# Patient Record
Sex: FEMALE | Race: WHITE | NOT HISPANIC OR LATINO | Employment: OTHER | URBAN - METROPOLITAN AREA
[De-identification: names, ages, dates, MRNs, and addresses within clinical notes are randomized per-mention and may not be internally consistent; named-entity substitution may affect disease eponyms.]

---

## 2017-01-04 ENCOUNTER — OFFICE VISIT (OUTPATIENT)
Dept: PHYSICAL MEDICINE AND REHAB | Facility: CLINIC | Age: 47
End: 2017-01-04

## 2017-01-04 VITALS
BODY MASS INDEX: 26.74 KG/M2 | HEIGHT: 70 IN | SYSTOLIC BLOOD PRESSURE: 123 MMHG | DIASTOLIC BLOOD PRESSURE: 73 MMHG | WEIGHT: 186.8 LBS | HEART RATE: 72 BPM

## 2017-01-04 DIAGNOSIS — M25.571 CHRONIC PAIN OF RIGHT ANKLE: Primary | ICD-10-CM

## 2017-01-04 DIAGNOSIS — G89.29 CHRONIC PAIN OF RIGHT ANKLE: Primary | ICD-10-CM

## 2017-01-04 DIAGNOSIS — S93.401A SPRAIN OF RIGHT ANKLE, UNSPECIFIED LIGAMENT, INITIAL ENCOUNTER: ICD-10-CM

## 2017-01-04 ASSESSMENT — PAIN SCALES - GENERAL: PAINLEVEL: NO PAIN (0)

## 2017-01-04 NOTE — NURSING NOTE
Patient given RX from Dr Villagomez for AFO. She was also given phone numbers and addresses for [facility], [facility] and [facility]. She is unsure which location her insurance will allow her to  Go.
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
## 2017-01-14 ENCOUNTER — TRANSFERRED RECORDS (OUTPATIENT)
Dept: HEALTH INFORMATION MANAGEMENT | Facility: CLINIC | Age: 47
End: 2017-01-14

## 2017-01-14 ENCOUNTER — THERAPY VISIT (OUTPATIENT)
Dept: PHYSICAL THERAPY | Facility: CLINIC | Age: 47
End: 2017-01-14
Payer: COMMERCIAL

## 2017-01-14 DIAGNOSIS — M25.371 RIGHT ANKLE INSTABILITY: ICD-10-CM

## 2017-01-14 DIAGNOSIS — M25.571 CHRONIC PAIN OF RIGHT ANKLE: Primary | ICD-10-CM

## 2017-01-14 DIAGNOSIS — G89.29 CHRONIC PAIN OF RIGHT ANKLE: Primary | ICD-10-CM

## 2017-01-14 PROCEDURE — 97110 THERAPEUTIC EXERCISES: CPT | Mod: GP | Performed by: PHYSICAL THERAPIST

## 2017-01-14 PROCEDURE — 97163 PT EVAL HIGH COMPLEX 45 MIN: CPT | Mod: GP | Performed by: PHYSICAL THERAPIST

## 2017-01-14 PROCEDURE — 97112 NEUROMUSCULAR REEDUCATION: CPT | Mod: GP | Performed by: PHYSICAL THERAPIST
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
## 2017-01-16 NOTE — PROGRESS NOTES
Subjective:                                     General health as reported by patient is good.  Pertinent medical history includes:  Diabetes and thyroid problems.    Other surgeries include:  Orthopedic surgery (ACL ( left )).  Current medications:  Thyroid medication and other (Insulin).  Current occupation is Research.    Primary job tasks include:  Prolonged sitting, driving and other (computer work).    Barriers include:  None as reported by patient.    Red flags:  None as reported by patient.                      Objective:    System    Physical Exam    General     ROS    Assessment/Plan:

## 2017-01-28 ENCOUNTER — THERAPY VISIT (OUTPATIENT)
Dept: PHYSICAL THERAPY | Facility: CLINIC | Age: 47
End: 2017-01-28
Payer: COMMERCIAL

## 2017-01-28 DIAGNOSIS — M25.371 RIGHT ANKLE INSTABILITY: Primary | ICD-10-CM

## 2017-01-28 PROCEDURE — 97110 THERAPEUTIC EXERCISES: CPT | Mod: GP | Performed by: PHYSICAL THERAPIST

## 2017-01-28 PROCEDURE — 97112 NEUROMUSCULAR REEDUCATION: CPT | Mod: GP | Performed by: PHYSICAL THERAPIST

## 2017-02-11 ENCOUNTER — THERAPY VISIT (OUTPATIENT)
Dept: PHYSICAL THERAPY | Facility: CLINIC | Age: 47
End: 2017-02-11
Payer: COMMERCIAL

## 2017-02-11 DIAGNOSIS — M25.371 RIGHT ANKLE INSTABILITY: Primary | ICD-10-CM

## 2017-02-11 PROCEDURE — 97110 THERAPEUTIC EXERCISES: CPT | Mod: GP | Performed by: PHYSICAL THERAPIST

## 2017-02-11 PROCEDURE — 97112 NEUROMUSCULAR REEDUCATION: CPT | Mod: GP | Performed by: PHYSICAL THERAPIST

## 2017-02-14 ENCOUNTER — OFFICE VISIT (OUTPATIENT)
Dept: ENDOCRINOLOGY | Facility: CLINIC | Age: 47
End: 2017-02-14

## 2017-02-14 VITALS
SYSTOLIC BLOOD PRESSURE: 112 MMHG | HEART RATE: 73 BPM | DIASTOLIC BLOOD PRESSURE: 75 MMHG | WEIGHT: 189.4 LBS | BODY MASS INDEX: 27.11 KG/M2 | HEIGHT: 70 IN

## 2017-02-14 DIAGNOSIS — E10.9 TYPE 1 DIABETES MELLITUS WITHOUT COMPLICATION (H): ICD-10-CM

## 2017-02-14 DIAGNOSIS — E10.9 TYPE 1 DIABETES MELLITUS WITHOUT COMPLICATION (H): Primary | ICD-10-CM

## 2017-02-14 LAB
CREAT SERPL-MCNC: 0.6 MG/DL (ref 0.52–1.04)
CREAT UR-MCNC: 74 MG/DL
GFR SERPL CREATININE-BSD FRML MDRD: NORMAL ML/MIN/1.7M2
HBA1C MFR BLD: 7.2 % (ref 4.3–6)
MICROALBUMIN UR-MCNC: 6 MG/L
MICROALBUMIN/CREAT UR: 7.81 MG/G CR (ref 0–25)

## 2017-02-14 NOTE — MR AVS SNAPSHOT
After Visit Summary   2/14/2017    Dali Martines    MRN: [id number]           Patient Information     Date Of Birth          1970        Visit Information        Provider Department      2/14/2017 2:30 PM Felicia Mckeon MD  [facility]        Today's Diagnoses     Type 1 diabetes mellitus without complication (H)    -  1      Care Instructions    Be sure to take your mealtime insulin before you eat    Keep a food log of what you ate in the evening on nights your bedtime correction doesn't bring you back to target.      To expedite your medication refill(s), please contact your pharmacy and have them fax a refill request to: [phone].  *Please allow 3 business days for routine medication refills.  *Please allow 5 business days for controlled substance medication refills.  --------------------  For scheduling appointments (including lab work), please request an appointment through [employer], or call: [phone].    For questions for your provider or the endocrine nurse, please send a [employer] message.  For after-hours urgent issues, please dial [phone], and ask to speak with the Endocrinologist On-Call.  --------------------  Please Note: If you are active on [employer], all future test results will be sent by [employer] message only and will no longer be sent by mail. You may also receive communication directly from your physician.          Follow-ups after your visit        Your next 10 appointments already scheduled     Feb 25, 2017 11:10 AM CST   [facility] Extremity with Chanelle Muniz PT   [facility] ([facility]  )    [address]  [facility] [place] [address]   [phone]            Mar 11, 2017 11:10 AM CST   LAKISHA Extremity with Chanelle Muniz PT   [facility] ([facility]  )    [address]  [facility] [place] [address]   [phone]            Mar 25, 2017 11:10  AM CDT   LAKISHA Extremity with Michi Esteves Pt, PT   [facility] ([facility]  )    [address]  [facility] [address]   [phone]            Apr 08, 2017 11:10 AM CDT   LAKISHA Extremity with Michi Esteves Pt, PT   [facility] ([facility] [place]  )    [address]  [facility] [address]   [phone]            Apr 18, 2017  3:30 PM CDT   (Arrive by 3:15 PM)   Return Visit with Yoel Betancourt MD   [facility] ([facility])    [address] [place] [address] [phone]            Apr 24, 2017 12:30 PM CDT   (Arrive by 12:15 PM)   Return Visit with Sara Carter MD   [facility] ([facility])    [address] [place] [address] [phone]            Aug 08, 2017  3:30 PM CDT   (Arrive by 3:15 PM)   RETURN DIABETES with Felicia Mckeon MD   [facility] ([facility])    [address] [place] [id number] [phone]              Who to contact     Please call your clinic at [phone] to:    Ask questions about your health    Make or cancel appointments    Discuss your medicines    Learn about your test results    Speak to your doctor   If you have compliments or concerns about an experience at your clinic, or if you wish to file a complaint, please contact [facility] Patient Relations at [phone] or email us at [email or web address]         Additional Information About Your Visit        MyChart Information     [employer] gives you secure access to your electronic health record. If you see a primary care provider, you can also send messages to your care team and make appointments. If you have questions, please call your primary care clinic.   "If you do not have a primary care provider, please call [phone] and they will assist you.      [employer] is an electronic gateway that provides easy, online access to your medical records. With [employer], you can request a clinic appointment, read your test results, renew a prescription or communicate with your care team.     To access your existing account, please contact your [facility] or call [phone] for assistance.        Care EveryWhere ID     This is your Care EveryWhere ID. This could be used by other organizations to access your [facility] medical records  [id number]        Your Vitals Were     Pulse Height BMI (Body Mass Index)             73 1.778 m (5' 10\") 27.18 kg/m2          Blood Pressure from Last 3 Encounters:   02/14/17 112/75   01/04/17 123/73   12/29/16 116/77    Weight from Last 3 Encounters:   02/14/17 85.9 kg (189 lb 6.4 oz)   01/04/17 84.7 kg (186 lb 12.8 oz)   10/18/16 88 kg (194 lb)               Primary Care Provider Office Phone # Fax #    Dimitri Alas [phone]       [address] [address]        Thank you!     Thank you for choosing [facility]  for your care. Our goal is always to provide you with excellent care. Hearing back from our patients is one way we can continue to improve our services. Please take a few minutes to complete the written survey that you may receive in the mail after your visit with us. Thank you!             Your Updated Medication List - Protect others around you: Learn how to safely use, store and throw away your medicines at www.disposemymeds.org.          This list is accurate as of: 2/14/17  5:03 PM.  Always use your most recent med list.                   Brand Name Dispense Instructions for use    aspirin 81 MG tablet      Take 1 tablet by mouth daily.       blood glucose monitoring lancets     4 Box    Use to test blood sugar 8 times daily or " as directed.       blood glucose monitoring test strip    ACCU-CHEK COLIN PLUS    700 strip    Test Blood Sugar 8 times daily or as directed       CALCIUM + D PO      Take one daily       citalopram 20 MG tablet    celeXA    135 tablet    Take 1.5 tablets (30 mg) by mouth daily       diclofenac 1 % Gel topical gel    VOLTAREN    100 g    Place 2 g onto the skin 4 times daily as needed for moderate pain       dulaglutide 1.5 MG/0.5ML pen    TRULICITY    6 mL    Inject 1.5 mg Subcutaneous every 7 days       econazole nitrate 1 % cream      Apply 0.5 inches topically daily.       fish oil-omega-3 fatty acids 1000 MG capsule      Take one daily       insulin degludec 100 UNIT/ML pen    TRESIBA    9 mL    Inject 18 Units Subcutaneous daily       insulin pen needle 31G X 8 MM    B-D U/F    450 each    Use 5 pen needles daily       levothyroxine 125 MCG tablet    SYNTHROID/LEVOTHROID    90 tablet    Take 1 tablet (125 mcg) by mouth daily       meloxicam 15 MG tablet    MOBIC    90 tablet    Take 1 tablet (15 mg) by mouth daily       methylphenidate 20 MG CR capsule    METADATE CD     Take 20 mg by mouth daily       mometasone-formoterol 100-5 MCG/ACT oral inhaler    DULERA    3 Inhaler    Inhale 2 puffs into the lungs 2 times daily       montelukast 10 MG tablet    SINGULAIR    30 tablet    Take 1 tablet (10 mg) by mouth every evening       * MULTIVITAMIN PO      Take  by mouth. Take one daily tab       * BIOCEL PO          NIZORAL PO      Take  by mouth. Shampoo daily       NovoLOG PENFILL 100 UNIT/ML injection   Generic drug:  insulin aspart     15 mL    1 unit per 15 grams CHO at all meals and snacks with correction scale of 1 unit per 50 mg/dL over 150.  Average daily use is 20 units.       ROGAINE EX      Externally apply  topically. daily       traZODone 50 MG tablet    DESYREL    180 tablet    Take 1-2 tablets by mouth nightly as needed for sleep.       UNABLE TO FIND      MEDICATION NAME: biosil       * Notice:   This list has 2 medication(s) that are the same as other medications prescribed for you. Read the directions carefully, and ask your doctor or other care provider to review them with you.

## 2017-02-14 NOTE — NURSING NOTE
"Chief Complaint   Patient presents with     RECHECK     f/u diabetes        Initial /75  Pulse 73  Ht 1.778 m (5' 10\")  Wt 85.9 kg (189 lb 6.4 oz)  BMI 27.18 kg/m2 Estimated body mass index is 27.18 kg/(m^2) as calculated from the following:    Height as of this encounter: 1.778 m (5' 10\").    Weight as of this encounter: 85.9 kg (189 lb 6.4 oz).  Medication Reconciliation: complete     Manasa Oconnor, CAMI         "

## 2017-02-14 NOTE — PROGRESS NOTES
This 46 year old woman returns for f/u of her type 1 diabetes. She also has hypothyroidism, psoriatic arthritis and joint pains, particularly in her right foot.    She is now taking tresiba 14 units daily She uses  Humalog at meals (1 for 15 )  and for correction (1 to drop 50 with goal of 70- 150)  She uses the expert meter and has been letting it calculate doses most of the time.  She was taking trulicity 1.5 mg each week but stopped two weeks ago because of cost.  She hasn't noticed that it has had much of an effect on her BG or her appetite.     She continues to use a dexcom almost daily.  She checks her sugars 1- 2 times a day and has an average over the last 30 days is 197 with range from .  Her CGM average is 130 over the last 2 weeks with 62 % of BG in range.  12% were low.       She doesn't always recognize her lows but the CGM helps her make sure she doesn't develop serious lows.  Review of the CGM shows was above 200 on 4 of last 7 nights. She believes this occurs even if she does corrections at hs.  On one night she gave a correction at 2 am and she fell to 70 over 3 hours.  On two other occasions she remained agove 200 all night despite corrections.  On the fourth night she cam down to less than 150 but then was back to 200 by 3 am.  On the other 3 nights of the last week she was stable and in target all night.  She usually is in target between 6 am and noon, but admits that she has trouble covering her noon meal and usually doesn't give insulin until after the meal.  As a result she is often low by 2-3 pm.  This occurred on 5 of the last 7 days.        She continues to be limited by pain in her left foot. She had surgery go to repair a ligamental tear in her left foot a year ago and her recovery has been slow. She is now working with PT and thinks that is at least keeping her from regressing.    She saw the eye MD and all is fine.  She has no sores on her feet.  Mood is OK.      Current  Outpatient Prescriptions on File Prior to Visit:  Multiple Vitamins-Minerals (BIOCEL PO)    blood glucose monitoring (ACCU-CHEK COLIN PLUS) test strip Test Blood Sugar 8 times daily or as directed   UNABLE TO FIND MEDICATION NAME: biosil   diclofenac (VOLTAREN) 1 % GEL Place 2 g onto the skin 4 times daily as needed for moderate pain   montelukast (SINGULAIR) 10 MG tablet Take 1 tablet (10 mg) by mouth every evening   meloxicam (MOBIC) 15 MG tablet Take 1 tablet (15 mg) by mouth daily   mometasone-formoterol (DULERA) 100-5 MCG/ACT oral inhaler Inhale 2 puffs into the lungs 2 times daily   NOVOLOG PENFILL 100 UNIT/ML soln 1 unit per 15 grams CHO at all meals and snacks with correction scale of 1 unit per 50 mg/dL over 150.  Average daily use is 20 units.   levothyroxine (SYNTHROID, LEVOTHROID) 125 MCG tablet Take 1 tablet (125 mcg) by mouth daily   Insulin Degludec 100 UNIT/ML SOPN Inject 18 Units Subcutaneous daily   blood glucose monitoring (ACCU-CHEK FASTCLIX) lancets Use to test blood sugar 8 times daily or as directed.   citalopram (CELEXA) 20 MG tablet Take 1.5 tablets (30 mg) by mouth daily   insulin pen needle (B-D U/F) 31G X 8 MM Use 5 pen needles daily   methylphenidate (METADATE CD) 20 MG CR capsule Take 20 mg by mouth daily   traZODone (DESYREL) 50 MG tablet Take 1-2 tablets by mouth nightly as needed for sleep.   aspirin 81 MG tablet Take 1 tablet by mouth daily.   Calcium Carbonate-Vitamin D (CALCIUM + D PO) Take one daily   econazole nitrate 1 % cream Apply 0.5 inches topically daily.   fish oil-omega-3 fatty acids (FISH OIL) 1000 MG capsule Take one daily   Multiple Vitamin (MULTIVITAMIN OR) Take  by mouth. Take one daily tab   Ketoconazole (NIZORAL PO) Take  by mouth. Shampoo daily   Minoxidil (ROGAINE EX) Externally apply  topically. daily   dulaglutide (TRULICITY) 1.5 MG/0.5ML pen Inject 1.5 mg Subcutaneous every 7 days (Patient not taking: Reported on 2/14/2017)     No current  "facility-administered medications on file prior to visit.     ROS: 10 point ROS neg other than the symptoms noted above in the HPI.    So Hx - lives alone . Works at [employer].  Often eats lunch at desk. Hasn't been able to exercise.    Vital signs:   /75  Pulse 73  Ht 1.778 m (5' 10\")  Wt 85.9 kg (189 lb 6.4 oz)  BMI 27.18 kg/m2  Estimated body mass index is 27.18 kg/(m^2) as calculated from the following:    Height as of this encounter: 1.778 m (5' 10\").    Weight as of this encounter: 85.9 kg (189 lb 6.4 oz).    NAD  Eyes - no periorbital edema, conjunctival injection, scleral icterus  CV - RRR.  Normal pulses in feet.  No edema  Neuro - sensation intact to monofilament on soles of feet.    Skin - normal texture   Feet - no ulcers     Recent Labs   Lab Test  02/14/17   1546  02/14/17   1540  10/21/16   0958 06/20/16 04/27/16   1402  02/16/16   1734  02/16/16   1719 02/16/16 07/09/14   1641   09/27/13   0928  09/16/13   1612   06/04/13   1555  02/15/13   0935   11/29/10   1009   A1C   --    --    --    --    --    --    --    --    --    --    --    --   7.2*   --   7.6*  6.8*   < >   --    HEMOGLOBINA1   --    --    --   7.4*   --    --    --   7.9*   < >   --    < >   --    --    --    --    --    < >   --    TSH   --    --   0.88   --    --   1.86   --    --    < >  0.11*   < >   --    --    < >   --    --    < >  1.89   T4   --    --    --    --    --    --    --    --    --   1.31   --    --    --    --    --    --    --   8.5   LDL   --    --    --    --    --    --    --    --    --    --    --   123   --    --    --   135*   < >  84   HDL   --    --    --    --    --    --    --    --    --    --    --   78   --    --    --   77   < >  94   TRIG   --    --    --    --    --    --    --    --    --    --    --   51   --    --    --   63   < >  45   CR  0.60   --    --    --   0.49*   --    --    --    < >   --    < >   --    --    --    --   0.56   < >  0.77   MICROL   --   6   --    --    --    --   6   " --    < >   --    < >   --    --    --    --    --    < >   --     < > = values in this interval not displayed.       A1c today 7.2    Assessment and plan:    1.  Diabetes control.  Overall she is doing well.  Told her that effect of trulicity may not be completely gone and to watch her post meal coverage. We may need to change the carb ratio.  Suggested she aim to take her lunch dose at the start of the meal and keep track of what she eats on the nights she stays so high overnight.  This may guide her on changes she can do to prevent the overnight highs from happening.     2.  Diabetes complications.  Will check renal function today.  Eyes and feet are OK.    3.  Hypothyroidism.  At target last fall.    4. CVD risk. Not on statin but may start post menopause. BP is OK.    F/u ~ 4 mo    I spent 25 min with her the majority of which were spent counseling about diabetes management    Felicia Mckeon MD

## 2017-02-14 NOTE — PATIENT INSTRUCTIONS
Be sure to take your mealtime insulin before you eat    Keep a food log of what you ate in the evening on nights your bedtime correction doesn't bring you back to target.      To expedite your medication refill(s), please contact your pharmacy and have them fax a refill request to: [phone].  *Please allow 3 business days for routine medication refills.  *Please allow 5 business days for controlled substance medication refills.  --------------------  For scheduling appointments (including lab work), please request an appointment through [employer], or call: [phone].    For questions for your provider or the endocrine nurse, please send a [employer] message.  For after-hours urgent issues, please dial [phone], and ask to speak with the Endocrinologist On-Call.  --------------------  Please Note: If you are active on [facility], all future test results will be sent by [facility] message only and will no longer be sent by mail. You may also receive communication directly from your physician.

## 2017-02-14 NOTE — LETTER
2/14/2017       RE: Dali Martines  [address]  [facility] [address]     Dear Colleague,    Thank you for referring your patient, Dali Martines, to the [facility] ENDOCRINOLOGY at [facility]. Please see a copy of my visit note below.    This 46 year old woman returns for f/u of her type 1 diabetes. She also has hypothyroidism, psoriatic arthritis and joint pains, particularly in her right foot.    She is now taking tresiba 14 units daily She uses  Humalog at meals (1 for 15 )  and for correction (1 to drop 50 with goal of 70- 150)  She uses the expert meter and has been letting it calculate doses most of the time.  She was taking trulicity 1.5 mg each week but stopped two weeks ago because of cost.  She hasn't noticed that it has had much of an effect on her BG or her appetite.     She continues to use a dexcom almost daily.  She checks her sugars 1- 2 times a day and has an average over the last 30 days is 197 with range from .  Her CGM average is 130 over the last 2 weeks with 62 % of BG in range.  12% were low.       She doesn't always recognize her lows but the CGM helps her make sure she doesn't develop serious lows.  Review of the CGM shows was above 200 on 4 of last 7 nights. She believes this occurs even if she does corrections at hs.  On one night she gave a correction at 2 am and she fell to 70 over 3 hours.  On two other occasions she remained agove 200 all night despite corrections.  On the fourth night she cam down to less than 150 but then was back to 200 by 3 am.  On the other 3 nights of the last week she was stable and in target all night.  She usually is in target between 6 am and noon, but admits that she has trouble covering her noon meal and usually doesn't give insulin until after the meal.  As a result she is often low by 2-3 pm.  This occurred on 5 of the last 7 days.        She continues to be limited by pain in her left foot. She had  surgery go to repair a ligamental tear in her left foot a year ago and her recovery has been slow. She is now working with PT and thinks that is at least keeping her from regressing.    She saw the eye MD and all is fine.  She has no sores on her feet.  Mood is OK.      Current Outpatient Prescriptions on File Prior to Visit:  Multiple Vitamins-Minerals (BIOCEL PO)    blood glucose monitoring (ACCU-CHEK COLIN PLUS) test strip Test Blood Sugar 8 times daily or as directed   UNABLE TO FIND MEDICATION NAME: biosil   diclofenac (VOLTAREN) 1 % GEL Place 2 g onto the skin 4 times daily as needed for moderate pain   montelukast (SINGULAIR) 10 MG tablet Take 1 tablet (10 mg) by mouth every evening   meloxicam (MOBIC) 15 MG tablet Take 1 tablet (15 mg) by mouth daily   mometasone-formoterol (DULERA) 100-5 MCG/ACT oral inhaler Inhale 2 puffs into the lungs 2 times daily   NOVOLOG PENFILL 100 UNIT/ML soln 1 unit per 15 grams CHO at all meals and snacks with correction scale of 1 unit per 50 mg/dL over 150.  Average daily use is 20 units.   levothyroxine (SYNTHROID, LEVOTHROID) 125 MCG tablet Take 1 tablet (125 mcg) by mouth daily   Insulin Degludec 100 UNIT/ML SOPN Inject 18 Units Subcutaneous daily   blood glucose monitoring (ACCU-CHEK FASTCLIX) lancets Use to test blood sugar 8 times daily or as directed.   citalopram (CELEXA) 20 MG tablet Take 1.5 tablets (30 mg) by mouth daily   insulin pen needle (B-D U/F) 31G X 8 MM Use 5 pen needles daily   methylphenidate (METADATE CD) 20 MG CR capsule Take 20 mg by mouth daily   traZODone (DESYREL) 50 MG tablet Take 1-2 tablets by mouth nightly as needed for sleep.   aspirin 81 MG tablet Take 1 tablet by mouth daily.   Calcium Carbonate-Vitamin D (CALCIUM + D PO) Take one daily   econazole nitrate 1 % cream Apply 0.5 inches topically daily.   fish oil-omega-3 fatty acids (FISH OIL) 1000 MG capsule Take one daily   Multiple Vitamin (MULTIVITAMIN OR) Take  by mouth. Take one daily tab  "  Ketoconazole (NIZORAL PO) Take  by mouth. Shampoo daily   Minoxidil (ROGAINE EX) Externally apply  topically. daily   dulaglutide (TRULICITY) 1.5 MG/0.5ML pen Inject 1.5 mg Subcutaneous every 7 days (Patient not taking: Reported on 2/14/2017)     No current facility-administered medications on file prior to visit.     ROS: 10 point ROS neg other than the symptoms noted above in the HPI.    So Hx - lives alone . Works at [employer].  Often eats lunch at desk. Hasn't been able to exercise.    Vital signs:   /75  Pulse 73  Ht 1.778 m (5' 10\")  Wt 85.9 kg (189 lb 6.4 oz)  BMI 27.18 kg/m2  Estimated body mass index is 27.18 kg/(m^2) as calculated from the following:    Height as of this encounter: 1.778 m (5' 10\").    Weight as of this encounter: 85.9 kg (189 lb 6.4 oz).    NAD  Eyes - no periorbital edema, conjunctival injection, scleral icterus  CV - RRR.  Normal pulses in feet.  No edema  Neuro - sensation intact to monofilament on soles of feet.    Skin - normal texture   Feet - no ulcers     Recent Labs   Lab Test  02/14/17   1546  02/14/17   1540  10/21/16   0958 06/20/16 04/27/16   1402  02/16/16   1734  02/16/16   1719 02/16/16 07/09/14   1641   09/27/13   0928  09/16/13   1612   06/04/13   1555  02/15/13   0935   11/29/10   1009   A1C   --    --    --    --    --    --    --    --    --    --    --    --   7.2*   --   7.6*  6.8*   < >   --    HEMOGLOBINA1   --    --    --   7.4*   --    --    --   7.9*   < >   --    < >   --    --    --    --    --    < >   --    TSH   --    --   0.88   --    --   1.86   --    --    < >  0.11*   < >   --    --    < >   --    --    < >  1.89   T4   --    --    --    --    --    --    --    --    --   1.31   --    --    --    --    --    --    --   8.5   LDL   --    --    --    --    --    --    --    --    --    --    --   123   --    --    --   135*   < >  84   HDL   --    --    --    --    --    --    --    --    --    --    --   78   --    --    --   77   < >  94   TRIG   -- "    --    --    --    --    --    --    --    --    --    --   51   --    --    --   63   < >  45   CR  0.60   --    --    --   0.49*   --    --    --    < >   --    < >   --    --    --    --   0.56   < >  0.77   MICROL   --   6   --    --    --    --   6   --    < >   --    < >   --    --    --    --    --    < >   --     < > = values in this interval not displayed.       A1c today 7.2    Assessment and plan:    1.  Diabetes control.  Overall she is doing well.  Told her that effect of trulicity may not be completely gone and to watch her post meal coverage. We may need to change the carb ratio.  Suggested she aim to take her lunch dose at the start of the meal and keep track of what she eats on the nights she stays so high overnight.  This may guide her on changes she can do to prevent the overnight highs from happening.     2.  Diabetes complications.  Will check renal function today.  Eyes and feet are OK.    3.  Hypothyroidism.  At target last fall.    4. CVD risk. Not on statin but may start post menopause. BP is OK.    F/u ~ 4 mo    I spent 25 min with her the majority of which were spent counseling about diabetes management    Felicia Mckeon MD

## 2017-02-17 DIAGNOSIS — E10.9 TYPE 1 DIABETES, HBA1C GOAL < 7% (H): ICD-10-CM

## 2017-02-17 RX ORDER — INSULIN ASPART 100 [IU]/ML
INJECTION, SOLUTION INTRAVENOUS; SUBCUTANEOUS
Qty: 15 ML | Refills: 4 | Status: SHIPPED | OUTPATIENT
Start: 2017-02-17 | End: 2017-12-05

## 2017-02-25 ENCOUNTER — THERAPY VISIT (OUTPATIENT)
Dept: PHYSICAL THERAPY | Facility: CLINIC | Age: 47
End: 2017-02-25
Payer: COMMERCIAL

## 2017-02-25 DIAGNOSIS — M25.371 RIGHT ANKLE INSTABILITY: ICD-10-CM

## 2017-02-25 PROCEDURE — 97112 NEUROMUSCULAR REEDUCATION: CPT | Mod: GP | Performed by: PHYSICAL THERAPIST

## 2017-02-25 PROCEDURE — 97110 THERAPEUTIC EXERCISES: CPT | Mod: GP | Performed by: PHYSICAL THERAPIST

## 2017-02-27 DIAGNOSIS — E10.9 TYPE 1 DIABETES MELLITUS WITHOUT COMPLICATION (H): ICD-10-CM

## 2017-02-27 NOTE — TELEPHONE ENCOUNTER
dulaglutide (TRULICITY) 1.5 MG/0.5ML pen      Last Written Prescription Date:  10/25/2016  Last Fill Quantity: 6 ml,   # refills: 3  Last Office Visit : 2/14/2017  Future Office visit:  8/8/2017

## 2017-03-15 DIAGNOSIS — E10.9 TYPE 1 DIABETES MELLITUS WITHOUT COMPLICATION (H): ICD-10-CM

## 2017-03-18 ENCOUNTER — THERAPY VISIT (OUTPATIENT)
Dept: PHYSICAL THERAPY | Facility: CLINIC | Age: 47
End: 2017-03-18
Payer: COMMERCIAL

## 2017-03-18 DIAGNOSIS — M25.371 RIGHT ANKLE INSTABILITY: ICD-10-CM

## 2017-03-18 PROCEDURE — 97110 THERAPEUTIC EXERCISES: CPT | Mod: GP | Performed by: PHYSICAL THERAPIST

## 2017-03-18 PROCEDURE — 97112 NEUROMUSCULAR REEDUCATION: CPT | Mod: GP | Performed by: PHYSICAL THERAPIST

## 2017-03-27 DIAGNOSIS — E10.9 TYPE 1 DIABETES MELLITUS WITHOUT COMPLICATION (H): ICD-10-CM

## 2017-04-08 ENCOUNTER — THERAPY VISIT (OUTPATIENT)
Dept: PHYSICAL THERAPY | Facility: CLINIC | Age: 47
End: 2017-04-08
Payer: COMMERCIAL

## 2017-04-08 DIAGNOSIS — M25.371 RIGHT ANKLE INSTABILITY: ICD-10-CM

## 2017-04-08 PROCEDURE — 97110 THERAPEUTIC EXERCISES: CPT | Mod: GP | Performed by: PHYSICAL THERAPIST

## 2017-04-08 PROCEDURE — 97112 NEUROMUSCULAR REEDUCATION: CPT | Mod: GP | Performed by: PHYSICAL THERAPIST

## 2017-04-19 ASSESSMENT — ENCOUNTER SYMPTOMS
SNORES LOUDLY: 0
RECTAL BLEEDING: 0
BLOOD IN STOOL: 0
DYSPNEA ON EXERTION: 1
ARTHRALGIAS: 1
ABDOMINAL PAIN: 1
POSTURAL DYSPNEA: 1
RECTAL PAIN: 0
MUSCLE WEAKNESS: 0
DIARRHEA: 0
WHEEZING: 0
INCREASED ENERGY: 1
DECREASED APPETITE: 1
SORE THROAT: 0
WEIGHT LOSS: 0
JOINT SWELLING: 0
JAUNDICE: 0
SMELL DISTURBANCE: 0
FEVER: 0
VOMITING: 0
TROUBLE SWALLOWING: 0
NAUSEA: 0
NECK MASS: 1
SPUTUM PRODUCTION: 0
BACK PAIN: 1
STIFFNESS: 1
MUSCLE CRAMPS: 0
WEIGHT GAIN: 0
RESPIRATORY PAIN: 1
POLYDIPSIA: 0
NECK PAIN: 0
HALLUCINATIONS: 0
CONSTIPATION: 0
SHORTNESS OF BREATH: 1
NIGHT SWEATS: 0
DECREASED LIBIDO: 0
ALTERED TEMPERATURE REGULATION: 1
MYALGIAS: 1
SWOLLEN GLANDS: 1
HOT FLASHES: 0
CHILLS: 1
FATIGUE: 1
COUGH DISTURBING SLEEP: 0
SINUS PAIN: 1
BRUISES/BLEEDS EASILY: 1
TASTE DISTURBANCE: 0
HEMOPTYSIS: 0
BLOATING: 1
POLYPHAGIA: 0
SINUS CONGESTION: 0
HOARSE VOICE: 1
COUGH: 0
BOWEL INCONTINENCE: 0
HEARTBURN: 0

## 2017-04-24 ENCOUNTER — OFFICE VISIT (OUTPATIENT)
Dept: PULMONOLOGY | Facility: CLINIC | Age: 47
End: 2017-04-24
Attending: INTERNAL MEDICINE
Payer: COMMERCIAL

## 2017-04-24 VITALS
SYSTOLIC BLOOD PRESSURE: 135 MMHG | DIASTOLIC BLOOD PRESSURE: 89 MMHG | HEART RATE: 75 BPM | RESPIRATION RATE: 16 BRPM | OXYGEN SATURATION: 98 %

## 2017-04-24 DIAGNOSIS — R59.9 SWOLLEN LYMPH NODES: ICD-10-CM

## 2017-04-24 DIAGNOSIS — J45.30 MILD PERSISTENT ASTHMA WITHOUT COMPLICATION: Primary | ICD-10-CM

## 2017-04-24 PROCEDURE — 99212 OFFICE O/P EST SF 10 MIN: CPT | Mod: ZF

## 2017-04-24 PROCEDURE — 86735 MUMPS ANTIBODY: CPT | Performed by: INTERNAL MEDICINE

## 2017-04-24 PROCEDURE — 36415 COLL VENOUS BLD VENIPUNCTURE: CPT | Performed by: INTERNAL MEDICINE

## 2017-04-24 RX ORDER — MONTELUKAST SODIUM 5 MG/1
5 TABLET, CHEWABLE ORAL AT BEDTIME
Qty: 30 TABLET | Refills: 3 | Status: SHIPPED | OUTPATIENT
Start: 2017-04-24 | End: 2017-08-18

## 2017-04-24 ASSESSMENT — PAIN SCALES - GENERAL: PAINLEVEL: NO PAIN (0)

## 2017-04-24 NOTE — MR AVS SNAPSHOT
After Visit Summary   4/24/2017    Dali Martines    MRN: [id number]           Patient Information     Date Of Birth          1970        Visit Information        Provider Department      4/24/2017 12:30 PM Sara Carter MD [facility]        Today's Diagnoses     Mild persistent asthma without complication    -  1    Swollen lymph nodes           Follow-ups after your visit        Follow-up notes from your care team     Return in about 4 months (around 8/24/2017).      Your next 10 appointments already scheduled     Apr 24, 2017  1:30 PM CDT   Lab with  LAB    Health Lab ([facility])    [address]  1st Floor  [facility] [address]   [phone]            Apr 25, 2017  5:30 PM CDT   (Arrive by 5:15 PM)   Return Visit with Yoel Betancourt MD   [facility] ([facility])    [address]  3rd Floor  [facility] [address]   [phone]            May 06, 2017 11:10 AM CDT   [facility]KISHA Extremity with Chanelle Muniz PT   [facility] ([facility]  )    [address]  [facility] [address]   [phone]            May 20, 2017 11:10 AM CDT   LAKISHA Extremity with Chanelle Muniz PT   [facility] ([facility]  )    [address]  [facility] [address]   [phone]            Sergey 10, 2017 11:10 AM CDT   LAKISHA Extremity with Mcihi Esteves Pt, PT   [facility] ([facility]  )    [address]  [facility] [address]   [phone]            Jun 24, 2017 11:10 AM CDT   LAKISHA Extremity with Michi Esteves Pt, PT   [facility] ([facility]  )    [address]  [facility] [address]   [phone]            Aug 08, 2017  3:30 PM CDT   (Arrive by 3:15 PM)    RETURN DIABETES with Felicia Mckeon MD   [facility] ([facility])    [address] [id number] [phone]            Aug 21, 2017 11:30 AM CDT   (Arrive by 11:15 AM)   Return Visit with Sara Crater MD   [facility] ([facility])    [address] [id number] [phone]              Who to contact     If you have questions or need follow up information about today's clinic visit or your schedule please contact [facility] directly at [phone].  Normal or non-critical lab and imaging results will be communicated to you by [facility] [facility], letter or phone within 4 business days after the clinic has received the results. If you do not hear from us within 7 days, please contact the clinic through [facility] [facility] or phone. If you have a critical or abnormal lab result, we will notify you by phone as soon as possible.  Submit refill requests through [employer] or call your pharmacy and they will forward the refill request to us. Please allow 3 business days for your refill to be completed.          Additional Information About Your Visit        [employer] Information     [employer] gives you secure access to your electronic health record. If you see a primary care provider, you can also send messages to your care team and make appointments. If you have questions, please call your primary care clinic.  If you do not have a primary care provider, please call [phone] and they will assist you.        Care EveryWhere ID     This is your Care EveryWhere ID. This could be used by other organizations to access your [place] medical records  [id number]        Your Vitals Were     Pulse Respirations Pulse Oximetry             75 16 98%          Blood Pressure from Last 3 Encounters:   04/24/17 135/89   02/14/17 112/75   01/04/17 123/73     Weight from Last 3 Encounters:   02/14/17 85.9 kg (189 lb 6.4 oz)   01/04/17 84.7 kg (186 lb 12.8 oz)   10/18/16 88 kg (194 lb)                 Today's Medication Changes          These changes are accurate as of: 4/24/17  1:27 PM.  If you have any questions, ask your nurse or doctor.               These medicines have changed or have updated prescriptions.        Dose/Directions    * montelukast 10 MG tablet   Commonly known as:  SINGULAIR   This may have changed:  Another medication with the same name was added. Make sure you understand how and when to take each.   Used for:  Mild persistent allergic asthma   Changed by:  Sara Carter MD        Dose:  10 mg   Take 1 tablet (10 mg) by mouth every evening   Quantity:  30 tablet   Refills:  6       * montelukast 5 MG chewable tablet   Commonly known as:  SINGULAIR   This may have changed:  You were already taking a medication with the same name, and this prescription was added. Make sure you understand how and when to take each.   Used for:  Mild persistent asthma without complication   Changed by:  Sara Carter MD        Dose:  5 mg   Take 1 tablet (5 mg) by mouth At Bedtime   Quantity:  30 tablet   Refills:  3       MULTIVITAMIN PO   This may have changed:  Another medication with the same name was removed. Continue taking this medication, and follow the directions you see here.   Changed by:  Felicia Mckeon MD        Take  by mouth. Take one daily tab   Refills:  0       * Notice:  This list has 2 medication(s) that are the same as other medications prescribed for you. Read the directions carefully, and ask your doctor or other care provider to review them with you.         Where to get your medicines      These medications were sent to [facility] [id number]  [address] [place] [address]     Phone:  [phone]     montelukast 5 MG chewable tablet                 Primary Care Provider Office Phone # Fax #    Dimitri Alas [phone]       [address] [address]        Thank you!     Thank you for choosing [facility]  for your care. Our goal is always to provide you with excellent care. Hearing back from our patients is one way we can continue to improve our services. Please take a few minutes to complete the written survey that you may receive in the mail after your visit with us. Thank you!             Your Updated Medication List - Protect others around you: Learn how to safely use, store and throw away your medicines at www.disposemymeds.org.          This list is accurate as of: 4/24/17  1:27 PM.  Always use your most recent med list.                   Brand Name Dispense Instructions for use    aspirin 81 MG tablet      Take 1 tablet by mouth daily.       blood glucose monitoring lancets     4 Box    Use to test blood sugar 8 times daily or as directed.       blood glucose monitoring test strip    ACCU-CHEK COLIN PLUS    700 strip    Test Blood Sugar 8 times daily or as directed       CALCIUM + D PO      Take one daily       citalopram 20 MG tablet    celeXA    135 tablet    Take 1.5 tablets (30 mg) by mouth daily       diclofenac 1 % Gel topical gel    VOLTAREN    100 g    Place 2 g onto the skin 4 times daily as needed for moderate pain       dulaglutide 1.5 MG/0.5ML pen    TRULICITY    6 mL    Inject 1.5 mg Subcutaneous every 7 days       econazole nitrate 1 % cream      Apply 0.5 inches topically daily.       fish oil-omega-3 fatty acids 1000 MG capsule      Take one daily       insulin degludec 100 UNIT/ML pen    TRESIBA    15 mL    Inject 18 Units Subcutaneous daily       insulin pen needle 31G X 8 MM    B-D U/F    450 each    Use 5 pen needles daily       levothyroxine 125 MCG tablet    SYNTHROID/LEVOTHROID    90 tablet    Take 1 tablet (125 mcg) by mouth daily        meloxicam 15 MG tablet    MOBIC    90 tablet    Take 1 tablet (15 mg) by mouth daily       methylphenidate 20 MG CR capsule    METADATE CD     Take 20 mg by mouth daily       mometasone-formoterol 100-5 MCG/ACT oral inhaler    DULERA    3 Inhaler    Inhale 2 puffs into the lungs 2 times daily       * montelukast 10 MG tablet    SINGULAIR    30 tablet    Take 1 tablet (10 mg) by mouth every evening       * montelukast 5 MG chewable tablet    SINGULAIR    30 tablet    Take 1 tablet (5 mg) by mouth At Bedtime       MULTIVITAMIN PO      Take  by mouth. Take one daily tab       NIZORAL PO      Take  by mouth. Shampoo daily       NovoLOG PENFILL 100 UNIT/ML injection   Generic drug:  insulin aspart     15 mL    1 unit per 15 grams CHO at all meals and snacks with correction scale of 1 unit per 50 mg/dL over 150.  Average daily use is 20 units.       ROGAINE EX      Externally apply  topically. daily       traZODone 50 MG tablet    DESYREL    180 tablet    Take 1-2 tablets by mouth nightly as needed for sleep.       * UNABLE TO FIND      MEDICATION NAME: biosil       * UNABLE TO FIND      Biosil       * Notice:  This list has 4 medication(s) that are the same as other medications prescribed for you. Read the directions carefully, and ask your doctor or other care provider to review them with you.

## 2017-04-24 NOTE — NURSING NOTE
Chief Complaint   Patient presents with     Asthma     Patient is being seen for follow up of asthma/Pt c/o mild congestion headaches enlarged lymph, Chest tightness x 1 month.      Bessie Cosme  CMA at 12:37 PM on 4/24/2017

## 2017-04-24 NOTE — PROGRESS NOTES
Reason for Visit  Dali Martines is a 46 year old female who is followed for asthma  Pulmonary HPI  In late march, early April she had sore throat, chest tightness, short of. She resumed half-tab Singulair then inhaler. Then got back on full tab of Singulair which improved above symptoms. Peak flows have been about 450. Off Dulera now.   Last 10 days lymph node swelling on the right, sore on the floor of the mouth, chills, anorexia, headache. Ankle wound healing is coming along slowly, hasn't been active lately.     The patient was seen and examined by Sara Carter MD   Current Outpatient Prescriptions   Medication     insulin degludec (TRESIBA) 100 UNIT/ML pen     dulaglutide (TRULICITY) 1.5 MG/0.5ML pen     NOVOLOG PENFILL 100 UNIT/ML soln     Multiple Vitamins-Minerals (BIOCEL PO)     blood glucose monitoring (ACCU-CHEK COLIN PLUS) test strip     UNABLE TO FIND     diclofenac (VOLTAREN) 1 % GEL     montelukast (SINGULAIR) 10 MG tablet     meloxicam (MOBIC) 15 MG tablet     mometasone-formoterol (DULERA) 100-5 MCG/ACT oral inhaler     levothyroxine (SYNTHROID, LEVOTHROID) 125 MCG tablet     blood glucose monitoring (ACCU-CHEK FASTCLIX) lancets     citalopram (CELEXA) 20 MG tablet     insulin pen needle (B-D U/F) 31G X 8 MM     methylphenidate (METADATE CD) 20 MG CR capsule     traZODone (DESYREL) 50 MG tablet     aspirin 81 MG tablet     Calcium Carbonate-Vitamin D (CALCIUM + D PO)     econazole nitrate 1 % cream     fish oil-omega-3 fatty acids (FISH OIL) 1000 MG capsule     Multiple Vitamin (MULTIVITAMIN OR)     Ketoconazole (NIZORAL PO)     Minoxidil (ROGAINE EX)     No current facility-administered medications for this visit.      No Known Allergies  Social History     Social History     Marital status: Single     Spouse name: N/A     Number of children: 0     Years of education: N/A     Occupational History     research [employer]     Social History Main Topics     Smoking status: Never Smoker      Smokeless tobacco: Never Used     Alcohol use 0.0 oz/week     0 Standard drinks or equivalent per week      Comment: moderately     Drug use: No     Sexual activity: Not on file     Other Topics Concern     Parent/Sibling W/ Cabg, Mi Or Angioplasty Before 65f 55m? Yes     Social History Narrative     Past Medical History:   Diagnosis Date     Anemia      Anxiety      Arthritis 2014    Psoriatic arthritis     Back injury 1988     Dry eye syndrome      Dyslipidemia      Endometriosis 2002    adehsions seen at laparoscopy     GERD (gastroesophageal reflux disease)      Hypothyroidism     8 yoa     SOB (shortness of breath) 3/11/2014     Type I (juvenile type) diabetes mellitus without mention of complication, not stated as uncontrolled     when 15      Uncomplicated asthma Fall 2013     ROS Pulmonary  Dyspnea: No, Cough: No, Chest pain: Yes, Wheezing: No, Sputum Production: No, Hemoptysis: No  A complete ROS was otherwise negative except as noted in the HPI.  /89 (BP Location: Right arm, Patient Position: Chair, Cuff Size: Adult Regular)  Pulse 75  Resp 16  SpO2 98%  Exam:   GENERAL APPEARANCE: Well developed, well nourished, alert, and in no apparent distress.  EYES: PERRL, EOMI  HENT: Nasal mucosa with no edema and no hyperemia. No nasal polyps.  EARS: Canals clear, TMs normal  MOUTH: Oral mucosa is moist, without any lesions, no tonsillar enlargement, no oropharyngeal exudate.  NECK: supple, no masses, no thyromegaly.   LYMPHATICS: no palpable lymphadenopathy  RESP: normal percussion, good air flow throughout.  No crackles. No rhonchi. No wheezes.  CV: Normal S1, S2, regular rhythm, normal rate. No murmur.  No rub. No gallop. No LE edema.   ABDOMEN:  Bowel sounds normal, soft, nontender, no HSM or masses.   MS: extremities normal. No clubbing. No cyanosis.  SKIN: no rash on limited exam  NEURO: Mentation intact, speech normal, normal strength and tone, normal gait and stance  PSYCH: mentation appears  normal. and affect normal/bright  Results:  No new testing    Assessment and plan: Dali Martines is a 46-year-old female with type 1 diabetes who was clinically diagnosed with asthma as an adult.  She has been sedentary for over a year due to ankle injury, surgery, poor wound healing  1.  Asthma.  Mild intermittent asthma, stable.   Up to date on vaccinations.      - peak flow meter and action plan reviewed  - spacer provided  - continue full dose Singulair for asthma maintenance, reduce to 5mg in a couple of weeks because she has noticed constipation with full dose      2.  Cervical adenopathy detected on ultrasound done to evaluate neck discomfort. it still comes and goes, especially related to infection. She is concerned about mumps, we called MD, recommended IgM blood test, will be performed today.     I will see her back in clinic in 4 months.

## 2017-04-25 ENCOUNTER — OFFICE VISIT (OUTPATIENT)
Dept: RHEUMATOLOGY | Facility: CLINIC | Age: 47
End: 2017-04-25
Attending: INTERNAL MEDICINE
Payer: COMMERCIAL

## 2017-04-25 VITALS
DIASTOLIC BLOOD PRESSURE: 81 MMHG | RESPIRATION RATE: 18 BRPM | HEART RATE: 82 BPM | BODY MASS INDEX: 28.41 KG/M2 | WEIGHT: 198 LBS | SYSTOLIC BLOOD PRESSURE: 121 MMHG

## 2017-04-25 DIAGNOSIS — E06.3 HYPOTHYROIDISM DUE TO HASHIMOTO'S THYROIDITIS: Primary | ICD-10-CM

## 2017-04-25 DIAGNOSIS — M77.9 ENTHESOPATHY: ICD-10-CM

## 2017-04-25 DIAGNOSIS — R70.0 ESR RAISED: ICD-10-CM

## 2017-04-25 DIAGNOSIS — E06.3 HYPOTHYROIDISM DUE TO HASHIMOTO'S THYROIDITIS: ICD-10-CM

## 2017-04-25 LAB
BASOPHILS # BLD AUTO: 0.1 10E9/L (ref 0–0.2)
BASOPHILS NFR BLD AUTO: 0.8 %
CRP SERPL-MCNC: <2.9 MG/L (ref 0–8)
DIFFERENTIAL METHOD BLD: NORMAL
EOSINOPHIL # BLD AUTO: 0.1 10E9/L (ref 0–0.7)
EOSINOPHIL NFR BLD AUTO: 2 %
ERYTHROCYTE [DISTWIDTH] IN BLOOD BY AUTOMATED COUNT: 12.3 % (ref 10–15)
ERYTHROCYTE [SEDIMENTATION RATE] IN BLOOD BY WESTERGREN METHOD: 41 MM/H (ref 0–20)
HCT VFR BLD AUTO: 36.7 % (ref 35–47)
HGB BLD-MCNC: 12.3 G/DL (ref 11.7–15.7)
IMM GRANULOCYTES # BLD: 0 10E9/L (ref 0–0.4)
IMM GRANULOCYTES NFR BLD: 0.3 %
LYMPHOCYTES # BLD AUTO: 2.2 10E9/L (ref 0.8–5.3)
LYMPHOCYTES NFR BLD AUTO: 33.7 %
MCH RBC QN AUTO: 30.4 PG (ref 26.5–33)
MCHC RBC AUTO-ENTMCNC: 33.5 G/DL (ref 31.5–36.5)
MCV RBC AUTO: 91 FL (ref 78–100)
MONOCYTES # BLD AUTO: 0.5 10E9/L (ref 0–1.3)
MONOCYTES NFR BLD AUTO: 7.9 %
NEUTROPHILS # BLD AUTO: 3.7 10E9/L (ref 1.6–8.3)
NEUTROPHILS NFR BLD AUTO: 55.3 %
NRBC # BLD AUTO: 0 10*3/UL
NRBC BLD AUTO-RTO: 0 /100
PLATELET # BLD AUTO: 303 10E9/L (ref 150–450)
RBC # BLD AUTO: 4.04 10E12/L (ref 3.8–5.2)
TSH SERPL DL<=0.005 MIU/L-ACNC: 0.66 MU/L (ref 0.4–4)
WBC # BLD AUTO: 6.6 10E9/L (ref 4–11)

## 2017-04-25 PROCEDURE — 85652 RBC SED RATE AUTOMATED: CPT | Performed by: INTERNAL MEDICINE

## 2017-04-25 PROCEDURE — 85025 COMPLETE CBC W/AUTO DIFF WBC: CPT | Performed by: INTERNAL MEDICINE

## 2017-04-25 PROCEDURE — 99212 OFFICE O/P EST SF 10 MIN: CPT | Mod: ZF

## 2017-04-25 PROCEDURE — 36415 COLL VENOUS BLD VENIPUNCTURE: CPT | Performed by: INTERNAL MEDICINE

## 2017-04-25 PROCEDURE — 84443 ASSAY THYROID STIM HORMONE: CPT | Performed by: INTERNAL MEDICINE

## 2017-04-25 PROCEDURE — 86140 C-REACTIVE PROTEIN: CPT | Performed by: INTERNAL MEDICINE

## 2017-04-25 ASSESSMENT — PAIN SCALES - GENERAL: PAINLEVEL: MODERATE PAIN (4)

## 2017-04-25 NOTE — PROGRESS NOTES
Rheumatology Visit     Dali Martines MRN# [id number]   YOB: 1970 Age: 44 year old         Primary care provider: Dimitri Alas          Assessment and Plan:     Psoriatic Arthriitis versus Rheumatoid Arthritis     Ms. Dali Martines is a 44 year-old woman with PMH of type I DM, Hashimoto's thyroiditis, and possible psoriasis in childhood, with family history of RA, type I DM, Hashimoto's, and psoriasis presents who presents with recurrent acute onset pain, swelling, stiffness in L hand.    Most likely explanation of her symptoms is psoriatic arthritis given the presentation of asymmetric, transient joint pain/swelling/stiffness, new low titer NOLVIA (vs 2008), family history of psoriasis and active psoriasis found on exam, plus morning heel pain. Psoriasis tends to be worst in fall/winter months, and this corresponds to when she has had these episodes of arthritis in the hand.    She has no active synovitis at this time and based on that I certainly do not think that we need to move on disease-modifying drugs.      I am not sure what this episode represented, whether it was some trauma that she does not remember or whether she actually was having a transient flare of inflammatory arthritis.      No matter what it was, I do not want her on Meloxicam long term with her diabetes due to its risks of the kidneys.  We have discussed that in some detail today.      I also think it may be a cause of her weight gain, potentially with some fluid retention, though she has not had her T4/TSH checked in the last year.      Accordingly, I am going to check her CBC with diff, her CRP, sed rate and a T4, TSH today.      I have, however, asked her to totally stop the meloxicam and to use it only on a p.r.n. basis if things flare.      In the meantime, she can try using the topical diclofenac also on the right fifth digit.      I would like to see her in 4 months to see how this strategy is going.  Certainly if she  is having worsening joint problems despite this, I think we need to consider disease-modifying drugs, although she has not wanted to be on those in the past and I have not been convinced based on the severity of her exam which is pretty minimal that she would need them.          This visit was 25 minutes in duration, over 50% in counseling.                            History of Present Illness:   Ms. Dali Martines is a 44-year-old woman with a history of type I DM and Hashimoto's thyroiditis who presents for evaluation of recurrent stiffness, swelling, and pain in the left hand.    Symptoms first occurred in August 2008, when she began having stiffness and pain in the left hand, particularly in the PIPs and MCPs. Symptoms spontaneously resolved after about 6-8 weeks. She was previously seen by Rheumatology here around that time (see note from Dr. Betancourt on 12/9/2008). Briefly, assessment was that she had a self-limited episode of inflammatory arthralgias, possibly viral or early psoriatic arthritis. Negative NOLVIA, RF, Lyme serologies and normal CRP and ESR. Given her family history of RA, there was a thought to start her on HCQ if anti-CCP antibodies were positive, but they were found to be negative. No imaging of the hand was performed.    Last September (2014), she experienced recurrent acute onset pain, stiffness, and swelling in the L hand very similar to her symptoms in 2008. Specifically, had swelling across MCPs and in 3rd PIP. She could hardly bend the hand sometimes due to the welling. Had morning stiffness lasting 30-45 minutes. No other joints were involved.    Issues with her left hand lingered for several months but eventually self-resolved around January. As of today, her only notable symptom is heel pain in the mornings. No fevers, chills, or weight loss. No back pain, vision changes, nausea, vomiting, diarrhea, abdominal pain, or blood in stool or urine. No rash, sicca symptoms, or oral ulcers.    It  seems she has a history of hypermobility, e.g. could bend wrist back to forearm, bend down and touch palms flat on floor, when she was younger. Used to get frequent ankle sprains, and has had multiple tendon and ligament tears over the years.    SEPTEMBER 22, 2015, INTERVAL HISTORY       Since we have last seen the patient, she feels like her joints were generally better over the summer.  She did have some right ankle pain that has continued to be tender at times.  She also got some pretty significant tennis elbow during the summer, but both of these did well with meloxicam.  Her tennis elbow did not respond as well to a compressive band.      About 10 days ago, however, she again started having dull hand pain, left generally greater than right, except for her right fifth digit.  She has about an hour of associated morning stiffness.  This is very similar to what she has had the last 2 years.  Last year, this seemed to last most of the year, but the prior year this was only about 3 months in duration.       Throughout this time, she has had no development of clear-cut psoriasis.  She has carried a diagnosis of psoriasis in the past, but it has not been a persistent problem with her skin.  She does have a strong family history, however, with this affecting her uncle and her mother.       Recall that based on her symptoms and her type 1 diabetes, we had previously checked her for the anti-citrullinated peptide antibodies, but she does not have those.     December 08 2015 Interval history     Since last visit and starting the Meloxicam daily, she feels that her left 5th digit pain has improved significantly. She did have an URI recently with some SOB and had a Prednisone burst of 20 mg daily for five days and she felt that her pain was completely gone with the steroids. She is not noticing any morning stiffness, swelling or erythema in the left 5th digit. She will occasionally have some chronic pain in the DIP of the  right hand but very seldomly. Overall, she feels that her joint pain is better since starting the Meloxicam.     She has noted a small lump on the palmar aspect of her right hand. It is not painful and has not become swollen or red.     Her left ankle has become more bothersome and she thinks she will be needing surgery soon on that side.     When she was having an upper respiratory infection, she noted some swelling under the right mandible and it became so large that she felt it would obstruct her breathing when she turned her head to the right side. The swelling has come down but it is still present and slightly painful to touch. She has not had any weight loss, fevers, chills or night sweats.     No symptoms of dry mouth, eyes, oral lesions, rash, abdominal pain, nausea, vomiting.     Interim history 5/17/2016:  Doing well on Meloxicam. Since last visit, had to go off of Meloxicam for ankle surgery (torn tendon). A couple flares off of it, recently went back on due to the pain. Always has been in L hand, now in right hand and both elbows. Mainly painful (5/10) and stiff. Stiffness begins in the morning, takes about 30 minutes to subside. No noted erythema, warmth or edema of joints. Has two nodules on base of 2nd MCP, can be painful occasionally, no changing drastically in size. Was doing better in the winter, so wondering if there could be a seasonal component. No known seasonal allergies.    Had the chicken pox around age 12. In the past 10-15 years, will periodically get shingles presenting as unilateral painful, itchy rash around L waist every few years. Most recently it occurred in April and was much worse than usual (nerve pain to the point of not being able to sleep). Muscle relaxant helped clear it up and allow for sleep. Has continued intermittent fever and chills since this episode. Decreased appetite, mild nausea. No other rashes since winter.     INTERVAL HISTORY (10/18/2016):  Since we have last  seen the patient, she tried going down on meloxicam and actually has tolerated that pretty well.  She has a little bit more stiffness in the morning in her left hand, but the right hand is pretty good by comparison.  She occasionally has some sporadic sharp pains there, but nothing severe and nothing persistent.  Her overall morning stiffness in the left hand is only about 30 minutes.  Her PIP joints are always the worst, and her fourth and fifth fingers tend to bother her a little bit more.      She did have some palmar contracture developing but with hand therapy those improved without any injections.  She has also avoiding a trackball mouse as that seemed to make things worse.      She continues to have some pain in her right ankle.  She had a bony fragment removed there in 01/2016 by Dr. Tran.  That has hurt a fair amount since and the nature of the pain is not particularly inflammatory and suggests more of a DJD problem.  She also has some pain in the left knee which had an ACL construction in 1995, also possibly consistent with DJD.      She has had some intermittent attacks of very low-grade psoriasis over her chest up in the clavicle area, but has none currently.  She has no other significant new problems.     INTERVAL HISTORY:  Since we last saw the patient, she had an odd episode of pain in her left hand on 02/20.  She said this initially started very sharp, then she got a lump in the palm of her hand that felt inflamed, and then she noticed some bruising in the area.  She does not recall any trauma to it.  She resumed meloxicam, which she had stopped and with that felt better over the next 2 weeks, but has remained on it since.      She also has a sensation in her right fifth digit that is consistently sore and somewhat achy.  That has persisted even with the meloxicam.      Finally, on the right ankle, which has been sprained several times in the past, she continues to wear a brace.  She actually uses  the topical meloxicam on that area, but has not done so on the fifth finger.      In all these areas she can have some stiffness in the morning lasting for up to an hour.      Her other concern is that she has gained about 10 pounds.  She does not think she has changed her eating much.                  Review of Systems:   Negative other than what is stated in the HPI above           Past Medical History:     Past Medical History:   Diagnosis Date     Anemia      Anxiety      Arthritis 2014    Psoriatic arthritis     Back injury 1988     Dry eye syndrome      Dyslipidemia      Endometriosis 2002    adehsions seen at laparoscopy     GERD (gastroesophageal reflux disease)      Hypothyroidism     8 yoa     SOB (shortness of breath) 3/11/2014     Type I (juvenile type) diabetes mellitus without mention of complication, not stated as uncontrolled     when 15      Uncomplicated asthma Fall 2013   Possible psoriasis as a child  Multiple tendon and ligament injuries    Patient Active Problem List    Diagnosis Date Noted     Right ankle instability 01/14/2017     Priority: Medium     Left knee pain 07/07/2016     Priority: Medium     Swelling of limb 03/15/2016     Priority: Medium     Other postprocedural status(V45.89) 03/15/2016     Priority: Medium     ESR raised 01/03/2016     Priority: Medium     Right foot pain 10/14/2015     Priority: Medium     Chronic pain of right ankle 07/30/2015     Priority: Medium     Enthesopathy 07/20/2015     Priority: Medium     Pain in joint, multiple sites 07/20/2015     Priority: Medium     PAIN KNEE JOINT 06/17/2015     Priority: Medium     Diabetes (H) 02/26/2015     Priority: Medium     Screening for other eye conditions 02/26/2015     Priority: Medium     Encounter for other specified aftercare 02/24/2015     Priority: Medium     PAIN FOOT 02/24/2015     Priority: Medium     SOB (shortness of breath) 03/11/2014     Priority: Medium     Cervicalgia 06/25/2012     Priority: Medium      Chronic low back pain 06/25/2012     Priority: Medium     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/2012     Priority: Medium     Cutaneous skin tags 06/12/2012     Priority: Medium     Type 1 diabetes, HbA1c goal < 7% (H) 04/05/2011     Priority: Medium     Anxiety 04/05/2011     Priority: Medium     CARDIOVASCULAR SCREENING; LDL GOAL LESS THAN 100 10/31/2010     Priority: Medium     GERD (gastroesophageal reflux disease) 02/24/2009     Priority: Medium             Past Surgical History:     Past Surgical History:   Procedure Laterality Date     C REPAIR CRUCIATE LIGAMENT,KNEE       C STOMACH SURGERY PROCEDURE UNLISTED  December 2002     COLONOSCOPY  2002     ESOPHAGOSCOPY, GASTROSCOPY, DUODENOSCOPY (EGD), COMBINED  1/7/2014    Procedure: COMBINED ESOPHAGOSCOPY, GASTROSCOPY, DUODENOSCOPY (EGD), BIOPSY SINGLE OR MULTIPLE;;  Surgeon: Kraig Nicole MD;  Location:  GI     GYN SURGERY      Laparotomy to remove endometrial tissue     HC BREATH HYDROGEN TEST N/A 6/17/2014    Procedure: HYDROGEN BREATH TEST;  Surgeon: Deacon Alberts MD;  Location:  GI     HYDROGEN BREATH TEST  6/17/14     LAPAROSCOPIC APPENDECTOMY  2002     LAPAROTOMY, LYSIS ADHESIONS, COMBINED  2002    endometriosis     SOFT TISSUE SURGERY  December 2014    right ankle tendon injury             Social History:     Social History   Substance Use Topics     Smoking status: Never Smoker     Smokeless tobacco: Never Used     Alcohol use 0.0 oz/week     0 Standard drinks or equivalent per week      Comment: moderately   Lives on her own. No children. Works as a researcher in education.          Family History:   Father with RA, Hashimoto's thyroiditis, and type I DM  Mother with psoriasis  Possible SLE in cousins  No family history of gout or IBD         Allergies:   No Known Allergies          Medications:     Current Outpatient Prescriptions   Medication Sig Dispense Refill     UNABLE TO FIND Biosil       montelukast (SINGULAIR) 5  MG chewable tablet Take 1 tablet (5 mg) by mouth At Bedtime 30 tablet 3     insulin degludec (TRESIBA) 100 UNIT/ML pen Inject 18 Units Subcutaneous daily 15 mL 11     dulaglutide (TRULICITY) 1.5 MG/0.5ML pen Inject 1.5 mg Subcutaneous every 7 days 6 mL 3     NOVOLOG PENFILL 100 UNIT/ML soln 1 unit per 15 grams CHO at all meals and snacks with correction scale of 1 unit per 50 mg/dL over 150.  Average daily use is 20 units. 15 mL 4     blood glucose monitoring (ACCU-CHEK COLIN PLUS) test strip Test Blood Sugar 8 times daily or as directed 700 strip 3     UNABLE TO FIND MEDICATION NAME: biosil       diclofenac (VOLTAREN) 1 % GEL Place 2 g onto the skin 4 times daily as needed for moderate pain 100 g 11     montelukast (SINGULAIR) 10 MG tablet Take 1 tablet (10 mg) by mouth every evening 30 tablet 6     meloxicam (MOBIC) 15 MG tablet Take 1 tablet (15 mg) by mouth daily 90 tablet 3     mometasone-formoterol (DULERA) 100-5 MCG/ACT oral inhaler Inhale 2 puffs into the lungs 2 times daily 3 Inhaler 3     levothyroxine (SYNTHROID, LEVOTHROID) 125 MCG tablet Take 1 tablet (125 mcg) by mouth daily 90 tablet 3     blood glucose monitoring (ACCU-CHEK FASTCLIX) lancets Use to test blood sugar 8 times daily or as directed. 4 Box 3     citalopram (CELEXA) 20 MG tablet Take 1.5 tablets (30 mg) by mouth daily 135 tablet 1     insulin pen needle (B-D U/F) 31G X 8 MM Use 5 pen needles daily 450 each 3     methylphenidate (METADATE CD) 20 MG CR capsule Take 20 mg by mouth daily       traZODone (DESYREL) 50 MG tablet Take 1-2 tablets by mouth nightly as needed for sleep. 180 tablet 0     aspirin 81 MG tablet Take 1 tablet by mouth daily.       Calcium Carbonate-Vitamin D (CALCIUM + D PO) Take one daily       econazole nitrate 1 % cream Apply 0.5 inches topically daily.       fish oil-omega-3 fatty acids (FISH OIL) 1000 MG capsule Take one daily       Multiple Vitamin (MULTIVITAMIN OR) Take  by mouth. Take one daily tab        Ketoconazole (NIZORAL PO) Take  by mouth. Shampoo daily       Minoxidil (ROGAINE EX) Externally apply  topically. daily              Physical Exam:   Blood pressure 121/81, pulse 82, resp. rate 18, weight 89.8 kg (198 lb), not currently breastfeeding.     Limited today to joint exam below, and skin exam.   Constitutional: [id number] cooperative  Eyes: nl EOM, PERRLA, vision, conjunctiva, sclera  ENT: nl external ears, nose, hearing, lips, teeth, gums, throat  No mucous membrane lesions, normal saliva pool  Neck: no mass or thyroid enlargement  Resp: lungs clear to auscultation, nl to palpation  CV: RRR, no murmurs, rubs or gallops, no edema  GI: no ABD mass or tenderness, no HSM  : not tested  Lymph:  No supraclavicular, inguinal or epitrochlear nodes  MS:  She has a small tendon nodule noted on the plantar aspect of the right hand   All other  TMJ, neck, shoulder, elbow, wrist, MCP/PIP/DIP, spine, hip, knee, ankle, and foot MTP/IP joints were examined. Tender right subtalar joint but sans synovitis.No active synovitis. +Heberden's nodes. Full ROM.  Normal  strength. No dactylitis,  tenosynovitis, enthespathy   Skin:  NO active psoriasis along hairline or elsewhere. No nail pitting, alopecia, rash, nodules or lesions  Neuro: nl cranial nerves, strength, sensation, DTRs.   Psych: nl judgement, orientation, memory, affect.         Data:     Component      Latest Ref Rng 4/27/2016   WBC      4.0 - 11.0 10e9/L 4.8   RBC Count      3.8 - 5.2 10e12/L 3.63 (L)   Hemoglobin      11.7 - 15.7 g/dL 11.2 (L)   Hematocrit      35.0 - 47.0 % 33.6 (L)   MCV      78 - 100 fl 93   MCH      26.5 - 33.0 pg 30.9   MCHC      31.5 - 36.5 g/dL 33.3   RDW      10.0 - 15.0 % 12.7   Platelet Count      150 - 450 10e9/L 294   Diff Method       Automated Method   % Neutrophils       59.3   % Lymphocytes       30.3   % Monocytes       7.8   % Eosinophils       1.3   % Basophils       1.1   % Immature Granulocytes       0.2   Nucleated  RBCs      0 /100 0   Absolute Neutrophil      1.6 - 8.3 10e9/L 2.8   Absolute Lymphocytes      0.8 - 5.3 10e9/L 1.4   Absolute Monocytes      0.0 - 1.3 10e9/L 0.4   Absolute Eosinophils      0.0 - 0.7 10e9/L 0.1   Absolute Basophils      0.0 - 0.2 10e9/L 0.1   Abs Immature Granulocytes      0 - 0.4 10e9/L 0.0   Absolute Nucleated RBC       0.0   TSH      0.40 - 4.00 mU/L        Lab Results   Component Value Date    AST 19 12/22/2014    AST 36 2/15/2013    AST 25 11/29/2010    ALT 22 12/22/2014    ALT 30 2/15/2013    ALT 8 11/23/2011    ALKPHOS 109 12/22/2014    ALKPHOS 89 2/15/2013    ALKPHOS 65 11/29/2010     Reviewed Rheumatology lab flowsheet. Pertinent results:  2014 -- NOLVIA 1.6, negative RF, normal CRP, mildly elevated ESR, unremarkable x-rays of hands and wrists

## 2017-04-25 NOTE — MR AVS SNAPSHOT
After Visit Summary   4/25/2017    Dali Martines    MRN: [id number]           Patient Information     Date Of Birth          1970        Visit Information        Provider Department      4/25/2017 5:30 PM Yoel Betancourt MD [facility] Rheumatology        Today's Diagnoses     Hypothyroidism due to Hashimoto's thyroiditis    -  1    Enthesopathy        ESR raised           Follow-ups after your visit        Your next 10 appointments already scheduled     May 06, 2017 11:10 AM CDT   LAKISHA Extremity with Chanelle Muniz, PT   [facility] ([facility]  )    [address]  [facility] [address]   [phone]            May 20, 2017 11:10 AM CDT   LAKISHA Extremity with Chanelle Muniz, PT   [facility] ([facility]  )    [address]  [facility] [address]   [phone]            Sergey 10, 2017 11:10 AM CDT   LAKISHA Extremity with Michi Esteves Pt, PT   [facility] ([facility]  )    [address]  [facility] [address]   [phone]            Jun 24, 2017 11:10 AM CDT   LAKISHA Extremity with Michi Esteves Pt, PT   [facility] ([facility]  )    [address]  [facility] [address]   [phone]            Aug 08, 2017  3:30 PM CDT   (Arrive by 3:15 PM)   RETURN DIABETES with Felicia Mckeon MD   [facility] Endocrinology ([facility])    [address] [facility] [address] [phone]            Aug 21, 2017 11:30 AM CDT   (Arrive by 11:15 AM)   Return Visit with Sara Carter MD   [facility] ([facility])    [address]  [address] [facility] [address]   [phone]            Aug 29, 2017  2:00 PM CDT   (Arrive by 1:45 PM)   Return Visit  with Yoel Betancourt MD   [facility] Rheumat[facility] ([facility])    [address]  [address] [facility] [address] [phone]              Who to contact     If you have questions or need follow up information about today's clinic visit or your schedule please contact [facility] RHEUMATOLOGY directly at [phone].  Normal or non-critical lab and imaging results will be communicated to you by MyChart, letter or phone within 4 business days after the clinic has received the results. If you do not hear from us within 7 days, please contact the clinic through [employer] or phone. If you have a critical or abnormal lab result, we will notify you by phone as soon as possible.  Submit refill requests through [employer] or call your pharmacy and they will forward the refill request to us. Please allow 3 business days for your refill to be completed.          Additional Information About Your Visit        [employer] Information     [employer] gives you secure access to your electronic health record. If you see a primary care provider, you can also send messages to your care team and make appointments. If you have questions, please call your primary care clinic.  If you do not have a primary care provider, please call [phone] and they will assist you.        Care EveryWhere ID     This is your Care EveryWhere ID. This could be used by other organizations to access your [facility] medical records  [id number]        Your Vitals Were     Pulse Respirations BMI (Body Mass Index)             82 18 28.41 kg/m2          Blood Pressure from Last 3 Encounters:   04/25/17 121/81   04/24/17 135/89   02/14/17 112/75    Weight from Last 3 Encounters:   04/25/17 89.8 kg (198 lb)   02/14/17 85.9 kg (189 lb 6.4 oz)   01/04/17 84.7 kg (186 lb 12.8 oz)               Primary Care Provider Office Phone # Fax #    Dimitri Alas [phone]       [address]  [facility] [address]        Thank you!     Thank you for choosing [facility] RHEUMATOLOGY  for your care. Our goal is always to provide you with excellent care. Hearing back from our patients is one way we can continue to improve our services. Please take a few minutes to complete the written survey that you may receive in the mail after your visit with us. Thank you!             Your Updated Medication List - Protect others around you: Learn how to safely use, store and throw away your medicines at www.disposemymeds.org.          This list is accurate as of: 4/25/17 11:59 PM.  Always use your most recent med list.                   Brand Name Dispense Instructions for use    aspirin 81 MG tablet      Take 1 tablet by mouth daily.       blood glucose monitoring lancets     4 Box    Use to test blood sugar 8 times daily or as directed.       blood glucose monitoring test strip    ACCU-CHEK COLIN PLUS    700 strip    Test Blood Sugar 8 times daily or as directed       CALCIUM + D PO      Take one daily       citalopram 20 MG tablet    celeXA    135 tablet    Take 1.5 tablets (30 mg) by mouth daily       diclofenac 1 % Gel topical gel    VOLTAREN    100 g    Place 2 g onto the skin 4 times daily as needed for moderate pain       dulaglutide 1.5 MG/0.5ML pen    TRULICITY    6 mL    Inject 1.5 mg Subcutaneous every 7 days       econazole nitrate 1 % cream      Apply 0.5 inches topically daily.       fish oil-omega-3 fatty acids 1000 MG capsule      Take one daily       insulin degludec 100 UNIT/ML pen    TRESIBA    15 mL    Inject 18 Units Subcutaneous daily       insulin pen needle 31G X 8 MM    B-D U/F    450 each    Use 5 pen needles daily       levothyroxine 125 MCG tablet    SYNTHROID/LEVOTHROID    90 tablet    Take 1 tablet (125 mcg) by mouth daily       meloxicam 15 MG tablet    MOBIC    90 tablet    Take 1 tablet (15 mg) by mouth daily       methylphenidate 20 MG CR capsule    METADATE CD     Take 20 mg by  mouth daily       mometasone-formoterol 100-5 MCG/ACT oral inhaler    DULERA    3 Inhaler    Inhale 2 puffs into the lungs 2 times daily       * montelukast 10 MG tablet    SINGULAIR    30 tablet    Take 1 tablet (10 mg) by mouth every evening       * montelukast 5 MG chewable tablet    SINGULAIR    30 tablet    Take 1 tablet (5 mg) by mouth At Bedtime       MULTIVITAMIN PO      Take  by mouth. Take one daily tab       NIZORAL PO      Take  by mouth. Shampoo daily       NovoLOG PENFILL 100 UNIT/ML injection   Generic drug:  insulin aspart     15 mL    1 unit per 15 grams CHO at all meals and snacks with correction scale of 1 unit per 50 mg/dL over 150.  Average daily use is 20 units.       ROGAINE EX      Externally apply  topically. daily       traZODone 50 MG tablet    DESYREL    180 tablet    Take 1-2 tablets by mouth nightly as needed for sleep.       * UNABLE TO FIND      MEDICATION NAME: biosil       * UNABLE TO FIND      Biosil       * Notice:  This list has 4 medication(s) that are the same as other medications prescribed for you. Read the directions carefully, and ask your doctor or other care provider to review them with you.

## 2017-04-25 NOTE — LETTER
4/25/2017       RE: Dali Martines  [address]  [facility] [id number]     Dear Colleague,    Thank you for referring your patient, Dali Martines, to the [facility] RHEUMATOLOGY at [facility]. Please see a copy of my visit note below.    Rheumatology Visit     Dali Martines MRN# [id number]   YOB: 1970 Age: 44 year old         Primary care provider: Dimitri Alas          Assessment and Plan:     Psoriatic Arthriitis versus Rheumatoid Arthritis     Ms. Dali Martines is a 44 year-old woman with PMH of type I DM, Hashimoto's thyroiditis, and possible psoriasis in childhood, with family history of RA, type I DM, Hashimoto's, and psoriasis presents who presents with recurrent acute onset pain, swelling, stiffness in L hand.    Most likely explanation of her symptoms is psoriatic arthritis given the presentation of asymmetric, transient joint pain/swelling/stiffness, new low titer NOLVIA (vs 2008), family history of psoriasis and active psoriasis found on exam, plus morning heel pain. Psoriasis tends to be worst in fall/winter months, and this corresponds to when she has had these episodes of arthritis in the hand.    She has no active synovitis at this time and based on that I certainly do not think that we need to move on disease-modifying drugs.      I am not sure what this episode represented, whether it was some trauma that she does not remember or whether she actually was having a transient flare of inflammatory arthritis.      No matter what it was, I do not want her on Meloxicam long term with her diabetes due to its risks of the kidneys.  We have discussed that in some detail today.      I also think it may be a cause of her weight gain, potentially with some fluid retention, though she has not had her T4/TSH checked in the last year.      Accordingly, I am going to check her CBC with diff, her CRP, sed rate and a T4, TSH today.      I have,  however, asked her to totally stop the meloxicam and to use it only on a p.r.n. basis if things flare.      In the meantime, she can try using the topical diclofenac also on the right fifth digit.      I would like to see her in 4 months to see how this strategy is going.  Certainly if she is having worsening joint problems despite this, I think we need to consider disease-modifying drugs, although she has not wanted to be on those in the past and I have not been convinced based on the severity of her exam which is pretty minimal that she would need them.          This visit was 25 minutes in duration, over 50% in counseling.                            History of Present Illness:   Ms. Dali Martines is a 44-year-old woman with a history of type I DM and Hashimoto's thyroiditis who presents for evaluation of recurrent stiffness, swelling, and pain in the left hand.    Symptoms first occurred in August 2008, when she began having stiffness and pain in the left hand, particularly in the PIPs and MCPs. Symptoms spontaneously resolved after about 6-8 weeks. She was previously seen by Rheumatology here around that time (see note from Dr. Betancourt on 12/9/2008). Briefly, assessment was that she had a self-limited episode of inflammatory arthralgias, possibly viral or early psoriatic arthritis. Negative NOLVIA, RF, Lyme serologies and normal CRP and ESR. Given her family history of RA, there was a thought to start her on HCQ if anti-CCP antibodies were positive, but they were found to be negative. No imaging of the hand was performed.    Last September (2014), she experienced recurrent acute onset pain, stiffness, and swelling in the L hand very similar to her symptoms in 2008. Specifically, had swelling across MCPs and in 3rd PIP. She could hardly bend the hand sometimes due to the welling. Had morning stiffness lasting 30-45 minutes. No other joints were involved.    Issues with her left hand lingered for several months but  eventually self-resolved around January. As of today, her only notable symptom is heel pain in the mornings. No fevers, chills, or weight loss. No back pain, vision changes, nausea, vomiting, diarrhea, abdominal pain, or blood in stool or urine. No rash, sicca symptoms, or oral ulcers.    It seems she has a history of hypermobility, e.g. could bend wrist back to forearm, bend down and touch palms flat on floor, when she was younger. Used to get frequent ankle sprains, and has had multiple tendon and ligament tears over the years.    SEPTEMBER 22, 2015, INTERVAL HISTORY       Since we have last seen the patient, she feels like her joints were generally better over the summer.  She did have some right ankle pain that has continued to be tender at times.  She also got some pretty significant tennis elbow during the summer, but both of these did well with meloxicam.  Her tennis elbow did not respond as well to a compressive band.      About 10 days ago, however, she again started having dull hand pain, left generally greater than right, except for her right fifth digit.  She has about an hour of associated morning stiffness.  This is very similar to what she has had the last 2 years.  Last year, this seemed to last most of the year, but the prior year this was only about 3 months in duration.       Throughout this time, she has had no development of clear-cut psoriasis.  She has carried a diagnosis of psoriasis in the past, but it has not been a persistent problem with her skin.  She does have a strong family history, however, with this affecting her uncle and her mother.       Recall that based on her symptoms and her type 1 diabetes, we had previously checked her for the anti-citrullinated peptide antibodies, but she does not have those.     December 08 2015 Interval history     Since last visit and starting the Meloxicam daily, she feels that her left 5th digit pain has improved significantly. She did have an URI  recently with some SOB and had a Prednisone burst of 20 mg daily for five days and she felt that her pain was completely gone with the steroids. She is not noticing any morning stiffness, swelling or erythema in the left 5th digit. She will occasionally have some chronic pain in the DIP of the right hand but very seldomly. Overall, she feels that her joint pain is better since starting the Meloxicam.     She has noted a small lump on the palmar aspect of her right hand. It is not painful and has not become swollen or red.     Her left ankle has become more bothersome and she thinks she will be needing surgery soon on that side.     When she was having an upper respiratory infection, she noted some swelling under the right mandible and it became so large that she felt it would obstruct her breathing when she turned her head to the right side. The swelling has come down but it is still present and slightly painful to touch. She has not had any weight loss, fevers, chills or night sweats.     No symptoms of dry mouth, eyes, oral lesions, rash, abdominal pain, nausea, vomiting.     Interim history 5/17/2016:  Doing well on Meloxicam. Since last visit, had to go off of Meloxicam for ankle surgery (torn tendon). A couple flares off of it, recently went back on due to the pain. Always has been in L hand, now in right hand and both elbows. Mainly painful (5/10) and stiff. Stiffness begins in the morning, takes about 30 minutes to subside. No noted erythema, warmth or edema of joints. Has two nodules on base of 2nd MCP, can be painful occasionally, no changing drastically in size. Was doing better in the winter, so wondering if there could be a seasonal component. No known seasonal allergies.    Had the chicken pox around age 12. In the past 10-15 years, will periodically get shingles presenting as unilateral painful, itchy rash around L waist every few years. Most recently it occurred in April and was much worse than usual  (nerve pain to the point of not being able to sleep). Muscle relaxant helped clear it up and allow for sleep. Has continued intermittent fever and chills since this episode. Decreased appetite, mild nausea. No other rashes since winter.     INTERVAL HISTORY (10/18/2016):  Since we have last seen the patient, she tried going down on meloxicam and actually has tolerated that pretty well.  She has a little bit more stiffness in the morning in her left hand, but the right hand is pretty good by comparison.  She occasionally has some sporadic sharp pains there, but nothing severe and nothing persistent.  Her overall morning stiffness in the left hand is only about 30 minutes.  Her PIP joints are always the worst, and her fourth and fifth fingers tend to bother her a little bit more.      She did have some palmar contracture developing but with hand therapy those improved without any injections.  She has also avoiding a trackball mouse as that seemed to make things worse.      She continues to have some pain in her right ankle.  She had a bony fragment removed there in 01/2016 by Dr. Tran.  That has hurt a fair amount since and the nature of the pain is not particularly inflammatory and suggests more of a DJD problem.  She also has some pain in the left knee which had an ACL construction in 1995, also possibly consistent with DJD.      She has had some intermittent attacks of very low-grade psoriasis over her chest up in the clavicle area, but has none currently.  She has no other significant new problems.     INTERVAL HISTORY:  Since we last saw the patient, she had an odd episode of pain in her left hand on 02/20.  She said this initially started very sharp, then she got a lump in the palm of her hand that felt inflamed, and then she noticed some bruising in the area.  She does not recall any trauma to it.  She resumed meloxicam, which she had stopped and with that felt better over the next 2 weeks, but has remained on  it since.      She also has a sensation in her right fifth digit that is consistently sore and somewhat achy.  That has persisted even with the meloxicam.      Finally, on the right ankle, which has been sprained several times in the past, she continues to wear a brace.  She actually uses the topical meloxicam on that area, but has not done so on the fifth finger.      In all these areas she can have some stiffness in the morning lasting for up to an hour.      Her other concern is that she has gained about 10 pounds.  She does not think she has changed her eating much.                  Review of Systems:   Negative other than what is stated in the HPI above           Past Medical History:     Past Medical History:   Diagnosis Date     Anemia      Anxiety      Arthritis 2014    Psoriatic arthritis     Back injury 1988     Dry eye syndrome      Dyslipidemia      Endometriosis 2002    adehsions seen at laparoscopy     GERD (gastroesophageal reflux disease)      Hypothyroidism     8 yoa     SOB (shortness of breath) 3/11/2014     Type I (juvenile type) diabetes mellitus without mention of complication, not stated as uncontrolled     when 15      Uncomplicated asthma Fall 2013   Possible psoriasis as a child  Multiple tendon and ligament injuries    Patient Active Problem List    Diagnosis Date Noted     Right ankle instability 01/14/2017     Priority: Medium     Left knee pain 07/07/2016     Priority: Medium     Swelling of limb 03/15/2016     Priority: Medium     Other postprocedural status(V45.89) 03/15/2016     Priority: Medium     ESR raised 01/03/2016     Priority: Medium     Right foot pain 10/14/2015     Priority: Medium     Chronic pain of right ankle 07/30/2015     Priority: Medium     Enthesopathy 07/20/2015     Priority: Medium     Pain in joint, multiple sites 07/20/2015     Priority: Medium     PAIN KNEE JOINT 06/17/2015     Priority: Medium     Diabetes (H) 02/26/2015     Priority: Medium     Screening  for other eye conditions 02/26/2015     Priority: Medium     Encounter for other specified aftercare 02/24/2015     Priority: Medium     PAIN FOOT 02/24/2015     Priority: Medium     SOB (shortness of breath) 03/11/2014     Priority: Medium     Cervicalgia 06/25/2012     Priority: Medium     Chronic low back pain 06/25/2012     Priority: Medium     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/2012     Priority: Medium     Cutaneous skin tags 06/12/2012     Priority: Medium     Type 1 diabetes, HbA1c goal < 7% (H) 04/05/2011     Priority: Medium     Anxiety 04/05/2011     Priority: Medium     CARDIOVASCULAR SCREENING; LDL GOAL LESS THAN 100 10/31/2010     Priority: Medium     GERD (gastroesophageal reflux disease) 02/24/2009     Priority: Medium             Past Surgical History:     Past Surgical History:   Procedure Laterality Date     C REPAIR CRUCIATE LIGAMENT,KNEE       C STOMACH SURGERY PROCEDURE UNLISTED  December 2002     COLONOSCOPY  2002     ESOPHAGOSCOPY, GASTROSCOPY, DUODENOSCOPY (EGD), COMBINED  1/7/2014    Procedure: COMBINED ESOPHAGOSCOPY, GASTROSCOPY, DUODENOSCOPY (EGD), BIOPSY SINGLE OR MULTIPLE;;  Surgeon: Kraig Nicole MD;  Location:  GI     GYN SURGERY      Laparotomy to remove endometrial tissue     HC BREATH HYDROGEN TEST N/A 6/17/2014    Procedure: HYDROGEN BREATH TEST;  Surgeon: Deacon Alberts MD;  Location:  GI     HYDROGEN BREATH TEST  6/17/14     LAPAROSCOPIC APPENDECTOMY  2002     LAPAROTOMY, LYSIS ADHESIONS, COMBINED  2002    endometriosis     SOFT TISSUE SURGERY  December 2014    right ankle tendon injury             Social History:     Social History   Substance Use Topics     Smoking status: Never Smoker     Smokeless tobacco: Never Used     Alcohol use 0.0 oz/week     0 Standard drinks or equivalent per week      Comment: moderately   Lives on her own. No children. Works as a researcher in education.          Family History:   Father with RA, Hashimoto's  thyroiditis, and type I DM  Mother with psoriasis  Possible SLE in cousins  No family history of gout or IBD         Allergies:   No Known Allergies          Medications:     Current Outpatient Prescriptions   Medication Sig Dispense Refill     UNABLE TO FIND Biosil       montelukast (SINGULAIR) 5 MG chewable tablet Take 1 tablet (5 mg) by mouth At Bedtime 30 tablet 3     insulin degludec (TRESIBA) 100 UNIT/ML pen Inject 18 Units Subcutaneous daily 15 mL 11     dulaglutide (TRULICITY) 1.5 MG/0.5ML pen Inject 1.5 mg Subcutaneous every 7 days 6 mL 3     NOVOLOG PENFILL 100 UNIT/ML soln 1 unit per 15 grams CHO at all meals and snacks with correction scale of 1 unit per 50 mg/dL over 150.  Average daily use is 20 units. 15 mL 4     blood glucose monitoring (ACCU-CHEK COLIN PLUS) test strip Test Blood Sugar 8 times daily or as directed 700 strip 3     UNABLE TO FIND MEDICATION NAME: biosil       diclofenac (VOLTAREN) 1 % GEL Place 2 g onto the skin 4 times daily as needed for moderate pain 100 g 11     montelukast (SINGULAIR) 10 MG tablet Take 1 tablet (10 mg) by mouth every evening 30 tablet 6     meloxicam (MOBIC) 15 MG tablet Take 1 tablet (15 mg) by mouth daily 90 tablet 3     mometasone-formoterol (DULERA) 100-5 MCG/ACT oral inhaler Inhale 2 puffs into the lungs 2 times daily 3 Inhaler 3     levothyroxine (SYNTHROID, LEVOTHROID) 125 MCG tablet Take 1 tablet (125 mcg) by mouth daily 90 tablet 3     blood glucose monitoring (ACCU-CHEK FASTCLIX) lancets Use to test blood sugar 8 times daily or as directed. 4 Box 3     citalopram (CELEXA) 20 MG tablet Take 1.5 tablets (30 mg) by mouth daily 135 tablet 1     insulin pen needle (B-D U/F) 31G X 8 MM Use 5 pen needles daily 450 each 3     methylphenidate (METADATE CD) 20 MG CR capsule Take 20 mg by mouth daily       traZODone (DESYREL) 50 MG tablet Take 1-2 tablets by mouth nightly as needed for sleep. 180 tablet 0     aspirin 81 MG tablet Take 1 tablet by mouth daily.        Calcium Carbonate-Vitamin D (CALCIUM + D PO) Take one daily       econazole nitrate 1 % cream Apply 0.5 inches topically daily.       fish oil-omega-3 fatty acids (FISH OIL) 1000 MG capsule Take one daily       Multiple Vitamin (MULTIVITAMIN OR) Take  by mouth. Take one daily tab       Ketoconazole (NIZORAL PO) Take  by mouth. Shampoo daily       Minoxidil (ROGAINE EX) Externally apply  topically. daily              Physical Exam:   Blood pressure 121/81, pulse 82, resp. rate 18, weight 89.8 kg (198 lb), not currently breastfeeding.     Limited today to joint exam below, and skin exam.   Constitutional: [id number] cooperative  Eyes: nl EOM, PERRLA, vision, conjunctiva, sclera  ENT: nl external ears, nose, hearing, lips, teeth, gums, throat  No mucous membrane lesions, normal saliva pool  Neck: no mass or thyroid enlargement  Resp: lungs clear to auscultation, nl to palpation  CV: RRR, no murmurs, rubs or gallops, no edema  GI: no ABD mass or tenderness, no HSM  : not tested  Lymph:  No supraclavicular, inguinal or epitrochlear nodes  MS:  She has a small tendon nodule noted on the plantar aspect of the right hand   All other  TMJ, neck, shoulder, elbow, wrist, MCP/PIP/DIP, spine, hip, knee, ankle, and foot MTP/IP joints were examined. Tender right subtalar joint but sans synovitis.No active synovitis. +Heberden's nodes. Full ROM.  Normal  strength. No dactylitis,  tenosynovitis, enthespathy   Skin:  NO active psoriasis along hairline or elsewhere. No nail pitting, alopecia, rash, nodules or lesions  Neuro: nl cranial nerves, strength, sensation, DTRs.   Psych: nl judgement, orientation, memory, affect.         Data:     Component      Latest Ref Rng 4/27/2016   WBC      4.0 - 11.0 10e9/L 4.8   RBC Count      3.8 - 5.2 10e12/L 3.63 (L)   Hemoglobin      11.7 - 15.7 g/dL 11.2 (L)   Hematocrit      35.0 - 47.0 % 33.6 (L)   MCV      78 - 100 fl 93   MCH      26.5 - 33.0 pg 30.9   MCHC      31.5 - 36.5 g/dL 33.3    RDW      10.0 - 15.0 % 12.7   Platelet Count      150 - 450 10e9/L 294   Diff Method       Automated Method   % Neutrophils       59.3   % Lymphocytes       30.3   % Monocytes       7.8   % Eosinophils       1.3   % Basophils       1.1   % Immature Granulocytes       0.2   Nucleated RBCs      0 /100 0   Absolute Neutrophil      1.6 - 8.3 10e9/L 2.8   Absolute Lymphocytes      0.8 - 5.3 10e9/L 1.4   Absolute Monocytes      0.0 - 1.3 10e9/L 0.4   Absolute Eosinophils      0.0 - 0.7 10e9/L 0.1   Absolute Basophils      0.0 - 0.2 10e9/L 0.1   Abs Immature Granulocytes      0 - 0.4 10e9/L 0.0   Absolute Nucleated RBC       0.0   TSH      0.40 - 4.00 mU/L        Lab Results   Component Value Date    AST 19 12/22/2014    AST 36 2/15/2013    AST 25 11/29/2010    ALT 22 12/22/2014    ALT 30 2/15/2013    ALT 8 11/23/2011    ALKPHOS 109 12/22/2014    ALKPHOS 89 2/15/2013    ALKPHOS 65 11/29/2010     Reviewed Rheumatology lab flowsheet. Pertinent results:  2014 -- NOLVIA 1.6, negative RF, normal CRP, mildly elevated ESR, unremarkable x-rays of hands and wrists      Again, thank you for allowing me to participate in the care of your patient.      Sincerely,    Yoel Betancourt MD

## 2017-04-25 NOTE — NURSING NOTE
"Chief Complaint   Patient presents with     RECHECK     6 mo joint follow up       Initial /81  Pulse 82  Resp 18  Wt 89.8 kg (198 lb)  BMI 28.41 kg/m2 Estimated body mass index is 28.41 kg/(m^2) as calculated from the following:    Height as of 2/14/17: 1.778 m (5' 10\").    Weight as of this encounter: 89.8 kg (198 lb).  Medication Reconciliation: complete    "

## 2017-04-27 LAB — MUV IGM SER IA-ACNC: 0.14

## 2017-05-06 ENCOUNTER — THERAPY VISIT (OUTPATIENT)
Dept: PHYSICAL THERAPY | Facility: CLINIC | Age: 47
End: 2017-05-06
Payer: COMMERCIAL

## 2017-05-06 DIAGNOSIS — M25.371 RIGHT ANKLE INSTABILITY: ICD-10-CM

## 2017-05-06 PROCEDURE — 97110 THERAPEUTIC EXERCISES: CPT | Mod: GP | Performed by: PHYSICAL THERAPIST

## 2017-05-06 PROCEDURE — 97112 NEUROMUSCULAR REEDUCATION: CPT | Mod: GP | Performed by: PHYSICAL THERAPIST

## 2017-05-06 NOTE — PROGRESS NOTES
"Subjective:    HPI                    Objective:    System    Physical Exam    General     ROS    Assessment/Plan:      PROGRESS  REPORT    Progress reporting period is from 1/14/17 to 5/6/17.       SUBJECTIVE  Subjective changes noted by patient:   Feels like her balance has greatly improved, both with walking or exercising.  However, she's still frustrated that any kind of toe raises flares up the ankle.  It hurts and swells so she can only do it periodically.       Current Pain level:  (0-6/10).     Initial Pain level:  (3-8/10).   Changes in function:  Yes (See Goal flowsheet attached for changes in current functional level)  Adverse reaction to treatment or activity: None    OBJECTIVE  Changes noted in objective findings:  Yes,       R ankle AROM: DF 9, PF 62, IV 11+, EV 10.    R ankle strength: DF 5/5, PF 3+/5+, IV 4+/5 EV 4+/5.    R LE strength: quad 5/5, hamstring 5/5, hip flexion 5/5, hip abd 5-/5, hip er 5/5, hip ext 5-/5.       SLS 30\" B, but more difficult on the R.  This is improved from 12 seconds and having pain during it in Jan.    ASSESSMENT/PLAN  Updated problem list and treatment plan: Diagnosis 1:  R ankle pain and instability  Pain -  self management, education and home program  Decreased ROM/flexibility - manual therapy, therapeutic exercise and home program  Decreased strength - therapeutic exercise, therapeutic activities and home program  Impaired balance - neuro re-education, therapeutic activities and home program  Impaired muscle performance - neuro re-education and home program  Decreased function - therapeutic activities and home program  STG/LTGs have been met or progress has been made towards goals:  Yes (See Goal flow sheet completed today.)  Assessment of Progress: The patient's condition is improving.  The patient has had set backs in their progress.  Self Management Plans:  Patient has been instructed in a home treatment program.  Patient  has been instructed in self management of " symptoms.  I have re-evaluated this patient and find that the nature, scope, duration and intensity of the therapy is appropriate for the medical condition of the patient.  Dali continues to require the following intervention to meet STG and LTG's:  PT    Recommendations:  This patient would benefit from continued therapy.     Frequency:  2 X a month, once daily  Duration:  for 3 months        Please refer to the daily flowsheet for treatment today, total treatment time and time spent performing 1:1 timed codes.

## 2017-05-06 NOTE — MR AVS SNAPSHOT
After Visit Summary   5/6/2017    Dali Martines    MRN: [id number]           Patient Information     Date Of Birth          1970        Visit Information        Provider Department      5/6/2017 11:10 AM Chanelle Muniz PT [facility]        Today's Diagnoses     Right ankle instability           Follow-ups after your visit        Your next 10 appointments already scheduled     May 20, 2017 11:10 AM CDT   LAKISHA Extremity with Chanelle Muniz PT   [facility] ([facility] [place]  )    [address]  [place] [address]   [phone]            Sergey 10, 2017 11:10 AM CDT   LAKISHA Extremity with Michi Esteves Pt, PT   [facility] ([facility]  )    [address]  [place] [address]   [phone]            Jun 24, 2017 11:10 AM CDT   LAKISHA Extremity with Michi Esteves Pt, PT   [facility] ([facility] [place]  )    [address]  [place] [address]   [phone]            Aug 08, 2017  3:30 PM CDT   (Arrive by 3:15 PM)   RETURN DIABETES with Felicia Mckeon MD   [facility] ([facility])    [address] [place] [id number] [phone]            Aug 21, 2017 11:30 AM CDT   (Arrive by 11:15 AM)   Return Visit with Sara Carter MD   [facility] ([facility])    [address] [place] [address] [id number] [phone]            Aug 29, 2017  2:00 PM CDT   (Arrive by 1:45 PM)   Return Visit with Yoel Betancourt MD   [facility] ([facility])    [address] [place] [address] [id number] [phone]              Who to contact     If you have questions or need follow up information about  today's clinic visit or your schedule please contact [facility] directly at [phone].  Normal or non-critical lab and imaging results will be communicated to you by MyChart, letter or phone within 4 business days after the clinic has received the results. If you do not hear from us within 7 days, please contact the clinic through [employer] or phone. If you have a critical or abnormal lab result, we will notify you by phone as soon as possible.  Submit refill requests through [employer] or call your pharmacy and they will forward the refill request to us. Please allow 3 business days for your refill to be completed.          Additional Information About Your Visit        [employer] Information     [employer] gives you secure access to your electronic health record. If you see a primary care provider, you can also send messages to your care team and make appointments. If you have questions, please call your primary care clinic.  If you do not have a primary care provider, please call [phone] and they will assist you.        Care EveryWhere ID     This is your Care EveryWhere ID. This could be used by other organizations to access your [facility] medical records  [id number]         Blood Pressure from Last 3 Encounters:   04/25/17 121/81   04/24/17 135/89   02/14/17 112/75    Weight from Last 3 Encounters:   04/25/17 89.8 kg (198 lb)   02/14/17 85.9 kg (189 lb 6.4 oz)   01/04/17 84.7 kg (186 lb 12.8 oz)              We Performed the Following     LA[facility]HA PROGRESS NOTES REPORT     NEUROMUSCULAR RE-EDUCATION     THERAPEUTIC EXERCISES        Primary Care Provider Office Phone # Fax #    Dimitir Alas [phone]       [address] [address]        Thank you!     Thank you for choosing [facility]  for your care. Our goal is always to provide you with excellent care. Hearing  back from our patients is one way we can continue to improve our services. Please take a few minutes to complete the written survey that you may receive in the mail after your visit with us. Thank you!             Your Updated Medication List - Protect others around you: Learn how to safely use, store and throw away your medicines at www.disposemymeds.org.          This list is accurate as of: 5/6/17 11:57 AM.  Always use your most recent med list.                   Brand Name Dispense Instructions for use    aspirin 81 MG tablet      Take 1 tablet by mouth daily.       blood glucose monitoring lancets     4 Box    Use to test blood sugar 8 times daily or as directed.       blood glucose monitoring test strip    ACCU-CHEK COLIN PLUS    700 strip    Test Blood Sugar 8 times daily or as directed       CALCIUM + D PO      Take one daily       citalopram 20 MG tablet    celeXA    135 tablet    Take 1.5 tablets (30 mg) by mouth daily       diclofenac 1 % Gel topical gel    VOLTAREN    100 g    Place 2 g onto the skin 4 times daily as needed for moderate pain       dulaglutide 1.5 MG/0.5ML pen    TRULICITY    6 mL    Inject 1.5 mg Subcutaneous every 7 days       econazole nitrate 1 % cream      Apply 0.5 inches topically daily.       fish oil-omega-3 fatty acids 1000 MG capsule      Take one daily       insulin degludec 100 UNIT/ML pen    TRESIBA    15 mL    Inject 18 Units Subcutaneous daily       insulin pen needle 31G X 8 MM    B-D U/F    450 each    Use 5 pen needles daily       levothyroxine 125 MCG tablet    SYNTHROID/LEVOTHROID    90 tablet    Take 1 tablet (125 mcg) by mouth daily       meloxicam 15 MG tablet    MOBIC    90 tablet    Take 1 tablet (15 mg) by mouth daily       methylphenidate 20 MG CR capsule    METADATE CD     Take 20 mg by mouth daily       mometasone-formoterol 100-5 MCG/ACT oral inhaler    DULERA    3 Inhaler    Inhale 2 puffs into the lungs 2 times daily       * montelukast 10 MG tablet     SINGULAIR    30 tablet    Take 1 tablet (10 mg) by mouth every evening       * montelukast 5 MG chewable tablet    SINGULAIR    30 tablet    Take 1 tablet (5 mg) by mouth At Bedtime       MULTIVITAMIN PO      Take  by mouth. Take one daily tab       NIZORAL PO      Take  by mouth. Shampoo daily       NovoLOG PENFILL 100 UNIT/ML injection   Generic drug:  insulin aspart     15 mL    1 unit per 15 grams CHO at all meals and snacks with correction scale of 1 unit per 50 mg/dL over 150.  Average daily use is 20 units.       ROGAINE EX      Externally apply  topically. daily       traZODone 50 MG tablet    DESYREL    180 tablet    Take 1-2 tablets by mouth nightly as needed for sleep.       * UNABLE TO FIND      MEDICATION NAME: biosil       * UNABLE TO FIND      Biosil       * Notice:  This list has 4 medication(s) that are the same as other medications prescribed for you. Read the directions carefully, and ask your doctor or other care provider to review them with you.

## 2017-05-17 ENCOUNTER — MYC MEDICAL ADVICE (OUTPATIENT)
Dept: RHEUMATOLOGY | Facility: CLINIC | Age: 47
End: 2017-05-17

## 2017-05-18 NOTE — TELEPHONE ENCOUNTER
Writer left voice message with patient and sent an MyChart message with provider's instructions from last office visit. Encounter routed to provider to review.   Dorita Meade LPN

## 2017-05-19 NOTE — TELEPHONE ENCOUNTER
Patient called and stated that the topical diclofenac is not effective and she needs an effective alternative to Meloxicam other that the topical diclofenac. Patient stated that she has been experiencing a  bad flare since she stopped taking meloxicam per Dr. Betancourt's instruction and that was a terrible idea. Writer will route to provider to review and advise.   Dorita Meade LPN

## 2017-05-20 ENCOUNTER — THERAPY VISIT (OUTPATIENT)
Dept: PHYSICAL THERAPY | Facility: CLINIC | Age: 47
End: 2017-05-20
Payer: COMMERCIAL

## 2017-05-20 DIAGNOSIS — M25.371 RIGHT ANKLE INSTABILITY: ICD-10-CM

## 2017-05-20 PROCEDURE — 97140 MANUAL THERAPY 1/> REGIONS: CPT | Mod: GP | Performed by: PHYSICAL THERAPIST

## 2017-05-20 PROCEDURE — 97110 THERAPEUTIC EXERCISES: CPT | Mod: GP | Performed by: PHYSICAL THERAPIST

## 2017-05-24 ENCOUNTER — TELEPHONE (OUTPATIENT)
Dept: ENDOCRINOLOGY | Facility: CLINIC | Age: 47
End: 2017-05-24

## 2017-05-24 DIAGNOSIS — E03.9 HYPOTHYROIDISM: ICD-10-CM

## 2017-05-24 RX ORDER — LEVOTHYROXINE SODIUM 125 UG/1
125 TABLET ORAL DAILY
Qty: 90 TABLET | Refills: 3 | Status: SHIPPED | OUTPATIENT
Start: 2017-05-24 | End: 2018-05-08

## 2017-05-24 NOTE — TELEPHONE ENCOUNTER
Pt left a voice message stating she is almost out of the below medication. Pt would like a new Rx sent to [facility] in [place].      levothyroxine (SYNTHROID, LEVOTHROID) 125 MCG tablet 90 tablet 3 2/29/2016  No   Sig: Take 1 tablet (125 mcg) by mouth daily

## 2017-05-30 NOTE — TELEPHONE ENCOUNTER
She can go back on the meloxicam temporarily, but using it long-term, particularly with her underlying diabetes puts her at real risk of very significant kidney disease. Continuing meloxicam long-term as a daily drug is a bad idea, which is why I strongly encouraged her to try to get off of it.    I know she is not wanted to be on disease modifying drugs such as sulfasalazine or methotrexate. If her disease cannot be controlled without the meloxicam however, then this is the next step. I am not willing to prescribe meloxicam indefinitely. The risks are too great.

## 2017-05-30 NOTE — TELEPHONE ENCOUNTER
Writer attempted to communicate provider's note to patient, left a voice message.   Dorita Meade LPN

## 2017-06-10 ENCOUNTER — THERAPY VISIT (OUTPATIENT)
Dept: PHYSICAL THERAPY | Facility: CLINIC | Age: 47
End: 2017-06-10
Payer: COMMERCIAL

## 2017-06-10 DIAGNOSIS — M25.371 RIGHT ANKLE INSTABILITY: ICD-10-CM

## 2017-06-10 PROCEDURE — 97140 MANUAL THERAPY 1/> REGIONS: CPT | Mod: GP | Performed by: PHYSICAL THERAPIST

## 2017-06-10 PROCEDURE — 97110 THERAPEUTIC EXERCISES: CPT | Mod: GP | Performed by: PHYSICAL THERAPIST

## 2017-06-15 DIAGNOSIS — M77.9 ENTHESOPATHY: Primary | ICD-10-CM

## 2017-06-15 NOTE — TELEPHONE ENCOUNTER
Patient returned call from encounter dated 5/27/2017. Writer spoke with patient and was informed that she would like to take Sulfasalazine. She is okay with stopping meloxicam but, we will need something to control her flares. Writer cued up an Rx for Sulfasalazine for provider to review and advise.   Dorita Meade LPN

## 2017-06-23 RX ORDER — SULFASALAZINE 500 MG/1
500 TABLET, DELAYED RELEASE ORAL 2 TIMES DAILY
Status: CANCELLED | OUTPATIENT
Start: 2017-06-23

## 2017-06-24 ENCOUNTER — THERAPY VISIT (OUTPATIENT)
Dept: PHYSICAL THERAPY | Facility: CLINIC | Age: 47
End: 2017-06-24
Payer: COMMERCIAL

## 2017-06-24 DIAGNOSIS — M25.371 RIGHT ANKLE INSTABILITY: ICD-10-CM

## 2017-06-24 PROCEDURE — 97110 THERAPEUTIC EXERCISES: CPT | Mod: GP | Performed by: PHYSICAL THERAPIST

## 2017-06-24 PROCEDURE — 97112 NEUROMUSCULAR REEDUCATION: CPT | Mod: GP | Performed by: PHYSICAL THERAPIST

## 2017-07-08 ENCOUNTER — THERAPY VISIT (OUTPATIENT)
Dept: PHYSICAL THERAPY | Facility: CLINIC | Age: 47
End: 2017-07-08
Payer: COMMERCIAL

## 2017-07-08 DIAGNOSIS — M25.371 RIGHT ANKLE INSTABILITY: ICD-10-CM

## 2017-07-08 PROCEDURE — 97110 THERAPEUTIC EXERCISES: CPT | Mod: GP | Performed by: PHYSICAL THERAPIST

## 2017-07-08 PROCEDURE — 97112 NEUROMUSCULAR REEDUCATION: CPT | Mod: GP | Performed by: PHYSICAL THERAPIST

## 2017-07-08 NOTE — MR AVS SNAPSHOT
After Visit Summary   7/8/2017    Dali Martines    MRN: [id number]           Patient Information     Date Of Birth          1970        Visit Information        Provider Department      7/8/2017 11:10 AM Michi Esteves Pt, PT [place] [facility]        Today's Diagnoses     Right ankle instability           Follow-ups after your visit        Your next 10 appointments already scheduled     Jul 22, 2017 11:10 AM CDT   [place] Extremity with Michi Esteves Pt, PT   [facility] ([place] [place]  )    [address] [facility] [address]  [place] [address]   [phone]            Aug 08, 2017  3:30 PM CDT   (Arrive by 3:15 PM)   RETURN DIABETES with Felicia Mckeon MD   [facility] ([facility])    [address] [place] [id number] [phone]            Aug 21, 2017 11:30 AM CDT   (Arrive by 11:15 AM)   Return Visit with Sara Carter MD   [facility] ([facility])    [address] [place] [id number] [phone]            Aug 29, 2017  2:00 PM CDT   (Arrive by 1:45 PM)   Return Visit with Yoel Betancourt MD   [facility] ([facility])    [address] [place] [id number] [phone]              Who to contact     If you have questions or need follow up information about today's clinic visit or your schedule please contact [facility] directly at [phone].  Normal or non-critical lab and imaging results will be communicated to you by MyChart, letter or phone within 4 business days after the clinic has received the results. If you do not hear from us within 7 days, please contact the clinic through MyChart or phone. If you have a critical or abnormal lab  result, we will notify you by phone as soon as possible.  Submit refill requests through [employer] In or call your pharmacy and they will forward the refill request to us. Please allow 3 business days for your refill to be completed.          Additional Information About Your Visit        [employer] Information     [employer] In gives you secure access to your electronic health record. If you see a primary care provider, you can also send messages to your care team and make appointments. If you have questions, please call your primary care clinic.  If you do not have a primary care provider, please call [phone] and they will assist you.        Care EveryWhere ID     This is your Care EveryWhere ID. This could be used by other organizations to access your [facility] medical records  [id number]         Blood Pressure from Last 3 Encounters:   04/25/17 121/81   04/24/17 135/89   02/14/17 112/75    Weight from Last 3 Encounters:   04/25/17 89.8 kg (198 lb)   02/14/17 85.9 kg (189 lb 6.4 oz)   01/04/17 84.7 kg (186 lb 12.8 oz)              We Performed the Following     NEUROMUSCULAR RE-EDUCATION     THERAPEUTIC EXERCISES        Primary Care Provider Office Phone # Fax #    Dimitri Alas [phone]       [address] [address]        Equal Access to Services     DALIA MOORE : Hadii aad ku hadasho Soomaali, waaxda luqadaha, qaybta kaalmada adeegyada, kaitlin dodd hayravi garrison . So [facility] [phone].    ATENCIÓN: Si habla español, tiene a ramsey disposición servicios gratuitos de asistencia lingüística. Llame al [phone].    We comply with applicable federal civil rights laws and [place] laws. We do not discriminate on the basis of race, color, national origin, age, disability sex, sexual orientation or gender identity.            Thank you!     Thank you for choosing [facility]  for your care. Our goal is  always to provide you with excellent care. Hearing back from our patients is one way we can continue to improve our services. Please take a few minutes to complete the written survey that you may receive in the mail after your visit with us. Thank you!             Your Updated Medication List - Protect others around you: Learn how to safely use, store and throw away your medicines at www.disposemymeds.org.          This list is accurate as of: 7/8/17 11:49 AM.  Always use your most recent med list.                   Brand Name Dispense Instructions for use Diagnosis    aspirin 81 MG tablet      Take 1 tablet by mouth daily.        blood glucose monitoring lancets     4 Box    Use to test blood sugar 8 times daily or as directed.    Type 1 diabetes, HbA1c goal < 7% (H)       blood glucose monitoring test strip    ACCU-CHEK COLIN PLUS    700 strip    Test Blood Sugar 8 times daily or as directed    Type 1 diabetes, HbA1c goal < 7% (H)       CALCIUM + D PO      Take one daily        citalopram 20 MG tablet    celeXA    135 tablet    Take 1.5 tablets (30 mg) by mouth daily    Anxiety       diclofenac 1 % Gel topical gel    VOLTAREN    100 g    Place 2 g onto the skin 4 times daily as needed for moderate pain    Enthesopathy, Pain in joint, multiple sites, Chronic pain of right ankle       dulaglutide 1.5 MG/0.5ML pen    TRULICITY    6 mL    Inject 1.5 mg Subcutaneous every 7 days    Type 1 diabetes mellitus without complication (H)       econazole nitrate 1 % cream      Apply 0.5 inches topically daily.        fish oil-omega-3 fatty acids 1000 MG capsule      Take one daily        insulin degludec 100 UNIT/ML pen    TRESIBA    15 mL    Inject 18 Units Subcutaneous daily    Type 1 diabetes mellitus without complication (H)       insulin pen needle 31G X 8 MM    B-D U/F    450 each    Use 5 pen needles daily    Diabetes mellitus type I (H)       levothyroxine 125 MCG tablet    SYNTHROID/LEVOTHROID    90 tablet    Take 1  tablet (125 mcg) by mouth daily    Hypothyroidism       meloxicam 15 MG tablet    MOBIC    90 tablet    Take 1 tablet (15 mg) by mouth daily    Pain in joint, ankle and foot, right, Chronic pain of right ankle, Pain in joint, multiple sites, Enthesopathy       methylphenidate 20 MG CR capsule    METADATE CD     Take 20 mg by mouth daily        mometasone-formoterol 100-5 MCG/ACT oral inhaler    DULERA    3 Inhaler    Inhale 2 puffs into the lungs 2 times daily    Moderate persistent asthma without complication       * montelukast 10 MG tablet    SINGULAIR    30 tablet    Take 1 tablet (10 mg) by mouth every evening    Mild persistent allergic asthma       * montelukast 5 MG chewable tablet    SINGULAIR    30 tablet    Take 1 tablet (5 mg) by mouth At Bedtime    Mild persistent asthma without complication       MULTIVITAMIN PO      Take  by mouth. Take one daily tab        NIZORAL PO      Take  by mouth. Shampoo daily        NovoLOG PENFILL 100 UNIT/ML injection   Generic drug:  insulin aspart     15 mL    1 unit per 15 grams CHO at all meals and snacks with correction scale of 1 unit per 50 mg/dL over 150.  Average daily use is 20 units.    Type 1 diabetes, HbA1c goal < 7% (H)       ROGAINE EX      Externally apply  topically. daily        sulfaSALAzine 500 MG tablet    AZULFIDINE    60 tablet    Take 1 tablet (500 mg) by mouth 2 times daily    Encounter for long-term current use of high risk medication, Pain in joint, multiple sites       traZODone 50 MG tablet    DESYREL    180 tablet    Take 1-2 tablets by mouth nightly as needed for sleep.    Insomnia       * UNABLE TO FIND      MEDICATION NAME: biosil    Enthesopathy, Pain in joint, multiple sites, Chronic pain of right ankle       * UNABLE TO FIND      Biosil        * Notice:  This list has 4 medication(s) that are the same as other medications prescribed for you. Read the directions carefully, and ask your doctor or other care provider to review them with  you.

## 2017-07-08 NOTE — PROGRESS NOTES
"Subjective:    HPI                    Objective:    System    Physical Exam    General     ROS    Assessment/Plan:      PROGRESS  REPORT    Progress reporting period is from 5/6/17 to 7/8/17.       SUBJECTIVE  Subjective changes noted by patient:  Stood 1 to 1.5 hours on cement caused increased swelling. Pain was short term and swelling did decrease after a day or 2. Walks: \"Not too much walking\" past 2 weeks. 1-2 blocks before she starts feeling it. Has been working on peroneals and she feels this helps. High peroneal contraction tends to make \"it feel better\".    Current pain level is 2/10 and at it's worse past few days 6/10 standing.     Previous pain level was   Initial Pain level:  (3-8/10).   Changes in function:  Yes, \" stability and strength better\"  Adverse reaction to treatment or activity: activity - Standing and walking most provoking even at short duration.     OBJECTIVE  Changes noted in objective findings: AROM: 10 degrees DF, PF WNL, INV 22 degrees painful, EV 10 degrees  AROM maximum Eversion \" feels like it's bone on bone\"  MMT's: EV/INV 5/5. DF 5/5. PF 4+/5, Strong, but painful and pain lingers  Swelling local to inferior incsion site.  ATFL tenderness R. - deltoid.  30 sec SLS eyes open level surface and bear foot.  Core stability is improving SL weight shift bridge 1-2x only before level pelvis lost  Flex and EXT spine both feel good. EXT is slightly stiff end range. Unable to straighten elbows for full press up. Flexion hypermobile.  NO lumbar tenderness  Hip IR 25 L and 45 R        ASSESSMENT/PLAN  Updated problem list and treatment plan: Diagnosis 1:  R ankle  Pain -  self management, education and home program  Decreased ROM/flexibility - manual therapy and therapeutic exercise  Decreased joint mobility - manual therapy and therapeutic exercise  Decreased strength - therapeutic exercise and therapeutic activities  Decreased proprioception - neuro re-education and therapeutic " activities  Inflammation - self management/home program  Impaired gait - gait training  Impaired muscle performance - neuro re-education  Decreased function - therapeutic activities  Instability -  Therapeutic Activity  Therapeutic Exercise  STG/LTGs have been met or progress has been made towards goals:  Yes, very slow progress  Assessment of Progress: The patient's condition is improving.  Self Management Plans:  Patient has been instructed in a home treatment program.    Dali continues to require the following intervention to meet STG and LTG's:  PT    Recommendations:  This patient would benefit from continued therapy.     Frequency:  1x every 3 weeks  Duration:  For 6 weeks        Please refer to the daily flowsheet for treatment today, total treatment time and time spent performing 1:1 timed codes.

## 2017-07-24 DIAGNOSIS — H47.393 CUP TO DISC ASYMMETRY, BILATERAL: Primary | ICD-10-CM

## 2017-07-27 ENCOUNTER — TELEPHONE (OUTPATIENT)
Dept: INTERNAL MEDICINE | Facility: CLINIC | Age: 47
End: 2017-07-27

## 2017-07-27 NOTE — TELEPHONE ENCOUNTER
I received a call from Dali today. She stated that she has been dizzy, had a persistent headache, and had fevers over the last several days. She stated she has taken tylenol with little effect on her fever. She stated that she believed symptoms are medication related (sulfazalazine), but that she stopped taking the medication 2 days ago and symptoms have not decreased. She has no appetite. She stated that she has been drinking lots of fluids, but is still not feeling any better. She stated that she has also had low urine output, despite drinking lots of water. Advised pt that she should be seen as symptoms have not resolved or decreased. As no appointments are available in clinic, advised pt to go to urgent care to rule out other causes and check pt's vital signs. She agreed.    Geraldine Gumzan RN

## 2017-07-28 DIAGNOSIS — Z79.899 HIGH RISK MEDICATION USE: ICD-10-CM

## 2017-07-28 DIAGNOSIS — R21 RASH: ICD-10-CM

## 2017-07-28 DIAGNOSIS — R50.9 FEVER, UNSPECIFIED: ICD-10-CM

## 2017-07-28 DIAGNOSIS — Z79.899 ENCOUNTER FOR LONG-TERM CURRENT USE OF HIGH RISK MEDICATION: ICD-10-CM

## 2017-07-28 DIAGNOSIS — M25.50 PAIN IN JOINT, MULTIPLE SITES: ICD-10-CM

## 2017-07-28 LAB
ALBUMIN UR-MCNC: NEGATIVE MG/DL
APPEARANCE UR: CLEAR
BASOPHILS # BLD AUTO: 0 10E9/L (ref 0–0.2)
BASOPHILS NFR BLD AUTO: 0.3 %
BILIRUB UR QL STRIP: NEGATIVE
COLOR UR AUTO: YELLOW
CRP SERPL-MCNC: 20 MG/L (ref 0–8)
DIFFERENTIAL METHOD BLD: ABNORMAL
EOSINOPHIL # BLD AUTO: 0.3 10E9/L (ref 0–0.7)
EOSINOPHIL NFR BLD AUTO: 7.4 %
ERYTHROCYTE [DISTWIDTH] IN BLOOD BY AUTOMATED COUNT: 12.6 % (ref 10–15)
GLUCOSE UR STRIP-MCNC: NEGATIVE MG/DL
HCT VFR BLD AUTO: 34.2 % (ref 35–47)
HGB BLD-MCNC: 11.3 G/DL (ref 11.7–15.7)
HGB UR QL STRIP: ABNORMAL
KETONES UR STRIP-MCNC: NEGATIVE MG/DL
LEUKOCYTE ESTERASE UR QL STRIP: NEGATIVE
LYMPHOCYTES # BLD AUTO: 1.6 10E9/L (ref 0.8–5.3)
LYMPHOCYTES NFR BLD AUTO: 46.2 %
MCH RBC QN AUTO: 30.1 PG (ref 26.5–33)
MCHC RBC AUTO-ENTMCNC: 33 G/DL (ref 31.5–36.5)
MCV RBC AUTO: 91 FL (ref 78–100)
MONOCYTES # BLD AUTO: 0.5 10E9/L (ref 0–1.3)
MONOCYTES NFR BLD AUTO: 13.2 %
NEUTROPHILS # BLD AUTO: 1.1 10E9/L (ref 1.6–8.3)
NEUTROPHILS NFR BLD AUTO: 32.9 %
NITRATE UR QL: NEGATIVE
NON-SQ EPI CELLS #/AREA URNS LPF: NORMAL /LPF
PH UR STRIP: 6 PH (ref 5–7)
PLATELET # BLD AUTO: 210 10E9/L (ref 150–450)
PROCALCITONIN SERPL-MCNC: NORMAL NG/ML
RBC # BLD AUTO: 3.75 10E12/L (ref 3.8–5.2)
RBC #/AREA URNS AUTO: NORMAL /HPF (ref 0–2)
SP GR UR STRIP: <=1.005 (ref 1–1.03)
URN SPEC COLLECT METH UR: ABNORMAL
UROBILINOGEN UR STRIP-ACNC: 0.2 EU/DL (ref 0.2–1)
WBC # BLD AUTO: 3.4 10E9/L (ref 4–11)
WBC #/AREA URNS AUTO: NORMAL /HPF (ref 0–2)

## 2017-07-28 PROCEDURE — 84450 TRANSFERASE (AST) (SGOT): CPT | Performed by: FAMILY MEDICINE

## 2017-07-28 PROCEDURE — 84460 ALANINE AMINO (ALT) (SGPT): CPT | Performed by: FAMILY MEDICINE

## 2017-07-28 PROCEDURE — 81001 URINALYSIS AUTO W/SCOPE: CPT | Performed by: FAMILY MEDICINE

## 2017-07-28 PROCEDURE — 86140 C-REACTIVE PROTEIN: CPT | Performed by: FAMILY MEDICINE

## 2017-07-28 PROCEDURE — 87086 URINE CULTURE/COLONY COUNT: CPT | Performed by: FAMILY MEDICINE

## 2017-07-28 PROCEDURE — 36415 COLL VENOUS BLD VENIPUNCTURE: CPT | Performed by: FAMILY MEDICINE

## 2017-07-28 PROCEDURE — 82040 ASSAY OF SERUM ALBUMIN: CPT | Performed by: FAMILY MEDICINE

## 2017-07-28 PROCEDURE — 82565 ASSAY OF CREATININE: CPT | Performed by: FAMILY MEDICINE

## 2017-07-28 PROCEDURE — 84145 PROCALCITONIN (PCT): CPT | Performed by: FAMILY MEDICINE

## 2017-07-28 PROCEDURE — 85025 COMPLETE CBC W/AUTO DIFF WBC: CPT | Performed by: FAMILY MEDICINE

## 2017-07-29 LAB
ALBUMIN SERPL-MCNC: 3.6 G/DL (ref 3.4–5)
ALT SERPL W P-5'-P-CCNC: 24 U/L (ref 0–50)
AST SERPL W P-5'-P-CCNC: 24 U/L (ref 0–45)
BACTERIA SPEC CULT: NORMAL
CREAT SERPL-MCNC: 0.59 MG/DL (ref 0.52–1.04)
GFR SERPL CREATININE-BSD FRML MDRD: NORMAL ML/MIN/1.7M2
MICRO REPORT STATUS: NORMAL
SPECIMEN SOURCE: NORMAL

## 2017-08-02 ASSESSMENT — ENCOUNTER SYMPTOMS
ALTERED TEMPERATURE REGULATION: 1
TASTE DISTURBANCE: 0
HALLUCINATIONS: 0
POOR WOUND HEALING: 0
ARTHRALGIAS: 1
CHILLS: 1
POLYPHAGIA: 0
NECK MASS: 0
FATIGUE: 1
JOINT SWELLING: 0
MYALGIAS: 1
MUSCLE WEAKNESS: 1
WEIGHT GAIN: 1
BACK PAIN: 1
SKIN CHANGES: 0
FEVER: 1
NAIL CHANGES: 0
SINUS PAIN: 0
NIGHT SWEATS: 1
MUSCLE CRAMPS: 0
DECREASED APPETITE: 1
SINUS CONGESTION: 0
INCREASED ENERGY: 1
SMELL DISTURBANCE: 0
SORE THROAT: 0
HOARSE VOICE: 0
STIFFNESS: 1
POLYDIPSIA: 0
TROUBLE SWALLOWING: 0
NECK PAIN: 0

## 2017-08-08 ENCOUNTER — OFFICE VISIT (OUTPATIENT)
Dept: ENDOCRINOLOGY | Facility: CLINIC | Age: 47
End: 2017-08-08

## 2017-08-08 VITALS
BODY MASS INDEX: 27.64 KG/M2 | HEIGHT: 70 IN | HEART RATE: 85 BPM | SYSTOLIC BLOOD PRESSURE: 110 MMHG | WEIGHT: 193.1 LBS | DIASTOLIC BLOOD PRESSURE: 74 MMHG

## 2017-08-08 DIAGNOSIS — E10.9 TYPE 1 DIABETES MELLITUS WITHOUT COMPLICATION (H): Primary | ICD-10-CM

## 2017-08-08 LAB — HBA1C MFR BLD: 7.2 % (ref 4.3–6)

## 2017-08-08 NOTE — PROGRESS NOTES
This 46 year old woman returns for f/u of her type 1 diabetes. She also has hypothyroidism, psoriatic arthritis and joint pains, particularly in her right foot. She is followed by Dr. Xiong.  She has had lots of joint pain and developed a fever while on sulfasalazine.  She was then put on prednisone, which was stopped on 8/4.  Because of this illness and the prednisone, her sugars have been higher than usual the last month.  She increased her tresiba from the 14 unit dose she usually takes to a peak of 17 units.  She reduced to 15 units a day or two ago because the sugars were coming down.   She uses  Humalog at meals (1 for 15 )  and for correction (1 to drop 50 with goal of 70- 150)  She uses the expert meter and has been letting it calculate doses most of the time.  She is taking trulicity 1.5 mg each week. She has no GI sx. She continues to use a dexcom almost daily.  She checks her sugars 1- 2 times a day and has an average over the last 30 days is 176 with range from .  Her CGM average is 159  over the last 4 weeks with 56 % of BG in range.  8% were low.   She doesn't always recognize her lows but the CGM helps her make sure she doesn't develop serious lows.  Review of the CGM shows she is usually in target overnight. She is often high after lunch for about 2 hours. She thinks this may be because she takes the insulin when she eats, not before.    She reports that her left foot pain is better.  She is working with a PT to slowly increase activity and she has been able to do more this summer than in the spring.  She is worried that she continues to gain weight.      She saw the eye MD and all is fine.  She has no sores on her feet.  Mood is OK.      Current Outpatient Prescriptions on File Prior to Visit:  predniSONE (DELTASONE) 10 MG tablet Take 1 tablet (10 mg) by mouth daily If symptoms worsen increase to 40 mg and call clinic or on call Rheumatologist.   levothyroxine (SYNTHROID/LEVOTHROID) 125 MCG  tablet Take 1 tablet (125 mcg) by mouth daily   UNABLE TO FIND Biosil   montelukast (SINGULAIR) 5 MG chewable tablet Take 1 tablet (5 mg) by mouth At Bedtime   insulin degludec (TRESIBA) 100 UNIT/ML pen Inject 18 Units Subcutaneous daily   dulaglutide (TRULICITY) 1.5 MG/0.5ML pen Inject 1.5 mg Subcutaneous every 7 days   NOVOLOG PENFILL 100 UNIT/ML soln 1 unit per 15 grams CHO at all meals and snacks with correction scale of 1 unit per 50 mg/dL over 150.  Average daily use is 20 units.   blood glucose monitoring (ACCU-CHEK COLIN PLUS) test strip Test Blood Sugar 8 times daily or as directed   UNABLE TO FIND MEDICATION NAME: biosil   diclofenac (VOLTAREN) 1 % GEL Place 2 g onto the skin 4 times daily as needed for moderate pain   mometasone-formoterol (DULERA) 100-5 MCG/ACT oral inhaler Inhale 2 puffs into the lungs 2 times daily   blood glucose monitoring (ACCU-CHEK FASTCLIX) lancets Use to test blood sugar 8 times daily or as directed.   citalopram (CELEXA) 20 MG tablet Take 1.5 tablets (30 mg) by mouth daily   insulin pen needle (B-D U/F) 31G X 8 MM Use 5 pen needles daily   methylphenidate (METADATE CD) 20 MG CR capsule Take 20 mg by mouth daily   traZODone (DESYREL) 50 MG tablet Take 1-2 tablets by mouth nightly as needed for sleep.   aspirin 81 MG tablet Take 1 tablet by mouth daily.   Calcium Carbonate-Vitamin D (CALCIUM + D PO) Take one daily   econazole nitrate 1 % cream Apply 0.5 inches topically daily.   fish oil-omega-3 fatty acids (FISH OIL) 1000 MG capsule Take one daily   Multiple Vitamin (MULTIVITAMIN OR) Take  by mouth. Take one daily tab   Ketoconazole (NIZORAL PO) Take  by mouth. Shampoo daily   Minoxidil (ROGAINE EX) Externally apply  topically. daily   [DISCONTINUED] montelukast (SINGULAIR) 10 MG tablet Take 1 tablet (10 mg) by mouth every evening     No current facility-administered medications on file prior to visit.     ROS: 10 point ROS neg other than the symptoms noted above in the  "HPI.    So Hx - lives alone    Vital signs:   /74 (BP Location: Right arm, Patient Position: Sitting, Cuff Size: Adult Regular)  Pulse 85  Ht 1.778 m (5' 10\")  Wt 87.6 kg (193 lb 1.6 oz)  BMI 27.71 kg/m2  Estimated body mass index is 27.71 kg/(m^2) as calculated from the following:    Height as of this encounter: 1.778 m (5' 10\").    Weight as of this encounter: 87.6 kg (193 lb 1.6 oz).    NAD  Eyes - no periorbital edema, conjunctival injection, scleral icterus  CV - No edema  Skin - normal texture   Feet - no ulcers   Mood - more upbeat than she usually is    Recent Labs   Lab Test  07/28/17   1428  04/25/17   1827  02/14/17   1546  02/14/17   1540 02/14/17  10/21/16   0958 06/20/16 02/16/16   1719   07/09/14   1641   09/27/13   0928  09/16/13   1612   06/04/13   1555  02/15/13   0935   11/29/10   1009   A1C   --    --    --    --    --    --    --    --    --    --    --    --    --   7.2*   --   7.6*  6.8*   < >   --    HEMOGLOBINA1   --    --    --    --   7.2*   --   7.4*   --    --    < >   --    < >   --    --    --    --    --    < >   --    TSH   --   0.66   --    --    --   0.88   --    < >   --    < >  0.11*   < >   --    --    < >   --    --    < >  1.89   T4   --    --    --    --    --    --    --    --    --    --   1.31   --    --    --    --    --    --    --   8.5   LDL   --    --    --    --    --    --    --    --    --    --    --    --   123   --    --    --   135*   < >  84   HDL   --    --    --    --    --    --    --    --    --    --    --    --   78   --    --    --   77   < >  94   TRIG   --    --    --    --    --    --    --    --    --    --    --    --   51   --    --    --   63   < >  45   CR  0.59   --   0.60   --    --    --    --    < >   --    < >   --    < >   --    --    --    --   0.56   < >  0.77   MICROL   --    --    --   6   --    --    --    --   6   < >   --    < >   --    --    --    --    --    < >   --     < > = values in this interval not displayed. "     A1c today 7.2    Assessment and plan:    1.  Diabetes control.  Overall she is doing well.  Increased the tresiba appropriately for illness, steroids.  Recommended she go back to 14 units starting tomorrow.  To continue trulicity.    2.  Diabetes complications. Up to date with screens and has none.    3.  Hypothyroidism. TSH OK earlier this year.  No changes today.    4.  Weight gain.  Will have her meet with RD to develop meal plan for weight loss.    5. CVD risk.  BP is OK.  May want to consider starting stain at menopause.    F/u 4-6 mo      I spent 25 minutes with this patient face to face and explained the conditions and plans (more than 50% of time was counseling/coordination of diabetes care) . The patient understood and is satisfied with today's visit.     Felicia Mckeon MD

## 2017-08-08 NOTE — MR AVS SNAPSHOT
After Visit Summary   8/8/2017    Dali Martines    MRN: [id number]           Patient Information     Date Of Birth          1970        Visit Information        Provider Department      8/8/2017 3:30 PM Felicia Mckeon MD  [facility]        Today's Diagnoses     Type 1 diabetes mellitus without complication (H)    -  1       Follow-ups after your visit        Additional Services     DIABETES EDUCATOR REFERRAL       DIABETES SELF MANAGEMENT TRAINING (DSMT)      Your provider has referred you to Diabetes Education: [facility]: Endocrinology and Diabetes Clinic [facility] [phone]   [email or web address]    Type of training and number of hours: Previous Diagnosis: Follow-up DSMT - 2 hours.    Medicare covers: 10 hours of initial DSMT in 12 month period from the time of first visit, plus 2 hours of follow-up DSMT annually, and additional hours as requested for insulin training.    Diabetes Type: Type 1             Diabetes Co-Morbidities: none               A1C Goal:  <7.0       A1C is: Lab Results       Component                Value               Date                       A1C                      7.2                 09/16/2013              If an urgent visit is needed or A1C is above 12, Care Team to call the Diabetes Education Team at [phone] or send an In Basket message to the Diabetes Education Pool (P DIAB ED-PATIENT CARE).    Diabetes Education Topics: Other: Want her to meet with RD to discuss weight loss    Special Educational Needs Requiring Individual DSMT: None       MEDICAL NUTRITION THERAPY (MNT) for Diabetes    Medical Nutrition Therapy with a Registered Dietitian can be provided in coordination with Diabetes Self-Management Training to assist in achieving optimal diabetes management.    MNT Type and Hours: Previous diagnosis: Annual follow-up MNT - 2 hours                        Medicare will cover: 3 hours initial MNT in 12 month period after first visit, plus 2 hours of follow-up MNT annually    Please be aware that coverage of these services is subject to the terms and limitations of your health insurance plan.  Call member services at your health plan to determine Diabetes Self-Management Training benefits and ask which blood glucose monitor brands are covered by your plan.      Please bring the following with you to your appointment:    (1)  List of current medications   (2)  List of Blood Glucose Monitor brands that are covered by your insurance plan  (3)  Blood Glucose Monitor and log book  (4)   Food records for the 3 days prior to your visit    The Certified Diabetes Educator may make diabetes medication adjustments per the CDE Protocol and Collaborative Practice Agreement.                  Your next 10 appointments already scheduled     Aug 12, 2017 11:10 AM CDT   LAKISHA Extremity with Chanelle Muniz, PT   [facility] ([facility]  )    [address]  [facility] [address]   [phone]            Aug 17, 2017  9:30 AM CDT   (Arrive by 9:15 AM)   Return Visit with Yoel Betancourt MD   [facility] ([facility])    [address] [place] [id number]   [phone]            Aug 19, 2017 11:10 AM CDT   LAKISHA Extremity with Michi Esteves Pt, PT   [facility] ([facility]  )    [address]  [facility] [address]   [phone]            Aug 29, 2017  2:00 PM CDT   (Arrive by 1:45 PM)   Return Visit with Yoel Betancourt MD   [facility] ([facility])    [address] [place] [address]   [phone]            Sep 21, 2017  2:30 PM CDT   (Arrive by 2:15 PM)   Office Visit with Dali Viera RN   [facility] ([facility])    [address] [place] [id number] [phone]           Bring a current list of meds and any records pertaining to this visit. For Physicals, please bring immunization records and any forms needing to be filled out. Please arrive 10 minutes early to complete paperwork.            Oct 09, 2017  1:00 PM CDT   (Arrive by 12:45 PM)   Return Visit with Sara Carter MD   [facility] ([facility])    [address] [place] [id number] [phone]            Jan 09, 2018  2:30 PM CST   (Arrive by 2:15 PM)   RETURN DIABETES with Felicia Mckeon MD   [facility] ([facility])    [address] [place] [id number] [phone]              Who to contact     Please call your clinic at [phone] to:    Ask questions about your health    Make or cancel appointments    Discuss your medicines    Learn about your test results    Speak to your doctor   If you have compliments or concerns about an experience at your clinic, or if you wish to file a complaint, please contact [facility] Patient Relations at [phone] or email us at [email or web address]         Additional Information About Your Visit        MyChart Information     [email or web address] gives you secure access to your electronic health record. If you see a primary care provider, you can also send messages to your care team and make appointments. If you have questions, please call your primary care clinic.  If you do not have a primary care provider, please call [phone] and they will assist you.      DoorDash is an electronic gateway that provides easy, online access to your medical records. With [employer], you can request a clinic appointment, read your test results, renew a prescription or communicate with your care team.     To access your  "existing account, please contact your [facility] or call [phone] for assistance.        Care EveryWhere ID     This is your Care EveryWhere ID. This could be used by other organizations to access your [facility] medical records  [id number]        Your Vitals Were     Pulse Height BMI (Body Mass Index)             85 1.778 m (5' 10\") 27.71 kg/m2          Blood Pressure from Last 3 Encounters:   08/08/17 110/74   04/25/17 121/81   04/24/17 135/89    Weight from Last 3 Encounters:   08/08/17 87.6 kg (193 lb 1.6 oz)   04/25/17 89.8 kg (198 lb)   02/14/17 85.9 kg (189 lb 6.4 oz)              We Performed the Following     DIABETES EDUCATOR REFERRAL          Today's Medication Changes          These changes are accurate as of: 8/8/17  4:41 PM.  If you have any questions, ask your nurse or doctor.               These medicines have changed or have updated prescriptions.        Dose/Directions    montelukast 5 MG chewable tablet   Commonly known as:  SINGULAIR   This may have changed:  Another medication with the same name was removed. Continue taking this medication, and follow the directions you see here.   Used for:  Mild persistent asthma without complication        Dose:  5 mg   Take 1 tablet (5 mg) by mouth At Bedtime   Quantity:  30 tablet   Refills:  3         Stop taking these medicines if you haven't already. Please contact your care team if you have questions.     meloxicam 15 MG tablet   Commonly known as:  MOBIC           sulfaSALAzine 500 MG tablet   Commonly known as:  AZULFIDINE                    Primary Care Provider Office Phone # Fax #    Dimitri Alas [phone] [address] [address]        Equal Access to Services     [facility]: Diego Castillo, reymundo chacon, kaitlin wolf. So [facility] [phone].    ATENCIÓN: Si habla español, tiene a ramsey " disposición servicios gratuitos de asistencia lingüística. Juanito medel [phone].    We comply with applicable federal civil rights laws and [place] laws. We do not discriminate on the basis of race, color, national origin, age, disability sex, sexual orientation or gender identity.            Thank you!     Thank you for choosing [facility]  for your care. Our goal is always to provide you with excellent care. Hearing back from our patients is one way we can continue to improve our services. Please take a few minutes to complete the written survey that you may receive in the mail after your visit with us. Thank you!             Your Updated Medication List - Protect others around you: Learn how to safely use, store and throw away your medicines at www.disposemymeds.org.          This list is accurate as of: 8/8/17  4:41 PM.  Always use your most recent med list.                   Brand Name Dispense Instructions for use Diagnosis    aspirin 81 MG tablet      Take 1 tablet by mouth daily.        blood glucose monitoring lancets     4 Box    Use to test blood sugar 8 times daily or as directed.    Type 1 diabetes, HbA1c goal < 7% (H)       blood glucose monitoring test strip    ACCU-CHEK COLIN PLUS    700 strip    Test Blood Sugar 8 times daily or as directed    Type 1 diabetes, HbA1c goal < 7% (H)       CALCIUM + D PO      Take one daily        citalopram 20 MG tablet    celeXA    135 tablet    Take 1.5 tablets (30 mg) by mouth daily    Anxiety       diclofenac 1 % Gel topical gel    VOLTAREN    100 g    Place 2 g onto the skin 4 times daily as needed for moderate pain    Enthesopathy, Pain in joint, multiple sites, Chronic pain of right ankle       dulaglutide 1.5 MG/0.5ML pen    TRULICITY    6 mL    Inject 1.5 mg Subcutaneous every 7 days    Type 1 diabetes mellitus without complication (H)       econazole nitrate 1 % cream      Apply 0.5 inches topically daily.        fish oil-omega-3 fatty acids  1000 MG capsule      Take one daily        insulin degludec 100 UNIT/ML pen    TRESIBA    15 mL    Inject 18 Units Subcutaneous daily    Type 1 diabetes mellitus without complication (H)       insulin pen needle 31G X 8 MM    B-D U/F    450 each    Use 5 pen needles daily    Diabetes mellitus type I (H)       levothyroxine 125 MCG tablet    SYNTHROID/LEVOTHROID    90 tablet    Take 1 tablet (125 mcg) by mouth daily    Hypothyroidism       methylphenidate 20 MG CR capsule    METADATE CD     Take 20 mg by mouth daily        mometasone-formoterol 100-5 MCG/ACT oral inhaler    DULERA    3 Inhaler    Inhale 2 puffs into the lungs 2 times daily    Moderate persistent asthma without complication       montelukast 5 MG chewable tablet    SINGULAIR    30 tablet    Take 1 tablet (5 mg) by mouth At Bedtime    Mild persistent asthma without complication       MULTIVITAMIN PO      Take  by mouth. Take one daily tab        NIZORAL PO      Take  by mouth. Shampoo daily        NovoLOG PENFILL 100 UNIT/ML injection   Generic drug:  insulin aspart     15 mL    1 unit per 15 grams CHO at all meals and snacks with correction scale of 1 unit per 50 mg/dL over 150.  Average daily use is 20 units.    Type 1 diabetes, HbA1c goal < 7% (H)       predniSONE 10 MG tablet    DELTASONE    60 tablet    Take 1 tablet (10 mg) by mouth daily If symptoms worsen increase to 40 mg and call clinic or on call Rheumatologist.    High risk medication use, Rash, Fever, unspecified       ROGAINE EX      Externally apply  topically. daily        traZODone 50 MG tablet    DESYREL    180 tablet    Take 1-2 tablets by mouth nightly as needed for sleep.    Insomnia       * UNABLE TO FIND      MEDICATION NAME: biosil    Enthesopathy, Pain in joint, multiple sites, Chronic pain of right ankle       * UNABLE TO FIND      Biosil        * Notice:  This list has 2 medication(s) that are the same as other medications prescribed for you. Read the directions carefully,  and ask your doctor or other care provider to review them with you.

## 2017-08-08 NOTE — LETTER
8/8/2017       RE: Dali Martines  [address]  [facility] [address]     Dear Colleague,    Thank you for referring your patient, Dali Martines, to the [facility] ENDOCRINOLOGY at [facility]. Please see a copy of my visit note below.    This 46 year old woman returns for f/u of her type 1 diabetes. She also has hypothyroidism, psoriatic arthritis and joint pains, particularly in her right foot. She is followed by Dr. Xiong.  She has had lots of joint pain and developed a fever while on sulfasalazine.  She was then put on prednisone, which was stopped on 8/4.  Because of this illness and the prednisone, her sugars have been higher than usual the last month.  She increased her tresiba from the 14 unit dose she usually takes to a peak of 17 units.  She reduced to 15 units a day or two ago because the sugars were coming down.   She uses  Humalog at meals (1 for 15 )  and for correction (1 to drop 50 with goal of 70- 150)  She uses the expert meter and has been letting it calculate doses most of the time.  She is taking trulicity 1.5 mg each week. She has no GI sx. She continues to use a dexcom almost daily.  She checks her sugars 1- 2 times a day and has an average over the last 30 days is 176 with range from .  Her CGM average is 159  over the last 4 weeks with 56 % of BG in range.  8% were low.   She doesn't always recognize her lows but the CGM helps her make sure she doesn't develop serious lows.  Review of the CGM shows she is usually in target overnight. She is often high after lunch for about 2 hours. She thinks this may be because she takes the insulin when she eats, not before.    She reports that her left foot pain is better.  She is working with a PT to slowly increase activity and she has been able to do more this summer than in the spring.  She is worried that she continues to gain weight.      She saw the eye MD and all is fine.  She has no sores on  her feet.  Mood is OK.      Current Outpatient Prescriptions on File Prior to Visit:  predniSONE (DELTASONE) 10 MG tablet Take 1 tablet (10 mg) by mouth daily If symptoms worsen increase to 40 mg and call clinic or on call Rheumatologist.   levothyroxine (SYNTHROID/LEVOTHROID) 125 MCG tablet Take 1 tablet (125 mcg) by mouth daily   UNABLE TO FIND Biosil   montelukast (SINGULAIR) 5 MG chewable tablet Take 1 tablet (5 mg) by mouth At Bedtime   insulin degludec (TRESIBA) 100 UNIT/ML pen Inject 18 Units Subcutaneous daily   dulaglutide (TRULICITY) 1.5 MG/0.5ML pen Inject 1.5 mg Subcutaneous every 7 days   NOVOLOG PENFILL 100 UNIT/ML soln 1 unit per 15 grams CHO at all meals and snacks with correction scale of 1 unit per 50 mg/dL over 150.  Average daily use is 20 units.   blood glucose monitoring (ACCU-CHEK COLIN PLUS) test strip Test Blood Sugar 8 times daily or as directed   UNABLE TO FIND MEDICATION NAME: biosil   diclofenac (VOLTAREN) 1 % GEL Place 2 g onto the skin 4 times daily as needed for moderate pain   mometasone-formoterol (DULERA) 100-5 MCG/ACT oral inhaler Inhale 2 puffs into the lungs 2 times daily   blood glucose monitoring (ACCU-CHEK FASTCLIX) lancets Use to test blood sugar 8 times daily or as directed.   citalopram (CELEXA) 20 MG tablet Take 1.5 tablets (30 mg) by mouth daily   insulin pen needle (B-D U/F) 31G X 8 MM Use 5 pen needles daily   methylphenidate (METADATE CD) 20 MG CR capsule Take 20 mg by mouth daily   traZODone (DESYREL) 50 MG tablet Take 1-2 tablets by mouth nightly as needed for sleep.   aspirin 81 MG tablet Take 1 tablet by mouth daily.   Calcium Carbonate-Vitamin D (CALCIUM + D PO) Take one daily   econazole nitrate 1 % cream Apply 0.5 inches topically daily.   fish oil-omega-3 fatty acids (FISH OIL) 1000 MG capsule Take one daily   Multiple Vitamin (MULTIVITAMIN OR) Take  by mouth. Take one daily tab   Ketoconazole (NIZORAL PO) Take  by mouth. Shampoo daily   Minoxidil (ROGAINE EX)  "Externally apply  topically. daily   [DISCONTINUED] montelukast (SINGULAIR) 10 MG tablet Take 1 tablet (10 mg) by mouth every evening     No current facility-administered medications on file prior to visit.     ROS: 10 point ROS neg other than the symptoms noted above in the HPI.    So Hx - lives alone    Vital signs:   /74 (BP Location: Right arm, Patient Position: Sitting, Cuff Size: Adult Regular)  Pulse 85  Ht 1.778 m (5' 10\")  Wt 87.6 kg (193 lb 1.6 oz)  BMI 27.71 kg/m2  Estimated body mass index is 27.71 kg/(m^2) as calculated from the following:    Height as of this encounter: 1.778 m (5' 10\").    Weight as of this encounter: 87.6 kg (193 lb 1.6 oz).    NAD  Eyes - no periorbital edema, conjunctival injection, scleral icterus  CV - No edema  Skin - normal texture   Feet - no ulcers   Mood - more upbeat than she usually is    Recent Labs   Lab Test  07/28/17   1428  04/25/17   1827  02/14/17   1546  02/14/17   1540 02/14/17  10/21/16   0958 06/20/16 02/16/16   1719   07/09/14   1641   09/27/13   0928  09/16/13   1612   06/04/13   1555  02/15/13   0935   11/29/10   1009   A1C   --    --    --    --    --    --    --    --    --    --    --    --    --   7.2*   --   7.6*  6.8*   < >   --    HEMOGLOBINA1   --    --    --    --   7.2*   --   7.4*   --    --    < >   --    < >   --    --    --    --    --    < >   --    TSH   --   0.66   --    --    --   0.88   --    < >   --    < >  0.11*   < >   --    --    < >   --    --    < >  1.89   T4   --    --    --    --    --    --    --    --    --    --   1.31   --    --    --    --    --    --    --   8.5   LDL   --    --    --    --    --    --    --    --    --    --    --    --   123   --    --    --   135*   < >  84   HDL   --    --    --    --    --    --    --    --    --    --    --    --   78   --    --    --   77   < >  94   TRIG   --    --    --    --    --    --    --    --    --    --    --    --   51   --    --    --   63   < >  45   CR  0.59  "  --   0.60   --    --    --    --    < >   --    < >   --    < >   --    --    --    --   0.56   < >  0.77   MICROL   --    --    --   6   --    --    --    --   6   < >   --    < >   --    --    --    --    --    < >   --     < > = values in this interval not displayed.     A1c today 7.2    Assessment and plan:    1.  Diabetes control.  Overall she is doing well.  Increased the tresiba appropriately for illness, steroids.  Recommended she go back to 14 units starting tomorrow.  To continue trulicity.    2.  Diabetes complications. Up to date with screens and has none.    3.  Hypothyroidism. TSH OK earlier this year.  No changes today.    4.  Weight gain.  Will have her meet with RD to develop meal plan for weight loss.    5. CVD risk.  BP is OK.  May want to consider starting stain at menopause.    F/u 4-6 mo      I spent 25 minutes with this patient face to face and explained the conditions and plans (more than 50% of time was counseling/coordination of diabetes care) . The patient understood and is satisfied with today's visit.     Felicia Mckeon MD

## 2017-08-08 NOTE — NURSING NOTE
"Chief Complaint   Patient presents with     RECHECK     DIABETES TYPE 1 F/U        Initial /74 (BP Location: Right arm, Patient Position: Sitting, Cuff Size: Adult Regular)  Pulse 85  Ht 1.778 m (5' 10\")  Wt 87.6 kg (193 lb 1.6 oz)  BMI 27.71 kg/m2 Estimated body mass index is 27.71 kg/(m^2) as calculated from the following:    Height as of this encounter: 1.778 m (5' 10\").    Weight as of this encounter: 87.6 kg (193 lb 1.6 oz).  Medication Reconciliation: complete     Performed A1C test - patient tolerated well.    Manasa Oconnor, [facility]       "

## 2017-08-17 ENCOUNTER — OFFICE VISIT (OUTPATIENT)
Dept: RHEUMATOLOGY | Facility: CLINIC | Age: 47
End: 2017-08-17
Attending: INTERNAL MEDICINE
Payer: COMMERCIAL

## 2017-08-17 VITALS
TEMPERATURE: 99 F | HEART RATE: 84 BPM | BODY MASS INDEX: 27.26 KG/M2 | SYSTOLIC BLOOD PRESSURE: 118 MMHG | OXYGEN SATURATION: 98 % | DIASTOLIC BLOOD PRESSURE: 79 MMHG | WEIGHT: 190 LBS

## 2017-08-17 DIAGNOSIS — Z51.81 ENCOUNTER FOR THERAPEUTIC DRUG MONITORING: ICD-10-CM

## 2017-08-17 DIAGNOSIS — M77.9 TENDINITIS: ICD-10-CM

## 2017-08-17 DIAGNOSIS — M25.50 PAIN IN JOINT, MULTIPLE SITES: Primary | ICD-10-CM

## 2017-08-17 PROCEDURE — 99213 OFFICE O/P EST LOW 20 MIN: CPT | Mod: ZF

## 2017-08-17 RX ORDER — FOLIC ACID 1 MG/1
1 TABLET ORAL DAILY
Qty: 90 TABLET | Refills: 3 | Status: SHIPPED | OUTPATIENT
Start: 2017-08-17 | End: 2018-09-07

## 2017-08-17 ASSESSMENT — PAIN SCALES - GENERAL: PAINLEVEL: MILD PAIN (3)

## 2017-08-17 NOTE — NURSING NOTE
"Chief Complaint   Patient presents with     RECHECK     Discuss treatment        Initial /79  Pulse 84  Temp 99  F (37.2  C) (Oral)  Wt 86.2 kg (190 lb)  SpO2 98%  BMI 27.26 kg/m2 Estimated body mass index is 27.26 kg/(m^2) as calculated from the following:    Height as of 8/8/17: 1.778 m (5' 10\").    Weight as of this encounter: 86.2 kg (190 lb).  Medication Reconciliation: complete   JUAN MARTINEZ CMA      "

## 2017-08-17 NOTE — MR AVS SNAPSHOT
After Visit Summary   8/17/2017    Dali Martines    MRN: [id number]           Patient Information     Date Of Birth          1970        Visit Information        Provider Department      8/17/2017 9:30 AM Yoel Betancourt MD [facility] Rheumatology        Today's Diagnoses     Pain in joint, multiple sites    -  1    Tendinitis        Encounter for therapeutic drug monitoring           Follow-ups after your visit        Your next 10 appointments already scheduled     Aug 19, 2017 11:10 AM CDT   [facility] Extremity with Michi Esteves Pt, PT   [facility] ([place] [place]  )    [id number]  [facility] [address]   [phone]            Sep 21, 2017  2:30 PM CDT   (Arrive by 2:15 PM)   Office Visit with Dali Viera RN   [facility] Diabetes ([facility])    [address] [place] [address] [id number] [phone]           Bring a current list of meds and any records pertaining to this visit. For Physicals, please bring immunization records and any forms needing to be filled out. Please arrive 10 minutes early to complete paperwork.            Sep 28, 2017  1:15 PM CDT   Lab with  LAB    [facility] ([facility])    [address] [place] [address] [id number] [phone]            Sep 28, 2017  2:00 PM CDT   (Arrive by 1:45 PM)   Return Visit with Yoel Betancourt MD   [facility] Rheumatology ([facility])    [address] [place] [address] [id number] [phone]            Oct 09, 2017  1:00 PM CDT   (Arrive by 12:45 PM)   Return Visit with Sara Carter MD   [facility] ([facility])    [address] [place] [address] [id number] [phone]            Jan 09, 2018  2:30 PM CST   (Arrive by 2:15 PM)   RETURN DIABETES with Felicia ALBERTS  MD Linda   [facility] ([facility])    [address]  [address] [facility] [address] [phone]              Future tests that were ordered for you today     Open Standing Orders        Priority Remaining Interval Expires Ordered    ALT Routine 6/6 Every 2 Months 8/17/2018 8/17/2017    AST Routine 6/6 Every 2 Months 8/17/2018 8/17/2017    Albumin level Routine 6/6 Every 2 Months 8/17/2018 8/17/2017    Creatinine Routine 6/6 Every 2 Months 8/17/2018 8/17/2017    CBC with platelets differential Routine 6/6 Every 2 Months 8/17/2018 8/17/2017    CRP inflammation Routine 6/6 Every 2 Months 8/17/2018 8/17/2017            Who to contact     If you have questions or need follow up information about today's clinic visit or your schedule please contact [facility] RHEUMATOLOGY directly at [phone].  Normal or non-critical lab and imaging results will be communicated to you by Acteavohart, letter or phone within 4 business days after the clinic has received the results. If you do not hear from us within 7 days, please contact the clinic through ROSTRt or phone. If you have a critical or abnormal lab result, we will notify you by phone as soon as possible.  Submit refill requests through [employer] Modular or call your pharmacy and they will forward the refill request to us. Please allow 3 business days for your refill to be completed.          Additional Information About Your Visit        Acteavohart Information     [employer] gives you secure access to your electronic health record. If you see a primary care provider, you can also send messages to your care team and make appointments. If you have questions, please call your primary care clinic.  If you do not have a primary care provider, please call [phone] and they will assist you.        Care EveryWhere ID     This is your Care EveryWhere ID. This could be used by other organizations to access your [facility]  records  [id number]        Your Vitals Were     Pulse Temperature Pulse Oximetry BMI (Body Mass Index)          84 99  F (37.2  C) (Oral) 98% 27.26 kg/m2         Blood Pressure from Last 3 Encounters:   08/17/17 118/79   08/08/17 110/74   04/25/17 121/81    Weight from Last 3 Encounters:   08/17/17 86.2 kg (190 lb)   08/08/17 87.6 kg (193 lb 1.6 oz)   04/25/17 89.8 kg (198 lb)                 Today's Medication Changes          These changes are accurate as of: 8/17/17 11:14 AM.  If you have any questions, ask your nurse or doctor.               Start taking these medicines.        Dose/Directions    folic acid 1 MG tablet   Commonly known as:  FOLVITE   Used for:  Pain in joint, multiple sites, Tendinitis, Encounter for therapeutic drug monitoring   Started by:  Yoel Betancourt MD        Dose:  1 mg   Take 1 tablet (1 mg) by mouth daily   Quantity:  90 tablet   Refills:  3       methotrexate 2.5 MG tablet CHEMO   Used for:  Pain in joint, multiple sites, Tendinitis, Encounter for therapeutic drug monitoring   Started by:  Yoel Betancourt MD        Dose:  7.5 mg   Take 3 tablets (7.5 mg) by mouth once a week Increase by 1 tab/q 2 wk as tolerated until @ 6 tabs/wk.   Quantity:  24 tablet   Refills:  2            Where to get your medicines      These medications were sent to [facility] [address] [place] [address] [address] [place] [address] [place] [address]     Phone:  [phone]     folic acid 1 MG tablet    methotrexate 2.5 MG tablet CHEMO                Primary Care Provider Office Phone # Fax #    Dimitri Alas [phone]       [address] [address]        Equal Access to Services     DALIA MOORE AH: Diego Castillo, reymundo chacon, kaitlin wolf [facility] [phone].    ATENCIÓN: Si habla español, tiene a ramsey disposición servicios gratuitos de  jannya lingüística. Juanito al [phone].    We comply with applicable federal civil rights laws and [place] laws. We do not discriminate on the basis of race, color, national origin, age, disability sex, sexual orientation or gender identity.            Thank you!     Thank you for choosing [facility] RHEUMATOLOGY  for your care. Our goal is always to provide you with excellent care. Hearing back from our patients is one way we can continue to improve our services. Please take a few minutes to complete the written survey that you may receive in the mail after your visit with us. Thank you!             Your Updated Medication List - Protect others around you: Learn how to safely use, store and throw away your medicines at www.disposemymeds.org.          This list is accurate as of: 8/17/17 11:14 AM.  Always use your most recent med list.                   Brand Name Dispense Instructions for use Diagnosis    aspirin 81 MG tablet      Take 1 tablet by mouth daily.        blood glucose monitoring lancets     4 Box    Use to test blood sugar 8 times daily or as directed.    Type 1 diabetes, HbA1c goal < 7% (H)       blood glucose monitoring test strip    ACCU-CHEK COLIN PLUS    700 strip    Test Blood Sugar 8 times daily or as directed    Type 1 diabetes, HbA1c goal < 7% (H)       CALCIUM + D PO      Take one daily        citalopram 20 MG tablet    celeXA    135 tablet    Take 1.5 tablets (30 mg) by mouth daily    Anxiety       diclofenac 1 % Gel topical gel    VOLTAREN    100 g    Place 2 g onto the skin 4 times daily as needed for moderate pain    Enthesopathy, Pain in joint, multiple sites, Chronic pain of right ankle       dulaglutide 1.5 MG/0.5ML pen    TRULICITY    6 mL    Inject 1.5 mg Subcutaneous every 7 days    Type 1 diabetes mellitus without complication (H)       econazole nitrate 1 % cream      Apply 0.5 inches topically daily.        fish oil-omega-3 fatty acids 1000 MG capsule      Take one daily         folic acid 1 MG tablet    FOLVITE    90 tablet    Take 1 tablet (1 mg) by mouth daily    Pain in joint, multiple sites, Tendinitis, Encounter for therapeutic drug monitoring       insulin degludec 100 UNIT/ML pen    TRESIBA    15 mL    Inject 18 Units Subcutaneous daily    Type 1 diabetes mellitus without complication (H)       insulin pen needle 31G X 8 MM    B-D U/F    450 each    Use 5 pen needles daily    Diabetes mellitus type I (H)       levothyroxine 125 MCG tablet    SYNTHROID/LEVOTHROID    90 tablet    Take 1 tablet (125 mcg) by mouth daily    Hypothyroidism       methotrexate 2.5 MG tablet CHEMO     24 tablet    Take 3 tablets (7.5 mg) by mouth once a week Increase by 1 tab/q 2 wk as tolerated until @ 6 tabs/wk.    Pain in joint, multiple sites, Tendinitis, Encounter for therapeutic drug monitoring       methylphenidate 20 MG CR capsule    METADATE CD     Take 20 mg by mouth daily        mometasone-formoterol 100-5 MCG/ACT oral inhaler    DULERA    3 Inhaler    Inhale 2 puffs into the lungs 2 times daily    Moderate persistent asthma without complication       montelukast 5 MG chewable tablet    SINGULAIR    30 tablet    Take 1 tablet (5 mg) by mouth At Bedtime    Mild persistent asthma without complication       MULTIVITAMIN PO      Take  by mouth. Take one daily tab        NIZORAL PO      Take  by mouth. Shampoo daily        NovoLOG PENFILL 100 UNIT/ML injection   Generic drug:  insulin aspart     15 mL    1 unit per 15 grams CHO at all meals and snacks with correction scale of 1 unit per 50 mg/dL over 150.  Average daily use is 20 units.    Type 1 diabetes, HbA1c goal < 7% (H)       predniSONE 10 MG tablet    DELTASONE    60 tablet    Take 1 tablet (10 mg) by mouth daily If symptoms worsen increase to 40 mg and call clinic or on call Rheumatologist.    High risk medication use, Rash, Fever, unspecified       ROGAINE EX      Externally apply  topically. daily        traZODone 50 MG tablet     DESYREL    180 tablet    Take 1-2 tablets by mouth nightly as needed for sleep.    Insomnia       * UNABLE TO FIND      MEDICATION NAME: biosil    Enthesopathy, Pain in joint, multiple sites, Chronic pain of right ankle       * UNABLE TO FIND      Biosil        * Notice:  This list has 2 medication(s) that are the same as other medications prescribed for you. Read the directions carefully, and ask your doctor or other care provider to review them with you.

## 2017-08-17 NOTE — LETTER
8/17/2017      RE: Dali Martines  [address]  [facility] [address]       Rheumatology Visit     Dali Martines MRN# [id number]   YOB: 1970 Age: 44 year old         Primary care provider: Dimitri Alas          Assessment and Plan:     Psoriatic Arthriitis versus Rheumatoid Arthritis     Ms. Dali Martines is a 44 year-old woman with PMH of type I DM, Hashimoto's thyroiditis, and possible psoriasis in childhood, with family history of RA, type I DM, Hashimoto's, and psoriasis presents who presents with recurrent acute onset pain, swelling, stiffness in L hand.    Most likely explanation of her symptoms is psoriatic arthritis given the presentation of asymmetric, transient joint pain/swelling/stiffness, new low titer NOLVIA (vs 2008), family history of psoriasis and active psoriasis found on exam, plus morning heel pain. Psoriasis tends to be worst in fall/winter months, and this corresponds to when she has had these episodes of arthritis in the hand.    At this point, she understands the concern about long-term meloxicam because of her diabetes and the risk to her kidneys that this medicine presents as she gets older.  Unfortunately, sulfasalazine is not going to be an alternative.       She is willing to try methotrexate and has no contraindication to doing so, so I am going to start that at a very low dose of 7.5 mg weekly and dose escalate every several weeks as tolerated and see if we can get better control of her musculoskeletal symptoms with that.       It is not entirely clear to me what this skin eruption really was.  I still think her overall disease process is consistent with psoriatic arthritis, so it is certainly possible it was real psoriasis and that that is going to be more of an issue in the future, but we will just need to monitor that.       Her labs are expanded to include typical methotrexate monitoring labs.  She will gradually dose escalate and then get those labs  done, let us know if she is having difficulties, and see us back in 2-3 months, sooner p.r.n.              This visit was 25 minutes in duration, over 50% in counseling.                            History of Present Illness:   Ms. Dali Martines is a 44-year-old woman with a history of type I DM and Hashimoto's thyroiditis who presents for evaluation of recurrent stiffness, swelling, and pain in the left hand.    Symptoms first occurred in August 2008, when she began having stiffness and pain in the left hand, particularly in the PIPs and MCPs. Symptoms spontaneously resolved after about 6-8 weeks. She was previously seen by Rheumatology here around that time (see note from Dr. Betancourt on 12/9/2008). Briefly, assessment was that she had a self-limited episode of inflammatory arthralgias, possibly viral or early psoriatic arthritis. Negative NOLVIA, RF, Lyme serologies and normal CRP and ESR. Given her family history of RA, there was a thought to start her on HCQ if anti-CCP antibodies were positive, but they were found to be negative. No imaging of the hand was performed.    Last September (2014), she experienced recurrent acute onset pain, stiffness, and swelling in the L hand very similar to her symptoms in 2008. Specifically, had swelling across MCPs and in 3rd PIP. She could hardly bend the hand sometimes due to the welling. Had morning stiffness lasting 30-45 minutes. No other joints were involved.    Issues with her left hand lingered for several months but eventually self-resolved around January. As of today, her only notable symptom is heel pain in the mornings. No fevers, chills, or weight loss. No back pain, vision changes, nausea, vomiting, diarrhea, abdominal pain, or blood in stool or urine. No rash, sicca symptoms, or oral ulcers.    It seems she has a history of hypermobility, e.g. could bend wrist back to forearm, bend down and touch palms flat on floor, when she was younger. Used to get frequent ankle  sprains, and has had multiple tendon and ligament tears over the years.    SEPTEMBER 22, 2015, INTERVAL HISTORY       Since we have last seen the patient, she feels like her joints were generally better over the summer.  She did have some right ankle pain that has continued to be tender at times.  She also got some pretty significant tennis elbow during the summer, but both of these did well with meloxicam.  Her tennis elbow did not respond as well to a compressive band.      About 10 days ago, however, she again started having dull hand pain, left generally greater than right, except for her right fifth digit.  She has about an hour of associated morning stiffness.  This is very similar to what she has had the last 2 years.  Last year, this seemed to last most of the year, but the prior year this was only about 3 months in duration.       Throughout this time, she has had no development of clear-cut psoriasis.  She has carried a diagnosis of psoriasis in the past, but it has not been a persistent problem with her skin.  She does have a strong family history, however, with this affecting her uncle and her mother.       Recall that based on her symptoms and her type 1 diabetes, we had previously checked her for the anti-citrullinated peptide antibodies, but she does not have those.     December 08 2015 Interval history     Since last visit and starting the Meloxicam daily, she feels that her left 5th digit pain has improved significantly. She did have an URI recently with some SOB and had a Prednisone burst of 20 mg daily for five days and she felt that her pain was completely gone with the steroids. She is not noticing any morning stiffness, swelling or erythema in the left 5th digit. She will occasionally have some chronic pain in the DIP of the right hand but very seldomly. Overall, she feels that her joint pain is better since starting the Meloxicam.     She has noted a small lump on the palmar aspect of her  right hand. It is not painful and has not become swollen or red.     Her left ankle has become more bothersome and she thinks she will be needing surgery soon on that side.     When she was having an upper respiratory infection, she noted some swelling under the right mandible and it became so large that she felt it would obstruct her breathing when she turned her head to the right side. The swelling has come down but it is still present and slightly painful to touch. She has not had any weight loss, fevers, chills or night sweats.     No symptoms of dry mouth, eyes, oral lesions, rash, abdominal pain, nausea, vomiting.     Interim history 5/17/2016:  Doing well on Meloxicam. Since last visit, had to go off of Meloxicam for ankle surgery (torn tendon). A couple flares off of it, recently went back on due to the pain. Always has been in L hand, now in right hand and both elbows. Mainly painful (5/10) and stiff. Stiffness begins in the morning, takes about 30 minutes to subside. No noted erythema, warmth or edema of joints. Has two nodules on base of 2nd MCP, can be painful occasionally, no changing drastically in size. Was doing better in the winter, so wondering if there could be a seasonal component. No known seasonal allergies.    Had the chicken pox around age 12. In the past 10-15 years, will periodically get shingles presenting as unilateral painful, itchy rash around L waist every few years. Most recently it occurred in April and was much worse than usual (nerve pain to the point of not being able to sleep). Muscle relaxant helped clear it up and allow for sleep. Has continued intermittent fever and chills since this episode. Decreased appetite, mild nausea. No other rashes since winter.     INTERVAL HISTORY (10/18/2016):  Since we have last seen the patient, she tried going down on meloxicam and actually has tolerated that pretty well.  She has a little bit more stiffness in the morning in her left hand, but  the right hand is pretty good by comparison.  She occasionally has some sporadic sharp pains there, but nothing severe and nothing persistent.  Her overall morning stiffness in the left hand is only about 30 minutes.  Her PIP joints are always the worst, and her fourth and fifth fingers tend to bother her a little bit more.      She did have some palmar contracture developing but with hand therapy those improved without any injections.  She has also avoiding a trackball mouse as that seemed to make things worse.      She continues to have some pain in her right ankle.  She had a bony fragment removed there in 01/2016 by Dr. Tran.  That has hurt a fair amount since and the nature of the pain is not particularly inflammatory and suggests more of a DJD problem.  She also has some pain in the left knee which had an ACL construction in 1995, also possibly consistent with DJD.      She has had some intermittent attacks of very low-grade psoriasis over her chest up in the clavicle area, but has none currently.  She has no other significant new problems.     INTERVAL HISTORY:  Since we last saw the patient, she had an odd episode of pain in her left hand on 02/20.  She said this initially started very sharp, then she got a lump in the palm of her hand that felt inflamed, and then she noticed some bruising in the area.  She does not recall any trauma to it.  She resumed meloxicam, which she had stopped and with that felt better over the next 2 weeks, but has remained on it since.      She also has a sensation in her right fifth digit that is consistently sore and somewhat achy.  That has persisted even with the meloxicam.      Finally, on the right ankle, which has been sprained several times in the past, she continues to wear a brace.  She actually uses the topical meloxicam on that area, but has not done so on the fifth finger.      In all these areas she can have some stiffness in the morning lasting for up to an hour.       Her other concern is that she has gained about 10 pounds.  She does not think she has changed her eating much.     AUGUST 17, 2017, INTERVAL HISTORY:  At the time we last saw the patient, we urged her to try to get off of meloxicam.  She did that and used the topical diclofenac I had given her, but she felt like she quickly worsened with respect to arthritis and, more interestingly and somewhat surprisingly, she got psoriasis over a number of areas.  That has since improved, and it is not entirely clear to me whether there is any relationship here, but she did get some along her hairline and a few other areas.       From the standpoint of her joints, the most dominant issue is right thumb tendinitis, and she has had ongoing difficulty in gripping with it.      When she was doing that badly, we had her go back on the meloxicam temporarily, then started sulfasalazine.  Unfortunately, she had more severe side effects with sulfasalazine including a fever to 102 degrees, headaches, and a general sense of malaise, and this was bad enough that we gave her a course of steroids to interfere with a potentially more severe sulfasalazine allergy and got her off of it.      She now has stopped the steroids as of 10 days ago.  Overall, she thinks she is doing better.  With the taper from the steroids, however, she is again feeling somewhat worse, particularly in her joints.       She really has never had much of anything in the way of psoriasis.  I have felt her arthritis was most consistent with psoriatic arthritis, and she has had a couple of very small plaques occasionally that could have been consistent with it but have never been biopsied.  She does, however, have a strong family history of psoriasis.                      Review of Systems:   Negative other than what is stated in the HPI above           Past Medical History:     Past Medical History:   Diagnosis Date     Anemia      Anxiety      Arthritis 2014    Psoriatic  arthritis     Back injury 1988     Dry eye syndrome      Dyslipidemia      Endometriosis 2002    adehsions seen at laparoscopy     GERD (gastroesophageal reflux disease)      Hypothyroidism     8 yoa     SOB (shortness of breath) 3/11/2014     Type I (juvenile type) diabetes mellitus without mention of complication, not stated as uncontrolled     when 15      Uncomplicated asthma Fall 2013   Possible psoriasis as a child  Multiple tendon and ligament injuries    Patient Active Problem List    Diagnosis Date Noted     Type 1 diabetes mellitus without complication (H) 08/08/2017     Priority: Medium     Right ankle instability 01/14/2017     Priority: Medium     Left knee pain 07/07/2016     Priority: Medium     Swelling of limb 03/15/2016     Priority: Medium     Post-proc states NEC 03/15/2016     Priority: Medium     ESR raised 01/03/2016     Priority: Medium     Right foot pain 10/14/2015     Priority: Medium     Chronic pain of right ankle 07/30/2015     Priority: Medium     Enthesopathy 07/20/2015     Priority: Medium     Pain in joint, multiple sites 07/20/2015     Priority: Medium     PAIN KNEE JOINT 06/17/2015     Priority: Medium     Diabetes (H) 02/26/2015     Priority: Medium     Screening for other eye conditions 02/26/2015     Priority: Medium     Encounter for other specified aftercare 02/24/2015     Priority: Medium     PAIN FOOT 02/24/2015     Priority: Medium     SOB (shortness of breath) 03/11/2014     Priority: Medium     Cervicalgia 06/25/2012     Priority: Medium     Chronic low back pain 06/25/2012     Priority: Medium     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/2012     Priority: Medium     Cutaneous skin tags 06/12/2012     Priority: Medium     Type 1 diabetes, HbA1c goal < 7% (H) 04/05/2011     Priority: Medium     Anxiety 04/05/2011     Priority: Medium     CARDIOVASCULAR SCREENING; LDL GOAL LESS THAN 100 10/31/2010     Priority: Medium     GERD (gastroesophageal reflux disease)  02/24/2009     Priority: Medium             Past Surgical History:     Past Surgical History:   Procedure Laterality Date     C REPAIR CRUCIATE LIGAMENT,KNEE       C STOMACH SURGERY PROCEDURE UNLISTED  December 2002     COLONOSCOPY  2002     ESOPHAGOSCOPY, GASTROSCOPY, DUODENOSCOPY (EGD), COMBINED  1/7/2014    Procedure: COMBINED ESOPHAGOSCOPY, GASTROSCOPY, DUODENOSCOPY (EGD), BIOPSY SINGLE OR MULTIPLE;;  Surgeon: Kraig Nicole MD;  Location:  GI     GYN SURGERY      Laparotomy to remove endometrial tissue     HC BREATH HYDROGEN TEST N/A 6/17/2014    Procedure: HYDROGEN BREATH TEST;  Surgeon: Deacon Alberts MD;  Location:  GI     HYDROGEN BREATH TEST  6/17/14     LAPAROSCOPIC APPENDECTOMY  2002     LAPAROTOMY, LYSIS ADHESIONS, COMBINED  2002    endometriosis     SOFT TISSUE SURGERY  December 2014    right ankle tendon injury             Social History:     Social History   Substance Use Topics     Smoking status: Never Smoker     Smokeless tobacco: Never Used     Alcohol use 0.0 oz/week     0 Standard drinks or equivalent per week      Comment: moderately   Lives on her own. No children. Works as a researcher in education.          Family History:   Father with RA, Hashimoto's thyroiditis, and type I DM  Mother with psoriasis  Possible SLE in cousins  No family history of gout or IBD         Allergies:     Allergies   Allergen Reactions     Sulfasalazine      Developed rash, HA, dizziness             Medications:     Current Outpatient Prescriptions   Medication Sig Dispense Refill     methotrexate 2.5 MG tablet CHEMO Take 3 tablets (7.5 mg) by mouth once a week Increase by 1 tab/q 2 wk as tolerated until @ 6 tabs/wk. 24 tablet 2     folic acid (FOLVITE) 1 MG tablet Take 1 tablet (1 mg) by mouth daily 90 tablet 3     predniSONE (DELTASONE) 10 MG tablet Take 1 tablet (10 mg) by mouth daily If symptoms worsen increase to 40 mg and call clinic or on call Rheumatologist. 60 tablet 1     levothyroxine  (SYNTHROID/LEVOTHROID) 125 MCG tablet Take 1 tablet (125 mcg) by mouth daily 90 tablet 3     UNABLE TO FIND Biosil       insulin degludec (TRESIBA) 100 UNIT/ML pen Inject 18 Units Subcutaneous daily 15 mL 11     dulaglutide (TRULICITY) 1.5 MG/0.5ML pen Inject 1.5 mg Subcutaneous every 7 days 6 mL 3     NOVOLOG PENFILL 100 UNIT/ML soln 1 unit per 15 grams CHO at all meals and snacks with correction scale of 1 unit per 50 mg/dL over 150.  Average daily use is 20 units. 15 mL 4     blood glucose monitoring (ACCU-CHEK COLIN PLUS) test strip Test Blood Sugar 8 times daily or as directed 700 strip 3     UNABLE TO FIND MEDICATION NAME: biosil       diclofenac (VOLTAREN) 1 % GEL Place 2 g onto the skin 4 times daily as needed for moderate pain 100 g 11     mometasone-formoterol (DULERA) 100-5 MCG/ACT oral inhaler Inhale 2 puffs into the lungs 2 times daily 3 Inhaler 3     blood glucose monitoring (ACCU-CHEK FASTCLIX) lancets Use to test blood sugar 8 times daily or as directed. 4 Box 3     citalopram (CELEXA) 20 MG tablet Take 1.5 tablets (30 mg) by mouth daily 135 tablet 1     insulin pen needle (B-D U/F) 31G X 8 MM Use 5 pen needles daily 450 each 3     methylphenidate (METADATE CD) 20 MG CR capsule Take 20 mg by mouth daily       traZODone (DESYREL) 50 MG tablet Take 1-2 tablets by mouth nightly as needed for sleep. 180 tablet 0     aspirin 81 MG tablet Take 1 tablet by mouth daily.       Calcium Carbonate-Vitamin D (CALCIUM + D PO) Take one daily       econazole nitrate 1 % cream Apply 0.5 inches topically daily.       fish oil-omega-3 fatty acids (FISH OIL) 1000 MG capsule Take one daily       Multiple Vitamin (MULTIVITAMIN OR) Take  by mouth. Take one daily tab       Ketoconazole (NIZORAL PO) Take  by mouth. Shampoo daily       Minoxidil (ROGAINE EX) Externally apply  topically. daily       montelukast (SINGULAIR) 5 MG chewable tablet CHEW AND SWALLOW 1 TABLET(5 MG) BY MOUTH AT BEDTIME 30 tablet 0            Physical  Exam:   Blood pressure 118/79, pulse 84, temperature 99  F (37.2  C), temperature source Oral, weight 86.2 kg (190 lb), SpO2 98 %, not currently breastfeeding.     Limited today to joint exam below, and skin exam.   Constitutional: [id number] cooperative  Eyes: nl EOM, PERRLA, vision, conjunctiva, sclera  ENT: nl external ears, nose, hearing, lips, teeth, gums, throat  No mucous membrane lesions, normal saliva pool  Neck: no mass or thyroid enlargement  Resp: lungs clear to auscultation, nl to palpation  CV: RRR, no murmurs, rubs or gallops, no edema  GI: no ABD mass or tenderness, no HSM  : not tested  Lymph:  No supraclavicular, inguinal or epitrochlear nodes  MS:  She has a small tendon nodule noted on the plantar aspect of the right hand, tenderness in flexor tendon right thumb.    All other  TMJ, neck, shoulder, elbow, wrist, MCP/PIP/DIP, spine, hip, knee, ankle, and foot MTP/IP joints were examined. Tender right subtalar joint but sans synovitis.No active synovitis. +Heberden's nodes. Full ROM.  Normal  strength. No dactylitis,  tenosynovitis, enthespathy   Skin:  NO active psoriasis along hairline or elsewhere. No nail pitting, alopecia, rash, nodules or lesions  Neuro: nl cranial nerves, strength, sensation, DTRs.   Psych: nl judgement, orientation, memory, affect.         Data:     Component      Latest Ref Rn 4/27/2016   WBC      4.0 - 11.0 10e9/L 4.8   RBC Count      3.8 - 5.2 10e12/L 3.63 (L)   Hemoglobin      11.7 - 15.7 g/dL 11.2 (L)   Hematocrit      35.0 - 47.0 % 33.6 (L)   MCV      78 - 100 fl 93   MCH      26.5 - 33.0 pg 30.9   MCHC      31.5 - 36.5 g/dL 33.3   RDW      10.0 - 15.0 % 12.7   Platelet Count      150 - 450 10e9/L 294   Diff Method       Automated Method   % Neutrophils       59.3   % Lymphocytes       30.3   % Monocytes       7.8   % Eosinophils       1.3   % Basophils       1.1   % Immature Granulocytes       0.2   Nucleated RBCs      0 /100 0   Absolute Neutrophil      1.6 -  8.3 10e9/L 2.8   Absolute Lymphocytes      0.8 - 5.3 10e9/L 1.4   Absolute Monocytes      0.0 - 1.3 10e9/L 0.4   Absolute Eosinophils      0.0 - 0.7 10e9/L 0.1   Absolute Basophils      0.0 - 0.2 10e9/L 0.1   Abs Immature Granulocytes      0 - 0.4 10e9/L 0.0   Absolute Nucleated RBC       0.0   TSH      0.40 - 4.00 mU/L        Lab Results   Component Value Date    AST 19 12/22/2014    AST 36 2/15/2013    AST 25 11/29/2010    ALT 22 12/22/2014    ALT 30 2/15/2013    ALT 8 11/23/2011    ALKPHOS 109 12/22/2014    ALKPHOS 89 2/15/2013    ALKPHOS 65 11/29/2010     Reviewed Rheumatology lab flowsheet. Pertinent results:  2014 -- NOLVIA 1.6, negative RF, normal CRP, mildly elevated ESR, unremarkable x-rays of hands and wrists          Yoel Betancourt MD

## 2017-08-18 DIAGNOSIS — J45.30 MILD PERSISTENT ASTHMA WITHOUT COMPLICATION: ICD-10-CM

## 2017-08-18 RX ORDER — MONTELUKAST SODIUM 5 MG/1
TABLET, CHEWABLE ORAL
Qty: 30 TABLET | Refills: 0 | Status: SHIPPED | OUTPATIENT
Start: 2017-08-18 | End: 2017-09-18

## 2017-08-19 ENCOUNTER — THERAPY VISIT (OUTPATIENT)
Dept: PHYSICAL THERAPY | Facility: CLINIC | Age: 47
End: 2017-08-19
Payer: COMMERCIAL

## 2017-08-19 DIAGNOSIS — M25.371 RIGHT ANKLE INSTABILITY: ICD-10-CM

## 2017-08-19 PROCEDURE — 97112 NEUROMUSCULAR REEDUCATION: CPT | Mod: GP | Performed by: PHYSICAL THERAPIST

## 2017-08-19 PROCEDURE — 97110 THERAPEUTIC EXERCISES: CPT | Mod: GP | Performed by: PHYSICAL THERAPIST

## 2017-08-24 ENCOUNTER — OFFICE VISIT (OUTPATIENT)
Dept: OPHTHALMOLOGY | Facility: CLINIC | Age: 47
End: 2017-08-24
Attending: OPHTHALMOLOGY
Payer: COMMERCIAL

## 2017-08-24 DIAGNOSIS — H47.393 CUP TO DISC ASYMMETRY, BILATERAL: ICD-10-CM

## 2017-08-24 PROCEDURE — 99215 OFFICE O/P EST HI 40 MIN: CPT | Mod: ZF

## 2017-08-24 PROCEDURE — 92133 CPTRZD OPH DX IMG PST SGM ON: CPT | Mod: ZF | Performed by: OPHTHALMOLOGY

## 2017-08-24 PROCEDURE — 92083 EXTENDED VISUAL FIELD XM: CPT | Mod: ZF | Performed by: OPHTHALMOLOGY

## 2017-08-24 ASSESSMENT — EXTERNAL EXAM - RIGHT EYE: OD_EXAM: NORMAL

## 2017-08-24 ASSESSMENT — CONF VISUAL FIELD
OD_NORMAL: 1
METHOD: COUNTING FINGERS
OS_NORMAL: 1

## 2017-08-24 ASSESSMENT — SLIT LAMP EXAM - LIDS
COMMENTS: NORMAL
COMMENTS: NORMAL

## 2017-08-24 ASSESSMENT — CUP TO DISC RATIO
OS_RATIO: 0.6
OD_RATIO: 0.4

## 2017-08-24 ASSESSMENT — REFRACTION_MANIFEST
OS_SPHERE: -0.50
OS_CYLINDER: SPHERE
OD_ADD: +2.00
OD_CYLINDER: +0.50
OD_SPHERE: -0.75
OS_ADD: +2.00
OD_AXIS: 165

## 2017-08-24 ASSESSMENT — TONOMETRY
OD_IOP_MMHG: 16
OS_IOP_MMHG: 18
IOP_METHOD: TONOPEN

## 2017-08-24 ASSESSMENT — VISUAL ACUITY
OD_SC: 20/20-2
METHOD: SNELLEN - LINEAR
OS_SC: 20/20

## 2017-08-24 ASSESSMENT — EXTERNAL EXAM - LEFT EYE: OS_EXAM: NORMAL

## 2017-08-24 NOTE — NURSING NOTE
Chief Complaints and History of Present Illnesses   Patient presents with     Eye Exam For Diabetes     Glaucoma Suspect Follow Up     HPI    Symptoms:     Blurred vision   No decreased vision   Difficulty with reading   Floaters   No flashes   No Dryness         Do you have eye pain now?:  No      Comments:  Harder time seeing fine print like on phone or med bottles.   A1c 7.2 per pt  Linda Dukes COA August 24, 2017 2:50 PM

## 2017-08-24 NOTE — MR AVS SNAPSHOT
After Visit Summary   8/24/2017    Dali Martines    MRN: [id number]           Patient Information     Date Of Birth          1970        Visit Information        Provider Department      8/24/2017 2:30 PM [facility] Lira MD [facility]        Today's Diagnoses     Cup to disc asymmetry, bilateral           Follow-ups after your visit        Follow-up notes from your care team     Return in about 1 year (around 8/24/2018) for OCT Mac and OCT RNFL, Diabetes and glaucoma suspect.      Your next 10 appointments already scheduled     Sep 16, 2017 11:10 AM CDT   [facility] Extremity with Michi Esteves Pt, PT   [facility] ([facility]  )    [address]  [place] [id number] [phone]            Sep 21, 2017  2:30 PM CDT   (Arrive by 2:15 PM)   Office Visit with Dali Viera RN   [facility] Diabetes ([facility])    [address] [place] [address] [id number] [phone]           Bring a current list of meds and any records pertaining to this visit. For Physicals, please bring immunization records and any forms needing to be filled out. Please arrive 10 minutes early to complete paperwork.            Sep 28, 2017  1:15 PM CDT   Lab with  LAB   [facility] Lab ([facility])    [address] [place] [id number] [phone]            Sep 28, 2017  2:00 PM CDT   (Arrive by 1:45 PM)   Return Visit with Yoel Betancourt MD   [facility] Rheumatology ([facility])    [address] [place] [address] [id number] [phone]            Oct 09, 2017  1:00 PM CDT   (Arrive by 12:45 PM)   Return Visit with Sara Carter MD   [facility] ([facility])    [address] [place] [address] [id number] [phone]            Jan 09, 2018   2:30 PM CST   (Arrive by 2:15 PM)   RETURN DIABETES with Felicia Mckeon MD   [facility] Endocrinology ([facility])    [address] [address] [phone]              Who to contact     Please call your clinic at [phone] to:    Ask questions about your health    Make or cancel appointments    Discuss your medicines    Learn about your test results    Speak to your doctor   If you have compliments or concerns about an experience at your clinic, or if you wish to file a complaint, please contact [facility] Patient Relations at [phone] or email us at [email or web address]         Additional Information About Your Visit        [employer] Information     [employer] gives you secure access to your electronic health record. If you see a primary care provider, you can also send messages to your care team and make appointments. If you have questions, please call your primary care clinic.  If you do not have a primary care provider, please call [phone] and they will assist you.      [employer] is an electronic gateway that provides easy, online access to your medical records. With [employer], you can request a clinic appointment, read your test results, renew a prescription or communicate with your care team.     To access your existing account, please contact your [facility] or call [phone] for assistance.        Care EveryWhere ID     This is your Care EveryWhere ID. This could be used by other organizations to access your [facility] medical records  [id number]         Blood Pressure from Last 3 Encounters:   08/17/17 118/79   08/08/17 110/74   04/25/17 121/81    Weight from Last 3 Encounters:   08/17/17 86.2 kg (190 lb)   08/08/17 87.6 kg (193 lb 1.6 oz)   04/25/17 89.8 kg (198 lb)              We Performed the Following     OCT Optic Nerve RNFL Spectralis OU (both eyes)     OVF  24-2 Dynamic OU        Primary Care Provider Office Phone # Fax #    Dimitri Alas [phone]        [address] [address]        Equal Access to Services     DALIA MOORE : Diego bryan ku dheerajo Soterrellali, waaxda luqadaha, qaybta kaalmada adeegyada, kaitlin fernandes laNicolasravi owusu. So [facility] [phone].    ATENCIÓN: Si habla español, tiene a ramsey disposición servicios gratuitos de asistencia lingüística. Llame al [phone].    We comply with applicable federal civil rights laws and [place] laws. We do not discriminate on the basis of race, color, national origin, age, disability sex, sexual orientation or gender identity.            Thank you!     Thank you for choosing EYE CLINIC  for your care. Our goal is always to provide you with excellent care. Hearing back from our patients is one way we can continue to improve our services. Please take a few minutes to complete the written survey that you may receive in the mail after your visit with us. Thank you!             Your Updated Medication List - Protect others around you: Learn how to safely use, store and throw away your medicines at www.disposemymeds.org.          This list is accurate as of: 8/24/17  4:02 PM.  Always use your most recent med list.                   Brand Name Dispense Instructions for use Diagnosis    aspirin 81 MG tablet      Take 1 tablet by mouth daily.        blood glucose monitoring lancets     4 Box    Use to test blood sugar 8 times daily or as directed.    Type 1 diabetes, HbA1c goal < 7% (H)       blood glucose monitoring test strip    ACCU-CHEK COLIN PLUS    700 strip    Test Blood Sugar 8 times daily or as directed    Type 1 diabetes, HbA1c goal < 7% (H)       CALCIUM + D PO      Take one daily        citalopram 20 MG tablet    celeXA    135 tablet    Take 1.5 tablets (30 mg) by mouth daily    Anxiety       diclofenac 1 % Gel topical gel    VOLTAREN    100 g    Place 2 g onto  the skin 4 times daily as needed for moderate pain    Enthesopathy, Pain in joint, multiple sites, Chronic pain of right ankle       dulaglutide 1.5 MG/0.5ML pen    TRULICITY    6 mL    Inject 1.5 mg Subcutaneous every 7 days    Type 1 diabetes mellitus without complication (H)       econazole nitrate 1 % cream      Apply 0.5 inches topically daily.        fish oil-omega-3 fatty acids 1000 MG capsule      Take one daily        folic acid 1 MG tablet    FOLVITE    90 tablet    Take 1 tablet (1 mg) by mouth daily    Pain in joint, multiple sites, Tendinitis, Encounter for therapeutic drug monitoring       insulin degludec 100 UNIT/ML pen    TRESIBA    15 mL    Inject 18 Units Subcutaneous daily    Type 1 diabetes mellitus without complication (H)       insulin pen needle 31G X 8 MM    B-D U/F    450 each    Use 5 pen needles daily    Diabetes mellitus type I (H)       levothyroxine 125 MCG tablet    SYNTHROID/LEVOTHROID    90 tablet    Take 1 tablet (125 mcg) by mouth daily    Hypothyroidism       methotrexate 2.5 MG tablet CHEMO     24 tablet    Take 3 tablets (7.5 mg) by mouth once a week Increase by 1 tab/q 2 wk as tolerated until @ 6 tabs/wk.    Pain in joint, multiple sites, Tendinitis, Encounter for therapeutic drug monitoring       methylphenidate 20 MG CR capsule    METADATE CD     Take 20 mg by mouth daily        mometasone-formoterol 100-5 MCG/ACT oral inhaler    DULERA    3 Inhaler    Inhale 2 puffs into the lungs 2 times daily    Moderate persistent asthma without complication       montelukast 5 MG chewable tablet    SINGULAIR    30 tablet    CHEW AND SWALLOW 1 TABLET(5 MG) BY MOUTH AT BEDTIME    Mild persistent asthma without complication       MULTIVITAMIN PO      Take  by mouth. Take one daily tab        NIZORAL PO      Take  by mouth. Shampoo daily        NovoLOG PENFILL 100 UNIT/ML injection   Generic drug:  insulin aspart     15 mL    1 unit per 15 grams CHO at all meals and snacks with correction  scale of 1 unit per 50 mg/dL over 150.  Average daily use is 20 units.    Type 1 diabetes, HbA1c goal < 7% (H)       predniSONE 10 MG tablet    DELTASONE    60 tablet    Take 1 tablet (10 mg) by mouth daily If symptoms worsen increase to 40 mg and call clinic or on call Rheumatologist.    High risk medication use, Rash, Fever, unspecified       ROGAINE EX      Externally apply  topically. daily        traZODone 50 MG tablet    DESYREL    180 tablet    Take 1-2 tablets by mouth nightly as needed for sleep.    Insomnia       * UNABLE TO FIND      MEDICATION NAME: biosil    Enthesopathy, Pain in joint, multiple sites, Chronic pain of right ankle       * UNABLE TO FIND      Biosil        * Notice:  This list has 2 medication(s) that are the same as other medications prescribed for you. Read the directions carefully, and ask your doctor or other care provider to review them with you.

## 2017-08-24 NOTE — PROGRESS NOTES
CC Diabetes follow up , glaucoma suspect    HPI: Dali Martines is a 46 year old female with the following ophthalmologic problems:    Here for diabetic retinopathy check.   Last HbA1c 7.2% 8.8.17. Wearing glasses for reading only.     Optical Coherence Tomography 8.24.17  Right eye - normal foveal contour; normal NFL  Left eye - normal foveal contour; normal nerve fiber layer     OVF 8.24.17: right eye normal; left eye: small focal point of decreased sensitivity     Optical Coherence Tomography retinal nerve fiber layer 8-24-17  Normal OU    ASSESSMENT/PLAN    1. DM with mild nonproliferative diabetic retinopathy right eye   No Diabetic retinopathy left eye   - Blood pressure (<120/80) and blood glucose (HbA1c 7.2) control discussed with patient.     2. H/o TIA-like vision loss RE in ~2008  - eval by Dr Schaffer felt to be migraine related rather than embolic or inflammatory    3. C:D asymmetry  - intraocular pressure within normal limits, Optical Coherence Tomography retinal nerve fiber layer without thinning, visual field 24-2 within normal limits   Plan for optic nerve Optical Coherence Tomography nerve fiber layer with OVF24-2 every other year    return to retina clinic: 1 yr with macula Optical Coherence Tomography sooner as needed.  Kelechi Matthew MD, PhD  Vitreoretinal Surgery Fellow    ~~~~~~~~~~~~~~~~~~~~~~~~~~~~~~~~~~   Complete documentation of historical and exam elements from today's encounter can be found in the full encounter summary report (not reduplicated in this progress note).  I personally obtained the chief complaint(s) and history of present illness.  I confirmed and edited as necessary the review of systems, past medical/surgical history, family history, social history, and examination findings as documented by others; and I examined the patient myself.  I personally reviewed the relevant tests, images, and reports as documented above.  I formulated and edited as necessary the assessment and plan  and discussed the findings and management plan with the patient and family    Naz Lira MD  .  Retina Service   Department of Ophthalmology and Visual Neurosciences   [facility]  Phone: [phone]   Fax: [phone]

## 2017-08-24 NOTE — LETTER
8/24/2017       RE: Dali Martines  [address]  [facility] [address]     Dear Colleague,    Thank you for referring your patient, Dali Martines, [facility] Please see a copy of my visit note below.    CC Diabetes follow up , glaucoma suspect    HPI: Dali Martines is a 46 year old female with the following ophthalmologic problems:    Here for diabetic retinopathy check.   Last HbA1c 7.2% 8.8.17. Wearing glasses for reading only.     Optical Coherence Tomography 8.24.17  Right eye - normal foveal contour; normal NFL  Left eye - normal foveal contour; normal nerve fiber layer     OVF 8.24.17: right eye normal; left eye: small focal point of decreased sensitivity     Optical Coherence Tomography retinal nerve fiber layer 8-24-17  Normal OU    ASSESSMENT/PLAN    1. DM with mild nonproliferative diabetic retinopathy right eye   No Diabetic retinopathy left eye   - Blood pressure (<120/80) and blood glucose (HbA1c 7.2) control discussed with patient.     2. H/o TIA-like vision loss RE in ~2008  - eval by Dr Schaffer felt to be migraine related rather than embolic or inflammatory    3. C:D asymmetry  - intraocular pressure within normal limits, Optical Coherence Tomography retinal nerve fiber layer without thinning, visual field 24-2 within normal limits   Plan for optic nerve Optical Coherence Tomography nerve fiber layer with OVF24-2 every other year    return to retina clinic: 1 yr with macula Optical Coherence Tomography sooner as needed.  Kelechi Matthew MD, PhD  Vitreoretinal Surgery Fellow    ~~~~~~~~~~~~~~~~~~~~~~~~~~~~~~~~~~   Complete documentation of historical and exam elements from today's encounter can be found in the full encounter summary report (not reduplicated in this progress note).  I personally obtained the chief complaint(s) and history of present illness.  I confirmed and edited as necessary the review of systems, past  medical/surgical history, family history, social history, and examination findings as documented by others; and I examined the patient myself.  I personally reviewed the relevant tests, images, and reports as documented above.  I formulated and edited as necessary the assessment and plan and discussed the findings and management plan with the patient and family    Naz Lira MD  .  Retina Service   Department of Ophthalmology and Visual Neurosciences   [facility]  Phone: [phone]   Fax: [phone]

## 2017-08-25 NOTE — PROGRESS NOTES
Rheumatology Visit     Dali Martines MRN# [id number]   YOB: 1970 Age: 44 year old         Primary care provider: Dimitri Alas          Assessment and Plan:     Psoriatic Arthriitis versus Rheumatoid Arthritis     Ms. Dali Martines is a 44 year-old woman with PMH of type I DM, Hashimoto's thyroiditis, and possible psoriasis in childhood, with family history of RA, type I DM, Hashimoto's, and psoriasis presents who presents with recurrent acute onset pain, swelling, stiffness in L hand.    Most likely explanation of her symptoms is psoriatic arthritis given the presentation of asymmetric, transient joint pain/swelling/stiffness, new low titer NOLVIA (vs 2008), family history of psoriasis and active psoriasis found on exam, plus morning heel pain. Psoriasis tends to be worst in fall/winter months, and this corresponds to when she has had these episodes of arthritis in the hand.    At this point, she understands the concern about long-term meloxicam because of her diabetes and the risk to her kidneys that this medicine presents as she gets older.  Unfortunately, sulfasalazine is not going to be an alternative.       She is willing to try methotrexate and has no contraindication to doing so, so I am going to start that at a very low dose of 7.5 mg weekly and dose escalate every several weeks as tolerated and see if we can get better control of her musculoskeletal symptoms with that.       It is not entirely clear to me what this skin eruption really was.  I still think her overall disease process is consistent with psoriatic arthritis, so it is certainly possible it was real psoriasis and that that is going to be more of an issue in the future, but we will just need to monitor that.       Her labs are expanded to include typical methotrexate monitoring labs.  She will gradually dose escalate and then get those labs done, let us know if she is having difficulties, and see us back in 2-3 months, sooner  p.r.n.              This visit was 25 minutes in duration, over 50% in counseling.                            History of Present Illness:   Ms. Dali Martines is a 44-year-old woman with a history of type I DM and Hashimoto's thyroiditis who presents for evaluation of recurrent stiffness, swelling, and pain in the left hand.    Symptoms first occurred in August 2008, when she began having stiffness and pain in the left hand, particularly in the PIPs and MCPs. Symptoms spontaneously resolved after about 6-8 weeks. She was previously seen by Rheumatology here around that time (see note from Dr. Betancourt on 12/9/2008). Briefly, assessment was that she had a self-limited episode of inflammatory arthralgias, possibly viral or early psoriatic arthritis. Negative NOLVIA, RF, Lyme serologies and normal CRP and ESR. Given her family history of RA, there was a thought to start her on HCQ if anti-CCP antibodies were positive, but they were found to be negative. No imaging of the hand was performed.    Last September (2014), she experienced recurrent acute onset pain, stiffness, and swelling in the L hand very similar to her symptoms in 2008. Specifically, had swelling across MCPs and in 3rd PIP. She could hardly bend the hand sometimes due to the welling. Had morning stiffness lasting 30-45 minutes. No other joints were involved.    Issues with her left hand lingered for several months but eventually self-resolved around January. As of today, her only notable symptom is heel pain in the mornings. No fevers, chills, or weight loss. No back pain, vision changes, nausea, vomiting, diarrhea, abdominal pain, or blood in stool or urine. No rash, sicca symptoms, or oral ulcers.    It seems she has a history of hypermobility, e.g. could bend wrist back to forearm, bend down and touch palms flat on floor, when she was younger. Used to get frequent ankle sprains, and has had multiple tendon and ligament tears over the years.    SEPTEMBER 22,  2015, INTERVAL HISTORY       Since we have last seen the patient, she feels like her joints were generally better over the summer.  She did have some right ankle pain that has continued to be tender at times.  She also got some pretty significant tennis elbow during the summer, but both of these did well with meloxicam.  Her tennis elbow did not respond as well to a compressive band.      About 10 days ago, however, she again started having dull hand pain, left generally greater than right, except for her right fifth digit.  She has about an hour of associated morning stiffness.  This is very similar to what she has had the last 2 years.  Last year, this seemed to last most of the year, but the prior year this was only about 3 months in duration.       Throughout this time, she has had no development of clear-cut psoriasis.  She has carried a diagnosis of psoriasis in the past, but it has not been a persistent problem with her skin.  She does have a strong family history, however, with this affecting her uncle and her mother.       Recall that based on her symptoms and her type 1 diabetes, we had previously checked her for the anti-citrullinated peptide antibodies, but she does not have those.     December 08 2015 Interval history     Since last visit and starting the Meloxicam daily, she feels that her left 5th digit pain has improved significantly. She did have an URI recently with some SOB and had a Prednisone burst of 20 mg daily for five days and she felt that her pain was completely gone with the steroids. She is not noticing any morning stiffness, swelling or erythema in the left 5th digit. She will occasionally have some chronic pain in the DIP of the right hand but very seldomly. Overall, she feels that her joint pain is better since starting the Meloxicam.     She has noted a small lump on the palmar aspect of her right hand. It is not painful and has not become swollen or red.     Her left ankle has  become more bothersome and she thinks she will be needing surgery soon on that side.     When she was having an upper respiratory infection, she noted some swelling under the right mandible and it became so large that she felt it would obstruct her breathing when she turned her head to the right side. The swelling has come down but it is still present and slightly painful to touch. She has not had any weight loss, fevers, chills or night sweats.     No symptoms of dry mouth, eyes, oral lesions, rash, abdominal pain, nausea, vomiting.     Interim history 5/17/2016:  Doing well on Meloxicam. Since last visit, had to go off of Meloxicam for ankle surgery (torn tendon). A couple flares off of it, recently went back on due to the pain. Always has been in L hand, now in right hand and both elbows. Mainly painful (5/10) and stiff. Stiffness begins in the morning, takes about 30 minutes to subside. No noted erythema, warmth or edema of joints. Has two nodules on base of 2nd MCP, can be painful occasionally, no changing drastically in size. Was doing better in the winter, so wondering if there could be a seasonal component. No known seasonal allergies.    Had the chicken pox around age 12. In the past 10-15 years, will periodically get shingles presenting as unilateral painful, itchy rash around L waist every few years. Most recently it occurred in April and was much worse than usual (nerve pain to the point of not being able to sleep). Muscle relaxant helped clear it up and allow for sleep. Has continued intermittent fever and chills since this episode. Decreased appetite, mild nausea. No other rashes since winter.     INTERVAL HISTORY (10/18/2016):  Since we have last seen the patient, she tried going down on meloxicam and actually has tolerated that pretty well.  She has a little bit more stiffness in the morning in her left hand, but the right hand is pretty good by comparison.  She occasionally has some sporadic sharp  pains there, but nothing severe and nothing persistent.  Her overall morning stiffness in the left hand is only about 30 minutes.  Her PIP joints are always the worst, and her fourth and fifth fingers tend to bother her a little bit more.      She did have some palmar contracture developing but with hand therapy those improved without any injections.  She has also avoiding a trackball mouse as that seemed to make things worse.      She continues to have some pain in her right ankle.  She had a bony fragment removed there in 01/2016 by Dr. Tran.  That has hurt a fair amount since and the nature of the pain is not particularly inflammatory and suggests more of a DJD problem.  She also has some pain in the left knee which had an ACL construction in 1995, also possibly consistent with DJD.      She has had some intermittent attacks of very low-grade psoriasis over her chest up in the clavicle area, but has none currently.  She has no other significant new problems.     INTERVAL HISTORY:  Since we last saw the patient, she had an odd episode of pain in her left hand on 02/20.  She said this initially started very sharp, then she got a lump in the palm of her hand that felt inflamed, and then she noticed some bruising in the area.  She does not recall any trauma to it.  She resumed meloxicam, which she had stopped and with that felt better over the next 2 weeks, but has remained on it since.      She also has a sensation in her right fifth digit that is consistently sore and somewhat achy.  That has persisted even with the meloxicam.      Finally, on the right ankle, which has been sprained several times in the past, she continues to wear a brace.  She actually uses the topical meloxicam on that area, but has not done so on the fifth finger.      In all these areas she can have some stiffness in the morning lasting for up to an hour.      Her other concern is that she has gained about 10 pounds.  She does not think she  has changed her eating much.     AUGUST 17, 2017, INTERVAL HISTORY:  At the time we last saw the patient, we urged her to try to get off of meloxicam.  She did that and used the topical diclofenac I had given her, but she felt like she quickly worsened with respect to arthritis and, more interestingly and somewhat surprisingly, she got psoriasis over a number of areas.  That has since improved, and it is not entirely clear to me whether there is any relationship here, but she did get some along her hairline and a few other areas.       From the standpoint of her joints, the most dominant issue is right thumb tendinitis, and she has had ongoing difficulty in gripping with it.      When she was doing that badly, we had her go back on the meloxicam temporarily, then started sulfasalazine.  Unfortunately, she had more severe side effects with sulfasalazine including a fever to 102 degrees, headaches, and a general sense of malaise, and this was bad enough that we gave her a course of steroids to interfere with a potentially more severe sulfasalazine allergy and got her off of it.      She now has stopped the steroids as of 10 days ago.  Overall, she thinks she is doing better.  With the taper from the steroids, however, she is again feeling somewhat worse, particularly in her joints.       She really has never had much of anything in the way of psoriasis.  I have felt her arthritis was most consistent with psoriatic arthritis, and she has had a couple of very small plaques occasionally that could have been consistent with it but have never been biopsied.  She does, however, have a strong family history of psoriasis.                      Review of Systems:   Negative other than what is stated in the HPI above           Past Medical History:     Past Medical History:   Diagnosis Date     Anemia      Anxiety      Arthritis 2014    Psoriatic arthritis     Back injury 1988     Dry eye syndrome      Dyslipidemia       Endometriosis 2002    adehsions seen at laparoscopy     GERD (gastroesophageal reflux disease)      Hypothyroidism     8 yoa     SOB (shortness of breath) 3/11/2014     Type I (juvenile type) diabetes mellitus without mention of complication, not stated as uncontrolled     when 15      Uncomplicated asthma Fall 2013   Possible psoriasis as a child  Multiple tendon and ligament injuries    Patient Active Problem List    Diagnosis Date Noted     Type 1 diabetes mellitus without complication (H) 08/08/2017     Priority: Medium     Right ankle instability 01/14/2017     Priority: Medium     Left knee pain 07/07/2016     Priority: Medium     Swelling of limb 03/15/2016     Priority: Medium     Post-proc states NEC 03/15/2016     Priority: Medium     ESR raised 01/03/2016     Priority: Medium     Right foot pain 10/14/2015     Priority: Medium     Chronic pain of right ankle 07/30/2015     Priority: Medium     Enthesopathy 07/20/2015     Priority: Medium     Pain in joint, multiple sites 07/20/2015     Priority: Medium     PAIN KNEE JOINT 06/17/2015     Priority: Medium     Diabetes (H) 02/26/2015     Priority: Medium     Screening for other eye conditions 02/26/2015     Priority: Medium     Encounter for other specified aftercare 02/24/2015     Priority: Medium     PAIN FOOT 02/24/2015     Priority: Medium     SOB (shortness of breath) 03/11/2014     Priority: Medium     Cervicalgia 06/25/2012     Priority: Medium     Chronic low back pain 06/25/2012     Priority: Medium     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/2012     Priority: Medium     Cutaneous skin tags 06/12/2012     Priority: Medium     Type 1 diabetes, HbA1c goal < 7% (H) 04/05/2011     Priority: Medium     Anxiety 04/05/2011     Priority: Medium     CARDIOVASCULAR SCREENING; LDL GOAL LESS THAN 100 10/31/2010     Priority: Medium     GERD (gastroesophageal reflux disease) 02/24/2009     Priority: Medium             Past Surgical History:     Past  Surgical History:   Procedure Laterality Date     C REPAIR CRUCIATE LIGAMENT,KNEE       C STOMACH SURGERY PROCEDURE UNLISTED  December 2002     COLONOSCOPY  2002     ESOPHAGOSCOPY, GASTROSCOPY, DUODENOSCOPY (EGD), COMBINED  1/7/2014    Procedure: COMBINED ESOPHAGOSCOPY, GASTROSCOPY, DUODENOSCOPY (EGD), BIOPSY SINGLE OR MULTIPLE;;  Surgeon: Kraig Nicole MD;  Location:  GI     GYN SURGERY      Laparotomy to remove endometrial tissue     HC BREATH HYDROGEN TEST N/A 6/17/2014    Procedure: HYDROGEN BREATH TEST;  Surgeon: Deacon Alberts MD;  Location:  GI     HYDROGEN BREATH TEST  6/17/14     LAPAROSCOPIC APPENDECTOMY  2002     LAPAROTOMY, LYSIS ADHESIONS, COMBINED  2002    endometriosis     SOFT TISSUE SURGERY  December 2014    right ankle tendon injury             Social History:     Social History   Substance Use Topics     Smoking status: Never Smoker     Smokeless tobacco: Never Used     Alcohol use 0.0 oz/week     0 Standard drinks or equivalent per week      Comment: moderately   Lives on her own. No children. Works as a researcher in education.          Family History:   Father with RA, Hashimoto's thyroiditis, and type I DM  Mother with psoriasis  Possible SLE in cousins  No family history of gout or IBD         Allergies:     Allergies   Allergen Reactions     Sulfasalazine      Developed rash, HA, dizziness             Medications:     Current Outpatient Prescriptions   Medication Sig Dispense Refill     methotrexate 2.5 MG tablet CHEMO Take 3 tablets (7.5 mg) by mouth once a week Increase by 1 tab/q 2 wk as tolerated until @ 6 tabs/wk. 24 tablet 2     folic acid (FOLVITE) 1 MG tablet Take 1 tablet (1 mg) by mouth daily 90 tablet 3     predniSONE (DELTASONE) 10 MG tablet Take 1 tablet (10 mg) by mouth daily If symptoms worsen increase to 40 mg and call clinic or on call Rheumatologist. 60 tablet 1     levothyroxine (SYNTHROID/LEVOTHROID) 125 MCG tablet Take 1 tablet (125 mcg) by mouth daily 90  tablet 3     UNABLE TO FIND Biosil       insulin degludec (TRESIBA) 100 UNIT/ML pen Inject 18 Units Subcutaneous daily 15 mL 11     dulaglutide (TRULICITY) 1.5 MG/0.5ML pen Inject 1.5 mg Subcutaneous every 7 days 6 mL 3     NOVOLOG PENFILL 100 UNIT/ML soln 1 unit per 15 grams CHO at all meals and snacks with correction scale of 1 unit per 50 mg/dL over 150.  Average daily use is 20 units. 15 mL 4     blood glucose monitoring (ACCU-CHEK COLIN PLUS) test strip Test Blood Sugar 8 times daily or as directed 700 strip 3     UNABLE TO FIND MEDICATION NAME: biosil       diclofenac (VOLTAREN) 1 % GEL Place 2 g onto the skin 4 times daily as needed for moderate pain 100 g 11     mometasone-formoterol (DULERA) 100-5 MCG/ACT oral inhaler Inhale 2 puffs into the lungs 2 times daily 3 Inhaler 3     blood glucose monitoring (ACCU-CHEK FASTCLIX) lancets Use to test blood sugar 8 times daily or as directed. 4 Box 3     citalopram (CELEXA) 20 MG tablet Take 1.5 tablets (30 mg) by mouth daily 135 tablet 1     insulin pen needle (B-D U/F) 31G X 8 MM Use 5 pen needles daily 450 each 3     methylphenidate (METADATE CD) 20 MG CR capsule Take 20 mg by mouth daily       traZODone (DESYREL) 50 MG tablet Take 1-2 tablets by mouth nightly as needed for sleep. 180 tablet 0     aspirin 81 MG tablet Take 1 tablet by mouth daily.       Calcium Carbonate-Vitamin D (CALCIUM + D PO) Take one daily       econazole nitrate 1 % cream Apply 0.5 inches topically daily.       fish oil-omega-3 fatty acids (FISH OIL) 1000 MG capsule Take one daily       Multiple Vitamin (MULTIVITAMIN OR) Take  by mouth. Take one daily tab       Ketoconazole (NIZORAL PO) Take  by mouth. Shampoo daily       Minoxidil (ROGAINE EX) Externally apply  topically. daily       montelukast (SINGULAIR) 5 MG chewable tablet CHEW AND SWALLOW 1 TABLET(5 MG) BY MOUTH AT BEDTIME 30 tablet 0            Physical Exam:   Blood pressure 118/79, pulse 84, temperature 99  F (37.2  C),  temperature source Oral, weight 86.2 kg (190 lb), SpO2 98 %, not currently breastfeeding.     Limited today to joint exam below, and skin exam.   Constitutional: [id number] cooperative  Eyes: nl EOM, PERRLA, vision, conjunctiva, sclera  ENT: nl external ears, nose, hearing, lips, teeth, gums, throat  No mucous membrane lesions, normal saliva pool  Neck: no mass or thyroid enlargement  Resp: lungs clear to auscultation, nl to palpation  CV: RRR, no murmurs, rubs or gallops, no edema  GI: no ABD mass or tenderness, no HSM  : not tested  Lymph:  No supraclavicular, inguinal or epitrochlear nodes  MS:  She has a small tendon nodule noted on the plantar aspect of the right hand, tenderness in flexor tendon right thumb.    All other  TMJ, neck, shoulder, elbow, wrist, MCP/PIP/DIP, spine, hip, knee, ankle, and foot MTP/IP joints were examined. Tender right subtalar joint but sans synovitis.No active synovitis. +Heberden's nodes. Full ROM.  Normal  strength. No dactylitis,  tenosynovitis, enthespathy   Skin:  NO active psoriasis along hairline or elsewhere. No nail pitting, alopecia, rash, nodules or lesions  Neuro: nl cranial nerves, strength, sensation, DTRs.   Psych: nl judgement, orientation, memory, affect.         Data:     Component      Latest Ref Rng 4/27/2016   WBC      4.0 - 11.0 10e9/L 4.8   RBC Count      3.8 - 5.2 10e12/L 3.63 (L)   Hemoglobin      11.7 - 15.7 g/dL 11.2 (L)   Hematocrit      35.0 - 47.0 % 33.6 (L)   MCV      78 - 100 fl 93   MCH      26.5 - 33.0 pg 30.9   MCHC      31.5 - 36.5 g/dL 33.3   RDW      10.0 - 15.0 % 12.7   Platelet Count      150 - 450 10e9/L 294   Diff Method       Automated Method   % Neutrophils       59.3   % Lymphocytes       30.3   % Monocytes       7.8   % Eosinophils       1.3   % Basophils       1.1   % Immature Granulocytes       0.2   Nucleated RBCs      0 /100 0   Absolute Neutrophil      1.6 - 8.3 10e9/L 2.8   Absolute Lymphocytes      0.8 - 5.3 10e9/L 1.4    Absolute Monocytes      0.0 - 1.3 10e9/L 0.4   Absolute Eosinophils      0.0 - 0.7 10e9/L 0.1   Absolute Basophils      0.0 - 0.2 10e9/L 0.1   Abs Immature Granulocytes      0 - 0.4 10e9/L 0.0   Absolute Nucleated RBC       0.0   TSH      0.40 - 4.00 mU/L        Lab Results   Component Value Date    AST 19 12/22/2014    AST 36 2/15/2013    AST 25 11/29/2010    ALT 22 12/22/2014    ALT 30 2/15/2013    ALT 8 11/23/2011    ALKPHOS 109 12/22/2014    ALKPHOS 89 2/15/2013    ALKPHOS 65 11/29/2010     Reviewed Rheumatology lab flowsheet. Pertinent results:  2014 -- NOLVIA 1.6, negative RF, normal CRP, mildly elevated ESR, unremarkable x-rays of hands and wrists

## 2017-09-16 ENCOUNTER — THERAPY VISIT (OUTPATIENT)
Dept: PHYSICAL THERAPY | Facility: CLINIC | Age: 47
End: 2017-09-16
Payer: COMMERCIAL

## 2017-09-16 DIAGNOSIS — M25.371 RIGHT ANKLE INSTABILITY: ICD-10-CM

## 2017-09-16 PROCEDURE — 97110 THERAPEUTIC EXERCISES: CPT | Mod: GP | Performed by: PHYSICAL THERAPIST

## 2017-09-16 PROCEDURE — 97112 NEUROMUSCULAR REEDUCATION: CPT | Mod: GP | Performed by: PHYSICAL THERAPIST

## 2017-09-16 NOTE — MR AVS SNAPSHOT
After Visit Summary   9/16/2017    Dali Martines    MRN: [id number]           Patient Information     Date Of Birth          1970        Visit Information        Provider Department      9/16/2017 11:10 AM Danielito Pt, ZAHR[facility] Physical [facility]        Today's Diagnoses     Right ankle instability           Follow-ups after your visit        Your next 10 appointments already scheduled     Sep 21, 2017  2:30 PM CDT   (Arrive by 2:15 PM)   Office Visit with Dali Viera RN   [facility] ([facility])    [address] [place] [address] [id number] [phone]           Bring a current list of meds and any records pertaining to this visit. For Physicals, please bring immunization records and any forms needing to be filled out. Please arrive 10 minutes early to complete paperwork.            Sep 28, 2017  1:15 PM CDT   Lab with  LAB   [facility] ([facility])    [address] [place] [address] [id number] [phone]            Sep 28, 2017  2:00 PM CDT   (Arrive by 1:45 PM)   Return Visit with Yoel Betancourt MD   [facility] Rheumatology ([facility])    [address] [place] [address] [id number] [phone]            Oct 09, 2017  1:00 PM CDT   (Arrive by 12:45 PM)   Return Visit with Sara Carter MD   [facility] ([facility])    [address] [place] [address] [id number] [phone]            Jan 09, 2018  2:30 PM CST   (Arrive by 2:15 PM)   RETURN DIABETES with Felicia Mckeon MD   [facility] Endocrinology ([facility])    [address] [place] [address] [id number] [phone]              Who to contact     If you have questions or need follow up information about today's clinic visit or  your schedule please contact [place]AP[facility] directly at [phone].  Normal or non-critical lab and imaging results will be communicated to you by MyChart, letter or phone within 4 business days after the clinic has received the results. If you do not hear from us within 7 days, please contact the clinic through [employer] or phone. If you have a critical or abnormal lab result, we will notify you by phone as soon as possible.  Submit refill requests through [employer] or call your pharmacy and they will forward the refill request to us. Please allow 3 business days for your refill to be completed.          Additional Information About Your Visit        [employer] Information     [employer] gives you secure access to your electronic health record. If you see a primary care provider, you can also send messages to your care team and make appointments. If you have questions, please call your primary care clinic.  If you do not have a primary care provider, please call [phone] and they will assist you.        Care EveryWhere ID     This is your Care EveryWhere ID. This could be used by other organizations to access your [facility] medical records  [id number]         Blood Pressure from Last 3 Encounters:   08/17/17 118/79   08/08/17 110/74   04/25/17 121/81    Weight from Last 3 Encounters:   08/17/17 86.2 kg (190 lb)   08/08/17 87.6 kg (193 lb 1.6 oz)   04/25/17 89.8 kg (198 lb)              We Performed the Following     LAKISHA PROGRESS NOTES REPORT     NEUROMUSCULAR RE-EDUCATION     THERAPEUTIC EXERCISES        Primary Care Provider Office Phone # Fax #    Dimitri Alas [phone] [address] [address]        Equal Access to Services     [facility] : Diego Castillo, reymundo chacon, kaitlin wolf. So [facility] [phone].    ATENCIÓN: Si jimmy edouard ramsey  disposición servicios gratuitos de asistencia lingüística. Juanito medel [phone].    We comply with applicable federal civil rights laws and [place] laws. We do not discriminate on the basis of race, color, national origin, age, disability sex, sexual orientation or gender identity.            Thank you!     Thank you for choosing [place]ER[facility]  for your care. Our goal is always to provide you with excellent care. Hearing back from our patients is one way we can continue to improve our services. Please take a few minutes to complete the written survey that you may receive in the mail after your visit with us. Thank you!             Your Updated Medication List - Protect others around you: Learn how to safely use, store and throw away your medicines at www.disposemymeds.org.          This list is accurate as of: 9/16/17 12:47 PM.  Always use your most recent med list.                   Brand Name Dispense Instructions for use Diagnosis    aspirin 81 MG tablet      Take 1 tablet by mouth daily.        blood glucose monitoring lancets     4 Box    Use to test blood sugar 8 times daily or as directed.    Type 1 diabetes, HbA1c goal < 7% (H)       blood glucose monitoring test strip    ACCU-CHEK COLIN PLUS    700 strip    Test Blood Sugar 8 times daily or as directed    Type 1 diabetes, HbA1c goal < 7% (H)       CALCIUM + D PO      Take one daily        citalopram 20 MG tablet    celeXA    135 tablet    Take 1.5 tablets (30 mg) by mouth daily    Anxiety       diclofenac 1 % Gel topical gel    VOLTAREN    100 g    Place 2 g onto the skin 4 times daily as needed for moderate pain    Enthesopathy, Pain in joint, multiple sites, Chronic pain of right ankle       dulaglutide 1.5 MG/0.5ML pen    TRULICITY    6 mL    Inject 1.5 mg Subcutaneous every 7 days    Type 1 diabetes mellitus without complication (H)       econazole nitrate 1 % cream      Apply 0.5 inches topically daily.         fish oil-omega-3 fatty acids 1000 MG capsule      Take one daily        folic acid 1 MG tablet    FOLVITE    90 tablet    Take 1 tablet (1 mg) by mouth daily    Pain in joint, multiple sites, Tendinitis, Encounter for therapeutic drug monitoring       insulin degludec 100 UNIT/ML pen    TRESIBA    15 mL    Inject 18 Units Subcutaneous daily    Type 1 diabetes mellitus without complication (H)       insulin pen needle 31G X 8 MM    B-D U/F    450 each    Use 5 pen needles daily    Diabetes mellitus type I (H)       levothyroxine 125 MCG tablet    SYNTHROID/LEVOTHROID    90 tablet    Take 1 tablet (125 mcg) by mouth daily    Hypothyroidism       methotrexate 2.5 MG tablet CHEMO     24 tablet    Take 3 tablets (7.5 mg) by mouth once a week Increase by 1 tab/q 2 wk as tolerated until @ 6 tabs/wk.    Pain in joint, multiple sites, Tendinitis, Encounter for therapeutic drug monitoring       methylphenidate 20 MG CR capsule    METADATE CD     Take 20 mg by mouth daily        mometasone-formoterol 100-5 MCG/ACT oral inhaler    DULERA    3 Inhaler    Inhale 2 puffs into the lungs 2 times daily    Moderate persistent asthma without complication       montelukast 5 MG chewable tablet    SINGULAIR    30 tablet    CHEW AND SWALLOW 1 TABLET(5 MG) BY MOUTH AT BEDTIME    Mild persistent asthma without complication       MULTIVITAMIN PO      Take  by mouth. Take one daily tab        NIZORAL PO      Take  by mouth. Shampoo daily        NovoLOG PENFILL 100 UNIT/ML injection   Generic drug:  insulin aspart     15 mL    1 unit per 15 grams CHO at all meals and snacks with correction scale of 1 unit per 50 mg/dL over 150.  Average daily use is 20 units.    Type 1 diabetes, HbA1c goal < 7% (H)       predniSONE 10 MG tablet    DELTASONE    60 tablet    Take 1 tablet (10 mg) by mouth daily If symptoms worsen increase to 40 mg and call clinic or on call Rheumatologist.    High risk medication use, Rash, Fever, unspecified       ROGAINE  EX      Externally apply  topically. daily        traZODone 50 MG tablet    DESYREL    180 tablet    Take 1-2 tablets by mouth nightly as needed for sleep.    Insomnia       * UNABLE TO FIND      MEDICATION NAME: biosil    Enthesopathy, Pain in joint, multiple sites, Chronic pain of right ankle       * UNABLE TO FIND      Biosil        * Notice:  This list has 2 medication(s) that are the same as other medications prescribed for you. Read the directions carefully, and ask your doctor or other care provider to review them with you.

## 2017-09-16 NOTE — PROGRESS NOTES
"Subjective:    HPI                    Objective:    System    Physical Exam    General     ROS    Assessment/Plan:      DISCHARGE REPORT    Progress reporting period is from 7/8/17 to 9/16/17.       SUBJECTIVE  Subjective: Compression ankle sleeve helped keep swelling down at last weeks travel. Drove to and from  [facility] and feels this is a real improvement. She still is only walking 2 blocks max with pain R ankle and swelling. This goal has not improved and has been going on since beginning of summer.  Has been compensating causing L knee to flare up.   Current pain level is 3/10  .     Previous pain level was   Initial Pain level:  (3-8/10).   Changes in function:  Yes, but not all functional goals have been progressing as stated about walking  Adverse reaction to treatment or activity: activity - prolonged walking or standing    OBJECTIVE  Changes noted in objective findings:    Objective: MMT's 5/5, Balance 30\" with effort.  AROM WNL, except 30 degrees INV vs 57 L.. Painful end range EVersion. No pain with MMT's or isometrics     ASSESSMENT/PLAN  Updated problem list and treatment plan: Diagnosis 1:  R ankle  Pain -  self management, education and home program  Decreased strength - therapeutic exercise and therapeutic activities  Inflammation - self management/home program  Decreased function - therapeutic activities  Instability -  Therapeutic Activity  Therapeutic Exercise  STG/LTGs have been met or progress has been made towards goals:  Yes, modest gains with very slow progression  Assessment of Progress: The patient's condition has potential to improve.  The patient's progress has slowed.  Self Management Plans:  Patient is independent in a home treatment program.  Patient is independent in self management of symptoms.    Dali continues to require the following intervention to meet STG and LTG's:  PT intervention is no longer required to meet STG/LTG.    Recommendations:  This patient is ready to be " discharged from therapy and continue their home treatment program.    Please refer to the daily flowsheet for treatment today, total treatment time and time spent performing 1:1 timed codes.

## 2017-09-18 DIAGNOSIS — J45.30 MILD PERSISTENT ASTHMA WITHOUT COMPLICATION: ICD-10-CM

## 2017-09-19 RX ORDER — MONTELUKAST SODIUM 5 MG/1
TABLET, CHEWABLE ORAL
Qty: 30 TABLET | Refills: 3 | Status: SHIPPED | OUTPATIENT
Start: 2017-09-19 | End: 2018-01-27

## 2017-09-28 ENCOUNTER — OFFICE VISIT (OUTPATIENT)
Dept: RHEUMATOLOGY | Facility: CLINIC | Age: 47
End: 2017-09-28
Attending: INTERNAL MEDICINE
Payer: COMMERCIAL

## 2017-09-28 VITALS
BODY MASS INDEX: 27.23 KG/M2 | HEIGHT: 70 IN | HEART RATE: 64 BPM | OXYGEN SATURATION: 98 % | SYSTOLIC BLOOD PRESSURE: 125 MMHG | DIASTOLIC BLOOD PRESSURE: 82 MMHG | WEIGHT: 190.2 LBS

## 2017-09-28 DIAGNOSIS — M25.50 PAIN IN JOINT, MULTIPLE SITES: ICD-10-CM

## 2017-09-28 DIAGNOSIS — M65.30 TRIGGER FINGER, ACQUIRED: ICD-10-CM

## 2017-09-28 DIAGNOSIS — Z51.81 ENCOUNTER FOR THERAPEUTIC DRUG MONITORING: ICD-10-CM

## 2017-09-28 DIAGNOSIS — M77.9 TENDINITIS: ICD-10-CM

## 2017-09-28 DIAGNOSIS — K21.9 GASTROESOPHAGEAL REFLUX DISEASE, ESOPHAGITIS PRESENCE NOT SPECIFIED: Primary | ICD-10-CM

## 2017-09-28 LAB
ALBUMIN SERPL-MCNC: 4 G/DL (ref 3.4–5)
ALT SERPL W P-5'-P-CCNC: 23 U/L (ref 0–50)
AST SERPL W P-5'-P-CCNC: 18 U/L (ref 0–45)
BASOPHILS # BLD AUTO: 0 10E9/L (ref 0–0.2)
BASOPHILS NFR BLD AUTO: 0.5 %
CREAT SERPL-MCNC: 0.6 MG/DL (ref 0.52–1.04)
CRP SERPL-MCNC: <2.9 MG/L (ref 0–8)
DIFFERENTIAL METHOD BLD: NORMAL
EOSINOPHIL # BLD AUTO: 0.1 10E9/L (ref 0–0.7)
EOSINOPHIL NFR BLD AUTO: 0.9 %
ERYTHROCYTE [DISTWIDTH] IN BLOOD BY AUTOMATED COUNT: 13.2 % (ref 10–15)
GFR SERPL CREATININE-BSD FRML MDRD: >90 ML/MIN/1.7M2
HCT VFR BLD AUTO: 35.6 % (ref 35–47)
HGB BLD-MCNC: 11.8 G/DL (ref 11.7–15.7)
IMM GRANULOCYTES # BLD: 0 10E9/L (ref 0–0.4)
IMM GRANULOCYTES NFR BLD: 0.2 %
LYMPHOCYTES # BLD AUTO: 1.7 10E9/L (ref 0.8–5.3)
LYMPHOCYTES NFR BLD AUTO: 29.7 %
MCH RBC QN AUTO: 30.9 PG (ref 26.5–33)
MCHC RBC AUTO-ENTMCNC: 33.1 G/DL (ref 31.5–36.5)
MCV RBC AUTO: 93 FL (ref 78–100)
MONOCYTES # BLD AUTO: 0.5 10E9/L (ref 0–1.3)
MONOCYTES NFR BLD AUTO: 8.8 %
NEUTROPHILS # BLD AUTO: 3.3 10E9/L (ref 1.6–8.3)
NEUTROPHILS NFR BLD AUTO: 59.9 %
NRBC # BLD AUTO: 0 10*3/UL
NRBC BLD AUTO-RTO: 0 /100
PLATELET # BLD AUTO: 350 10E9/L (ref 150–450)
RBC # BLD AUTO: 3.82 10E12/L (ref 3.8–5.2)
WBC # BLD AUTO: 5.6 10E9/L (ref 4–11)

## 2017-09-28 PROCEDURE — 36415 COLL VENOUS BLD VENIPUNCTURE: CPT | Performed by: INTERNAL MEDICINE

## 2017-09-28 PROCEDURE — 86140 C-REACTIVE PROTEIN: CPT | Performed by: INTERNAL MEDICINE

## 2017-09-28 PROCEDURE — 84450 TRANSFERASE (AST) (SGOT): CPT | Performed by: INTERNAL MEDICINE

## 2017-09-28 PROCEDURE — 99212 OFFICE O/P EST SF 10 MIN: CPT | Mod: ZF

## 2017-09-28 PROCEDURE — 82565 ASSAY OF CREATININE: CPT | Performed by: INTERNAL MEDICINE

## 2017-09-28 PROCEDURE — 84460 ALANINE AMINO (ALT) (SGPT): CPT | Performed by: INTERNAL MEDICINE

## 2017-09-28 PROCEDURE — 82040 ASSAY OF SERUM ALBUMIN: CPT | Performed by: INTERNAL MEDICINE

## 2017-09-28 PROCEDURE — 85025 COMPLETE CBC W/AUTO DIFF WBC: CPT | Performed by: INTERNAL MEDICINE

## 2017-09-28 ASSESSMENT — PAIN SCALES - GENERAL: PAINLEVEL: MILD PAIN (3)

## 2017-09-28 NOTE — PROGRESS NOTES
Rheumatology Visit     Dali Martines MRN# [id number]   YOB: 1970 Age: 44 year old         Primary care provider: Dimitri Alas          Assessment and Plan:     Psoriatic Arthriitis versus Rheumatoid Arthritis     Ms. Dali Martines is a 44 year-old woman with PMH of type I DM, Hashimoto's thyroiditis, and possible psoriasis in childhood, with family history of RA, type I DM, Hashimoto's, and psoriasis presents who presents with recurrent acute onset pain, swelling, stiffness in L hand.    Most likely explanation of her symptoms is psoriatic arthritis given the presentation of asymmetric, transient joint pain/swelling/stiffness, new low titer NOLVIA (vs 2008), family history of psoriasis and active psoriasis found on exam, plus morning heel pain. Psoriasis tends to be worst in fall/winter months, and this corresponds to when she has had these episodes of arthritis in the hand.    PLAN/RECOMMENDATIONS:  She is definitely doing better on the methotrexate and is tolerating the 4 tablets weekly and perhaps will get enough continued improvement on that not need to dose escalate further.  I did tell her however that if she wants to try going up to 5 tablets given that she is doing this well that may be worthwhile.      Her bigger problem right now is this trigger nodule.  I did raise the possibility that as she stays on methotrexate if this is an inflammatory nodule it may decrease in size.  She has also noted prior improvement with meloxicam which is a little surprising, but it is possible that she can get the same kind of improvement if she uses the topical Voltaren 3 or 4 times a day and I raised that possibility with her and she will try that.      If despite these measures this is continuing to be a big problem or is worsening after another 4-5 weeks, I think she should be seen in Orthopedics Hand.      In the meantime, she will continue to monitor the methotrexate carefully with every 2-month labs  and see me back in approximately 4 months, sooner p.r.n.                                History of Present Illness:   Ms. Dali Martines is a 44-year-old woman with a history of type I DM and Hashimoto's thyroiditis who presents for evaluation of recurrent stiffness, swelling, and pain in the left hand.    Symptoms first occurred in August 2008, when she began having stiffness and pain in the left hand, particularly in the PIPs and MCPs. Symptoms spontaneously resolved after about 6-8 weeks. She was previously seen by Rheumatology here around that time (see note from Dr. Betancourt on 12/9/2008). Briefly, assessment was that she had a self-limited episode of inflammatory arthralgias, possibly viral or early psoriatic arthritis. Negative NOLVIA, RF, Lyme serologies and normal CRP and ESR. Given her family history of RA, there was a thought to start her on HCQ if anti-CCP antibodies were positive, but they were found to be negative. No imaging of the hand was performed.    Last September (2014), she experienced recurrent acute onset pain, stiffness, and swelling in the L hand very similar to her symptoms in 2008. Specifically, had swelling across MCPs and in 3rd PIP. She could hardly bend the hand sometimes due to the welling. Had morning stiffness lasting 30-45 minutes. No other joints were involved.    Issues with her left hand lingered for several months but eventually self-resolved around January. As of today, her only notable symptom is heel pain in the mornings. No fevers, chills, or weight loss. No back pain, vision changes, nausea, vomiting, diarrhea, abdominal pain, or blood in stool or urine. No rash, sicca symptoms, or oral ulcers.    It seems she has a history of hypermobility, e.g. could bend wrist back to forearm, bend down and touch palms flat on floor, when she was younger. Used to get frequent ankle sprains, and has had multiple tendon and ligament tears over the years.    SEPTEMBER 22, 2015, INTERVAL  HISTORY       Since we have last seen the patient, she feels like her joints were generally better over the summer.  She did have some right ankle pain that has continued to be tender at times.  She also got some pretty significant tennis elbow during the summer, but both of these did well with meloxicam.  Her tennis elbow did not respond as well to a compressive band.      About 10 days ago, however, she again started having dull hand pain, left generally greater than right, except for her right fifth digit.  She has about an hour of associated morning stiffness.  This is very similar to what she has had the last 2 years.  Last year, this seemed to last most of the year, but the prior year this was only about 3 months in duration.       Throughout this time, she has had no development of clear-cut psoriasis.  She has carried a diagnosis of psoriasis in the past, but it has not been a persistent problem with her skin.  She does have a strong family history, however, with this affecting her uncle and her mother.       Recall that based on her symptoms and her type 1 diabetes, we had previously checked her for the anti-citrullinated peptide antibodies, but she does not have those.     December 08 2015 Interval history     Since last visit and starting the Meloxicam daily, she feels that her left 5th digit pain has improved significantly. She did have an URI recently with some SOB and had a Prednisone burst of 20 mg daily for five days and she felt that her pain was completely gone with the steroids. She is not noticing any morning stiffness, swelling or erythema in the left 5th digit. She will occasionally have some chronic pain in the DIP of the right hand but very seldomly. Overall, she feels that her joint pain is better since starting the Meloxicam.     She has noted a small lump on the palmar aspect of her right hand. It is not painful and has not become swollen or red.     Her left ankle has become more  bothersome and she thinks she will be needing surgery soon on that side.     When she was having an upper respiratory infection, she noted some swelling under the right mandible and it became so large that she felt it would obstruct her breathing when she turned her head to the right side. The swelling has come down but it is still present and slightly painful to touch. She has not had any weight loss, fevers, chills or night sweats.     No symptoms of dry mouth, eyes, oral lesions, rash, abdominal pain, nausea, vomiting.     Interim history 5/17/2016:  Doing well on Meloxicam. Since last visit, had to go off of Meloxicam for ankle surgery (torn tendon). A couple flares off of it, recently went back on due to the pain. Always has been in L hand, now in right hand and both elbows. Mainly painful (5/10) and stiff. Stiffness begins in the morning, takes about 30 minutes to subside. No noted erythema, warmth or edema of joints. Has two nodules on base of 2nd MCP, can be painful occasionally, no changing drastically in size. Was doing better in the winter, so wondering if there could be a seasonal component. No known seasonal allergies.    Had the chicken pox around age 12. In the past 10-15 years, will periodically get shingles presenting as unilateral painful, itchy rash around L waist every few years. Most recently it occurred in April and was much worse than usual (nerve pain to the point of not being able to sleep). Muscle relaxant helped clear it up and allow for sleep. Has continued intermittent fever and chills since this episode. Decreased appetite, mild nausea. No other rashes since winter.     INTERVAL HISTORY (10/18/2016):  Since we have last seen the patient, she tried going down on meloxicam and actually has tolerated that pretty well.  She has a little bit more stiffness in the morning in her left hand, but the right hand is pretty good by comparison.  She occasionally has some sporadic sharp pains there,  but nothing severe and nothing persistent.  Her overall morning stiffness in the left hand is only about 30 minutes.  Her PIP joints are always the worst, and her fourth and fifth fingers tend to bother her a little bit more.      She did have some palmar contracture developing but with hand therapy those improved without any injections.  She has also avoiding a trackball mouse as that seemed to make things worse.      She continues to have some pain in her right ankle.  She had a bony fragment removed there in 01/2016 by Dr. Tran.  That has hurt a fair amount since and the nature of the pain is not particularly inflammatory and suggests more of a DJD problem.  She also has some pain in the left knee which had an ACL construction in 1995, also possibly consistent with DJD.      She has had some intermittent attacks of very low-grade psoriasis over her chest up in the clavicle area, but has none currently.  She has no other significant new problems.     INTERVAL HISTORY:  Since we last saw the patient, she had an odd episode of pain in her left hand on 02/20.  She said this initially started very sharp, then she got a lump in the palm of her hand that felt inflamed, and then she noticed some bruising in the area.  She does not recall any trauma to it.  She resumed meloxicam, which she had stopped and with that felt better over the next 2 weeks, but has remained on it since.      She also has a sensation in her right fifth digit that is consistently sore and somewhat achy.  That has persisted even with the meloxicam.      Finally, on the right ankle, which has been sprained several times in the past, she continues to wear a brace.  She actually uses the topical meloxicam on that area, but has not done so on the fifth finger.      In all these areas she can have some stiffness in the morning lasting for up to an hour.      Her other concern is that she has gained about 10 pounds.  She does not think she has changed her  eating much.     AUGUST 17, 2017, INTERVAL HISTORY:  At the time we last saw the patient, we urged her to try to get off of meloxicam.  She did that and used the topical diclofenac I had given her, but she felt like she quickly worsened with respect to arthritis and, more interestingly and somewhat surprisingly, she got psoriasis over a number of areas.  That has since improved, and it is not entirely clear to me whether there is any relationship here, but she did get some along her hairline and a few other areas.       From the standpoint of her joints, the most dominant issue is right thumb tendinitis, and she has had ongoing difficulty in gripping with it.      When she was doing that badly, we had her go back on the meloxicam temporarily, then started sulfasalazine.  Unfortunately, she had more severe side effects with sulfasalazine including a fever to 102 degrees, headaches, and a general sense of malaise, and this was bad enough that we gave her a course of steroids to interfere with a potentially more severe sulfasalazine allergy and got her off of it.      She now has stopped the steroids as of 10 days ago.  Overall, she thinks she is doing better.  With the taper from the steroids, however, she is again feeling somewhat worse, particularly in her joints.       She really has never had much of anything in the way of psoriasis.  I have felt her arthritis was most consistent with psoriatic arthritis, and she has had a couple of very small plaques occasionally that could have been consistent with it but have never been biopsied.  She does, however, have a strong family history of psoriasis.     INTERVAL HISTORY:  At the time we last saw the patient, she was improving from her adverse effects of sulfasalazine and we initiated methotrexate at 3 tablets weekly.  She actually tolerated that quite well and was able to go up to 4 tablets weekly and continues to tolerate that with no significant symptoms.  Notably,  she has the odd effect of having her joints actually feel a little worse for 1-2 days at the time of methotrexate dosing and then feeling significantly better.  She notes this in bilateral second and third fingers in both the MCP and PIP joints and these have been the primary joints affecting her.  She definitely feels that they are overall better since starting the methotrexate and have relatively little morning stiffness now.      The bigger and more persistent problem has been her right thumb.  This still hurts, and she has a flexor tendon nodule there that is probably causing most of the difficulties.  She tried a splint but was not sure it helped very much.  She gets locking and catching with activity that requires her to manually release the catching with her other hand.  This has caused some ongoing significant tenderness in the area.  She does think it was better when she was back on Meloxicam however.      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                        Review of Systems:   Negative other than what is stated in the HPI above           Past Medical History:     Past Medical History:   Diagnosis Date     Anemia      Anxiety      Arthritis 2014    Psoriatic arthritis     Back injury 1988     Dry eye syndrome      Dyslipidemia      Endometriosis 2002    adehsions seen at laparoscopy     GERD (gastroesophageal reflux disease)      Hypothyroidism     8 yoa     SOB (shortness of breath) 3/11/2014     Type I (juvenile type) diabetes mellitus without mention of complication, not stated as uncontrolled     when 15      Uncomplicated asthma Fall 2013   Possible psoriasis as a child  Multiple tendon and ligament injuries    Patient Active Problem List    Diagnosis Date Noted     Type 1 diabetes mellitus without complication (H) 08/08/2017     Priority:  Medium     Left knee pain 07/07/2016     Priority: Medium     Swelling of limb 03/15/2016     Priority: Medium     Post-proc states NEC 03/15/2016     Priority: Medium     ESR raised 01/03/2016     Priority: Medium     Right foot pain 10/14/2015     Priority: Medium     Chronic pain of right ankle 07/30/2015     Priority: Medium     Enthesopathy 07/20/2015     Priority: Medium     Pain in joint, multiple sites 07/20/2015     Priority: Medium     PAIN KNEE JOINT 06/17/2015     Priority: Medium     Diabetes (H) 02/26/2015     Priority: Medium     Screening for other eye conditions 02/26/2015     Priority: Medium     Encounter for other specified aftercare 02/24/2015     Priority: Medium     PAIN FOOT 02/24/2015     Priority: Medium     SOB (shortness of breath) 03/11/2014     Priority: Medium     Cervicalgia 06/25/2012     Priority: Medium     Chronic low back pain 06/25/2012     Priority: Medium     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/2012     Priority: Medium     Cutaneous skin tags 06/12/2012     Priority: Medium     Type 1 diabetes, HbA1c goal < 7% (H) 04/05/2011     Priority: Medium     Anxiety 04/05/2011     Priority: Medium     CARDIOVASCULAR SCREENING; LDL GOAL LESS THAN 100 10/31/2010     Priority: Medium     GERD (gastroesophageal reflux disease) 02/24/2009     Priority: Medium             Past Surgical History:     Past Surgical History:   Procedure Laterality Date     C REPAIR CRUCIATE LIGAMENT,KNEE       C STOMACH SURGERY PROCEDURE UNLISTED  December 2002     COLONOSCOPY  2002     ESOPHAGOSCOPY, GASTROSCOPY, DUODENOSCOPY (EGD), COMBINED  1/7/2014    Procedure: COMBINED ESOPHAGOSCOPY, GASTROSCOPY, DUODENOSCOPY (EGD), BIOPSY SINGLE OR MULTIPLE;;  Surgeon: Kraig Nicole MD;  Location:  GI     GYN SURGERY      Laparotomy to remove endometrial tissue     HC BREATH HYDROGEN TEST N/A 6/17/2014    Procedure: HYDROGEN BREATH TEST;  Surgeon: Deacon Alberts MD;  Location:  GI     [facility]  BREATH TEST  6/17/14     LAPAROSCOPIC APPENDECTOMY  2002     LAPAROTOMY, LYSIS ADHESIONS, COMBINED  2002    endometriosis     SOFT TISSUE SURGERY  December 2014    right ankle tendon injury             Social History:     Social History   Substance Use Topics     Smoking status: Never Smoker     Smokeless tobacco: Never Used     Alcohol use 0.0 oz/week     0 Standard drinks or equivalent per week      Comment: moderately   Lives on her own. No children. Works as a researcher in education.          Family History:   Father with RA, Hashimoto's thyroiditis, and type I DM  Mother with psoriasis  Possible SLE in cousins  No family history of gout or IBD         Allergies:     Allergies   Allergen Reactions     Sulfasalazine      Developed rash, HA, dizziness             Medications:     Current Outpatient Prescriptions   Medication Sig Dispense Refill     methotrexate 2.5 MG tablet CHEMO Take 4 tablets (10 mg) by mouth once a week 24 tablet 2     montelukast (SINGULAIR) 5 MG chewable tablet CHEW AND SWALLOW 1 TABLET(5 MG) BY MOUTH AT BEDTIME 30 tablet 3     folic acid (FOLVITE) 1 MG tablet Take 1 tablet (1 mg) by mouth daily 90 tablet 3     levothyroxine (SYNTHROID/LEVOTHROID) 125 MCG tablet Take 1 tablet (125 mcg) by mouth daily 90 tablet 3     UNABLE TO FIND Biosil       insulin degludec (TRESIBA) 100 UNIT/ML pen Inject 18 Units Subcutaneous daily 15 mL 11     dulaglutide (TRULICITY) 1.5 MG/0.5ML pen Inject 1.5 mg Subcutaneous every 7 days 6 mL 3     NOVOLOG PENFILL 100 UNIT/ML soln 1 unit per 15 grams CHO at all meals and snacks with correction scale of 1 unit per 50 mg/dL over 150.  Average daily use is 20 units. 15 mL 4     blood glucose monitoring (ACCU-CHEK COLIN PLUS) test strip Test Blood Sugar 8 times daily or as directed 700 strip 3     UNABLE TO FIND MEDICATION NAME: biosil       diclofenac (VOLTAREN) 1 % GEL Place 2 g onto the skin 4 times daily as needed for moderate pain 100 g 11     mometasone-formoterol  "(DULERA) 100-5 MCG/ACT oral inhaler Inhale 2 puffs into the lungs 2 times daily 3 Inhaler 3     blood glucose monitoring (ACCU-CHEK FASTCLIX) lancets Use to test blood sugar 8 times daily or as directed. 4 Box 3     citalopram (CELEXA) 20 MG tablet Take 1.5 tablets (30 mg) by mouth daily 135 tablet 1     insulin pen needle (B-D U/F) 31G X 8 MM Use 5 pen needles daily 450 each 3     methylphenidate (METADATE CD) 20 MG CR capsule Take 20 mg by mouth daily       traZODone (DESYREL) 50 MG tablet Take 1-2 tablets by mouth nightly as needed for sleep. 180 tablet 0     aspirin 81 MG tablet Take 1 tablet by mouth daily.       Calcium Carbonate-Vitamin D (CALCIUM + D PO) Take one daily       econazole nitrate 1 % cream Apply 0.5 inches topically daily.       fish oil-omega-3 fatty acids (FISH OIL) 1000 MG capsule Take one daily       Multiple Vitamin (MULTIVITAMIN OR) Take  by mouth. Take one daily tab       Ketoconazole (NIZORAL PO) Take  by mouth. Shampoo daily       Minoxidil (ROGAINE EX) Externally apply  topically. daily       [DISCONTINUED] methotrexate 2.5 MG tablet CHEMO Take 3 tablets (7.5 mg) by mouth once a week Increase by 1 tab/q 2 wk as tolerated until @ 6 tabs/wk. 24 tablet 2            Physical Exam:   Blood pressure 125/82, pulse 64, height 1.778 m (5' 10\"), weight 86.3 kg (190 lb 3.2 oz), SpO2 98 %, not currently breastfeeding.     Limited today to joint exam below, and skin exam.   Constitutional: [id number] cooperative  Eyes: nl EOM, PERRLA, vision, conjunctiva, sclera  ENT: nl external ears, nose, hearing, lips, teeth, gums, throat  No mucous membrane lesions, normal saliva pool  Neck: no mass or thyroid enlargement  Resp: lungs clear to auscultation, nl to palpation  CV: RRR, no murmurs, rubs or gallops, no edema  GI: no ABD mass or tenderness, no HSM  : not tested  Lymph:  No supraclavicular, inguinal or epitrochlear nodes  MS:  She has a small tendon nodule noted on the plantar aspect of the right " hand, tenderness in flexor tendon right thumb.    All other  TMJ, neck, shoulder, elbow, wrist, MCP/PIP/DIP, spine, hip, knee, ankle, and foot MTP/IP joints were examined. Tender right subtalar joint but sans synovitis.No active synovitis. +Heberden's nodes. Full ROM.  Normal  strength. No dactylitis,  tenosynovitis, enthespathy   Skin:  NO active psoriasis along hairline or elsewhere. No nail pitting, alopecia, rash, nodules or lesions  Neuro: nl cranial nerves, strength, sensation, DTRs.   Psych: nl judgement, orientation, memory, affect.         Data:     Component      Latest Ref Rng 4/27/2016   WBC      4.0 - 11.0 10e9/L 4.8   RBC Count      3.8 - 5.2 10e12/L 3.63 (L)   Hemoglobin      11.7 - 15.7 g/dL 11.2 (L)   Hematocrit      35.0 - 47.0 % 33.6 (L)   MCV      78 - 100 fl 93   MCH      26.5 - 33.0 pg 30.9   MCHC      31.5 - 36.5 g/dL 33.3   RDW      10.0 - 15.0 % 12.7   Platelet Count      150 - 450 10e9/L 294   Diff Method       Automated Method   % Neutrophils       59.3   % Lymphocytes       30.3   % Monocytes       7.8   % Eosinophils       1.3   % Basophils       1.1   % Immature Granulocytes       0.2   Nucleated RBCs      0 /100 0   Absolute Neutrophil      1.6 - 8.3 10e9/L 2.8   Absolute Lymphocytes      0.8 - 5.3 10e9/L 1.4   Absolute Monocytes      0.0 - 1.3 10e9/L 0.4   Absolute Eosinophils      0.0 - 0.7 10e9/L 0.1   Absolute Basophils      0.0 - 0.2 10e9/L 0.1   Abs Immature Granulocytes      0 - 0.4 10e9/L 0.0   Absolute Nucleated RBC       0.0   TSH      0.40 - 4.00 mU/L        Lab Results   Component Value Date    AST 19 12/22/2014    AST 36 2/15/2013    AST 25 11/29/2010    ALT 22 12/22/2014    ALT 30 2/15/2013    ALT 8 11/23/2011    ALKPHOS 109 12/22/2014    ALKPHOS 89 2/15/2013    ALKPHOS 65 11/29/2010     Reviewed Rheumatology lab flowsheet. Pertinent results:  2014 -- NOLVIA 1.6, negative RF, normal CRP, mildly elevated ESR, unremarkable x-rays of hands and wrists

## 2017-09-28 NOTE — NURSING NOTE
"Chief Complaint   Patient presents with     RECHECK     4 month follow up for PSA        Initial /82  Pulse 64  Ht 1.778 m (5' 10\")  Wt 86.3 kg (190 lb 3.2 oz)  SpO2 98%  BMI 27.29 kg/m2 Estimated body mass index is 27.29 kg/(m^2) as calculated from the following:    Height as of this encounter: 1.778 m (5' 10\").    Weight as of this encounter: 86.3 kg (190 lb 3.2 oz).  Medication Reconciliation: complete   Barbie Wolff CMA    "

## 2017-09-28 NOTE — LETTER
9/28/2017      RE: Dali Martines  [address]  [facility] [id number]       Rheumatology Visit     Dali Martines MRN# [id number]   YOB: 1970 Age: 44 year old         Primary care provider: Dimitri Alas          Assessment and Plan:     Psoriatic Arthriitis versus Rheumatoid Arthritis     Ms. Dali Martines is a 44 year-old woman with PMH of type I DM, Hashimoto's thyroiditis, and possible psoriasis in childhood, with family history of RA, type I DM, Hashimoto's, and psoriasis presents who presents with recurrent acute onset pain, swelling, stiffness in L hand.    Most likely explanation of her symptoms is psoriatic arthritis given the presentation of asymmetric, transient joint pain/swelling/stiffness, new low titer NOLVIA (vs 2008), family history of psoriasis and active psoriasis found on exam, plus morning heel pain. Psoriasis tends to be worst in fall/winter months, and this corresponds to when she has had these episodes of arthritis in the hand.    PLAN/RECOMMENDATIONS:  She is definitely doing better on the methotrexate and is tolerating the 4 tablets weekly and perhaps will get enough continued improvement on that not need to dose escalate further.  I did tell her however that if she wants to try going up to 5 tablets given that she is doing this well that may be worthwhile.      Her bigger problem right now is this trigger nodule.  I did raise the possibility that as she stays on methotrexate if this is an inflammatory nodule it may decrease in size.  She has also noted prior improvement with meloxicam which is a little surprising, but it is possible that she can get the same kind of improvement if she uses the topical Voltaren 3 or 4 times a day and I raised that possibility with her and she will try that.      If despite these measures this is continuing to be a big problem or is worsening after another 4-5 weeks, I think she should be seen in Orthopedics Hand.      In the  meantime, she will continue to monitor the methotrexate carefully with every 2-month labs and see me back in approximately 4 months, sooner p.r.n.                                History of Present Illness:   Ms. Dali Martines is a 44-year-old woman with a history of type I DM and Hashimoto's thyroiditis who presents for evaluation of recurrent stiffness, swelling, and pain in the left hand.    Symptoms first occurred in August 2008, when she began having stiffness and pain in the left hand, particularly in the PIPs and MCPs. Symptoms spontaneously resolved after about 6-8 weeks. She was previously seen by Rheumatology here around that time (see note from Dr. Betancourt on 12/9/2008). Briefly, assessment was that she had a self-limited episode of inflammatory arthralgias, possibly viral or early psoriatic arthritis. Negative NOLVIA, RF, Lyme serologies and normal CRP and ESR. Given her family history of RA, there was a thought to start her on HCQ if anti-CCP antibodies were positive, but they were found to be negative. No imaging of the hand was performed.    Last September (2014), she experienced recurrent acute onset pain, stiffness, and swelling in the L hand very similar to her symptoms in 2008. Specifically, had swelling across MCPs and in 3rd PIP. She could hardly bend the hand sometimes due to the welling. Had morning stiffness lasting 30-45 minutes. No other joints were involved.    Issues with her left hand lingered for several months but eventually self-resolved around January. As of today, her only notable symptom is heel pain in the mornings. No fevers, chills, or weight loss. No back pain, vision changes, nausea, vomiting, diarrhea, abdominal pain, or blood in stool or urine. No rash, sicca symptoms, or oral ulcers.    It seems she has a history of hypermobility, e.g. could bend wrist back to forearm, bend down and touch palms flat on floor, when she was younger. Used to get frequent ankle sprains, and has had  multiple tendon and ligament tears over the years.    SEPTEMBER 22, 2015, INTERVAL HISTORY       Since we have last seen the patient, she feels like her joints were generally better over the summer.  She did have some right ankle pain that has continued to be tender at times.  She also got some pretty significant tennis elbow during the summer, but both of these did well with meloxicam.  Her tennis elbow did not respond as well to a compressive band.      About 10 days ago, however, she again started having dull hand pain, left generally greater than right, except for her right fifth digit.  She has about an hour of associated morning stiffness.  This is very similar to what she has had the last 2 years.  Last year, this seemed to last most of the year, but the prior year this was only about 3 months in duration.       Throughout this time, she has had no development of clear-cut psoriasis.  She has carried a diagnosis of psoriasis in the past, but it has not been a persistent problem with her skin.  She does have a strong family history, however, with this affecting her uncle and her mother.       Recall that based on her symptoms and her type 1 diabetes, we had previously checked her for the anti-citrullinated peptide antibodies, but she does not have those.     December 08 2015 Interval history     Since last visit and starting the Meloxicam daily, she feels that her left 5th digit pain has improved significantly. She did have an URI recently with some SOB and had a Prednisone burst of 20 mg daily for five days and she felt that her pain was completely gone with the steroids. She is not noticing any morning stiffness, swelling or erythema in the left 5th digit. She will occasionally have some chronic pain in the DIP of the right hand but very seldomly. Overall, she feels that her joint pain is better since starting the Meloxicam.     She has noted a small lump on the palmar aspect of her right hand. It is not  painful and has not become swollen or red.     Her left ankle has become more bothersome and she thinks she will be needing surgery soon on that side.     When she was having an upper respiratory infection, she noted some swelling under the right mandible and it became so large that she felt it would obstruct her breathing when she turned her head to the right side. The swelling has come down but it is still present and slightly painful to touch. She has not had any weight loss, fevers, chills or night sweats.     No symptoms of dry mouth, eyes, oral lesions, rash, abdominal pain, nausea, vomiting.     Interim history 5/17/2016:  Doing well on Meloxicam. Since last visit, had to go off of Meloxicam for ankle surgery (torn tendon). A couple flares off of it, recently went back on due to the pain. Always has been in L hand, now in right hand and both elbows. Mainly painful (5/10) and stiff. Stiffness begins in the morning, takes about 30 minutes to subside. No noted erythema, warmth or edema of joints. Has two nodules on base of 2nd MCP, can be painful occasionally, no changing drastically in size. Was doing better in the winter, so wondering if there could be a seasonal component. No known seasonal allergies.    Had the chicken pox around age 12. In the past 10-15 years, will periodically get shingles presenting as unilateral painful, itchy rash around L waist every few years. Most recently it occurred in April and was much worse than usual (nerve pain to the point of not being able to sleep). Muscle relaxant helped clear it up and allow for sleep. Has continued intermittent fever and chills since this episode. Decreased appetite, mild nausea. No other rashes since winter.     INTERVAL HISTORY (10/18/2016):  Since we have last seen the patient, she tried going down on meloxicam and actually has tolerated that pretty well.  She has a little bit more stiffness in the morning in her left hand, but the right hand is  pretty good by comparison.  She occasionally has some sporadic sharp pains there, but nothing severe and nothing persistent.  Her overall morning stiffness in the left hand is only about 30 minutes.  Her PIP joints are always the worst, and her fourth and fifth fingers tend to bother her a little bit more.      She did have some palmar contracture developing but with hand therapy those improved without any injections.  She has also avoiding a trackball mouse as that seemed to make things worse.      She continues to have some pain in her right ankle.  She had a bony fragment removed there in 01/2016 by Dr. Tran.  That has hurt a fair amount since and the nature of the pain is not particularly inflammatory and suggests more of a DJD problem.  She also has some pain in the left knee which had an ACL construction in 1995, also possibly consistent with DJD.      She has had some intermittent attacks of very low-grade psoriasis over her chest up in the clavicle area, but has none currently.  She has no other significant new problems.     INTERVAL HISTORY:  Since we last saw the patient, she had an odd episode of pain in her left hand on 02/20.  She said this initially started very sharp, then she got a lump in the palm of her hand that felt inflamed, and then she noticed some bruising in the area.  She does not recall any trauma to it.  She resumed meloxicam, which she had stopped and with that felt better over the next 2 weeks, but has remained on it since.      She also has a sensation in her right fifth digit that is consistently sore and somewhat achy.  That has persisted even with the meloxicam.      Finally, on the right ankle, which has been sprained several times in the past, she continues to wear a brace.  She actually uses the topical meloxicam on that area, but has not done so on the fifth finger.      In all these areas she can have some stiffness in the morning lasting for up to an hour.      Her other  concern is that she has gained about 10 pounds.  She does not think she has changed her eating much.     AUGUST 17, 2017, INTERVAL HISTORY:  At the time we last saw the patient, we urged her to try to get off of meloxicam.  She did that and used the topical diclofenac I had given her, but she felt like she quickly worsened with respect to arthritis and, more interestingly and somewhat surprisingly, she got psoriasis over a number of areas.  That has since improved, and it is not entirely clear to me whether there is any relationship here, but she did get some along her hairline and a few other areas.       From the standpoint of her joints, the most dominant issue is right thumb tendinitis, and she has had ongoing difficulty in gripping with it.      When she was doing that badly, we had her go back on the meloxicam temporarily, then started sulfasalazine.  Unfortunately, she had more severe side effects with sulfasalazine including a fever to 102 degrees, headaches, and a general sense of malaise, and this was bad enough that we gave her a course of steroids to interfere with a potentially more severe sulfasalazine allergy and got her off of it.      She now has stopped the steroids as of 10 days ago.  Overall, she thinks she is doing better.  With the taper from the steroids, however, she is again feeling somewhat worse, particularly in her joints.       She really has never had much of anything in the way of psoriasis.  I have felt her arthritis was most consistent with psoriatic arthritis, and she has had a couple of very small plaques occasionally that could have been consistent with it but have never been biopsied.  She does, however, have a strong family history of psoriasis.     INTERVAL HISTORY:  At the time we last saw the patient, she was improving from her adverse effects of sulfasalazine and we initiated methotrexate at 3 tablets weekly.  She actually tolerated that quite well and was able to go up to 4  tablets weekly and continues to tolerate that with no significant symptoms.  Notably, she has the odd effect of having her joints actually feel a little worse for 1-2 days at the time of methotrexate dosing and then feeling significantly better.  She notes this in bilateral second and third fingers in both the MCP and PIP joints and these have been the primary joints affecting her.  She definitely feels that they are overall better since starting the methotrexate and have relatively little morning stiffness now.      The bigger and more persistent problem has been her right thumb.  This still hurts, and she has a flexor tendon nodule there that is probably causing most of the difficulties.  She tried a splint but was not sure it helped very much.  She gets locking and catching with activity that requires her to manually release the catching with her other hand.  This has caused some ongoing significant tenderness in the area.  She does think it was better when she was back on Meloxicam however.      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                        Review of Systems:   Negative other than what is stated in the HPI above           Past Medical History:     Past Medical History:   Diagnosis Date     Anemia      Anxiety      Arthritis 2014    Psoriatic arthritis     Back injury 1988     Dry eye syndrome      Dyslipidemia      Endometriosis 2002    adehsions seen at laparoscopy     GERD (gastroesophageal reflux disease)      Hypothyroidism     8 yoa     SOB (shortness of breath) 3/11/2014     Type I (juvenile type) diabetes mellitus without mention of complication, not stated as uncontrolled     when 15      Uncomplicated asthma Fall 2013   Possible psoriasis as a child  Multiple tendon and ligament injuries    Patient Active Problem List    Diagnosis Date  Noted     Type 1 diabetes mellitus without complication (H) 08/08/2017     Priority: Medium     Left knee pain 07/07/2016     Priority: Medium     Swelling of limb 03/15/2016     Priority: Medium     Post-proc states NEC 03/15/2016     Priority: Medium     ESR raised 01/03/2016     Priority: Medium     Right foot pain 10/14/2015     Priority: Medium     Chronic pain of right ankle 07/30/2015     Priority: Medium     Enthesopathy 07/20/2015     Priority: Medium     Pain in joint, multiple sites 07/20/2015     Priority: Medium     PAIN KNEE JOINT 06/17/2015     Priority: Medium     Diabetes (H) 02/26/2015     Priority: Medium     Screening for other eye conditions 02/26/2015     Priority: Medium     Encounter for other specified aftercare 02/24/2015     Priority: Medium     PAIN FOOT 02/24/2015     Priority: Medium     SOB (shortness of breath) 03/11/2014     Priority: Medium     Cervicalgia 06/25/2012     Priority: Medium     Chronic low back pain 06/25/2012     Priority: Medium     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/2012     Priority: Medium     Cutaneous skin tags 06/12/2012     Priority: Medium     Type 1 diabetes, HbA1c goal < 7% (H) 04/05/2011     Priority: Medium     Anxiety 04/05/2011     Priority: Medium     CARDIOVASCULAR SCREENING; LDL GOAL LESS THAN 100 10/31/2010     Priority: Medium     GERD (gastroesophageal reflux disease) 02/24/2009     Priority: Medium             Past Surgical History:     Past Surgical History:   Procedure Laterality Date     C REPAIR CRUCIATE LIGAMENT,KNEE       C STOMACH SURGERY PROCEDURE UNLISTED  December 2002     COLONOSCOPY  2002     ESOPHAGOSCOPY, GASTROSCOPY, DUODENOSCOPY (EGD), COMBINED  1/7/2014    Procedure: COMBINED ESOPHAGOSCOPY, GASTROSCOPY, DUODENOSCOPY (EGD), BIOPSY SINGLE OR MULTIPLE;;  Surgeon: Kraig Nicole MD;  Location:  GI     GYN SURGERY      Laparotomy to remove endometrial tissue     HC BREATH HYDROGEN TEST N/A 6/17/2014    Procedure:  HYDROGEN BREATH TEST;  Surgeon: Deacon Alberts MD;  Location:  GI     HYDROGEN BREATH TEST  6/17/14     LAPAROSCOPIC APPENDECTOMY  2002     LAPAROTOMY, LYSIS ADHESIONS, COMBINED  2002    endometriosis     SOFT TISSUE SURGERY  December 2014    right ankle tendon injury             Social History:     Social History   Substance Use Topics     Smoking status: Never Smoker     Smokeless tobacco: Never Used     Alcohol use 0.0 oz/week     0 Standard drinks or equivalent per week      Comment: moderately   Lives on her own. No children. Works as a researcher in education.          Family History:   Father with RA, Hashimoto's thyroiditis, and type I DM  Mother with psoriasis  Possible SLE in cousins  No family history of gout or IBD         Allergies:     Allergies   Allergen Reactions     Sulfasalazine      Developed rash, HA, dizziness             Medications:     Current Outpatient Prescriptions   Medication Sig Dispense Refill     methotrexate 2.5 MG tablet CHEMO Take 4 tablets (10 mg) by mouth once a week 24 tablet 2     montelukast (SINGULAIR) 5 MG chewable tablet CHEW AND SWALLOW 1 TABLET(5 MG) BY MOUTH AT BEDTIME 30 tablet 3     folic acid (FOLVITE) 1 MG tablet Take 1 tablet (1 mg) by mouth daily 90 tablet 3     levothyroxine (SYNTHROID/LEVOTHROID) 125 MCG tablet Take 1 tablet (125 mcg) by mouth daily 90 tablet 3     UNABLE TO FIND Biosil       insulin degludec (TRESIBA) 100 UNIT/ML pen Inject 18 Units Subcutaneous daily 15 mL 11     dulaglutide (TRULICITY) 1.5 MG/0.5ML pen Inject 1.5 mg Subcutaneous every 7 days 6 mL 3     NOVOLOG PENFILL 100 UNIT/ML soln 1 unit per 15 grams CHO at all meals and snacks with correction scale of 1 unit per 50 mg/dL over 150.  Average daily use is 20 units. 15 mL 4     blood glucose monitoring (ACCU-CHEK COLIN PLUS) test strip Test Blood Sugar 8 times daily or as directed 700 strip 3     UNABLE TO FIND MEDICATION NAME: biosil       diclofenac (VOLTAREN) 1 % GEL Place 2 g  "onto the skin 4 times daily as needed for moderate pain 100 g 11     mometasone-formoterol (DULERA) 100-5 MCG/ACT oral inhaler Inhale 2 puffs into the lungs 2 times daily 3 Inhaler 3     blood glucose monitoring (ACCU-CHEK FASTCLIX) lancets Use to test blood sugar 8 times daily or as directed. 4 Box 3     citalopram (CELEXA) 20 MG tablet Take 1.5 tablets (30 mg) by mouth daily 135 tablet 1     insulin pen needle (B-D U/F) 31G X 8 MM Use 5 pen needles daily 450 each 3     methylphenidate (METADATE CD) 20 MG CR capsule Take 20 mg by mouth daily       traZODone (DESYREL) 50 MG tablet Take 1-2 tablets by mouth nightly as needed for sleep. 180 tablet 0     aspirin 81 MG tablet Take 1 tablet by mouth daily.       Calcium Carbonate-Vitamin D (CALCIUM + D PO) Take one daily       econazole nitrate 1 % cream Apply 0.5 inches topically daily.       fish oil-omega-3 fatty acids (FISH OIL) 1000 MG capsule Take one daily       Multiple Vitamin (MULTIVITAMIN OR) Take  by mouth. Take one daily tab       Ketoconazole (NIZORAL PO) Take  by mouth. Shampoo daily       Minoxidil (ROGAINE EX) Externally apply  topically. daily       [DISCONTINUED] methotrexate 2.5 MG tablet CHEMO Take 3 tablets (7.5 mg) by mouth once a week Increase by 1 tab/q 2 wk as tolerated until @ 6 tabs/wk. 24 tablet 2            Physical Exam:   Blood pressure 125/82, pulse 64, height 1.778 m (5' 10\"), weight 86.3 kg (190 lb 3.2 oz), SpO2 98 %, not currently breastfeeding.     Limited today to joint exam below, and skin exam.   Constitutional: [id number] cooperative  Eyes: nl EOM, PERRLA, vision, conjunctiva, sclera  ENT: nl external ears, nose, hearing, lips, teeth, gums, throat  No mucous membrane lesions, normal saliva pool  Neck: no mass or thyroid enlargement  Resp: lungs clear to auscultation, nl to palpation  CV: RRR, no murmurs, rubs or gallops, no edema  GI: no ABD mass or tenderness, no HSM  : not tested  Lymph:  No supraclavicular, inguinal or " epitrochlear nodes  MS:  She has a small tendon nodule noted on the plantar aspect of the right hand, tenderness in flexor tendon right thumb.    All other  TMJ, neck, shoulder, elbow, wrist, MCP/PIP/DIP, spine, hip, knee, ankle, and foot MTP/IP joints were examined. Tender right subtalar joint but sans synovitis.No active synovitis. +Heberden's nodes. Full ROM.  Normal  strength. No dactylitis,  tenosynovitis, enthespathy   Skin:  NO active psoriasis along hairline or elsewhere. No nail pitting, alopecia, rash, nodules or lesions  Neuro: nl cranial nerves, strength, sensation, DTRs.   Psych: nl judgement, orientation, memory, affect.         Data:     Component      Latest Ref Rng 4/27/2016   WBC      4.0 - 11.0 10e9/L 4.8   RBC Count      3.8 - 5.2 10e12/L 3.63 (L)   Hemoglobin      11.7 - 15.7 g/dL 11.2 (L)   Hematocrit      35.0 - 47.0 % 33.6 (L)   MCV      78 - 100 fl 93   MCH      26.5 - 33.0 pg 30.9   MCHC      31.5 - 36.5 g/dL 33.3   RDW      10.0 - 15.0 % 12.7   Platelet Count      150 - 450 10e9/L 294   Diff Method       Automated Method   % Neutrophils       59.3   % Lymphocytes       30.3   % Monocytes       7.8   % Eosinophils       1.3   % Basophils       1.1   % Immature Granulocytes       0.2   Nucleated RBCs      0 /100 0   Absolute Neutrophil      1.6 - 8.3 10e9/L 2.8   Absolute Lymphocytes      0.8 - 5.3 10e9/L 1.4   Absolute Monocytes      0.0 - 1.3 10e9/L 0.4   Absolute Eosinophils      0.0 - 0.7 10e9/L 0.1   Absolute Basophils      0.0 - 0.2 10e9/L 0.1   Abs Immature Granulocytes      0 - 0.4 10e9/L 0.0   Absolute Nucleated RBC       0.0   TSH      0.40 - 4.00 mU/L        Lab Results   Component Value Date    AST 19 12/22/2014    AST 36 2/15/2013    AST 25 11/29/2010    ALT 22 12/22/2014    ALT 30 2/15/2013    ALT 8 11/23/2011    ALKPHOS 109 12/22/2014    ALKPHOS 89 2/15/2013    ALKPHOS 65 11/29/2010     Reviewed Rheumatology lab flowsheet. Pertinent results:  2014 -- NOLVIA 1.6, negative RF,  normal CRP, mildly elevated ESR, unremarkable x-rays of hands and wrists          Yoel Betancourt MD

## 2017-09-28 NOTE — MR AVS SNAPSHOT
After Visit Summary   9/28/2017    Dali Martines    MRN: [id number]           Patient Information     Date Of Birth          1970        Visit Information        Provider Department      9/28/2017 2:00 PM Yoel Betancourt MD [facility] Rheumatology        Today's Diagnoses     Pain in joint, multiple sites        Tendinitis        Encounter for therapeutic drug monitoring           Follow-ups after your visit        Your next 10 appointments already scheduled     Sep 29, 2017 10:30 AM CDT   (Arrive by 10:15 AM)   Office Visit with Dali Viera RN   [facility] Diabetes ([facility])    [address] [place] [id number] [phone]           Bring a current list of meds and any records pertaining to this visit. For Physicals, please bring immunization records and any forms needing to be filled out. Please arrive 10 minutes early to complete paperwork.            Oct 09, 2017  1:00 PM CDT   (Arrive by 12:45 PM)   Return Visit with Sara Carter MD   [facility] ([facility])    [address] [place] [id number] [phone]            Dec 06, 2017  3:30 PM CST   (Arrive by 3:15 PM)   Return Visit with Kraig Villagomez MD   [facility] Physical Medicine and Rehabilitation ([facility])    [address] [place] [id number] [phone]            Jan 09, 2018  2:30 PM CST   (Arrive by 2:15 PM)   RETURN DIABETES with Felicia Mckeon MD   [facility] Endocrinology ([facility])    [address] [place] [id number]   [phone]            Feb 01, 2018  2:30 PM CST   (Arrive by 2:15 PM)   Return Visit with Yoel Betancourt MD   [facility] Rheumatology ([facility])    [address] [place] [id number] [phone]     "          Who to contact     If you have questions or need follow up information about today's clinic visit or your schedule please contact [facility] RHEUMATOLOGY directly at [phone].  Normal or non-critical lab and imaging results will be communicated to you by [employer]hart, letter or phone within 4 business days after the clinic has received the results. If you do not hear from us within 7 days, please contact the clinic through Roostt or phone. If you have a critical or abnormal lab result, we will notify you by phone as soon as possible.  Submit refill requests through Ahometo or call your pharmacy and they will forward the refill request to us. Please allow 3 business days for your refill to be completed.          Additional Information About Your Visit        Ahometo Information     [employer] gives you secure access to your electronic health record. If you see a primary care provider, you can also send messages to your care team and make appointments. If you have questions, please call your primary care clinic.  If you do not have a primary care provider, please call [phone] and they will assist you.        Care EveryWhere ID     This is your Care EveryWhere ID. This could be used by other organizations to access your [place] medical records  [id number]        Your Vitals Were     Pulse Height Pulse Oximetry BMI (Body Mass Index)          64 1.778 m (5' 10\") 98% 27.29 kg/m2         Blood Pressure from Last 3 Encounters:   09/28/17 125/82   08/17/17 118/79   08/08/17 110/74    Weight from Last 3 Encounters:   09/28/17 86.3 kg (190 lb 3.2 oz)   08/17/17 86.2 kg (190 lb)   08/08/17 87.6 kg (193 lb 1.6 oz)              Today, you had the following     No orders found for display         Today's Medication Changes          These changes are accurate as of: 9/28/17  2:47 PM.  If you have any questions, ask your nurse or doctor.               These medicines have changed or have updated prescriptions.        " Dose/Directions    methotrexate 2.5 MG tablet CHEMO   This may have changed:    - how much to take  - additional instructions   Used for:  Pain in joint, multiple sites, Tendinitis, Encounter for therapeutic drug monitoring   Changed by:  Yoel Betancourt MD        Dose:  10 mg   Take 4 tablets (10 mg) by mouth once a week   Quantity:  24 tablet   Refills:  2         Stop taking these medicines if you haven't already. Please contact your care team if you have questions.     predniSONE 10 MG tablet   Commonly known as:  DELTASONE   Stopped by:  Yoel Betancourt MD                Where to get your medicines      These medications were sent to [id number] - [place], [place] - [address]  [address] [place] [address]     Phone:  [phone]     methotrexate 2.5 MG tablet CHEMO                Primary Care Provider Office Phone # Fax #    Dimitri WEN MD [facility] [phone]       [address] [address]        Equal Access to Services     [facility]: Hadii randi ku hadasho Soomaali, waaxda luqadaha, qaybta kaalmada adeegyada, kaitlin dodd hayravi garrison . So [facility] [phone].    ATENCIÓN: Si habla español, tiene a ramsey disposición servicios gratuitos de asistencia lingüística. [facility] [phone].    We comply with applicable federal civil rights laws and [place] laws. We do not discriminate on the basis of race, color, national origin, age, disability sex, sexual orientation or gender identity.            Thank you!     Thank you for choosing [facility] RHEUMATOLOGY  for your care. Our goal is always to provide you with excellent care. Hearing back from our patients is one way we can continue to improve our services. Please take a few minutes to complete the written survey that you may receive in the mail after your visit with us. Thank you!             Your Updated Medication List - Protect others around you:  Learn how to safely use, store and throw away your medicines at www.disposemymeds.org.          This list is accurate as of: 9/28/17  2:47 PM.  Always use your most recent med list.                   Brand Name Dispense Instructions for use Diagnosis    aspirin 81 MG tablet      Take 1 tablet by mouth daily.        blood glucose monitoring lancets     4 Box    Use to test blood sugar 8 times daily or as directed.    Type 1 diabetes, HbA1c goal < 7% (H)       blood glucose monitoring test strip    ACCU-CHEK COLIN PLUS    700 strip    Test Blood Sugar 8 times daily or as directed    Type 1 diabetes, HbA1c goal < 7% (H)       CALCIUM + D PO      Take one daily        citalopram 20 MG tablet    celeXA    135 tablet    Take 1.5 tablets (30 mg) by mouth daily    Anxiety       diclofenac 1 % Gel topical gel    VOLTAREN    100 g    Place 2 g onto the skin 4 times daily as needed for moderate pain    Enthesopathy, Pain in joint, multiple sites, Chronic pain of right ankle       dulaglutide 1.5 MG/0.5ML pen    TRULICITY    6 mL    Inject 1.5 mg Subcutaneous every 7 days    Type 1 diabetes mellitus without complication (H)       econazole nitrate 1 % cream      Apply 0.5 inches topically daily.        fish oil-omega-3 fatty acids 1000 MG capsule      Take one daily        folic acid 1 MG tablet    FOLVITE    90 tablet    Take 1 tablet (1 mg) by mouth daily    Pain in joint, multiple sites, Tendinitis, Encounter for therapeutic drug monitoring       insulin degludec 100 UNIT/ML pen    TRESIBA    15 mL    Inject 18 Units Subcutaneous daily    Type 1 diabetes mellitus without complication (H)       insulin pen needle 31G X 8 MM    B-D U/F    450 each    Use 5 pen needles daily    Diabetes mellitus type I (H)       levothyroxine 125 MCG tablet    SYNTHROID/LEVOTHROID    90 tablet    Take 1 tablet (125 mcg) by mouth daily    Hypothyroidism       methotrexate 2.5 MG tablet CHEMO     24 tablet    Take 4 tablets (10 mg) by mouth once  a week    Pain in joint, multiple sites, Tendinitis, Encounter for therapeutic drug monitoring       methylphenidate 20 MG CR capsule    METADATE CD     Take 20 mg by mouth daily        mometasone-formoterol 100-5 MCG/ACT oral inhaler    DULERA    3 Inhaler    Inhale 2 puffs into the lungs 2 times daily    Moderate persistent asthma without complication       montelukast 5 MG chewable tablet    SINGULAIR    30 tablet    CHEW AND SWALLOW 1 TABLET(5 MG) BY MOUTH AT BEDTIME    Mild persistent asthma without complication       MULTIVITAMIN PO      Take  by mouth. Take one daily tab        NIZORAL PO      Take  by mouth. Shampoo daily        NovoLOG PENFILL 100 UNIT/ML injection   Generic drug:  insulin aspart     15 mL    1 unit per 15 grams CHO at all meals and snacks with correction scale of 1 unit per 50 mg/dL over 150.  Average daily use is 20 units.    Type 1 diabetes, HbA1c goal < 7% (H)       ROGAINE EX      Externally apply  topically. daily        traZODone 50 MG tablet    DESYREL    180 tablet    Take 1-2 tablets by mouth nightly as needed for sleep.    Insomnia       * UNABLE TO FIND      MEDICATION NAME: biosil    Enthesopathy, Pain in joint, multiple sites, Chronic pain of right ankle       * UNABLE TO FIND      Biosil        * Notice:  This list has 2 medication(s) that are the same as other medications prescribed for you. Read the directions carefully, and ask your doctor or other care provider to review them with you.

## 2017-09-29 ENCOUNTER — OFFICE VISIT (OUTPATIENT)
Dept: EDUCATION SERVICES | Facility: CLINIC | Age: 47
End: 2017-09-29

## 2017-09-29 DIAGNOSIS — E10.9 TYPE 1 DIABETES MELLITUS WITHOUT COMPLICATION (H): Primary | ICD-10-CM

## 2017-09-29 NOTE — MR AVS SNAPSHOT
After Visit Summary   9/29/2017    Dali Martines    MRN: [id number]           Patient Information     Date Of Birth          1970        Visit Information        Provider Department      9/29/2017 10:30 AM Dali Viera RN [facility] Diabetes        Today's Diagnoses     Type 1 diabetes mellitus without complication (H)    -  1       Follow-ups after your visit        Your next 10 appointments already scheduled     Oct 09, 2017  1:00 PM CDT   (Arrive by 12:45 PM)   Return Visit with Sara Carter MD   [facility] ([facility])    [address] [place] [address] [phone] [phone]            Oct 20, 2017  2:30 PM CDT   (Arrive by 2:15 PM)   Office Visit with Luz Marina Holley RD   [facility] Diabetes ([facility])    [address] [place] [id number] [phone]           Bring a current list of meds and any records pertaining to this visit. For Physicals, please bring immunization records and any forms needing to be filled out. Please arrive 10 minutes early to complete paperwork.            Dec 06, 2017  3:30 PM CST   (Arrive by 3:15 PM)   Return Visit with Kraig Villagomez MD   [facility] Physical Medicine and Rehabilitation ([facility])    [address] [place] [address] [id number] [phone]            Jan 09, 2018  2:30 PM CST   (Arrive by 2:15 PM)   RETURN DIABETES with Felicia Mckeon MD   [facility] Endocrinology ([facility])    [address] [place] [address] [id number] [phone]            Feb 01, 2018  2:30 PM CST   (Arrive by 2:15 PM)   Return Visit with Yoel Betancourt MD   [facility] Rheumatology ([facility])    [address] [place] [address] [phone]              Who to contact     Please call your  clinic at [phone] to:    Ask questions about your health    Make or cancel appointments    Discuss your medicines    Learn about your test results    Speak to your doctor   If you have compliments or concerns about an experience at your clinic, or if you wish to file a complaint, please contact [facility] Patient Relations at [phone] or email us at [email or web address]         Additional Information About Your Visit        [employer] Information     Un[employer] gives you secure access to your electronic health record. If you see a primary care provider, you can also send messages to your care team and make appointments. If you have questions, please call your primary care clinic.  If you do not have a primary care provider, please call [phone] and they will assist you.      Metabolomx is an electronic gateway that provides easy, online access to your medical records. With Met[employer], you can request a clinic appointment, read your test results, renew a prescription or communicate with your care team.     To access your existing account, please contact your [facility] or call [phone] for assistance.        Care EveryWhere ID     This is your Care EveryWhere ID. This could be used by other organizations to access your [place] medical records  [id number]         Blood Pressure from Last 3 Encounters:   09/28/17 125/82   08/17/17 118/79   08/08/17 110/74    Weight from Last 3 Encounters:   09/28/17 86.3 kg (190 lb 3.2 oz)   08/17/17 86.2 kg (190 lb)   08/08/17 87.6 kg (193 lb 1.6 oz)              We Performed the Following     DIABETES EDUCATION - Individual  []        Primary Care Provider Office Phone # Fax #    Dimitri Alas [phone]       [address] [address]        Equal Access to Services     DALIA MOORE : Diego Castillo, angelia jolly,  kaitlin dodd ildefonso adkins'aan ah. So [facility] [phone].    ATENCIÓN: Si pérezla lui, tiene a ramsey disposición servicios gratuitos de asistencia lingüística. Juanito medel [phone].    We comply with applicable federal civil rights laws and [place] laws. We do not discriminate on the basis of race, color, national origin, age, disability, sex, sexual orientation, or gender identity.            Thank you!     Thank you for choosing [facility] DIABETES  for your care. Our goal is always to provide you with excellent care. Hearing back from our patients is one way we can continue to improve our services. Please take a few minutes to complete the written survey that you may receive in the mail after your visit with us. Thank you!             Your Updated Medication List - Protect others around you: Learn how to safely use, store and throw away your medicines at www.disposemymeds.org.          This list is accurate as of: 9/29/17 11:59 PM.  Always use your most recent med list.                   Brand Name Dispense Instructions for use Diagnosis    aspirin 81 MG tablet      Take 1 tablet by mouth daily.        blood glucose monitoring lancets     4 Box    Use to test blood sugar 8 times daily or as directed.    Type 1 diabetes, HbA1c goal < 7% (H)       blood glucose monitoring test strip    ACCU-CHEK COLIN PLUS    700 strip    Test Blood Sugar 8 times daily or as directed    Type 1 diabetes, HbA1c goal < 7% (H)       CALCIUM + D PO      Take one daily        citalopram 20 MG tablet    celeXA    135 tablet    Take 1.5 tablets (30 mg) by mouth daily    Anxiety       diclofenac 1 % Gel topical gel    VOLTAREN    100 g    Place 2 g onto the skin 4 times daily as needed for moderate pain    Enthesopathy, Pain in joint, multiple sites, Chronic pain of right ankle       dulaglutide 1.5 MG/0.5ML pen    TRULICITY    6 mL    Inject 1.5 mg Subcutaneous every 7 days    Type 1 diabetes mellitus without complication (H)        econazole nitrate 1 % cream      Apply 0.5 inches topically daily.        fish oil-omega-3 fatty acids 1000 MG capsule      Take one daily        folic acid 1 MG tablet    FOLVITE    90 tablet    Take 1 tablet (1 mg) by mouth daily    Pain in joint, multiple sites, Tendinitis, Encounter for therapeutic drug monitoring       insulin degludec 100 UNIT/ML pen    TRESIBA    15 mL    Inject 18 Units Subcutaneous daily    Type 1 diabetes mellitus without complication (H)       insulin pen needle 31G X 8 MM    B-D U/F    450 each    Use 5 pen needles daily    Diabetes mellitus type I (H)       levothyroxine 125 MCG tablet    SYNTHROID/LEVOTHROID    90 tablet    Take 1 tablet (125 mcg) by mouth daily    Hypothyroidism       methotrexate 2.5 MG tablet CHEMO     24 tablet    Take 4 tablets (10 mg) by mouth once a week    Pain in joint, multiple sites, Tendinitis, Encounter for therapeutic drug monitoring       methylphenidate 20 MG CR capsule    METADATE CD     Take 20 mg by mouth daily        mometasone-formoterol 100-5 MCG/ACT oral inhaler    DULERA    3 Inhaler    Inhale 2 puffs into the lungs 2 times daily    Moderate persistent asthma without complication       montelukast 5 MG chewable tablet    SINGULAIR    30 tablet    CHEW AND SWALLOW 1 TABLET(5 MG) BY MOUTH AT BEDTIME    Mild persistent asthma without complication       MULTIVITAMIN PO      Take  by mouth. Take one daily tab        NIZORAL PO      Take  by mouth. Shampoo daily        NovoLOG PENFILL 100 UNIT/ML injection   Generic drug:  insulin aspart     15 mL    1 unit per 15 grams CHO at all meals and snacks with correction scale of 1 unit per 50 mg/dL over 150.  Average daily use is 20 units.    Type 1 diabetes, HbA1c goal < 7% (H)       ROGAINE EX      Externally apply  topically. daily        traZODone 50 MG tablet    DESYREL    180 tablet    Take 1-2 tablets by mouth nightly as needed for sleep.    Insomnia       * UNABLE TO FIND      MEDICATION NAME: biosil     Enthesopathy, Pain in joint, multiple sites, Chronic pain of right ankle       * UNABLE TO FIND      Biosil        * Notice:  This list has 2 medication(s) that are the same as other medications prescribed for you. Read the directions carefully, and ask your doctor or other care provider to review them with you.

## 2017-10-04 NOTE — PROGRESS NOTES
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  She is quite concerned and somewhat surprised to hear this.  She has always been very well controlled, but is seeking advice to improve on her control.  She is also interested in losing weight.      ASSESSMENT:    Current Diabetes Management per Patient:  Taking diabetes medications?   Yes:  Currently taking Tresiba 14 units daily (18 units is listed on the patient's medication record).   She has the following calculations in her Accucheck Expert meter:  Insulin to Carb ratio:  1 unit per 15 grams CHO  Correction Factor:     1 unit drops BG 50 mg/mL  Targets:  Overnight:   and during the Daytime:   Active Insulin Time:  3.5 hours.     She feels that frequently she is adding insulin onto what the calculator is suggesting when she eats a higher fat meal, and this is resulting in some low blood glucose afterward.  She is also correcting before bed sometimes and drops a little too much overnight.  She thinks that the active insulin time may be too long.           Monitoring  Patient glucose self monitoring as follows: four times daily.   BG meter: Accu-Chek Expert meter with insulin calculator  She also wears a Dexcom G4 CGM every day.  Patient's most recent   Lab Results   Component Value Date    A1C 7.2 09/16/2013    is not meeting goal of <7.0, however her episodes of hypoglycemia have improved significantly    Exercise:   Since a severe soft tissue injury to her ankle 2 years ago, she has been unable to resume her regular habit of walking, however she is able to climb stairs and has been doing this daily (about 15 flights), as well as working with a PT on core strengthening    Nutrition:   Currently not  counting calories and feels that her carb counting is not always accurate.  Her weight has steadily climbed over the past few years, a result, she feels, of not being able to exercise as much as she used to.       Hypoglycemia:   Patient is at risk of hypoglycemia? 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  She has been using Trulicity 1.5 mg weekly for a while, having previously used Symlin. She finds it easier to use but thinks it is less effective than Symlin in keeping her post meal blood glucoses under control.    Her Dexcom download indicates mild hypoglycemia overnight sometimes for several hours.  She states that she doesn't always hear the alarms.                                 EDUCATION and INSTRUCTION PROVIDED AT THIS VISIT:      Dexcom download indicates inconsistant BG overnight.  It appears that if she goes to bed with a fairly stable blood glucose, she is quite stable and in a good range overnight.  She has difficulty when she eats later in the evening, has a high blood glucose before bedtime, overcorrects her blood glucose, and then ends up going low or may stay over target for much of the night.       Discussed some strategies for bolusing to prevent glucose excursions.  She is going to try bolusing 10-15 minutes prior to eating, unless she is eating a high fat meal, in which case she may do better with bolusing immediately after eating instead.  We raised the target in her Expert meter overnight to avoid over-correcting, and cautioned her to try to avoid over over what the bolus calculator recommends, as when she does this she tends to drop too low.      Discussed weight loss somewhat.  Suggested that the first step would be to start counting calories.  Suggested a calorie limit of 1600 calories daily to help her achieve a weight goal of 160 lbs.  She is currently around 190 lbs and states she just doesn't feel well at that this weight.       Patient-stated goal written and given to Dali Martines.  Verbalized and demonstrated understanding of instructions.     PLAN:  See patient instructions  AVS printed and given to patient    FOLLOW-UP:      Dali is going to keep food records, and we scheduled a follow up appointment with Luz Marina Holley to review her food records, and to assess/ re: weight loss strategies.    Time spent with patient at today's visit was 60 minutes.      Any diabetes medication dose changes were made via the [facility]DE Protocol and Collaborative [facility] Agreement [facility].  A copy of this encounter was provided to patient's referring provider.

## 2017-10-06 ENCOUNTER — PRE VISIT (OUTPATIENT)
Dept: ORTHOPEDICS | Facility: CLINIC | Age: 47
End: 2017-10-06

## 2017-10-06 NOTE — TELEPHONE ENCOUNTER
APPT INFORMATION    Date & Time: 10/11/17 11:15AM   Reason for Appt: Rt Trigger Nodule   Referring Name/Clinic: Dr. Betancourt    Yes / No COMMENT / NOTES DATE & ACTION   Patient Contacted? No  Internal referral       RECORDS CLINIC NAME  (use N/A if no records ) DATE & ACTION RECEIVED RECS & IMG? Y/N   (may include other helpful notes)   External Clinics: n/a     Internal Clinics: [facility]  Records are in Epic

## 2017-10-09 ENCOUNTER — OFFICE VISIT (OUTPATIENT)
Dept: PULMONOLOGY | Facility: CLINIC | Age: 47
End: 2017-10-09
Attending: INTERNAL MEDICINE
Payer: COMMERCIAL

## 2017-10-09 VITALS
HEIGHT: 70 IN | DIASTOLIC BLOOD PRESSURE: 86 MMHG | HEART RATE: 60 BPM | TEMPERATURE: 97.7 F | WEIGHT: 188 LBS | BODY MASS INDEX: 26.92 KG/M2 | SYSTOLIC BLOOD PRESSURE: 130 MMHG | RESPIRATION RATE: 18 BRPM | OXYGEN SATURATION: 100 %

## 2017-10-09 DIAGNOSIS — J45.40 MODERATE PERSISTENT ASTHMA WITHOUT COMPLICATION: ICD-10-CM

## 2017-10-09 DIAGNOSIS — R07.89 ATYPICAL CHEST PAIN: ICD-10-CM

## 2017-10-09 DIAGNOSIS — J45.21 MILD INTERMITTENT ASTHMA WITH ACUTE EXACERBATION: Primary | ICD-10-CM

## 2017-10-09 PROCEDURE — 99212 OFFICE O/P EST SF 10 MIN: CPT | Mod: ZF

## 2017-10-09 ASSESSMENT — ENCOUNTER SYMPTOMS
DYSPNEA ON EXERTION: 1
MUSCLE CRAMPS: 0
DYSPNEA ON EXERTION: 1
BACK PAIN: 1
STIFFNESS: 1
DYSURIA: 0
SPUTUM PRODUCTION: 1
POOR WOUND HEALING: 0
POSTURAL DYSPNEA: 0
SKIN CHANGES: 1
ARTHRALGIAS: 1
HEMOPTYSIS: 0
HEMATURIA: 0
WHEEZING: 0
WHEEZING: 0
SNORES LOUDLY: 0
COUGH DISTURBING SLEEP: 0
ARTHRALGIAS: 1
BACK PAIN: 1
FLANK PAIN: 0
JOINT SWELLING: 1
POSTURAL DYSPNEA: 0
MYALGIAS: 0
MUSCLE WEAKNESS: 0
JOINT SWELLING: 1
NAIL CHANGES: 0
SNORES LOUDLY: 0
SHORTNESS OF BREATH: 1
NAIL CHANGES: 0
DIFFICULTY URINATING: 0
SHORTNESS OF BREATH: 1
POOR WOUND HEALING: 0
NECK PAIN: 0
RESPIRATORY PAIN: 1
COUGH DISTURBING SLEEP: 0
NECK PAIN: 0
FLANK PAIN: 0
RESPIRATORY PAIN: 1
DYSURIA: 0
COUGH: 0
STIFFNESS: 1
MUSCLE CRAMPS: 0
COUGH: 0
MYALGIAS: 0
MUSCLE WEAKNESS: 0
HEMOPTYSIS: 0
DIFFICULTY URINATING: 0
HEMATURIA: 0
SKIN CHANGES: 1
SPUTUM PRODUCTION: 1

## 2017-10-09 ASSESSMENT — PAIN SCALES - GENERAL: PAINLEVEL: MODERATE PAIN (4)

## 2017-10-09 NOTE — LETTER
10/9/2017       RE: Dali Martines  [address]  [facility] [address]     Dear Colleague,    Thank you for referring your patient, Dali Martines, to the [facility] at [facility]. Please see a copy of my visit note below.    Reason for Visit  Dali Martines is a 46 year old female who is followed for asthma  Pulmonary HPI  Recently taking more stairs lately, 3 flights 2 x daily. Still bothered by ankle and walking. Last few days worsening breathing trouble, burning and chest tightness. She also had some mild nasal congestion prior to this. Change of season is worst for her, cold exercise as well.     The patient was seen and examined by Sara Carter MD   Current Outpatient Prescriptions   Medication     methotrexate 2.5 MG tablet CHEMO     montelukast (SINGULAIR) 5 MG chewable tablet     folic acid (FOLVITE) 1 MG tablet     levothyroxine (SYNTHROID/LEVOTHROID) 125 MCG tablet     UNABLE TO FIND     insulin degludec (TRESIBA) 100 UNIT/ML pen     dulaglutide (TRULICITY) 1.5 MG/0.5ML pen     NOVOLOG PENFILL 100 UNIT/ML soln     blood glucose monitoring (ACCU-CHEK COLIN PLUS) test strip     UNABLE TO FIND     diclofenac (VOLTAREN) 1 % GEL     mometasone-formoterol (DULERA) 100-5 MCG/ACT oral inhaler     blood glucose monitoring (ACCU-CHEK FASTCLIX) lancets     citalopram (CELEXA) 20 MG tablet     insulin pen needle (B-D U/F) 31G X 8 MM     methylphenidate (METADATE CD) 20 MG CR capsule     traZODone (DESYREL) 50 MG tablet     aspirin 81 MG tablet     Calcium Carbonate-Vitamin D (CALCIUM + D PO)     econazole nitrate 1 % cream     fish oil-omega-3 fatty acids (FISH OIL) 1000 MG capsule     Multiple Vitamin (MULTIVITAMIN OR)     Ketoconazole (NIZORAL PO)     Minoxidil (ROGAINE EX)     No current facility-administered medications for this visit.      Allergies   Allergen Reactions     Sulfasalazine      Developed rash, HA, dizziness  "    Social History     Social History     Marital status: Single     Spouse name: N/A     Number of children: 0     Years of education: N/A     Occupational History     research [employer]     Social History Main Topics     Smoking status: Never Smoker     Smokeless tobacco: Never Used     Alcohol use 0.0 oz/week     0 Standard drinks or equivalent per week      Comment: moderately     Drug use: No     Sexual activity: Not on file     Other Topics Concern     Parent/Sibling W/ Cabg, Mi Or Angioplasty Before 65f 55m? Yes     Social History Narrative     Past Medical History:   Diagnosis Date     Anemia      Anxiety      Arthritis 2014    Psoriatic arthritis     Back injury 1988     Dry eye syndrome      Dyslipidemia      Endometriosis 2002    adehsions seen at laparoscopy     GERD (gastroesophageal reflux disease)      Hypothyroidism     8 yoa     SOB (shortness of breath) 3/11/2014     Type I (juvenile type) diabetes mellitus without mention of complication, not stated as uncontrolled     when 15      Uncomplicated asthma Fall 2013     ROS Pulmonary  Dyspnea: No, Cough: No, Chest pain: Yes, Wheezing: No, Sputum Production: No, Hemoptysis: No  A complete ROS was otherwise negative except as noted in the HPI.  /86  Pulse 60  Temp 97.7  F (36.5  C) (Oral)  Resp 18  Ht 1.778 m (5' 10\")  Wt 85.3 kg (188 lb)  SpO2 100%  BMI 26.98 kg/m2  Exam:   GENERAL APPEARANCE: Well developed, well nourished, alert, and in no apparent distress.  EYES: PERRL, EOMI  HENT: Nasal mucosa with no edema and no hyperemia. No nasal polyps.  EARS: Canals clear, TMs normal  MOUTH: Oral mucosa is moist, without any lesions, no tonsillar enlargement, no oropharyngeal exudate.  NECK: supple, no masses, no thyromegaly.   LYMPHATICS: no palpable lymphadenopathy  RESP: normal percussion, good air flow throughout.  No crackles. No rhonchi. No wheezes.  CV: Normal S1, S2, regular rhythm, normal rate. No murmur.  No rub. No gallop. " No LE edema.   ABDOMEN:  Bowel sounds normal, soft, nontender, no HSM or masses.   MS: extremities normal. No clubbing. No cyanosis.  SKIN: no rash on limited exam  NEURO: Mentation intact, speech normal, normal strength and tone, normal gait and stance  PSYCH: mentation appears normal. and affect normal/bright  Results:  Chest x-ray performed today images reviewed  Stress echo 12/30/14 normal    Assessment and plan: Dali Martines is a 46-year-old female with type 1 diabetes who was clinically diagnosed with asthma as an adult.  She has been sedentary for over a year due to ankle injury, surgery, poor wound healing  1.  Asthma.  Mild intermittent asthma, worsening shortness of breath lately.   Up to date on vaccinations.    -Chest x-ray performed after clinic visit and clear.  - She will resume Dulera two puffs twice a day  - Singulair for asthma maintenance, 5mg because she has noticed constipation with full dose     2.  Unusual sensation of some breath and chest discomfort that spontaneously resolved  Likely related to her asthma, we'll resume high-dose inhaled corticosteroids if this persists we should repeat stress test    I will see her back in clinic in 4 months.         Again, thank you for allowing me to participate in the care of your patient.      Sincerely,    Sara Carter MD

## 2017-10-09 NOTE — PATIENT INSTRUCTIONS
Chest xray today  I will let you know, if normal (which I expect), resume Dulera 2 puffs twice a day.  Get your flu shot at work.

## 2017-10-09 NOTE — MR AVS SNAPSHOT
After Visit Summary   10/9/2017    Dali aMrtines    MRN: [id number]           Patient Information     Date Of Birth          1970        Visit Information        Provider Department      10/9/2017 1:00 PM Sara Carter MD [facility]        Today's Diagnoses     Mild intermittent asthma with acute exacerbation    -  1    Atypical chest pain        Moderate persistent asthma without complication          Care Instructions    Chest xray today  I will let you know, if normal (which I expect), resume Dulera 2 puffs twice a day.  Get your flu shot at work.           Follow-ups after your visit        Follow-up notes from your care team     Return in about 2 months (around 12/9/2017).      Your next 10 appointments already scheduled     Oct 09, 2017  1:55 PM CDT   (Arrive by 1:40 PM)   XR CHEST 2 VIEWS with UCXR1   [facility] ([facility])    [address] [place] [id number] [phone]           Please bring a list of your current medicines to your exam. (Include vitamins, minerals and over-thecounter medicines.) Leave your valuables at home.  Tell your doctor if there is a chance you may be pregnant.  You do not need to do anything special for this exam.            Oct 11, 2017 11:15 AM CDT   (Arrive by 11:00 AM)   NEW HAND with Chris Uribe MD   [facility] ([facility])    [address] [place] [id number] [phone]            Oct 20, 2017  2:30 PM CDT   (Arrive by 2:15 PM)   Office Visit with Luz Marina Holley RD   [facility] ([facility])    [address] [place] [id number] [phone]           Bring a current list of meds and any records pertaining to this visit. For Physicals, please bring immunization records and any forms needing to be filled out.  Please arrive 10 minutes early to complete paperwork.            Dec 06, 2017  3:30 PM CST   (Arrive by 3:15 PM)   Return Visit with Kraig Villagomez MD   [facility] ([facility])    [address] [place] [id number] [phone]            Dec 11, 2017 12:00 PM CST   (Arrive by 11:45 AM)   Return Visit with Sara Carter MD   [facility] ([facility])    [address] [place] [id number] [phone]            Jan 09, 2018  2:30 PM CST   (Arrive by 2:15 PM)   RETURN DIABETES with Felicia Mckeon MD   [facility] Endocrinology ([facility])    [address] [place] [id number] [phone]            Feb 01, 2018  2:30 PM CST   (Arrive by 2:15 PM)   Return Visit with Yoel Betancourt MD   [facility] Rheumatology ([facility])    [address] [place] [id number] [phone]              Future tests that were ordered for you today     Open Future Orders        Priority Expected Expires Ordered    X-ray Chest 2 vws* Routine 10/9/2017 10/9/2018 10/9/2017            Who to contact     If you have questions or need follow up information about today's clinic visit or your schedule please contact [facility] directly at [phone].  Normal or non-critical lab and imaging results will be communicated to you by MyChart, letter or phone within 4 business days after the clinic has received the results. If you do not hear from us within 7 days, please contact the clinic through MyChart or phone. If you have a critical or abnormal lab result, we will notify you by phone as soon as possible.  Submit refill requests through [employer] or call your pharmacy and they will forward the refill request to us. Please allow 3 business  "days for your refill to be completed.          Additional Information About Your Visit        MyChart Information     [employer] gives you secure access to your electronic health record. If you see a primary care provider, you can also send messages to your care team and make appointments. If you have questions, please call your primary care clinic.  If you do not have a primary care provider, please call [phone] and they will assist you.        Care EveryWhere ID     This is your Care EveryWhere ID. This could be used by other organizations to access your [place] medical records  [id number]        Your Vitals Were     Pulse Temperature Respirations Height Pulse Oximetry BMI (Body Mass Index)    60 97.7  F (36.5  C) (Oral) 18 1.778 m (5' 10\") 100% 26.98 kg/m2       Blood Pressure from Last 3 Encounters:   10/09/17 130/86   09/28/17 125/82   08/17/17 118/79    Weight from Last 3 Encounters:   10/09/17 85.3 kg (188 lb)   09/28/17 86.3 kg (190 lb 3.2 oz)   08/17/17 86.2 kg (190 lb)                 Where to get your medicines      These medications were sent to [facility] [address] [place] [address] [address] [place] [address] [place] [address]     Phone:  [phone]     mometasone-formoterol 100-5 MCG/ACT oral inhaler          Primary Care Provider Office Phone # Fax #    Dimitri Alas [phone]       [address] [address]        Equal Access to Services     [facility]: Hadii randi Castillo, reymundo chacon, qakaitlin castro. So [facility] [phone].    ATENCIÓN: Si habla español, tiene a ramsey disposición servicios gratuitos de asistencia lingüística. Juanito al [phone].    We comply with applicable federal civil rights laws and [place] laws. We do not discriminate on the basis of race, color, national origin, age, disability, sex, sexual orientation, or " gender identity.            Thank you!     Thank you for choosing [facility]  for your care. Our goal is always to provide you with excellent care. Hearing back from our patients is one way we can continue to improve our services. Please take a few minutes to complete the written survey that you may receive in the mail after your visit with us. Thank you!             Your Updated Medication List - Protect others around you: Learn how to safely use, store and throw away your medicines at www.disposemymeds.org.          This list is accurate as of: 10/9/17  1:29 PM.  Always use your most recent med list.                   Brand Name Dispense Instructions for use Diagnosis    aspirin 81 MG tablet      Take 1 tablet by mouth daily.        blood glucose monitoring lancets     4 Box    Use to test blood sugar 8 times daily or as directed.    Type 1 diabetes, HbA1c goal < 7% (H)       blood glucose monitoring test strip    ACCU-CHEK COLIN PLUS    700 strip    Test Blood Sugar 8 times daily or as directed    Type 1 diabetes, HbA1c goal < 7% (H)       CALCIUM + D PO      Take one daily        citalopram 20 MG tablet    celeXA    135 tablet    Take 1.5 tablets (30 mg) by mouth daily    Anxiety       diclofenac 1 % Gel topical gel    VOLTAREN    100 g    Place 2 g onto the skin 4 times daily as needed for moderate pain    Enthesopathy, Pain in joint, multiple sites, Chronic pain of right ankle       dulaglutide 1.5 MG/0.5ML pen    TRULICITY    6 mL    Inject 1.5 mg Subcutaneous every 7 days    Type 1 diabetes mellitus without complication (H)       econazole nitrate 1 % cream      Apply 0.5 inches topically daily.        fish oil-omega-3 fatty acids 1000 MG capsule      Take one daily        folic acid 1 MG tablet    FOLVITE    90 tablet    Take 1 tablet (1 mg) by mouth daily    Pain in joint, multiple sites, Tendinitis, Encounter for therapeutic drug monitoring       insulin degludec 100  UNIT/ML pen    TRESIBA    15 mL    Inject 18 Units Subcutaneous daily    Type 1 diabetes mellitus without complication (H)       insulin pen needle 31G X 8 MM    B-D U/F    450 each    Use 5 pen needles daily    Diabetes mellitus type I (H)       levothyroxine 125 MCG tablet    SYNTHROID/LEVOTHROID    90 tablet    Take 1 tablet (125 mcg) by mouth daily    Hypothyroidism       methotrexate 2.5 MG tablet CHEMO     24 tablet    Take 4 tablets (10 mg) by mouth once a week    Pain in joint, multiple sites, Tendinitis, Encounter for therapeutic drug monitoring       methylphenidate 20 MG CR capsule    METADATE CD     Take 20 mg by mouth daily        mometasone-formoterol 100-5 MCG/ACT oral inhaler    DULERA    3 Inhaler    Inhale 2 puffs into the lungs 2 times daily    Moderate persistent asthma without complication       montelukast 5 MG chewable tablet    SINGULAIR    30 tablet    CHEW AND SWALLOW 1 TABLET(5 MG) BY MOUTH AT BEDTIME    Mild persistent asthma without complication       MULTIVITAMIN PO      Take  by mouth. Take one daily tab        NIZORAL PO      Take  by mouth. Shampoo daily        NovoLOG PENFILL 100 UNIT/ML injection   Generic drug:  insulin aspart     15 mL    1 unit per 15 grams CHO at all meals and snacks with correction scale of 1 unit per 50 mg/dL over 150.  Average daily use is 20 units.    Type 1 diabetes, HbA1c goal < 7% (H)       ROGAINE EX      Externally apply  topically. daily        traZODone 50 MG tablet    DESYREL    180 tablet    Take 1-2 tablets by mouth nightly as needed for sleep.    Insomnia       * UNABLE TO FIND      MEDICATION NAME: biosil    Enthesopathy, Pain in joint, multiple sites, Chronic pain of right ankle       * UNABLE TO FIND      Biosil        * Notice:  This list has 2 medication(s) that are the same as other medications prescribed for you. Read the directions carefully, and ask your doctor or other care provider to review them with you.

## 2017-10-09 NOTE — PROGRESS NOTES
Reason for Visit  Dali Martines is a 46 year old female who is followed for asthma  Pulmonary HPI  Recently taking more stairs lately, 3 flights 2 x daily. Still bothered by ankle and walking. Last few days worsening breathing trouble, burning and chest tightness. She also had some mild nasal congestion prior to this. Change of season is worst for her, cold exercise as well.     The patient was seen and examined by Sara Carter MD   Current Outpatient Prescriptions   Medication     methotrexate 2.5 MG tablet CHEMO     montelukast (SINGULAIR) 5 MG chewable tablet     folic acid (FOLVITE) 1 MG tablet     levothyroxine (SYNTHROID/LEVOTHROID) 125 MCG tablet     UNABLE TO FIND     insulin degludec (TRESIBA) 100 UNIT/ML pen     dulaglutide (TRULICITY) 1.5 MG/0.5ML pen     NOVOLOG PENFILL 100 UNIT/ML soln     blood glucose monitoring (ACCU-CHEK COLIN PLUS) test strip     UNABLE TO FIND     diclofenac (VOLTAREN) 1 % GEL     mometasone-formoterol (DULERA) 100-5 MCG/ACT oral inhaler     blood glucose monitoring (ACCU-CHEK FASTCLIX) lancets     citalopram (CELEXA) 20 MG tablet     insulin pen needle (B-D U/F) 31G X 8 MM     methylphenidate (METADATE CD) 20 MG CR capsule     traZODone (DESYREL) 50 MG tablet     aspirin 81 MG tablet     Calcium Carbonate-Vitamin D (CALCIUM + D PO)     econazole nitrate 1 % cream     fish oil-omega-3 fatty acids (FISH OIL) 1000 MG capsule     Multiple Vitamin (MULTIVITAMIN OR)     Ketoconazole (NIZORAL PO)     Minoxidil (ROGAINE EX)     No current facility-administered medications for this visit.      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 Yes     Social History Narrative     Past Medical History:   Diagnosis Date     Anemia      Anxiety      Arthritis 2014    Psoriatic arthritis     Back injury 1988     Dry eye syndrome      Dyslipidemia      Endometriosis 2002    adehsions seen at laparoscopy     GERD (gastroesophageal reflux disease)      Hypothyroidism     8 yoa     SOB (shortness of breath) 3/11/2014     Type I (juvenile type) diabetes mellitus without mention of complication, not stated as uncontrolled     when 15      Uncomplicated asthma Fall 2013     ROS Pulmonary  Dyspnea: No, Cough: No, Chest pain: Yes, Wheezing: No, Sputum Production: No, Hemoptysis: No  A complete ROS was otherwise negative except as noted in the HPI.  /86  Pulse 60  Temp 97.7  F (36.5  C) (Oral)  Resp 18  Ht 1.778 m (5' 10\")  Wt 85.3 kg (188 lb)  SpO2 100%  BMI 26.98 kg/m2  Exam:   GENERAL APPEARANCE: Well developed, well nourished, alert, and in no apparent distress.  EYES: PERRL, EOMI  HENT: Nasal mucosa with no edema and no hyperemia. No nasal polyps.  EARS: Canals clear, TMs normal  MOUTH: Oral mucosa is moist, without any lesions, no tonsillar enlargement, no oropharyngeal exudate.  NECK: supple, no masses, no thyromegaly.   LYMPHATICS: no palpable lymphadenopathy  RESP: normal percussion, good air flow throughout.  No crackles. No rhonchi. No wheezes.  CV: Normal S1, S2, regular rhythm, normal rate. No murmur.  No rub. No gallop. No LE edema.   ABDOMEN:  Bowel sounds normal, soft, nontender, no HSM or masses.   MS: extremities normal. No clubbing. No cyanosis.  SKIN: no rash on limited exam  NEURO: Mentation intact, speech normal, normal strength and tone, normal gait and stance  PSYCH: mentation appears normal. and affect " normal/bright  Results:  Chest x-ray performed today images reviewed  Stress echo 12/30/14 normal    Assessment and plan: Dali Martines is a 46-year-old female with type 1 diabetes who was clinically diagnosed with asthma as an adult.  She has been sedentary for over a year due to ankle injury, surgery, poor wound healing  1.  Asthma.  Mild intermittent asthma, worsening shortness of breath lately.   Up to date on vaccinations.    -Chest x-ray performed after clinic visit and clear.  - She will resume Dulera two puffs twice a day  - Singulair for asthma maintenance, 5mg because she has noticed constipation with full dose     2.  Unusual sensation of some breath and chest discomfort that spontaneously resolved  Likely related to her asthma, we'll resume high-dose inhaled corticosteroids if this persists we should repeat stress test    I will see her back in clinic in 4 months.

## 2017-10-09 NOTE — NURSING NOTE
Chief Complaint   Patient presents with     Asthma     Follow up on Dali and her Asthma     Contreras Alves CMA at 12:58 PM on 10/9/2017

## 2017-10-11 ENCOUNTER — OFFICE VISIT (OUTPATIENT)
Dept: ORTHOPEDICS | Facility: CLINIC | Age: 47
End: 2017-10-11

## 2017-10-11 ENCOUNTER — TELEPHONE (OUTPATIENT)
Dept: PHYSICAL MEDICINE AND REHAB | Facility: CLINIC | Age: 47
End: 2017-10-11

## 2017-10-11 DIAGNOSIS — M25.571 PAIN IN JOINT INVOLVING ANKLE AND FOOT, RIGHT: Primary | ICD-10-CM

## 2017-10-11 DIAGNOSIS — M79.644 THUMB PAIN, RIGHT: Primary | ICD-10-CM

## 2017-10-11 DIAGNOSIS — M65.311 TRIGGER FINGER OF RIGHT THUMB: ICD-10-CM

## 2017-10-11 RX ORDER — LIDOCAINE HYDROCHLORIDE 10 MG/ML
1 INJECTION, SOLUTION INFILTRATION; PERINEURAL ONCE
Qty: 1 ML | Refills: 0 | OUTPATIENT
Start: 2017-10-11 | End: 2017-10-11

## 2017-10-11 RX ORDER — BETAMETHASONE SODIUM PHOSPHATE AND BETAMETHASONE ACETATE 3; 3 MG/ML; MG/ML
6 INJECTION, SUSPENSION INTRA-ARTICULAR; INTRALESIONAL; INTRAMUSCULAR; SOFT TISSUE ONCE
Status: DISCONTINUED | OUTPATIENT
Start: 2017-10-11 | End: 2020-09-30

## 2017-10-11 NOTE — LETTER
10/11/2017      RE: Dali Martines  [address]  [facility] [address]       Orthopaedic Surgery Clinic Consult  Date of Service: 10/11/17    Chief Complaint: Right thumb trigger    History of Present Illness:  Pt is a right HD 46 year old female, with history of psoriatic arthritis, presenting with right thumb triggering and pain of 6 months duration. She has never had previous digits trigger in the past. She has never undergone any injections or other therapies for this in the past on this digit. She did have some poorly defined pain and swelling near the A1 pulleys on the left index and long finger for which she had ultrasound therapy many years ago. She seems to attribute this triggering to change in her meloxicam therapy in April. At this point she does not take meloxicam and has been transferred to methotrexate for her psoriatic arthritis. She says the triggering started in April and has continued unabated since then, it is very bothersome with activities such as cooking or other manual activities. She does have a tender nodule which she has noticed over approximately the A1 pulley on the thumb. No associated numbness or tingling, no previous injuries to this region, and no previous surgeries to the hand.    Of note she is a type I diabetic and is fairly well controlled.    Past Medical History:  Past Medical History:   Diagnosis Date     Anemia      Anxiety      Arthritis 2014    Psoriatic arthritis     Back injury 1988     Dry eye syndrome      Dyslipidemia      Endometriosis 2002    adehsions seen at laparoscopy     GERD (gastroesophageal reflux disease)      Hypothyroidism     8 yoa     SOB (shortness of breath) 3/11/2014     Type I (juvenile type) diabetes mellitus without mention of complication, not stated as uncontrolled     when 15      Uncomplicated asthma Fall 2013       Past Surgical History:  Past Surgical History:   Procedure Laterality Date     C REPAIR CRUCIATE LIGAMENT,KNEE       C  STOMACH SURGERY PROCEDURE UNLISTED  December 2002     COLONOSCOPY  2002     ESOPHAGOSCOPY, GASTROSCOPY, DUODENOSCOPY (EGD), COMBINED  1/7/2014    Procedure: COMBINED ESOPHAGOSCOPY, GASTROSCOPY, DUODENOSCOPY (EGD), BIOPSY SINGLE OR MULTIPLE;;  Surgeon: Kraig Nicole MD;  Location:  GI     GYN SURGERY      Laparotomy to remove endometrial tissue     HC BREATH HYDROGEN TEST N/A 6/17/2014    Procedure: HYDROGEN BREATH TEST;  Surgeon: Deacon Alberts MD;  Location:  GI     HYDROGEN BREATH TEST  6/17/14     LAPAROSCOPIC APPENDECTOMY  2002     LAPAROTOMY, LYSIS ADHESIONS, COMBINED  2002    endometriosis     SOFT TISSUE SURGERY  December 2014    right ankle tendon injury       Medications:  Current Outpatient Prescriptions   Medication Sig Dispense Refill     mometasone-formoterol (DULERA) 100-5 MCG/ACT oral inhaler Inhale 2 puffs into the lungs 2 times daily 3 Inhaler 3     methotrexate 2.5 MG tablet CHEMO Take 4 tablets (10 mg) by mouth once a week 24 tablet 2     montelukast (SINGULAIR) 5 MG chewable tablet CHEW AND SWALLOW 1 TABLET(5 MG) BY MOUTH AT BEDTIME 30 tablet 3     folic acid (FOLVITE) 1 MG tablet Take 1 tablet (1 mg) by mouth daily 90 tablet 3     levothyroxine (SYNTHROID/LEVOTHROID) 125 MCG tablet Take 1 tablet (125 mcg) by mouth daily 90 tablet 3     UNABLE TO FIND Biosil       insulin degludec (TRESIBA) 100 UNIT/ML pen Inject 18 Units Subcutaneous daily 15 mL 11     dulaglutide (TRULICITY) 1.5 MG/0.5ML pen Inject 1.5 mg Subcutaneous every 7 days 6 mL 3     NOVOLOG PENFILL 100 UNIT/ML soln 1 unit per 15 grams CHO at all meals and snacks with correction scale of 1 unit per 50 mg/dL over 150.  Average daily use is 20 units. 15 mL 4     blood glucose monitoring (ACCU-CHEK COLIN PLUS) test strip Test Blood Sugar 8 times daily or as directed 700 strip 3     UNABLE TO FIND MEDICATION NAME: biosil       diclofenac (VOLTAREN) 1 % GEL Place 2 g onto the skin 4 times daily as needed for moderate pain 100 g  11     blood glucose monitoring (ACCU-CHEK FASTCLIX) lancets Use to test blood sugar 8 times daily or as directed. 4 Box 3     citalopram (CELEXA) 20 MG tablet Take 1.5 tablets (30 mg) by mouth daily 135 tablet 1     insulin pen needle (B-D U/F) 31G X 8 MM Use 5 pen needles daily 450 each 3     methylphenidate (METADATE CD) 20 MG CR capsule Take 20 mg by mouth daily       traZODone (DESYREL) 50 MG tablet Take 1-2 tablets by mouth nightly as needed for sleep. 180 tablet 0     aspirin 81 MG tablet Take 1 tablet by mouth daily.       Calcium Carbonate-Vitamin D (CALCIUM + D PO) Take one daily       econazole nitrate 1 % cream Apply 0.5 inches topically daily.       fish oil-omega-3 fatty acids (FISH OIL) 1000 MG capsule Take one daily       Multiple Vitamin (MULTIVITAMIN OR) Take  by mouth. Take one daily tab       Ketoconazole (NIZORAL PO) Take  by mouth. Shampoo daily       Minoxidil (ROGAINE EX) Externally apply  topically. 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 Yes     Social History Narrative       Family History:  Family History   Problem Relation Age of Onset     Breast Cancer Mother      HEART DISEASE Father      C.A.D. Father      Arthritis Father      Circulatory Father      Eye Disorder Father      Lipids Father      Thyroid Disease Father      Macular Degeneration Father      Coronary  Artery Disease Father      Anxiety Disorder Father      Alcoholism Father      Rheumatoid Arthritis Father      Hypothyroidism Father      DIABETES Father      CEREBROVASCULAR DISEASE Paternal Grandmother      ,     CANCER Paternal Grandfather      Pancreatic     HEART DISEASE Paternal Grandfather      DIABETES Maternal Grandmother      Type 2     C.A.D. Maternal Grandmother      HEART DISEASE Maternal Grandmother      Coronary Artery Disease Maternal Grandmother      HEART DISEASE Maternal Grandfather      Cardiac Sudden Death Maternal Grandfather      Gynecology Other      self     Thyroid Disease Other      self     Macular Degeneration Maternal Aunt      DIABETES Other      Type 1     Glaucoma No family hx of      Negative for blood clots, bleeding disorders or anesthesia related complications.    Review of Systems:  10 point review of systems is reviewed and is available below.    Physical Exam:  There were no vitals taken for this visit.  She is well-developed well-nourished and in no acute distress.   She is alert and oriented to her surroundings.   RUE:   No obvious deformity   SILT m/u/r/ax nerve distributions, SILT in rad/ulnar aspects of all digits.   Fires interossei, EPL (thumbs up), FDP does fire but is painful and causes triggering.   Palpable tender nodule at the A1 pulley.  Palpable triggering with assisted thumb flexion and extension.   No skin thickening or pitting of the nail plates.   All fingers wwp, radial pulse 2+        Imaging:  None    Assessment:  Pt is a right HD 46 year old female, with history of psoriatic arthritis now presenting with right-sided trigger thumb of 6 months duration. She has not undergone any specific treatment for this to this point. We did discuss surgical and nonsurgical options and also discussed the higher chance for recurrence in a diabetic such as her, if injection pursued.    Plan:   injection to be performed today in clinic to the right thumb flexor  sheath.  -No restrictions  -We will have the patient follow up p.r.n. if she does re-develop triggering at this digit    Procedure:  Written consent was obtained from the patient after discussion of the risks of injection including infection, bleeding, tendon damage, hypopigmentation, and increased blood sugars.  R thumb skin was steriley prepped at the A1 pulley volarly.  1 ml betamethasone (30mg/5ml) and 1ml 1% lidocaine plain were drawn up into a syringe and injection was performed under sterile conditions to the thumb flexor tendon sheath, using a 27 gauge needle.  Patient tolerated this well, no complications.  Wound dressed with a bandaid. The injection was performed by Dr. Uribe.      Grayson Diaz MD  PGY-4, Orthopaedic Surgery  [phone]      I, Chris Uribe, saw and evaluated this patient with the resident and agree with the resident s findings and plan of care as documented in the resident s note.      Chris Uribe MD

## 2017-10-11 NOTE — PROGRESS NOTES
Orthopaedic Surgery Clinic Consult  Date of Service: 10/11/17    Chief Complaint: Right thumb trigger    History of Present Illness:  Pt is a right HD 46 year old female, with history of psoriatic arthritis, presenting with right thumb triggering and pain of 6 months duration. She has never had previous digits trigger in the past. She has never undergone any injections or other therapies for this in the past on this digit. She did have some poorly defined pain and swelling near the A1 pulleys on the left index and long finger for which she had ultrasound therapy many years ago. She seems to attribute this triggering to change in her meloxicam therapy in April. At this point she does not take meloxicam and has been transferred to methotrexate for her psoriatic arthritis. She says the triggering started in April and has continued unabated since then, it is very bothersome with activities such as cooking or other manual activities. She does have a tender nodule which she has noticed over approximately the A1 pulley on the thumb. No associated numbness or tingling, no previous injuries to this region, and no previous surgeries to the hand.    Of note she is a type I diabetic and is fairly well controlled.    Past Medical History:  Past Medical History:   Diagnosis Date     Anemia      Anxiety      Arthritis 2014    Psoriatic arthritis     Back injury 1988     Dry eye syndrome      Dyslipidemia      Endometriosis 2002    adehsions seen at laparoscopy     GERD (gastroesophageal reflux disease)      Hypothyroidism     8 yoa     SOB (shortness of breath) 3/11/2014     Type I (juvenile type) diabetes mellitus without mention of complication, not stated as uncontrolled     when 15      Uncomplicated asthma Fall 2013       Past Surgical History:  Past Surgical History:   Procedure Laterality Date     C REPAIR CRUCIATE LIGAMENT,KNEE       C STOMACH SURGERY PROCEDURE UNLISTED  December 2002     COLONOSCOPY  2002      ESOPHAGOSCOPY, GASTROSCOPY, DUODENOSCOPY (EGD), COMBINED  1/7/2014    Procedure: COMBINED ESOPHAGOSCOPY, GASTROSCOPY, DUODENOSCOPY (EGD), BIOPSY SINGLE OR MULTIPLE;;  Surgeon: Kraig Nicole MD;  Location:  GI     GYN SURGERY      Laparotomy to remove endometrial tissue     HC BREATH HYDROGEN TEST N/A 6/17/2014    Procedure: HYDROGEN BREATH TEST;  Surgeon: Deacon Alberts MD;  Location:  GI     HYDROGEN BREATH TEST  6/17/14     LAPAROSCOPIC APPENDECTOMY  2002     LAPAROTOMY, LYSIS ADHESIONS, COMBINED  2002    endometriosis     SOFT TISSUE SURGERY  December 2014    right ankle tendon injury       Medications:  Current Outpatient Prescriptions   Medication Sig Dispense Refill     mometasone-formoterol (DULERA) 100-5 MCG/ACT oral inhaler Inhale 2 puffs into the lungs 2 times daily 3 Inhaler 3     methotrexate 2.5 MG tablet CHEMO Take 4 tablets (10 mg) by mouth once a week 24 tablet 2     montelukast (SINGULAIR) 5 MG chewable tablet CHEW AND SWALLOW 1 TABLET(5 MG) BY MOUTH AT BEDTIME 30 tablet 3     folic acid (FOLVITE) 1 MG tablet Take 1 tablet (1 mg) by mouth daily 90 tablet 3     levothyroxine (SYNTHROID/LEVOTHROID) 125 MCG tablet Take 1 tablet (125 mcg) by mouth daily 90 tablet 3     UNABLE TO FIND Biosil       insulin degludec (TRESIBA) 100 UNIT/ML pen Inject 18 Units Subcutaneous daily 15 mL 11     dulaglutide (TRULICITY) 1.5 MG/0.5ML pen Inject 1.5 mg Subcutaneous every 7 days 6 mL 3     NOVOLOG PENFILL 100 UNIT/ML soln 1 unit per 15 grams CHO at all meals and snacks with correction scale of 1 unit per 50 mg/dL over 150.  Average daily use is 20 units. 15 mL 4     blood glucose monitoring (ACCU-CHEK COLIN PLUS) test strip Test Blood Sugar 8 times daily or as directed 700 strip 3     UNABLE TO FIND MEDICATION NAME: biosil       diclofenac (VOLTAREN) 1 % GEL Place 2 g onto the skin 4 times daily as needed for moderate pain 100 g 11     blood glucose monitoring (ACCU-CHEK FASTCLIX) lancets Use to test  blood sugar 8 times daily or as directed. 4 Box 3     citalopram (CELEXA) 20 MG tablet Take 1.5 tablets (30 mg) by mouth daily 135 tablet 1     insulin pen needle (B-D U/F) 31G X 8 MM Use 5 pen needles daily 450 each 3     methylphenidate (METADATE CD) 20 MG CR capsule Take 20 mg by mouth daily       traZODone (DESYREL) 50 MG tablet Take 1-2 tablets by mouth nightly as needed for sleep. 180 tablet 0     aspirin 81 MG tablet Take 1 tablet by mouth daily.       Calcium Carbonate-Vitamin D (CALCIUM + D PO) Take one daily       econazole nitrate 1 % cream Apply 0.5 inches topically daily.       fish oil-omega-3 fatty acids (FISH OIL) 1000 MG capsule Take one daily       Multiple Vitamin (MULTIVITAMIN OR) Take  by mouth. Take one daily tab       Ketoconazole (NIZORAL PO) Take  by mouth. Shampoo daily       Minoxidil (ROGAINE EX) Externally apply  topically. daily         Allergies:     Allergies   Allergen Reactions     Sulfasalazine      Developed rash, HA, dizziness       Social History:   nonsmoker  Social History     Social History     Marital status: Single     Spouse name: N/A     Number of children: 0     Years of education: N/A     Occupational History     research [employer]     Social History Main Topics     Smoking status: Never Smoker     Smokeless tobacco: Never Used     Alcohol use 0.0 oz/week     0 Standard drinks or equivalent per week      Comment: moderately     Drug use: No     Sexual activity: Not on file     Other Topics Concern     Parent/Sibling W/ Cabg, Mi Or Angioplasty Before 65f 55m? Yes     Social History Narrative       Family History:  Family History   Problem Relation Age of Onset     Breast Cancer Mother      HEART DISEASE Father      C.A.D. Father      Arthritis Father      Circulatory Father      Eye Disorder Father      Lipids Father      Thyroid Disease Father      Macular Degeneration Father      Coronary Artery Disease Father      Anxiety Disorder Father      Alcoholism  Father      Rheumatoid Arthritis Father      Hypothyroidism Father      DIABETES Father      CEREBROVASCULAR DISEASE Paternal Grandmother      ,     CANCER Paternal Grandfather      Pancreatic     HEART DISEASE Paternal Grandfather      DIABETES Maternal Grandmother      Type 2     C.A.D. Maternal Grandmother      HEART DISEASE Maternal Grandmother      Coronary Artery Disease Maternal Grandmother      HEART DISEASE Maternal Grandfather      Cardiac Sudden Death Maternal Grandfather      Gynecology Other      self     Thyroid Disease Other      self     Macular Degeneration Maternal Aunt      DIABETES Other      Type 1     Glaucoma No family hx of      Negative for blood clots, bleeding disorders or anesthesia related complications.    Review of Systems:  10 point review of systems is reviewed and is available below.    Physical Exam:  There were no vitals taken for this visit.  She is well-developed well-nourished and in no acute distress.   She is alert and oriented to her surroundings.   RUE:   No obvious deformity   SILT m/u/r/ax nerve distributions, SILT in rad/ulnar aspects of all digits.   Fires interossei, EPL (thumbs up), FDP does fire but is painful and causes triggering.   Palpable tender nodule at the A1 pulley.  Palpable triggering with assisted thumb flexion and extension.   No skin thickening or pitting of the nail plates.   All fingers wwp, radial pulse 2+        Imaging:  None    Assessment:  Pt is a right HD 46 year old female, with history of psoriatic arthritis now presenting with right-sided trigger thumb of 6 months duration. She has not undergone any specific treatment for this to this point. We did discuss surgical and nonsurgical options and also discussed the higher chance for recurrence in a diabetic such as her, if injection pursued.    Plan:   injection to be performed today in clinic to the right thumb flexor sheath.  -No restrictions  -We will have the patient follow up p.r.n. if she  does re-develop triggering at this digit    Procedure:  Written consent was obtained from the patient after discussion of the risks of injection including infection, bleeding, tendon damage, hypopigmentation, and increased blood sugars.  R thumb skin was steriley prepped at the A1 pulley volarly.  1 ml betamethasone (30mg/5ml) and 1ml 1% lidocaine plain were drawn up into a syringe and injection was performed under sterile conditions to the thumb flexor tendon sheath, using a 27 gauge needle.  Patient tolerated this well, no complications.  Wound dressed with a bandaid. The injection was performed by Dr. Ramírez.      Grayson Diaz MD  PGY-4, Orthopaedic Surgery  [phone]      IChris, saw and evaluated this patient with the resident and agree with the resident s findings and plan of care as documented in the resident s note.    Answers for HPI/ROS submitted by the patient on 10/9/2017   General Symptoms: No  Skin Symptoms: Yes  HENT Symptoms: No  EYE SYMPTOMS: No  HEART SYMPTOMS: No  LUNG SYMPTOMS: Yes  INTESTINAL SYMPTOMS: No  URINARY SYMPTOMS: Yes  GYNECOLOGIC SYMPTOMS: No  BREAST SYMPTOMS: No  SKELETAL SYMPTOMS: Yes  BLOOD SYMPTOMS: No  NERVOUS SYSTEM SYMPTOMS: No  MENTAL HEALTH SYMPTOMS: No  Changes in hair: No  Changes in moles/birth marks: Yes  Itching: No  Rashes: No  Changes in nails: No  Acne: No  Hair in places you don't want it: No  Change in facial hair: No  Warts: No  Non-healing sores: No  Scarring: No  Flaking of skin: No  Color changes of hands/feet in cold : No  Sun sensitivity: No  Skin thickening: No  Cough: No  Sputum or phlegm: Yes  Coughing up blood: No  Difficulty breating or shortness of breath: Yes  Snoring: No  Wheezing: No  Difficulty breathing on exertion: Yes  Respiratory pain: Yes  Nighttime Cough: No  Difficulty breathing when lying flat: No  Trouble holding urine or incontinence: No  Pain or burning: No  Trouble starting or stopping: No  Increased frequency of  urination: Yes  Blood in urine: No  Decreased frequency of urination: No  Frequent nighttime urination: No  Flank pain: No  Difficulty emptying bladder: No  Back pain: Yes  Muscle aches: No  Neck pain: No  Swollen joints: Yes  Joint pain: Yes  Bone pain: Yes  Muscle cramps: No  Muscle weakness: No  Joint stiffness: Yes  Bone fracture: No

## 2017-10-11 NOTE — NURSING NOTE
Reason For Visit:   Chief Complaint   Patient presents with     Consult     Referred by Dr Betancourt for triggerfinger right thumb, since 4/2017.       Primary MD: Dimitri Alas  Ref. MD: Dr Yoel Betancourt    Age: 46 year old    ?  No          Pain Assessment  Patient Currently in Pain: No    Hand Dominance Evaluation  Hand Dominance: Right          QuickDASH Main 10/11/2017   1.Open a tight or new jar. No difficulty   2. Do heavy household chores (e.g., wash walls, floors) No difficulty   3. Carry a shopping bag or briefcase. No difficulty   4. Wash your back. No difficulty   5. Use a knife to cut food. Severe difficulty   6. Recreational activities in which you take some force or impact through your arm, shoulder or hand (e.g., golf, hammering, tennis, etc.). Severe difficulty   7. During the past week, to what extent has your arm, shoulder or hand problem interfered with your normal social activities with family, friends, neighbours or groups? Slightly   8. During the past week, were you limited in your work or other regular daily activities as a result of your arm, shoulder or hand problem? Very limited   9. Arm, shoulder or hand pain. Moderate   10.Tingling (pins and needles) in your arm,shoulder or hand. Moderate   11. During the past week, how much difficulty have you had sleeping because of the pain in your arm, shoulder or hand? ([place] number) No difficulty   Quickdash Ability Score 31.81       Current Outpatient Prescriptions   Medication Sig Dispense Refill     mometasone-formoterol (DULERA) 100-5 MCG/ACT oral inhaler Inhale 2 puffs into the lungs 2 times daily 3 Inhaler 3     methotrexate 2.5 MG tablet CHEMO Take 4 tablets (10 mg) by mouth once a week 24 tablet 2     montelukast (SINGULAIR) 5 MG chewable tablet CHEW AND SWALLOW 1 TABLET(5 MG) BY MOUTH AT BEDTIME 30 tablet 3     folic acid (FOLVITE) 1 MG tablet Take 1 tablet (1 mg) by mouth daily 90 tablet 3     levothyroxine  (SYNTHROID/LEVOTHROID) 125 MCG tablet Take 1 tablet (125 mcg) by mouth daily 90 tablet 3     UNABLE TO FIND Biosil       insulin degludec (TRESIBA) 100 UNIT/ML pen Inject 18 Units Subcutaneous daily 15 mL 11     dulaglutide (TRULICITY) 1.5 MG/0.5ML pen Inject 1.5 mg Subcutaneous every 7 days 6 mL 3     NOVOLOG PENFILL 100 UNIT/ML soln 1 unit per 15 grams CHO at all meals and snacks with correction scale of 1 unit per 50 mg/dL over 150.  Average daily use is 20 units. 15 mL 4     blood glucose monitoring (ACCU-CHEK COLIN PLUS) test strip Test Blood Sugar 8 times daily or as directed 700 strip 3     UNABLE TO FIND MEDICATION NAME: biosil       diclofenac (VOLTAREN) 1 % GEL Place 2 g onto the skin 4 times daily as needed for moderate pain 100 g 11     blood glucose monitoring (ACCU-CHEK FASTCLIX) lancets Use to test blood sugar 8 times daily or as directed. 4 Box 3     citalopram (CELEXA) 20 MG tablet Take 1.5 tablets (30 mg) by mouth daily 135 tablet 1     insulin pen needle (B-D U/F) 31G X 8 MM Use 5 pen needles daily 450 each 3     methylphenidate (METADATE CD) 20 MG CR capsule Take 20 mg by mouth daily       traZODone (DESYREL) 50 MG tablet Take 1-2 tablets by mouth nightly as needed for sleep. 180 tablet 0     aspirin 81 MG tablet Take 1 tablet by mouth daily.       Calcium Carbonate-Vitamin D (CALCIUM + D PO) Take one daily       econazole nitrate 1 % cream Apply 0.5 inches topically daily.       fish oil-omega-3 fatty acids (FISH OIL) 1000 MG capsule Take one daily       Multiple Vitamin (MULTIVITAMIN OR) Take  by mouth. Take one daily tab       Ketoconazole (NIZORAL PO) Take  by mouth. Shampoo daily       Minoxidil (ROGAINE EX) Externally apply  topically. daily         Allergies   Allergen Reactions     Sulfasalazine      Developed rash, HA, dizziness       Bhavya Laurent LPN      The following medication was given:     MEDICATION:  Lidocaine without epinephrine  ROUTE: soft tissue  SITE: left thump  flexor tendon sheath  DOSE: 1 mL  LOT #: [id number]  : [employer]  EXPIRATION DATE: 6.1.19  NDC#: 7478-0591-99  Was there drug waste? Yes  Amount of drug waste (mL): 19.  Reason for waste:  Single use vial      Bhavya Laurent LPN  October 11, 2017    The following medication was given:     MEDICATION:  Betamethasone sodium phosphate  ROUTE: soft tissue  SITE: Left flexor tendon sheath  DOSE: 1 mL  LOT #: [id number]  : [employer]  EXPIRATION DATE: 2/2019  NDC#: 2562-1974-43   Was there drug waste? Yes  Amount of drug waste (mL): 4.  Reason for waste:  Single use vial      Bhavya Laurent LPN  October 11, 2017

## 2017-10-11 NOTE — TELEPHONE ENCOUNTER
Patient informed that Dr Villagomez placed PHYSICAL THERAPY orders for [facility]. Patient has a follow up appointment with Dr Villagomez on 12/6/17.

## 2017-10-11 NOTE — MR AVS SNAPSHOT
After Visit Summary   10/11/2017    Dali Martines    MRN: [id number]           Patient Information     Date Of Birth          1970        Visit Information        Provider Department      10/11/2017 11:15 AM Chris Uribe MD [facility]        Today's Diagnoses     Thumb pain, right    -  1    Trigger finger of right thumb           Follow-ups after your visit        Your next 10 appointments already scheduled     Oct 20, 2017  2:30 PM CDT   (Arrive by 2:15 PM)   Office Visit with Luz Marina Holley RD   [facility] ([facility])    [address] [place] [id number] [phone]           Bring a current list of meds and any records pertaining to this visit. For Physicals, please bring immunization records and any forms needing to be filled out. Please arrive 10 minutes early to complete paperwork.            Dec 06, 2017  3:30 PM CST   (Arrive by 3:15 PM)   Return Visit with Kraig Villagomez MD   [facility] ([facility])    [address] [place] [address] [id number] [phone]            Dec 11, 2017 12:00 PM CST   (Arrive by 11:45 AM)   Return Visit with Sara Carter MD   [facility] ([facility])    [address] [place] [id number] [phone]            Jan 09, 2018  2:30 PM CST   (Arrive by 2:15 PM)   RETURN DIABETES with Felicia Mckeon MD   [facility] ([facility])    [address] [place] [id number] [phone]            Feb 01, 2018  2:30 PM CST   (Arrive by 2:15 PM)   Return Visit with Yoel Betancourt MD   [facility] Rheumat[facility] ([facility])    [address] [place] [address] [id number] [phone]              Who to contact      Please call your clinic at [phone] to:    Ask questions about your health    Make or cancel appointments    Discuss your medicines    Learn about your test results    Speak to your doctor   If you have compliments or concerns about an experience at your clinic, or if you wish to file a complaint, please contact [facility] Patient Relations at [phone] or email us at [email or web address]         Additional Information About Your Visit        [employer] Information     [employer] gives you secure access to your electronic health record. If you see a primary care provider, you can also send messages to your care team and make appointments. If you have questions, please call your primary care clinic.  If you do not have a primary care provider, please call [phone] and they will assist you.      Razz is an electronic gateway that provides easy, online access to your medical records. With Razz, you can request a clinic appointment, read your test results, renew a prescription or communicate with your care team.     To access your existing account, please contact your [facility] or call [phone] for assistance.        Care EveryWhere ID     This is your Care EveryWhere ID. This could be used by other organizations to access your [facility] medical records  [id number]         Blood Pressure from Last 3 Encounters:   10/09/17 130/86   09/28/17 125/82   08/17/17 118/79    Weight from Last 3 Encounters:   10/09/17 85.3 kg (188 lb)   09/28/17 86.3 kg (190 lb 3.2 oz)   08/17/17 86.2 kg (190 lb)              We Performed the Following     INJECTION SINGLE TENDON SHEATH/LIGAMENT          Today's Medication Changes          These changes are accurate as of: 10/11/17  4:19 PM.  If you have any questions, ask your nurse or doctor.               Start taking these medicines.        Dose/Directions    lidocaine 1 % injection   Used for:  Thumb pain,  right   Started by:  Chris Uribe MD        Dose:  1 mL   1 mL by INTRA-ARTICULAR route once for 1 dose   Quantity:  1 mL   Refills:  0            Where to get your medicines      Some of these will need a paper prescription and others can be bought over the counter.  Ask your nurse if you have questions.     You don't need a prescription for these medications     lidocaine 1 % injection                Primary Care Provider Office Phone # Fax #    Dimitri Alas [phone]        [address] [address]        Equal Access to Services     [facility]: Hadii aad ku hadasho Soomaali, waaxda luqadaha, qaybta kaalmada adeegyada, waxay joanin hayravi garrison . So [facility] [phone].    ATENCIÓN: Si habla español, tiene a ramsey disposición servicios gratuitos de asistencia lingüística. [facility] [phone].    We comply with applicable federal civil rights laws and [place] laws. We do not discriminate on the basis of race, color, national origin, age, disability, sex, sexual orientation, or gender identity.            Thank you!     Thank you for choosing [facility]  for your care. Our goal is always to provide you with excellent care. Hearing back from our patients is one way we can continue to improve our services. Please take a few minutes to complete the written survey that you may receive in the mail after your visit with us. Thank you!             Your Updated Medication List - Protect others around you: Learn how to safely use, store and throw away your medicines at www.disposemymeds.org.          This list is accurate as of: 10/11/17  4:19 PM.  Always use your most recent med list.                   Brand Name Dispense Instructions for use Diagnosis    aspirin 81 MG tablet      Take 1 tablet by mouth daily.        blood glucose monitoring lancets     4 Box    Use to test blood sugar 8 times daily or as directed.    Type 1 diabetes,  HbA1c goal < 7% (H)       blood glucose monitoring test strip    ACCU-CHEK COLIN PLUS    700 strip    Test Blood Sugar 8 times daily or as directed    Type 1 diabetes, HbA1c goal < 7% (H)       CALCIUM + D PO      Take one daily        citalopram 20 MG tablet    celeXA    135 tablet    Take 1.5 tablets (30 mg) by mouth daily    Anxiety       diclofenac 1 % Gel topical gel    VOLTAREN    100 g    Place 2 g onto the skin 4 times daily as needed for moderate pain    Enthesopathy, Pain in joint, multiple sites, Chronic pain of right ankle       dulaglutide 1.5 MG/0.5ML pen    TRULICITY    6 mL    Inject 1.5 mg Subcutaneous every 7 days    Type 1 diabetes mellitus without complication (H)       econazole nitrate 1 % cream      Apply 0.5 inches topically daily.        fish oil-omega-3 fatty acids 1000 MG capsule      Take one daily        folic acid 1 MG tablet    FOLVITE    90 tablet    Take 1 tablet (1 mg) by mouth daily    Pain in joint, multiple sites, Tendinitis, Encounter for therapeutic drug monitoring       insulin degludec 100 UNIT/ML pen    TRESIBA    15 mL    Inject 18 Units Subcutaneous daily    Type 1 diabetes mellitus without complication (H)       insulin pen needle 31G X 8 MM    B-D U/F    450 each    Use 5 pen needles daily    Diabetes mellitus type I (H)       levothyroxine 125 MCG tablet    SYNTHROID/LEVOTHROID    90 tablet    Take 1 tablet (125 mcg) by mouth daily    Hypothyroidism       lidocaine 1 % injection     1 mL    1 mL by INTRA-ARTICULAR route once for 1 dose    Thumb pain, right       methotrexate 2.5 MG tablet CHEMO     24 tablet    Take 4 tablets (10 mg) by mouth once a week    Pain in joint, multiple sites, Tendinitis, Encounter for therapeutic drug monitoring       methylphenidate 20 MG CR capsule    METADATE CD     Take 20 mg by mouth daily        mometasone-formoterol 100-5 MCG/ACT oral inhaler    DULERA    3 Inhaler    Inhale 2 puffs into the lungs 2 times daily    Moderate persistent  asthma without complication       montelukast 5 MG chewable tablet    SINGULAIR    30 tablet    CHEW AND SWALLOW 1 TABLET(5 MG) BY MOUTH AT BEDTIME    Mild persistent asthma without complication       MULTIVITAMIN PO      Take  by mouth. Take one daily tab        NIZORAL PO      Take  by mouth. Shampoo daily        NovoLOG PENFILL 100 UNIT/ML injection   Generic drug:  insulin aspart     15 mL    1 unit per 15 grams CHO at all meals and snacks with correction scale of 1 unit per 50 mg/dL over 150.  Average daily use is 20 units.    Type 1 diabetes, HbA1c goal < 7% (H)       ROGAINE EX      Externally apply  topically. daily        traZODone 50 MG tablet    DESYREL    180 tablet    Take 1-2 tablets by mouth nightly as needed for sleep.    Insomnia       * UNABLE TO FIND      MEDICATION NAME: biosil    Enthesopathy, Pain in joint, multiple sites, Chronic pain of right ankle       * UNABLE TO FIND      Biosil        * Notice:  This list has 2 medication(s) that are the same as other medications prescribed for you. Read the directions carefully, and ask your doctor or other care provider to review them with you.

## 2017-10-21 ENCOUNTER — THERAPY VISIT (OUTPATIENT)
Dept: PHYSICAL THERAPY | Facility: CLINIC | Age: 47
End: 2017-10-21
Payer: COMMERCIAL

## 2017-10-21 DIAGNOSIS — M25.571 PAIN IN JOINT INVOLVING ANKLE AND FOOT, RIGHT: Primary | ICD-10-CM

## 2017-10-21 PROCEDURE — 97112 NEUROMUSCULAR REEDUCATION: CPT | Mod: GP | Performed by: PHYSICAL THERAPIST

## 2017-10-21 PROCEDURE — 97110 THERAPEUTIC EXERCISES: CPT | Mod: GP | Performed by: PHYSICAL THERAPIST

## 2017-11-04 ENCOUNTER — THERAPY VISIT (OUTPATIENT)
Dept: PHYSICAL THERAPY | Facility: CLINIC | Age: 47
End: 2017-11-04
Payer: COMMERCIAL

## 2017-11-04 DIAGNOSIS — M25.571 PAIN IN JOINT INVOLVING ANKLE AND FOOT, RIGHT: ICD-10-CM

## 2017-11-04 PROCEDURE — 97112 NEUROMUSCULAR REEDUCATION: CPT | Mod: GP | Performed by: PHYSICAL THERAPIST

## 2017-11-04 PROCEDURE — 97110 THERAPEUTIC EXERCISES: CPT | Mod: GP | Performed by: PHYSICAL THERAPIST

## 2017-11-04 NOTE — MR AVS SNAPSHOT
After Visit Summary   11/4/2017    Dali Martines    MRN: [id number]           Patient Information     Date Of Birth          1970        Visit Information        Provider Department      11/4/2017 11:10 AM Chanelle Muniz M, PT [place] [facility]        Today's Diagnoses     Pain in joint involving ankle and foot, right           Follow-ups after your visit        Your next 10 appointments already scheduled     Nov 17, 2017  1:30 PM CST   (Arrive by 1:15 PM)   Office Visit with Luz Marina Holley RD   [facility] ([facility])    [address] [place] [id number]   [phone]           Bring a current list of meds and any records pertaining to this visit. For Physicals, please bring immunization records and any forms needing to be filled out. Please arrive 10 minutes early to complete paperwork.            Nov 18, 2017 11:10 AM CST   LAKISHA Extremity with Michi Esteves Pt, PT   [place] [facility] ([facility]  )    [address]  [facility] [address]   [phone]            Dec 06, 2017  3:30 PM CST   (Arrive by 3:15 PM)   Return Visit with Kraig Villagomez MD   [facility] ([facility])    [address] [place] [address]   [phone]            Dec 11, 2017 12:00 PM CST   (Arrive by 11:45 AM)   Return Visit with Sara Carter MD   [facility] ([facility])    [address] [place] [address]   [phone]            Jan 09, 2018  2:30 PM CST   (Arrive by 2:15 PM)   RETURN DIABETES with Felicia Mckeon MD   [facility] End[facility] ([facility])    [address] [place] [address]   [phone]            Feb 01, 2018  2:30 PM  CST   (Arrive by 2:15 PM)   Return Visit with Yoel Betancourt MD   [facility] ([facility])    [address] [facility] [address] [phone]              Who to contact     If you have questions or need follow up information about today's clinic visit or your schedule please contact [facility] directly at [phone].  Normal or non-critical lab and imaging results will be communicated to you by [employer], letter or phone within 4 business days after the clinic has received the results. If you do not hear from us within 7 days, please contact the clinic through [employer] or phone. If you have a critical or abnormal lab result, we will notify you by phone as soon as possible.  Submit refill requests through [employer] or call your pharmacy and they will forward the refill request to us. Please allow 3 business days for your refill to be completed.          Additional Information About Your Visit        [employer] Information     [employer] gives you secure access to your electronic health record. If you see a primary care provider, you can also send messages to your care team and make appointments. If you have questions, please call your primary care clinic.  If you do not have a primary care provider, please call [phone] and they will assist you.        Care EveryWhere ID     This is your Care EveryWhere ID. This could be used by other organizations to access your [place] medical records  [id number]         Blood Pressure from Last 3 Encounters:   10/09/17 130/86   09/28/17 125/82   08/17/17 118/79    Weight from Last 3 Encounters:   10/09/17 85.3 kg (188 lb)   09/28/17 86.3 kg (190 lb 3.2 oz)   08/17/17 86.2 kg (190 lb)              We Performed the Following     LAKISHA PROGRESS NOTES REPORT     NEUROMUSCULAR RE-EDUCATION     THERAPEUTIC EXERCISES        Primary Care Provider Office Phone # Fax #    Dimitri CARVER  [place] [phone]       [address] [address]        Equal Access to Services     DALIA MOORE : Diego randi aguilar xin Castillo, seniada fabianaraulha, angelia kaanuragda jemal, kaitlin oquendoanaya umer. So [facility] [phone].    ATENCIÓN: Si habla español, tiene a ramsey disposición servicios gratuitos de asistencia lingüística. Llame al [phone].    We comply with applicable federal civil rights laws and [place] laws. We do not discriminate on the basis of race, color, national origin, age, disability, sex, sexual orientation, or gender identity.            Thank you!     Thank you for choosing [place] [facility]  for your care. Our goal is always to provide you with excellent care. Hearing back from our patients is one way we can continue to improve our services. Please take a few minutes to complete the written survey that you may receive in the mail after your visit with us. Thank you!             Your Updated Medication List - Protect others around you: Learn how to safely use, store and throw away your medicines at www.disposemymeds.org.          This list is accurate as of: 11/4/17 12:05 PM.  Always use your most recent med list.                   Brand Name Dispense Instructions for use Diagnosis    aspirin 81 MG tablet      Take 1 tablet by mouth daily.        blood glucose monitoring lancets     4 Box    Use to test blood sugar 8 times daily or as directed.    Type 1 diabetes, HbA1c goal < 7% (H)       blood glucose monitoring test strip    ACCU-CHEK COLIN PLUS    700 strip    Test Blood Sugar 8 times daily or as directed    Type 1 diabetes, HbA1c goal < 7% (H)       CALCIUM + D PO      Take one daily        citalopram 20 MG tablet    celeXA    135 tablet    Take 1.5 tablets (30 mg) by mouth daily    Anxiety       diclofenac 1 % Gel topical gel    VOLTAREN    100 g    Place 2 g onto the skin 4 times daily as  needed for moderate pain    Enthesopathy, Pain in joint, multiple sites, Chronic pain of right ankle       dulaglutide 1.5 MG/0.5ML pen    TRULICITY    6 mL    Inject 1.5 mg Subcutaneous every 7 days    Type 1 diabetes mellitus without complication (H)       econazole nitrate 1 % cream      Apply 0.5 inches topically daily.        fish oil-omega-3 fatty acids 1000 MG capsule      Take one daily        folic acid 1 MG tablet    FOLVITE    90 tablet    Take 1 tablet (1 mg) by mouth daily    Pain in joint, multiple sites, Tendinitis, Encounter for therapeutic drug monitoring       insulin degludec 100 UNIT/ML pen    TRESIBA    15 mL    Inject 18 Units Subcutaneous daily    Type 1 diabetes mellitus without complication (H)       insulin pen needle 31G X 8 MM    B-D U/F    450 each    Use 5 pen needles daily    Diabetes mellitus type I (H)       levothyroxine 125 MCG tablet    SYNTHROID/LEVOTHROID    90 tablet    Take 1 tablet (125 mcg) by mouth daily    Hypothyroidism       methotrexate 2.5 MG tablet CHEMO     24 tablet    Take 4 tablets (10 mg) by mouth once a week    Pain in joint, multiple sites, Tendinitis, Encounter for therapeutic drug monitoring       methylphenidate 20 MG CR capsule    METADATE CD     Take 20 mg by mouth daily        mometasone-formoterol 100-5 MCG/ACT oral inhaler    DULERA    3 Inhaler    Inhale 2 puffs into the lungs 2 times daily    Moderate persistent asthma without complication       montelukast 5 MG chewable tablet    SINGULAIR    30 tablet    CHEW AND SWALLOW 1 TABLET(5 MG) BY MOUTH AT BEDTIME    Mild persistent asthma without complication       MULTIVITAMIN PO      Take  by mouth. Take one daily tab        NIZORAL PO      Take  by mouth. Shampoo daily        NovoLOG PENFILL 100 UNIT/ML injection   Generic drug:  insulin aspart     15 mL    1 unit per 15 grams CHO at all meals and snacks with correction scale of 1 unit per 50 mg/dL over 150.  Average daily use is 20 units.    Type 1  diabetes, HbA1c goal < 7% (H)       ROGAINE EX      Externally apply  topically. daily        traZODone 50 MG tablet    DESYREL    180 tablet    Take 1-2 tablets by mouth nightly as needed for sleep.    Insomnia       * UNABLE TO FIND      MEDICATION NAME: biosil    Enthesopathy, Pain in joint, multiple sites, Chronic pain of right ankle       * UNABLE TO FIND      Biosil        * Notice:  This list has 2 medication(s) that are the same as other medications prescribed for you. Read the directions carefully, and ask your doctor or other care provider to review them with you.

## 2017-11-04 NOTE — PROGRESS NOTES
"Subjective:    HPI                    Objective:    System    Physical Exam    General     ROS    Assessment/Plan:      PROGRESS  REPORT    Progress reporting period is from 9/16/17 to 11/4/17.       SUBJECTIVE  Subjective changes noted by patient:  Feeling stronger, especially with the glutes.  This has helped her to walk with a normal gait and speed now.  Still limited to 2-3 blocks though, pain is less now.  Only gets up to a 5/10.  Has intermittently advanced to standing toe raises, but still gets swelling with it so she doesn't do it consistently.    Current Pain level:  (1-5/10).      Initial Pain level:  (3-8/10).   Changes in function:  Yes, but very slow  Adverse reaction to treatment or activity: yes, still gets swelling and soreness when trying the standing toe raises    OBJECTIVE  Changes noted in objective findings:  Yes,     R ankle AROM: PF 65, DF 8, IV 20+, EV 19.    R ankle strength 5/5.    SLS 30\" B.  However, very difficult on R LE when adding trunk movement to balance.   Tight hip flexors and left trunk rotation  Weak L knee VMO  +anterior drawer on R ankle and tender ATF    ASSESSMENT/PLAN  Updated problem list and treatment plan: Diagnosis 1:  R ankle pain and instability  Pain -  self management, education and home program  Decreased ROM/flexibility - manual therapy, therapeutic exercise and home program  Decreased strength - therapeutic exercise, therapeutic activities and home program  Impaired balance - neuro re-education, therapeutic activities and home program  Decreased proprioception - neuro re-education, therapeutic activities and home program  Impaired muscle performance - neuro re-education and home program  Decreased function - therapeutic activities and home program  STG/LTGs have been met or progress has been made towards goals:  Yes (See Goal flow sheet completed today.)  Assessment of Progress: The patient's condition has potential to improve.  Self Management Plans:  Patient " has been instructed in a home treatment program.  Patient  has been instructed in self management of symptoms.  I have re-evaluated this patient and find that the nature, scope, duration and intensity of the therapy is appropriate for the medical condition of the patient.  Dali continues to require the following intervention to meet STG and LTG's:  PT    Recommendations:  This patient would benefit from continued therapy.     Frequency:  2 X a month, once daily  Duration:  for 4 visits          Please refer to the daily flowsheet for treatment today, total treatment time and time spent performing 1:1 timed codes.

## 2017-11-17 ENCOUNTER — OFFICE VISIT (OUTPATIENT)
Dept: EDUCATION SERVICES | Facility: CLINIC | Age: 47
End: 2017-11-17

## 2017-11-17 VITALS — BODY MASS INDEX: 27.05 KG/M2 | WEIGHT: 188.5 LBS

## 2017-11-17 DIAGNOSIS — E10.9 TYPE 1 DIABETES MELLITUS WITHOUT COMPLICATION (H): Primary | ICD-10-CM

## 2017-11-17 NOTE — MR AVS SNAPSHOT
After Visit Summary   11/17/2017    Dali Martines    MRN: [id number]           Patient Information     Date Of Birth          1970        Visit Information        Provider Department      11/17/2017 1:30 PM Luz Marina Holley RD [facility] Diabetes        Care Instructions    1. We reviewed healthy low saturated, low trans fat diet today   2. We reviewed carbohydrate counting today. Measure again for awhile for better accuracy.   3. We reviewed calories in foods today, consider buying Calorie Magdiel Carbohydrate, Fat and Protein Counter to help you be more aware of what you are eating.  4. Try [employer]il[employer]fitInnovit[employer].com or JewelStreet for more information  5. A good calorie goal to start for you is 1800 calories. For now, you will log your food and carbs and blood sugars to keep better track.  6. Follow up one month, December 18th at 2:30  Luz Marina Holley RD, LD, CDE  Diabetes Care  [address]  [address] [place] [address]  [address]  Phone: [phone]  Appointment line: [phone]  Email: [email or web address]              Follow-ups after your visit        Your next 10 appointments already scheduled     Nov 18, 2017 11:10 AM CST   LAKISHA Extremity with Michi Esteves Pt, PT   [facility] ([facility]  )    [address]  [facility] [id number] [phone]            Dec 06, 2017  3:30 PM CST   (Arrive by 3:15 PM)   Return Visit with Kraig Villagomez MD   [facility] ([facility])    [address] [place] [id number] [phone]            Dec 11, 2017 12:00 PM CST   (Arrive by 11:45 AM)   Return Visit with Sara Carter MD   [facility] ([facility])    [address] [place] [id number] [phone]            Jan 09, 2018  2:30 PM  CST   (Arrive by 2:15 PM)   RETURN DIABETES with Felicia Mckeon MD   [facility] Endocrinology ([facility])    [address]  [address] [facility] [id number] [phone]            Feb 01, 2018  2:30 PM CST   (Arrive by 2:15 PM)   Return Visit with Yoel Betancourt MD   [facility] Rheumatology ([facility])    [address]  [address] [facility] [address] [phone]              Who to contact     Please call your clinic at [phone] to:    Ask questions about your health    Make or cancel appointments    Discuss your medicines    Learn about your test results    Speak to your doctor   If you have compliments or concerns about an experience at your clinic, or if you wish to file a complaint, please contact [facility] Patient Relations at [phone] or email us at [email or web address]         Additional Information About Your Visit        Comprehend Systemshargo2 media Information     Simply Wall Stt gives you secure access to your electronic health record. If you see a primary care provider, you can also send messages to your care team and make appointments. If you have questions, please call your primary care clinic.  If you do not have a primary care provider, please call [phone] and they will assist you.      Datappraise is an electronic gateway that provides easy, online access to your medical records. With Datappraise, you can request a clinic appointment, read your test results, renew a prescription or communicate with your care team.     To access your existing account, please contact your [facility] or call [phone] for assistance.        Care EveryWhere ID     This is your Care EveryWhere ID. This could be used by other organizations to access your [place] medical records  [id number]         Blood Pressure from Last 3 Encounters:   10/09/17 130/86   09/28/17 125/82   08/17/17  118/79    Weight from Last 3 Encounters:   10/09/17 85.3 kg (188 lb)   09/28/17 86.3 kg (190 lb 3.2 oz)   08/17/17 86.2 kg (190 lb)              Today, you had the following     No orders found for display       Primary Care Provider Office Phone # Fax #    Dimitri Alas [phone] [address] [address]        Equal Access to Services     DALIA MOORE : Hadii aad ku hadasho Soomaali, waaxda luqadaha, qaybta kaalmada adeegyada, waxay idiin hayaan adeeg kharash la'niman umer. So [facility] [phone].    ATENCIÓN: Si habla español, tiene a ramsey disposición servicios gratuitos de asistencia lingüística. [facility] [phone].    We comply with applicable federal civil rights laws and [place] laws. We do not discriminate on the basis of race, color, national origin, age, disability, sex, sexual orientation, or gender identity.            Thank you!     Thank you for choosing [facility]  for your care. Our goal is always to provide you with excellent care. Hearing back from our patients is one way we can continue to improve our services. Please take a few minutes to complete the written survey that you may receive in the mail after your visit with us. Thank you!             Your Updated Medication List - Protect others around you: Learn how to safely use, store and throw away your medicines at www.disposemymeds.org.          This list is accurate as of: 11/17/17  2:37 PM.  Always use your most recent med list.                   Brand Name Dispense Instructions for use Diagnosis    aspirin 81 MG tablet      Take 1 tablet by mouth daily.        blood glucose monitoring lancets     4 Box    Use to test blood sugar 8 times daily or as directed.    Type 1 diabetes, HbA1c goal < 7% (H)       blood glucose monitoring test strip    ACCU-CHEK COLIN PLUS    700 strip    Test Blood Sugar 8 times daily or as directed    Type 1 diabetes, HbA1c goal < 7% (H)       CALCIUM + D PO       Take one daily        citalopram 20 MG tablet    celeXA    135 tablet    Take 1.5 tablets (30 mg) by mouth daily    Anxiety       diclofenac 1 % Gel topical gel    VOLTAREN    100 g    Place 2 g onto the skin 4 times daily as needed for moderate pain    Enthesopathy, Pain in joint, multiple sites, Chronic pain of right ankle       dulaglutide 1.5 MG/0.5ML pen    TRULICITY    6 mL    Inject 1.5 mg Subcutaneous every 7 days    Type 1 diabetes mellitus without complication (H)       econazole nitrate 1 % cream      Apply 0.5 inches topically daily.        fish oil-omega-3 fatty acids 1000 MG capsule      Take one daily        folic acid 1 MG tablet    FOLVITE    90 tablet    Take 1 tablet (1 mg) by mouth daily    Pain in joint, multiple sites, Tendinitis, Encounter for therapeutic drug monitoring       insulin degludec 100 UNIT/ML pen    TRESIBA    15 mL    Inject 18 Units Subcutaneous daily    Type 1 diabetes mellitus without complication (H)       insulin pen needle 31G X 8 MM    B-D U/F    450 each    Use 5 pen needles daily    Diabetes mellitus type I (H)       levothyroxine 125 MCG tablet    SYNTHROID/LEVOTHROID    90 tablet    Take 1 tablet (125 mcg) by mouth daily    Hypothyroidism       methotrexate 2.5 MG tablet CHEMO     24 tablet    Take 4 tablets (10 mg) by mouth once a week    Pain in joint, multiple sites, Tendinitis, Encounter for therapeutic drug monitoring       methylphenidate 20 MG CR capsule    METADATE CD     Take 20 mg by mouth daily        mometasone-formoterol 100-5 MCG/ACT oral inhaler    DULERA    3 Inhaler    Inhale 2 puffs into the lungs 2 times daily    Moderate persistent asthma without complication       montelukast 5 MG chewable tablet    SINGULAIR    30 tablet    CHEW AND SWALLOW 1 TABLET(5 MG) BY MOUTH AT BEDTIME    Mild persistent asthma without complication       MULTIVITAMIN PO      Take  by mouth. Take one daily tab        NIZORAL PO      Take  by mouth. Shampoo daily         NovoLOG PENFILL 100 UNIT/ML injection   Generic drug:  insulin aspart     15 mL    1 unit per 15 grams CHO at all meals and snacks with correction scale of 1 unit per 50 mg/dL over 150.  Average daily use is 20 units.    Type 1 diabetes, HbA1c goal < 7% (H)       ROGAINE EX      Externally apply  topically. daily        traZODone 50 MG tablet    DESYREL    180 tablet    Take 1-2 tablets by mouth nightly as needed for sleep.    Insomnia       * UNABLE TO FIND      MEDICATION NAME: biosil    Enthesopathy, Pain in joint, multiple sites, Chronic pain of right ankle       * UNABLE TO FIND      Biosil        * Notice:  This list has 2 medication(s) that are the same as other medications prescribed for you. Read the directions carefully, and ask your doctor or other care provider to review them with you.

## 2017-11-17 NOTE — PROGRESS NOTES
"  Diabetes Self Management Training: Individual Review Visit    Dali Martines presents today for education related to Type 1 diabetes.    She is accompanied by self    Patient Problem List and Family Medical History reviewed for relevant medical history, current medical status, and diabetes risk factors.    Current Diabetes Management per Patient:  Taking diabetes medications?   yes:     Diabetes Medication(s)     Insulin Sig    insulin degludec (TRESIBA) 100 UNIT/ML pen Inject 18 Units Subcutaneous daily    NOVOLOG PENFILL 100 UNIT/ML soln 1 unit per 15 grams CHO at all meals and snacks with correction scale of 1 unit per 50 mg/dL over 150.  Average daily use is 20 units.    Incretin Mimetic Agents (GLP-1 Receptor Agonists) Sig    dulaglutide (TRULICITY) 1.5 MG/0.5ML pen Inject 1.5 mg Subcutaneous every 7 days          Past Diabetes Education: Yes    Patient glucose self monitoring as follows: Dali brings food/bg/insulin records for review today.        Vitals:  Wt 85.5 kg (188 lb 8 oz)  BMI 27.05 kg/m2  Estimated body mass index is 27.05 kg/(m^2) as calculated from the following:    Height as of 10/9/17: 1.778 m (5' 10\").    Weight as of this encounter: 85.5 kg (188 lb 8 oz).   Last 3 BP:   BP Readings from Last 3 Encounters:   10/09/17 130/86   09/28/17 125/82   08/17/17 118/79     History   Smoking Status     Never Smoker   Smokeless Tobacco     Never Used       Labs:  Lab Results   Component Value Date    A1C 7.2 09/16/2013     Lab Results   Component Value Date     04/27/2016     Lab Results   Component Value Date     09/27/2013     HDL Cholesterol   Date Value Ref Range Status   09/27/2013 78 50 - 110 mg/dL Final   ]  GFR Estimate   Date Value Ref Range Status   09/28/2017 >90 >60 mL/min/1.7m2 Final     Comment:     Non  GFR Calc     GFR Estimate If Black   Date Value Ref Range Status   09/28/2017 >90 >60 mL/min/1.7m2 Final     Comment:      GFR Calc     Lab " Results   Component Value Date    CR 0.60 09/28/2017     No results found for: MICROALBUMIN    Nutrition Review:  Dali is here today for type 1 diabetes nutrition review. I have read her PMH. She tells me she would like to review carb counting and she also would like help to lose weight. She injured her ankle approx 3 years ago and is still having problems with it and has not been able to exercise, therefore she has gained approx 10# in the past 3 years. She also would like to review the effects of protein and fat on her bg as she notices sometimes when she eats a lot of protein and/or fat her bg will rise later. She works full time in a sedentary job, M-F day hours. She lives alone, cooks and shops for self.She brings in food records today with food/bg/insulin for review.     Diet Recall:   Breakfast:coffee/1% milk  AM snack:2 toast/pb or ivette bar or granola/almonds/craisins/flax seed/milk  Lunch:Noosa Yogurt or 2 pb toast or apples/pastry/cocoa or skips if she eats a late breakfast  PM snack:only if low bg  Dinner:chicken/potato/veggies/apples or nachos/cheese or quiche/salad/sorbet or meatballa/veg,apple, wine or pizza  Evening snack:rarely    Eats out in restaurants: about 1 times per week  Beverages: water, coffee  ETOH: 1 glass wine a few times per week     Physical Activity:    Limited due to ongoing ankle pain for which she has been in PT for the past 2 years.       Gave Dali written and verbal information on general healthy eating, low saturated, low trans fat, low sodium and carbohydrate counting. Provided Dali with information on how to obtain nutrition information online and via smartphone. Reviewed effects of large amounts of protein and fat on bg which is very individual and encouraged her to keep tracking her food/bg/insulin to determine trends. Encouraged her to wait 3 hours prior to correcting if necessary for high protein/fat meals. PRovided Dali with more food records to track food.   Worked with Dali to set goals for improved diet and weight loss. Discussed phone apps such as MoveInSync or Predect to help track daily calories with a goal of 1800 per day. She is not holding in her insulin pen with injections for 10 seconds which also may affect her bg, instructed her to count to 10 with each injection prior to removing the needle.       Education provided today on:  AADE Self-Care Behaviors:  Healthy Eating: carbohydrate counting, weight reduction, heart healthy diet, eating out and label reading  Taking Medication: hold needle in skin for 10 seconds with each injection    Pt verbalized understanding of concepts discussed and recommendations provided today.         ASSESSMENT: Dali was very receptive to information provided today and will start paying attention to calories for weight loss as she states she never has had to look at calories before. She will start by keeping food records to look more closely at what she is eating and to determine bg trends.       PLAN:  See Patient Instructions for co-developed, patient-stated behavior change goals.  AVS printed and provided to patient today.    FOLLOW-UP:  Follow-up appointment scheduled on December 18.  Chart routed to referring provider.    Time Spent: 60 minutes  Encounter Type: Individual    Any diabetes medication dose changes were made via the CDE Protocol and Collaborative Practice Agreement with the patient's referring provider. A copy of this encounter was shared with the provider.

## 2017-11-17 NOTE — PATIENT INSTRUCTIONS
1. We reviewed healthy low saturated, low trans fat diet today   2. We reviewed carbohydrate counting today. Measure again for awhile for better accuracy.   3. We reviewed calories in foods today, consider buying Calorie Magdiel Carbohydrate, Fat and Protein Counter to help you be more aware of what you are eating.  4. Try [employer].com or [employer] for more information  5. A good calorie goal to start for you is 1800 calories. For now, you will log your food and carbs and blood sugars to keep better track.  6. Follow up one month, December 18th at 2:30  Luz Marina Holley RD, ASHELY, CDE  Diabetes Care  [address]  [address]  [place]  [address]  Phone: [phone]  Appointment line: [phone]  Email: [email or web address]

## 2017-11-18 ENCOUNTER — THERAPY VISIT (OUTPATIENT)
Dept: PHYSICAL THERAPY | Facility: CLINIC | Age: 47
End: 2017-11-18
Payer: COMMERCIAL

## 2017-11-18 DIAGNOSIS — M25.571 PAIN IN JOINT INVOLVING ANKLE AND FOOT, RIGHT: ICD-10-CM

## 2017-11-18 PROCEDURE — 97112 NEUROMUSCULAR REEDUCATION: CPT | Mod: GP | Performed by: PHYSICAL THERAPIST

## 2017-11-18 PROCEDURE — 97110 THERAPEUTIC EXERCISES: CPT | Mod: GP | Performed by: PHYSICAL THERAPIST

## 2017-12-01 ENCOUNTER — HOSPITAL ENCOUNTER (OUTPATIENT)
Dept: MAMMOGRAPHY | Facility: CLINIC | Age: 47
Discharge: HOME | End: 2017-12-01
Attending: INTERNAL MEDICINE | Admitting: INTERNAL MEDICINE
Payer: COMMERCIAL

## 2017-12-01 DIAGNOSIS — Z12.31 VISIT FOR SCREENING MAMMOGRAM: ICD-10-CM

## 2017-12-01 PROCEDURE — G0202 SCR MAMMO BI INCL CAD: HCPCS

## 2017-12-02 ENCOUNTER — THERAPY VISIT (OUTPATIENT)
Dept: PHYSICAL THERAPY | Facility: CLINIC | Age: 47
End: 2017-12-02
Payer: COMMERCIAL

## 2017-12-02 DIAGNOSIS — M25.571 PAIN IN JOINT INVOLVING ANKLE AND FOOT, RIGHT: ICD-10-CM

## 2017-12-02 PROCEDURE — 97110 THERAPEUTIC EXERCISES: CPT | Mod: GP | Performed by: PHYSICAL THERAPIST

## 2017-12-02 PROCEDURE — 97112 NEUROMUSCULAR REEDUCATION: CPT | Mod: GP | Performed by: PHYSICAL THERAPIST

## 2017-12-05 ENCOUNTER — TELEPHONE (OUTPATIENT)
Dept: ENDOCRINOLOGY | Facility: CLINIC | Age: 47
End: 2017-12-05

## 2017-12-05 ENCOUNTER — APPOINTMENT (OUTPATIENT)
Dept: GENERAL RADIOLOGY | Facility: CLINIC | Age: 47
End: 2017-12-05
Attending: EMERGENCY MEDICINE
Payer: COMMERCIAL

## 2017-12-05 ENCOUNTER — HOSPITAL ENCOUNTER (EMERGENCY)
Facility: CLINIC | Age: 47
Discharge: HOME | End: 2017-12-05
Attending: EMERGENCY MEDICINE | Admitting: EMERGENCY MEDICINE
Payer: COMMERCIAL

## 2017-12-05 VITALS
SYSTOLIC BLOOD PRESSURE: 138 MMHG | WEIGHT: 180 LBS | HEART RATE: 62 BPM | TEMPERATURE: 99.4 F | RESPIRATION RATE: 18 BRPM | HEIGHT: 70 IN | BODY MASS INDEX: 25.77 KG/M2 | OXYGEN SATURATION: 98 % | DIASTOLIC BLOOD PRESSURE: 81 MMHG

## 2017-12-05 DIAGNOSIS — S92.514A CLOSED NONDISPLACED FRACTURE OF PROXIMAL PHALANX OF LESSER TOE OF RIGHT FOOT, INITIAL ENCOUNTER: ICD-10-CM

## 2017-12-05 DIAGNOSIS — E10.9 TYPE 1 DIABETES, HBA1C GOAL < 7% (H): ICD-10-CM

## 2017-12-05 PROCEDURE — 28510 TREATMENT OF TOE FRACTURE: CPT | Mod: T9

## 2017-12-05 PROCEDURE — 73630 X-RAY EXAM OF FOOT: CPT | Mod: RT

## 2017-12-05 PROCEDURE — 25000132 ZZH RX MED GY IP 250 OP 250 PS 637: Performed by: EMERGENCY MEDICINE

## 2017-12-05 PROCEDURE — 99284 EMERGENCY DEPT VISIT MOD MDM: CPT | Mod: 25

## 2017-12-05 RX ORDER — HYDROCODONE BITARTRATE AND ACETAMINOPHEN 5; 325 MG/1; MG/1
1 TABLET ORAL ONCE
Status: COMPLETED | OUTPATIENT
Start: 2017-12-05 | End: 2017-12-05

## 2017-12-05 RX ORDER — INSULIN ASPART 100 [IU]/ML
INJECTION, SOLUTION INTRAVENOUS; SUBCUTANEOUS
Qty: 30 ML | Refills: 4 | Status: SHIPPED | OUTPATIENT
Start: 2017-12-05 | End: 2019-01-26

## 2017-12-05 RX ORDER — HYDROCODONE BITARTRATE AND ACETAMINOPHEN 5; 325 MG/1; MG/1
1-2 TABLET ORAL EVERY 4 HOURS PRN
Qty: 15 TABLET | Refills: 0 | Status: SHIPPED | OUTPATIENT
Start: 2017-12-05 | End: 2018-08-14

## 2017-12-05 RX ADMIN — HYDROCODONE BITARTRATE AND ACETAMINOPHEN 1 TABLET: 5; 325 TABLET ORAL at 15:46

## 2017-12-05 NOTE — ED AVS SNAPSHOT
Emergency Department    [address] [place] [address]    Phone:  [phone]    Fax:  [phone]                                       Dali Martines   MRN: [id number]    Department:   Emergency Department   Date of Visit:  12/5/2017           After Visit Summary Signature Page     I have received my discharge instructions, and my questions have been answered. I have discussed any challenges I see with this plan with the nurse or doctor.    ..........................................................................................................................................  Patient/Patient Representative Signature      ..........................................................................................................................................  Patient Representative Print Name and Relationship to Patient    ..................................................               ................................................  Date                                            Time    ..........................................................................................................................................  Reviewed by Signature/Title    ...................................................              ..............................................  Date                                                            Time

## 2017-12-05 NOTE — ED AVS SNAPSHOT
Emergency Department    [address] [facility] [address]    Phone:  [phone]    Fax:  [phone]                                       Dali Martines   MRN: [id number]    Department:   Emergency Department   Date of Visit:  12/5/2017           Patient Information     Date Of Birth          1970        Your diagnoses for this visit were:     Closed nondisplaced fracture of proximal phalanx of lesser toe of right foot, initial encounter        You were seen by Lake Scott MD and Jesi Lynn MD.      Follow-up Information     Follow up with Philip Butler MD In 1 day.    Specialty:  Orthopedics    Contact information:    [facility] ORTHOPEDICS  [address] [place] [id number]  [phone]        Discharge References/Attachments     FRACTURE, TOE, CLOSED (ENGLISH)      Future Appointments        Provider Department Dept Phone Center    12/6/2017 3:30 PM Kraig Villagomez MD [facility] Physical Medicine and Rehabilitation [phone] [facility]    12/11/2017 12:00 PM Sara Carter MD [facility] [phone] [facility]    12/16/2017 9:10 AM Pt Michi Esteves PT [facility] [phone] [facility]    12/18/2017 2:30 PM Luz Marina Holley RD [facility] Diabetes [phone] [facility]    12/30/2017 9:50 AM Pt Michi Esteves  [facility] [phone] [facility]    1/9/2018 2:30 PM Felicia Mckeon MD [facility] Endocrinology [phone] [facility]    2/1/2018 2:30 PM Yoel Betancourt MD [facility] Rheumatology [phone] [facility]      24 Hour Appointment Hotline       To make an appointment at any [facility], call [phone] ([phone]). If you don't have a family doctor or clinic, we will help you find one. [facility] are conveniently located to serve the needs of you and your family.             Review of your medicines       START taking        Dose / Directions Last dose taken    HYDROcodone-acetaminophen 5-325 MG per tablet   Commonly known as:  NORCO   Dose:  1-2 tablet   Quantity:  15 tablet        Take 1-2 tablets by mouth every 4 hours as needed for moderate to severe pain   Refills:  0          Our records show that you are taking the medicines listed below. If these are incorrect, please call your family doctor or clinic.        Dose / Directions Last dose taken    aspirin 81 MG tablet   Dose:  1 tablet        Take 1 tablet by mouth daily.   Refills:  0        blood glucose monitoring lancets   Quantity:  4 Box        Use to test blood sugar 8 times daily or as directed.   Refills:  3        blood glucose monitoring test strip   Commonly known as:  ACCU-CHEK COLIN PLUS   Quantity:  700 strip        Test Blood Sugar 8 times daily or as directed   Refills:  3        CALCIUM + D PO        Take one daily   Refills:  0        citalopram 20 MG tablet   Commonly known as:  celeXA   Dose:  30 mg   Quantity:  135 tablet        Take 1.5 tablets (30 mg) by mouth daily   Refills:  1        diclofenac 1 % Gel topical gel   Commonly known as:  VOLTAREN   Dose:  2 g   Quantity:  100 g        Place 2 g onto the skin 4 times daily as needed for moderate pain   Refills:  11        dulaglutide 1.5 MG/0.5ML pen   Commonly known as:  TRULICITY   Dose:  1.5 mg   Quantity:  6 mL        Inject 1.5 mg Subcutaneous every 7 days   Refills:  3        econazole nitrate 1 % cream   Dose:  0.5 inch        Apply 0.5 inches topically daily.   Refills:  0        fish oil-omega-3 fatty acids 1000 MG capsule        Take one daily   Refills:  0        folic acid 1 MG tablet   Commonly known as:  FOLVITE   Dose:  1 mg   Quantity:  90 tablet        Take 1 tablet (1 mg) by mouth daily   Refills:  3        insulin degludec 100 UNIT/ML pen   Commonly known as:  TRESIBA   Dose:  18 Units   Quantity:  15 mL        Inject 18 Units Subcutaneous daily   Refills:  11         insulin pen needle 31G X 8 MM   Commonly known as:  B-D U/F   Quantity:  450 each        Use 5 pen needles daily   Refills:  3        levothyroxine 125 MCG tablet   Commonly known as:  SYNTHROID/LEVOTHROID   Dose:  125 mcg   Quantity:  90 tablet        Take 1 tablet (125 mcg) by mouth daily   Refills:  3        methotrexate 2.5 MG tablet CHEMO   Dose:  10 mg   Quantity:  24 tablet        Take 4 tablets (10 mg) by mouth once a week   Refills:  2        methylphenidate 20 MG CR capsule   Commonly known as:  METADATE CD   Dose:  20 mg        Take 20 mg by mouth daily   Refills:  0        mometasone-formoterol 100-5 MCG/ACT oral inhaler   Commonly known as:  DULERA   Dose:  2 puff   Quantity:  3 Inhaler        Inhale 2 puffs into the lungs 2 times daily   Refills:  3        montelukast 5 MG chewable tablet   Commonly known as:  SINGULAIR   Quantity:  30 tablet        CHEW AND SWALLOW 1 TABLET(5 MG) BY MOUTH AT BEDTIME   Refills:  3        MULTIVITAMIN PO        Take  by mouth. Take one daily tab   Refills:  0        NIZORAL PO        Take  by mouth. Shampoo daily   Refills:  0        NovoLOG PENFILL 100 UNIT/ML injection   Quantity:  15 mL   Generic drug:  insulin aspart        1 unit per 15 grams CHO at all meals and snacks with correction scale of 1 unit per 50 mg/dL over 150.  Average daily use is 20 units.   Refills:  4        ROGAINE EX        Externally apply  topically. daily   Refills:  0        traZODone 50 MG tablet   Commonly known as:  DESYREL   Dose:   mg   Quantity:  180 tablet        Take 1-2 tablets by mouth nightly as needed for sleep.   Refills:  0        * UNABLE TO FIND        MEDICATION NAME: biosil   Refills:  0        * UNABLE TO FIND        Biosil   Refills:  0        * Notice:  This list has 2 medication(s) that are the same as other medications prescribed for you. Read the directions carefully, and ask your doctor or other care provider to review them with you.            Prescriptions  were sent or printed at these locations (1 Prescription)                   Other Prescriptions                Printed at Department/Unit printer (1 of 1)         HYDROcodone-acetaminophen (NORCO) 5-325 MG per tablet                Procedures and tests performed during your visit     Foot  XR, G/E 3 views, right      Orders Needing Specimen Collection     None      Pending Results     No orders found from 12/3/2017 to 12/6/2017.            Pending Culture Results     No orders found from 12/3/2017 to 12/6/2017.            Pending Results Instructions     If you had any lab results that were not finalized at the time of your Discharge, you can call the ED Lab Result RN at [phone]. You will be contacted by this team for any positive Lab results or changes in treatment. The nurses are available 7 days a week from 10A to 6:30P.  You can leave a message 24 hours per day and they will return your call.        Test Results From Your Hospital Stay        12/5/2017  3:03 PM      Narrative     XR FOOT RT G/E 3 VW 12/5/2017 2:57 PM    HISTORY: Injury.    COMPARISON: None.    FINDINGS: Fracture through the distal aspect of the second toe  proximal phalanx with mild displacement at the fracture site. Other  visualized osseous structures appear intact.        Impression     IMPRESSION: Fracture through the distal aspect of the second toe  proximal phalanx.    GRABIEL CARTER MD                Clinical Quality Measure: Blood Pressure Screening     Your blood pressure was checked while you were in the emergency department today. The last reading we obtained was  BP: 138/81 . Please read the guidelines below about what these numbers mean and what you should do about them.  If your systolic blood pressure (the top number) is less than 120 and your diastolic blood pressure (the bottom number) is less than 80, then your blood pressure is normal. There is nothing more that you need to do about it.  If your systolic blood pressure (the  top number) is 120-139 or your diastolic blood pressure (the bottom number) is 80-89, your blood pressure may be higher than it should be. You should have your blood pressure rechecked within a year by a primary care provider.  If your systolic blood pressure (the top number) is 140 or greater or your diastolic blood pressure (the bottom number) is 90 or greater, you may have high blood pressure. High blood pressure is treatable, but if left untreated over time it can put you at risk for heart attack, stroke, or kidney failure. You should have your blood pressure rechecked by a primary care provider within the next 4 weeks.  If your provider in the emergency department today gave you specific instructions to follow-up with your doctor or provider even sooner than that, you should follow that instruction and not wait for up to 4 weeks for your follow-up visit.        Thank you for choosing [facility]       Thank you for choosing [facility] for your care. Our goal is always to provide you with excellent care. Hearing back from our patients is one way we can continue to improve our services. Please take a few minutes to complete the written survey that you may receive in the mail after you visit with us. Thank you!        [facility] Information     [facility] gives you secure access to your electronic health record. If you see a primary care provider, you can also send messages to your care team and make appointments. If you have questions, please call your primary care clinic.  If you do not have a primary care provider, please call [phone] and they will assist you.        Care EveryWhere ID     This is your Care EveryWhere ID. This could be used by other organizations to access your [facility] medical records  [id number]        Equal Access to Services     DALIA MOORE : Diego Castillo, reymundo chacon, qakaitlin castro. So [facility] [phone].    ATENCIÓN:  Si habla lui, tiene a ramsey disposición servicios gratuitos de asistencia lingüística. Llame al [phone].    We comply with applicable federal civil rights laws and [place] laws. We do not discriminate on the basis of race, color, national origin, age, disability, sex, sexual orientation, or gender identity.            After Visit Summary       This is your record. Keep this with you and show to your community pharmacist(s) and doctor(s) at your next visit.

## 2017-12-05 NOTE — ED NOTES
Pt states that she dropped a 25 pound weight that was a base of an umbrella stand onto her foot last night.  She has been icing an taking ibuprofen.  Last does of ibuprofen was @ 10am, 600mg.   Toes are swollen and bruised, limited ROM.

## 2017-12-05 NOTE — ED PROVIDER NOTES
History     Chief Complaint:  Foot Injury      HPI   Dali Martines is a 47 year old female who presents to the emergency department today for evaluation of right foot injury. The patient reports a 25 lb weight fell off a shelf onto her right foot last night at 2000. She was wearing canvas topped shoes at the time. She was able to bear some weight, but has been using crutches since about 2200 last night. She states she has been unable to bear weight since midnight last night. Due to persistent pain and concern for fracture, the patient presents to the emergency department for evaluation. Of note, she took ibuprofen prior to arrival.    Allergies:  Sulfasalazine    Medications:    mometasone-formoterol (DULERA) 100-5 MCG/ACT oral inhaler  methotrexate 2.5 MG tablet CHEMO  montelukast (SINGULAIR) 5 MG chewable tablet  folic acid (FOLVITE) 1 MG tablet  levothyroxine (SYNTHROID/LEVOTHROID) 125 MCG tablet  insulin degludec (TRESIBA) 100 UNIT/ML pen  dulaglutide (TRULICITY) 1.5 MG/0.5ML pen  NOVOLOG PENFILL 100 UNIT/ML soln  diclofenac (VOLTAREN) 1 % GEL  citalopram (CELEXA) 20 MG tablet  methylphenidate (METADATE CD) 20 MG CR capsule  traZODone (DESYREL) 50 MG tablet  aspirin 81 MG tablet  Calcium Carbonate-Vitamin D (CALCIUM + D PO)  econazole nitrate 1 % cream  fish oil-omega-3 fatty acids (FISH OIL) 1000 MG capsule  Multiple Vitamin (MULTIVITAMIN OR)  Ketoconazole (NIZORAL PO)  Minoxidil (ROGAINE EX)    Past Medical History:    Anemia  Anxiety  Arthritis  Back injury  Dry eye syndrome  Dyslipidemia  Endometriosis  GERD  Hypothyroidism  Type 1 diabetes  Asthma    Past Surgical History:    Repair cruciate ligament, knee  Stomach surgery procedure unlisted  Colonoscopy  EGD  Gyn surgery  Breath hydrogen test  Laparoscopic appendectomy  Laparotomy, lysis adhesions, combined  Soft tissue surgery    Family History:    Breast cancer  Heart disease  CAD  Arthritis  Eye disorder  Hyperlipidemia  Thyroid  "disease  Anxiety  Alcoholism  Rheumatoid arthritis  Diabetes    Social History:  The patient was accompanied to the ED by family.  Smoking Status: Never  Smokeless Tobacco: Never  Alcohol Use: Yes  Marital Status:  Single     Review of Systems   Musculoskeletal:        Positive for right foot pain   All other systems reviewed and are negative.    Physical Exam     Patient Vitals for the past 24 hrs:   BP Temp Temp src Pulse Resp SpO2 Height Weight   12/05/17 1547 138/81 - - 62 18 98 % - -   12/05/17 1416 131/67 99.4  F (37.4  C) Oral 71 18 97 % 1.778 m (5' 10\") 81.6 kg (180 lb)       Physical Exam  Constitutional:  Patient is oriented to person, place, and time. They appear well-developed and well-nourished. Mild distress secondary to right foot pain.   HENT:   Eyes:    Conjunctivae normal and EOM are normal. Pupils are equal, round, and reactive to light.   Neck:    Normal range of motion.   Cardiovascular: Normal rate, regular rhythm and normal heart sounds.  Exam reveals no gallop and no friction rub.  No murmur heard.  Pulmonary/Chest:  Effort normal and breath sounds normal. Patient has no wheezes. Patient has no rales.   Musculoskeletal:   Movement of right foot and toes is altered secondary to pain, normal pulse, no obvious bony deformities.  Neurological:   Patient is alert and oriented to person, place, and time. Patient has normal strength. No cranial nerve deficit or sensory deficit. GCS 15  Skin:   Skin is warm and dry. No rash noted. No erythema. Bruising and mild ecchymosis from the base of the right second toe to between the great toe.  Psychiatric:   Patient has a normal mood and behavior    Emergency Department Course     Imaging:  Radiology findings were communicated with the patient who voiced understanding of the findings.  Foot XR, G/E 3 views, right  IMPRESSION: Fracture through the distal aspect of the second toe proximal phalanx.  Report per radiology      Interventions:  1546 Norco 5-325mg 1 " Tablet PO     Emergency Department Course:  Nursing notes and vitals reviewed.  The patient was sent for a Foot XR, G/E 3 views, right while in the emergency department, results above.     1514: I performed an exam of the patient as documented above. Patient updated on XR results.    1604: Patient rechecked and updated.     Findings and plan explained to the Patient. Patient discharged home with instructions regarding supportive care, medications, and reasons to return. The importance of close follow-up was reviewed. The patient was prescribed Norco.  I personally reviewed the imaging results with the Patient and answered all related questions prior to discharge.    Impression & Plan      Medical Decision Making:  Dali Martines is a 47 year old female presenting with right second toe pain after heavy object fell on her toe last night. It has become progressively painful which is why she presents for evaluation. No numbness but limited range of motion secondary to pain. XR was obtained which showed the above fractures. At this point, it is anatomically aligned on an AP view with mild displacement. I did not feel reduction was required. Also, visually there is no gross deformity. No open fracture. Her toes were oneida taped, she was placed in a post-operative shoe, and she had her own crutches. I did review the images with her on paper. She was concerned about deformity and healing as she uses her toes quite a bit as she has chronic ankle problems. I did reassure her that this does not require acute reduction at this time. She will follow up with orthopedics. She actually has an appointment tomorrow with her orthopedist specialist which she uses for her ankle. I will write her a prescription for Norco. Images provided on disc.      Diagnosis:    ICD-10-CM    1. Closed nondisplaced fracture of proximal phalanx of lesser toe of right foot, initial encounter S92.514A        Disposition:  discharged to home    Discharge  Medications:  New Prescriptions    HYDROCODONE-ACETAMINOPHEN (NORCO) 5-325 MG PER TABLET    Take 1-2 tablets by mouth every 4 hours as needed for moderate to severe pain       Scribe Disclosure:  I, Jyoti Miller, am serving as a scribe at 3:12 PM on 12/5/2017 to document services personally performed by Jesi Lynn MD based on my observations and the provider's statements to me.    12/5/2017    EMERGENCY DEPARTMENT       Jesi Lynn MD  12/08/17 6110

## 2017-12-11 ENCOUNTER — TRANSFERRED RECORDS (OUTPATIENT)
Dept: HEALTH INFORMATION MANAGEMENT | Facility: CLINIC | Age: 47
End: 2017-12-11

## 2017-12-16 DIAGNOSIS — M25.50 PAIN IN JOINT, MULTIPLE SITES: ICD-10-CM

## 2017-12-16 DIAGNOSIS — M77.9 ENTHESOPATHY: ICD-10-CM

## 2017-12-16 DIAGNOSIS — M25.571 CHRONIC PAIN OF RIGHT ANKLE: ICD-10-CM

## 2017-12-16 DIAGNOSIS — G89.29 CHRONIC PAIN OF RIGHT ANKLE: ICD-10-CM

## 2017-12-18 DIAGNOSIS — M77.9 TENDINITIS: ICD-10-CM

## 2017-12-18 DIAGNOSIS — Z51.81 ENCOUNTER FOR THERAPEUTIC DRUG MONITORING: ICD-10-CM

## 2017-12-18 DIAGNOSIS — M25.50 PAIN IN JOINT, MULTIPLE SITES: ICD-10-CM

## 2017-12-19 NOTE — TELEPHONE ENCOUNTER
Last Written Prescription Date:  10/18/16  Last Fill Quantity: 100G,   # refills: 11  Last Office Visit : 9/28/17  Future Office visit:  2/1/18  Lab Results   Component Value Date    AST 18 09/28/2017     Lab Results   Component Value Date    ALT 23 09/28/2017     Creatinine   Date Value Ref Range Status   09/28/2017 0.60 0.52 - 1.04 mg/dL Final     Hemoglobin   Date Value Ref Range Status   09/28/2017 11.8 11.7 - 15.7 g/dL Final

## 2017-12-20 NOTE — TELEPHONE ENCOUNTER
Methotrexate 2.5 mg tabs      Last Written Prescription Date:  9-28-17  Last Fill Quantity: 24,   # refills: 2  Last Office Visit: 9-28-17  Future Office visit:  2-1-18    CBC RESULTS:   Recent Labs   Lab Test  09/28/17   1300   WBC  5.6   RBC  3.82   HGB  11.8   HCT  35.6   MCV  93   MCH  30.9   MCHC  33.1   RDW  13.2   PLT  350       Creatinine   Date Value Ref Range Status   09/28/2017 0.60 0.52 - 1.04 mg/dL Final   ]    Liver Function Studies -   Recent Labs   Lab Test  09/28/17   1300   04/27/16   1402   PROTTOTAL   --    --   8.0   ALBUMIN  4.0   < >  4.0   BILITOTAL   --    --   0.3   ALKPHOS   --    --   108   AST  18   < >  21   ALT  23   < >  25    < > = values in this interval not displayed.     From 9-28-17 appointment:  She is definitely doing better on the methotrexate and is tolerating the 4 tablets weekly and perhaps will get enough continued improvement on that not need to dose escalate further.       Kathleen M Doege RN

## 2017-12-27 ENCOUNTER — TRANSFERRED RECORDS (OUTPATIENT)
Dept: HEALTH INFORMATION MANAGEMENT | Facility: CLINIC | Age: 47
End: 2017-12-27

## 2018-01-09 ENCOUNTER — OFFICE VISIT (OUTPATIENT)
Dept: ENDOCRINOLOGY | Facility: CLINIC | Age: 48
End: 2018-01-09
Payer: COMMERCIAL

## 2018-01-09 VITALS
HEIGHT: 70 IN | BODY MASS INDEX: 26.57 KG/M2 | SYSTOLIC BLOOD PRESSURE: 126 MMHG | WEIGHT: 185.6 LBS | HEART RATE: 79 BPM | DIASTOLIC BLOOD PRESSURE: 82 MMHG

## 2018-01-09 DIAGNOSIS — E10.3291 TYPE 1 DIABETES MELLITUS WITH MILD NONPROLIFERATIVE RETINOPATHY OF RIGHT EYE, MACULAR EDEMA PRESENCE UNSPECIFIED (H): ICD-10-CM

## 2018-01-09 DIAGNOSIS — E10.3291 TYPE 1 DIABETES MELLITUS WITH MILD NONPROLIFERATIVE RETINOPATHY OF RIGHT EYE, MACULAR EDEMA PRESENCE UNSPECIFIED (H): Primary | ICD-10-CM

## 2018-01-09 LAB
CREAT SERPL-MCNC: 0.57 MG/DL (ref 0.52–1.04)
CREAT UR-MCNC: 37 MG/DL
GFR SERPL CREATININE-BSD FRML MDRD: >90 ML/MIN/1.7M2
HBA1C MFR BLD: 7 % (ref 4.3–6)
MICROALBUMIN UR-MCNC: <5 MG/L
MICROALBUMIN/CREAT UR: NORMAL MG/G CR (ref 0–25)
TSH SERPL DL<=0.005 MIU/L-ACNC: 0.63 MU/L (ref 0.4–4)

## 2018-01-09 RX ORDER — SIMVASTATIN 20 MG
20 TABLET ORAL AT BEDTIME
Qty: 90 TABLET | Refills: 3 | Status: SHIPPED | OUTPATIENT
Start: 2018-01-09 | End: 2018-04-30

## 2018-01-09 RX ORDER — SIMVASTATIN 20 MG
20 TABLET ORAL AT BEDTIME
Qty: 90 TABLET | Refills: 3 | Status: SHIPPED | OUTPATIENT
Start: 2018-01-09 | End: 2019-04-02

## 2018-01-09 NOTE — LETTER
1/9/2018       RE: Dali Martines  [address]  [facility] [address]     Dear Colleague,    Thank you for referring your patient, Dali Martines, to the [facility] ENDOCRINOLOGY at [facility]. Please see a copy of my visit note below.    This 46 year old woman returns for f/u of her type 1 diabetes. She also has hypothyroidism and psoriatic arthritis.    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  He recommended follow-up in 1 year.  She has many questions about what this finding means and whether she is at risk for blindness.    Currently taking Tresiba 14 units daily, although she increased the dose last week to 16 because of a GI illness that made her sugars go up.  She has the following calculations in her Accucheck Expert meter:  Insulin to Carb ratio:  1 unit per 15 grams CHO  Correction Factor:     1 unit drops BG 50 mg/mL  Targets:  Overnight:   and during the Daytime:   Active Insulin Time:  3.5 hours.     She wears her Dex com sensor nearly all the time.  During the last month her average was 181.  She was between  53% of the time, above 180 44% of the time, and below 72.8% of the time.  Review of the daily pattern shows she generally comes to target after she cut does not food dose.  She is not having hypoglycemia overnight.  She reports she recognizes her low blood sugars.    She reports she still recovering from her GI illness which was characterized by nausea and vomiting for a day or so.  She never had fever and her GI symptoms have resolved.  She does have sort of a sore throat today.  She denies chest pain or shortness of breath.    She dropped a weight on her right foot a couple of weeks ago which led to fracturing of 2 of her toes.  She is wearing a boot currently and will see her orthopedist next week to see if  bones are healing.  This is limit her mobility.      Current Outpatient Prescriptions on File Prior to Visit:  methotrexate 2.5 MG tablet CHEMO Take 4 tablets (10 mg) by mouth once a week   diclofenac (VOLTAREN) 1 % GEL topical gel APPLY 2 GRAMS ONTO THE SKIN FOUR TIMES DAILY AS NEEDED FOR MODERATE PAIN   HYDROcodone-acetaminophen (NORCO) 5-325 MG per tablet Take 1-2 tablets by mouth every 4 hours as needed for moderate to severe pain   NOVOLOG PENFILL 100 UNIT/ML soln 1 unit per 15 grams CHO at all meals and snacks with correction scale of 1 unit per 50 mg/dL over 150.  Average daily use is 20 units.   mometasone-formoterol (DULERA) 100-5 MCG/ACT oral inhaler Inhale 2 puffs into the lungs 2 times daily   montelukast (SINGULAIR) 5 MG chewable tablet CHEW AND SWALLOW 1 TABLET(5 MG) BY MOUTH AT BEDTIME   folic acid (FOLVITE) 1 MG tablet Take 1 tablet (1 mg) by mouth daily   levothyroxine (SYNTHROID/LEVOTHROID) 125 MCG tablet Take 1 tablet (125 mcg) by mouth daily   UNABLE TO FIND Biosil   insulin degludec (TRESIBA) 100 UNIT/ML pen Inject 18 Units Subcutaneous daily   dulaglutide (TRULICITY) 1.5 MG/0.5ML pen Inject 1.5 mg Subcutaneous every 7 days   blood glucose monitoring (ACCU-CHEK COLIN PLUS) test strip Test Blood Sugar 8 times daily or as directed   UNABLE TO FIND MEDICATION NAME: biosil   blood glucose monitoring (ACCU-CHEK FASTCLIX) lancets Use to test blood sugar 8 times daily or as directed.   citalopram (CELEXA) 20 MG tablet Take 1.5 tablets (30 mg) by mouth daily   insulin pen needle (B-D U/F) 31G X 8 MM Use 5 pen needles daily   methylphenidate (METADATE CD) 20 MG CR capsule Take 20 mg by mouth daily   traZODone (DESYREL) 50 MG tablet Take 1-2 tablets by mouth nightly as needed for sleep.   aspirin 81 MG tablet Take 1 tablet by mouth daily.   Calcium Carbonate-Vitamin D (CALCIUM + D PO) Take one daily   econazole nitrate 1 % cream Apply 0.5 inches topically daily.   fish oil-omega-3 fatty acids (FISH OIL)  "1000 MG capsule Take one daily   Multiple Vitamin (MULTIVITAMIN OR) Take  by mouth. Take one daily tab   Ketoconazole (NIZORAL PO) Take  by mouth. Shampoo daily   Minoxidil (ROGAINE EX) Externally apply  topically. daily     Current Facility-Administered Medications on File Prior to Visit:  betamethasone acet & sod phos (CELESTONE) injection 6 mg       ROS: 10 point ROS neg other than the symptoms noted above in the HPI.    So Hx - lives alone    Vital signs:   /82 (BP Location: Right arm, Patient Position: Sitting, Cuff Size: Adult Regular)  Pulse 79  Ht 1.778 m (5' 10\")  Wt 84.2 kg (185 lb 9.6 oz)  BMI 26.63 kg/m2  Estimated body mass index is 26.63 kg/(m^2) as calculated from the following:    Height as of this encounter: 1.778 m (5' 10\").    Weight as of this encounter: 84.2 kg (185 lb 9.6 oz).    VSS  NAD  Eyes - no periorbital edema, conjunctival injection, scleral icterus  Neck - no thyromegaly or LN  Oropharynx - w/o erythema  Lungs - clear  CV - RRR.  Normal pulses in feet.  No edema   DTR 2/4 biceps  Skin - normal texture     Recent Labs   Lab Test  01/09/18   1601  01/09/18   1550 01/09/18 09/28/17   1300 08/08/17 04/25/17   1827   02/14/17   1540   07/09/14   1641   09/27/13   0928  09/16/13   1612   06/04/13   1555  02/15/13   0935   11/29/10   1009   A1C   --    --    --    --    --    --    --    --    --    --    --    --    --   7.2*   --   7.6*  6.8*   < >   --    HEMOGLOBINA1   --    --   7.0*   --   7.2*   --    --    --    --    < >   --    < >   --    --    --    --    --    < >   --    TSH  0.63   --    --    --    --    --   0.66   --    --    < >  0.11*   < >   --    --    < >   --    --    < >  1.89   T4   --    --    --    --    --    --    --    --    --    --   1.31   --    --    --    --    --    --    --   8.5   LDL   --    --    --    --    --    --    --    --    --    --    --    --   123   --    --    --   135*   < >  84   HDL   --    --    --    --    --    --    --   "  --    --    --    --    --   78   --    --    --   77   < >  94   TRIG   --    --    --    --    --    --    --    --    --    --    --    --   51   --    --    --   63   < >  45   CR  0.57   --    --   0.60   --    < >   --    < >   --    < >   --    < >   --    --    --    --   0.56   < >  0.77   MICROL   --   <5   --    --    --    --    --    --   6   < >   --    < >   --    --    --    --    --    < >   --     < > = values in this interval not displayed.       Assessment and plan:    1.  Diabetes control.  Overall her glucose control is good.  I did not make any changes in her doses today.  Encouraged her to continue to wear her continuous gross monitor.      2.  Diabetes complications.  She now has background retinopathy.  I reviewed what this finding means and the expected outcome.  Because the ophthalmologist did not want to see her back for 1 year I suspect this is a very mild change and that it might even regress over time.  To ensure that it does not progress, will need to continue to control her blood sugars as she is doing.  Her blood pressure is well controlled today.  I recommended she go back on the statin that she took in the past.  She agreed to do this.  I will start her on 20 mg of simvastatin today.  We will check her renal function today.  She has no neuropathy.    3.  Hypothyroidism.  I will repeat her thyroid function tests today.    F/u 4-6 mo    Felicia Mckeon MD

## 2018-01-09 NOTE — PROGRESS NOTES
This 46 year old woman returns for f/u of her type 1 diabetes. She also has hypothyroidism and psoriatic arthritis.    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  He recommended follow-up in 1 year.  She has many questions about what this finding means and whether she is at risk for blindness.    Currently taking Tresiba 14 units daily, although she increased the dose last week to 16 because of a GI illness that made her sugars go up.  She has the following calculations in her Accucheck Expert meter:  Insulin to Carb ratio:  1 unit per 15 grams CHO  Correction Factor:     1 unit drops BG 50 mg/mL  Targets:  Overnight:   and during the Daytime:   Active Insulin Time:  3.5 hours.     She wears her Dex com sensor nearly all the time.  During the last month her average was 181.  She was between  53% of the time, above 180 44% of the time, and below 72.8% of the time.  Review of the daily pattern shows she generally comes to target after she cut does not food dose.  She is not having hypoglycemia overnight.  She reports she recognizes her low blood sugars.    She reports she still recovering from her GI illness which was characterized by nausea and vomiting for a day or so.  She never had fever and her GI symptoms have resolved.  She does have sort of a sore throat today.  She denies chest pain or shortness of breath.    She dropped a weight on her right foot a couple of weeks ago which led to fracturing of 2 of her toes.  She is wearing a boot currently and will see her orthopedist next week to see if bones are healing.  This is limit her mobility.      Current Outpatient Prescriptions on File Prior to Visit:  methotrexate 2.5 MG tablet CHEMO Take 4 tablets (10 mg) by mouth once a week   diclofenac (VOLTAREN) 1 % GEL topical gel APPLY 2 GRAMS ONTO THE SKIN FOUR TIMES DAILY AS  NEEDED FOR MODERATE PAIN   HYDROcodone-acetaminophen (NORCO) 5-325 MG per tablet Take 1-2 tablets by mouth every 4 hours as needed for moderate to severe pain   NOVOLOG PENFILL 100 UNIT/ML soln 1 unit per 15 grams CHO at all meals and snacks with correction scale of 1 unit per 50 mg/dL over 150.  Average daily use is 20 units.   mometasone-formoterol (DULERA) 100-5 MCG/ACT oral inhaler Inhale 2 puffs into the lungs 2 times daily   montelukast (SINGULAIR) 5 MG chewable tablet CHEW AND SWALLOW 1 TABLET(5 MG) BY MOUTH AT BEDTIME   folic acid (FOLVITE) 1 MG tablet Take 1 tablet (1 mg) by mouth daily   levothyroxine (SYNTHROID/LEVOTHROID) 125 MCG tablet Take 1 tablet (125 mcg) by mouth daily   UNABLE TO FIND Biosil   insulin degludec (TRESIBA) 100 UNIT/ML pen Inject 18 Units Subcutaneous daily   dulaglutide (TRULICITY) 1.5 MG/0.5ML pen Inject 1.5 mg Subcutaneous every 7 days   blood glucose monitoring (ACCU-CHEK COLIN PLUS) test strip Test Blood Sugar 8 times daily or as directed   UNABLE TO FIND MEDICATION NAME: biosil   blood glucose monitoring (ACCU-CHEK FASTCLIX) lancets Use to test blood sugar 8 times daily or as directed.   citalopram (CELEXA) 20 MG tablet Take 1.5 tablets (30 mg) by mouth daily   insulin pen needle (B-D U/F) 31G X 8 MM Use 5 pen needles daily   methylphenidate (METADATE CD) 20 MG CR capsule Take 20 mg by mouth daily   traZODone (DESYREL) 50 MG tablet Take 1-2 tablets by mouth nightly as needed for sleep.   aspirin 81 MG tablet Take 1 tablet by mouth daily.   Calcium Carbonate-Vitamin D (CALCIUM + D PO) Take one daily   econazole nitrate 1 % cream Apply 0.5 inches topically daily.   fish oil-omega-3 fatty acids (FISH OIL) 1000 MG capsule Take one daily   Multiple Vitamin (MULTIVITAMIN OR) Take  by mouth. Take one daily tab   Ketoconazole (NIZORAL PO) Take  by mouth. Shampoo daily   Minoxidil (ROGAINE EX) Externally apply  topically. daily     Current Facility-Administered Medications on File Prior  "to Visit:  betamethasone acet & sod phos (CELESTONE) injection 6 mg       ROS: 10 point ROS neg other than the symptoms noted above in the HPI.    So Hx - lives alone    Vital signs:   /82 (BP Location: Right arm, Patient Position: Sitting, Cuff Size: Adult Regular)  Pulse 79  Ht 1.778 m (5' 10\")  Wt 84.2 kg (185 lb 9.6 oz)  BMI 26.63 kg/m2  Estimated body mass index is 26.63 kg/(m^2) as calculated from the following:    Height as of this encounter: 1.778 m (5' 10\").    Weight as of this encounter: 84.2 kg (185 lb 9.6 oz).    VSS  NAD  Eyes - no periorbital edema, conjunctival injection, scleral icterus  Neck - no thyromegaly or LN  Oropharynx - w/o erythema  Lungs - clear  CV - RRR.  Normal pulses in feet.  No edema   DTR 2/4 biceps  Skin - normal texture     Recent Labs   Lab Test  01/09/18   1601  01/09/18   1550 01/09/18 09/28/17   1300 08/08/17 04/25/17   1827   02/14/17   1540   07/09/14   1641   09/27/13   0928  09/16/13   1612   06/04/13   1555  02/15/13   0935   11/29/10   1009   A1C   --    --    --    --    --    --    --    --    --    --    --    --    --   7.2*   --   7.6*  6.8*   < >   --    HEMOGLOBINA1   --    --   7.0*   --   7.2*   --    --    --    --    < >   --    < >   --    --    --    --    --    < >   --    TSH  0.63   --    --    --    --    --   0.66   --    --    < >  0.11*   < >   --    --    < >   --    --    < >  1.89   T4   --    --    --    --    --    --    --    --    --    --   1.31   --    --    --    --    --    --    --   8.5   LDL   --    --    --    --    --    --    --    --    --    --    --    --   123   --    --    --   135*   < >  84   HDL   --    --    --    --    --    --    --    --    --    --    --    --   78   --    --    --   77   < >  94   TRIG   --    --    --    --    --    --    --    --    --    --    --    --   51   --    --    --   63   < >  45   CR  0.57   --    --   0.60   --    < >   --    < >   --    < >   --    < >   --    --    --    --  "  0.56   < >  0.77   MICROL   --   <5   --    --    --    --    --    --   6   < >   --    < >   --    --    --    --    --    < >   --     < > = values in this interval not displayed.       Assessment and plan:    1.  Diabetes control.  Overall her glucose control is good.  I did not make any changes in her doses today.  Encouraged her to continue to wear her continuous gross monitor.      2.  Diabetes complications.  She now has background retinopathy.  I reviewed what this finding means and the expected outcome.  Because the ophthalmologist did not want to see her back for 1 year I suspect this is a very mild change and that it might even regress over time.  To ensure that it does not progress, will need to continue to control her blood sugars as she is doing.  Her blood pressure is well controlled today.  I recommended she go back on the statin that she took in the past.  She agreed to do this.  I will start her on 20 mg of simvastatin today.  We will check her renal function today.  She has no neuropathy.    3.  Hypothyroidism.  I will repeat her thyroid function tests today.    F/u 4-6 mo    Felicia Mckeon MD

## 2018-01-09 NOTE — NURSING NOTE
"Chief Complaint   Patient presents with     RECHECK     DIABETES TYPE 1 F/U        Initial /82 (BP Location: Right arm, Patient Position: Sitting, Cuff Size: Adult Regular)  Pulse 79  Ht 1.778 m (5' 10\")  Wt 84.2 kg (185 lb 9.6 oz)  BMI 26.63 kg/m2 Estimated body mass index is 26.63 kg/(m^2) as calculated from the following:    Height as of this encounter: 1.778 m (5' 10\").    Weight as of this encounter: 84.2 kg (185 lb 9.6 oz).  Medication Reconciliation: complete       Performed A1C test - patient tolerated well.    Manasa Oconnor, [facility]       "

## 2018-01-09 NOTE — MR AVS SNAPSHOT
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, macular edema presence unspecified (H)    -  1       Follow-ups after your visit        Your next 10 appointments already scheduled     Jan 24, 2018  3:15 PM CST   (Arrive by 3:00 PM)   RETURN HAND with Chris Uribe MD   [facility] ([facility] [place])    [address] [place] [address]  4th [facility] [id number] [phone]            Jan 27, 2018 11:10 AM CST   [facility] Extremity with Michi Esteves Pt, PT   [facility] ([facility]  )    [address]  [facility] [address] [phone]            Jan 30, 2018  2:30 PM CST   (Arrive by 2:15 PM)   Office Visit with Luz Marina Holley RD   [facility] ([facility])    [address] [place] [address]  3rd [facility] [id number] [phone]           Bring a current list of meds and any records pertaining to this visit. For Physicals, please bring immunization records and any forms needing to be filled out. Please arrive 10 minutes early to complete paperwork.            Feb 01, 2018  2:30 PM CST   (Arrive by 2:15 PM)   Return Visit with Yoel Betancourt MD   [facility] Rheumat[facility] ([facility])    [address] [place] [address]  [address]  [facility] [id number] [phone]            Feb 07, 2018  1:30 PM CST   (Arrive by 1:15 PM)   Return Visit with Kraig Villagomez MD   [facility] ([facility])    [address] [place] [address]  3rd [facility] [id number] [phone]            Feb  10, 2018 11:10 AM CST   [place]IS[place] Extremity with Michi Esteves Pt, PT   [facility] ([facility] [place]  )    [address]  [facility] [id number] [phone]            Feb 20, 2018  4:00 PM CST   (Arrive by 3:45 PM)   Return Visit with Sara Carter MD   [facility] ([facility])    [address]  [address]  [facility] [id number] [phone]            Jun 12, 2018  3:00 PM CDT   (Arrive by 2:45 PM)   RETURN DIABETES with Felicia Mckeon MD   [facility] ([facility])    [address]  3rd [address]  [facility] [address] [phone]              Future tests that were ordered for you today     Open Future Orders        Priority Expected Expires Ordered    Albumin Random Urine Quantitative with Creat Ratio Routine 1/9/2018 1/30/2018 1/9/2018    Creatinine Routine 1/9/2018 1/30/2018 1/9/2018    TSH Routine 1/9/2018 1/30/2018 1/9/2018            Who to contact     Please call your clinic at [phone] to:    Ask questions about your health    Make or cancel appointments    Discuss your medicines    Learn about your test results    Speak to your doctor   If you have compliments or concerns about an experience at your clinic, or if you wish to file a complaint, please contact [facility] Patient Relations at [phone] or email us at [email or web address]         Additional Information About Your Visit        [employer] Information     Ludesi gives you secure access to your electronic health record. If you see a primary care provider, you can also send messages to your care team and make appointments. If you have questions, please call your primary care clinic.  If you do not have a primary care provider, please call [phone] and they will assist you.      Ludesi is an electronic gateway that provides  "easy, online access to your medical records. With [employer], you can request a clinic appointment, read your test results, renew a prescription or communicate with your care team.     To access your existing account, please contact your [facility] or call [phone] for assistance.        Care EveryWhere ID     This is your Care EveryWhere ID. This could be used by other organizations to access your [facility] medical records  [id number]        Your Vitals Were     Pulse Height BMI (Body Mass Index)             79 1.778 m (5' 10\") 26.63 kg/m2          Blood Pressure from Last 3 Encounters:   01/09/18 126/82   12/05/17 138/81   10/09/17 130/86    Weight from Last 3 Encounters:   01/09/18 84.2 kg (185 lb 9.6 oz)   12/05/17 81.6 kg (180 lb)   11/17/17 85.5 kg (188 lb 8 oz)                 Today's Medication Changes          These changes are accurate as of: 1/9/18  3:28 PM.  If you have any questions, ask your nurse or doctor.               Start taking these medicines.        Dose/Directions    * simvastatin 20 MG tablet   Commonly known as:  ZOCOR   Used for:  Type 1 diabetes mellitus with mild nonproliferative retinopathy of right eye, macular edema presence unspecified (H)        Dose:  20 mg   Take 1 tablet (20 mg) by mouth At Bedtime   Quantity:  90 tablet   Refills:  3       * simvastatin 20 MG tablet   Commonly known as:  ZOCOR   Used for:  Type 1 diabetes mellitus with mild nonproliferative retinopathy of right eye, macular edema presence unspecified (H)        Dose:  20 mg   Take 1 tablet (20 mg) by mouth At Bedtime   Quantity:  90 tablet   Refills:  3       * Notice:  This list has 2 medication(s) that are the same as other medications prescribed for you. Read the directions carefully, and ask your doctor or other care provider to review them with you.         Where to get your medicines      These medications were sent to [email or web address], [place] - [address]  [address] [place] [address]     Phone:  [phone]     simvastatin 20 MG tablet    simvastatin 20 MG tablet                Primary Care Provider Office Phone # Fax #    Dimitri Alas [phone] [address] [address]        Equal Access to Services     DALIA MOORE : Hadii aad ku hadasho Soomaali, waaxda luqadaha, qaybta kaalmada adeegyada, waxay idiin hayaan adeeg kharash la'aan ah. So [facility] [phone].    ATENCIÓN: Si habla español, tiene a ramsey disposición servicios gratuitos de asistencia lingüística. [facility] [phone].    We comply with applicable federal civil rights laws and [place] laws. We do not discriminate on the basis of race, color, national origin, age, disability, sex, sexual orientation, or gender identity.            Thank you!     Thank you for choosing [facility] ENDOCRINOLOGY  for your care. Our goal is always to provide you with excellent care. Hearing back from our patients is one way we can continue to improve our services. Please take a few minutes to complete the written survey that you may receive in the mail after your visit with us. Thank you!             Your Updated Medication List - Protect others around you: Learn how to safely use, store and throw away your medicines at www.disposemymeds.org.          This list is accurate as of: 1/9/18  3:28 PM.  Always use your most recent med list.                   Brand Name Dispense Instructions for use Diagnosis    aspirin 81 MG tablet      Take 1 tablet by mouth daily.        blood glucose monitoring lancets     4 Box    Use to test blood sugar 8 times daily or as directed.    Type 1 diabetes, HbA1c goal < 7% (H)       blood glucose monitoring test strip    ACCU-CHEK COLIN PLUS    700 strip    Test Blood Sugar 8 times daily or as directed    Type 1 diabetes, HbA1c goal < 7% (H)       CALCIUM + D PO      Take one daily        citalopram  20 MG tablet    celeXA    135 tablet    Take 1.5 tablets (30 mg) by mouth daily    Anxiety       diclofenac 1 % Gel topical gel    VOLTAREN    100 g    APPLY 2 GRAMS ONTO THE SKIN FOUR TIMES DAILY AS NEEDED FOR MODERATE PAIN    Enthesopathy, Pain in joint, multiple sites, Chronic pain of right ankle       dulaglutide 1.5 MG/0.5ML pen    TRULICITY    6 mL    Inject 1.5 mg Subcutaneous every 7 days    Type 1 diabetes mellitus without complication (H)       econazole nitrate 1 % cream      Apply 0.5 inches topically daily.        fish oil-omega-3 fatty acids 1000 MG capsule      Take one daily        folic acid 1 MG tablet    FOLVITE    90 tablet    Take 1 tablet (1 mg) by mouth daily    Pain in joint, multiple sites, Tendinitis, Encounter for therapeutic drug monitoring       HYDROcodone-acetaminophen 5-325 MG per tablet    NORCO    15 tablet    Take 1-2 tablets by mouth every 4 hours as needed for moderate to severe pain        insulin degludec 100 UNIT/ML pen    TRESIBA    15 mL    Inject 18 Units Subcutaneous daily    Type 1 diabetes mellitus without complication (H)       insulin pen needle 31G X 8 MM    B-D U/F    450 each    Use 5 pen needles daily    Diabetes mellitus type I (H)       levothyroxine 125 MCG tablet    SYNTHROID/LEVOTHROID    90 tablet    Take 1 tablet (125 mcg) by mouth daily    Hypothyroidism       methotrexate 2.5 MG tablet CHEMO     24 tablet    Take 4 tablets (10 mg) by mouth once a week    Pain in joint, multiple sites, Tendinitis, Encounter for therapeutic drug monitoring       methylphenidate 20 MG CR capsule    METADATE CD     Take 20 mg by mouth daily        mometasone-formoterol 100-5 MCG/ACT oral inhaler    DULERA    3 Inhaler    Inhale 2 puffs into the lungs 2 times daily    Moderate persistent asthma without complication       montelukast 5 MG chewable tablet    SINGULAIR    30 tablet    CHEW AND SWALLOW 1 TABLET(5 MG) BY MOUTH AT BEDTIME    Mild persistent asthma without  complication       MULTIVITAMIN PO      Take  by mouth. Take one daily tab        NIZORAL PO      Take  by mouth. Shampoo daily        NovoLOG PENFILL 100 UNIT/ML injection   Generic drug:  insulin aspart     30 mL    1 unit per 15 grams CHO at all meals and snacks with correction scale of 1 unit per 50 mg/dL over 150.  Average daily use is 20 units.    Type 1 diabetes, HbA1c goal < 7% (H)       ROGAINE EX      Externally apply  topically. 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    Insomnia       * UNABLE TO FIND      MEDICATION NAME: biosil    Enthesopathy, Pain in joint, multiple sites, Chronic pain of right ankle       * UNABLE TO FIND      Biosil        * Notice:  This list has 4 medication(s) that are the same as other medications prescribed for you. Read the directions carefully, and ask your doctor or other care provider to review them with you.

## 2018-01-27 ENCOUNTER — HEALTH MAINTENANCE LETTER (OUTPATIENT)
Age: 48
End: 2018-01-27

## 2018-01-27 ENCOUNTER — THERAPY VISIT (OUTPATIENT)
Dept: PHYSICAL THERAPY | Facility: CLINIC | Age: 48
End: 2018-01-27
Payer: COMMERCIAL

## 2018-01-27 DIAGNOSIS — M25.571 PAIN IN JOINT INVOLVING ANKLE AND FOOT, RIGHT: ICD-10-CM

## 2018-01-27 DIAGNOSIS — J45.30 MILD PERSISTENT ASTHMA WITHOUT COMPLICATION: ICD-10-CM

## 2018-01-27 PROCEDURE — 97110 THERAPEUTIC EXERCISES: CPT | Mod: GP | Performed by: PHYSICAL THERAPIST

## 2018-01-27 PROCEDURE — 97112 NEUROMUSCULAR REEDUCATION: CPT | Mod: GP | Performed by: PHYSICAL THERAPIST

## 2018-01-29 RX ORDER — MONTELUKAST SODIUM 5 MG/1
TABLET, CHEWABLE ORAL
Qty: 30 TABLET | Refills: 3 | Status: SHIPPED | OUTPATIENT
Start: 2018-01-29 | End: 2018-06-21

## 2018-01-31 ENCOUNTER — OFFICE VISIT (OUTPATIENT)
Dept: ORTHOPEDICS | Facility: CLINIC | Age: 48
End: 2018-01-31
Payer: COMMERCIAL

## 2018-01-31 ENCOUNTER — RADIANT APPOINTMENT (OUTPATIENT)
Dept: GENERAL RADIOLOGY | Facility: CLINIC | Age: 48
End: 2018-01-31
Attending: ORTHOPAEDIC SURGERY
Payer: COMMERCIAL

## 2018-01-31 VITALS — WEIGHT: 180 LBS | HEIGHT: 70 IN | BODY MASS INDEX: 25.77 KG/M2

## 2018-01-31 DIAGNOSIS — M65.311 TRIGGER FINGER OF RIGHT THUMB: ICD-10-CM

## 2018-01-31 DIAGNOSIS — M79.641 PAIN OF RIGHT HAND: ICD-10-CM

## 2018-01-31 DIAGNOSIS — M79.641 PAIN OF RIGHT HAND: Primary | ICD-10-CM

## 2018-01-31 NOTE — LETTER
1/31/2018       RE: Dali Martines  [address]  [facility] [address]     Dear Colleague,    Thank you for referring your patient, Dali Martines, to the [facility] at [facility]. Please see a copy of my visit note below.    Reason for visit: Follow-up right thumb    Interval events: I first saw Trena on October 11. She has a right trigger thumb which I injected at that time. She has a history of psoriatic arthritis. She reports that the injection was helpful in reducing that locking of the thumb. However, she reports that about a week after the injection, she felt a bizarre shooting sensation in her palm that she thought was the trigger nodule catching more proximally. This has not happened again. She does not feel that she is having much triggering at this point but she does have soreness in the IP and MP joint areas associated with range of motion of the thumb. She feels that the thumb is progressively more inflamed.    Review of systems: Positive for left thumb and hand pain as described above.    Physical examination: Well-developed, well-nourished in no acute distress. Alert and oriented to surrounding. On examination of the right thumb, there is sanjeev triggering. There is tenderness over the thumb A1 pulley. The MP and IP joints are able to extend to neutral but not past this, as opposed to another side where she is able to achieve hyperextension. There is no tenderness to palpation of the IP, MP, or CMC joints. SHe is able to flex the thumb IP joint about 45  which is less than the contralateral side. There is a palpable nodularity over the A1 pulley area of the thumb.    X-rays: 3 views of the right hand working today demonstrate mild CMC arthritis.    Assessment: Right trigger thumb with improvement but not resolution following injection in October of this year and associated stiffness    Plan: I discussed the diagnosis and treatment options  with the patient.  We discussed three possible treatment options. The first would be to continue to observe the symptoms for any change or progression. The second would be to perform a corticosteroid injection again. The third is to consider surgery, which would be a trigger release. Given the amount of stiffness she has and her incomplete response to the first injection, I think she is a good candidate for surgical trigger release. She reports that she had hand therapy with modalities that helped eliminate nodules on her left hand. Therefore, she would like to have a trial of hand therapy with modalities on the right side to see if this can resolve the problem before proceeding surgically. I discussed with her that I am not aware of any evidence that modalities are effective for trigger thumb but if she would like to try this, I'm happy to place a referral. She'll come back to see me in 6 weeks to see as she is doing at that time.      Again, thank you for allowing me to participate in the care of your patient.      Sincerely,    Chris Uribe MD

## 2018-01-31 NOTE — NURSING NOTE
"Reason For Visit:   Chief Complaint   Patient presents with     RECHECK     The patient is following up today with her right thumb.        Primary MD: Dimitri Alas      ?  No    Age: 47 year old      Date of injury: 9 moths  Type of injury: trigger and nodule in right thumb.  Date of surgery: N/A  Type of surgery: N/A.        Ht 1.778 m (5' 10\")  Wt 81.6 kg (180 lb)  BMI 25.83 kg/m2      Pain Assessment  Patient Currently in Pain: Yes  0-10 Pain Scale: 6  Primary Pain Location: Finger (Comment which one) (thumb)  Pain Orientation: Right  Pain Descriptors:  (using - sharp, constand -dull)  Alleviating Factors: Rest  Aggravating Factors: Movement    Hand Dominance Evaluation  Hand Dominance: Right  Pinch force  R hand key force: 3.629 kg (8 lb)  L hand key force: 8.618 kg (19 lb)   force  R hand  level 2 force: 31.8 kg (70 lb)  L hand  level  2 force: 40.8 kg (90 lb)    QuickDASH Assessment  QuickDASH Main 10/11/2017   1.Open a tight or new jar. No difficulty   2. Do heavy household chores (e.g., wash walls, floors) No difficulty   3. Carry a shopping bag or briefcase. No difficulty   4. Wash your back. No difficulty   5. Use a knife to cut food. Severe difficulty   6. Recreational activities in which you take some force or impact through your arm, shoulder or hand (e.g., golf, hammering, tennis, etc.). Severe difficulty   7. During the past week, to what extent has your arm, shoulder or hand problem interfered with your normal social activities with family, friends, neighbours or groups? Slightly   8. During the past week, were you limited in your work or other regular daily activities as a result of your arm, shoulder or hand problem? Very limited   9. Arm, shoulder or hand pain. Moderate   10.Tingling (pins and needles) in your arm,shoulder or hand. Moderate   11. During the past week, how much difficulty have you had sleeping because of the pain in your arm, shoulder or hand? ([place] " number) No difficulty   Quickdash Ability Score 31.81          Allergies   Allergen Reactions     Sulfasalazine      Developed rash, HA, dizziness       Zoe Gay, ATC

## 2018-01-31 NOTE — PROGRESS NOTES
Reason for visit: Follow-up right thumb    Interval events: I first saw Trena on October 11. She has a right trigger thumb which I injected at that time. She has a history of psoriatic arthritis. She reports that the injection was helpful in reducing that locking of the thumb. However, she reports that about a week after the injection, she felt a bizarre shooting sensation in her palm that she thought was the trigger nodule catching more proximally. This has not happened again. She does not feel that she is having much triggering at this point but she does have soreness in the IP and MP joint areas associated with range of motion of the thumb. She feels that the thumb is progressively more inflamed.    Review of systems: Positive for left thumb and hand pain as described above.    Physical examination: Well-developed, well-nourished in no acute distress. Alert and oriented to surrounding. On examination of the right thumb, there is asnjeev triggering. There is tenderness over the thumb A1 pulley. The MP and IP joints are able to extend to neutral but not past this, as opposed to another side where she is able to achieve hyperextension. There is no tenderness to palpation of the IP, MP, or CMC joints. SHe is able to flex the thumb IP joint about 45  which is less than the contralateral side. There is a palpable nodularity over the A1 pulley area of the thumb.    X-rays: 3 views of the right hand working today demonstrate mild CMC arthritis.    Assessment: Right trigger thumb with improvement but not resolution following injection in October of this year and associated stiffness    Plan: I discussed the diagnosis and treatment options with the patient.  We discussed three possible treatment options. The first would be to continue to observe the symptoms for any change or progression. The second would be to perform a corticosteroid injection again. The third is to consider surgery, which would be a trigger release.  Given the amount of stiffness she has and her incomplete response to the first injection, I think she is a good candidate for surgical trigger release. She reports that she had hand therapy with modalities that helped eliminate nodules on her left hand. Therefore, she would like to have a trial of hand therapy with modalities on the right side to see if this can resolve the problem before proceeding surgically. I discussed with her that I am not aware of any evidence that modalities are effective for trigger thumb but if she would like to try this, I'm happy to place a referral. She'll come back to see me in 6 weeks to see as she is doing at that time.

## 2018-01-31 NOTE — MR AVS SNAPSHOT
After Visit Summary   1/31/2018    Dali Martines    MRN: [id number]           Patient Information     Date Of Birth          1970        Visit Information        Provider Department      1/31/2018 2:30 PM Chris Uribe MD [facility]        Today's Diagnoses     Pain of right hand    -  1    Trigger finger of right thumb           Follow-ups after your visit        Additional Services     HAND THERAPY       Right trigger thumb                  Your next 10 appointments already scheduled     Feb 01, 2018  2:30 PM CST   (Arrive by 2:15 PM)   Return Visit with Yoel Betancourt MD   [facility] Rheumatology ([facility])    [address]  [address]  [facility] [address]   [phone]            Feb 07, 2018  1:30 PM CST   (Arrive by 1:15 PM)   Return Visit with Kraig Villagomez MD   [facility] Physical Medicine and Rehabilitation ([facility])    [address]  [address] [facility] [address]   [phone]            Feb 10, 2018 11:10 AM CST   [place] Extremity with Michi Esteves Pt, PT   [facility] ([facility]  )    [address]  [facility] [address]   [phone]            Feb 13, 2018 12:00 PM CST   (Arrive by 11:45 AM)   Return Visit with Sara Carter MD   [facility] ([facility])    [address]  [address]  [facility] [address]   [phone]            Feb 28, 2018 10:30 AM CST   (Arrive by 10:15 AM)   Office Visit with Luz Marina Holley RD   [facility] Diabetes ([facility])    [address]  3rd Floor  [facility] [id number] [phone]           Bring a current list of meds and any records pertaining to this visit. For Physicals, please bring immunization records and any forms needing to be filled out. Please arrive 10 minutes  early to complete paperwork.            Mar 14, 2018  2:30 PM CDT   (Arrive by 2:15 PM)   RETURN HAND with Chris Uribe MD   [facility] ([facility])    [address] [place] [address]  4th [facility] [id number] [phone]            Jun 12, 2018  3:00 PM CDT   (Arrive by 2:45 PM)   RETURN DIABETES with Felicia Mckeon MD   [facility] Endocrinology ([facility])    [address] [place] [address]  3rd [facility] [address] [phone]              Who to contact     Please call your clinic at [phone] to:    Ask questions about your health    Make or cancel appointments    Discuss your medicines    Learn about your test results    Speak to your doctor   If you have compliments or concerns about an experience at your clinic, or if you wish to file a complaint, please contact [facility] Patient Relations at [phone] or email us at [email or web address]         Additional Information About Your Visit        [facility] Information     FitnessManagert gives you secure access to your electronic health record. If you see a primary care provider, you can also send messages to your care team and make appointments. If you have questions, please call your primary care clinic.  If you do not have a primary care provider, please call [phone] and they will assist you.      BubbleNoise is an electronic gateway that provides easy, online access to your medical records. With BubbleNoise, you can request a clinic appointment, read your test results, renew a prescription or communicate with your care team.     To access your existing account, please contact your [facility] Clinic or call [phone] for assistance.        Care EveryWhere ID     This is your Care EveryWhere ID. This could be used by other organizations to access your [facility] medical records  [id number]        Your Vitals Were   "   Height BMI (Body Mass Index)                1.778 m (5' 10\") 25.83 kg/m2           Blood Pressure from Last 3 Encounters:   01/09/18 126/82   12/05/17 138/81   10/09/17 130/86    Weight from Last 3 Encounters:   01/31/18 81.6 kg (180 lb)   01/09/18 84.2 kg (185 lb 9.6 oz)   12/05/17 81.6 kg (180 lb)              We Performed the Following     HAND THERAPY        Primary Care Provider Office Phone # Fax #    Dimitri Alas [phone] [address] [address]        Equal Access to Services     CHARLES MOORE : Diego Castillo, reymundo chacon, angelia kaalmabrian joaquin, kaitlin owusu. So [facility] [phone].    ATENCIÓN: Si habla español, tiene a ramsey disposición servicios gratuitos de asistencia lingüística. Llame al [phone].    We comply with applicable federal civil rights laws and [place] laws. We do not discriminate on the basis of race, color, national origin, age, disability, sex, sexual orientation, or gender identity.            Thank you!     Thank you for choosing [facility]  for your care. Our goal is always to provide you with excellent care. Hearing back from our patients is one way we can continue to improve our services. Please take a few minutes to complete the written survey that you may receive in the mail after your visit with us. Thank you!             Your Updated Medication List - Protect others around you: Learn how to safely use, store and throw away your medicines at www.disposemymeds.org.          This list is accurate as of 1/31/18  4:54 PM.  Always use your most recent med list.                   Brand Name Dispense Instructions for use Diagnosis    aspirin 81 MG tablet      Take 1 tablet by mouth daily.        blood glucose monitoring lancets     4 Box    Use to test blood sugar 8 times daily or as directed.    Type 1 diabetes, HbA1c goal < 7% (H)       blood glucose monitoring " test strip    ACCU-CHEK COLIN PLUS    700 strip    Test Blood Sugar 8 times daily or as directed    Type 1 diabetes, HbA1c goal < 7% (H)       CALCIUM + D PO      Take one daily        citalopram 20 MG tablet    celeXA    135 tablet    Take 1.5 tablets (30 mg) by mouth daily    Anxiety       diclofenac 1 % Gel topical gel    VOLTAREN    100 g    APPLY 2 GRAMS ONTO THE SKIN FOUR TIMES DAILY AS NEEDED FOR MODERATE PAIN    Enthesopathy, Pain in joint, multiple sites, Chronic pain of right ankle       dulaglutide 1.5 MG/0.5ML pen    TRULICITY    6 mL    Inject 1.5 mg Subcutaneous every 7 days    Type 1 diabetes mellitus without complication (H)       econazole nitrate 1 % cream      Apply 0.5 inches topically daily.        fish oil-omega-3 fatty acids 1000 MG capsule      Take one daily        folic acid 1 MG tablet    FOLVITE    90 tablet    Take 1 tablet (1 mg) by mouth daily    Pain in joint, multiple sites, Tendinitis, Encounter for therapeutic drug monitoring       HYDROcodone-acetaminophen 5-325 MG per tablet    NORCO    15 tablet    Take 1-2 tablets by mouth every 4 hours as needed for moderate to severe pain        insulin degludec 100 UNIT/ML pen    TRESIBA    15 mL    Inject 18 Units Subcutaneous daily    Type 1 diabetes mellitus without complication (H)       insulin pen needle 31G X 8 MM    B-D U/F    450 each    Use 5 pen needles daily    Diabetes mellitus type I (H)       levothyroxine 125 MCG tablet    SYNTHROID/LEVOTHROID    90 tablet    Take 1 tablet (125 mcg) by mouth daily    Hypothyroidism       methotrexate 2.5 MG tablet CHEMO     24 tablet    Take 4 tablets (10 mg) by mouth once a week    Pain in joint, multiple sites, Tendinitis, Encounter for therapeutic drug monitoring       methylphenidate 20 MG CR capsule    METADATE CD     Take 20 mg by mouth daily        mometasone-formoterol 100-5 MCG/ACT oral inhaler    DULERA    3 Inhaler    Inhale 2 puffs into the lungs 2 times daily    Moderate  persistent asthma without complication       montelukast 5 MG chewable tablet    SINGULAIR    30 tablet    CHEW AND SWALLOW 1 TABLET(5 MG) BY MOUTH AT BEDTIME    Mild persistent asthma without complication       MULTIVITAMIN PO      Take  by mouth. Take one daily tab        NIZORAL PO      Take  by mouth. Shampoo daily        NovoLOG PENFILL 100 UNIT/ML injection   Generic drug:  insulin aspart     30 mL    1 unit per 15 grams CHO at all meals and snacks with correction scale of 1 unit per 50 mg/dL over 150.  Average daily use is 20 units.    Type 1 diabetes, HbA1c goal < 7% (H)       ROGAINE EX      Externally apply  topically. 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    Insomnia       * UNABLE TO FIND      MEDICATION NAME: biosil    Enthesopathy, Pain in joint, multiple sites, Chronic pain of right ankle       * UNABLE TO FIND      Biosil        * Notice:  This list has 4 medication(s) that are the same as other medications prescribed for you. Read the directions carefully, and ask your doctor or other care provider to review them with you.

## 2018-02-01 ENCOUNTER — OFFICE VISIT (OUTPATIENT)
Dept: RHEUMATOLOGY | Facility: CLINIC | Age: 48
End: 2018-02-01
Attending: INTERNAL MEDICINE
Payer: COMMERCIAL

## 2018-02-01 VITALS
HEIGHT: 70 IN | WEIGHT: 180 LBS | BODY MASS INDEX: 25.77 KG/M2 | TEMPERATURE: 97.8 F | HEART RATE: 90 BPM | OXYGEN SATURATION: 97 % | DIASTOLIC BLOOD PRESSURE: 77 MMHG | SYSTOLIC BLOOD PRESSURE: 115 MMHG

## 2018-02-01 DIAGNOSIS — M65.30 TRIGGER FINGER, ACQUIRED: Primary | ICD-10-CM

## 2018-02-01 DIAGNOSIS — M25.50 PAIN IN JOINT, MULTIPLE SITES: ICD-10-CM

## 2018-02-01 DIAGNOSIS — Z51.81 ENCOUNTER FOR THERAPEUTIC DRUG MONITORING: ICD-10-CM

## 2018-02-01 DIAGNOSIS — M77.9 TENDINITIS: ICD-10-CM

## 2018-02-01 DIAGNOSIS — M77.9 ENTHESOPATHY: ICD-10-CM

## 2018-02-01 DIAGNOSIS — M25.571 PAIN IN JOINT INVOLVING ANKLE AND FOOT, RIGHT: ICD-10-CM

## 2018-02-01 DIAGNOSIS — K21.9 GASTROESOPHAGEAL REFLUX DISEASE WITHOUT ESOPHAGITIS: ICD-10-CM

## 2018-02-01 LAB
ALBUMIN SERPL-MCNC: 4.2 G/DL (ref 3.4–5)
ALT SERPL W P-5'-P-CCNC: 22 U/L (ref 0–50)
AST SERPL W P-5'-P-CCNC: 19 U/L (ref 0–45)
BASOPHILS # BLD AUTO: 0 10E9/L (ref 0–0.2)
BASOPHILS NFR BLD AUTO: 0.5 %
CREAT SERPL-MCNC: 0.58 MG/DL (ref 0.52–1.04)
CRP SERPL-MCNC: <2.9 MG/L (ref 0–8)
DIFFERENTIAL METHOD BLD: ABNORMAL
EOSINOPHIL # BLD AUTO: 0.1 10E9/L (ref 0–0.7)
EOSINOPHIL NFR BLD AUTO: 0.9 %
ERYTHROCYTE [DISTWIDTH] IN BLOOD BY AUTOMATED COUNT: 12.9 % (ref 10–15)
GFR SERPL CREATININE-BSD FRML MDRD: >90 ML/MIN/1.7M2
HCT VFR BLD AUTO: 34.6 % (ref 35–47)
HGB BLD-MCNC: 11.7 G/DL (ref 11.7–15.7)
IMM GRANULOCYTES # BLD: 0 10E9/L (ref 0–0.4)
IMM GRANULOCYTES NFR BLD: 0.4 %
LYMPHOCYTES # BLD AUTO: 1.6 10E9/L (ref 0.8–5.3)
LYMPHOCYTES NFR BLD AUTO: 27.7 %
MCH RBC QN AUTO: 31.6 PG (ref 26.5–33)
MCHC RBC AUTO-ENTMCNC: 33.8 G/DL (ref 31.5–36.5)
MCV RBC AUTO: 94 FL (ref 78–100)
MONOCYTES # BLD AUTO: 0.5 10E9/L (ref 0–1.3)
MONOCYTES NFR BLD AUTO: 8.9 %
NEUTROPHILS # BLD AUTO: 3.5 10E9/L (ref 1.6–8.3)
NEUTROPHILS NFR BLD AUTO: 61.6 %
NRBC # BLD AUTO: 0 10*3/UL
NRBC BLD AUTO-RTO: 0 /100
PLATELET # BLD AUTO: 270 10E9/L (ref 150–450)
RBC # BLD AUTO: 3.7 10E12/L (ref 3.8–5.2)
WBC # BLD AUTO: 5.6 10E9/L (ref 4–11)

## 2018-02-01 PROCEDURE — 82040 ASSAY OF SERUM ALBUMIN: CPT | Performed by: INTERNAL MEDICINE

## 2018-02-01 PROCEDURE — 85025 COMPLETE CBC W/AUTO DIFF WBC: CPT | Performed by: INTERNAL MEDICINE

## 2018-02-01 PROCEDURE — 84450 TRANSFERASE (AST) (SGOT): CPT | Performed by: INTERNAL MEDICINE

## 2018-02-01 PROCEDURE — 82565 ASSAY OF CREATININE: CPT | Performed by: INTERNAL MEDICINE

## 2018-02-01 PROCEDURE — 86140 C-REACTIVE PROTEIN: CPT | Performed by: INTERNAL MEDICINE

## 2018-02-01 PROCEDURE — G0463 HOSPITAL OUTPT CLINIC VISIT: HCPCS | Mod: ZF

## 2018-02-01 PROCEDURE — 84460 ALANINE AMINO (ALT) (SGPT): CPT | Performed by: INTERNAL MEDICINE

## 2018-02-01 PROCEDURE — 36415 COLL VENOUS BLD VENIPUNCTURE: CPT | Performed by: INTERNAL MEDICINE

## 2018-02-01 ASSESSMENT — PAIN SCALES - GENERAL: PAINLEVEL: MODERATE PAIN (5)

## 2018-02-01 NOTE — MR AVS SNAPSHOT
After Visit Summary   2/1/2018    Dali Martines    MRN: [id number]           Patient Information     Date Of Birth          1970        Visit Information        Provider Department      2/1/2018 2:30 PM Yoel Betancourt MD [facility]umat[facility]         Follow-ups after your visit        Your next 10 appointments already scheduled     Feb 01, 2018  3:45 PM CST   Lab with [facility]    Health Lab ([facility])    [address] [place] [address]   [phone]            Feb 07, 2018  1:30 PM CST   (Arrive by 1:15 PM)   Return Visit with Kraig Villagomez MD   [facility] Physical Medicine and Rehabilitation ([facility])    [address] [place] [address]   [phone]            Feb 10, 2018 11:10 AM CST   LAKISHA Extremity with Michi Esteves Pt, PT   [facility] ([facility]  )    [address]  [facility] [address]   [phone]            Feb 13, 2018 12:00 PM CST   (Arrive by 11:45 AM)   Return Visit with Sara Carter MD   [facility] ([facility])    [address] [place] [address]  [address] [place] [address]   [phone]            Feb 28, 2018 10:30 AM CST   (Arrive by 10:15 AM)   Office Visit with Luz Marina Holley RD   [facility] Diabetes ([facility])    [address] [place] [address]   [phone]           Bring a current list of meds and any records pertaining to this visit. For Physicals, please bring immunization records and any forms needing to be filled out. Please arrive 10 minutes early to complete paperwork.            Mar 14, 2018  2:30 PM CDT   (Arrive by 2:15 PM)   RETURN HAND with Chris Uribe MD   [facility] ([facility])    [address] [place] [address]  Floor  [facility] [id number] [phone]            May 15, 2018  3:00 PM CDT   (Arrive by 2:45 PM)   Return Visit with Yoel Betancourt MD   [facility] Rheumatology ([facility])    [address]  [address]  [facility] [id number] [phone]            Jun 12, 2018  3:00 PM CDT   (Arrive by 2:45 PM)   RETURN DIABETES with Felicia Mckeon MD   [facility] Endocrinology ([facility])    [address]  3rd Floor  [facility] [id number] [phone]              Who to contact     If you have questions or need follow up information about today's clinic visit or your schedule please contact [facility] RHEUMATOLOGY directly at [phone].  Normal or non-critical lab and imaging results will be communicated to you by wavecatchhart, letter or phone within 4 business days after the clinic has received the results. If you do not hear from us within 7 days, please contact the clinic through [employer] or phone. If you have a critical or abnormal lab result, we will notify you by phone as soon as possible.  Submit refill requests through [email or web address] or call your pharmacy and they will forward the refill request to us. Please allow 3 business days for your refill to be completed.          Additional Information About Your Visit        [employer] Information     [email or web address] gives you secure access to your electronic health record. If you see a primary care provider, you can also send messages to your care team and make appointments. If you have questions, please call your primary care clinic.  If you do not have a primary care provider, please call [phone] and they will assist you.        Care EveryWhere ID     This is your Care EveryWhere ID. This could be used by other organizations to access your [facility] medical records  [id number]        Your Vitals Were     Pulse Temperature Height Pulse Oximetry BMI (Body Mass Index)       90 97.8  F (36.6  C) (Oral)  "1.778 m (5' 10\") 97% 25.83 kg/m2        Blood Pressure from Last 3 Encounters:   02/01/18 115/77   01/09/18 126/82   12/05/17 138/81    Weight from Last 3 Encounters:   02/01/18 81.6 kg (180 lb)   01/31/18 81.6 kg (180 lb)   01/09/18 84.2 kg (185 lb 9.6 oz)              Today, you had the following     No orders found for display       Primary Care Provider Office Phone # Fax #    Dimitri Alas [phone]        [address] [address]        Equal Access to Services     CHARLES MOORE : Diego Castillo, wageorges chacon, angelia kaalmada jemal, kaitlin owusu. So [facility] [phone].    ATENCIÓN: Si habla español, tiene a ramsey disposición servicios gratuitos de asistencia lingüística. Llame al [phone].    We comply with applicable federal civil rights laws and [place] laws. We do not discriminate on the basis of race, color, national origin, age, disability, sex, sexual orientation, or gender identity.            Thank you!     Thank you for choosing [facility]  for your care. Our goal is always to provide you with excellent care. Hearing back from our patients is one way we can continue to improve our services. Please take a few minutes to complete the written survey that you may receive in the mail after your visit with us. Thank you!             Your Updated Medication List - Protect others around you: Learn how to safely use, store and throw away your medicines at www.disposemymeds.org.          This list is accurate as of 2/1/18  3:39 PM.  Always use your most recent med list.                   Brand Name Dispense Instructions for use Diagnosis    aspirin 81 MG tablet      Take 1 tablet by mouth daily.        blood glucose monitoring lancets     4 Box    Use to test blood sugar 8 times daily or as directed.    Type 1 diabetes, HbA1c goal < 7% (H)       blood glucose monitoring test strip    ACCU-CHEK COLIN PLUS    " 700 strip    Test Blood Sugar 8 times daily or as directed    Type 1 diabetes, HbA1c goal < 7% (H)       CALCIUM + D PO      Take one daily        citalopram 20 MG tablet    celeXA    135 tablet    Take 1.5 tablets (30 mg) by mouth daily    Anxiety       diclofenac 1 % Gel topical gel    VOLTAREN    100 g    APPLY 2 GRAMS ONTO THE SKIN FOUR TIMES DAILY AS NEEDED FOR MODERATE PAIN    Enthesopathy, Pain in joint, multiple sites, Chronic pain of right ankle       dulaglutide 1.5 MG/0.5ML pen    TRULICITY    6 mL    Inject 1.5 mg Subcutaneous every 7 days    Type 1 diabetes mellitus without complication (H)       econazole nitrate 1 % cream      Apply 0.5 inches topically daily.        fish oil-omega-3 fatty acids 1000 MG capsule      Take one daily        folic acid 1 MG tablet    FOLVITE    90 tablet    Take 1 tablet (1 mg) by mouth daily    Pain in joint, multiple sites, Tendinitis, Encounter for therapeutic drug monitoring       HYDROcodone-acetaminophen 5-325 MG per tablet    NORCO    15 tablet    Take 1-2 tablets by mouth every 4 hours as needed for moderate to severe pain        insulin degludec 100 UNIT/ML pen    TRESIBA    15 mL    Inject 18 Units Subcutaneous daily    Type 1 diabetes mellitus without complication (H)       insulin pen needle 31G X 8 MM    B-D U/F    450 each    Use 5 pen needles daily    Diabetes mellitus type I (H)       levothyroxine 125 MCG tablet    SYNTHROID/LEVOTHROID    90 tablet    Take 1 tablet (125 mcg) by mouth daily    Hypothyroidism       methotrexate 2.5 MG tablet CHEMO     24 tablet    Take 4 tablets (10 mg) by mouth once a week    Pain in joint, multiple sites, Tendinitis, Encounter for therapeutic drug monitoring       methylphenidate 20 MG CR capsule    METADATE CD     Take 20 mg by mouth daily        mometasone-formoterol 100-5 MCG/ACT oral inhaler    DULERA    3 Inhaler    Inhale 2 puffs into the lungs 2 times daily    Moderate persistent asthma without complication        montelukast 5 MG chewable tablet    SINGULAIR    30 tablet    CHEW AND SWALLOW 1 TABLET(5 MG) BY MOUTH AT BEDTIME    Mild persistent asthma without complication       MULTIVITAMIN PO      Take  by mouth. Take one daily tab        NIZORAL PO      Take  by mouth. Shampoo daily        NovoLOG PENFILL 100 UNIT/ML injection   Generic drug:  insulin aspart     30 mL    1 unit per 15 grams CHO at all meals and snacks with correction scale of 1 unit per 50 mg/dL over 150.  Average daily use is 20 units.    Type 1 diabetes, HbA1c goal < 7% (H)       ROGAINE EX      Externally apply  topically. 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    Insomnia       * UNABLE TO FIND      MEDICATION NAME: biosil    Enthesopathy, Pain in joint, multiple sites, Chronic pain of right ankle       * UNABLE TO FIND      Biosil        * Notice:  This list has 4 medication(s) that are the same as other medications prescribed for you. Read the directions carefully, and ask your doctor or other care provider to review them with you.

## 2018-02-01 NOTE — LETTER
2/1/2018      RE: Dali Martines  [address]  [facility] [address]       Rheumatology Visit     Dali Martines MRN# [id number]   YOB: 1970 Age: 44 year old         Primary care provider: Dimitri Alas          Assessment and Plan:     Psoriatic Arthriitis versus Rheumatoid Arthritis     Ms. Dali Martines is a 44 year-old woman with PMH of type I DM, Hashimoto's thyroiditis, and possible psoriasis in childhood, with family history of RA, type I DM, Hashimoto's, and psoriasis presents who presents with recurrent acute onset pain, swelling, stiffness in L hand.    Most likely explanation of her symptoms is psoriatic arthritis given the presentation of asymmetric, transient joint pain/swelling/stiffness, new low titer NOLVIA (vs 2008), family history of psoriasis and active psoriasis found on exam, plus morning heel pain. Psoriasis tends to be worst in fall/winter months, and this corresponds to when she has had these episodes of arthritis in the hand.    PLAN/RECOMMENDATIONS:   Her most significant issue ow is this trigger nodule of the right thumb.  She did not notice any improvement with topical Voltaren gel for this issue. Had a steroid injection in the past which she is concerned made her triggering worse and she is concerned that a trigger release could exacerbate this issue after seeing Orthopedics yesterday. Given her ongoing concern for this issue, we discussed conservative management of her triggering with PT as ultrasound has improved these nodules on the left hand. Also, with her various motions that results in triggering, it may be beneficial to provide additional splinting. We discussed that diabetes is most likley the disease that puts Ms. Martines at increased risk for these nodules. With increasing doses of Methotrexate, it is possible that her GI symptoms may due an adverse effect of this medication. For this reason, will have her take a drug holiday from Methotrexate until  her symptoms resolve, then restart this at her prior dose, and titrate up as tolerated. No evidence of hepatic involvement on labs today. The nail ridges she noted today do not seem consistent with nail involvement of psoriatic arthritis such as oncholysis, nail pitting, nail bed hyperkeratosis.   - Recommended Thumb Spica Splint as well as following up with PT for treatment of inflammatory nodules as this has had good efficacy for her in the past  - If she feels that surgery is best for this triggering, she can follow up for this procedure with Dr. Uribe in Orthopedics or can pursue a second opinion as desired.   - Due to her GI discomfort with decreased appetite, advised her to take a Methotrexate holiday for the next one to two weeks to monitor her symptoms. Once she resumes this, she can resume her prior dose of Methotrexate 2.5 mg QID and then slowly increase her dose back to six times per day as tolerated for symptomatic control   - Monitoring labs including CRP, CBC, Creatinine, Albumin, AST, and ALT were obtained today.                  History of Present Illness:   Ms. Dali Martines is a 44-year-old woman with a history of type I DM and Hashimoto's thyroiditis who presents for evaluation of recurrent stiffness, swelling, and pain in the left hand.    Symptoms first occurred in August 2008, when she began having stiffness and pain in the left hand, particularly in the PIPs and MCPs. Symptoms spontaneously resolved after about 6-8 weeks. She was previously seen by Rheumatology here around that time (see note from Dr. Betancourt on 12/9/2008). Briefly, assessment was that she had a self-limited episode of inflammatory arthralgias, possibly viral or early psoriatic arthritis. Negative NOLVIA, RF, Lyme serologies and normal CRP and ESR. Given her family history of RA, there was a thought to start her on HCQ if anti-CCP antibodies were positive, but they were found to be negative. No imaging of the hand was  performed.    Last September (2014), she experienced recurrent acute onset pain, stiffness, and swelling in the L hand very similar to her symptoms in 2008. Specifically, had swelling across MCPs and in 3rd PIP. She could hardly bend the hand sometimes due to the welling. Had morning stiffness lasting 30-45 minutes. No other joints were involved.    Issues with her left hand lingered for several months but eventually self-resolved around January. As of today, her only notable symptom is heel pain in the mornings. No fevers, chills, or weight loss. No back pain, vision changes, nausea, vomiting, diarrhea, abdominal pain, or blood in stool or urine. No rash, sicca symptoms, or oral ulcers.    It seems she has a history of hypermobility, e.g. could bend wrist back to forearm, bend down and touch palms flat on floor, when she was younger. Used to get frequent ankle sprains, and has had multiple tendon and ligament tears over the years.    SEPTEMBER 22, 2015, INTERVAL HISTORY       Since we have last seen the patient, she feels like her joints were generally better over the summer.  She did have some right ankle pain that has continued to be tender at times.  She also got some pretty significant tennis elbow during the summer, but both of these did well with meloxicam.  Her tennis elbow did not respond as well to a compressive band.      About 10 days ago, however, she again started having dull hand pain, left generally greater than right, except for her right fifth digit.  She has about an hour of associated morning stiffness.  This is very similar to what she has had the last 2 years.  Last year, this seemed to last most of the year, but the prior year this was only about 3 months in duration.       Throughout this time, she has had no development of clear-cut psoriasis.  She has carried a diagnosis of psoriasis in the past, but it has not been a persistent problem with her skin.  She does have a strong family history,  however, with this affecting her uncle and her mother.       Recall that based on her symptoms and her type 1 diabetes, we had previously checked her for the anti-citrullinated peptide antibodies, but she does not have those.     December 08 2015 Interval history     Since last visit and starting the Meloxicam daily, she feels that her left 5th digit pain has improved significantly. She did have an URI recently with some SOB and had a Prednisone burst of 20 mg daily for five days and she felt that her pain was completely gone with the steroids. She is not noticing any morning stiffness, swelling or erythema in the left 5th digit. She will occasionally have some chronic pain in the DIP of the right hand but very seldomly. Overall, she feels that her joint pain is better since starting the Meloxicam.     She has noted a small lump on the palmar aspect of her right hand. It is not painful and has not become swollen or red.     Her left ankle has become more bothersome and she thinks she will be needing surgery soon on that side.     When she was having an upper respiratory infection, she noted some swelling under the right mandible and it became so large that she felt it would obstruct her breathing when she turned her head to the right side. The swelling has come down but it is still present and slightly painful to touch. She has not had any weight loss, fevers, chills or night sweats.     No symptoms of dry mouth, eyes, oral lesions, rash, abdominal pain, nausea, vomiting.     Interim history 5/17/2016:  Doing well on Meloxicam. Since last visit, had to go off of Meloxicam for ankle surgery (torn tendon). A couple flares off of it, recently went back on due to the pain. Always has been in L hand, now in right hand and both elbows. Mainly painful (5/10) and stiff. Stiffness begins in the morning, takes about 30 minutes to subside. No noted erythema, warmth or edema of joints. Has two nodules on base of 2nd MCP, can be  painful occasionally, no changing drastically in size. Was doing better in the winter, so wondering if there could be a seasonal component. No known seasonal allergies.    Had the chicken pox around age 12. In the past 10-15 years, will periodically get shingles presenting as unilateral painful, itchy rash around L waist every few years. Most recently it occurred in April and was much worse than usual (nerve pain to the point of not being able to sleep). Muscle relaxant helped clear it up and allow for sleep. Has continued intermittent fever and chills since this episode. Decreased appetite, mild nausea. No other rashes since winter.     INTERVAL HISTORY (10/18/2016):  Since we have last seen the patient, she tried going down on meloxicam and actually has tolerated that pretty well.  She has a little bit more stiffness in the morning in her left hand, but the right hand is pretty good by comparison.  She occasionally has some sporadic sharp pains there, but nothing severe and nothing persistent.  Her overall morning stiffness in the left hand is only about 30 minutes.  Her PIP joints are always the worst, and her fourth and fifth fingers tend to bother her a little bit more.      She did have some palmar contracture developing but with hand therapy those improved without any injections.  She has also avoiding a trackball mouse as that seemed to make things worse.      She continues to have some pain in her right ankle.  She had a bony fragment removed there in 01/2016 by Dr. Tran.  That has hurt a fair amount since and the nature of the pain is not particularly inflammatory and suggests more of a DJD problem.  She also has some pain in the left knee which had an ACL construction in 1995, also possibly consistent with DJD.      She has had some intermittent attacks of very low-grade psoriasis over her chest up in the clavicle area, but has none currently.  She has no other significant new problems.     INTERVAL  HISTORY:  Since we last saw the patient, she had an odd episode of pain in her left hand on 02/20.  She said this initially started very sharp, then she got a lump in the palm of her hand that felt inflamed, and then she noticed some bruising in the area.  She does not recall any trauma to it.  She resumed meloxicam, which she had stopped and with that felt better over the next 2 weeks, but has remained on it since.      She also has a sensation in her right fifth digit that is consistently sore and somewhat achy.  That has persisted even with the meloxicam.      Finally, on the right ankle, which has been sprained several times in the past, she continues to wear a brace.  She actually uses the topical meloxicam on that area, but has not done so on the fifth finger.      In all these areas she can have some stiffness in the morning lasting for up to an hour.      Her other concern is that she has gained about 10 pounds.  She does not think she has changed her eating much.     AUGUST 17, 2017, INTERVAL HISTORY:  At the time we last saw the patient, we urged her to try to get off of meloxicam.  She did that and used the topical diclofenac I had given her, but she felt like she quickly worsened with respect to arthritis and, more interestingly and somewhat surprisingly, she got psoriasis over a number of areas.  That has since improved, and it is not entirely clear to me whether there is any relationship here, but she did get some along her hairline and a few other areas.       From the standpoint of her joints, the most dominant issue is right thumb tendinitis, and she has had ongoing difficulty in gripping with it.      When she was doing that badly, we had her go back on the meloxicam temporarily, then started sulfasalazine.  Unfortunately, she had more severe side effects with sulfasalazine including a fever to 102 degrees, headaches, and a general sense of malaise, and this was bad enough that we gave her a course  of steroids to interfere with a potentially more severe sulfasalazine allergy and got her off of it.      She now has stopped the steroids as of 10 days ago.  Overall, she thinks she is doing better.  With the taper from the steroids, however, she is again feeling somewhat worse, particularly in her joints.       She really has never had much of anything in the way of psoriasis.  I have felt her arthritis was most consistent with psoriatic arthritis, and she has had a couple of very small plaques occasionally that could have been consistent with it but have never been biopsied.  She does, however, have a strong family history of psoriasis.     INTERVAL HISTORY:  At the time we last saw the patient, she was improving from her adverse effects of sulfasalazine and we initiated methotrexate at 3 tablets weekly.  She actually tolerated that quite well and was able to go up to 4 tablets weekly and continues to tolerate that with no significant symptoms.  Notably, she has the odd effect of having her joints actually feel a little worse for 1-2 days at the time of methotrexate dosing and then feeling significantly better.  She notes this in bilateral second and third fingers in both the MCP and PIP joints and these have been the primary joints affecting her.  She definitely feels that they are overall better since starting the methotrexate and have relatively little morning stiffness now.      The bigger and more persistent problem has been her right thumb.  This still hurts, and she has a flexor tendon nodule there that is probably causing most of the difficulties.  She tried a splint but was not sure it helped very much.  She gets locking and catching with activity that requires her to manually release the catching with her other hand.  This has caused some ongoing significant tenderness in the area.  She does think it was better when she was back on Meloxicam however.      Incidentally, she has been noted to have early  "retinopathy in her right eye which is, of course, of concern to her and basically would rule out the use of Plaquenil as an adjunctive medicine.     Interval History 2/1/2018:   Ms. Stephenson most significant concern today continues to be her inflammatory nodules that she felt became much worse after discontinuing Meloxicam. They appeared over the bridge of nose, dorsum and plantar surface of left hand, and over tendon of right thumb. Systemic steroids resolved some of these nodules including over her nose, but she feels the most effective therapy was working with PT and having ultrasound treatments.    Her right hand has a nodule at the base of her 1st MTP on the palmar surface that causes her significant distress. She is right hand dominant this nodules make it especially hard to write and difficult to type. She was seen by Dr. Uribe in orthopedics yesterday who evaluated the patient for a possible trigger release. Ms. Martines has a degree of hesitancy about this procedure as she was given a steroid injection was in October of 2017 witih \"slight improvement\" but subsequently developed catching and trigger with more thumb movements. Diclofenac cream hasn't improved this catching.     There is some relatively new nail changes that Ms. Martines has noticed in her right thumbnail that she described as nail ridges without pitting or evidence of swelling or inflammation.     Additionally, she has noticed that she has been experiencing swelling of her right hand localized to the fingers that with associated \"fullness\". She had experienced this in her left hand when her arthritis was much less controlled, but notes that at this time her arthritis is under adequate control. ncreased to 6 tablets (of 2.5 mg) per week over last two weeks after having increased her dose of MTX to 5 tablets back in October of 2017.    She has no eye symptoms, chest pain, worsening SOB aside from baseline asthma, back pain, rashes, or new skin " "lesions. Ms. Martines did mention a severe case of \"stomach flu\" 2 weeks ago which she believes may have worsened her joint symptoms. This was described as persistent nausea/vomiting with intolerance of fluids/solids without diarrhea that resolved over 2 days. Since that time, has had a low appetite and increased satiety.  She wonders if her increasing dose of Methotrexate may have resulted in these lingering GI symptoms. Otherwise, she has no other concerns.      I, Diego Johnson MS4, served as a scribe in documentation for Dr. Betancourt.            Review of Systems:   Negative other than what is stated in the HPI above           Past Medical History:     Past Medical History:   Diagnosis Date     Anemia      Anxiety      Arthritis 2014    Psoriatic arthritis     Back injury 1988     Dry eye syndrome      Dyslipidemia      Endometriosis 2002    adehsions seen at laparoscopy     GERD (gastroesophageal reflux disease)      Hypothyroidism     8 yoa     SOB (shortness of breath) 3/11/2014     Type I (juvenile type) diabetes mellitus without mention of complication, not stated as uncontrolled     when 15      Uncomplicated asthma Fall 2013   Possible psoriasis as a child  Multiple tendon and ligament injuries    Patient Active Problem List    Diagnosis Date Noted     Pain in joint involving ankle and foot, right 10/11/2017     Priority: Medium     Trigger finger, acquired 09/28/2017     Priority: Medium     Type 1 diabetes mellitus without complication (H) 08/08/2017     Priority: Medium     Left knee pain 07/07/2016     Priority: Medium     Swelling of limb 03/15/2016     Priority: Medium     Other postprocedural status(V45.89) 03/15/2016     Priority: Medium     ESR raised 01/03/2016     Priority: Medium     Right foot pain 10/14/2015     Priority: Medium     Chronic pain of right ankle 07/30/2015     Priority: Medium     Enthesopathy 07/20/2015     Priority: Medium     Pain in joint, multiple sites 07/20/2015     Priority: " Medium     PAIN KNEE JOINT 06/17/2015     Priority: Medium     Diabetes (H) 02/26/2015     Priority: Medium     Screening for other eye conditions 02/26/2015     Priority: Medium     Encounter for other specified aftercare 02/24/2015     Priority: Medium     PAIN FOOT 02/24/2015     Priority: Medium     SOB (shortness of breath) 03/11/2014     Priority: Medium     Cervicalgia 06/25/2012     Priority: Medium     Chronic low back pain 06/25/2012     Priority: Medium     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/2012     Priority: Medium     Cutaneous skin tags 06/12/2012     Priority: Medium     Type 1 diabetes mellitus with retinopathy (H) 04/05/2011     Priority: Medium     Anxiety 04/05/2011     Priority: Medium     CARDIOVASCULAR SCREENING; LDL GOAL LESS THAN 100 10/31/2010     Priority: Medium     GERD (gastroesophageal reflux disease) 02/24/2009     Priority: Medium             Past Surgical History:     Past Surgical History:   Procedure Laterality Date     C REPAIR CRUCIATE LIGAMENT,KNEE       C STOMACH SURGERY PROCEDURE UNLISTED  December 2002     COLONOSCOPY  2002     ESOPHAGOSCOPY, GASTROSCOPY, DUODENOSCOPY (EGD), COMBINED  1/7/2014    Procedure: COMBINED ESOPHAGOSCOPY, GASTROSCOPY, DUODENOSCOPY (EGD), BIOPSY SINGLE OR MULTIPLE;;  Surgeon: Kraig Nicole MD;  Location: [facility]     GYN SURGERY      Laparotomy to remove endometrial tissue     HC BREATH HYDROGEN TEST N/A 6/17/2014    Procedure: HYDROGEN BREATH TEST;  Surgeon: Deacon Alberts MD;  Location:  GI     HYDROGEN BREATH TEST  6/17/14     LAPAROSCOPIC APPENDECTOMY  2002     LAPAROTOMY, LYSIS ADHESIONS, COMBINED  2002    endometriosis     SOFT TISSUE SURGERY  December 2014    right ankle tendon injury             Social History:     Social History   Substance Use Topics     Smoking status: Never Smoker     Smokeless tobacco: Never Used     Alcohol use 0.0 oz/week     0 Standard drinks or equivalent per week      Comment: moderately    Lives on her own. No children. Works as a researcher in education.          Family History:   Father with RA, Hashimoto's thyroiditis, and type I DM  Mother with psoriasis  Possible SLE in cousins  No family history of gout or IBD         Allergies:     Allergies   Allergen Reactions     Sulfasalazine      Developed rash, HA, dizziness             Medications:     Current Outpatient Prescriptions   Medication Sig Dispense Refill     montelukast (SINGULAIR) 5 MG chewable tablet CHEW AND SWALLOW 1 TABLET(5 MG) BY MOUTH AT BEDTIME 30 tablet 3     simvastatin (ZOCOR) 20 MG tablet Take 1 tablet (20 mg) by mouth At Bedtime 90 tablet 3     simvastatin (ZOCOR) 20 MG tablet Take 1 tablet (20 mg) by mouth At Bedtime 90 tablet 3     methotrexate 2.5 MG tablet CHEMO Take 4 tablets (10 mg) by mouth once a week 24 tablet 2     diclofenac (VOLTAREN) 1 % GEL topical gel APPLY 2 GRAMS ONTO THE SKIN FOUR TIMES DAILY AS NEEDED FOR MODERATE PAIN 100 g 5     HYDROcodone-acetaminophen (NORCO) 5-325 MG per tablet Take 1-2 tablets by mouth every 4 hours as needed for moderate to severe pain 15 tablet 0     NOVOLOG PENFILL 100 UNIT/ML soln 1 unit per 15 grams CHO at all meals and snacks with correction scale of 1 unit per 50 mg/dL over 150.  Average daily use is 20 units. 30 mL 4     mometasone-formoterol (DULERA) 100-5 MCG/ACT oral inhaler Inhale 2 puffs into the lungs 2 times daily 3 Inhaler 3     folic acid (FOLVITE) 1 MG tablet Take 1 tablet (1 mg) by mouth daily 90 tablet 3     levothyroxine (SYNTHROID/LEVOTHROID) 125 MCG tablet Take 1 tablet (125 mcg) by mouth daily 90 tablet 3     UNABLE TO FIND Biosil       insulin degludec (TRESIBA) 100 UNIT/ML pen Inject 18 Units Subcutaneous daily 15 mL 11     dulaglutide (TRULICITY) 1.5 MG/0.5ML pen Inject 1.5 mg Subcutaneous every 7 days 6 mL 3     blood glucose monitoring (ACCU-CHEK COLIN PLUS) test strip Test Blood Sugar 8 times daily or as directed 700 strip 3     UNABLE TO FIND MEDICATION  NAME: biosil       blood glucose monitoring (ACCU-CHEK FASTCLIX) lancets Use to test blood sugar 8 times daily or as directed. 4 Box 3     citalopram (CELEXA) 20 MG tablet Take 1.5 tablets (30 mg) by mouth daily 135 tablet 1     insulin pen needle (B-D U/F) 31G X 8 MM Use 5 pen needles daily 450 each 3     methylphenidate (METADATE CD) 20 MG CR capsule Take 20 mg by mouth daily       traZODone (DESYREL) 50 MG tablet Take 1-2 tablets by mouth nightly as needed for sleep. 180 tablet 0     aspirin 81 MG tablet Take 1 tablet by mouth daily.       Calcium Carbonate-Vitamin D (CALCIUM + D PO) Take one daily       econazole nitrate 1 % cream Apply 0.5 inches topically daily.       fish oil-omega-3 fatty acids (FISH OIL) 1000 MG capsule Take one daily       Multiple Vitamin (MULTIVITAMIN OR) Take  by mouth. Take one daily tab       Ketoconazole (NIZORAL PO) Take  by mouth. Shampoo daily       Minoxidil (ROGAINE EX) Externally apply  topically. daily              Physical Exam:   not currently breastfeeding.     Limited today to joint exam below, and skin exam.   Constitutional: [id number] cooperative  Eyes: nl EOM, PERRLA, vision, conjunctiva, sclera  ENT: nl external ears, nose, hearing, lips, teeth, gums, throat  No mucous membrane lesions, normal saliva pool  Neck: no mass or thyroid enlargement  Resp: lungs clear to auscultation, nl to palpation  CV: RRR, no murmurs, rubs or gallops, no edema  GI: no ABD mass or tenderness, no HSM  : not tested  Lymph:  No supraclavicular, inguinal or epitrochlear nodes  MS:  She has a small tendon nodule noted on the plantar aspect of the right hand, tenderness in flexor tendon right thumb.    All other  TMJ, neck, shoulder, elbow, wrist, MCP/PIP/DIP were examined. No active synovitis. +Heberden's nodes. Full ROM.  Normal  strength. No dactylitis,  tenosynovitis, enthespathy   Skin:  NO active psoriasis along hairline or elsewhere. Longitudinal nail ridges of right thumbnail with  no nail pitting, surrounding erythema, or hyperkeratosis. No alopecia, rash, or lesions  Neuro: nl cranial nerves, strength, sensation, DTRs.   Psych: nl judgement, orientation, memory, affect.         Data:     Component      Latest Ref Rng 4/27/2016   WBC      4.0 - 11.0 10e9/L 4.8   RBC Count      3.8 - 5.2 10e12/L 3.63 (L)   Hemoglobin      11.7 - 15.7 g/dL 11.2 (L)   Hematocrit      35.0 - 47.0 % 33.6 (L)   MCV      78 - 100 fl 93   MCH      26.5 - 33.0 pg 30.9   MCHC      31.5 - 36.5 g/dL 33.3   RDW      10.0 - 15.0 % 12.7   Platelet Count      150 - 450 10e9/L 294   Diff Method       Automated Method   % Neutrophils       59.3   % Lymphocytes       30.3   % Monocytes       7.8   % Eosinophils       1.3   % Basophils       1.1   % Immature Granulocytes       0.2   Nucleated RBCs      0 /100 0   Absolute Neutrophil      1.6 - 8.3 10e9/L 2.8   Absolute Lymphocytes      0.8 - 5.3 10e9/L 1.4   Absolute Monocytes      0.0 - 1.3 10e9/L 0.4   Absolute Eosinophils      0.0 - 0.7 10e9/L 0.1   Absolute Basophils      0.0 - 0.2 10e9/L 0.1   Abs Immature Granulocytes      0 - 0.4 10e9/L 0.0   Absolute Nucleated RBC       0.0   TSH      0.40 - 4.00 mU/L        Lab Results   Component Value Date    AST 19 12/22/2014    AST 36 2/15/2013    AST 25 11/29/2010    ALT 22 12/22/2014    ALT 30 2/15/2013    ALT 8 11/23/2011    ALKPHOS 109 12/22/2014    ALKPHOS 89 2/15/2013    ALKPHOS 65 11/29/2010     Reviewed Rheumatology lab flowsheet. Pertinent results:  2014 -- NOLVIA 1.6, negative RF, normal CRP, mildly elevated ESR, unremarkable x-rays of hands and wrists      3 views right hand radiographs 1/31/2018 4:30 PM     History: right hand pain. psoriatic arthritis; Pain of right hand     Comparison: None     Findings:     PA, oblique and lateral view(s) of the right hand were obtained.      No acute osseous abnormality.  No erosion.     Mild degenerative changes of the first carpometacarpal and triscaphe  joints.  Additional scattered  degenerative change in the  interphalangeal joints, particularly distally.     Soft tissue is unremarkable.         Impression:  1. No acute osseous abnormality. No erosion.  2. No substantial degenerative change.      I have personally reviewed the examination and initial interpretation  and I agree with the findings.     JUTTA ELLERMANN, MD    Component      Latest Ref Rng & Units 2/1/2018   WBC      4.0 - 11.0 10e9/L 5.6   RBC Count      3.8 - 5.2 10e12/L 3.70 (L)   Hemoglobin      11.7 - 15.7 g/dL 11.7   Hematocrit      35.0 - 47.0 % 34.6 (L)   MCV      78 - 100 fl 94   MCH      26.5 - 33.0 pg 31.6   MCHC      31.5 - 36.5 g/dL 33.8   RDW      10.0 - 15.0 % 12.9   Platelet Count      150 - 450 10e9/L 270   Diff Method       Automated Method   % Neutrophils      % 61.6   % Lymphocytes      % 27.7   % Monocytes      % 8.9   % Eosinophils      % 0.9   % Basophils      % 0.5   % Immature Granulocytes      % 0.4   Nucleated RBCs      0 /100 0   Absolute Neutrophil      1.6 - 8.3 10e9/L 3.5   Absolute Lymphocytes      0.8 - 5.3 10e9/L 1.6   Absolute Monocytes      0.0 - 1.3 10e9/L 0.5   Absolute Eosinophils      0.0 - 0.7 10e9/L 0.1   Absolute Basophils      0.0 - 0.2 10e9/L 0.0   Abs Immature Granulocytes      0 - 0.4 10e9/L 0.0   Absolute Nucleated RBC       0.0   Creatinine      0.52 - 1.04 mg/dL 0.58   GFR Estimate      >60 mL/min/1.7m2 >90   GFR Estimate If Black      >60 mL/min/1.7m2 >90   ALT      0 - 50 U/L 22   AST      0 - 45 U/L 19   Albumin      3.4 - 5.0 g/dL 4.2   CRP Inflammation      0.0 - 8.0 mg/L <2.9       I saw the patient with the student, who served as my scribe, recording my history,  exam and recommendations as described.      This visit was 40 mins in duration, the majority spent in counseling.       Yoel Betancourt MD

## 2018-02-01 NOTE — NURSING NOTE
"Chief Complaint   Patient presents with     RECHECK     Psoriatic arthritis       Initial /77 (BP Location: Right arm, Patient Position: Sitting, Cuff Size: Adult Large)  Pulse 90  Temp 97.8  F (36.6  C) (Oral)  Ht 1.778 m (5' 10\")  Wt 81.6 kg (180 lb)  SpO2 97%  BMI 25.83 kg/m2 Estimated body mass index is 25.83 kg/(m^2) as calculated from the following:    Height as of this encounter: 1.778 m (5' 10\").    Weight as of this encounter: 81.6 kg (180 lb).  Medication Reconciliation: complete    "

## 2018-02-01 NOTE — PROGRESS NOTES
Rheumatology Visit     Dali Martines MRN# [id number]   YOB: 1970 Age: 44 year old         Primary care provider: Dimitri Alas          Assessment and Plan:     Psoriatic Arthriitis versus Rheumatoid Arthritis     Ms. Dali Martines is a 44 year-old woman with PMH of type I DM, Hashimoto's thyroiditis, and possible psoriasis in childhood, with family history of RA, type I DM, Hashimoto's, and psoriasis presents who presents with recurrent acute onset pain, swelling, stiffness in L hand.    Most likely explanation of her symptoms is psoriatic arthritis given the presentation of asymmetric, transient joint pain/swelling/stiffness, new low titer NOLVIA (vs 2008), family history of psoriasis and active psoriasis found on exam, plus morning heel pain. Psoriasis tends to be worst in fall/winter months, and this corresponds to when she has had these episodes of arthritis in the hand.    PLAN/RECOMMENDATIONS:   Her most significant issue ow is this trigger nodule of the right thumb.  She did not notice any improvement with topical Voltaren gel for this issue. Had a steroid injection in the past which she is concerned made her triggering worse and she is concerned that a trigger release could exacerbate this issue after seeing Orthopedics yesterday. Given her ongoing concern for this issue, we discussed conservative management of her triggering with PT as ultrasound has improved these nodules on the left hand. Also, with her various motions that results in triggering, it may be beneficial to provide additional splinting. We discussed that diabetes is most likley the disease that puts Ms. Martines at increased risk for these nodules. With increasing doses of Methotrexate, it is possible that her GI symptoms may due an adverse effect of this medication. For this reason, will have her take a drug holiday from Methotrexate until her symptoms resolve, then restart this at her prior dose, and titrate up as  tolerated. No evidence of hepatic involvement on labs today. The nail ridges she noted today do not seem consistent with nail involvement of psoriatic arthritis such as oncholysis, nail pitting, nail bed hyperkeratosis.   - Recommended Thumb Spica Splint as well as following up with PT for treatment of inflammatory nodules as this has had good efficacy for her in the past  - If she feels that surgery is best for this triggering, she can follow up for this procedure with Dr. Uribe in Orthopedics or can pursue a second opinion as desired.   - Due to her GI discomfort with decreased appetite, advised her to take a Methotrexate holiday for the next one to two weeks to monitor her symptoms. Once she resumes this, she can resume her prior dose of Methotrexate 2.5 mg QID and then slowly increase her dose back to six times per day as tolerated for symptomatic control   - Monitoring labs including CRP, CBC, Creatinine, Albumin, AST, and ALT were obtained today.                  History of Present Illness:   Ms. Dali Martines is a 44-year-old woman with a history of type I DM and Hashimoto's thyroiditis who presents for evaluation of recurrent stiffness, swelling, and pain in the left hand.    Symptoms first occurred in August 2008, when she began having stiffness and pain in the left hand, particularly in the PIPs and MCPs. Symptoms spontaneously resolved after about 6-8 weeks. She was previously seen by Rheumatology here around that time (see note from Dr. Betancourt on 12/9/2008). Briefly, assessment was that she had a self-limited episode of inflammatory arthralgias, possibly viral or early psoriatic arthritis. Negative NOLVIA, RF, Lyme serologies and normal CRP and ESR. Given her family history of RA, there was a thought to start her on HCQ if anti-CCP antibodies were positive, but they were found to be negative. No imaging of the hand was performed.    Last September (2014), she experienced recurrent acute onset pain,  stiffness, and swelling in the L hand very similar to her symptoms in 2008. Specifically, had swelling across MCPs and in 3rd PIP. She could hardly bend the hand sometimes due to the welling. Had morning stiffness lasting 30-45 minutes. No other joints were involved.    Issues with her left hand lingered for several months but eventually self-resolved around January. As of today, her only notable symptom is heel pain in the mornings. No fevers, chills, or weight loss. No back pain, vision changes, nausea, vomiting, diarrhea, abdominal pain, or blood in stool or urine. No rash, sicca symptoms, or oral ulcers.    It seems she has a history of hypermobility, e.g. could bend wrist back to forearm, bend down and touch palms flat on floor, when she was younger. Used to get frequent ankle sprains, and has had multiple tendon and ligament tears over the years.    SEPTEMBER 22, 2015, INTERVAL HISTORY       Since we have last seen the patient, she feels like her joints were generally better over the summer.  She did have some right ankle pain that has continued to be tender at times.  She also got some pretty significant tennis elbow during the summer, but both of these did well with meloxicam.  Her tennis elbow did not respond as well to a compressive band.      About 10 days ago, however, she again started having dull hand pain, left generally greater than right, except for her right fifth digit.  She has about an hour of associated morning stiffness.  This is very similar to what she has had the last 2 years.  Last year, this seemed to last most of the year, but the prior year this was only about 3 months in duration.       Throughout this time, she has had no development of clear-cut psoriasis.  She has carried a diagnosis of psoriasis in the past, but it has not been a persistent problem with her skin.  She does have a strong family history, however, with this affecting her uncle and her mother.       Recall that based  on her symptoms and her type 1 diabetes, we had previously checked her for the anti-citrullinated peptide antibodies, but she does not have those.     December 08 2015 Interval history     Since last visit and starting the Meloxicam daily, she feels that her left 5th digit pain has improved significantly. She did have an URI recently with some SOB and had a Prednisone burst of 20 mg daily for five days and she felt that her pain was completely gone with the steroids. She is not noticing any morning stiffness, swelling or erythema in the left 5th digit. She will occasionally have some chronic pain in the DIP of the right hand but very seldomly. Overall, she feels that her joint pain is better since starting the Meloxicam.     She has noted a small lump on the palmar aspect of her right hand. It is not painful and has not become swollen or red.     Her left ankle has become more bothersome and she thinks she will be needing surgery soon on that side.     When she was having an upper respiratory infection, she noted some swelling under the right mandible and it became so large that she felt it would obstruct her breathing when she turned her head to the right side. The swelling has come down but it is still present and slightly painful to touch. She has not had any weight loss, fevers, chills or night sweats.     No symptoms of dry mouth, eyes, oral lesions, rash, abdominal pain, nausea, vomiting.     Interim history 5/17/2016:  Doing well on Meloxicam. Since last visit, had to go off of Meloxicam for ankle surgery (torn tendon). A couple flares off of it, recently went back on due to the pain. Always has been in L hand, now in right hand and both elbows. Mainly painful (5/10) and stiff. Stiffness begins in the morning, takes about 30 minutes to subside. No noted erythema, warmth or edema of joints. Has two nodules on base of 2nd MCP, can be painful occasionally, no changing drastically in size. Was doing better in the  winter, so wondering if there could be a seasonal component. No known seasonal allergies.    Had the chicken pox around age 12. In the past 10-15 years, will periodically get shingles presenting as unilateral painful, itchy rash around L waist every few years. Most recently it occurred in April and was much worse than usual (nerve pain to the point of not being able to sleep). Muscle relaxant helped clear it up and allow for sleep. Has continued intermittent fever and chills since this episode. Decreased appetite, mild nausea. No other rashes since winter.     INTERVAL HISTORY (10/18/2016):  Since we have last seen the patient, she tried going down on meloxicam and actually has tolerated that pretty well.  She has a little bit more stiffness in the morning in her left hand, but the right hand is pretty good by comparison.  She occasionally has some sporadic sharp pains there, but nothing severe and nothing persistent.  Her overall morning stiffness in the left hand is only about 30 minutes.  Her PIP joints are always the worst, and her fourth and fifth fingers tend to bother her a little bit more.      She did have some palmar contracture developing but with hand therapy those improved without any injections.  She has also avoiding a trackball mouse as that seemed to make things worse.      She continues to have some pain in her right ankle.  She had a bony fragment removed there in 01/2016 by Dr. Tran.  That has hurt a fair amount since and the nature of the pain is not particularly inflammatory and suggests more of a DJD problem.  She also has some pain in the left knee which had an ACL construction in 1995, also possibly consistent with DJD.      She has had some intermittent attacks of very low-grade psoriasis over her chest up in the clavicle area, but has none currently.  She has no other significant new problems.     INTERVAL HISTORY:  Since we last saw the patient, she had an odd episode of pain in her left  hand on 02/20.  She said this initially started very sharp, then she got a lump in the palm of her hand that felt inflamed, and then she noticed some bruising in the area.  She does not recall any trauma to it.  She resumed meloxicam, which she had stopped and with that felt better over the next 2 weeks, but has remained on it since.      She also has a sensation in her right fifth digit that is consistently sore and somewhat achy.  That has persisted even with the meloxicam.      Finally, on the right ankle, which has been sprained several times in the past, she continues to wear a brace.  She actually uses the topical meloxicam on that area, but has not done so on the fifth finger.      In all these areas she can have some stiffness in the morning lasting for up to an hour.      Her other concern is that she has gained about 10 pounds.  She does not think she has changed her eating much.     AUGUST 17, 2017, INTERVAL HISTORY:  At the time we last saw the patient, we urged her to try to get off of meloxicam.  She did that and used the topical diclofenac I had given her, but she felt like she quickly worsened with respect to arthritis and, more interestingly and somewhat surprisingly, she got psoriasis over a number of areas.  That has since improved, and it is not entirely clear to me whether there is any relationship here, but she did get some along her hairline and a few other areas.       From the standpoint of her joints, the most dominant issue is right thumb tendinitis, and she has had ongoing difficulty in gripping with it.      When she was doing that badly, we had her go back on the meloxicam temporarily, then started sulfasalazine.  Unfortunately, she had more severe side effects with sulfasalazine including a fever to 102 degrees, headaches, and a general sense of malaise, and this was bad enough that we gave her a course of steroids to interfere with a potentially more severe sulfasalazine allergy and  got her off of it.      She now has stopped the steroids as of 10 days ago.  Overall, she thinks she is doing better.  With the taper from the steroids, however, she is again feeling somewhat worse, particularly in her joints.       She really has never had much of anything in the way of psoriasis.  I have felt her arthritis was most consistent with psoriatic arthritis, and she has had a couple of very small plaques occasionally that could have been consistent with it but have never been biopsied.  She does, however, have a strong family history of psoriasis.     INTERVAL HISTORY:  At the time we last saw the patient, she was improving from her adverse effects of sulfasalazine and we initiated methotrexate at 3 tablets weekly.  She actually tolerated that quite well and was able to go up to 4 tablets weekly and continues to tolerate that with no significant symptoms.  Notably, she has the odd effect of having her joints actually feel a little worse for 1-2 days at the time of methotrexate dosing and then feeling significantly better.  She notes this in bilateral second and third fingers in both the MCP and PIP joints and these have been the primary joints affecting her.  She definitely feels that they are overall better since starting the methotrexate and have relatively little morning stiffness now.      The bigger and more persistent problem has been her right thumb.  This still hurts, and she has a flexor tendon nodule there that is probably causing most of the difficulties.  She tried a splint but was not sure it helped very much.  She gets locking and catching with activity that requires her to manually release the catching with her other hand.  This has caused some ongoing significant tenderness in the area.  She does think it was better when she was back on Meloxicam however.      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     Interval History 2/1/2018:   Ms. Stephenson most significant concern today continues to be her inflammatory nodules that she felt became much worse after discontinuing Meloxicam. They appeared over the bridge of nose, dorsum and plantar surface of left hand, and over tendon of right thumb. Systemic steroids resolved some of these nodules including over her nose, but she feels the most effective therapy was working with PT and having ultrasound treatments.    Her right hand has a nodule at the base of her 1st MTP on the palmar surface that causes her significant distress. She is right hand dominant this nodules make it especially hard to write and difficult to type. She was seen by Dr. Uribe in orthopedics yesterday who evaluated the patient for a possible trigger release. Ms. Martines has a degree of hesitancy about this procedure as she was given a steroid injection was in October of 2017 witih \"slight improvement\" but subsequently developed catching and trigger with more thumb movements. Diclofenac cream hasn't improved this catching.     There is some relatively new nail changes that Ms. Martines has noticed in her right thumbnail that she described as nail ridges without pitting or evidence of swelling or inflammation.     Additionally, she has noticed that she has been experiencing swelling of her right hand localized to the fingers that with associated \"fullness\". She had experienced this in her left hand when her arthritis was much less controlled, but notes that at this time her arthritis is under adequate control. ncreased to 6 tablets (of 2.5 mg) per week over last two weeks after having increased her dose of MTX to 5 tablets back in October of 2017.    She has no eye symptoms, chest pain, worsening SOB aside from baseline asthma, back pain, rashes, or new skin lesions. Ms. Martines did mention a severe case of \"stomach flu\" 2 weeks ago which she " believes may have worsened her joint symptoms. This was described as persistent nausea/vomiting with intolerance of fluids/solids without diarrhea that resolved over 2 days. Since that time, has had a low appetite and increased satiety.  She wonders if her increasing dose of Methotrexate may have resulted in these lingering GI symptoms. Otherwise, she has no other concerns.      I, Diego Johnson MS4, served as a scribe in documentation for Dr. Betancourt.            Review of Systems:   Negative other than what is stated in the HPI above           Past Medical History:     Past Medical History:   Diagnosis Date     Anemia      Anxiety      Arthritis 2014    Psoriatic arthritis     Back injury 1988     Dry eye syndrome      Dyslipidemia      Endometriosis 2002    adehsions seen at laparoscopy     GERD (gastroesophageal reflux disease)      Hypothyroidism     8 yoa     SOB (shortness of breath) 3/11/2014     Type I (juvenile type) diabetes mellitus without mention of complication, not stated as uncontrolled     when 15      Uncomplicated asthma Fall 2013   Possible psoriasis as a child  Multiple tendon and ligament injuries    Patient Active Problem List    Diagnosis Date Noted     Pain in joint involving ankle and foot, right 10/11/2017     Priority: Medium     Trigger finger, acquired 09/28/2017     Priority: Medium     Type 1 diabetes mellitus without complication (H) 08/08/2017     Priority: Medium     Left knee pain 07/07/2016     Priority: Medium     Swelling of limb 03/15/2016     Priority: Medium     Other postprocedural status(V45.89) 03/15/2016     Priority: Medium     ESR raised 01/03/2016     Priority: Medium     Right foot pain 10/14/2015     Priority: Medium     Chronic pain of right ankle 07/30/2015     Priority: Medium     Enthesopathy 07/20/2015     Priority: Medium     Pain in joint, multiple sites 07/20/2015     Priority: Medium     PAIN KNEE JOINT 06/17/2015     Priority: Medium     Diabetes (H)  02/26/2015     Priority: Medium     Screening for other eye conditions 02/26/2015     Priority: Medium     Encounter for other specified aftercare 02/24/2015     Priority: Medium     PAIN FOOT 02/24/2015     Priority: Medium     SOB (shortness of breath) 03/11/2014     Priority: Medium     Cervicalgia 06/25/2012     Priority: Medium     Chronic low back pain 06/25/2012     Priority: Medium     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/2012     Priority: Medium     Cutaneous skin tags 06/12/2012     Priority: Medium     Type 1 diabetes mellitus with retinopathy (H) 04/05/2011     Priority: Medium     Anxiety 04/05/2011     Priority: Medium     CARDIOVASCULAR SCREENING; LDL GOAL LESS THAN 100 10/31/2010     Priority: Medium     GERD (gastroesophageal reflux disease) 02/24/2009     Priority: Medium             Past Surgical History:     Past Surgical History:   Procedure Laterality Date     C REPAIR CRUCIATE LIGAMENT,KNEE       C STOMACH SURGERY PROCEDURE UNLISTED  December 2002     COLONOSCOPY  2002     ESOPHAGOSCOPY, GASTROSCOPY, DUODENOSCOPY (EGD), COMBINED  1/7/2014    Procedure: COMBINED ESOPHAGOSCOPY, GASTROSCOPY, DUODENOSCOPY (EGD), BIOPSY SINGLE OR MULTIPLE;;  Surgeon: Kraig Nicole MD;  Location:  GI     GYN SURGERY      Laparotomy to remove endometrial tissue     HC BREATH HYDROGEN TEST N/A 6/17/2014    Procedure: HYDROGEN BREATH TEST;  Surgeon: Deacon Alberts MD;  Location:  GI     HYDROGEN BREATH TEST  6/17/14     LAPAROSCOPIC APPENDECTOMY  2002     LAPAROTOMY, LYSIS ADHESIONS, COMBINED  2002    endometriosis     SOFT TISSUE SURGERY  December 2014    right ankle tendon injury             Social History:     Social History   Substance Use Topics     Smoking status: Never Smoker     Smokeless tobacco: Never Used     Alcohol use 0.0 oz/week     0 Standard drinks or equivalent per week      Comment: moderately   Lives on her own. No children. Works as a researcher in education.           Family History:   Father with RA, Hashimoto's thyroiditis, and type I DM  Mother with psoriasis  Possible SLE in cousins  No family history of gout or IBD         Allergies:     Allergies   Allergen Reactions     Sulfasalazine      Developed rash, HA, dizziness             Medications:     Current Outpatient Prescriptions   Medication Sig Dispense Refill     montelukast (SINGULAIR) 5 MG chewable tablet CHEW AND SWALLOW 1 TABLET(5 MG) BY MOUTH AT BEDTIME 30 tablet 3     simvastatin (ZOCOR) 20 MG tablet Take 1 tablet (20 mg) by mouth At Bedtime 90 tablet 3     simvastatin (ZOCOR) 20 MG tablet Take 1 tablet (20 mg) by mouth At Bedtime 90 tablet 3     methotrexate 2.5 MG tablet CHEMO Take 4 tablets (10 mg) by mouth once a week 24 tablet 2     diclofenac (VOLTAREN) 1 % GEL topical gel APPLY 2 GRAMS ONTO THE SKIN FOUR TIMES DAILY AS NEEDED FOR MODERATE PAIN 100 g 5     HYDROcodone-acetaminophen (NORCO) 5-325 MG per tablet Take 1-2 tablets by mouth every 4 hours as needed for moderate to severe pain 15 tablet 0     NOVOLOG PENFILL 100 UNIT/ML soln 1 unit per 15 grams CHO at all meals and snacks with correction scale of 1 unit per 50 mg/dL over 150.  Average daily use is 20 units. 30 mL 4     mometasone-formoterol (DULERA) 100-5 MCG/ACT oral inhaler Inhale 2 puffs into the lungs 2 times daily 3 Inhaler 3     folic acid (FOLVITE) 1 MG tablet Take 1 tablet (1 mg) by mouth daily 90 tablet 3     levothyroxine (SYNTHROID/LEVOTHROID) 125 MCG tablet Take 1 tablet (125 mcg) by mouth daily 90 tablet 3     UNABLE TO FIND Biosil       insulin degludec (TRESIBA) 100 UNIT/ML pen Inject 18 Units Subcutaneous daily 15 mL 11     dulaglutide (TRULICITY) 1.5 MG/0.5ML pen Inject 1.5 mg Subcutaneous every 7 days 6 mL 3     blood glucose monitoring (ACCU-CHEK COLIN PLUS) test strip Test Blood Sugar 8 times daily or as directed 700 strip 3     UNABLE TO FIND MEDICATION NAME: biosil       blood glucose monitoring (ACCU-CHEK FASTCLIX) lancets Use  to test blood sugar 8 times daily or as directed. 4 Box 3     citalopram (CELEXA) 20 MG tablet Take 1.5 tablets (30 mg) by mouth daily 135 tablet 1     insulin pen needle (B-D U/F) 31G X 8 MM Use 5 pen needles daily 450 each 3     methylphenidate (METADATE CD) 20 MG CR capsule Take 20 mg by mouth daily       traZODone (DESYREL) 50 MG tablet Take 1-2 tablets by mouth nightly as needed for sleep. 180 tablet 0     aspirin 81 MG tablet Take 1 tablet by mouth daily.       Calcium Carbonate-Vitamin D (CALCIUM + D PO) Take one daily       econazole nitrate 1 % cream Apply 0.5 inches topically daily.       fish oil-omega-3 fatty acids (FISH OIL) 1000 MG capsule Take one daily       Multiple Vitamin (MULTIVITAMIN OR) Take  by mouth. Take one daily tab       Ketoconazole (NIZORAL PO) Take  by mouth. Shampoo daily       Minoxidil (ROGAINE EX) Externally apply  topically. daily              Physical Exam:   not currently breastfeeding.     Limited today to joint exam below, and skin exam.   Constitutional: [id number] cooperative  Eyes: nl EOM, PERRLA, vision, conjunctiva, sclera  ENT: nl external ears, nose, hearing, lips, teeth, gums, throat  No mucous membrane lesions, normal saliva pool  Neck: no mass or thyroid enlargement  Resp: lungs clear to auscultation, nl to palpation  CV: RRR, no murmurs, rubs or gallops, no edema  GI: no ABD mass or tenderness, no HSM  : not tested  Lymph:  No supraclavicular, inguinal or epitrochlear nodes  MS:  She has a small tendon nodule noted on the plantar aspect of the right hand, tenderness in flexor tendon right thumb.    All other  TMJ, neck, shoulder, elbow, wrist, MCP/PIP/DIP were examined. No active synovitis. +Heberden's nodes. Full ROM.  Normal  strength. No dactylitis,  tenosynovitis, enthespathy   Skin:  NO active psoriasis along hairline or elsewhere. Longitudinal nail ridges of right thumbnail with no nail pitting, surrounding erythema, or hyperkeratosis. No alopecia, rash,  or lesions  Neuro: nl cranial nerves, strength, sensation, DTRs.   Psych: nl judgement, orientation, memory, affect.         Data:     Component      Latest Ref Rng 4/27/2016   WBC      4.0 - 11.0 10e9/L 4.8   RBC Count      3.8 - 5.2 10e12/L 3.63 (L)   Hemoglobin      11.7 - 15.7 g/dL 11.2 (L)   Hematocrit      35.0 - 47.0 % 33.6 (L)   MCV      78 - 100 fl 93   MCH      26.5 - 33.0 pg 30.9   MCHC      31.5 - 36.5 g/dL 33.3   RDW      10.0 - 15.0 % 12.7   Platelet Count      150 - 450 10e9/L 294   Diff Method       Automated Method   % Neutrophils       59.3   % Lymphocytes       30.3   % Monocytes       7.8   % Eosinophils       1.3   % Basophils       1.1   % Immature Granulocytes       0.2   Nucleated RBCs      0 /100 0   Absolute Neutrophil      1.6 - 8.3 10e9/L 2.8   Absolute Lymphocytes      0.8 - 5.3 10e9/L 1.4   Absolute Monocytes      0.0 - 1.3 10e9/L 0.4   Absolute Eosinophils      0.0 - 0.7 10e9/L 0.1   Absolute Basophils      0.0 - 0.2 10e9/L 0.1   Abs Immature Granulocytes      0 - 0.4 10e9/L 0.0   Absolute Nucleated RBC       0.0   TSH      0.40 - 4.00 mU/L        Lab Results   Component Value Date    AST 19 12/22/2014    AST 36 2/15/2013    AST 25 11/29/2010    ALT 22 12/22/2014    ALT 30 2/15/2013    ALT 8 11/23/2011    ALKPHOS 109 12/22/2014    ALKPHOS 89 2/15/2013    ALKPHOS 65 11/29/2010     Reviewed Rheumatology lab flowsheet. Pertinent results:  2014 -- NOLVIA 1.6, negative RF, normal CRP, mildly elevated ESR, unremarkable x-rays of hands and wrists      3 views right hand radiographs 1/31/2018 4:30 PM     History: right hand pain. psoriatic arthritis; Pain of right hand     Comparison: None     Findings:     PA, oblique and lateral view(s) of the right hand were obtained.      No acute osseous abnormality.  No erosion.     Mild degenerative changes of the first carpometacarpal and triscaphe  joints.  Additional scattered degenerative change in the  interphalangeal joints, particularly  distally.     Soft tissue is unremarkable.         Impression:  1. No acute osseous abnormality. No erosion.  2. No substantial degenerative change.      I have personally reviewed the examination and initial interpretation  and I agree with the findings.     JUTTA ELLERMANN, MD    Component      Latest Ref Rng & Units 2/1/2018   WBC      4.0 - 11.0 10e9/L 5.6   RBC Count      3.8 - 5.2 10e12/L 3.70 (L)   Hemoglobin      11.7 - 15.7 g/dL 11.7   Hematocrit      35.0 - 47.0 % 34.6 (L)   MCV      78 - 100 fl 94   MCH      26.5 - 33.0 pg 31.6   MCHC      31.5 - 36.5 g/dL 33.8   RDW      10.0 - 15.0 % 12.9   Platelet Count      150 - 450 10e9/L 270   Diff Method       Automated Method   % Neutrophils      % 61.6   % Lymphocytes      % 27.7   % Monocytes      % 8.9   % Eosinophils      % 0.9   % Basophils      % 0.5   % Immature Granulocytes      % 0.4   Nucleated RBCs      0 /100 0   Absolute Neutrophil      1.6 - 8.3 10e9/L 3.5   Absolute Lymphocytes      0.8 - 5.3 10e9/L 1.6   Absolute Monocytes      0.0 - 1.3 10e9/L 0.5   Absolute Eosinophils      0.0 - 0.7 10e9/L 0.1   Absolute Basophils      0.0 - 0.2 10e9/L 0.0   Abs Immature Granulocytes      0 - 0.4 10e9/L 0.0   Absolute Nucleated RBC       0.0   Creatinine      0.52 - 1.04 mg/dL 0.58   GFR Estimate      >60 mL/min/1.7m2 >90   GFR Estimate If Black      >60 mL/min/1.7m2 >90   ALT      0 - 50 U/L 22   AST      0 - 45 U/L 19   Albumin      3.4 - 5.0 g/dL 4.2   CRP Inflammation      0.0 - 8.0 mg/L <2.9       I saw the patient with the student, who served as my scribe, recording my history,  exam and recommendations as described.      This visit was 40 mins in duration, the majority spent in counseling.     Yoel Betancourt MD

## 2018-02-07 ENCOUNTER — OFFICE VISIT (OUTPATIENT)
Dept: PHYSICAL MEDICINE AND REHAB | Facility: CLINIC | Age: 48
End: 2018-02-07
Payer: COMMERCIAL

## 2018-02-07 VITALS
HEART RATE: 89 BPM | DIASTOLIC BLOOD PRESSURE: 72 MMHG | BODY MASS INDEX: 26.34 KG/M2 | WEIGHT: 184 LBS | SYSTOLIC BLOOD PRESSURE: 111 MMHG | HEIGHT: 70 IN

## 2018-02-07 DIAGNOSIS — M25.571 PAIN IN JOINT INVOLVING ANKLE AND FOOT, RIGHT: ICD-10-CM

## 2018-02-07 DIAGNOSIS — M67.90 TENDINOPATHY: Primary | ICD-10-CM

## 2018-02-07 ASSESSMENT — ENCOUNTER SYMPTOMS
SWOLLEN GLANDS: 1
MUSCLE CRAMPS: 1
NECK PAIN: 0
BACK PAIN: 0
MUSCLE WEAKNESS: 0
ARTHRALGIAS: 1
BRUISES/BLEEDS EASILY: 1
MYALGIAS: 1
STIFFNESS: 1
JOINT SWELLING: 1

## 2018-02-07 ASSESSMENT — PAIN SCALES - GENERAL: PAINLEVEL: MODERATE PAIN (5)

## 2018-02-07 NOTE — PROGRESS NOTES
Service Date: 02/07/2018      HISTORY OF PRESENT ILLNESS:  Dali Martines [facility] for chronic right ankle pain.  She was last seen in clinic on 01/04/2017.      She reports that up until December she was progressing with her balance and strength but it was still frustrating for her to note that her ability to walk without swelling and pain, although it had improved some, was slower than what the progress was for balance and strength.  Unfortunately, in 12/2017, a 25-pound weight fell on her second, third and fourth toes of the right foot, fracturing those toes.  She was seen by Dr. Rushing at [facility].  No surgery was needed.  However, she did have quite a bit of swelling.  She did have to keep her foot elevated for almost 6 weeks.  About 2 weeks ago she was able to resume physical therapy that she was doing prior to this unfortunate injury.  She notes that she is now walking more and the swelling is mostly gone.  She is no longer needing the CAM walker.  Unfortunately, following this incident she noted significant increase in tenderness and pain in the distal peroneal tendons, about 6-8 cm above the lateral malleolus of the right ankle.  This is where she had the severe pain from 3 years ago from her initial injury in 2015.      The patient did have an MRI done on the right ankle in 12/2016.  That MRI did show a split tear of the right ankle peroneal brevis tendon which reconstitutes along the distal aspect of the calcaneus.  Minimal fluid surrounding the peroneal brevis and peroneal longus tendons reported.  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  No marrow signal abnormalities to suggest fracture, osteonecrosis or marrow infiltration noted.  No osteochondral lesion noted.      The patient is concerned that she is having much more pain again in the lateral distal ankle as described above.  That is aggravated with the  walking.      In discussing this with the patient, I think we should get a repeat MRI scan.  Indeed, on examination, she is noted to be quite tender in those lateral peroneal tendons, 6-8 cm above the lateral malleolus.  She does have some swelling in particular of the second toe of the right foot from fractures.  The second, third and fourth toes of the right foot are quite stiff because of the recent fractures.      RECOMMENDATION:  I have recommended she consider some stationary bicycle riding.  However, she really did not have access to that, nor does she have access to a pool.  However, I did suggest that she use warm water soaks in a bucket with her foot and ankle and to do some gentle AVC exercises for the ankle as well as for the toes to help regain function.  We will get the MRI scan to reassess to see if there is any other pathology here.  The patient is quite frustrated with all of this.  I will see her in followup in 2 months.  She will call my clinic nurse, Negrita Sanchez, a couple of days after the MRI to get the results.  I am unable to get the Epic results at home on my iPad.  I will not be back in clinic until April but we will have her contact Negrita unless we can get this done soon so that I can get in touch with the patient with these results.  I did discuss depending on the findings that we might even wish for her to see Dr. Rushing, whom she saw recently at [facility], if there is anything questionable that might need an orthopedic opinion.  I would like to see the patient in followup in 2 months' time.      Total time spent with the patient over 25 minutes.      MD HOWARD Yeh MD             D: 2018   T: 2018   MT: GEETHA      Name:     BETO DOVE   MRN:      [id number]        Account:      [id number]   :      1970           Service Date: 2018      Document: [id number]

## 2018-02-07 NOTE — LETTER
2/7/2018       RE: Dali Martines  [address]  [facility] [address]     Dear Colleague,    Thank you for referring your patient, Dali Martines, to the [facility] PHYSICAL [facility]ICINE AND REHABILITATION at [facility]. Please see a copy of my visit note below.    Service Date: 02/07/2018      HISTORY OF PRESENT ILLNESS:  Dali Martines has returned to the [facility] for followup for chronic right ankle pain.  She was last seen in clinic on 01/04/2017.      She reports that up until December she was progressing with her balance and strength but it was still frustrating for her to note that her ability to walk without swelling and pain, although it had improved some, was slower than what the progress was for balance and strength.  Unfortunately, in 12/2017, a 25-pound weight fell on her second, third and fourth toes of the right foot, fracturing those toes.  She was seen by Dr. Rushing at [facility].  No surgery was needed.  However, she did have quite a bit of swelling.  She did have to keep her foot elevated for almost 6 weeks.  About 2 weeks ago she was able to resume physical therapy that she was doing prior to this unfortunate injury.  She notes that she is now walking more and the swelling is mostly gone.  She is no longer needing the CAM walker.  Unfortunately, following this incident she noted significant increase in tenderness and pain in the distal peroneal tendons, about 6-8 cm above the lateral malleolus of the right ankle.  This is where she had the severe pain from 3 years ago from her initial injury in 2015.      The patient did have an MRI done on the right ankle in 12/2016.  That MRI did show a split tear of the right ankle peroneal brevis tendon which reconstitutes along the distal aspect of the calcaneus.  Minimal fluid surrounding the peroneal brevis and peroneal longus tendons reported.  Anterior tibiofibular ligament not well seen and was  thought to be chronically torn.  No marrow signal abnormalities to suggest fracture, osteonecrosis or marrow infiltration noted.  No osteochondral lesion noted.      The patient is concerned that she is having much more pain again in the lateral distal ankle as described above.  That is aggravated with the walking.      In discussing this with the patient, I think we should get a repeat MRI scan.  Indeed, on examination, she is noted to be quite tender in those lateral peroneal tendons, 6-8 cm above the lateral malleolus.  She does have some swelling in particular of the second toe of the right foot from fractures.  The second, third and fourth toes of the right foot are quite stiff because of the recent fractures.      RECOMMENDATION:  I have recommended she consider some stationary bicycle riding.  However, she really did not have access to that, nor does she have access to a pool.  However, I did suggest that she use warm water soaks in a bucket with her foot and ankle and to do some gentle AVC exercises for the ankle as well as for the toes to help regain function.  We will get the MRI scan to reassess to see if there is any other pathology here.  The patient is quite frustrated with all of this.  I will see her in followup in 2 months.  She will call my clinic nurse, Negrita Sanchez, a couple of days after the MRI to get the results.  I am unable to get the Epic results at home on my iPad.  I will not be back in clinic until April but we will have her contact Negrita unless we can get this done soon so that I can get in touch with the patient with these results.  I did discuss depending on the findings that we might even wish for her to see Dr. Rushing, whom she saw recently at [facility], if there is anything questionable that might need an orthopedic opinion.  I would like to see the patient in followup in 2 months' time.      Total time spent with the patient over 25 minutes.      D: 02/07/2018   T:  2018   MT: AKA      Name:     BETO DOVE   MRN:      [id number]        Account:      [id number]   :      1970           Service Date: 2018      Document: [id number]        Again, thank you for allowing me to participate in the care of your patient.      Sincerely,    Kraig Villagomez MD

## 2018-02-07 NOTE — MR AVS SNAPSHOT
After Visit Summary   2/7/2018    Dali Martines    MRN: [id number]           Patient Information     Date Of Birth          1970        Visit Information        Provider Department      2/7/2018 1:30 PM Kraig Villagomez MD [facility] Physical Medicine and Rehabilitation        Today's Diagnoses     Tendinopathy    -  1    Pain in joint involving ankle and foot, right           Follow-ups after your visit        Follow-up notes from your care team     Return in about 2 months (around 4/7/2018).      Your next 10 appointments already scheduled     Feb 08, 2018  8:00 PM CST   (Arrive by 7:45 PM)   MR ANKLE RIGHT W/O CONTRAST with [id number]   [facility] ([facility])    [address] [place] [address] [phone]           Take your medicines as usual, unless your doctor tells you not to. Bring a list of your current medicines to your exam (including vitamins, minerals and over-the-counter drugs). Also bring the results of similar scans you may have had.  Please remove any body piercings and hair extensions before you arrive.  Follow your doctor s orders. If you do not, we may have to postpone your exam.  You may or may not receive IV contrast for this exam pending the discretion of the Radiologist.  You do not need to do anything special to prepare.  The MRI machine uses a strong magnet. Please wear clothes without metal (snaps, zippers). A sweatsuit works well, or we may give you a hospital gown.   **IMPORTANT** THE INSTRUCTIONS BELOW ARE ONLY FOR THOSE PATIENTS WHO HAVE BEEN PRESCRIBED SEDATION OR GENERAL ANESTHESIA DURING THEIR MRI PROCEDURE:  IF YOUR DOCTOR PRESCRIBED ORAL SEDATION (take medicine to help you relax during your exam):   You must get the medicine from your doctor (oral medication) before you arrive. Bring the medicine to the exam. Do not take it at home. You ll be told when to take it upon arriving for your exam.   Arrive one hour early.  Bring someone who can take you home after the test. Your medicine will make you sleepy. After the exam, you may not drive, take a bus or take a taxi by yourself.  IF YOUR DOCTOR PRESCRIBED IV SEDATION:   Arrive one hour early. Bring someone who can take you home after the test. Your medicine will make you sleepy. After the exam, you may not drive, take a bus or take a taxi by yourself.   No eating 6 hours before your exam. You may have clear liquids up until 4 hours before your exam. (Clear liquids include water, clear tea, black coffee and fruit juice without pulp.)  IF YOUR DOCTOR PRESCRIBED ANESTHESIA (be asleep for your exam):   Arrive 1 1/2 hours early. Bring someone who can take you home after the test. You may not drive, take a bus or take a taxi by yourself.   No eating 8 hours before your exam. You may have clear liquids up until 4 hours before your exam. (Clear liquids include water, clear tea, black coffee and fruit juice without pulp.)   You will spend four to five hours in the recovery room.  Please call the Imaging Department at your exam site with any questions.            Feb 10, 2018 11:10 AM CST   LAKISHA Extremity with Michi Esteves Pt, PT   [facility] ([facility]  )    [address]  [facility] [address]   [phone]            Feb 12, 2018  3:00 PM CST   (Arrive by 2:45 PM)   LAKISHA Hand with Sydni Talbot OT   [facility] Hand Therapy ([facility])    [address]  [address]th [address]  [facility] [facility] [id number] [phone]            Feb 13, 2018 12:00 PM CST   (Arrive by 11:45 AM)   Return Visit with aSra Carter MD   [facility] ([facility])    [address]  [address] [place] [id number] [phone]            Feb 19, 2018  3:00 PM CST   LAKISHA Hand with Jana Chapman OT   [facility] ([facility]  [place])    [address] [place] [address]   [phone]            Feb 27, 2018  4:00 PM CST   LAKISHA Hand with Sydni Talbot OT   [facility] Therapy ([facility])    [address] [place] [id number]   [phone]            Feb 28, 2018 10:30 AM CST   (Arrive by 10:15 AM)   Office Visit with Luz Marina Holley RD   [facility] Diabetes ([facility])    [address] [place] [id number] [phone]           Bring a current list of meds and any records pertaining to this visit. For Physicals, please bring immunization records and any forms needing to be filled out. Please arrive 10 minutes early to complete paperwork.            Mar 14, 2018  2:30 PM CDT   (Arrive by 2:15 PM)   RETURN HAND with Chirs Uribe MD   [facility] ([facility])    [address] [place] [id number] [phone]            Apr 04, 2018 11:30 AM CDT   (Arrive by 11:15 AM)   Return Visit with Kraig Villagomez MD   [facility] Physical Medicine and Rehabilitation ([facility])    [address] [place] [id number] [phone]            May 15, 2018  3:00 PM CDT   (Arrive by 2:45 PM)   Return Visit with Yoel Betancourt MD   [facility] Rheumatology ([facility])    [address] [place] [address]  [address]  [facility] [id number] [phone]              Future tests that were ordered for you today     Open Future Orders        Priority Expected Expires Ordered    MR Ankle Right w/o Contrast Routine  2/7/2019 2/7/2018            Who to contact     Please call your clinic at [phone] to:    Ask questions about your health    Make or cancel appointments    Discuss your medicines    Learn about your test results    Speak to your doctor   If you have compliments or concerns about  "an experience at your clinic, or if you wish to file a complaint, please contact [facility] Patient Relations at [phone] or email us at [email or web address]         Additional Information About Your Visit        [employer] Information     [employer] gives you secure access to your electronic health record. If you see a primary care provider, you can also send messages to your care team and make appointments. If you have questions, please call your primary care clinic.  If you do not have a primary care provider, please call [phone] and they will assist you.      [employer] is an electronic gateway that provides easy, online access to your medical records. With [employer], you can request a clinic appointment, read your test results, renew a prescription or communicate with your care team.     To access your existing account, please contact your [facility] or call [phone] for assistance.        Care EveryWhere ID     This is your Care EveryWhere ID. This could be used by other organizations to access your [place] medical records  [id number]        Your Vitals Were     Pulse Height BMI (Body Mass Index)             89 1.778 m (5' 10\") 26.4 kg/m2          Blood Pressure from Last 3 Encounters:   02/07/18 111/72   02/01/18 115/77   01/09/18 126/82    Weight from Last 3 Encounters:   02/07/18 83.5 kg (184 lb)   02/01/18 81.6 kg (180 lb)   01/31/18 81.6 kg (180 lb)               Primary Care Provider Office Phone # Fax #    Dimitri Alas [phone] [address] [address]        Equal Access to Services     DALIA MOORE : Hadii randi Castillo, waaxda luqadaha, qaybta kaalmakaitlin pierre. So [facility] [phone].    ATENCIÓN: Si habla español, tiene a ramsey disposición servicios gratuitos de asistencia lingüística. Llame al [phone].    We comply with " applicable federal civil rights laws and [place] laws. We do not discriminate on the basis of race, color, national origin, age, disability, sex, sexual orientation, or gender identity.            Thank you!     Thank you for choosing [facility] PHYSICAL MEDICINE AND REHABILITATION  for your care. Our goal is always to provide you with excellent care. Hearing back from our patients is one way we can continue to improve our services. Please take a few minutes to complete the written survey that you may receive in the mail after your visit with us. Thank you!             Your Updated Medication List - Protect others around you: Learn how to safely use, store and throw away your medicines at www.disposemymeds.org.          This list is accurate as of 2/7/18  3:02 PM.  Always use your most recent med list.                   Brand Name Dispense Instructions for use Diagnosis    aspirin 81 MG tablet      Take 1 tablet by mouth daily.        blood glucose monitoring lancets     4 Box    Use to test blood sugar 8 times daily or as directed.    Type 1 diabetes, HbA1c goal < 7% (H)       blood glucose monitoring test strip    ACCU-CHEK COLIN PLUS    700 strip    Test Blood Sugar 8 times daily or as directed    Type 1 diabetes, HbA1c goal < 7% (H)       CALCIUM + D PO      Take one daily        citalopram 20 MG tablet    celeXA    135 tablet    Take 1.5 tablets (30 mg) by mouth daily    Anxiety       diclofenac 1 % Gel topical gel    VOLTAREN    100 g    APPLY 2 GRAMS ONTO THE SKIN FOUR TIMES DAILY AS NEEDED FOR MODERATE PAIN    Enthesopathy, Pain in joint, multiple sites, Chronic pain of right ankle       dulaglutide 1.5 MG/0.5ML pen    TRULICITY    6 mL    Inject 1.5 mg Subcutaneous every 7 days    Type 1 diabetes mellitus without complication (H)       econazole nitrate 1 % cream      Apply 0.5 inches topically daily.        fish oil-omega-3 fatty acids 1000 MG capsule      Take one daily        folic acid 1 MG tablet     FOLVITE    90 tablet    Take 1 tablet (1 mg) by mouth daily    Pain in joint, multiple sites, Tendinitis, Encounter for therapeutic drug monitoring       HYDROcodone-acetaminophen 5-325 MG per tablet    NORCO    15 tablet    Take 1-2 tablets by mouth every 4 hours as needed for moderate to severe pain        insulin degludec 100 UNIT/ML pen    TRESIBA    15 mL    Inject 18 Units Subcutaneous daily    Type 1 diabetes mellitus without complication (H)       insulin pen needle 31G X 8 MM    B-D U/F    450 each    Use 5 pen needles daily    Diabetes mellitus type I (H)       levothyroxine 125 MCG tablet    SYNTHROID/LEVOTHROID    90 tablet    Take 1 tablet (125 mcg) by mouth daily    Hypothyroidism       methotrexate 2.5 MG tablet CHEMO     24 tablet    Take 4 tablets (10 mg) by mouth once a week    Pain in joint, multiple sites, Tendinitis, Encounter for therapeutic drug monitoring       methylphenidate 20 MG CR capsule    METADATE CD     Take 20 mg by mouth daily        mometasone-formoterol 100-5 MCG/ACT oral inhaler    DULERA    3 Inhaler    Inhale 2 puffs into the lungs 2 times daily    Moderate persistent asthma without complication       montelukast 5 MG chewable tablet    SINGULAIR    30 tablet    CHEW AND SWALLOW 1 TABLET(5 MG) BY MOUTH AT BEDTIME    Mild persistent asthma without complication       MULTIVITAMIN PO      Take  by mouth. Take one daily tab        NIZORAL PO      Take  by mouth. Shampoo daily        NovoLOG PENFILL 100 UNIT/ML injection   Generic drug:  insulin aspart     30 mL    1 unit per 15 grams CHO at all meals and snacks with correction scale of 1 unit per 50 mg/dL over 150.  Average daily use is 20 units.    Type 1 diabetes, HbA1c goal < 7% (H)       ROGAINE EX      Externally apply  topically. 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    Insomnia       * UNABLE TO FIND      MEDICATION NAME: biosil    Enthesopathy, Pain in joint, multiple sites, Chronic pain of right ankle       * UNABLE TO FIND      Biosil        * Notice:  This list has 4 medication(s) that are the same as other medications prescribed for you. Read the directions carefully, and ask your doctor or other care provider to review them with you.

## 2018-02-10 ENCOUNTER — THERAPY VISIT (OUTPATIENT)
Dept: PHYSICAL THERAPY | Facility: CLINIC | Age: 48
End: 2018-02-10
Payer: COMMERCIAL

## 2018-02-10 DIAGNOSIS — M25.571 PAIN IN JOINT INVOLVING ANKLE AND FOOT, RIGHT: ICD-10-CM

## 2018-02-10 PROCEDURE — 97112 NEUROMUSCULAR REEDUCATION: CPT | Mod: GP | Performed by: PHYSICAL THERAPIST

## 2018-02-10 PROCEDURE — 97110 THERAPEUTIC EXERCISES: CPT | Mod: GP | Performed by: PHYSICAL THERAPIST

## 2018-02-12 ENCOUNTER — THERAPY VISIT (OUTPATIENT)
Dept: OCCUPATIONAL THERAPY | Facility: CLINIC | Age: 48
End: 2018-02-12
Payer: COMMERCIAL

## 2018-02-12 DIAGNOSIS — M65.30 TRIGGER FINGER, ACQUIRED: ICD-10-CM

## 2018-02-12 DIAGNOSIS — M79.644 PAIN OF RIGHT THUMB: Primary | ICD-10-CM

## 2018-02-12 PROCEDURE — 97165 OT EVAL LOW COMPLEX 30 MIN: CPT | Mod: GO | Performed by: OCCUPATIONAL THERAPIST

## 2018-02-12 PROCEDURE — 97035 APP MDLTY 1+ULTRASOUND EA 15: CPT | Mod: GO | Performed by: OCCUPATIONAL THERAPIST

## 2018-02-12 PROCEDURE — 97140 MANUAL THERAPY 1/> REGIONS: CPT | Mod: GO | Performed by: OCCUPATIONAL THERAPIST

## 2018-02-12 PROCEDURE — 97110 THERAPEUTIC EXERCISES: CPT | Mod: GO | Performed by: OCCUPATIONAL THERAPIST

## 2018-02-12 NOTE — MR AVS SNAPSHOT
After Visit Summary   2/12/2018    Dali Martines    MRN: [id number]           Patient Information     Date Of Birth          1970        Visit Information        Provider Department      2/12/2018 3:00 PM Sydni Talbot OT [facility]        Today's Diagnoses     Pain of right thumb    -  1    Trigger finger, acquired           Follow-ups after your visit        Your next 10 appointments already scheduled     Feb 13, 2018 12:00 PM CST   (Arrive by 11:45 AM)   Return Visit with Sara Carter MD   [facility] ([facility])    [address]  [address] [place] [id number] [phone]            Feb 15, 2018  4:30 PM CST   (Arrive by 4:15 PM)   MR ANKLE RIGHT W/O CONTRAST with SHMRP1   [facility] MRI ([facility])    [address] [place] [id number] [phone]           Take your medicines as usual, unless your doctor tells you not to. Bring a list of your current medicines to your exam (including vitamins, minerals and over-the-counter drugs). Also bring the results of similar scans you may have had.  Please remove any body piercings and hair extensions before you arrive.  Follow your doctor s orders. If you do not, we may have to postpone your exam.  You may or may not receive IV contrast for this exam pending the discretion of the Radiologist.  You do not need to do anything special to prepare.  The MRI machine uses a strong magnet. Please wear clothes without metal (snaps, zippers). A sweatsuit works well, or we may give you a hospital gown.   **IMPORTANT** THE INSTRUCTIONS BELOW ARE ONLY FOR THOSE PATIENTS WHO HAVE BEEN PRESCRIBED SEDATION OR GENERAL ANESTHESIA DURING THEIR MRI PROCEDURE:  IF YOUR DOCTOR PRESCRIBED ORAL SEDATION (take medicine to help you relax during your exam):   You must get the medicine from your doctor (oral medication) before you arrive.  Bring the medicine to the exam. Do not take it at home. You ll be told when to take it upon arriving for your exam.   Arrive one hour early. Bring someone who can take you home after the test. Your medicine will make you sleepy. After the exam, you may not drive, take a bus or take a taxi by yourself.  IF YOUR DOCTOR PRESCRIBED IV SEDATION:   Arrive one hour early. Bring someone who can take you home after the test. Your medicine will make you sleepy. After the exam, you may not drive, take a bus or take a taxi by yourself.   No eating 6 hours before your exam. You may have clear liquids up until 4 hours before your exam. (Clear liquids include water, clear tea, black coffee and fruit juice without pulp.)  IF YOUR DOCTOR PRESCRIBED ANESTHESIA (be asleep for your exam):   Arrive 1 1/2 hours early. Bring someone who can take you home after the test. You may not drive, take a bus or take a taxi by yourself.   No eating 8 hours before your exam. You may have clear liquids up until 4 hours before your exam. (Clear liquids include water, clear tea, black coffee and fruit juice without pulp.)   You will spend four to five hours in the recovery room.  Please call the Imaging Department at your exam site with any questions.            Feb 19, 2018  3:00 PM CST   LAKISHA Hand with Jana Chapman OT   [facility] ([facility])    [address] [place] [id number]   [phone]            Feb 24, 2018 11:10 AM CST   LAKISHA Extremity with Michi Esteves Pt, PT   [facility] ([facility]  )    [address]  [facility] [address]   [phone]            Feb 27, 2018  4:00 PM CST   LAKISHA Hand with Sydni Talbot OT   [facility] ([facility])    [address] [place] [id number]   [phone]            Feb 28, 2018 10:30 AM CST   (Arrive by 10:15 AM)    Office Visit with Luz Marina Holley RD   [facility] ([facility])    [address] [place] [id number] [phone]           Bring a current list of meds and any records pertaining to this visit. For Physicals, please bring immunization records and any forms needing to be filled out. Please arrive 10 minutes early to complete paperwork.            Mar 06, 2018  4:00 PM CST   LAKISHA Hand with Sydni Talbot OT   [facility] ([facility])    [address] [place] [id number] [phone]            Mar 10, 2018 11:10 AM CST   LAKISHA Extremity with Michi Esteves Pt, PT   [place] [facility] ([facility]  )    [address]  [facility] [address]   [phone]            Mar 13, 2018  4:00 PM CDT   LAKISHA Hand with Sydni Talbot OT   [facility] ([facility])    [address] [place] [id number] [phone]            Mar 14, 2018  2:30 PM CDT   (Arrive by 2:15 PM)   RETURN HAND with Chris Uribe MD   [facility] ([facility])    [address] [place] [id number] [phone]              Who to contact     If you have questions or need follow up information about today's clinic visit or your schedule please contact [facility] directly at [phone].  Normal or non-critical lab and imaging results will be communicated to you by MyChart, letter or phone within 4 business days after the clinic has received the results. If you do not hear from us within 7 days, please contact the clinic through MyChart or phone. If you have a critical or abnormal lab result, we will notify you by phone as soon as possible.  Submit refill requests through [employer] or call your pharmacy and they will forward the refill request  to us. Please allow 3 business days for your refill to be completed.          Additional Information About Your Visit        MyChart Information     [employer] gives you secure access to your electronic health record. If you see a primary care provider, you can also send messages to your care team and make appointments. If you have questions, please call your primary care clinic.  If you do not have a primary care provider, please call [phone] and they will assist you.        Care EveryWhere ID     This is your Care EveryWhere ID. This could be used by other organizations to access your [facility] medical records  [id number]         Blood Pressure from Last 3 Encounters:   02/07/18 111/72   02/01/18 115/77   01/09/18 126/82    Weight from Last 3 Encounters:   02/07/18 83.5 kg (184 lb)   02/01/18 81.6 kg (180 lb)   01/31/18 81.6 kg (180 lb)              We Performed the Following     HC OT EVAL, LOW COMPLEXITY     LAKISHA INITIAL EVAL REPORT     MANUAL THER TECH,1+REGIONS,EA 15 MIN     THERAPEUTIC EXERCISES     ULTRASOUND THERAPY        Primary Care Provider Office Phone # Fax #    Dimitri WEN MD Bishop [phone]        [address] [address]        Equal Access to Services     DALIA MOORE : Hadii aad ku hadasho Soomaali, waaxda luqadaha, qaybta kaalmada adeegyada, kaitlin francoin yassinen gerald owusu. So [facility] [phone].    ATENCIÓN: Si habla español, tiene a ramsey disposición servicios gratuitos de asistencia lingüística. Llame al [phone].    We comply with applicable federal civil rights laws and [place] laws. We do not discriminate on the basis of race, color, national origin, age, disability, sex, sexual orientation, or gender identity.            Thank you!     Thank you for choosing  [facility]  for your care. Our goal is always to provide you with excellent care. Hearing back from our patients is one way we can continue to improve our services. Please  take a few minutes to complete the written survey that you may receive in the mail after your visit with us. Thank you!             Your Updated Medication List - Protect others around you: Learn how to safely use, store and throw away your medicines at www.disposemymeds.org.          This list is accurate as of 2/12/18  6:20 PM.  Always use your most recent med list.                   Brand Name Dispense Instructions for use Diagnosis    aspirin 81 MG tablet      Take 1 tablet by mouth daily.        blood glucose monitoring lancets     4 Box    Use to test blood sugar 8 times daily or as directed.    Type 1 diabetes, HbA1c goal < 7% (H)       blood glucose monitoring test strip    ACCU-CHEK COLIN PLUS    700 strip    Test Blood Sugar 8 times daily or as directed    Type 1 diabetes, HbA1c goal < 7% (H)       CALCIUM + D PO      Take one daily        citalopram 20 MG tablet    celeXA    135 tablet    Take 1.5 tablets (30 mg) by mouth daily    Anxiety       diclofenac 1 % Gel topical gel    VOLTAREN    100 g    APPLY 2 GRAMS ONTO THE SKIN FOUR TIMES DAILY AS NEEDED FOR MODERATE PAIN    Enthesopathy, Pain in joint, multiple sites, Chronic pain of right ankle       dulaglutide 1.5 MG/0.5ML pen    TRULICITY    6 mL    Inject 1.5 mg Subcutaneous every 7 days    Type 1 diabetes mellitus without complication (H)       econazole nitrate 1 % cream      Apply 0.5 inches topically daily.        fish oil-omega-3 fatty acids 1000 MG capsule      Take one daily        folic acid 1 MG tablet    FOLVITE    90 tablet    Take 1 tablet (1 mg) by mouth daily    Pain in joint, multiple sites, Tendinitis, Encounter for therapeutic drug monitoring       HYDROcodone-acetaminophen 5-325 MG per tablet    NORCO    15 tablet    Take 1-2 tablets by mouth every 4 hours as needed for moderate to severe pain        insulin degludec 100 UNIT/ML pen    TRESIBA    15 mL    Inject 18 Units Subcutaneous daily    Type 1 diabetes mellitus without  complication (H)       insulin pen needle 31G X 8 MM    B-D U/F    450 each    Use 5 pen needles daily    Diabetes mellitus type I (H)       levothyroxine 125 MCG tablet    SYNTHROID/LEVOTHROID    90 tablet    Take 1 tablet (125 mcg) by mouth daily    Hypothyroidism       methotrexate 2.5 MG tablet CHEMO     24 tablet    Take 4 tablets (10 mg) by mouth once a week    Pain in joint, multiple sites, Tendinitis, Encounter for therapeutic drug monitoring       methylphenidate 20 MG CR capsule    METADATE CD     Take 20 mg by mouth daily        mometasone-formoterol 100-5 MCG/ACT oral inhaler    DULERA    3 Inhaler    Inhale 2 puffs into the lungs 2 times daily    Moderate persistent asthma without complication       montelukast 5 MG chewable tablet    SINGULAIR    30 tablet    CHEW AND SWALLOW 1 TABLET(5 MG) BY MOUTH AT BEDTIME    Mild persistent asthma without complication       MULTIVITAMIN PO      Take  by mouth. Take one daily tab        NIZORAL PO      Take  by mouth. Shampoo daily        NovoLOG PENFILL 100 UNIT/ML injection   Generic drug:  insulin aspart     30 mL    1 unit per 15 grams CHO at all meals and snacks with correction scale of 1 unit per 50 mg/dL over 150.  Average daily use is 20 units.    Type 1 diabetes, HbA1c goal < 7% (H)       ROGAINE EX      Externally apply  topically. 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    Insomnia       * UNABLE TO FIND      MEDICATION NAME: biosil     Enthesopathy, Pain in joint, multiple sites, Chronic pain of right ankle       * UNABLE TO FIND      Biosil        * Notice:  This list has 4 medication(s) that are the same as other medications prescribed for you. Read the directions carefully, and ask your doctor or other care provider to review them with you.

## 2018-02-12 NOTE — PROGRESS NOTES
Hand Therapy Initial Evaluation    Current Date:  2/12/2018    Diagnosis:   Right Thumb trigger finger    Associated Diagnoses M65.311 Trigger finger of right thumb   Evaluate and Treat Yes Edema Control Yes Modalities Cmt: would like modalities to reduce size of trigger nodule- says she has had these previously with success Visit Frequency PRN     DOI:  April 2017    Referring MD:Chris Uribe MD  Next MD visit: 6 weeks    Initial Subjective:  Dali Martines is a 47 year old right hand dominant female.    Patient reports symptoms of pain, stiffness/loss of motion, weakness/loss of strength, edema, and rarely numbness and tingling  of the right thumb which occurred due to changes in taking anti-inflammatory medications, possibly for another systemic autoimmune disorders. She found the resumption of antiiflammatory meds, has resolved some sx in her other hand but not in the right thumb. . Since onset symptoms are Unchanged.  Special tests:  x-ray.  Previous treatment: Injection to R thumb: 10/17,  hand therapy for other trigger fingers in the past.    General health as reported by patient is good.  Pertinent medical history includes:diabetes, thyroid problems, anemia, asthma, psoriatic arthritis  Medical allergies:See EMR.  Surgical history: orthopedic: ACL knee and ankle bone fragment removal.  Medication history: thyroid, anti-inflammatory, insulin.    Occupational Profile Information:  Current occupation is researcher  Currently working in normal job without restrictions  Job Tasks: prolonged sitting, computer work  Prior functional level:  no limitations  Barriers include:live alone  Mobility: No difficulty  Transportation: drivesp  Leisure activities/hobbies: crotchet, not able to do this now due to thumb, paint, musician (cello and piano), reading holding a book with the right hand  Other:     Functional Outcome Measure:   See flowsheet    Objective:  Noting large nodule on volar thumb, A1 pulley  area    PAIN 2/12/2018     Location Right  thumb     Description aching, dull, throbing and tender, with swelling     Radiates no     Worse d/n daytime     Frequency activity dependant     Exacerbated by More use of the right hand and thumb      Relieves NSAIDs and rest     At rest 0-10/10 1/10     On use 0-10/10 7/10     At worst.0-10/10 9/10     Sleep disruption?   no     Progression Unchanged           ROM:  .ROM:  Thumb 2/12/2018 2/12/2018   AROM(PROM) Right Left   MP 0/53 0/55   IP HE20/0  Blocked: /35 HE25/77   RAbd 56  Feels Taut 58   PAbd     Retropulsion     Kapandji Opposition Scale (0-10/10)       Stage of Stenosing Tenosynovitis (SST):   2/12/2018   Triggering of rightthumb finger or Thumb 3   Stage 1:  Normal  Stage 2:  Uneven motion of tendon  Stage 3:  Triggering, clicking, catching  Stage 4:  Locking in extension or flexion; unlocked by active motion  Stage 5:  Locking in extension or flexion; unlocked by passive motion  Stage 6:  Finger locked in extension or flexion    Edema: mild of the thumb, dorsal IP at times per pt report  Strength: contraindicated  Sensation: [x]       WNL throughout all nerve distributions; per patient report           Assessment:   Patient presents with symptoms consistent with above diagnosis,  with non-surgical intervention.     Patient's limitations or Problem List includes:  Pain, Decreased ROM/motion, Increased edema, Decreased , Decreased pinch, Decreased coordination and Adherence in connective tissue of the right thumb which interferes with the patient's ability to perform Self Care Tasks (dressing, eating, hygiene/toileting), Work Tasks, Recreational Activities and Household Chores as compared to previous level of function.    Rehab Potential:  Excellent - Return to full activity, no limitations    Patient will benefit from skilled Occupational Therapy to increase ROM, flexibility,  strength, pinch strength, coordination and dexterity and decrease pain and  adherence of scarring to return to previous activity level and resume normal daily tasks and to reach their rehab potential.    Barriers to Learning:  No barrier    Communication Issues:  Patient appears to be able to clearly communicate and understand verbal and written communication and follow directions correctly.      Chart Review: Chart Review, Brief history including review of medical and/or therapy records relating to the presenting problem and Simple history review with patient    Identified Performance Deficits: bathing/showering, toileting, dressing, home establishment and management, meal preparation and cleanup, shopping, work and leisure activities    Assessment of Occupational Performance:  5 or more Performance Deficits    Clinical Decision Making (Complexity): Low complexity      Treatment Explanation:  The following has been discussed with the patient:  RX ordered/plan of care  Anticipated outcomes  Possible risks and side effects        Treatment Plan:   Frequency:  1 X week, once daily  Duration:  for 6 weeks     Modalities:  US  Therapeutic Exercise:  AROM, AAROM, PROM, Tendon Gliding and Blocking  Manual Techniques:  Coordination/Dexterity, Friction massage and Myofascial release  Self Care:  Ergonomic Considerations  Orthotic Fabrication: Static finger based orthosis,  thumb IP flexion/ext block    Discharge Plan:  Achieve all LTG.  Independent in home treatment program.  Reach maximal therapeutic benefit.    Home Exercise Program:  Thumb IP AROM w/o triggering, and PROM for the full range flex/ext  Wear IP orthosis for prevention of IP ROM for use, even return to crotcheting

## 2018-02-13 ENCOUNTER — OFFICE VISIT (OUTPATIENT)
Dept: PULMONOLOGY | Facility: CLINIC | Age: 48
End: 2018-02-13
Attending: INTERNAL MEDICINE
Payer: COMMERCIAL

## 2018-02-13 VITALS
HEIGHT: 70 IN | RESPIRATION RATE: 16 BRPM | WEIGHT: 180 LBS | HEART RATE: 68 BPM | SYSTOLIC BLOOD PRESSURE: 117 MMHG | BODY MASS INDEX: 25.77 KG/M2 | OXYGEN SATURATION: 98 % | DIASTOLIC BLOOD PRESSURE: 75 MMHG

## 2018-02-13 DIAGNOSIS — J45.40 MODERATE PERSISTENT ASTHMA WITHOUT COMPLICATION: Primary | ICD-10-CM

## 2018-02-13 PROCEDURE — G0463 HOSPITAL OUTPT CLINIC VISIT: HCPCS | Mod: ZF

## 2018-02-13 ASSESSMENT — PAIN SCALES - GENERAL: PAINLEVEL: NO PAIN (0)

## 2018-02-13 NOTE — MR AVS SNAPSHOT
After Visit Summary   2/13/2018    Dali Martines    MRN: [id number]           Patient Information     Date Of Birth          1970        Visit Information        Provider Department      2/13/2018 12:00 PM Sara Carter MD [facility]        Today's Diagnoses     Moderate persistent asthma without complication    -  1      Care Instructions    If the feeling of passing out seems to happen only after the second puff we could back off on dosing or change to 1 puff three times a day.           Follow-ups after your visit        Follow-up notes from your care team     Return in about 6 months (around 8/13/2018).      Your next 10 appointments already scheduled     Feb 15, 2018  4:30 PM CST   (Arrive by 4:15 PM)   MR ANKLE RIGHT W/O CONTRAST with [id number]   [facility] ([facility])    [address] [place] [id number] [phone]           Take your medicines as usual, unless your doctor tells you not to. Bring a list of your current medicines to your exam (including vitamins, minerals and over-the-counter drugs). Also bring the results of similar scans you may have had.  Please remove any body piercings and hair extensions before you arrive.  Follow your doctor s orders. If you do not, we may have to postpone your exam.  You may or may not receive IV contrast for this exam pending the discretion of the Radiologist.  You do not need to do anything special to prepare.  The MRI machine uses a strong magnet. Please wear clothes without metal (snaps, zippers). A sweatsuit works well, or we may give you a hospital gown.   **IMPORTANT** THE INSTRUCTIONS BELOW ARE ONLY FOR THOSE PATIENTS WHO HAVE BEEN PRESCRIBED SEDATION OR GENERAL ANESTHESIA DURING THEIR MRI PROCEDURE:  IF YOUR DOCTOR PRESCRIBED ORAL SEDATION (take medicine to help you relax during your exam):   You must get the medicine from your doctor (oral medication)  before you arrive. Bring the medicine to the exam. Do not take it at home. You ll be told when to take it upon arriving for your exam.   Arrive one hour early. Bring someone who can take you home after the test. Your medicine will make you sleepy. After the exam, you may not drive, take a bus or take a taxi by yourself.  IF YOUR DOCTOR PRESCRIBED IV SEDATION:   Arrive one hour early. Bring someone who can take you home after the test. Your medicine will make you sleepy. After the exam, you may not drive, take a bus or take a taxi by yourself.   No eating 6 hours before your exam. You may have clear liquids up until 4 hours before your exam. (Clear liquids include water, clear tea, black coffee and fruit juice without pulp.)  IF YOUR DOCTOR PRESCRIBED ANESTHESIA (be asleep for your exam):   Arrive 1 1/2 hours early. Bring someone who can take you home after the test. You may not drive, take a bus or take a taxi by yourself.   No eating 8 hours before your exam. You may have clear liquids up until 4 hours before your exam. (Clear liquids include water, clear tea, black coffee and fruit juice without pulp.)   You will spend four to five hours in the recovery room.  Please call the Imaging Department at your exam site with any questions.            Feb 19, 2018  3:00 PM CST   [facility] Hand with Jana Chapman OT    [facility] ([facility])    [address] [place] [id number]   [phone]            Feb 24, 2018 11:10 AM CST   LAKISHA Extremity with Michi Esteves Pt, PT   [place] [facility] ([facility]  )    [address]  [facility] [address]   [phone]            Feb 27, 2018  4:00 PM CST   LAKISHA Hand with Sydni Talbot OT   [facility] ([facility])    [address] [place] [address]   [phone]            Feb 28, 2018 10:30 AM CST   (Arrive  by 10:15 AM)   Office Visit with Luz Marina Holley RD   [facility] ([facility])    [address] [place] [id number] [phone]           Bring a current list of meds and any records pertaining to this visit. For Physicals, please bring immunization records and any forms needing to be filled out. Please arrive 10 minutes early to complete paperwork.            Mar 06, 2018  4:00 PM CST   LAKISHA Hand with Sydni Talbot OT   [facility] ([facility])    [address] [place] [id number] [phone]            Mar 10, 2018 11:10 AM CST   LAKISHA Extremity with Michi Esteves Pt, PT   [place] [facility] ([facility]  )    [address]  [facility] [id number] [phone]            Mar 13, 2018  4:00 PM CDT   LAKISHA Hand with Sydni Talbot OT   [facility] ([facility])    [address] [place] [id number] [phone]            Mar 14, 2018  2:30 PM CDT   (Arrive by 2:15 PM)   RETURN HAND with Chris Uribe MD   [facility] ([facility])    [address] [place] [id number] [phone]            Apr 04, 2018 11:30 AM CDT   (Arrive by 11:15 AM)   Return Visit with Kraig Villagomez MD   [facility] ([facility])    [address] [place] [id number] [phone]              Who to contact     If you have questions or need follow up information about today's clinic visit or your schedule please contact [facility] directly at [phone].  Normal or non-critical lab and imaging results will be communicated to you by MyChart, letter or phone within 4 business days after the clinic has received the  "results. If you do not hear from us within 7 days, please contact the clinic through [facility] or phone. If you have a critical or abnormal lab result, we will notify you by phone as soon as possible.  Submit refill requests through [facility] or call your pharmacy and they will forward the refill request to us. Please allow 3 business days for your refill to be completed.          Additional Information About Your Visit        Schedule [employer] Information     [facility] gives you secure access to your electronic health record. If you see a primary care provider, you can also send messages to your care team and make appointments. If you have questions, please call your primary care clinic.  If you do not have a primary care provider, please call [phone] and they will assist you.        Care EveryWhere ID     This is your Care EveryWhere ID. This could be used by other organizations to access your [facility] medical records  [id number]        Your Vitals Were     Pulse Respirations Height Pulse Oximetry BMI (Body Mass Index)       68 16 1.778 m (5' 10\") 98% 25.83 kg/m2        Blood Pressure from Last 3 Encounters:   02/13/18 117/75   02/07/18 111/72   02/01/18 115/77    Weight from Last 3 Encounters:   02/13/18 81.6 kg (180 lb)   02/07/18 83.5 kg (184 lb)   02/01/18 81.6 kg (180 lb)              Today, you had the following     No orders found for display       Primary Care Provider Office Phone # Fax #    Dimitri Alas [phone]       [address] [address]        Equal Access to Services     [facility]: Hadii aad lauren hadasho Soomaali, waaxda luqadaha, qaybta kaalmada jemal, waxay ju garrison . So [facility] [phone].    ATENCIÓN: Si habla español, tiene a ramsey disposición servicios gratuitos de asistencia lingüística. Llame al [phone].    We comply with applicable federal civil rights laws and [place] laws. We do not discriminate on the basis of " race, color, national origin, age, disability, sex, sexual orientation, or gender identity.            Thank you!     Thank you for choosing [facility]  for your care. Our goal is always to provide you with excellent care. Hearing back from our patients is one way we can continue to improve our services. Please take a few minutes to complete the written survey that you may receive in the mail after your visit with us. Thank you!             Your Updated Medication List - Protect others around you: Learn how to safely use, store and throw away your medicines at www.disposemymeds.org.          This list is accurate as of 2/13/18  6:16 PM.  Always use your most recent med list.                   Brand Name Dispense Instructions for use Diagnosis    aspirin 81 MG tablet      Take 1 tablet by mouth daily.        blood glucose monitoring lancets     4 Box    Use to test blood sugar 8 times daily or as directed.    Type 1 diabetes, HbA1c goal < 7% (H)       blood glucose monitoring test strip    ACCU-CHEK COLIN PLUS    700 strip    Test Blood Sugar 8 times daily or as directed    Type 1 diabetes, HbA1c goal < 7% (H)       CALCIUM + D PO      Take one daily        citalopram 20 MG tablet    celeXA    135 tablet    Take 1.5 tablets (30 mg) by mouth daily    Anxiety       diclofenac 1 % Gel topical gel    VOLTAREN    100 g    APPLY 2 GRAMS ONTO THE SKIN FOUR TIMES DAILY AS NEEDED FOR MODERATE PAIN    Enthesopathy, Pain in joint, multiple sites, Chronic pain of right ankle       dulaglutide 1.5 MG/0.5ML pen    TRULICITY    6 mL    Inject 1.5 mg Subcutaneous every 7 days    Type 1 diabetes mellitus without complication (H)       econazole nitrate 1 % cream      Apply 0.5 inches topically daily.        fish oil-omega-3 fatty acids 1000 MG capsule      Take one daily        folic acid 1 MG tablet    FOLVITE    90 tablet    Take 1 tablet (1 mg) by mouth daily    Pain in joint, multiple sites,  Tendinitis, Encounter for therapeutic drug monitoring       HYDROcodone-acetaminophen 5-325 MG per tablet    NORCO    15 tablet    Take 1-2 tablets by mouth every 4 hours as needed for moderate to severe pain        insulin degludec 100 UNIT/ML pen    TRESIBA    15 mL    Inject 18 Units Subcutaneous daily    Type 1 diabetes mellitus without complication (H)       insulin pen needle 31G X 8 MM    B-D U/F    450 each    Use 5 pen needles daily    Diabetes mellitus type I (H)       levothyroxine 125 MCG tablet    SYNTHROID/LEVOTHROID    90 tablet    Take 1 tablet (125 mcg) by mouth daily    Hypothyroidism       methotrexate 2.5 MG tablet CHEMO     24 tablet    Take 4 tablets (10 mg) by mouth once a week    Pain in joint, multiple sites, Tendinitis, Encounter for therapeutic drug monitoring       methylphenidate 20 MG CR capsule    METADATE CD     Take 20 mg by mouth daily        mometasone-formoterol 100-5 MCG/ACT oral inhaler    DULERA    3 Inhaler    Inhale 2 puffs into the lungs 2 times daily    Moderate persistent asthma without complication       montelukast 5 MG chewable tablet    SINGULAIR    30 tablet    CHEW AND SWALLOW 1 TABLET(5 MG) BY MOUTH AT BEDTIME    Mild persistent asthma without complication       MULTIVITAMIN PO      Take  by mouth. Take one daily tab        NIZORAL PO      Take  by mouth. Shampoo daily        NovoLOG PENFILL 100 UNIT/ML injection   Generic drug:  insulin aspart     30 mL    1 unit per 15 grams CHO at all meals and snacks with correction scale of 1 unit per 50 mg/dL over 150.  Average daily use is 20 units.    Type 1 diabetes, HbA1c goal < 7% (H)       ROGAINE EX      Externally apply  topically. 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    Insomnia       * UNABLE TO FIND      MEDICATION NAME: biosil    Enthesopathy, Pain in joint, multiple sites, Chronic pain of right ankle       * UNABLE TO FIND      Biosil        * Notice:  This list has 4 medication(s) that are the same as other medications prescribed for you. Read the directions carefully, and ask your doctor or other care provider to review them with you.

## 2018-02-13 NOTE — PATIENT INSTRUCTIONS
If the feeling of passing out seems to happen only after the second puff we could back off on dosing or change to 1 puff three times a day.

## 2018-02-13 NOTE — PROGRESS NOTES
"Reason for Visit  Dali Martines is a 47 year old female who is followed for asthma  Pulmonary HPI    She had a weight fall on her foot in December she has been on 90% NWB so physical activity \"nonexistent.\" She had a \"wicked\" viral illness respiratory and GI symptoms in January. She noticed particular improvement with Dulera during this illness. Mostly resolved. She has been taking Dulera 2 puffs BID, \"faithfully\".  Reports feeling almost like she will pass out sometimes after she takes a puff. Increasing in frequency but especially with standing. Peak flows have been 460 - 480.     The patient was seen and examined by Sara Carter MD   Current Outpatient Prescriptions   Medication     montelukast (SINGULAIR) 5 MG chewable tablet     simvastatin (ZOCOR) 20 MG tablet     simvastatin (ZOCOR) 20 MG tablet     methotrexate 2.5 MG tablet CHEMO     diclofenac (VOLTAREN) 1 % GEL topical gel     HYDROcodone-acetaminophen (NORCO) 5-325 MG per tablet     NOVOLOG PENFILL 100 UNIT/ML soln     mometasone-formoterol (DULERA) 100-5 MCG/ACT oral inhaler     folic acid (FOLVITE) 1 MG tablet     levothyroxine (SYNTHROID/LEVOTHROID) 125 MCG tablet     UNABLE TO FIND     insulin degludec (TRESIBA) 100 UNIT/ML pen     dulaglutide (TRULICITY) 1.5 MG/0.5ML pen     blood glucose monitoring (ACCU-CHEK COLIN PLUS) test strip     UNABLE TO FIND     blood glucose monitoring (ACCU-CHEK FASTCLIX) lancets     citalopram (CELEXA) 20 MG tablet     insulin pen needle (B-D U/F) 31G X 8 MM     methylphenidate (METADATE CD) 20 MG CR capsule     traZODone (DESYREL) 50 MG tablet     aspirin 81 MG tablet     Calcium Carbonate-Vitamin D (CALCIUM + D PO)     econazole nitrate 1 % cream     fish oil-omega-3 fatty acids (FISH OIL) 1000 MG capsule     Multiple Vitamin (MULTIVITAMIN OR)     Ketoconazole (NIZORAL PO)     Minoxidil (ROGAINE EX)     Current Facility-Administered Medications   Medication     betamethasone acet & sod phos (CELESTONE) " "injection 6 mg     Allergies   Allergen Reactions     Sulfasalazine      Developed rash, HA, dizziness     Social History     Social History     Marital status: Single     Spouse name: N/A     Number of children: 0     Years of education: N/A     Occupational History     research [employer]     Social History Main Topics     Smoking status: Never Smoker     Smokeless tobacco: Never Used     Alcohol use 0.0 oz/week     0 Standard drinks or equivalent per week      Comment: moderately     Drug use: No     Sexual activity: Not on file     Other Topics Concern     Parent/Sibling W/ Cabg, Mi Or Angioplasty Before 65f 55m? Yes     Social History Narrative     Past Medical History:   Diagnosis Date     Anemia      Anxiety      Arthritis 2014    Psoriatic arthritis     Back injury 1988     Dry eye syndrome      Dyslipidemia      Endometriosis 2002    adehsions seen at laparoscopy     GERD (gastroesophageal reflux disease)      Hypothyroidism     8 yoa     SOB (shortness of breath) 3/11/2014     Type I (juvenile type) diabetes mellitus without mention of complication, not stated as uncontrolled     when 15      Uncomplicated asthma Fall 2013     ROS Pulmonary  Dyspnea: No, Cough: No, Chest pain: Yes, Wheezing: No, Sputum Production: No, Hemoptysis: No  A complete ROS was otherwise negative except as noted in the HPI.  /75  Pulse 68  Resp 16  Ht 1.778 m (5' 10\")  Wt 81.6 kg (180 lb)  SpO2 98%  BMI 25.83 kg/m2  Exam:   GENERAL APPEARANCE: Well developed, well nourished, alert, and in no apparent distress.  EYES: PERRL, EOMI  HENT: Nasal mucosa with no edema and no hyperemia. No nasal polyps.  EARS: Canals clear, TMs normal  MOUTH: Oral mucosa is moist, without any lesions, no tonsillar enlargement, no oropharyngeal exudate.  NECK: supple, no masses, no thyromegaly.   LYMPHATICS: no palpable lymphadenopathy  RESP: normal percussion, good air flow throughout.  No crackles. No rhonchi. No wheezes.  CV: Normal " S1, S2, regular rhythm, normal rate. No murmur.  No rub. No gallop. No LE edema.   ABDOMEN:  Bowel sounds normal, soft, nontender, no HSM or masses.   MS: extremities normal. No clubbing. No cyanosis.  SKIN: no rash on limited exam  NEURO: Mentation intact, speech normal, normal strength and tone, normal gait and stance  PSYCH: mentation appears normal. and affect normal/bright  Results:  Stress echo 12/30/14 normal  PFTs normal 3/14/16     Assessment and plan: Dali Martines is a 47-year-old female with type 1 diabetes who was clinically diagnosed with asthma as an adult.  She has been intermittently sedentary due to repeated lower extremity ankle injury, surgery, poor wound healing  1.  Asthma.  Mild intermittent asthma, worsening shortness of breath lately.   Up to date on vaccinations.    - She will continue Dulera two puffs twice a day but pay attention to lightheadedness and consider adjusting dose to 1 puff 2-3 times a day  - Singulair for asthma maintenance, 5mg because she has noticed constipation with full dose     2.  Unusual sensation of lightheadedness, she thinks related to inhaler use.  -I have encouraged her to pay attention to whether or not it is always after the second puffs of the inhaler.  Seems more likely to be related to the beta agonist, maybe we could space it out to 1 puff 3 times a day instead of 2 puffs twice a day    I will see her back in clinic in 6 months.

## 2018-02-15 ENCOUNTER — HOSPITAL ENCOUNTER (OUTPATIENT)
Dept: MRI IMAGING | Facility: CLINIC | Age: 48
Discharge: HOME | End: 2018-02-15
Attending: PHYSICAL MEDICINE & REHABILITATION | Admitting: PHYSICAL MEDICINE & REHABILITATION
Payer: COMMERCIAL

## 2018-02-15 DIAGNOSIS — M25.571 PAIN IN JOINT INVOLVING ANKLE AND FOOT, RIGHT: ICD-10-CM

## 2018-02-15 DIAGNOSIS — M67.90 TENDINOPATHY: ICD-10-CM

## 2018-02-15 PROCEDURE — 73721 MRI JNT OF LWR EXTRE W/O DYE: CPT | Mod: RT

## 2018-02-19 ENCOUNTER — OFFICE VISIT (OUTPATIENT)
Dept: PHYSICAL MEDICINE AND REHAB | Facility: CLINIC | Age: 48
End: 2018-02-19
Payer: COMMERCIAL

## 2018-02-19 ENCOUNTER — THERAPY VISIT (OUTPATIENT)
Dept: OCCUPATIONAL THERAPY | Facility: CLINIC | Age: 48
End: 2018-02-19
Payer: COMMERCIAL

## 2018-02-19 VITALS
HEIGHT: 70 IN | SYSTOLIC BLOOD PRESSURE: 125 MMHG | DIASTOLIC BLOOD PRESSURE: 73 MMHG | BODY MASS INDEX: 26.14 KG/M2 | WEIGHT: 182.6 LBS | HEART RATE: 69 BPM

## 2018-02-19 DIAGNOSIS — S93.491D SPRAIN OF ANTERIOR TALOFIBULAR LIGAMENT OF RIGHT ANKLE, SUBSEQUENT ENCOUNTER: ICD-10-CM

## 2018-02-19 DIAGNOSIS — M65.30 TRIGGER FINGER, ACQUIRED: ICD-10-CM

## 2018-02-19 DIAGNOSIS — M79.644 PAIN OF RIGHT THUMB: Primary | ICD-10-CM

## 2018-02-19 DIAGNOSIS — S86.311D TEAR OF PERONEAL TENDON, RIGHT, SUBSEQUENT ENCOUNTER: Primary | ICD-10-CM

## 2018-02-19 PROCEDURE — 97035 APP MDLTY 1+ULTRASOUND EA 15: CPT | Mod: GO | Performed by: OCCUPATIONAL THERAPIST

## 2018-02-19 PROCEDURE — 97140 MANUAL THERAPY 1/> REGIONS: CPT | Mod: GO | Performed by: OCCUPATIONAL THERAPIST

## 2018-02-19 PROCEDURE — 97110 THERAPEUTIC EXERCISES: CPT | Mod: GO | Performed by: OCCUPATIONAL THERAPIST

## 2018-02-19 ASSESSMENT — PAIN SCALES - GENERAL: PAINLEVEL: MODERATE PAIN (5)

## 2018-02-19 NOTE — MR AVS SNAPSHOT
After Visit Summary   2/19/2018    Dali Martines    MRN: [id number]           Patient Information     Date Of Birth          1970        Visit Information        Provider Department      2/19/2018 4:00 PM Bryan Schaffer DO [facility]        Today's Diagnoses     Tear of peroneal tendon, right, subsequent encounter    -  1    Sprain of anterior talofibular ligament of right ankle, subsequent encounter          Care Instructions    We addressed the following today:    1. Right peroneal tendon tear  2. Right ankle ligament sprain    Home exercise program as instructed  Physical therapy: [facility] - [phone]  Other specific instructions: Call if interested in PRP injection, surgical referral, or shoe insert (lateral wedge) as discussed  Follow up as needed for further evaluation/medical care (sooner if needed; call direct clinic number [[phone]] at any time with questions/concerns)            Follow-ups after your visit        Your next 10 appointments already scheduled     Feb 24, 2018 11:10 AM CST   LAKISHA Extremity with Michi Esteves Pt, PT   [facility] [facility] ([facility]  )    [address]  [facility] [address]   [phone]            Feb 27, 2018  4:00 PM CST   LAIKSHA Hand with Sydni Talbot OT   [facility] ([facility])    [address] [place] [address]  4th [facility] [id number] [phone]            Feb 28, 2018 10:30 AM CST   (Arrive by 10:15 AM)   Office Visit with Luz Marina Holley RD   [facility] ([facility])    [address] [place] [address]  3rd [facility] [id number] [phone]           Bring a current list of meds and any records pertaining to this visit. For Physicals, please bring immunization records and any forms needing to be filled out. Please arrive 10 minutes early to complete  paperwork.            Mar 06, 2018  4:00 PM CST   LAKISHA Hand with Sydni Talbot OT   [facility] ([facility])    [address]  4th [facility] [address] [phone]            Mar 10, 2018 11:10 AM CST   LAKISHA Extremity with Michi Esteves Pt, PT   [facility] [facility] ([facility]  )    [address]  [facility] [address] [phone]            Mar 13, 2018  4:00 PM CDT   LAKISHA Hand with Sydni Talbot OT   [facility] ([facility])    [address]  4th [facility] [address] [phone]            Mar 14, 2018  2:30 PM CDT   (Arrive by 2:15 PM)   RETURN HAND with Chris Uribe MD   [facility] ([facility])    [address]  4th [facility] [address] [phone]            Mar 24, 2018 10:30 AM CDT   LAKISHA Extremity with Chanelle Muniz PT   [facility] ([facility]  )    [address]  [facility] [address] [phone]            Apr 04, 2018 11:30 AM CDT   (Arrive by 11:15 AM)   Return Visit with Kraig Villagomez MD   [facility] and Rehabilitation ([facility])    [address]  3rd [facility] [address] [phone]            Apr 07, 2018 11:10 AM CDT   LAKSIHA Extremity with Chanelle Muniz PT   [facility] ([facility]  )    [address]  [facility] [address] [phone]              Who to contact     Please call your clinic at [phone] to:    Ask questions about your health    Make or cancel appointments    Discuss your medicines    Learn about your test results    Speak to your doctor            Additional Information About Your Visit        MyChart Information     [employer] gives you secure access  "to your electronic health record. If you see a primary care provider, you can also send messages to your care team and make appointments. If you have questions, please call your primary care clinic.  If you do not have a primary care provider, please call [phone] and they will assist you.      [employer] is an electronic gateway that provides easy, online access to your medical records. With [employer], you can request a clinic appointment, read your test results, renew a prescription or communicate with your care team.     To access your existing account, please contact your [facility] or call [phone] for assistance.        Care EveryWhere ID     This is your Care EveryWhere ID. This could be used by other organizations to access your [place] medical records  [id number]        Your Vitals Were     Pulse Height BMI (Body Mass Index)             69 1.778 m (5' 10\") 26.2 kg/m2          Blood Pressure from Last 3 Encounters:   02/19/18 125/73   02/13/18 117/75   02/07/18 111/72    Weight from Last 3 Encounters:   02/19/18 82.8 kg (182 lb 9.6 oz)   02/13/18 81.6 kg (180 lb)   02/07/18 83.5 kg (184 lb)              Today, you had the following     No orders found for display       Primary Care Provider Office Phone # Fax #    Dimitri Alas [phone] [address] [address]        Equal Access to Services     [facility] : Hadii randi esquedao Sosravanthi, waaxda luqadaha, qaybta kaalmada adeegyada, kaitlin owusu. So [facility] [phone].    ATENCIÓN: Si habla español, tiene a ramsey disposición servicios gratuitos de asistencia lingüística. Llame al [phone].    We comply with applicable federal civil rights laws and [place] laws. We do not discriminate on the basis of race, color, national origin, age, disability, sex, sexual orientation, or gender identity.            Thank you!     Thank you for choosing [facility]  for your care. Our goal is always to provide you with excellent care. Hearing back from our patients is one way we can continue to improve our services. Please take a few minutes to complete the written survey that you may receive in the mail after your visit with us. Thank you!             Your Updated Medication List - Protect others around you: Learn how to safely use, store and throw away your medicines at www.disposemymeds.org.          This list is accurate as of 2/19/18  4:52 PM.  Always use your most recent med list.                   Brand Name Dispense Instructions for use Diagnosis    aspirin 81 MG tablet      Take 1 tablet by mouth daily.        blood glucose monitoring lancets     4 Box    Use to test blood sugar 8 times daily or as directed.    Type 1 diabetes, HbA1c goal < 7% (H)       blood glucose monitoring test strip    ACCU-CHEK COLIN PLUS    700 strip    Test Blood Sugar 8 times daily or as directed    Type 1 diabetes, HbA1c goal < 7% (H)       CALCIUM + D PO      Take one daily        citalopram 20 MG tablet    celeXA    135 tablet    Take 1.5 tablets (30 mg) by mouth daily    Anxiety       diclofenac 1 % Gel topical gel    VOLTAREN    100 g    APPLY 2 GRAMS ONTO THE SKIN FOUR TIMES DAILY AS NEEDED FOR MODERATE PAIN    Enthesopathy, Pain in joint, multiple sites, Chronic pain of right ankle       dulaglutide 1.5 MG/0.5ML pen    TRULICITY    6 mL    Inject 1.5 mg Subcutaneous every 7 days    Type 1 diabetes mellitus without complication (H)       econazole nitrate 1 % cream      Apply 0.5 inches topically daily.        fish oil-omega-3 fatty acids 1000 MG capsule      Take one daily        folic acid 1 MG tablet    FOLVITE    90 tablet    Take 1 tablet (1 mg) by mouth daily    Pain in joint, multiple sites, Tendinitis, Encounter for therapeutic drug monitoring       HYDROcodone-acetaminophen 5-325 MG per tablet    NORCO    15 tablet    Take 1-2  tablets by mouth every 4 hours as needed for moderate to severe pain        insulin degludec 100 UNIT/ML pen    TRESIBA    15 mL    Inject 18 Units Subcutaneous daily    Type 1 diabetes mellitus without complication (H)       insulin pen needle 31G X 8 MM    B-D U/F    450 each    Use 5 pen needles daily    Diabetes mellitus type I (H)       levothyroxine 125 MCG tablet    SYNTHROID/LEVOTHROID    90 tablet    Take 1 tablet (125 mcg) by mouth daily    Hypothyroidism       methotrexate 2.5 MG tablet CHEMO     24 tablet    Take 4 tablets (10 mg) by mouth once a week    Pain in joint, multiple sites, Tendinitis, Encounter for therapeutic drug monitoring       methylphenidate 20 MG CR capsule    METADATE CD     Take 20 mg by mouth daily        mometasone-formoterol 100-5 MCG/ACT oral inhaler    DULERA    3 Inhaler    Inhale 2 puffs into the lungs 2 times daily    Moderate persistent asthma without complication       montelukast 5 MG chewable tablet    SINGULAIR    30 tablet    CHEW AND SWALLOW 1 TABLET(5 MG) BY MOUTH AT BEDTIME    Mild persistent asthma without complication       MULTIVITAMIN PO      Take  by mouth. Take one daily tab        NIZORAL PO      Take  by mouth. Shampoo daily        NovoLOG PENFILL 100 UNIT/ML injection   Generic drug:  insulin aspart     30 mL    1 unit per 15 grams CHO at all meals and snacks with correction scale of 1 unit per 50 mg/dL over 150.  Average daily use is 20 units.    Type 1 diabetes, HbA1c goal < 7% (H)       ROGAINE EX      Externally apply  topically. 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    Insomnia       * UNABLE TO FIND      MEDICATION NAME: biosil    Enthesopathy, Pain in joint, multiple sites, Chronic pain of right ankle       * UNABLE TO FIND      Biosil        * Notice:  This list has 4 medication(s) that are the same as other medications prescribed for you. Read the directions carefully, and ask your doctor or other care provider to review them with you.

## 2018-02-19 NOTE — LETTER
"2/19/2018       RE: Dali Martines  [address]  [facility] [address]     Dear Colleague,    Thank you for referring your patient, Dali Martines, to the [facility]. Please see a copy of my visit note below.    [facility] Physical Medicine & Rehabilitation Follow-Up Clinic Note  Follow-up Visit s Feb 19, 2018    Subjective:  Dali Martines is a 47 year old female who is seen in follow up for evaluation of right ankle pain for discussion of MRI of the right ankle without contrast results.  Last visit was on 2/7/2018 with Dr. Villagomez.  Since that time, symptoms have been the same/worse.  Has not been using any oral pain medications for improvement of symptoms.  Has been completing her home exercise as prescribed during formal physical therapy.    Notes right lateral leg/ankle pain.  Symptoms are worse with walking activities and with toe off activities.  Denies any numbness/tingling of the right lower extremity.  Notes intermittent weakness of the right lower extremity.  Denies any trauma/falls since last clinical visit.    Patient's past medical, surgical, social, and family histories are reviewed today    Objective:  /73 (BP Location: Left arm, Patient Position: Chair, Cuff Size: Adult Regular)  Pulse 69  Ht 1.778 m (5' 10\")  Wt 82.8 kg (182 lb 9.6 oz)  BMI 26.2 kg/m2  General: healthy, alert, and in no distress  Skin: no suspicious lesions or rashes  Neuro: motor exam as noted below    MSK:    RIGHT ANKLE  Palpation:    No tenderness over the ATFL, lateral malleolus, or peroneal tendons  Strength:    Within normal limits for dorsiflexion and eversion  Special Tests:    Negative anterior drawer    Imaging:  No x-rays indicated during today's visit  Previous films were reviewed today, independent visualization of images was performed, and results were discussed with the patient  MR of the Right Ankle " without Contrast - 2/15/2018  IMPRESSION:    1. The findings appear similar to the previous exam  2. Attenuation of the peroneus brevis tendon distally, which may well represent chronic partial thickness degeneration/degenerative tearing.    ASSESSMENT:  1.  Right peroneus brevis degeneration/tear  2.  Chronic right anterior talofibular ligament sprain    PLAN:  1.  Discussed treatment options with the patient including an ankle brace, CAM boot immobilization, lateral wedge, further formal physical therapy, platelet rich plasma injection, surgical intervention, etc. with the patient.  Patient wished to proceed with further formal physical therapy at this time.  2.  Call if interested in a a right peroneal tendon platelet rich plasma injection with ultrasound guidance (recommended), surgical referral, or shoe insert (lateral wedge) as discussed for further treatment purposes.  3.  Follow-up as needed for further evaluation/medical care.      Patient's conditions were thoroughly discussed during today's visit with greater than 50% of the visit spent counseling the patient with total time spent face-to-face with the patient being 30 minutes.      Again, thank you for allowing me to participate in the care of your patient.      Sincerely,    Bryan Schaffer, DO

## 2018-02-19 NOTE — MR AVS SNAPSHOT
After Visit Summary   2/19/2018    Dali Martines    MRN: [id number]           Patient Information     Date Of Birth          1970        Visit Information        Provider Department      2/19/2018 3:00 PM Jana Chapman OT [facility]        Today's Diagnoses     Pain of right thumb    -  1    Trigger finger, acquired           Follow-ups after your visit        Your next 10 appointments already scheduled     Feb 24, 2018 11:10 AM CST   LAKISHA Extremity with Michi Esteves Pt, PT   [place] [facility]    [address]  [facility] [place] [address]   [phone]            Feb 27, 2018  4:00 PM CST   LAKISHA Hand with GOLD [facility] ([facility])    [address] [place] [address] [phone]            Feb 28, 2018 10:30 AM CST   (Arrive by 10:15 AM)   Office Visit with Luz Marina Holley RD    [facility] ([facility])    [address] [place] [address]  3rd [facility] [address] [phone]           Bring a current list of meds and any records pertaining to this visit. For Physicals, please bring immunization records and any forms needing to be filled out. Please arrive 10 minutes early to complete paperwork.            Mar 06, 2018  4:00 PM CST   LAKISHA Hand with GOLD [facility] ([facility])    [address] [place] [address] [phone]            Mar 10, 2018 11:10 AM CST   LAKISHA Extremity with Michi Esteves Pt, PT   [facility] ([facility]  )    [address]  [facility] [place] [address]   [phone]            Mar 13, 2018  4:00 PM CDT   LAKISHA Hand with GOLD [facility] ([facility])    [address] [place] [address]  Floor  [facility] [address]   [phone]            Mar 14, 2018  2:30 PM CDT   (Arrive by 2:15 PM)   RETURN HAND with Chris Uribe MD   [facility] ([facility])    [address] [place] [address]  4th [facility] [place] [facility] [address]   [phone]            Mar 24, 2018 10:30 AM CDT   LAKISHA Extremity with Chanelle Muniz PT   [place] [facility] ([facility]  )    [address]  [facility] [address]   [phone]            Apr 04, 2018 11:30 AM CDT   (Arrive by 11:15 AM)   Return Visit with Kraig Villagomez MD   [facility] Physical Medicine and Rehabilitation ([facility])    [address] [place] [address]  3rd [facility] [place] [facility] [address]   [phone]            Apr 07, 2018 11:10 AM CDT   LAKISHA Extremity with Chanelle Muniz PT   [place] [facility] ([facility]  )    [address]  [facility] [address]   [phone]              Who to contact     If you have questions or need follow up information about today's clinic visit or your schedule please contact [facility]ER[facility] directly at [phone].  Normal or non-critical lab and imaging results will be communicated to you by [employer], letter or phone within 4 business days after the clinic has received the results. If you do not hear from us within 7 days, please contact the clinic through [employer] or phone. If you have a critical or abnormal lab result, we will notify you by phone as soon as possible.  Submit refill requests through [employer] or call your pharmacy and they will forward the refill request to us. Please allow 3 business days for your refill to be completed.          Additional Information About Your Visit        [employer] Information     [employer] gives you secure access to your electronic health record. If you see a primary care provider, you can also send messages to your care team  and make appointments. If you have questions, please call your primary care clinic.  If you do not have a primary care provider, please call [phone] and they will assist you.        Care EveryWhere ID     This is your Care EveryWhere ID. This could be used by other organizations to access your [place] medical records  [id number]         Blood Pressure from Last 3 Encounters:   02/19/18 125/73   02/13/18 117/75   02/07/18 111/72    Weight from Last 3 Encounters:   02/19/18 82.8 kg (182 lb 9.6 oz)   02/13/18 81.6 kg (180 lb)   02/07/18 83.5 kg (184 lb)              We Performed the Following     MANUAL THER TECH,1+REGIONS,EA 15 MIN     THERAPEUTIC EXERCISES     ULTRASOUND THERAPY        Primary Care Provider Office Phone # Fax #    Dimitri Alas [phone] [address] [address]        Equal Access to Services     DALIA MOORE : Hadii aad ku hadasho Soomaali, waaxda luqadaha, qaybta kaalmada adeegyada, waxay joanin hayniman [facility] . So [facility] [phone].    ATENCIÓN: Si ziggy poe, tiene a ramsey disposición servicios gratuitos de asistencia lingüística. Llame al [phone].    We comply with applicable federal civil rights laws and [place] laws. We do not discriminate on the basis of race, color, national origin, age, disability, sex, sexual orientation, or gender identity.            Thank you!     Thank you for choosing  "[facility] HAND THERAP[facility]  for your care. Our goal is always to provide you with excellent care. Hearing back from our patients is one way we can continue to improve our services. Please take a few minutes to complete the written survey that you may receive in the mail after your visit with us. Thank you!             Your Updated Medication List - Protect others around you: Learn how to safely use, store and throw away your medicines at www.disposemymeds.org.          This list is accurate as of 2/19/18 10:18 PM.  Always use your  most recent med list.                   Brand Name Dispense Instructions for use Diagnosis    aspirin 81 MG tablet      Take 1 tablet by mouth daily.        blood glucose monitoring lancets     4 Box    Use to test blood sugar 8 times daily or as directed.    Type 1 diabetes, HbA1c goal < 7% (H)       blood glucose monitoring test strip    ACCU-CHEK COLIN PLUS    700 strip    Test Blood Sugar 8 times daily or as directed    Type 1 diabetes, HbA1c goal < 7% (H)       CALCIUM + D PO      Take one daily        citalopram 20 MG tablet    celeXA    135 tablet    Take 1.5 tablets (30 mg) by mouth daily    Anxiety       diclofenac 1 % Gel topical gel    VOLTAREN    100 g    APPLY 2 GRAMS ONTO THE SKIN FOUR TIMES DAILY AS NEEDED FOR MODERATE PAIN    Enthesopathy, Pain in joint, multiple sites, Chronic pain of right ankle       dulaglutide 1.5 MG/0.5ML pen    TRULICITY    6 mL    Inject 1.5 mg Subcutaneous every 7 days    Type 1 diabetes mellitus without complication (H)       econazole nitrate 1 % cream      Apply 0.5 inches topically daily.        fish oil-omega-3 fatty acids 1000 MG capsule      Take one daily        folic acid 1 MG tablet    FOLVITE    90 tablet    Take 1 tablet (1 mg) by mouth daily    Pain in joint, multiple sites, Tendinitis, Encounter for therapeutic drug monitoring       HYDROcodone-acetaminophen 5-325 MG per tablet    NORCO    15 tablet    Take 1-2 tablets by mouth every 4 hours as needed for moderate to severe pain        insulin degludec 100 UNIT/ML pen    TRESIBA    15 mL    Inject 18 Units Subcutaneous daily    Type 1 diabetes mellitus without complication (H)       insulin pen needle 31G X 8 MM    B-D U/F    450 each    Use 5 pen needles daily    Diabetes mellitus type I (H)       levothyroxine 125 MCG tablet    SYNTHROID/LEVOTHROID    90 tablet    Take 1 tablet (125 mcg) by mouth daily    Hypothyroidism       methotrexate 2.5 MG tablet CHEMO     24 tablet    Take 4 tablets (10 mg) by mouth  once a week    Pain in joint, multiple sites, Tendinitis, Encounter for therapeutic drug monitoring       methylphenidate 20 MG CR capsule    METADATE CD     Take 20 mg by mouth daily        mometasone-formoterol 100-5 MCG/ACT oral inhaler    DULERA    3 Inhaler    Inhale 2 puffs into the lungs 2 times daily    Moderate persistent asthma without complication       montelukast 5 MG chewable tablet    SINGULAIR    30 tablet    CHEW AND SWALLOW 1 TABLET(5 MG) BY MOUTH AT BEDTIME    Mild persistent asthma without complication       MULTIVITAMIN PO      Take  by mouth. Take one daily tab        NIZORAL PO      Take  by mouth. Shampoo daily        NovoLOG PENFILL 100 UNIT/ML injection   Generic drug:  insulin aspart     30 mL    1 unit per 15 grams CHO at all meals and snacks with correction scale of 1 unit per 50 mg/dL over 150.  Average daily use is 20 units.    Type 1 diabetes, HbA1c goal < 7% (H)       ROGAINE EX      Externally apply  topically. 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    Insomnia       * UNABLE TO FIND      MEDICATION NAME: biosil    Enthesopathy, Pain in joint, multiple sites, Chronic pain of right ankle       * UNABLE TO FIND      Biosil        * Notice:  This list has 4 medication(s) that are the same as other medications prescribed for you. Read the directions carefully, and ask your doctor or other care provider to review them with you.

## 2018-02-19 NOTE — PATIENT INSTRUCTIONS
We addressed the following today:    1. Right peroneal tendon tear  2. Right ankle ligament sprain    Home exercise program as instructed  Physical therapy: [facility] - [phone]  Other specific instructions: Call if interested in PRP injection, surgical referral, or shoe insert (lateral wedge) as discussed  Follow up as needed for further evaluation/medical care (sooner if needed; call direct clinic number [[phone]] at any time with questions/concerns)

## 2018-02-19 NOTE — PROGRESS NOTES
"[facility] Physical Medicine & Rehabilitation Follow-Up Clinic Note  Follow-up Visit s Feb 19, 2018    Subjective:  Dali Martines is a 47 year old female who is seen in follow up for evaluation of right ankle pain for discussion of MRI of the right ankle without contrast results.  Last visit was on 2/7/2018 with Dr. Villagomez.  Since that time, symptoms have been the same/worse.  Has not been using any oral pain medications for improvement of symptoms.  Has been completing her home exercise as prescribed during formal physical therapy.    Notes right lateral leg/ankle pain.  Symptoms are worse with walking activities and with toe off activities.  Denies any numbness/tingling of the right lower extremity.  Notes intermittent weakness of the right lower extremity.  Denies any trauma/falls since last clinical visit.    Patient's past medical, surgical, social, and family histories are reviewed today    Objective:  /73 (BP Location: Left arm, Patient Position: Chair, Cuff Size: Adult Regular)  Pulse 69  Ht 1.778 m (5' 10\")  Wt 82.8 kg (182 lb 9.6 oz)  BMI 26.2 kg/m2  General: healthy, alert, and in no distress  Skin: no suspicious lesions or rashes  Neuro: motor exam as noted below    MSK:    RIGHT ANKLE  Palpation:    No tenderness over the ATFL, lateral malleolus, or peroneal tendons  Strength:    Within normal limits for dorsiflexion and eversion  Special Tests:    Negative anterior drawer    Imaging:  No x-rays indicated during today's visit  Previous films were reviewed today, independent visualization of images was performed, and results were discussed with the patient  MR of the Right Ankle without Contrast - 2/15/2018  IMPRESSION:    1. The findings appear similar to the previous exam  2. Attenuation of the peroneus brevis tendon distally, which may well represent chronic partial thickness degeneration/degenerative tearing.    ASSESSMENT:  1.  Right peroneus brevis degeneration/tear  2.  " Chronic right anterior talofibular ligament sprain    PLAN:  1.  Discussed treatment options with the patient including an ankle brace, CAM boot immobilization, lateral wedge, further formal physical therapy, platelet rich plasma injection, surgical intervention, etc. with the patient.  Patient wished to proceed with further formal physical therapy at this time.  2.  Call if interested in a a right peroneal tendon platelet rich plasma injection with ultrasound guidance (recommended), surgical referral, or shoe insert (lateral wedge) as discussed for further treatment purposes.  3.  Follow-up as needed for further evaluation/medical care.      Patient's conditions were thoroughly discussed during today's visit with greater than 50% of the visit spent counseling the patient with total time spent face-to-face with the patient being 30 minutes.    Bryan Schaffer DO, RUTHIEM    Department of Rehabilitation Medicine

## 2018-02-24 ENCOUNTER — THERAPY VISIT (OUTPATIENT)
Dept: PHYSICAL THERAPY | Facility: CLINIC | Age: 48
End: 2018-02-24
Payer: COMMERCIAL

## 2018-02-24 DIAGNOSIS — M25.571 PAIN IN JOINT INVOLVING ANKLE AND FOOT, RIGHT: ICD-10-CM

## 2018-02-24 PROCEDURE — 97110 THERAPEUTIC EXERCISES: CPT | Mod: GP | Performed by: PHYSICAL THERAPIST

## 2018-02-24 PROCEDURE — 97112 NEUROMUSCULAR REEDUCATION: CPT | Mod: GP | Performed by: PHYSICAL THERAPIST

## 2018-02-26 DIAGNOSIS — E10.9 TYPE 1 DIABETES MELLITUS WITHOUT COMPLICATION (H): ICD-10-CM

## 2018-02-27 ENCOUNTER — THERAPY VISIT (OUTPATIENT)
Dept: OCCUPATIONAL THERAPY | Facility: CLINIC | Age: 48
End: 2018-02-27
Payer: COMMERCIAL

## 2018-02-27 DIAGNOSIS — M79.644 PAIN OF RIGHT THUMB: Primary | ICD-10-CM

## 2018-02-27 DIAGNOSIS — M65.30 TRIGGER FINGER, ACQUIRED: ICD-10-CM

## 2018-02-27 PROCEDURE — 97110 THERAPEUTIC EXERCISES: CPT | Mod: GO | Performed by: OCCUPATIONAL THERAPIST

## 2018-02-27 PROCEDURE — 97140 MANUAL THERAPY 1/> REGIONS: CPT | Mod: GO | Performed by: OCCUPATIONAL THERAPIST

## 2018-02-27 PROCEDURE — 97035 APP MDLTY 1+ULTRASOUND EA 15: CPT | Mod: GO | Performed by: OCCUPATIONAL THERAPIST

## 2018-02-27 NOTE — MR AVS SNAPSHOT
After Visit Summary   2/27/2018    Dali Martines    MRN: [id number]           Patient Information     Date Of Birth          1970        Visit Information        Provider Department      2/27/2018 4:00 PM Sydni Talbot OT M [facility]        Today's Diagnoses     Pain of right thumb    -  1    Trigger finger, acquired           Follow-ups after your visit        Your next 10 appointments already scheduled     Mar 06, 2018  4:00 PM CST   [facility] Hand with GOLD Herrera [facility] ([facility])    [address] [place] [address]  [address]th [facility] [address]   [phone]            Mar 10, 2018 11:10 AM CST   LAKISHA Extremity with Michi Esteves Pt, PT   [facility] ([facility]  )    [address]  [facility] [address]   [phone]            Mar 13, 2018  4:00 PM CDT   LAKISHA Hand with GOLD Herrera [facility] ([facility])    [address] [place] [address]  [address]th [facility] [address]   [phone]            Mar 14, 2018  2:30 PM CDT   (Arrive by 2:15 PM)   RETURN HAND with Chris Uribe MD   [facility] ([facility])    [address] [place] [address]   [phone]            Mar 24, 2018 10:30 AM CDT   LAKISHA Extremity with Chanelle Muniz PT   [facility] ([facility] [place]  )    [address]  [facility] [address]   [phone]            Apr 04, 2018 11:30 AM CDT   (Arrive by 11:15 AM)   Return Visit with Kraig Villagomez MD   [facility] ([facility])    [address] [place] [address]   [phone]            Apr 07, 2018 11:10 AM CDT   LAKISHA Extremity with Chanelle Muniz PT   [facility] ([facility]  )    [address]  [facility] [id number] [phone]            May 15, 2018  3:00 PM CDT   (Arrive by 2:45 PM)   Return Visit with Yoel Betancourt MD   [facility] ([facility])    [address]  [address]  [facility] [address] [phone]            Jun 12, 2018  3:00 PM CDT   (Arrive by 2:45 PM)   RETURN DIABETES with Felicia Mckeon MD   [facility]in[facility] ([facility])    [address]  3[address]  [facility] [id number] [phone]            Aug 14, 2018  3:30 PM CDT   (Arrive by 3:15 PM)   Return Visit with Sara Carter MD   [facility] ([facility])    [address]  [address]  [facility] [id number] [phone]              Who to contact     If you have questions or need follow up information about today's clinic visit or your schedule please contact [facility] directly at [phone].  Normal or non-critical lab and imaging results will be communicated to you by Beebritehart, letter or phone within 4 business days after the clinic has received the results. If you do not hear from us within 7 days, please contact the clinic through Beebritehart or phone. If you have a critical or abnormal lab result, we will notify you by phone as soon as possible.  Submit refill requests through Cliq or call your pharmacy and they will forward the refill request to us. Please allow 3 business days for your refill to be completed.          Additional Information About Your Visit        Cliq Information     Cliq gives you secure access to your electronic health record. If you see a primary care provider, you can also send messages to your care team and make appointments. If you have questions, please call your primary care clinic.  If you do not have a primary care provider, please call [phone] and  they will assist you.        Care EveryWhere ID     This is your Care EveryWhere ID. This could be used by other organizations to access your [place] medical records  [id number]         Blood Pressure from Last 3 Encounters:   02/19/18 125/73   02/13/18 117/75   02/07/18 111/72    Weight from Last 3 Encounters:   02/19/18 82.8 kg (182 lb 9.6 oz)   02/13/18 81.6 kg (180 lb)   02/07/18 83.5 kg (184 lb)              We Performed the Following     MANUAL THER TECH,1+REGIONS,EA 15 MIN     THERAPEUTIC EXERCISES     ULTRASOUND THERAPY        Primary Care Provider Office Phone # Fax #    Dimitri Alas [phone]       [address] [address]        Equal Access to Services     DALIA MOORE : Hadii randi aguilar hadasho Sosravanthi, waaxda luqadaha, qaybta kaalmada adeegyada, kaitlin garrison . So [facility] [phone].    ATENCIÓN: Si habla español, tiene a ramsey disposición servicios gratuitos de asistencia lingüística. [facility] [phone].    We comply with applicable federal civil rights laws and [place] laws. We do not discriminate on the basis of race, color, national origin, age, disability, sex, sexual orientation, or gender identity.            Thank you!     Thank you for choosing [facility] THERAPY  for your care. Our goal is always to provide you with excellent care. Hearing back from our patients is one way we can continue to improve our services. Please take a few minutes to complete the written survey that you may receive in the mail after your visit with us. Thank you!             Your Updated Medication List - Protect others around you: Learn how to safely use, store and throw away your medicines at www.disposemymeds.org.          This list is accurate as of 2/27/18  4:37 PM.  Always use your most recent med list.                   Brand Name Dispense Instructions for use Diagnosis    aspirin 81 MG tablet      Take 1 tablet by mouth daily.         blood glucose monitoring lancets     4 Box    Use to test blood sugar 8 times daily or as directed.    Type 1 diabetes, HbA1c goal < 7% (H)       blood glucose monitoring test strip    ACCU-CHEK COLIN PLUS    700 strip    Test Blood Sugar 8 times daily or as directed    Type 1 diabetes, HbA1c goal < 7% (H)       CALCIUM + D PO      Take one daily        citalopram 20 MG tablet    celeXA    135 tablet    Take 1.5 tablets (30 mg) by mouth daily    Anxiety       diclofenac 1 % Gel topical gel    VOLTAREN    100 g    APPLY 2 GRAMS ONTO THE SKIN FOUR TIMES DAILY AS NEEDED FOR MODERATE PAIN    Enthesopathy, Pain in joint, multiple sites, Chronic pain of right ankle       dulaglutide 1.5 MG/0.5ML pen    TRULICITY    6 mL    Inject 1.5 mg Subcutaneous every 7 days    Type 1 diabetes mellitus without complication (H)       econazole nitrate 1 % cream      Apply 0.5 inches topically daily.        fish oil-omega-3 fatty acids 1000 MG capsule      Take one daily        folic acid 1 MG tablet    FOLVITE    90 tablet    Take 1 tablet (1 mg) by mouth daily    Pain in joint, multiple sites, Tendinitis, Encounter for therapeutic drug monitoring       HYDROcodone-acetaminophen 5-325 MG per tablet    NORCO    15 tablet    Take 1-2 tablets by mouth every 4 hours as needed for moderate to severe pain        insulin degludec 100 UNIT/ML pen    TRESIBA    15 mL    Inject 18 Units Subcutaneous daily    Type 1 diabetes mellitus without complication (H)       insulin pen needle 31G X 8 MM    B-D U/F    450 each    Use 5 pen needles daily    Diabetes mellitus type I (H)       levothyroxine 125 MCG tablet    SYNTHROID/LEVOTHROID    90 tablet    Take 1 tablet (125 mcg) by mouth daily    Hypothyroidism       methotrexate 2.5 MG tablet CHEMO     24 tablet    Take 4 tablets (10 mg) by mouth once a week    Pain in joint, multiple sites, Tendinitis, Encounter for therapeutic drug monitoring       methylphenidate 20 MG CR capsule    METADATE CD      Take 20 mg by mouth daily        mometasone-formoterol 100-5 MCG/ACT oral inhaler    DULERA    3 Inhaler    Inhale 2 puffs into the lungs 2 times daily    Moderate persistent asthma without complication       montelukast 5 MG chewable tablet    SINGULAIR    30 tablet    CHEW AND SWALLOW 1 TABLET(5 MG) BY MOUTH AT BEDTIME    Mild persistent asthma without complication       MULTIVITAMIN PO      Take  by mouth. Take one daily tab        NIZORAL PO      Take  by mouth. Shampoo daily        NovoLOG PENFILL 100 UNIT/ML injection   Generic drug:  insulin aspart     30 mL    1 unit per 15 grams CHO at all meals and snacks with correction scale of 1 unit per 50 mg/dL over 150.  Average daily use is 20 units.    Type 1 diabetes, HbA1c goal < 7% (H)       ROGAINE EX      Externally apply  topically. 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    Insomnia       * UNABLE TO FIND      MEDICATION NAME: biosil    Enthesopathy, Pain in joint, multiple sites, Chronic pain of right ankle       * UNABLE TO FIND      Biosil        * Notice:  This list has 4 medication(s) that are the same as other medications prescribed for you. Read the directions carefully, and ask your doctor or other care provider to review them with you.

## 2018-02-27 NOTE — PROGRESS NOTES
SOAP note objective information for 2/27/2018.      Diagnosis:   Right Thumb trigger finger    Associated Diagnoses M65.311 Trigger finger of right thumb   Evaluate and Treat Yes Edema Control Yes Modalities Cmt: would like modalities to reduce size of trigger nodule- says she has had these previously with success Visit Frequency PRN     DOI:  April 2017    Referring MD:Chris Uribe MD  Next MD visit: 6 weeks    S:  Subjective changes as noted by patient:(P) feels like is it more smaller, the nodule, and pain is less, the thumb is moving more when I block it and do like to stretch it.  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  Job Tasks: prolonged sitting, computer work  Prior functional level:  no limitations  Barriers include:live alone  Mobility: No difficulty  Transportation: drivesp  Leisure activities/hobbies: crotchet, not able to do this now due to thumb, paint, musician (cello and piano), reading holding a book with the right hand      Functional Outcome Measure:   See flowsheet    Objective:  Noting large nodule on volar thumb, A1 pulley area    PAIN 2/12/2018 2/27    Location Right  thumb     Description aching, dull, throbing and tender, with swelling     Radiates no     Worse d/n daytime     Frequency activity dependant     Exacerbated by More use of the right hand and thumb      Relieves NSAIDs and rest     At rest 0-10/10 1/10     On use 0-10/10 7/10     At worst.0-10/10 9/10 5/10    Sleep disruption?   no     Progression Unchanged improving          ROM:  .ROM:  Thumb 2/12/2018 2/12/2018 2/27/18   AROM(PROM) Right Left left   MP 0/53 0/55    IP HE20/0  Blocked: /35 HE25/77 HE 35/66   RAbd 56  Feels Taut 58    PAbd      Retropulsion      Kapandji Opposition  Scale (0-10/10)        Stage of Stenosing Tenosynovitis (SST):   2/12/2018 2/27   Triggering of rightthumb finger or Thumb 3 Not allowing the catching   Stage 1:  Normal  Stage 2:  Uneven motion of tendon  Stage 3:  Triggering, clicking, catching  Stage 4:  Locking in extension or flexion; unlocked by active motion  Stage 5:  Locking in extension or flexion; unlocked by passive motion  Stage 6:  Finger locked in extension or flexion    Edema: mild of the thumb, dorsal IP at times per pt report  Strength: contraindicated  Sensation: [x]       WNL throughout all nerve distributions; per patient report           Assessment:   See flowsheet        Treatment Plan:   Frequency:  1 X week, once daily  Duration:  for 6 weeks     Modalities:  US  Therapeutic Exercise:  AROM, AAROM, PROM, Tendon Gliding and Blocking  Manual Techniques:  Coordination/Dexterity, Friction massage and Myofascial release  Self Care:  Ergonomic Considerations  Orthotic Fabrication: Static finger based orthosis,  thumb IP flexion/ext block    Discharge Plan:  Achieve all LTG.  Independent in home treatment program.  Reach maximal therapeutic benefit.    Home Exercise Program:  Thumb IP AROM w/o triggering, and PROM for the full range flex/ext  Wear IP orthosis for prevention of IP ROM for use, even return to crotcheting

## 2018-03-06 ENCOUNTER — THERAPY VISIT (OUTPATIENT)
Dept: OCCUPATIONAL THERAPY | Facility: CLINIC | Age: 48
End: 2018-03-06
Payer: COMMERCIAL

## 2018-03-06 DIAGNOSIS — M79.644 PAIN OF RIGHT THUMB: Primary | ICD-10-CM

## 2018-03-06 DIAGNOSIS — M65.30 TRIGGER FINGER, ACQUIRED: ICD-10-CM

## 2018-03-06 PROCEDURE — 97140 MANUAL THERAPY 1/> REGIONS: CPT | Mod: GO | Performed by: OCCUPATIONAL THERAPIST

## 2018-03-06 PROCEDURE — 97110 THERAPEUTIC EXERCISES: CPT | Mod: GO | Performed by: OCCUPATIONAL THERAPIST

## 2018-03-06 PROCEDURE — 97035 APP MDLTY 1+ULTRASOUND EA 15: CPT | Mod: GO | Performed by: OCCUPATIONAL THERAPIST

## 2018-03-09 DIAGNOSIS — M77.9 TENDINITIS: ICD-10-CM

## 2018-03-09 DIAGNOSIS — Z51.81 ENCOUNTER FOR THERAPEUTIC DRUG MONITORING: ICD-10-CM

## 2018-03-09 DIAGNOSIS — M25.50 PAIN IN JOINT, MULTIPLE SITES: ICD-10-CM

## 2018-03-10 ENCOUNTER — THERAPY VISIT (OUTPATIENT)
Dept: PHYSICAL THERAPY | Facility: CLINIC | Age: 48
End: 2018-03-10
Payer: COMMERCIAL

## 2018-03-10 DIAGNOSIS — M25.571 PAIN IN JOINT INVOLVING ANKLE AND FOOT, RIGHT: ICD-10-CM

## 2018-03-10 PROCEDURE — 97112 NEUROMUSCULAR REEDUCATION: CPT | Mod: GP | Performed by: PHYSICAL THERAPIST

## 2018-03-10 PROCEDURE — 97110 THERAPEUTIC EXERCISES: CPT | Mod: GP | Performed by: PHYSICAL THERAPIST

## 2018-03-11 NOTE — TELEPHONE ENCOUNTER
methotrexate 2.5 MG tablet CHEMO   Last Written Prescription Date:  12/20/17  Last Fill Quantity: 24   # refills: 2  Last Office Visit: 2/1/18  Future Office visit:  5/15/18    CBC RESULTS:   Recent Labs   Lab Test  02/01/18   1553   WBC  5.6   RBC  3.70*   HGB  11.7   HCT  34.6*   MCV  94   MCH  31.6   MCHC  33.8   RDW  12.9   PLT  270       Creatinine   Date Value Ref Range Status   02/01/2018 0.58 0.52 - 1.04 mg/dL Final   ]    Liver Function Studies -   Recent Labs   Lab Test  02/01/18   1553   04/27/16   1402   PROTTOTAL   --    --   8.0   ALBUMIN  4.2   < >  4.0   BILITOTAL   --    --   0.3   ALKPHOS   --    --   108   AST  19   < >  21   ALT  22   < >  25    < > = values in this interval not displayed.       Routing refill request to provider for review/approval because: protocol

## 2018-03-13 ENCOUNTER — THERAPY VISIT (OUTPATIENT)
Dept: OCCUPATIONAL THERAPY | Facility: CLINIC | Age: 48
End: 2018-03-13
Payer: COMMERCIAL

## 2018-03-13 DIAGNOSIS — M79.644 PAIN OF RIGHT THUMB: Primary | ICD-10-CM

## 2018-03-13 DIAGNOSIS — M65.30 TRIGGER FINGER, ACQUIRED: ICD-10-CM

## 2018-03-13 PROCEDURE — 97140 MANUAL THERAPY 1/> REGIONS: CPT | Mod: GO | Performed by: OCCUPATIONAL THERAPIST

## 2018-03-13 PROCEDURE — 97763 ORTHC/PROSTC MGMT SBSQ ENC: CPT | Mod: GO | Performed by: OCCUPATIONAL THERAPIST

## 2018-03-13 PROCEDURE — 97035 APP MDLTY 1+ULTRASOUND EA 15: CPT | Mod: GO | Performed by: OCCUPATIONAL THERAPIST

## 2018-03-13 NOTE — PROGRESS NOTES
SOAP note objective information for 3/13/2018.      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, computer work  Prior functional level:  no limitations  Barriers include:live alone  Mobility: No difficulty  Transportation: drivesp  Leisure activities/hobbies: crotchet, not able to do this now due to thumb, paint, musician (cello and piano), reading holding a book with the right hand      Functional Outcome Measure:   See flowsheet    Objective:  Noting large nodule on volar thumb, A1 pulley area    PAIN 2/12/2018 2/27 3/13   Location Right  thumb     Description aching, dull, throbing and tender, with swelling     Radiates no     Worse d/n daytime     Frequency activity dependant     Exacerbated by More use of the right hand and thumb      Relieves NSAIDs and rest     At rest 0-10/10 1/10     On use 0-10/10 7/10     At worst.0-10/10 9/10 5/10 Less pain not allowing the moderate pain   Sleep disruption?   no     Progression Unchanged improving          ROM:  .ROM:  Thumb 2/12/2018 2/12/2018 2/27/18    AROM(PROM) Right Left left    MP 0/53 0/55     IP HE20/0  Blocked: /35 HE25/77 HE 35/66    RAbd 56  Feels Taut 58     PAbd       Retropulsion       Kapandji Opposition Scale (0-10/10)         Stage of Stenosing Tenosynovitis (SST):   2/12/2018 2/27 3/13   Triggering of rightthumb finger or Thumb 3 Not allowing the catching 2, but again, monitoring and not allowing triggering   Stage 1:  Normal  Stage 2:  Uneven motion of  tendon  Stage 3:  Triggering, clicking, catching  Stage 4:  Locking in extension or flexion; unlocked by active motion  Stage 5:  Locking in extension or flexion; unlocked by passive motion  Stage 6:  Finger locked in extension or flexion    Edema: mild of the thumb, dorsal IP at times per pt report  Strength: contraindicated  Sensation: [x]       WNL throughout all nerve distributions; per patient report           Assessment:   See flowsheet        Treatment Plan:   Frequency:  1 X week, once daily  Duration:  for 6 weeks     Modalities:  US  Therapeutic Exercise:  AROM, AAROM, PROM, Tendon Gliding and Blocking  Manual Techniques:  Coordination/Dexterity, Friction massage and Myofascial release  Self Care:  Ergonomic Considerations  Orthotic Fabrication: Static finger based orthosis,  thumb IP flexion/ext block    Discharge Plan:  Achieve all LTG.  Independent in home treatment program.  Reach maximal therapeutic benefit.    Home Exercise Program:  Thumb IP AROM w/o triggering, and PROM for the full range flex/ext  Wear IP orthosis for prevention of IP ROM for use, even return to crotcheting  3/13/2018  Ring orthosis: wear 3 x/day in place of IP blocking orthosis, and note reductio or increase of sx, cont IP blocking between ring pulley orthosis

## 2018-03-13 NOTE — MR AVS SNAPSHOT
After Visit Summary   3/13/2018    Dali Martines    MRN: [id number]           Patient Information     Date Of Birth          1970        Visit Information        Provider Department      3/13/2018 4:00 PM Sydni Talbot OT M [facility]        Today's Diagnoses     Pain of right thumb    -  1    Trigger finger, acquired           Follow-ups after your visit        Your next 10 appointments already scheduled     Mar 19, 2018  4:30 PM CDT   [place] Hand with Sydni Talbot OT   [facility] ([facility])    [address] [place] [address]  4th [facility] [address]   [phone]            Mar 24, 2018 10:30 AM CDT   LAKISHA Extremity with Chanelle Muniz PT   [facility] ([facility] [place]  )    [address]  [facility] [address]   [phone]            Mar 27, 2018  4:00 PM CDT   LAKISHA Hand with Sydni Talbot OT   [facility] ([facility])    [address] [place] [address]  4th [facility] [address]   [phone]            Apr 04, 2018 11:30 AM CDT   (Arrive by 11:15 AM)   Return Visit with Kraig Villaogmez MD   [facility] ([facility])    [address] [place] [address]   [phone]            Apr 07, 2018 11:10 AM CDT   LAKISHA Extremity with Chanelle Muniz PT   [facility] ([facility] [place]  )    [address]  [facility] [address]   [phone]            Apr 25, 2018  3:15 PM CDT   (Arrive by 3:00 PM)   RETURN HAND with Chris Uribe MD   [facility] ([facility])    [address] [place] [address]   [phone]            May 15, 2018  3:00 PM CDT   (Arrive by 2:45 PM)   Return Visit with Yoel Betancourt MD   [facility] ([facility])    [address]  [address]  [facility] [address]   [phone]            Jun 12, 2018  3:00 PM CDT   (Arrive by 2:45 PM)   RETURN DIABETES with Felicia Mckeon MD   [facility] ([facility])    [address]  3rd Floor  [facility] [address] [phone]            Aug 14, 2018  3:30 PM CDT   (Arrive by 3:15 PM)   Return Visit with Sara Carter MD   [facility] ([facility])    [address]  [address]  [facility] [address] [phone]              Who to contact     If you have questions or need follow up information about today's clinic visit or your schedule please contact [facility] directly at [phone].  Normal or non-critical lab and imaging results will be communicated to you by MyChart, letter or phone within 4 business days after the clinic has received the results. If you do not hear from us within 7 days, please contact the clinic through Apartamahart or phone. If you have a critical or abnormal lab result, we will notify you by phone as soon as possible.  Submit refill requests through [facility] or call your pharmacy and they will forward the refill request to us. Please allow 3 business days for your refill to be completed.          Additional Information About Your Visit        ApartamaharSchoolTube Information     [facility] gives you secure access to your electronic health record. If you see a primary care provider, you can also send messages to your care team and make appointments. If you have questions, please call your primary care clinic.  If you do not have a primary care provider, please call [phone] and they will assist you.        Care EveryWhere ID     This is your Care EveryWhere ID. This could be used by other organizations to access your [facility] medical records  [id number]         Blood Pressure from Last 3  Encounters:   02/19/18 125/73   02/13/18 117/75   02/07/18 111/72    Weight from Last 3 Encounters:   02/19/18 82.8 kg (182 lb 9.6 oz)   02/13/18 81.6 kg (180 lb)   02/07/18 83.5 kg (184 lb)              We Performed the Following     C OT ORTHOTICS/PROSTH MGMT &/TRAING SBSQ ENCTR, EA 15 MIN     MANUAL THER TECH,1+REGIONS,EA 15 MIN     ULTRASOUND THERAPY        Primary Care Provider Office Phone # Fax #    Dimitri Alas [phone]       [address] [address]        Equal Access to Services     DALIA MOORE : Diego Castillo, wageorges chacon, qaming[place] kaalmada jemal, kaitlin owusu. So [facility] [phone].    ATENCIÓN: Si habla español, tiene a ramsey disposición servicios gratuitos de asistencia lingüística. Llame al [phone].    We comply with applicable federal civil rights laws and [place] laws. We do not discriminate on the basis of race, color, national origin, age, disability, sex, sexual orientation, or gender identity.            Thank you!     Thank you for choosing [facility]  for your care. Our goal is always to provide you with excellent care. Hearing back from our patients is one way we can continue to improve our services. Please take a few minutes to complete the written survey that you may receive in the mail after your visit with us. Thank you!             Your Updated Medication List - Protect others around you: Learn how to safely use, store and throw away your medicines at www.disposemymeds.org.          This list is accurate as of 3/13/18  5:05 PM.  Always use your most recent med list.                   Brand Name Dispense Instructions for use Diagnosis    aspirin 81 MG tablet      Take 1 tablet by mouth daily.        blood glucose monitoring lancets     4 Box    Use to test blood sugar 8 times daily or as directed.    Type 1 diabetes, HbA1c goal < 7% (H)       blood glucose monitoring test strip     ACCU-CHEK COLIN PLUS    700 strip    Test Blood Sugar 8 times daily or as directed    Type 1 diabetes, HbA1c goal < 7% (H)       CALCIUM + D PO      Take one daily        citalopram 20 MG tablet    celeXA    135 tablet    Take 1.5 tablets (30 mg) by mouth daily    Anxiety       diclofenac 1 % Gel topical gel    VOLTAREN    100 g    APPLY 2 GRAMS ONTO THE SKIN FOUR TIMES DAILY AS NEEDED FOR MODERATE PAIN    Enthesopathy, Pain in joint, multiple sites, Chronic pain of right ankle       dulaglutide 1.5 MG/0.5ML pen    TRULICITY    6 mL    Inject 1.5 mg Subcutaneous every 7 days    Type 1 diabetes mellitus without complication (H)       econazole nitrate 1 % cream      Apply 0.5 inches topically daily.        fish oil-omega-3 fatty acids 1000 MG capsule      Take one daily        folic acid 1 MG tablet    FOLVITE    90 tablet    Take 1 tablet (1 mg) by mouth daily    Pain in joint, multiple sites, Tendinitis, Encounter for therapeutic drug monitoring       HYDROcodone-acetaminophen 5-325 MG per tablet    NORCO    15 tablet    Take 1-2 tablets by mouth every 4 hours as needed for moderate to severe pain        insulin degludec 100 UNIT/ML pen    TRESIBA    15 mL    Inject 18 Units Subcutaneous daily    Type 1 diabetes mellitus without complication (H)       insulin pen needle 31G X 8 MM    B-D U/F    450 each    Use 5 pen needles daily    Diabetes mellitus type I (H)       levothyroxine 125 MCG tablet    SYNTHROID/LEVOTHROID    90 tablet    Take 1 tablet (125 mcg) by mouth daily    Hypothyroidism       methotrexate 2.5 MG tablet CHEMO     48 tablet    Take 4 tablets (10 mg) by mouth once a week    Pain in joint, multiple sites, Tendinitis, Encounter for therapeutic drug monitoring       methylphenidate 20 MG CR capsule    METADATE CD     Take 20 mg by mouth daily        mometasone-formoterol 100-5 MCG/ACT oral inhaler    DULERA    3 Inhaler    Inhale 2 puffs into the lungs 2 times daily    Moderate persistent asthma  without complication       montelukast 5 MG chewable tablet    SINGULAIR    30 tablet    CHEW AND SWALLOW 1 TABLET(5 MG) BY MOUTH AT BEDTIME    Mild persistent asthma without complication       MULTIVITAMIN PO      Take  by mouth. Take one daily tab        NIZORAL PO      Take  by mouth. Shampoo daily        NovoLOG PENFILL 100 UNIT/ML injection   Generic drug:  insulin aspart     30 mL    1 unit per 15 grams CHO at all meals and snacks with correction scale of 1 unit per 50 mg/dL over 150.  Average daily use is 20 units.    Type 1 diabetes, HbA1c goal < 7% (H)       ROGAINE EX      Externally apply  topically. 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    Insomnia       * UNABLE TO FIND      MEDICATION NAME: biosil    Enthesopathy, Pain in joint, multiple sites, Chronic pain of right ankle       * UNABLE TO FIND      Biosil        * Notice:  This list has 4 medication(s) that are the same as other medications prescribed for you. Read the directions carefully, and ask your doctor or other care provider to review them with you.

## 2018-03-19 ENCOUNTER — THERAPY VISIT (OUTPATIENT)
Dept: OCCUPATIONAL THERAPY | Facility: CLINIC | Age: 48
End: 2018-03-19
Payer: COMMERCIAL

## 2018-03-19 DIAGNOSIS — M65.30 TRIGGER FINGER, ACQUIRED: ICD-10-CM

## 2018-03-19 DIAGNOSIS — M79.644 PAIN OF RIGHT THUMB: Primary | ICD-10-CM

## 2018-03-19 PROCEDURE — 97110 THERAPEUTIC EXERCISES: CPT | Mod: GO | Performed by: OCCUPATIONAL THERAPIST

## 2018-03-19 PROCEDURE — 97140 MANUAL THERAPY 1/> REGIONS: CPT | Mod: GO | Performed by: OCCUPATIONAL THERAPIST

## 2018-03-19 PROCEDURE — 97035 APP MDLTY 1+ULTRASOUND EA 15: CPT | Mod: GO | Performed by: OCCUPATIONAL THERAPIST

## 2018-03-19 NOTE — MR AVS SNAPSHOT
After Visit Summary   3/19/2018    Dali Martines    MRN: [id number]           Patient Information     Date Of Birth          1970        Visit Information        Provider Department      3/19/2018 4:30 PM Sydni Talbot OT M [facility]        Today's Diagnoses     Pain of right thumb    -  1    Trigger finger, acquired           Follow-ups after your visit        Your next 10 appointments already scheduled     Mar 24, 2018 10:30 AM CDT   LAKISHA Extremity with Chanelle Muniz PT   [facility] ([facility] [place]  )    [address]  [facility] [address]   [phone]            Mar 27, 2018  4:00 PM CDT   LAK[place] Hand with GOLD Herrera [facility] ([facility])    [address] [place] [address]  4th [facility] [address]   [phone]            Apr 04, 2018 11:30 AM CDT   (Arrive by 11:15 AM)   Return Visit with Kragi Villagomez MD   [facility] ([facility])    [address] [place] [address]  3rd Floor  [facility] [address]   [phone]            Apr 07, 2018 11:10 AM CDT   LAKISHA Extremity with Chanelle Muniz PT   [facility] ([facility] [place]  )    [address]  [facility] [address]   [phone]            Apr 25, 2018  3:15 PM CDT   (Arrive by 3:00 PM)   RETURN HAND with Chris Uribe MD   [facility] ([facility])    [address] [place] [address]  4th Floor  [facility] [address] [phone]            May 15, 2018  3:00 PM CDT   (Arrive by 2:45 PM)   Return Visit with Yoel Betancourt MD   [facility] ([facility])    [address] [place] [address]  [address]  [facility] [address] [phone]            Jun 12, 2018  3:00 PM CDT   (Arrive by 2:45 PM)   RETURN DIABETES with Felicia Mckeon MD     [facility] ([facility] [place])    [address]  3rd Floor  [facility] [id number] [phone]            Aug 14, 2018  3:30 PM CDT   (Arrive by 3:15 PM)   Return Visit with Sara Carter MD   [facility] ([facility])    [address]  [address]  [facility] [address] [phone]              Who to contact     If you have questions or need follow up information about today's clinic visit or your schedule please contact [facility] directly at [phone].  Normal or non-critical lab and imaging results will be communicated to you by Ellihart, letter or phone within 4 business days after the clinic has received the results. If you do not hear from us within 7 days, please contact the clinic through Waizyt or phone. If you have a critical or abnormal lab result, we will notify you by phone as soon as possible.  Submit refill requests through CREOpoint or call your pharmacy and they will forward the refill request to us. Please allow 3 business days for your refill to be completed.          Additional Information About Your Visit        ElliharDatran Media Information     [facility] gives you secure access to your electronic health record. If you see a primary care provider, you can also send messages to your care team and make appointments. If you have questions, please call your primary care clinic.  If you do not have a primary care provider, please call [phone] and they will assist you.        Care EveryWhere ID     This is your Care EveryWhere ID. This could be used by other organizations to access your [facility] medical records  [id number]         Blood Pressure from Last 3 Encounters:   02/19/18 125/73   02/13/18 117/75   02/07/18 111/72    Weight from Last 3 Encounters:   02/19/18 82.8 kg (182 lb 9.6 oz)   02/13/18 81.6 kg (180 lb)   02/07/18 83.5 kg (184 lb)              We Performed the  Following     MANUAL THER TECH,1+REGIONS,EA 15 MIN     THERAPEUTIC EXERCISES     ULTRASOUND THERAPY        Primary Care Provider Office Phone # Fax #    Dimitri Alas [phone] [address] [address]        Equal Access to Services     ROXANNCHARLES TERESA : Hadii aad ku hadasho Soomaali, waaxda luqadaha, qaybta kaalmada adeegyada, waxay idiin hayniman ademarbella fernandes laалександрanaya owusu. So [facility] [phone].    ATENCIÓN: Si habla español, tiene a ramsey disposición servicios gratuitos de asistencia lingüística. Llame al [phone].    We comply with applicable federal civil rights laws and [place] laws. We do not discriminate on the basis of race, color, national origin, age, disability, sex, sexual orientation, or gender identity.            Thank you!     Thank you for choosing [facility]  for your care. Our goal is always to provide you with excellent care. Hearing back from our patients is one way we can continue to improve our services. Please take a few minutes to complete the written survey that you may receive in the mail after your visit with us. Thank you!             Your Updated Medication List - Protect others around you: Learn how to safely use, store and throw away your medicines at www.disposemymeds.org.          This list is accurate as of 3/19/18  5:55 PM.  Always use your most recent med list.                   Brand Name Dispense Instructions for use Diagnosis    aspirin 81 MG tablet      Take 1 tablet by mouth daily.        blood glucose monitoring lancets     4 Box    Use to test blood sugar 8 times daily or as directed.    Type 1 diabetes, HbA1c goal < 7% (H)       blood glucose monitoring test strip    ACCU-CHEK COLIN PLUS    700 strip    Test Blood Sugar 8 times daily or as directed    Type 1 diabetes, HbA1c goal < 7% (H)       CALCIUM + D PO      Take one daily        citalopram 20 MG tablet    celeXA    135 tablet    Take 1.5 tablets (30 mg) by  mouth daily    Anxiety       diclofenac 1 % Gel topical gel    VOLTAREN    100 g    APPLY 2 GRAMS ONTO THE SKIN FOUR TIMES DAILY AS NEEDED FOR MODERATE PAIN    Enthesopathy, Pain in joint, multiple sites, Chronic pain of right ankle       dulaglutide 1.5 MG/0.5ML pen    TRULICITY    6 mL    Inject 1.5 mg Subcutaneous every 7 days    Type 1 diabetes mellitus without complication (H)       econazole nitrate 1 % cream      Apply 0.5 inches topically daily.        fish oil-omega-3 fatty acids 1000 MG capsule      Take one daily        folic acid 1 MG tablet    FOLVITE    90 tablet    Take 1 tablet (1 mg) by mouth daily    Pain in joint, multiple sites, Tendinitis, Encounter for therapeutic drug monitoring       HYDROcodone-acetaminophen 5-325 MG per tablet    NORCO    15 tablet    Take 1-2 tablets by mouth every 4 hours as needed for moderate to severe pain        insulin degludec 100 UNIT/ML pen    TRESIBA    15 mL    Inject 18 Units Subcutaneous daily    Type 1 diabetes mellitus without complication (H)       insulin pen needle 31G X 8 MM    B-D U/F    450 each    Use 5 pen needles daily    Diabetes mellitus type I (H)       levothyroxine 125 MCG tablet    SYNTHROID/LEVOTHROID    90 tablet    Take 1 tablet (125 mcg) by mouth daily    Hypothyroidism       methotrexate 2.5 MG tablet CHEMO     48 tablet    Take 4 tablets (10 mg) by mouth once a week    Pain in joint, multiple sites, Tendinitis, Encounter for therapeutic drug monitoring       methylphenidate 20 MG CR capsule    METADATE CD     Take 20 mg by mouth daily        mometasone-formoterol 100-5 MCG/ACT oral inhaler    DULERA    3 Inhaler    Inhale 2 puffs into the lungs 2 times daily    Moderate persistent asthma without complication       montelukast 5 MG chewable tablet    SINGULAIR    30 tablet    CHEW AND SWALLOW 1 TABLET(5 MG) BY MOUTH AT BEDTIME    Mild persistent asthma without complication       MULTIVITAMIN PO      Take  by mouth. Take one daily tab         NIZORAL PO      Take  by mouth. Shampoo daily        NovoLOG PENFILL 100 UNIT/ML injection   Generic drug:  insulin aspart     30 mL    1 unit per 15 grams CHO at all meals and snacks with correction scale of 1 unit per 50 mg/dL over 150.  Average daily use is 20 units.    Type 1 diabetes, HbA1c goal < 7% (H)       ROGAINE EX      Externally apply  topically. 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    Insomnia       * UNABLE TO FIND      MEDICATION NAME: biosil    Enthesopathy, Pain in joint, multiple sites, Chronic pain of right ankle       * UNABLE TO FIND      Biosil        * Notice:  This list has 4 medication(s) that are the same as other medications prescribed for you. Read the directions carefully, and ask your doctor or other care provider to review them with you.

## 2018-03-19 NOTE — PROGRESS NOTES
SOAP note objective information for 3/19/18    Please refer to the daily flowsheet for treatment today, total treatment time and time spent performing 1:1 timed codes.      Diagnosis:   Right Thumb trigger finger    Associated Diagnoses M65.311 Trigger finger of right thumb   Evaluate and Treat Yes Edema Control Yes Modalities Cmt: would like modalities to reduce size of trigger nodule- says she has had these previously with success Visit Frequency PRN     DOI:  April 2017    Referring MD:Chris Uribe MD  Next MD visit: 6 weeks    S:  Subjective changes as noted by patient: Subjective: had a set back and the thumb is more sore and new nodule on the R middle finger which is sharp and tender at the volar MCP middle finger. THis just errupted this week. The thumb is not moving as well, doing the epsom salt hand soaks. Noted a sharp twinge on Saturday  3/17/18 when wearing the ring splint and it caused more soreness in the palm and more swelling  Functional changes noted by patient: Decreased Performance in Self Care Tasks (eating, bathing) and Work Tasks  Response to previous treatment:  fair  Patient has noted adverse reaction to:   None      Occupational Profile Information:  Current occupation is researcher  Currently working in normal job without restrictions  Job Tasks: prolonged sitting, computer work  Prior functional level:  no limitations  Barriers include:live alone  Mobility: No difficulty  Transportation: drivesp  Leisure activities/hobbies: crotchet, not able to do this now due to thumb, paint, musician (cello and piano), reading holding a book with the right hand      Functional Outcome Measure:   See flowsheet    Objective:  Noting large nodule on volar thumb, A1 pulley area    PAIN 2/12/2018 2/27 3/13 3/19   Location Right  thumb      Description aching, dull, throbing and tender, with swelling      Radiates no      Worse d/n daytime      Frequency activity dependant      Exacerbated by More use of  the right hand and thumb       Relieves NSAIDs and rest      At rest 0-10/10 1/10      On use 0-10/10 7/10      At worst.0-10/10 9/10 5/10 Less pain not allowing the moderate pain R: 5/10   R: middle finger with pressure at the MCP: 5/10    Sleep disruption?   no      Progression Unchanged improving  Worsened.          ROM:  .ROM:  Thumb 2/12/2018 2/12/2018 2/27/18 3/19/18   AROM(PROM) Right Left left left   MP 0/53 0/55     IP HE20/0  Blocked: /35 HE25/77 HE 35/66 HE20/62   RAbd 56  Feels Taut 58     PAbd       Retropulsion       Kapandji Opposition Scale (0-10/10)         Stage of Stenosing Tenosynovitis (SST):   2/12/2018 2/27 3/13 3/19   Triggering of rightthumb finger or Thumb 3 Not allowing the catching 2, but again, monitoring and not allowing triggering 3 R thumb   Stage 1:  Normal  Stage 2:  Uneven motion of tendon  Stage 3:  Triggering, clicking, catching  Stage 4:  Locking in extension or flexion; unlocked by active motion  Stage 5:  Locking in extension or flexion; unlocked by passive motion  Stage 6:  Finger locked in extension or flexion    Edema: mild of the thumb, dorsal IP at times per pt report  Strength: contraindicated  Sensation: [x]       WNL throughout all nerve distributions; per patient report     A:  Response to therapy has been lack of progress in:  ROM of Thumb:  Ext  and Flex  Pain:  intensity of pain has increased, duration of pain is increased and more tender over affected area    Overall Assessment:  Patient would benefit from continued therapy to achieve rehab potential  STG/LTG:  See goal sheet for details and updates of remaining functional limitations.       Treatment Plan:   P:  Frequency/Duration:  Recommend continuing with the current treatment plan. 1 X week, once daily  for 4 weeks    Recommendations for Continued Therapy  Treatment Plan:  Modalities:  US  Therapeutic Exercise:  AROM, AAROM, PROM, Tendon Gliding and Blocking  Manual Techniques:  Coordination/Dexterity,  Friction massage and Myofascial release  Self Care:  Ergonomic Considerations  Orthotic Fabrication: Static finger based orthosis,  thumb IP flexion/ext block    Discharge Plan:  Achieve all LTG.  Independent in home treatment program.  Reach maximal therapeutic benefit.    Home Exercise Program:  Thumb IP AROM w/o triggering, and PROM for the full range flex/ext  Wear IP orthosis for prevention of IP ROM for use, even return to crotcheting  3/13/2018  Ring orthosis: wear 3 x/day in place of IP blocking orthosis, and note reductio or increase of sx, cont IP blocking between ring pulley orthosis  3/19/2018  DC ring orthosis, wear IP orthosis as previously

## 2018-03-24 ENCOUNTER — THERAPY VISIT (OUTPATIENT)
Dept: PHYSICAL THERAPY | Facility: CLINIC | Age: 48
End: 2018-03-24
Payer: COMMERCIAL

## 2018-03-24 DIAGNOSIS — M25.571 PAIN IN JOINT INVOLVING ANKLE AND FOOT, RIGHT: ICD-10-CM

## 2018-03-24 PROCEDURE — 97110 THERAPEUTIC EXERCISES: CPT | Mod: GP | Performed by: PHYSICAL THERAPIST

## 2018-03-24 NOTE — MR AVS SNAPSHOT
After Visit Summary   3/24/2018    Dali Martines    MRN: [id number]           Patient Information     Date Of Birth          1970        Visit Information        Provider Department      3/24/2018 10:30 AM Chanelle Muniz, PT [facility]        Today's Diagnoses     Pain in joint involving ankle and foot, right           Follow-ups after your visit        Your next 10 appointments already scheduled     Mar 27, 2018  4:00 PM CDT   [facility] Hand with Sydni Talbot OT   [facility] ([facility])    [address]  4th [facility] [address]   [phone]            Apr 04, 2018 11:30 AM CDT   (Arrive by 11:15 AM)   Return Visit with Kraig Villagomez MD   [facility] ([facility])    [address] [place] [address]  3rd [facility] [address]   [phone]            Apr 07, 2018 11:10 AM CDT   LAKISHA Extremity with Chanelle Muniz PT   [facility] ([facility]  )    [address]  [facility] [address]   [phone]            Apr 21, 2018 10:30 AM CDT   LAKISHA Extremity with Michi Esteves Pt, PT   [facility] ([facility]  )    [address]  [facility] [address]   [phone]            Apr 25, 2018  3:15 PM CDT   (Arrive by 3:00 PM)   RETURN HAND with Chris Uribe MD   [facility] ([facility])    [address] [place] [address]  4th [facility] [address]   [phone]            May 03, 2018  4:10 PM CDT   LAKISHA Extremity with Chanelle Muniz PT   [facility] ([facility]  )    [address]  [facility] [address]   [phone]            May 15, 2018  3:00 PM CDT   (Arrive by 2:45 PM)   Return Visit with Yoel Betancourt MD    [facility] ([facility])    [address]  [address]  [facility] [address]   [phone]            May 19, 2018 11:10 AM CDT   LAKISHA Extremity with Michi Esteves Pt, PT   [facility] ([facility]  )    [address]  [facility] [address]   [phone]            Jun 12, 2018  3:00 PM CDT   (Arrive by 2:45 PM)   RETURN DIABETES with Felicia Mckeon MD   [facility] End[facility]in[facility] ([facility])    [address]  3rd Floor  [facility] [address] [phone]            Aug 14, 2018  3:30 PM CDT   (Arrive by 3:15 PM)   Return Visit with Sara Carter MD   [facility] ([facility])    [address]  [address]  [facility] [address] [phone]              Who to contact     If you have questions or need follow up information about today's clinic visit or your schedule please contact [facility] directly at [phone].  Normal or non-critical lab and imaging results will be communicated to you by [employer], letter or phone within 4 business days after the clinic has received the results. If you do not hear from us within 7 days, please contact the clinic through [employer] or phone. If you have a critical or abnormal lab result, we will notify you by phone as soon as possible.  Submit refill requests through [employer] or call your pharmacy and they will forward the refill request to us. Please allow 3 business days for your refill to be completed.          Additional Information About Your Visit        Zip[employer]osis Information     [employer] gives you secure access to your electronic health record. If you see a primary care provider, you can also send messages to your care team and make appointments. If you have questions, please call your primary care clinic.  If  you do not have a primary care provider, please call [phone] and they will assist you.        Care EveryWhere ID     This is your Care EveryWhere ID. This could be used by other organizations to access your [facility] medical records  [id number]         Blood Pressure from Last 3 Encounters:   02/19/18 125/73   02/13/18 117/75   02/07/18 111/72    Weight from Last 3 Encounters:   02/19/18 82.8 kg (182 lb 9.6 oz)   02/13/18 81.6 kg (180 lb)   02/07/18 83.5 kg (184 lb)              We Performed the Following     LAKISHA PROGRESS NOTES REPORT     THERAPEUTIC EXERCISES        Primary Care Provider Office Phone # Fax #    Dimitri Alas [phone] [address] [address]        Equal Access to Services     DALIA MOORE : Hadii aad ku hadasho Soomaali, waaxda luqadaha, qaybta kaalmada adeegyada, kaitlin dodd hayravi garrsion . So [facility] [phone].    ATENCIÓN: Si habla español, tiene a ramsey disposición servicios gratuitos de asistencia lingüística. Juanito al [phone].    We comply with applicable federal civil rights laws and [place] laws. We do not discriminate on the basis of race, color, national origin, age, disability, sex, sexual orientation, or gender identity.            Thank you!     Thank you for choosing [facility]  for your care. Our goal is always to provide you with excellent care. Hearing back from our patients is one way we can continue to improve our services. Please take a few minutes to complete the written survey that you may receive in the mail after your visit with us. Thank you!             Your Updated Medication List - Protect others around you: Learn how to safely use, store and throw away your medicines at www.disposemymeds.org.          This list is accurate as of 3/24/18 11:36 AM.  Always use your most recent med list.                   Brand Name Dispense Instructions for use  Diagnosis    aspirin 81 MG tablet      Take 1 tablet by mouth daily.        blood glucose monitoring lancets     4 Box    Use to test blood sugar 8 times daily or as directed.    Type 1 diabetes, HbA1c goal < 7% (H)       blood glucose monitoring test strip    ACCU-CHEK COLIN PLUS    700 strip    Test Blood Sugar 8 times daily or as directed    Type 1 diabetes, HbA1c goal < 7% (H)       CALCIUM + D PO      Take one daily        citalopram 20 MG tablet    celeXA    135 tablet    Take 1.5 tablets (30 mg) by mouth daily    Anxiety       diclofenac 1 % Gel topical gel    VOLTAREN    100 g    APPLY 2 GRAMS ONTO THE SKIN FOUR TIMES DAILY AS NEEDED FOR MODERATE PAIN    Enthesopathy, Pain in joint, multiple sites, Chronic pain of right ankle       dulaglutide 1.5 MG/0.5ML pen    TRULICITY    6 mL    Inject 1.5 mg Subcutaneous every 7 days    Type 1 diabetes mellitus without complication (H)       econazole nitrate 1 % cream      Apply 0.5 inches topically daily.        fish oil-omega-3 fatty acids 1000 MG capsule      Take one daily        folic acid 1 MG tablet    FOLVITE    90 tablet    Take 1 tablet (1 mg) by mouth daily    Pain in joint, multiple sites, Tendinitis, Encounter for therapeutic drug monitoring       HYDROcodone-acetaminophen 5-325 MG per tablet    NORCO    15 tablet    Take 1-2 tablets by mouth every 4 hours as needed for moderate to severe pain        insulin degludec 100 UNIT/ML pen    TRESIBA    15 mL    Inject 18 Units Subcutaneous daily    Type 1 diabetes mellitus without complication (H)       insulin pen needle 31G X 8 MM    B-D U/F    450 each    Use 5 pen needles daily    Diabetes mellitus type I (H)       levothyroxine 125 MCG tablet    SYNTHROID/LEVOTHROID    90 tablet    Take 1 tablet (125 mcg) by mouth daily    Hypothyroidism       methotrexate 2.5 MG tablet CHEMO     48 tablet    Take 4 tablets (10 mg) by mouth once a week    Pain in joint, multiple sites, Tendinitis, Encounter for therapeutic  drug monitoring       methylphenidate 20 MG CR capsule    METADATE CD     Take 20 mg by mouth daily        mometasone-formoterol 100-5 MCG/ACT oral inhaler    DULERA    3 Inhaler    Inhale 2 puffs into the lungs 2 times daily    Moderate persistent asthma without complication       montelukast 5 MG chewable tablet    SINGULAIR    30 tablet    CHEW AND SWALLOW 1 TABLET(5 MG) BY MOUTH AT BEDTIME    Mild persistent asthma without complication       MULTIVITAMIN PO      Take  by mouth. Take one daily tab        NIZORAL PO      Take  by mouth. Shampoo daily        NovoLOG PENFILL 100 UNIT/ML injection   Generic drug:  insulin aspart     30 mL    1 unit per 15 grams CHO at all meals and snacks with correction scale of 1 unit per 50 mg/dL over 150.  Average daily use is 20 units.    Type 1 diabetes, HbA1c goal < 7% (H)       ROGAINE EX      Externally apply  topically. 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    Insomnia       * UNABLE TO FIND      MEDICATION NAME: biosil    Enthesopathy, Pain in joint, multiple sites, Chronic pain of right ankle       * UNABLE TO FIND      Biosil        * Notice:  This list has 4 medication(s) that are the same as other medications prescribed for you. Read the directions carefully, and ask your doctor or other care provider to review them with you.

## 2018-03-24 NOTE — PROGRESS NOTES
Subjective:  HPI                    Objective:  System    Physical Exam    General     ROS    Assessment/Plan:    PROGRESS  REPORT    Progress reporting period is from 11/4/17 to 3/24/18.       SUBJECTIVE  Subjective changes noted by patient:   This patient was doing better at our last progress note in November, however she dropped a 25# weight on her right foot on 12/4/17 and fxd her 2nd, 3rd and 4th toes.  She was put in a CAM boot for a while and after this, the peroneal tendon got more sore again and she was limping.  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  Her MD discussed doing a PRP injection, but has to wait until her MD is certified in this and she doesn't know when that will be.  She is wondering if it will be done at proximal tear versus distal tear, but i explained to her that all i can see in both MRI reports and in MD notes is a tear distally, NOT proximally.  She is certain there is a longitudinal tear above the malleoli though.  She plans to clarify that with MD when she finds out when she can get the injection.  Since last PT visit on 3/10, she has worked much more on stretching her toes and this has improved some and allowing her to walk a little better, but her back still feels out of alignment with her odd walking pattern.  Pain in toes is now 3/10 with pushing off through the toes.  THe lateral ankle feels weak.  She is limited to 2-3 blocks of walking and then she swells and pain is up to a 4/10 at toes and ankle.  She has long work shows/meetings coming up at the end of April and again end of May.  Right now she can walk 2-3 blocks with swelling and 4/10 pain, but she'd like to be able to walk 4-6 blocks before swelling/pain.  She also currently has pain come on within 5 min of standing, but would like to be able to stand at least 15 min before the pain.       Current Pain level:  (3/10  "toes, 4/10 ankle).      Initial Pain level:  (3-8/10).   Changes in function:  Yes (See Goal flowsheet attached for changes in current functional level)  Adverse reaction to treatment or activity: activity - breaking 3 toes    OBJECTIVE  Changes noted in objective findings:      Gait:slight medial heel whip and decreased great toe ext on the R.     SLS to 30\" L, 30\"R but much more shaky and having difficulty using 2nd toe.      Ankle AROM: PF 65 B, DF 18L 8R,  IV 41L 18R+, EV 18L 15R+/-.    R ankle strength grossly 5/5 except PF is 3-/5 as she is unable to do a SL toe raise in standing.     Tender right distal peroneal brevis and ATF.    Tight R toe extensors.    Minimal swelling around toes as well.    R pelvis rotated posterior     ASSESSMENT/PLAN  Updated problem list and treatment plan: Diagnosis 1:  R ankle pain with tear of peroneus brevis and likely ATF tear as well  Pain -  manual therapy, self management, education and home program  Decreased ROM/flexibility - manual therapy, therapeutic exercise and home program  Decreased strength - therapeutic exercise, therapeutic activities and home program  Impaired balance - neuro re-education, therapeutic activities and home program  Edema - self management/home program  Impaired gait - gait training and home program  Impaired muscle performance - neuro re-education and home program  Decreased function - therapeutic activities and home program  STG/LTGs have been met or progress has been made towards goals:  Yes, but has had many set backs along the way  Assessment of Progress: The patient's condition has potential to improve.  The patient has had set backs in their progress.  Self Management Plans:  Patient has been instructed in a home treatment program.  Patient  has been instructed in self management of symptoms.  I have re-evaluated this patient and find that the nature, scope, duration and intensity of the therapy is appropriate for the medical condition of " the patientBrian Mcnally continues to require the following intervention to meet STG and LTG's:  PT    Recommendations:  This patient would benefit from continued therapy.     Frequency:  2 X a month, once daily  Duration:  for 3 months        Please refer to the daily flowsheet for treatment today, total treatment time and time spent performing 1:1 timed codes.

## 2018-03-27 ENCOUNTER — THERAPY VISIT (OUTPATIENT)
Dept: OCCUPATIONAL THERAPY | Facility: CLINIC | Age: 48
End: 2018-03-27
Payer: COMMERCIAL

## 2018-03-27 DIAGNOSIS — M65.30 TRIGGER FINGER, ACQUIRED: Primary | ICD-10-CM

## 2018-03-27 DIAGNOSIS — M79.644 PAIN OF RIGHT THUMB: ICD-10-CM

## 2018-03-27 PROCEDURE — 97763 ORTHC/PROSTC MGMT SBSQ ENC: CPT | Mod: GO | Performed by: OCCUPATIONAL THERAPIST

## 2018-03-27 PROCEDURE — 97035 APP MDLTY 1+ULTRASOUND EA 15: CPT | Mod: GO | Performed by: OCCUPATIONAL THERAPIST

## 2018-03-27 PROCEDURE — 97110 THERAPEUTIC EXERCISES: CPT | Mod: GO | Performed by: OCCUPATIONAL THERAPIST

## 2018-03-27 PROCEDURE — 97140 MANUAL THERAPY 1/> REGIONS: CPT | Mod: GO | Performed by: OCCUPATIONAL THERAPIST

## 2018-03-27 NOTE — PROGRESS NOTES
Hand Therapy Progress Note  3/27/2018  Initial Visit: 2/12/18  Total Visits: 7    Diagnosis:   Right Thumb trigger finger    Associated Diagnoses M65.311 Trigger finger of right thumb   Evaluate and Treat Yes Edema Control Yes Modalities Cmt: would like modalities to reduce size of trigger nodule- says she has had these previously with success Visit Frequency PRN     DOI:  April 2017    Referring MD:Chris Uribe MD  Next MD visit: 6 weeks    S:  Subjective changes as noted by patient: Subjective: had a set back and the thumb is more sore and new nodule on the R middle finger which is sharp and tender at the volar MCP middle finger. THis just errupted this week. The thumb is not moving as well, doing the epsom salt hand soaks. Noted a sharp twinge on Saturday  3/17/18 when wearing the ring splint and it caused more soreness in the palm and more swelling  Functional changes noted by patient: Decreased Performance in Self Care Tasks (eating, bathing) and Work Tasks  Response to previous treatment:  fair  Patient has noted adverse reaction to:   None      Occupational Profile Information:  Current occupation is researcher  Currently working in normal job without restrictions  Job Tasks: prolonged sitting, computer work  Prior functional level:  no limitations  Barriers include:live alone  Mobility: No difficulty  Transportation: drivesp  Leisure activities/hobbies: crotchet, not able to do this now due to thumb, paint, musician (cello and piano), reading holding a book with the right hand      Functional Outcome Measure:   See flowsheet    Objective:  Noting large nodule on volar thumb, A1 pulley area    PAIN 2/12/2018 2/27 3/13 3/19    Location Right  thumb       Description aching, dull, throbing and tender, with swelling       Radiates no       Worse d/n daytime       Frequency activity dependant       Exacerbated by More use of the right hand and thumb        Relieves NSAIDs and rest       At rest 0-10/10 1/10        On use 0-10/10 7/10       At worst.0-10/10 9/10 5/10 Less pain not allowing the moderate pain R: 5/10   R: middle finger with pressure at the MCP: 5/10     Sleep disruption?   no       Progression Unchanged improving  Worsened.           ROM:  .ROM:  Thumb 2/12/2018 2/12/2018 2/27/18 3/19/18    AROM(PROM) Right Left left left    MP 0/53 0/55      IP HE20/0  Blocked: /35 HE25/77 HE 35/66 HE20/62 HE35/45   RAbd 56  Feels Taut 58      PAbd        Retropulsion        Kapandji Opposition Scale (0-10/10)          Stage of Stenosing Tenosynovitis (SST):   2/12/2018 2/27 3/13 3/19 3/27   Triggering of rightthumb finger or Thumb 3 Not allowing the catching 2, but again, monitoring and not allowing triggering 3 R thumb 2: End range of extension, it is taut, not catching at the IP.    Stage 1:  Normal  Stage 2:  Uneven motion of tendon  Stage 3:  Triggering, clicking, catching  Stage 4:  Locking in extension or flexion; unlocked by active motion  Stage 5:  Locking in extension or flexion; unlocked by passive motion  Stage 6:  Finger locked in extension or flexion    Edema: mild of the thumb, dorsal IP at times per pt report  Strength: contraindicated  Sensation: [x]       WNL throughout all nerve distributions; per patient report     Assessment:  Response to therapy has been improvement to:  ROM of Thumb:  Ext  and Flex  Response to therapy has been lack of progress in:  Continues to have a trigger thumb. Will continue to manage at home until she sees the MD for further assessment end of April    Overall Assessment:  Patient is becoming more independent in home exercise program  STG/LTG:  STGoals have been reviewed and progress or achievement has occurred;  see goal sheet for details and updates.  I have re-evaluated this patient and find that the nature, scope, duration and intensity of the therapy is appropriate for the medical condition of the patient.        Treatment Plan:   Discharge to home  program.      Recommendations for Continued Therapy  Treatment Plan:  Modalities:  US  Therapeutic Exercise:  AROM, AAROM, PROM, Tendon Gliding and Blocking  Manual Techniques:  Coordination/Dexterity, Friction massage and Myofascial release  Self Care:  Ergonomic Considerations  Orthotic Fabrication: Static finger based orthosis,  thumb IP flexion/ext block    Discharge Plan:  Achieve all LTG.  Independent in home treatment program.  Reach maximal therapeutic benefit.    Home Exercise Program:  Thumb IP AROM w/o triggering, and PROM for the full range flex/ext  Wear IP orthosis for prevention of IP ROM for use, even return to crotcheting  3/13/2018  Ring orthosis: wear 3 x/day in place of IP blocking orthosis, and note reductio or increase of sx, cont IP blocking between ring pulley orthosis  3/27/2018  Continue with IP joint blocking orthosis and PROM of IP, and active to point of tension

## 2018-03-27 NOTE — MR AVS SNAPSHOT
After Visit Summary   3/27/2018    Dali Martines    MRN: [id number]           Patient Information     Date Of Birth          1970        Visit Information        Provider Department      3/27/2018 4:00 PM Sydni Talbot OT [facility]        Today's Diagnoses     Trigger finger, acquired    -  1    Pain of right thumb           Follow-ups after your visit        Your next 10 appointments already scheduled     Apr 04, 2018 11:30 AM CDT   (Arrive by 11:15 AM)   Return Visit with Kraig Villagomez MD   [facility] ([facility])    [address] [place] [address]  [address] [facility] [address]   [phone]            Apr 07, 2018 11:10 AM CDT   LAKISHA Extremity with Chanelle Muniz PT   [facility] ([facility]  )    [address]  [facility] [address]   [phone]            Apr 21, 2018 10:30 AM CDT   LAKISHA Extremity with Michi Esteves Pt, PT   [facility] ([facility]  )    [address]  [facility] [address]   [phone]            Apr 25, 2018  3:15 PM CDT   (Arrive by 3:00 PM)   RETURN HAND with Chris Uribe MD   [facility] ([facility])    [address] [place] [address]  [address] [facility] [address]   [phone]            May 03, 2018  4:10 PM CDT   LAKISHA Extremity with Chanelle Muniz PT   [facility] ([facility]  )    [address]  [facility] [address]   [phone]            May 15, 2018  3:00 PM CDT   (Arrive by 2:45 PM)   Return Visit with Yoel Betancourt MD   [facility]umat[facility] ([facility])    [address] [place] [address]  [address]  [facility] [address]   [phone]            May 19, 2018 11:10 AM CDT   LAKISHA Extremity with Michi Esteves Pt, PT   [facility] ([facility]  )    [address]  [facility] [address] [phone]            Jun 12, 2018  3:00 PM CDT   (Arrive by 2:45 PM)   RETURN DIABETES with Felicia Mckeon MD   [facility] ([facility])    [address]  3rd Floor  [facility] [id number] [phone]            Aug 14, 2018  3:30 PM CDT   (Arrive by 3:15 PM)   Return Visit with Sara Carter MD   [facility] ([facility])    [address]  [address]  [facility] [id number] [phone]              Who to contact     If you have questions or need follow up information about today's clinic visit or your schedule please contact [facility] directly at [phone].  Normal or non-critical lab and imaging results will be communicated to you by Zulihart, letter or phone within 4 business days after the clinic has received the results. If you do not hear from us within 7 days, please contact the clinic through Daily Dealy or phone. If you have a critical or abnormal lab result, we will notify you by phone as soon as possible.  Submit refill requests through Daily Dealy or call your pharmacy and they will forward the refill request to us. Please allow 3 business days for your refill to be completed.          Additional Information About Your Visit        Daily Dealy Information     Daily Dealy gives you secure access to your electronic health record. If you see a primary care provider, you can also send messages to your care team and make appointments. If you have questions, please call your primary care clinic.  If you do not have a primary care provider, please call [phone] and they will assist you.        Care EveryWhere ID     This is your Care EveryWhere ID. This could be used by other organizations to access your [facility] medical records  [id number]         Blood  Pressure from Last 3 Encounters:   02/19/18 125/73   02/13/18 117/75   02/07/18 111/72    Weight from Last 3 Encounters:   02/19/18 82.8 kg (182 lb 9.6 oz)   02/13/18 81.6 kg (180 lb)   02/07/18 83.5 kg (184 lb)              We Performed the Following     C OT ORTHOTICS/PROSTH MGMT &/TRAING SBSQ ENCTR, EA 15 MIN     MANUAL THER TECH,1+REGIONS,EA 15 MIN     THERAPEUTIC EXERCISES     ULTRASOUND THERAPY        Primary Care Provider Office Phone # Fax #    Dimitri CARVER MD [facility] [phone] [address] [address]        Equal Access to Services     DALIA MOORE : Hadii randi esquedao Jonathan, waaxda luqadaha, qaybta kaalmada ademarbellayada, kaitlin owusu. So [facility] [phone].    ATENCIÓN: Si habla español, tiene a ramsey disposición servicios gratuitos de asistencia lingüística. Llame al [phone].    We comply with applicable federal civil rights laws and [place] laws. We do not discriminate on the basis of race, color, national origin, age, disability, sex, sexual orientation, or gender identity.            Thank you!     Thank you for choosing [facility]  for your care. Our goal is always to provide you with excellent care. Hearing back from our patients is one way we can continue to improve our services. Please take a few minutes to complete the written survey that you may receive in the mail after your visit with us. Thank you!             Your Updated Medication List - Protect others around you: Learn how to safely use, store and throw away your medicines at www.disposemymeds.org.          This list is accurate as of 3/27/18  5:26 PM.  Always use your most recent med list.                   Brand Name Dispense Instructions for use Diagnosis    aspirin 81 MG tablet      Take 1 tablet by mouth daily.        blood glucose monitoring lancets     4 Box    Use to test blood sugar 8 times daily or as directed.    Type 1 diabetes, HbA1c goal <  7% (H)       blood glucose monitoring test strip    ACCU-CHEK COLIN PLUS    700 strip    Test Blood Sugar 8 times daily or as directed    Type 1 diabetes, HbA1c goal < 7% (H)       CALCIUM + D PO      Take one daily        citalopram 20 MG tablet    celeXA    135 tablet    Take 1.5 tablets (30 mg) by mouth daily    Anxiety       diclofenac 1 % Gel topical gel    VOLTAREN    100 g    APPLY 2 GRAMS ONTO THE SKIN FOUR TIMES DAILY AS NEEDED FOR MODERATE PAIN    Enthesopathy, Pain in joint, multiple sites, Chronic pain of right ankle       dulaglutide 1.5 MG/0.5ML pen    TRULICITY    6 mL    Inject 1.5 mg Subcutaneous every 7 days    Type 1 diabetes mellitus without complication (H)       econazole nitrate 1 % cream      Apply 0.5 inches topically daily.        fish oil-omega-3 fatty acids 1000 MG capsule      Take one daily        folic acid 1 MG tablet    FOLVITE    90 tablet    Take 1 tablet (1 mg) by mouth daily    Pain in joint, multiple sites, Tendinitis, Encounter for therapeutic drug monitoring       HYDROcodone-acetaminophen 5-325 MG per tablet    NORCO    15 tablet    Take 1-2 tablets by mouth every 4 hours as needed for moderate to severe pain        insulin degludec 100 UNIT/ML pen    TRESIBA    15 mL    Inject 18 Units Subcutaneous daily    Type 1 diabetes mellitus without complication (H)       insulin pen needle 31G X 8 MM    B-D U/F    450 each    Use 5 pen needles daily    Diabetes mellitus type I (H)       levothyroxine 125 MCG tablet    SYNTHROID/LEVOTHROID    90 tablet    Take 1 tablet (125 mcg) by mouth daily    Hypothyroidism       methotrexate 2.5 MG tablet CHEMO     48 tablet    Take 4 tablets (10 mg) by mouth once a week    Pain in joint, multiple sites, Tendinitis, Encounter for therapeutic drug monitoring       methylphenidate 20 MG CR capsule    METADATE CD     Take 20 mg by mouth daily        mometasone-formoterol 100-5 MCG/ACT oral inhaler    DULERA    3 Inhaler    Inhale 2 puffs into the  lungs 2 times daily    Moderate persistent asthma without complication       montelukast 5 MG chewable tablet    SINGULAIR    30 tablet    CHEW AND SWALLOW 1 TABLET(5 MG) BY MOUTH AT BEDTIME    Mild persistent asthma without complication       MULTIVITAMIN PO      Take  by mouth. Take one daily tab        NIZORAL PO      Take  by mouth. Shampoo daily        NovoLOG PENFILL 100 UNIT/ML injection   Generic drug:  insulin aspart     30 mL    1 unit per 15 grams CHO at all meals and snacks with correction scale of 1 unit per 50 mg/dL over 150.  Average daily use is 20 units.    Type 1 diabetes, HbA1c goal < 7% (H)       ROGAINE EX      Externally apply  topically. 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    Insomnia       * UNABLE TO FIND      MEDICATION NAME: biosil    Enthesopathy, Pain in joint, multiple sites, Chronic pain of right ankle       * UNABLE TO FIND      Biosil        * Notice:  This list has 4 medication(s) that are the same as other medications prescribed for you. Read the directions carefully, and ask your doctor or other care provider to review them with you.       no

## 2018-04-04 ENCOUNTER — OFFICE VISIT (OUTPATIENT)
Dept: PHYSICAL MEDICINE AND REHAB | Facility: CLINIC | Age: 48
End: 2018-04-04
Payer: COMMERCIAL

## 2018-04-04 ENCOUNTER — OFFICE VISIT (OUTPATIENT)
Dept: ORTHOPEDICS | Facility: CLINIC | Age: 48
End: 2018-04-04
Payer: COMMERCIAL

## 2018-04-04 VITALS — WEIGHT: 183.7 LBS | BODY MASS INDEX: 26.3 KG/M2 | HEIGHT: 70 IN

## 2018-04-04 VITALS
HEART RATE: 86 BPM | HEIGHT: 70 IN | BODY MASS INDEX: 26.2 KG/M2 | DIASTOLIC BLOOD PRESSURE: 77 MMHG | SYSTOLIC BLOOD PRESSURE: 115 MMHG | WEIGHT: 183 LBS

## 2018-04-04 DIAGNOSIS — M65.311 TRIGGER FINGER OF RIGHT THUMB: Primary | ICD-10-CM

## 2018-04-04 DIAGNOSIS — M25.571 PAIN IN JOINT, ANKLE AND FOOT, RIGHT: Primary | ICD-10-CM

## 2018-04-04 ASSESSMENT — PAIN SCALES - GENERAL: PAINLEVEL: MODERATE PAIN (4)

## 2018-04-04 NOTE — MR AVS SNAPSHOT
After Visit Summary   4/4/2018    Dali Martines    MRN: [id number]           Patient Information     Date Of Birth          1970        Visit Information        Provider Department      4/4/2018 11:30 AM Kraig Villagomez MD [facility]        Today's Diagnoses     Pain in joint, ankle and foot, right    -  1       Follow-ups after your visit        Your next 10 appointments already scheduled     Apr 07, 2018 11:10 AM CDT   LAKISHA Extremity with Chanelle Muniz PT   [facility] ([facility]  )    [address]  [facility] [address]   [phone]            Apr 21, 2018 10:30 AM CDT   LAKISHA Extremity with Michi Esteves Pt, PT   [facility] ([facility]  )    [address]  [facility] [address]   [phone]            Apr 25, 2018  3:15 PM CDT   (Arrive by 3:00 PM)   RETURN HAND with Chris Uribe MD   [facility] ([facility])    [address]  4th Floor  [facility] [address] [phone]            May 03, 2018  4:10 PM CDT   LAKISHA Extremity with Chanelle Muniz PT   [facility] ([facility]  )    [address]  [facility] [address]   [phone]            May 15, 2018  3:00 PM CDT   (Arrive by 2:45 PM)   Return Visit with Yoel Betancourt MD   [facility] ([facility])    [address] [facility] [address]  [address]  [facility] [address] [phone]            May 19, 2018 11:10 AM CDT   LAKISHA Extremity with Michi Esteves Pt, PT   [facility] ([facility]  )    [address]  [facility] [address]   [phone]            Jun 12, 2018  3:00 PM CDT   (Arrive by 2:45 PM)   RETURN DIABETES with Felicia Mckeon MD   [facility]  "Endocrinology ([facility])    [address]  3rd Floor  [facility] [id number] [phone]            Aug 14, 2018  3:30 PM CDT   (Arrive by 3:15 PM)   Return Visit with Sara Carter MD   [facility] ([place] [place] [facility])    [address]  [address]  [facility] [address] [phone]              Future tests that were ordered for you today     Open Future Orders        Priority Expected Expires Ordered    MR Hand Right w/o & w Contrast Routine  4/4/2019 4/4/2018            Who to contact     Please call your clinic at [phone] to:    Ask questions about your health    Make or cancel appointments    Discuss your medicines    Learn about your test results    Speak to your doctor            Additional Information About Your Visit        [employer] Information     [employer] gives you secure access to your electronic health record. If you see a primary care provider, you can also send messages to your care team and make appointments. If you have questions, please call your primary care clinic.  If you do not have a primary care provider, please call [phone] and they will assist you.      RaySat is an electronic gateway that provides easy, online access to your medical records. With [employer], you can request a clinic appointment, read your test results, renew a prescription or communicate with your care team.     To access your existing account, please contact your [facility] or call [phone] for assistance.        Care EveryWhere ID     This is your Care EveryWhere ID. This could be used by other organizations to access your [place] medical records  [id number]        Your Vitals Were     Pulse Height BMI (Body Mass Index)             86 1.778 m (5' 10\") 26.26 kg/m2          Blood Pressure from Last 3 Encounters:   04/04/18 115/77   02/19/18 125/73   02/13/18 117/75    " Weight from Last 3 Encounters:   04/04/18 83 kg (183 lb)   04/04/18 83.3 kg (183 lb 11.2 oz)   02/19/18 82.8 kg (182 lb 9.6 oz)              Today, you had the following     No orders found for display       Primary Care Provider Office Phone # Fax #    Dimitri Alas [phone] [address] [address]        Equal Access to Services     DALIA MOORE : Hadii aad ku hadasho Soomaali, waaxda luqadaha, qaybta kaalmada adeegyada, waxay idiin hayaan adeeg kharash la'ravi . So [facility] [phone].    ATENCIÓN: Si habla español, tiene a ramsey disposición servicios gratuitos de asistencia lingüística. [facility] [phone].    We comply with applicable federal civil rights laws and [place] laws. We do not discriminate on the basis of race, color, national origin, age, disability, sex, sexual orientation, or gender identity.            Thank you!     Thank you for choosing [facility]IL[facility]  for your care. Our goal is always to provide you with excellent care. Hearing back from our patients is one way we can continue to improve our services. Please take a few minutes to complete the written survey that you may receive in the mail after your visit with us. Thank you!             Your Updated Medication List - Protect others around you: Learn how to safely use, store and throw away your medicines at www.disposemymeds.org.          This list is accurate as of 4/4/18  1:01 PM.  Always use your most recent med list.                   Brand Name Dispense Instructions for use Diagnosis    aspirin 81 MG tablet      Take 1 tablet by mouth daily.        blood glucose monitoring lancets     4 Box    Use to test blood sugar 8 times daily or as directed.    Type 1 diabetes, HbA1c goal < 7% (H)       blood glucose monitoring test strip    ACCU-CHEK COLIN PLUS    700 strip    Test Blood Sugar 8 times daily or as directed    Type 1 diabetes, HbA1c goal < 7% (H)        CALCIUM + D PO      Take one daily        citalopram 20 MG tablet    celeXA    135 tablet    Take 1.5 tablets (30 mg) by mouth daily    Anxiety       diclofenac 1 % Gel topical gel    VOLTAREN    100 g    APPLY 2 GRAMS ONTO THE SKIN FOUR TIMES DAILY AS NEEDED FOR MODERATE PAIN    Enthesopathy, Pain in joint, multiple sites, Chronic pain of right ankle       dulaglutide 1.5 MG/0.5ML pen    TRULICITY    6 mL    Inject 1.5 mg Subcutaneous every 7 days    Type 1 diabetes mellitus without complication (H)       econazole nitrate 1 % cream      Apply 0.5 inches topically daily.        fish oil-omega-3 fatty acids 1000 MG capsule      Take one daily        folic acid 1 MG tablet    FOLVITE    90 tablet    Take 1 tablet (1 mg) by mouth daily    Pain in joint, multiple sites, Tendinitis, Encounter for therapeutic drug monitoring       HYDROcodone-acetaminophen 5-325 MG per tablet    NORCO    15 tablet    Take 1-2 tablets by mouth every 4 hours as needed for moderate to severe pain        insulin degludec 100 UNIT/ML pen    TRESIBA    15 mL    Inject 18 Units Subcutaneous daily    Type 1 diabetes mellitus without complication (H)       insulin pen needle 31G X 8 MM    B-D U/F    450 each    Use 5 pen needles daily    Diabetes mellitus type I (H)       levothyroxine 125 MCG tablet    SYNTHROID/LEVOTHROID    90 tablet    Take 1 tablet (125 mcg) by mouth daily    Hypothyroidism       methotrexate 2.5 MG tablet CHEMO     48 tablet    Take 4 tablets (10 mg) by mouth once a week    Pain in joint, multiple sites, Tendinitis, Encounter for therapeutic drug monitoring       methylphenidate 20 MG CR capsule    METADATE CD     Take 20 mg by mouth daily        mometasone-formoterol 100-5 MCG/ACT oral inhaler    DULERA    3 Inhaler    Inhale 2 puffs into the lungs 2 times daily    Moderate persistent asthma without complication       montelukast 5 MG chewable tablet    SINGULAIR    30 tablet    CHEW AND SWALLOW 1 TABLET(5 MG) BY MOUTH AT  BEDTIME    Mild persistent asthma without complication       MULTIVITAMIN PO      Take  by mouth. Take one daily tab        NIZORAL PO      Take  by mouth. Shampoo daily        NovoLOG PENFILL 100 UNIT/ML injection   Generic drug:  insulin aspart     30 mL    1 unit per 15 grams CHO at all meals and snacks with correction scale of 1 unit per 50 mg/dL over 150.  Average daily use is 20 units.    Type 1 diabetes, HbA1c goal < 7% (H)       ROGAINE EX      Externally apply  topically. 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    Insomnia       * UNABLE TO FIND      MEDICATION NAME: biosil    Enthesopathy, Pain in joint, multiple sites, Chronic pain of right ankle       * UNABLE TO FIND      Biosil        * Notice:  This list has 4 medication(s) that are the same as other medications prescribed for you. Read the directions carefully, and ask your doctor or other care provider to review them with you.

## 2018-04-04 NOTE — PROGRESS NOTES
Reason for visit: Follow-up right trigger thumb    Interval events: I saw the patient back in follow-up for her right trigger thumb. She previously had an injection which helped but did not resolve her symptoms. She wished to avoid surgery and wanted to try instead therapy. She states she has been doing therapy with bracing and feels that her motion has improved but she continues to have triggering and stiffness in the thumb. She also reports a very painful nodule that is emerged over the A1 pulley of the middle finger. This is appeared in the last 2 weeks. There is associated triggering.    Physical exam: She is well-developed, well-nourished, no acute distress. She follows instructions appropriately. She is alert and oriented to surroundings. On examination of the right thumb, actively, MP motion is from 0-90  and IP motion is from 0-45 , as opposed to 0-90 on the contralateral side. There is tenderness over the A1 pulley. There is palpable triggering. There is nodularity of the flexor tendon. On examination of the middle finger, there is a subcentimeter, round deep mass directly overlying the A1 pulley. It is tender to palpation. It does not move with flexion and extension of the finger. There is no triggering.      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 She continues to have pain and triggering. She has had an injection and extensive therapy. She is not wish to have further injections and is interested in other options. At this point, I recommended a right thumb trigger release. I discussed the risks of this including infection, bleeding, pain, scarring, damage to nerves, blood vessels, or other neurovascular structures, recurrence of triggering or failure to improve, bowstringing, stiffness, and need for further surgery. Informed consent was obtained and she elected to proceed with a right  trigger thumb release.    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 I discussed treatment options for this including aspiration versus surgical excision, and I recommended a trial of aspiration first as this is often successful. She wished to go ahead with this so aspiration was attempted as described below. However, no fluid was obtained and although the mass appeared to decrease in size somewhat, it it did not completely resolve. We will get an MRI to confirm that this is in fact a retinacular ganglion cyst. If It is, we will plan to excise this in the operating room at the same time as the trigger thumb is released.      .PROCEDURE NOTE  After informed consent was obtained, The right middle finger A1 pulley area was sterilely prepped with ChloraPrep. A 25-gauge needle was used to puncture and attempt to aspirate fluid from the right middle finger mass. This was attempted twice but no fluid was obtained. The mass decreased in size and less painful but did not resolve entirely. The skin was dressed with a Band-Aid. The patient tolerated the procedure well.  .

## 2018-04-04 NOTE — PROGRESS NOTES
Service Date: 04/04/2018      HISTORY OF PRESENT ILLNESS:  Dali Martines has returned to the Rehab Clinic for followup for right ankle pain.      The patient was last seen in clinic on 02/07.  At that point in time, I thought an MRI scan of right ankle would be helpful.  She did have the MRI.  I did review the results with the patient.  The MRI done on 02/15/2018 as compared to the MRI of 12/12/2016 shows similar results.  There is attenuation of the peroneus brevis tendon distally which may represent chronic partial thickness degeneration, degenerative tearing.  Also, I ended up reviewing the MRI scan with the radiologist and they compared that to the MRI of 12/12/2016 and indeed it does appear to be very similar.  The radiologist did note that there is some splitting tear of the right ankle peroneal brevis tendon which reconstitutes along the distal aspect of the calcaneus, some minimal fluid surrounds the peroneal brevis and longus tendons.  This is consistent with an ongoing irritation tendinosis.  Also, the anterior tibiofibular ligament is not well seen and is likely clinically torn.  I believe in discussing this with the patient, she did have a sprain of that ankle in the past that probably resulted in that.      She has now had 3 years of ongoing pain.  Therapy has not been that helpful.  Of note, she did have a fracture of some toes that are slowly healing at this point in time.  At this point in time, I would like to refer the patient to our sports medicine specialists for further evaluation to see if other interventions might be helpful for the patient; for instance, would an injection would be helpful for this.  The patient is very anxious to be able to return to regular activities.  It has been about 3 years that she has been quite incapacitated and this is very frustrating to her.  It keeps her from doing the types of activities, walking, bicycling, etc., that she would love to be able to do.       PLAN:  I will be referring the patient to my sports medicine colleagues here at the [facility] for further evaluation of this and to see if there is other treatment that could be helpful for her.      Total time spent with the patient on the evaluation today over 30 minutes.  Follow up in the Rehabilitation Clinic will be on an as needed basis.      MD HOWARD Yeh MD             D: 2018   T: 2018   MT: GEETHA      Name:     BETO DOVE   MRN:      [id number]        Account:      [id number]   :      1970           Service Date: 2018      Document: [id number]

## 2018-04-04 NOTE — MR AVS SNAPSHOT
After Visit Summary   4/4/2018    Dali Martines    MRN: [id number]           Patient Information     Date Of Birth          1970        Visit Information        Provider Department      4/4/2018 11:00 AM Chris Uribe MD [facility]        Today's Diagnoses     Trigger finger of right thumb    -  1       Follow-ups after your visit        Your next 10 appointments already scheduled     Apr 07, 2018 11:10 AM CDT   LAKISHA Extremity with Chanelle Muniz PT   [facility] ([facility] [place]  )    [address]  [facility] [address]   [phone]            Apr 21, 2018 10:30 AM CDT   LAKISHA Extremity with Michi Esteves Pt, PT   [facility] ([facility] [place]  )    [address]  [facility] [address]   [phone]            Apr 25, 2018  3:15 PM CDT   (Arrive by 3:00 PM)   RETURN HAND with Chris Uribe MD   [facility] ([facility])    [address]  4th Floor  [facility] [address]   [phone]            May 03, 2018  4:10 PM CDT   LAKISHA Extremity with Chanelle Muniz PT   [facility] ([facility] [place]  )    [address]  [facility] [address]   [phone]            May 15, 2018  3:00 PM CDT   (Arrive by 2:45 PM)   Return Visit with Yoel Betancourt MD   [facility] Rheumatology ([facility])    [address]  [address]  [facility] [address]   [phone]            May 19, 2018 11:10 AM CDT   LAKISHA Extremity with Michi Esteves Pt, PT   [facility] ([facility] [place]  )    [address]  [facility] [address]   [phone]            Jun 12, 2018  3:00 PM CDT   (Arrive by 2:45 PM)   RETURN DIABETES with Felicia Mckeon MD   [facility] Endocrinology ([facility]  [facility])    [address]  3rd Floor  [facility] [id number] [phone]            Aug 14, 2018  3:30 PM CDT   (Arrive by 3:15 PM)   Return Visit with Sara Carter MD   [facility] ([facility])    [address]  [address]  [facility] [id number] [phone]              Future tests that were ordered for you today     Open Future Orders        Priority Expected Expires Ordered    MR Hand Right w/o & w Contrast Routine  4/4/2019 4/4/2018            Who to contact     Please call your clinic at [phone] to:    Ask questions about your health    Make or cancel appointments    Discuss your medicines    Learn about your test results    Speak to your doctor            Additional Information About Your Visit        Compliance [employer] Information     [employer] gives you secure access to your electronic health record. If you see a primary care provider, you can also send messages to your care team and make appointments. If you have questions, please call your primary care clinic.  If you do not have a primary care provider, please call [phone] and they will assist you.      Contur is an electronic gateway that provides easy, online access to your medical records. With [employer], you can request a clinic appointment, read your test results, renew a prescription or communicate with your care team.     To access your existing account, please contact your [facility] or call [phone] for assistance.        Care EveryWhere ID     This is your Care EveryWhere ID. This could be used by other organizations to access your [facility] medical records  [id number]        Your Vitals Were     Height BMI (Body Mass Index)                1.778 m (5' 10\") 26.36 kg/m2           Blood Pressure from Last 3 Encounters:   04/04/18 115/77   02/19/18 125/73   02/13/18 117/75    Weight from Last 3 Encounters: "   04/04/18 83 kg (183 lb)   04/04/18 83.3 kg (183 lb 11.2 oz)   02/19/18 82.8 kg (182 lb 9.6 oz)              We Performed the Following     Small Joint/Bursa injection and/or drainage (Finger) [20600]        Primary Care Provider Office Phone # Fax #    Dimitri Alas [phone]       [address] [address]        Equal Access to Services     DALIA MOORE : Hadii aad ku hadasho Soomaali, waaxda luqadaha, qaybta kaalmada adeegyada, waxay idiin hayaan adeeg kharash la'niman . So [facility] [phone].    ATENCIÓN: Si habla español, tiene a ramsey disposición servicios gratuitos de asistencia lingüística. [facility] [phone].    We comply with applicable federal civil rights laws and [place] laws. We do not discriminate on the basis of race, color, national origin, age, disability, sex, sexual orientation, or gender identity.            Thank you!     Thank you for choosing [facility]  for your care. Our goal is always to provide you with excellent care. Hearing back from our patients is one way we can continue to improve our services. Please take a few minutes to complete the written survey that you may receive in the mail after your visit with us. Thank you!             Your Updated Medication List - Protect others around you: Learn how to safely use, store and throw away your medicines at www.disposemymeds.org.          This list is accurate as of 4/4/18 12:14 PM.  Always use your most recent med list.                   Brand Name Dispense Instructions for use Diagnosis    aspirin 81 MG tablet      Take 1 tablet by mouth daily.        blood glucose monitoring lancets     4 Box    Use to test blood sugar 8 times daily or as directed.    Type 1 diabetes, HbA1c goal < 7% (H)       blood glucose monitoring test strip    ACCU-CHEK COLIN PLUS    700 strip    Test Blood Sugar 8 times daily or as directed    Type 1 diabetes, HbA1c goal < 7% (H)       CALCIUM + D PO       Take one daily        citalopram 20 MG tablet    celeXA    135 tablet    Take 1.5 tablets (30 mg) by mouth daily    Anxiety       diclofenac 1 % Gel topical gel    VOLTAREN    100 g    APPLY 2 GRAMS ONTO THE SKIN FOUR TIMES DAILY AS NEEDED FOR MODERATE PAIN    Enthesopathy, Pain in joint, multiple sites, Chronic pain of right ankle       dulaglutide 1.5 MG/0.5ML pen    TRULICITY    6 mL    Inject 1.5 mg Subcutaneous every 7 days    Type 1 diabetes mellitus without complication (H)       econazole nitrate 1 % cream      Apply 0.5 inches topically daily.        fish oil-omega-3 fatty acids 1000 MG capsule      Take one daily        folic acid 1 MG tablet    FOLVITE    90 tablet    Take 1 tablet (1 mg) by mouth daily    Pain in joint, multiple sites, Tendinitis, Encounter for therapeutic drug monitoring       HYDROcodone-acetaminophen 5-325 MG per tablet    NORCO    15 tablet    Take 1-2 tablets by mouth every 4 hours as needed for moderate to severe pain        insulin degludec 100 UNIT/ML pen    TRESIBA    15 mL    Inject 18 Units Subcutaneous daily    Type 1 diabetes mellitus without complication (H)       insulin pen needle 31G X 8 MM    B-D U/F    450 each    Use 5 pen needles daily    Diabetes mellitus type I (H)       levothyroxine 125 MCG tablet    SYNTHROID/LEVOTHROID    90 tablet    Take 1 tablet (125 mcg) by mouth daily    Hypothyroidism       methotrexate 2.5 MG tablet CHEMO     48 tablet    Take 4 tablets (10 mg) by mouth once a week    Pain in joint, multiple sites, Tendinitis, Encounter for therapeutic drug monitoring       methylphenidate 20 MG CR capsule    METADATE CD     Take 20 mg by mouth daily        mometasone-formoterol 100-5 MCG/ACT oral inhaler    DULERA    3 Inhaler    Inhale 2 puffs into the lungs 2 times daily    Moderate persistent asthma without complication       montelukast 5 MG chewable tablet    SINGULAIR    30 tablet    CHEW AND SWALLOW 1 TABLET(5 MG) BY MOUTH AT BEDTIME    Mild  persistent asthma without complication       MULTIVITAMIN PO      Take  by mouth. Take one daily tab        NIZORAL PO      Take  by mouth. Shampoo daily        NovoLOG PENFILL 100 UNIT/ML injection   Generic drug:  insulin aspart     30 mL    1 unit per 15 grams CHO at all meals and snacks with correction scale of 1 unit per 50 mg/dL over 150.  Average daily use is 20 units.    Type 1 diabetes, HbA1c goal < 7% (H)       ROGAINE EX      Externally apply  topically. 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    Insomnia       * UNABLE TO FIND      MEDICATION NAME: biosil    Enthesopathy, Pain in joint, multiple sites, Chronic pain of right ankle       * UNABLE TO FIND      Biosil        * Notice:  This list has 4 medication(s) that are the same as other medications prescribed for you. Read the directions carefully, and ask your doctor or other care provider to review them with you.

## 2018-04-04 NOTE — LETTER
4/4/2018       RE: Dali Martines  [address]  [facility] [address]     Dear Colleague,    Thank you for referring your patient, Dali Martines, to the [facility] PHYSICAL MEDICINE AND REHABILITATION at [facility]. Please see a copy of my visit note below.    Service Date: 04/04/2018      HISTORY OF PRESENT ILLNESS:  Dali Martines has returned to the Rehab Clinic for followup for right ankle pain.      The patient was last seen in clinic on 02/07.  At that point in time, I thought an MRI scan of right ankle would be helpful.  She did have the MRI.  I did review the results with the patient.  The MRI done on 02/15/2018 as compared to the MRI of 12/12/2016 shows similar results.  There is attenuation of the peroneus brevis tendon distally which may represent chronic partial thickness degeneration, degenerative tearing.  Also, I ended up reviewing the MRI scan with the radiologist and they compared that to the MRI of 12/12/2016 and indeed it does appear to be very similar.  The radiologist did note that there is some splitting tear of the right ankle peroneal brevis tendon which reconstitutes along the distal aspect of the calcaneus, some minimal fluid surrounds the peroneal brevis and longus tendons.  This is consistent with an ongoing irritation tendinosis.  Also, the anterior tibiofibular ligament is not well seen and is likely clinically torn.  I believe in discussing this with the patient, she did have a sprain of that ankle in the past that probably resulted in that.      She has now had 3 years of ongoing pain.  Therapy has not been that helpful.  Of note, she did have a fracture of some toes that are slowly healing at this point in time.  At this point in time, I would like to refer the patient to our sports medicine specialists for further evaluation to see if other interventions might be helpful for the patient; for instance, would an injection would be helpful  for this.  The patient is very anxious to be able to return to regular activities.  It has been about 3 years that she has been quite incapacitated and this is very frustrating to her.  It keeps her from doing the types of activities, walking, bicycling, etc., that she would love to be able to do.      PLAN:  I will be referring the patient to my sports medicine colleagues here at the [facility] for further evaluation of this and to see if there is other treatment that could be helpful for her.      Total time spent with the patient on the evaluation today over 30 minutes.  Follow up in the Rehabilitation Clinic will be on an as needed basis.         D: 2018   T: 2018   MT: GEETHA      Name:     BETO DOVE   MRN:      [id number]        Account:      [id number]   :      1970           Service Date: 2018      Document: [id number]       Again, thank you for allowing me to participate in the care of your patient.      Sincerely,    Kraig Villagomez MD

## 2018-04-04 NOTE — NURSING NOTE
[facility]  [address] [place] [address]  Dept: [phone]  ______________________________________________________________________________    Patient: Dali Martines   : 1970   MRN: [id number]   2018    INVASIVE PROCEDURE SAFETY CHECKLIST    Date: 2018   Procedure:right middle finger cyst aspiration  Patient Name: Dali Martines  MRN: [id number]  YOB: 1970    Action: Complete sections as appropriate. Any discrepancy results in a HARD COPY until resolved.     PRE PROCEDURE:  Patient ID verified with 2 identifiers (name and  or MRN): Yes  Procedure and site verified with patient/designee (when able): Yes  Accurate consent documentation in medical record: Yes  H&P (or appropriate assessment) documented in medical record: Yes  H&P must be up to 20 days prior to procedure and updates within 24 hours of procedure as applicable: Yes  Relevant diagnostic and radiology test results appropriately labeled and displayed as applicable: Yes  Procedure site(s) marked with provider initials: Yes    TIMEOUT:  Time-Out performed immediately prior to starting procedure, including verbal and active participation of all team members addressing the following:Yes  * Correct patient identify  * Confirmed that the correct side and site are marked  * An accurate procedure consent form  * Agreement on the procedure to be done  * Correct patient position  * Relevant images and results are properly labeled and appropriately displayed  * The need to administer antibiotics or fluids for irrigation purposes during the procedure as applicable   * Safety precautions based on patient history or medication use    DURING PROCEDURE: Verification of correct person, site, and procedures any time the responsibility for care of the patient is transferred to another member of the care team.     The following medication was given:     MEDICATION:  Celestone 30  mg  ROUTE: not given  SITE: not given   DOSE: 1 cc  LOT #: [id number]  : [employer]  EXPIRATION DATE: 09/2019  NDC#: 1070-3188-39   Was there drug waste? Yes Amount of drug waste (mL): 5. Reason for waste: injection was not given to patient    MEDICATION:  Lidocaine without epinephrine  ROUTE: not given  SITE: not given  DOSE: 1 cc  LOT #: [id number]  : [employer]  EXPIRATION DATE: 12/2019  NDC#: 9126-5868-83   Was there drug waste? Yes  Amount of drug waste (mL): 20. Reason for waste: injection not given to patient     Zoe Gay, VIVIANA  April 4, 2018

## 2018-04-04 NOTE — LETTER
4/4/2018       RE: Dali Martines  [address]  [facility] [address]     Dear Colleague,    Thank you for referring your patient, Dali Martines, to the [facility]OP[facility]IC at [facility] Please see a copy of my visit note below.    Reason for visit: Follow-up right trigger thumb    Interval events: I saw the patient back in follow-up for her right trigger thumb. She previously had an injection which helped but did not resolve her symptoms. She wished to avoid surgery and wanted to try instead therapy. She states she has been doing therapy with bracing and feels that her motion has improved but she continues to have triggering and stiffness in the thumb. She also reports a very painful nodule that is emerged over the A1 pulley of the middle finger. This is appeared in the last 2 weeks. There is associated triggering.    Physical exam: She is well-developed, well-nourished, no acute distress. She follows instructions appropriately. She is alert and oriented to surroundings. On examination of the right thumb, actively, MP motion is from 0-90  and IP motion is from 0-45 , as opposed to 0-90 on the contralateral side. There is tenderness over the A1 pulley. There is palpable triggering. There is nodularity of the flexor tendon. On examination of the middle finger, there is a subcentimeter, round deep mass directly overlying the A1 pulley. It is tender to palpation. It does not move with flexion and extension of the finger. There is no triggering.      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 She continues to have pain and triggering. She has had an injection and extensive therapy. She is not wish to have further injections and is interested in other options. At this point, I recommended a right thumb trigger release. I discussed the risks of this  including infection, bleeding, pain, scarring, damage to nerves, blood vessels, or other neurovascular structures, recurrence of triggering or failure to improve, bowstringing, stiffness, and need for further surgery. Informed consent was obtained and she elected to proceed with a right trigger thumb release.    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 I discussed treatment options for this including aspiration versus surgical excision, and I recommended a trial of aspiration first as this is often successful. She wished to go ahead with this so aspiration was attempted as described below. However, no fluid was obtained and although the mass appeared to decrease in size somewhat, it it did not completely resolve. We will get an MRI to confirm that this is in fact a retinacular ganglion cyst. If It is, we will plan to excise this in the operating room at the same time as the trigger thumb is released.      .PROCEDURE NOTE  After informed consent was obtained, The right middle finger A1 pulley area was sterilely prepped with ChloraPrep. A 25-gauge needle was used to puncture and attempt to aspirate fluid from the right middle finger mass. This was attempted twice but no fluid was obtained. The mass decreased in size and less painful but did not resolve entirely. The skin was dressed with a Band-Aid. The patient tolerated the procedure well.  .      Again, thank you for allowing me to participate in the care of your patient.      Sincerely,    Chris Uribe MD

## 2018-04-04 NOTE — NURSING NOTE
"Reason For Visit:   Chief Complaint   Patient presents with     Hand Pain     pt is here to follow up on her right trigger thumb,pt states there is minimal pain in her right thumb today.       Primary MD: Dimitri Alas  Ref. MD: Self    ?  No    Age: 47 year old      Ht 1.778 m (5' 10\")  Wt 83.3 kg (183 lb 11.2 oz)  BMI 26.36 kg/m2      Pain Assessment  Patient Currently in Pain: Yes  0-10 Pain Scale: 4  Primary Pain Location: Finger (Comment which one)  Pain Orientation: Right  Other Pain Locations:  (thumb)    Hand Dominance Evaluation  Hand Dominance: Right          QuickDASH Assessment  QuickDASH Main 4/4/2018   1.Open a tight or new jar. Moderate difficulty   2. Do heavy household chores (e.g., wash walls, floors) Moderate difficulty   3. Carry a shopping bag or briefcase. No difficulty   4. Wash your back. No difficulty   5. Use a knife to cut food. Moderate difficulty   6. Recreational activities in which you take some force or impact through your arm, shoulder or hand (e.g., golf, hammering, tennis, etc.). Moderate difficulty   7. During the past week, to what extent has your arm, shoulder or hand problem interfered with your normal social activities with family, friends, neighbours or groups? Slightly   8. During the past week, were you limited in your work or other regular daily activities as a result of your arm, shoulder or hand problem? Moderately limited   9. Arm, shoulder or hand pain. Moderate   10.Tingling (pins and needles) in your arm,shoulder or hand. Mild   11. During the past week, how much difficulty have you had sleeping because of the pain in your arm, shoulder or hand? (Tribe number) No difficulty   Quickdash Ability Score 31.81          Allergies   Allergen Reactions     Sulfasalazine      Developed rash, HA, dizziness       Marion Walls, CAMI  "

## 2018-04-05 ENCOUNTER — TELEPHONE (OUTPATIENT)
Dept: ORTHOPEDICS | Facility: CLINIC | Age: 48
End: 2018-04-05

## 2018-04-05 NOTE — TELEPHONE ENCOUNTER
Called patient to schedule her procedure with Dr Uribe, patient needs an MRI done first, she will call me back when she is ready and has the MRI scheduled. Gave her my number [phone] to call when she is ready.

## 2018-04-07 ENCOUNTER — THERAPY VISIT (OUTPATIENT)
Dept: PHYSICAL THERAPY | Facility: CLINIC | Age: 48
End: 2018-04-07
Payer: COMMERCIAL

## 2018-04-07 DIAGNOSIS — M25.571 PAIN IN JOINT INVOLVING ANKLE AND FOOT, RIGHT: ICD-10-CM

## 2018-04-07 PROCEDURE — 97112 NEUROMUSCULAR REEDUCATION: CPT | Mod: GP | Performed by: PHYSICAL THERAPIST

## 2018-04-07 PROCEDURE — 97110 THERAPEUTIC EXERCISES: CPT | Mod: GP | Performed by: PHYSICAL THERAPIST

## 2018-04-09 ENCOUNTER — TELEPHONE (OUTPATIENT)
Dept: REGISTRATION | Facility: CLINIC | Age: 48
End: 2018-04-09

## 2018-04-11 ENCOUNTER — OFFICE VISIT (OUTPATIENT)
Dept: PHYSICAL MEDICINE AND REHAB | Facility: CLINIC | Age: 48
End: 2018-04-11
Payer: COMMERCIAL

## 2018-04-11 VITALS
BODY MASS INDEX: 26.41 KG/M2 | DIASTOLIC BLOOD PRESSURE: 80 MMHG | HEIGHT: 70 IN | WEIGHT: 184.5 LBS | HEART RATE: 86 BPM | SYSTOLIC BLOOD PRESSURE: 121 MMHG

## 2018-04-11 DIAGNOSIS — S86.311D TEAR OF PERONEAL TENDON, RIGHT, SUBSEQUENT ENCOUNTER: Primary | ICD-10-CM

## 2018-04-11 ASSESSMENT — PAIN SCALES - GENERAL: PAINLEVEL: MODERATE PAIN (4)

## 2018-04-11 NOTE — LETTER
4/11/2018       RE: Dali Martines  [address]  [facility] [address]     Dear Colleague,    Thank you for referring your patient, Dali Martines, to the [facility] PHYSICAL MEDICINE AND REHABILITATION at [facility]. Please see a copy of my visit note below.    Right Peroneal Tendon Platelet Rich Plasma (PRP) Injection     Diagnoses (preoperative and postoperative):  Right peroneus brevis degeneration/tear  Current Procedure (include preoperative):  Sonographically guided peroneal tendon PRP injection  Current Indication (include preoperative):  Alleviation of pain  REASON FOR REFERRAL:  A peroneal tendon PRP injection has been requested to help with modulation of pain. Sonographic guidance with be used to visualize the tendon and avoid neurovascular structures.  PATIENT EDUCATION:  Ready to learn with no apparent learning barriers identified.  Learning preferences include listening. Explained diagnosis and treatment plan as well as treatment alternatives. Patient expressed understanding of the content.  Following denial of allergy and review of potential side effects and complications including but not necessarily limited to infection, bleeding, allergic reaction, post-injection flare, local tissue breakdown, tendon rupture, injury to soft tissue and/or nerves and seizure, patient indicated their understanding and agreed to proceed.  PROCEDURE:  Prior to the procedure, the peroneal tendon was visualized with a 18 MHz hockey stick transducer to visualize the tendon and determine the approach for the procedure.  Procedure was carried out using sterile technique including ChloroPrep, a sterile transducer cover, and sterile transducer gel. A simple surgical tray was used.  PROCEDURAL PAUSE:  Procedural pause conducted to verify correct patient identity, procedure to be performed, and as applicable, correct side/site, correct patient position, availability of implants,  special equipment, or special requirements.  Patient position:  Left/right lateral recumbent  Transducer type:  18 MHz hockey stick transducer  Approach:  Posterior-to-anterior, [id number], in-plane to the transducer  Local Anesthesia:  Sonographically guided 25-gauge 1.5-inch needle was directly visualized providing 3 cc's of 1% Lidocaine for local anesthetic purposes.  Injectate:  Sonographically guided 25-gauge 1.5-inch needle was directly visualized entering down into the peroneal tendon. After confirming needle tip position a solution containing 3 ml's of PRP and 1 ml of PPP (platelet poor plasma) was injected without complication (2 ml's posterior to the lateral malleolus and 2 ml's at the peroneus brevis tendon insertion at the base of the 5th metatarsal).  AFTERCARE:  Patient tolerated the procedure without complication. After a short observation period, patient was discharged under their own power and in excellent condition.  FOLLOW-UP:   1. Hold on home exercises/formal physical therapy at this time for treatment purposes.  2. Acetaminophen as needed for improved pain control.  3. Initiate long-leg walking boot immobilization for treatment purposes for 3 days.  4. Follow-up in 2 weeks for further evaluation/medical care.      Again, thank you for allowing me to participate in the care of your patient.      Sincerely,    Bryan Schaffer DO

## 2018-04-11 NOTE — PATIENT INSTRUCTIONS
We addressed the following today:    1. Right peroneus tendon degeneration/tear    Activity modification as discussed  Hold on physical therapy/home exercise progam  Over the counter medication: Acetaminophen (Tylenol) 1000 mg every 6 hours with food (Maximum of 3000 mg/day)  PRP injection of the right peroneal tendon was performed today in clinic with US guidance  Other specific instructions: Initiate walking boot immobilization for 3 days  Follow up in 2 weeks for further evaluation/medical care (sooner if needed; call direct clinic number [[phone]] at any time with questions/concerns)    PRP (Platelet Rich Plasma) Post-Procedure Instructions  1. Avoid all non steroidal anti-inflammatories (Motrin, Advil, Naprosyn, Naproxen, Aleve, Indocin, Mobic, Toradol, Voltaren, Arthrotec, Ibuprofen, Diclofenac, etc.) for 14 days after completion of the the procedure.  Use Acetaminophen up to 3000 mg/day for improvement of pain.  2. Do not take any oral or injected steroids for 3 months after the procedure.  3. Avoid icing the treated area for 2 weeks after the procedure.  4. No strenuous activity for 2 weeks following the procedure to maximize the healing process.  5. Procedure can take 8-12 weeks to notice improvement of symptoms.  6. Schedule a follow-up clinic visit in 2 weeks post-procedure.    Post Procedure PRP Rehabilitation Protocol:  Days 0-14 - compression and elevation as needed   Days 14-28 - return to physical therapy/home exercise program as instructed  Weeks 4-6 - able to return to stationary biking/elliptical exercises as tolerated  Weeks 6-10 - walking program progressing to jogging as tolerated  Weeks 10-12 - return to neuromuscular control/sports specific exercises

## 2018-04-11 NOTE — PROGRESS NOTES
Right Peroneal Tendon Platelet Rich Plasma (PRP) Injection     Diagnoses (preoperative and postoperative):  Right peroneus brevis degeneration/tear  Current Procedure (include preoperative):  Sonographically guided peroneal tendon PRP injection  Current Indication (include preoperative):  Alleviation of pain  REASON FOR REFERRAL:  A peroneal tendon PRP injection has been requested to help with modulation of pain. Sonographic guidance with be used to visualize the tendon and avoid neurovascular structures.  PATIENT EDUCATION:  Ready to learn with no apparent learning barriers identified.  Learning preferences include listening. Explained diagnosis and treatment plan as well as treatment alternatives. Patient expressed understanding of the content.  Following denial of allergy and review of potential side effects and complications including but not necessarily limited to infection, bleeding, allergic reaction, post-injection flare, local tissue breakdown, tendon rupture, injury to soft tissue and/or nerves and seizure, patient indicated their understanding and agreed to proceed.  PROCEDURE:  Prior to the procedure, the peroneal tendon was visualized with a 18 MHz hockey stick transducer to visualize the tendon and determine the approach for the procedure.  Procedure was carried out using sterile technique including ChloroPrep, a sterile transducer cover, and sterile transducer gel. A simple surgical tray was used.  PROCEDURAL PAUSE:  Procedural pause conducted to verify correct patient identity, procedure to be performed, and as applicable, correct side/site, correct patient position, availability of implants, special equipment, or special requirements.  Patient position:  Left/right lateral recumbent  Transducer type:  18 MHz hockey stick transducer  Approach:  Posterior-to-anterior, [id number], in-plane to the transducer  Local Anesthesia:  Sonographically guided 25-gauge 1.5-inch needle was directly  visualized providing 3 cc's of 1% Lidocaine for local anesthetic purposes.  Injectate:  Sonographically guided 25-gauge 1.5-inch needle was directly visualized entering down into the peroneal tendon. After confirming needle tip position a solution containing 3 ml's of PRP and 1 ml of PPP (platelet poor plasma) was injected without complication (2 ml's posterior to the lateral malleolus and 2 ml's at the peroneus brevis tendon insertion at the base of the 5th metatarsal).  AFTERCARE:  Patient tolerated the procedure without complication. After a short observation period, patient was discharged under their own power and in excellent condition.  FOLLOW-UP:   1. Hold on home exercises/formal physical therapy at this time for treatment purposes.  2. Acetaminophen as needed for improved pain control.  3. Initiate long-leg walking boot immobilization for treatment purposes for 3 days.  4. Follow-up in 2 weeks for further evaluation/medical care.    Bryan Schaffer DO, CIELO    Department of Rehabilitation Medicine

## 2018-04-11 NOTE — MR AVS SNAPSHOT
After Visit Summary   4/11/2018    Dali Martines    MRN: [id number]           Patient Information     Date Of Birth          1970        Visit Information        Provider Department      4/11/2018 8:45 AM Bryan Schaffer [facility] Physical Medicine and Rehabilitation        Today's Diagnoses     Tear of peroneal tendon, right, subsequent encounter    -  1      Care Instructions    We addressed the following today:    1. Right peroneus tendon degeneration/tear    Activity modification as discussed  Hold on physical therapy/home exercise progam  Over the counter medication: Acetaminophen (Tylenol) 1000 mg every 6 hours with food (Maximum of 3000 mg/day)  PRP injection of the right peroneal tendon was performed today in clinic with US guidance  Other specific instructions: Initiate walking boot immobilization for 3 days  Follow up in 2 weeks for further evaluation/medical care (sooner if needed; call direct clinic number [[phone]] at any time with questions/concerns)    PRP (Platelet Rich Plasma) Post-Procedure Instructions  1. Avoid all non steroidal anti-inflammatories (Motrin, Advil, Naprosyn, Naproxen, Aleve, Indocin, Mobic, Toradol, Voltaren, Arthrotec, Ibuprofen, Diclofenac, etc.) for 14 days after completion of the the procedure.  Use Acetaminophen up to 3000 mg/day for improvement of pain.  2. Do not take any oral or injected steroids for 3 months after the procedure.  3. Avoid icing the treated area for 2 weeks after the procedure.  4. No strenuous activity for 2 weeks following the procedure to maximize the healing process.  5. Procedure can take 8-12 weeks to notice improvement of symptoms.  6. Schedule a follow-up clinic visit in 2 weeks post-procedure.    Post Procedure PRP Rehabilitation Protocol:  Days 0-14 - compression and elevation as needed   Days 14-28 - return to physical therapy/home exercise program as instructed  Weeks 4-6 - able to return to stationary  biking/elliptical exercises as tolerated  Weeks 6-10 - walking program progressing to jogging as tolerated  Weeks 10-12 - return to neuromuscular control/sports specific exercises            Follow-ups after your visit        Follow-up notes from your care team     Return in about 2 weeks (around 4/25/2018).      Your next 10 appointments already scheduled     Apr 25, 2018  3:15 PM CDT   (Arrive by 3:00 PM)   RETURN HAND with Chris Uribe MD   [facility] ([facility])    [address] [place] [address]  4th Floor  [employer] [id number] [phone]            May 03, 2018  4:10 PM CDT   LAKISHA Extremity with Chanelle Muniz, PT   [facility] ([facility]  )    [address]  [employer] [facility] [id number] [phone]            May 15, 2018  3:00 PM CDT   (Arrive by 2:45 PM)   Return Visit with Yoel Betancourt MD   [facility] ([facility])    [address] [place] [address]  [address]  [employer] [facility] [id number] [phone]            May 19, 2018 11:10 AM CDT   LAKISHA Extremity with Michi Esteves Pt, PT   [facility] ([facility]  )    [address]  [employer] [id number] [phone]            Jun 12, 2018  3:00 PM CDT   (Arrive by 2:45 PM)   RETURN DIABETES with Felicia Mckeon MD   [facility] ([facility])    [address] [place] [address]  3rd Floor  [employer] [id number] [phone]            Aug 14, 2018  3:30 PM CDT   (Arrive by 3:15 PM)   Return Visit with Sara Carter MD   [facility] ([facility])    [address] [place] [address]  [address]  [employer] [id number] [phone]              Who to contact     Please call your clinic at [phone] to:    Ask questions about your health    Make or cancel appointments    Discuss  "your medicines    Learn about your test results    Speak to your doctor            Additional Information About Your Visit        [facility] Information     [facility] gives you secure access to your electronic health record. If you see a primary care provider, you can also send messages to your care team and make appointments. If you have questions, please call your primary care clinic.  If you do not have a primary care provider, please call [phone] and they will assist you.      [facility] is an electronic gateway that provides easy, online access to your medical records. With [facility], you can request a clinic appointment, read your test results, renew a prescription or communicate with your care team.     To access your existing account, please contact your [facility] or call [phone] for assistance.        Care EveryWhere ID     This is your Care EveryWhere ID. This could be used by other organizations to access your [facility] medical records  [id number]        Your Vitals Were     Pulse Height BMI (Body Mass Index)             86 1.778 m (5' 10\") 26.47 kg/m2          Blood Pressure from Last 3 Encounters:   04/11/18 121/80   04/04/18 115/77   02/19/18 125/73    Weight from Last 3 Encounters:   04/11/18 83.7 kg (184 lb 8 oz)   04/04/18 83 kg (183 lb)   04/04/18 83.3 kg (183 lb 11.2 oz)              We Performed the Following     C INJ(S), PLATELET RICH PLASMA, INCL IMAGE GUIDANCE        Primary Care Provider Office Phone # Fax #    Dimitri CARVER MD [facility] [phone]       [address] [address]        Equal Access to Services     [facility]: Hadii aad lauren hadasho Soomaali, waaxda luqadaha, qaybta kaalmada jemal, kaitlin garrison . So [facility] [phone].    ATENCIÓN: Si habla español, tiene a ramsey disposición servicios gratuitos de asistencia lingüística. Llame al [phone].    We comply with applicable federal civil " rights laws and [place] laws. We do not discriminate on the basis of race, color, national origin, age, disability, sex, sexual orientation, or gender identity.            Thank you!     Thank you for choosing [facility] PHYSICAL MEDICINE AND REHABILITATION  for your care. Our goal is always to provide you with excellent care. Hearing back from our patients is one way we can continue to improve our services. Please take a few minutes to complete the written survey that you may receive in the mail after your visit with us. Thank you!             Your Updated Medication List - Protect others around you: Learn how to safely use, store and throw away your medicines at www.disposemymeds.org.          This list is accurate as of 4/11/18  9:17 AM.  Always use your most recent med list.                   Brand Name Dispense Instructions for use Diagnosis    aspirin 81 MG tablet      Take 1 tablet by mouth daily.        blood glucose monitoring lancets     4 Box    Use to test blood sugar 8 times daily or as directed.    Type 1 diabetes, HbA1c goal < 7% (H)       blood glucose monitoring test strip    ACCU-CHEK COLIN PLUS    700 strip    Test Blood Sugar 8 times daily or as directed    Type 1 diabetes, HbA1c goal < 7% (H)       CALCIUM + D PO      Take one daily        citalopram 20 MG tablet    celeXA    135 tablet    Take 1.5 tablets (30 mg) by mouth daily    Anxiety       diclofenac 1 % Gel topical gel    VOLTAREN    100 g    APPLY 2 GRAMS ONTO THE SKIN FOUR TIMES DAILY AS NEEDED FOR MODERATE PAIN    Enthesopathy, Pain in joint, multiple sites, Chronic pain of right ankle       dulaglutide 1.5 MG/0.5ML pen    TRULICITY    6 mL    Inject 1.5 mg Subcutaneous every 7 days    Type 1 diabetes mellitus without complication (H)       econazole nitrate 1 % cream      Apply 0.5 inches topically daily.        fish oil-omega-3 fatty acids 1000 MG capsule      Take one daily        folic acid 1 MG tablet    FOLVITE    90 tablet     Take 1 tablet (1 mg) by mouth daily    Pain in joint, multiple sites, Tendinitis, Encounter for therapeutic drug monitoring       HYDROcodone-acetaminophen 5-325 MG per tablet    NORCO    15 tablet    Take 1-2 tablets by mouth every 4 hours as needed for moderate to severe pain        insulin degludec 100 UNIT/ML pen    TRESIBA    15 mL    Inject 18 Units Subcutaneous daily    Type 1 diabetes mellitus without complication (H)       insulin pen needle 31G X 8 MM    B-D U/F    450 each    Use 5 pen needles daily    Diabetes mellitus type I (H)       levothyroxine 125 MCG tablet    SYNTHROID/LEVOTHROID    90 tablet    Take 1 tablet (125 mcg) by mouth daily    Hypothyroidism       methotrexate 2.5 MG tablet CHEMO     48 tablet    Take 4 tablets (10 mg) by mouth once a week    Pain in joint, multiple sites, Tendinitis, Encounter for therapeutic drug monitoring       methylphenidate 20 MG CR capsule    METADATE CD     Take 20 mg by mouth daily        mometasone-formoterol 100-5 MCG/ACT oral inhaler    DULERA    3 Inhaler    Inhale 2 puffs into the lungs 2 times daily    Moderate persistent asthma without complication       montelukast 5 MG chewable tablet    SINGULAIR    30 tablet    CHEW AND SWALLOW 1 TABLET(5 MG) BY MOUTH AT BEDTIME    Mild persistent asthma without complication       MULTIVITAMIN PO      Take  by mouth. Take one daily tab        NIZORAL PO      Take  by mouth. Shampoo daily        NovoLOG PENFILL 100 UNIT/ML injection   Generic drug:  insulin aspart     30 mL    1 unit per 15 grams CHO at all meals and snacks with correction scale of 1 unit per 50 mg/dL over 150.  Average daily use is 20 units.    Type 1 diabetes, HbA1c goal < 7% (H)       ROGAINE EX      Externally apply  topically. 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    Insomnia       * UNABLE TO FIND      MEDICATION NAME: biosil    Enthesopathy, Pain in joint, multiple sites, Chronic pain of right ankle       * UNABLE TO FIND      Biosil        * Notice:  This list has 4 medication(s) that are the same as other medications prescribed for you. Read the directions carefully, and ask your doctor or other care provider to review them with you.

## 2018-04-18 ENCOUNTER — TELEPHONE (OUTPATIENT)
Dept: NEUROLOGY | Facility: CLINIC | Age: 48
End: 2018-04-18

## 2018-04-18 NOTE — TELEPHONE ENCOUNTER
[facility] Call Center    Phone Message    May a detailed message be left on voicemail: yes    Reason for Call: Other: Pt needs to reschedule her Appt with Dr Schaffer - she cannot come on Friday - Please return her call on her WORK Ph #      Action Taken: Message routed to:  [facility] ([facility] PM&R

## 2018-04-30 ENCOUNTER — OFFICE VISIT (OUTPATIENT)
Dept: PHYSICAL MEDICINE AND REHAB | Facility: CLINIC | Age: 48
End: 2018-04-30
Payer: COMMERCIAL

## 2018-04-30 VITALS
SYSTOLIC BLOOD PRESSURE: 147 MMHG | TEMPERATURE: 98.4 F | HEART RATE: 80 BPM | RESPIRATION RATE: 24 BRPM | HEIGHT: 70 IN | OXYGEN SATURATION: 96 % | WEIGHT: 182.7 LBS | DIASTOLIC BLOOD PRESSURE: 80 MMHG | BODY MASS INDEX: 26.16 KG/M2

## 2018-04-30 DIAGNOSIS — S86.311D TEAR OF PERONEAL TENDON, RIGHT, SUBSEQUENT ENCOUNTER: Primary | ICD-10-CM

## 2018-04-30 DIAGNOSIS — M25.571 CHRONIC PAIN OF RIGHT ANKLE: ICD-10-CM

## 2018-04-30 DIAGNOSIS — G89.29 CHRONIC PAIN OF RIGHT ANKLE: ICD-10-CM

## 2018-04-30 ASSESSMENT — PAIN SCALES - GENERAL: PAINLEVEL: MODERATE PAIN (5)

## 2018-04-30 NOTE — MR AVS SNAPSHOT
After Visit Summary   4/30/2018    Dali Martines    MRN: [id number]           Patient Information     Date Of Birth          1970        Visit Information        Provider Department      4/30/2018 10:45 AM Bryan Schaffer DO [facility] Physical Medicine and Rehabilitation        Today's Diagnoses     Tear of peroneal tendon, right, subsequent encounter    -  1    Chronic pain of right ankle          Care Instructions    We addressed the following today:    1. Chronic right ankle/foot pain  2. Right peroneal tendon tear    Activity modification as discussed  Home exercise program as instructed  Physical therapy: [facility] - [phone]  Topical Treatments: Ice  Over the counter medication: Acetaminophen (Tylenol) 1000 mg every 6 hours with food (Maximum of 3000 mg/day)  Other specific instructions: Discontinue walking boot as able as discussed  Follow up in 4 weeks for further evaluation/medical care (sooner if needed; call direct clinic number [[phone]] at any time with questions/concerns)    PRP (Platelet Rich Plasma) Post-Procedure Instructions  1. Do not take any oral or injected steroids for 3 months after the procedure.  2. Procedure can take 8-12 weeks to notice improvement of symptoms.      Post Procedure Tendon Fenestration Rehabilitation Protocol:  Days 14-28 - return to physical therapy/home exercise program as instructed  Weeks 4-6 - able to return to stationary biking/elliptical exercises as tolerated  Weeks 6-10 - walking program progressing to jogging as tolerated  Weeks 10-12 - return to neuromuscular control/sports specific exercises            Follow-ups after your visit        Additional Services     [facility] PT, HAND, AND CHIROPRACTIC REFERRAL       **This order will print in the [facility] Scheduling Office**    Physical Therapy, Hand Therapy and Chiropractic Care are available through:    *[facility]  *[facility]  *[facility]    Call one number to schedule at any of the above locations: [phone].    Your provider has referred you to: Physical Therapy at [facility] or [facility]    Indication/Reason for Referral: Ankle/Foot Pain  Therapy Orders: Evaluate and Treat  Special Programs: None  Special Request: None    Shayy Lucas      Additional Comments for the Therapist or Chiropractor: Formal physical therapy - exercises to include ankle range of motion, peroneal strengthening, and balance/proprioception/neuromuscular facilitation exercises with use of modalities as needed with home exercise prescription.    Please be aware that coverage of these services is subject to the terms and limitations of your health insurance plan.  Call member services at your health plan with any benefit or coverage questions.      Please bring the following to your appointment:    *Your personal calendar for scheduling future appointments  *Comfortable clothing                  Follow-up notes from your care team     Return in about 4 weeks (around 5/28/2018).      Your next 10 appointments already scheduled     May 03, 2018  4:10 PM CDT   [facility] Extremity with Chanelle Muniz PT   [facility] ([facility]  )    [address]  [facility] [address]   [phone]            May 15, 2018  3:00 PM CDT   (Arrive by 2:45 PM)   Return Visit with Yoel Betancourt MD   [facility] ([facility])    [address] [place] [address]  [address]  [facility] [address] [phone]            May 19, 2018 11:10 AM CDT   [facility] Extremity with Michi Esteves Pt, PT   [facility] [place] [facility] ([facility]  )    [address]  [place] [facility] [address]   [phone]            Jun 12, 2018  3:00 PM CDT   (Arrive by 2:45 PM)   RETURN DIABETES with Felicia Mckeon MD   [facility] ([facility] [facility])  "   [address]  3rd [address]  [facility] [id number] [phone]            Aug 14, 2018  3:30 PM CDT   (Arrive by 3:15 PM)   Return Visit with Sara Carter MD   [facility] ([facility])    [address]  [address]  [facility] [id number] [phone]              Who to contact     Please call your clinic at [phone] to:    Ask questions about your health    Make or cancel appointments    Discuss your medicines    Learn about your test results    Speak to your doctor            Additional Information About Your Visit        [employer] Information     [employer] gives you secure access to your electronic health record. If you see a primary care provider, you can also send messages to your care team and make appointments. If you have questions, please call your primary care clinic.  If you do not have a primary care provider, please call [phone] and they will assist you.      [employer] is an electronic gateway that provides easy, online access to your medical records. With [employer], you can request a clinic appointment, read your test results, renew a prescription or communicate with your care team.     To access your existing account, please contact your [facility] or call [phone] for assistance.        Care EveryWhere ID     This is your Care EveryWhere ID. This could be used by other organizations to access your [facility] medical records  [id number]        Your Vitals Were     Pulse Temperature Respirations Height Pulse Oximetry Breastfeeding?    80 98.4  F (36.9  C) (Oral) 24 1.778 m (5' 10\") 96% No    BMI (Body Mass Index)                   26.21 kg/m2            Blood Pressure from Last 3 Encounters:   04/30/18 147/80   04/11/18 121/80   04/04/18 115/77    Weight from Last 3 Encounters:   04/30/18 82.9 kg (182 lb 11.2 oz)   04/11/18 83.7 kg (184 lb 8 oz)   04/04/18 83 kg (183 lb)         "      We Performed the Following     [facility] PT, HAND, AND CHIROPRACTIC REFERRAL        Primary Care Provider Office Phone # Fax #    Dimitri Alas [phone] [address] [address]        Equal Access to Services     CHARLES STANLEYLEXUS : Hadii aad ku hadasho Soomaali, waaxda luqadaha, qaybta kaalmada adeegyada, waxay idiin hayniman ademarbella fernandes laалександрanaya . So [facility] [phone].    ATENCIÓN: Si habla español, tiene a ramsey disposición servicios gratuitos de asistencia lingüística. Llame al [phone].    We comply with applicable federal civil rights laws and [place] laws. We do not discriminate on the basis of race, color, national origin, age, disability, sex, sexual orientation, or gender identity.            Thank you!     Thank you for choosing [facility] PHYSICAL MEDICINE AND REHABILITATION  for your care. Our goal is always to provide you with excellent care. Hearing back from our patients is one way we can continue to improve our services. Please take a few minutes to complete the written survey that you may receive in the mail after your visit with us. Thank you!             Your Updated Medication List - Protect others around you: Learn how to safely use, store and throw away your medicines at www.disposemymeds.org.          This list is accurate as of 4/30/18 11:27 AM.  Always use your most recent med list.                   Brand Name Dispense Instructions for use Diagnosis    aspirin 81 MG tablet      Take 1 tablet by mouth daily.        blood glucose monitoring lancets     4 Box    Use to test blood sugar 8 times daily or as directed.    Type 1 diabetes, HbA1c goal < 7% (H)       blood glucose monitoring test strip    ACCU-CHEK COLIN PLUS    700 strip    Test Blood Sugar 8 times daily or as directed    Type 1 diabetes, HbA1c goal < 7% (H)       CALCIUM + D PO      Take one daily        citalopram 20 MG tablet    celeXA    135 tablet    Take 1.5 tablets (30 mg) by  mouth daily    Anxiety       diclofenac 1 % Gel topical gel    VOLTAREN    100 g    APPLY 2 GRAMS ONTO THE SKIN FOUR TIMES DAILY AS NEEDED FOR MODERATE PAIN    Enthesopathy, Pain in joint, multiple sites, Chronic pain of right ankle       dulaglutide 1.5 MG/0.5ML pen    TRULICITY    6 mL    Inject 1.5 mg Subcutaneous every 7 days    Type 1 diabetes mellitus without complication (H)       econazole nitrate 1 % cream      Apply 0.5 inches topically daily.        fish oil-omega-3 fatty acids 1000 MG capsule      Take one daily        folic acid 1 MG tablet    FOLVITE    90 tablet    Take 1 tablet (1 mg) by mouth daily    Pain in joint, multiple sites, Tendinitis, Encounter for therapeutic drug monitoring       HYDROcodone-acetaminophen 5-325 MG per tablet    NORCO    15 tablet    Take 1-2 tablets by mouth every 4 hours as needed for moderate to severe pain        insulin degludec 100 UNIT/ML pen    TRESIBA    15 mL    Inject 18 Units Subcutaneous daily    Type 1 diabetes mellitus without complication (H)       insulin pen needle 31G X 8 MM    B-D U/F    450 each    Use 5 pen needles daily    Diabetes mellitus type I (H)       levothyroxine 125 MCG tablet    SYNTHROID/LEVOTHROID    90 tablet    Take 1 tablet (125 mcg) by mouth daily    Hypothyroidism       methotrexate 2.5 MG tablet CHEMO     48 tablet    Take 4 tablets (10 mg) by mouth once a week    Pain in joint, multiple sites, Tendinitis, Encounter for therapeutic drug monitoring       methylphenidate 20 MG CR capsule    METADATE CD     Take 20 mg by mouth daily        mometasone-formoterol 100-5 MCG/ACT oral inhaler    DULERA    3 Inhaler    Inhale 2 puffs into the lungs 2 times daily    Moderate persistent asthma without complication       montelukast 5 MG chewable tablet    SINGULAIR    30 tablet    CHEW AND SWALLOW 1 TABLET(5 MG) BY MOUTH AT BEDTIME    Mild persistent asthma without complication       MULTIVITAMIN PO      Take  by mouth. Take one daily tab         NIZORAL PO      Take  by mouth. Shampoo daily        NovoLOG PENFILL 100 UNIT/ML injection   Generic drug:  insulin aspart     30 mL    1 unit per 15 grams CHO at all meals and snacks with correction scale of 1 unit per 50 mg/dL over 150.  Average daily use is 20 units.    Type 1 diabetes, HbA1c goal < 7% (H)       ROGAINE EX      Externally apply  topically. 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    Insomnia       UNABLE TO FIND      Biosil

## 2018-04-30 NOTE — LETTER
"4/30/2018       RE: Dali Martines  [address]  [place] [facility] [address]     Dear Colleague,    Thank you for referring your patient, Dali Martines, to the [facility] PHYSICAL MEDICINE AND REHABILITATION at [facility]. Please see a copy of my visit note below.    [facility] Physical Medicine & Rehabilitation Follow-Up Clinic Note  Follow-up Visit s Apr 30, 2018    Subjective:  Dali Martines is a 47 year old female who is seen in follow up for evaluation of chronic right foot/ankle pain.  Last visit was on 4/11/2018 with a right peroneal tendon platelet rich plasma injection with ultrasound guidance completed at that time.  Since that time, symptoms have been about the same.  Has been using a short leg walking boot currently for treatment purposes.  Used a long leg walking boot for 1 week after for treatment purposes.  Has not been using any oral pain medications for improvement of symptoms.  Did start use of Methotrexate post procedure for treatment purposes.  Did do some physical activity 3-4 days after completion of the procedure.  Started home exercises this past weekend for treatment purposes (clam shell lifts, side-to-side walking, etc.).    Notes chronic right lateral foot/ankle pain.  Symptoms are worse with increased physical activities and with eversion activities of the right foot.  Notes numbness of the right anterior leg region.  Denies any weakenss of the right lower extremity.  Notes warmth of the right ankle.  Denies any fevers or chills.  Denies any redness of the right ankle region.  Slipped in the snow a couple of weeks ago after completion of the procedure.    Patient's past medical, surgical, social, and family histories are reviewed today    Objective:  /80  Pulse 80  Temp 98.4  F (36.9  C) (Oral)  Resp 24  Ht 1.778 m (5' 10\")  Wt 82.9 kg (182 lb 11.2 oz)  SpO2 96%  Breastfeeding? No  BMI 26.21 kg/m2  General: healthy, " alert, and in no distress  Skin: no suspicious lesions or rashes  Neuro: motor exam as noted below    MSK:    RIGHT ANKLE  Inspection:    No swelling, redness, edema, or ecchymosis is observed  Palpation:    Tender about the ATFL    No tenderness over the 5th metatarsal base or the peroneal tendon  Active Range of Motion:     Plantarflexion within normal limits / dorsiflexion limited by stiffness / inversion within normal limits / eversion limited by stiffness  Strength:    5/5 for dorsiflexion, plantarflexion, inversion, and eversion  Special Tests:    None    Imaging:  No x-rays indicated during today's visit  Previous report was reviewed today and results were discussed with the patient    ASSESSMENT:  1. Right peroneus brevis degeneration/tear  2. Chronic right ankle/foot pain    PLAN:  1. Return to formal physical therapy - exercises to include ankle range of motion, peroneal strengthening, and balance/proprioception/neuromuscular facilitation exercises with use of modalities as needed with home exercise prescription.  2. Activity modification as discussed, including limitation of activities that cause pain/discomfort.  3. Acetaminophen/ice as needed for improved pain control.  4. Return to use of Methotrexate as previously prescribed for treatment purposes.  5. Discontinue use of walking boot immobilization as discussed as could potentially lead to further stiffness, complex regional pain syndrome, etc. if continued use noted.  6. Follow-up in 4 weeks for further evaluation/medical care.      Patient's conditions were thoroughly discussed during today's visit with greater than 50% of the visit spent counseling the patient with total time spent face-to-face with the patient being 30 minutes.    Bryan Schaffer DO, RUTHIEM    Department of Rehabilitation Medicine

## 2018-04-30 NOTE — PROGRESS NOTES
"[facility] Physical Medicine & Rehabilitation Follow-Up Clinic Note  Follow-up Visit s Apr 30, 2018    Subjective:  Dali Martines is a 47 year old female who is seen in follow up for evaluation of chronic right foot/ankle pain.  Last visit was on 4/11/2018 with a right peroneal tendon platelet rich plasma injection with ultrasound guidance completed at that time.  Since that time, symptoms have been about the same.  Has been using a short leg walking boot currently for treatment purposes.  Used a long leg walking boot for 1 week after for treatment purposes.  Has not been using any oral pain medications for improvement of symptoms.  Did start use of Methotrexate post procedure for treatment purposes.  Did do some physical activity 3-4 days after completion of the procedure.  Started home exercises this past weekend for treatment purposes (clam shell lifts, side-to-side walking, etc.).    Notes chronic right lateral foot/ankle pain.  Symptoms are worse with increased physical activities and with eversion activities of the right foot.  Notes numbness of the right anterior leg region.  Denies any weakenss of the right lower extremity.  Notes warmth of the right ankle.  Denies any fevers or chills.  Denies any redness of the right ankle region.  Slipped in the snow a couple of weeks ago after completion of the procedure.    Patient's past medical, surgical, social, and family histories are reviewed today    Objective:  /80  Pulse 80  Temp 98.4  F (36.9  C) (Oral)  Resp 24  Ht 1.778 m (5' 10\")  Wt 82.9 kg (182 lb 11.2 oz)  SpO2 96%  Breastfeeding? No  BMI 26.21 kg/m2  General: healthy, alert, and in no distress  Skin: no suspicious lesions or rashes  Neuro: motor exam as noted below    MSK:    RIGHT ANKLE  Inspection:    No swelling, redness, edema, or ecchymosis is observed  Palpation:    Tender about the ATFL    No tenderness over the 5th metatarsal base or the peroneal tendon  Active Range " of Motion:     Plantarflexion within normal limits / dorsiflexion limited by stiffness / inversion within normal limits / eversion limited by stiffness  Strength:    5/5 for dorsiflexion, plantarflexion, inversion, and eversion  Special Tests:    None    Imaging:  No x-rays indicated during today's visit  Previous report was reviewed today and results were discussed with the patient    ASSESSMENT:  1. Right peroneus brevis degeneration/tear  2. Chronic right ankle/foot pain    PLAN:  1. Return to formal physical therapy - exercises to include ankle range of motion, peroneal strengthening, and balance/proprioception/neuromuscular facilitation exercises with use of modalities as needed with home exercise prescription.  2. Activity modification as discussed, including limitation of activities that cause pain/discomfort.  3. Acetaminophen/ice as needed for improved pain control.  4. Return to use of Methotrexate as previously prescribed for treatment purposes.  5. Discontinue use of walking boot immobilization as discussed as could potentially lead to further stiffness, complex regional pain syndrome, etc. if continued use noted.  6. Follow-up in 4 weeks for further evaluation/medical care.      Patient's conditions were thoroughly discussed during today's visit with greater than 50% of the visit spent counseling the patient with total time spent face-to-face with the patient being 30 minutes.    Bryan Schaffer DO, CIELO    Department of Rehabilitation Medicine

## 2018-04-30 NOTE — Clinical Note
Chanelle-  This patient is scheduled with you later this week for PT after a right peroneal tendon PRP injection with US guidance approximately 2.5 weeks ago.  If you could please work with her regarding exercises to include ankle range of motion, peroneal strengthening, and balance/proprioception/neuromuscular facilitation exercises with use of modalities as needed with home exercise prescription that would be appreciated.  Thanks and have a great day!  Bryan

## 2018-04-30 NOTE — PATIENT INSTRUCTIONS
We addressed the following today:    1. Chronic right ankle/foot pain  2. Right peroneal tendon tear    Activity modification as discussed  Home exercise program as instructed  Physical therapy: [facility] - [phone]  Topical Treatments: Ice  Over the counter medication: Acetaminophen (Tylenol) 1000 mg every 6 hours with food (Maximum of 3000 mg/day)  Other specific instructions: Discontinue walking boot as able as discussed  Follow up in 4 weeks for further evaluation/medical care (sooner if needed; call direct clinic number [[phone]] at any time with questions/concerns)    PRP (Platelet Rich Plasma) Post-Procedure Instructions  1. Do not take any oral or injected steroids for 3 months after the procedure.  2. Procedure can take 8-12 weeks to notice improvement of symptoms.      Post Procedure Tendon Fenestration Rehabilitation Protocol:  Days 14-28 - return to physical therapy/home exercise program as instructed  Weeks 4-6 - able to return to stationary biking/elliptical exercises as tolerated  Weeks 6-10 - walking program progressing to jogging as tolerated  Weeks 10-12 - return to neuromuscular control/sports specific exercises

## 2018-04-30 NOTE — NURSING NOTE
Chief Complaint   Patient presents with     RECHECK     [facility]- CHRONIC PAIN IN RIGHT ANKLE     Cristobal Kaye, CMA

## 2018-05-03 ENCOUNTER — THERAPY VISIT (OUTPATIENT)
Dept: PHYSICAL THERAPY | Facility: CLINIC | Age: 48
End: 2018-05-03
Payer: COMMERCIAL

## 2018-05-03 DIAGNOSIS — R60.9 EDEMA: Primary | ICD-10-CM

## 2018-05-03 DIAGNOSIS — M25.571 PAIN IN JOINT INVOLVING ANKLE AND FOOT, RIGHT: ICD-10-CM

## 2018-05-03 PROCEDURE — 97016 VASOPNEUMATIC DEVICE THERAPY: CPT | Mod: GP | Performed by: PHYSICAL THERAPIST

## 2018-05-03 PROCEDURE — 97110 THERAPEUTIC EXERCISES: CPT | Mod: GP | Performed by: PHYSICAL THERAPIST

## 2018-05-03 NOTE — PROGRESS NOTES
Subjective:  HPI                    Objective:  System    Physical Exam    General     ROS    Assessment/Plan:    PROGRESS  REPORT    Progress reporting period is from 3/24/18 to 5/3/18.       SUBJECTIVE  Subjective changes noted by patient:   Had the PRP injections on 4/11 and so far has not noted an improvement.  MD approved her to start back to PT as of Monday and to do ankle ROM, peroneal strengthening and balance, but gently.  Patient reports she was at a conference this past Mon, Tues and Wed and was on it a lot so the foot is extra sore and swollen today.  She actually wore the boot for the conferences.  She hasn't done much walking out of the boot cause she gets immediate and localized pain at the point of the injections.  Admits that shortly after the injection she had to be out shoveling without the boot and this probably didn't help the healing.  She was told to expect the most improvement between 6-12 wks.  She leaves for her next conference which is 5 days, on 5/28.    Current Pain level: 5/10.      Initial Pain level:  (3-8/10).   Changes in function:  Yes, set back after PRP and Boot  Adverse reaction to treatment or activity: treatment - PRP injection and Boot    OBJECTIVE  Changes noted in objective findings:      Slight bruise posterior to lateral malleolus.    Slight pocket of swelling just around ATF.    R ankle AROM: DF 7, PF 65, IV 15, Ev 7.     SLS 30 seconds.    Gait normal, but slow candence.  If tries to walk faster, unable to push off through toes and becomes more painful     ASSESSMENT/PLAN  Updated problem list and treatment plan: Diagnosis 1:  S/p PRP injection 4/11/18 with chronic R ankle pain due to tear of ATF and peroneal brevis  Pain -  hot/cold therapy, manual therapy, self management, education and home program  Decreased ROM/flexibility - manual therapy, therapeutic exercise and home program  Decreased strength - therapeutic exercise, therapeutic activities and home  program  Edema - vasopneumatics, cold therapy and self management/home program  Impaired gait - gait training and home program  Impaired muscle performance - neuro re-education and home program  Decreased function - therapeutic activities and home program  STG/LTGs have been met or progress has been made towards goals:  Yes, set backs  Assessment of Progress: The patient has had set backs in their progress.  Self Management Plans:  Patient has been instructed in a home treatment program.  Patient  has been instructed in self management of symptoms.  I have re-evaluated this patient and find that the nature, scope, duration and intensity of the therapy is appropriate for the medical condition of the patient.  Dali continues to require the following intervention to meet STG and LTG's:  PT    Recommendations:  This patient would benefit from continued therapy.     Frequency:  2 X a month, once daily  Duration:  for 3 months        Please refer to the daily flowsheet for treatment today, total treatment time and time spent performing 1:1 timed codes.

## 2018-05-03 NOTE — MR AVS SNAPSHOT
After Visit Summary   5/3/2018    Dali Martines    MRN: [id number]           Patient Information     Date Of Birth          1970        Visit Information        Provider Department      5/3/2018 4:10 PM Chanelle Muniz, PT [facility]        Today's Diagnoses     Edema    -  1    Pain in joint involving ankle and foot, right           Follow-ups after your visit        Your next 10 appointments already scheduled     May 15, 2018  3:00 PM CDT   (Arrive by 2:45 PM)   Return Visit with Yoel Betancourt MD   [facility] Rheumatology ([facility])    [address]  [address]  [facility] [id number] [phone]            May 19, 2018 11:10 AM CDT   LAKISHA Extremity with Michi Esteves Pt, PT   [facility] ([facility]  )    [address]  [facility] [id number] [phone]            May 24, 2018  1:00 PM CDT   (Arrive by 12:45 PM)   Return Visit with Bryan Schaffer DO   [facility] and Rehabilitation ([facility])    [address]  3rd Floor  [facility] [address] [phone]            Jun 12, 2018  3:00 PM CDT   (Arrive by 2:45 PM)   RETURN DIABETES with Felicia Mckeon MD   [facility] Endocrinology ([facility])    [address]  3rd Floor  [facility] [id number] [phone]            Aug 14, 2018  3:30 PM CDT   (Arrive by 3:15 PM)   Return Visit with Sara Carter MD   [facility] ([facility])    [address]  [address]  [facility] [id number] [phone]              Who to contact     If you have questions or need follow up information about today's clinic visit or your schedule please contact [facility] directly at [phone].  Normal or  non-critical lab and imaging results will be communicated to you by MyChart, letter or phone within 4 business days after the clinic has received the results. If you do not hear from us within 7 days, please contact the clinic through [employer] or phone. If you have a critical or abnormal lab result, we will notify you by phone as soon as possible.  Submit refill requests through [employer] or call your pharmacy and they will forward the refill request to us. Please allow 3 business days for your refill to be completed.          Additional Information About Your Visit        [employer] Information     [employer] gives you secure access to your electronic health record. If you see a primary care provider, you can also send messages to your care team and make appointments. If you have questions, please call your primary care clinic.  If you do not have a primary care provider, please call [phone] and they will assist you.        Care EveryWhere ID     This is your Care EveryWhere ID. This could be used by other organizations to access your [facility] medical records  [id number]         Blood Pressure from Last 3 Encounters:   04/30/18 147/80   04/11/18 121/80   04/04/18 115/77    Weight from Last 3 Encounters:   04/30/18 82.9 kg (182 lb 11.2 oz)   04/11/18 83.7 kg (184 lb 8 oz)   04/04/18 83 kg (183 lb)              We Performed the Following     C VASOPNEUMATIC DEVICE     LAKISHA PROGRESS NOTES REPORT     THERAPEUTIC EXERCISES        Primary Care Provider Office Phone # Fax #    Dimitri CARVER MD [facility] [phone]       [address] [address]        Equal Access to Services     [facility] : Hadii aad ku hadasho Soomaali, waaxda luqadaha, qaybta kaalmada jemal, kaitlin garrison . So [facility] [phone].    ATENCIÓN: Si habla español, tiene a ramsey disposición servicios gratuitos de asistencia lingüística. Llame al [phone].    We comply with applicable federal civil  rights laws and [place] laws. We do not discriminate on the basis of race, color, national origin, age, disability, sex, sexual orientation, or gender identity.            Thank you!     Thank you for choosing [facility]  for your care. Our goal is always to provide you with excellent care. Hearing back from our patients is one way we can continue to improve our services. Please take a few minutes to complete the written survey that you may receive in the mail after your visit with us. Thank you!             Your Updated Medication List - Protect others around you: Learn how to safely use, store and throw away your medicines at www.disposemymeds.org.          This list is accurate as of 5/3/18  4:55 PM.  Always use your most recent med list.                   Brand Name Dispense Instructions for use Diagnosis    aspirin 81 MG tablet      Take 1 tablet by mouth daily.        blood glucose monitoring lancets     4 Box    Use to test blood sugar 8 times daily or as directed.    Type 1 diabetes, HbA1c goal < 7% (H)       blood glucose monitoring test strip    ACCU-CHEK COLIN PLUS    700 strip    Test Blood Sugar 8 times daily or as directed    Type 1 diabetes, HbA1c goal < 7% (H)       CALCIUM + D PO      Take one daily        citalopram 20 MG tablet    celeXA    135 tablet    Take 1.5 tablets (30 mg) by mouth daily    Anxiety       diclofenac 1 % Gel topical gel    VOLTAREN    100 g    APPLY 2 GRAMS ONTO THE SKIN FOUR TIMES DAILY AS NEEDED FOR MODERATE PAIN    Enthesopathy, Pain in joint, multiple sites, Chronic pain of right ankle       dulaglutide 1.5 MG/0.5ML pen    TRULICITY    6 mL    Inject 1.5 mg Subcutaneous every 7 days    Type 1 diabetes mellitus without complication (H)       econazole nitrate 1 % cream      Apply 0.5 inches topically daily.        fish oil-omega-3 fatty acids 1000 MG capsule      Take one daily        folic acid 1 MG tablet    FOLVITE    90  tablet    Take 1 tablet (1 mg) by mouth daily    Pain in joint, multiple sites, Tendinitis, Encounter for therapeutic drug monitoring       HYDROcodone-acetaminophen 5-325 MG per tablet    NORCO    15 tablet    Take 1-2 tablets by mouth every 4 hours as needed for moderate to severe pain        insulin degludec 100 UNIT/ML pen    TRESIBA    15 mL    Inject 18 Units Subcutaneous daily    Type 1 diabetes mellitus without complication (H)       insulin pen needle 31G X 8 MM    B-D U/F    450 each    Use 5 pen needles daily    Diabetes mellitus type I (H)       levothyroxine 125 MCG tablet    SYNTHROID/LEVOTHROID    90 tablet    Take 1 tablet (125 mcg) by mouth daily    Hypothyroidism       methotrexate 2.5 MG tablet CHEMO     48 tablet    Take 4 tablets (10 mg) by mouth once a week    Pain in joint, multiple sites, Tendinitis, Encounter for therapeutic drug monitoring       methylphenidate 20 MG CR capsule    METADATE CD     Take 20 mg by mouth daily        mometasone-formoterol 100-5 MCG/ACT oral inhaler    DULERA    3 Inhaler    Inhale 2 puffs into the lungs 2 times daily    Moderate persistent asthma without complication       montelukast 5 MG chewable tablet    SINGULAIR    30 tablet    CHEW AND SWALLOW 1 TABLET(5 MG) BY MOUTH AT BEDTIME    Mild persistent asthma without complication       MULTIVITAMIN PO      Take  by mouth. Take one daily tab        NIZORAL PO      Take  by mouth. Shampoo daily        NovoLOG PENFILL 100 UNIT/ML injection   Generic drug:  insulin aspart     30 mL    1 unit per 15 grams CHO at all meals and snacks with correction scale of 1 unit per 50 mg/dL over 150.  Average daily use is 20 units.    Type 1 diabetes, HbA1c goal < 7% (H)       ROGAINE EX      Externally apply  topically. 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    Insomnia       UNABLE TO FIND      Biosil

## 2018-05-08 DIAGNOSIS — E03.9 HYPOTHYROIDISM: ICD-10-CM

## 2018-05-09 RX ORDER — LEVOTHYROXINE SODIUM 125 UG/1
125 TABLET ORAL DAILY
Qty: 90 TABLET | Refills: 0 | Status: SHIPPED | OUTPATIENT
Start: 2018-05-09 | End: 2018-08-13

## 2018-05-15 ENCOUNTER — OFFICE VISIT (OUTPATIENT)
Dept: RHEUMATOLOGY | Facility: CLINIC | Age: 48
End: 2018-05-15
Attending: INTERNAL MEDICINE
Payer: COMMERCIAL

## 2018-05-15 VITALS
TEMPERATURE: 98.4 F | HEIGHT: 70 IN | WEIGHT: 183.9 LBS | DIASTOLIC BLOOD PRESSURE: 74 MMHG | SYSTOLIC BLOOD PRESSURE: 126 MMHG | OXYGEN SATURATION: 95 % | HEART RATE: 78 BPM | BODY MASS INDEX: 26.33 KG/M2

## 2018-05-15 DIAGNOSIS — R70.0 ESR RAISED: ICD-10-CM

## 2018-05-15 DIAGNOSIS — M77.9 ENTHESOPATHY: ICD-10-CM

## 2018-05-15 DIAGNOSIS — M25.50 PAIN IN JOINT, MULTIPLE SITES: Primary | ICD-10-CM

## 2018-05-15 DIAGNOSIS — Z51.81 MEDICATION MONITORING ENCOUNTER: ICD-10-CM

## 2018-05-15 DIAGNOSIS — Z51.81 ENCOUNTER FOR THERAPEUTIC DRUG MONITORING: ICD-10-CM

## 2018-05-15 DIAGNOSIS — M25.571 PAIN IN JOINT INVOLVING ANKLE AND FOOT, RIGHT: ICD-10-CM

## 2018-05-15 DIAGNOSIS — M77.9 TENDINITIS: ICD-10-CM

## 2018-05-15 DIAGNOSIS — M25.50 PAIN IN JOINT, MULTIPLE SITES: ICD-10-CM

## 2018-05-15 DIAGNOSIS — K21.9 GASTROESOPHAGEAL REFLUX DISEASE, ESOPHAGITIS PRESENCE NOT SPECIFIED: ICD-10-CM

## 2018-05-15 LAB
ALBUMIN SERPL-MCNC: 3.8 G/DL (ref 3.4–5)
ALT SERPL W P-5'-P-CCNC: 22 U/L (ref 0–50)
AST SERPL W P-5'-P-CCNC: 23 U/L (ref 0–45)
BASOPHILS # BLD AUTO: 0 10E9/L (ref 0–0.2)
BASOPHILS NFR BLD AUTO: 0.5 %
CREAT SERPL-MCNC: 0.66 MG/DL (ref 0.52–1.04)
CRP SERPL-MCNC: <2.9 MG/L (ref 0–8)
DIFFERENTIAL METHOD BLD: ABNORMAL
EOSINOPHIL # BLD AUTO: 0.1 10E9/L (ref 0–0.7)
EOSINOPHIL NFR BLD AUTO: 0.8 %
ERYTHROCYTE [DISTWIDTH] IN BLOOD BY AUTOMATED COUNT: 12.2 % (ref 10–15)
GFR SERPL CREATININE-BSD FRML MDRD: >90 ML/MIN/1.7M2
HCT VFR BLD AUTO: 34.4 % (ref 35–47)
HGB BLD-MCNC: 11.4 G/DL (ref 11.7–15.7)
IMM GRANULOCYTES # BLD: 0 10E9/L (ref 0–0.4)
IMM GRANULOCYTES NFR BLD: 0.1 %
LYMPHOCYTES # BLD AUTO: 2.2 10E9/L (ref 0.8–5.3)
LYMPHOCYTES NFR BLD AUTO: 28.9 %
MCH RBC QN AUTO: 31.3 PG (ref 26.5–33)
MCHC RBC AUTO-ENTMCNC: 33.1 G/DL (ref 31.5–36.5)
MCV RBC AUTO: 95 FL (ref 78–100)
MONOCYTES # BLD AUTO: 0.6 10E9/L (ref 0–1.3)
MONOCYTES NFR BLD AUTO: 7.9 %
NEUTROPHILS # BLD AUTO: 4.7 10E9/L (ref 1.6–8.3)
NEUTROPHILS NFR BLD AUTO: 61.8 %
NRBC # BLD AUTO: 0 10*3/UL
NRBC BLD AUTO-RTO: 0 /100
PLATELET # BLD AUTO: 264 10E9/L (ref 150–450)
RBC # BLD AUTO: 3.64 10E12/L (ref 3.8–5.2)
WBC # BLD AUTO: 7.6 10E9/L (ref 4–11)

## 2018-05-15 PROCEDURE — 84460 ALANINE AMINO (ALT) (SGPT): CPT | Performed by: INTERNAL MEDICINE

## 2018-05-15 PROCEDURE — 85025 COMPLETE CBC W/AUTO DIFF WBC: CPT | Performed by: INTERNAL MEDICINE

## 2018-05-15 PROCEDURE — 82565 ASSAY OF CREATININE: CPT | Performed by: INTERNAL MEDICINE

## 2018-05-15 PROCEDURE — 86140 C-REACTIVE PROTEIN: CPT | Performed by: INTERNAL MEDICINE

## 2018-05-15 PROCEDURE — 84450 TRANSFERASE (AST) (SGOT): CPT | Performed by: INTERNAL MEDICINE

## 2018-05-15 PROCEDURE — 36415 COLL VENOUS BLD VENIPUNCTURE: CPT | Performed by: INTERNAL MEDICINE

## 2018-05-15 PROCEDURE — 82040 ASSAY OF SERUM ALBUMIN: CPT | Performed by: INTERNAL MEDICINE

## 2018-05-15 PROCEDURE — G0463 HOSPITAL OUTPT CLINIC VISIT: HCPCS | Mod: ZF

## 2018-05-15 ASSESSMENT — PAIN SCALES - GENERAL: PAINLEVEL: MODERATE PAIN (4)

## 2018-05-15 NOTE — MR AVS SNAPSHOT
After Visit Summary   5/15/2018    Dali Martines    MRN: [id number]           Patient Information     Date Of Birth          1970        Visit Information        Provider Department      5/15/2018 3:00 PM Yoel Betancourt MD [facility] Rheumatology        Today's Diagnoses     Pain in joint, multiple sites    -  1    Enthesopathy        ESR raised        Pain in joint involving ankle and foot, right           Follow-ups after your visit        Your next 10 appointments already scheduled     May 15, 2018  4:00 PM CDT   Lab with  LAB   [facility] Lab ([facility])    [address] [place] [address]   [phone]            May 19, 2018 11:10 AM CDT   LAKISHA Extremity with Michi Esteves Pt, PT   [facility] ([facility]  )    [address]  [facility] [address]   [phone]            May 24, 2018  1:00 PM CDT   (Arrive by 12:45 PM)   Return Visit with Bryan Schaffer DO   [facility] Rehabilitation ([facility])    [address] [place] [address]   [phone]            Jun 09, 2018 11:10 AM CDT   LAKISHA Extremity with Chanelle Muniz PT   [facility] ([facility]  )    [address]  [facility] [address]   [phone]            Jun 12, 2018  3:00 PM CDT   (Arrive by 2:45 PM)   RETURN DIABETES with Felicia Mckeon MD   [facility] Endocrinology ([facility])    [address] [place] [address]   [phone]            Jun 23, 2018 11:10 AM CDT   LAKISHA Extremity with Michi Esteves Pt, PT   [facility] ([facility]  )    [address]  [facility] [address]   [phone]            Aug 14, 2018  3:30 PM CDT   (Arrive by 3:15 PM)   Return Visit  "with Sara Carter MD   [facility] ([facility] [place])    [address]  [address]  [facility] [address] [phone]            Nov 20, 2018  2:30 PM CST   (Arrive by 2:15 PM)   Return Visit with Yoel Betancourt MD   [facility] Rheumatology ([facility] [place])    [address]  [address]  [facility] [address] [phone]              Who to contact     If you have questions or need follow up information about today's clinic visit or your schedule please contact [facility] RHEUMATOLOGY directly at [phone].  Normal or non-critical lab and imaging results will be communicated to you by [employer], letter or phone within 4 business days after the clinic has received the results. If you do not hear from us within 7 days, please contact the clinic through [employer] or phone. If you have a critical or abnormal lab result, we will notify you by phone as soon as possible.  Submit refill requests through [employer] or call your pharmacy and they will forward the refill request to us. Please allow 3 business days for your refill to be completed.          Additional Information About Your Visit        [employer] Information     [employer] gives you secure access to your electronic health record. If you see a primary care provider, you can also send messages to your care team and make appointments. If you have questions, please call your primary care clinic.  If you do not have a primary care provider, please call [phone] and they will assist you.        Care EveryWhere ID     This is your Care EveryWhere ID. This could be used by other organizations to access your [facility] medical records  [id number]        Your Vitals Were     Pulse Temperature Height Pulse Oximetry BMI (Body Mass Index)       78 98.4  F (36.9  C) (Oral) 1.778 m (5' 10\") 95% 26.39 kg/m2        Blood Pressure from Last 3 Encounters:   05/15/18 126/74   04/30/18 " 147/80   04/11/18 121/80    Weight from Last 3 Encounters:   05/15/18 83.4 kg (183 lb 14.4 oz)   04/30/18 82.9 kg (182 lb 11.2 oz)   04/11/18 83.7 kg (184 lb 8 oz)              Today, you had the following     No orders found for display       Primary Care Provider Office Phone # Fax #    Dimitri WEN MD [facility] [phone] [address] [address]        Equal Access to Services     [facility]: Hadii aad ku hadasho Soomaali, waaxda luqadaha, qaybta kaalmada adeegyada, waxay joanin hayravi garrison . So [facility] [phone].    ATENCIÓN: Si habla español, tiene a ramsey disposición servicios gratuitos de asistencia lingüística. Llame al [phone].    We comply with applicable federal civil rights laws and [place] laws. We do not discriminate on the basis of race, color, national origin, age, disability, sex, sexual orientation, or gender identity.            Thank you!     Thank you for choosing [facility]  for your care. Our goal is always to provide you with excellent care. Hearing back from our patients is one way we can continue to improve our services. Please take a few minutes to complete the written survey that you may receive in the mail after your visit with us. Thank you!             Your Updated Medication List - Protect others around you: Learn how to safely use, store and throw away your medicines at www.disposemymeds.org.          This list is accurate as of 5/15/18  3:45 PM.  Always use your most recent med list.                   Brand Name Dispense Instructions for use Diagnosis    aspirin 81 MG tablet      Take 1 tablet by mouth daily.        blood glucose monitoring lancets     4 Box    Use to test blood sugar 8 times daily or as directed.    Type 1 diabetes, HbA1c goal < 7% (H)       blood glucose monitoring test strip    ACCU-CHEK COLIN PLUS    700 strip    Test Blood Sugar 8 times daily or as directed    Type 1 diabetes, HbA1c goal <  7% (H)       CALCIUM + D PO      Take one daily        citalopram 20 MG tablet    celeXA    135 tablet    Take 1.5 tablets (30 mg) by mouth daily    Anxiety       diclofenac 1 % Gel topical gel    VOLTAREN    100 g    APPLY 2 GRAMS ONTO THE SKIN FOUR TIMES DAILY AS NEEDED FOR MODERATE PAIN    Enthesopathy, Pain in joint, multiple sites, Chronic pain of right ankle       dulaglutide 1.5 MG/0.5ML pen    TRULICITY    6 mL    Inject 1.5 mg Subcutaneous every 7 days    Type 1 diabetes mellitus without complication (H)       econazole nitrate 1 % cream      Apply 0.5 inches topically daily.        fish oil-omega-3 fatty acids 1000 MG capsule      Take one daily        folic acid 1 MG tablet    FOLVITE    90 tablet    Take 1 tablet (1 mg) by mouth daily    Pain in joint, multiple sites, Tendinitis, Encounter for therapeutic drug monitoring       HYDROcodone-acetaminophen 5-325 MG per tablet    NORCO    15 tablet    Take 1-2 tablets by mouth every 4 hours as needed for moderate to severe pain        insulin degludec 100 UNIT/ML pen    TRESIBA    15 mL    Inject 18 Units Subcutaneous daily    Type 1 diabetes mellitus without complication (H)       insulin pen needle 31G X 8 MM    B-D U/F    450 each    Use 5 pen needles daily    Diabetes mellitus type I (H)       levothyroxine 125 MCG tablet    SYNTHROID/LEVOTHROID    90 tablet    Take 1 tablet (125 mcg) by mouth daily    Hypothyroidism       methotrexate 2.5 MG tablet CHEMO     48 tablet    Take 4 tablets (10 mg) by mouth once a week    Pain in joint, multiple sites, Tendinitis, Encounter for therapeutic drug monitoring       methylphenidate 20 MG CR capsule    METADATE CD     Take 20 mg by mouth daily        mometasone-formoterol 100-5 MCG/ACT oral inhaler    DULERA    3 Inhaler    Inhale 2 puffs into the lungs 2 times daily    Moderate persistent asthma without complication       montelukast 5 MG chewable tablet    SINGULAIR    30 tablet    CHEW AND SWALLOW 1 TABLET(5 MG)  BY MOUTH AT BEDTIME    Mild persistent asthma without complication       MULTIVITAMIN PO      Take  by mouth. Take one daily tab        NIZORAL PO      Take  by mouth. Shampoo daily        NovoLOG PENFILL 100 UNIT/ML injection   Generic drug:  insulin aspart     30 mL    1 unit per 15 grams CHO at all meals and snacks with correction scale of 1 unit per 50 mg/dL over 150.  Average daily use is 20 units.    Type 1 diabetes, HbA1c goal < 7% (H)       ROGAINE EX      Externally apply  topically. 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    Insomnia       UNABLE TO FIND      Biosil

## 2018-05-15 NOTE — LETTER
5/15/2018      RE: Dali Martines  [address]  [facility] [address]       Rheumatology Visit     Dali Martines MRN# [id number]   YOB: 1970 Age: 44 year old         Primary care provider: Dimitri Alas          Assessment and Plan:     Psoriatic Arthriitis versus Rheumatoid Arthritis     Ms. Dali Martines is a 44 year-old woman with PMH of type I DM, Hashimoto's thyroiditis, and possible psoriasis in childhood, with family history of RA, type I DM, Hashimoto's, and psoriasis presents who presents with recurrent acute onset pain, swelling, stiffness in L hand.    Most likely explanation of her symptoms is psoriatic arthritis given the presentation of asymmetric, transient joint pain/swelling/stiffness, new low titer NOLVIA (vs 2008), family history of psoriasis and active psoriasis found on exam, plus morning heel pain. Psoriasis tends to be worst in fall/winter months, and this corresponds to when she has had these episodes of arthritis in the hand.    PLAN/RECOMMENDATIONS:     At this time she has noticed that she definitely feels worse off of methotrexate so she will stay back on her prior dose for the foreseeable future.  She also thinks she gets some help with the topical diclofenac we have given her so she will continue that as well.    She has not had any significant evidence of toxicity on the medications, and the GI issues she has had are relatively stable at the current time.  She will contact us with any worsening and will otherwise see her back in 3-6 months sooner as needed                     History of Present Illness:   Ms. Dali Martines is a 44-year-old woman with a history of type I DM and Hashimoto's thyroiditis who presents for evaluation of recurrent stiffness, swelling, and pain in the left hand.    Symptoms first occurred in August 2008, when she began having stiffness and pain in the left hand, particularly in the PIPs and MCPs. Symptoms spontaneously resolved  after about 6-8 weeks. She was previously seen by Rheumatology here around that time (see note from Dr. Betancourt on 12/9/2008). Briefly, assessment was that she had a self-limited episode of inflammatory arthralgias, possibly viral or early psoriatic arthritis. Negative NOLVIA, RF, Lyme serologies and normal CRP and ESR. Given her family history of RA, there was a thought to start her on HCQ if anti-CCP antibodies were positive, but they were found to be negative. No imaging of the hand was performed.    Last September (2014), she experienced recurrent acute onset pain, stiffness, and swelling in the L hand very similar to her symptoms in 2008. Specifically, had swelling across MCPs and in 3rd PIP. She could hardly bend the hand sometimes due to the welling. Had morning stiffness lasting 30-45 minutes. No other joints were involved.    Issues with her left hand lingered for several months but eventually self-resolved around January. As of today, her only notable symptom is heel pain in the mornings. No fevers, chills, or weight loss. No back pain, vision changes, nausea, vomiting, diarrhea, abdominal pain, or blood in stool or urine. No rash, sicca symptoms, or oral ulcers.    It seems she has a history of hypermobility, e.g. could bend wrist back to forearm, bend down and touch palms flat on floor, when she was younger. Used to get frequent ankle sprains, and has had multiple tendon and ligament tears over the years.    SEPTEMBER 22, 2015, INTERVAL HISTORY       Since we have last seen the patient, she feels like her joints were generally better over the summer.  She did have some right ankle pain that has continued to be tender at times.  She also got some pretty significant tennis elbow during the summer, but both of these did well with meloxicam.  Her tennis elbow did not respond as well to a compressive band.      About 10 days ago, however, she again started having dull hand pain, left generally greater than  right, except for her right fifth digit.  She has about an hour of associated morning stiffness.  This is very similar to what she has had the last 2 years.  Last year, this seemed to last most of the year, but the prior year this was only about 3 months in duration.       Throughout this time, she has had no development of clear-cut psoriasis.  She has carried a diagnosis of psoriasis in the past, but it has not been a persistent problem with her skin.  She does have a strong family history, however, with this affecting her uncle and her mother.       Recall that based on her symptoms and her type 1 diabetes, we had previously checked her for the anti-citrullinated peptide antibodies, but she does not have those.     December 08 2015 Interval history     Since last visit and starting the Meloxicam daily, she feels that her left 5th digit pain has improved significantly. She did have an URI recently with some SOB and had a Prednisone burst of 20 mg daily for five days and she felt that her pain was completely gone with the steroids. She is not noticing any morning stiffness, swelling or erythema in the left 5th digit. She will occasionally have some chronic pain in the DIP of the right hand but very seldomly. Overall, she feels that her joint pain is better since starting the Meloxicam.     She has noted a small lump on the palmar aspect of her right hand. It is not painful and has not become swollen or red.     Her left ankle has become more bothersome and she thinks she will be needing surgery soon on that side.     When she was having an upper respiratory infection, she noted some swelling under the right mandible and it became so large that she felt it would obstruct her breathing when she turned her head to the right side. The swelling has come down but it is still present and slightly painful to touch. She has not had any weight loss, fevers, chills or night sweats.     No symptoms of dry mouth, eyes, oral  lesions, rash, abdominal pain, nausea, vomiting.     Interim history 5/17/2016:  Doing well on Meloxicam. Since last visit, had to go off of Meloxicam for ankle surgery (torn tendon). A couple flares off of it, recently went back on due to the pain. Always has been in L hand, now in right hand and both elbows. Mainly painful (5/10) and stiff. Stiffness begins in the morning, takes about 30 minutes to subside. No noted erythema, warmth or edema of joints. Has two nodules on base of 2nd MCP, can be painful occasionally, no changing drastically in size. Was doing better in the winter, so wondering if there could be a seasonal component. No known seasonal allergies.    Had the chicken pox around age 12. In the past 10-15 years, will periodically get shingles presenting as unilateral painful, itchy rash around L waist every few years. Most recently it occurred in April and was much worse than usual (nerve pain to the point of not being able to sleep). Muscle relaxant helped clear it up and allow for sleep. Has continued intermittent fever and chills since this episode. Decreased appetite, mild nausea. No other rashes since winter.     INTERVAL HISTORY (10/18/2016):  Since we have last seen the patient, she tried going down on meloxicam and actually has tolerated that pretty well.  She has a little bit more stiffness in the morning in her left hand, but the right hand is pretty good by comparison.  She occasionally has some sporadic sharp pains there, but nothing severe and nothing persistent.  Her overall morning stiffness in the left hand is only about 30 minutes.  Her PIP joints are always the worst, and her fourth and fifth fingers tend to bother her a little bit more.      She did have some palmar contracture developing but with hand therapy those improved without any injections.  She has also avoiding a trackball mouse as that seemed to make things worse.      She continues to have some pain in her right ankle.  She  had a bony fragment removed there in 01/2016 by Dr. Tran.  That has hurt a fair amount since and the nature of the pain is not particularly inflammatory and suggests more of a DJD problem.  She also has some pain in the left knee which had an ACL construction in 1995, also possibly consistent with DJD.      She has had some intermittent attacks of very low-grade psoriasis over her chest up in the clavicle area, but has none currently.  She has no other significant new problems.     INTERVAL HISTORY:  Since we last saw the patient, she had an odd episode of pain in her left hand on 02/20.  She said this initially started very sharp, then she got a lump in the palm of her hand that felt inflamed, and then she noticed some bruising in the area.  She does not recall any trauma to it.  She resumed meloxicam, which she had stopped and with that felt better over the next 2 weeks, but has remained on it since.      She also has a sensation in her right fifth digit that is consistently sore and somewhat achy.  That has persisted even with the meloxicam.      Finally, on the right ankle, which has been sprained several times in the past, she continues to wear a brace.  She actually uses the topical meloxicam on that area, but has not done so on the fifth finger.      In all these areas she can have some stiffness in the morning lasting for up to an hour.      Her other concern is that she has gained about 10 pounds.  She does not think she has changed her eating much.     AUGUST 17, 2017, INTERVAL HISTORY:  At the time we last saw the patient, we urged her to try to get off of meloxicam.  She did that and used the topical diclofenac I had given her, but she felt like she quickly worsened with respect to arthritis and, more interestingly and somewhat surprisingly, she got psoriasis over a number of areas.  That has since improved, and it is not entirely clear to me whether there is any relationship here, but she did get some  along her hairline and a few other areas.       From the standpoint of her joints, the most dominant issue is right thumb tendinitis, and she has had ongoing difficulty in gripping with it.      When she was doing that badly, we had her go back on the meloxicam temporarily, then started sulfasalazine.  Unfortunately, she had more severe side effects with sulfasalazine including a fever to 102 degrees, headaches, and a general sense of malaise, and this was bad enough that we gave her a course of steroids to interfere with a potentially more severe sulfasalazine allergy and got her off of it.      She now has stopped the steroids as of 10 days ago.  Overall, she thinks she is doing better.  With the taper from the steroids, however, she is again feeling somewhat worse, particularly in her joints.       She really has never had much of anything in the way of psoriasis.  I have felt her arthritis was most consistent with psoriatic arthritis, and she has had a couple of very small plaques occasionally that could have been consistent with it but have never been biopsied.  She does, however, have a strong family history of psoriasis.     INTERVAL HISTORY:  At the time we last saw the patient, she was improving from her adverse effects of sulfasalazine and we initiated methotrexate at 3 tablets weekly.  She actually tolerated that quite well and was able to go up to 4 tablets weekly and continues to tolerate that with no significant symptoms.  Notably, she has the odd effect of having her joints actually feel a little worse for 1-2 days at the time of methotrexate dosing and then feeling significantly better.  She notes this in bilateral second and third fingers in both the MCP and PIP joints and these have been the primary joints affecting her.  She definitely feels that they are overall better since starting the methotrexate and have relatively little morning stiffness now.      The bigger and more persistent problem has  "been her right thumb.  This still hurts, and she has a flexor tendon nodule there that is probably causing most of the difficulties.  She tried a splint but was not sure it helped very much.  She gets locking and catching with activity that requires her to manually release the catching with her other hand.  This has caused some ongoing significant tenderness in the area.  She does think it was better when she was back on Meloxicam however.      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     Interval History 2/1/2018:   Ms. Stephenson most significant concern today continues to be her inflammatory nodules that she felt became much worse after discontinuing Meloxicam. They appeared over the bridge of nose, dorsum and plantar surface of left hand, and over tendon of right thumb. Systemic steroids resolved some of these nodules including over her nose, but she feels the most effective therapy was working with PT and having ultrasound treatments.    Her right hand has a nodule at the base of her 1st MTP on the palmar surface that causes her significant distress. She is right hand dominant this nodules make it especially hard to write and difficult to type. She was seen by Dr. Uribe in orthopedics yesterday who evaluated the patient for a possible trigger release. Ms. Martines has a degree of hesitancy about this procedure as she was given a steroid injection was in October of 2017 witih \"slight improvement\" but subsequently developed catching and trigger with more thumb movements. Diclofenac cream hasn't improved this catching.     There is some relatively new nail changes that Ms. Martines has noticed in her right thumbnail that she described as nail ridges without pitting or evidence of swelling or inflammation.     Additionally, she has noticed that she has been experiencing " "swelling of her right hand localized to the fingers that with associated \"fullness\". She had experienced this in her left hand when her arthritis was much less controlled, but notes that at this time her arthritis is under adequate control. ncreased to 6 tablets (of 2.5 mg) per week over last two weeks after having increased her dose of MTX to 5 tablets back in October of 2017.    She has no eye symptoms, chest pain, worsening SOB aside from baseline asthma, back pain, rashes, or new skin lesions. Ms. Martines did mention a severe case of \"stomach flu\" 2 weeks ago which she believes may have worsened her joint symptoms. This was described as persistent nausea/vomiting with intolerance of fluids/solids without diarrhea that resolved over 2 days. Since that time, has had a low appetite and increased satiety.  She wonders if her increasing dose of Methotrexate may have resulted in these lingering GI symptoms. Otherwise, she has no other concerns.      5/15/18 interval history:    Since we have last seen the patient she feels that she has been to be doing pretty well with her methotrexate.  She was off about 4 weeks secondary to an injection of platelet rich plasma into the ankle.  She just had this about a month ago on 04/11.  It was right before the snow storm and she did a lot of shoveling and had a lot of strain on it so she did not feel better at all for a while, but she thinks she may be having some small gains now.  She recently started back in physical therapy for it as well and is doing some range of motion work.  She thinks the joint is not as painful when it is extended so there may be some definite benefit.      However, off of the methotrexate she has started to notice some right second digit pain that is increasing over the last few days to a week.      Overall, she thinks that her overall function however despite these issues with her joints may be improving.  She does not feel like anything else is worse.  " She notices some tightness along her palms and a feeling of tightness in her left fourth finger.  In the area along her palm she still notes a nodule.  That seems largely unchanged to her.      She denies any other significant new problems.  Everything else feels stable to her.  GERD feels stable and she is not having any dysphagia.                Review of Systems:   Negative other than what is stated in the HPI above           Past Medical History:     Past Medical History:   Diagnosis Date     Anemia      Anxiety      Arthritis 2014    Psoriatic arthritis     Back injury 1988     Dry eye syndrome      Dyslipidemia      Endometriosis 2002    adehsions seen at laparoscopy     GERD (gastroesophageal reflux disease)      Hypothyroidism     8 yoa     SOB (shortness of breath) 3/11/2014     Type I (juvenile type) diabetes mellitus without mention of complication, not stated as uncontrolled     when 15      Uncomplicated asthma Fall 2013   Possible psoriasis as a child  Multiple tendon and ligament injuries    Patient Active Problem List    Diagnosis Date Noted     Edema 05/03/2018     Priority: Medium     Pain in joint involving ankle and foot, right 10/11/2017     Priority: Medium     Type 1 diabetes mellitus without complication (H) 08/08/2017     Priority: Medium     Left knee pain 07/07/2016     Priority: Medium     Swelling of limb 03/15/2016     Priority: Medium     Other postprocedural status(V45.89) 03/15/2016     Priority: Medium     ESR raised 01/03/2016     Priority: Medium     Right foot pain 10/14/2015     Priority: Medium     Chronic pain of right ankle 07/30/2015     Priority: Medium     Enthesopathy 07/20/2015     Priority: Medium     Pain in joint, multiple sites 07/20/2015     Priority: Medium     PAIN KNEE JOINT 06/17/2015     Priority: Medium     Diabetes (H) 02/26/2015     Priority: Medium     Screening for other eye conditions 02/26/2015     Priority: Medium     Encounter for other specified  aftercare 02/24/2015     Priority: Medium     PAIN FOOT 02/24/2015     Priority: Medium     SOB (shortness of breath) 03/11/2014     Priority: Medium     Cervicalgia 06/25/2012     Priority: Medium     Chronic low back pain 06/25/2012     Priority: Medium     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/2012     Priority: Medium     Cutaneous skin tags 06/12/2012     Priority: Medium     Type 1 diabetes mellitus with retinopathy (H) 04/05/2011     Priority: Medium     Anxiety 04/05/2011     Priority: Medium     CARDIOVASCULAR SCREENING; LDL GOAL LESS THAN 100 10/31/2010     Priority: Medium     GERD (gastroesophageal reflux disease) 02/24/2009     Priority: Medium             Past Surgical History:     Past Surgical History:   Procedure Laterality Date     C REPAIR CRUCIATE LIGAMENT,KNEE       C STOMACH SURGERY PROCEDURE UNLISTED  December 2002     COLONOSCOPY  2002     ESOPHAGOSCOPY, GASTROSCOPY, DUODENOSCOPY (EGD), COMBINED  1/7/2014    Procedure: COMBINED ESOPHAGOSCOPY, GASTROSCOPY, DUODENOSCOPY (EGD), BIOPSY SINGLE OR MULTIPLE;;  Surgeon: Kraig Nicole MD;  Location:  GI     GYN SURGERY      Laparotomy to remove endometrial tissue     HC BREATH HYDROGEN TEST N/A 6/17/2014    Procedure: HYDROGEN BREATH TEST;  Surgeon: Deacon Alberts MD;  Location:  GI     HYDROGEN BREATH TEST  6/17/14     LAPAROSCOPIC APPENDECTOMY  2002     LAPAROTOMY, LYSIS ADHESIONS, COMBINED  2002    endometriosis     SOFT TISSUE SURGERY  December 2014    right ankle tendon injury             Social History:     Social History   Substance Use Topics     Smoking status: Never Smoker     Smokeless tobacco: Never Used     Alcohol use 0.0 oz/week     0 Standard drinks or equivalent per week      Comment: moderately   Lives on her own. No children. Works as a researcher in education.          Family History:   Father with RA, Hashimoto's thyroiditis, and type I DM  Mother with psoriasis  Possible SLE in cousins  No family history  of gout or IBD         Allergies:     Allergies   Allergen Reactions     Sulfasalazine      Developed rash, HA, dizziness             Medications:     Current Outpatient Prescriptions   Medication Sig Dispense Refill     aspirin 81 MG tablet Take 1 tablet by mouth daily.       blood glucose monitoring (ACCU-CHEK COLIN PLUS) test strip Test Blood Sugar 8 times daily or as directed 700 strip 3     blood glucose monitoring (ACCU-CHEK FASTCLIX) lancets Use to test blood sugar 8 times daily or as directed. 4 Box 3     Calcium Carbonate-Vitamin D (CALCIUM + D PO) Take one daily       citalopram (CELEXA) 20 MG tablet Take 1.5 tablets (30 mg) by mouth daily 135 tablet 1     diclofenac (VOLTAREN) 1 % GEL topical gel APPLY 2 GRAMS ONTO THE SKIN FOUR TIMES DAILY AS NEEDED FOR MODERATE PAIN 100 g 5     dulaglutide (TRULICITY) 1.5 MG/0.5ML pen Inject 1.5 mg Subcutaneous every 7 days 6 mL 3     econazole nitrate 1 % cream Apply 0.5 inches topically daily.       fish oil-omega-3 fatty acids (FISH OIL) 1000 MG capsule Take one daily       folic acid (FOLVITE) 1 MG tablet Take 1 tablet (1 mg) by mouth daily 90 tablet 3     HYDROcodone-acetaminophen (NORCO) 5-325 MG per tablet Take 1-2 tablets by mouth every 4 hours as needed for moderate to severe pain 15 tablet 0     insulin degludec (TRESIBA) 100 UNIT/ML pen Inject 18 Units Subcutaneous daily 15 mL 11     insulin pen needle (B-D U/F) 31G X 8 MM Use 5 pen needles daily 450 each 3     Ketoconazole (NIZORAL PO) Take  by mouth. Shampoo daily       levothyroxine (SYNTHROID/LEVOTHROID) 125 MCG tablet Take 1 tablet (125 mcg) by mouth daily 90 tablet 0     methotrexate 2.5 MG tablet CHEMO Take 4 tablets (10 mg) by mouth once a week 48 tablet 0     methylphenidate (METADATE CD) 20 MG CR capsule Take 20 mg by mouth daily       Minoxidil (ROGAINE EX) Externally apply  topically. daily       mometasone-formoterol (DULERA) 100-5 MCG/ACT oral inhaler Inhale 2 puffs into the lungs 2 times daily  "3 Inhaler 3     montelukast (SINGULAIR) 5 MG chewable tablet CHEW AND SWALLOW 1 TABLET(5 MG) BY MOUTH AT BEDTIME 30 tablet 3     Multiple Vitamin (MULTIVITAMIN OR) Take  by mouth. Take one daily tab       NOVOLOG PENFILL 100 UNIT/ML soln 1 unit per 15 grams CHO at all meals and snacks with correction scale of 1 unit per 50 mg/dL over 150.  Average daily use is 20 units. 30 mL 4     simvastatin (ZOCOR) 20 MG tablet Take 1 tablet (20 mg) by mouth At Bedtime 90 tablet 3     traZODone (DESYREL) 50 MG tablet Take 1-2 tablets by mouth nightly as needed for sleep. 180 tablet 0     UNABLE TO FIND Biosil              Physical Exam:   Blood pressure 126/74, pulse 78, temperature 98.4  F (36.9  C), temperature source Oral, height 1.778 m (5' 10\"), weight 83.4 kg (183 lb 14.4 oz), SpO2 95 %, not currently breastfeeding.     Limited today to joint exam below, and skin exam.   Constitutional: [id number] cooperative  Eyes: nl EOM, PERRLA, vision, conjunctiva, sclera  ENT: nl external ears, nose, hearing, lips, teeth, gums, throat  No mucous membrane lesions, normal saliva pool  Neck: no mass or thyroid enlargement  Resp: lungs clear to auscultation, nl to palpation  CV: RRR, no murmurs, rubs or gallops, no edema  GI: no ABD mass or tenderness, no HSM  : not tested  Lymph:  No supraclavicular, inguinal or epitrochlear nodes  MS:  She has a small tendon nodule noted on the plantar aspect of the right hand, tenderness in flexor tendon right thumb.    All other  TMJ, neck, shoulder, elbow, wrist, MCP/PIP/DIP were examined. No active synovitis. +Heberden's nodes. Full ROM.  Normal  strength. No dactylitis,  tenosynovitis, enthespathy   Skin:  NO active psoriasis along hairline or elsewhere. Longitudinal nail ridges of right thumbnail with no nail pitting, surrounding erythema, or hyperkeratosis. No alopecia, rash, or lesions  Neuro: nl cranial nerves, strength, sensation, DTRs.   Psych: nl judgement, orientation, memory, " affect.         Data:     Component      Latest Ref Rng 4/27/2016   WBC      4.0 - 11.0 10e9/L 4.8   RBC Count      3.8 - 5.2 10e12/L 3.63 (L)   Hemoglobin      11.7 - 15.7 g/dL 11.2 (L)   Hematocrit      35.0 - 47.0 % 33.6 (L)   MCV      78 - 100 fl 93   MCH      26.5 - 33.0 pg 30.9   MCHC      31.5 - 36.5 g/dL 33.3   RDW      10.0 - 15.0 % 12.7   Platelet Count      150 - 450 10e9/L 294   Diff Method       Automated Method   % Neutrophils       59.3   % Lymphocytes       30.3   % Monocytes       7.8   % Eosinophils       1.3   % Basophils       1.1   % Immature Granulocytes       0.2   Nucleated RBCs      0 /100 0   Absolute Neutrophil      1.6 - 8.3 10e9/L 2.8   Absolute Lymphocytes      0.8 - 5.3 10e9/L 1.4   Absolute Monocytes      0.0 - 1.3 10e9/L 0.4   Absolute Eosinophils      0.0 - 0.7 10e9/L 0.1   Absolute Basophils      0.0 - 0.2 10e9/L 0.1   Abs Immature Granulocytes      0 - 0.4 10e9/L 0.0   Absolute Nucleated RBC       0.0   TSH      0.40 - 4.00 mU/L        Lab Results   Component Value Date    AST 19 12/22/2014    AST 36 2/15/2013    AST 25 11/29/2010    ALT 22 12/22/2014    ALT 30 2/15/2013    ALT 8 11/23/2011    ALKPHOS 109 12/22/2014    ALKPHOS 89 2/15/2013    ALKPHOS 65 11/29/2010     Reviewed Rheumatology lab flowsheet. Pertinent results:  2014 -- NOLVIA 1.6, negative RF, normal CRP, mildly elevated ESR, unremarkable x-rays of hands and wrists      3 views right hand radiographs 1/31/2018 4:30 PM     History: right hand pain. psoriatic arthritis; Pain of right hand     Comparison: None     Findings:     PA, oblique and lateral view(s) of the right hand were obtained.      No acute osseous abnormality.  No erosion.     Mild degenerative changes of the first carpometacarpal and triscaphe  joints.  Additional scattered degenerative change in the  interphalangeal joints, particularly distally.     Soft tissue is unremarkable.         Impression:  1. No acute osseous abnormality. No erosion.  2. No  substantial degenerative change.      I have personally reviewed the examination and initial interpretation  and I agree with the findings.     JUTTA ELLERMANN, MD    Component      Latest Ref Rng & Units 2/1/2018   WBC      4.0 - 11.0 10e9/L 5.6   RBC Count      3.8 - 5.2 10e12/L 3.70 (L)   Hemoglobin      11.7 - 15.7 g/dL 11.7   Hematocrit      35.0 - 47.0 % 34.6 (L)   MCV      78 - 100 fl 94   MCH      26.5 - 33.0 pg 31.6   MCHC      31.5 - 36.5 g/dL 33.8   RDW      10.0 - 15.0 % 12.9   Platelet Count      150 - 450 10e9/L 270   Diff Method       Automated Method   % Neutrophils      % 61.6   % Lymphocytes      % 27.7   % Monocytes      % 8.9   % Eosinophils      % 0.9   % Basophils      % 0.5   % Immature Granulocytes      % 0.4   Nucleated RBCs      0 /100 0   Absolute Neutrophil      1.6 - 8.3 10e9/L 3.5   Absolute Lymphocytes      0.8 - 5.3 10e9/L 1.6   Absolute Monocytes      0.0 - 1.3 10e9/L 0.5   Absolute Eosinophils      0.0 - 0.7 10e9/L 0.1   Absolute Basophils      0.0 - 0.2 10e9/L 0.0   Abs Immature Granulocytes      0 - 0.4 10e9/L 0.0   Absolute Nucleated RBC       0.0   Creatinine      0.52 - 1.04 mg/dL 0.58   GFR Estimate      >60 mL/min/1.7m2 >90   GFR Estimate If Black      >60 mL/min/1.7m2 >90   ALT      0 - 50 U/L 22   AST      0 - 45 U/L 19   Albumin      3.4 - 5.0 g/dL 4.2   CRP Inflammation      0.0 - 8.0 mg/L <2.9           Yoel Betancourt MD

## 2018-05-19 ENCOUNTER — THERAPY VISIT (OUTPATIENT)
Dept: PHYSICAL THERAPY | Facility: CLINIC | Age: 48
End: 2018-05-19
Payer: COMMERCIAL

## 2018-05-19 DIAGNOSIS — M25.571 PAIN IN JOINT INVOLVING ANKLE AND FOOT, RIGHT: ICD-10-CM

## 2018-05-19 DIAGNOSIS — R60.9 EDEMA: ICD-10-CM

## 2018-05-19 PROCEDURE — 97110 THERAPEUTIC EXERCISES: CPT | Mod: GP | Performed by: PHYSICAL THERAPIST

## 2018-05-19 PROCEDURE — 97112 NEUROMUSCULAR REEDUCATION: CPT | Mod: GP | Performed by: PHYSICAL THERAPIST

## 2018-05-19 PROCEDURE — 97016 VASOPNEUMATIC DEVICE THERAPY: CPT | Mod: GP | Performed by: PHYSICAL THERAPIST

## 2018-05-19 NOTE — MR AVS SNAPSHOT
After Visit Summary   5/19/2018    Dali Martines    MRN: [id number]           Patient Information     Date Of Birth          1970        Visit Information        Provider Department      5/19/2018 11:10 AM Michi Esteves Pt, PT [place] [facility]        Today's Diagnoses     Edema        Pain in joint involving ankle and foot, right           Follow-ups after your visit        Your next 10 appointments already scheduled     May 24, 2018  1:00 PM CDT   (Arrive by 12:45 PM)   Return Visit with Bryan Schaffer DO   [facility] ([facility])    [address]  [address] [place] [address]   [phone]            Jun 09, 2018 11:10 AM CDT   LAKISHA Extremity with Chanelle Muniz PT   [place] [facility] ([facility]  )    [address]  [facility] [address]   [phone]            Jun 12, 2018  3:00 PM CDT   (Arrive by 2:45 PM)   RETURN DIABETES with Felicia Mckeon MD   [facility] Endocrinology ([facility])    [address]  [address] [place] [id number] [phone]            Jun 23, 2018 11:10 AM CDT   LAKISHA Extremity with Michi Esteves Pt, PT   [facility] [facility] ([facility]  )    [address]  [facility] [address] [phone]            Aug 14, 2018  3:30 PM CDT   (Arrive by 3:15 PM)   Return Visit with Sara Carter MD   [facility] ([facility])    [address]  [address]  [facility] [id number] [phone]            Nov 20, 2018  2:30 PM CST   (Arrive by 2:15 PM)   Return Visit with Yoel Betancourt MD   [facility] Rheumatology ([facility])    [address]  [address]  [facility] [address] [phone]               Who to contact     If you have questions or need follow up information about today's clinic visit or your schedule please contact [place] [facility] directly at [phone].  Normal or non-critical lab and imaging results will be communicated to you by MyChart, letter or phone within 4 business days after the clinic has received the results. If you do not hear from us within 7 days, please contact the clinic through MyChart or phone. If you have a critical or abnormal lab result, we will notify you by phone as soon as possible.  Submit refill requests through [employer] or call your pharmacy and they will forward the refill request to us. Please allow 3 business days for your refill to be completed.          Additional Information About Your Visit        [employer] Information     [employer] gives you secure access to your electronic health record. If you see a primary care provider, you can also send messages to your care team and make appointments. If you have questions, please call your primary care clinic.  If you do not have a primary care provider, please call [phone] and they will assist you.        Care EveryWhere ID     This is your Care EveryWhere ID. This could be used by other organizations to access your [place] medical records  [id number]         Blood Pressure from Last 3 Encounters:   05/15/18 126/74   04/30/18 147/80   04/11/18 121/80    Weight from Last 3 Encounters:   05/15/18 83.4 kg (183 lb 14.4 oz)   04/30/18 82.9 kg (182 lb 11.2 oz)   04/11/18 83.7 kg (184 lb 8 oz)              We Performed the Following     NEUROMUSCULAR RE-EDUCATION     THERAPEUTIC EXERCISES     Vasopneumatic Device        Primary Care Provider Office Phone # Fax #    Dimitri Alas [phone] [address] [address]        Equal Access to Services     DALIA MOORE : Diego Castillo, reymundo chacon, angelia cruz  kaitlin joaquinmarbella coradoaan ah. Rachael [facility] [phone].    ATENCIÓN: Si ziggy poe, tiene a ramsey disposición servicios gratuitos de asistencia lingüística. Juanito al [phone].    We comply with applicable federal civil rights laws and [place] laws. We do not discriminate on the basis of race, color, national origin, age, disability, sex, sexual orientation, or gender identity.            Thank you!     Thank you for choosing [place] [facility]  for your care. Our goal is always to provide you with excellent care. Hearing back from our patients is one way we can continue to improve our services. Please take a few minutes to complete the written survey that you may receive in the mail after your visit with us. Thank you!             Your Updated Medication List - Protect others around you: Learn how to safely use, store and throw away your medicines at www.disposemymeds.org.          This list is accurate as of 5/19/18 12:15 PM.  Always use your most recent med list.                   Brand Name Dispense Instructions for use Diagnosis    aspirin 81 MG tablet      Take 1 tablet by mouth daily.        blood glucose monitoring lancets     4 Box    Use to test blood sugar 8 times daily or as directed.    Type 1 diabetes, HbA1c goal < 7% (H)       blood glucose monitoring test strip    ACCU-CHEK COLIN PLUS    700 strip    Test Blood Sugar 8 times daily or as directed    Type 1 diabetes, HbA1c goal < 7% (H)       CALCIUM + D PO      Take one daily        citalopram 20 MG tablet    celeXA    135 tablet    Take 1.5 tablets (30 mg) by mouth daily    Anxiety       diclofenac 1 % Gel topical gel    VOLTAREN    100 g    APPLY 2 GRAMS ONTO THE SKIN FOUR TIMES DAILY AS NEEDED FOR MODERATE PAIN    Enthesopathy, Pain in joint, multiple sites, Chronic pain of right ankle       dulaglutide 1.5 MG/0.5ML pen    TRULICITY    6 mL    Inject 1.5 mg Subcutaneous every 7 days     Type 1 diabetes mellitus without complication (H)       econazole nitrate 1 % cream      Apply 0.5 inches topically daily.        fish oil-omega-3 fatty acids 1000 MG capsule      Take one daily        folic acid 1 MG tablet    FOLVITE    90 tablet    Take 1 tablet (1 mg) by mouth daily    Pain in joint, multiple sites, Tendinitis, Encounter for therapeutic drug monitoring       HYDROcodone-acetaminophen 5-325 MG per tablet    NORCO    15 tablet    Take 1-2 tablets by mouth every 4 hours as needed for moderate to severe pain        insulin degludec 100 UNIT/ML pen    TRESIBA    15 mL    Inject 18 Units Subcutaneous daily    Type 1 diabetes mellitus without complication (H)       insulin pen needle 31G X 8 MM    B-D U/F    450 each    Use 5 pen needles daily    Diabetes mellitus type I (H)       levothyroxine 125 MCG tablet    SYNTHROID/LEVOTHROID    90 tablet    Take 1 tablet (125 mcg) by mouth daily    Hypothyroidism       methotrexate 2.5 MG tablet CHEMO     48 tablet    Take 4 tablets (10 mg) by mouth once a week    Pain in joint, multiple sites, Tendinitis, Encounter for therapeutic drug monitoring       methylphenidate 20 MG CR capsule    METADATE CD     Take 20 mg by mouth daily        mometasone-formoterol 100-5 MCG/ACT oral inhaler    DULERA    3 Inhaler    Inhale 2 puffs into the lungs 2 times daily    Moderate persistent asthma without complication       montelukast 5 MG chewable tablet    SINGULAIR    30 tablet    CHEW AND SWALLOW 1 TABLET(5 MG) BY MOUTH AT BEDTIME    Mild persistent asthma without complication       MULTIVITAMIN PO      Take  by mouth. Take one daily tab        NIZORAL PO      Take  by mouth. Shampoo daily        NovoLOG PENFILL 100 UNIT/ML injection   Generic drug:  insulin aspart     30 mL    1 unit per 15 grams CHO at all meals and snacks with correction scale of 1 unit per 50 mg/dL over 150.  Average daily use is 20 units.    Type 1 diabetes, HbA1c goal < 7% (H)       ROGAINE EX       Externally apply  topically. 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    Insomnia       UNABLE TO FIND      Biosil

## 2018-05-19 NOTE — PROGRESS NOTES
"Subjective:  HPI                    Objective:  System    Physical Exam    General     ROS    Assessment/Plan:    PROGRESS  REPORT    Progress reporting period is from 3/24/18 to 5/19/18.       SUBJECTIVE  Subjective changes noted by patient:  Walking better than 2 weeks ago and less swelling. Balance was slow to recover after injection, but past 2 days has returned to 30\" SLS.       Current pain level is 3/10  .     Previous pain level was  6/10 2 weeks ago Initial Pain level:  (3-8/10).   Changes in function:  Yes (See Goal flowsheet attached for changes in current functional level)  Adverse reaction to treatment or activity: None    OBJECTIVE  Changes noted in objective findings:  Yes, Mild swelling with tenderness along distal peroneal tendon towards insertion, but better than 2 weeks ago.  Feels tension PF/INV, but now feels like the tissue is strengthening rather than \"damaging\" which is how it felt in the past.  Gait is slower than normal and tolerance has not been more than about 1/4 mile. This will be painful at this level.  Weakness Eversion: ankle shakes when challenged repetitively into eversion.  We are back to her home plan she was doing prior to injection         ASSESSMENT/PLAN  Updated problem list and treatment plan: Diagnosis 1:  R ankle peroneal tendonapathy  Pain -  self management, education and home program  Decreased ROM/flexibility - manual therapy and therapeutic exercise  Decreased strength - therapeutic exercise and therapeutic activities  Impaired gait - gait training  Impaired muscle performance - neuro re-education  Decreased function - therapeutic activities  STG/LTGs have been met or progress has been made towards goals:  Yes (See Goal flow sheet completed today.)  Assessment of Progress: The patient's condition is improving.  Self Management Plans:  Patient has been instructed in a home treatment program.    Dali continues to require the following intervention to meet STG and " LTG's:  PT    Recommendations:  This patient would benefit from continued therapy.     Frequency:  2 X a month, once daily  Duration:  for 3 months        Please refer to the daily flowsheet for treatment today, total treatment time and time spent performing 1:1 timed codes.

## 2018-05-24 ENCOUNTER — OFFICE VISIT (OUTPATIENT)
Dept: PHYSICAL MEDICINE AND REHAB | Facility: CLINIC | Age: 48
End: 2018-05-24
Payer: COMMERCIAL

## 2018-05-24 VITALS
BODY MASS INDEX: 26.2 KG/M2 | DIASTOLIC BLOOD PRESSURE: 85 MMHG | SYSTOLIC BLOOD PRESSURE: 127 MMHG | WEIGHT: 183 LBS | HEART RATE: 70 BPM | HEIGHT: 70 IN

## 2018-05-24 DIAGNOSIS — M25.571 CHRONIC PAIN OF RIGHT ANKLE: ICD-10-CM

## 2018-05-24 DIAGNOSIS — S86.311D TEAR OF PERONEAL TENDON, RIGHT, SUBSEQUENT ENCOUNTER: Primary | ICD-10-CM

## 2018-05-24 DIAGNOSIS — G89.29 CHRONIC PAIN OF RIGHT ANKLE: ICD-10-CM

## 2018-05-24 ASSESSMENT — PAIN SCALES - GENERAL: PAINLEVEL: NO PAIN (0)

## 2018-05-24 NOTE — Clinical Note
Chanelle-  Please see my clinic note from today.  Patient has noticed improvement with completion of formal physical therapy and with completion of injection.  Please continue to work with her with physical therapy/home exercise program and please reach out with any further questions/concerns.  Thanks and have a great day!  Bryan

## 2018-05-24 NOTE — MR AVS SNAPSHOT
After Visit Summary   5/24/2018    Dali Martines    MRN: [id number]           Patient Information     Date Of Birth          1970        Visit Information        Provider Department      5/24/2018 1:00 PM Bryan Schaffer DO M [facility]        Today's Diagnoses     Tear of peroneal tendon, right, subsequent encounter    -  1    Chronic pain of right ankle          Care Instructions    We addressed the following today:     1. Chronic right ankle/foot pain  2. Right peroneal tendon tear     Activity modification as discussed  Home exercise program as instructed  Physical therapy: [place] [facility]   Topical Treatments: Ice  Over the counter medication: Acetaminophen (Tylenol) 1000 mg every 6 hours with food (Maximum of 3000 mg/day)  Follow up in 6 weeks if no improvement of symptoms for further evaluation/medical care (sooner if needed; call direct clinic number [[phone]] at any time with questions/concerns)     PRP (Platelet Rich Plasma) Post-Procedure Instructions  1. Do not take any oral or injected steroids for 3 months after the procedure.  2. Procedure can take 8-12 weeks to notice improvement of symptoms.     Post Procedure Tendon Fenestration Rehabilitation Protocol:  Weeks 6-10 - walking program progressing to jogging as tolerated  Weeks 10-12 - return to neuromuscular control/sports specific exercises            Follow-ups after your visit        Follow-up notes from your care team     Return in about 6 weeks (around 7/5/2018).      Your next 10 appointments already scheduled     Jun 09, 2018 11:10 AM CDT   [place] Extremity with Chanelle Muniz PT   [place] [facility] ([facility]  )    [address]  [place] [address]   [phone]            Jun 12, 2018  3:00 PM CDT   (Arrive by 2:45 PM)   RETURN DIABETES with Felicia Mckeon MD   [facility] ([facility])    [address]  [address]  [facility] [address] [phone]            Jun 23, 2018 11:10 AM CDT   LAKISHA Extremity with Michi Esteves Pt, PT   [place] [facility] [facility] ([facility] [place]  )    [address]  [facility] [address]   [phone]            Aug 14, 2018  3:30 PM CDT   (Arrive by 3:15 PM)   Return Visit with Sara Carter MD   [facility] ([facility])    [address]  [address]  [place] [id number] [phone]            Nov 20, 2018  2:30 PM CST   (Arrive by 2:15 PM)   Return Visit with Yoel Betancourt MD   [facility] Rheumatology ([facility])    [address]  [address]  [facility] [place] [address] [phone]              Who to contact     Please call your clinic at [phone] to:    Ask questions about your health    Make or cancel appointments    Discuss your medicines    Learn about your test results    Speak to your doctor            Additional Information About Your Visit        [employer] Information     [employer] gives you secure access to your electronic health record. If you see a primary care provider, you can also send messages to your care team and make appointments. If you have questions, please call your primary care clinic.  If you do not have a primary care provider, please call [phone] and they will assist you.      [employer] is an electronic gateway that provides easy, online access to your medical records. With [employer], you can request a clinic appointment, read your test results, renew a prescription or communicate with your care team.     To access your existing account, please contact your [facility] or call [phone] for assistance.        Care EveryWhere ID     This is your Care EveryWhere ID. This could be used by other organizations to access your  "[place] medical records  [id number]        Your Vitals Were     Pulse Height BMI (Body Mass Index)             70 1.778 m (5' 10\") 26.26 kg/m2          Blood Pressure from Last 3 Encounters:   05/24/18 127/85   05/15/18 126/74   04/30/18 147/80    Weight from Last 3 Encounters:   05/24/18 83 kg (183 lb)   05/15/18 83.4 kg (183 lb 14.4 oz)   04/30/18 82.9 kg (182 lb 11.2 oz)              Today, you had the following     No orders found for display       Primary Care Provider Office Phone # Fax #    Dimitri Alas [phone]       [address] [address]        Equal Access to Services     DALIA MOORE : Hadii aad ku hadasho Soomaali, waaxda luqadaha, qaybta kaalmada adeegyada, kaitlin garrison . So [facility] [phone].    ATENCIÓN: Si habla español, tiene a ramsey disposición servicios gratuitos de asistencia lingüística. Llame al [phone].    We comply with applicable federal civil rights laws and [place] laws. We do not discriminate on the basis of race, color, national origin, age, disability, sex, sexual orientation, or gender identity.            Thank you!     Thank you for choosing [facility]  for your care. Our goal is always to provide you with excellent care. Hearing back from our patients is one way we can continue to improve our services. Please take a few minutes to complete the written survey that you may receive in the mail after your visit with us. Thank you!             Your Updated Medication List - Protect others around you: Learn how to safely use, store and throw away your medicines at www.disposemymeds.org.          This list is accurate as of 5/24/18  1:50 PM.  Always use your most recent med list.                   Brand Name Dispense Instructions for use Diagnosis    aspirin 81 MG tablet      Take 1 tablet by mouth daily.        blood glucose monitoring lancets     4 Box    Use to test " blood sugar 8 times daily or as directed.    Type 1 diabetes, HbA1c goal < 7% (H)       blood glucose monitoring test strip    ACCU-CHEK COLIN PLUS    700 strip    Test Blood Sugar 8 times daily or as directed    Type 1 diabetes, HbA1c goal < 7% (H)       CALCIUM + D PO      Take one daily        citalopram 20 MG tablet    celeXA    135 tablet    Take 1.5 tablets (30 mg) by mouth daily    Anxiety       diclofenac 1 % Gel topical gel    VOLTAREN    100 g    APPLY 2 GRAMS ONTO THE SKIN FOUR TIMES DAILY AS NEEDED FOR MODERATE PAIN    Enthesopathy, Pain in joint, multiple sites, Chronic pain of right ankle       dulaglutide 1.5 MG/0.5ML pen    TRULICITY    6 mL    Inject 1.5 mg Subcutaneous every 7 days    Type 1 diabetes mellitus without complication (H)       econazole nitrate 1 % cream      Apply 0.5 inches topically daily.        fish oil-omega-3 fatty acids 1000 MG capsule      Take one daily        folic acid 1 MG tablet    FOLVITE    90 tablet    Take 1 tablet (1 mg) by mouth daily    Pain in joint, multiple sites, Tendinitis, Encounter for therapeutic drug monitoring       HYDROcodone-acetaminophen 5-325 MG per tablet    NORCO    15 tablet    Take 1-2 tablets by mouth every 4 hours as needed for moderate to severe pain        insulin degludec 100 UNIT/ML pen    TRESIBA    15 mL    Inject 18 Units Subcutaneous daily    Type 1 diabetes mellitus without complication (H)       insulin pen needle 31G X 8 MM    B-D U/F    450 each    Use 5 pen needles daily    Diabetes mellitus type I (H)       levothyroxine 125 MCG tablet    SYNTHROID/LEVOTHROID    90 tablet    Take 1 tablet (125 mcg) by mouth daily    Hypothyroidism       methotrexate 2.5 MG tablet CHEMO     48 tablet    Take 4 tablets (10 mg) by mouth once a week    Pain in joint, multiple sites, Tendinitis, Encounter for therapeutic drug monitoring       methylphenidate 20 MG CR capsule    METADATE CD     Take 20 mg by mouth daily        mometasone-formoterol  100-5 MCG/ACT oral inhaler    DULERA    3 Inhaler    Inhale 2 puffs into the lungs 2 times daily    Moderate persistent asthma without complication       montelukast 5 MG chewable tablet    SINGULAIR    30 tablet    CHEW AND SWALLOW 1 TABLET(5 MG) BY MOUTH AT BEDTIME    Mild persistent asthma without complication       MULTIVITAMIN PO      Take  by mouth. Take one daily tab        NIZORAL PO      Take  by mouth. Shampoo daily        NovoLOG PENFILL 100 UNIT/ML injection   Generic drug:  insulin aspart     30 mL    1 unit per 15 grams CHO at all meals and snacks with correction scale of 1 unit per 50 mg/dL over 150.  Average daily use is 20 units.    Type 1 diabetes, HbA1c goal < 7% (H)       ROGAINE EX      Externally apply  topically. 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    Insomnia       UNABLE TO FIND      Biosil

## 2018-05-24 NOTE — LETTER
"5/24/2018       RE: Dali Martines  [address]  [facility] [id number]     Dear Colleague,    Thank you for referring your patient, Dali Martines, to the [facility] PHYSICAL MEDICINE AND REHABILITATION at [facility]. Please see a copy of my visit note below.    [facility] Physical Medicine & Rehabilitation Follow-Up Clinic Note  Follow-up Visit s May 24, 2018    Subjective:  Dali Martines is a 47 year old female who is seen in follow up for evaluation of chronic right foot/ankle pain.  Last visit was on 4/30/2018.  Since that time, symptoms have been improved by 100% overall since completion of platelet rich plasma injection in mid April 2018.  Has been completing formal physical therapy/home exercise program for treatment purposes.  Has not been using any oral pain medications for improvement of symptoms.      Notes right lateral foot/ankle pain.  Symptoms are worse with increased physical activities and with plantarflexion activities.  Denies any consistent weakness of the right lower extremity.  Notes numbness/tingling of the right anteromedial leg region.  Denies any trauma/falls since last clinical visit.    Patient's past medical, surgical, social, and family histories are reviewed today    Objective:  /85  Pulse 70  Ht 1.778 m (5' 10\")  Wt 83 kg (183 lb)  BMI 26.26 kg/m2  General: healthy, alert and no distress  Skin: no suspicious lesions or rashes  Neuro: motor exam as noted below    MSK:    RIGHT ANKLE  Inspection:    No swelling, redness, edema, or ecchymosis is observed  Palpation:    No tenderness over the ATFL or peroneal tendon  Active Range of Motion:     Plantarflexion within normal limits / dorsiflexion within normal limits / inversion limited by pain / eversion limited by pain  Special Tests:    None    Imaging:  No x-rays indicated during today's visit  Previous report was reviewed today and results were discussed with the " patient    ASSESSMENT:  1. Right peroneus brevis degeneration/tear  2. Chronic right foot/ankle pain    PLAN:  1. Continue with formal physical therapy - exercises to include ankle range of motion, peroneal strengthening, and balance/proprioception/neuromuscular facilitation exercises with use of modalities as needed with home exercise prescription as prescribed.  2. Activity modification as discussed, including limitation of activities that cause pain/discomfort.  3. Acetaminophen/Ibuprofen/ice as needed for improved pain control.  4. Follow-up/MyChart in 6 weeks for further evaluation/medical care.  If asymptomatic, can follow-up as needed.      Patient's conditions were thoroughly discussed during today's visit with greater than 50% of the visit spent counseling the patient with total time spent face-to-face with the patient being 20 minutes.    Bryan Schaffer DO, RUTHIEM    Department of Rehabilitation Medicine

## 2018-05-24 NOTE — PROGRESS NOTES
"[facility] Physical Medicine & Rehabilitation Follow-Up Clinic Note  Follow-up Visit s May 24, 2018    Subjective:  Dali Martines is a 47 year old female who is seen in follow up for evaluation of chronic right foot/ankle pain.  Last visit was on 4/30/2018.  Since that time, symptoms have been improved by 100% overall since completion of platelet rich plasma injection in mid April 2018.  Has been completing formal physical therapy/home exercise program for treatment purposes.  Has not been using any oral pain medications for improvement of symptoms.      Notes right lateral foot/ankle pain.  Symptoms are worse with increased physical activities and with plantarflexion activities.  Denies any consistent weakness of the right lower extremity.  Notes numbness/tingling of the right anteromedial leg region.  Denies any trauma/falls since last clinical visit.    Patient's past medical, surgical, social, and family histories are reviewed today    Objective:  /85  Pulse 70  Ht 1.778 m (5' 10\")  Wt 83 kg (183 lb)  BMI 26.26 kg/m2  General: healthy, alert and no distress  Skin: no suspicious lesions or rashes  Neuro: motor exam as noted below    MSK:    RIGHT ANKLE  Inspection:    No swelling, redness, edema, or ecchymosis is observed  Palpation:    No tenderness over the ATFL or peroneal tendon  Active Range of Motion:     Plantarflexion within normal limits / dorsiflexion within normal limits / inversion limited by pain / eversion limited by pain  Special Tests:    None    Imaging:  No x-rays indicated during today's visit  Previous report was reviewed today and results were discussed with the patient    ASSESSMENT:  1. Right peroneus brevis degeneration/tear  2. Chronic right foot/ankle pain    PLAN:  1. Continue with formal physical therapy - exercises to include ankle range of motion, peroneal strengthening, and balance/proprioception/neuromuscular facilitation exercises with use of modalities as " needed with home exercise prescription as prescribed.  2. Activity modification as discussed, including limitation of activities that cause pain/discomfort.  3. Acetaminophen/Ibuprofen/ice as needed for improved pain control.  4. Follow-up/MyChart in 6 weeks for further evaluation/medical care.  If asymptomatic, can follow-up as needed.      Patient's conditions were thoroughly discussed during today's visit with greater than 50% of the visit spent counseling the patient with total time spent face-to-face with the patient being 20 minutes.    Bryan Schaffer DO, CAQSM    Department of Rehabilitation Medicine

## 2018-06-04 NOTE — PROGRESS NOTES
Rheumatology Visit     Dali Martines MRN# [id number]   YOB: 1970 Age: 44 year old         Primary care provider: Dimitri Alas          Assessment and Plan:     Psoriatic Arthriitis versus Rheumatoid Arthritis     Ms. Dali Martines is a 44 year-old woman with PMH of type I DM, Hashimoto's thyroiditis, and possible psoriasis in childhood, with family history of RA, type I DM, Hashimoto's, and psoriasis presents who presents with recurrent acute onset pain, swelling, stiffness in L hand.    Most likely explanation of her symptoms is psoriatic arthritis given the presentation of asymmetric, transient joint pain/swelling/stiffness, new low titer NOLVIA (vs 2008), family history of psoriasis and active psoriasis found on exam, plus morning heel pain. Psoriasis tends to be worst in fall/winter months, and this corresponds to when she has had these episodes of arthritis in the hand.    PLAN/RECOMMENDATIONS:     At this time she has noticed that she definitely feels worse off of methotrexate so she will stay back on her prior dose for the foreseeable future.  She also thinks she gets some help with the topical diclofenac we have given her so she will continue that as well.    She has not had any significant evidence of toxicity on the medications, and the GI issues she has had are relatively stable at the current time.  She will contact us with any worsening and will otherwise see her back in 3-6 months sooner as needed                     History of Present Illness:   Ms. Dali Martines is a 44-year-old woman with a history of type I DM and Hashimoto's thyroiditis who presents for evaluation of recurrent stiffness, swelling, and pain in the left hand.    Symptoms first occurred in August 2008, when she began having stiffness and pain in the left hand, particularly in the PIPs and MCPs. Symptoms spontaneously resolved after about 6-8 weeks. She was previously seen by Rheumatology here around that time (see  note from Dr. Betancourt on 12/9/2008). Briefly, assessment was that she had a self-limited episode of inflammatory arthralgias, possibly viral or early psoriatic arthritis. Negative NOLVIA, RF, Lyme serologies and normal CRP and ESR. Given her family history of RA, there was a thought to start her on HCQ if anti-CCP antibodies were positive, but they were found to be negative. No imaging of the hand was performed.    Last September (2014), she experienced recurrent acute onset pain, stiffness, and swelling in the L hand very similar to her symptoms in 2008. Specifically, had swelling across MCPs and in 3rd PIP. She could hardly bend the hand sometimes due to the welling. Had morning stiffness lasting 30-45 minutes. No other joints were involved.    Issues with her left hand lingered for several months but eventually self-resolved around January. As of today, her only notable symptom is heel pain in the mornings. No fevers, chills, or weight loss. No back pain, vision changes, nausea, vomiting, diarrhea, abdominal pain, or blood in stool or urine. No rash, sicca symptoms, or oral ulcers.    It seems she has a history of hypermobility, e.g. could bend wrist back to forearm, bend down and touch palms flat on floor, when she was younger. Used to get frequent ankle sprains, and has had multiple tendon and ligament tears over the years.    SEPTEMBER 22, 2015, INTERVAL HISTORY       Since we have last seen the patient, she feels like her joints were generally better over the summer.  She did have some right ankle pain that has continued to be tender at times.  She also got some pretty significant tennis elbow during the summer, but both of these did well with meloxicam.  Her tennis elbow did not respond as well to a compressive band.      About 10 days ago, however, she again started having dull hand pain, left generally greater than right, except for her right fifth digit.  She has about an hour of associated morning  stiffness.  This is very similar to what she has had the last 2 years.  Last year, this seemed to last most of the year, but the prior year this was only about 3 months in duration.       Throughout this time, she has had no development of clear-cut psoriasis.  She has carried a diagnosis of psoriasis in the past, but it has not been a persistent problem with her skin.  She does have a strong family history, however, with this affecting her uncle and her mother.       Recall that based on her symptoms and her type 1 diabetes, we had previously checked her for the anti-citrullinated peptide antibodies, but she does not have those.     December 08 2015 Interval history     Since last visit and starting the Meloxicam daily, she feels that her left 5th digit pain has improved significantly. She did have an URI recently with some SOB and had a Prednisone burst of 20 mg daily for five days and she felt that her pain was completely gone with the steroids. She is not noticing any morning stiffness, swelling or erythema in the left 5th digit. She will occasionally have some chronic pain in the DIP of the right hand but very seldomly. Overall, she feels that her joint pain is better since starting the Meloxicam.     She has noted a small lump on the palmar aspect of her right hand. It is not painful and has not become swollen or red.     Her left ankle has become more bothersome and she thinks she will be needing surgery soon on that side.     When she was having an upper respiratory infection, she noted some swelling under the right mandible and it became so large that she felt it would obstruct her breathing when she turned her head to the right side. The swelling has come down but it is still present and slightly painful to touch. She has not had any weight loss, fevers, chills or night sweats.     No symptoms of dry mouth, eyes, oral lesions, rash, abdominal pain, nausea, vomiting.     Interim history 5/17/2016:  Doing  well on Meloxicam. Since last visit, had to go off of Meloxicam for ankle surgery (torn tendon). A couple flares off of it, recently went back on due to the pain. Always has been in L hand, now in right hand and both elbows. Mainly painful (5/10) and stiff. Stiffness begins in the morning, takes about 30 minutes to subside. No noted erythema, warmth or edema of joints. Has two nodules on base of 2nd MCP, can be painful occasionally, no changing drastically in size. Was doing better in the winter, so wondering if there could be a seasonal component. No known seasonal allergies.    Had the chicken pox around age 12. In the past 10-15 years, will periodically get shingles presenting as unilateral painful, itchy rash around L waist every few years. Most recently it occurred in April and was much worse than usual (nerve pain to the point of not being able to sleep). Muscle relaxant helped clear it up and allow for sleep. Has continued intermittent fever and chills since this episode. Decreased appetite, mild nausea. No other rashes since winter.     INTERVAL HISTORY (10/18/2016):  Since we have last seen the patient, she tried going down on meloxicam and actually has tolerated that pretty well.  She has a little bit more stiffness in the morning in her left hand, but the right hand is pretty good by comparison.  She occasionally has some sporadic sharp pains there, but nothing severe and nothing persistent.  Her overall morning stiffness in the left hand is only about 30 minutes.  Her PIP joints are always the worst, and her fourth and fifth fingers tend to bother her a little bit more.      She did have some palmar contracture developing but with hand therapy those improved without any injections.  She has also avoiding a [employer] mouse as that seemed to make things worse.      She continues to have some pain in her right ankle.  She had a bony fragment removed there in 01/2016 by Dr. Tran.  That has hurt a fair amount  since and the nature of the pain is not particularly inflammatory and suggests more of a DJD problem.  She also has some pain in the left knee which had an ACL construction in 1995, also possibly consistent with DJD.      She has had some intermittent attacks of very low-grade psoriasis over her chest up in the clavicle area, but has none currently.  She has no other significant new problems.     INTERVAL HISTORY:  Since we last saw the patient, she had an odd episode of pain in her left hand on 02/20.  She said this initially started very sharp, then she got a lump in the palm of her hand that felt inflamed, and then she noticed some bruising in the area.  She does not recall any trauma to it.  She resumed meloxicam, which she had stopped and with that felt better over the next 2 weeks, but has remained on it since.      She also has a sensation in her right fifth digit that is consistently sore and somewhat achy.  That has persisted even with the meloxicam.      Finally, on the right ankle, which has been sprained several times in the past, she continues to wear a brace.  She actually uses the topical meloxicam on that area, but has not done so on the fifth finger.      In all these areas she can have some stiffness in the morning lasting for up to an hour.      Her other concern is that she has gained about 10 pounds.  She does not think she has changed her eating much.     AUGUST 17, 2017, INTERVAL HISTORY:  At the time we last saw the patient, we urged her to try to get off of meloxicam.  She did that and used the topical diclofenac I had given her, but she felt like she quickly worsened with respect to arthritis and, more interestingly and somewhat surprisingly, she got psoriasis over a number of areas.  That has since improved, and it is not entirely clear to me whether there is any relationship here, but she did get some along her hairline and a few other areas.       From the standpoint of her joints, the  most dominant issue is right thumb tendinitis, and she has had ongoing difficulty in gripping with it.      When she was doing that badly, we had her go back on the meloxicam temporarily, then started sulfasalazine.  Unfortunately, she had more severe side effects with sulfasalazine including a fever to 102 degrees, headaches, and a general sense of malaise, and this was bad enough that we gave her a course of steroids to interfere with a potentially more severe sulfasalazine allergy and got her off of it.      She now has stopped the steroids as of 10 days ago.  Overall, she thinks she is doing better.  With the taper from the steroids, however, she is again feeling somewhat worse, particularly in her joints.       She really has never had much of anything in the way of psoriasis.  I have felt her arthritis was most consistent with psoriatic arthritis, and she has had a couple of very small plaques occasionally that could have been consistent with it but have never been biopsied.  She does, however, have a strong family history of psoriasis.     INTERVAL HISTORY:  At the time we last saw the patient, she was improving from her adverse effects of sulfasalazine and we initiated methotrexate at 3 tablets weekly.  She actually tolerated that quite well and was able to go up to 4 tablets weekly and continues to tolerate that with no significant symptoms.  Notably, she has the odd effect of having her joints actually feel a little worse for 1-2 days at the time of methotrexate dosing and then feeling significantly better.  She notes this in bilateral second and third fingers in both the MCP and PIP joints and these have been the primary joints affecting her.  She definitely feels that they are overall better since starting the methotrexate and have relatively little morning stiffness now.      The bigger and more persistent problem has been her right thumb.  This still hurts, and she has a flexor tendon nodule there that  "is probably causing most of the difficulties.  She tried a splint but was not sure it helped very much.  She gets locking and catching with activity that requires her to manually release the catching with her other hand.  This has caused some ongoing significant tenderness in the area.  She does think it was better when she was back on Meloxicam however.      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     Interval History 2/1/2018:   Ms. Stephenson most significant concern today continues to be her inflammatory nodules that she felt became much worse after discontinuing Meloxicam. They appeared over the bridge of nose, dorsum and plantar surface of left hand, and over tendon of right thumb. Systemic steroids resolved some of these nodules including over her nose, but she feels the most effective therapy was working with PT and having ultrasound treatments.    Her right hand has a nodule at the base of her 1st MTP on the palmar surface that causes her significant distress. She is right hand dominant this nodules make it especially hard to write and difficult to type. She was seen by Dr. Uribe in orthopedics yesterday who evaluated the patient for a possible trigger release. Ms. Martines has a degree of hesitancy about this procedure as she was given a steroid injection was in October of 2017 witih \"slight improvement\" but subsequently developed catching and trigger with more thumb movements. Diclofenac cream hasn't improved this catching.     There is some relatively new nail changes that Ms. Martines has noticed in her right thumbnail that she described as nail ridges without pitting or evidence of swelling or inflammation.     Additionally, she has noticed that she has been experiencing swelling of her right hand localized to the fingers that with associated \"fullness\". " "She had experienced this in her left hand when her arthritis was much less controlled, but notes that at this time her arthritis is under adequate control. ncreased to 6 tablets (of 2.5 mg) per week over last two weeks after having increased her dose of MTX to 5 tablets back in October of 2017.    She has no eye symptoms, chest pain, worsening SOB aside from baseline asthma, back pain, rashes, or new skin lesions. Ms. Martines did mention a severe case of \"stomach flu\" 2 weeks ago which she believes may have worsened her joint symptoms. This was described as persistent nausea/vomiting with intolerance of fluids/solids without diarrhea that resolved over 2 days. Since that time, has had a low appetite and increased satiety.  She wonders if her increasing dose of Methotrexate may have resulted in these lingering GI symptoms. Otherwise, she has no other concerns.      5/15/18 interval history:    Since we have last seen the patient she feels that she has been to be doing pretty well with her methotrexate.  She was off about 4 weeks secondary to an injection of platelet rich plasma into the ankle.  She just had this about a month ago on 04/11.  It was right before the snow storm and she did a lot of shoveling and had a lot of strain on it so she did not feel better at all for a while, but she thinks she may be having some small gains now.  She recently started back in physical therapy for it as well and is doing some range of motion work.  She thinks the joint is not as painful when it is extended so there may be some definite benefit.      However, off of the methotrexate she has started to notice some right second digit pain that is increasing over the last few days to a week.      Overall, she thinks that her overall function however despite these issues with her joints may be improving.  She does not feel like anything else is worse.  She notices some tightness along her palms and a feeling of tightness in her left " fourth finger.  In the area along her palm she still notes a nodule.  That seems largely unchanged to her.      She denies any other significant new problems.  Everything else feels stable to her.  GERD feels stable and she is not having any dysphagia.                Review of Systems:   Negative other than what is stated in the HPI above           Past Medical History:     Past Medical History:   Diagnosis Date     Anemia      Anxiety      Arthritis 2014    Psoriatic arthritis     Back injury 1988     Dry eye syndrome      Dyslipidemia      Endometriosis 2002    adehsions seen at laparoscopy     GERD (gastroesophageal reflux disease)      Hypothyroidism     8 yoa     SOB (shortness of breath) 3/11/2014     Type I (juvenile type) diabetes mellitus without mention of complication, not stated as uncontrolled     when 15      Uncomplicated asthma Fall 2013   Possible psoriasis as a child  Multiple tendon and ligament injuries    Patient Active Problem List    Diagnosis Date Noted     Edema 05/03/2018     Priority: Medium     Pain in joint involving ankle and foot, right 10/11/2017     Priority: Medium     Type 1 diabetes mellitus without complication (H) 08/08/2017     Priority: Medium     Left knee pain 07/07/2016     Priority: Medium     Swelling of limb 03/15/2016     Priority: Medium     Other postprocedural status(V45.89) 03/15/2016     Priority: Medium     ESR raised 01/03/2016     Priority: Medium     Right foot pain 10/14/2015     Priority: Medium     Chronic pain of right ankle 07/30/2015     Priority: Medium     Enthesopathy 07/20/2015     Priority: Medium     Pain in joint, multiple sites 07/20/2015     Priority: Medium     PAIN KNEE JOINT 06/17/2015     Priority: Medium     Diabetes (H) 02/26/2015     Priority: Medium     Screening for other eye conditions 02/26/2015     Priority: Medium     Encounter for other specified aftercare 02/24/2015     Priority: Medium     PAIN FOOT 02/24/2015     Priority:  Medium     SOB (shortness of breath) 03/11/2014     Priority: Medium     Cervicalgia 06/25/2012     Priority: Medium     Chronic low back pain 06/25/2012     Priority: Medium     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/2012     Priority: Medium     Cutaneous skin tags 06/12/2012     Priority: Medium     Type 1 diabetes mellitus with retinopathy (H) 04/05/2011     Priority: Medium     Anxiety 04/05/2011     Priority: Medium     CARDIOVASCULAR SCREENING; LDL GOAL LESS THAN 100 10/31/2010     Priority: Medium     GERD (gastroesophageal reflux disease) 02/24/2009     Priority: Medium             Past Surgical History:     Past Surgical History:   Procedure Laterality Date     C REPAIR CRUCIATE LIGAMENT,KNEE       C STOMACH SURGERY PROCEDURE UNLISTED  December 2002     COLONOSCOPY  2002     ESOPHAGOSCOPY, GASTROSCOPY, DUODENOSCOPY (EGD), COMBINED  1/7/2014    Procedure: COMBINED ESOPHAGOSCOPY, GASTROSCOPY, DUODENOSCOPY (EGD), BIOPSY SINGLE OR MULTIPLE;;  Surgeon: Kraig Nicole MD;  Location:  GI     GYN SURGERY      Laparotomy to remove endometrial tissue     HC BREATH HYDROGEN TEST N/A 6/17/2014    Procedure: HYDROGEN BREATH TEST;  Surgeon: Deacon Alberts MD;  Location:  GI     HYDROGEN BREATH TEST  6/17/14     LAPAROSCOPIC APPENDECTOMY  2002     LAPAROTOMY, LYSIS ADHESIONS, COMBINED  2002    endometriosis     SOFT TISSUE SURGERY  December 2014    right ankle tendon injury             Social History:     Social History   Substance Use Topics     Smoking status: Never Smoker     Smokeless tobacco: Never Used     Alcohol use 0.0 oz/week     0 Standard drinks or equivalent per week      Comment: moderately   Lives on her own. No children. Works as a researcher in education.          Family History:   Father with RA, Hashimoto's thyroiditis, and type I DM  Mother with psoriasis  Possible SLE in cousins  No family history of gout or IBD         Allergies:     Allergies   Allergen Reactions      Sulfasalazine      Developed rash, HA, dizziness             Medications:     Current Outpatient Prescriptions   Medication Sig Dispense Refill     aspirin 81 MG tablet Take 1 tablet by mouth daily.       blood glucose monitoring (ACCU-CHEK COLIN PLUS) test strip Test Blood Sugar 8 times daily or as directed 700 strip 3     blood glucose monitoring (ACCU-CHEK FASTCLIX) lancets Use to test blood sugar 8 times daily or as directed. 4 Box 3     Calcium Carbonate-Vitamin D (CALCIUM + D PO) Take one daily       citalopram (CELEXA) 20 MG tablet Take 1.5 tablets (30 mg) by mouth daily 135 tablet 1     diclofenac (VOLTAREN) 1 % GEL topical gel APPLY 2 GRAMS ONTO THE SKIN FOUR TIMES DAILY AS NEEDED FOR MODERATE PAIN 100 g 5     dulaglutide (TRULICITY) 1.5 MG/0.5ML pen Inject 1.5 mg Subcutaneous every 7 days 6 mL 3     econazole nitrate 1 % cream Apply 0.5 inches topically daily.       fish oil-omega-3 fatty acids (FISH OIL) 1000 MG capsule Take one daily       folic acid (FOLVITE) 1 MG tablet Take 1 tablet (1 mg) by mouth daily 90 tablet 3     HYDROcodone-acetaminophen (NORCO) 5-325 MG per tablet Take 1-2 tablets by mouth every 4 hours as needed for moderate to severe pain 15 tablet 0     insulin degludec (TRESIBA) 100 UNIT/ML pen Inject 18 Units Subcutaneous daily 15 mL 11     insulin pen needle (B-D U/F) 31G X 8 MM Use 5 pen needles daily 450 each 3     Ketoconazole (NIZORAL PO) Take  by mouth. Shampoo daily       levothyroxine (SYNTHROID/LEVOTHROID) 125 MCG tablet Take 1 tablet (125 mcg) by mouth daily 90 tablet 0     methotrexate 2.5 MG tablet CHEMO Take 4 tablets (10 mg) by mouth once a week 48 tablet 0     methylphenidate (METADATE CD) 20 MG CR capsule Take 20 mg by mouth daily       Minoxidil (ROGAINE EX) Externally apply  topically. daily       mometasone-formoterol (DULERA) 100-5 MCG/ACT oral inhaler Inhale 2 puffs into the lungs 2 times daily 3 Inhaler 3     montelukast (SINGULAIR) 5 MG chewable tablet CHEW AND  "SWALLOW 1 TABLET(5 MG) BY MOUTH AT BEDTIME 30 tablet 3     Multiple Vitamin (MULTIVITAMIN OR) Take  by mouth. Take one daily tab       NOVOLOG PENFILL 100 UNIT/ML soln 1 unit per 15 grams CHO at all meals and snacks with correction scale of 1 unit per 50 mg/dL over 150.  Average daily use is 20 units. 30 mL 4     simvastatin (ZOCOR) 20 MG tablet Take 1 tablet (20 mg) by mouth At Bedtime 90 tablet 3     traZODone (DESYREL) 50 MG tablet Take 1-2 tablets by mouth nightly as needed for sleep. 180 tablet 0     UNABLE TO FIND Biosil              Physical Exam:   Blood pressure 126/74, pulse 78, temperature 98.4  F (36.9  C), temperature source Oral, height 1.778 m (5' 10\"), weight 83.4 kg (183 lb 14.4 oz), SpO2 95 %, not currently breastfeeding.     Limited today to joint exam below, and skin exam.   Constitutional: [id number] cooperative  Eyes: nl EOM, PERRLA, vision, conjunctiva, sclera  ENT: nl external ears, nose, hearing, lips, teeth, gums, throat  No mucous membrane lesions, normal saliva pool  Neck: no mass or thyroid enlargement  Resp: lungs clear to auscultation, nl to palpation  CV: RRR, no murmurs, rubs or gallops, no edema  GI: no ABD mass or tenderness, no HSM  : not tested  Lymph:  No supraclavicular, inguinal or epitrochlear nodes  MS:  She has a small tendon nodule noted on the plantar aspect of the right hand, tenderness in flexor tendon right thumb.    All other  TMJ, neck, shoulder, elbow, wrist, MCP/PIP/DIP were examined. No active synovitis. +Heberden's nodes. Full ROM.  Normal  strength. No dactylitis,  tenosynovitis, enthespathy   Skin:  NO active psoriasis along hairline or elsewhere. Longitudinal nail ridges of right thumbnail with no nail pitting, surrounding erythema, or hyperkeratosis. No alopecia, rash, or lesions  Neuro: nl cranial nerves, strength, sensation, DTRs.   Psych: nl judgement, orientation, memory, affect.         Data:     Component      Latest Ref Rng 4/27/2016   WBC      4.0 " - 11.0 10e9/L 4.8   RBC Count      3.8 - 5.2 10e12/L 3.63 (L)   Hemoglobin      11.7 - 15.7 g/dL 11.2 (L)   Hematocrit      35.0 - 47.0 % 33.6 (L)   MCV      78 - 100 fl 93   MCH      26.5 - 33.0 pg 30.9   MCHC      31.5 - 36.5 g/dL 33.3   RDW      10.0 - 15.0 % 12.7   Platelet Count      150 - 450 10e9/L 294   Diff Method       Automated Method   % Neutrophils       59.3   % Lymphocytes       30.3   % Monocytes       7.8   % Eosinophils       1.3   % Basophils       1.1   % Immature Granulocytes       0.2   Nucleated RBCs      0 /100 0   Absolute Neutrophil      1.6 - 8.3 10e9/L 2.8   Absolute Lymphocytes      0.8 - 5.3 10e9/L 1.4   Absolute Monocytes      0.0 - 1.3 10e9/L 0.4   Absolute Eosinophils      0.0 - 0.7 10e9/L 0.1   Absolute Basophils      0.0 - 0.2 10e9/L 0.1   Abs Immature Granulocytes      0 - 0.4 10e9/L 0.0   Absolute Nucleated RBC       0.0   TSH      0.40 - 4.00 mU/L        Lab Results   Component Value Date    AST 19 12/22/2014    AST 36 2/15/2013    AST 25 11/29/2010    ALT 22 12/22/2014    ALT 30 2/15/2013    ALT 8 11/23/2011    ALKPHOS 109 12/22/2014    ALKPHOS 89 2/15/2013    ALKPHOS 65 11/29/2010     Reviewed Rheumatology lab flowsheet. Pertinent results:  2014 -- NOLVIA 1.6, negative RF, normal CRP, mildly elevated ESR, unremarkable x-rays of hands and wrists      3 views right hand radiographs 1/31/2018 4:30 PM     History: right hand pain. psoriatic arthritis; Pain of right hand     Comparison: None     Findings:     PA, oblique and lateral view(s) of the right hand were obtained.      No acute osseous abnormality.  No erosion.     Mild degenerative changes of the first carpometacarpal and triscaphe  joints.  Additional scattered degenerative change in the  interphalangeal joints, particularly distally.     Soft tissue is unremarkable.         Impression:  1. No acute osseous abnormality. No erosion.  2. No substantial degenerative change.      I have personally reviewed the examination and  initial interpretation  and I agree with the findings.     JUTTA ELLERMANN, MD    Component      Latest Ref Rng & Units 2/1/2018   WBC      4.0 - 11.0 10e9/L 5.6   RBC Count      3.8 - 5.2 10e12/L 3.70 (L)   Hemoglobin      11.7 - 15.7 g/dL 11.7   Hematocrit      35.0 - 47.0 % 34.6 (L)   MCV      78 - 100 fl 94   MCH      26.5 - 33.0 pg 31.6   MCHC      31.5 - 36.5 g/dL 33.8   RDW      10.0 - 15.0 % 12.9   Platelet Count      150 - 450 10e9/L 270   Diff Method       Automated Method   % Neutrophils      % 61.6   % Lymphocytes      % 27.7   % Monocytes      % 8.9   % Eosinophils      % 0.9   % Basophils      % 0.5   % Immature Granulocytes      % 0.4   Nucleated RBCs      0 /100 0   Absolute Neutrophil      1.6 - 8.3 10e9/L 3.5   Absolute Lymphocytes      0.8 - 5.3 10e9/L 1.6   Absolute Monocytes      0.0 - 1.3 10e9/L 0.5   Absolute Eosinophils      0.0 - 0.7 10e9/L 0.1   Absolute Basophils      0.0 - 0.2 10e9/L 0.0   Abs Immature Granulocytes      0 - 0.4 10e9/L 0.0   Absolute Nucleated RBC       0.0   Creatinine      0.52 - 1.04 mg/dL 0.58   GFR Estimate      >60 mL/min/1.7m2 >90   GFR Estimate If Black      >60 mL/min/1.7m2 >90   ALT      0 - 50 U/L 22   AST      0 - 45 U/L 19   Albumin      3.4 - 5.0 g/dL 4.2   CRP Inflammation      0.0 - 8.0 mg/L <2.9

## 2018-06-09 ENCOUNTER — THERAPY VISIT (OUTPATIENT)
Dept: PHYSICAL THERAPY | Facility: CLINIC | Age: 48
End: 2018-06-09
Payer: COMMERCIAL

## 2018-06-09 DIAGNOSIS — M25.571 PAIN IN JOINT INVOLVING ANKLE AND FOOT, RIGHT: ICD-10-CM

## 2018-06-09 DIAGNOSIS — R60.9 EDEMA: ICD-10-CM

## 2018-06-09 PROCEDURE — 97110 THERAPEUTIC EXERCISES: CPT | Mod: GP | Performed by: PHYSICAL THERAPIST

## 2018-06-09 PROCEDURE — 97140 MANUAL THERAPY 1/> REGIONS: CPT | Mod: GP | Performed by: PHYSICAL THERAPIST

## 2018-06-12 ENCOUNTER — OFFICE VISIT (OUTPATIENT)
Dept: ENDOCRINOLOGY | Facility: CLINIC | Age: 48
End: 2018-06-12
Payer: COMMERCIAL

## 2018-06-12 VITALS
WEIGHT: 179.1 LBS | HEIGHT: 70 IN | SYSTOLIC BLOOD PRESSURE: 124 MMHG | DIASTOLIC BLOOD PRESSURE: 78 MMHG | HEART RATE: 69 BPM | BODY MASS INDEX: 25.64 KG/M2

## 2018-06-12 DIAGNOSIS — E10.319: Primary | ICD-10-CM

## 2018-06-12 LAB — HBA1C MFR BLD: 6.4 % (ref 4.3–6)

## 2018-06-12 ASSESSMENT — PAIN SCALES - GENERAL: PAINLEVEL: NO PAIN (0)

## 2018-06-12 NOTE — PATIENT INSTRUCTIONS
To see if the tresiba dose is correct, we will have you skip a meal and check your sugar every hour for up to 6 hours.  To test the in the morning, check your sugar when you get up.  If it is  skip your breakfast and check every hour until lunch.  If the sugar goes up to > 250-300 stop the test and do a correction.  If the sugars goes < 90 stop the test and eat a meal after dosing for the carbs in the meal.  If first morning sugar is < 90 or > 250 do the test on another day.    To test the in the afternoon, eat your breakfast with your usual bolus for the food.  About 4 hours later check your sugar.    If it is  skip your lunch and check every hour until dinner.  If the sugar goes up to > 250-300 stop the test and do a correction.  If the sugars goes < 90 stop the test and eat a meal after dosing for the carbs in the meal.  If first morning sugar is < 90 or > 250 do the test on another day.

## 2018-06-12 NOTE — LETTER
6/12/2018       RE: Dali Martines  [address]  [facility] [address]     Dear Colleague,    Thank you for referring your patient, Dali Martines, to the [facility] ENDOCRINOLOGY at [facility]. Please see a copy of my visit note below.    This 47 year old woman returns for f/u of her type 1 diabetes. She also has hypothyroidism and psoriatic arthritis.    She is currently taking Tresiba 16 units daily.  She reports that after her GI illness in the winter her dose went up to 20 units a day but once she started to feel better, she started to reduce the dose.  She thinks her current BG control is a bit erratic and wonders of the tresiba dose is correct.      She has the following calculations in her Accucheck Expert meter:  Insulin to Carb ratio:  1 unit per 15 grams CHO  Correction Factor:     1 unit drops BG 50 mg/mL  Targets:  Overnight:   and during the Daytime:   Active Insulin Time:  3.5 hours.     She remains on trulicity and is tolerating it well.  She wears her Dex com sensor nearly all the time.  During the last 2 weeks her average was 162.  She was between  52% of the time, above 180 44% of the time, and below seventy 5 % of the time. Reviewing the daily CGM data shows She is usually stable overnight but often above 150 about half of the time. She thinks some of that is because of eating a late dinner.  She appears to go low 3+ hours after meals about half of the time.   She is not having hypoglycemia overnight.  She reports she recognizes her low blood sugars.  She still is not as active as she was in past but her orthopedic/rheumatologic diseases are improving.       Current Outpatient Prescriptions on File Prior to Visit:  aspirin 81 MG tablet Take 1 tablet by mouth daily.   blood glucose monitoring (ACCU-CHEK COLIN PLUS) test strip Test Blood Sugar 8 times daily or as directed   blood glucose monitoring (ACCU-CHEK FASTCLIX) lancets Use to  test blood sugar 8 times daily or as directed.   Calcium Carbonate-Vitamin D (CALCIUM + D PO) Take one daily   citalopram (CELEXA) 20 MG tablet Take 1.5 tablets (30 mg) by mouth daily   diclofenac (VOLTAREN) 1 % GEL topical gel APPLY 2 GRAMS ONTO THE SKIN FOUR TIMES DAILY AS NEEDED FOR MODERATE PAIN   dulaglutide (TRULICITY) 1.5 MG/0.5ML pen Inject 1.5 mg Subcutaneous every 7 days   econazole nitrate 1 % cream Apply 0.5 inches topically daily.   fish oil-omega-3 fatty acids (FISH OIL) 1000 MG capsule Take one daily   folic acid (FOLVITE) 1 MG tablet Take 1 tablet (1 mg) by mouth daily   HYDROcodone-acetaminophen (NORCO) 5-325 MG per tablet Take 1-2 tablets by mouth every 4 hours as needed for moderate to severe pain   insulin degludec (TRESIBA) 100 UNIT/ML pen Inject 18 Units Subcutaneous daily   insulin pen needle (B-D U/F) 31G X 8 MM Use 5 pen needles daily   Ketoconazole (NIZORAL PO) Take  by mouth. Shampoo daily   levothyroxine (SYNTHROID/LEVOTHROID) 125 MCG tablet Take 1 tablet (125 mcg) by mouth daily   methotrexate 2.5 MG tablet CHEMO Take 4 tablets (10 mg) by mouth once a week   methylphenidate (METADATE CD) 20 MG CR capsule Take 20 mg by mouth daily   Minoxidil (ROGAINE EX) Externally apply  topically. daily   mometasone-formoterol (DULERA) 100-5 MCG/ACT oral inhaler Inhale 2 puffs into the lungs 2 times daily   montelukast (SINGULAIR) 5 MG chewable tablet CHEW AND SWALLOW 1 TABLET(5 MG) BY MOUTH AT BEDTIME   Multiple Vitamin (MULTIVITAMIN OR) Take  by mouth. Take one daily tab   NOVOLOG PENFILL 100 UNIT/ML soln 1 unit per 15 grams CHO at all meals and snacks with correction scale of 1 unit per 50 mg/dL over 150.  Average daily use is 20 units.   simvastatin (ZOCOR) 20 MG tablet Take 1 tablet (20 mg) by mouth At Bedtime   traZODone (DESYREL) 50 MG tablet Take 1-2 tablets by mouth nightly as needed for sleep.   UNABLE TO FIND Biosil     Current Facility-Administered Medications on File Prior to  "Visit:  betamethasone acet & sod phos (CELESTONE) injection 6 mg       ROS: 10 point ROS neg other than the symptoms noted above in the HPI.    Vital signs:   /78  Pulse 69  Ht 1.778 m (5' 10\")  Wt 81.2 kg (179 lb 1.6 oz)  BMI 25.7 kg/m2  Estimated body mass index is 25.7 kg/(m^2) as calculated from the following:    Height as of this encounter: 1.778 m (5' 10\").    Weight as of this encounter: 81.2 kg (179 lb 1.6 oz).  NAD  Eyes - no periorbital edema, conjunctival injection, scleral icterus  CV - No edema  Skin - normal texture     Recent Labs   Lab Test 06/12/18  05/15/18   1617  02/01/18   1553  01/09/18   1601  01/09/18   1550 01/09/18 04/25/17   1827   02/14/17   1540   07/09/14   1641   09/27/13   0928  09/16/13   1612   06/04/13   1555  02/15/13   0935   11/29/10   1009   A1C   --    --    --    --    --    --    --    --    --    --    --    --    --    --   7.2*   --   7.6*  6.8*   < >   --    HEMOGLOBINA1  6.4*   --    --    --    --   7.0*   < >   --    --    --    < >   --    < >   --    --    --    --    --    < >   --    TSH   --    --    --   0.63   --    --    --   0.66   --    --    < >  0.11*   < >   --    --    < >   --    --    < >  1.89   T4   --    --    --    --    --    --    --    --    --    --    --   1.31   --    --    --    --    --    --    --   8.5   LDL   --    --    --    --    --    --    --    --    --    --    --    --    --   123   --    --    --   135*   < >  84   HDL   --    --    --    --    --    --    --    --    --    --    --    --    --   78   --    --    --   77   < >  94   TRIG   --    --    --    --    --    --    --    --    --    --    --    --    --   51   --    --    --   63   < >  45   CR   --   0.66  0.58  0.57   --    --    < >   --    < >   --    < >   --    < >   --    --    --    --   0.56   < >  0.77   MICROL   --    --    --    --   <5   --    --    --    --   6   < >   --    < >   --    --    --    --    --    < >   --     < > = values in this " interval not displayed.       Assessment and plan:    1.  Diabetes control. She is doing very well.  I am concerned that the tresiba does may be incorrect because she is getting low long after her meal doses.  Will have her do some fasting tests to check on this and sent her home with instructions to do this.    2.  Diabetes complications.  She has background retinopathy and will see eye MD in the fall. Feet and kidneys are fine.    3.  Hypothyroidism. TSH OK.    4. CVD risk.  BP is OK and she is on statin.      I spent 25 minutes with this patient face to face and explained the conditions and plans (more than 50% of time was counseling/coordination of diabetes care) . The patient understood and is satisfied with today's visit.     F/u 6 mo      Again, thank you for allowing me to participate in the care of your patient.      Sincerely,    Felicia Mckeon MD

## 2018-06-12 NOTE — MR AVS SNAPSHOT
After Visit Summary   6/12/2018    Dali Martines    MRN: [id number]           Patient Information     Date Of Birth          1970        Visit Information        Provider Department      6/12/2018 3:00 PM Felicia Mckeon MD [facility] End[facility]ology        Today's Diagnoses     Diabetes mellitus type 1 (H)    -  1      Care Instructions    To see if the tresiba dose is correct, we will have you skip a meal and check your sugar every hour for up to 6 hours.  To test the in the morning, check your sugar when you get up.  If it is  skip your breakfast and check every hour until lunch.  If the sugar goes up to > 250-300 stop the test and do a correction.  If the sugars goes < 90 stop the test and eat a meal after dosing for the carbs in the meal.  If first morning sugar is < 90 or > 250 do the test on another day.    To test the in the afternoon, eat your breakfast with your usual bolus for the food.  About 4 hours later check your sugar.    If it is  skip your lunch and check every hour until dinner.  If the sugar goes up to > 250-300 stop the test and do a correction.  If the sugars goes < 90 stop the test and eat a meal after dosing for the carbs in the meal.  If first morning sugar is < 90 or > 250 do the test on another day.          Follow-ups after your visit        Follow-up notes from your care team     Return in about 6 months (around 12/12/2018).      Your next 10 appointments already scheduled     Jun 23, 2018 11:10 AM CDT   LA[facility]ISHA Extremity with Michi Esteves Pt, PT   [facility] ([facility]  )    [id number]  [facility] [address]   [phone]            Jul 07, 2018  9:50 AM CDT   LAKISHA Extremity with Michi Esteves Pt, PT   [facility] ([facility]    [address]  [facility] [address]   [phone]            Jul 21, 2018 11:10 AM CDT   LAKISHA  Extremity with Michi Esteves Pt, PT   [place] [facility] ([facility]  )    [address]  [facility] [address]   [phone]            Aug 04, 2018 11:10 AM CDT   LAKISHA Extremity with Michi Esteves Pt, PT   [facility] [facility] ([facility]  )    [address]  [facility] [address]   [phone]            Aug 14, 2018  3:30 PM CDT   (Arrive by 3:15 PM)   Return Visit with Sara Carter MD   [facility] ([facility])    [address]  [address]  [place] [place] [facility] [id number] [phone]            Nov 20, 2018  2:30 PM CST   (Arrive by 2:15 PM)   Return Visit with Yoel Betancourt MD   [facility] Rheumatology ([facility])    [address]  [address]  [facility] [id number] [phone]            Dec 18, 2018  3:00 PM CST   (Arrive by 2:45 PM)   RETURN DIABETES with Felicia Mckeon MD   [facility] Endocrinology ([facility])    [address] [place] [address]  3rd Floor  [facility] [id number] [phone]              Who to contact     Please call your clinic at [phone] to:    Ask questions about your health    Make or cancel appointments    Discuss your medicines    Learn about your test results    Speak to your doctor            Additional Information About Your Visit        [employer] Information     [employer] gives you secure access to your electronic health record. If you see a primary care provider, you can also send messages to your care team and make appointments. If you have questions, please call your primary care clinic.  If you do not have a primary care provider, please call [phone] and they will assist you.      [employer] is an electronic gateway that provides easy, online access to your medical records. With [employer], you can request a clinic appointment, read your  "test results, renew a prescription or communicate with your care team.     To access your existing account, please contact your [facility] or call [phone] for assistance.        Care EveryWhere ID     This is your Care EveryWhere ID. This could be used by other organizations to access your [place] medical records  [id number]        Your Vitals Were     Pulse Height BMI (Body Mass Index)             69 1.778 m (5' 10\") 25.7 kg/m2          Blood Pressure from Last 3 Encounters:   06/12/18 124/78   05/24/18 127/85   05/15/18 126/74    Weight from Last 3 Encounters:   06/12/18 81.2 kg (179 lb 1.6 oz)   05/24/18 83 kg (183 lb)   05/15/18 83.4 kg (183 lb 14.4 oz)              We Performed the Following     Hemoglobin A1c POCT        Primary Care Provider Office Phone # Fax #    Dimitri Alas [phone]       [address] [address]        Equal Access to Services     [facility]: Hadii aad ku hadasho Soomaali, waaxda luqadaha, qaybta kaalmada adeegyada, waxpat dodd hayravi [facility] . So [facility] [phone].    ATENCIÓN: Si habla español, tiene a ramsey disposición servicios gratuitos de asistencia lingüística. Llame al [phone].    We comply with applicable federal civil rights laws and [place] laws. We do not discriminate on the basis of race, color, national origin, age, disability, sex, sexual orientation, or gender identity.            Thank you!     Thank you for choosing [facility] ENDOCRINOLOGY  for your care. Our goal is always to provide you with excellent care. Hearing back from our patients is one way we can continue to improve our services. Please take a few minutes to complete the written survey that you may receive in the mail after your visit with us. Thank you!             Your Updated Medication List - Protect others around you: Learn how to safely use, store and throw away your medicines at " www.disposemymeds.org.          This list is accurate as of 6/12/18  4:10 PM.  Always use your most recent med list.                   Brand Name Dispense Instructions for use Diagnosis    aspirin 81 MG tablet      Take 1 tablet by mouth daily.        blood glucose monitoring lancets     4 Box    Use to test blood sugar 8 times daily or as directed.    Type 1 diabetes, HbA1c goal < 7% (H)       blood glucose monitoring test strip    ACCU-CHEK COLIN PLUS    700 strip    Test Blood Sugar 8 times daily or as directed    Type 1 diabetes, HbA1c goal < 7% (H)       CALCIUM + D PO      Take one daily        citalopram 20 MG tablet    celeXA    135 tablet    Take 1.5 tablets (30 mg) by mouth daily    Anxiety       diclofenac 1 % Gel topical gel    VOLTAREN    100 g    APPLY 2 GRAMS ONTO THE SKIN FOUR TIMES DAILY AS NEEDED FOR MODERATE PAIN    Enthesopathy, Pain in joint, multiple sites, Chronic pain of right ankle       dulaglutide 1.5 MG/0.5ML pen    TRULICITY    6 mL    Inject 1.5 mg Subcutaneous every 7 days    Type 1 diabetes mellitus without complication (H)       econazole nitrate 1 % cream      Apply 0.5 inches topically daily.        fish oil-omega-3 fatty acids 1000 MG capsule      Take one daily        folic acid 1 MG tablet    FOLVITE    90 tablet    Take 1 tablet (1 mg) by mouth daily    Pain in joint, multiple sites, Tendinitis, Encounter for therapeutic drug monitoring       HYDROcodone-acetaminophen 5-325 MG per tablet    NORCO    15 tablet    Take 1-2 tablets by mouth every 4 hours as needed for moderate to severe pain        insulin degludec 100 UNIT/ML pen    TRESIBA    15 mL    Inject 18 Units Subcutaneous daily    Type 1 diabetes mellitus without complication (H)       insulin pen needle 31G X 8 MM    B-D U/F    450 each    Use 5 pen needles daily    Diabetes mellitus type I (H)       levothyroxine 125 MCG tablet    SYNTHROID/LEVOTHROID    90 tablet    Take 1 tablet (125 mcg) by mouth daily     Hypothyroidism       methotrexate 2.5 MG tablet CHEMO     48 tablet    Take 4 tablets (10 mg) by mouth once a week    Pain in joint, multiple sites, Tendinitis, Encounter for therapeutic drug monitoring       methylphenidate 20 MG CR capsule    METADATE CD     Take 20 mg by mouth daily        mometasone-formoterol 100-5 MCG/ACT oral inhaler    DULERA    3 Inhaler    Inhale 2 puffs into the lungs 2 times daily    Moderate persistent asthma without complication       montelukast 5 MG chewable tablet    SINGULAIR    30 tablet    CHEW AND SWALLOW 1 TABLET(5 MG) BY MOUTH AT BEDTIME    Mild persistent asthma without complication       MULTIVITAMIN PO      Take  by mouth. Take one daily tab        NIZORAL PO      Take  by mouth. Shampoo daily        NovoLOG PENFILL 100 UNIT/ML injection   Generic drug:  insulin aspart     30 mL    1 unit per 15 grams CHO at all meals and snacks with correction scale of 1 unit per 50 mg/dL over 150.  Average daily use is 20 units.    Type 1 diabetes, HbA1c goal < 7% (H)       ROGAINE EX      Externally apply  topically. 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    Insomnia       UNABLE TO FIND      Biosil

## 2018-06-12 NOTE — PROGRESS NOTES
This 47 year old woman returns for f/u of her type 1 diabetes. She also has hypothyroidism and psoriatic arthritis.    She is currently taking Tresiba 16 units daily.  She reports that after her GI illness in the winter her dose went up to 20 units a day but once she started to feel better, she started to reduce the dose.  She thinks her current BG control is a bit erratic and wonders of the tresiba dose is correct.      She has the following calculations in her Accuc[employer]ck Expert meter:  Insulin to Carb ratio:  1 unit per 15 grams CHO  Correction Factor:     1 unit drops BG 50 mg/mL  Targets:  Overnight:   and during the Daytime:   Active Insulin Time:  3.5 hours.     She remains on trulicity and is tolerating it well.  She wears her Dex com sensor nearly all the time.  During the last 2 weeks her average was 162.  She was between  52% of the time, above 180 44% of the time, and below seventy 5 % of the time. Reviewing the daily CGM data shows She is usually stable overnight but often above 150 about half of the time. She thinks some of that is because of eating a late dinner.  She appears to go low 3+ hours after meals about half of the time.   She is not having hypoglycemia overnight.  She reports she recognizes her low blood sugars.  She still is not as active as she was in past but her orthopedic/rheumatologic diseases are improving.       Current Outpatient Prescriptions on File Prior to Visit:  aspirin 81 MG tablet Take 1 tablet by mouth daily.   blood glucose monitoring (ACCU-CHEK COLIN PLUS) test strip Test Blood Sugar 8 times daily or as directed   blood glucose monitoring (ACCU-CHEK FASTCLIX) lancets Use to test blood sugar 8 times daily or as directed.   Calcium Carbonate-Vitamin D (CALCIUM + D PO) Take one daily   citalopram (CELEXA) 20 MG tablet Take 1.5 tablets (30 mg) by mouth daily   diclofenac (VOLTAREN) 1 % GEL topical gel APPLY 2 GRAMS ONTO THE SKIN FOUR TIMES DAILY AS NEEDED FOR  "MODERATE PAIN   dulaglutide (TRULICITY) 1.5 MG/0.5ML pen Inject 1.5 mg Subcutaneous every 7 days   econazole nitrate 1 % cream Apply 0.5 inches topically daily.   fish oil-omega-3 fatty acids (FISH OIL) 1000 MG capsule Take one daily   folic acid (FOLVITE) 1 MG tablet Take 1 tablet (1 mg) by mouth daily   HYDROcodone-acetaminophen (NORCO) 5-325 MG per tablet Take 1-2 tablets by mouth every 4 hours as needed for moderate to severe pain   insulin degludec (TRESIBA) 100 UNIT/ML pen Inject 18 Units Subcutaneous daily   insulin pen needle (B-D U/F) 31G X 8 MM Use 5 pen needles daily   Ketoconazole (NIZORAL PO) Take  by mouth. Shampoo daily   levothyroxine (SYNTHROID/LEVOTHROID) 125 MCG tablet Take 1 tablet (125 mcg) by mouth daily   methotrexate 2.5 MG tablet CHEMO Take 4 tablets (10 mg) by mouth once a week   methylphenidate (METADATE CD) 20 MG CR capsule Take 20 mg by mouth daily   Minoxidil (ROGAINE EX) Externally apply  topically. daily   mometasone-formoterol (DULERA) 100-5 MCG/ACT oral inhaler Inhale 2 puffs into the lungs 2 times daily   montelukast (SINGULAIR) 5 MG chewable tablet CHEW AND SWALLOW 1 TABLET(5 MG) BY MOUTH AT BEDTIME   Multiple Vitamin (MULTIVITAMIN OR) Take  by mouth. Take one daily tab   NOVOLOG PENFILL 100 UNIT/ML soln 1 unit per 15 grams CHO at all meals and snacks with correction scale of 1 unit per 50 mg/dL over 150.  Average daily use is 20 units.   simvastatin (ZOCOR) 20 MG tablet Take 1 tablet (20 mg) by mouth At Bedtime   traZODone (DESYREL) 50 MG tablet Take 1-2 tablets by mouth nightly as needed for sleep.   UNABLE TO FIND Biosil     Current Facility-Administered Medications on File Prior to Visit:  betamethasone acet & sod phos (CELESTONE) injection 6 mg       ROS: 10 point ROS neg other than the symptoms noted above in the HPI.    Vital signs:   /78  Pulse 69  Ht 1.778 m (5' 10\")  Wt 81.2 kg (179 lb 1.6 oz)  BMI 25.7 kg/m2  Estimated body mass index is 25.7 kg/(m^2) as " "calculated from the following:    Height as of this encounter: 1.778 m (5' 10\").    Weight as of this encounter: 81.2 kg (179 lb 1.6 oz).  NAD  Eyes - no periorbital edema, conjunctival injection, scleral icterus  CV - No edema  Skin - normal texture     Recent Labs   Lab Test 06/12/18  05/15/18   1617  02/01/18   1553  01/09/18   1601  01/09/18   1550 01/09/18 04/25/17   1827   02/14/17   1540   07/09/14   1641   09/27/13   0928  09/16/13   1612   06/04/13   1555  02/15/13   0935   11/29/10   1009   A1C   --    --    --    --    --    --    --    --    --    --    --    --    --    --   7.2*   --   7.6*  6.8*   < >   --    HEMOGLOBINA1  6.4*   --    --    --    --   7.0*   < >   --    --    --    < >   --    < >   --    --    --    --    --    < >   --    TSH   --    --    --   0.63   --    --    --   0.66   --    --    < >  0.11*   < >   --    --    < >   --    --    < >  1.89   T4   --    --    --    --    --    --    --    --    --    --    --   1.31   --    --    --    --    --    --    --   8.5   LDL   --    --    --    --    --    --    --    --    --    --    --    --    --   123   --    --    --   135*   < >  84   HDL   --    --    --    --    --    --    --    --    --    --    --    --    --   78   --    --    --   77   < >  94   TRIG   --    --    --    --    --    --    --    --    --    --    --    --    --   51   --    --    --   63   < >  45   CR   --   0.66  0.58  0.57   --    --    < >   --    < >   --    < >   --    < >   --    --    --    --   0.56   < >  0.77   MICROL   --    --    --    --   <5   --    --    --    --   6   < >   --    < >   --    --    --    --    --    < >   --     < > = values in this interval not displayed.       Assessment and plan:    1.  Diabetes control. She is doing very well.  I am concerned that the tresiba does may be incorrect because she is getting low long after her meal doses.  Will have her do some fasting tests to check on this and sent her home with " instructions to do this.    2.  Diabetes complications.  She has background retinopathy and will see eye MD in the fall. Feet and kidneys are fine.    3.  Hypothyroidism. TSH OK.    4. CVD risk.  BP is OK and she is on statin.      I spent 25 minutes with this patient face to face and explained the conditions and plans (more than 50% of time was counseling/coordination of diabetes care) . The patient understood and is satisfied with today's visit.     F/u 6 mo    Felicia Mckeon MD

## 2018-06-21 DIAGNOSIS — J45.30 MILD PERSISTENT ASTHMA WITHOUT COMPLICATION: ICD-10-CM

## 2018-06-21 DIAGNOSIS — E10.9 TYPE 1 DIABETES MELLITUS WITHOUT COMPLICATION (H): ICD-10-CM

## 2018-06-21 RX ORDER — MONTELUKAST SODIUM 5 MG/1
TABLET, CHEWABLE ORAL
Qty: 30 TABLET | Refills: 0 | Status: SHIPPED | OUTPATIENT
Start: 2018-06-21 | End: 2018-07-25

## 2018-06-23 ENCOUNTER — THERAPY VISIT (OUTPATIENT)
Dept: PHYSICAL THERAPY | Facility: CLINIC | Age: 48
End: 2018-06-23
Payer: COMMERCIAL

## 2018-06-23 DIAGNOSIS — M25.571 PAIN IN JOINT INVOLVING ANKLE AND FOOT, RIGHT: ICD-10-CM

## 2018-06-23 DIAGNOSIS — R60.9 EDEMA: ICD-10-CM

## 2018-06-23 PROCEDURE — 97140 MANUAL THERAPY 1/> REGIONS: CPT | Mod: GP | Performed by: PHYSICAL THERAPIST

## 2018-06-23 PROCEDURE — 97110 THERAPEUTIC EXERCISES: CPT | Mod: GP | Performed by: PHYSICAL THERAPIST

## 2018-06-25 DIAGNOSIS — M25.50 PAIN IN JOINT, MULTIPLE SITES: ICD-10-CM

## 2018-06-25 DIAGNOSIS — E11.620 NLD (NECROBIOSIS LIPOIDICA DIABETICORUM) (H): Primary | ICD-10-CM

## 2018-06-25 DIAGNOSIS — M77.9 TENDINITIS: ICD-10-CM

## 2018-06-25 DIAGNOSIS — Z51.81 ENCOUNTER FOR THERAPEUTIC DRUG MONITORING: ICD-10-CM

## 2018-06-25 NOTE — TELEPHONE ENCOUNTER
methotrexate 2.5 MG tablet CHEMO      Last Written Prescription Date:  3/12/18  Last Fill Quantity: 48,   # refills: 0  Last Office Visit: 5/15/18  Future Office visit:  11/20/18    CBC RESULTS:   Recent Labs   Lab Test  05/15/18   1617   WBC  7.6   RBC  3.64*   HGB  11.4*   HCT  34.4*   MCV  95   MCH  31.3   MCHC  33.1   RDW  12.2   PLT  264       Creatinine   Date Value Ref Range Status   05/15/2018 0.66 0.52 - 1.04 mg/dL Final   ]    Liver Function Studies -   Recent Labs   Lab Test  05/15/18   1617   04/27/16   1402   PROTTOTAL   --    --   8.0   ALBUMIN  3.8   < >  4.0   BILITOTAL   --    --   0.3   ALKPHOS   --    --   108   AST  23   < >  21   ALT  22   < >  25    < > = values in this interval not displayed.       Routing refill request to provider for review/approval because:  DMARD medication- meets requirements.

## 2018-07-07 ENCOUNTER — THERAPY VISIT (OUTPATIENT)
Dept: PHYSICAL THERAPY | Facility: CLINIC | Age: 48
End: 2018-07-07
Payer: COMMERCIAL

## 2018-07-07 DIAGNOSIS — R60.9 EDEMA: ICD-10-CM

## 2018-07-07 DIAGNOSIS — M25.571 PAIN IN JOINT INVOLVING ANKLE AND FOOT, RIGHT: ICD-10-CM

## 2018-07-07 PROCEDURE — 97110 THERAPEUTIC EXERCISES: CPT | Mod: GP | Performed by: PHYSICAL THERAPIST

## 2018-07-07 PROCEDURE — 97140 MANUAL THERAPY 1/> REGIONS: CPT | Mod: GP | Performed by: PHYSICAL THERAPIST

## 2018-07-25 DIAGNOSIS — J45.30 MILD PERSISTENT ASTHMA WITHOUT COMPLICATION: ICD-10-CM

## 2018-07-25 RX ORDER — MONTELUKAST SODIUM 5 MG/1
TABLET, CHEWABLE ORAL
Qty: 30 TABLET | Refills: 0 | Status: SHIPPED | OUTPATIENT
Start: 2018-07-25 | End: 2018-08-29

## 2018-07-28 ENCOUNTER — THERAPY VISIT (OUTPATIENT)
Dept: PHYSICAL THERAPY | Facility: CLINIC | Age: 48
End: 2018-07-28
Payer: COMMERCIAL

## 2018-07-28 DIAGNOSIS — R60.9 EDEMA: ICD-10-CM

## 2018-07-28 DIAGNOSIS — M25.571 PAIN IN JOINT INVOLVING ANKLE AND FOOT, RIGHT: ICD-10-CM

## 2018-07-28 PROCEDURE — 97140 MANUAL THERAPY 1/> REGIONS: CPT | Mod: GP | Performed by: PHYSICAL THERAPIST

## 2018-07-28 PROCEDURE — 97110 THERAPEUTIC EXERCISES: CPT | Mod: GP | Performed by: PHYSICAL THERAPIST

## 2018-07-28 NOTE — PROGRESS NOTES
Subjective:  HPI                    Objective:  System    Physical Exam    General     ROS    Assessment/Plan:    PROGRESS  REPORT    Progress reporting period is from 5/3/18 to 7/28/18.       SUBJECTIVE  Subjective changes noted by patient: Was doing quite well, but for unknown reasons, she has had more soreness at distal peroneal tendon again the past 1-2 wks.  Having a hard time balancing again as well.  Now that she thinks about it, though, she has been on her feet more this past week.    Current Pain level:  (0-6/10).      Initial Pain level:  (3-8/10).   Changes in function:  Yes, but slow  Adverse reaction to treatment or activity: activity - increased walking    OBJECTIVE  Changes noted in objective findings:      R ankle AROM: DF 6, PF 70, IV 31, EV 11.    When actively everting foot against gravity or resistance, she uses ant tib and DF's the foot.  When making her ilia from a relaxed or Plantar flexed position, she did a better job engaging peroneals and it was much harder.    Still weak with R ankle Eversion>Plantar flexion>Inversion     ASSESSMENT/PLAN  Updated problem list and treatment plan: Diagnosis 1:  S/p PRP injection 4/11/18 with chronic R ankle pain due to tear of ATF and peroneal brevis  Pain -  manual therapy, self management, education and home program  Decreased ROM/flexibility - manual therapy, therapeutic exercise and home program  Decreased strength - therapeutic exercise, therapeutic activities and home program  Impaired balance - neuro re-education, therapeutic activities and home program  Impaired gait - gait training and home program  Impaired muscle performance - neuro re-education and home program  Decreased function - therapeutic activities and home program  STG/LTGs have been met or progress has been made towards goals:  Yes (See Goal flow sheet completed today.)  Assessment of Progress: The patient's condition has potential to improve.  Self Management Plans:  Patient has been  instructed in a home treatment program.  Patient  has been instructed in self management of symptoms.  I have re-evaluated this patient and find that the nature, scope, duration and intensity of the therapy is appropriate for the medical condition of the patient.  Dali continues to require the following intervention to meet STG and LTG's:  PT    Recommendations:  This patient would benefit from continued therapy.     Frequency:  2 X a month, once daily  Duration:  for 1-2 months        Please refer to the daily flowsheet for treatment today, total treatment time and time spent performing 1:1 timed codes.

## 2018-07-28 NOTE — MR AVS SNAPSHOT
After Visit Summary   7/28/2018    Dali Martines    MRN: [id number]           Patient Information     Date Of Birth          1970        Visit Information        Provider Department      7/28/2018 11:10 AM Chanelle Muniz PT [facility]        Today's Diagnoses     Edema        Pain in joint involving ankle and foot, right           Follow-ups after your visit        Your next 10 appointments already scheduled     Aug 11, 2018 11:10 AM CDT   [facility] Extremity with Chanelle Muniz PT   [facility] ([facility]  )    [address]  [facility] [address]   [phone]            Aug 14, 2018  3:30 PM CDT   (Arrive by 3:15 PM)   Return Visit with Sara Carter MD   [facility] ([facility])    [address]  [address]  [place] [facility] [id number] [phone]            Aug 25, 2018 11:10 AM CDT   LAKIS[place] Extremity with Chanelle Muniz PT   [facility] ([facility]  )    [address]  [facility] [address]   [phone]            Nov 20, 2018  2:30 PM CST   (Arrive by 2:15 PM)   Return Visit with Yoel Betancourt MD   [facility]umat[facility] ([facility])    [address]  [address]  [place] [place] [facility] [id number] [phone]            Dec 18, 2018  3:00 PM CST   (Arrive by 2:45 PM)   RETURN DIABETES with Felicia Mckeon MD   [facility] Endocrinology ([facility])    [address]  [address]rd Floor  [place] [id number] [phone]              Who to contact     If you have questions or need follow up information about today's clinic visit or your schedule please contact [facility] directly at [phone].  Normal or non-critical lab and imaging results will be  communicated to you by [employer], letter or phone within 4 business days after the clinic has received the results. If you do not hear from us within 7 days, please contact the clinic through [employer] or phone. If you have a critical or abnormal lab result, we will notify you by phone as soon as possible.  Submit refill requests through [employer] or call your pharmacy and they will forward the refill request to us. Please allow 3 business days for your refill to be completed.          Additional Information About Your Visit        [employer] Information     [employer] gives you secure access to your electronic health record. If you see a primary care provider, you can also send messages to your care team and make appointments. If you have questions, please call your primary care clinic.  If you do not have a primary care provider, please call [phone] and they will assist you.        Care EveryWhere ID     This is your Care EveryWhere ID. This could be used by other organizations to access your [facility] medical records  [id number]         Blood Pressure from Last 3 Encounters:   06/12/18 124/78   05/24/18 127/85   05/15/18 126/74    Weight from Last 3 Encounters:   06/12/18 81.2 kg (179 lb 1.6 oz)   05/24/18 83 kg (183 lb)   05/15/18 83.4 kg (183 lb 14.4 oz)              We Performed the Following     [facility] PROGRESS NOTES REPORT     MANUAL THER TECH,1+REGIONS,EA 15 MIN     THERAPEUTIC EXERCISES        Primary Care Provider Office Phone # Fax #    Dimitri Alas [phone]       [address] [address]        Equal Access to Services     [facility] : Hadii aad ku hadasho Soomaali, waaxda luqadaha, qaybta kaalmada kaitlin joaquin . So [facility] [phone].    ATENCIÓN: Si habla español, tiene a ramsey disposición servicios gratuitos de asistencia lingüística. Llame al [phone].    We comply with applicable federal civil rights laws and [place]  laws. We do not discriminate on the basis of race, color, national origin, age, disability, sex, sexual orientation, or gender identity.            Thank you!     Thank you for choosing [facility]  for your care. Our goal is always to provide you with excellent care. Hearing back from our patients is one way we can continue to improve our services. Please take a few minutes to complete the written survey that you may receive in the mail after your visit with us. Thank you!             Your Updated Medication List - Protect others around you: Learn how to safely use, store and throw away your medicines at www.disposemymeds.org.          This list is accurate as of 7/28/18 12:05 PM.  Always use your most recent med list.                   Brand Name Dispense Instructions for use Diagnosis    aspirin 81 MG tablet      Take 1 tablet by mouth daily.        blood glucose monitoring lancets     4 Box    Use to test blood sugar 8 times daily or as directed.    Type 1 diabetes, HbA1c goal < 7% (H)       blood glucose monitoring test strip    ACCU-CHEK COLIN PLUS    700 strip    Test Blood Sugar 8 times daily or as directed    Type 1 diabetes, HbA1c goal < 7% (H)       CALCIUM + D PO      Take one daily        citalopram 20 MG tablet    celeXA    135 tablet    Take 1.5 tablets (30 mg) by mouth daily    Anxiety       diclofenac 1 % Gel topical gel    VOLTAREN    100 g    APPLY 2 GRAMS ONTO THE SKIN FOUR TIMES DAILY AS NEEDED FOR MODERATE PAIN    Enthesopathy, Pain in joint, multiple sites, Chronic pain of right ankle       dulaglutide 1.5 MG/0.5ML pen    TRULICITY    6 mL    Inject 1.5 mg Subcutaneous every 7 days    Type 1 diabetes mellitus without complication (H)       econazole nitrate 1 % cream      Apply 0.5 inches topically daily.        fish oil-omega-3 fatty acids 1000 MG capsule      Take one daily        folic acid 1 MG tablet    FOLVITE    90 tablet    Take 1 tablet (1  mg) by mouth daily    Pain in joint, multiple sites, Tendinitis, Encounter for therapeutic drug monitoring       HYDROcodone-acetaminophen 5-325 MG per tablet    NORCO    15 tablet    Take 1-2 tablets by mouth every 4 hours as needed for moderate to severe pain        insulin degludec 100 UNIT/ML pen    TRESIBA    15 mL    Inject 18 Units Subcutaneous daily    Type 1 diabetes mellitus without complication (H)       insulin pen needle 31G X 8 MM    B-D U/F    450 each    Use 5 pen needles daily    Diabetes mellitus type I (H)       levothyroxine 125 MCG tablet    SYNTHROID/LEVOTHROID    90 tablet    Take 1 tablet (125 mcg) by mouth daily    Hypothyroidism       methotrexate 2.5 MG tablet CHEMO     48 tablet    Take 4 tablets (10 mg) by mouth once a week    Pain in joint, multiple sites, Tendinitis, NLD (necrobiosis lipoidica diabeticorum) (H)       methylphenidate 20 MG CR capsule    METADATE CD     Take 20 mg by mouth daily        mometasone-formoterol 100-5 MCG/ACT oral inhaler    DULERA    3 Inhaler    Inhale 2 puffs into the lungs 2 times daily    Moderate persistent asthma without complication       montelukast 5 MG chewable tablet    SINGULAIR    30 tablet    CHEW AND SWALLOW 1 TABLET(5 MG) BY MOUTH AT BEDTIME    Mild persistent asthma without complication       MULTIVITAMIN PO      Take  by mouth. Take one daily tab        NIZORAL PO      Take  by mouth. Shampoo daily        NovoLOG PENFILL 100 UNIT/ML injection   Generic drug:  insulin aspart     30 mL    1 unit per 15 grams CHO at all meals and snacks with correction scale of 1 unit per 50 mg/dL over 150.  Average daily use is 20 units.    Type 1 diabetes, HbA1c goal < 7% (H)       ROGAINE EX      Externally apply  topically. 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    Insomnia       UNABLE TO FIND      Biosil

## 2018-08-03 NOTE — TELEPHONE ENCOUNTER
Talked with patient about schedule her procedure with Dr Uribe, she wants to move forward but still needs to get the MRI done first, gave patient that number [phone] to call and schedule, she will call me back at [phone] after she gets it schedule so we can set up her procedure. Stated that she had to wait do to getting an injection before.

## 2018-08-11 ENCOUNTER — THERAPY VISIT (OUTPATIENT)
Dept: PHYSICAL THERAPY | Facility: CLINIC | Age: 48
End: 2018-08-11
Payer: COMMERCIAL

## 2018-08-11 DIAGNOSIS — M25.571 PAIN IN JOINT INVOLVING ANKLE AND FOOT, RIGHT: ICD-10-CM

## 2018-08-11 DIAGNOSIS — R60.9 EDEMA: ICD-10-CM

## 2018-08-11 PROCEDURE — 97140 MANUAL THERAPY 1/> REGIONS: CPT | Mod: GP | Performed by: PHYSICAL THERAPIST

## 2018-08-11 PROCEDURE — 97110 THERAPEUTIC EXERCISES: CPT | Mod: GP | Performed by: PHYSICAL THERAPIST

## 2018-08-13 DIAGNOSIS — E03.9 HYPOTHYROIDISM: ICD-10-CM

## 2018-08-13 RX ORDER — LEVOTHYROXINE SODIUM 125 UG/1
125 TABLET ORAL DAILY
Qty: 90 TABLET | Refills: 3 | Status: SHIPPED | OUTPATIENT
Start: 2018-08-13 | End: 2019-03-21

## 2018-08-14 ENCOUNTER — OFFICE VISIT (OUTPATIENT)
Dept: PULMONOLOGY | Facility: CLINIC | Age: 48
End: 2018-08-14
Attending: INTERNAL MEDICINE
Payer: COMMERCIAL

## 2018-08-14 DIAGNOSIS — J45.30 MILD PERSISTENT ASTHMA WITHOUT COMPLICATION: Primary | ICD-10-CM

## 2018-08-14 ASSESSMENT — PAIN SCALES - GENERAL: PAINLEVEL: NO PAIN (0)

## 2018-08-14 NOTE — MR AVS SNAPSHOT
After Visit Summary   8/14/2018    Dlai Martines    MRN: [id number]           Patient Information     Date Of Birth          1970        Visit Information        Provider Department      8/14/2018 3:30 PM Sara Carter MD [facility]         Follow-ups after your visit        Follow-up notes from your care team     Return in about 6 months (around 2/14/2019).      Your next 10 appointments already scheduled     Aug 17, 2018  4:45 PM CDT   MR HAND RIGHT W/O & W CONTRAST with [id number]   [facility] ([facility])    [address] [address] [phone]           Take your medicines as usual, unless your doctor tells you not to. Bring a list of your current medicines to your exam (including vitamins, minerals and over-the-counter drugs).  You may or may not receive intravenous (IV) contrast for this exam pending the discretion of the Radiologist.  You do not need to do anything special to prepare.  The MRI machine uses a strong magnet. Please wear clothes without metal (snaps, zippers). A sweatsuit works well, or we may give you a hospital gown.  Please remove any body piercings and hair extensions before you arrive. You will also remove watches, jewelry, hairpins, wallets, dentures, partial dental plates and hearing aids. You may wear contact lenses, and you may be able to wear your rings. We have a safe place to keep your personal items, but it is safer to leave them at home.  **IMPORTANT** THE INSTRUCTIONS BELOW ARE ONLY FOR THOSE PATIENTS WHO HAVE BEEN PRESCRIBED SEDATION OR GENERAL ANESTHESIA DURING THEIR MRI PROCEDURE:  IF YOUR DOCTOR PRESCRIBED ORAL SEDATION (take medicine to help you relax during your exam):   You must get the medicine from your doctor (oral medication) before you arrive. Bring the medicine to the exam. Do not take it at home. You ll be told when to take it  upon arriving for your exam.   Arrive one hour early. Bring someone who can take you home after the test. Your medicine will make you sleepy. After the exam, you may not drive, take a bus or take a taxi by yourself.  IF YOUR DOCTOR PRESCRIBED IV SEDATION:   Arrive one hour early. Bring someone who can take you home after the test. Your medicine will make you sleepy. After the exam, you may not drive, take a bus or take a taxi by yourself.   No eating 6 hours before your exam. You may have clear liquids up until 4 hours before your exam. (Clear liquids include water, clear tea, black coffee and fruit juice without pulp.)  IF YOUR DOCTOR PRESCRIBED ANESTHESIA (be asleep for your exam):   Arrive 1 1/2 hours early. Bring someone who can take you home after the test. You may not drive, take a bus or take a taxi by yourself.   No eating 8 hours before your exam. You may have clear liquids up until 4 hours before your exam. (Clear liquids include water, clear tea, black coffee and fruit juice without pulp.)   You will spend four to five hours in the recovery room.  Please call the Imaging Department at your exam site with any questions.            Aug 25, 2018 11:10 AM CDT   LAKISHA Extremity with Chanelle Muniz PT   [facility] ([facility]  )    [address]  [facility] [address]   [phone]            Nov 20, 2018  2:30 PM CST   (Arrive by 2:15 PM)   Return Visit with Yoel Betancourt MD   [facility] ([facility])    [address] [place] [address]  [address]  [facility] [address]   [phone]            Dec 18, 2018  3:00 PM CST   (Arrive by 2:45 PM)   RETURN DIABETES with Felicia Mckeon MD   [facility] ([facility])    [address] [place] [address]  3rd Floor  [facility] [address]   [phone]            Feb 19, 2019  3:30 PM CST   (Arrive by 3:15 PM)   Return Visit with Sara Pelletier  MD Emma   [facility] ([facility])    [address]  [address]  [facility] [address] [phone]              Who to contact     If you have questions or need follow up information about today's clinic visit or your schedule please contact [facility] directly at [phone].  Normal or non-critical lab and imaging results will be communicated to you by MyChart, letter or phone within 4 business days after the clinic has received the results. If you do not hear from us within 7 days, please contact the clinic through [employer] or phone. If you have a critical or abnormal lab result, we will notify you by phone as soon as possible.  Submit refill requests through [employer] or call your pharmacy and they will forward the refill request to us. Please allow 3 business days for your refill to be completed.          Additional Information About Your Visit        [employer] Information     [employer] gives you secure access to your electronic health record. If you see a primary care provider, you can also send messages to your care team and make appointments. If you have questions, please call your primary care clinic.  If you do not have a primary care provider, please call [phone] and they will assist you.        Care EveryWhere ID     This is your Care EveryWhere ID. This could be used by other organizations to access your [facility] medical records  [id number]         Blood Pressure from Last 3 Encounters:   08/14/18 (P) 112/75   06/12/18 124/78   05/24/18 127/85    Weight from Last 3 Encounters:   06/12/18 81.2 kg (179 lb 1.6 oz)   05/24/18 83 kg (183 lb)   05/15/18 83.4 kg (183 lb 14.4 oz)              Today, you had the following     No orders found for display       Primary Care Provider Office Phone # Fax #    Dimitri Alas [phone]       [address] [address]         Equal Access to Services     [facility] : Hadii randi aguilar xin Castillo, waaxda luqadaha, qaybta kaalmabrian joaquin, kaitlin mcdonaldjamisonmckinley owusu. So [facility] [phone].    ATENCIÓN: Si habla español, tiene a ramsey disposición servicios gratuitos de asistencia lingüística. Llame al [phone].    We comply with applicable federal civil rights laws and [place] laws. We do not discriminate on the basis of race, color, national origin, age, disability, sex, sexual orientation, or gender identity.            Thank you!     Thank you for choosing [facility]  for your care. Our goal is always to provide you with excellent care. Hearing back from our patients is one way we can continue to improve our services. Please take a few minutes to complete the written survey that you may receive in the mail after your visit with us. Thank you!             Your Updated Medication List - Protect others around you: Learn how to safely use, store and throw away your medicines at www.disposemymeds.org.          This list is accurate as of 8/14/18  4:26 PM.  Always use your most recent med list.                   Brand Name Dispense Instructions for use Diagnosis    aspirin 81 MG tablet      Take 1 tablet by mouth daily.        blood glucose monitoring lancets     4 Box    Use to test blood sugar 8 times daily or as directed.    Type 1 diabetes, HbA1c goal < 7% (H)       blood glucose monitoring test strip    ACCU-CHEK COLIN PLUS    700 strip    Test Blood Sugar 8 times daily or as directed    Type 1 diabetes, HbA1c goal < 7% (H)       CALCIUM + D PO      Take one daily        citalopram 20 MG tablet    celeXA    135 tablet    Take 1.5 tablets (30 mg) by mouth daily    Anxiety       diclofenac 1 % Gel topical gel    VOLTAREN    100 g    APPLY 2 GRAMS ONTO THE SKIN FOUR TIMES DAILY AS NEEDED FOR MODERATE PAIN    Enthesopathy, Pain in joint, multiple sites, Chronic pain of right ankle        dulaglutide 1.5 MG/0.5ML pen    TRULICITY    6 mL    Inject 1.5 mg Subcutaneous every 7 days    Type 1 diabetes mellitus without complication (H)       econazole nitrate 1 % cream      Apply 0.5 inches topically daily.        fish oil-omega-3 fatty acids 1000 MG capsule      Take one daily        folic acid 1 MG tablet    FOLVITE    90 tablet    Take 1 tablet (1 mg) by mouth daily    Pain in joint, multiple sites, Tendinitis, Encounter for therapeutic drug monitoring       insulin degludec 100 UNIT/ML pen    TRESIBA    15 mL    Inject 18 Units Subcutaneous daily    Type 1 diabetes mellitus without complication (H)       insulin pen needle 31G X 8 MM    B-D U/F    450 each    Use 5 pen needles daily    Diabetes mellitus type I (H)       levothyroxine 125 MCG tablet    SYNTHROID/LEVOTHROID    90 tablet    Take 1 tablet (125 mcg) by mouth daily    Hypothyroidism       methotrexate 2.5 MG tablet CHEMO     48 tablet    Take 4 tablets (10 mg) by mouth once a week    Pain in joint, multiple sites, Tendinitis, NLD (necrobiosis lipoidica diabeticorum) (H)       methylphenidate 20 MG CR capsule    METADATE CD     Take 20 mg by mouth daily        mometasone-formoterol 100-5 MCG/ACT oral inhaler    DULERA    3 Inhaler    Inhale 2 puffs into the lungs 2 times daily    Moderate persistent asthma without complication       montelukast 5 MG chewable tablet    SINGULAIR    30 tablet    CHEW AND SWALLOW 1 TABLET(5 MG) BY MOUTH AT BEDTIME    Mild persistent asthma without complication       MULTIVITAMIN PO      Take  by mouth. Take one daily tab        NIZORAL PO      Take  by mouth. Shampoo daily        NovoLOG PENFILL 100 UNIT/ML injection   Generic drug:  insulin aspart     30 mL    1 unit per 15 grams CHO at all meals and snacks with correction scale of 1 unit per 50 mg/dL over 150.  Average daily use is 20 units.    Type 1 diabetes, HbA1c goal < 7% (H)       ROGAINE EX      Externally apply  topically. 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    Insomnia

## 2018-08-14 NOTE — LETTER
8/14/2018       RE: Dali Martines  [address]  [facility] [address]     Dear Colleague,    Thank you for referring your patient, Dali Martines, to the [facility] at [facility]. Please see a copy of my visit note below.    Reason for Visit  Dali Martines is a 47 year old female who is followed for asthma  Pulmonary HPI  She has a physical therapy move that gets the heart rate going but doesn't feel like wheezing, has noticed how it is not as difficult for breathing for stairs. Had a platelet injection into her right ankle peroneal brevis.     She has been taking Dulera 1 puffs BID daily, increases to 2 puffs BID at the first sign of respiratory issue/congestion. In order to improve lightheadedness feeling she sits when she takes a puff.  Peak flows have been 460 - 480.     The patient was seen and examined by Sara Carter MD   Current Outpatient Prescriptions   Medication     aspirin 81 MG tablet     blood glucose monitoring (ACCU-CHEK COLIN PLUS) test strip     blood glucose monitoring (ACCU-CHEK FASTCLIX) lancets     Calcium Carbonate-Vitamin D (CALCIUM + D PO)     citalopram (CELEXA) 20 MG tablet     diclofenac (VOLTAREN) 1 % GEL topical gel     dulaglutide (TRULICITY) 1.5 MG/0.5ML pen     econazole nitrate 1 % cream     fish oil-omega-3 fatty acids (FISH OIL) 1000 MG capsule     folic acid (FOLVITE) 1 MG tablet     insulin degludec (TRESIBA) 100 UNIT/ML pen     insulin pen needle (B-D U/F) 31G X 8 MM     Ketoconazole (NIZORAL PO)     levothyroxine (SYNTHROID/LEVOTHROID) 125 MCG tablet     methotrexate 2.5 MG tablet CHEMO     methylphenidate (METADATE CD) 20 MG CR capsule     Minoxidil (ROGAINE EX)     mometasone-formoterol (DULERA) 100-5 MCG/ACT oral inhaler     montelukast (SINGULAIR) 5 MG chewable tablet     Multiple Vitamin (MULTIVITAMIN OR)     NOVOLOG PENFILL 100 UNIT/ML soln     simvastatin (ZOCOR) 20 MG tablet      traZODone (DESYREL) 50 MG tablet     Current Facility-Administered Medications   Medication     betamethasone acet & sod phos (CELESTONE) injection 6 mg     Allergies   Allergen Reactions     Sulfasalazine      Developed rash, HA, dizziness     Social History     Social History     Marital status: Single     Spouse name: N/A     Number of children: 0     Years of education: N/A     Occupational History     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 Yes     Social History Narrative     Past Medical History:   Diagnosis Date     Anemia      Anxiety      Arthritis 2014    Psoriatic arthritis     Back injury 1988     Dry eye syndrome      Dyslipidemia      Endometriosis 2002    adehsions seen at laparoscopy     GERD (gastroesophageal reflux disease)      Hypothyroidism     8 yoa     SOB (shortness of breath) 3/11/2014     Type I (juvenile type) diabetes mellitus without mention of complication, not stated as uncontrolled     when 15      Uncomplicated asthma Fall 2013     ROS Pulmonary  Dyspnea: No, Cough: No, Chest pain: Yes, Wheezing: No, Sputum Production: No, Hemoptysis: No  A complete ROS was otherwise negative except as noted in the HPI.  BP (P) 112/75 (BP Location: Right arm, Patient Position: Chair, Cuff Size: Adult Regular)  Pulse (P) 67  Resp (P) 16  SpO2 (P) 98%  Exam:   GENERAL APPEARANCE: Well developed, well nourished, alert, and in no apparent distress.  EYES: PERRL, EOMI  HENT: Nasal mucosa with no edema and no hyperemia. No nasal polyps.  EARS: Canals clear, TMs normal  MOUTH: Oral mucosa is moist, without any lesions, no tonsillar enlargement, no oropharyngeal exudate.  NECK: supple, no  masses, no thyromegaly.   LYMPHATICS: no palpable lymphadenopathy  RESP: normal percussion, good air flow throughout.  No crackles. No rhonchi. No wheezes.  CV: Normal S1, S2, regular rhythm, normal rate. No murmur.  No rub. No gallop. No LE edema.   ABDOMEN:  Bowel sounds normal, soft, nontender, no HSM or masses.   MS: extremities normal. No clubbing. No cyanosis.  SKIN: no rash on limited exam  NEURO: Mentation intact, speech normal, normal strength and tone, normal gait and stance  PSYCH: mentation appears normal. and affect normal/bright  Results:  Stress echo 12/30/14 normal  PFTs normal 3/14/16     Assessment and plan: Dali Martines is a 47-year-old female with type 1 diabetes who was clinically diagnosed with asthma as an adult.  She has been intermittently sedentary due to repeated lower extremity ankle injury, surgery, poor wound healing  1.  Asthma.  Mild intermittent asthma. Well controlled.   Up to date on vaccinations.    - She will continue Dulera 1 puff twice a day increase as needed  - Singulair for asthma maintenance, 5mg because she has noticed constipation with full dose      I will see her back in clinic in 6 months.         Again, thank you for allowing me to participate in the care of your patient.      Sincerely,    Sara Carter MD

## 2018-08-14 NOTE — PROGRESS NOTES
Reason for Visit  Dali Martines is a 47 year old female who is followed for asthma  Pulmonary HPI  She has a physical therapy move that gets the heart rate going but doesn't feel like wheezing, has noticed how it is not as difficult for breathing for stairs. Had a platelet injection into her right ankle peroneal brevis.     She has been taking Dulera 1 puffs BID daily, increases to 2 puffs BID at the first sign of respiratory issue/congestion. In order to improve lightheadedness feeling she sits when she takes a puff.  Peak flows have been 460 - 480.     The patient was seen and examined by Sara Carter MD   Current Outpatient Prescriptions   Medication     aspirin 81 MG tablet     blood glucose monitoring (ACCU-CHEK COLIN PLUS) test strip     blood glucose monitoring (ACCU-CHEK FASTCLIX) lancets     Calcium Carbonate-Vitamin D (CALCIUM + D PO)     citalopram (CELEXA) 20 MG tablet     diclofenac (VOLTAREN) 1 % GEL topical gel     dulaglutide (TRULICITY) 1.5 MG/0.5ML pen     econazole nitrate 1 % cream     fish oil-omega-3 fatty acids (FISH OIL) 1000 MG capsule     folic acid (FOLVITE) 1 MG tablet     insulin degludec (TRESIBA) 100 UNIT/ML pen     insulin pen needle (B-D U/F) 31G X 8 MM     Ketoconazole (NIZORAL PO)     levothyroxine (SYNTHROID/LEVOTHROID) 125 MCG tablet     methotrexate 2.5 MG tablet CHEMO     methylphenidate (METADATE CD) 20 MG CR capsule     Minoxidil (ROGAINE EX)     mometasone-formoterol (DULERA) 100-5 MCG/ACT oral inhaler     montelukast (SINGULAIR) 5 MG chewable tablet     Multiple Vitamin (MULTIVITAMIN OR)     NOVOLOG PENFILL 100 UNIT/ML soln     simvastatin (ZOCOR) 20 MG tablet     traZODone (DESYREL) 50 MG tablet     Current Facility-Administered Medications   Medication     betamethasone acet & sod phos (CELESTONE) injection 6 mg     Allergies   Allergen Reactions     Sulfasalazine      Developed rash, HA, dizziness     Social History     Social History     Marital status: Single      Spouse name: N/A     Number of children: 0     Years of education: N/A     Occupational History     research [employer]     Social History Main Topics     Smoking status: Never Smoker     Smokeless tobacco: Never Used     Alcohol use 0.0 oz/week     0 Standard drinks or equivalent per week      Comment: moderately     Drug use: No     Sexual activity: Not on file     Other Topics Concern     Parent/Sibling W/ Cabg, Mi Or Angioplasty Before 65f 55m? Yes     Social History Narrative     Past Medical History:   Diagnosis Date     Anemia      Anxiety      Arthritis 2014    Psoriatic arthritis     Back injury 1988     Dry eye syndrome      Dyslipidemia      Endometriosis 2002    adehsions seen at laparoscopy     GERD (gastroesophageal reflux disease)      Hypothyroidism     8 yoa     SOB (shortness of breath) 3/11/2014     Type I (juvenile type) diabetes mellitus without mention of complication, not stated as uncontrolled     when 15      Uncomplicated asthma Fall 2013     ROS Pulmonary  Dyspnea: No, Cough: No, Chest pain: Yes, Wheezing: No, Sputum Production: No, Hemoptysis: No  A complete ROS was otherwise negative except as noted in the HPI.  BP (P) 112/75 (BP Location: Right arm, Patient Position: Chair, Cuff Size: Adult Regular)  Pulse (P) 67  Resp (P) 16  SpO2 (P) 98%  Exam:   GENERAL APPEARANCE: Well developed, well nourished, alert, and in no apparent distress.  EYES: PERRL, EOMI  HENT: Nasal mucosa with no edema and no hyperemia. No nasal polyps.  EARS: Canals clear, TMs normal  MOUTH: Oral mucosa is moist, without any lesions, no tonsillar enlargement, no oropharyngeal exudate.  NECK: supple, no masses, no thyromegaly.   LYMPHATICS: no palpable lymphadenopathy  RESP: normal percussion, good air flow throughout.  No crackles. No rhonchi. No wheezes.  CV: Normal S1, S2, regular rhythm, normal rate. No murmur.  No rub. No gallop. No LE edema.   ABDOMEN:  Bowel sounds normal, soft, nontender, no HSM  or masses.   MS: extremities normal. No clubbing. No cyanosis.  SKIN: no rash on limited exam  NEURO: Mentation intact, speech normal, normal strength and tone, normal gait and stance  PSYCH: mentation appears normal. and affect normal/bright  Results:  Stress echo 12/30/14 normal  PFTs normal 3/14/16     Assessment and plan: Dali Martines is a 47-year-old female with type 1 diabetes who was clinically diagnosed with asthma as an adult.  She has been intermittently sedentary due to repeated lower extremity ankle injury, surgery, poor wound healing  1.  Asthma.  Mild intermittent asthma. Well controlled.   Up to date on vaccinations.    - She will continue Dulera 1 puff twice a day increase as needed  - Singulair for asthma maintenance, 5mg because she has noticed constipation with full dose      I will see her back in clinic in 6 months.

## 2018-08-17 ENCOUNTER — RADIANT APPOINTMENT (OUTPATIENT)
Dept: MRI IMAGING | Facility: CLINIC | Age: 48
End: 2018-08-17
Attending: ORTHOPAEDIC SURGERY
Payer: COMMERCIAL

## 2018-08-17 DIAGNOSIS — M65.311 TRIGGER FINGER OF RIGHT THUMB: ICD-10-CM

## 2018-08-17 RX ORDER — GADOBUTROL 604.72 MG/ML
7.5 INJECTION INTRAVENOUS ONCE
Status: COMPLETED | OUTPATIENT
Start: 2018-08-17 | End: 2018-08-17

## 2018-08-17 RX ADMIN — GADOBUTROL 7.5 ML: 604.72 INJECTION INTRAVENOUS at 18:51

## 2018-08-17 NOTE — DISCHARGE INSTRUCTIONS
MRI Contrast Discharge Instructions    The IV contrast you received today will pass out of your body in your  urine. This will happen in the next 24 hours. You will not feel this process.  Your urine will not change color.    Drink at least 4 extra glasses of water or juice today (unless your doctor  has restricted your fluids). This reduces the stress on your kidneys.  You may take your regular medicines.    If you are on dialysis: It is best to have dialysis today.    If you have a reaction: Most reactions happen right away. If you have  any new symptoms after leaving the hospital (such as hives or swelling),  call your hospital at the correct number below. Or call your family doctor.  If you have breathing distress or wheezing, call 911.    Special instructions: ***    I have read and understand the above information.    Signature:______________________________________ Date:___________    Staff:__________________________________________ Date:___________     Time:__________    [facility] Departments:    ___[place]: [phone]  ___[facility]: [phone]  ___[place]: [phone] ___[facility]: [phone]  ___[facility]: [phone]  ___[facility]: [phone]  ___[phone]  ___[facility]: [phone]  ___[phone]

## 2018-08-23 ENCOUNTER — OFFICE VISIT (OUTPATIENT)
Dept: OPHTHALMOLOGY | Facility: CLINIC | Age: 48
End: 2018-08-23
Attending: OPHTHALMOLOGY
Payer: COMMERCIAL

## 2018-08-23 DIAGNOSIS — E10.3291 TYPE 1 DIABETES MELLITUS WITH MILD NONPROLIFERATIVE RETINOPATHY OF RIGHT EYE WITHOUT MACULAR EDEMA (H): Primary | ICD-10-CM

## 2018-08-23 DIAGNOSIS — H40.003 GLAUCOMA SUSPECT OF BOTH EYES: ICD-10-CM

## 2018-08-23 DIAGNOSIS — E10.3291 TYPE 1 DIABETES MELLITUS WITH MILD NONPROLIFERATIVE RETINOPATHY OF RIGHT EYE WITHOUT MACULAR EDEMA (H): ICD-10-CM

## 2018-08-23 PROCEDURE — G0463 HOSPITAL OUTPT CLINIC VISIT: HCPCS | Mod: ZF

## 2018-08-23 PROCEDURE — 92134 CPTRZ OPH DX IMG PST SGM RTA: CPT | Mod: ZF | Performed by: OPHTHALMOLOGY

## 2018-08-23 PROCEDURE — 92133 CPTRZD OPH DX IMG PST SGM ON: CPT | Mod: ZF | Performed by: OPHTHALMOLOGY

## 2018-08-23 PROCEDURE — 92083 EXTENDED VISUAL FIELD XM: CPT | Mod: ZF | Performed by: OPHTHALMOLOGY

## 2018-08-23 ASSESSMENT — VISUAL ACUITY
OD_SC+: -1
OS_SC: 20/20
OS_SC+: -1
OD_SC: 20/20
METHOD: SNELLEN - LINEAR

## 2018-08-23 ASSESSMENT — TONOMETRY
OD_IOP_MMHG: 15
IOP_METHOD: TONOPEN
OS_IOP_MMHG: 15

## 2018-08-23 ASSESSMENT — EXTERNAL EXAM - RIGHT EYE: OD_EXAM: NORMAL

## 2018-08-23 ASSESSMENT — SLIT LAMP EXAM - LIDS
COMMENTS: NORMAL
COMMENTS: NORMAL

## 2018-08-23 ASSESSMENT — EXTERNAL EXAM - LEFT EYE: OS_EXAM: NORMAL

## 2018-08-23 ASSESSMENT — CUP TO DISC RATIO
OS_RATIO: 0.6
OD_RATIO: 0.4

## 2018-08-23 ASSESSMENT — CONF VISUAL FIELD
OD_NORMAL: 1
OS_NORMAL: 1

## 2018-08-23 NOTE — MR AVS SNAPSHOT
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      Your next 10 appointments already scheduled     Aug 25, 2018 11:10 AM CDT   LAKISHA Extremity with Chanelle Muniz PT   [facility] ([place]  )    [address]  [facility] [id number] [phone]            Nov 20, 2018  2:30 PM CST   (Arrive by 2:15 PM)   Return Visit with Yoel Betancourt MD   [facility] ([facility])    [address] [place] [address]  [address]  [facility] [address] [phone]            Dec 18, 2018  3:00 PM CST   (Arrive by 2:45 PM)   RETURN DIABETES with Felicia Mckeon MD   [facility] Endocrin[facility] ([facility])    [address] [place] [address]  3rd Floor  [facility] [address] [phone]            Feb 19, 2019  3:30 PM CST   (Arrive by 3:15 PM)   Return Visit with Sara Carter MD   [facility] ([facility])    [address] [place] [address]  [address]  [facility] [address] [phone]              Who to contact     Please call your clinic at [phone] to:    Ask questions about your health    Make or cancel appointments    Discuss your medicines    Learn about your test results    Speak to your doctor            Additional Information About Your Visit        MyChart Information     MyChart gives you secure access to your  electronic health record. If you see a primary care provider, you can also send messages to your care team and make appointments. If you have questions, please call your primary care clinic.  If you do not have a primary care provider, please call [phone] and they will assist you.      Glowbl is an electronic gateway that provides easy, online access to your medical records. With Glowbl, you can request a clinic appointment, read your test results, renew a prescription or communicate with your care team.     To access your existing account, please contact your [facility] or call [phone] for assistance.        Care EveryWhere ID     This is your Care EveryWhere ID. This could be used by other organizations to access your [place] medical records  [id number]         Blood Pressure from Last 3 Encounters:   08/14/18 (P) 112/75   06/12/18 124/78   05/24/18 127/85    Weight from Last 3 Encounters:   06/12/18 81.2 kg (179 lb 1.6 oz)   05/24/18 83 kg (183 lb)   05/15/18 83.4 kg (183 lb 14.4 oz)              We Performed the Following     OCT Optic Nerve RNFL Spectralis OU (both eyes)     OCT Retina Spectralis OU (both eyes)     OVF 24-2 Dynamic OU        Primary Care Provider Office Phone # Fax #    Dimitri CARVER MD [facility] [phone] [address] [address]        Equal Access to Services     DALIA MOORE : Hadii aad ku hadasho Soomaali, waaxda luqadaha, qaybta kaalmada adeegyada, kaitlin garrison . So [facility] [phone].    ATENCIÓN: Si habla español, tiene a ramsey disposición servicios gratuitos de asistencia lingüística. Llame al [phone].    We comply with applicable federal civil rights laws and [place] laws. We do not discriminate on the basis of race, color, national origin, age, disability, sex, sexual orientation, or gender identity.            Thank you!     Thank you for choosing EYE CLINIC  for your  care. Our goal is always to provide you with excellent care. Hearing back from our patients is one way we can continue to improve our services. Please take a few minutes to complete the written survey that you may receive in the mail after your visit with us. Thank you!             Your Updated Medication List - Protect others around you: Learn how to safely use, store and throw away your medicines at www.disposemymeds.org.          This list is accurate as of 8/23/18  4:29 PM.  Always use your most recent med list.                   Brand Name Dispense Instructions for use Diagnosis    aspirin 81 MG tablet      Take 1 tablet by mouth daily.        blood glucose monitoring lancets     4 Box    Use to test blood sugar 8 times daily or as directed.    Type 1 diabetes, HbA1c goal < 7% (H)       blood glucose monitoring test strip    ACCU-CHEK COLIN PLUS    700 strip    Test Blood Sugar 8 times daily or as directed    Type 1 diabetes, HbA1c goal < 7% (H)       CALCIUM + D PO      Take one daily        citalopram 20 MG tablet    celeXA    135 tablet    Take 1.5 tablets (30 mg) by mouth daily    Anxiety       diclofenac 1 % Gel topical gel    VOLTAREN    100 g    APPLY 2 GRAMS ONTO THE SKIN FOUR TIMES DAILY AS NEEDED FOR MODERATE PAIN    Enthesopathy, Pain in joint, multiple sites, Chronic pain of right ankle       dulaglutide 1.5 MG/0.5ML pen    TRULICITY    6 mL    Inject 1.5 mg Subcutaneous every 7 days    Type 1 diabetes mellitus without complication (H)       econazole nitrate 1 % cream      Apply 0.5 inches topically daily.        fish oil-omega-3 fatty acids 1000 MG capsule      Take one daily        folic acid 1 MG tablet    FOLVITE    90 tablet    Take 1 tablet (1 mg) by mouth daily    Pain in joint, multiple sites, Tendinitis, Encounter for therapeutic drug monitoring       insulin degludec 100 UNIT/ML pen    TRESIBA    15 mL    Inject 18 Units Subcutaneous daily    Type 1 diabetes mellitus without complication  (H)       insulin pen needle 31G X 8 MM    B-D U/F    450 each    Use 5 pen needles daily    Diabetes mellitus type I (H)       levothyroxine 125 MCG tablet    SYNTHROID/LEVOTHROID    90 tablet    Take 1 tablet (125 mcg) by mouth daily    Hypothyroidism       methotrexate 2.5 MG tablet CHEMO     48 tablet    Take 4 tablets (10 mg) by mouth once a week    Pain in joint, multiple sites, Tendinitis, NLD (necrobiosis lipoidica diabeticorum) (H)       methylphenidate 20 MG CR capsule    METADATE CD     Take 20 mg by mouth daily        mometasone-formoterol 100-5 MCG/ACT oral inhaler    DULERA    3 Inhaler    Inhale 2 puffs into the lungs 2 times daily    Moderate persistent asthma without complication       montelukast 5 MG chewable tablet    SINGULAIR    30 tablet    CHEW AND SWALLOW 1 TABLET(5 MG) BY MOUTH AT BEDTIME    Mild persistent asthma without complication       MULTIVITAMIN PO      Take  by mouth. Take one daily tab        NIZORAL PO      Take  by mouth. Shampoo daily        NovoLOG PENFILL 100 UNIT/ML injection   Generic drug:  insulin aspart     30 mL    1 unit per 15 grams CHO at all meals and snacks with correction scale of 1 unit per 50 mg/dL over 150.  Average daily use is 20 units.    Type 1 diabetes, HbA1c goal < 7% (H)       ROGAINE EX      Externally apply  topically. 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    Insomnia

## 2018-08-23 NOTE — NURSING NOTE
Chief Complaints and History of Present Illnesses   Patient presents with     Follow Up For     Type 1 diabetes mellitus with mild nonproliferative retinopa...     HPI    Affected eye(s):  Both   Symptoms:     No blurred vision   No decreased vision   No floaters   No flashes      Frequency:  Constant       Do you have eye pain now?:  No      Comments:  States va is the same since last visit  using no drops  Lab Results       A1C                      6.4                  6/2018                     A1C                      7.2                 09/16/2013                 A1C                      7.6                 06/04/2013                 A1C                      6.8                 02/15/2013                 A1C                      7.4                 12/19/2012                 A1C                      7.4                 09/10/2012            BS: 120-140      Michael Stephens  2:52 PM August 23, 2018

## 2018-08-23 NOTE — PROGRESS NOTES
CC Diabetes follow up , glaucoma suspect    HPI: Dali Martines is a 47 year old female with the following ophthalmologic problems:    Here for diabetic retinopathy check.   Last HbA1c 6.4 on 6-12-18. Wearing glasses for reading only.     Optical Coherence Tomography 8.23.18  Right eye - normal foveal contour; normal NFL  Left eye - normal foveal contour; normal nerve fiber layer     OVF 8.23.18  right eye normal; left eye: small focal point of decreased sensitivity     Optical Coherence Tomography retinal nerve fiber layer 8-23-18  Normal OU    ASSESSMENT/PLAN    1. DM with history of mild nonproliferative diabetic retinopathy right eye   No Diabetic retinopathy left eye   - Blood pressure (<120/80) and blood glucose (HbA1c 7.2) control discussed with patient.     2. H/o TIA-like vision loss RE in ~2008  - eval by Dr Schaffer felt to be migraine related rather than embolic or inflammatory    3. C:D asymmetry  - intraocular pressure within normal limits, Optical Coherence Tomography retinal nerve fiber layer without thinning, visual field 24-2 within normal limits   Plan for optic nerve Optical Coherence Tomography nerve fiber layer with OVF24-2 every other year or as needed     return to retina clinic: 1 yr with dilated exam and prescription     ~~~~~~~~~~~~~~~~~~~~~~~~~~~~~~~~~~   Complete documentation of historical and exam elements from today's encounter can be found in the full encounter summary report (not reduplicated in this progress note).  I personally obtained the chief complaint(s) and history of present illness.  I confirmed and edited as necessary the review of systems, past medical/surgical history, family history, social history, and examination findings as documented by others; and I examined the patient myself.  I personally reviewed the relevant tests, images, and reports as documented above.  I personally reviewed the ophthalmic test(s) associated with this encounter, agree with the  interpretation(s) as documented by the resident/fellow, and have edited the corresponding report(s) as necessary.   I formulated and edited as necessary the assessment and plan and discussed the findings and management plan with the patient and family    Naz Lira MD  .  Retina Service   Department of Ophthalmology and Visual Neurosciences   [facility]  Phone: [phone]   Fax: [phone]

## 2018-08-23 NOTE — LETTER
8/23/2018       RE: Dali Martines  [address]  [facility] [address]     Dear Colleague,    Thank you for referring your patient, Dali Martines, to the EYE CLINIC at [facility]. Please see a copy of my visit note below.    CC Diabetes follow up , glaucoma suspect    HPI: Dali Martines is a 47 year old female with the following ophthalmologic problems:    Here for diabetic retinopathy check.   Last HbA1c 6.4 on 6-12-18. Wearing glasses for reading only.     Optical Coherence Tomography 8.23.18  Right eye - normal foveal contour; normal NFL  Left eye - normal foveal contour; normal nerve fiber layer     OVF 8.23.18  right eye normal; left eye: small focal point of decreased sensitivity     Optical Coherence Tomography retinal nerve fiber layer 8-23-18  Normal OU    ASSESSMENT/PLAN    1. DM with history of mild nonproliferative diabetic retinopathy right eye   No Diabetic retinopathy left eye   - Blood pressure (<120/80) and blood glucose (HbA1c 7.2) control discussed with patient.     2. H/o TIA-like vision loss RE in ~2008  - eval by Dr Schaffer felt to be migraine related rather than embolic or inflammatory    3. C:D asymmetry  - intraocular pressure within normal limits, Optical Coherence Tomography retinal nerve fiber layer without thinning, visual field 24-2 within normal limits   Plan for optic nerve Optical Coherence Tomography nerve fiber layer with OVF24-2 every other year or as needed     return to retina clinic: 1 yr with dilated exam and prescription     ~~~~~~~~~~~~~~~~~~~~~~~~~~~~~~~~~~   Complete documentation of historical and exam elements from today's encounter can be found in the full encounter summary report (not reduplicated in this progress note).  I personally obtained the chief complaint(s) and history of present illness.  I confirmed and edited as necessary the review of systems, past medical/surgical history, family history, social history, and  examination findings as documented by others; and I examined the patient myself.  I personally reviewed the relevant tests, images, and reports as documented above.  I personally reviewed the ophthalmic test(s) associated with this encounter, agree with the interpretation(s) as documented by the resident/fellow, and have edited the corresponding report(s) as necessary.   I formulated and edited as necessary the assessment and plan and discussed the findings and management plan with the patient and family    Naz Lira MD  .  Retina Service   Department of Ophthalmology and Visual Neurosciences   [facility]  Phone: [phone]   Fax: [phone]         Again, thank you for allowing me to participate in the care of your patient.      Sincerely,    Naz Lira MD

## 2018-08-25 ENCOUNTER — THERAPY VISIT (OUTPATIENT)
Dept: PHYSICAL THERAPY | Facility: CLINIC | Age: 48
End: 2018-08-25
Payer: COMMERCIAL

## 2018-08-25 DIAGNOSIS — M25.571 PAIN IN JOINT INVOLVING ANKLE AND FOOT, RIGHT: ICD-10-CM

## 2018-08-25 DIAGNOSIS — R60.9 EDEMA: ICD-10-CM

## 2018-08-25 PROCEDURE — 97140 MANUAL THERAPY 1/> REGIONS: CPT | Mod: GP | Performed by: PHYSICAL THERAPIST

## 2018-08-25 PROCEDURE — 97110 THERAPEUTIC EXERCISES: CPT | Mod: GP | Performed by: PHYSICAL THERAPIST

## 2018-08-25 NOTE — MR AVS SNAPSHOT
After Visit Summary   8/25/2018    Dali Martines    MRN: [id number]           Patient Information     Date Of Birth          1970        Visit Information        Provider Department      8/25/2018 11:10 AM Chanelle Muniz PT [facility]        Today's Diagnoses     Edema        Pain in joint involving ankle and foot, right           Follow-ups after your visit        Your next 10 appointments already scheduled     Sep 22, 2018 11:10 AM CDT   LAKIS[place] Extremity with Chanelle Muniz PT   [facility] ([place] [place]  )    [address]  [facility] [address]   [phone]            Nov 20, 2018  2:30 PM CST   (Arrive by 2:15 PM)   Return Visit with Yoel Betancourt MD   [facility] ([facility])    [address]  [address]  [facility] [id number] [phone]            Dec 18, 2018  3:00 PM CST   (Arrive by 2:45 PM)   RETURN DIABETES with Felicia Mckeon MD   [facility]in[facility] ([facility])    [address]  3rd Floor  [facility] [address] [phone]            Feb 19, 2019  3:30 PM CST   (Arrive by 3:15 PM)   Return Visit with Sara Carter MD   [facility] ([facility])    [address]  [address]  [facility] [address] [phone]              Who to contact     If you have questions or need follow up information about today's clinic visit or your schedule please contact [facility] directly at [phone].  Normal or non-critical lab and imaging results will be communicated to you by MyChart, letter or phone within 4 business days after the clinic has received the results. If you do not hear from us within 7 days, please contact the clinic through MyChart or phone. If you have a  critical or abnormal lab result, we will notify you by phone as soon as possible.  Submit refill requests through [employer] or call your pharmacy and they will forward the refill request to us. Please allow 3 business days for your refill to be completed.          Additional Information About Your Visit        SOHMhart Information     Prixtel gives you secure access to your electronic health record. If you see a primary care provider, you can also send messages to your care team and make appointments. If you have questions, please call your primary care clinic.  If you do not have a primary care provider, please call [phone] and they will assist you.        Care EveryWhere ID     This is your Care EveryWhere ID. This could be used by other organizations to access your [place] medical records  [id number]         Blood Pressure from Last 3 Encounters:   08/14/18 (P) 112/75   06/12/18 124/78   05/24/18 127/85    Weight from Last 3 Encounters:   06/12/18 81.2 kg (179 lb 1.6 oz)   05/24/18 83 kg (183 lb)   05/15/18 83.4 kg (183 lb 14.4 oz)              We Performed the Following     LA[facility]ISHA PROGRESS NOTES REPORT     MANUAL THER TECH,1+REGIONS,EA 15 MIN     THERAPEUTIC EXERCISES        Primary Care Provider Office Phone # Fax #    Dimitri WEN MD [facility] [phone]       [address] [address]        Equal Access to Services     DALIA MOORE : Hadii aad ku hadasho Soomaali, waaxda luqadaha, qaybta kaalmada jemal, kaitlin owusu. So [facility] [phone].    ATENCIÓN: Si habla español, tiene a ramsey disposición servicios gratuitos de asistencia lingüística. Juanito al [phone].    We comply with applicable federal civil rights laws and [place] laws. We do not discriminate on the basis of race, color, national origin, age, disability, sex, sexual orientation, or gender identity.            Thank you!     Thank you for choosing [facility]  for your care. Our goal is always to provide you with excellent care. Hearing back from our patients is one way we can continue to improve our services. Please take a few minutes to complete the written survey that you may receive in the mail after your visit with us. Thank you!             Your Updated Medication List - Protect others around you: Learn how to safely use, store and throw away your medicines at www.disposemymeds.org.          This list is accurate as of 8/25/18 12:07 PM.  Always use your most recent med list.                   Brand Name Dispense Instructions for use Diagnosis    aspirin 81 MG tablet      Take 1 tablet by mouth daily.        blood glucose monitoring lancets     4 Box    Use to test blood sugar 8 times daily or as directed.    Type 1 diabetes, HbA1c goal < 7% (H)       blood glucose monitoring test strip    ACCU-CHEK COLIN PLUS    700 strip    Test Blood Sugar 8 times daily or as directed    Type 1 diabetes, HbA1c goal < 7% (H)       CALCIUM + D PO      Take one daily        citalopram 20 MG tablet    celeXA    135 tablet    Take 1.5 tablets (30 mg) by mouth daily    Anxiety       diclofenac 1 % Gel topical gel    VOLTAREN    100 g    APPLY 2 GRAMS ONTO THE SKIN FOUR TIMES DAILY AS NEEDED FOR MODERATE PAIN    Enthesopathy, Pain in joint, multiple sites, Chronic pain of right ankle       dulaglutide 1.5 MG/0.5ML pen    TRULICITY    6 mL    Inject 1.5 mg Subcutaneous every 7 days    Type 1 diabetes mellitus without complication (H)       econazole nitrate 1 % cream      Apply 0.5 inches topically daily.        fish oil-omega-3 fatty acids 1000 MG capsule      Take one daily        folic acid 1 MG tablet    FOLVITE    90 tablet    Take 1 tablet (1 mg) by mouth daily    Pain in joint, multiple sites, Tendinitis, Encounter for therapeutic drug monitoring       insulin degludec 100 UNIT/ML pen    TRESIBA    15 mL    Inject 18 Units Subcutaneous daily    Type 1  diabetes mellitus without complication (H)       insulin pen needle 31G X 8 MM    B-D U/F    450 each    Use 5 pen needles daily    Diabetes mellitus type I (H)       levothyroxine 125 MCG tablet    SYNTHROID/LEVOTHROID    90 tablet    Take 1 tablet (125 mcg) by mouth daily    Hypothyroidism       methotrexate 2.5 MG tablet CHEMO     48 tablet    Take 4 tablets (10 mg) by mouth once a week    Pain in joint, multiple sites, Tendinitis, NLD (necrobiosis lipoidica diabeticorum) (H)       methylphenidate 20 MG CR capsule    METADATE CD     Take 20 mg by mouth daily        mometasone-formoterol 100-5 MCG/ACT oral inhaler    DULERA    3 Inhaler    Inhale 2 puffs into the lungs 2 times daily    Moderate persistent asthma without complication       montelukast 5 MG chewable tablet    SINGULAIR    30 tablet    CHEW AND SWALLOW 1 TABLET(5 MG) BY MOUTH AT BEDTIME    Mild persistent asthma without complication       MULTIVITAMIN PO      Take  by mouth. Take one daily tab        NIZORAL PO      Take  by mouth. Shampoo daily        NovoLOG PENFILL 100 UNIT/ML injection   Generic drug:  insulin aspart     30 mL    1 unit per 15 grams CHO at all meals and snacks with correction scale of 1 unit per 50 mg/dL over 150.  Average daily use is 20 units.    Type 1 diabetes, HbA1c goal < 7% (H)       ROGAINE EX      Externally apply  topically. 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    Insomnia

## 2018-08-25 NOTE — PROGRESS NOTES
"Subjective:  HPI                    Objective:  System    Physical Exam    General     ROS    Assessment/Plan:    PROGRESS  REPORT    Progress reporting period is from 7/28/18 to 8/25/18.       SUBJECTIVE  Subjective changes noted by patient:   Patient reports she had a set back 1.5 wks ago.  She ended up having to walk 4 blocks and at a faster pace than she's use to.  After that, she has had consistent swelling and poor balance.  She had increased pain as well, but pain has calmed down and balance has improved over the past 10 days. Swelling has not, though.  Her endurance with peroneal strengthening has improved, but continues to flare up the nerves at lateral foot while doing it.       Current Pain level:  (0-4/10).      Initial Pain level:  (3-8/10).   Changes in function:  Yes (See Goal flowsheet attached for changes in current functional level)  Adverse reaction to treatment or activity: activity - walking a normal pace for 4 blocks    OBJECTIVE  Changes noted in objective findings:  Yes,     R ankle AROM: DF 6, PF 70, IV 32, EV 11.    Strength: all 5/5 except PF/EV 4/5 and PF 4+/5.   SLS 30\" easily now, but hard on uneven surface or with eyes closed.    A pocket of fluid evident at location of ATFligament.    NOt tender at distal peroneal today.     ASSESSMENT/PLAN  Updated problem list and treatment plan: Diagnosis 1:  S/p PRP injection 4/11/18 with chronic R ankle pain due to tear of ATF and peroneal brevis  Pain -  manual therapy, self management, education and home program  Decreased strength - therapeutic exercise, therapeutic activities and home program  Impaired balance - neuro re-education, therapeutic activities and home program  Impaired gait - gait training and home program  Impaired muscle performance - neuro re-education and home program  Decreased function - therapeutic activities and home program  STG/LTGs have been met or progress has been made towards goals:  Yes (See Goal flow sheet " completed today.)  Assessment of Progress: The patient's condition is improving.  The patient has had set backs in their progress.  Self Management Plans:  Patient has been instructed in a home treatment program.  Patient  has been instructed in self management of symptoms.  I have re-evaluated this patient and find that the nature, scope, duration and intensity of the therapy is appropriate for the medical condition of the patient.  Dali continues to require the following intervention to meet STG and LTG's:  PT    Recommendations:  This patient would benefit from continued therapy.     Frequency:  1 X a month, once daily  Duration:  for 2-3 months        Please refer to the daily flowsheet for treatment today, total treatment time and time spent performing 1:1 timed codes.

## 2018-08-29 DIAGNOSIS — J45.30 MILD PERSISTENT ASTHMA WITHOUT COMPLICATION: ICD-10-CM

## 2018-08-29 RX ORDER — MONTELUKAST SODIUM 5 MG/1
TABLET, CHEWABLE ORAL
Qty: 30 TABLET | Refills: 3 | Status: SHIPPED | OUTPATIENT
Start: 2018-08-29 | End: 2018-12-23

## 2018-09-07 DIAGNOSIS — Z51.81 ENCOUNTER FOR THERAPEUTIC DRUG MONITORING: ICD-10-CM

## 2018-09-07 DIAGNOSIS — M25.50 PAIN IN JOINT, MULTIPLE SITES: ICD-10-CM

## 2018-09-07 DIAGNOSIS — M77.9 TENDINITIS: ICD-10-CM

## 2018-09-07 RX ORDER — FOLIC ACID 1 MG/1
1 TABLET ORAL DAILY
Qty: 90 TABLET | Refills: 3 | Status: SHIPPED | OUTPATIENT
Start: 2018-09-07 | End: 2019-10-08

## 2018-09-22 ENCOUNTER — THERAPY VISIT (OUTPATIENT)
Dept: PHYSICAL THERAPY | Facility: CLINIC | Age: 48
End: 2018-09-22
Payer: COMMERCIAL

## 2018-09-22 DIAGNOSIS — M25.571 PAIN IN JOINT INVOLVING ANKLE AND FOOT, RIGHT: ICD-10-CM

## 2018-09-22 DIAGNOSIS — R60.9 EDEMA: ICD-10-CM

## 2018-09-22 PROCEDURE — 97110 THERAPEUTIC EXERCISES: CPT | Mod: GP | Performed by: PHYSICAL THERAPIST

## 2018-09-22 PROCEDURE — 97140 MANUAL THERAPY 1/> REGIONS: CPT | Mod: GP | Performed by: PHYSICAL THERAPIST

## 2018-10-30 ENCOUNTER — THERAPY VISIT (OUTPATIENT)
Dept: PHYSICAL THERAPY | Facility: CLINIC | Age: 48
End: 2018-10-30
Payer: COMMERCIAL

## 2018-10-30 DIAGNOSIS — M25.571 PAIN IN JOINT INVOLVING ANKLE AND FOOT, RIGHT: ICD-10-CM

## 2018-10-30 DIAGNOSIS — R60.9 EDEMA: ICD-10-CM

## 2018-10-30 PROCEDURE — 97112 NEUROMUSCULAR REEDUCATION: CPT | Mod: GP | Performed by: PHYSICAL THERAPIST

## 2018-10-30 PROCEDURE — 97140 MANUAL THERAPY 1/> REGIONS: CPT | Mod: GP | Performed by: PHYSICAL THERAPIST

## 2018-10-30 PROCEDURE — 97110 THERAPEUTIC EXERCISES: CPT | Mod: GP | Performed by: PHYSICAL THERAPIST

## 2018-10-30 NOTE — PROGRESS NOTES
"Subjective:  HPI                    Objective:  System    Physical Exam    General     ROS    Assessment/Plan:    PROGRESS  REPORT    Progress reporting period is from 8/25/18 to 10/30/18.       SUBJECTIVE  Subjective changes noted by patient:  About 1 week ago, stepped out of a store on an uneven piece of sidewalk and the ankle rolled and she got alot of pain again.  She had to back off on her exercises for about 5 days, but has returned the past 2 days.  Her balance is still recovering from this, as is the swelling and strength.  Prior to that, it was the typical very slow improvement.  She was walking daily, but if she rushes, she has pain.  If she goes slowly, no problem at all.  THe pain comes on after.    No longer getting the nerve pain at lateral foot and is happy that she's feeling stronger   Is considering looking into a second PRP injection as she's happy with the improvement she made from the first one, but would like to be able to walk more/faster and drive more without pain/limitation.     Current Pain level:  (0-3/10).      Initial Pain level:  (3-8/10).   Changes in function:  Yes, but has many set backs.  Overall, improving though.  Adverse reaction to treatment or activity: activity - lots of driving, rolling ankle, having to walk fast    OBJECTIVE  Changes noted in objective findings:  Yes,     R ankle AROM: DF 5, PF 80, IV 28, EV 11.    R ankle strength: all 5/5 except PF/EV 4+/5 and PF 4+/5.    Swelling pocket noted at ATF which is always there, but today there was one at distal peroneal.  Hypomobile posterior TC mobility.    SLS 30\" with EO, 11\"L and 7\"R with EC.       ASSESSMENT/PLAN  Updated problem list and treatment plan: Diagnosis 1:  S/p PRP injection 4/11/18 with chronic R ankle pain due to tear of ATF and peroneal brevis  Pain -  manual therapy, self management, education and home program  Decreased ROM/flexibility - manual therapy, therapeutic exercise and home program  Decreased joint " mobility - manual therapy, therapeutic exercise and home program  Decreased strength - therapeutic exercise, therapeutic activities and home program  Impaired balance - neuro re-education, therapeutic activities and home program  Impaired gait - gait training and home program  Impaired muscle performance - neuro re-education and home program  Decreased function - therapeutic activities and home program  STG/LTGs have been met or progress has been made towards goals:  Yes (See Goal flow sheet completed today.)  Assessment of Progress: The patient's condition is improving.  The patient has had set backs in their progress.  Self Management Plans:  Patient has been instructed in a home treatment program.  Patient  has been instructed in self management of symptoms.  I have re-evaluated this patient and find that the nature, scope, duration and intensity of the therapy is appropriate for the medical condition of the patient.  Dali continues to require the following intervention to meet STG and LTG's:  PT    Recommendations:  This patient would benefit from continued therapy.     Frequency:  1 X a month, once daily  Duration:  for 2 months        Please refer to the daily flowsheet for treatment today, total treatment time and time spent performing 1:1 timed codes.

## 2018-10-30 NOTE — MR AVS SNAPSHOT
After Visit Summary   10/30/2018    Dali Martines    MRN: [id number]           Patient Information     Date Of Birth          1970        Visit Information        Provider Department      10/30/2018 9:00 AM Chanelle Muniz PT [place] [facility]        Today's Diagnoses     Edema        Pain in joint involving ankle and foot, right           Follow-ups after your visit        Your next 10 appointments already scheduled     Nov 17, 2018 11:10 AM CST   [facility] Extremity with Chanelle Muniz PT   [facility] Physical Therapy ([place]  )    [address]  [place] [address] [phone]            Nov 20, 2018  2:30 PM CST   (Arrive by 2:15 PM)   Return Visit with Yoel Betancourt MD   [facility] Rheumatology ([facility])    [address]  [address]  [place] [id number] [phone]            Dec 18, 2018  3:00 PM CST   (Arrive by 2:45 PM)   RETURN DIABETES with Felicia Mckeon MD   [facility] Endocrinology ([facility])    [address]  3rd Floor  [place] [id number] [phone]            Feb 19, 2019  3:30 PM CST   (Arrive by 3:15 PM)   Return Visit with Sara Carter MD   [facility] ([facility])    [address]  [address]  [place] [address] [phone]              Who to contact     If you have questions or need follow up information about today's clinic visit or your schedule please contact [place] [facility] PH[facility] THERAP[facility] directly at [phone].  Normal or non-critical lab and imaging results will be communicated to you by MyChart, letter or phone within 4 business days after the clinic has received the results. If you do not hear from us within 7 days, please contact the clinic through MyChart or phone. If you have a  critical or abnormal lab result, we will notify you by phone as soon as possible.  Submit refill requests through [employer] or call your pharmacy and they will forward the refill request to us. Please allow 3 business days for your refill to be completed.          Additional Information About Your Visit        [employer] Information     [employer] gives you secure access to your electronic health record. If you see a primary care provider, you can also send messages to your care team and make appointments. If you have questions, please call your primary care clinic.  If you do not have a primary care provider, please call [phone] and they will assist you.        Care EveryWhere ID     This is your Care EveryWhere ID. This could be used by other organizations to access your [facility] medical records  [id number]         Blood Pressure from Last 3 Encounters:   08/14/18 (P) 112/75   06/12/18 124/78   05/24/18 127/85    Weight from Last 3 Encounters:   06/12/18 81.2 kg (179 lb 1.6 oz)   05/24/18 83 kg (183 lb)   05/15/18 83.4 kg (183 lb 14.4 oz)              We Performed the Following     [facility] PROGRESS NOTES REPORT     MANUAL THER TECH,1+REGIONS,EA 15 MIN     NEUROMUSCULAR RE-EDUCATION     THERAPEUTIC EXERCISES        Primary Care Provider Office Phone # Fax #    Dimitri Alas [phone]       [address] [address]        Equal Access to Services     [facility] : Hadii aad ku hadasho Soomaali, waaxda luqadaha, qaybta kaalmada adeegyada, kaitlin dodd hayravi garrison . So [facility] [phone].    ATENCIÓN: Si habla español, tiene a ramsey disposición servicios gratuitos de asistencia lingüística. Llame al [phone].    We comply with applicable federal civil rights laws and [place] laws. We do not discriminate on the basis of race, color, national origin, age, disability, sex, sexual orientation, or gender identity.            Thank you!     Thank you for choosing  [place] [facility] PHYSICAL THERAP[facility]  for your care. Our goal is always to provide you with excellent care. Hearing back from our patients is one way we can continue to improve our services. Please take a few minutes to complete the written survey that you may receive in the mail after your visit with us. Thank you!             Your Updated Medication List - Protect others around you: Learn how to safely use, store and throw away your medicines at www.disposemymeds.org.          This list is accurate as of 10/30/18  1:46 PM.  Always use your most recent med list.                   Brand Name Dispense Instructions for use Diagnosis    aspirin 81 MG tablet      Take 1 tablet by mouth daily.        blood glucose monitoring lancets     4 Box    Use to test blood sugar 8 times daily or as directed.    Type 1 diabetes, HbA1c goal < 7% (H)       blood glucose monitoring test strip    ACCU-CHEK COLIN PLUS    700 strip    Test Blood Sugar 8 times daily or as directed    Type 1 diabetes, HbA1c goal < 7% (H)       CALCIUM + D PO      Take one daily        citalopram 20 MG tablet    celeXA    135 tablet    Take 1.5 tablets (30 mg) by mouth daily    Anxiety       diclofenac 1 % Gel topical gel    VOLTAREN    100 g    APPLY 2 GRAMS ONTO THE SKIN FOUR TIMES DAILY AS NEEDED FOR MODERATE PAIN    Enthesopathy, Pain in joint, multiple sites, Chronic pain of right ankle       dulaglutide 1.5 MG/0.5ML pen    TRULICITY    6 mL    Inject 1.5 mg Subcutaneous every 7 days    Type 1 diabetes mellitus without complication (H)       econazole nitrate 1 % cream      Apply 0.5 inches topically daily.        fish oil-omega-3 fatty acids 1000 MG capsule      Take one daily        folic acid 1 MG tablet    FOLVITE    90 tablet    Take 1 tablet (1 mg) by mouth daily    Pain in joint, multiple sites, Tendinitis, Encounter for therapeutic drug monitoring       insulin degludec 100 UNIT/ML pen    TRESIBA    15 mL    Inject 18 Units  Subcutaneous daily    Type 1 diabetes mellitus without complication (H)       insulin pen needle 31G X 8 MM    B-D U/F    450 each    Use 5 pen needles daily    Diabetes mellitus type I (H)       levothyroxine 125 MCG tablet    SYNTHROID/LEVOTHROID    90 tablet    Take 1 tablet (125 mcg) by mouth daily    Hypothyroidism       methotrexate 2.5 MG tablet CHEMO     48 tablet    Take 4 tablets (10 mg) by mouth once a week    Pain in joint, multiple sites, Tendinitis, NLD (necrobiosis lipoidica diabeticorum) (H)       methylphenidate 20 MG CR capsule    METADATE CD     Take 20 mg by mouth daily        mometasone-formoterol 100-5 MCG/ACT oral inhaler    DULERA    3 Inhaler    Inhale 2 puffs into the lungs 2 times daily    Moderate persistent asthma without complication       montelukast 5 MG chewable tablet    SINGULAIR    30 tablet    CHEW AND SWALLOW 1 TABLET(5 MG) BY MOUTH AT BEDTIME    Mild persistent asthma without complication       MULTIVITAMIN PO      Take  by mouth. Take one daily tab        NIZORAL PO      Take  by mouth. Shampoo daily        NovoLOG PENFILL 100 UNIT/ML injection   Generic drug:  insulin aspart     30 mL    1 unit per 15 grams CHO at all meals and snacks with correction scale of 1 unit per 50 mg/dL over 150.  Average daily use is 20 units.    Type 1 diabetes, HbA1c goal < 7% (H)       ROGAINE EX      Externally apply  topically. 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    Insomnia

## 2018-11-20 ENCOUNTER — OFFICE VISIT (OUTPATIENT)
Dept: RHEUMATOLOGY | Facility: CLINIC | Age: 48
End: 2018-11-20
Attending: INTERNAL MEDICINE
Payer: COMMERCIAL

## 2018-11-20 VITALS
HEART RATE: 69 BPM | HEIGHT: 70 IN | DIASTOLIC BLOOD PRESSURE: 79 MMHG | WEIGHT: 180.5 LBS | OXYGEN SATURATION: 96 % | BODY MASS INDEX: 25.84 KG/M2 | TEMPERATURE: 98.3 F | SYSTOLIC BLOOD PRESSURE: 123 MMHG

## 2018-11-20 DIAGNOSIS — M25.571 PAIN IN JOINT INVOLVING ANKLE AND FOOT, RIGHT: Primary | ICD-10-CM

## 2018-11-20 DIAGNOSIS — M25.571 CHRONIC PAIN OF RIGHT ANKLE: ICD-10-CM

## 2018-11-20 DIAGNOSIS — L40.50 PSORIASIS WITH ARTHROPATHY (H): ICD-10-CM

## 2018-11-20 DIAGNOSIS — L40.50 PSORIATIC ARTHRITIS (H): ICD-10-CM

## 2018-11-20 DIAGNOSIS — Z79.631 LONG TERM METHOTREXATE USER: ICD-10-CM

## 2018-11-20 DIAGNOSIS — M79.671 RIGHT FOOT PAIN: ICD-10-CM

## 2018-11-20 DIAGNOSIS — Z51.81 MEDICATION MONITORING ENCOUNTER: ICD-10-CM

## 2018-11-20 DIAGNOSIS — M25.50 PAIN IN JOINT, MULTIPLE SITES: ICD-10-CM

## 2018-11-20 DIAGNOSIS — G89.29 CHRONIC PAIN OF RIGHT ANKLE: ICD-10-CM

## 2018-11-20 DIAGNOSIS — Z51.81 ENCOUNTER FOR THERAPEUTIC DRUG MONITORING: ICD-10-CM

## 2018-11-20 DIAGNOSIS — M25.571 PAIN IN JOINT INVOLVING ANKLE AND FOOT, RIGHT: ICD-10-CM

## 2018-11-20 DIAGNOSIS — M77.9 TENDINITIS: ICD-10-CM

## 2018-11-20 LAB
ALBUMIN SERPL-MCNC: 4 G/DL (ref 3.4–5)
ALT SERPL W P-5'-P-CCNC: 29 U/L (ref 0–50)
AST SERPL W P-5'-P-CCNC: 25 U/L (ref 0–45)
BASOPHILS # BLD AUTO: 0 10E9/L (ref 0–0.2)
BASOPHILS NFR BLD AUTO: 0.7 %
CREAT SERPL-MCNC: 0.58 MG/DL (ref 0.52–1.04)
CRP SERPL-MCNC: <2.9 MG/L (ref 0–8)
DIFFERENTIAL METHOD BLD: ABNORMAL
EOSINOPHIL # BLD AUTO: 0.1 10E9/L (ref 0–0.7)
EOSINOPHIL NFR BLD AUTO: 1.3 %
ERYTHROCYTE [DISTWIDTH] IN BLOOD BY AUTOMATED COUNT: 12.7 % (ref 10–15)
GFR SERPL CREATININE-BSD FRML MDRD: >90 ML/MIN/1.7M2
HCT VFR BLD AUTO: 35.7 % (ref 35–47)
HGB BLD-MCNC: 11.9 G/DL (ref 11.7–15.7)
IMM GRANULOCYTES # BLD: 0 10E9/L (ref 0–0.4)
IMM GRANULOCYTES NFR BLD: 0.2 %
LYMPHOCYTES # BLD AUTO: 1.6 10E9/L (ref 0.8–5.3)
LYMPHOCYTES NFR BLD AUTO: 35.2 %
MCH RBC QN AUTO: 31.6 PG (ref 26.5–33)
MCHC RBC AUTO-ENTMCNC: 33.3 G/DL (ref 31.5–36.5)
MCV RBC AUTO: 95 FL (ref 78–100)
MONOCYTES # BLD AUTO: 0.4 10E9/L (ref 0–1.3)
MONOCYTES NFR BLD AUTO: 8.3 %
NEUTROPHILS # BLD AUTO: 2.5 10E9/L (ref 1.6–8.3)
NEUTROPHILS NFR BLD AUTO: 54.3 %
NRBC # BLD AUTO: 0 10*3/UL
NRBC BLD AUTO-RTO: 0 /100
PLATELET # BLD AUTO: 275 10E9/L (ref 150–450)
RBC # BLD AUTO: 3.76 10E12/L (ref 3.8–5.2)
WBC # BLD AUTO: 4.6 10E9/L (ref 4–11)

## 2018-11-20 PROCEDURE — 82040 ASSAY OF SERUM ALBUMIN: CPT | Performed by: INTERNAL MEDICINE

## 2018-11-20 PROCEDURE — 82306 VITAMIN D 25 HYDROXY: CPT | Performed by: INTERNAL MEDICINE

## 2018-11-20 PROCEDURE — 84460 ALANINE AMINO (ALT) (SGPT): CPT | Performed by: INTERNAL MEDICINE

## 2018-11-20 PROCEDURE — 86140 C-REACTIVE PROTEIN: CPT | Performed by: INTERNAL MEDICINE

## 2018-11-20 PROCEDURE — 82565 ASSAY OF CREATININE: CPT | Performed by: INTERNAL MEDICINE

## 2018-11-20 PROCEDURE — G0463 HOSPITAL OUTPT CLINIC VISIT: HCPCS | Mod: ZF

## 2018-11-20 PROCEDURE — 84450 TRANSFERASE (AST) (SGOT): CPT | Performed by: INTERNAL MEDICINE

## 2018-11-20 PROCEDURE — 36415 COLL VENOUS BLD VENIPUNCTURE: CPT | Performed by: INTERNAL MEDICINE

## 2018-11-20 PROCEDURE — 85025 COMPLETE CBC W/AUTO DIFF WBC: CPT | Performed by: INTERNAL MEDICINE

## 2018-11-20 ASSESSMENT — PAIN SCALES - GENERAL: PAINLEVEL: MILD PAIN (3)

## 2018-11-20 NOTE — PROGRESS NOTES
[facility]  Rheumatology Clinic  Yoel Betancourt MD  2018     Name: Dali Martines  MRN: [id number]  Age: 48 year old  : 1970  Referring provider: Dimitri Alas     Assessment and Plan:  # Psoriatic arthritis (H)  # Psoriasis with arthropathy (H)  # Pain in joint, multiple sites  # Chronic pain of right ankle  # Right foot pain  # Pain in joint involving ankle and foot, right  Today the patient reports that she developed increased left hand, predominately left 2nd MCP, pain a couple of months ago. At that point, she increased her methotrexate dose to 12.5 mg, which she tolerates well. Her symptoms have been well controlled on this, however she does notice worsening pain towards the end of the week. Her CRP was normal on the last two labs and x-rays did not show significant damaging changes. However, low grade damage not evident on x-rays is possible, so we discussed that if she is tolerating the methotrexate well, she can increase her dose to 15 mg weekly.     We will check vitamin D level today. She should check how much vitamin D is in her multivitamin and if her levels are low, she should ensure that she is taking 800-1000 IU daily. She has been taking vitamin C daily.     - Vitamin D Deficiency     Follow-up: 6 months     HPI:   Dali Martines is a 48 year old female with a history of type I DM and Hashimoto's thyroiditis who presents for follow up of recurrent stiffness, swelling, and pain in the left hand. I last evaluated the patient on 05/15/2018, at which time impression was psoriatic arthritis. She was continued on her prior dose of methotrexate and topical diclofenac.      Symptoms first occurred in 2008, when she began having stiffness and pain in the left hand, particularly in the PIPs and MCPs. Symptoms spontaneously resolved after about 6-8 weeks. She was previously seen by Rheumatology here around that time (see note from Dr. Betancourt on 2008). Briefly, assessment was  that she had a self-limited episode of inflammatory arthralgias, possibly viral or early psoriatic arthritis. Negative NOLVIA, RF, Lyme serologies and normal CRP and ESR. Given her family history of RA, there was a thought to start her on HCQ if anti-CCP antibodies were positive, but they were found to be negative. No imaging of the hand was performed. September 2014, she experienced recurrent acute onset pain, stiffness, and swelling in the L hand very similar to her symptoms in 2008. Specifically, had swelling across MCPs and in 3rd PIP. She could hardly bend the hand sometimes due to the welling. Had morning stiffness lasting 30-45 minutes. No other joints were involved. Issues with her left hand lingered for several months but eventually self-resolved that January and her only notable symptom is heel pain in the mornings. No fevers, chills, or weight loss. No back pain, vision changes, nausea, vomiting, diarrhea, abdominal pain, or blood in stool or urine. No rash, sicca symptoms, or oral ulcers. It seems she has a history of hypermobility, e.g. could bend wrist back to forearm, bend down and touch palms flat on floor, when she was younger. Used to get frequent ankle sprains, and has had multiple tendon and ligament tears over the years.    Today, the patient reports that in early fall, she developed increased left hand pain, particularly in the left 2nd MCP. She does typically have a flare of joint pain around this time of year. Two months ago, she increased her methotrexate to 12.5 mg weekly. Her symptoms are pretty well controlled on this, although she does note slightly increased pain towards the end of the week. With use, her pain does worsen somewhat. She is tolerating the increased dose well. She has not noticed any change in her ankle symptoms on methotrexate. She did receive a PRP injection earlier this year and following this noticed increased gains in physical therapy for about 2 months. She has  continued working with physical therapy and plans to follow up for another injection. She does have some pain, attributed to tendonitis, in the elbows with computer use but denies any elbow or shoulder joint pain.     She endorses some areas of psoriasis on her legs and around her hairline. She has also not noticed improvement of this with methotrexate.     She has been taking a multivitamin and vitamin C daily. She has received her flu vaccine already.     Review of Systems:   Pertinent items are noted in HPI or as below, remainder of complete ROS is negative.      No recent problems with hearing or vision. No swallowing problems.   No breathing difficulty, shortness of breath, coughing, or wheezing  No chest pain or palpitations  No heart burn, indigestion, abdominal pain, nausea, vomiting, diarrhea  No urination problems, no bloody, cloudy urine, no dysuria  No numbing, tingling, weakness  No headaches or confusion  No easy bleeding or bruising.   + left hand pain   + psoriasis   + ankle pain   + elbow pain     Active Medications:     Current Outpatient Prescriptions:      aspirin 81 MG tablet, Take 1 tablet by mouth daily., Disp: , Rfl:      blood glucose monitoring (ACCU-CHEK COLIN PLUS) test strip, Test Blood Sugar 8 times daily or as directed, Disp: 700 strip, Rfl: 3     blood glucose monitoring (ACCU-CHEK FASTCLIX) lancets, Use to test blood sugar 8 times daily or as directed., Disp: 4 Box, Rfl: 3     Calcium Carbonate-Vitamin D (CALCIUM + D PO), Take one daily, Disp: , Rfl:      citalopram (CELEXA) 20 MG tablet, Take 1.5 tablets (30 mg) by mouth daily, Disp: 135 tablet, Rfl: 1     diclofenac (VOLTAREN) 1 % GEL topical gel, APPLY 2 GRAMS ONTO THE SKIN FOUR TIMES DAILY AS NEEDED FOR MODERATE PAIN, Disp: 100 g, Rfl: 5     dulaglutide (TRULICITY) 1.5 MG/0.5ML pen, Inject 1.5 mg Subcutaneous every 7 days, Disp: 6 mL, Rfl: 3     econazole nitrate 1 % cream, Apply 0.5 inches topically daily., Disp: , Rfl:       fish oil-omega-3 fatty acids (FISH OIL) 1000 MG capsule, Take one daily, Disp: , Rfl:      folic acid (FOLVITE) 1 MG tablet, Take 1 tablet (1 mg) by mouth daily, Disp: 90 tablet, Rfl: 3     insulin degludec (TRESIBA) 100 UNIT/ML pen, Inject 18 Units Subcutaneous daily, Disp: 15 mL, Rfl: 11     insulin pen needle (B-D U/F) 31G X 8 MM, Use 5 pen needles daily, Disp: 450 each, Rfl: 3     Ketoconazole (NIZORAL PO), Take  by mouth. Shampoo daily, Disp: , Rfl:      levothyroxine (SYNTHROID/LEVOTHROID) 125 MCG tablet, Take 1 tablet (125 mcg) by mouth daily, Disp: 90 tablet, Rfl: 3     methotrexate 2.5 MG tablet CHEMO, Take 4 tablets (10 mg) by mouth once a week, Disp: 48 tablet, Rfl: 0     methylphenidate (METADATE CD) 20 MG CR capsule, Take 20 mg by mouth daily, Disp: , Rfl:      Minoxidil (ROGAINE EX), Externally apply  topically. daily, Disp: , Rfl:      mometasone-formoterol (DULERA) 100-5 MCG/ACT oral inhaler, Inhale 2 puffs into the lungs 2 times daily, Disp: 3 Inhaler, Rfl: 3     montelukast (SINGULAIR) 5 MG chewable tablet, CHEW AND SWALLOW 1 TABLET(5 MG) BY MOUTH AT BEDTIME, Disp: 30 tablet, Rfl: 3     Multiple Vitamin (MULTIVITAMIN OR), Take  by mouth. Take one daily tab, Disp: , Rfl:      NOVOLOG PENFILL 100 UNIT/ML soln, 1 unit per 15 grams CHO at all meals and snacks with correction scale of 1 unit per 50 mg/dL over 150.  Average daily use is 20 units., Disp: 30 mL, Rfl: 4     simvastatin (ZOCOR) 20 MG tablet, Take 1 tablet (20 mg) by mouth At Bedtime, Disp: 90 tablet, Rfl: 3     traZODone (DESYREL) 50 MG tablet, Take 1-2 tablets by mouth nightly as needed for sleep., Disp: 180 tablet, Rfl: 0    Current Facility-Administered Medications:      betamethasone acet & sod phos (CELESTONE) injection 6 mg, 6 mg, INTRA-ARTICULAR, Once, Chris Uribe MD      Allergies:   Sulfasalazine      Past Medical History:  Anemia   Anxiety   Psoriatic arthritis  Dry eye syndrome   Endometriosis   Gastroesophageal reflux  "disease   Hypothyroidism   Type 1 diabetes mellitus   Asthma   Necrobiosis lipoidica diabeticorum  Chronic low back pain   Enthesopathy   Chronic right ankle pain      Past Surgical History:  Knee cruciate ligament repair   Stomach surgery 12/2002  Hydrogen breath test 6/17/2014   Appendectomy 2002   Lysis adhesions 2002   Right ankle surgery 12/2014     Family History:   Mother: breast cancer   Father: heart disease, eye disorder, hyperlipidemia, macular degeneration, anxiety, alcoholism, rheumatoid arthritis, hypothyroidism, diabetes mellitus   Paternal grandmother: cerebrovascular disease  Paternal grandfather: pancreatic cancer, heart disease   Maternal grandmother: type 2 diabetes mellitus, coronary artery disease  Maternal aunt: type 1 diabetes mellitus     Social History:   No tobacco use.   Moderate alcohol use.     Physical Exam:   /79  Pulse 69  Temp 98.3  F (36.8  C) (Oral)  Ht 1.778 m (5' 10\")  Wt 81.9 kg (180 lb 8 oz)  SpO2 96%  BMI 25.9 kg/m2   Wt Readings from Last 4 Encounters:   11/20/18 81.9 kg (180 lb 8 oz)   06/12/18 81.2 kg (179 lb 1.6 oz)   05/24/18 83 kg (183 lb)   05/15/18 83.4 kg (183 lb 14.4 oz)     Constitutional: Well-developed, appearing stated age; cooperative  Eyes: Normal EOM, PERRLA, vision, conjunctiva, sclera  ENT: Normal external ears, nose, hearing, lips, teeth, gums, throat. No mucous membrane lesions, normal saliva pool  Neck: No mass or thyroid enlargement  Resp: Lungs clear to auscultation, nl to palpation  CV: RRR, no murmurs, rubs or gallops, no edema  GI: No ABD mass or tenderness, no HSM  : Not tested  Lymph: No cervical, supraclavicular, inguinal or epitrochlear nodes  MS: The TMJ, neck, shoulder, elbow, wrist, MCP/PIP/DIP, spine, hip, knee, ankle, and foot MTP/IP joints were examined and found normal. No active synovitis or altered joint anatomy. Full joint ROM. Normal  strength. No dactylitis,  tenosynovitis, enthesopathy.  Skin: No nail pitting, " alopecia, nodules or lesions. Very small amount of scale with minimal erythema and minimal induration, more noticeable on the right temple.   Neuro: Normal cranial nerves, strength, sensation, DTRs.   Psych: Normal judgement, orientation, memory, affect.     Laboratory:   RHEUM RESULTS Latest Ref Rng & Units 1/9/2018 2/1/2018 5/15/2018   SED RATE 0 - 20 mm/h - - -   CRP, INFLAMMATION 0.0 - 8.0 mg/L - <2.9 <2.9   CYCLIC CIT PEPT IGG <5 U/mL - - -   RHEUMATOID FACTOR <20 IU/mL - - -   NOLVIA SCREEN BY EIA <1.0 - - -   AST 0 - 45 U/L - 19 23   ALT 0 - 50 U/L - 22 22   ALBUMIN 3.4 - 5.0 g/dL - 4.2 3.8   WBC 4.0 - 11.0 10e9/L - 5.6 7.6   RBC 3.8 - 5.2 10e12/L - 3.70(L) 3.64(L)   HGB 11.7 - 15.7 g/dL - 11.7 11.4(L)   HCT 35.0 - 47.0 % - 34.6(L) 34.4(L)   MCV 78 - 100 fl - 94 95   MCHC 31.5 - 36.5 g/dL - 33.8 33.1   RDW 10.0 - 15.0 % - 12.9 12.2    - 450 10e9/L - 270 264   CREATININE 0.52 - 1.04 mg/dL 0.57 0.58 0.66   GFR ESTIMATE, IF BLACK >60 mL/min/1.7m2 >90 >90 >90   GFR ESTIMATE >60 mL/min/1.7m2 >90 >90 >90    - 1620 mg/dL - - -   IGA 70 - 380 mg/dL - - -   IGM 60 - 265 mg/dL - - -     Rheumatoid Factor   Date Value Ref Range Status   12/22/2014 <20 <20 IU/mL Final   ,  ,   Cyclic Cit Pept IgG/IgA   Date Value Ref Range Status   03/17/2015 <20  Interpretation:  Negative   <20 UNITS Final   ,   Cyclic Citrullinated Peptide IgG   Date Value Ref Range Status   12/09/2008 <2 <5 U/mL Final     Comment:     Interpretation:  Negative   ,   Scleroderma Antibody Scl-70 ELEAZAR IgG   Date Value Ref Range Status   03/17/2015  0.0 - 0.9 AI Final    <0.2  Negative   Antibody index (AI) values reflect qualitative changes in antibody   concentration that cannot be directly associated with clinical condition or   disease state.       SSA (Ro) (ELEAZAR) Antibody, IgG   Date Value Ref Range Status   03/17/2015 0.2 0.0 - 0.9 AI Final     Comment:     Negative   Antibody index (AI) values reflect qualitative changes in antibody    concentration that cannot be directly associated with clinical condition or   disease state.       SSB (La) (ELEAZAR) Antibody, IgG   Date Value Ref Range Status   03/17/2015 0.2 0.0 - 0.9 AI Final     Comment:     Negative   Antibody index (AI) values reflect qualitative changes in antibody   concentration that cannot be directly associated with clinical condition or   disease state.       ,  ,   NOLVIA Screen by EIA   Date Value Ref Range Status   12/22/2014 1.6 (H) <1.0 Final     Comment:     Interpretation:  Positive     IGG   Date Value Ref Range Status   07/11/2005 1410 695 - 1620 mg/dL Final     IGA   Date Value Ref Range Status   06/10/2014 190 70 - 380 mg/dL Final     IGM   Date Value Ref Range Status   07/11/2005 151 60 - 265 mg/dL Final   ,  ,  ,  ,   Cyr ELEAZAR Antibody IgG   Date Value Ref Range Status   03/17/2015  0.0 - 0.9 AI Final    <0.2  Negative   Antibody index (AI) values reflect qualitative changes in antibody   concentration that cannot be directly associated with clinical condition or   disease state.       Scleroderma Antibody Scl-70 ELEAZAR IgG   Date Value Ref Range Status   03/17/2015  0.0 - 0.9 AI Final    <0.2  Negative   Antibody index (AI) values reflect qualitative changes in antibody   concentration that cannot be directly associated with clinical condition or   disease state.         Scribe Disclosure:   I, Arabella Taylor, am serving as a scribe to document services personally performed by Yoel Betancourt MD at this visit, based upon the provider's statements to me. All documentation has been reviewed by the aforementioned provider prior to being entered into the official medical record.     Portions of this medical record were completed by a scribe. UPON MY REVIEW AND AUTHENTICATION BY ELECTRONIC SIGNATURE, this confirms (a) I performed the applicable clinical services, and (b) the record is accurate.    JOSUE Betancourt MD, PhD    Rheumatology

## 2018-11-20 NOTE — MR AVS SNAPSHOT
After Visit Summary   11/20/2018    Dali Martines    MRN: [id number]           Patient Information     Date Of Birth          1970        Visit Information        Provider Department      11/20/2018 2:30 PM Yoel Betancourt MD [facility] Rheumatology        Today's Diagnoses     Pain in joint involving ankle and foot, right    -  1    Pain in joint, multiple sites        Chronic pain of right ankle        Right foot pain        Psoriatic arthritis (H)        Psoriasis with arthropathy (H)           Follow-ups after your visit        Your next 10 appointments already scheduled     Nov 20, 2018  3:30 PM CST   Lab with [facility] LAB   [facility] ([facility])    [address]  1st Floor  [facility] [id number] [phone]            Dec 18, 2018  3:00 PM CST   (Arrive by 2:45 PM)   RETURN DIABETES with Felicia Mckeon MD   [facility] Endocrinology ([facility])    [address]  3rd Floor  [facility] [id number] [phone]            Feb 19, 2019  3:30 PM CST   (Arrive by 3:15 PM)   Return Visit with Sara Carter MD   [facility] ([facility])    [address]  [address]  [facility] [id number] [phone]            May 07, 2019  2:30 PM CDT   (Arrive by 2:15 PM)   Return Visit with Yoel Betancourt MD   [facility] Rheumatology ([facility])    [address]  [address]  [facility] [id number] [phone]              Future tests that were ordered for you today     Open Future Orders        Priority Expected Expires Ordered    Vitamin D Deficiency Routine  5/22/2019 11/20/2018            Who to contact     If you have questions or need follow up information about today's clinic visit or your schedule please contact [facility] RHEUMATOLOGY directly at [phone].  Normal or non-critical lab and imaging results will  "be communicated to you by MyChart, letter or phone within 4 business days after the clinic has received the results. If you do not hear from us within 7 days, please contact the clinic through [facility] or phone. If you have a critical or abnormal lab result, we will notify you by phone as soon as possible.  Submit refill requests through [facility] or call your pharmacy and they will forward the refill request to us. Please allow 3 business days for your refill to be completed.          Additional Information About Your Visit        [facility] Information     [facility] gives you secure access to your electronic health record. If you see a primary care provider, you can also send messages to your care team and make appointments. If you have questions, please call your primary care clinic.  If you do not have a primary care provider, please call [phone] and they will assist you.        Care EveryWhere ID     This is your Care EveryWhere ID. This could be used by other organizations to access your [facility] medical records  [id number]        Your Vitals Were     Pulse Temperature Height Pulse Oximetry BMI (Body Mass Index)       69 98.3  F (36.8  C) (Oral) 1.778 m (5' 10\") 96% 25.9 kg/m2        Blood Pressure from Last 3 Encounters:   11/20/18 123/79   08/14/18 (P) 112/75   06/12/18 124/78    Weight from Last 3 Encounters:   11/20/18 81.9 kg (180 lb 8 oz)   06/12/18 81.2 kg (179 lb 1.6 oz)   05/24/18 83 kg (183 lb)               Primary Care Provider Office Phone # Fax #    Dimitri Alas [phone] [address] [address]        Equal Access to Services     CHARLES MOORE : Hadii randi Castillo, wakristieda luqadaha, qaybta kaalmada kaitlin joaquin. So [facility] [phone].    ATENCIÓN: Si habla español, tiene a ramsey disposición servicios gratuitos de asistencia lingüística. Llame al [phone].    We comply with applicable federal civil " rights laws and [place] laws. We do not discriminate on the basis of race, color, national origin, age, disability, sex, sexual orientation, or gender identity.            Thank you!     Thank you for choosing [facility] RHEUMATOLOGY  for your care. Our goal is always to provide you with excellent care. Hearing back from our patients is one way we can continue to improve our services. Please take a few minutes to complete the written survey that you may receive in the mail after your visit with us. Thank you!             Your Updated Medication List - Protect others around you: Learn how to safely use, store and throw away your medicines at www.disposemymeds.org.          This list is accurate as of 11/20/18  3:23 PM.  Always use your most recent med list.                   Brand Name Dispense Instructions for use Diagnosis    aspirin 81 MG tablet      Take 1 tablet by mouth daily.        blood glucose monitoring lancets     4 Box    Use to test blood sugar 8 times daily or as directed.    Type 1 diabetes, HbA1c goal < 7% (H)       blood glucose monitoring test strip    ACCU-CHEK COLIN PLUS    700 strip    Test Blood Sugar 8 times daily or as directed    Type 1 diabetes, HbA1c goal < 7% (H)       CALCIUM + D PO      Take one daily        citalopram 20 MG tablet    celeXA    135 tablet    Take 1.5 tablets (30 mg) by mouth daily    Anxiety       diclofenac 1 % Gel topical gel    VOLTAREN    100 g    APPLY 2 GRAMS ONTO THE SKIN FOUR TIMES DAILY AS NEEDED FOR MODERATE PAIN    Enthesopathy, Pain in joint, multiple sites, Chronic pain of right ankle       dulaglutide 1.5 MG/0.5ML pen    TRULICITY    6 mL    Inject 1.5 mg Subcutaneous every 7 days    Type 1 diabetes mellitus without complication (H)       econazole nitrate 1 % cream      Apply 0.5 inches topically daily.        fish oil-omega-3 fatty acids 1000 MG capsule      Take one daily        folic acid 1 MG tablet    FOLVITE    90 tablet    Take 1 tablet (1 mg) by  mouth daily    Pain in joint, multiple sites, Tendinitis, Encounter for therapeutic drug monitoring       insulin degludec 100 UNIT/ML pen    TRESIBA    15 mL    Inject 18 Units Subcutaneous daily    Type 1 diabetes mellitus without complication (H)       insulin pen needle 31G X 8 MM miscellaneous    B-D U/F    450 each    Use 5 pen needles daily    Diabetes mellitus type I (H)       levothyroxine 125 MCG tablet    SYNTHROID/LEVOTHROID    90 tablet    Take 1 tablet (125 mcg) by mouth daily    Hypothyroidism       methotrexate 2.5 MG tablet CHEMO     48 tablet    Take 4 tablets (10 mg) by mouth once a week    Pain in joint, multiple sites, Tendinitis, NLD (necrobiosis lipoidica diabeticorum) (H)       methylphenidate 20 MG CR capsule    METADATE CD     Take 20 mg by mouth daily        mometasone-formoterol 100-5 MCG/ACT oral inhaler    DULERA    3 Inhaler    Inhale 2 puffs into the lungs 2 times daily    Moderate persistent asthma without complication       montelukast 5 MG chewable tablet    SINGULAIR    30 tablet    CHEW AND SWALLOW 1 TABLET(5 MG) BY MOUTH AT BEDTIME    Mild persistent asthma without complication       MULTIVITAMIN PO      Take  by mouth. Take one daily tab        NIZORAL PO      Take  by mouth. Shampoo daily        NovoLOG PENFILL 100 UNIT/ML injection   Generic drug:  insulin aspart     30 mL    1 unit per 15 grams CHO at all meals and snacks with correction scale of 1 unit per 50 mg/dL over 150.  Average daily use is 20 units.    Type 1 diabetes, HbA1c goal < 7% (H)       ROGAINE EX      Externally apply  topically. 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    Insomnia

## 2018-11-20 NOTE — LETTER
2018      RE: Dali Martines  [address] [facility] [id number]       [facility]  Rheumatology Clinic  Yoel Betancourt MD  2018     Name: Dali Martines  MRN: [id number]  Age: 48 year old  : 1970  Referring provider: Dimitri Alas     Assessment and Plan:  # Psoriatic arthritis (H)  # Psoriasis with arthropathy (H)  # Pain in joint, multiple sites  # Chronic pain of right ankle  # Right foot pain  # Pain in joint involving ankle and foot, right  Today the patient reports that she developed increased left hand, predominately left 2nd MCP, pain a couple of months ago. At that point, she increased her methotrexate dose to 12.5 mg, which she tolerates well. Her symptoms have been well controlled on this, however she does notice worsening pain towards the end of the week. Her CRP was normal on the last two labs and x-rays did not show significant damaging changes. However, low grade damage not evident on x-rays is possible, so we discussed that if she is tolerating the methotrexate well, she can increase her dose to 15 mg weekly.     We will check vitamin D level today. She should check how much vitamin D is in her multivitamin and if her levels are low, she should ensure that she is taking 800-1000 IU daily. She has been taking vitamin C daily.     - Vitamin D Deficiency     Follow-up: 6 months     HPI:   Dali Martines is a 48 year old female with a history of type I DM and Hashimoto's thyroiditis who presents for follow up of recurrent stiffness, swelling, and pain in the left hand. I last evaluated the patient on 05/15/2018, at which time impression was psoriatic arthritis. She was continued on her prior dose of methotrexate and topical diclofenac.      Symptoms first occurred in 2008, when she began having stiffness and pain in the left hand, particularly in the PIPs and MCPs. Symptoms spontaneously resolved after about 6-8 weeks. She was previously seen by Rheumatology  here around that time (see note from Dr. Betancourt on 12/9/2008). Briefly, assessment was that she had a self-limited episode of inflammatory arthralgias, possibly viral or early psoriatic arthritis. Negative NOLVIA, RF, Lyme serologies and normal CRP and ESR. Given her family history of RA, there was a thought to start her on HCQ if anti-CCP antibodies were positive, but they were found to be negative. No imaging of the hand was performed. September 2014, she experienced recurrent acute onset pain, stiffness, and swelling in the L hand very similar to her symptoms in 2008. Specifically, had swelling across MCPs and in 3rd PIP. She could hardly bend the hand sometimes due to the welling. Had morning stiffness lasting 30-45 minutes. No other joints were involved. Issues with her left hand lingered for several months but eventually self-resolved that January and her only notable symptom is heel pain in the mornings. No fevers, chills, or weight loss. No back pain, vision changes, nausea, vomiting, diarrhea, abdominal pain, or blood in stool or urine. No rash, sicca symptoms, or oral ulcers. It seems she has a history of hypermobility, e.g. could bend wrist back to forearm, bend down and touch palms flat on floor, when she was younger. Used to get frequent ankle sprains, and has had multiple tendon and ligament tears over the years.    Today, the patient reports that in early fall, she developed increased left hand pain, particularly in the left 2nd MCP. She does typically have a flare of joint pain around this time of year. Two months ago, she increased her methotrexate to 12.5 mg weekly. Her symptoms are pretty well controlled on this, although she does note slightly increased pain towards the end of the week. With use, her pain does worsen somewhat. She is tolerating the increased dose well. She has not noticed any change in her ankle symptoms on methotrexate. She did receive a PRP injection earlier this year and  following this noticed increased gains in physical therapy for about 2 months. She has continued working with physical therapy and plans to follow up for another injection. She does have some pain, attributed to tendonitis, in the elbows with computer use but denies any elbow or shoulder joint pain.     She endorses some areas of psoriasis on her legs and around her hairline. She has also not noticed improvement of this with methotrexate.     She has been taking a multivitamin and vitamin C daily. She has received her flu vaccine already.     Review of Systems:   Pertinent items are noted in HPI or as below, remainder of complete ROS is negative.      No recent problems with hearing or vision. No swallowing problems.   No breathing difficulty, shortness of breath, coughing, or wheezing  No chest pain or palpitations  No heart burn, indigestion, abdominal pain, nausea, vomiting, diarrhea  No urination problems, no bloody, cloudy urine, no dysuria  No numbing, tingling, weakness  No headaches or confusion  No easy bleeding or bruising.   + left hand pain   + psoriasis   + ankle pain   + elbow pain     Active Medications:     Current Outpatient Prescriptions:      aspirin 81 MG tablet, Take 1 tablet by mouth daily., Disp: , Rfl:      blood glucose monitoring (ACCU-CHEK COLIN PLUS) test strip, Test Blood Sugar 8 times daily or as directed, Disp: 700 strip, Rfl: 3     blood glucose monitoring (ACCU-CHEK FASTCLIX) lancets, Use to test blood sugar 8 times daily or as directed., Disp: 4 Box, Rfl: 3     Calcium Carbonate-Vitamin D (CALCIUM + D PO), Take one daily, Disp: , Rfl:      citalopram (CELEXA) 20 MG tablet, Take 1.5 tablets (30 mg) by mouth daily, Disp: 135 tablet, Rfl: 1     diclofenac (VOLTAREN) 1 % GEL topical gel, APPLY 2 GRAMS ONTO THE SKIN FOUR TIMES DAILY AS NEEDED FOR MODERATE PAIN, Disp: 100 g, Rfl: 5     dulaglutide (TRULICITY) 1.5 MG/0.5ML pen, Inject 1.5 mg Subcutaneous every 7 days, Disp: 6 mL, Rfl: 3      econazole nitrate 1 % cream, Apply 0.5 inches topically daily., Disp: , Rfl:      fish oil-omega-3 fatty acids (FISH OIL) 1000 MG capsule, Take one daily, Disp: , Rfl:      folic acid (FOLVITE) 1 MG tablet, Take 1 tablet (1 mg) by mouth daily, Disp: 90 tablet, Rfl: 3     insulin degludec (TRESIBA) 100 UNIT/ML pen, Inject 18 Units Subcutaneous daily, Disp: 15 mL, Rfl: 11     insulin pen needle (B-D U/F) 31G X 8 MM, Use 5 pen needles daily, Disp: 450 each, Rfl: 3     Ketoconazole (NIZORAL PO), Take  by mouth. Shampoo daily, Disp: , Rfl:      levothyroxine (SYNTHROID/LEVOTHROID) 125 MCG tablet, Take 1 tablet (125 mcg) by mouth daily, Disp: 90 tablet, Rfl: 3     methotrexate 2.5 MG tablet CHEMO, Take 4 tablets (10 mg) by mouth once a week, Disp: 48 tablet, Rfl: 0     methylphenidate (METADATE CD) 20 MG CR capsule, Take 20 mg by mouth daily, Disp: , Rfl:      Minoxidil (ROGAINE EX), Externally apply  topically. daily, Disp: , Rfl:      mometasone-formoterol (DULERA) 100-5 MCG/ACT oral inhaler, Inhale 2 puffs into the lungs 2 times daily, Disp: 3 Inhaler, Rfl: 3     montelukast (SINGULAIR) 5 MG chewable tablet, CHEW AND SWALLOW 1 TABLET(5 MG) BY MOUTH AT BEDTIME, Disp: 30 tablet, Rfl: 3     Multiple Vitamin (MULTIVITAMIN OR), Take  by mouth. Take one daily tab, Disp: , Rfl:      NOVOLOG PENFILL 100 UNIT/ML soln, 1 unit per 15 grams CHO at all meals and snacks with correction scale of 1 unit per 50 mg/dL over 150.  Average daily use is 20 units., Disp: 30 mL, Rfl: 4     simvastatin (ZOCOR) 20 MG tablet, Take 1 tablet (20 mg) by mouth At Bedtime, Disp: 90 tablet, Rfl: 3     traZODone (DESYREL) 50 MG tablet, Take 1-2 tablets by mouth nightly as needed for sleep., Disp: 180 tablet, Rfl: 0    Current Facility-Administered Medications:      betamethasone acet & sod phos (CELESTONE) injection 6 mg, 6 mg, INTRA-ARTICULAR, Once, Chris Uribe MD      Allergies:   Sulfasalazine      Past Medical History:  Anemia   Anxiety  "  Psoriatic arthritis  Dry eye syndrome   Endometriosis   Gastroesophageal reflux disease   Hypothyroidism   Type 1 diabetes mellitus   Asthma   Necrobiosis lipoidica diabeticorum  Chronic low back pain   Enthesopathy   Chronic right ankle pain      Past Surgical History:  Knee cruciate ligament repair   Stomach surgery 12/2002  Hydrogen breath test 6/17/2014   Appendectomy 2002   Lysis adhesions 2002   Right ankle surgery 12/2014     Family History:   Mother: breast cancer   Father: heart disease, eye disorder, hyperlipidemia, macular degeneration, anxiety, alcoholism, rheumatoid arthritis, hypothyroidism, diabetes mellitus   Paternal grandmother: cerebrovascular disease  Paternal grandfather: pancreatic cancer, heart disease   Maternal grandmother: type 2 diabetes mellitus, coronary artery disease  Maternal aunt: type 1 diabetes mellitus     Social History:   No tobacco use.   Moderate alcohol use.     Physical Exam:   /79  Pulse 69  Temp 98.3  F (36.8  C) (Oral)  Ht 1.778 m (5' 10\")  Wt 81.9 kg (180 lb 8 oz)  SpO2 96%  BMI 25.9 kg/m2   Wt Readings from Last 4 Encounters:   11/20/18 81.9 kg (180 lb 8 oz)   06/12/18 81.2 kg (179 lb 1.6 oz)   05/24/18 83 kg (183 lb)   05/15/18 83.4 kg (183 lb 14.4 oz)     Constitutional: Well-developed, appearing stated age; cooperative  Eyes: Normal EOM, PERRLA, vision, conjunctiva, sclera  ENT: Normal external ears, nose, hearing, lips, teeth, gums, throat. No mucous membrane lesions, normal saliva pool  Neck: No mass or thyroid enlargement  Resp: Lungs clear to auscultation, nl to palpation  CV: RRR, no murmurs, rubs or gallops, no edema  GI: No ABD mass or tenderness, no HSM  : Not tested  Lymph: No cervical, supraclavicular, inguinal or epitrochlear nodes  MS: The TMJ, neck, shoulder, elbow, wrist, MCP/PIP/DIP, spine, hip, knee, ankle, and foot MTP/IP joints were examined and found normal. No active synovitis or altered joint anatomy. Full joint ROM. Normal  " strength. No dactylitis,  tenosynovitis, enthesopathy.  Skin: No nail pitting, alopecia, nodules or lesions. Very small amount of scale with minimal erythema and minimal induration, more noticeable on the right temple.   Neuro: Normal cranial nerves, strength, sensation, DTRs.   Psych: Normal judgement, orientation, memory, affect.     Laboratory:   RHEUM RESULTS Latest Ref Rng & Units 1/9/2018 2/1/2018 5/15/2018   SED RATE 0 - 20 mm/h - - -   CRP, INFLAMMATION 0.0 - 8.0 mg/L - <2.9 <2.9   CYCLIC CIT PEPT IGG <5 U/mL - - -   RHEUMATOID FACTOR <20 IU/mL - - -   NOLVIA SCREEN BY EIA <1.0 - - -   AST 0 - 45 U/L - 19 23   ALT 0 - 50 U/L - 22 22   ALBUMIN 3.4 - 5.0 g/dL - 4.2 3.8   WBC 4.0 - 11.0 10e9/L - 5.6 7.6   RBC 3.8 - 5.2 10e12/L - 3.70(L) 3.64(L)   HGB 11.7 - 15.7 g/dL - 11.7 11.4(L)   HCT 35.0 - 47.0 % - 34.6(L) 34.4(L)   MCV 78 - 100 fl - 94 95   MCHC 31.5 - 36.5 g/dL - 33.8 33.1   RDW 10.0 - 15.0 % - 12.9 12.2    - 450 10e9/L - 270 264   CREATININE 0.52 - 1.04 mg/dL 0.57 0.58 0.66   GFR ESTIMATE, IF BLACK >60 mL/min/1.7m2 >90 >90 >90   GFR ESTIMATE >60 mL/min/1.7m2 >90 >90 >90    - 1620 mg/dL - - -   IGA 70 - 380 mg/dL - - -   IGM 60 - 265 mg/dL - - -     Rheumatoid Factor   Date Value Ref Range Status   12/22/2014 <20 <20 IU/mL Final   ,  ,   Cyclic Cit Pept IgG/IgA   Date Value Ref Range Status   03/17/2015 <20  Interpretation:  Negative   <20 UNITS Final   ,   Cyclic Citrullinated Peptide IgG   Date Value Ref Range Status   12/09/2008 <2 <5 U/mL Final     Comment:     Interpretation:  Negative   ,   Scleroderma Antibody Scl-70 ELEAZAR IgG   Date Value Ref Range Status   03/17/2015  0.0 - 0.9 AI Final    <0.2  Negative   Antibody index (AI) values reflect qualitative changes in antibody   concentration that cannot be directly associated with clinical condition or   disease state.       SSA (Ro) (ELEAZAR) Antibody, IgG   Date Value Ref Range Status   03/17/2015 0.2 0.0 - 0.9 AI Final     Comment:      Negative   Antibody index (AI) values reflect qualitative changes in antibody   concentration that cannot be directly associated with clinical condition or   disease state.       SSB (La) (ELEAZAR) Antibody, IgG   Date Value Ref Range Status   03/17/2015 0.2 0.0 - 0.9 AI Final     Comment:     Negative   Antibody index (AI) values reflect qualitative changes in antibody   concentration that cannot be directly associated with clinical condition or   disease state.       ,  ,   NOLVIA Screen by EIA   Date Value Ref Range Status   12/22/2014 1.6 (H) <1.0 Final     Comment:     Interpretation:  Positive     IGG   Date Value Ref Range Status   07/11/2005 1410 695 - 1620 mg/dL Final     IGA   Date Value Ref Range Status   06/10/2014 190 70 - 380 mg/dL Final     IGM   Date Value Ref Range Status   07/11/2005 151 60 - 265 mg/dL Final   ,  ,  ,  ,   Cyr ELEAZAR Antibody IgG   Date Value Ref Range Status   03/17/2015  0.0 - 0.9 AI Final    <0.2  Negative   Antibody index (AI) values reflect qualitative changes in antibody   concentration that cannot be directly associated with clinical condition or   disease state.       Scleroderma Antibody Scl-70 ELEAZAR IgG   Date Value Ref Range Status   03/17/2015  0.0 - 0.9 AI Final    <0.2  Negative   Antibody index (AI) values reflect qualitative changes in antibody   concentration that cannot be directly associated with clinical condition or   disease state.         Scribe Disclosure:   I, Arabella Taylor, am serving as a scribe to document services personally performed by Yoel Betancourt MD at this visit, based upon the provider's statements to me. All documentation has been reviewed by the aforementioned provider prior to being entered into the official medical record.     Portions of this medical record were completed by a scribe. UPON MY REVIEW AND AUTHENTICATION BY ELECTRONIC SIGNATURE, this confirms (a) I performed the applicable clinical services, and (b) the record is accurate.    JOSUE CH  MD Asia, PhD    Rheumatology

## 2018-11-20 NOTE — NURSING NOTE
"Chief Complaint   Patient presents with     RECHECK     f/u psoriatic arthritis     /79  Pulse 69  Temp 98.3  F (36.8  C) (Oral)  Ht 1.778 m (5' 10\")  Wt 81.9 kg (180 lb 8 oz)  SpO2 96%  BMI 25.9 kg/m2  Senia Lee CMA    "

## 2018-11-21 DIAGNOSIS — M25.50 PAIN IN JOINT, MULTIPLE SITES: ICD-10-CM

## 2018-11-21 DIAGNOSIS — M77.9 TENDINITIS: ICD-10-CM

## 2018-11-21 DIAGNOSIS — E11.620 NLD (NECROBIOSIS LIPOIDICA DIABETICORUM) (H): ICD-10-CM

## 2018-11-21 LAB — DEPRECATED CALCIDIOL+CALCIFEROL SERPL-MC: 32 UG/L (ref 20–75)

## 2018-11-23 ENCOUNTER — TELEPHONE (OUTPATIENT)
Dept: RHEUMATOLOGY | Facility: CLINIC | Age: 48
End: 2018-11-23

## 2018-11-23 DIAGNOSIS — L40.50 PSORIATIC ARTHRITIS (H): Primary | ICD-10-CM

## 2018-11-23 DIAGNOSIS — M77.9 TENDINITIS: ICD-10-CM

## 2018-11-23 DIAGNOSIS — E11.620 NLD (NECROBIOSIS LIPOIDICA DIABETICORUM) (H): ICD-10-CM

## 2018-11-23 DIAGNOSIS — M25.50 PAIN IN JOINT, MULTIPLE SITES: ICD-10-CM

## 2018-11-23 NOTE — TELEPHONE ENCOUNTER
Methotrexate      Last Written Prescription Date:  6/26/18  Last Fill Quantity: 48,   # refills: 0  Last Office Visit: 11/20/18  Future Office visit:  5/7/19    CBC RESULTS:   Recent Labs   Lab Test  11/20/18   1536   WBC  4.6   RBC  3.76*   HGB  11.9   HCT  35.7   MCV  95   MCH  31.6   MCHC  33.3   RDW  12.7   PLT  275       Creatinine   Date Value Ref Range Status   11/20/2018 0.58 0.52 - 1.04 mg/dL Final   ]    Liver Function Studies -   Recent Labs   Lab Test  11/20/18   1536   04/27/16   1402   PROTTOTAL   --    --   8.0   ALBUMIN  4.0   < >  4.0   BILITOTAL   --    --   0.3   ALKPHOS   --    --   108   AST  25   < >  21   ALT  29   < >  25    < > = values in this interval not displayed.       Routing refill request to provider for review/approval because:  Drug not on the [facility], [facility] or [facility] refill protocol or controlled substance. Pt is also requesting increase in dose.    Cyndi Eugene RN  Rheumatology Clinic

## 2018-11-23 NOTE — TELEPHONE ENCOUNTER
Health Call Center    Phone Message    May a detailed message be left on voicemail: yes    Reason for Call: Other: Patient is calling in regarding RX methotrexate 2.5 MG tablet CHEMO.  Patient stated she saw  on 11/20 and they discussed increasing this medication to 6 tablets a week instead of 4 and she is out of the medication. She stopped at the pharmacy to see if it was ready and they told they do not have it yet. Please follow up with the patient asap. She stated she did not want to miss another dose. Thank you.    Action Taken: Message routed to:  [facility] rheum

## 2018-11-23 NOTE — TELEPHONE ENCOUNTER
Have sent refill encounter to Dr. Betancourt to sign.  Have paged him letting him know she is out of medication and to sign if possible.    Updated pt of the refill policy and when we received the request for the refill.    Cyndi Eugene RN  Rheumatology Clinic

## 2018-11-28 ENCOUNTER — PATIENT OUTREACH (OUTPATIENT)
Dept: PHYSICAL MEDICINE AND REHAB | Facility: CLINIC | Age: 48
End: 2018-11-28

## 2018-11-28 NOTE — PROGRESS NOTES
Patient called regarding PRP procedure. Dr Schaffer had previously done procedure, but is no longer practicing in [facility] clinic. Patient is wondering about another provider who could perform PRP. Patient given phone number for Orthopedic Department. Patient will call to schedule PMR appointment with Dr Villagomez if she needs updated PT orders.

## 2018-12-06 ENCOUNTER — TELEPHONE (OUTPATIENT)
Dept: ORTHOPEDICS | Facility: CLINIC | Age: 48
End: 2018-12-06

## 2018-12-06 ENCOUNTER — HOSPITAL ENCOUNTER (OUTPATIENT)
Dept: MAMMOGRAPHY | Facility: CLINIC | Age: 48
Discharge: HOME | End: 2018-12-06
Attending: INTERNAL MEDICINE | Admitting: INTERNAL MEDICINE
Payer: COMMERCIAL

## 2018-12-06 DIAGNOSIS — Z12.31 VISIT FOR SCREENING MAMMOGRAM: ICD-10-CM

## 2018-12-06 PROCEDURE — 77067 SCR MAMMO BI INCL CAD: CPT

## 2018-12-06 NOTE — TELEPHONE ENCOUNTER
Health Call Center    Phone Message    May a detailed message be left on voicemail: yes    Reason for Call: Other: Pt used to see Dr Schaffer at [facility] and get PRP injections and now that provider has left the clinic. Pt requests call back to know if anyone at Ortho can do this for her? and who? and how soon? as she is overdue - Please return her call - Thanks     Action Taken: Message routed to:  [facility] ([facility]): Orthopaedic Clinic

## 2018-12-07 NOTE — TELEPHONE ENCOUNTER
The patient was contacted today. It was explained to the patient that Dr. Heart does do PRP injectio, but would recommend a consult before scheduling any PRP injections. The patient is agreeable and an appointment was made today.

## 2018-12-08 ENCOUNTER — THERAPY VISIT (OUTPATIENT)
Dept: PHYSICAL THERAPY | Facility: CLINIC | Age: 48
End: 2018-12-08
Payer: COMMERCIAL

## 2018-12-08 DIAGNOSIS — M25.571 PAIN IN JOINT INVOLVING ANKLE AND FOOT, RIGHT: ICD-10-CM

## 2018-12-08 DIAGNOSIS — R60.9 EDEMA: ICD-10-CM

## 2018-12-08 PROCEDURE — 97112 NEUROMUSCULAR REEDUCATION: CPT | Mod: GP | Performed by: PHYSICAL THERAPIST

## 2018-12-08 PROCEDURE — 97110 THERAPEUTIC EXERCISES: CPT | Mod: GP | Performed by: PHYSICAL THERAPIST

## 2018-12-08 PROCEDURE — 97140 MANUAL THERAPY 1/> REGIONS: CPT | Mod: GP | Performed by: PHYSICAL THERAPIST

## 2018-12-11 ENCOUNTER — ANCILLARY PROCEDURE (OUTPATIENT)
Dept: GENERAL RADIOLOGY | Facility: CLINIC | Age: 48
End: 2018-12-11
Attending: PREVENTIVE MEDICINE
Payer: COMMERCIAL

## 2018-12-11 ENCOUNTER — OFFICE VISIT (OUTPATIENT)
Dept: ORTHOPEDICS | Facility: CLINIC | Age: 48
End: 2018-12-11
Payer: COMMERCIAL

## 2018-12-11 VITALS — WEIGHT: 180 LBS | BODY MASS INDEX: 25.77 KG/M2 | HEIGHT: 70 IN

## 2018-12-11 DIAGNOSIS — M54.16 LUMBAR RADICULAR PAIN: Primary | ICD-10-CM

## 2018-12-11 DIAGNOSIS — M54.16 LUMBAR RADICULAR PAIN: ICD-10-CM

## 2018-12-11 ASSESSMENT — MIFFLIN-ST. JEOR: SCORE: 1516.72

## 2018-12-11 NOTE — PROGRESS NOTES
HISTORY OF PRESENT ILLNESS  Ms. Martines is a pleasant 48 year old year old female who presents to clinic today with low back pain  Dali explains that she has had ankle and knee pain and leg pain as well as low back pain for years  Location: low back  Quality:  achy pain    Severity: 6/10 at worst    Duration: years  Timing: occurs intermittently  Context: occurs whilewalking and standing  Modifying factors:  resting and non-use makes it better, movement and use makes it worse  Associated signs & symptoms: radiation into legs    Additional history: as documented    MEDICAL HISTORY  Patient Active Problem List   Diagnosis     GERD (gastroesophageal reflux disease)     CARDIOVASCULAR SCREENING; LDL GOAL LESS THAN 100     Type 1 diabetes mellitus with retinopathy (H)     Anxiety     NLD (necrobiosis lipoidica diabeticorum) (H)     Cutaneous skin tags     Cervicalgia     Chronic low back pain     SOB (shortness of breath)     Encounter for other specified aftercare     PAIN FOOT     Diabetes (H)     Screening for other eye conditions     PAIN KNEE JOINT     Enthesopathy     Pain in joint, multiple sites     Chronic pain of right ankle     Right foot pain     ESR raised     Swelling of limb     Other postprocedural status(V45.89)     Left knee pain     Type 1 diabetes mellitus without complication (H)     Pain in joint involving ankle and foot, right     Edema     Psoriatic arthritis (H)     Psoriasis with arthropathy (H)       Current Outpatient Medications   Medication Sig Dispense Refill     aspirin 81 MG tablet Take 1 tablet by mouth daily.       blood glucose monitoring (ACCU-CHEK COLIN PLUS) test strip Test Blood Sugar 8 times daily or as directed 700 strip 3     blood glucose monitoring (ACCU-CHEK FASTCLIX) lancets Use to test blood sugar 8 times daily or as directed. 4 Box 3     Calcium Carbonate-Vitamin D (CALCIUM + D PO) Take one daily       citalopram (CELEXA) 20 MG tablet Take 1.5 tablets (30 mg) by mouth  daily 135 tablet 1     diclofenac (VOLTAREN) 1 % GEL topical gel APPLY 2 GRAMS ONTO THE SKIN FOUR TIMES DAILY AS NEEDED FOR MODERATE PAIN 100 g 5     dulaglutide (TRULICITY) 1.5 MG/0.5ML pen Inject 1.5 mg Subcutaneous every 7 days 6 mL 3     econazole nitrate 1 % cream Apply 0.5 inches topically daily.       fish oil-omega-3 fatty acids (FISH OIL) 1000 MG capsule Take one daily       folic acid (FOLVITE) 1 MG tablet Take 1 tablet (1 mg) by mouth daily 90 tablet 3     insulin degludec (TRESIBA) 100 UNIT/ML pen Inject 18 Units Subcutaneous daily 15 mL 11     insulin pen needle (B-D U/F) 31G X 8 MM Use 5 pen needles daily 450 each 3     Ketoconazole (NIZORAL PO) Take  by mouth. Shampoo daily       levothyroxine (SYNTHROID/LEVOTHROID) 125 MCG tablet Take 1 tablet (125 mcg) by mouth daily 90 tablet 3     methotrexate 2.5 MG tablet CHEMO Take 6 tablets (15 mg) by mouth once a week 72 tablet 0     methylphenidate (METADATE CD) 20 MG CR capsule Take 20 mg by mouth daily       Minoxidil (ROGAINE EX) Externally apply  topically. daily       mometasone-formoterol (DULERA) 100-5 MCG/ACT oral inhaler Inhale 2 puffs into the lungs 2 times daily 3 Inhaler 3     montelukast (SINGULAIR) 5 MG chewable tablet CHEW AND SWALLOW 1 TABLET(5 MG) BY MOUTH AT BEDTIME 30 tablet 3     Multiple Vitamin (MULTIVITAMIN OR) Take  by mouth. Take one daily tab       NOVOLOG PENFILL 100 UNIT/ML soln 1 unit per 15 grams CHO at all meals and snacks with correction scale of 1 unit per 50 mg/dL over 150.  Average daily use is 20 units. 30 mL 4     simvastatin (ZOCOR) 20 MG tablet Take 1 tablet (20 mg) by mouth At Bedtime 90 tablet 3     traZODone (DESYREL) 50 MG tablet Take 1-2 tablets by mouth nightly as needed for sleep. 180 tablet 0       Allergies   Allergen Reactions     Sulfasalazine      Developed rash, HA, dizziness       Family History   Problem Relation Age of Onset     Breast Cancer Mother      Heart Disease Father      C.A.D. Father       "Arthritis Father      Circulatory Father      Eye Disorder Father      Lipids Father      Thyroid Disease Father      Macular Degeneration Father      Coronary Artery Disease Father      Anxiety Disorder Father      Alcoholism Father      Rheumatoid Arthritis Father      Hypothyroidism Father      Diabetes Father      Cerebrovascular Disease Paternal Grandmother         ,     Cancer Paternal Grandfather         Pancreatic     Heart Disease Paternal Grandfather      Diabetes Maternal Grandmother         Type 2     C.A.D. Maternal Grandmother      Heart Disease Maternal Grandmother      Coronary Artery Disease Maternal Grandmother      Heart Disease Maternal Grandfather      Cardiac Sudden Death Maternal Grandfather      Gynecology Other         self     Thyroid Disease Other         self     Macular Degeneration Maternal Aunt      Diabetes Other         Type 1     Glaucoma No family hx of        Additional medical/Social/Surgical histories reviewed in [facility] and updated as appropriate.     REVIEW OF SYSTEMS (12/11/2018)  10 point ROS of systems including Constitutional, Eyes, Respiratory, Cardiovascular, Gastroenterology, Genitourinary, Integumentary, Musculoskeletal, Psychiatric were all negative except for pertinent positives noted in my HPI.     PHYSICAL EXAM  Vitals:    12/11/18 1409   Weight: 81.6 kg (180 lb)   Height: 1.778 m (5' 10\")     Vital Signs: Ht 1.778 m (5' 10\")   Wt 81.6 kg (180 lb)   BMI 25.83 kg/m   Patient declined being weighed. Body mass index is 25.83 kg/m .    General  - normal appearance, in no obvious distress  CV  - normal peripheral perfusion  Pulm  - normal respiratory pattern, non-labored  Musculoskeletal - lumbar spine  - stance: normal gait without limp, no obvious leg length discrepancy, normal heel and toe walk  - inspection: normal bone and joint alignment, no obvious scoliosis  - palpation: no paravertebral or bony tenderness  - ROM: flexion exacerbates pain, normal extension, " sidebending, rotation  - strength: lower extremities 5/5 in all planes  - special tests:  (+) straight leg raise  (+) slump test  Neuro  - patellar and Achilles DTRs 2+ bilaterally, right lower extremity sensory deficit throughout L5 distribution, grossly normal coordination, normal muscle tone  Skin  - no ecchymosis, erythema, warmth, or induration, no obvious rash  Psych  - interactive, appropriate, normal mood and affect    ASSESSMENT & PLAN  47 yo female with lumbar ddd, disc herniation, radicular pain  [id number] her symptoms and her history and ordered imaging for lumbar spine  Will consider lumbar injection  Given HEP      Dimitri Heart MD, CAQSM

## 2018-12-11 NOTE — LETTER
12/11/2018       RE: Dali Martines  [address]  [facility] [address]     Dear Colleague,    Thank you for referring your patient, Dali Martines, to the [facility] at [facility]. Please see a copy of my visit note below.    HISTORY OF PRESENT ILLNESS  Ms. Martines is a pleasant 48 year old year old female who presents to clinic today with low back pain  Dali explains that she has had ankle and knee pain and leg pain as well as low back pain for years  Location: low back  Quality:  achy pain    Severity: 6/10 at worst    Duration: years  Timing: occurs intermittently  Context: occurs whilewalking and standing  Modifying factors:  resting and non-use makes it better, movement and use makes it worse  Associated signs & symptoms: radiation into legs    Additional history: as documented    MEDICAL HISTORY  Patient Active Problem List   Diagnosis     GERD (gastroesophageal reflux disease)     CARDIOVASCULAR SCREENING; LDL GOAL LESS THAN 100     Type 1 diabetes mellitus with retinopathy (H)     Anxiety     NLD (necrobiosis lipoidica diabeticorum) (H)     Cutaneous skin tags     Cervicalgia     Chronic low back pain     SOB (shortness of breath)     Encounter for other specified aftercare     PAIN FOOT     Diabetes (H)     Screening for other eye conditions     PAIN KNEE JOINT     Enthesopathy     Pain in joint, multiple sites     Chronic pain of right ankle     Right foot pain     ESR raised     Swelling of limb     Other postprocedural status(V45.89)     Left knee pain     Type 1 diabetes mellitus without complication (H)     Pain in joint involving ankle and foot, right     Edema     Psoriatic arthritis (H)     Psoriasis with arthropathy (H)       Current Outpatient Medications   Medication Sig Dispense Refill     aspirin 81 MG tablet Take 1 tablet by mouth daily.       blood glucose monitoring (ACCU-CHEK COLIN PLUS) test strip Test Blood Sugar 8 times  daily or as directed 700 strip 3     blood glucose monitoring (ACCU-CHEK FASTCLIX) lancets Use to test blood sugar 8 times daily or as directed. 4 Box 3     Calcium Carbonate-Vitamin D (CALCIUM + D PO) Take one daily       citalopram (CELEXA) 20 MG tablet Take 1.5 tablets (30 mg) by mouth daily 135 tablet 1     diclofenac (VOLTAREN) 1 % GEL topical gel APPLY 2 GRAMS ONTO THE SKIN FOUR TIMES DAILY AS NEEDED FOR MODERATE PAIN 100 g 5     dulaglutide (TRULICITY) 1.5 MG/0.5ML pen Inject 1.5 mg Subcutaneous every 7 days 6 mL 3     econazole nitrate 1 % cream Apply 0.5 inches topically daily.       fish oil-omega-3 fatty acids (FISH OIL) 1000 MG capsule Take one daily       folic acid (FOLVITE) 1 MG tablet Take 1 tablet (1 mg) by mouth daily 90 tablet 3     insulin degludec (TRESIBA) 100 UNIT/ML pen Inject 18 Units Subcutaneous daily 15 mL 11     insulin pen needle (B-D U/F) 31G X 8 MM Use 5 pen needles daily 450 each 3     Ketoconazole (NIZORAL PO) Take  by mouth. Shampoo daily       levothyroxine (SYNTHROID/LEVOTHROID) 125 MCG tablet Take 1 tablet (125 mcg) by mouth daily 90 tablet 3     methotrexate 2.5 MG tablet CHEMO Take 6 tablets (15 mg) by mouth once a week 72 tablet 0     methylphenidate (METADATE CD) 20 MG CR capsule Take 20 mg by mouth daily       Minoxidil (ROGAINE EX) Externally apply  topically. daily       mometasone-formoterol (DULERA) 100-5 MCG/ACT oral inhaler Inhale 2 puffs into the lungs 2 times daily 3 Inhaler 3     montelukast (SINGULAIR) 5 MG chewable tablet CHEW AND SWALLOW 1 TABLET(5 MG) BY MOUTH AT BEDTIME 30 tablet 3     Multiple Vitamin (MULTIVITAMIN OR) Take  by mouth. Take one daily tab       NOVOLOG PENFILL 100 UNIT/ML soln 1 unit per 15 grams CHO at all meals and snacks with correction scale of 1 unit per 50 mg/dL over 150.  Average daily use is 20 units. 30 mL 4     simvastatin (ZOCOR) 20 MG tablet Take 1 tablet (20 mg) by mouth At Bedtime 90 tablet 3     traZODone (DESYREL) 50 MG tablet  "Take 1-2 tablets by mouth nightly as needed for sleep. 180 tablet 0       Allergies   Allergen Reactions     Sulfasalazine      Developed rash, HA, dizziness       Family History   Problem Relation Age of Onset     Breast Cancer Mother      Heart Disease Father      C.A.D. Father      Arthritis Father      Circulatory Father      Eye Disorder Father      Lipids Father      Thyroid Disease Father      Macular Degeneration Father      Coronary Artery Disease Father      Anxiety Disorder Father      Alcoholism Father      Rheumatoid Arthritis Father      Hypothyroidism Father      Diabetes Father      Cerebrovascular Disease Paternal Grandmother         ,     Cancer Paternal Grandfather         Pancreatic     Heart Disease Paternal Grandfather      Diabetes Maternal Grandmother         Type 2     C.A.D. Maternal Grandmother      Heart Disease Maternal Grandmother      Coronary Artery Disease Maternal Grandmother      Heart Disease Maternal Grandfather      Cardiac Sudden Death Maternal Grandfather      Gynecology Other         self     Thyroid Disease Other         self     Macular Degeneration Maternal Aunt      Diabetes Other         Type 1     Glaucoma No family hx of      Additional medical/Social/Surgical histories reviewed in [facility] and updated as appropriate.    PHYSICAL EXAM  Vitals:    12/11/18 1409   Weight: 81.6 kg (180 lb)   Height: 1.778 m (5' 10\")     Vital Signs: Ht 1.778 m (5' 10\")   Wt 81.6 kg (180 lb)   BMI 25.83 kg/m    Patient declined being weighed. Body mass index is 25.83 kg/m .    General  - normal appearance, in no obvious distress  CV  - normal peripheral perfusion  Pulm  - normal respiratory pattern, non-labored  Musculoskeletal - lumbar spine  - stance: normal gait without limp, no obvious leg length discrepancy, normal heel and toe walk  - inspection: normal bone and joint alignment, no obvious scoliosis  - palpation: no paravertebral or bony tenderness  - ROM: flexion exacerbates pain, " normal extension, sidebending, rotation  - strength: lower extremities 5/5 in all planes  - special tests:  (+) straight leg raise  (+) slump test  Neuro  - patellar and Achilles DTRs 2+ bilaterally, right lower extremity sensory deficit throughout L5 distribution, grossly normal coordination, normal muscle tone  Skin  - no ecchymosis, erythema, warmth, or induration, no obvious rash  Psych  - interactive, appropriate, normal mood and affect    ASSESSMENT & PLAN  47 yo female with lumbar ddd, disc herniation, radicular pain  shavinc5r her symptoms and her history and ordered imaging for lumbar spine  Will consider lumbar injection  Given HEP    Dimitri Heart MD, CAQSM

## 2018-12-15 ENCOUNTER — ANCILLARY PROCEDURE (OUTPATIENT)
Dept: MRI IMAGING | Facility: CLINIC | Age: 48
End: 2018-12-15
Attending: PREVENTIVE MEDICINE
Payer: COMMERCIAL

## 2018-12-15 DIAGNOSIS — M54.16 LUMBAR RADICULAR PAIN: ICD-10-CM

## 2018-12-18 ENCOUNTER — OFFICE VISIT (OUTPATIENT)
Dept: ENDOCRINOLOGY | Facility: CLINIC | Age: 48
End: 2018-12-18
Payer: COMMERCIAL

## 2018-12-18 DIAGNOSIS — E10.3299 TYPE 1 DIABETES MELLITUS WITH MILD NONPROLIFERATIVE RETINOPATHY, MACULAR EDEMA PRESENCE UNSPECIFIED, UNSPECIFIED LATERALITY (H): Primary | ICD-10-CM

## 2018-12-18 DIAGNOSIS — E10.319 TYPE 1 DIABETES MELLITUS WITH RETINOPATHY (H): ICD-10-CM

## 2018-12-18 LAB
CHOLEST SERPL-MCNC: 194 MG/DL
CREAT UR-MCNC: 86 MG/DL
HBA1C MFR BLD: 7.2 % (ref 4.3–6)
HDLC SERPL-MCNC: 91 MG/DL
LDLC SERPL CALC-MCNC: 92 MG/DL
MICROALBUMIN UR-MCNC: 6 MG/L
MICROALBUMIN/CREAT UR: 7.11 MG/G CR (ref 0–25)
NONHDLC SERPL-MCNC: 102 MG/DL
TRIGL SERPL-MCNC: 54 MG/DL
TSH SERPL DL<=0.005 MIU/L-ACNC: 0.12 MU/L (ref 0.4–4)

## 2018-12-18 NOTE — PATIENT INSTRUCTIONS
Increase tresiba to 16 units each day    We change the correction between 9 pm and midnight to 1 for 20 points.    The Diabetes M dominic has some of the functionality of the accucheck expert meter (dose calculation). There is a free version you might want to try.  The paid version has a lot more options for storing and managing data

## 2018-12-18 NOTE — LETTER
12/18/2018     RE: Dali Martines  [address]  [facility] [address]     Dear Colleague,    Thank you for referring your patient, Dali Martines, to the [facility] ENDOCRINOLOGY at [facility]. Please see a copy of my visit note below.    This 48 year old woman returns for f/u of her type 1 diabetes. She also has hypothyroidism and psoriatic arthritis.  She reports she has had a very stressful few months because of injury, illness, and disability in both of her parents.  She is now their primary caretaker and finds she does not have enough time to take care of herself.    She is currently taking Tresiba 15 units daily.      She has the following calculations in her Accucheck Expert meter:  Insulin to Carb ratio:  1 unit per 15 grams CHO  Correction Factor:     1 unit drops BG 50 mg/mL  Targets:  Overnight:   and during the Daytime:   Active Insulin Time:  3.5 hours.     She remains on trulicity and is tolerating it well.  She wears her Dex com sensor nearly all the time.  During the last 2 weeks her average was 160.  She was between  49% of the time, above 180 42% of the time, and below seventy 9 % of the time. Reviewing the daily CGM data shows She is usually stable overnight but drifts up during the morning if she does not eat.  She is also finding that her corrections done after dinner do not bring her back to target. She reports she recognizes her low blood sugars.  She still is not as active as she was in past but her orthopedic/rheumatologic diseases are improving.     She has seen her eye MD and her eyes are stable. She has no changes in her tingling in her toes.      Current Outpatient Medications on File Prior to Visit:  aspirin 81 MG tablet Take 1 tablet by mouth daily.   blood glucose monitoring (ACCU-CHEK COLIN PLUS) test strip Test Blood Sugar 8 times daily or as directed   blood glucose monitoring (ACCU-CHEK FASTCLIX) lancets Use to test  blood sugar 8 times daily or as directed.   Calcium Carbonate-Vitamin D (CALCIUM + D PO) Take one daily   citalopram (CELEXA) 20 MG tablet Take 1.5 tablets (30 mg) by mouth daily   diclofenac (VOLTAREN) 1 % GEL topical gel APPLY 2 GRAMS ONTO THE SKIN FOUR TIMES DAILY AS NEEDED FOR MODERATE PAIN   dulaglutide (TRULICITY) 1.5 MG/0.5ML pen Inject 1.5 mg Subcutaneous every 7 days   econazole nitrate 1 % cream Apply 0.5 inches topically daily.   fish oil-omega-3 fatty acids (FISH OIL) 1000 MG capsule Take one daily   folic acid (FOLVITE) 1 MG tablet Take 1 tablet (1 mg) by mouth daily   insulin degludec (TRESIBA) 100 UNIT/ML pen Inject 18 Units Subcutaneous daily   insulin pen needle (B-D U/F) 31G X 8 MM Use 5 pen needles daily   Ketoconazole (NIZORAL PO) Take  by mouth. Shampoo daily   levothyroxine (SYNTHROID/LEVOTHROID) 125 MCG tablet Take 1 tablet (125 mcg) by mouth daily   methotrexate 2.5 MG tablet CHEMO Take 6 tablets (15 mg) by mouth once a week   methylphenidate (METADATE CD) 20 MG CR capsule Take 20 mg by mouth daily   Minoxidil (ROGAINE EX) Externally apply  topically. daily   mometasone-formoterol (DULERA) 100-5 MCG/ACT oral inhaler Inhale 2 puffs into the lungs 2 times daily   montelukast (SINGULAIR) 5 MG chewable tablet CHEW AND SWALLOW 1 TABLET(5 MG) BY MOUTH AT BEDTIME   Multiple Vitamin (MULTIVITAMIN OR) Take  by mouth. Take one daily tab   NOVOLOG PENFILL 100 UNIT/ML soln 1 unit per 15 grams CHO at all meals and snacks with correction scale of 1 unit per 50 mg/dL over 150.  Average daily use is 20 units.   simvastatin (ZOCOR) 20 MG tablet Take 1 tablet (20 mg) by mouth At Bedtime   traZODone (DESYREL) 50 MG tablet Take 1-2 tablets by mouth nightly as needed for sleep.     Current Facility-Administered Medications on File Prior to Visit:  betamethasone acet & sod phos (CELESTONE) injection 6 mg       ROS: 10 point ROS neg other than the symptoms noted above in the HPI.    So Hx - lives alone    Vital  "signs:   There were no vitals taken for this visit.  Estimated body mass index is 25.83 kg/m  as calculated from the following:    Height as of 12/11/18: 1.778 m (5' 10\").    Weight as of 12/11/18: 81.6 kg (180 lb).    VSS  NAD  Eyes - no periorbital edema, conjunctival injection, scleral icterus  Neck - no thyromegaly  Neuro -.  DTR 2/4 biceps  Skin - normal texture   Mood - neutral    Recent Labs   Lab Test 12/18/18  1617 12/18/18  1610 12/18/18 11/20/18  1536 06/12/18 05/15/18  1617  01/09/18  1601 01/09/18  1550  07/09/14  1641  09/27/13  0928 09/16/13  1612  06/04/13  1555  11/29/10  1009   A1C  --   --   --   --   --   --   --   --   --   --   --   --   --  7.2*  --  7.6*   < >  --    HEMOGLOBINA1  --   --  7.2*  --  6.4*  --   --   --   --    < >  --    < >  --   --   --   --    < >  --    TSH  --  0.12*  --   --   --   --   --  0.63  --    < > 0.11*   < >  --   --    < >  --    < > 1.89   T4  --   --   --   --   --   --   --   --   --   --  1.31  --   --   --   --   --   --  8.5   LDL  --  92  --   --   --   --   --   --   --   --   --   --  123  --   --   --    < > 84   HDL  --  91  --   --   --   --   --   --   --   --   --   --  78  --   --   --    < > 94   TRIG  --  54  --   --   --   --   --   --   --   --   --   --  51  --   --   --    < > 45   CR  --   --   --  0.58  --  0.66   < > 0.57  --    < >  --    < >  --   --   --   --    < > 0.77   MICROL 6  --   --   --   --   --   --   --  <5   < >  --    < >  --   --   --   --    < >  --     < > = values in this interval not displayed.       Assessment and plan:    1.  Diabetes control.  Her A1c has gone up but she is still at target.  I recommended she reduce the tresiba by 1 unit to 16 units.  Also changed her correction between 9 pm and midnight to 1 for 20 points.      2.  Diabetes complications.  I will check her urine microalbumin today. Cr was OK last month.  Eyes have been checked.  Feet are OK.    3.  CVD risk. Will check lipids today. BP is " OK.    4.  Hypothyroidism. Will check TSH today. Appears euthyroid.      I spent 25 minutes with this patient face to face and explained the conditions and plans (more than 50% of time was counseling/coordination of diabetes care) . The patient understood and is satisfied with today's visit.     F/u 4-6 mo    Felicia Mckeon MD

## 2018-12-18 NOTE — NURSING NOTE
Chief Complaint   Patient presents with     RECHECK     Type I Diabetes     Performed capillary puncture for A1C testing. Patient tolerated well.    Heather Johnson, [facility]  Endocrinology & Diabetes

## 2018-12-18 NOTE — PROGRESS NOTES
This 48 year old woman returns for f/u of her type 1 diabetes. She also has hypothyroidism and psoriatic arthritis.  She reports she has had a very stressful few months because of injury, illness, and disability in both of her parents.  She is now their primary caretaker and finds she does not have enough time to take care of herself.    She is currently taking Tresiba 15 units daily.      She has the following calculations in her Accucheck Expert meter:  Insulin to Carb ratio:  1 unit per 15 grams CHO  Correction Factor:     1 unit drops BG 50 mg/mL  Targets:  Overnight:   and during the Daytime:   Active Insulin Time:  3.5 hours.     She remains on trulicity and is tolerating it well.  She wears her Dex com sensor nearly all the time.  During the last 2 weeks her average was 160.  She was between  49% of the time, above 180 42% of the time, and below seventy 9 % of the time. Reviewing the daily CGM data shows She is usually stable overnight but drifts up during the morning if she does not eat.  She is also finding that her corrections done after dinner do not bring her back to target. She reports she recognizes her low blood sugars.  She still is not as active as she was in past but her orthopedic/rheumatologic diseases are improving.     She has seen her eye MD and her eyes are stable. She has no changes in her tingling in her toes.      Current Outpatient Medications on File Prior to Visit:  aspirin 81 MG tablet Take 1 tablet by mouth daily.   blood glucose monitoring (ACCU-CHEK COLIN PLUS) test strip Test Blood Sugar 8 times daily or as directed   blood glucose monitoring (ACCU-CHEK FASTCLIX) lancets Use to test blood sugar 8 times daily or as directed.   Calcium Carbonate-Vitamin D (CALCIUM + D PO) Take one daily   citalopram (CELEXA) 20 MG tablet Take 1.5 tablets (30 mg) by mouth daily   diclofenac (VOLTAREN) 1 % GEL topical gel APPLY 2 GRAMS ONTO THE SKIN FOUR TIMES DAILY AS NEEDED FOR  "MODERATE PAIN   dulaglutide (TRULICITY) 1.5 MG/0.5ML pen Inject 1.5 mg Subcutaneous every 7 days   econazole nitrate 1 % cream Apply 0.5 inches topically daily.   fish oil-omega-3 fatty acids (FISH OIL) 1000 MG capsule Take one daily   folic acid (FOLVITE) 1 MG tablet Take 1 tablet (1 mg) by mouth daily   insulin degludec (TRESIBA) 100 UNIT/ML pen Inject 18 Units Subcutaneous daily   insulin pen needle (B-D U/F) 31G X 8 MM Use 5 pen needles daily   Ketoconazole (NIZORAL PO) Take  by mouth. Shampoo daily   levothyroxine (SYNTHROID/LEVOTHROID) 125 MCG tablet Take 1 tablet (125 mcg) by mouth daily   methotrexate 2.5 MG tablet CHEMO Take 6 tablets (15 mg) by mouth once a week   methylphenidate (METADATE CD) 20 MG CR capsule Take 20 mg by mouth daily   Minoxidil (ROGAINE EX) Externally apply  topically. daily   mometasone-formoterol (DULERA) 100-5 MCG/ACT oral inhaler Inhale 2 puffs into the lungs 2 times daily   montelukast (SINGULAIR) 5 MG chewable tablet CHEW AND SWALLOW 1 TABLET(5 MG) BY MOUTH AT BEDTIME   Multiple Vitamin (MULTIVITAMIN OR) Take  by mouth. Take one daily tab   NOVOLOG PENFILL 100 UNIT/ML soln 1 unit per 15 grams CHO at all meals and snacks with correction scale of 1 unit per 50 mg/dL over 150.  Average daily use is 20 units.   simvastatin (ZOCOR) 20 MG tablet Take 1 tablet (20 mg) by mouth At Bedtime   traZODone (DESYREL) 50 MG tablet Take 1-2 tablets by mouth nightly as needed for sleep.     Current Facility-Administered Medications on File Prior to Visit:  betamethasone acet & sod phos (CELESTONE) injection 6 mg       ROS: 10 point ROS neg other than the symptoms noted above in the HPI.    So Hx - lives alone    Vital signs:   There were no vitals taken for this visit.  Estimated body mass index is 25.83 kg/m  as calculated from the following:    Height as of 12/11/18: 1.778 m (5' 10\").    Weight as of 12/11/18: 81.6 kg (180 lb).    VSS  NAD  Eyes - no periorbital edema, conjunctival injection, " scleral icterus  Neck - no thyromegaly  Neuro -.  DTR 2/4 biceps  Skin - normal texture   Mood - neutral    Recent Labs   Lab Test 12/18/18  1617 12/18/18  1610 12/18/18 11/20/18  1536 06/12/18 05/15/18  1617  01/09/18  1601 01/09/18  1550  07/09/14  1641  09/27/13  0928 09/16/13  1612  06/04/13  1555  11/29/10  1009   A1C  --   --   --   --   --   --   --   --   --   --   --   --   --  7.2*  --  7.6*   < >  --    HEMOGLOBINA1  --   --  7.2*  --  6.4*  --   --   --   --    < >  --    < >  --   --   --   --    < >  --    TSH  --  0.12*  --   --   --   --   --  0.63  --    < > 0.11*   < >  --   --    < >  --    < > 1.89   T4  --   --   --   --   --   --   --   --   --   --  1.31  --   --   --   --   --   --  8.5   LDL  --  92  --   --   --   --   --   --   --   --   --   --  123  --   --   --    < > 84   HDL  --  91  --   --   --   --   --   --   --   --   --   --  78  --   --   --    < > 94   TRIG  --  54  --   --   --   --   --   --   --   --   --   --  51  --   --   --    < > 45   CR  --   --   --  0.58  --  0.66   < > 0.57  --    < >  --    < >  --   --   --   --    < > 0.77   MICROL 6  --   --   --   --   --   --   --  <5   < >  --    < >  --   --   --   --    < >  --     < > = values in this interval not displayed.       Assessment and plan:    1.  Diabetes control.  Her A1c has gone up but she is still at target.  I recommended she reduce the tresiba by 1 unit to 16 units.  Also changed her correction between 9 pm and midnight to 1 for 20 points.      2.  Diabetes complications.  I will check her urine microalbumin today. Cr was OK last month.  Eyes have been checked.  Feet are OK.    3.  CVD risk. Will check lipids today. BP is OK.    4.  Hypothyroidism. Will check TSH today. Appears euthyroid.      I spent 25 minutes with this patient face to face and explained the conditions and plans (more than 50% of time was counseling/coordination of diabetes care) . The patient understood and is satisfied with today's  visit.     F/u 4-6 mo    Felicia Mckeon MD

## 2018-12-23 DIAGNOSIS — J45.30 MILD PERSISTENT ASTHMA WITHOUT COMPLICATION: ICD-10-CM

## 2018-12-24 RX ORDER — MONTELUKAST SODIUM 5 MG/1
TABLET, CHEWABLE ORAL
Qty: 30 TABLET | Refills: 3 | Status: SHIPPED | OUTPATIENT
Start: 2018-12-24 | End: 2019-04-02

## 2018-12-27 ENCOUNTER — THERAPY VISIT (OUTPATIENT)
Dept: PHYSICAL THERAPY | Facility: CLINIC | Age: 48
End: 2018-12-27
Payer: COMMERCIAL

## 2018-12-27 DIAGNOSIS — R60.9 EDEMA: ICD-10-CM

## 2018-12-27 DIAGNOSIS — M25.571 PAIN IN JOINT INVOLVING ANKLE AND FOOT, RIGHT: ICD-10-CM

## 2018-12-27 PROCEDURE — 97140 MANUAL THERAPY 1/> REGIONS: CPT | Mod: GP | Performed by: PHYSICAL THERAPIST

## 2018-12-27 PROCEDURE — 97110 THERAPEUTIC EXERCISES: CPT | Mod: GP | Performed by: PHYSICAL THERAPIST

## 2018-12-27 NOTE — PROGRESS NOTES
Subjective:  HPI                    Objective:  System    Physical Exam    General     ROS    Assessment/Plan:    PROGRESS  REPORT    Progress reporting period is from 10/30/18 to 12/27/18.       SUBJECTIVE  Subjective changes noted by patient:  Is getting a second PRP on 1/17 from Dr Heart.  He doesn't feel she needs to be in a boot, but nothing strenuous for 1-2 weeks.  She'd like to do a final PT right before it and find out what to do on her own the first 2-3 weeks after it.  Still feels like her hips are off set and won't entirely go back in and stay in.  The stretches help, but haven't fixed it.  Notices it more with driving and then has had more nerve firings at lateral R foot too.  Also Dr Heart had her do an xray and MRI of her low back.  Xray showed L5-S1 being compressed, but she hasn't gotten MRI results.  Also tried driving with R foot and that still causes inflammed pain at lateral ankle tendon afterwards.     Current Pain level: (0-3/10).      Initial Pain level: (3-8/10).   Changes in function:  Yes (See Goal flowsheet attached for changes in current functional level)  Adverse reaction to treatment or activity: None    OBJECTIVE  Changes noted in objective findings:  Yes,     Very tight R ant hip and quad.    Lumbar ROM: FLex palms to floor, ext WNL, B SG 75% and very tight.    Likely hypermobile and needs stability    R ankle strength: Df 5/5, PF 4/5, IV 4/5 and EV 4+/5.    R ankle ROM: DF 4, PF 65, IV 25, EV10.    Hypo mobile posterior TC mobility and subtalar mobility     ASSESSMENT/PLAN  Updated problem list and treatment plan: Diagnosis 1:  S/p PRP injection 4/11/18 with chronic R ankle pain due to tear of ATF and peroneal brevis  Pain -  manual therapy, self management, education and home program  Decreased ROM/flexibility - manual therapy, therapeutic exercise and home program  Decreased joint mobility - manual therapy, therapeutic exercise and home program  Decreased strength -  therapeutic exercise, therapeutic activities and home program  Impaired muscle performance - neuro re-education and home program  Decreased function - therapeutic activities and home program  STG/LTGs have been met or progress has been made towards goals:  Yes (See Goal flow sheet completed today.)  Assessment of Progress: The patient's condition has potential to improve.  Self Management Plans:  Patient has been instructed in a home treatment program.  Patient  has been instructed in self management of symptoms.  I have re-evaluated this patient and find that the nature, scope, duration and intensity of the therapy is appropriate for the medical condition of the patient.  Dali continues to require the following intervention to meet STG and LTG's:  PT    Recommendations:  This patient would benefit from continued therapy.     Frequency:  1 X a month, once daily  Duration:  for 1 months        Please refer to the daily flowsheet for treatment today, total treatment time and time spent performing 1:1 timed codes.

## 2019-01-03 DIAGNOSIS — E11.620 NLD (NECROBIOSIS LIPOIDICA DIABETICORUM) (H): ICD-10-CM

## 2019-01-03 DIAGNOSIS — M77.9 TENDINITIS: ICD-10-CM

## 2019-01-03 DIAGNOSIS — M25.50 PAIN IN JOINT, MULTIPLE SITES: ICD-10-CM

## 2019-01-03 DIAGNOSIS — L40.50 PSORIATIC ARTHRITIS (H): ICD-10-CM

## 2019-01-04 ENCOUNTER — TELEPHONE (OUTPATIENT)
Dept: ENDOCRINOLOGY | Facility: CLINIC | Age: 49
End: 2019-01-04

## 2019-01-04 DIAGNOSIS — E10.9 TYPE 1 DIABETES, HBA1C GOAL < 7% (H): ICD-10-CM

## 2019-01-04 NOTE — TELEPHONE ENCOUNTER
Prior Authorization Retail Medication Request    Medication/Dose: Accu Chek Radha Plus Strips 100s  ICD code (if different than what is on RX):E10.9  Rationale: Patient needs for Accu Chek Radha Plus monitor    Insurance Name: [employer]   Insurance ID: [id number]       Pharmacy Information (if different than what is on RX)  Name:[facility]   Phone:[phone]

## 2019-01-15 ENCOUNTER — THERAPY VISIT (OUTPATIENT)
Dept: PHYSICAL THERAPY | Facility: CLINIC | Age: 49
End: 2019-01-15
Payer: COMMERCIAL

## 2019-01-15 DIAGNOSIS — M25.571 PAIN IN JOINT INVOLVING ANKLE AND FOOT, RIGHT: ICD-10-CM

## 2019-01-15 DIAGNOSIS — R60.9 EDEMA: ICD-10-CM

## 2019-01-15 PROCEDURE — 97110 THERAPEUTIC EXERCISES: CPT | Mod: GP | Performed by: PHYSICAL THERAPIST

## 2019-01-15 PROCEDURE — 97140 MANUAL THERAPY 1/> REGIONS: CPT | Mod: GP | Performed by: PHYSICAL THERAPIST

## 2019-01-15 NOTE — PROGRESS NOTES
"Subjective:  HPI                    Objective:  System    Physical Exam    General     ROS    Assessment/Plan:    DISCHARGE REPORT    Progress reporting period is from 12/28/18 to 1/15/19.       SUBJECTIVE  Subjective changes noted by patient: Patient was walking at a mall 1.5 weeks ago. Hours afterwards she had sharp pain lasting through the next day at distal 1/3 fibula and soft tissue around it.  Hasn't been on her feet too much since then, but did HEP.  Pain is back to normal of just 0-3/10 now.  Thigh/back is better, but still super tight and sore at insertion of R TFL and glut med.  Is getting second PRP injection on Thursday.  Educated to focus on normal gait, balance and active eversion initially     Current Pain level: (0-3/10, but was up to 6/10 for 2 days).      Initial Pain level: (3-8/10).   Changes in function:  None  Adverse reaction to treatment or activity: activity - walking at a mall      OBJECTIVE  Changes noted in objective findings:      Walking, she avoids end range push off on the R.      Strength: IV 4/5, EV 4+/5, PF 4+/5.    SLS 30\" EO, 23\" EC    R ankle AROM WNL except limited DF to 4 on the R.    Hypomobile posterior TC mobility.     ASSESSMENT/PLAN  Updated problem list and treatment plan: Diagnosis 1:  S/p PRP injection 4/11/18 with chronic R ankle pain due to tear of ATF and peroneal brevis tendon  Pain -  manual therapy, self management, education and home program  Decreased ROM/flexibility - manual therapy, therapeutic exercise and home program  Decreased strength - therapeutic exercise, therapeutic activities and home program  Impaired balance - neuro re-education, therapeutic activities and home program  Impaired gait - gait training and home program  Impaired muscle performance - neuro re-education and home program  Decreased function - therapeutic activities and home program  STG/LTGs have been met or progress has been made towards goals:  None  Assessment of Progress: The " patient's progress has plateaued.  Self Management Plans:  Patient is independent in a home treatment program.  Patient is independent in self management of symptoms.    Dali continues to require the following intervention to meet STG and LTG's:  PT intervention is no longer required to meet STG/LTG.    Recommendations:  This patient is ready to be discharged from therapy and continue their home treatment program, until after her next PRP injection.    Please refer to the daily flowsheet for treatment today, total treatment time and time spent performing 1:1 timed codes.

## 2019-01-15 NOTE — LETTER
"[facility]  [address]  [facility] [address] [phone]    2019    Re: Dali Martines   :   1970  MRN:  [id number]   REFERRING PHYSICIAN:   Kraig Villagomez    [facility]    Date of Initial Evaluation:  17  Visits:  Rxs Used: 35  Reason for Referral:     Edema  Pain in joint involving ankle and foot, right    DISCHARGE REPORT  Progress reporting period is from 18 to 1/15/19.     SUBJECTIVE  Subjective changes noted by patient: Patient was walking at a mall 1.5 weeks ago. Hours afterwards she had sharp pain lasting through the next day at distal 1/3 fibula and soft tissue around it.  Hasn't been on her feet too much since then, but did HEP.  Pain is back to normal of just 0-3/10 now.  Thigh/back is better, but still super tight and sore at insertion of R TFL and glut med.  Is getting second PRP injection on Thursday.  Educated to focus on normal gait, balance and active eversion initially    Current Pain level: (0-3/10, but was up to 6/10 for 2 days).     Initial Pain level: (3-8/10).   Changes in function:  None  Adverse reaction to treatment or activity: activity - walking at a mall    OBJECTIVE  Changes noted in objective findings:    Walking, she avoids end range push off on the R.      Strength: IV 4/5, EV 4+/5, PF 4+/5.    SLS 30\" EO, 23\" EC    R ankle AROM WNL except limited DF to 4 on the R.    Hypomobile posterior TC mobility.   ASSESSMENT/PLAN  Updated problem list and treatment plan: Diagnosis 1:  S/p PRP injection 18 with chronic R ankle pain due to tear of ATF and peroneal brevis tendon  Pain -  manual therapy, self management, education and home program  Decreased ROM/flexibility - manual therapy, therapeutic exercise and home program  Decreased strength - therapeutic exercise, therapeutic activities and home program  Impaired balance - neuro re-education, " therapeutic activities and home program  Impaired gait - gait training and home program  Impaired muscle performance - neuro re-education and home program  Decreased function - therapeutic activities and home program  STG/LTGs have been met or progress has been made towards goals:  None  Assessment of Progress: The patient's progress has plateaued.  Self Management Plans:  Patient is independent in a home treatment program.  Re: Dali Martines   :   1970    Patient is independent in self management of symptoms.  Dali continues to require the following intervention to meet STG and LTG's:  PT intervention is no longer required to meet STG/LTG.  Recommendations:  This patient is ready to be discharged from therapy and continue their home treatment program, until after her next PRP injection.    Thank you for your referral.    INQUIRIES  Therapist: Chanelle Muniz [facility]  [address] [place] [address] [id number]  [facility] [address]  Phone: [phone]  Fax: [phone]

## 2019-01-17 ENCOUNTER — OFFICE VISIT (OUTPATIENT)
Dept: ORTHOPEDICS | Facility: CLINIC | Age: 49
End: 2019-01-17

## 2019-01-17 VITALS
SYSTOLIC BLOOD PRESSURE: 133 MMHG | HEIGHT: 70 IN | BODY MASS INDEX: 25.77 KG/M2 | HEART RATE: 78 BPM | WEIGHT: 180 LBS | DIASTOLIC BLOOD PRESSURE: 79 MMHG

## 2019-01-17 DIAGNOSIS — M51.16 LUMBAR DISC HERNIATION WITH RADICULOPATHY: ICD-10-CM

## 2019-01-17 DIAGNOSIS — S86.311S PERONEAL TENDON TEAR, RIGHT, SEQUELA: Primary | ICD-10-CM

## 2019-01-17 PROCEDURE — 99207 ZZC NO CHARGE LOS: CPT | Performed by: PREVENTIVE MEDICINE

## 2019-01-17 PROCEDURE — 0232T NJX PLATELET PLASMA: CPT | Mod: RT | Performed by: PREVENTIVE MEDICINE

## 2019-01-17 ASSESSMENT — PAIN SCALES - GENERAL: PAINLEVEL: MILD PAIN (3)

## 2019-01-17 ASSESSMENT — MIFFLIN-ST. JEOR: SCORE: 1516.72

## 2019-01-17 NOTE — LETTER
1/17/2019         RE: Dali Martines  [address] [facility] [address]        Dear Colleague,    Thank you for referring your patient, Dali Martines, to the [facility]. Please see a copy of my visit note below.    Dali returns for right ankle peroneal tendon PRP injection as previously planned.    Diagnosis: right peroneal tendon tear, chronic ankle pain    PROCEDURE: right ankle peroneal tendon PRP injection   4mL of PRP was harvested from the patient's blood in normal fashion.     The patient was apprised of the risks and the benefits of the procedure and consented and a written consent was signed.   The patient s right ankle was sterilely prepped with chloraprepe  The patient was injected into the distal and middle peroneal tendon sheath near the talofibular joint and at the base of the 5th metatarsal with 4mL of PRP with a 1.5-inch 25-gauge needle using ultrasound guidance for needle placement and visualization and the images were permanently saved for the patient's record.  There were no complications. The patient tolerated the procedure well. There was minimal bleeding.   The patient was instructed to ice the right ankle upon leaving clinic and refrain from overuse over the next 3 days.   The patient was instructed to go to the emergency room with any usual pain, swelling, or redness occurred in the injected area.   The patient was given a followup appointment to evaluate response to the injection to their increased range of motion and reduction of pain.  patient will return to the office in 2-3 weeks for followup and will discuss lumbar injection at that time  Given PT order  Dr Dimitri Heart    Again, thank you for allowing me to participate in the care of your patient.        Sincerely,        Dimitri Heart MD

## 2019-01-17 NOTE — PROGRESS NOTES
Dali returns for right ankle peroneal tendon PRP injection as previously planned.    Diagnosis: right peroneal tendon tear, chronic ankle pain    PROCEDURE: right ankle peroneal tendon PRP injection   4mL of PRP was harvested from the patient's blood in normal fashion.     The patient was apprised of the risks and the benefits of the procedure and consented and a written consent was signed.   The patient s right ankle was sterilely prepped with chloraprepe  The patient was injected into the distal and middle peroneal tendon sheath near the talofibular joint and at the base of the 5th metatarsal with 4mL of PRP with a 1.5-inch 25-gauge needle using ultrasound guidance for needle placement and visualization and the images were permanently saved for the patient's record.  There were no complications. The patient tolerated the procedure well. There was minimal bleeding.   The patient was instructed to ice the right ankle upon leaving clinic and refrain from overuse over the next 3 days.   The patient was instructed to go to the emergency room with any usual pain, swelling, or redness occurred in the injected area.   The patient was given a followup appointment to evaluate response to the injection to their increased range of motion and reduction of pain.  patient will return to the office in 2-3 weeks for followup and will discuss lumbar injection at that time  Given PT order  Dr Dimitri Heart

## 2019-01-17 NOTE — PATIENT INSTRUCTIONS
Thanks for coming today.  [facility]  [address] [place] [address]    To schedule future appointments in Ortho Clinic, you may call [phone].    To schedule ordered imaging by your provider:   Call Central Imaging [phone]    To schedule an injection ordered by your provider:  Call Central Imaging Injection scheduling line: [phone]  [facility] available online at:  [email or web address]    Please call if any further questions or concerns ([phone]).  Clinic hours 8 am to 5 pm.    Return to clinic (call) if symptoms worsen or fail to improve.

## 2019-01-22 DIAGNOSIS — E10.9 TYPE 1 DIABETES MELLITUS WITHOUT COMPLICATION (H): ICD-10-CM

## 2019-01-26 DIAGNOSIS — E10.9 TYPE 1 DIABETES MELLITUS WITHOUT COMPLICATION (H): ICD-10-CM

## 2019-01-26 DIAGNOSIS — E10.3299 TYPE 1 DIABETES MELLITUS WITH MILD NONPROLIFERATIVE RETINOPATHY, MACULAR EDEMA PRESENCE UNSPECIFIED, UNSPECIFIED LATERALITY (H): Primary | ICD-10-CM

## 2019-01-26 RX ORDER — INSULIN ASPART 100 [IU]/ML
INJECTION, SOLUTION INTRAVENOUS; SUBCUTANEOUS
Qty: 30 ML | Refills: 4 | Status: SHIPPED | OUTPATIENT
Start: 2019-01-26 | End: 2019-05-08

## 2019-01-26 RX ORDER — INSULIN ASPART 100 [IU]/ML
INJECTION, SOLUTION INTRAVENOUS; SUBCUTANEOUS
Qty: 30 ML | Refills: 4 | Status: SHIPPED | OUTPATIENT
Start: 2019-01-26 | End: 2019-01-26

## 2019-01-29 ENCOUNTER — DOCUMENTATION ONLY (OUTPATIENT)
Dept: CARE COORDINATION | Facility: CLINIC | Age: 49
End: 2019-01-29

## 2019-02-04 ENCOUNTER — TELEPHONE (OUTPATIENT)
Dept: ENDOCRINOLOGY | Facility: CLINIC | Age: 49
End: 2019-02-04

## 2019-02-04 NOTE — TELEPHONE ENCOUNTER
[facility] Call [facility]    Phone Message    May a detailed message be left on voicemail: yes    Reason for Call: Other: PT is requesting a call back regarding the status of her accuchek test strips.  Please follow up with the PT.      Action Taken: Message routed to:  [facility] ([facility]): Ronald

## 2019-02-10 ENCOUNTER — NURSE TRIAGE (OUTPATIENT)
Dept: NURSING | Facility: CLINIC | Age: 49
End: 2019-02-10

## 2019-02-11 NOTE — TELEPHONE ENCOUNTER
"Reason for Call/Nurse Assessment:  Mom calling with questions. Caller is NOT with patient = NO TRIAGE.     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 Caller had no further questions. Encouraged call back to [facility] 24/7 for new/worsening symptoms or further questions.    Ellie Newman RN  [facility]      Additional Information    [1] Caller is not with the adult (patient) AND [2] probable NON-URGENT symptoms    Protocols used: INFORMATION ONLY CALL-ADULT-      "

## 2019-02-14 ENCOUNTER — TELEPHONE (OUTPATIENT)
Dept: GASTROENTEROLOGY | Facility: CLINIC | Age: 49
End: 2019-02-14

## 2019-02-14 ENCOUNTER — OFFICE VISIT (OUTPATIENT)
Dept: INTERNAL MEDICINE | Facility: CLINIC | Age: 49
End: 2019-02-14
Payer: COMMERCIAL

## 2019-02-14 VITALS
SYSTOLIC BLOOD PRESSURE: 116 MMHG | HEART RATE: 65 BPM | WEIGHT: 181.3 LBS | BODY MASS INDEX: 26.01 KG/M2 | DIASTOLIC BLOOD PRESSURE: 74 MMHG

## 2019-02-14 DIAGNOSIS — Z00.00 ENCOUNTER FOR PREVENTIVE CARE: ICD-10-CM

## 2019-02-14 DIAGNOSIS — Z51.81 ENCOUNTER FOR THERAPEUTIC DRUG MONITORING: ICD-10-CM

## 2019-02-14 DIAGNOSIS — M77.9 TENDINITIS: ICD-10-CM
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
## 2019-02-14 DIAGNOSIS — M25.50 PAIN IN JOINT, MULTIPLE SITES: ICD-10-CM

## 2019-02-14 LAB
ALBUMIN SERPL-MCNC: 4.2 G/DL (ref 3.4–5)
ALBUMIN UR-MCNC: NEGATIVE MG/DL
ALT SERPL W P-5'-P-CCNC: 24 U/L (ref 0–50)
APPEARANCE UR: CLEAR
AST SERPL W P-5'-P-CCNC: 23 U/L (ref 0–45)
BASOPHILS # BLD AUTO: 0 10E9/L (ref 0–0.2)
BASOPHILS NFR BLD AUTO: 0.4 %
BILIRUB UR QL STRIP: NEGATIVE
COLOR UR AUTO: YELLOW
CREAT SERPL-MCNC: 0.54 MG/DL (ref 0.52–1.04)
CRP SERPL-MCNC: <2.9 MG/L (ref 0–8)
DIFFERENTIAL METHOD BLD: NORMAL
EOSINOPHIL # BLD AUTO: 0.1 10E9/L (ref 0–0.7)
EOSINOPHIL NFR BLD AUTO: 0.7 %
ERYTHROCYTE [DISTWIDTH] IN BLOOD BY AUTOMATED COUNT: 12.4 % (ref 10–15)
GFR SERPL CREATININE-BSD FRML MDRD: >90 ML/MIN/{1.73_M2}
GLUCOSE UR STRIP-MCNC: NEGATIVE MG/DL
HCT VFR BLD AUTO: 36.4 % (ref 35–47)
HGB BLD-MCNC: 12.3 G/DL (ref 11.7–15.7)
HGB UR QL STRIP: NEGATIVE
IMM GRANULOCYTES # BLD: 0 10E9/L (ref 0–0.4)
IMM GRANULOCYTES NFR BLD: 0.3 %
KETONES UR STRIP-MCNC: NEGATIVE MG/DL
LEUKOCYTE ESTERASE UR QL STRIP: NEGATIVE
LYMPHOCYTES # BLD AUTO: 2 10E9/L (ref 0.8–5.3)
LYMPHOCYTES NFR BLD AUTO: 28.1 %
MCH RBC QN AUTO: 31.4 PG (ref 26.5–33)
MCHC RBC AUTO-ENTMCNC: 33.8 G/DL (ref 31.5–36.5)
MCV RBC AUTO: 93 FL (ref 78–100)
MONOCYTES # BLD AUTO: 0.5 10E9/L (ref 0–1.3)
MONOCYTES NFR BLD AUTO: 7.2 %
MUCOUS THREADS #/AREA URNS LPF: PRESENT /LPF
NEUTROPHILS # BLD AUTO: 4.5 10E9/L (ref 1.6–8.3)
NEUTROPHILS NFR BLD AUTO: 63.3 %
NITRATE UR QL: NEGATIVE
NRBC # BLD AUTO: 0 10*3/UL
NRBC BLD AUTO-RTO: 0 /100
PH UR STRIP: 6 PH (ref 5–7)
PLATELET # BLD AUTO: 253 10E9/L (ref 150–450)
RBC # BLD AUTO: 3.92 10E12/L (ref 3.8–5.2)
RBC #/AREA URNS AUTO: <1 /HPF (ref 0–2)
SOURCE: ABNORMAL
SP GR UR STRIP: 1.01 (ref 1–1.03)
SQUAMOUS #/AREA URNS AUTO: 1 /HPF (ref 0–1)
UROBILINOGEN UR STRIP-MCNC: 0 MG/DL (ref 0–2)
WBC # BLD AUTO: 7.2 10E9/L (ref 4–11)
WBC #/AREA URNS AUTO: 1 /HPF (ref 0–5)

## 2019-02-14 ASSESSMENT — PAIN SCALES - GENERAL: PAINLEVEL: NO PAIN (0)

## 2019-02-14 NOTE — PATIENT INSTRUCTIONS
[facility] Medication Refill Request Information:  * Please contact your pharmacy regarding ANY request for medication refills.  ** [facility] Prescription Fax = [phone]  * Please allow 3 business days for routine medication refills.  * Please allow 5 business days for controlled substance medication refills.     [facility] Test Result notification information:  *You will be notified with in 7-10 days of your appointment day regarding the results of your test.  If you are on MyChart you will be notified as soon as the provider has reviewed the results and signed off on them.    [facility]: [phone]

## 2019-02-14 NOTE — NURSING NOTE
Chief Complaint   Patient presents with     Headache     pt states she was getting headaches for 5 days, was drinking coffee and found mice droppings in cup, has mice in kitchen        Lazara Figueroa CMA at 2:35 PM on 2/14/2019.

## 2019-02-15 NOTE — PROGRESS NOTES
SUBJECTIVE: Chief complaint: Inadvertent ingestion of mouse excrement.  This 48-year-old woman reports that she noted a sharp pain over the crown of her head approximately 9 days ago.  Symptoms gradually improved over one week without specific treatment.  Since that time, she has noted mild increase of arthralgias which she distinguishes from her baseline discomfort related to psoriatic arthritis.  She reports that she discovered the presence of mouse excrement in her coffeemaker 4 days ago, at which point she questioned whether her headache or recent change in joint symptoms might be related to her ingestion tainted coffee.  She has noted minimal chills, without fever or sweats.  She has noted a slight cough and chest tightness similar to symptoms noted with past episodes of exercise-or cold-induced asthma; symptoms improved with use of her metered-dose inhaler; note that recent outdoor temperatures have been bitterly cold at times over the past 2 weeks.  She notes a faint left-sided rash over the region of the lower malar to mandibular region which she describes as patchy, without vesicles, pustules, ulceration, or crusting.  She denies cough, chest pain, rhinorrhea, postnasal drainage, sinus congestion, and sinus pain.  She reports a several-day episode of experiencing difficulty recalling names of familiar acquaintances that subsided several days ago.  She denies focal neurologic deficits, active headache, change in vision, and change in medication.     Past Medical History: Reviewed and updated in patient health profile.     Adverse Drug Reactions: Reviewed and updated in patient health profile.     Current Medications: Reviewed and updated in patient health profile.     OBJECTIVE:     Vital signs: Reviewed in patient health profile.  General: Alert, neatly dressed and groomed, in no acute distress.  HEENT: Atraumatic and normocephalic. Eyelids, pupils, and conjunctivae appeared normal. Lips, teeth and gums  appeared normal.  Funduscopic examination showed sharp discs bilaterally, without exudate or hemorrhage.  Oropharynx showed moist mucous membranes, without exudate or erythema.  Sinuses were nontender.  Neck: Supple, without thyromegaly, mass, or bruit. No cervical or supraclavicular lymphadenopathy.  Back: No spinal or costovertebral angle tenderness.  Chest: Clear to auscultation and percussion. Normal respiratory effort.  Cardiovascular: No jugular venous distention. Regular rate and rhythm, normal S1, S2 without murmur.  Abdomen: Bowel sounds positive; soft, nontender, without rebound, guarding, hepatosplenomegaly or mass.  Extremities: No cyanosis or edema.  Neurologic: Alert and oriented ×3.  Cranial nerves II-XII were grossly intact.  Sensory and motor examinations were grossly normal.  Finger-nose-finger and heel-to-shin tests were normal.  Gait was normal.     ASSESSMENT:    1.  Exposure to and presumed ingestion of mouse excrement.  She does not recall any recent instances of inhalation of aerosolized material in the areas where mouse extremity was located.  She describes several nonspecific symptoms without sanjeev fever, cough, chest pain, or dyspnea.  We discussed potential exposures given her history and she expressed interest in proceeding with further evaluation to exclude the possibility of infectious consequences from recent exposure.  Plan will be to arrange UA, creatinine, hantavirus and Leptospira antibodies while monitoring symptoms.  She agrees to call in the event of fever, chills, sweats, worsening cough, dyspnea, chest discomfort, or hematuria.  If above studies are unremarkable, she will manage expectantly; if symptoms progress or if abnormality is noted, further evaluation and treatment will be arranged.    2.  Preventive care.  Due for colonoscopy, which will be arranged.  She agrees to discuss with her endocrinologist periprocedural medical management.  He had questions regarding zoster  vaccination, which were discussed.  She will proceed recumbent zoster vaccination if her rheumatologist is in agreement.    PLAN:  See above.    Total time was 25 minutes.  Counseling time was 15 minutes.  We discussed potential causes of her symptoms, symptoms for which to monitor, options for further evaluation, and plans for further evaluation, treatment, and follow-up.     ~SRT

## 2019-02-16 ENCOUNTER — THERAPY VISIT (OUTPATIENT)
Dept: PHYSICAL THERAPY | Facility: CLINIC | Age: 49
End: 2019-02-16
Payer: COMMERCIAL

## 2019-02-16 DIAGNOSIS — M25.571 PAIN IN JOINT INVOLVING ANKLE AND FOOT, RIGHT: ICD-10-CM

## 2019-02-16 PROCEDURE — 97110 THERAPEUTIC EXERCISES: CPT | Mod: GP | Performed by: PHYSICAL THERAPIST

## 2019-02-16 PROCEDURE — 97162 PT EVAL MOD COMPLEX 30 MIN: CPT | Mod: GP | Performed by: PHYSICAL THERAPIST
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
## 2019-02-18 ENCOUNTER — TELEPHONE (OUTPATIENT)
Dept: GASTROENTEROLOGY | Facility: CLINIC | Age: 49
End: 2019-02-18

## 2019-02-19 ENCOUNTER — TELEPHONE (OUTPATIENT)
Dept: GASTROENTEROLOGY | Facility: CLINIC | Age: 49
End: 2019-02-19

## 2019-02-19 LAB — LEPTOSPIRA IGM SER QL: NEGATIVE

## 2019-02-22 ENCOUNTER — OFFICE VISIT (OUTPATIENT)
Dept: ORTHOPEDICS | Facility: CLINIC | Age: 49
End: 2019-02-22
Payer: COMMERCIAL

## 2019-02-22 DIAGNOSIS — M54.16 LUMBAR RADICULAR PAIN: Primary | ICD-10-CM

## 2019-02-22 ASSESSMENT — ENCOUNTER SYMPTOMS
ORTHOPNEA: 0
SORE THROAT: 0
SMELL DISTURBANCE: 0
LIGHT-HEADEDNESS: 0
DISTURBANCES IN COORDINATION: 0
BACK PAIN: 1
MEMORY LOSS: 1
PARALYSIS: 0
MUSCLE WEAKNESS: 1
POSTURAL DYSPNEA: 0
NUMBNESS: 0
WEAKNESS: 0
MYALGIAS: 1
HEADACHES: 1
PALPITATIONS: 0
MUSCLE CRAMPS: 0
LEG PAIN: 0
LOSS OF CONSCIOUSNESS: 0
SPEECH CHANGE: 0
SYNCOPE: 0
COUGH DISTURBING SLEEP: 1
SINUS PAIN: 0
DYSPNEA ON EXERTION: 1
COUGH: 1
NECK PAIN: 0
HEMOPTYSIS: 0
SEIZURES: 0
SNORES LOUDLY: 0
ARTHRALGIAS: 1
TASTE DISTURBANCE: 0
HOARSE VOICE: 0
SLEEP DISTURBANCES DUE TO BREATHING: 0
TROUBLE SWALLOWING: 0
SHORTNESS OF BREATH: 1
NECK MASS: 0
SPUTUM PRODUCTION: 1
DIZZINESS: 0
STIFFNESS: 1
TREMORS: 0
HYPOTENSION: 0
EXERCISE INTOLERANCE: 0
JOINT SWELLING: 0
HYPERTENSION: 0
SINUS CONGESTION: 1
WHEEZING: 0
TINGLING: 0

## 2019-02-22 NOTE — LETTER
2/22/2019       RE: Dali Martines  [address]  [facility] [address]     Dear Colleague,    Thank you for referring your patient, Dali Martines, to the [facility] at [facility]. Please see a copy of my visit note below.    HISTORY OF PRESENT ILLNESS  Ms. Martines is a pleasant 48 year old year old female who presents to clinic today for followup for lumbar MRI and after having PRP Injection for right ankle  She overall feels better in the ankle, has had some right leg pains that may be connected, but overall better  She is happy with where she is at  She would like to pursue PT for her low back and see how that goes and will let me know if she wants to pursue a lumbar injection at some point    MEDICAL HISTORY  Patient Active Problem List   Diagnosis     GERD (gastroesophageal reflux disease)     CARDIOVASCULAR SCREENING; LDL GOAL LESS THAN 100     Type 1 diabetes mellitus with retinopathy (H)     Anxiety     NLD (necrobiosis lipoidica diabeticorum) (H)     Cutaneous skin tags     Cervicalgia     Chronic low back pain     SOB (shortness of breath)     Encounter for other specified aftercare     PAIN FOOT     Diabetes (H)     Screening for other eye conditions     PAIN KNEE JOINT     Enthesopathy     Pain in joint, multiple sites     Chronic pain of right ankle     Right foot pain     ESR raised     Swelling of limb     Other postprocedural status(V45.89)     Left knee pain     Type 1 diabetes mellitus without complication (H)     Psoriatic arthritis (H)     Psoriasis with arthropathy (H)     Pain in joint involving ankle and foot, right       Current Outpatient Medications   Medication Sig Dispense Refill     aspirin 81 MG tablet Take 1 tablet by mouth daily.       blood glucose (ACCU-CHEK COLIN PLUS) test strip Test Blood Sugar 8 times daily or as directed 700 strip 3     blood glucose monitoring (ACCU-CHEK FASTCLIX) lancets Use to test blood sugar 8  times daily or as directed. 4 Box 3     Calcium Carbonate-Vitamin D (CALCIUM + D PO) Take one daily       citalopram (CELEXA) 20 MG tablet Take 1.5 tablets (30 mg) by mouth daily 135 tablet 1     diclofenac (VOLTAREN) 1 % GEL topical gel APPLY 2 GRAMS ONTO THE SKIN FOUR TIMES DAILY AS NEEDED FOR MODERATE PAIN 100 g 5     dulaglutide (TRULICITY) 1.5 MG/0.5ML pen Inject 1.5 mg Subcutaneous every 7 days 6 mL 1     econazole nitrate 1 % cream Apply 0.5 inches topically daily.       fish oil-omega-3 fatty acids (FISH OIL) 1000 MG capsule Take one daily       folic acid (FOLVITE) 1 MG tablet Take 1 tablet (1 mg) by mouth daily 90 tablet 3     insulin pen needle (B-D U/F) 31G X 8 MM miscellaneous Use 5 pen needles daily 450 each 3     Ketoconazole (NIZORAL PO) Take  by mouth. Shampoo daily       methotrexate 2.5 MG tablet CHEMO Take 6 tablets (15 mg) by mouth once a week 72 tablet 0     methylphenidate (METADATE CD) 20 MG CR capsule Take 20 mg by mouth daily       Minoxidil (ROGAINE EX) Externally apply  topically. daily       mometasone-formoterol (DULERA) 100-5 MCG/ACT oral inhaler Inhale 2 puffs into the lungs 2 times daily 3 Inhaler 3     montelukast (SINGULAIR) 5 MG chewable tablet CHEW AND SWALLOW 1 TABLET(5 MG) BY MOUTH AT BEDTIME 30 tablet 3     Multiple Vitamin (MULTIVITAMIN OR) Take  by mouth. Take one daily tab       NOVOLOG PENFILL 100 UNIT/ML soln 1 unit per 15 grams CHO at all meals and snacks with correction scale of 1 unit per 50 mg/dL over 150. Average daily use is 20 units. 30 mL 4     simvastatin (ZOCOR) 20 MG tablet Take 1 tablet (20 mg) by mouth At Bedtime 90 tablet 3     traZODone (DESYREL) 50 MG tablet Take 1-2 tablets by mouth nightly as needed for sleep. 180 tablet 0     insulin degludec (TRESIBA) 100 UNIT/ML pen Inject 14 units subcutaneous at hs. 15 mL 11     levothyroxine (SYNTHROID/LEVOTHROID) 112 MCG tablet Take 1 tablet (112 mcg) by mouth daily 90 tablet 3       Allergies   Allergen Reactions      Sulfasalazine      Developed rash, HA, dizziness       Family History   Problem Relation Age of Onset     Breast Cancer Mother      Heart Disease Father      C.A.D. Father      Arthritis Father      Circulatory Father      Eye Disorder Father      Lipids Father      Thyroid Disease Father      Macular Degeneration Father      Coronary Artery Disease Father      Anxiety Disorder Father      Alcoholism Father      Rheumatoid Arthritis Father      Hypothyroidism Father      Diabetes Father      Cerebrovascular Disease Paternal Grandmother         ,     Cancer Paternal Grandfather         Pancreatic     Heart Disease Paternal Grandfather      Diabetes Maternal Grandmother         Type 2     C.A.D. Maternal Grandmother      Heart Disease Maternal Grandmother      Coronary Artery Disease Maternal Grandmother      Heart Disease Maternal Grandfather      Cardiac Sudden Death Maternal Grandfather      Gynecology Other         self     Thyroid Disease Other         self     Macular Degeneration Maternal Aunt      Diabetes Other         Type 1     Glaucoma No family hx of        Additional medical/Social/Surgical histories reviewed in EPIC and updated as appropriate.     REVIEW OF SYSTEMS (3/25/2019)  10 point ROS of systems including Constitutional, Eyes, Respiratory, Cardiovascular, Gastroenterology, Genitourinary, Integumentary, Musculoskeletal, Psychiatric were all negative except for pertinent positives noted in my HPI.     PHYSICAL EXAM  Vital Signs: VSS  General  - normal appearance, in no obvious distress  CV  - normal peripheral perfusion  Pulm  - normal respiratory pattern, non-labored  Musculoskeletal - lumbar spine  - stance: normal gait without limp, no obvious leg length discrepancy, normal heel and toe walk  - inspection: normal bone and joint alignment, no obvious scoliosis  - palpation: no paravertebral or bony tenderness  - ROM: flexion does not exacerbates pain, normal extension, sidebending, rotation  -  strength: lower extremities 5/5 in all planes  - special tests:  (-) straight leg raise  (-) slump test  Neuro  - patellar and Achilles DTRs 2+ bilaterally, no lower extremity sensory deficit throughout L5 distribution, grossly normal coordination, normal muscle tone  Skin  - no ecchymosis, erythema, warmth, or induration, no obvious rash  Psych  - interactive, appropriate, normal mood and affect  ASSESSMENT & PLAN  47 yo female with lumbar radicular pain and right ankle peroneal tendon  Reviewed response to injection PRP and will consider again in the future  She is feeling better, so will hold off on lumbar injection at this point    Dimitri Heart MD, CAQSM

## 2019-02-22 NOTE — NURSING NOTE
Reason For Visit:   Chief Complaint   Patient presents with     RECHECK     follow up PRP injection        There were no vitals taken for this visit.    Pain Assessment  Patient Currently in Pain: Yes(Patient states the injection has helped and has noticed that the ankle isn't rolling as easily. She also states that PT has noticed a decrease in chronic swelling)  0-10 Pain Scale: 3  Primary Pain Location: Ankle    Latanya You, ATC

## 2019-02-25 ENCOUNTER — TELEPHONE (OUTPATIENT)
Dept: ENDOCRINOLOGY | Facility: CLINIC | Age: 49
End: 2019-02-25

## 2019-02-25 NOTE — TELEPHONE ENCOUNTER
Letter written for  Accucheck kimberlee plus  Test strips for type 1 diabetes  To be approved in Appeal.  Please attach letter with denial  and try to get approved. They are used with the  Accucheck Expert meter  That calculates  Insulin doses for her.

## 2019-02-27 NOTE — TELEPHONE ENCOUNTER
Medication Appeal Initiation    We have initiated an appeal for the requested medication:  Medication: Accu-Chek Radha Plus Test Strips- Appeal -   Appeal Start Date:  2/27/2019  Insurance Company: [employer] - Phone [phone] Fax [phone]  Comments:       Appeal sent to [employer]

## 2019-02-28 LAB
RESULT: NORMAL
SEND OUTS MISC TEST CODE: NORMAL
SEND OUTS MISC TEST SPECIMEN: NORMAL
TEST NAME: NORMAL

## 2019-03-05 NOTE — TELEPHONE ENCOUNTER
MEDICATION APPEAL APPROVED    Medication: Accu-Chek Radha Plus Test Strips- Appeal - APPROVED  Authorization Effective Date: 2/22/2019  Authorization Expiration Date: 2/22/2020  Approved Dose/Quantity: 700 per 88 days  Reference #:     Insurance Company: [employer] Phone [phone] Fax [phone]  Expected CoPay:       CoPay Card Available:      Foundation Assistance Needed:    Which Pharmacy is filling the prescription (Not needed for infusion/clinic administered): [facility] [address] [place] [address], [place] - [address] [facility] [address] [facility] [address]

## 2019-03-07 ENCOUNTER — THERAPY VISIT (OUTPATIENT)
Dept: PHYSICAL THERAPY | Facility: CLINIC | Age: 49
End: 2019-03-07
Payer: COMMERCIAL

## 2019-03-07 DIAGNOSIS — M25.571 PAIN IN JOINT INVOLVING ANKLE AND FOOT, RIGHT: ICD-10-CM

## 2019-03-07 PROCEDURE — 97110 THERAPEUTIC EXERCISES: CPT | Mod: GP | Performed by: PHYSICAL THERAPIST

## 2019-03-07 PROCEDURE — 97140 MANUAL THERAPY 1/> REGIONS: CPT | Mod: GP | Performed by: PHYSICAL THERAPIST

## 2019-03-08 ENCOUNTER — TELEPHONE (OUTPATIENT)
Dept: GASTROENTEROLOGY | Facility: CLINIC | Age: 49
End: 2019-03-08

## 2019-03-11 ENCOUNTER — TELEPHONE (OUTPATIENT)
Dept: GASTROENTEROLOGY | Facility: CLINIC | Age: 49
End: 2019-03-11

## 2019-03-11 DIAGNOSIS — Z12.11 SPECIAL SCREENING FOR MALIGNANT NEOPLASMS, COLON: Primary | ICD-10-CM

## 2019-03-11 NOTE — TELEPHONE ENCOUNTER
Patient scheduled for Colonoscopy    Indication for procedure. Screening     Referring Provider. Panfilo Gates    ? no    Arrival time verified? 1010    Facility location verified? [address] [facility] [address]; 5th floor    Instructions given regarding prep and procedure answered questions and concerns    Prep Type Golytely sent to [facility] as noted in chart    Are you taking any anticoagulants or blood thinners? no    Instructions given? Yes received in mail    Electronic implanted devices? no    Pre procedure teaching completed? Yes    Transportation from procedure? Yes, verbalized understanding of expectations    H&P / Pre op physical completed? n/a

## 2019-03-15 ENCOUNTER — HOSPITAL ENCOUNTER (OUTPATIENT)
Facility: AMBULATORY SURGERY CENTER | Age: 49
End: 2019-03-15
Attending: INTERNAL MEDICINE
Payer: COMMERCIAL

## 2019-03-15 VITALS
BODY MASS INDEX: 25.77 KG/M2 | RESPIRATION RATE: 16 BRPM | SYSTOLIC BLOOD PRESSURE: 131 MMHG | DIASTOLIC BLOOD PRESSURE: 75 MMHG | HEIGHT: 70 IN | TEMPERATURE: 98.8 F | WEIGHT: 180 LBS | OXYGEN SATURATION: 98 % | HEART RATE: 80 BPM

## 2019-03-15 DIAGNOSIS — Z12.11 SCREENING FOR COLON CANCER: Primary | ICD-10-CM

## 2019-03-15 LAB
COLONOSCOPY: NORMAL
GLUCOSE BLDC GLUCOMTR-MCNC: 74 MG/DL (ref 70–99)
HCG UR QL: NEGATIVE
INTERNAL QC OK POCT: YES

## 2019-03-15 RX ORDER — FENTANYL CITRATE 50 UG/ML
INJECTION, SOLUTION INTRAMUSCULAR; INTRAVENOUS PRN
Status: DISCONTINUED | OUTPATIENT
Start: 2019-03-15 | End: 2019-03-15 | Stop reason: HOSPADM

## 2019-03-15 RX ORDER — LIDOCAINE 40 MG/G
CREAM TOPICAL
Status: DISCONTINUED | OUTPATIENT
Start: 2019-03-15 | End: 2019-03-15 | Stop reason: HOSPADM

## 2019-03-15 RX ORDER — NALOXONE HYDROCHLORIDE 0.4 MG/ML
.1-.4 INJECTION, SOLUTION INTRAMUSCULAR; INTRAVENOUS; SUBCUTANEOUS
Status: DISCONTINUED | OUTPATIENT
Start: 2019-03-15 | End: 2019-03-16 | Stop reason: HOSPADM

## 2019-03-15 RX ORDER — ONDANSETRON 2 MG/ML
4 INJECTION INTRAMUSCULAR; INTRAVENOUS EVERY 6 HOURS PRN
Status: DISCONTINUED | OUTPATIENT
Start: 2019-03-15 | End: 2019-03-16 | Stop reason: HOSPADM

## 2019-03-15 RX ORDER — SODIUM CHLORIDE, SODIUM LACTATE, POTASSIUM CHLORIDE, CALCIUM CHLORIDE 600; 310; 30; 20 MG/100ML; MG/100ML; MG/100ML; MG/100ML
INJECTION, SOLUTION INTRAVENOUS CONTINUOUS
Status: DISCONTINUED | OUTPATIENT
Start: 2019-03-15 | End: 2019-03-15 | Stop reason: HOSPADM

## 2019-03-15 RX ORDER — ONDANSETRON 2 MG/ML
4 INJECTION INTRAMUSCULAR; INTRAVENOUS
Status: DISCONTINUED | OUTPATIENT
Start: 2019-03-15 | End: 2019-03-15 | Stop reason: HOSPADM

## 2019-03-15 RX ORDER — ONDANSETRON 4 MG/1
4 TABLET, ORALLY DISINTEGRATING ORAL EVERY 6 HOURS PRN
Status: DISCONTINUED | OUTPATIENT
Start: 2019-03-15 | End: 2019-03-16 | Stop reason: HOSPADM

## 2019-03-15 RX ORDER — FLUMAZENIL 0.1 MG/ML
0.2 INJECTION, SOLUTION INTRAVENOUS
Status: DISCONTINUED | OUTPATIENT
Start: 2019-03-15 | End: 2019-03-16 | Stop reason: HOSPADM

## 2019-03-15 RX ORDER — SIMETHICONE
LIQUID (ML) MISCELLANEOUS PRN
Status: DISCONTINUED | OUTPATIENT
Start: 2019-03-15 | End: 2019-03-15 | Stop reason: HOSPADM

## 2019-03-15 RX ADMIN — FENTANYL CITRATE 25 MCG: 50 INJECTION, SOLUTION INTRAMUSCULAR; INTRAVENOUS at 12:40

## 2019-03-15 RX ADMIN — FENTANYL CITRATE 25 MCG: 50 INJECTION, SOLUTION INTRAMUSCULAR; INTRAVENOUS at 12:38

## 2019-03-15 RX ADMIN — FENTANYL CITRATE 25 MCG: 50 INJECTION, SOLUTION INTRAMUSCULAR; INTRAVENOUS at 13:03

## 2019-03-15 RX ADMIN — FENTANYL CITRATE 25 MCG: 50 INJECTION, SOLUTION INTRAMUSCULAR; INTRAVENOUS at 12:51

## 2019-03-15 ASSESSMENT — MIFFLIN-ST. JEOR: SCORE: 1516.72

## 2019-03-15 NOTE — OR NURSING
Patient declined to have a glucose checked upon arrival to PACU. She did tolerate drinking apple juice and eating crackers while physician was discussing plan for the future scopes.

## 2019-03-18 LAB — COPATH REPORT: NORMAL

## 2019-03-19 ASSESSMENT — ENCOUNTER SYMPTOMS
JOINT SWELLING: 0
STIFFNESS: 1
DYSPNEA ON EXERTION: 0
COUGH: 1
BACK PAIN: 1
WHEEZING: 0
HEMOPTYSIS: 0
BACK PAIN: 1
SHORTNESS OF BREATH: 0
SNORES LOUDLY: 0
MYALGIAS: 1
COUGH DISTURBING SLEEP: 1
NECK PAIN: 0
MYALGIAS: 1
SPUTUM PRODUCTION: 1
WHEEZING: 0
JOINT SWELLING: 0
POSTURAL DYSPNEA: 0
DYSPNEA ON EXERTION: 0
NECK PAIN: 0
ARTHRALGIAS: 1
SHORTNESS OF BREATH: 0
COUGH DISTURBING SLEEP: 1
STIFFNESS: 1
SNORES LOUDLY: 0
MUSCLE CRAMPS: 0
COUGH: 1
MUSCLE CRAMPS: 0
SPUTUM PRODUCTION: 1
POSTURAL DYSPNEA: 0
ARTHRALGIAS: 1
HEMOPTYSIS: 0

## 2019-03-21 ENCOUNTER — OFFICE VISIT (OUTPATIENT)
Dept: ENDOCRINOLOGY | Facility: CLINIC | Age: 49
End: 2019-03-21
Payer: COMMERCIAL

## 2019-03-21 VITALS
BODY MASS INDEX: 26.24 KG/M2 | SYSTOLIC BLOOD PRESSURE: 112 MMHG | HEIGHT: 70 IN | DIASTOLIC BLOOD PRESSURE: 75 MMHG | WEIGHT: 183.3 LBS | HEART RATE: 74 BPM

## 2019-03-21 DIAGNOSIS — E10.9 TYPE 1 DIABETES MELLITUS WITHOUT COMPLICATION (H): Primary | ICD-10-CM

## 2019-03-21 DIAGNOSIS — E10.9 TYPE 1 DIABETES MELLITUS WITHOUT COMPLICATION (H): ICD-10-CM

## 2019-03-21 DIAGNOSIS — E03.8 OTHER SPECIFIED HYPOTHYROIDISM: Primary | ICD-10-CM

## 2019-03-21 LAB
HBA1C MFR BLD: 7.2 % (ref 4.3–6)
T4 FREE SERPL-MCNC: 1.09 NG/DL (ref 0.76–1.46)
TSH SERPL DL<=0.005 MIU/L-ACNC: 0.19 MU/L (ref 0.4–4)

## 2019-03-21 RX ORDER — LEVOTHYROXINE SODIUM 112 UG/1
112 TABLET ORAL DAILY
Qty: 90 TABLET | Refills: 3 | Status: SHIPPED | OUTPATIENT
Start: 2019-03-21 | End: 2020-03-18

## 2019-03-21 ASSESSMENT — PAIN SCALES - GENERAL: PAINLEVEL: NO PAIN (0)

## 2019-03-21 ASSESSMENT — MIFFLIN-ST. JEOR: SCORE: 1531.69

## 2019-03-21 NOTE — PATIENT INSTRUCTIONS
1. Decrease Tresiba 14 unit at bedtime.  2. No change in I/C ratio or correction today.  3.  Have thyroid blood tests done today.  4.  See Dr. Mckeon on 6/18/2019.  Marissa Sebastian PA-C

## 2019-03-21 NOTE — LETTER
3/21/2019       RE: Dali Martines  [address]  [facility] [address]     Dear Colleague,    Thank you for referring your patient, Dail Martines, to the [facility] ENDOCRINOLOGY at [facility]. Please see a copy of my visit note below.    HPI  Dali Martines is a 48 year old female with type 1 diabetes mellitus and hypothyroidism, here today for a follow up visit.  She was last seen in our clinic by Dr. Mckeon in Dec 2018.  Her medical history is significant for history of mild NPDR right eye , psoriatic arthritis and anxiety.  For her diabetes, she is currently taking Trulicity 1.5 mg subcutaneous once a week, Tresiba 15 units at bedtime and Novolog 1 unit/15 gms CHO with meals and snacks with correction insulin.  Pt's A1C is 7.2 % today. Her previous A1C was 7.2 % in Dec 2018.  We downloaded pt's glucose meter and her Dexcom today.  Her average glucose was 155 with SD 56 .  Dali has had hypoglycemia during the day and night.  She was without her Dexcom for 3 weeks.  On ROS today, pt denies frequent headaches, blurred vision, nausea, vomiting, shortness of breath at rest or chronic cough.  She denies chest pain, abdominal pain, diarrhea, dysuria or hematuria.  Patient also denies any numbness tingling or pain in her hands or feet.  She has no foot ulcers.    Diabetes Care  Retinopathy:yes;  pt seen by Oph in Aug 2018 - hx of mild NPDR right eye only.  Nephropathy:none; urine microalbuminuria negative in 12/2018.  Neuropathy: none.  Foot Exam: no ulcers.  Taking aspirin: yes.  Lipids: LDL 92 in Dec 2018. Pt is taking Simvastatin.    ROS  Please see under HPI.    Allergies  Allergies   Allergen Reactions     Sulfasalazine      Developed rash, HA, dizziness       Medications  Current Outpatient Medications   Medication Sig Dispense Refill     insulin degludec (TRESIBA) 100 UNIT/ML pen Inject 14 units subcutaneous at hs. 15 mL 11     aspirin 81 MG tablet Take 1 tablet by  mouth daily.       blood glucose (ACCU-CHEK COLIN PLUS) test strip Test Blood Sugar 8 times daily or as directed 700 strip 3     blood glucose monitoring (ACCU-CHEK FASTCLIX) lancets Use to test blood sugar 8 times daily or as directed. 4 Box 3     Calcium Carbonate-Vitamin D (CALCIUM + D PO) Take one daily       citalopram (CELEXA) 20 MG tablet Take 1.5 tablets (30 mg) by mouth daily 135 tablet 1     diclofenac (VOLTAREN) 1 % GEL topical gel APPLY 2 GRAMS ONTO THE SKIN FOUR TIMES DAILY AS NEEDED FOR MODERATE PAIN 100 g 5     dulaglutide (TRULICITY) 1.5 MG/0.5ML pen Inject 1.5 mg Subcutaneous every 7 days 6 mL 1     econazole nitrate 1 % cream Apply 0.5 inches topically daily.       fish oil-omega-3 fatty acids (FISH OIL) 1000 MG capsule Take one daily       folic acid (FOLVITE) 1 MG tablet Take 1 tablet (1 mg) by mouth daily 90 tablet 3     insulin pen needle (B-D U/F) 31G X 8 MM miscellaneous Use 5 pen needles daily 450 each 3     Ketoconazole (NIZORAL PO) Take  by mouth. Shampoo daily       levothyroxine (SYNTHROID/LEVOTHROID) 112 MCG tablet Take 1 tablet (112 mcg) by mouth daily 90 tablet 3     methotrexate 2.5 MG tablet CHEMO Take 6 tablets (15 mg) by mouth once a week 72 tablet 0     methylphenidate (METADATE CD) 20 MG CR capsule Take 20 mg by mouth daily       Minoxidil (ROGAINE EX) Externally apply  topically. daily       mometasone-formoterol (DULERA) 100-5 MCG/ACT oral inhaler Inhale 2 puffs into the lungs 2 times daily 3 Inhaler 3     montelukast (SINGULAIR) 5 MG chewable tablet CHEW AND SWALLOW 1 TABLET(5 MG) BY MOUTH AT BEDTIME 30 tablet 3     Multiple Vitamin (MULTIVITAMIN OR) Take  by mouth. Take one daily tab       NOVOLOG PENFILL 100 UNIT/ML soln 1 unit per 15 grams CHO at all meals and snacks with correction scale of 1 unit per 50 mg/dL over 150. Average daily use is 20 units. 30 mL 4     simvastatin (ZOCOR) 20 MG tablet Take 1 tablet (20 mg) by mouth At Bedtime 90 tablet 3     traZODone (DESYREL)  50 MG tablet Take 1-2 tablets by mouth nightly as needed for sleep. 180 tablet 0       Family History  family history includes Alcoholism in her father; Anxiety Disorder in her father; Arthritis in her father; Breast Cancer in her mother; C.A.D. in her father and maternal grandmother; Cancer in her paternal grandfather; Cardiac Sudden Death in her maternal grandfather; Cerebrovascular Disease in her paternal grandmother; Circulatory in her father; Coronary Artery Disease in her father and maternal grandmother; Diabetes in her father, maternal grandmother, and another family member; Eye Disorder in her father; Gynecology in an other family member; Heart Disease in her father, maternal grandfather, maternal grandmother, and paternal grandfather; Hypothyroidism in her father; Lipids in her father; Macular Degeneration in her father and maternal aunt; Rheumatoid Arthritis in her father; Thyroid Disease in her father and another family member.    Social History   reports that  has never smoked. she has never used smokeless tobacco. She reports that she drinks alcohol. She reports that she does not use drugs.     Past Medical History  Past Medical History:   Diagnosis Date     Anemia      Anxiety      Arthritis 2014    Psoriatic arthritis     Back injury 1988     Dry eye syndrome      Dyslipidemia      Endometriosis 2002    adehsions seen at laparoscopy     GERD (gastroesophageal reflux disease)      Hypothyroidism     8 yoa     SOB (shortness of breath) 3/11/2014     Type I (juvenile type) diabetes mellitus without mention of complication, not stated as uncontrolled     when 15      Uncomplicated asthma Fall 2013       Past Surgical History:   Procedure Laterality Date     C REPAIR CRUCIATE LIGAMENT,KNEE       C STOMACH SURGERY PROCEDURE UNLISTED  December 2002     COLONOSCOPY  2002     ESOPHAGOSCOPY, GASTROSCOPY, DUODENOSCOPY (EGD), COMBINED  1/7/2014    Procedure: COMBINED ESOPHAGOSCOPY, GASTROSCOPY, DUODENOSCOPY  "(EGD), BIOPSY SINGLE OR MULTIPLE;;  Surgeon: Kraig Nicole MD;  Location:  GI     GYN SURGERY      Laparotomy to remove endometrial tissue     HC BREATH HYDROGEN TEST N/A 6/17/2014    Procedure: HYDROGEN BREATH TEST;  Surgeon: Deacon Alberts MD;  Location:  GI     HYDROGEN BREATH TEST  6/17/14     LAPAROSCOPIC APPENDECTOMY  2002     LAPAROTOMY, LYSIS ADHESIONS, COMBINED  2002    endometriosis     SOFT TISSUE SURGERY  December 2014    right ankle tendon injury       Physical Exam  /75   Pulse 74   Ht 1.778 m (5' 10\")   Wt 83.1 kg (183 lb 4.8 oz)   BMI 26.30 kg/m     Body mass index is 26.3 kg/m .    GENERAL : In no apparent distress    RESULTS  Creatinine   Date Value Ref Range Status   02/14/2019 0.54 0.52 - 1.04 mg/dL Final     GFR Estimate   Date Value Ref Range Status   02/14/2019 >90 >60 mL/min/[1.73_m2] Final     Comment:     Non  GFR Calc  Starting 12/18/2018, serum creatinine based estimated GFR (eGFR) will be   calculated using the Chronic Kidney Disease Epidemiology Collaboration   (CKD-EPI) equation.       Microalbumin Urine   Date Value Ref Range Status   06/26/2009 5@ MG/L      Hemoglobin A1C   Date Value Ref Range Status   09/16/2013 7.2 (A) 4.3 - 6.0 % Final     Potassium   Date Value Ref Range Status   04/27/2016 4.1 3.4 - 5.3 mmol/L Final     ALT   Date Value Ref Range Status   02/14/2019 24 0 - 50 U/L Final     AST   Date Value Ref Range Status   02/14/2019 23 0 - 45 U/L Final     TSH   Date Value Ref Range Status   03/21/2019 0.19 (L) 0.40 - 4.00 mU/L Final     T4 Total   Date Value Ref Range Status   11/29/2010 8.5 5.0 - 11.0 ug/dL Final     T4 Free   Date Value Ref Range Status   03/21/2019 1.09 0.76 - 1.46 ng/dL Final       Cholesterol   Date Value Ref Range Status   12/18/2018 194 <200 mg/dL Final   09/27/2013 211 (H) 0 - 200 mg/dL Final     Comment:     LDL Cholesterol is the primary guide to therapy.   The NCEP recommends further evaluation of: patients " with cholesterol greater   than 200 mg/dL if additional risk factors are present, cholesterol greater   than   240 mg/dL, triglycerides greater than 150 mg/dL, or HDL less than 40 mg/dL.     HDL Cholesterol   Date Value Ref Range Status   12/18/2018 91 >49 mg/dL Final   09/27/2013 78 50 - 110 mg/dL Final     LDL Cholesterol Calculated   Date Value Ref Range Status   12/18/2018 92 <100 mg/dL Final     Comment:     Desirable:       <100 mg/dl   09/27/2013 123 0 - 129 mg/dL Final     Comment:     LDL Cholesterol is the primary guide to therapy: LDL-cholesterol goal in high   risk patients is <100 mg/dL and in very high risk patients is <70 mg/dL.     Triglycerides   Date Value Ref Range Status   12/18/2018 54 <150 mg/dL Final   09/27/2013 51 0 - 150 mg/dL Final     Cholesterol/HDL Ratio   Date Value Ref Range Status   09/27/2013 2.7 0.0 - 5.0 Final   02/15/2013 2.9 0.0 - 5.0 Final     A1C   7.2 % today.    ASSESSMENT/PLAN:    1. TYPE 1 DIABETES MELLITUS:  Type 1 diabetes mellitus complicated by mild NPDR right eye.  Pt has been having hypoglycemia during the day and night.  I asked her to reduce Tresiba 14 units subcutaneous at hs.  She wears a Dexcom sensor.  No change in Novolog I/C ratio and she is to remain on Trulicity 1.5 mg subcutaneous once a week.  Pt's creat/GFR are normal and urine microalbuminuria was negative in Dec 2018.  Her feet are ok.  She is normotensive.  She is taking ASA daily.    2.  RETINOPATHY: Hx of mild NPDR right eye only.  She was seen by Oph here in Aug 2018.    3.  HYPOTHYROIDISM: Pt is taking Levothyroxine 125 mcg daily.  TSH was low 0.19 mU/L today.  Reduced Levothyroxine 112 mcg po daily.    4. Return to Endocrine Clinic to see Dr. Mckeon in June 2019.    Again, thank you for allowing me to participate in the care of your patient.      Sincerely,    Marissa Sebastian PA-C

## 2019-03-23 ENCOUNTER — THERAPY VISIT (OUTPATIENT)
Dept: PHYSICAL THERAPY | Facility: CLINIC | Age: 49
End: 2019-03-23
Payer: COMMERCIAL

## 2019-03-23 DIAGNOSIS — M25.571 PAIN IN JOINT INVOLVING ANKLE AND FOOT, RIGHT: ICD-10-CM

## 2019-03-23 PROCEDURE — 97110 THERAPEUTIC EXERCISES: CPT | Mod: GP | Performed by: PHYSICAL THERAPIST

## 2019-03-23 PROCEDURE — 97140 MANUAL THERAPY 1/> REGIONS: CPT | Mod: GP | Performed by: PHYSICAL THERAPIST

## 2019-03-25 NOTE — PROGRESS NOTES
HISTORY OF PRESENT ILLNESS  Ms. Martines is a pleasant 48 year old year old female who presents to clinic today for followup for lumbar MRI and after having PRP Injection for right ankle  She overall feels better in the ankle, has had some right leg pains that may be connected, but overall better  She is happy with where she is at  She would like to pursue PT for her low back and see how that goes and will let me know if she wants to pursue a lumbar injection at some point    MEDICAL HISTORY  Patient Active Problem List   Diagnosis     GERD (gastroesophageal reflux disease)     CARDIOVASCULAR SCREENING; LDL GOAL LESS THAN 100     Type 1 diabetes mellitus with retinopathy (H)     Anxiety     NLD (necrobiosis lipoidica diabeticorum) (H)     Cutaneous skin tags     Cervicalgia     Chronic low back pain     SOB (shortness of breath)     Encounter for other specified aftercare     PAIN FOOT     Diabetes (H)     Screening for other eye conditions     PAIN KNEE JOINT     Enthesopathy     Pain in joint, multiple sites     Chronic pain of right ankle     Right foot pain     ESR raised     Swelling of limb     Other postprocedural status(V45.89)     Left knee pain     Type 1 diabetes mellitus without complication (H)     Psoriatic arthritis (H)     Psoriasis with arthropathy (H)     Pain in joint involving ankle and foot, right       Current Outpatient Medications   Medication Sig Dispense Refill     aspirin 81 MG tablet Take 1 tablet by mouth daily.       blood glucose (ACCU-CHEK COLIN PLUS) test strip Test Blood Sugar 8 times daily or as directed 700 strip 3     blood glucose monitoring (ACCU-CHEK FASTCLIX) lancets Use to test blood sugar 8 times daily or as directed. 4 Box 3     Calcium Carbonate-Vitamin D (CALCIUM + D PO) Take one daily       citalopram (CELEXA) 20 MG tablet Take 1.5 tablets (30 mg) by mouth daily 135 tablet 1     diclofenac (VOLTAREN) 1 % GEL topical gel APPLY 2 GRAMS ONTO THE SKIN FOUR TIMES DAILY AS  NEEDED FOR MODERATE PAIN 100 g 5     dulaglutide (TRULICITY) 1.5 MG/0.5ML pen Inject 1.5 mg Subcutaneous every 7 days 6 mL 1     econazole nitrate 1 % cream Apply 0.5 inches topically daily.       fish oil-omega-3 fatty acids (FISH OIL) 1000 MG capsule Take one daily       folic acid (FOLVITE) 1 MG tablet Take 1 tablet (1 mg) by mouth daily 90 tablet 3     insulin pen needle (B-D U/F) 31G X 8 MM miscellaneous Use 5 pen needles daily 450 each 3     Ketoconazole (NIZORAL PO) Take  by mouth. Shampoo daily       methotrexate 2.5 MG tablet CHEMO Take 6 tablets (15 mg) by mouth once a week 72 tablet 0     methylphenidate (METADATE CD) 20 MG CR capsule Take 20 mg by mouth daily       Minoxidil (ROGAINE EX) Externally apply  topically. daily       mometasone-formoterol (DULERA) 100-5 MCG/ACT oral inhaler Inhale 2 puffs into the lungs 2 times daily 3 Inhaler 3     montelukast (SINGULAIR) 5 MG chewable tablet CHEW AND SWALLOW 1 TABLET(5 MG) BY MOUTH AT BEDTIME 30 tablet 3     Multiple Vitamin (MULTIVITAMIN OR) Take  by mouth. Take one daily tab       NOVOLOG PENFILL 100 UNIT/ML soln 1 unit per 15 grams CHO at all meals and snacks with correction scale of 1 unit per 50 mg/dL over 150. Average daily use is 20 units. 30 mL 4     simvastatin (ZOCOR) 20 MG tablet Take 1 tablet (20 mg) by mouth At Bedtime 90 tablet 3     traZODone (DESYREL) 50 MG tablet Take 1-2 tablets by mouth nightly as needed for sleep. 180 tablet 0     insulin degludec (TRESIBA) 100 UNIT/ML pen Inject 14 units subcutaneous at hs. 15 mL 11     levothyroxine (SYNTHROID/LEVOTHROID) 112 MCG tablet Take 1 tablet (112 mcg) by mouth daily 90 tablet 3       Allergies   Allergen Reactions     Sulfasalazine      Developed rash, HA, dizziness       Family History   Problem Relation Age of Onset     Breast Cancer Mother      Heart Disease Father      C.A.D. Father      Arthritis Father      Circulatory Father      Eye Disorder Father      Lipids Father      Thyroid  Disease Father      Macular Degeneration Father      Coronary Artery Disease Father      Anxiety Disorder Father      Alcoholism Father      Rheumatoid Arthritis Father      Hypothyroidism Father      Diabetes Father      Cerebrovascular Disease Paternal Grandmother         ,     Cancer Paternal Grandfather         Pancreatic     Heart Disease Paternal Grandfather      Diabetes Maternal Grandmother         Type 2     C.A.D. Maternal Grandmother      Heart Disease Maternal Grandmother      Coronary Artery Disease Maternal Grandmother      Heart Disease Maternal Grandfather      Cardiac Sudden Death Maternal Grandfather      Gynecology Other         self     Thyroid Disease Other         self     Macular Degeneration Maternal Aunt      Diabetes Other         Type 1     Glaucoma No family hx of        Additional medical/Social/Surgical histories reviewed in [facility] and updated as appropriate.     REVIEW OF SYSTEMS (3/25/2019)  10 point ROS of systems including Constitutional, Eyes, Respiratory, Cardiovascular, Gastroenterology, Genitourinary, Integumentary, Musculoskeletal, Psychiatric were all negative except for pertinent positives noted in my HPI.     PHYSICAL EXAM  Vital Signs: VSS  General  - normal appearance, in no obvious distress  CV  - normal peripheral perfusion  Pulm  - normal respiratory pattern, non-labored  Musculoskeletal - lumbar spine  - stance: normal gait without limp, no obvious leg length discrepancy, normal heel and toe walk  - inspection: normal bone and joint alignment, no obvious scoliosis  - palpation: no paravertebral or bony tenderness  - ROM: flexion does not exacerbates pain, normal extension, sidebending, rotation  - strength: lower extremities 5/5 in all planes  - special tests:  (-) straight leg raise  (-) slump test  Neuro  - patellar and Achilles DTRs 2+ bilaterally, no lower extremity sensory deficit throughout L5 distribution, grossly normal coordination, normal muscle tone  Skin  -  no ecchymosis, erythema, warmth, or induration, no obvious rash  Psych  - interactive, appropriate, normal mood and affect  ASSESSMENT & PLAN  49 yo female with lumbar radicular pain and right ankle peroneal tendon  Reviewed response to injection PRP and will consider again in the future  She is feeling better, so will hold off on lumbar injection at this point    Dimitri Heart MD, CAQSM

## 2019-03-26 NOTE — PROGRESS NOTES
HPI  Dali Martines is a 48 year old female with type 1 diabetes mellitus and hypothyroidism, here today for a follow up visit.  She was last seen in our clinic by Dr. Mckeon in Dec 2018.  Her medical history is significant for history of mild NPDR right eye , psoriatic arthritis and anxiety.  For her diabetes, she is currently taking Trulicity 1.5 mg subcutaneous once a week, Tresiba 15 units at bedtime and Novolog 1 unit/15 gms CHO with meals and snacks with correction insulin.  Pt's A1C is 7.2 % today. Her previous A1C was 7.2 % in Dec 2018.  We downloaded pt's glucose meter and her Dexcom today.  Her average glucose was 155 with SD 56 .  Dali has had hypoglycemia during the day and night.  She was without her Dexcom for 3 weeks.  On ROS today, pt denies frequent headaches, blurred vision, nausea, vomiting, shortness of breath at rest or chronic cough.  She denies chest pain, abdominal pain, diarrhea, dysuria or hematuria.  Patient also denies any numbness tingling or pain in her hands or feet.  She has no foot ulcers.    Diabetes Care  Retinopathy:yes;  pt seen by Oph in Aug 2018 - hx of mild NPDR right eye only.  Nephropathy:none; urine microalbuminuria negative in 12/2018.  Neuropathy: none.  Foot Exam: no ulcers.  Taking aspirin: yes.  Lipids: LDL 92 in Dec 2018. Pt is taking Simvastatin.    ROS  Please see under HPI.    Allergies  Allergies   Allergen Reactions     Sulfasalazine      Developed rash, HA, dizziness       Medications  Current Outpatient Medications   Medication Sig Dispense Refill     insulin degludec (TRESIBA) 100 UNIT/ML pen Inject 14 units subcutaneous at hs. 15 mL 11     aspirin 81 MG tablet Take 1 tablet by mouth daily.       blood glucose (ACCU-CHEK COLIN PLUS) test strip Test Blood Sugar 8 times daily or as directed 700 strip 3     blood glucose monitoring (ACCU-CHEK FASTCLIX) lancets Use to test blood sugar 8 times daily or as directed. 4 Box 3     Calcium Carbonate-Vitamin D  (CALCIUM + D PO) Take one daily       citalopram (CELEXA) 20 MG tablet Take 1.5 tablets (30 mg) by mouth daily 135 tablet 1     diclofenac (VOLTAREN) 1 % GEL topical gel APPLY 2 GRAMS ONTO THE SKIN FOUR TIMES DAILY AS NEEDED FOR MODERATE PAIN 100 g 5     dulaglutide (TRULICITY) 1.5 MG/0.5ML pen Inject 1.5 mg Subcutaneous every 7 days 6 mL 1     econazole nitrate 1 % cream Apply 0.5 inches topically daily.       fish oil-omega-3 fatty acids (FISH OIL) 1000 MG capsule Take one daily       folic acid (FOLVITE) 1 MG tablet Take 1 tablet (1 mg) by mouth daily 90 tablet 3     insulin pen needle (B-D U/F) 31G X 8 MM miscellaneous Use 5 pen needles daily 450 each 3     Ketoconazole (NIZORAL PO) Take  by mouth. Shampoo daily       levothyroxine (SYNTHROID/LEVOTHROID) 112 MCG tablet Take 1 tablet (112 mcg) by mouth daily 90 tablet 3     methotrexate 2.5 MG tablet CHEMO Take 6 tablets (15 mg) by mouth once a week 72 tablet 0     methylphenidate (METADATE CD) 20 MG CR capsule Take 20 mg by mouth daily       Minoxidil (ROGAINE EX) Externally apply  topically. daily       mometasone-formoterol (DULERA) 100-5 MCG/ACT oral inhaler Inhale 2 puffs into the lungs 2 times daily 3 Inhaler 3     montelukast (SINGULAIR) 5 MG chewable tablet CHEW AND SWALLOW 1 TABLET(5 MG) BY MOUTH AT BEDTIME 30 tablet 3     Multiple Vitamin (MULTIVITAMIN OR) Take  by mouth. Take one daily tab       NOVOLOG PENFILL 100 UNIT/ML soln 1 unit per 15 grams CHO at all meals and snacks with correction scale of 1 unit per 50 mg/dL over 150. Average daily use is 20 units. 30 mL 4     simvastatin (ZOCOR) 20 MG tablet Take 1 tablet (20 mg) by mouth At Bedtime 90 tablet 3     traZODone (DESYREL) 50 MG tablet Take 1-2 tablets by mouth nightly as needed for sleep. 180 tablet 0       Family History  family history includes Alcoholism in her father; Anxiety Disorder in her father; Arthritis in her father; Breast Cancer in her mother; C.A.D. in her father and maternal  grandmother; Cancer in her paternal grandfather; Cardiac Sudden Death in her maternal grandfather; Cerebrovascular Disease in her paternal grandmother; Circulatory in her father; Coronary Artery Disease in her father and maternal grandmother; Diabetes in her father, maternal grandmother, and another family member; Eye Disorder in her father; Gynecology in an other family member; Heart Disease in her father, maternal grandfather, maternal grandmother, and paternal grandfather; Hypothyroidism in her father; Lipids in her father; Macular Degeneration in her father and maternal aunt; Rheumatoid Arthritis in her father; Thyroid Disease in her father and another family member.    Social History   reports that  has never smoked. she has never used smokeless tobacco. She reports that she drinks alcohol. She reports that she does not use drugs.     Past Medical History  Past Medical History:   Diagnosis Date     Anemia      Anxiety      Arthritis 2014    Psoriatic arthritis     Back injury 1988     Dry eye syndrome      Dyslipidemia      Endometriosis 2002    adehsions seen at laparoscopy     GERD (gastroesophageal reflux disease)      Hypothyroidism     8 yoa     SOB (shortness of breath) 3/11/2014     Type I (juvenile type) diabetes mellitus without mention of complication, not stated as uncontrolled     when 15      Uncomplicated asthma Fall 2013       Past Surgical History:   Procedure Laterality Date     C REPAIR CRUCIATE LIGAMENT,KNEE       C STOMACH SURGERY PROCEDURE UNLISTED  December 2002     COLONOSCOPY  2002     ESOPHAGOSCOPY, GASTROSCOPY, DUODENOSCOPY (EGD), COMBINED  1/7/2014    Procedure: COMBINED ESOPHAGOSCOPY, GASTROSCOPY, DUODENOSCOPY (EGD), BIOPSY SINGLE OR MULTIPLE;;  Surgeon: Kraig Nicole MD;  Location:  GI     GYN SURGERY      Laparotomy to remove endometrial tissue     HC BREATH HYDROGEN TEST N/A 6/17/2014    Procedure: HYDROGEN BREATH TEST;  Surgeon: Deacon Alberts MD;  Location:  GI      "HYDROGEN BREATH TEST  6/17/14     LAPAROSCOPIC APPENDECTOMY  2002     LAPAROTOMY, LYSIS ADHESIONS, COMBINED  2002    endometriosis     SOFT TISSUE SURGERY  December 2014    right ankle tendon injury       Physical Exam  /75   Pulse 74   Ht 1.778 m (5' 10\")   Wt 83.1 kg (183 lb 4.8 oz)   BMI 26.30 kg/m    Body mass index is 26.3 kg/m .    GENERAL : In no apparent distress    RESULTS  Creatinine   Date Value Ref Range Status   02/14/2019 0.54 0.52 - 1.04 mg/dL Final     GFR Estimate   Date Value Ref Range Status   02/14/2019 >90 >60 mL/min/[1.73_m2] Final     Comment:     Non  GFR Calc  Starting 12/18/2018, serum creatinine based estimated GFR (eGFR) will be   calculated using the Chronic Kidney Disease Epidemiology Collaboration   (CKD-EPI) equation.       Microalbumin Urine   Date Value Ref Range Status   06/26/2009 5@ MG/L      Hemoglobin A1C   Date Value Ref Range Status   09/16/2013 7.2 (A) 4.3 - 6.0 % Final     Potassium   Date Value Ref Range Status   04/27/2016 4.1 3.4 - 5.3 mmol/L Final     ALT   Date Value Ref Range Status   02/14/2019 24 0 - 50 U/L Final     AST   Date Value Ref Range Status   02/14/2019 23 0 - 45 U/L Final     TSH   Date Value Ref Range Status   03/21/2019 0.19 (L) 0.40 - 4.00 mU/L Final     T4 Total   Date Value Ref Range Status   11/29/2010 8.5 5.0 - 11.0 ug/dL Final     T4 Free   Date Value Ref Range Status   03/21/2019 1.09 0.76 - 1.46 ng/dL Final       Cholesterol   Date Value Ref Range Status   12/18/2018 194 <200 mg/dL Final   09/27/2013 211 (H) 0 - 200 mg/dL Final     Comment:     LDL Cholesterol is the primary guide to therapy.   The NCEP recommends further evaluation of: patients with cholesterol greater   than 200 mg/dL if additional risk factors are present, cholesterol greater   than   240 mg/dL, triglycerides greater than 150 mg/dL, or HDL less than 40 mg/dL.     HDL Cholesterol   Date Value Ref Range Status   12/18/2018 91 >49 mg/dL Final "   09/27/2013 78 50 - 110 mg/dL Final     LDL Cholesterol Calculated   Date Value Ref Range Status   12/18/2018 92 <100 mg/dL Final     Comment:     Desirable:       <100 mg/dl   09/27/2013 123 0 - 129 mg/dL Final     Comment:     LDL Cholesterol is the primary guide to therapy: LDL-cholesterol goal in high   risk patients is <100 mg/dL and in very high risk patients is <70 mg/dL.     Triglycerides   Date Value Ref Range Status   12/18/2018 54 <150 mg/dL Final   09/27/2013 51 0 - 150 mg/dL Final     Cholesterol/HDL Ratio   Date Value Ref Range Status   09/27/2013 2.7 0.0 - 5.0 Final   02/15/2013 2.9 0.0 - 5.0 Final     A1C   7.2 % today.    ASSESSMENT/PLAN:    1. TYPE 1 DIABETES MELLITUS:  Type 1 diabetes mellitus complicated by mild NPDR right eye.  Pt has been having hypoglycemia during the day and night.  I asked her to reduce Tresiba 14 units subcutaneous at hs.  She wears a Dexcom sensor.  No change in Novolog I/C ratio and she is to remain on Trulicity 1.5 mg subcutaneous once a week.  Pt's creat/GFR are normal and urine microalbuminuria was negative in Dec 2018.  Her feet are ok.  She is normotensive.  She is taking ASA daily.    2.  RETINOPATHY: Hx of mild NPDR right eye only.  She was seen by Oph here in Aug 2018.    3.  HYPOTHYROIDISM: Pt is taking Levothyroxine 125 mcg daily.  TSH was low 0.19 mU/L today.  Reduced Levothyroxine 112 mcg po daily.    4. Return to Endocrine Clinic to see Dr. Mckeon in June 2019.

## 2019-03-28 ENCOUNTER — TELEPHONE (OUTPATIENT)
Dept: ENDOCRINOLOGY | Facility: CLINIC | Age: 49
End: 2019-03-28

## 2019-03-28 NOTE — TELEPHONE ENCOUNTER
Forms received from: [employer]     Forms were for Certificate of medical necessity     Faxed Date:4/2/19

## 2019-04-01 ENCOUNTER — TELEPHONE (OUTPATIENT)
Dept: GASTROENTEROLOGY | Facility: CLINIC | Age: 49
End: 2019-04-01

## 2019-04-01 DIAGNOSIS — D12.6 COLON ADENOMAS: Primary | ICD-10-CM

## 2019-04-02 ENCOUNTER — TELEPHONE (OUTPATIENT)
Dept: GASTROENTEROLOGY | Facility: CLINIC | Age: 49
End: 2019-04-02

## 2019-04-02 ENCOUNTER — OFFICE VISIT (OUTPATIENT)
Dept: PULMONOLOGY | Facility: CLINIC | Age: 49
End: 2019-04-02
Attending: INTERNAL MEDICINE
Payer: COMMERCIAL

## 2019-04-02 VITALS
WEIGHT: 182.8 LBS | HEIGHT: 70 IN | HEART RATE: 75 BPM | SYSTOLIC BLOOD PRESSURE: 125 MMHG | DIASTOLIC BLOOD PRESSURE: 84 MMHG | OXYGEN SATURATION: 95 % | BODY MASS INDEX: 26.17 KG/M2

## 2019-04-02 DIAGNOSIS — E10.3291 TYPE 1 DIABETES MELLITUS WITH MILD NONPROLIFERATIVE RETINOPATHY OF RIGHT EYE, MACULAR EDEMA PRESENCE UNSPECIFIED (H): ICD-10-CM

## 2019-04-02 DIAGNOSIS — J45.30 MILD PERSISTENT ASTHMA WITHOUT COMPLICATION: ICD-10-CM

## 2019-04-02 PROCEDURE — G0463 HOSPITAL OUTPT CLINIC VISIT: HCPCS | Mod: ZF

## 2019-04-02 RX ORDER — MONTELUKAST SODIUM 5 MG/1
5 TABLET, CHEWABLE ORAL AT BEDTIME
Qty: 90 TABLET | Refills: 3 | Status: SHIPPED | OUTPATIENT
Start: 2019-04-02 | End: 2020-03-27

## 2019-04-02 RX ORDER — SIMVASTATIN 20 MG
20 TABLET ORAL AT BEDTIME
Qty: 90 TABLET | Refills: 3 | Status: SHIPPED | OUTPATIENT
Start: 2019-04-02 | End: 2020-03-26

## 2019-04-02 ASSESSMENT — PAIN SCALES - GENERAL: PAINLEVEL: MODERATE PAIN (5)

## 2019-04-02 ASSESSMENT — MIFFLIN-ST. JEOR: SCORE: 1529.43

## 2019-04-02 NOTE — NURSING NOTE
Chief Complaint   Patient presents with     Follow Up     6mo fu     Vitals were taken and medications were reconciled.     NICHOLAS Parish

## 2019-04-02 NOTE — PROGRESS NOTES
"Reason for Visit  Dali Martines is a 48 year old female who is followed for asthma  Pulmonary HPI  She \"feel out of the habit\" of taking Dulera every day, still taking montelukast every day. Reporting dry cough at night. Not much of respiratory infections. She has noticed some psoriasis patches and arthritis which is unusual, usually in the fall she has a flare, despite maintenance therapy.      The patient was seen and examined by Sara Carter MD   Current Outpatient Medications   Medication     aspirin 81 MG tablet     blood glucose (ACCU-CHEK COLIN PLUS) test strip     blood glucose monitoring (ACCU-CHEK FASTCLIX) lancets     Calcium Carbonate-Vitamin D (CALCIUM + D PO)     citalopram (CELEXA) 20 MG tablet     diclofenac (VOLTAREN) 1 % GEL topical gel     dulaglutide (TRULICITY) 1.5 MG/0.5ML pen     econazole nitrate 1 % cream     fish oil-omega-3 fatty acids (FISH OIL) 1000 MG capsule     folic acid (FOLVITE) 1 MG tablet     insulin degludec (TRESIBA) 100 UNIT/ML pen     insulin pen needle (B-D U/F) 31G X 8 MM miscellaneous     Ketoconazole (NIZORAL PO)     levothyroxine (SYNTHROID/LEVOTHROID) 112 MCG tablet     methotrexate 2.5 MG tablet CHEMO     methylphenidate (METADATE CD) 20 MG CR capsule     Minoxidil (ROGAINE EX)     mometasone-formoterol (DULERA) 100-5 MCG/ACT oral inhaler     montelukast (SINGULAIR) 5 MG chewable tablet     Multiple Vitamin (MULTIVITAMIN OR)     NOVOLOG PENFILL 100 UNIT/ML soln     simvastatin (ZOCOR) 20 MG tablet     traZODone (DESYREL) 50 MG tablet     Current Facility-Administered Medications   Medication     betamethasone acet & sod phos (CELESTONE) injection 6 mg     Allergies   Allergen Reactions     Sulfasalazine      Developed rash, HA, dizziness     Social History     Socioeconomic History     Marital status: Single     Spouse name: Not on file     Number of children: 0     Years of education: Not on file     Highest education level: Not on file   Occupational History "     Occupation: research     Employer: [employer]   Social Needs     Financial resource strain: Not on file     Food insecurity:     Worry: Not on file     Inability: Not on file     Transportation needs:     Medical: Not on file     Non-medical: Not on file   Tobacco Use     Smoking status: Never Smoker     Smokeless tobacco: Never Used   Substance and Sexual Activity     Alcohol use: Yes     Alcohol/week: 0.0 oz     Comment: moderately     Drug use: No     Sexual activity: Not on file   Lifestyle     Physical activity:     Days per week: Not on file     Minutes per session: Not on file     Stress: Not on file   Relationships     Social connections:     Talks on phone: Not on file     Gets together: Not on file     Attends [employer] service: Not on file     Active member of club or organization: Not on file     Attends meetings of clubs or organizations: Not on file     Relationship status: Not on file     Intimate partner violence:     Fear of current or ex partner: Not on file     Emotionally abused: Not on file     Physically abused: Not on file     Forced sexual activity: Not on file   Other Topics Concern     Parent/sibling w/ CABG, MI or angioplasty before 65F 55M? Yes   Social History Narrative     Not on file     Past Medical History:   Diagnosis Date     Anemia      Anxiety      Arthritis 2014    Psoriatic arthritis     Back injury 1988     Dry eye syndrome      Dyslipidemia      Endometriosis 2002    adehsions seen at laparoscopy     GERD (gastroesophageal reflux disease)      Hypothyroidism     8 yoa     SOB (shortness of breath) 3/11/2014     Type I (juvenile type) diabetes mellitus without mention of complication, not stated as uncontrolled     when 15      Uncomplicated asthma Fall 2013     ROS Pulmonary  Dyspnea: No, Cough: No, Chest pain: Yes, Wheezing: No, Sputum Production: No, Hemoptysis: No  A complete ROS was otherwise negative except as noted in the HPI.  /84   Pulse 75   Ht  "1.778 m (5' 10\")   Wt 82.9 kg (182 lb 12.8 oz)   SpO2 95%   BMI 26.23 kg/m    Exam:   GENERAL APPEARANCE: Well developed, well nourished, alert, and in no apparent distress.  EYES: PERRL, EOMI  HENT: Nasal mucosa with no edema and no hyperemia. No nasal polyps.  EARS: Canals clear, TMs normal  MOUTH: Oral mucosa is moist, without any lesions, no tonsillar enlargement, no oropharyngeal exudate.  NECK: supple, no masses, no thyromegaly.   LYMPHATICS: no palpable lymphadenopathy  RESP: normal percussion, good air flow throughout.  No crackles. No rhonchi. No wheezes.  CV: Normal S1, S2, regular rhythm, normal rate. No murmur.  No rub. No gallop. No LE edema.   ABDOMEN:  Bowel sounds normal, soft, nontender, no HSM or masses.   MS: extremities normal. No clubbing. No cyanosis.  SKIN: no rash on limited exam  NEURO: Mentation intact, speech normal, normal strength and tone, normal gait and stance  PSYCH: mentation appears normal. and affect normal/bright  Results:  Stress echo 12/30/14 normal  PFTs normal 3/14/16     Assessment and plan: Dali Martines is a 48-year-old female with type 1 diabetes who was clinically diagnosed with asthma as an adult.  She has been intermittently sedentary due to repeated lower extremity ankle injury, surgery, poor wound healing  1.  Asthma.  Mild intermittent asthma. Poor control (cough) and Dulera noncompliance  Up to date on vaccinations.    - She will continue Dulera, resume daily use. 2puffs BID for now, can reduce to 1 puff twice a day when cough improves  - Singulair for asthma maintenance, 5mg because she has noticed constipation with full dose      I will see her back in clinic in 6 months. She will let me know if cough not improved in a few weeks.       "

## 2019-04-02 NOTE — TELEPHONE ENCOUNTER
[facility] Call Center    Phone Message    May a detailed message be left on voicemail: yes    Reason for Call: Medication Refill Request    Has the patient contacted the pharmacy for the refill? Yes   Name of medication being requested: simvastatin (ZOCOR) 20 MG tablet  Provider who prescribed the medication: Linda  Pharmacy:    [facility] [id number] [place] [address] - [address] AT [address]    Date medication is needed: ASAP      Action Taken: Message routed to:  [facility] ([facility]): Med Refills

## 2019-04-02 NOTE — PATIENT INSTRUCTIONS
Get back into the habit of taking Dulera every day twice a day.   Stick with 2 puffs each time now, when things settle you can back off to 1 puff twice a day.   Let me know if the cough is not better in a few weeks.

## 2019-04-03 ENCOUNTER — TELEPHONE (OUTPATIENT)
Dept: GASTROENTEROLOGY | Facility: CLINIC | Age: 49
End: 2019-04-03

## 2019-04-11 ENCOUNTER — THERAPY VISIT (OUTPATIENT)
Dept: PHYSICAL THERAPY | Facility: CLINIC | Age: 49
End: 2019-04-11
Payer: COMMERCIAL

## 2019-04-11 DIAGNOSIS — M25.571 PAIN IN JOINT INVOLVING ANKLE AND FOOT, RIGHT: ICD-10-CM

## 2019-04-11 PROCEDURE — 97140 MANUAL THERAPY 1/> REGIONS: CPT | Mod: GP | Performed by: PHYSICAL THERAPIST

## 2019-04-11 PROCEDURE — 97110 THERAPEUTIC EXERCISES: CPT | Mod: GP | Performed by: PHYSICAL THERAPIST

## 2019-04-11 NOTE — PROGRESS NOTES
Subjective:  HPI                    Objective:  System    Physical Exam    General     ROS    Assessment/Plan:    PROGRESS  REPORT    Progress reporting period is from 2/16/19 to 4/11/19.       SUBJECTIVE  Subjective changes noted by patient:   Got back late last night from a confernece.  Had one day of increased pain at lateral ankle, but no increased swelling.  Hasn't scheduled next injection, but plans to.  Has another conference at the end of May.  Pain has increased to a 4/10 at rest today and was up to 8/10 on the bad day.  Also noticed the isometrics to her shoulder greatly irritate it.  Moving up behind her back is better, but ER is about the same.    Current Pain level: 4/10.     Initial Pain level: (2-4/10).   Changes in function:  Yes, but a slight set back this past week  Adverse reaction to treatment or activity: activity - lots of standing/walking at a conference    OBJECTIVE  Changes noted in objective findings:      SLS improved to 30/30 B.    R ankle DF increased from 1 to 6 and IV from 24+ to 26+/-.    R ankle Strength: DF 5/5, PF 4/5, IV 5-/5 and EV 5-/5.  Tender primarily at PTF ligament on the R.      LUmbar ROM: Flex palms to floor, ext 90% stiff, R %, L SG 75%+/-.    B slump -.        Shoulder ir/ext T7R, T8L+    L shoulder ER strength 4+/5, L shoulder abd strength 4/5+/-     ASSESSMENT/PLAN  Updated problem list and treatment plan: Diagnosis 1:  R chronic ankle pain with 2nd PRP injection on 1/17/19 Pain -  manual therapy, self management, education and home program  Decreased ROM/flexibility - manual therapy, therapeutic exercise and home program  Decreased strength - therapeutic exercise, therapeutic activities and home program  Impaired gait - gait training and home program  Impaired muscle performance - neuro re-education and home program  Decreased function - therapeutic activities and home program  STG/LTGs have been met or progress has been made towards goals:  Yes, but had a set  back this past week  Assessment of Progress: The patient's condition is improving.  The patient has had set backs in their progress.  Self Management Plans:  Patient has been instructed in a home treatment program.  Patient  has been instructed in self management of symptoms.  I have re-evaluated this patient and find that the nature, scope, duration and intensity of the therapy is appropriate for the medical condition of the patient.  Dali continues to require the following intervention to meet STG and LTG's:  PT    Recommendations:  This patient would benefit from continued therapy.     Frequency:  2 X a month, once daily  Duration:  for 2-3 months        Please refer to the daily flowsheet for treatment today, total treatment time and time spent performing 1:1 timed codes.

## 2019-04-18 ENCOUNTER — OFFICE VISIT (OUTPATIENT)
Dept: ORTHOPEDICS | Facility: CLINIC | Age: 49
End: 2019-04-18
Payer: COMMERCIAL

## 2019-04-18 VITALS
HEIGHT: 70 IN | BODY MASS INDEX: 26.05 KG/M2 | SYSTOLIC BLOOD PRESSURE: 109 MMHG | DIASTOLIC BLOOD PRESSURE: 68 MMHG | HEART RATE: 74 BPM | WEIGHT: 182 LBS

## 2019-04-18 DIAGNOSIS — S86.311S PERONEAL TENDON TEAR, RIGHT, SEQUELA: Primary | ICD-10-CM

## 2019-04-18 DIAGNOSIS — S93.421A SPRAIN OF RIGHT MEDIAL ANKLE JOINT, INITIAL ENCOUNTER: ICD-10-CM

## 2019-04-18 PROCEDURE — 99213 OFFICE O/P EST LOW 20 MIN: CPT | Performed by: PREVENTIVE MEDICINE

## 2019-04-18 ASSESSMENT — MIFFLIN-ST. JEOR: SCORE: 1525.8

## 2019-04-18 ASSESSMENT — PAIN SCALES - GENERAL: PAINLEVEL: MODERATE PAIN (5)

## 2019-04-18 NOTE — NURSING NOTE
"      [facility] FOLLOW-UP VISIT 4/18/2019    Dali Martines's chief complaint for this visit includes:  Chief Complaint   Patient presents with     Ankle Pain     Right ankle PRP injection     PCP: Dimitri Alas    Referring Provider:  No referring provider defined for this encounter.    /68   Pulse 74   Ht 1.778 m (5' 10\")   Wt 82.6 kg (182 lb)   BMI 26.11 kg/m    Moderate Pain (5)       Interval History:     Follow up reason: Follow up for right ankle, PRP injection     Date of injury: None    Date last seen: January 2019 for right ankle PRP    Following Therapeutic Plan: Yes     Pain: Unchanged    Function: Unchanged     Medical History:    Any recent changes to your medical history? No    Any new medication prescribed since last visit? No    Review of Systems:    Do you have fever, chills, weight loss? No    Do you have any vision problems? No    Do you have any chest pain or edema? No    Do you have any shortness of breath or wheezing?  No    Do you have stomach problems? No    Do you have any numbness or focal weakness? No    Do you have diabetes? No    Do you have problems with bleeding or clotting? No    Do you have an rashes or other skin lesions? No    "

## 2019-04-18 NOTE — PROGRESS NOTES
HISTORY OF PRESENT ILLNESS  Ms. Martines is a pleasant 48 year old year old female who presents to clinic today for followup for right ankle peroneal tendinopathy, mild current sprain  Had a conference last week and had a lot of walking and 'flared it up' no specific new injury  Some pain with walking today  Using brace     MEDICAL HISTORY  Patient Active Problem List   Diagnosis     GERD (gastroesophageal reflux disease)     CARDIOVASCULAR SCREENING; LDL GOAL LESS THAN 100     Type 1 diabetes mellitus with retinopathy (H)     Anxiety     NLD (necrobiosis lipoidica diabeticorum) (H)     Cutaneous skin tags     Cervicalgia     Chronic low back pain     SOB (shortness of breath)     Encounter for other specified aftercare     PAIN FOOT     Diabetes (H)     Screening for other eye conditions     PAIN KNEE JOINT     Enthesopathy     Pain in joint, multiple sites     Chronic pain of right ankle     Right foot pain     ESR raised     Swelling of limb     Other postprocedural status(V45.89)     Left knee pain     Type 1 diabetes mellitus without complication (H)     Psoriatic arthritis (H)     Psoriasis with arthropathy (H)     Pain in joint involving ankle and foot, right       Current Outpatient Medications   Medication Sig Dispense Refill     aspirin 81 MG tablet Take 1 tablet by mouth daily.       blood glucose (ACCU-CHEK COLIN PLUS) test strip Test Blood Sugar 8 times daily or as directed 700 strip 3     blood glucose monitoring (ACCU-CHEK FASTCLIX) lancets Use to test blood sugar 8 times daily or as directed. 4 Box 3     Calcium Carbonate-Vitamin D (CALCIUM + D PO) Take one daily       citalopram (CELEXA) 20 MG tablet Take 1.5 tablets (30 mg) by mouth daily 135 tablet 1     diclofenac (VOLTAREN) 1 % GEL topical gel APPLY 2 GRAMS ONTO THE SKIN FOUR TIMES DAILY AS NEEDED FOR MODERATE PAIN 100 g 5     dulaglutide (TRULICITY) 1.5 MG/0.5ML pen Inject 1.5 mg Subcutaneous every 7 days 6 mL 1     econazole nitrate 1 % cream  Apply 0.5 inches topically daily.       fish oil-omega-3 fatty acids (FISH OIL) 1000 MG capsule Take one daily       folic acid (FOLVITE) 1 MG tablet Take 1 tablet (1 mg) by mouth daily 90 tablet 3     insulin degludec (TRESIBA) 100 UNIT/ML pen Inject 14 units subcutaneous at hs. 15 mL 11     insulin pen needle (B-D U/F) 31G X 8 MM miscellaneous Use 5 pen needles daily 450 each 3     Ketoconazole (NIZORAL PO) Take  by mouth. Shampoo daily       levothyroxine (SYNTHROID/LEVOTHROID) 112 MCG tablet Take 1 tablet (112 mcg) by mouth daily 90 tablet 3     methotrexate 2.5 MG tablet CHEMO Take 6 tablets (15 mg) by mouth once a week 72 tablet 0     methylphenidate (METADATE CD) 20 MG CR capsule Take 20 mg by mouth daily       Minoxidil (ROGAINE EX) Externally apply  topically. daily       mometasone-formoterol (DULERA) 100-5 MCG/ACT oral inhaler Inhale 2 puffs into the lungs 2 times daily 3 Inhaler 3     montelukast (SINGULAIR) 5 MG chewable tablet Take 1 tablet (5 mg) by mouth At Bedtime 90 tablet 3     Multiple Vitamin (MULTIVITAMIN OR) Take  by mouth. Take one daily tab       NOVOLOG PENFILL 100 UNIT/ML soln 1 unit per 15 grams CHO at all meals and snacks with correction scale of 1 unit per 50 mg/dL over 150. Average daily use is 20 units. 30 mL 4     simvastatin (ZOCOR) 20 MG tablet Take 1 tablet (20 mg) by mouth At Bedtime 90 tablet 3     traZODone (DESYREL) 50 MG tablet Take 1-2 tablets by mouth nightly as needed for sleep. 180 tablet 0       Allergies   Allergen Reactions     Sulfasalazine      Developed rash, HA, dizziness       Family History   Problem Relation Age of Onset     Breast Cancer Mother      Heart Disease Father      C.A.D. Father      Arthritis Father      Circulatory Father      Eye Disorder Father      Lipids Father      Thyroid Disease Father      Macular Degeneration Father      Coronary Artery Disease Father      Anxiety Disorder Father      Alcoholism Father      Rheumatoid Arthritis Father       "Hypothyroidism Father      Diabetes Father      Cerebrovascular Disease Paternal Grandmother         ,     Cancer Paternal Grandfather         Pancreatic     Heart Disease Paternal Grandfather      Diabetes Maternal Grandmother         Type 2     C.A.D. Maternal Grandmother      Heart Disease Maternal Grandmother      Coronary Artery Disease Maternal Grandmother      Heart Disease Maternal Grandfather      Cardiac Sudden Death Maternal Grandfather      Gynecology Other         self     Thyroid Disease Other         self     Macular Degeneration Maternal Aunt      Diabetes Other         Type 1     Glaucoma No family hx of        Additional medical/Social/Surgical histories reviewed in [facility] and updated as appropriate.     REVIEW OF SYSTEMS (4/18/2019)  10 point ROS of systems including Constitutional, Eyes, Respiratory, Cardiovascular, Gastroenterology, Genitourinary, Integumentary, Musculoskeletal, Psychiatric were all negative except for pertinent positives noted in my HPI.     PHYSICAL EXAM  Vitals:    04/18/19 0716   BP: 109/68   Pulse: 74   Weight: 82.6 kg (182 lb)   Height: 1.778 m (5' 10\")     Vital Signs: /68   Pulse 74   Ht 1.778 m (5' 10\")   Wt 82.6 kg (182 lb)   BMI 26.11 kg/m   Patient declined being weighed. Body mass index is 26.11 kg/m .    General  - normal appearance, in no obvious distress  CV  - normal pulses at posterior tib and dorsalis pedis  Pulm  - normal respiratory pattern, non-labored  Musculoskeletal - right ankle  - stance: gait does not favors affected side, not reluctant to bear weight  - inspection: mild swelling laterally, normal bone and joint alignment, no obvious deformity  - palpation: tenderness over calcaneofibular ligament, no tenderness over lateral or medial malleoli, navicular, or base of 5th MT, no significant tenderness over peroneal tendon today  - ROM: intact globally but not limited secondary to pain  - strength: 5/5 in eversion, 5/5 in all other planes  - " special tests:  Has some pain with inversion stress  (-) anterior drawer  (-) talar tilt  (-) Tinel's  (-) squeeze test  (-) Austin test  Neuro  - no sensory or motor deficit, grossly normal coordination, normal muscle tone  Skin  - no ecchymosis overlying lateral foot-ankle junction, no warmth or induration, no obvious rash  Psych  - interactive, appropriate, normal mood and affect  ASSESSMENT & PLAN  47 yo female with history of chronic peroneal tendinopathy/pain, previously treated with PRP, ligamentous sprain currently  Cont. HEP every other day  Topical lidocaine   Consider kinesio taping  F/u 2 weeks consider PRP for peroneal tendon or ligament at that time      Dimitri Heart MD, CAQSM

## 2019-04-18 NOTE — LETTER
4/18/2019         RE: Dali Martines  [address]  [facility] [address]        Dear Colleague,    Thank you for referring your patient, Dali Martines, to the [facility]. Please see a copy of my visit note below.    HISTORY OF PRESENT ILLNESS  Ms. Martines is a pleasant 48 year old year old female who presents to clinic today for followup for right ankle peroneal tendinopathy, mild current sprain  Had a conference last week and had a lot of walking and 'flared it up' no specific new injury  Some pain with walking today  Using brace     MEDICAL HISTORY  Patient Active Problem List   Diagnosis     GERD (gastroesophageal reflux disease)     CARDIOVASCULAR SCREENING; LDL GOAL LESS THAN 100     Type 1 diabetes mellitus with retinopathy (H)     Anxiety     NLD (necrobiosis lipoidica diabeticorum) (H)     Cutaneous skin tags     Cervicalgia     Chronic low back pain     SOB (shortness of breath)     Encounter for other specified aftercare     PAIN FOOT     Diabetes (H)     Screening for other eye conditions     PAIN KNEE JOINT     Enthesopathy     Pain in joint, multiple sites     Chronic pain of right ankle     Right foot pain     ESR raised     Swelling of limb     Other postprocedural status(V45.89)     Left knee pain     Type 1 diabetes mellitus without complication (H)     Psoriatic arthritis (H)     Psoriasis with arthropathy (H)     Pain in joint involving ankle and foot, right       Current Outpatient Medications   Medication Sig Dispense Refill     aspirin 81 MG tablet Take 1 tablet by mouth daily.       blood glucose (ACCU-CHEK COLIN PLUS) test strip Test Blood Sugar 8 times daily or as directed 700 strip 3     blood glucose monitoring (ACCU-CHEK FASTCLIX) lancets Use to test blood sugar 8 times daily or as directed. 4 Box 3     Calcium Carbonate-Vitamin D (CALCIUM + D PO) Take one daily       citalopram (CELEXA) 20 MG tablet Take 1.5 tablets (30 mg) by mouth daily 135 tablet 1      diclofenac (VOLTAREN) 1 % GEL topical gel APPLY 2 GRAMS ONTO THE SKIN FOUR TIMES DAILY AS NEEDED FOR MODERATE PAIN 100 g 5     dulaglutide (TRULICITY) 1.5 MG/0.5ML pen Inject 1.5 mg Subcutaneous every 7 days 6 mL 1     econazole nitrate 1 % cream Apply 0.5 inches topically daily.       fish oil-omega-3 fatty acids (FISH OIL) 1000 MG capsule Take one daily       folic acid (FOLVITE) 1 MG tablet Take 1 tablet (1 mg) by mouth daily 90 tablet 3     insulin degludec (TRESIBA) 100 UNIT/ML pen Inject 14 units subcutaneous at hs. 15 mL 11     insulin pen needle (B-D U/F) 31G X 8 MM miscellaneous Use 5 pen needles daily 450 each 3     Ketoconazole (NIZORAL PO) Take  by mouth. Shampoo daily       levothyroxine (SYNTHROID/LEVOTHROID) 112 MCG tablet Take 1 tablet (112 mcg) by mouth daily 90 tablet 3     methotrexate 2.5 MG tablet CHEMO Take 6 tablets (15 mg) by mouth once a week 72 tablet 0     methylphenidate (METADATE CD) 20 MG CR capsule Take 20 mg by mouth daily       Minoxidil (ROGAINE EX) Externally apply  topically. daily       mometasone-formoterol (DULERA) 100-5 MCG/ACT oral inhaler Inhale 2 puffs into the lungs 2 times daily 3 Inhaler 3     montelukast (SINGULAIR) 5 MG chewable tablet Take 1 tablet (5 mg) by mouth At Bedtime 90 tablet 3     Multiple Vitamin (MULTIVITAMIN OR) Take  by mouth. Take one daily tab       NOVOLOG PENFILL 100 UNIT/ML soln 1 unit per 15 grams CHO at all meals and snacks with correction scale of 1 unit per 50 mg/dL over 150. Average daily use is 20 units. 30 mL 4     simvastatin (ZOCOR) 20 MG tablet Take 1 tablet (20 mg) by mouth At Bedtime 90 tablet 3     traZODone (DESYREL) 50 MG tablet Take 1-2 tablets by mouth nightly as needed for sleep. 180 tablet 0       Allergies   Allergen Reactions     Sulfasalazine      Developed rash, HA, dizziness       Family History   Problem Relation Age of Onset     Breast Cancer Mother      Heart Disease Father      C.A.D. Father      Arthritis Father       "Circulatory Father      Eye Disorder Father      Lipids Father      Thyroid Disease Father      Macular Degeneration Father      Coronary Artery Disease Father      Anxiety Disorder Father      Alcoholism Father      Rheumatoid Arthritis Father      Hypothyroidism Father      Diabetes Father      Cerebrovascular Disease Paternal Grandmother         ,     Cancer Paternal Grandfather         Pancreatic     Heart Disease Paternal Grandfather      Diabetes Maternal Grandmother         Type 2     C.A.D. Maternal Grandmother      Heart Disease Maternal Grandmother      Coronary Artery Disease Maternal Grandmother      Heart Disease Maternal Grandfather      Cardiac Sudden Death Maternal Grandfather      Gynecology Other         self     Thyroid Disease Other         self     Macular Degeneration Maternal Aunt      Diabetes Other         Type 1     Glaucoma No family hx of        Additional medical/Social/Surgical histories reviewed in [facility] and updated as appropriate.     REVIEW OF SYSTEMS (4/18/2019)  10 point ROS of systems including Constitutional, Eyes, Respiratory, Cardiovascular, Gastroenterology, Genitourinary, Integumentary, Musculoskeletal, Psychiatric were all negative except for pertinent positives noted in my HPI.     PHYSICAL EXAM  Vitals:    04/18/19 0716   BP: 109/68   Pulse: 74   Weight: 82.6 kg (182 lb)   Height: 1.778 m (5' 10\")     Vital Signs: /68   Pulse 74   Ht 1.778 m (5' 10\")   Wt 82.6 kg (182 lb)   BMI 26.11 kg/m    Patient declined being weighed. Body mass index is 26.11 kg/m .    General  - normal appearance, in no obvious distress  CV  - normal pulses at posterior tib and dorsalis pedis  Pulm  - normal respiratory pattern, non-labored  Musculoskeletal - right ankle  - stance: gait does not favors affected side, not reluctant to bear weight  - inspection: mild swelling laterally, normal bone and joint alignment, no obvious deformity  - palpation: tenderness over calcaneofibular " ligament, no tenderness over lateral or medial malleoli, navicular, or base of 5th MT, no significant tenderness over peroneal tendon today  - ROM: intact globally but not limited secondary to pain  - strength: 5/5 in eversion, 5/5 in all other planes  - special tests:  Has some pain with inversion stress  (-) anterior drawer  (-) talar tilt  (-) Tinel's  (-) squeeze test  (-) Austin test  Neuro  - no sensory or motor deficit, grossly normal coordination, normal muscle tone  Skin  - no ecchymosis overlying lateral foot-ankle junction, no warmth or induration, no obvious rash  Psych  - interactive, appropriate, normal mood and affect  ASSESSMENT & PLAN  47 yo female with history of chronic peroneal tendinopathy/pain, previously treated with PRP, ligamentous sprain currently  Cont. HEP every other day  Topical lidocaine   Consider kinesio taping  F/u 2 weeks consider PRP for peroneal tendon or ligament at that time      Dimitri Heart MD, [facility]    Again, thank you for allowing me to participate in the care of your patient.        Sincerely,        Dimitri Heart MD

## 2019-05-02 ENCOUNTER — OFFICE VISIT (OUTPATIENT)
Dept: ORTHOPEDICS | Facility: CLINIC | Age: 49
End: 2019-05-02
Payer: COMMERCIAL

## 2019-05-02 VITALS
WEIGHT: 182 LBS | HEART RATE: 81 BPM | BODY MASS INDEX: 26.05 KG/M2 | HEIGHT: 70 IN | SYSTOLIC BLOOD PRESSURE: 111 MMHG | DIASTOLIC BLOOD PRESSURE: 68 MMHG

## 2019-05-02 DIAGNOSIS — S86.311S PERONEAL TENDON TEAR, RIGHT, SEQUELA: Primary | ICD-10-CM

## 2019-05-02 PROCEDURE — 0232T NJX PLATELET PLASMA: CPT | Mod: RT | Performed by: PREVENTIVE MEDICINE

## 2019-05-02 PROCEDURE — 99207 ZZC NO CHARGE LOS: CPT | Performed by: PREVENTIVE MEDICINE

## 2019-05-02 ASSESSMENT — MIFFLIN-ST. JEOR: SCORE: 1525.8

## 2019-05-02 NOTE — PROGRESS NOTES
"      [facility] FOLLOW-UP VISIT 5/2/2019    Dali Martines's chief complaint for this visit includes:  Chief Complaint   Patient presents with     Follow Up     Follow up for right ankle PRP injection      PCP: Dimitri Alas    Referring Provider:  No referring provider defined for this encounter.    /68   Pulse 81   Ht 1.778 m (5' 10\")   Wt 82.6 kg (182 lb)   BMI 26.11 kg/m    Data Unavailable       Interval History:     Follow up reason:Right ankle PRP injection     Date of injury: N/A    Date last seen: 4-18-19    Following Therapeutic Plan: Yes     Pain: Unchanged    Function: Unchanged  Medical History:    Any recent changes to your medical history? No    Any new medication prescribed since last visit? No    Review of Systems:    Do you have fever, chills, weight loss? No    Do you have any vision problems? No    Do you have any chest pain or edema? No    Do you have any shortness of breath or wheezing?  No    Do you have stomach problems? No    Do you have any numbness or focal weakness? No    Do you have diabetes? No    Do you have problems with bleeding or clotting? No    Do you have an rashes or other skin lesions? No    Dali returns for right ankle peroneal tendon PRP injection as previously planned.  She is doing well  She would like another injection today     Diagnosis: right peroneal tendon tear, chronic ankle pain     PROCEDURE: right ankle peroneal tendon PRP injection   4mL of PRP was harvested from the patient's blood in normal fashion.      The patient was apprised of the risks and the benefits of the procedure and consented and a written consent was signed.   The patient s right ankle was sterilely prepped with chloraprepe  The patient was injected into the distal and middle peroneal tendon sheath near the talofibular joint and at the base of the 5th metatarsal with 4mL of PRP with a 1.5-inch 25-gauge needle using ultrasound guidance for needle placement and " visualization and the images were permanently saved for the patient's record.  There were no complications. The patient tolerated the procedure well. There was minimal bleeding.   The patient was instructed to ice the right ankle upon leaving clinic and refrain from overuse over the next 3 days.   The patient was instructed to go to the emergency room with any usual pain, swelling, or redness occurred in the injected area.   The patient was given a followup appointment to evaluate response to the injection to their increased range of motion and reduction of pain.  patient will return to the office in 2-3 weeks for followup and will discuss lumbar injection at that time  Given PT order  Dr Dimitri Heart

## 2019-05-02 NOTE — PATIENT INSTRUCTIONS
Thanks for coming today.  [facility]  [address] [place] [address]    To schedule future appointments in Ortho Clinic, you may call [phone].    To schedule ordered imaging by your provider:   Call [facility] [phone]    To schedule an injection ordered by your provider:  Call [facility] Injection scheduling line: [phone]  [facility] available online at:  [email or web address]    Please call if any further questions or concerns ([phone]).  Clinic hours 8 am to 5 pm.    Return to clinic (call) if symptoms worsen or fail to improve.

## 2019-05-02 NOTE — LETTER
"    5/2/2019         RE: Dali Martines  [address]  [facility] [address]        Dear Colleague,    Thank you for referring your patient, Dali Martines, to the [facility]. Please see a copy of my visit note below.          [facility] FOLLOW-UP VISIT 5/2/2019    Dali Martines's chief complaint for this visit includes:  Chief Complaint   Patient presents with     Follow Up     Follow up for right ankle PRP injection      PCP: Dimitri Alas    Referring Provider:  No referring provider defined for this encounter.    /68   Pulse 81   Ht 1.778 m (5' 10\")   Wt 82.6 kg (182 lb)   BMI 26.11 kg/m     Data Unavailable       Interval History:     Follow up reason:Right ankle PRP injection     Date of injury: N/A    Date last seen: 4-18-19    Following Therapeutic Plan: Yes     Pain: Unchanged    Function: Unchanged  Medical History:    Any recent changes to your medical history? No    Any new medication prescribed since last visit? No    Review of Systems:    Do you have fever, chills, weight loss? No    Do you have any vision problems? No    Do you have any chest pain or edema? No    Do you have any shortness of breath or wheezing?  No    Do you have stomach problems? No    Do you have any numbness or focal weakness? No    Do you have diabetes? No    Do you have problems with bleeding or clotting? No    Do you have an rashes or other skin lesions? No    Dali returns for right ankle peroneal tendon PRP injection as previously planned.  She is doing well  She would like another injection today     Diagnosis: right peroneal tendon tear, chronic ankle pain     PROCEDURE: right ankle peroneal tendon PRP injection   4mL of PRP was harvested from the patient's blood in normal fashion.      The patient was apprised of the risks and the benefits of the procedure and consented and a written consent was signed.   The patient s right ankle was sterilely prepped with " chloraprepe  The patient was injected into the distal and middle peroneal tendon sheath near the talofibular joint and at the base of the 5th metatarsal with 4mL of PRP with a 1.5-inch 25-gauge needle using ultrasound guidance for needle placement and visualization and the images were permanently saved for the patient's record.  There were no complications. The patient tolerated the procedure well. There was minimal bleeding.   The patient was instructed to ice the right ankle upon leaving clinic and refrain from overuse over the next 3 days.   The patient was instructed to go to the emergency room with any usual pain, swelling, or redness occurred in the injected area.   The patient was given a followup appointment to evaluate response to the injection to their increased range of motion and reduction of pain.  patient will return to the office in 2-3 weeks for followup and will discuss lumbar injection at that time  Given PT order  Dr Dimitri Heart        Again, thank you for allowing me to participate in the care of your patient.        Sincerely,        Dimitri Heart MD

## 2019-05-08 DIAGNOSIS — E10.9 TYPE 1 DIABETES MELLITUS WITHOUT COMPLICATION (H): ICD-10-CM

## 2019-05-08 RX ORDER — INSULIN ASPART 100 [IU]/ML
INJECTION, SOLUTION INTRAVENOUS; SUBCUTANEOUS
Qty: 30 ML | Refills: 4 | Status: SHIPPED | OUTPATIENT
Start: 2019-05-08 | End: 2020-05-18

## 2019-05-13 ENCOUNTER — CARE COORDINATION (OUTPATIENT)
Dept: EDUCATION SERVICES | Facility: CLINIC | Age: 49
End: 2019-05-13

## 2019-05-13 DIAGNOSIS — E10.319 TYPE 1 DIABETES MELLITUS WITH RETINOPATHY (H): Primary | ICD-10-CM

## 2019-05-13 RX ORDER — BLOOD-GLUCOSE SENSOR
1 EACH MISCELLANEOUS
Qty: 12 EACH | Refills: 3 | Status: SHIPPED | OUTPATIENT
Start: 2019-05-13 | End: 2020-09-12

## 2019-05-13 RX ORDER — BLOOD-GLUCOSE TRANSMITTER
1 EACH MISCELLANEOUS
Qty: 1 EACH | Refills: 1 | Status: SHIPPED | OUTPATIENT
Start: 2019-05-13 | End: 2021-01-04

## 2019-05-13 NOTE — PROGRESS NOTES
Diabetes Education Note:    Dali has had some difficulty getting her supplies from [employer] for her Dexcom g4.  She is wondering if there might be an easier option.  Sent order to [facility] for Dexcom transmitter and sensors for the Dexcom G4 for a 3 month supply.

## 2019-05-22 PROBLEM — Z86.0101 HISTORY OF ADENOMATOUS POLYP OF COLON: Status: ACTIVE | Noted: 2019-05-22

## 2019-06-18 ENCOUNTER — OFFICE VISIT (OUTPATIENT)
Dept: ENDOCRINOLOGY | Facility: CLINIC | Age: 49
End: 2019-06-18
Payer: COMMERCIAL

## 2019-06-18 VITALS
SYSTOLIC BLOOD PRESSURE: 125 MMHG | HEART RATE: 66 BPM | BODY MASS INDEX: 26.72 KG/M2 | DIASTOLIC BLOOD PRESSURE: 83 MMHG | WEIGHT: 186.2 LBS

## 2019-06-18 DIAGNOSIS — Z51.81 ENCOUNTER FOR THERAPEUTIC DRUG MONITORING: ICD-10-CM

## 2019-06-18 DIAGNOSIS — E10.9 TYPE 1 DIABETES MELLITUS WITHOUT COMPLICATION (H): Primary | ICD-10-CM

## 2019-06-18 DIAGNOSIS — M25.50 PAIN IN JOINT, MULTIPLE SITES: ICD-10-CM

## 2019-06-18 DIAGNOSIS — E10.9 TYPE 1 DIABETES MELLITUS WITHOUT COMPLICATION (H): ICD-10-CM

## 2019-06-18 DIAGNOSIS — M77.9 TENDINITIS: ICD-10-CM

## 2019-06-18 LAB
ALBUMIN SERPL-MCNC: 4 G/DL (ref 3.4–5)
ALT SERPL W P-5'-P-CCNC: 47 U/L (ref 0–50)
AST SERPL W P-5'-P-CCNC: 40 U/L (ref 0–45)
BASOPHILS # BLD AUTO: 0 10E9/L (ref 0–0.2)
BASOPHILS NFR BLD AUTO: 0.6 %
CREAT SERPL-MCNC: 0.49 MG/DL (ref 0.52–1.04)
CRP SERPL-MCNC: <2.9 MG/L (ref 0–8)
DIFFERENTIAL METHOD BLD: ABNORMAL
EOSINOPHIL # BLD AUTO: 0.1 10E9/L (ref 0–0.7)
EOSINOPHIL NFR BLD AUTO: 0.7 %
ERYTHROCYTE [DISTWIDTH] IN BLOOD BY AUTOMATED COUNT: 12.8 % (ref 10–15)
GFR SERPL CREATININE-BSD FRML MDRD: >90 ML/MIN/{1.73_M2}
HBA1C MFR BLD: 7 % (ref 4.3–6)
HCT VFR BLD AUTO: 35.9 % (ref 35–47)
HGB BLD-MCNC: 11.7 G/DL (ref 11.7–15.7)
IMM GRANULOCYTES # BLD: 0 10E9/L (ref 0–0.4)
IMM GRANULOCYTES NFR BLD: 0.3 %
LYMPHOCYTES # BLD AUTO: 2.4 10E9/L (ref 0.8–5.3)
LYMPHOCYTES NFR BLD AUTO: 34.5 %
MCH RBC QN AUTO: 31.2 PG (ref 26.5–33)
MCHC RBC AUTO-ENTMCNC: 32.6 G/DL (ref 31.5–36.5)
MCV RBC AUTO: 96 FL (ref 78–100)
MONOCYTES # BLD AUTO: 0.5 10E9/L (ref 0–1.3)
MONOCYTES NFR BLD AUTO: 6.9 %
NEUTROPHILS # BLD AUTO: 4 10E9/L (ref 1.6–8.3)
NEUTROPHILS NFR BLD AUTO: 57 %
NRBC # BLD AUTO: 0 10*3/UL
NRBC BLD AUTO-RTO: 0 /100
PLATELET # BLD AUTO: 277 10E9/L (ref 150–450)
RBC # BLD AUTO: 3.75 10E12/L (ref 3.8–5.2)
TSH SERPL DL<=0.005 MIU/L-ACNC: 4.48 MU/L (ref 0.4–4)
WBC # BLD AUTO: 6.9 10E9/L (ref 4–11)

## 2019-06-18 ASSESSMENT — PAIN SCALES - GENERAL: PAINLEVEL: MODERATE PAIN (5)

## 2019-06-18 NOTE — LETTER
6/18/2019       RE: Dali Martines  [address]  [facility] [address]     Dear Colleague,    Thank you for referring your patient, Dali Martines, to the [facility] ENDOCRINOLOGY at [facility]. Please see a copy of my visit note below.    This 48 year old woman returns for f/u of her type 1 diabetes. She also has hypothyroidism and psoriatic arthritis. She is currently taking Tresiba 15-16 units daily.      She has the following calculations in her Accucheck Expert meter:  Insulin to Carb ratio:  1 unit per 15 grams CHO  Correction Factor:     1 unit drops BG 50 mg/mL  Targets:  Overnight:   and during the Daytime:   Active Insulin Time:  3.5 hours.     She remains on trulicity and is tolerating it well.  She wears her Dex com sensor nearly all the time she wore it 70% of the time during the last 2 weeks..  During the last 2 weeks her sensor average was 159.  She was between  58% of the time, above 180 34% of the time, and below seventy 9 % of the time. Reviewing the daily CGM data shows she often drifts down overnight.  She generally is in target after meals and with corrections. She reports she recognizes her low blood sugars.  She still is not as active as she was in past but her orthopedic/rheumatologic diseases are improving.     She has seen her eye MD and her eyes are stable. She has no changes in her tingling in her toes.  She has no new concerns today.  Her thyroid dose was decreased from 125 mcg each day to 112 last visit.  She has not noticed any change in how she feels on the lower dose.      Current Outpatient Medications on File Prior to Visit:  aspirin 81 MG tablet Take 1 tablet by mouth daily.   blood glucose (ACCU-CHEK COLIN PLUS) test strip Test Blood Sugar 8 times daily or as directed   blood glucose monitoring (ACCU-CHEK FASTCLIX) lancets Use to test blood sugar 8 times daily or as directed.   Calcium Carbonate-Vitamin D (CALCIUM + D  PO) Take one daily   citalopram (CELEXA) 20 MG tablet Take 1.5 tablets (30 mg) by mouth daily   Continuous Blood Gluc Sensor (DEXCOM G5 MOB/G4 PLAT SENSOR) MISC 1 each every 7 days   Continuous Blood Gluc Transmit (DEXCOM G4 PLATINUM TRANSMITTER) MISC 1 each every 6 months   diclofenac (VOLTAREN) 1 % GEL topical gel APPLY 2 GRAMS ONTO THE SKIN FOUR TIMES DAILY AS NEEDED FOR MODERATE PAIN   dulaglutide (TRULICITY) 1.5 MG/0.5ML pen Inject 1.5 mg Subcutaneous every 7 days   econazole nitrate 1 % cream Apply 0.5 inches topically daily.   fish oil-omega-3 fatty acids (FISH OIL) 1000 MG capsule Take one daily   folic acid (FOLVITE) 1 MG tablet Take 1 tablet (1 mg) by mouth daily   insulin degludec (TRESIBA) 100 UNIT/ML pen Inject 14 units subcutaneous at hs.   insulin pen needle (B-D U/F) 31G X 8 MM miscellaneous Use 5 pen needles daily   Ketoconazole (NIZORAL PO) Take  by mouth. Shampoo daily   levothyroxine (SYNTHROID/LEVOTHROID) 112 MCG tablet Take 1 tablet (112 mcg) by mouth daily   methotrexate 2.5 MG tablet CHEMO Take 6 tablets (15 mg) by mouth once a week   methylphenidate (METADATE CD) 20 MG CR capsule Take 20 mg by mouth daily   Minoxidil (ROGAINE EX) Externally apply  topically. daily   mometasone-formoterol (DULERA) 100-5 MCG/ACT oral inhaler Inhale 2 puffs into the lungs 2 times daily   montelukast (SINGULAIR) 5 MG chewable tablet Take 1 tablet (5 mg) by mouth At Bedtime   Multiple Vitamin (MULTIVITAMIN OR) Take  by mouth. Take one daily tab   NOVOLOG PENFILL 100 UNIT/ML soln 1 unit per 15 grams CHO at all meals and snacks with correction scale of 1 unit per 50 mg/dL over 150. Average daily use is 30  units.   simvastatin (ZOCOR) 20 MG tablet Take 1 tablet (20 mg) by mouth At Bedtime   traZODone (DESYREL) 50 MG tablet Take 1-2 tablets by mouth nightly as needed for sleep.   [] polyethylene glycol (GOLYTELY/NULYTELY) 236 g suspension Take 4,000 mLs (4 L) by mouth once for 1 dose     Current  "Facility-Administered Medications on File Prior to Visit:  betamethasone acet & sod phos (CELESTONE) injection 6 mg       ROS: 10 point ROS neg other than the symptoms noted above in the HPI.  Vital signs:   /83   Pulse 66   Wt 84.5 kg (186 lb 3.2 oz)   BMI 26.72 kg/m     Estimated body mass index is 26.72 kg/m  as calculated from the following:    Height as of 5/2/19: 1.778 m (5' 10\").    Weight as of this encounter: 84.5 kg (186 lb 3.2 oz).    VSS  NAD  Eyes - no periorbital edema, conjunctival injection, scleral icterus  CV - RRR.  Normal pulses in feet.  No edema  Neuro - sensation intact to monofilament on soles of feet.  DTR 2/4 biceps  Skin - normal texture   Feet - no ulcers   Mood - not depressed    Recent Labs   Lab Test 06/18/19  1556 06/18/19 03/21/19  1359 03/21/19 02/14/19  1635 12/18/18  1617 12/18/18  1610  01/09/18  1550  07/09/14  1641  09/27/13  0928 09/16/13  1612  06/04/13  1555   A1C  --   --   --   --   --   --   --   --   --   --   --   --   --  7.2*  --  7.6*   HEMOGLOBINA1  --  7.0*  --  7.2*  --   --   --    < >  --    < >  --    < >  --   --   --   --    TSH 4.48*  --  0.19*  --   --   --  0.12*   < >  --    < > 0.11*   < >  --   --    < >  --    T4  --   --  1.09  --   --   --   --   --   --   --  1.31  --   --   --   --   --    LDL  --   --   --   --   --   --  92  --   --   --   --   --  123  --   --   --    HDL  --   --   --   --   --   --  91  --   --   --   --   --  78  --   --   --    TRIG  --   --   --   --   --   --  54  --   --   --   --   --  51  --   --   --    CR 0.49*  --   --   --  0.54  --   --    < >  --    < >  --    < >  --   --   --   --    MICROL  --   --   --   --   --  6  --   --  <5   < >  --    < >  --   --   --   --     < > = values in this interval not displayed.       Assessment and plan:    1.  Diabetes control.  Overall she is doing very well.  Because she does drift down overnight, I suggested she fix her Tresiba dose at 15.  No other changes " today.    2.  Diabetes complications. Up to date with screens and has only background retinopathy.    3.hypothyroidism.  I will repeat her TSH today since she is on a different dose.    3.CVD risk.  Her blood pressure is at target.  Her LDL is at target on simvastatin.    F/u with Shakira Sebastian in 3 mo and f/u with me in 6 mo    Felicia Mckeon MD

## 2019-06-24 NOTE — PROGRESS NOTES
This 48 year old woman returns for f/u of her type 1 diabetes. She also has hypothyroidism and psoriatic arthritis. She is currently taking Tresiba 15-16 units daily.      She has the following calculations in her Accucheck Expert meter:  Insulin to Carb ratio:  1 unit per 15 grams CHO  Correction Factor:     1 unit drops BG 50 mg/mL  Targets:  Overnight:   and during the Daytime:   Active Insulin Time:  3.5 hours.     She remains on trulicity and is tolerating it well.  She wears her Dex com sensor nearly all the time she wore it 70% of the time during the last 2 weeks..  During the last 2 weeks her sensor average was 159.  She was between  58% of the time, above 180 34% of the time, and below seventy 9 % of the time. Reviewing the daily CGM data shows she often drifts down overnight.  She generally is in target after meals and with corrections. She reports she recognizes her low blood sugars.  She still is not as active as she was in past but her orthopedic/rheumatologic diseases are improving.     She has seen her eye MD and her eyes are stable. She has no changes in her tingling in her toes.  She has no new concerns today.  Her thyroid dose was decreased from 125 mcg each day to 112 last visit.  She has not noticed any change in how she feels on the lower dose.      Current Outpatient Medications on File Prior to Visit:  aspirin 81 MG tablet Take 1 tablet by mouth daily.   blood glucose (ACCU-CHEK COLIN PLUS) test strip Test Blood Sugar 8 times daily or as directed   blood glucose monitoring (ACCU-CHEK FASTCLIX) lancets Use to test blood sugar 8 times daily or as directed.   Calcium Carbonate-Vitamin D (CALCIUM + D PO) Take one daily   citalopram (CELEXA) 20 MG tablet Take 1.5 tablets (30 mg) by mouth daily   Continuous Blood Gluc Sensor (DEXCOM G5 MOB/G4 PLAT SENSOR) MISC 1 each every 7 days   Continuous Blood Gluc Transmit (DEXCOM G4 PLATINUM TRANSMITTER) MISC 1 each every 6 months    diclofenac (VOLTAREN) 1 % GEL topical gel APPLY 2 GRAMS ONTO THE SKIN FOUR TIMES DAILY AS NEEDED FOR MODERATE PAIN   dulaglutide (TRULICITY) 1.5 MG/0.5ML pen Inject 1.5 mg Subcutaneous every 7 days   econazole nitrate 1 % cream Apply 0.5 inches topically daily.   fish oil-omega-3 fatty acids (FISH OIL) 1000 MG capsule Take one daily   folic acid (FOLVITE) 1 MG tablet Take 1 tablet (1 mg) by mouth daily   insulin degludec (TRESIBA) 100 UNIT/ML pen Inject 14 units subcutaneous at hs.   insulin pen needle (B-D U/F) 31G X 8 MM miscellaneous Use 5 pen needles daily   Ketoconazole (NIZORAL PO) Take  by mouth. Shampoo daily   levothyroxine (SYNTHROID/LEVOTHROID) 112 MCG tablet Take 1 tablet (112 mcg) by mouth daily   methotrexate 2.5 MG tablet CHEMO Take 6 tablets (15 mg) by mouth once a week   methylphenidate (METADATE CD) 20 MG CR capsule Take 20 mg by mouth daily   Minoxidil (ROGAINE EX) Externally apply  topically. daily   mometasone-formoterol (DULERA) 100-5 MCG/ACT oral inhaler Inhale 2 puffs into the lungs 2 times daily   montelukast (SINGULAIR) 5 MG chewable tablet Take 1 tablet (5 mg) by mouth At Bedtime   Multiple Vitamin (MULTIVITAMIN OR) Take  by mouth. Take one daily tab   NOVOLOG PENFILL 100 UNIT/ML soln 1 unit per 15 grams CHO at all meals and snacks with correction scale of 1 unit per 50 mg/dL over 150. Average daily use is 30  units.   simvastatin (ZOCOR) 20 MG tablet Take 1 tablet (20 mg) by mouth At Bedtime   traZODone (DESYREL) 50 MG tablet Take 1-2 tablets by mouth nightly as needed for sleep.   [] polyethylene glycol (GOLYTELY/NULYTELY) 236 g suspension Take 4,000 mLs (4 L) by mouth once for 1 dose     Current Facility-Administered Medications on File Prior to Visit:  betamethasone acet & sod phos (CELESTONE) injection 6 mg       ROS: 10 point ROS neg other than the symptoms noted above in the HPI.  Vital signs:   /83   Pulse 66   Wt 84.5 kg (186 lb 3.2 oz)   BMI 26.72 kg/m   "  Estimated body mass index is 26.72 kg/m  as calculated from the following:    Height as of 5/2/19: 1.778 m (5' 10\").    Weight as of this encounter: 84.5 kg (186 lb 3.2 oz).    VSS  NAD  Eyes - no periorbital edema, conjunctival injection, scleral icterus  CV - RRR.  Normal pulses in feet.  No edema  Neuro - sensation intact to monofilament on soles of feet.  DTR 2/4 biceps  Skin - normal texture   Feet - no ulcers   Mood - not depressed    Recent Labs   Lab Test 06/18/19  1556 06/18/19 03/21/19  1359 03/21/19 02/14/19  1635 12/18/18  1617 12/18/18  1610  01/09/18  1550  07/09/14  1641  09/27/13  0928 09/16/13  1612  06/04/13  1555   A1C  --   --   --   --   --   --   --   --   --   --   --   --   --  7.2*  --  7.6*   HEMOGLOBINA1  --  7.0*  --  7.2*  --   --   --    < >  --    < >  --    < >  --   --   --   --    TSH 4.48*  --  0.19*  --   --   --  0.12*   < >  --    < > 0.11*   < >  --   --    < >  --    T4  --   --  1.09  --   --   --   --   --   --   --  1.31  --   --   --   --   --    LDL  --   --   --   --   --   --  92  --   --   --   --   --  123  --   --   --    HDL  --   --   --   --   --   --  91  --   --   --   --   --  78  --   --   --    TRIG  --   --   --   --   --   --  54  --   --   --   --   --  51  --   --   --    CR 0.49*  --   --   --  0.54  --   --    < >  --    < >  --    < >  --   --   --   --    MICROL  --   --   --   --   --  6  --   --  <5   < >  --    < >  --   --   --   --     < > = values in this interval not displayed.       Assessment and plan:    1.  Diabetes control.  Overall she is doing very well.  Because she does drift down overnight, I suggested she fix her Tresiba dose at 15.  No other changes today.    2.  Diabetes complications. Up to date with screens and has only background retinopathy.    3.hypothyroidism.  I will repeat her TSH today since she is on a different dose.    3.CVD risk.  Her blood pressure is at target.  Her LDL is at target on simvastatin.    F/u with Shakira " Romulo in 3 mo and f/u with me in 6 mo    Felicia Mckeon MD

## 2019-06-27 ENCOUNTER — THERAPY VISIT (OUTPATIENT)
Dept: PHYSICAL THERAPY | Facility: CLINIC | Age: 49
End: 2019-06-27
Payer: COMMERCIAL

## 2019-06-27 DIAGNOSIS — M25.571 PAIN IN JOINT INVOLVING ANKLE AND FOOT, RIGHT: ICD-10-CM

## 2019-06-27 PROCEDURE — 97110 THERAPEUTIC EXERCISES: CPT | Mod: GP | Performed by: PHYSICAL THERAPIST

## 2019-06-27 NOTE — PROGRESS NOTES
"Subjective:  HPI                    Objective:  System    Physical Exam    General     ROS    Assessment/Plan:    PROGRESS  REPORT    Progress reporting period is from 4/11/19 to 6/27/19.       SUBJECTIVE  Subjective changes noted by patient:  Patient reports after her most recent PRP injection on 5/2, she has not had as good of a response as she did on the previous one.  However, she also has had 2 conferences for work since then and that alway requires her to be on her feet standing and walking more than usual.  She ended up having to wear her boot at these and pain was pretty bad by the end of it and still has not recovered from that.  KT tape seems to reduce the swelling, but it doesn't eliminate her pain.  Her back has also been irritated lately, but had to do a lot of house cleaning, so she thinks that is explainable.       Current pain level is 4/10  .      Initial Pain level: (2-4/10).   Changes in function:  None  Adverse reaction to treatment or activity: Increased pain after PRP and after 2 work conferences.    OBJECTIVE  Changes noted in objective findings:      R ankle AROM: DF improved from 5 to 7.  PF 80, IV 25 and not painful today, EV 15.    Tender distal peroneal insertion on 5th metatarsal and mild at CF ligament.     Pocket of fluid ant/distal to R lateral malleoli is decreased, but still present.     R ankle Strength all 5-/5 except gastroc and post tib 4+/5.      SLS 12\"R with lots of movement and great difficulty.  L SLS 30\" easy.      Gait demonstrated good mechanics with slow baylee, but when asked to walk faster, she demonstrated a decreased push off and excessive use of ant tib, which is where she feels apin with prolonged walks.  So i want her to get out of her brace and walk more with focusing on toe push off.     ASSESSMENT/PLAN  Updated problem list and treatment plan: Diagnosis 1:  R chronic ankle pain with 3rd PRP injection on 5/2/19  Pain -  manual therapy, " splint/taping/bracing/orthotics, self management, education and home program  Decreased strength - therapeutic exercise, therapeutic activities and home program  Impaired balance - neuro re-education, therapeutic activities and home program  Decreased proprioception - neuro re-education, therapeutic activities and home program  Impaired gait - gait training and home program  Impaired muscle performance - neuro re-education and home program  Decreased function - therapeutic activities and home program  STG/LTGs have been met or progress has been made towards goals:  Yes, but minimal, so program was changed  Assessment of Progress: The patient's progress has plateaued.  Self Management Plans:  Patient has been instructed in a home treatment program.  Patient  has been instructed in self management of symptoms.  I have re-evaluated this patient and find that the nature, scope, duration and intensity of the therapy is appropriate for the medical condition of the patient.  Dali continues to require the following intervention to meet STG and LTG's:  PT    Recommendations:  This patient would benefit from continued therapy.     Frequency:  1 X a month, once daily  Duration:  for 1-2 visits        Please refer to the daily flowsheet for treatment today, total treatment time and time spent performing 1:1 timed codes.

## 2019-07-05 DIAGNOSIS — M25.50 PAIN IN JOINT, MULTIPLE SITES: ICD-10-CM

## 2019-07-05 DIAGNOSIS — E11.620 NLD (NECROBIOSIS LIPOIDICA DIABETICORUM) (H): ICD-10-CM

## 2019-07-05 DIAGNOSIS — M77.9 TENDINITIS: ICD-10-CM

## 2019-07-05 DIAGNOSIS — L40.50 PSORIATIC ARTHRITIS (H): ICD-10-CM

## 2019-07-08 DIAGNOSIS — E10.9 TYPE 1 DIABETES MELLITUS WITHOUT COMPLICATION (H): ICD-10-CM

## 2019-07-08 NOTE — TELEPHONE ENCOUNTER
methotrexate 2.5 MG tablet         Last Written Prescription Date:  11/27/2018  Last Fill Quantity 72,   # refills: 0  Last Office Visit: 11/20/2018  Future Office visit:  none    CBC RESULTS:   Recent Labs   Lab Test 06/18/19  1556   WBC 6.9   RBC 3.75*   HGB 11.7   HCT 35.9   MCV 96   MCH 31.2   MCHC 32.6   RDW 12.8          Creatinine   Date Value Ref Range Status   06/18/2019 0.49 (L) 0.52 - 1.04 mg/dL Final   ]    Liver Function Studies -   Recent Labs   Lab Test 06/18/19  1556  04/27/16  1402   PROTTOTAL  --   --  8.0   ALBUMIN 4.0   < > 4.0   BILITOTAL  --   --  0.3   ALKPHOS  --   --  108   AST 40   < > 21   ALT 47   < > 25    < > = values in this interval not displayed.       Routing refill request to provider for review/approval because:  DMARD

## 2019-07-11 NOTE — TELEPHONE ENCOUNTER
dulaglutide (TRULICITY) 1.5 MG/0.5ML pen  Last Written Prescription Date:  1/22/19  Last Fill Quantity: 6ml,   # refills: 1  Last Office Visit : 6/18/19  Future Office visit:  9/19/19    Tyler SALDANA    Routing refill request to provider for review/approval because: creat  L

## 2019-07-18 ENCOUNTER — THERAPY VISIT (OUTPATIENT)
Dept: PHYSICAL THERAPY | Facility: CLINIC | Age: 49
End: 2019-07-18
Payer: COMMERCIAL

## 2019-07-18 DIAGNOSIS — M25.571 PAIN IN JOINT INVOLVING ANKLE AND FOOT, RIGHT: ICD-10-CM

## 2019-07-18 PROCEDURE — 97110 THERAPEUTIC EXERCISES: CPT | Mod: GP | Performed by: PHYSICAL THERAPIST

## 2019-07-18 PROCEDURE — 97112 NEUROMUSCULAR REEDUCATION: CPT | Mod: GP | Performed by: PHYSICAL THERAPIST

## 2019-08-24 ENCOUNTER — THERAPY VISIT (OUTPATIENT)
Dept: PHYSICAL THERAPY | Facility: CLINIC | Age: 49
End: 2019-08-24
Payer: COMMERCIAL

## 2019-08-24 DIAGNOSIS — M25.571 PAIN IN JOINT INVOLVING ANKLE AND FOOT, RIGHT: ICD-10-CM

## 2019-08-24 PROCEDURE — 97110 THERAPEUTIC EXERCISES: CPT | Mod: GP | Performed by: PHYSICAL THERAPIST

## 2019-08-24 NOTE — PROGRESS NOTES
Subjective:  HPI                    Objective:  System    Physical Exam    General     ROS    Assessment/Plan:    DISCHARGE  REPORT    Progress reporting period is from 6/27/19 to 8/24/19.       SUBJECTIVE  Subjective changes noted by patient:   Still loves not wearing the ankle brace and this seems helpful now that she got use to it.  She certainly had soreness at end of day initially and then had to back off on her strengthening.  THat is slowly improving, but not entirely unnoticeable yet.  However, she was only able to add the calf raises back in this past week and has had to do it sitting.  Is walking more normally now too.  Not for long distances, but doesn't even think about it during short distances.      Shoulder is feeling better as she can sleep on it sometimes and can weed her garden.  Reaching behind back is still very limited and painful though.     Current Pain level: (0-3/10).      Initial Pain level: (2-4/10).   Changes in function:  None  Adverse reaction to treatment or activity: activity - stopping wearing ankle support brace    OBJECTIVE  Changes noted in objective findings:       Normal gait in short distance.    Metatarsal mobility and great toe mobility WNL.    R ankle Strength: IV, DF, and EV 5/5, PF 4/5.   Not tender at peroneals now or 5th metatarsal.    Is tender at anterior talofibular and poster TF ligament on the R.    +Anterior Drawer on the R.    ASSESSMENT/PLAN  Updated problem list and treatment plan: Diagnosis 1:  R chronic ankle pain.  Peroneal tendon seems to be much improved, but instability continues to be a problem  Pain -  self management, education and home program  Decreased strength - therapeutic exercise, therapeutic activities and home program  Impaired muscle performance - neuro re-education and home program  Decreased function - therapeutic activities and home program  STG/LTGs have been met or progress has been made towards goals:  No, but is able to walk normal pace  indoors now  Assessment of Progress: The patient's condition has potential to improve.  The patient has had set backs in their progress.  Self Management Plans:  Patient has been instructed in a home treatment program.  Patient  has been instructed in self management of symptoms.  I have re-evaluated this patient and find that the nature, scope, duration and intensity of the therapy is appropriate for the medical condition of the patient.  Dali continues to require the following intervention to meet STG and LTG's:  PT    Recommendations:  This patient would benefit from continued therapy.     Frequency:  Return x1 visit in 2 months and then discontinue    However, patient has not returned in almost 4 months, so discontinue with HEP        Please refer to the daily flowsheet for treatment today, total treatment time and time spent performing 1:1 timed codes.

## 2019-08-30 DIAGNOSIS — E11.620 NLD (NECROBIOSIS LIPOIDICA DIABETICORUM) (H): ICD-10-CM

## 2019-08-30 DIAGNOSIS — M77.9 TENDINITIS: ICD-10-CM

## 2019-08-30 DIAGNOSIS — L40.50 PSORIATIC ARTHRITIS (H): ICD-10-CM

## 2019-08-30 DIAGNOSIS — M25.50 PAIN IN JOINT, MULTIPLE SITES: ICD-10-CM

## 2019-09-02 NOTE — TELEPHONE ENCOUNTER
methotrexate 2.5 MG tablet  Last Written Prescription Date:  7/8/19  Last Fill Quantity: 72,   # refills: 0  Last Office Visit:  11/20/18  Future Office visit:  None      CBC RESULTS:   Recent Labs   Lab Test 06/18/19  1556   WBC 6.9   RBC 3.75*   HGB 11.7   HCT 35.9   MCV 96   MCH 31.2   MCHC 32.6   RDW 12.8          Creatinine   Date Value Ref Range Status   06/18/2019 0.49 (L) 0.52 - 1.04 mg/dL Final   ]    Liver Function Studies -   Recent Labs   Lab Test 06/18/19  1556  04/27/16  1402   PROTTOTAL  --   --  8.0   ALBUMIN 4.0   < > 4.0   BILITOTAL  --   --  0.3   ALKPHOS  --   --  108   AST 40   < > 21   ALT 47   < > 25    < > = values in this interval not displayed.       Routing refill request to provider for review/approval because: lv > 6 mths   Labs due now

## 2019-09-18 ENCOUNTER — TELEPHONE (OUTPATIENT)
Dept: ENDOCRINOLOGY | Facility: CLINIC | Age: 49
End: 2019-09-18

## 2019-09-18 NOTE — TELEPHONE ENCOUNTER
[facility]    Phone Message    May a detailed message be left on voicemail: yes    Reason for Call: patient is needing to reschedule the appointment on 9/19 but didn't want to cancel until she could see if there was anything sooner than 10/30. I saw a POA spot on 9/25 @ 10:30 but wanted to see if we could use it first (don't even know if it would work for her but just saw it available)     Please advise     Action Taken: Message routed to:  [facility] ([facility] ENDO

## 2019-09-28 ENCOUNTER — HEALTH MAINTENANCE LETTER (OUTPATIENT)
Age: 49
End: 2019-09-28

## 2019-09-30 ENCOUNTER — OFFICE VISIT (OUTPATIENT)
Dept: ENDOCRINOLOGY | Facility: CLINIC | Age: 49
End: 2019-09-30
Payer: COMMERCIAL

## 2019-09-30 VITALS
HEIGHT: 70 IN | SYSTOLIC BLOOD PRESSURE: 124 MMHG | HEART RATE: 68 BPM | DIASTOLIC BLOOD PRESSURE: 75 MMHG | WEIGHT: 184 LBS | BODY MASS INDEX: 26.34 KG/M2

## 2019-09-30 DIAGNOSIS — E10.9 TYPE 1 DIABETES MELLITUS WITHOUT COMPLICATION (H): ICD-10-CM

## 2019-09-30 ASSESSMENT — MIFFLIN-ST. JEOR: SCORE: 1534.87

## 2019-09-30 ASSESSMENT — PAIN SCALES - GENERAL: PAINLEVEL: NO PAIN (0)

## 2019-09-30 NOTE — PROGRESS NOTES
[facility] Endocrinology- Outpatient Diabetes Follow up    Dali is a 48 year old female who presents for follow up of her TIDM and hypothyroidism.   She was last evaluated by Dr. Mckeon on 6/18/19. Per review of her Dexcom she was having some overnight low BG.  Her last A1c in June was 7.0%.     Her A1c has improved from 7.0 to 6.7% on this visit; her variability has also significantly improved. She is noticing a pattern of post-dinner, pre-bedtime hyperglycemia and endorses that more often than not, she is having to administer a bedtime correction dose of aspart for higher trending BG. She is endorsing a clear pattern that she has noticed, where if she eats a heavier meal late, she has higher trending BG through bedtime and overnight, and that this is more pronounced if she lays flat after dinner, as opposed to sitting up. She also endorses significant hyperglycemia when she eats a Tristen bar in the morning, compared to other foods; she will at times dose twice the recommended bolus for the bar (6 instead of 3 units) to keep her BG within range.    Pt denies headaches, visual changes, n/v, SOB at rest, chest pain, abd pain, nausea/vomiting, diarrhea, dysuria, hematuria or foot ulcers.    Current Diabetes Regimen:   Tresiba 15 units daily   Trulicity 1.5mg (max dose) sub Q weekly (on Sundays)  Boluses with the help of AccuCheck Expert meter:  Insulin to Carb ratio:  1 unit per 15 grams CHO  Correction Factor:     1 unit drops BG 50 mg/mL  Targets:  Overnight:   and during the Daytime:   Active Insulin Time:  3.5 hours.     Glucometer Settings  Glucometer type: Dexcom G4  Average glucose: 156 (from 154 last time)  SD: 64 (from 94 last time)  Time in Range: 61% of time (from 42% last time)  Documented hypoglycemia: occasional, some between 0498-5280 and two following lunch correction aspart. She becomes symptomatic and treats appropriately.       Weight: 184, compared to recent of 186 lbs   Physical  Activity: challenging due to psoriatic arthritis and a recent ankle injury,  working with PT regularly  Nutrition: tolerating three meals daily.     Diabetes Care  Retinopathy: yes, mild non-proliferative. Due for annual eye exam this year .     Nephropathy: BP well controlled. No known hx microalbuminuria. Creatinine 0.6 (stable). Taking ACEi/ARB: no    Neuropathy: no    Foot Exam: no wounds or ulcers    Lipids: Taking ASA: yes, statin: yes  LDL Cholesterol Calculated   Date Value Ref Range Status   12/18/2018 92 <100 mg/dL Final     Comment:     Desirable:       <100 mg/dl      ROS: 10 point review of systems completed; pertinent positives and negatives documented in HPI    Allergies  Allergies   Allergen Reactions     Sulfasalazine      Developed rash, HA, dizziness       Medications  Current Outpatient Medications   Medication Sig Dispense Refill     aspirin 81 MG tablet Take 1 tablet by mouth daily.       blood glucose (ACCU-CHEK COLIN PLUS) test strip Test Blood Sugar 8 times daily or as directed 700 strip 3     blood glucose monitoring (ACCU-CHEK FASTCLIX) lancets Use to test blood sugar 8 times daily or as directed. 4 Box 3     Calcium Carbonate-Vitamin D (CALCIUM + D PO) Take one daily       citalopram (CELEXA) 20 MG tablet Take 1.5 tablets (30 mg) by mouth daily 135 tablet 1     Continuous Blood Gluc Sensor (DEXCOM G5 MOB/G4 PLAT SENSOR) MISC 1 each every 7 days 12 each 3     Continuous Blood Gluc Transmit (DEXCOM G4 PLATINUM TRANSMITTER) MISC 1 each every 6 months 1 each 1     diclofenac (VOLTAREN) 1 % GEL topical gel APPLY 2 GRAMS ONTO THE SKIN FOUR TIMES DAILY AS NEEDED FOR MODERATE PAIN 100 g 5     dulaglutide (TRULICITY) 1.5 MG/0.5ML pen Inject 1.5 mg Subcutaneous every 7 days 6 mL 1     econazole nitrate 1 % cream Apply 0.5 inches topically daily.       fish oil-omega-3 fatty acids (FISH OIL) 1000 MG capsule Take one daily       folic acid (FOLVITE) 1 MG tablet Take 1 tablet (1 mg) by mouth daily 90  tablet 3     insulin degludec (TRESIBA) 100 UNIT/ML pen Inject 15 units subcutaneous at hs. 15 mL 11     insulin pen needle (B-D U/F) 31G X 8 MM miscellaneous Use 5 pen needles daily 450 each 3     Ketoconazole (NIZORAL PO) Take  by mouth. Shampoo daily       levothyroxine (SYNTHROID/LEVOTHROID) 112 MCG tablet Take 1 tablet (112 mcg) by mouth daily 90 tablet 3     methotrexate 2.5 MG tablet Take 6 tablets (15 mg) by mouth once a week 24 tablet 0     methylphenidate (METADATE CD) 20 MG CR capsule Take 20 mg by mouth daily       Minoxidil (ROGAINE EX) Externally apply  topically. daily       mometasone-formoterol (DULERA) 100-5 MCG/ACT oral inhaler Inhale 2 puffs into the lungs 2 times daily 3 Inhaler 3     montelukast (SINGULAIR) 5 MG chewable tablet Take 1 tablet (5 mg) by mouth At Bedtime 90 tablet 3     Multiple Vitamin (MULTIVITAMIN OR) Take  by mouth. Take one daily tab       NOVOLOG PENFILL 100 UNIT/ML soln 1 unit per 15 grams CHO at all meals and snacks with correction scale of 1 unit per 50 mg/dL over 150. Average daily use is 30  units. 30 mL 4     simvastatin (ZOCOR) 20 MG tablet Take 1 tablet (20 mg) by mouth At Bedtime 90 tablet 3     traZODone (DESYREL) 50 MG tablet Take 1-2 tablets by mouth nightly as needed for sleep. 180 tablet 0       Family History  family history includes Alcoholism in her father; Anxiety Disorder in her father; Arthritis in her father; Breast Cancer in her mother; C.A.D. in her father and maternal grandmother; Cancer in her paternal grandfather; Cardiac Sudden Death in her maternal grandfather; Cerebrovascular Disease in her paternal grandmother; Circulatory in her father; Coronary Artery Disease in her father and maternal grandmother; Diabetes in her father, maternal grandmother, and another family member; Eye Disorder in her father; Gynecology in an other family member; Heart Disease in her father, maternal grandfather, maternal grandmother, and paternal grandfather;  "Hypothyroidism in her father; Lipids in her father; Macular Degeneration in her father and maternal aunt; Rheumatoid Arthritis in her father; Thyroid Disease in her father and another family member.    Social History   reports that she has never smoked. She has never used smokeless tobacco. She reports current alcohol use. She reports that she does not use drugs.     Past Medical History  Past Medical History:   Diagnosis Date     Anemia      Anxiety      Arthritis 2014    Psoriatic arthritis     Back injury 1988     Dry eye syndrome      Dyslipidemia      Endometriosis 2002    adehsions seen at laparoscopy     GERD (gastroesophageal reflux disease)      Hypothyroidism     8 yoa     SOB (shortness of breath) 3/11/2014     Type I (juvenile type) diabetes mellitus without mention of complication, not stated as uncontrolled     when 15      Uncomplicated asthma Fall 2013       Past Surgical History:   Procedure Laterality Date     C REPAIR CRUCIATE LIGAMENT,KNEE       C STOMACH SURGERY PROCEDURE UNLISTED  December 2002     COLONOSCOPY  2002     ESOPHAGOSCOPY, GASTROSCOPY, DUODENOSCOPY (EGD), COMBINED  1/7/2014    Procedure: COMBINED ESOPHAGOSCOPY, GASTROSCOPY, DUODENOSCOPY (EGD), BIOPSY SINGLE OR MULTIPLE;;  Surgeon: Kraig Nicole MD;  Location:  GI     GYN SURGERY      Laparotomy to remove endometrial tissue     HC BREATH HYDROGEN TEST N/A 6/17/2014    Procedure: HYDROGEN BREATH TEST;  Surgeon: Deacon Alberts MD;  Location:  GI     HYDROGEN BREATH TEST  6/17/14     LAPAROSCOPIC APPENDECTOMY  2002     LAPAROTOMY, LYSIS ADHESIONS, COMBINED  2002    endometriosis     SOFT TISSUE SURGERY  December 2014    right ankle tendon injury       Physical Exam  /75   Pulse 68   Ht 1.778 m (5' 10\")   Wt 83.5 kg (184 lb)   BMI 26.40 kg/m    Body mass index is 26.4 kg/m .    GENERAL : In no apparent distress  SKIN: Normal color, normal temperature, texture.  No  suspicious lesions or rashes  EYES: PERRLA.  No " proptosis.  MOUTH: Moist, pink; pharynx clear  NECK: No visible masses. No palpable adenopathy, or masses. No goiter.  RESP: normal respiratory effort.  cough   ABDOMEN: soft, nontender to palpation   NEURO: awake, alert, responds appropriately to questions.  Moves all extremities; Gait normal.     EXTREMITIES: No ulcers or open wounds.     RESULTS  POC A1c 6.7% today.  Lab Results   Component Value Date    A1C 7.2 09/16/2013    A1C 7.6 06/04/2013    A1C 6.8 02/15/2013    A1C 7.4 12/19/2012    A1C 7.4 09/10/2012       Creatinine   Date Value Ref Range Status   06/18/2019 0.49 (L) 0.52 - 1.04 mg/dL Final     GFR Estimate   Date Value Ref Range Status   06/18/2019 >90 >60 mL/min/[1.73_m2] Final     Comment:     Non  GFR Calc  Starting 12/18/2018, serum creatinine based estimated GFR (eGFR) will be   calculated using the Chronic Kidney Disease Epidemiology Collaboration   (CKD-EPI) equation.       Microalbumin Urine   Date Value Ref Range Status   06/26/2009 5@ MG/L      Hemoglobin A1C   Date Value Ref Range Status   09/16/2013 7.2 (A) 4.3 - 6.0 % Final     Potassium   Date Value Ref Range Status   04/27/2016 4.1 3.4 - 5.3 mmol/L Final     ALT   Date Value Ref Range Status   06/18/2019 47 0 - 50 U/L Final     AST   Date Value Ref Range Status   06/18/2019 40 0 - 45 U/L Final     TSH   Date Value Ref Range Status   06/18/2019 4.48 (H) 0.40 - 4.00 mU/L Final     T4 Total   Date Value Ref Range Status   11/29/2010 8.5 5.0 - 11.0 ug/dL Final     T4 Free   Date Value Ref Range Status   03/21/2019 1.09 0.76 - 1.46 ng/dL Final       TSH   Date Value Ref Range Status   06/18/2019 4.48 (H) 0.40 - 4.00 mU/L Final   03/21/2019 0.19 (L) 0.40 - 4.00 mU/L Final   12/18/2018 0.12 (L) 0.40 - 4.00 mU/L Final   01/09/2018 0.63 0.40 - 4.00 mU/L Final   04/25/2017 0.66 0.40 - 4.00 mU/L Final     T4 Total   Date Value Ref Range Status   11/29/2010 8.5 5.0 - 11.0 ug/dL Final   09/24/2008 7.3 5.0 - 11.0 ug/dL Final     T4  Free   Date Value Ref Range Status   03/21/2019 1.09 0.76 - 1.46 ng/dL Final   07/09/2014 1.31 0.70 - 1.85 ng/dL Final   09/24/2008 7.3@ ng/dL        Creatinine   Date Value Ref Range Status   06/18/2019 0.49 (L) 0.52 - 1.04 mg/dL Final       Recent Labs   Lab Test 12/18/18  1610 09/27/13  0928 02/15/13  0935   CHOL 194 211* 224*   HDL 91 78 77   LDL 92 123 135*   TRIG 54 51 63   CHOLHDLRATIO  --  2.7 2.9       No results found for: [id number], [id number], [id number]      ASSESSMENT/PLAN:    1. Diabetes type I, well controlled,  MNPDR and ? gastroparesis.    -most recent A1c 6.8% with improvement in glucose variability   -pattern of higher trending glucoses following dinner and into early overnight, with stabilization into normal/low normal range fasting   -increase dinner Novolog to 1:13g CHO, continue 1:15g CHO with breakfast and lunch   -continue Tresiba 15 units daily HS; may need interval reduction with improved glucose control going into overnight   -continue Trulicity 1.5 mg weekly (some benefit of use in studies for TIDM with micro secretion, and for weight control)   -regarding her symptoms of delayed gastric emptying with heavier meals (postprandial fullness, occasional postprandial hypoglycemia, and her reported sx of hyperglycemia following heavy meals with laying flat), could consider assessment with GI motility studies, will continue to assess in follow up.     2. Hypothyroidism:    -most recent TSH 0.19> 4.4 with reduction in her LevoT4 dose from 125mcg daily to 112 mcg daily recently.    -has not been symptomatic, will reassess in further outpatient follow up.     Refills today:  1. Tresiba     Follow up:  1. With Dr Mckeon 12/17 as previously scheduled.   2. Diabetes Educator follow up: PRN    The patient is  enrolled in [employer] services    This patient has met [place] community measures for diabetes control: no (D5: Control blood pressure ,Lower bad cholesterol Maintain blood sugar, Be tobacco-free,  Take aspirin as recommended)    This patient is eligible for graduation from [facility] clinic: no    I spent 25 minutes with this patient face to face and explained the conditions and plans (more than 50% of time was counseling/coordination of care, diabetes management, follow up plan for worsening hyper and hypoglycemia) . The patient understood and is satisfied with today's visit.     CONSTANTINE Reddy, PA-C  [facility]   Pager [phone]

## 2019-09-30 NOTE — LETTER
9/30/2019       RE: Dali Martines  [address]  [facility] [id number]     Dear Colleague,    Thank you for referring your patient, Dali Martines, to the [facility] ENDOCRINOLOGY at [facility]. Please see a copy of my visit note below.    [facility]- Outpatient Diabetes Follow up    Dali is a 48 year old female who presents for follow up of her TIDM and hypothyroidism.   She was last evaluated by Dr. Mckeon on 6/18/19. Per review of her Dexcom she was having some overnight low BG.  Her last A1c in June was 7.0%.     Her A1c has improved from 7.0 to 6.7% on this visit; her variability has also significantly improved. She is noticing a pattern of post-dinner, pre-bedtime hyperglycemia and endorses that more often than not, she is having to administer a bedtime correction dose of aspart for higher trending BG. She is endorsing a clear pattern that she has noticed, where if she eats a heavier meal late, she has higher trending BG through bedtime and overnight, and that this is more pronounced if she lays flat after dinner, as opposed to sitting up. She also endorses significant hyperglycemia when she eats a Tristen bar in the morning, compared to other foods; she will at times dose twice the recommended bolus for the bar (6 instead of 3 units) to keep her BG within range.    Pt denies headaches, visual changes, n/v, SOB at rest, chest pain, abd pain, nausea/vomiting, diarrhea, dysuria, hematuria or foot ulcers.    Current Diabetes Regimen:   Tresiba 15 units daily   Trulicity 1.5mg (max dose) sub Q weekly (on Sundays)  Boluses with the help of AccuCheck Expert meter:  Insulin to Carb ratio:  1 unit per 15 grams CHO  Correction Factor:     1 unit drops BG 50 mg/mL  Targets:  Overnight:   and during the Daytime:   Active Insulin Time:  3.5 hours.     Glucometer Settings  Glucometer type: Dexcom G4  Average glucose: 156 (from 154 last time)  SD: 64  (from 94 last time)  Time in Range: 61% of time (from 42% last time)  Documented hypoglycemia: occasional, some between 2187-9134 and two following lunch correction aspart. She becomes symptomatic and treats appropriately.       Weight: 184, compared to recent of 186 lbs   Physical Activity: challenging due to psoriatic arthritis and a recent ankle injury,  working with PT regularly  Nutrition: tolerating three meals daily.     Diabetes Care  Retinopathy: yes, mild non-proliferative. Due for annual eye exam this year .     Nephropathy: BP well controlled. No known hx microalbuminuria. Creatinine 0.6 (stable). Taking ACEi/ARB: no    Neuropathy: no    Foot Exam: no wounds or ulcers    Lipids: Taking ASA: yes, statin: yes  LDL Cholesterol Calculated   Date Value Ref Range Status   12/18/2018 92 <100 mg/dL Final     Comment:     Desirable:       <100 mg/dl      ROS: 10 point review of systems completed; pertinent positives and negatives documented in HPI    Allergies  Allergies   Allergen Reactions     Sulfasalazine      Developed rash, HA, dizziness       Medications  Current Outpatient Medications   Medication Sig Dispense Refill     aspirin 81 MG tablet Take 1 tablet by mouth daily.       blood glucose (ACCU-CHEK COLIN PLUS) test strip Test Blood Sugar 8 times daily or as directed 700 strip 3     blood glucose monitoring (ACCU-CHEK FASTCLIX) lancets Use to test blood sugar 8 times daily or as directed. 4 Box 3     Calcium Carbonate-Vitamin D (CALCIUM + D PO) Take one daily       citalopram (CELEXA) 20 MG tablet Take 1.5 tablets (30 mg) by mouth daily 135 tablet 1     Continuous Blood Gluc Sensor (DEXCOM G5 MOB/G4 PLAT SENSOR) MISC 1 each every 7 days 12 each 3     Continuous Blood Gluc Transmit (DEXCOM G4 PLATINUM TRANSMITTER) MISC 1 each every 6 months 1 each 1     diclofenac (VOLTAREN) 1 % GEL topical gel APPLY 2 GRAMS ONTO THE SKIN FOUR TIMES DAILY AS NEEDED FOR MODERATE PAIN 100 g 5     dulaglutide (TRULICITY)  1.5 MG/0.5ML pen Inject 1.5 mg Subcutaneous every 7 days 6 mL 1     econazole nitrate 1 % cream Apply 0.5 inches topically daily.       fish oil-omega-3 fatty acids (FISH OIL) 1000 MG capsule Take one daily       folic acid (FOLVITE) 1 MG tablet Take 1 tablet (1 mg) by mouth daily 90 tablet 3     insulin degludec (TRESIBA) 100 UNIT/ML pen Inject 15 units subcutaneous at hs. 15 mL 11     insulin pen needle (B-D U/F) 31G X 8 MM miscellaneous Use 5 pen needles daily 450 each 3     Ketoconazole (NIZORAL PO) Take  by mouth. Shampoo daily       levothyroxine (SYNTHROID/LEVOTHROID) 112 MCG tablet Take 1 tablet (112 mcg) by mouth daily 90 tablet 3     methotrexate 2.5 MG tablet Take 6 tablets (15 mg) by mouth once a week 24 tablet 0     methylphenidate (METADATE CD) 20 MG CR capsule Take 20 mg by mouth daily       Minoxidil (ROGAINE EX) Externally apply  topically. daily       mometasone-formoterol (DULERA) 100-5 MCG/ACT oral inhaler Inhale 2 puffs into the lungs 2 times daily 3 Inhaler 3     montelukast (SINGULAIR) 5 MG chewable tablet Take 1 tablet (5 mg) by mouth At Bedtime 90 tablet 3     Multiple Vitamin (MULTIVITAMIN OR) Take  by mouth. Take one daily tab       NOVOLOG PENFILL 100 UNIT/ML soln 1 unit per 15 grams CHO at all meals and snacks with correction scale of 1 unit per 50 mg/dL over 150. Average daily use is 30  units. 30 mL 4     simvastatin (ZOCOR) 20 MG tablet Take 1 tablet (20 mg) by mouth At Bedtime 90 tablet 3     traZODone (DESYREL) 50 MG tablet Take 1-2 tablets by mouth nightly as needed for sleep. 180 tablet 0       Family History  family history includes Alcoholism in her father; Anxiety Disorder in her father; Arthritis in her father; Breast Cancer in her mother; C.A.D. in her father and maternal grandmother; Cancer in her paternal grandfather; Cardiac Sudden Death in her maternal grandfather; Cerebrovascular Disease in her paternal grandmother; Circulatory in her father; Coronary Artery Disease in  her father and maternal grandmother; Diabetes in her father, maternal grandmother, and another family member; Eye Disorder in her father; Gynecology in an other family member; Heart Disease in her father, maternal grandfather, maternal grandmother, and paternal grandfather; Hypothyroidism in her father; Lipids in her father; Macular Degeneration in her father and maternal aunt; Rheumatoid Arthritis in her father; Thyroid Disease in her father and another family member.    Social History   reports that she has never smoked. She has never used smokeless tobacco. She reports current alcohol use. She reports that she does not use drugs.     Past Medical History  Past Medical History:   Diagnosis Date     Anemia      Anxiety      Arthritis 2014    Psoriatic arthritis     Back injury 1988     Dry eye syndrome      Dyslipidemia      Endometriosis 2002    adehsions seen at laparoscopy     GERD (gastroesophageal reflux disease)      Hypothyroidism     8 yoa     SOB (shortness of breath) 3/11/2014     Type I (juvenile type) diabetes mellitus without mention of complication, not stated as uncontrolled     when 15      Uncomplicated asthma Fall 2013       Past Surgical History:   Procedure Laterality Date     C REPAIR CRUCIATE LIGAMENT,KNEE       C STOMACH SURGERY PROCEDURE UNLISTED  December 2002     COLONOSCOPY  2002     ESOPHAGOSCOPY, GASTROSCOPY, DUODENOSCOPY (EGD), COMBINED  1/7/2014    Procedure: COMBINED ESOPHAGOSCOPY, GASTROSCOPY, DUODENOSCOPY (EGD), BIOPSY SINGLE OR MULTIPLE;;  Surgeon: Kraig Nicole MD;  Location: [facility]     GYN SURGERY      Laparotomy to remove endometrial tissue     HC BREATH HYDROGEN TEST N/A 6/17/2014    Procedure: HYDROGEN BREATH TEST;  Surgeon: Deacon Alberts MD;  Location: [facility]     HYDROGEN BREATH TEST  6/17/14     LAPAROSCOPIC APPENDECTOMY  2002     LAPAROTOMY, LYSIS ADHESIONS, COMBINED  2002    endometriosis     SOFT TISSUE SURGERY  December 2014    right ankle tendon injury  "      Physical Exam  /75   Pulse 68   Ht 1.778 m (5' 10\")   Wt 83.5 kg (184 lb)   BMI 26.40 kg/m     Body mass index is 26.4 kg/m .    GENERAL : In no apparent distress  SKIN: Normal color, normal temperature, texture.  No  suspicious lesions or rashes  EYES: PERRLA.  No proptosis.  MOUTH: Moist, pink; pharynx clear  NECK: No visible masses. No palpable adenopathy, or masses. No goiter.  RESP: normal respiratory effort.  cough   ABDOMEN: soft, nontender to palpation   NEURO: awake, alert, responds appropriately to questions.  Moves all extremities; Gait normal.     EXTREMITIES: No ulcers or open wounds.     RESULTS  POC A1c 6.7% today.  Lab Results   Component Value Date    A1C 7.2 09/16/2013    A1C 7.6 06/04/2013    A1C 6.8 02/15/2013    A1C 7.4 12/19/2012    A1C 7.4 09/10/2012       Creatinine   Date Value Ref Range Status   06/18/2019 0.49 (L) 0.52 - 1.04 mg/dL Final     GFR Estimate   Date Value Ref Range Status   06/18/2019 >90 >60 mL/min/[1.73_m2] Final     Comment:     Non  GFR Calc  Starting 12/18/2018, serum creatinine based estimated GFR (eGFR) will be   calculated using the Chronic Kidney Disease Epidemiology Collaboration   (CKD-EPI) equation.       Microalbumin Urine   Date Value Ref Range Status   06/26/2009 5@ MG/L      Hemoglobin A1C   Date Value Ref Range Status   09/16/2013 7.2 (A) 4.3 - 6.0 % Final     Potassium   Date Value Ref Range Status   04/27/2016 4.1 3.4 - 5.3 mmol/L Final     ALT   Date Value Ref Range Status   06/18/2019 47 0 - 50 U/L Final     AST   Date Value Ref Range Status   06/18/2019 40 0 - 45 U/L Final     TSH   Date Value Ref Range Status   06/18/2019 4.48 (H) 0.40 - 4.00 mU/L Final     T4 Total   Date Value Ref Range Status   11/29/2010 8.5 5.0 - 11.0 ug/dL Final     T4 Free   Date Value Ref Range Status   03/21/2019 1.09 0.76 - 1.46 ng/dL Final       TSH   Date Value Ref Range Status   06/18/2019 4.48 (H) 0.40 - 4.00 mU/L Final   03/21/2019 0.19 (L) " 0.40 - 4.00 mU/L Final   12/18/2018 0.12 (L) 0.40 - 4.00 mU/L Final   01/09/2018 0.63 0.40 - 4.00 mU/L Final   04/25/2017 0.66 0.40 - 4.00 mU/L Final     T4 Total   Date Value Ref Range Status   11/29/2010 8.5 5.0 - 11.0 ug/dL Final   09/24/2008 7.3 5.0 - 11.0 ug/dL Final     T4 Free   Date Value Ref Range Status   03/21/2019 1.09 0.76 - 1.46 ng/dL Final   07/09/2014 1.31 0.70 - 1.85 ng/dL Final   09/24/2008 7.3@ ng/dL        Creatinine   Date Value Ref Range Status   06/18/2019 0.49 (L) 0.52 - 1.04 mg/dL Final       Recent Labs   Lab Test 12/18/18  1610 09/27/13  0928 02/15/13  0935   CHOL 194 211* 224*   HDL 91 78 77   LDL 92 123 135*   TRIG 54 51 63   CHOLHDLRATIO  --  2.7 2.9       No results found for: [id number], [id number], [id number]      ASSESSMENT/PLAN:    1. Diabetes type I, well controlled,  MNPDR and ? gastroparesis.    -most recent A1c 6.8% with improvement in glucose variability   -pattern of higher trending glucoses following dinner and into early overnight, with stabilization into normal/low normal range fasting   -increase dinner Novolog to 1:13g CHO, continue 1:15g CHO with breakfast and lunch   -continue Tresiba 15 units daily HS; may need interval reduction with improved glucose control going into overnight   -continue Trulicity 1.5 mg weekly (some benefit of use in studies for TIDM with micro secretion, and for weight control)   -regarding her symptoms of delayed gastric emptying with heavier meals (postprandial fullness, occasional postprandial hypoglycemia, and her reported sx of hyperglycemia following heavy meals with laying flat), could consider assessment with GI motility studies, will continue to assess in follow up.     2. Hypothyroidism:    -most recent TSH 0.19> 4.4 with reduction in her LevoT4 dose from 125mcg daily to 112 mcg daily recently.    -has not been symptomatic, will reassess in further outpatient follow up.     Refills today:  1. Tresiba     Follow up:  1. With   Linda 12/17 as previously scheduled.   2. Diabetes Educator follow up: PRN    The patient is  enrolled in [employer] services    This patient has met [place] community measures for diabetes control: no (D5: Control blood pressure ,Lower bad cholesterol Maintain blood sugar, Be tobacco-free, Take aspirin as recommended)    This patient is eligible for graduation from [facility] clinic: no    I spent 25 minutes with this patient face to face and explained the conditions and plans (more than 50% of time was counseling/coordination of care, diabetes management, follow up plan for worsening hyper and hypoglycemia) . The patient understood and is satisfied with today's visit.     CONSTANTINE Reddy, PA-C  [facility]   Pager [phone]

## 2019-09-30 NOTE — PATIENT INSTRUCTIONS
1. increase your insulin to 1 unit for 13 g CHO with dinner  2. Continue 1 unit for 15g CHO with other meals.   3. Continue Tulicity and Tresiba at current doses.

## 2019-10-08 ENCOUNTER — OFFICE VISIT (OUTPATIENT)
Dept: PULMONOLOGY | Facility: CLINIC | Age: 49
End: 2019-10-08
Attending: INTERNAL MEDICINE
Payer: COMMERCIAL

## 2019-10-08 VITALS
DIASTOLIC BLOOD PRESSURE: 81 MMHG | WEIGHT: 187.8 LBS | HEIGHT: 70 IN | OXYGEN SATURATION: 98 % | HEART RATE: 74 BPM | SYSTOLIC BLOOD PRESSURE: 126 MMHG | BODY MASS INDEX: 26.88 KG/M2

## 2019-10-08 DIAGNOSIS — M77.9 TENDINITIS: ICD-10-CM

## 2019-10-08 DIAGNOSIS — M25.50 PAIN IN JOINT, MULTIPLE SITES: ICD-10-CM

## 2019-10-08 DIAGNOSIS — J45.40 MODERATE PERSISTENT ASTHMA WITHOUT COMPLICATION: Primary | ICD-10-CM

## 2019-10-08 DIAGNOSIS — Z51.81 ENCOUNTER FOR THERAPEUTIC DRUG MONITORING: ICD-10-CM

## 2019-10-08 PROCEDURE — G0008 ADMIN INFLUENZA VIRUS VAC: HCPCS | Mod: ZF

## 2019-10-08 PROCEDURE — 25000128 H RX IP 250 OP 636: Mod: ZF | Performed by: INTERNAL MEDICINE

## 2019-10-08 PROCEDURE — G0463 HOSPITAL OUTPT CLINIC VISIT: HCPCS | Mod: ZF

## 2019-10-08 PROCEDURE — 90682 RIV4 VACC RECOMBINANT DNA IM: CPT | Mod: ZF | Performed by: INTERNAL MEDICINE

## 2019-10-08 RX ORDER — FOLIC ACID 1 MG/1
1 TABLET ORAL DAILY
Qty: 90 TABLET | Refills: 0 | Status: SHIPPED | OUTPATIENT
Start: 2019-10-08 | End: 2020-01-14

## 2019-10-08 RX ADMIN — INFLUENZA A VIRUS A/BRISBANE/02/2018 (H1N1) RECOMBINANT HEMAGGLUTININ ANTIGEN, INFLUENZA A VIRUS A/KANSAS/14/2017 (H3N2) RECOMBINANT HEMAGGLUTININ ANTIGEN, INFLUENZA B VIRUS B/PHUKET/3073/2013 RECOMBINANT HEMAGGLUTININ ANTIGEN, AND INFLUENZA B VIRUS B/MARYLAND/15/2016 RECOMBINANT HEMAGGLUTININ ANTIGEN 0.5 ML: 45; 45; 45; 45 INJECTION INTRAMUSCULAR at 16:58

## 2019-10-08 ASSESSMENT — ASTHMA QUESTIONNAIRES
QUESTION_2 LAST FOUR WEEKS HOW OFTEN HAVE YOU HAD SHORTNESS OF BREATH: THREE TO SIX TIMES A WEEK
QUESTION_1 LAST FOUR WEEKS HOW MUCH OF THE TIME DID YOUR ASTHMA KEEP YOU FROM GETTING AS MUCH DONE AT WORK, SCHOOL OR AT HOME: NONE OF THE TIME
ACT_TOTALSCORE: 22
QUESTION_4 LAST FOUR WEEKS HOW OFTEN HAVE YOU USED YOUR RESCUE INHALER OR NEBULIZER MEDICATION (SUCH AS ALBUTEROL): NOT AT ALL
QUESTION_3 LAST FOUR WEEKS HOW OFTEN DID YOUR ASTHMA SYMPTOMS (WHEEZING, COUGHING, SHORTNESS OF BREATH, CHEST TIGHTNESS OR PAIN) WAKE YOU UP AT NIGHT OR EARLIER THAN USUAL IN THE MORNING: NOT AT ALL
QUESTION_5 LAST FOUR WEEKS HOW WOULD YOU RATE YOUR ASTHMA CONTROL: WELL CONTROLLED

## 2019-10-08 ASSESSMENT — MIFFLIN-ST. JEOR: SCORE: 1552.11

## 2019-10-08 ASSESSMENT — PAIN SCALES - GENERAL: PAINLEVEL: MODERATE PAIN (4)

## 2019-10-08 NOTE — PROGRESS NOTES
"Reason for Visit  Dali Martines is a 48 year old female who is followed for asthma  Pulmonary HPI  Had one bout of a cold that tough to shake, was \"going around the office\" but didn't result in a cough or respiratory issues. She has been noticing Dulera affecting her voice. Using it sparingly, as needed in the summer. She will plan to use more regularly in the Fall and Winter. She is reporting \"additional lung pressure\" with stairs. She is considering an ankle repair surgery this winter to improve instability.     The patient was seen and examined by Sara Carter MD   Current Outpatient Medications   Medication     aspirin 81 MG tablet     blood glucose (ACCU-CHEK COLIN PLUS) test strip     blood glucose monitoring (ACCU-CHEK FASTCLIX) lancets     Calcium Carbonate-Vitamin D (CALCIUM + D PO)     citalopram (CELEXA) 20 MG tablet     Continuous Blood Gluc Sensor (DEXCOM G5 MOB/G4 PLAT SENSOR) MISC     Continuous Blood Gluc Transmit (DEXCOM G4 PLATINUM TRANSMITTER) MISC     diclofenac (VOLTAREN) 1 % GEL topical gel     dulaglutide (TRULICITY) 1.5 MG/0.5ML pen     econazole nitrate 1 % cream     fish oil-omega-3 fatty acids (FISH OIL) 1000 MG capsule     folic acid (FOLVITE) 1 MG tablet     insulin degludec (TRESIBA) 100 UNIT/ML pen     insulin pen needle (B-D U/F) 31G X 8 MM miscellaneous     Ketoconazole (NIZORAL PO)     levothyroxine (SYNTHROID/LEVOTHROID) 112 MCG tablet     methotrexate 2.5 MG tablet     methylphenidate (METADATE CD) 20 MG CR capsule     Minoxidil (ROGAINE EX)     mometasone-formoterol (DULERA) 100-5 MCG/ACT oral inhaler     montelukast (SINGULAIR) 5 MG chewable tablet     Multiple Vitamin (MULTIVITAMIN OR)     NOVOLOG PENFILL 100 UNIT/ML soln     simvastatin (ZOCOR) 20 MG tablet     traZODone (DESYREL) 50 MG tablet     Current Facility-Administered Medications   Medication     betamethasone acet & sod phos (CELESTONE) injection 6 mg     Allergies   Allergen Reactions     Sulfasalazine      " Developed rash, HA, dizziness     Social History     Socioeconomic History     Marital status: Single     Spouse name: Not on file     Number of children: 0     Years of education: Not on file     Highest education level: Not on file   Occupational History     Occupation: research     Employer: [employer]   Social Needs     Financial resource strain: Not on file     Food insecurity:     Worry: Not on file     Inability: Not on file     Transportation needs:     Medical: Not on file     Non-medical: Not on file   Tobacco Use     Smoking status: Never Smoker     Smokeless tobacco: Never Used   Substance and Sexual Activity     Alcohol use: Yes     Alcohol/week: 0.0 standard drinks     Comment: moderately     Drug use: No     Sexual activity: Not on file   Lifestyle     Physical activity:     Days per week: Not on file     Minutes per session: Not on file     Stress: Not on file   Relationships     Social connections:     Talks on phone: Not on file     Gets together: Not on file     Attends Yazidism service: Not on file     Active member of club or organization: Not on file     Attends meetings of clubs or organizations: Not on file     Relationship status: Not on file     Intimate partner violence:     Fear of current or ex partner: Not on file     Emotionally abused: Not on file     Physically abused: Not on file     Forced sexual activity: Not on file   Other Topics Concern     Parent/sibling w/ CABG, MI or angioplasty before 65F 55M? Yes   Social History Narrative     Not on file     Past Medical History:   Diagnosis Date     Anemia      Anxiety      Arthritis 2014    Psoriatic arthritis     Back injury 1988     Dry eye syndrome      Dyslipidemia      Endometriosis 2002    adehsions seen at laparoscopy     GERD (gastroesophageal reflux disease)      Hypothyroidism     8 yoa     SOB (shortness of breath) 3/11/2014     Type I (juvenile type) diabetes mellitus without mention of complication, not stated as  "uncontrolled     when 15      Uncomplicated asthma Fall 2013     ROS Pulmonary  Dyspnea: No, Cough: No, Chest pain: Yes, Wheezing: No, Sputum Production: No, Hemoptysis: No  A complete ROS was otherwise negative except as noted in the HPI.  /81   Pulse 74   Ht 1.778 m (5' 10\")   Wt 85.2 kg (187 lb 12.8 oz)   SpO2 98%   BMI 26.95 kg/m    Exam:   GENERAL APPEARANCE: Well developed, well nourished, alert, and in no apparent distress.  EYES: PERRL, EOMI  HENT: Nasal mucosa with no edema and no hyperemia. No nasal polyps.  EARS: Canals clear, TMs normal  MOUTH: Oral mucosa is moist, without any lesions, no tonsillar enlargement, no oropharyngeal exudate.  NECK: supple, no masses, no thyromegaly.   LYMPHATICS: no palpable lymphadenopathy  RESP: normal percussion, good air flow throughout.  No crackles. No rhonchi. No wheezes.  CV: Normal S1, S2, regular rhythm, normal rate. No murmur.  No rub. No gallop. No LE edema.   ABDOMEN:  Bowel sounds normal, soft, nontender, no HSM or masses.   MS: extremities normal. No clubbing. No cyanosis.  SKIN: no rash on limited exam  NEURO: Mentation intact, speech normal, normal strength and tone, normal gait and stance  PSYCH: mentation appears normal. and affect normal/bright  Results:  Stress echo 12/30/14 normal  PFTs normal 3/14/16     Assessment and plan: Dali Martines is a 48-year-old female with type 1 diabetes who was clinically diagnosed with asthma as an adult.  She has been intermittently sedentary due to repeated lower extremity ankle injury, surgery, poor wound healing.   1.  Asthma.  Mild intermittent asthma. Better control (cough) and Dulera side effects (boarseness)   Up to date on vaccinations.    - She will continue Dulera, daily use if needed, rinse and gargle afterwards and use spacer  - add daily antihistamine to see if cough improves  - Singulair for asthma maintenance, 5mg because she has noticed constipation with full dose      I will see her back in " clinic in 6 months.

## 2019-10-08 NOTE — PATIENT INSTRUCTIONS
Try rinse and gargle with tap water after Dulera use.   Continue to use with spacer  I would favor trying an antihistamine to help minimize Dulera use.   Try Claritin, Allegra, Zyrtec or store brands to see if you notice improvement.   These are not supposed make you sleepy but they certainly can.

## 2019-10-08 NOTE — LETTER
"10/8/2019       RE: Dali Martines  [address]  [facility] [address]     Dear Colleague,    Thank you for referring your patient, Dali Martines, to the [facility] FOR LUNG SCIENCE AND HEALTH at [facility]. Please see a copy of my visit note below.    Reason for Visit  Dali Martines is a 48 year old female who is followed for asthma  Pulmonary HPI  Had one bout of a cold that tough to shake, was \"going around the office\" but didn't result in a cough or respiratory issues. She has been noticing Dulera affecting her voice. Using it sparingly, as needed in the summer. She will plan to use more regularly in the Fall and Winter. She is reporting \"additional lung pressure\" with stairs. She is considering an ankle repair surgery this winter to improve instability.     The patient was seen and examined by Sara Carter MD   Current Outpatient Medications   Medication     aspirin 81 MG tablet     blood glucose (ACCU-CHEK COLIN PLUS) test strip     blood glucose monitoring (ACCU-CHEK FASTCLIX) lancets     Calcium Carbonate-Vitamin D (CALCIUM + D PO)     citalopram (CELEXA) 20 MG tablet     Continuous Blood Gluc Sensor (DEXCOM G5 MOB/G4 PLAT SENSOR) MISC     Continuous Blood Gluc Transmit (DEXCOM G4 PLATINUM TRANSMITTER) MISC     diclofenac (VOLTAREN) 1 % GEL topical gel     dulaglutide (TRULICITY) 1.5 MG/0.5ML pen     econazole nitrate 1 % cream     fish oil-omega-3 fatty acids (FISH OIL) 1000 MG capsule     folic acid (FOLVITE) 1 MG tablet     insulin degludec (TRESIBA) 100 UNIT/ML pen     insulin pen needle (B-D U/F) 31G X 8 MM miscellaneous     Ketoconazole (NIZORAL PO)     levothyroxine (SYNTHROID/LEVOTHROID) 112 MCG tablet     methotrexate 2.5 MG tablet     methylphenidate (METADATE CD) 20 MG CR capsule     Minoxidil (ROGAINE EX)     mometasone-formoterol (DULERA) 100-5 MCG/ACT oral inhaler     montelukast (SINGULAIR) 5 MG chewable tablet     Multiple Vitamin " (MULTIVITAMIN OR)     NOVOLOG PENFILL 100 UNIT/ML soln     simvastatin (ZOCOR) 20 MG tablet     traZODone (DESYREL) 50 MG tablet     Current Facility-Administered Medications   Medication     betamethasone acet & sod phos (CELESTONE) injection 6 mg     Allergies   Allergen Reactions     Sulfasalazine      Developed rash, HA, dizziness     Social History     Socioeconomic History     Marital status: Single     Spouse name: Not on file     Number of children: 0     Years of education: Not on file     Highest education level: Not on file   Occupational History     Occupation: research     Employer: [employer]   Social Needs     Financial resource strain: Not on file     Food insecurity:     Worry: Not on file     Inability: Not on file     Transportation needs:     Medical: Not on file     Non-medical: Not on file   Tobacco Use     Smoking status: Never Smoker     Smokeless tobacco: Never Used   Substance and Sexual Activity     Alcohol use: Yes     Alcohol/week: 0.0 standard drinks     Comment: moderately     Drug use: No     Sexual activity: Not on file   Lifestyle     Physical activity:     Days per week: Not on file     Minutes per session: Not on file     Stress: Not on file   Relationships     Social connections:     Talks on phone: Not on file     Gets together: Not on file     Attends [employer] service: Not on file     Active member of club or organization: Not on file     Attends meetings of clubs or organizations: Not on file     Relationship status: Not on file     Intimate partner violence:     Fear of current or ex partner: Not on file     Emotionally abused: Not on file     Physically abused: Not on file     Forced sexual activity: Not on file   Other Topics Concern     Parent/sibling w/ CABG, MI or angioplasty before 65F 55M? Yes   Social History Narrative     Not on file     Past Medical History:   Diagnosis Date     Anemia      Anxiety      Arthritis 2014    Psoriatic arthritis     Back injury  "1988     Dry eye syndrome      Dyslipidemia      Endometriosis 2002    adehsions seen at laparoscopy     GERD (gastroesophageal reflux disease)      Hypothyroidism     8 yoa     SOB (shortness of breath) 3/11/2014     Type I (juvenile type) diabetes mellitus without mention of complication, not stated as uncontrolled     when 15      Uncomplicated asthma Fall 2013     ROS Pulmonary  Dyspnea: No, Cough: No, Chest pain: Yes, Wheezing: No, Sputum Production: No, Hemoptysis: No  A complete ROS was otherwise negative except as noted in the HPI.  /81   Pulse 74   Ht 1.778 m (5' 10\")   Wt 85.2 kg (187 lb 12.8 oz)   SpO2 98%   BMI 26.95 kg/m     Exam:   GENERAL APPEARANCE: Well developed, well nourished, alert, and in no apparent distress.  EYES: PERRL, EOMI  HENT: Nasal mucosa with no edema and no hyperemia. No nasal polyps.  EARS: Canals clear, TMs normal  MOUTH: Oral mucosa is moist, without any lesions, no tonsillar enlargement, no oropharyngeal exudate.  NECK: supple, no masses, no thyromegaly.   LYMPHATICS: no palpable lymphadenopathy  RESP: normal percussion, good air flow throughout.  No crackles. No rhonchi. No wheezes.  CV: Normal S1, S2, regular rhythm, normal rate. No murmur.  No rub. No gallop. No LE edema.   ABDOMEN:  Bowel sounds normal, soft, nontender, no HSM or masses.   MS: extremities normal. No clubbing. No cyanosis.  SKIN: no rash on limited exam  NEURO: Mentation intact, speech normal, normal strength and tone, normal gait and stance  PSYCH: mentation appears normal. and affect normal/bright  Results:  Stress echo 12/30/14 normal  PFTs normal 3/14/16     Assessment and plan: Dali Martines is a 48-year-old female with type 1 diabetes who was clinically diagnosed with asthma as an adult.  She has been intermittently sedentary due to repeated lower extremity ankle injury, surgery, poor wound healing.   1.  Asthma.  Mild intermittent asthma. Better control (cough) and Dulera side effects " (boarseness)   Up to date on vaccinations.    - She will continue Dulera, daily use if needed, rinse and gargle afterwards and use spacer  - add daily antihistamine to see if cough improves  - Singulair for asthma maintenance, 5mg because she has noticed constipation with full dose      I will see her back in clinic in 6 months.         Again, thank you for allowing me to participate in the care of your patient.      Sincerely,    Sara Carter MD

## 2019-10-08 NOTE — TELEPHONE ENCOUNTER
folic acid (FOLVITE) 1 MG tablet      Last Written Prescription Date:  9/7/18  Last Fill Quantity: 90,   # refills: 3  Last Office Visit : 11/20/18  Future Office visit:  none    Refill to pharmacy.

## 2019-10-09 ASSESSMENT — ASTHMA QUESTIONNAIRES: ACT_TOTALSCORE: 22

## 2019-10-16 ENCOUNTER — OFFICE VISIT (OUTPATIENT)
Dept: OPHTHALMOLOGY | Facility: CLINIC | Age: 49
End: 2019-10-16
Attending: OPHTHALMOLOGY
Payer: COMMERCIAL

## 2019-10-16 DIAGNOSIS — E10.9 TYPE 1 DIABETES MELLITUS WITHOUT COMPLICATION (H): ICD-10-CM

## 2019-10-16 DIAGNOSIS — H40.003 GLAUCOMA SUSPECT OF BOTH EYES: Primary | ICD-10-CM

## 2019-10-16 PROCEDURE — G0463 HOSPITAL OUTPT CLINIC VISIT: HCPCS | Mod: ZF

## 2019-10-16 PROCEDURE — 92015 DETERMINE REFRACTIVE STATE: CPT | Mod: ZF

## 2019-10-16 ASSESSMENT — REFRACTION_MANIFEST
OD_ADD: +2.00
OD_AXIS: 019
OS_SPHERE: +1.00
OS_SPHERE: -1.00
OD_AXIS: 019
OS_ADD: +2.00
OD_SPHERE: +1.50
OD_CYLINDER: +0.50
OD_SPHERE: -0.50
OD_CYLINDER: +0.50
OS_CYLINDER: SPHERE
OS_CYLINDER: SPHERE

## 2019-10-16 ASSESSMENT — VISUAL ACUITY
OD_SC+: -2
METHOD: SNELLEN - LINEAR
OD_SC: 20/20
OS_SC+: -2
OS_SC: 20/25

## 2019-10-16 ASSESSMENT — CUP TO DISC RATIO
OD_RATIO: 0.5
OS_RATIO: 0.6

## 2019-10-16 ASSESSMENT — TONOMETRY
OD_IOP_MMHG: 17
IOP_METHOD: TONOPEN
OS_IOP_MMHG: 20

## 2019-10-16 ASSESSMENT — EXTERNAL EXAM - RIGHT EYE: OD_EXAM: NORMAL

## 2019-10-16 ASSESSMENT — CONF VISUAL FIELD
METHOD: COUNTING FINGERS
OS_NORMAL: 1
OD_NORMAL: 1

## 2019-10-16 ASSESSMENT — EXTERNAL EXAM - LEFT EYE: OS_EXAM: NORMAL

## 2019-10-16 ASSESSMENT — SLIT LAMP EXAM - LIDS
COMMENTS: NORMAL
COMMENTS: NORMAL

## 2019-10-16 NOTE — NURSING NOTE
"Chief Complaints and History of Present Illnesses   Patient presents with     Diabetic Eye Exam Follow Up     1 year follow-up DM with history of mild nonproliferative diabetic retinopathy right eye. No Diabetic retinopathy left eye      Chief Complaint(s) and History of Present Illness(es)     Diabetic Eye Exam Follow Up     Comments: 1 year follow-up DM with history of mild nonproliferative diabetic retinopathy right eye. No Diabetic retinopathy left eye               Comments     Complains of RE feels \"crunchy, feels different from LE\", more noticeable when BS is low.  \"RE seems to feel more full than my LE.\"  Notes she has been having to wear reading glasses more to see up close.  Denies any flashes, floaters, irritation, discharge, and tearing.  Type 1 DM - checks BS daily, was 140 today.  Lab Results       Component                Value               Date                       A1C                      \"6.7?\"            09/2019       A1C                      7.2                 09/16/2013                 A1C                      7.6                 06/04/2013                 A1C                      6.8                 02/15/2013                 A1C                      7.4                 12/19/2012                 A1C                      7.4                 09/10/2012              Melissa Blair OT 2:50 PM October 16, 2019                   "

## 2019-10-16 NOTE — LETTER
10/16/2019       RE: Dali Martines  [address]  [facility] [address]     Dear Colleague,    Thank you for referring your patient, Dali Martines, to the EYE CLINIC at [facility]. Please see a copy of my visit note below.    CC Diabetes follow up , glaucoma suspect    HPI: Dali Martines is a 47 year old female with the following ophthalmologic problems:    Here for diabetic retinopathy check. No major changes in vision in past year. Denies flashes or floaters.   Last HbA1c 7.0 on 6/2019. Wearing glasses for reading only.   6.7 2 weeks ago    Optical Coherence Tomography 2018  Right eye - normal foveal contour; normal NFL  Left eye - normal foveal contour; normal nerve fiber layer     OVF 8.23.18  right eye normal; left eye: small focal point of decreased sensitivity     Optical Coherence Tomography retinal nerve fiber layer 8-23-18  Normal OU    ASSESSMENT/PLAN  1. DM-1 with history of mild nonproliferative diabetic retinopathy right eye   No Diabetic retinopathy left eye   - Blood pressure (<120/80) and blood glucose (HbA1c 7.2) control discussed with patient.     2. H/o TIA-like vision loss RE in ~2008  - eval by Dr Schaffer felt to be migraine related rather than embolic or inflammatory    3. C:D asymmetry  - intraocular pressure within normal limits, Optical Coherence Tomography retinal nerve fiber layer without thinning, visual field 24-2 within normal limits   Plan for optic nerve Optical Coherence Tomography nerve fiber layer with OVF24-2 every other year or as needed     return to retina clinic: 1 yr with dilated exam and prescription   Plan for optic nerve Optical Coherence Tomography nerve fiber layer with OVF24-2 every other year or as needed and macula Optical Coherence Tomography     Uli Arnold MD  Ophthalmology Resident  PGY-3   ~~~~~~~~~~~~~~~~~~~~~~~~~~~~~~~~~~   Complete documentation of historical and exam elements from today's encounter can be found  in the full encounter summary report (not reduplicated in this progress note).  I personally obtained the chief complaint(s) and history of present illness.  I confirmed and edited as necessary the review of systems, past medical/surgical history, family history, social history, and examination findings as documented by others; and I examined the patient myself.  I personally reviewed the relevant tests, images, and reports as documented above.  I personally reviewed the ophthalmic test(s) associated with this encounter, agree with the interpretation(s) as documented by the resident/fellow, and have edited the corresponding report(s) as necessary.   I formulated and edited as necessary the assessment and plan and discussed the findings and management plan with the patient and family- Naz Lira MD      Again, thank you for allowing me to participate in the care of your patient.      Sincerely,    Naz Lira M.D.   of Ophthalmology  Vitreoretinal Surgeon  [facility] Endowed Chair  Department of Ophthalmology & Visual Neurosciences  [facility]  Phone:  [phone]   Fax:  [phone]

## 2019-10-16 NOTE — PROGRESS NOTES
CC Diabetes follow up , glaucoma suspect    HPI: Dali Martines is a 47 year old female with the following ophthalmologic problems:    Here for diabetic retinopathy check. No major changes in vision in past year. Denies flashes or floaters.   Last HbA1c 7.0 on 6/2019. Wearing glasses for reading only.   6.7 2 weeks ago    Optical Coherence Tomography 2018  Right eye - normal foveal contour; normal NFL  Left eye - normal foveal contour; normal nerve fiber layer     OVF 8.23.18  right eye normal; left eye: small focal point of decreased sensitivity     Optical Coherence Tomography retinal nerve fiber layer 8-23-18  Normal OU    ASSESSMENT/PLAN    1. DM-1 with history of mild nonproliferative diabetic retinopathy right eye   No Diabetic retinopathy left eye   - Blood pressure (<120/80) and blood glucose (HbA1c 7.2) control discussed with patient.     2. H/o TIA-like vision loss RE in ~2008  - eval by Dr Schaffer felt to be migraine related rather than embolic or inflammatory    3. C:D asymmetry  - intraocular pressure within normal limits, Optical Coherence Tomography retinal nerve fiber layer without thinning, visual field 24-2 within normal limits   Plan for optic nerve Optical Coherence Tomography nerve fiber layer with OVF24-2 every other year or as needed     return to retina clinic: 1 yr with dilated exam and prescription   Plan for optic nerve Optical Coherence Tomography nerve fiber layer with OVF24-2 every other year or as needed and macula Optical Coherence Tomography     Uli Arnold MD  Ophthalmology Resident  PGY-3     ~~~~~~~~~~~~~~~~~~~~~~~~~~~~~~~~~~   Complete documentation of historical and exam elements from today's encounter can be found in the full encounter summary report (not reduplicated in this progress note).  I personally obtained the chief complaint(s) and history of present illness.  I confirmed and edited as necessary the review of systems, past medical/surgical history, family history,  social history, and examination findings as documented by others; and I examined the patient myself.  I personally reviewed the relevant tests, images, and reports as documented above.  I personally reviewed the ophthalmic test(s) associated with this encounter, agree with the interpretation(s) as documented by the resident/fellow, and have edited the corresponding report(s) as necessary.   I formulated and edited as necessary the assessment and plan and discussed the findings and management plan with the patient and family    Naz Lira MD  .  Retina Service   Department of Ophthalmology and Visual Neurosciences   [facility]  Phone: [phone]   Fax: [phone]

## 2019-11-26 DIAGNOSIS — E10.9 TYPE 1 DIABETES MELLITUS WITHOUT COMPLICATION (H): ICD-10-CM

## 2019-11-26 NOTE — TELEPHONE ENCOUNTER
dulaglutide (TRULICITY) 1.5 MG/0.5ML pen      Last Written Prescription Date:  7-11-19  Last Fill Quantity: 6 ml,   # refills: 1  Last Office Visit : 9-30-19  Future Office visit:  12-17-19    Routing refill request to provider for review/approval because:  Abnormal lab: Cr

## 2019-12-12 DIAGNOSIS — E10.9 TYPE 1 DIABETES, HBA1C GOAL < 7% (H): ICD-10-CM

## 2019-12-12 PROBLEM — M25.571 PAIN IN JOINT INVOLVING ANKLE AND FOOT, RIGHT: Status: RESOLVED | Noted: 2019-02-16 | Resolved: 2019-12-12

## 2019-12-17 ENCOUNTER — OFFICE VISIT (OUTPATIENT)
Dept: ENDOCRINOLOGY | Facility: CLINIC | Age: 49
End: 2019-12-17
Payer: COMMERCIAL

## 2019-12-17 VITALS
HEART RATE: 71 BPM | SYSTOLIC BLOOD PRESSURE: 125 MMHG | BODY MASS INDEX: 27.38 KG/M2 | DIASTOLIC BLOOD PRESSURE: 83 MMHG | WEIGHT: 190.8 LBS

## 2019-12-17 DIAGNOSIS — E10.9 TYPE 1 DIABETES, HBA1C GOAL < 7% (H): Primary | ICD-10-CM

## 2019-12-17 DIAGNOSIS — E10.9 TYPE 1 DIABETES, HBA1C GOAL < 7% (H): ICD-10-CM

## 2019-12-17 DIAGNOSIS — E10.9 TYPE 1 DIABETES MELLITUS WITHOUT COMPLICATION (H): ICD-10-CM

## 2019-12-17 LAB
CREAT UR-MCNC: 48 MG/DL
HBA1C MFR BLD: 7.4 % (ref 4.3–6)
MICROALBUMIN UR-MCNC: <5 MG/L
MICROALBUMIN/CREAT UR: NORMAL MG/G CR (ref 0–25)
TSH SERPL DL<=0.005 MIU/L-ACNC: 1.26 MU/L (ref 0.4–4)

## 2019-12-17 ASSESSMENT — PAIN SCALES - GENERAL: PAINLEVEL: NO PAIN (0)

## 2019-12-17 NOTE — LETTER
12/17/2019     RE: Dali Martines  [address]  [facility] [address]     Dear Colleague,    Thank you for referring your patient, Dali Martines, to the [facility] ENDOCRINOLOGY at [facility]. Please see a copy of my visit note below.    This 49 year old woman returns for f/u of her type 1 diabetes. She also has hypothyroidism and psoriatic arthritis. She is currently taking Tresiba 15-16 units daily.      Insulin to Carb ratio:  1 unit per 7 grams CHO  Correction Factor:     1 unit drops BG 30 mg/mL       She remains on trulicity and is tolerating it well.  She did have trouble refilling the medication and was off the drug for a couple of weeks.  She was surprised to see how much better she felt.  Her chronic problems with esophageal reflux and some constipation were much improved.  However, her sugars were not nearly as good and her appetite was much increased.  She did go back on the medication at its full dose and is tolerated this well.  For now she is willing to keep taking the Trulicity.    She wears her Dex com sensor nearly all the time she wore it 90% of the time during the last 2 weeks..  During the last 2 weeks her sensor average was 166.  She was between  56% of the time, above 180 37% of the time, and below seventy 7 % of the time. Reviewing the daily CGM data shows she is usually stable overnight.  She doesn't come to target after all meals and with corrections.  When she tries to correct for postprandial highs, she often gets low afterwards.  She reports that she is having a lot of difficulty getting her insulin before meals and in fact rarely does so.  She also says that if she eats more carbs than she anticipated, she will add extra insulin based on the carb content when she realizes how much she ate.  This could be 30 minutes to 90 minutes after a meal.  She reports she recognizes her low blood sugars.      She has seen her eye MD and her eyes  are stable. She has no changes in her tingling in her toes.  Her thyroid dose was decreased from 125 mcg each day to 112 earlier this year  She has not noticed any change in how she feels on the lower dose.    Her only other concern today is that she has had problems finding the appropriate words for conversation or writing in the last year.  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  Both of her parents are having cognitive problems and Trena is concerned about her own cognition.  She has not noted any other deficits other than word finding but she would find it helpful to have formal neuropsychometric testing.    aspirin 81 MG tablet, Take 1 tablet by mouth daily.  blood glucose (ACCU-CHEK COLIN PLUS) test strip, Test Blood Sugar 8 times daily or as directed  blood glucose monitoring (ACCU-CHEK FASTCLIX) lancets, Use to test blood sugar 8 times daily or as directed.  Calcium Carbonate-Vitamin D (CALCIUM + D PO), Take one daily  citalopram (CELEXA) 20 MG tablet, Take 1.5 tablets (30 mg) by mouth daily  Continuous Blood Gluc Sensor (DEXCOM G5 MOB/G4 PLAT SENSOR) MISC, 1 each every 7 days  Continuous Blood Gluc Transmit (DEXCOM G4 PLATINUM TRANSMITTER) MISC, 1 each every 6 months  diclofenac (VOLTAREN) 1 % GEL topical gel, APPLY 2 GRAMS ONTO THE SKIN FOUR TIMES DAILY AS NEEDED FOR MODERATE PAIN  dulaglutide (TRULICITY) 1.5 MG/0.5ML pen, Inject 1.5 mg Subcutaneous every 7 days  econazole nitrate 1 % cream, Apply 0.5 inches topically daily.  fish oil-omega-3 fatty acids (FISH OIL) 1000 MG capsule, Take one daily  folic acid (FOLVITE) 1 MG tablet, Take 1 tablet (1 mg) by mouth daily  insulin degludec (TRESIBA) 100 UNIT/ML pen, Inject 15 units subcutaneous at hs.  insulin pen needle (B-D U/F) 31G X 8 MM miscellaneous, Use 5 pen needles daily  Ketoconazole (NIZORAL PO), Take  by mouth. Shampoo daily  levothyroxine (SYNTHROID/LEVOTHROID) 112 MCG tablet, Take 1 tablet (112 mcg) by mouth  "daily  methotrexate 2.5 MG tablet, Take 6 tablets (15 mg) by mouth once a week  methylphenidate (METADATE CD) 20 MG CR capsule, Take 20 mg by mouth daily  Minoxidil (ROGAINE EX), Externally apply  topically. daily  mometasone-formoterol (DULERA) 100-5 MCG/ACT oral inhaler, Inhale 2 puffs into the lungs 2 times daily  montelukast (SINGULAIR) 5 MG chewable tablet, Take 1 tablet (5 mg) by mouth At Bedtime  Multiple Vitamin (MULTIVITAMIN OR), Take  by mouth. Take one daily tab  NOVOLOG PENFILL 100 UNIT/ML soln, 1 unit per 15 grams CHO at all meals and snacks with correction scale of 1 unit per 50 mg/dL over 150. Average daily use is 30  units.  simvastatin (ZOCOR) 20 MG tablet, Take 1 tablet (20 mg) by mouth At Bedtime  traZODone (DESYREL) 50 MG tablet, Take 1-2 tablets by mouth nightly as needed for sleep.    betamethasone acet & sod phos (CELESTONE) injection 6 mg        ROS: 10 point ROS neg other than the symptoms noted above in the HPI.    So Hx - works in [employer]    Vital signs:   /83   Pulse 71   Wt 86.5 kg (190 lb 12.8 oz)   BMI 27.38 kg/m     Estimated body mass index is 27.38 kg/m  as calculated from the following:    Height as of 10/8/19: 1.778 m (5' 10\").    Weight as of this encounter: 86.5 kg (190 lb 12.8 oz).    VSS  NAD  Eyes - no periorbital edema, conjunctival injection, scleral icterus  Neck - thyroid not palpable  Neuro - DTR 2/4 biceps  Skin - normal texture   Mood - not depressed    Recent Labs   Lab Test 12/17/19  1631 12/17/19  1626 12/17/19 06/18/19  1556 06/18/19 03/21/19  1359  02/14/19  1635 12/18/18  1617 12/18/18  1610  07/09/14  1641  09/27/13  0928 09/16/13  1612  06/04/13  1555   A1C  --   --   --   --   --   --   --   --   --   --   --   --   --   --  7.2*  --  7.6*   HEMOGLOBINA1  --   --  7.4*  --  7.0*  --    < >  --   --   --    < >  --    < >  --   --   --   --    TSH  --  1.26  --  4.48*  --  0.19*  --   --   --  0.12*   < > 0.11*   < >  --   --    < >  --    T4  " --   --   --   --   --  1.09  --   --   --   --   --  1.31  --   --   --   --   --    LDL  --   --   --   --   --   --   --   --   --  92  --   --   --  123  --   --   --    HDL  --   --   --   --   --   --   --   --   --  91  --   --   --  78  --   --   --    TRIG  --   --   --   --   --   --   --   --   --  54  --   --   --  51  --   --   --    CR  --   --   --  0.49*  --   --   --  0.54  --   --    < >  --    < >  --   --   --   --    MICROL <5  --   --   --   --   --   --   --  6  --    < >  --    < >  --   --   --   --     < > = values in this interval not displayed.     Assessment and plan:    1.  Diabetes control.  Her A1c is okay but she is having a lot of postprandial hyperglycemia and hypoglycemia from corrections done to treat the postprandial hyperglycemia.  I think part of the problem is she is not getting her mealtime insulin in early enough.  She also is giving insulin for additional food eaten much too late.  I suggested she focus on getting the meal dose 15 minutes before she eats.  If her blood sugar is too high 1 hour after she eats, she should use a correction dose rather than administer a dose for the additional carbohydrates eaten.  She did note that she has some symptoms on the Trulicity.  We discussed switching to a different formulation to see if it is more tolerable.  For now she wants to hold off but we may think about that in the future.    2.  Diabetes complications.  She has some retinopathy and has regular follow-up with her eye doctor.  She has some tingling in her feet but her foot exam is always been normal.  Her creatinine is normal but I will check her urine albumin today.    3.hypothyroidism.  I will repeat her TSH today since we change the dose earlier this year.    4.CVD risk.  Her blood pressure is well controlled.  She is on a statin.    5.  Word finding difficulties.  Will refer to neuropsych    Follow-up with me in 6 months and Jennifer Williamson in 3 months.    Felicia ARENAS  Linda DOE

## 2019-12-19 NOTE — PROGRESS NOTES
This 49 year old woman returns for f/u of her type 1 diabetes. She also has hypothyroidism and psoriatic arthritis. She is currently taking Tresiba 15-16 units daily.      Insulin to Carb ratio:  1 unit per 7 grams CHO  Correction Factor:     1 unit drops BG 30 mg/mL       She remains on trulicity and is tolerating it well.  She did have trouble refilling the medication and was off the drug for a couple of weeks.  She was surprised to see how much better she felt.  Her chronic problems with esophageal reflux and some constipation were much improved.  However, her sugars were not nearly as good and her appetite was much increased.  She did go back on the medication at its full dose and is tolerated this well.  For now she is willing to keep taking the Trulicity.    She wears her Dex com sensor nearly all the time she wore it 90% of the time during the last 2 weeks..  During the last 2 weeks her sensor average was 166.  She was between  56% of the time, above 180 37% of the time, and below seventy 7 % of the time. Reviewing the daily CGM data shows she is usually stable overnight.  She doesn't come to target after all meals and with corrections.  When she tries to correct for postprandial highs, she often gets low afterwards.  She reports that she is having a lot of difficulty getting her insulin before meals and in fact rarely does so.  She also says that if she eats more carbs than she anticipated, she will add extra insulin based on the carb content when she realizes how much she ate.  This could be 30 minutes to 90 minutes after a meal.  She reports she recognizes her low blood sugars.      She has seen her eye MD and her eyes are stable. She has no changes in her tingling in her toes.  Her thyroid dose was decreased from 125 mcg each day to 112 earlier this year  She has not noticed any change in how she feels on the lower dose.    Her only other concern today is that she has had problems finding the  appropriate words for conversation or writing in the last year.  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  Both of her parents are having cognitive problems and Trena is concerned about her own cognition.  She has not noted any other deficits other than word finding but she would find it helpful to have formal neuropsychometric testing.    aspirin 81 MG tablet, Take 1 tablet by mouth daily.  blood glucose (ACCU-CHEK COLIN PLUS) test strip, Test Blood Sugar 8 times daily or as directed  blood glucose monitoring (ACCU-CHEK FASTCLIX) lancets, Use to test blood sugar 8 times daily or as directed.  Calcium Carbonate-Vitamin D (CALCIUM + D PO), Take one daily  citalopram (CELEXA) 20 MG tablet, Take 1.5 tablets (30 mg) by mouth daily  Continuous Blood Gluc Sensor (DEXCOM G5 MOB/G4 PLAT SENSOR) MISC, 1 each every 7 days  Continuous Blood Gluc Transmit (DEXCOM G4 PLATINUM TRANSMITTER) MISC, 1 each every 6 months  diclofenac (VOLTAREN) 1 % GEL topical gel, APPLY 2 GRAMS ONTO THE SKIN FOUR TIMES DAILY AS NEEDED FOR MODERATE PAIN  dulaglutide (TRULICITY) 1.5 MG/0.5ML pen, Inject 1.5 mg Subcutaneous every 7 days  econazole nitrate 1 % cream, Apply 0.5 inches topically daily.  fish oil-omega-3 fatty acids (FISH OIL) 1000 MG capsule, Take one daily  folic acid (FOLVITE) 1 MG tablet, Take 1 tablet (1 mg) by mouth daily  insulin degludec (TRESIBA) 100 UNIT/ML pen, Inject 15 units subcutaneous at hs.  insulin pen needle (B-D U/F) 31G X 8 MM miscellaneous, Use 5 pen needles daily  Ketoconazole (NIZORAL PO), Take  by mouth. Shampoo daily  levothyroxine (SYNTHROID/LEVOTHROID) 112 MCG tablet, Take 1 tablet (112 mcg) by mouth daily  methotrexate 2.5 MG tablet, Take 6 tablets (15 mg) by mouth once a week  methylphenidate (METADATE CD) 20 MG CR capsule, Take 20 mg by mouth daily  Minoxidil (ROGAINE EX), Externally apply  topically. daily  mometasone-formoterol (DULERA) 100-5 MCG/ACT oral inhaler, Inhale 2  "puffs into the lungs 2 times daily  montelukast (SINGULAIR) 5 MG chewable tablet, Take 1 tablet (5 mg) by mouth At Bedtime  Multiple Vitamin (MULTIVITAMIN OR), Take  by mouth. Take one daily tab  NOVOLOG PENFILL 100 UNIT/ML soln, 1 unit per 15 grams CHO at all meals and snacks with correction scale of 1 unit per 50 mg/dL over 150. Average daily use is 30  units.  simvastatin (ZOCOR) 20 MG tablet, Take 1 tablet (20 mg) by mouth At Bedtime  traZODone (DESYREL) 50 MG tablet, Take 1-2 tablets by mouth nightly as needed for sleep.    betamethasone acet & sod phos (CELESTONE) injection 6 mg        ROS: 10 point ROS neg other than the symptoms noted above in the HPI.    So Hx - works in [employer]    Vital signs:   /83   Pulse 71   Wt 86.5 kg (190 lb 12.8 oz)   BMI 27.38 kg/m    Estimated body mass index is 27.38 kg/m  as calculated from the following:    Height as of 10/8/19: 1.778 m (5' 10\").    Weight as of this encounter: 86.5 kg (190 lb 12.8 oz).    VSS  NAD  Eyes - no periorbital edema, conjunctival injection, scleral icterus  Neck - thyroid not palpable  Neuro - DTR 2/4 biceps  Skin - normal texture   Mood - not depressed    Recent Labs   Lab Test 12/17/19  1631 12/17/19  1626 12/17/19 06/18/19  1556 06/18/19 03/21/19  1359  02/14/19  1635 12/18/18  1617 12/18/18  1610  07/09/14  1641  09/27/13  0928 09/16/13  1612  06/04/13  1555   A1C  --   --   --   --   --   --   --   --   --   --   --   --   --   --  7.2*  --  7.6*   HEMOGLOBINA1  --   --  7.4*  --  7.0*  --    < >  --   --   --    < >  --    < >  --   --   --   --    TSH  --  1.26  --  4.48*  --  0.19*  --   --   --  0.12*   < > 0.11*   < >  --   --    < >  --    T4  --   --   --   --   --  1.09  --   --   --   --   --  1.31  --   --   --   --   --    LDL  --   --   --   --   --   --   --   --   --  92  --   --   --  123  --   --   --    HDL  --   --   --   --   --   --   --   --   --  91  --   --   --  78  --   --   --    TRIG  --   --   --   " --   --   --   --   --   --  54  --   --   --  51  --   --   --    CR  --   --   --  0.49*  --   --   --  0.54  --   --    < >  --    < >  --   --   --   --    MICROL <5  --   --   --   --   --   --   --  6  --    < >  --    < >  --   --   --   --     < > = values in this interval not displayed.     Assessment and plan:    1.  Diabetes control.  Her A1c is okay but she is having a lot of postprandial hyperglycemia and hypoglycemia from corrections done to treat the postprandial hyperglycemia.  I think part of the problem is she is not getting her mealtime insulin in early enough.  She also is giving insulin for additional food eaten much too late.  I suggested she focus on getting the meal dose 15 minutes before she eats.  If her blood sugar is too high 1 hour after she eats, she should use a correction dose rather than administer a dose for the additional carbohydrates eaten.  She did note that she has some symptoms on the Trulicity.  We discussed switching to a different formulation to see if it is more tolerable.  For now she wants to hold off but we may think about that in the future.    2.  Diabetes complications.  She has some retinopathy and has regular follow-up with her eye doctor.  She has some tingling in her feet but her foot exam is always been normal.  Her creatinine is normal but I will check her urine albumin today.    3.hypothyroidism.  I will repeat her TSH today since we change the dose earlier this year.    4.CVD risk.  Her blood pressure is well controlled.  She is on a statin.    5.  Word finding difficulties.  Will refer to neuropsych    Follow-up with me in 6 months and Jennifer Williamson in 3 months.    Felicia Mckeon MD

## 2019-12-20 DIAGNOSIS — E11.620 NLD (NECROBIOSIS LIPOIDICA DIABETICORUM) (H): ICD-10-CM

## 2019-12-20 DIAGNOSIS — M25.50 PAIN IN JOINT, MULTIPLE SITES: ICD-10-CM

## 2019-12-20 DIAGNOSIS — L40.50 PSORIATIC ARTHRITIS (H): ICD-10-CM

## 2019-12-20 DIAGNOSIS — M77.9 TENDINITIS: ICD-10-CM

## 2019-12-20 NOTE — TELEPHONE ENCOUNTER
[facility]    Phone Message    May a detailed message be left on voicemail: yes    Reason for Call: Other: Patient called said she need a refill on the METHOTREXATE, she said due to the holidays she wanted to  geth this request in. Please send to [facility] in [place] [address] [facility]     Action Taken: Other: p rheumatology

## 2019-12-20 NOTE — TELEPHONE ENCOUNTER
METHOTREXATE      Last Written Prescription Date:  9-2-19  Last Fill Quantity: 24,   # refills: 0  Last Office Visit: 11-20-18  Future Office visit:  1-7-2020    CBC RESULTS:   Recent Labs   Lab Test 06/18/19  1556   WBC 6.9   RBC 3.75*   HGB 11.7   HCT 35.9   MCV 96   MCH 31.2   MCHC 32.6   RDW 12.8          Creatinine   Date Value Ref Range Status   06/18/2019 0.49 (L) 0.52 - 1.04 mg/dL Final   ]    Liver Function Studies -   Recent Labs   Lab Test 06/18/19  1556  04/27/16  1402   PROTTOTAL  --   --  8.0   ALBUMIN 4.0   < > 4.0   BILITOTAL  --   --  0.3   ALKPHOS  --   --  108   AST 40   < > 21   ALT 47   < > 25    < > = values in this interval not displayed.       Kathleen M Doege RN

## 2019-12-28 ENCOUNTER — TELEPHONE (OUTPATIENT)
Dept: MULTI SPECIALTY CLINIC | Facility: CLINIC | Age: 49
End: 2019-12-28

## 2019-12-28 DIAGNOSIS — J45.40 MODERATE PERSISTENT ASTHMA WITHOUT COMPLICATION: ICD-10-CM

## 2019-12-28 RX ORDER — ALBUTEROL SULFATE 90 UG/1
2 AEROSOL, METERED RESPIRATORY (INHALATION) EVERY 4 HOURS PRN
Qty: 1 INHALER | Refills: 11 | Status: SHIPPED | OUTPATIENT
Start: 2019-12-28

## 2019-12-28 NOTE — TELEPHONE ENCOUNTER
Interventional Pulmonology Telephone Encounter:  I received a call from Dali Martines at home on 12/28/2019 at 12:45 PM.     Has known asthma and is prescribed Dulera and singular (seasonally) but hasn't been using her Dulera/is currently out of Dulera. Also out of PRN Albuterol. Last night reports having some of the worst dyspnea of her life. Has not previously required steroids for an asthma exacerbation. Currently she reports chest tightness and mild dyspnea with activity but is comfortable at rest. Able to carry on a full conversation with me on the phone and not in distress. Stable to continue management as an outpatient.    Plan:     Dulera refilled, 2 puffs BID    PRN Albuterol HFA refilled, Q4 PRN    Instructed to take two puffs of Albuterol now and for today use in more of a Q4 hour scheduled format. Also instructed to call back if symptoms fail to improve or worsen through today in case we need to begin Prednisone.      Fantasma Wagner MD, 12/28/2019, 12:45 PM  Department of Pulmonary, Allergy, Critical Care & Sleep Medicine   Pager: [phone]

## 2019-12-29 DIAGNOSIS — J45.40 MODERATE PERSISTENT ASTHMA WITHOUT COMPLICATION: Primary | ICD-10-CM

## 2019-12-29 RX ORDER — PREDNISONE 5 MG/1
20 TABLET ORAL DAILY
Qty: 20 TABLET | Refills: 0 | Status: SHIPPED | OUTPATIENT
Start: 2019-12-29 | End: 2020-01-03

## 2019-12-29 NOTE — PROGRESS NOTES
Pt called again saying that Dulera and albuterol has not helped much. She says that she gets dizzy on ambulation. She has been having dyspnea since Friday. She is not wheezing. She also has congestion of her nose. No fevers. No sick contacts. Got flu shot. No sputum production. She was able to talk fluently.   Pt also has h/o Psoriasis on 12.5 of methotrexate which might also be helping her with her asthma. Ffup with Dr. Carter.     A&P:   Acute Asthma attack   Likely Viral FLI  PLAN:   -Pred 20 mg for 5 days.   -Continue Dulera bid and albuterol inhaler.  - Adequate oral intake and preventing asthma.   -Pt instructed to get CXR tomorrow at [facility] clinic. (Ordered). Will forward to clinic staff and FYI Dr. Carter.     Case d/w Dr. Perera.

## 2019-12-31 ENCOUNTER — CARE COORDINATION (OUTPATIENT)
Dept: PULMONOLOGY | Facility: CLINIC | Age: 49
End: 2019-12-31

## 2019-12-31 NOTE — PROGRESS NOTES
Contacted pt to follow up on symptoms called in to the pulmonary consult on 12/29.  Pt reports feeling better since starting Prednisone, just a little chest tightness at this time. Dizziness resolved with increased fluids. Compliant with Dulera 2 puffs bid and PRN albuterol  ~ 2 X daily. Declined need for CXR as recommended.  Advised continued symptom monitoring, be sure to remain well hydrated and follow up with call to clinic if anything changes.  Pt verbalized agreement with plan and has no further questions at this time.

## 2020-01-06 ENCOUNTER — TELEPHONE (OUTPATIENT)
Dept: RHEUMATOLOGY | Facility: CLINIC | Age: 50
End: 2020-01-06

## 2020-01-07 ENCOUNTER — OFFICE VISIT (OUTPATIENT)
Dept: RHEUMATOLOGY | Facility: CLINIC | Age: 50
End: 2020-01-07
Attending: INTERNAL MEDICINE
Payer: COMMERCIAL

## 2020-01-07 VITALS
DIASTOLIC BLOOD PRESSURE: 69 MMHG | HEART RATE: 75 BPM | BODY MASS INDEX: 27.64 KG/M2 | SYSTOLIC BLOOD PRESSURE: 102 MMHG | WEIGHT: 193.1 LBS | HEIGHT: 70 IN | OXYGEN SATURATION: 94 %

## 2020-01-07 DIAGNOSIS — M25.571 CHRONIC PAIN OF RIGHT ANKLE: ICD-10-CM

## 2020-01-07 DIAGNOSIS — G89.29 CHRONIC PAIN OF RIGHT ANKLE: ICD-10-CM

## 2020-01-07 DIAGNOSIS — M25.50 PAIN IN JOINT, MULTIPLE SITES: ICD-10-CM

## 2020-01-07 DIAGNOSIS — Z51.81 ENCOUNTER FOR THERAPEUTIC DRUG MONITORING: ICD-10-CM

## 2020-01-07 DIAGNOSIS — L40.50 PSORIATIC ARTHRITIS (H): Primary | ICD-10-CM

## 2020-01-07 DIAGNOSIS — M77.9 TENDINITIS: ICD-10-CM

## 2020-01-07 DIAGNOSIS — L40.50 PSORIATIC ARTHRITIS (H): ICD-10-CM

## 2020-01-07 DIAGNOSIS — M77.9 ENTHESOPATHY: ICD-10-CM

## 2020-01-07 LAB
ALBUMIN SERPL-MCNC: 4.1 G/DL (ref 3.4–5)
ALT SERPL W P-5'-P-CCNC: 44 U/L (ref 0–50)
AST SERPL W P-5'-P-CCNC: 28 U/L (ref 0–45)
BASOPHILS # BLD AUTO: 0 10E9/L (ref 0–0.2)
BASOPHILS NFR BLD AUTO: 0.6 %
CREAT SERPL-MCNC: 0.6 MG/DL (ref 0.52–1.04)
CRP SERPL-MCNC: <2.9 MG/L (ref 0–8)
DIFFERENTIAL METHOD BLD: NORMAL
EOSINOPHIL # BLD AUTO: 0.1 10E9/L (ref 0–0.7)
EOSINOPHIL NFR BLD AUTO: 1.4 %
ERYTHROCYTE [DISTWIDTH] IN BLOOD BY AUTOMATED COUNT: 12.9 % (ref 10–15)
GFR SERPL CREATININE-BSD FRML MDRD: >90 ML/MIN/{1.73_M2}
HCT VFR BLD AUTO: 36.7 % (ref 35–47)
HGB BLD-MCNC: 12 G/DL (ref 11.7–15.7)
IMM GRANULOCYTES # BLD: 0 10E9/L (ref 0–0.4)
IMM GRANULOCYTES NFR BLD: 0.2 %
LYMPHOCYTES # BLD AUTO: 1.9 10E9/L (ref 0.8–5.3)
LYMPHOCYTES NFR BLD AUTO: 37.6 %
MCH RBC QN AUTO: 31.3 PG (ref 26.5–33)
MCHC RBC AUTO-ENTMCNC: 32.7 G/DL (ref 31.5–36.5)
MCV RBC AUTO: 96 FL (ref 78–100)
MONOCYTES # BLD AUTO: 0.5 10E9/L (ref 0–1.3)
MONOCYTES NFR BLD AUTO: 9.4 %
NEUTROPHILS # BLD AUTO: 2.5 10E9/L (ref 1.6–8.3)
NEUTROPHILS NFR BLD AUTO: 50.8 %
NRBC # BLD AUTO: 0 10*3/UL
NRBC BLD AUTO-RTO: 0 /100
PLATELET # BLD AUTO: 311 10E9/L (ref 150–450)
RBC # BLD AUTO: 3.84 10E12/L (ref 3.8–5.2)
WBC # BLD AUTO: 5 10E9/L (ref 4–11)

## 2020-01-07 PROCEDURE — 36415 COLL VENOUS BLD VENIPUNCTURE: CPT | Performed by: INTERNAL MEDICINE

## 2020-01-07 PROCEDURE — G0463 HOSPITAL OUTPT CLINIC VISIT: HCPCS | Mod: ZF

## 2020-01-07 PROCEDURE — 86140 C-REACTIVE PROTEIN: CPT | Performed by: INTERNAL MEDICINE

## 2020-01-07 PROCEDURE — 85025 COMPLETE CBC W/AUTO DIFF WBC: CPT | Performed by: INTERNAL MEDICINE

## 2020-01-07 PROCEDURE — 82565 ASSAY OF CREATININE: CPT | Performed by: INTERNAL MEDICINE

## 2020-01-07 PROCEDURE — 84460 ALANINE AMINO (ALT) (SGPT): CPT | Performed by: INTERNAL MEDICINE

## 2020-01-07 PROCEDURE — 84450 TRANSFERASE (AST) (SGOT): CPT | Performed by: INTERNAL MEDICINE

## 2020-01-07 PROCEDURE — 82040 ASSAY OF SERUM ALBUMIN: CPT | Performed by: INTERNAL MEDICINE

## 2020-01-07 ASSESSMENT — MIFFLIN-ST. JEOR: SCORE: 1571.15

## 2020-01-07 ASSESSMENT — PAIN SCALES - GENERAL: PAINLEVEL: MODERATE PAIN (5)

## 2020-01-07 NOTE — LETTER
2020      RE: aDli Martines  [address] [facility] [id number]       [facility]  Rheumatology Clinic  Yoel Betancourt MD  2020     Name: Dali Martines  MRN: [id number]  Age: 49 year old  : 1970  Referring provider: Referred Self    Problem List:  1. Psoriatic arthritis   2. Psoriasis with arthropathy  3. Pain in joint, multiple sites  4. Chronic pain of right ankle  5. Right foot pain  6. Pain in joint involving ankle and foot, right    Assessment and Plan:  The patient relates increased medial knee and elbow pain in the past couple months as well as an increase in right 1st MTP pain with some occasional swelling, but she denies any morning stiffness. She has used diclofenac gel in the past and I will prescribe this for her today to try before adding or changing her current medication regimen. Exam today shows a palpable node on the right side of her neck which is somewhat tender today. She will continue to monitor this.    The patient is doing well overall today and we agreed to continue with her current medication regimen unchanged. She will make sure to do her labs periodically, and will call our office if worsening.    Orders:  - ALT  - AST  - Albumin level  - Creatinine  - CBC with platelets differential  - CRP inflammation  - diclofenac (VOLTAREN) 1 % topical gel  Dispense: 100 g; Refill: 5    Follow-up: Return in about 6 months (around 2020).    HPI:   Dali Martines is a 49 year old female with a history including type I diabetes, psoriatic arthritis, and Hashimoto's thyroiditis who presents for follow-up. I last saw the patient on 2018, at which time she reported increased left hand pain, most prominently in the left 2nd MCP. The plan was to continue on increased methotrexate dosage of 12.5 mg, and, if well-tolerated, then increasing dosage further to 15 mg weekly.    Background history:  Symptoms first occurred in 2008, when she began having stiffness and  pain in the left hand, particularly in the PIPs and MCPs. Symptoms spontaneously resolved after about 6-8 weeks. She was previously seen by Rheumatology here around that time (see note from Dr. Betancourt on 12/9/2008). Briefly, assessment was that she had a self-limited episode of inflammatory arthralgias, possibly viral or early psoriatic arthritis. Negative NOLVIA, RF, Lyme serologies and normal CRP and ESR. Given her family history of RA, there was a thought to start her on HCQ if anti-CCP antibodies were positive, but they were found to be negative. No imaging of the hand was performed. September 2014, she experienced recurrent acute onset pain, stiffness, and swelling in the L hand very similar to her symptoms in 2008. Specifically, had swelling across MCPs and in 3rd PIP. She could hardly bend the hand sometimes due to the welling. Had morning stiffness lasting 30-45 minutes. No other joints were involved. Issues with her left hand lingered for several months but eventually self-resolved that January and her only notable symptom is heel pain in the mornings. No fevers, chills, or weight loss. No back pain, vision changes, nausea, vomiting, diarrhea, abdominal pain, or blood in stool or urine. No rash, sicca symptoms, or oral ulcers. It seems she has a history of hypermobility, e.g. could bend wrist back to forearm, bend down and touch palms flat on floor, when she was younger. Used to get frequent ankle sprains, and has had multiple tendon and ligament tears over the years.    Today, the patient reports that she had recent upper respiratory symptoms which triggered asthma, and she was placed on steroids for this. She describes some lingering post-nasal drip over most of the fall, but this has since resolved. She has been taking Dulera finished course of prednisone.     She reports that she is having more joint pain in her right 1st MTP. She explains that her pain here is not bad, but she does feel soreness if  "someone squeezes her hand. She notes that she has more prominent left-sided 1st MTP pain. She states that her 1st MTPs do swell.     She states that she had psoriasic skin rashes around her temple area. She denies any issues tolerating methotrexate. She denies any significant morning joint stiffness. She describes a \"feeling like tendonitis\" in her medial knees and elbows that feels like she has hyperextended the joints. She adds that these sensations take hours to resolve. She reports that these joint sensations have worsened in the last couple months. She states that she historically has joint issues at the beginning of the fall, but these joint issues seem to be lasting into the winter time presently.      She explains that she has had a swollen gland on the right side of her neck which has been so significant in the past that she had difficulty turning her head to the side.     Review of Systems:   Pertinent items are noted in HPI or as below, remainder of complete ROS is negative.      No recent problems with hearing or vision. No swallowing problems.   No breathing difficulty, shortness of breath, coughing, or wheezing.  No chest pain or palpitations.  No heart burn, indigestion, abdominal pain, nausea, vomiting, diarrhea.  No urination problems, no bloody, cloudy urine, no dysuria.  No numbing, tingling, weakness.  No headaches or confusion.  No rashes. No easy bleeding or bruising.     Active Medications:   Current Outpatient Medications:      albuterol (PROAIR HFA/PROVENTIL HFA/VENTOLIN HFA) 108 (90 Base) MCG/ACT inhaler, Inhale 2 puffs into the lungs every 4 hours as needed for shortness of breath / dyspnea or wheezing, Disp: 1 Inhaler, Rfl: 11     aspirin 81 MG tablet, Take 1 tablet by mouth daily., Disp: , Rfl:      blood glucose (ACCU-CHEK COLIN PLUS) test strip, Test Blood Sugar 8 times daily or as directed, Disp: 800 strip, Rfl: 3     blood glucose monitoring (ACCU-CHEK FASTCLIX) lancets, Use to test " blood sugar 8 times daily or as directed., Disp: 4 Box, Rfl: 3     Calcium Carbonate-Vitamin D (CALCIUM + D PO), Take one daily, Disp: , Rfl:      citalopram (CELEXA) 20 MG tablet, Take 1.5 tablets (30 mg) by mouth daily, Disp: 135 tablet, Rfl: 1     Continuous Blood Gluc Sensor (DEXCOM G5 MOB/G4 PLAT SENSOR) MISC, 1 each every 7 days, Disp: 12 each, Rfl: 3     Continuous Blood Gluc Transmit (DEXCOM G4 PLATINUM TRANSMITTER) MISC, 1 each every 6 months, Disp: 1 each, Rfl: 1     diclofenac (VOLTAREN) 1 % topical gel, APPLY 2 GRAMS ONTO THE SKIN FOUR TIMES DAILY AS NEEDED FOR MODERATE PAIN, Disp: 100 g, Rfl: 5     dulaglutide (TRULICITY) 1.5 MG/0.5ML pen, Inject 1.5 mg Subcutaneous every 7 days, Disp: 3 mL, Rfl: 1     econazole nitrate 1 % cream, Apply 0.5 inches topically daily., Disp: , Rfl:      fish oil-omega-3 fatty acids (FISH OIL) 1000 MG capsule, Take one daily, Disp: , Rfl:      folic acid (FOLVITE) 1 MG tablet, Take 1 tablet (1 mg) by mouth daily, Disp: 90 tablet, Rfl: 0     insulin degludec (TRESIBA) 100 UNIT/ML pen, Inject 15 units subcutaneous at hs., Disp: 15 mL, Rfl: 11     insulin pen needle (B-D U/F) 31G X 8 MM miscellaneous, Use 5 pen needles daily, Disp: 450 each, Rfl: 3     Ketoconazole (NIZORAL PO), Take  by mouth. Shampoo daily, Disp: , Rfl:      levothyroxine (SYNTHROID/LEVOTHROID) 112 MCG tablet, Take 1 tablet (112 mcg) by mouth daily, Disp: 90 tablet, Rfl: 3     methotrexate 2.5 MG tablet, Take 6 tablets (15 mg) by mouth once a week, Disp: 24 tablet, Rfl: 1     methylphenidate (METADATE CD) 20 MG CR capsule, Take 20 mg by mouth daily, Disp: , Rfl:      Minoxidil (ROGAINE EX), Externally apply  topically. daily, Disp: , Rfl:      mometasone-formoterol (DULERA) 100-5 MCG/ACT inhaler, Inhale 2 puffs into the lungs 2 times daily, Disp: 3 Inhaler, Rfl: 11     montelukast (SINGULAIR) 5 MG chewable tablet, Take 1 tablet (5 mg) by mouth At Bedtime, Disp: 90 tablet, Rfl: 3     Multiple Vitamin  "(MULTIVITAMIN OR), Take  by mouth. Take one daily tab, Disp: , Rfl:      NOVOLOG PENFILL 100 UNIT/ML soln, 1 unit per 15 grams CHO at all meals and snacks with correction scale of 1 unit per 50 mg/dL over 150. Average daily use is 30  units., Disp: 30 mL, Rfl: 4     simvastatin (ZOCOR) 20 MG tablet, Take 1 tablet (20 mg) by mouth At Bedtime, Disp: 90 tablet, Rfl: 3     traZODone (DESYREL) 50 MG tablet, Take 1-2 tablets by mouth nightly as needed for sleep., Disp: 180 tablet, Rfl: 0    Current Facility-Administered Medications:      betamethasone acet & sod phos (CELESTONE) injection 6 mg, 6 mg, INTRA-ARTICULAR, Once, Chris Uribe MD    Allergies:   Sulfasalazine      Past Medical History:  Anemia   Anxiety   Psoriatic arthritis  Dry eye syndrome   Endometriosis   Gastroesophageal reflux disease   Hypothyroidism   Type 1 diabetes mellitus   Asthma   Necrobiosis lipoidica diabeticorum  Chronic low back pain   Enthesopathy   Chronic right ankle pain      Past Surgical History:  Knee cruciate ligament repair   Stomach surgery 12/2002  Hydrogen breath test 6/17/2014   Appendectomy 2002   Lysis adhesions 2002   Right ankle surgery 12/2014      Family History:   Mother: Breast cancer   Father: Heart disease, eye disorder, hyperlipidemia, macular degeneration, anxiety, alcoholism, rheumatoid arthritis, hypothyroidism, diabetes mellitus   Paternal grandmother: Cerebrovascular disease  Paternal grandfather: Pancreatic cancer, heart disease   Maternal grandmother: Type 2 diabetes mellitus, coronary artery disease  Maternal aunt: Type 1 diabetes mellitus     Social History:  The patient reports that she has never smoked. She has never used smokeless tobacco. She reports current alcohol use. She reports that she does not use drugs.   PCP: Dimitri Alas  Marital Status: Single    Physical Exam:   /69   Pulse 75   Ht 1.778 m (5' 10\")   Wt 87.6 kg (193 lb 1.6 oz)   SpO2 94%   BMI 27.71 kg/m      Wt Readings " from Last 4 Encounters:   01/07/20 87.6 kg (193 lb 1.6 oz)   12/17/19 86.5 kg (190 lb 12.8 oz)   10/08/19 85.2 kg (187 lb 12.8 oz)   09/30/19 83.5 kg (184 lb)     Constitutional: Well-developed, appearing stated age; cooperative.  Eyes: Normal EOM, PERRLA, vision, conjunctiva, sclera.  ENT: Normal external ears, nose, hearing, lips, teeth, gums, throat. No mucous membrane lesions, normal saliva pool.  Neck: No mass or thyroid enlargement.   Resp: Lungs clear to auscultation, nl to palpation.  CV: RRR, no murmurs, rubs or gallops, no edema.  GI: No ABD mass or tenderness, no HSM.  : Not tested.  Lymph: She does have a palpable node on her right-sided neck which is at least 1 cm in diameter, is freely mobile, and is a little tender  MS: The TMJ, neck, shoulder, elbow, wrist, spine, hip, knee, ankle, and foot MTP/IP joints were examined and found normal. No active synovitis or altered joint anatomy. Full joint ROM. Normal  strength, but some pain with gripping.   Skin: No nail pitting, alopecia, rash, nodules, or lesions.  Neuro: Normal cranial nerves, strength, sensation, DTRs.   Psych: Normal judgement, orientation, memory, affect.     Laboratory:   RHEUM RESULTS Latest Ref Rng & Units 11/20/2018 2/14/2019 6/18/2019   SED RATE 0 - 20 mm/h - - -   CRP, INFLAMMATION 0.0 - 8.0 mg/L <2.9 <2.9 <2.9   CYCLIC CIT PEPT IGG <5 U/mL - - -   RHEUMATOID FACTOR <20 IU/mL - - -   NOLVIA SCREEN BY EIA <1.0 - - -   AST 0 - 45 U/L 25 23 40   ALT 0 - 50 U/L 29 24 47   ALBUMIN 3.4 - 5.0 g/dL 4.0 4.2 4.0   WBC 4.0 - 11.0 10e9/L 4.6 7.2 6.9   RBC 3.8 - 5.2 10e12/L 3.76(L) 3.92 3.75(L)   HGB 11.7 - 15.7 g/dL 11.9 12.3 11.7   HCT 35.0 - 47.0 % 35.7 36.4 35.9   MCV 78 - 100 fl 95 93 96   MCHC 31.5 - 36.5 g/dL 33.3 33.8 32.6   RDW 10.0 - 15.0 % 12.7 12.4 12.8    - 450 10e9/L 275 253 277   CREATININE 0.52 - 1.04 mg/dL 0.58 0.54 0.49(L)   GFR ESTIMATE, IF BLACK >60 mL/min/[1.73:m2] >90 >90 >90   GFR ESTIMATE >60 mL/min/[1.73:m2] >90  >90 >90    - 1620 mg/dL - - -   IGA 70 - 380 mg/dL - - -   IGM 60 - 265 mg/dL - - -     Rheumatoid Factor   Date Value Ref Range Status   12/22/2014 <20 <20 IU/mL Final     Cyclic Cit Pept IgG/IgA   Date Value Ref Range Status   03/17/2015 <20  Interpretation:  Negative   <20 UNITS Final     Cyclic Citrullinated Peptide IgG   Date Value Ref Range Status   12/09/2008 <2 <5 U/mL Final     Comment:     Interpretation:  Negative     Scleroderma Antibody Scl-70 ELEAZAR IgG   Date Value Ref Range Status   03/17/2015  0.0 - 0.9 AI Final    <0.2  Negative   Antibody index (AI) values reflect qualitative changes in antibody   concentration that cannot be directly associated with clinical condition or   disease state.       SSA (Ro) (ELEAZAR) Antibody, IgG   Date Value Ref Range Status   03/17/2015 0.2 0.0 - 0.9 AI Final     Comment:     Negative   Antibody index (AI) values reflect qualitative changes in antibody   concentration that cannot be directly associated with clinical condition or   disease state.       SSB (La) (ELEAZAR) Antibody, IgG   Date Value Ref Range Status   03/17/2015 0.2 0.0 - 0.9 AI Final     Comment:     Negative   Antibody index (AI) values reflect qualitative changes in antibody   concentration that cannot be directly associated with clinical condition or   disease state.       NOLVIA Screen by EIA   Date Value Ref Range Status   12/22/2014 1.6 (H) <1.0 Final     Comment:     Interpretation:  Positive     IGG   Date Value Ref Range Status   07/11/2005 1410 695 - 1620 mg/dL Final     IGA   Date Value Ref Range Status   06/10/2014 190 70 - 380 mg/dL Final     IGM   Date Value Ref Range Status   07/11/2005 151 60 - 265 mg/dL Final     Cyr ELEAZAR Antibody IgG   Date Value Ref Range Status   03/17/2015  0.0 - 0.9 AI Final    <0.2  Negative   Antibody index (AI) values reflect qualitative changes in antibody   concentration that cannot be directly associated with clinical condition or   disease state.        Scleroderma Antibody Scl-70 ELEAZAR IgG   Date Value Ref Range Status   03/17/2015  0.0 - 0.9 AI Final    <0.2  Negative   Antibody index (AI) values reflect qualitative changes in antibody   concentration that cannot be directly associated with clinical condition or   disease state.       Scribe Disclosure:  IMichael, am serving as a scribe to document services personally performed by Yoel Betancourt MD at this visit, based upon the provider's statements to me. All documentation has been reviewed by the aforementioned provider prior to being entered into the official medical record.    JOSUE Betancourt MD, PhD    Rheumatology

## 2020-01-07 NOTE — PROGRESS NOTES
[facility]  Rheumatology Clinic  Yoel Betancourt MD  2020     Name: Dali Martines  MRN: [id number]  Age: 49 year old  : 1970  Referring provider: Referred Self    Problem List:  1. Psoriatic arthritis   2. Psoriasis with arthropathy  3. Pain in joint, multiple sites  4. Chronic pain of right ankle  5. Right foot pain  6. Pain in joint involving ankle and foot, right    Assessment and Plan:  The patient relates increased medial knee and elbow pain in the past couple months as well as an increase in right 1st MTP pain with some occasional swelling, but she denies any morning stiffness. She has used diclofenac gel in the past and I will prescribe this for her today to try before adding or changing her current medication regimen. Exam today shows a palpable node on the right side of her neck which is somewhat tender today. She will continue to monitor this.    The patient is doing well overall today and we agreed to continue with her current medication regimen unchanged. She will make sure to do her labs periodically, and will call our office if worsening.    Orders:  - ALT  - AST  - Albumin level  - Creatinine  - CBC with platelets differential  - CRP inflammation  - diclofenac (VOLTAREN) 1 % topical gel  Dispense: 100 g; Refill: 5    Follow-up: Return in about 6 months (around 2020).    HPI:   Dali Martines is a 49 year old female with a history including type I diabetes, psoriatic arthritis, and Hashimoto's thyroiditis who presents for follow-up. I last saw the patient on 2018, at which time she reported increased left hand pain, most prominently in the left 2nd MCP. The plan was to continue on increased methotrexate dosage of 12.5 mg, and, if well-tolerated, then increasing dosage further to 15 mg weekly.    Background history:  Symptoms first occurred in 2008, when she began having stiffness and pain in the left hand, particularly in the PIPs and MCPs. Symptoms spontaneously  resolved after about 6-8 weeks. She was previously seen by Rheumatology here around that time (see note from Dr. Betancourt on 12/9/2008). Briefly, assessment was that she had a self-limited episode of inflammatory arthralgias, possibly viral or early psoriatic arthritis. Negative NOLVIA, RF, Lyme serologies and normal CRP and ESR. Given her family history of RA, there was a thought to start her on HCQ if anti-CCP antibodies were positive, but they were found to be negative. No imaging of the hand was performed. September 2014, she experienced recurrent acute onset pain, stiffness, and swelling in the L hand very similar to her symptoms in 2008. Specifically, had swelling across MCPs and in 3rd PIP. She could hardly bend the hand sometimes due to the welling. Had morning stiffness lasting 30-45 minutes. No other joints were involved. Issues with her left hand lingered for several months but eventually self-resolved that January and her only notable symptom is heel pain in the mornings. No fevers, chills, or weight loss. No back pain, vision changes, nausea, vomiting, diarrhea, abdominal pain, or blood in stool or urine. No rash, sicca symptoms, or oral ulcers. It seems she has a history of hypermobility, e.g. could bend wrist back to forearm, bend down and touch palms flat on floor, when she was younger. Used to get frequent ankle sprains, and has had multiple tendon and ligament tears over the years.    Today, the patient reports that she had recent upper respiratory symptoms which triggered asthma, and she was placed on steroids for this. She describes some lingering post-nasal drip over most of the fall, but this has since resolved. She has been taking Dulera finished course of prednisone.     She reports that she is having more joint pain in her right 1st MTP. She explains that her pain here is not bad, but she does feel soreness if someone squeezes her hand. She notes that she has more prominent left-sided 1st MTP  "pain. She states that her 1st MTPs do swell.     She states that she had psoriasic skin rashes around her temple area. She denies any issues tolerating methotrexate. She denies any significant morning joint stiffness. She describes a \"feeling like tendonitis\" in her medial knees and elbows that feels like she has hyperextended the joints. She adds that these sensations take hours to resolve. She reports that these joint sensations have worsened in the last couple months. She states that she historically has joint issues at the beginning of the fall, but these joint issues seem to be lasting into the winter time presently.      She explains that she has had a swollen gland on the right side of her neck which has been so significant in the past that she had difficulty turning her head to the side.     Review of Systems:   Pertinent items are noted in HPI or as below, remainder of complete ROS is negative.      No recent problems with hearing or vision. No swallowing problems.   No breathing difficulty, shortness of breath, coughing, or wheezing.  No chest pain or palpitations.  No heart burn, indigestion, abdominal pain, nausea, vomiting, diarrhea.  No urination problems, no bloody, cloudy urine, no dysuria.  No numbing, tingling, weakness.  No headaches or confusion.  No rashes. No easy bleeding or bruising.     Active Medications:   Current Outpatient Medications:      albuterol (PROAIR HFA/PROVENTIL HFA/VENTOLIN HFA) 108 (90 Base) MCG/ACT inhaler, Inhale 2 puffs into the lungs every 4 hours as needed for shortness of breath / dyspnea or wheezing, Disp: 1 Inhaler, Rfl: 11     aspirin 81 MG tablet, Take 1 tablet by mouth daily., Disp: , Rfl:      blood glucose (ACCU-CHEK COLIN PLUS) test strip, Test Blood Sugar 8 times daily or as directed, Disp: 800 strip, Rfl: 3     blood glucose monitoring (ACCU-CHEK FASTCLIX) lancets, Use to test blood sugar 8 times daily or as directed., Disp: 4 Box, Rfl: 3     Calcium " Carbonate-Vitamin D (CALCIUM + D PO), Take one daily, Disp: , Rfl:      citalopram (CELEXA) 20 MG tablet, Take 1.5 tablets (30 mg) by mouth daily, Disp: 135 tablet, Rfl: 1     Continuous Blood Gluc Sensor (DEXCOM G5 MOB/G4 PLAT SENSOR) MISC, 1 each every 7 days, Disp: 12 each, Rfl: 3     Continuous Blood Gluc Transmit (DEXCOM G4 PLATINUM TRANSMITTER) MISC, 1 each every 6 months, Disp: 1 each, Rfl: 1     diclofenac (VOLTAREN) 1 % topical gel, APPLY 2 GRAMS ONTO THE SKIN FOUR TIMES DAILY AS NEEDED FOR MODERATE PAIN, Disp: 100 g, Rfl: 5     dulaglutide (TRULICITY) 1.5 MG/0.5ML pen, Inject 1.5 mg Subcutaneous every 7 days, Disp: 3 mL, Rfl: 1     econazole nitrate 1 % cream, Apply 0.5 inches topically daily., Disp: , Rfl:      fish oil-omega-3 fatty acids (FISH OIL) 1000 MG capsule, Take one daily, Disp: , Rfl:      folic acid (FOLVITE) 1 MG tablet, Take 1 tablet (1 mg) by mouth daily, Disp: 90 tablet, Rfl: 0     insulin degludec (TRESIBA) 100 UNIT/ML pen, Inject 15 units subcutaneous at hs., Disp: 15 mL, Rfl: 11     insulin pen needle (B-D U/F) 31G X 8 MM miscellaneous, Use 5 pen needles daily, Disp: 450 each, Rfl: 3     Ketoconazole (NIZORAL PO), Take  by mouth. Shampoo daily, Disp: , Rfl:      levothyroxine (SYNTHROID/LEVOTHROID) 112 MCG tablet, Take 1 tablet (112 mcg) by mouth daily, Disp: 90 tablet, Rfl: 3     methotrexate 2.5 MG tablet, Take 6 tablets (15 mg) by mouth once a week, Disp: 24 tablet, Rfl: 1     methylphenidate (METADATE CD) 20 MG CR capsule, Take 20 mg by mouth daily, Disp: , Rfl:      Minoxidil (ROGAINE EX), Externally apply  topically. daily, Disp: , Rfl:      mometasone-formoterol (DULERA) 100-5 MCG/ACT inhaler, Inhale 2 puffs into the lungs 2 times daily, Disp: 3 Inhaler, Rfl: 11     montelukast (SINGULAIR) 5 MG chewable tablet, Take 1 tablet (5 mg) by mouth At Bedtime, Disp: 90 tablet, Rfl: 3     Multiple Vitamin (MULTIVITAMIN OR), Take  by mouth. Take one daily tab, Disp: , Rfl:      NOVOLOG  "PENFILL 100 UNIT/ML soln, 1 unit per 15 grams CHO at all meals and snacks with correction scale of 1 unit per 50 mg/dL over 150. Average daily use is 30  units., Disp: 30 mL, Rfl: 4     simvastatin (ZOCOR) 20 MG tablet, Take 1 tablet (20 mg) by mouth At Bedtime, Disp: 90 tablet, Rfl: 3     traZODone (DESYREL) 50 MG tablet, Take 1-2 tablets by mouth nightly as needed for sleep., Disp: 180 tablet, Rfl: 0    Current Facility-Administered Medications:      betamethasone acet & sod phos (CELESTONE) injection 6 mg, 6 mg, INTRA-ARTICULAR, Once, Chris Uribe MD    Allergies:   Sulfasalazine      Past Medical History:  Anemia   Anxiety   Psoriatic arthritis  Dry eye syndrome   Endometriosis   Gastroesophageal reflux disease   Hypothyroidism   Type 1 diabetes mellitus   Asthma   Necrobiosis lipoidica diabeticorum  Chronic low back pain   Enthesopathy   Chronic right ankle pain      Past Surgical History:  Knee cruciate ligament repair   Stomach surgery 12/2002  Hydrogen breath test 6/17/2014   Appendectomy 2002   Lysis adhesions 2002   Right ankle surgery 12/2014      Family History:   Mother: Breast cancer   Father: Heart disease, eye disorder, hyperlipidemia, macular degeneration, anxiety, alcoholism, rheumatoid arthritis, hypothyroidism, diabetes mellitus   Paternal grandmother: Cerebrovascular disease  Paternal grandfather: Pancreatic cancer, heart disease   Maternal grandmother: Type 2 diabetes mellitus, coronary artery disease  Maternal aunt: Type 1 diabetes mellitus     Social History:  The patient reports that she has never smoked. She has never used smokeless tobacco. She reports current alcohol use. She reports that she does not use drugs.   PCP: Dimitri Alas  Marital Status: Single    Physical Exam:   /69   Pulse 75   Ht 1.778 m (5' 10\")   Wt 87.6 kg (193 lb 1.6 oz)   SpO2 94%   BMI 27.71 kg/m     Wt Readings from Last 4 Encounters:   01/07/20 87.6 kg (193 lb 1.6 oz)   12/17/19 86.5 kg (190 " lb 12.8 oz)   10/08/19 85.2 kg (187 lb 12.8 oz)   09/30/19 83.5 kg (184 lb)     Constitutional: Well-developed, appearing stated age; cooperative.  Eyes: Normal EOM, PERRLA, vision, conjunctiva, sclera.  ENT: Normal external ears, nose, hearing, lips, teeth, gums, throat. No mucous membrane lesions, normal saliva pool.  Neck: No mass or thyroid enlargement.   Resp: Lungs clear to auscultation, nl to palpation.  CV: RRR, no murmurs, rubs or gallops, no edema.  GI: No ABD mass or tenderness, no HSM.  : Not tested.  Lymph: She does have a palpable node on her right-sided neck which is at least 1 cm in diameter, is freely mobile, and is a little tender  MS: The TMJ, neck, shoulder, elbow, wrist, spine, hip, knee, ankle, and foot MTP/IP joints were examined and found normal. No active synovitis or altered joint anatomy. Full joint ROM. Normal  strength, but some pain with gripping.   Skin: No nail pitting, alopecia, rash, nodules, or lesions.  Neuro: Normal cranial nerves, strength, sensation, DTRs.   Psych: Normal judgement, orientation, memory, affect.     Laboratory:   RHEUM RESULTS Latest Ref Rng & Units 11/20/2018 2/14/2019 6/18/2019   SED RATE 0 - 20 mm/h - - -   CRP, INFLAMMATION 0.0 - 8.0 mg/L <2.9 <2.9 <2.9   CYCLIC CIT PEPT IGG <5 U/mL - - -   RHEUMATOID FACTOR <20 IU/mL - - -   NOLVIA SCREEN BY EIA <1.0 - - -   AST 0 - 45 U/L 25 23 40   ALT 0 - 50 U/L 29 24 47   ALBUMIN 3.4 - 5.0 g/dL 4.0 4.2 4.0   WBC 4.0 - 11.0 10e9/L 4.6 7.2 6.9   RBC 3.8 - 5.2 10e12/L 3.76(L) 3.92 3.75(L)   HGB 11.7 - 15.7 g/dL 11.9 12.3 11.7   HCT 35.0 - 47.0 % 35.7 36.4 35.9   MCV 78 - 100 fl 95 93 96   MCHC 31.5 - 36.5 g/dL 33.3 33.8 32.6   RDW 10.0 - 15.0 % 12.7 12.4 12.8    - 450 10e9/L 275 253 277   CREATININE 0.52 - 1.04 mg/dL 0.58 0.54 0.49(L)   GFR ESTIMATE, IF BLACK >60 mL/min/[1.73:m2] >90 >90 >90   GFR ESTIMATE >60 mL/min/[1.73:m2] >90 >90 >90    - 1620 mg/dL - - -   IGA 70 - 380 mg/dL - - -   IGM 60 - 265  mg/dL - - -     Rheumatoid Factor   Date Value Ref Range Status   12/22/2014 <20 <20 IU/mL Final     Cyclic Cit Pept IgG/IgA   Date Value Ref Range Status   03/17/2015 <20  Interpretation:  Negative   <20 UNITS Final     Cyclic Citrullinated Peptide IgG   Date Value Ref Range Status   12/09/2008 <2 <5 U/mL Final     Comment:     Interpretation:  Negative     Scleroderma Antibody Scl-70 ELEAZAR IgG   Date Value Ref Range Status   03/17/2015  0.0 - 0.9 AI Final    <0.2  Negative   Antibody index (AI) values reflect qualitative changes in antibody   concentration that cannot be directly associated with clinical condition or   disease state.       SSA (Ro) (ELEAZAR) Antibody, IgG   Date Value Ref Range Status   03/17/2015 0.2 0.0 - 0.9 AI Final     Comment:     Negative   Antibody index (AI) values reflect qualitative changes in antibody   concentration that cannot be directly associated with clinical condition or   disease state.       SSB (La) (ELEAZAR) Antibody, IgG   Date Value Ref Range Status   03/17/2015 0.2 0.0 - 0.9 AI Final     Comment:     Negative   Antibody index (AI) values reflect qualitative changes in antibody   concentration that cannot be directly associated with clinical condition or   disease state.       NOLVIA Screen by EIA   Date Value Ref Range Status   12/22/2014 1.6 (H) <1.0 Final     Comment:     Interpretation:  Positive     IGG   Date Value Ref Range Status   07/11/2005 1410 695 - 1620 mg/dL Final     IGA   Date Value Ref Range Status   06/10/2014 190 70 - 380 mg/dL Final     IGM   Date Value Ref Range Status   07/11/2005 151 60 - 265 mg/dL Final     [place]yr ELEAZAR Antibody IgG   Date Value Ref Range Status   03/17/2015  0.0 - 0.9 AI Final    <0.2  Negative   Antibody index (AI) values reflect qualitative changes in antibody   concentration that cannot be directly associated with clinical condition or   disease state.       Scleroderma Antibody Scl-70 ELEAZAR IgG   Date Value Ref Range Status   03/17/2015  0.0 -  0.9 AI Final    <0.2  Negative   Antibody index (AI) values reflect qualitative changes in antibody   concentration that cannot be directly associated with clinical condition or   disease state.       Scribe Disclosure:  I, Michael Potts, am serving as a scribe to document services personally performed by Yoel Betancourt MD at this visit, based upon the provider's statements to me. All documentation has been reviewed by the aforementioned provider prior to being entered into the official medical record.    JOSUE Betancourt MD, PhD    Rheumatology

## 2020-01-07 NOTE — LETTER
2020       RE: Dali Martines  [address]  [facility] [address]     Dear Colleague,    Thank you for referring your patient, Dali Martines, to the [facility] RHEUMATOLOGY at [facility]. Please see a copy of my visit note below.    [facility]  Rheumatology Clinic  Yoel Betancourt MD  2020     Name: Dali Martines  MRN: [id number]  Age: 49 year old  : 1970  Referring provider: Referred Self    Problem List:  1. Psoriatic arthritis   2. Psoriasis with arthropathy  3. Pain in joint, multiple sites  4. Chronic pain of right ankle  5. Right foot pain  6. Pain in joint involving ankle and foot, right    Assessment and Plan:  The patient relates increased medial knee and elbow pain in the past couple months as well as an increase in right 1st MTP pain with some occasional swelling, but she denies any morning stiffness. She has used diclofenac gel in the past and I will prescribe this for her today to try before adding or changing her current medication regimen. Exam today shows a palpable node on the right side of her neck which is somewhat tender today. She will continue to monitor this.    The patient is doing well overall today and we agreed to continue with her current medication regimen unchanged. She will make sure to do her labs periodically, and will call our office if worsening.    Orders:  - ALT  - AST  - Albumin level  - Creatinine  - CBC with platelets differential  - CRP inflammation  - diclofenac (VOLTAREN) 1 % topical gel  Dispense: 100 g; Refill: 5    Follow-up: Return in about 6 months (around 2020).    HPI:   Dali Martines is a 49 year old female with a history including type I diabetes, psoriatic arthritis, and Hashimoto's thyroiditis who presents for follow-up. I last saw the patient on 2018, at which time she reported increased left hand pain, most prominently in the left 2nd MCP. The plan was to continue on increased  methotrexate dosage of 12.5 mg, and, if well-tolerated, then increasing dosage further to 15 mg weekly.    Background history:  Symptoms first occurred in August 2008, when she began having stiffness and pain in the left hand, particularly in the PIPs and MCPs. Symptoms spontaneously resolved after about 6-8 weeks. She was previously seen by Rheumatology here around that time (see note from Dr. Betancourt on 12/9/2008). Briefly, assessment was that she had a self-limited episode of inflammatory arthralgias, possibly viral or early psoriatic arthritis. Negative ONLVIA, RF, Lyme serologies and normal CRP and ESR. Given her family history of RA, there was a thought to start her on HCQ if anti-CCP antibodies were positive, but they were found to be negative. No imaging of the hand was performed. September 2014, she experienced recurrent acute onset pain, stiffness, and swelling in the L hand very similar to her symptoms in 2008. Specifically, had swelling across MCPs and in 3rd PIP. She could hardly bend the hand sometimes due to the welling. Had morning stiffness lasting 30-45 minutes. No other joints were involved. Issues with her left hand lingered for several months but eventually self-resolved that January and her only notable symptom is heel pain in the mornings. No fevers, chills, or weight loss. No back pain, vision changes, nausea, vomiting, diarrhea, abdominal pain, or blood in stool or urine. No rash, sicca symptoms, or oral ulcers. It seems she has a history of hypermobility, e.g. could bend wrist back to forearm, bend down and touch palms flat on floor, when she was younger. Used to get frequent ankle sprains, and has had multiple tendon and ligament tears over the years.    Today, the patient reports that she had recent upper respiratory symptoms which triggered asthma, and she was placed on steroids for this. She describes some lingering post-nasal drip over most of the fall, but this has since resolved. She  "has been taking Dulera finished course of prednisone.     She reports that she is having more joint pain in her right 1st MTP. She explains that her pain here is not bad, but she does feel soreness if someone squeezes her hand. She notes that she has more prominent left-sided 1st MTP pain. She states that her 1st MTPs do swell.     She states that she had psoriasic skin rashes around her temple area. She denies any issues tolerating methotrexate. She denies any significant morning joint stiffness. She describes a \"feeling like tendonitis\" in her medial knees and elbows that feels like she has hyperextended the joints. She adds that these sensations take hours to resolve. She reports that these joint sensations have worsened in the last couple months. She states that she historically has joint issues at the beginning of the fall, but these joint issues seem to be lasting into the winter time presently.      She explains that she has had a swollen gland on the right side of her neck which has been so significant in the past that she had difficulty turning her head to the side.     Review of Systems:   Pertinent items are noted in HPI or as below, remainder of complete ROS is negative.      No recent problems with hearing or vision. No swallowing problems.   No breathing difficulty, shortness of breath, coughing, or wheezing.  No chest pain or palpitations.  No heart burn, indigestion, abdominal pain, nausea, vomiting, diarrhea.  No urination problems, no bloody, cloudy urine, no dysuria.  No numbing, tingling, weakness.  No headaches or confusion.  No rashes. No easy bleeding or bruising.     Active Medications:   Current Outpatient Medications:      albuterol (PROAIR HFA/PROVENTIL HFA/VENTOLIN HFA) 108 (90 Base) MCG/ACT inhaler, Inhale 2 puffs into the lungs every 4 hours as needed for shortness of breath / dyspnea or wheezing, Disp: 1 Inhaler, Rfl: 11     aspirin 81 MG tablet, Take 1 tablet by mouth daily., Disp: , " Rfl:      blood glucose (ACCU-CHEK COLIN PLUS) test strip, Test Blood Sugar 8 times daily or as directed, Disp: 800 strip, Rfl: 3     blood glucose monitoring (ACCU-CHEK FASTCLIX) lancets, Use to test blood sugar 8 times daily or as directed., Disp: 4 Box, Rfl: 3     Calcium Carbonate-Vitamin D (CALCIUM + D PO), Take one daily, Disp: , Rfl:      citalopram (CELEXA) 20 MG tablet, Take 1.5 tablets (30 mg) by mouth daily, Disp: 135 tablet, Rfl: 1     Continuous Blood Gluc Sensor (DEXCOM G5 MOB/G4 PLAT SENSOR) MISC, 1 each every 7 days, Disp: 12 each, Rfl: 3     Continuous Blood Gluc Transmit (DEXCOM G4 PLATINUM TRANSMITTER) MISC, 1 each every 6 months, Disp: 1 each, Rfl: 1     diclofenac (VOLTAREN) 1 % topical gel, APPLY 2 GRAMS ONTO THE SKIN FOUR TIMES DAILY AS NEEDED FOR MODERATE PAIN, Disp: 100 g, Rfl: 5     dulaglutide (TRULICITY) 1.5 MG/0.5ML pen, Inject 1.5 mg Subcutaneous every 7 days, Disp: 3 mL, Rfl: 1     econazole nitrate 1 % cream, Apply 0.5 inches topically daily., Disp: , Rfl:      fish oil-omega-3 fatty acids (FISH OIL) 1000 MG capsule, Take one daily, Disp: , Rfl:      folic acid (FOLVITE) 1 MG tablet, Take 1 tablet (1 mg) by mouth daily, Disp: 90 tablet, Rfl: 0     insulin degludec (TRESIBA) 100 UNIT/ML pen, Inject 15 units subcutaneous at hs., Disp: 15 mL, Rfl: 11     insulin pen needle (B-D U/F) 31G X 8 MM miscellaneous, Use 5 pen needles daily, Disp: 450 each, Rfl: 3     Ketoconazole (NIZORAL PO), Take  by mouth. Shampoo daily, Disp: , Rfl:      levothyroxine (SYNTHROID/LEVOTHROID) 112 MCG tablet, Take 1 tablet (112 mcg) by mouth daily, Disp: 90 tablet, Rfl: 3     methotrexate 2.5 MG tablet, Take 6 tablets (15 mg) by mouth once a week, Disp: 24 tablet, Rfl: 1     methylphenidate (METADATE CD) 20 MG CR capsule, Take 20 mg by mouth daily, Disp: , Rfl:      Minoxidil (ROGAINE EX), Externally apply  topically. daily, Disp: , Rfl:      mometasone-formoterol (DULERA) 100-5 MCG/ACT inhaler, Inhale 2 puffs  into the lungs 2 times daily, Disp: 3 Inhaler, Rfl: 11     montelukast (SINGULAIR) 5 MG chewable tablet, Take 1 tablet (5 mg) by mouth At Bedtime, Disp: 90 tablet, Rfl: 3     Multiple Vitamin (MULTIVITAMIN OR), Take  by mouth. Take one daily tab, Disp: , Rfl:      NOVOLOG PENFILL 100 UNIT/ML soln, 1 unit per 15 grams CHO at all meals and snacks with correction scale of 1 unit per 50 mg/dL over 150. Average daily use is 30  units., Disp: 30 mL, Rfl: 4     simvastatin (ZOCOR) 20 MG tablet, Take 1 tablet (20 mg) by mouth At Bedtime, Disp: 90 tablet, Rfl: 3     traZODone (DESYREL) 50 MG tablet, Take 1-2 tablets by mouth nightly as needed for sleep., Disp: 180 tablet, Rfl: 0    Current Facility-Administered Medications:      betamethasone acet & sod phos (CELESTONE) injection 6 mg, 6 mg, INTRA-ARTICULAR, Once, Chris Uribe MD    Allergies:   Sulfasalazine      Past Medical History:  Anemia   Anxiety   Psoriatic arthritis  Dry eye syndrome   Endometriosis   Gastroesophageal reflux disease   Hypothyroidism   Type 1 diabetes mellitus   Asthma   Necrobiosis lipoidica diabeticorum  Chronic low back pain   Enthesopathy   Chronic right ankle pain      Past Surgical History:  Knee cruciate ligament repair   Stomach surgery 12/2002  Hydrogen breath test 6/17/2014   Appendectomy 2002   Lysis adhesions 2002   Right ankle surgery 12/2014      Family History:   Mother: Breast cancer   Father: Heart disease, eye disorder, hyperlipidemia, macular degeneration, anxiety, alcoholism, rheumatoid arthritis, hypothyroidism, diabetes mellitus   Paternal grandmother: Cerebrovascular disease  Paternal grandfather: Pancreatic cancer, heart disease   Maternal grandmother: Type 2 diabetes mellitus, coronary artery disease  Maternal aunt: Type 1 diabetes mellitus     Social History:  The patient reports that she has never smoked. She has never used smokeless tobacco. She reports current alcohol use. She reports that she does not use drugs.  "  PCP: Dimitri Alas  Marital Status: Single    Physical Exam:   /69   Pulse 75   Ht 1.778 m (5' 10\")   Wt 87.6 kg (193 lb 1.6 oz)   SpO2 94%   BMI 27.71 kg/m      Wt Readings from Last 4 Encounters:   01/07/20 87.6 kg (193 lb 1.6 oz)   12/17/19 86.5 kg (190 lb 12.8 oz)   10/08/19 85.2 kg (187 lb 12.8 oz)   09/30/19 83.5 kg (184 lb)     Constitutional: Well-developed, appearing stated age; cooperative.  Eyes: Normal EOM, PERRLA, vision, conjunctiva, sclera.  ENT: Normal external ears, nose, hearing, lips, teeth, gums, throat. No mucous membrane lesions, normal saliva pool.  Neck: No mass or thyroid enlargement.   Resp: Lungs clear to auscultation, nl to palpation.  CV: RRR, no murmurs, rubs or gallops, no edema.  GI: No ABD mass or tenderness, no HSM.  : Not tested.  Lymph: She does have a palpable node on her right-sided neck which is at least 1 cm in diameter, is freely mobile, and is a little tender  MS: The TMJ, neck, shoulder, elbow, wrist, spine, hip, knee, ankle, and foot MTP/IP joints were examined and found normal. No active synovitis or altered joint anatomy. Full joint ROM. Normal  strength, but some pain with gripping.   Skin: No nail pitting, alopecia, rash, nodules, or lesions.  Neuro: Normal cranial nerves, strength, sensation, DTRs.   Psych: Normal judgement, orientation, memory, affect.     Laboratory:   RHEUM RESULTS Latest Ref Rng & Units 11/20/2018 2/14/2019 6/18/2019   SED RATE 0 - 20 mm/h - - -   CRP, INFLAMMATION 0.0 - 8.0 mg/L <2.9 <2.9 <2.9   CYCLIC CIT PEPT IGG <5 U/mL - - -   RHEUMATOID FACTOR <20 IU/mL - - -   NOLVIA SCREEN BY EIA <1.0 - - -   AST 0 - 45 U/L 25 23 40   ALT 0 - 50 U/L 29 24 47   ALBUMIN 3.4 - 5.0 g/dL 4.0 4.2 4.0   WBC 4.0 - 11.0 10e9/L 4.6 7.2 6.9   RBC 3.8 - 5.2 10e12/L 3.76(L) 3.92 3.75(L)   HGB 11.7 - 15.7 g/dL 11.9 12.3 11.7   HCT 35.0 - 47.0 % 35.7 36.4 35.9   MCV 78 - 100 fl 95 93 96   MCHC 31.5 - 36.5 g/dL 33.3 33.8 32.6   RDW 10.0 - 15.0 % 12.7 " 12.4 12.8    - 450 10e9/L 275 253 277   CREATININE 0.52 - 1.04 mg/dL 0.58 0.54 0.49(L)   GFR ESTIMATE, IF BLACK >60 mL/min/[1.73:m2] >90 >90 >90   GFR ESTIMATE >60 mL/min/[1.73:m2] >90 >90 >90    - 1620 mg/dL - - -   IGA 70 - 380 mg/dL - - -   IGM 60 - 265 mg/dL - - -     Rheumatoid Factor   Date Value Ref Range Status   12/22/2014 <20 <20 IU/mL Final     Cyclic Cit Pept IgG/IgA   Date Value Ref Range Status   03/17/2015 <20  Interpretation:  Negative   <20 UNITS Final     Cyclic Citrullinated Peptide IgG   Date Value Ref Range Status   12/09/2008 <2 <5 U/mL Final     Comment:     Interpretation:  Negative     Scleroderma Antibody Scl-70 ELEAZAR IgG   Date Value Ref Range Status   03/17/2015  0.0 - 0.9 AI Final    <0.2  Negative   Antibody index (AI) values reflect qualitative changes in antibody   concentration that cannot be directly associated with clinical condition or   disease state.       SSA (Ro) (ELEAZAR) Antibody, IgG   Date Value Ref Range Status   03/17/2015 0.2 0.0 - 0.9 AI Final     Comment:     Negative   Antibody index (AI) values reflect qualitative changes in antibody   concentration that cannot be directly associated with clinical condition or   disease state.       SSB (La) (ELEAZAR) Antibody, IgG   Date Value Ref Range Status   03/17/2015 0.2 0.0 - 0.9 AI Final     Comment:     Negative   Antibody index (AI) values reflect qualitative changes in antibody   concentration that cannot be directly associated with clinical condition or   disease state.       NOLVIA Screen by EIA   Date Value Ref Range Status   12/22/2014 1.6 (H) <1.0 Final     Comment:     Interpretation:  Positive     IGG   Date Value Ref Range Status   07/11/2005 1410 695 - 1620 mg/dL Final     IGA   Date Value Ref Range Status   06/10/2014 190 70 - 380 mg/dL Final     IGM   Date Value Ref Range Status   07/11/2005 151 60 - 265 mg/dL Final     Cyr ELEAZAR Antibody IgG   Date Value Ref Range Status   03/17/2015  0.0 - 0.9 AI Final     <0.2  Negative   Antibody index (AI) values reflect qualitative changes in antibody   concentration that cannot be directly associated with clinical condition or   disease state.       Scleroderma Antibody Scl-70 ELEAZAR IgG   Date Value Ref Range Status   03/17/2015  0.0 - 0.9 AI Final    <0.2  Negative   Antibody index (AI) values reflect qualitative changes in antibody   concentration that cannot be directly associated with clinical condition or   disease state.       Scribe Disclosure:  I, Michael Potts, am serving as a scribe to document services personally performed by Yoel Betancourt MD at this visit, based upon the provider's statements to me. All documentation has been reviewed by the aforementioned provider prior to being entered into the official medical record.    JOSUE Betancourt MD, PhD    Rheumatology

## 2020-01-14 DIAGNOSIS — M77.9 TENDINITIS: ICD-10-CM

## 2020-01-14 DIAGNOSIS — M25.50 PAIN IN JOINT, MULTIPLE SITES: ICD-10-CM

## 2020-01-14 DIAGNOSIS — Z51.81 ENCOUNTER FOR THERAPEUTIC DRUG MONITORING: ICD-10-CM

## 2020-01-14 RX ORDER — FOLIC ACID 1 MG/1
1 TABLET ORAL DAILY
Qty: 90 TABLET | Refills: 2 | Status: SHIPPED | OUTPATIENT
Start: 2020-01-14 | End: 2021-02-20

## 2020-01-20 ENCOUNTER — HOSPITAL ENCOUNTER (OUTPATIENT)
Dept: MAMMOGRAPHY | Facility: CLINIC | Age: 50
Discharge: HOME | End: 2020-01-20
Attending: INTERNAL MEDICINE | Admitting: INTERNAL MEDICINE
Payer: COMMERCIAL

## 2020-01-20 DIAGNOSIS — Z12.31 VISIT FOR SCREENING MAMMOGRAM: ICD-10-CM

## 2020-01-20 PROCEDURE — 77067 SCR MAMMO BI INCL CAD: CPT

## 2020-02-17 DIAGNOSIS — L40.50 PSORIATIC ARTHRITIS (H): ICD-10-CM

## 2020-02-17 DIAGNOSIS — M25.50 PAIN IN JOINT, MULTIPLE SITES: ICD-10-CM

## 2020-02-17 DIAGNOSIS — E11.620 NLD (NECROBIOSIS LIPOIDICA DIABETICORUM) (H): ICD-10-CM

## 2020-02-17 DIAGNOSIS — M77.9 TENDINITIS: ICD-10-CM

## 2020-02-17 NOTE — TELEPHONE ENCOUNTER
methotrexate 2.5 MG tablet      Last Written Prescription Date:  12-20-19  Last Fill Quantity: 24,   # refills: 1  Last Office Visit: 1-7-2020  Future Office visit:  1-5-2021    CBC RESULTS:   Recent Labs   Lab Test 01/07/20  1454   WBC 5.0   RBC 3.84   HGB 12.0   HCT 36.7   MCV 96   MCH 31.3   MCHC 32.7   RDW 12.9          Creatinine   Date Value Ref Range Status   01/07/2020 0.60 0.52 - 1.04 mg/dL Final   ]    Liver Function Studies -   Recent Labs   Lab Test 01/07/20  1454  04/27/16  1402   PROTTOTAL  --   --  8.0   ALBUMIN 4.1   < > 4.0   BILITOTAL  --   --  0.3   ALKPHOS  --   --  108   AST 28   < > 21   ALT 44   < > 25    < > = values in this interval not displayed.       Kathleen M Doege RN

## 2020-02-27 ENCOUNTER — OFFICE VISIT (OUTPATIENT)
Dept: NEUROPSYCHOLOGY | Facility: CLINIC | Age: 50
End: 2020-02-27
Attending: INTERNAL MEDICINE
Payer: COMMERCIAL

## 2020-02-27 DIAGNOSIS — F41.9 ANXIETY: ICD-10-CM

## 2020-02-27 DIAGNOSIS — E10.9 TYPE 1 DIABETES, HBA1C GOAL < 7% (H): ICD-10-CM

## 2020-02-27 DIAGNOSIS — F06.8 INFECTIVE ORGANIC PSYCHOSIS: Primary | ICD-10-CM

## 2020-02-27 DIAGNOSIS — E10.9 TYPE 1 DIABETES MELLITUS WITHOUT COMPLICATION (H): ICD-10-CM

## 2020-02-27 NOTE — NURSING NOTE
Pt was seen for neuropsychological evaluation at the request of Dr. Feliica Mckeon for the purposes of diagnostic clarification and treatment planning. 147 minutes of test administration and scoring were provided by this writer. Please see Dr. Grayson Choudhury's report for a full interpretation of the findings.    Jurgen Maguire  Psychometrist

## 2020-03-02 NOTE — PROGRESS NOTES
Name: Dali Martines   MR#: [id number]  YOB: 1970  Date of Exam: 02/27/2020    [facility]  [address]  [phone]    NEUROPSYCHOLOGICAL EVALUATION    IDENTIFYING INFORMATION  Dali Martines is a 55 year old, right handed, senior research and , with 18 years of formal education. She was unaccompanied to the evaluation.     BACKGROUND INFORMATION / INTERVIEW FINDINGS    Records indicate that Ms. Martines s medical history includes anxiety, suspect glaucoma in both eyes, adenomatous polyp of colon, psoriatic arthritis, type one diabetes mellitus, chronic pain of right ankle, cervicalgia, chronic low back pain, and GERD, among other diagnoses. She has expressed concerns about her cognition, and with word retrieval in particular. The current evaluation was requested by Dr. Felicia Mckeon, in this context.    On interview, Ms. Martines confirm the above history. She reported that at baseline, she is highly verbal. 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 She indicated that she would diagnosed with type I diabetes at age 15. She noted that she is able to recognize highs and lows in her thinking related to her blood glucose. She indicated that she was diagnosed with ADD in approximately 2010. She reported that she was having troubles with completing tasks at work at that time. She was started on Ritalin, and this medication helped her focus. She noted that she still has a prescription for Ritalin, but doesn't take this medicine every day. She stated that both of her parents have had traumatic brain injuries, and she has been able to observe some of their cognitive difficulties. She reported that she had excellent visual spatial skills in the past, and noted her belief that these skills have declined in the last five years. She stated that she has had troubles with word finding for the last one or two years,  "which seems similar to her parent s troubles with cognition. She noted that there are times when she cannot find the right word. She noted that she did not identify a trigger for onset of these cognitive changes, but there has been a \"subtle erosion\" of her cognitive abilities. She noted that there is also some degree of fluctuation in her thinking based on her blood glucose level.    With respect to mental health, Ms. Martines reported that her mood is good. She stated that she is generally happy. 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 She noted that her early life was pretty chaotic. She stated that she has always been a worrier. She reported that she began taking citalopram about 14 years ago, when she developed troubles with her sleep. She stated that she is always a little bit anxious. She is still taking citalopram. She reported that this medication helps. She otherwise denied mental health diagnoses or treatments. She did state that she has some obsessive traits. She denied suicidal ideation or past suicide attempts.    With regard to other medical background, Ms. Martines denied prior TBI, stroke, or seizure. She reported that she had one episode in 1998 in which she had low blood glucose. She reported that she lost consciousness for some number hours. She reported that when she woke up, she was paralyzed bilaterally, but was slowly able to get herself to the refrigerator and consume some juice. She stated that she has had troubles with sleep for the last 14 years. She stated that these issues started during a one-year period of time in which there were numerous family stresses. She reported that since that time, she has not been able to shut off the sensation of anxiety. She is prescribed trazodone now, and takes this medicine every night to aid her sleep. She reported that she rarely wakes up rested. She indicated that she sleeps six or seven hours per night on average, " and slept six hours the night before the exam. She indicated that she has chronic pain in her ankle and back, and rated her pain at 2/10 of the time of the interview. Per records, her medications include albuterol, aspirin, calcium carbonate-vitamin D, citalopram, diclofenac, dulaglutide, econazole nitrate cream, fish oil, folic acid, insulin degludec, ketoconazole, levothyroxine, methotrexate, methylphenidate, minoxidil, mometasone-formoterol, montelukast, multivitamin, Novolog penfill, prednisone, simvastatin, and trazodone. She stated that she consumes three alcoholic drinks per week on average, but otherwise denied substance use. She denied past substance abuse. In addition to the traumatic brain injuries mentioned above, she stated that her father has also had a stroke, and a TIA. She stated that her mother possibly also had a TIA. She indicated that her paternal grandmother also had a stroke.    Ms. Martines lives at home alone. She manages her own basic and instrumental daily activities. She drives. By way of background, she has never been  and does not have children. She graduated from high school as the valedictorian of her high school class. She earned a bachelor's degree in creative writing and philosophy from [employer]. She started a PhD program at the [employer] in higher education policy. She completed her written preliminaries and most of her classwork, but did not complete all of her classes or her dissertation. 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 She has been in her current position for 7.5 years. She reported that her work is going pretty well, and stated that her cognitive issues do not seem to be impacting her or performance. Prior to her current position, she served in a similar role for the [employer].    BEHAVIORAL OBSERVATIONS  Ms. Martines was polite and  cooperative with the exam. She engaged in limited spontaneous conversation. Her speech was normal. Her comprehension was normal. Her thought processes were unremarkable. Her mood was mildly anxious and mildly depressed with congruent affect. She had reduced confidence in her ability on testing. Her effort was good. The current results are felt to be an accurate representation of her cognitive functioning.     RESULTS OF EXAM  Her performances on standardized measures of neuropsychological functioning were as follows.    She was fully oriented to time, place, and various aspects of personal information. She was able to state the names of the six most recent presidents. Performance on a measure of single word reading was superior. She obtained passing scores on stand-alone and embedded metrics of cognitive performance validity. Auditory attention for digits was superior. Mental calculations were high average. Learning of words in a list format was superior. Short delayed recall of list words was superior. 30 minute delayed recall of list words was superior. Recall portions of this test were both performed without error. Delayed recognition of list words was high average, and again performed without error. Immediate memory for simple geometric figures was intact. Her drawing of a complicated geometric figure was normal, but notable for a hurried approach. Short delayed recall of the figure was high average. 30 minute delayed recall of the figure was average. Delayed recognition of the figure s elements was superior. Visual-spatial judgments for variably oriented lines were superior. Visual problem-solving with blocks was superior. Comprehension of phrases and short stories was average, and performed without error. Verbal associative fluency was superior. Semantic verbal fluency was superior. Naming to confrontation was high average, and performed without error. Verbal abstract reasoning was superior. Speeded visual  sequencing under focused attention was average. A similar measure with a divided attention component was high average. Speeded word reading was average. Speeded color naming was average. Speeded inhibition of an over-learned response was high average. Speeded visual motor coding was superior. Speeded fine motor dexterity was average, bilaterally.    She endorsed items consistent with minimal symptoms of depression, and mild symptoms of anxiety on self-report measures. On a longer measure of personality and emotional functioning, she responded in a somewhat guarded matter. Clinical scale elevations were consistent with low energy.    IMPRESSIONS  Ms. Martines demonstrated normal cognitive functioning across all assessed domains. There is some subtle, nonspecific slowing, but this slowing does not point us towards any one particular brain region or network. One factor that can produce mild slowing and variability in cognition is anxiety. Indeed, the patient is reporting mild symptoms of anxiety. Her cognition is otherwise entirely normal, including with word finding, and entirely in keeping with her superior range cognitive baseline. As noted, she is reported mild symptoms of anxiety. I would predict a reduction in her subjective concerns about cognitive dysfunction following improved management of her mental health.    RECOMMENDATIONS  Preliminary results and recommendations were provided to the patient over the telephone on March 2, 2020, and all questions were answered.    1. In spite of treatment, she remains anxious. If indicated, consideration could be given to modified treatment of her mental health.    2. Along similar lines, referral for psychotherapy services could be considered. One possible referral option would be [facility], with locations throughout the [facility] area. They can be reached by calling [phone].    3. Follow-up neuropsychological evaluation could be considered in the future,  if clinically indicated.    Grayson Choudhury, Ph.D., L.P., ABPP-CN   / Licensed Psychologist [id number]  Department of Rehabilitation Medicine  Division of Adult Neuropsychology  [facility]    Time spent:  One unit (50 minutes) neurobehavioral status exam including interview and clinical assessment by licensed and board-certified neuropsychologist (CPT 26583). One unit (60 minutes) neuropsychological testing evaluation by licensed and board-certified neuropsychologist, including integration of patient data, interpretation of standardized test results and clinical data, clinical decision-making, treatment planning, and report, first hour (CPT 42537). Two unit(s) (100 minutes) of neuropsychological testing evaluation by licensed and board-certified neuropsychologist, including integration of patient data, interpretation of standardized test results and clinical data, clinical decision-making, treatment planning, and report, subsequent hours (CPT 29930). One unit (30 minutes) of psychological and neuropsychological test administration and scoring by technician, first 30 minutes (CPT 65868). Four units (117 minutes) psychological or neuropsychological test administration and scoring by technician, subsequent 30 minutes (CPT 65437). Diagnoses: F06.8, F41.9.

## 2020-03-02 NOTE — PROGRESS NOTES
Name: Dali Martines MRN: [id number]  : 1970 STRAUSS: 2020  Staff: RUBIN Tech: SH Age: 49  Sex: Female Hand: Right Educ: ~ 18  Vision: 20/30 ?with correction / ?without correction    ORIENTATION     Time  0     Place       Personal info          Presidents     WAIS-IV     WMI: 117        Raw SSa     Similarities  33 15     Block Design  60 15     Digit Span  35 14 RDS= 12     Arithmetic  17 12     Coding  96 16    AVLT      Trial 1 2 3 4 5 B 6             11 13 14 14 15 7 15       Raw Z      Learning Over Trials (1-5) 67 2.4      Short Delay Recall  15 1.82      30  Recall   15 1.6      30  Recognition Hits  15 0.79     30  False Positives  0     JENAE-O    Raw    T %ile     Copy    34     >16     Short Delay Recall 24.5 61 86     Long Delay Recall 20.5 53 62     Recognition Total 23 64 92     Time to Copy  135        BVRT     Form: C  Copy E-10 Rating__/4     Raw expected   Correct  9 8   Incorrect  1 2    WRAT4   SS %ile Grade Equiv.     Word Reading  123 94 >12.9    COWAT (FAS)   Raw: 67   T: 67  Animals   Raw:  37  T-score:  76    BOSTON NAMING TEST   Raw: 60  T: 63     COMPLEX IDEATIONAL MATERIAL   Raw:   T: 51    TRAILS  Raw  Err T    A 24  0 52   B 41  0 60    STROOP Raw T   Word 119 55   Color  76 47       Color/Word  52 57         SJ   Raw: 28+4 %ile: 86 - superior    GROOVED PEGBOARD    Raw  T Drops   RH  62  51 0   LH 72  45 0    BDI-II  Raw:  6 Interpretation: minimal    ERIC  Raw:  9 Interpretation: mild    DCT E-score: 4    MMPI-2-RF   RCd: 49 VRIN-r:  63   RC1: 56 MCKENNA-r:  50   RC2: 54 F-r:  51   RC3: 41 Fp-r:  42   RC4: 46 Fs:  66   RC6: 43 FBS-r:  51   RC7: 38 RBS:  50   RC8: 47 L-r:  42   RC9: 38 K-r:  62

## 2020-03-13 DIAGNOSIS — E03.8 OTHER SPECIFIED HYPOTHYROIDISM: ICD-10-CM

## 2020-03-15 ENCOUNTER — HEALTH MAINTENANCE LETTER (OUTPATIENT)
Age: 50
End: 2020-03-15

## 2020-03-18 RX ORDER — LEVOTHYROXINE SODIUM 112 UG/1
112 TABLET ORAL DAILY
Qty: 90 TABLET | Refills: 3 | Status: SHIPPED | OUTPATIENT
Start: 2020-03-18 | End: 2021-03-09

## 2020-03-23 ENCOUNTER — VIRTUAL VISIT (OUTPATIENT)
Dept: ENDOCRINOLOGY | Facility: CLINIC | Age: 50
End: 2020-03-23

## 2020-03-23 DIAGNOSIS — E10.9 TYPE 1 DIABETES MELLITUS WITHOUT COMPLICATION (H): ICD-10-CM

## 2020-03-24 NOTE — PROGRESS NOTES
[facility] Endocrinology- Outpatient Diabetes Follow up    Dali is a 48 year old female who presents for follow up of her TIDM and hypothyroidism.   She followed up with Dr. Mckeon 12/17/20. She was up to 1 unit per 7g CHO for mealtime insulin; this is much higher than when she last followed up with me.   She endorsed at this follow up persistent postprandial hyperglycemia; she had been administering mealtime insulin after her meals, and giving insulin for postprandial BG checks, leading to intermittent hypoglycemia. She was instructed to give carb coverage 15 minutes before her meal, and to administer correction dose within 1 hour of eating if BG were still elevated.     Her A1c was most recently 7.4% last December.     Follow up was done today via phone encounter, due to high risk patients implementing social distancing due to COVID 19. No updated labs were drawn for the same reason on this date.    Trena reports that since follow up in December, she has had a prolonged respiratory illness for which she sought medical attention and was told it was a viral illness. She was given a burst of Prednisone at the need of December and has a lot of trouble with hyperglycemia due to that. She did ramp up her insulin dosing (tresiba was up to 20 units daily) but still had issues until 2-3 days following ending of steroids. She is still reporting a dry hacking cough. No fevers, chills, and no shortness of breath. No headaches, nausea, or vomiting. No chest pain. She is using a steroid inhaler and albuterol inhaler daily with some relief in symptoms. She was out of work for 11 days. She is now working from home. She has a pulmonology follow up within the next 2 weeks.     She reports that at a blood draw in February, she developed numbness of her right middle finger due to fingerstick testing done by a provider there. It was painful, and since, she has had persistent numbness of her finger. No weakness or muscle atrophy. She  is wondering what can be done about this/seeking input regarding if/when it will improve.     Regarding her BG, she reports better overall control in the past month, and this is reflected in her Dexcom printout. She has been giving insulin at the beginning of meals. She has missed a couple doses of mealtime insulin in the past month. She is having a pattern of pre-bedtime BG that seem to drop precipitously, and then increase from midnight to 3AM. She does report trying to correct these lows before bed often, and has been sucking on a lot of cough drops (some are sugar free, some are not) before bed. She is worried because she feels less symptomatic/sensitive to lows than she had previously. Has Dexcom alerts set for lows.     Current Diabetes Regimen:   Tresiba 16 units daily   Trulicity 1.5mg (max dose) sub Q weekly (on Sundays)  Boluses with the help of AccuCheck Expert meter:  Insulin to Carb ratio:  1 unit per 7 grams CHO  Correction Factor:     1 unit drops BG 30 mg/mL  Targets:  Overnight:   and during the Daytime:   Active Insulin Time:  3.5 hours.     Glucometer Settings  Glucometer type: Dexcom G4  Average glucose: 155 (from 166 last time)  SD: 67 (stable from last time)  Time in Range: 63% of time (from 61% last time)  Documented hypoglycemia: pre-bedtime <70 about 4% of the time.    Physical Activity: challenging due to psoriatic arthritis and a recent ankle injury,  working with PT regularly  Nutrition: tolerating three meals daily.     Diabetes Care  Retinopathy: yes, mild non-proliferative. Due for annual eye exam this year .     Nephropathy:No known hx microalbuminuria. Creatinine 0.6 (stable). Taking ACEi/ARB: no    Neuropathy: no    Foot Exam: no wounds or ulcers    Lipids: Taking ASA: yes, statin: yes  LDL Cholesterol Calculated   Date Value Ref Range Status   12/18/2018 92 <100 mg/dL Final     Comment:     Desirable:       <100 mg/dl      ROS: 10 point review of systems completed;  pertinent positives and negatives documented in HPI    Allergies  Allergies   Allergen Reactions     Sulfasalazine      Developed rash, HA, dizziness       Medications  Current Outpatient Medications   Medication Sig Dispense Refill     albuterol (PROAIR HFA/PROVENTIL HFA/VENTOLIN HFA) 108 (90 Base) MCG/ACT inhaler Inhale 2 puffs into the lungs every 4 hours as needed for shortness of breath / dyspnea or wheezing 1 Inhaler 11     aspirin 81 MG tablet Take 1 tablet by mouth daily.       blood glucose (ACCU-CHEK COLIN PLUS) test strip Test Blood Sugar 8 times daily or as directed 800 strip 3     blood glucose monitoring (ACCU-CHEK FASTCLIX) lancets Use to test blood sugar 8 times daily or as directed. 4 Box 3     Calcium Carbonate-Vitamin D (CALCIUM + D PO) Take one daily       citalopram (CELEXA) 20 MG tablet Take 1.5 tablets (30 mg) by mouth daily 135 tablet 1     Continuous Blood Gluc Sensor (DEXCOM G5 MOB/G4 PLAT SENSOR) MISC 1 each every 7 days 12 each 3     Continuous Blood Gluc Transmit (DEXCOM G4 PLATINUM TRANSMITTER) MISC 1 each every 6 months 1 each 1     diclofenac (VOLTAREN) 1 % topical gel APPLY 2 GRAMS ONTO THE SKIN FOUR TIMES DAILY AS NEEDED FOR MODERATE PAIN 100 g 5     dulaglutide (TRULICITY) 1.5 MG/0.5ML pen Inject 1.5 mg Subcutaneous every 7 days 3 mL 1     econazole nitrate 1 % cream Apply 0.5 inches topically daily.       fish oil-omega-3 fatty acids (FISH OIL) 1000 MG capsule Take one daily       folic acid (FOLVITE) 1 MG tablet Take 1 tablet (1 mg) by mouth daily 90 tablet 2     insulin degludec (TRESIBA) 100 UNIT/ML pen Inject 15 units subcutaneous at hs. 15 mL 11     insulin pen needle (B-D U/F) 31G X 8 MM miscellaneous Use 5 pen needles daily 450 each 3     Ketoconazole (NIZORAL PO) Take  by mouth. Shampoo daily       levothyroxine (SYNTHROID/LEVOTHROID) 112 MCG tablet Take 1 tablet (112 mcg) by mouth daily 90 tablet 3     methotrexate 2.5 MG tablet Take 6 tablets (15 mg) by mouth once a week  24 tablet 1     methylphenidate (METADATE CD) 20 MG CR capsule Take 20 mg by mouth daily       Minoxidil (ROGAINE EX) Externally apply  topically. daily       mometasone-formoterol (DULERA) 100-5 MCG/ACT inhaler Inhale 2 puffs into the lungs 2 times daily 3 Inhaler 11     montelukast (SINGULAIR) 5 MG chewable tablet Take 1 tablet (5 mg) by mouth At Bedtime 90 tablet 3     Multiple Vitamin (MULTIVITAMIN OR) Take  by mouth. Take one daily tab       NOVOLOG PENFILL 100 UNIT/ML soln 1 unit per 15 grams CHO at all meals and snacks with correction scale of 1 unit per 50 mg/dL over 150. Average daily use is 30  units. 30 mL 4     simvastatin (ZOCOR) 20 MG tablet Take 1 tablet (20 mg) by mouth At Bedtime 90 tablet 3     traZODone (DESYREL) 50 MG tablet Take 1-2 tablets by mouth nightly as needed for sleep. 180 tablet 0       Family History  family history includes Alcoholism in her father; Anxiety Disorder in her father; Arthritis in her father; Breast Cancer in her mother; C.A.D. in her father and maternal grandmother; Cancer in her paternal grandfather; Cardiac Sudden Death in her maternal grandfather; Cerebrovascular Disease in her paternal grandmother; Circulatory in her father; Coronary Artery Disease in her father and maternal grandmother; Diabetes in her father, maternal grandmother, and another family member; Eye Disorder in her father; Gynecology in an other family member; Heart Disease in her father, maternal grandfather, maternal grandmother, and paternal grandfather; Hypothyroidism in her father; Lipids in her father; Macular Degeneration in her father and maternal aunt; Rheumatoid Arthritis in her father; Thyroid Disease in her father and another family member.    Social History   reports that she has never smoked. She has never used smokeless tobacco. She reports current alcohol use. She reports that she does not use drugs.     Past Medical History  Past Medical History:   Diagnosis Date     Anemia       Anxiety      Arthritis 2014    Psoriatic arthritis     Back injury 1988     Dry eye syndrome      Dyslipidemia      Endometriosis 2002    adehsions seen at laparoscopy     GERD (gastroesophageal reflux disease)      Hypothyroidism     8 yoa     SOB (shortness of breath) 3/11/2014     Type I (juvenile type) diabetes mellitus without mention of complication, not stated as uncontrolled     when 15      Uncomplicated asthma Fall 2013       Past Surgical History:   Procedure Laterality Date     C REPAIR CRUCIATE LIGAMENT,KNEE       C STOMACH SURGERY PROCEDURE UNLISTED  December 2002     COLONOSCOPY  2002     ESOPHAGOSCOPY, GASTROSCOPY, DUODENOSCOPY (EGD), COMBINED  1/7/2014    Procedure: COMBINED ESOPHAGOSCOPY, GASTROSCOPY, DUODENOSCOPY (EGD), BIOPSY SINGLE OR MULTIPLE;;  Surgeon: Kraig Nicole MD;  Location:  GI     GYN SURGERY      Laparotomy to remove endometrial tissue     HC BREATH HYDROGEN TEST N/A 6/17/2014    Procedure: HYDROGEN BREATH TEST;  Surgeon: Deacon Alberts MD;  Location:  GI     HYDROGEN BREATH TEST  6/17/14     LAPAROSCOPIC APPENDECTOMY  2002     LAPAROTOMY, LYSIS ADHESIONS, COMBINED  2002    endometriosis     SOFT TISSUE SURGERY  December 2014    right ankle tendon injury       Physical Exam  No vitals done this visit, telephone follow up.     GENERAL : In no apparent distress talking on phone. Voice is somewhat hoarse.  RESP: normal respiratory effort. Occasional dry hacking cough noted.   NEURO: awake, alert, responds appropriately to questions.      RESULTS    Lab Results   Component Value Date    A1C 7.2 09/16/2013    A1C 7.6 06/04/2013    A1C 6.8 02/15/2013    A1C 7.4 12/19/2012    A1C 7.4 09/10/2012       Creatinine   Date Value Ref Range Status   01/07/2020 0.60 0.52 - 1.04 mg/dL Final     GFR Estimate   Date Value Ref Range Status   01/07/2020 >90 >60 mL/min/[1.73_m2] Final     Comment:     Non  GFR Calc  Starting 12/18/2018, serum creatinine based estimated GFR  (eGFR) will be   calculated using the Chronic Kidney Disease Epidemiology Collaboration   (CKD-EPI) equation.       Microalbumin Urine   Date Value Ref Range Status   06/26/2009 5@ MG/L      Hemoglobin A1C   Date Value Ref Range Status   09/16/2013 7.2 (A) 4.3 - 6.0 % Final     Potassium   Date Value Ref Range Status   04/27/2016 4.1 3.4 - 5.3 mmol/L Final     ALT   Date Value Ref Range Status   01/07/2020 44 0 - 50 U/L Final     AST   Date Value Ref Range Status   01/07/2020 28 0 - 45 U/L Final     TSH   Date Value Ref Range Status   12/17/2019 1.26 0.40 - 4.00 mU/L Final     T4 Total   Date Value Ref Range Status   11/29/2010 8.5 5.0 - 11.0 ug/dL Final     T4 Free   Date Value Ref Range Status   03/21/2019 1.09 0.76 - 1.46 ng/dL Final       TSH   Date Value Ref Range Status   12/17/2019 1.26 0.40 - 4.00 mU/L Final   06/18/2019 4.48 (H) 0.40 - 4.00 mU/L Final   03/21/2019 0.19 (L) 0.40 - 4.00 mU/L Final   12/18/2018 0.12 (L) 0.40 - 4.00 mU/L Final   01/09/2018 0.63 0.40 - 4.00 mU/L Final     T4 Total   Date Value Ref Range Status   11/29/2010 8.5 5.0 - 11.0 ug/dL Final   09/24/2008 7.3 5.0 - 11.0 ug/dL Final     T4 Free   Date Value Ref Range Status   03/21/2019 1.09 0.76 - 1.46 ng/dL Final   07/09/2014 1.31 0.70 - 1.85 ng/dL Final   09/24/2008 7.3@ ng/dL        Creatinine   Date Value Ref Range Status   01/07/2020 0.60 0.52 - 1.04 mg/dL Final       Recent Labs   Lab Test 12/18/18  1610 09/27/13  0928 02/15/13  0935   CHOL 194 211* 224*   HDL 91 78 77   LDL 92 123 135*   TRIG 54 51 63   CHOLHDLRATIO  --  2.7 2.9       No results found for: [id number], [id number], [id number]      ASSESSMENT/PLAN:    1. Diabetes type I, well controlled,  MNPDR and ? gastroparesis.    -pattern of lower trending glucoses pre-bedtime, with change in timing of insulin to before meals.    -reduce dinner Novolog to 1:9g CHO, continue 1:7g CHO with breakfast and lunch   -continue Tresiba 16 units daily HS   -continue Trulicity 1.5 mg  weekly (some benefit of use in studies for TIDM with micro secretion, and for weight control)   -discussed importance of seeking medical care for prolonged respiratory illness; [facility] website given to her and she will consider reaching out to pulm provider for advice.    -recommended use of sugar free cough drops.   -I told Dali I would reach out to colleagues regarding finger numbness and next course of action/anticipated time to improvement- suspect nerve injury related to fingerstick at blood drive.    2. Hypothyroidism:    -most recent TSH 0.19> 4.4>1.2 with reduction in her LevoT4 dose from 125mcg daily to 112 mcg daily recently.    -has not been symptomatic, will reassess in further outpatient follow up.     Refills today:  -Tresiba   -Trulicity     Follow up:  1. With Dr Mckeon in June as previously scheduled.   2. Diabetes Educator follow up: PRN    The patient is  enrolled in [employer] services    This patient has met [place] community measures for diabetes control: no (D5: Control blood pressure ,Lower bad cholesterol Maintain blood sugar, Be tobacco-free, Take aspirin as recommended)    This patient is eligible for graduation from [facility] End[facility] clinic: no    I spent 25 minutes with this patient via telephone call.     CONSTANTINE Reddy, PA-C  [facility]   Pager [phone]

## 2020-03-26 DIAGNOSIS — E10.3291 TYPE 1 DIABETES MELLITUS WITH MILD NONPROLIFERATIVE RETINOPATHY OF RIGHT EYE, MACULAR EDEMA PRESENCE UNSPECIFIED (H): ICD-10-CM

## 2020-03-26 RX ORDER — SIMVASTATIN 20 MG
20 TABLET ORAL AT BEDTIME
Qty: 90 TABLET | Refills: 0 | Status: SHIPPED | OUTPATIENT
Start: 2020-03-26 | End: 2020-06-25

## 2020-03-27 DIAGNOSIS — J45.30 MILD PERSISTENT ASTHMA WITHOUT COMPLICATION: ICD-10-CM

## 2020-03-27 RX ORDER — MONTELUKAST SODIUM 5 MG/1
5 TABLET, CHEWABLE ORAL AT BEDTIME
Qty: 90 TABLET | Refills: 3 | Status: SHIPPED | OUTPATIENT
Start: 2020-03-27 | End: 2021-03-19

## 2020-04-07 ENCOUNTER — VIRTUAL VISIT (OUTPATIENT)
Dept: PULMONOLOGY | Facility: CLINIC | Age: 50
End: 2020-04-07
Attending: INTERNAL MEDICINE
Payer: COMMERCIAL

## 2020-04-07 DIAGNOSIS — J68.3 REACTIVE AIRWAYS DYSFUNCTION SYNDROME (H): ICD-10-CM

## 2020-04-07 DIAGNOSIS — J45.40 MODERATE PERSISTENT ASTHMA WITHOUT COMPLICATION: Primary | ICD-10-CM

## 2020-04-07 NOTE — PROGRESS NOTES
"Dali Martines is a 49 year old female who is being evaluated via a billable video visit.      The patient has been notified of following:     \"This video visit will be conducted via a call between you and your physician/provider. We have found that certain health care needs can be provided without the need for an in-person physical exam.  This service lets us provide the care you need with a video conversation.  If a prescription is necessary we can send it directly to your pharmacy.  If lab work is needed we can place an order for that and you can then stop by our lab to have the test done at a later time.    Video visits are billed at different rates depending on your insurance coverage.  Please reach out to your insurance provider with any questions.    If during the course of the call the physician/provider feels a video visit is not appropriate, you will not be charged for this service.\"    Patient has given verbal consent for Video visit? Yes    Patient would like the video invitation sent by: Send to e-mail at: [email or web address]    Video Start Time: 3:29 PM    Dali Martines complains of  Asthma follow up visit    I have reviewed and updated the patient's Past Medical History, Social History, Family History and Medication List.    ALLERGIES  Sulfasalazine    Additional provider notes: Dali Martines is a 49-year-old female with type 1 diabetes who was clinically diagnosed with asthma as an adult.  She has been struggling with breathing the last few months, started in December precipitated by cooking stir redman with steam/smoke. She didn't have inhalers with her, didn't even have a rescue inhaler. It was the worse reaction she ever had, felt like she was going to pass out. The next day she was able to get albuterol and it did help, but she was using it every two hours. Ultimately resolved with steroids. Then in late February she had a severe cough, fatigue, chills without fever, she was out sick for a " "week then had residual cough for weeks. She was questioning whether she had it. Last few weeks she has not needed rescue inhaler but last week she had a milder similar spell while standing over the stove. She has been doing peak flow values lately, \"always the same, no matter how 'weird' my lungs feel\" around 450.   1.  Asthma.  Mild intermittent asthma. Recent episodes suggestive of RADS after smoke exposure while cooking stir redman.   Up to date on vaccinations.    - She is on Dulera 2 puffs BID,  rinse and gargle afterwards and use spacer. Improved hoarseness with conscientious rinse and gargle. Continue at this dose until next visit, then reassess for de-escalation of therapy  - Singulair for asthma maintenance, 5mg because she has noticed constipation with full dose      Video-Visit Details    Type of service:  Video Visit    Video End Time (time video stopped): Stop: 04/07/2020 04:02 pm (33 min)    Originating Location (pt. Location): Home    Distant Location (provider location):  [facility]     Mode of Communication:  Video Conference via [employer]      Sara Carter MD      "

## 2020-04-20 DIAGNOSIS — M25.50 PAIN IN JOINT, MULTIPLE SITES: ICD-10-CM

## 2020-04-20 DIAGNOSIS — E11.620 NLD (NECROBIOSIS LIPOIDICA DIABETICORUM) (H): ICD-10-CM

## 2020-04-20 DIAGNOSIS — L40.50 PSORIATIC ARTHRITIS (H): ICD-10-CM

## 2020-04-20 DIAGNOSIS — M77.9 TENDINITIS: ICD-10-CM

## 2020-04-20 NOTE — TELEPHONE ENCOUNTER
methotrexate 2.5 MG tablet       Last Written Prescription Date:  2-17-20  Last Fill Quantity: 24,   # refills: 1  Last Office Visit: 1-7-20  Future Office visit:  -1-5-21    CBC RESULTS:   Recent Labs   Lab Test 01/07/20  1454   WBC 5.0   RBC 3.84   HGB 12.0   HCT 36.7   MCV 96   MCH 31.3   MCHC 32.7   RDW 12.9          Creatinine   Date Value Ref Range Status   01/07/2020 0.60 0.52 - 1.04 mg/dL Final   ]    Liver Function Studies -   Recent Labs   Lab Test 01/07/20  1454  04/27/16  1402   PROTTOTAL  --   --  8.0   ALBUMIN 4.1   < > 4.0   BILITOTAL  --   --  0.3   ALKPHOS  --   --  108   AST 28   < > 21   ALT 44   < > 25    < > = values in this interval not displayed.       Routing refill request to provider for review/approval because:  Per Protocol  Last labs: 1-7-20: FYI to clinic RN

## 2020-04-28 NOTE — TELEPHONE ENCOUNTER
Contacted pt to remind her that she is due for labs. She will get them when the stay at home orders have been lifted. Labs were last done 2/17/2020. Orders are up to date.    ANGÉLICA WillettN RN  Rheumatology Care Coordinator  [facility]

## 2020-05-18 DIAGNOSIS — E10.9 TYPE 1 DIABETES MELLITUS WITHOUT COMPLICATION (H): ICD-10-CM

## 2020-05-18 RX ORDER — INSULIN ASPART 100 [IU]/ML
INJECTION, SOLUTION INTRAVENOUS; SUBCUTANEOUS
Qty: 30 ML | Refills: 4 | Status: SHIPPED | OUTPATIENT
Start: 2020-05-18 | End: 2021-06-16

## 2020-05-18 NOTE — TELEPHONE ENCOUNTER
NOVOLOG PENFILL 3ML CARTRIDGE(ORNG)     Last Written Prescription Date:  5/8/2020  Last Fill Quantity: 30,   # refills: 4  Last Office Visit : 3/23/2020  Future Office visit:  6/23/2020    Routing refill request to provider for review/approval because:  Drug not on the [facility], [facility] or [facility] refill protocol or controlled substance    Randa Espinoza RN  Central Triage Red Flags/Med Refills

## 2020-05-25 ENCOUNTER — NURSE TRIAGE (OUTPATIENT)
Dept: NURSING | Facility: CLINIC | Age: 50
End: 2020-05-25

## 2020-05-25 ENCOUNTER — VIRTUAL VISIT (OUTPATIENT)
Dept: FAMILY MEDICINE | Facility: OTHER | Age: 50
End: 2020-05-25
Payer: COMMERCIAL

## 2020-05-25 PROCEDURE — 99421 OL DIG E/M SVC 5-10 MIN: CPT | Performed by: PHYSICIAN ASSISTANT

## 2020-05-25 NOTE — PROGRESS NOTES
"Date: 2020 14:55:23  Clinician: Bryan Mckinney  Clinician NPI: [id number]  Patient: Dali Martines  Patient : 1970  Patient Address: [facility] [place] [address]  Patient Phone: [phone]  Visit Protocol: URI  Patient Summary:  Dali is a 49 year old ( : 1970 ) female who initiated a Visit for COVID-19 (Coronavirus) evaluation and screening. When asked the question \"Please sign me up to receive news, health information and promotions from [employer].\", Dali responded \"No\".    Dali states her symptoms started suddenly 5-6 days ago. After her symptoms started, they improved and then got worse again.   Her symptoms consist of nausea, chills, a cough, a headache, and malaise. She is experiencing mild difficulty breathing with activities but can speak normally in full sentences. Dali also feels feverish but was unable to measure her temperature.   Symptom details     Cough: Dali coughs a few times an hour and her cough is more bothersome at night. Phlegm does not come into her throat when she coughs. She does not believe her cough is caused by post-nasal drip.     Headache: She states the headache is mild (1-3 on a 10 point pain scale).      Dali denies having teeth pain, ageusia, diarrhea, facial pain or pressure, sore throat, wheezing, nasal congestion, vomiting, rhinitis, ear pain, myalgias, and anosmia. She also denies having recent facial or sinus surgery in the past 60 days, taking antibiotic medication for the symptoms, and having a sinus infection within the past year.   Precipitating events  She has not recently been exposed to someone with influenza. Dali has been in close contact with the following high risk individuals: people with asthma, heart disease or diabetes and adults 65 or older.   Pertinent COVID-19 (Coronavirus) information  In the past 14 days, Dali has not worked in a congregate living setting.   She does not work or volunteer as healthcare worker " or a  and does not work or volunteer in a healthcare facility.   Dali also has not lived in a congregate living setting in the past 14 days. She does not live with a healthcare worker.   Dali has not had a close contact with a laboratory-confirmed COVID-19 patient within 14 days of symptom onset.   Pertinent medical history  Dali does not get yeast infections when she takes antibiotics.   Dali does not need a return to work/school note.   Weight: 180 lbs   Dali does not smoke or use smokeless tobacco.   Additional information as reported by the patient (free text): I originally got suddenly ill with these symptoms--dry cough, fatigue, body chills, mental fog, aches, chest tightness--in early March. I never had a high fever so didn't think it was covid-19. The cough lingered throughout March, and the other symptoms eased within 2 weeks. I never felt fully back to health. The symptoms resumed again last Wednesday.   Weight: 180 lbs    MEDICATIONS: Trulicity subcutaneous, Tresiba FlexTouch U-100 subcutaneous, albuterol sulfate inhalation, levothyroxine oral, Dulera inhalation, insulin lispro subcutaneous, ALLERGIES: sulfasalazine  Clinician Response:  Dear Dali,   Dear Dali  Your symptoms show that you may have coronavirus (COVID-19). This illness can cause fever, cough and trouble breathing. Many people get a mild case and get better on their own. Some people can get very sick.  What should I do?  We would like to test you for this virus. This will be a curbside test done outside the clinic.  Please call [phone] to schedule your visit. Explain that you were referred by [facility] to have a COVID-19 test. Be ready to share your [facility] visit ID number.  Starting now:  Stay at least 6 feet away from others. (If someone will drive you to your test, stay in the backseat, as far away from the  as you can.)   Don't go to work, school or anywhere else. When it's time for your test, go  "straight to the testing site. Don't make any stops on the way there or back.   Wash your hands and face often. Use soap and water.   Cover your mouth and nose with a mask, tissue or washcloth.   Don't touch anyone. No hugging, kissing or handshakes.  While at home   Stay home and away from others (self-isolate) until:  You've had no fever---and no medicine that reduces fever---for 3 full days (72 hours). And...  Your other symptoms have gotten better. For example, your cough or breathing has improved. And...  At least 10 days have passed since your symptoms started.  During this time:  Stay in your own room (and use your own bathroom), if you can.  Don't go to work, school or anywhere else.  Stay away from others in your home. No hugging, kissing or shaking hands.  Don't let anyone visit.  Cover your mouth and nose with a mask, tissue or washcloth to avoid spreading germs.  Clean \"high touch\" surfaces often (doorknobs, counters, handles, etc.). Use a household cleaning spray or wipes.  Wash your hands and face often. Use soap and water.  How can I take care of myself?  1. Get lots of rest. Drink extra fluids (unless your doctor has told you not to).  2. Take Tylenol (acetaminophen) for fever or pain. If you have liver or kidney problems, ask your family doctor if it's okay to take Tylenol.  Adults can take either:   650 mg (two 325 mg pills) every 4 to 6 hours, or...  1,000 mg (two 500 mg pills) every 8 hours as needed.   Note: Don't take more than 3,000 mg in one day.   Acetaminophen is found in many medicines (both prescribed and over-the-counter medicines). Read all labels to be sure you don't take too much.   For children, check the Tylenol bottle for the right dose. The dose is based on the child's age or weight.  3. If you have other health problems (like cancer, heart failure, an organ transplant or severe kidney disease): Call your specialty clinic if you don't feel better in the next 2 days.  4. Know when " to call 911: If your breathing is so bad that it keeps you from doing normal activities, call 911 or go to the emergency room. Tell them that you've been staying home and may have COVID-19.  5. Sign up for [employer]. We know it's scary to hear that you might have COVID-19. We want to track your symptoms to make sure you're okay over the next 2 weeks. Please look for an email from [employer]---this is a free, online program that we'll use to keep in touch. To sign up, follow the link in the email. Learn more at [email or web address].  6. The following will serve as your written order for this Covid Test ordered by me for the indication of suspected Covid [Z20.828]: The test will be ordered in [employer], our electronic health record after you are scheduled and will show as ordered and authorized by Adi Schaffer MD   Order: Covid-19 (Coronavirus) PCR for SYMPTOMATIC testing from [facility]  Where can I get more information?  To learn more about COVID-19 and how to care for yourself at home, please visit the CDC website at https://www.cdc.gov/coronavirus/2019-ncov/about/steps-when-sick.html.  For more about your care at [facility], please visit [email or web address]  If you'd like to be part of a COVID-19 clinical trial (research study) at the [facility], go to [email or web address] for details.    Diagnosis: Mild intermittent asthma with status asthmaticus  Diagnosis ICD: J45.22

## 2020-05-25 NOTE — TELEPHONE ENCOUNTER
Chills     Fatigue    Symptoms started again this wed    Also has a cough    Temp current is 98.9    Chest tightness    Patient has asthma and she is diabatic     Is using her inhalers bid     COVID 19 Nurse Triage Plan/Patient Instructions    Please be aware that novel coronavirus (COVID-19) may be circulating in the community. If you develop symptoms such as fever, cough, or SOB or if you have concerns about the presence of another infection including coronavirus (COVID-19), please contact your health care provider or visit [email or web address].     Disposition/Instructions    Patient to have an [facility] Visit with a provider (Preferred option). Follow System Ambulatory Workflow for COVID 19.     To do this follow these instructions:    1. Go to the website [email or web address]  2. Create an account (you will need your insurance information)  3. Start a new visit  4. Choose your diagnosis (e.g. COVID19)  5. Fill out the information about your symptoms  6. A provider will reach out to you by text, phone call or video visit based on your request    Call Back If: Your symptoms worsen before you are able to complete your [facility] Visit with a provider.    Thank you for limiting contact with others, wearing a simple mask to cover your cough, practice good hand hygiene habits and accessing our virtual services where possible to limit the spread of this virus.    For more information about COVID19 and options for caring for yourself at home, please visit the CDC website at https://www.cdc.gov/coronavirus/2019-ncov/about/steps-when-sick.html  For more options for care at [facility], please visit our website at [email or web address]    For more information, please use the [place] Department of Health COVID-19 Website: https://www.health.state.[place]/diseases/coronavirus/index.html  [facility] ([facility]) COVID-19 Hotlines (Interpreters available):      Health questions: Phone Number:  [phone] or [phone] and Hours: 7 a.m. to 7 p.m.    Schools and  questions: Phone Number: [phone] or [phone] and Hours 7 a.m. to 7 p.m.          Jana Mars RN          Additional Information    Negative: Severe difficulty breathing (e.g., struggling for each breath, speaks in single words)    Negative: Bluish (or gray) lips or face now    Negative: [1] Rapid onset of cough AND [2] has hives    Negative: Coughing started suddenly after medicine, an allergic food or bee sting    Negative: [1] Difficulty breathing AND [2] exposure to flames, smoke, or fumes    Negative: [1] Stridor AND [2] difficulty breathing    Negative: Sounds like a life-threatening emergency to the triager    Negative: Chest pain  (Exception: MILD central chest pain, present only when coughing)    Negative: Difficulty breathing    Negative: Patient sounds very sick or weak to the triager    Negative: [1] Coughed up blood AND [2] > 1 tablespoon (15 ml) (Exception: blood-tinged sputum)    Negative: Fever > 103 F (39.4 C)    Negative: [1] Fever > 101 F (38.3 C) AND [2] age > 60    Negative: [1] Fever > 100.0 F (37.8 C) AND [2] bedridden (e.g., nursing home patient, CVA, chronic illness, recovering from surgery)    Negative: [1] Fever > 100.0 F (37.8 C) AND [2] diabetes mellitus or weak immune system (e.g., HIV positive, cancer chemo, splenectomy, organ transplant, chronic steroids)    Negative: Wheezing is present    Negative: [1] Ankle swelling AND [2] swelling is increasing    SEVERE coughing spells (e.g., whooping sound after coughing, vomiting after coughing)    Protocols used: COUGH - ACUTE NON-PRODUCTIVE-A-AH

## 2020-05-28 DIAGNOSIS — Z20.822 SUSPECTED COVID-19 VIRUS INFECTION: Primary | ICD-10-CM

## 2020-05-28 PROCEDURE — 99000 SPECIMEN HANDLING OFFICE-LAB: CPT | Performed by: FAMILY MEDICINE

## 2020-05-28 PROCEDURE — 87635 SARS-COV-2 COVID-19 AMP PRB: CPT | Mod: 90 | Performed by: FAMILY MEDICINE

## 2020-05-28 PROCEDURE — 99207 ZZC NO BILLABLE SERVICE THIS VISIT: CPT

## 2020-05-29 LAB
SARS-COV-2 RNA SPEC QL NAA+PROBE: NOT DETECTED
SPECIMEN SOURCE: NORMAL

## 2020-06-10 DIAGNOSIS — Z11.59 ENCOUNTER FOR SCREENING FOR OTHER VIRAL DISEASES: Primary | ICD-10-CM

## 2020-06-18 DIAGNOSIS — L40.50 PSORIATIC ARTHRITIS (H): ICD-10-CM

## 2020-06-18 DIAGNOSIS — M77.9 TENDINITIS: ICD-10-CM

## 2020-06-18 DIAGNOSIS — M25.50 PAIN IN JOINT, MULTIPLE SITES: ICD-10-CM

## 2020-06-18 DIAGNOSIS — Z51.81 ENCOUNTER FOR THERAPEUTIC DRUG MONITORING: ICD-10-CM

## 2020-06-18 LAB
BASOPHILS # BLD AUTO: 0 10E9/L (ref 0–0.2)
BASOPHILS NFR BLD AUTO: 0.4 %
CRP SERPL-MCNC: <2.9 MG/L (ref 0–8)
DIFFERENTIAL METHOD BLD: ABNORMAL
EOSINOPHIL # BLD AUTO: 0.1 10E9/L (ref 0–0.7)
EOSINOPHIL NFR BLD AUTO: 1 %
ERYTHROCYTE [DISTWIDTH] IN BLOOD BY AUTOMATED COUNT: 13.2 % (ref 10–15)
HCT VFR BLD AUTO: 35.3 % (ref 35–47)
HGB BLD-MCNC: 11.7 G/DL (ref 11.7–15.7)
LYMPHOCYTES # BLD AUTO: 2.2 10E9/L (ref 0.8–5.3)
LYMPHOCYTES NFR BLD AUTO: 32.4 %
MCH RBC QN AUTO: 31.6 PG (ref 26.5–33)
MCHC RBC AUTO-ENTMCNC: 33.1 G/DL (ref 31.5–36.5)
MCV RBC AUTO: 95 FL (ref 78–100)
MONOCYTES # BLD AUTO: 0.6 10E9/L (ref 0–1.3)
MONOCYTES NFR BLD AUTO: 8.5 %
NEUTROPHILS # BLD AUTO: 4 10E9/L (ref 1.6–8.3)
NEUTROPHILS NFR BLD AUTO: 57.7 %
PLATELET # BLD AUTO: 311 10E9/L (ref 150–450)
RBC # BLD AUTO: 3.7 10E12/L (ref 3.8–5.2)
WBC # BLD AUTO: 6.9 10E9/L (ref 4–11)

## 2020-06-18 PROCEDURE — 84450 TRANSFERASE (AST) (SGOT): CPT | Performed by: INTERNAL MEDICINE

## 2020-06-18 PROCEDURE — 82565 ASSAY OF CREATININE: CPT | Performed by: INTERNAL MEDICINE

## 2020-06-18 PROCEDURE — 36415 COLL VENOUS BLD VENIPUNCTURE: CPT | Performed by: INTERNAL MEDICINE

## 2020-06-18 PROCEDURE — 84460 ALANINE AMINO (ALT) (SGPT): CPT | Performed by: INTERNAL MEDICINE

## 2020-06-18 PROCEDURE — 82040 ASSAY OF SERUM ALBUMIN: CPT | Performed by: INTERNAL MEDICINE

## 2020-06-18 PROCEDURE — 86140 C-REACTIVE PROTEIN: CPT | Performed by: INTERNAL MEDICINE

## 2020-06-18 PROCEDURE — 85025 COMPLETE CBC W/AUTO DIFF WBC: CPT | Performed by: INTERNAL MEDICINE

## 2020-06-19 LAB
ALBUMIN SERPL-MCNC: 3.9 G/DL (ref 3.4–5)
ALT SERPL W P-5'-P-CCNC: 33 U/L (ref 0–50)
AST SERPL W P-5'-P-CCNC: 23 U/L (ref 0–45)
CREAT SERPL-MCNC: 0.56 MG/DL (ref 0.52–1.04)
GFR SERPL CREATININE-BSD FRML MDRD: >90 ML/MIN/{1.73_M2}

## 2020-06-19 ASSESSMENT — ENCOUNTER SYMPTOMS
SNORES LOUDLY: 0
SORE THROAT: 0
HOARSE VOICE: 1
COUGH DISTURBING SLEEP: 1
MUSCLE WEAKNESS: 0
SINUS PAIN: 0
FEVER: 0
SMELL DISTURBANCE: 0
WEIGHT LOSS: 0
DIFFICULTY URINATING: 0
WEIGHT GAIN: 1
HEMATURIA: 1
JOINT SWELLING: 0
SINUS CONGESTION: 0
FATIGUE: 1
CHILLS: 1
POSTURAL DYSPNEA: 0
HALLUCINATIONS: 0
ALTERED TEMPERATURE REGULATION: 1
ARTHRALGIAS: 1
NECK MASS: 0
SHORTNESS OF BREATH: 1
WHEEZING: 0
DEPRESSION: 0
NERVOUS/ANXIOUS: 1
DYSURIA: 0
BACK PAIN: 1
COUGH: 1
POLYDIPSIA: 0
HEMOPTYSIS: 0
PANIC: 0
MYALGIAS: 0
TROUBLE SWALLOWING: 0
FLANK PAIN: 0
DECREASED CONCENTRATION: 0
STIFFNESS: 1
DECREASED APPETITE: 0
DYSPNEA ON EXERTION: 0
NECK PAIN: 0
SPUTUM PRODUCTION: 0
MUSCLE CRAMPS: 0
INSOMNIA: 1
INCREASED ENERGY: 1

## 2020-06-20 DIAGNOSIS — Z11.59 ENCOUNTER FOR SCREENING FOR OTHER VIRAL DISEASES: ICD-10-CM

## 2020-06-20 PROCEDURE — 99000 SPECIMEN HANDLING OFFICE-LAB: CPT | Performed by: COLON & RECTAL SURGERY

## 2020-06-20 PROCEDURE — 99207 ZZC NO BILLABLE SERVICE THIS VISIT: CPT

## 2020-06-20 PROCEDURE — U0003 INFECTIOUS AGENT DETECTION BY NUCLEIC ACID (DNA OR RNA); SEVERE ACUTE RESPIRATORY SYNDROME CORONAVIRUS 2 (SARS-COV-2) (CORONAVIRUS DISEASE [COVID-19]), AMPLIFIED PROBE TECHNIQUE, MAKING USE OF HIGH THROUGHPUT TECHNOLOGIES AS DESCRIBED BY CMS-2020-01-R: HCPCS | Mod: 90 | Performed by: COLON & RECTAL SURGERY

## 2020-06-21 LAB
SARS-COV-2 RNA SPEC QL NAA+PROBE: NOT DETECTED
SPECIMEN SOURCE: NORMAL

## 2020-06-22 NOTE — PROGRESS NOTES
"Week of__/__/__ Date  _6_/_22_  Date  _6_/_21_ Date  _6_/_19_ Date  _6_/_18_ Date  _6_/_17_ Date  _6_/_16_ Date  _6_/15__    Time BG Time BG Time BG Time BG Time BG Time BG Time BG    2:21am 370 1:38am 177 8:20pm 279 1:23am 158 8:51am 212 11:46pm 210 10:28pm 91          1:35am 244     8:17pm 84                5:09pm 176                2pm 136     Week of__/__/__ Date  _6_/_14_  Date  _6_/13__ Date  _6_/12__ Date  __6/_11_ Date  __/__ Date  __/__ Date  __/__    Time BG Time BG Time BG Time BG Time BG Time BG Time BG    9:48pm 114 7:51pm 246 7:32pm 255 12:51am 166          2pm 82 2:28pm 174 8:16am 156 2:51am 253          8am 203 11;446pm 244 1:29am 128                                Dali Martines is a 49 year old female who is being evaluated via a billable video visit.      The patient has been notified of following:     \"This video visit will be conducted via a call between you and your physician/provider. We have found that certain health care needs can be provided without the need for an in-person physical exam.  This service lets us provide the care you need with a video conversation.  If a prescription is necessary we can send it directly to your pharmacy.  If lab work is needed we can place an order for that and you can then stop by our lab to have the test done at a later time.    Video visits are billed at different rates depending on your insurance coverage.  Please reach out to your insurance provider with any questions.    If during the course of the call the physician/provider feels a video visit is not appropriate, you will not be charged for this service.\"    Patient has given verbal consent for Video visit?yes           Video-Visit Detail    This 49 year old woman was seen using [facility]  for f/u of her type 1 diabetes. She also has hypothyroidism and psoriatic arthritis. She is currently taking Tresiba 17 units daily.      Insulin to Carb ratio:  1 unit per 7 grams CHO  Correction Factor:     1 unit " drops BG 30 mg/mL     During the last 3 months, she has had 3 episodes that she attributes to a viral infection.  She had a dry cough with chills in March and ultimately had a COVID test.  It was negative.  She had a recurrence of the symptoms again in April and again in May but they were much less severe.  Each time they appeared, she felt her blood sugar is much more difficult to control.    She wears her Dex com sensor nearly all the time she wore it 83% of the time during the last 4 weeks.. her sensor average was 177.  She was between  49% of the time, above 180 45% of the time, and 54-70 3% and < 54 3%  of the time. Calculated A1c is 7.5%    Looking at the individual days so that she is usually in target during the day but will often go up after her evening meal and remain elevated till morning.  This happens about half of the time.  The nights this does not happen, she remains in target.  She thinks this particularly happens when she eats food like pizza or pasta.  She thinks she just has really slow gastric emptying and this is the cause of the high sugars.  She admits she does not always remember to give her pre-meal insulin 15 minutes before her meal.  She knows this is probably contributing to these postprandial highs.  During the day her sugars come to target after breakfast and lunch.  They also come to target after dinner intermittently.  She has no problems with hypoglycemia.  Because her sugars have been high, she is gradually increased her Tresiba to its current dose.  She last increased it about a week ago.    She remains on the Trulicity and wonders if this is contributing to her occasional gastric emptying problems.  She is currently on 1.5 mg a week.  She was off the drug for a few weeks earlier this year and found that her gastric emptying was better.  She also had less symptoms of reflux.  However, she thought her sugars were not quite as good as they are on the Trulicity.  She does not  remember having as much trouble taking Symlin with respect to her gastric emptying.  She wonders if we should return to taking.    She has seen her eye doctor and has no vision problems.  She has no paresthesias in her feet.  She has no chest pain or shortness of breath.  Mood is okay.    Current Outpatient Medications   Medication     albuterol (PROAIR HFA/PROVENTIL HFA/VENTOLIN HFA) 108 (90 Base) MCG/ACT inhaler     aspirin 81 MG tablet     blood glucose (ACCU-CHEK COLIN PLUS) test strip     blood glucose monitoring (ACCU-CHEK FASTCLIX) lancets     Calcium Carbonate-Vitamin D (CALCIUM + D PO)     citalopram (CELEXA) 20 MG tablet     Continuous Blood Gluc Sensor (DEXCOM G5 MOB/G4 PLAT SENSOR) MISC     Continuous Blood Gluc Transmit (DEXCOM G4 PLATINUM TRANSMITTER) MISC     diclofenac (VOLTAREN) 1 % topical gel     dulaglutide (TRULICITY) 1.5 MG/0.5ML pen     econazole nitrate 1 % cream     fish oil-omega-3 fatty acids (FISH OIL) 1000 MG capsule     folic acid (FOLVITE) 1 MG tablet     insulin aspart (FIASP FLEXTOUCH) 100 UNIT/ML pen-injector     insulin degludec (TRESIBA) 100 UNIT/ML pen     insulin pen needle (B-D U/F) 31G X 8 MM miscellaneous     Ketoconazole (NIZORAL PO)     levothyroxine (SYNTHROID/LEVOTHROID) 112 MCG tablet     methotrexate 2.5 MG tablet     methylphenidate (METADATE CD) 20 MG CR capsule     Minoxidil (ROGAINE EX)     mometasone-formoterol (DULERA) 100-5 MCG/ACT inhaler     montelukast (SINGULAIR) 5 MG chewable tablet     Multiple Vitamin (MULTIVITAMIN OR)     NOVOLOG PENFILL 100 UNIT/ML soln     simvastatin (ZOCOR) 20 MG tablet     traZODone (DESYREL) 50 MG tablet     Current Facility-Administered Medications   Medication     betamethasone acet & sod phos (CELESTONE) injection 6 mg       Social history-she has been involved in the care of her elderly parents.  She is felt under great stress because of this given the COVID pandemic.    On exam she is no acute distress.  She has no periorbital  edema.  Cranial nerves are grossly intact.  Mood is neutral.  Language is articulate and of normal content.    Recent Labs   Lab Test 06/18/20  1456 01/07/20  1454 12/17/19  1631 12/17/19  1626 12/17/19 06/18/19  1556 06/18/19 03/21/19  1359  12/18/18  1617 12/18/18  1610  07/09/14  1641  09/27/13  0928 09/16/13  1612  06/04/13  1555   A1C  --   --   --   --   --   --   --   --   --   --   --   --   --   --   --  7.2*  --  7.6*   HEMOGLOBINA1  --   --   --   --  7.4*  --  7.0*  --    < >  --   --    < >  --    < >  --   --   --   --    TSH  --   --   --  1.26  --  4.48*  --  0.19*  --   --  0.12*   < > 0.11*   < >  --   --    < >  --    T4  --   --   --   --   --   --   --  1.09  --   --   --   --  1.31  --   --   --   --   --    LDL  --   --   --   --   --   --   --   --   --   --  92  --   --   --  123  --   --   --    HDL  --   --   --   --   --   --   --   --   --   --  91  --   --   --  78  --   --   --    TRIG  --   --   --   --   --   --   --   --   --   --  54  --   --   --  51  --   --   --    CR 0.56 0.60  --   --   --  0.49*  --   --    < >  --   --    < >  --    < >  --   --   --   --    MICROL  --   --  <5  --   --   --   --   --   --  6  --    < >  --    < >  --   --   --   --     < > = values in this interval not displayed.     Assessment and plan:    1.  Diabetes control.  Her calculated A1c is not too bad but she does have a lot of hyperglycemia after dinner and overnight.  This happens about 50% of the time.  It is possible that this could be improved if she got her mealtime insulin earlier.  I suggested she try Fiasp at the same dose.  I explained it provides more insulin action in the first 30 minutes after administration.  It is also possible that the Trulicity is contributing to this problem.  I suggested she try to cut the dose in half.  This should still provide some benefit on the sugars.  I will give her a new prescription for this.  If we continue to see problems with her hyperglycemia at  night, we may want to switch her to Symlin at her next visit.  I also talked with her about using the tandem control IQ pump with a Dexcom.  She is been reluctant to use a pump in the past but has checked this machine out previously.  If she continues to have less than optimal glucose control at our next visit, she may want to move forward with ordering this pump.    2.  Diabetes complications.  Her renal function is normal.  She sees her eye doctor regularly and has no serious eye disease.  She has no symptoms of paresthesias today.    3.upper respiratory illness.  It is possible she had COVID.  I will check a antibody test.  If she is positive, she will feel more comfortable visiting her elderly parents.    4.CVD risk.  She is on a statin.  Blood pressure has been well controlled in the past.    5.hypothyroidism.  Her TSH was checked in the last few months and was in target.    She should see Shakira Sebastian in 3 months and me in 6 months for follow-up.    I spent a total of 32 minutes on the phone with this patient.  I used the and well platform to do the video visit.    Felicia Mckeon MD

## 2020-06-23 ENCOUNTER — VIRTUAL VISIT (OUTPATIENT)
Dept: ENDOCRINOLOGY | Facility: CLINIC | Age: 50
End: 2020-06-23
Payer: COMMERCIAL

## 2020-06-23 ENCOUNTER — HOSPITAL ENCOUNTER (OUTPATIENT)
Facility: CLINIC | Age: 50
Discharge: HOME | End: 2020-06-23
Attending: COLON & RECTAL SURGERY | Admitting: COLON & RECTAL SURGERY
Payer: COMMERCIAL

## 2020-06-23 VITALS
HEART RATE: 61 BPM | SYSTOLIC BLOOD PRESSURE: 135 MMHG | HEIGHT: 70 IN | BODY MASS INDEX: 26.48 KG/M2 | WEIGHT: 185 LBS | OXYGEN SATURATION: 98 % | RESPIRATION RATE: 7 BRPM | DIASTOLIC BLOOD PRESSURE: 83 MMHG

## 2020-06-23 DIAGNOSIS — E10.9 TYPE 1 DIABETES MELLITUS WITHOUT COMPLICATION (H): Primary | ICD-10-CM

## 2020-06-23 DIAGNOSIS — E10.3291 TYPE 1 DIABETES MELLITUS WITH MILD NONPROLIFERATIVE RETINOPATHY OF RIGHT EYE, MACULAR EDEMA PRESENCE UNSPECIFIED (H): ICD-10-CM

## 2020-06-23 LAB — COLONOSCOPY: NORMAL

## 2020-06-23 PROCEDURE — G0105 COLORECTAL SCRN; HI RISK IND: HCPCS | Performed by: COLON & RECTAL SURGERY

## 2020-06-23 PROCEDURE — 25000128 H RX IP 250 OP 636: Performed by: COLON & RECTAL SURGERY

## 2020-06-23 PROCEDURE — 99153 MOD SED SAME PHYS/QHP EA: CPT | Performed by: COLON & RECTAL SURGERY

## 2020-06-23 PROCEDURE — 45378 DIAGNOSTIC COLONOSCOPY: CPT | Performed by: COLON & RECTAL SURGERY

## 2020-06-23 PROCEDURE — G0500 MOD SEDAT ENDO SERVICE >5YRS: HCPCS | Performed by: COLON & RECTAL SURGERY

## 2020-06-23 RX ORDER — FENTANYL CITRATE 50 UG/ML
INJECTION, SOLUTION INTRAMUSCULAR; INTRAVENOUS PRN
Status: DISCONTINUED | OUTPATIENT
Start: 2020-06-23 | End: 2020-06-23 | Stop reason: HOSPADM

## 2020-06-23 RX ORDER — ONDANSETRON 4 MG/1
4 TABLET, ORALLY DISINTEGRATING ORAL EVERY 6 HOURS PRN
Status: DISCONTINUED | OUTPATIENT
Start: 2020-06-23 | End: 2020-06-23 | Stop reason: HOSPADM

## 2020-06-23 RX ORDER — ONDANSETRON 2 MG/ML
4 INJECTION INTRAMUSCULAR; INTRAVENOUS EVERY 6 HOURS PRN
Status: DISCONTINUED | OUTPATIENT
Start: 2020-06-23 | End: 2020-06-23 | Stop reason: HOSPADM

## 2020-06-23 RX ORDER — INSULIN ASPART INJECTION 100 [IU]/ML
INJECTION, SOLUTION SUBCUTANEOUS
Qty: 3 ML | Refills: 3 | Status: SHIPPED | OUTPATIENT
Start: 2020-06-23 | End: 2021-07-21

## 2020-06-23 RX ORDER — FLUMAZENIL 0.1 MG/ML
0.2 INJECTION, SOLUTION INTRAVENOUS
Status: DISCONTINUED | OUTPATIENT
Start: 2020-06-23 | End: 2020-06-23 | Stop reason: HOSPADM

## 2020-06-23 RX ORDER — ONDANSETRON 2 MG/ML
4 INJECTION INTRAMUSCULAR; INTRAVENOUS
Status: DISCONTINUED | OUTPATIENT
Start: 2020-06-23 | End: 2020-06-23 | Stop reason: HOSPADM

## 2020-06-23 RX ORDER — LIDOCAINE 40 MG/G
CREAM TOPICAL
Status: DISCONTINUED | OUTPATIENT
Start: 2020-06-23 | End: 2020-06-23 | Stop reason: HOSPADM

## 2020-06-23 RX ORDER — DIPHENHYDRAMINE HCL 25 MG
25 CAPSULE ORAL EVERY 4 HOURS PRN
Status: DISCONTINUED | OUTPATIENT
Start: 2020-06-23 | End: 2020-06-23 | Stop reason: HOSPADM

## 2020-06-23 RX ORDER — NALOXONE HYDROCHLORIDE 0.4 MG/ML
.1-.4 INJECTION, SOLUTION INTRAMUSCULAR; INTRAVENOUS; SUBCUTANEOUS
Status: DISCONTINUED | OUTPATIENT
Start: 2020-06-23 | End: 2020-06-23 | Stop reason: HOSPADM

## 2020-06-23 RX ORDER — DIPHENHYDRAMINE HYDROCHLORIDE 50 MG/ML
25 INJECTION INTRAMUSCULAR; INTRAVENOUS EVERY 4 HOURS PRN
Status: DISCONTINUED | OUTPATIENT
Start: 2020-06-23 | End: 2020-06-23 | Stop reason: HOSPADM

## 2020-06-23 ASSESSMENT — MIFFLIN-ST. JEOR: SCORE: 1534.4

## 2020-06-23 NOTE — H&P
Pre-Endoscopy History and Physical     Dali Martines MRN# [id number]   YOB: 1970 Age: 49 year old     Date of Procedure: 06/23/20  Primary care provider: Dimitri Alas  Type of Endoscopy: Colonoscopy  Reason for Procedure: family history of colon polyps age < 60, previous aborted scope for poor prep, personal history of polyps (TA in 2019)  Type of Anesthesia Anticipated: Moderate Sedation    HPI:    Dali is a 49 year old female who will be undergoing the above procedure.      A history and physical has been performed. The patient's medications and allergies have been reviewed. The risks and benefits of the procedure and the sedation options and risks were discussed with the patient.  All questions were answered and informed consent was obtained.      She denies a personal or family history of anesthesia complications or bleeding disorders.     Allergies   Allergen Reactions     Sulfasalazine      Developed rash, HA, dizziness        No current facility-administered medications on file prior to encounter.   albuterol (PROAIR HFA/PROVENTIL HFA/VENTOLIN HFA) 108 (90 Base) MCG/ACT inhaler, Inhale 2 puffs into the lungs every 4 hours as needed for shortness of breath / dyspnea or wheezing  aspirin 81 MG tablet, Take 1 tablet by mouth daily.  Calcium Carbonate-Vitamin D (CALCIUM + D PO), Take one daily  citalopram (CELEXA) 20 MG tablet, Take 1.5 tablets (30 mg) by mouth daily  diclofenac (VOLTAREN) 1 % topical gel, APPLY 2 GRAMS ONTO THE SKIN FOUR TIMES DAILY AS NEEDED FOR MODERATE PAIN  fish oil-omega-3 fatty acids (FISH OIL) 1000 MG capsule, Take one daily  folic acid (FOLVITE) 1 MG tablet, Take 1 tablet (1 mg) by mouth daily  levothyroxine (SYNTHROID/LEVOTHROID) 112 MCG tablet, Take 1 tablet (112 mcg) by mouth daily  methylphenidate (METADATE CD) 20 MG CR capsule, Take 20 mg by mouth daily  Minoxidil (ROGAINE EX), Externally apply  topically. daily  mometasone-formoterol (DULERA) 100-5 MCG/ACT  inhaler, Inhale 2 puffs into the lungs 2 times daily  montelukast (SINGULAIR) 5 MG chewable tablet, Take 1 tablet (5 mg) by mouth At Bedtime  Multiple Vitamin (MULTIVITAMIN OR), Take  by mouth. Take one daily tab  simvastatin (ZOCOR) 20 MG tablet, Take 1 tablet (20 mg) by mouth At Bedtime  traZODone (DESYREL) 50 MG tablet, Take 1-2 tablets by mouth nightly as needed for sleep.  blood glucose (ACCU-CHEK COLIN PLUS) test strip, Test Blood Sugar 8 times daily or as directed  blood glucose monitoring (ACCU-CHEK FASTCLIX) lancets, Use to test blood sugar 8 times daily or as directed.  Continuous Blood Gluc Sensor (DEXCOM G5 MOB/G4 PLAT SENSOR) MISC, 1 each every 7 days  Continuous Blood Gluc Transmit (DEXCOM G4 PLATINUM TRANSMITTER) MISC, 1 each every 6 months  dulaglutide (TRULICITY) 1.5 MG/0.5ML pen, Inject 1.5 mg Subcutaneous every 7 days  econazole nitrate 1 % cream, Apply 0.5 inches topically daily.  insulin degludec (TRESIBA) 100 UNIT/ML pen, Inject 16 units subcutaneous at hs.  insulin pen needle (B-D U/F) 31G X 8 MM miscellaneous, Use 5 pen needles daily  Ketoconazole (NIZORAL PO), Take  by mouth. Shampoo daily  methotrexate 2.5 MG tablet, Take 6 tablets (15 mg) by mouth every 7 days  NOVOLOG PENFILL 100 UNIT/ML soln, INJECT 1 UNIT PER 15 GRAMS CHO AT ALL MEALS AND SNACKS WITH CORRECTION SCALE OF 1 UNIT PER 50 MG/DL OVER 150, AVERAGE DAILY USE OF 30 UNITS  [] predniSONE (DELTASONE) 5 MG tablet, Take 4 tablets (20 mg) by mouth daily for 5 days        Patient Active Problem List   Diagnosis     GERD (gastroesophageal reflux disease)     CARDIOVASCULAR SCREENING; LDL GOAL LESS THAN 100     Type 1 diabetes mellitus with retinopathy (H)     Anxiety     NLD (necrobiosis lipoidica diabeticorum) (H)     Cutaneous skin tags     Cervicalgia     Chronic low back pain     SOB (shortness of breath)     Encounter for other specified aftercare     PAIN FOOT     Diabetes (H)     Screening for other eye conditions     PAIN  KNEE JOINT     Enthesopathy     Pain in joint, multiple sites     Chronic pain of right ankle     Right foot pain     ESR raised     Swelling of limb     Other postprocedural status(V45.89)     Left knee pain     Type 1 diabetes mellitus without complication (H)     Psoriatic arthritis (H)     Psoriasis with arthropathy (H)     History of adenomatous polyp of colon     Glaucoma suspect of both eyes        Past Medical History:   Diagnosis Date     Anemia      Anxiety      Arthritis 2014    Psoriatic arthritis     Back injury 1988     Dry eye syndrome      Dyslipidemia      Endometriosis 2002    adehsions seen at laparoscopy     GERD (gastroesophageal reflux disease)      Hypothyroidism     8 yoa     SOB (shortness of breath) 3/11/2014     Type I (juvenile type) diabetes mellitus without mention of complication, not stated as uncontrolled     when 15      Uncomplicated asthma Fall 2013        Past Surgical History:   Procedure Laterality Date     C REPAIR CRUCIATE LIGAMENT,KNEE       C STOMACH SURGERY PROCEDURE UNLISTED  December 2002     COLONOSCOPY  2002     ESOPHAGOSCOPY, GASTROSCOPY, DUODENOSCOPY (EGD), COMBINED  1/7/2014    Procedure: COMBINED ESOPHAGOSCOPY, GASTROSCOPY, DUODENOSCOPY (EGD), BIOPSY SINGLE OR MULTIPLE;;  Surgeon: Kraig Nicole MD;  Location: [facility]     GYN SURGERY      Laparotomy to remove endometrial tissue     HC BREATH HYDROGEN TEST N/A 6/17/2014    Procedure: HYDROGEN BREATH TEST;  Surgeon: Deacon Alberts MD;  Location:  GI     HYDROGEN BREATH TEST  6/17/14     LAPAROSCOPIC APPENDECTOMY  2002     LAPAROTOMY, LYSIS ADHESIONS, COMBINED  2002    endometriosis     SOFT TISSUE SURGERY  December 2014    right ankle tendon injury       Social History     Tobacco Use     Smoking status: Never Smoker     Smokeless tobacco: Never Used   Substance Use Topics     Alcohol use: Yes     Alcohol/week: 0.0 standard drinks     Comment: moderately       Family History   Problem Relation Age of Onset  "    Breast Cancer Mother      Heart Disease Father      C.A.D. Father      Arthritis Father      Circulatory Father      Eye Disorder Father      Lipids Father      Thyroid Disease Father      Macular Degeneration Father      Coronary Artery Disease Father      Anxiety Disorder Father      Alcoholism Father      Rheumatoid Arthritis Father      Hypothyroidism Father      Diabetes Father      Cerebrovascular Disease Paternal Grandmother         ,     Cancer Paternal Grandfather         Pancreatic     Heart Disease Paternal Grandfather      Diabetes Maternal Grandmother         Type 2     C.A.D. Maternal Grandmother      Heart Disease Maternal Grandmother      Coronary Artery Disease Maternal Grandmother      Heart Disease Maternal Grandfather      Cardiac Sudden Death Maternal Grandfather      Gynecology Other         self     Thyroid Disease Other         self     Macular Degeneration Maternal Aunt      Diabetes Other         Type 1     Glaucoma No family hx of        REVIEW OF SYSTEMS:     5 point ROS negative except as noted above in HPI, including Gen., Resp., CV, GI &  system review.      PHYSICAL EXAM:   /76   Resp 16   Ht 1.778 m (5' 10\")   Wt 83.9 kg (185 lb)   SpO2 98%   BMI 26.54 kg/m   Estimated body mass index is 26.54 kg/m  as calculated from the following:    Height as of this encounter: 1.778 m (5' 10\").    Weight as of this encounter: 83.9 kg (185 lb).   GENERAL APPEARANCE: healthy and alert  MENTAL STATUS: alert  AIRWAY EXAM: Mallampatti Class II (visualization of the soft palate, fauces, and uvula)  RESP: lungs clear to auscultation - no rales, rhonchi or wheezes  CV: regular rates and rhythm      IMPRESSION   ASA Class 3 - Severe systemic disease, but not incapacitating        PLAN:     Plan for colonoscopy. We discussed the risks, benefits and alternatives and the patient wished to proceed.    The above has been forwarded to the consulting provider.      Lauryn Rivera MD  [facility]  Phone: [phone]  Fax: [phone]  June 23, 2020

## 2020-06-23 NOTE — PATIENT INSTRUCTIONS
Try to get your NovoLog 15 minutes before you eat.    I put in a prescription for Fiasp.  This insulin can be taken at the start of the meal and may give you better postmeal control.    I put in orders for the COVID antibody and an A1c test to be done at your convenience.    Reduce the Trulicity to 0.75 mg each week.  I changed your prescription.

## 2020-06-23 NOTE — OP NOTE
See Provation Note In Chart    Lauryn Rivera MD  [facility]  Office Phone # [phone]

## 2020-06-23 NOTE — LETTER
"6/23/2020       RE: Dali Martines  [address]  [facility] [address]     Dear Colleague,    Thank you for referring your patient, Dali Martines, to the [facility] at [facility]. Please see a copy of my visit note below.    Week of__/__/__ Date  _6_/_22_  Date  _6_/_21_ Date  _6_/_19_ Date  _6_/_18_ Date  _6_/_17_ Date  _6_/_16_ Date  _6_/15__    Time BG Time BG Time BG Time BG Time BG Time BG Time BG    2:21am 370 1:38am 177 8:20pm 279 1:23am 158 8:51am 212 11:46pm 210 10:28pm 91          1:35am 244     8:17pm 84                5:09pm 176                2pm 136     Week of__/__/__ Date  _6_/_14_  Date  _6_/13__ Date  _6_/12__ Date  __6/_11_ Date  __/__ Date  __/__ Date  __/__    Time BG Time BG Time BG Time BG Time BG Time BG Time BG    9:48pm 114 7:51pm 246 7:32pm 255 12:51am 166          2pm 82 2:28pm 174 8:16am 156 2:51am 253          8am 203 11;446pm 244 1:29am 128                                Dali Martines is a 49 year old female who is being evaluated via a billable video visit.      The patient has been notified of following:     \"This video visit will be conducted via a call between you and your physician/provider. We have found that certain health care needs can be provided without the need for an in-person physical exam.  This service lets us provide the care you need with a video conversation.  If a prescription is necessary we can send it directly to your pharmacy.  If lab work is needed we can place an order for that and you can then stop by our lab to have the test done at a later time.    Video visits are billed at different rates depending on your insurance coverage.  Please reach out to your insurance provider with any questions.    If during the course of the call the physician/provider feels a video visit is not appropriate, you will not be charged for this service.\"    Patient has given verbal consent for Video visit?yes "           Video-Visit Detail    This 49 year old woman was seen using [facility]  for f/u of her type 1 diabetes. She also has hypothyroidism and psoriatic arthritis. She is currently taking Tresiba 17 units daily.      Insulin to Carb ratio:  1 unit per 7 grams CHO  Correction Factor:     1 unit drops BG 30 mg/mL     During the last 3 months, she has had 3 episodes that she attributes to a viral infection.  She had a dry cough with chills in March and ultimately had a COVID test.  It was negative.  She had a recurrence of the symptoms again in April and again in May but they were much less severe.  Each time they appeared, she felt her blood sugar is much more difficult to control.    She wears her Dex com sensor nearly all the time she wore it 83% of the time during the last 4 weeks.. her sensor average was 177.  She was between  49% of the time, above 180 45% of the time, and 54-70 3% and < 54 3%  of the time. Calculated A1c is 7.5%    Looking at the individual days so that she is usually in target during the day but will often go up after her evening meal and remain elevated till morning.  This happens about half of the time.  The nights this does not happen, she remains in target.  She thinks this particularly happens when she eats food like pizza or pasta.  She thinks she just has really slow gastric emptying and this is the cause of the high sugars.  She admits she does not always remember to give her pre-meal insulin 15 minutes before her meal.  She knows this is probably contributing to these postprandial highs.  During the day her sugars come to target after breakfast and lunch.  They also come to target after dinner intermittently.  She has no problems with hypoglycemia.  Because her sugars have been high, she is gradually increased her Tresiba to its current dose.  She last increased it about a week ago.    She remains on the Trulicity and wonders if this is contributing to her occasional gastric  emptying problems.  She is currently on 1.5 mg a week.  She was off the drug for a few weeks earlier this year and found that her gastric emptying was better.  She also had less symptoms of reflux.  However, she thought her sugars were not quite as good as they are on the Trulicity.  She does not remember having as much trouble taking Symlin with respect to her gastric emptying.  She wonders if we should return to taking.    She has seen her eye doctor and has no vision problems.  She has no paresthesias in her feet.  She has no chest pain or shortness of breath.  Mood is okay.    Current Outpatient Medications   Medication     albuterol (PROAIR HFA/PROVENTIL HFA/VENTOLIN HFA) 108 (90 Base) MCG/ACT inhaler     aspirin 81 MG tablet     blood glucose (ACCU-CHEK COLIN PLUS) test strip     blood glucose monitoring (ACCU-CHEK FASTCLIX) lancets     Calcium Carbonate-Vitamin D (CALCIUM + D PO)     citalopram (CELEXA) 20 MG tablet     Continuous Blood Gluc Sensor (DEXCOM G5 MOB/G4 PLAT SENSOR) MISC     Continuous Blood Gluc Transmit (DEXCOM G4 PLATINUM TRANSMITTER) MISC     diclofenac (VOLTAREN) 1 % topical gel     dulaglutide (TRULICITY) 1.5 MG/0.5ML pen     econazole nitrate 1 % cream     fish oil-omega-3 fatty acids (FISH OIL) 1000 MG capsule     folic acid (FOLVITE) 1 MG tablet     insulin aspart (FIASP FLEXTOUCH) 100 UNIT/ML pen-injector     insulin degludec (TRESIBA) 100 UNIT/ML pen     insulin pen needle (B-D U/F) 31G X 8 MM miscellaneous     Ketoconazole (NIZORAL PO)     levothyroxine (SYNTHROID/LEVOTHROID) 112 MCG tablet     methotrexate 2.5 MG tablet     methylphenidate (METADATE CD) 20 MG CR capsule     Minoxidil (ROGAINE EX)     mometasone-formoterol (DULERA) 100-5 MCG/ACT inhaler     montelukast (SINGULAIR) 5 MG chewable tablet     Multiple Vitamin (MULTIVITAMIN OR)     NOVOLOG PENFILL 100 UNIT/ML soln     simvastatin (ZOCOR) 20 MG tablet     traZODone (DESYREL) 50 MG tablet     Current Facility-Administered  Medications   Medication     betamethasone acet & sod phos (CELESTONE) injection 6 mg       Social history-she has been involved in the care of her elderly parents.  She is felt under great stress because of this given the COVID pandemic.    On exam she is no acute distress.  She has no periorbital edema.  Cranial nerves are grossly intact.  Mood is neutral.  Language is articulate and of normal content.    Recent Labs   Lab Test 06/18/20  1456 01/07/20  1454 12/17/19  1631 12/17/19  1626 12/17/19 06/18/19  1556 06/18/19 03/21/19  1359  12/18/18  1617 12/18/18  1610  07/09/14  1641  09/27/13  0928 09/16/13  1612  06/04/13  1555   A1C  --   --   --   --   --   --   --   --   --   --   --   --   --   --   --  7.2*  --  7.6*   HEMOGLOBINA1  --   --   --   --  7.4*  --  7.0*  --    < >  --   --    < >  --    < >  --   --   --   --    TSH  --   --   --  1.26  --  4.48*  --  0.19*  --   --  0.12*   < > 0.11*   < >  --   --    < >  --    T4  --   --   --   --   --   --   --  1.09  --   --   --   --  1.31  --   --   --   --   --    LDL  --   --   --   --   --   --   --   --   --   --  92  --   --   --  123  --   --   --    HDL  --   --   --   --   --   --   --   --   --   --  91  --   --   --  78  --   --   --    TRIG  --   --   --   --   --   --   --   --   --   --  54  --   --   --  51  --   --   --    CR 0.56 0.60  --   --   --  0.49*  --   --    < >  --   --    < >  --    < >  --   --   --   --    MICROL  --   --  <5  --   --   --   --   --   --  6  --    < >  --    < >  --   --   --   --     < > = values in this interval not displayed.     Assessment and plan:    1.  Diabetes control.  Her calculated A1c is not too bad but she does have a lot of hyperglycemia after dinner and overnight.  This happens about 50% of the time.  It is possible that this could be improved if she got her mealtime insulin earlier.  I suggested she try Fiasp at the same dose.  I explained it provides more insulin action in the first 30 minutes  after administration.  It is also possible that the Trulicity is contributing to this problem.  I suggested she try to cut the dose in half.  This should still provide some benefit on the sugars.  I will give her a new prescription for this.  If we continue to see problems with her hyperglycemia at night, we may want to switch her to Symlin at her next visit.  I also talked with her about using the tandem control IQ pump with a Dexcom.  She is been reluctant to use a pump in the past but has checked this machine out previously.  If she continues to have less than optimal glucose control at our next visit, she may want to move forward with ordering this pump.    2.  Diabetes complications.  Her renal function is normal.  She sees her eye doctor regularly and has no serious eye disease.  She has no symptoms of paresthesias today.    3.upper respiratory illness.  It is possible she had COVID.  I will check a antibody test.  If she is positive, she will feel more comfortable visiting her elderly parents.    4.CVD risk.  She is on a statin.  Blood pressure has been well controlled in the past.    5.hypothyroidism.  Her TSH was checked in the last few months and was in target.    She should see Shakira Sbeastian in 3 months and me in 6 months for follow-up.    I spent a total of 32 minutes on the phone with this patient.  I used the and well platform to do the video visit.    Felicia Mckeon MD

## 2020-06-25 RX ORDER — SIMVASTATIN 20 MG
20 TABLET ORAL AT BEDTIME
Qty: 90 TABLET | Refills: 0 | Status: SHIPPED | OUTPATIENT
Start: 2020-06-25 | End: 2020-09-22

## 2020-06-25 NOTE — TELEPHONE ENCOUNTER
simvastatin (ZOCOR) 20 MG tablet   Take 1 tablet (20 mg) by mouth At Bedtime   Last Written Prescription Date:  3/26/20  Last Fill Quantity: 90,   # refills: 0  Last Office Visit : 6/23/20 virtual visit   Future Office visit:  none    90 day baldo pended.    Routing refill request to provider for review/approval because:  Overdue lab - LDL     Recent Labs   Lab Test 12/18/18  1610   LDL 92

## 2020-06-26 DIAGNOSIS — E10.9 TYPE 1 DIABETES MELLITUS WITHOUT COMPLICATION (H): ICD-10-CM

## 2020-06-26 LAB — HBA1C MFR BLD: 7.6 % (ref 0–5.6)

## 2020-06-26 PROCEDURE — 99000 SPECIMEN HANDLING OFFICE-LAB: CPT | Performed by: INTERNAL MEDICINE

## 2020-06-26 PROCEDURE — 36415 COLL VENOUS BLD VENIPUNCTURE: CPT | Performed by: INTERNAL MEDICINE

## 2020-06-26 PROCEDURE — 86769 SARS-COV-2 COVID-19 ANTIBODY: CPT | Mod: 90 | Performed by: INTERNAL MEDICINE

## 2020-06-26 PROCEDURE — 83036 HEMOGLOBIN GLYCOSYLATED A1C: CPT | Performed by: INTERNAL MEDICINE

## 2020-06-26 NOTE — LETTER
June 28, 2020        Dali JING Conrad  [address] [facility] [address]      COVID-19 Antibody Screen   Date Value Ref Range Status   06/26/2020 Negative  Final     Comment:     No COVID-19 antibodies detected.  Patients within 10 days of symptom onset for   COVID-19 may not produce sufficient levels of detectable antibodies.    Immunocompromised COVID-19 patients may take longer to develop antibodies.       COVID-19 Antibody, IgG Titer   Date Value Ref Range Status   06/26/2020 Not Applicable  Final     Comment:     Qualitative screen for total antibodies to COVID-19 (SARS-CoV-2) with   semi-quantitative measurement of IgG COVID-19 antibodies by endpoint titer.    COVID-19 antibodies may be elevated due to a past or current infection.  Negative results do not rule out COVID-19 infection.  Results from antibody   testing should not be used as the sole basis to diagnose or exclude SARS-CoV-2   infection or to inform infection status.  COVID-19 PCR test should be ordered   if current infection is suspected.  False positive results may occur in rare   cases due to cross-reacting antibodies.  This test was developed and its performance characteristics determined by the   [facility] ([facility]),   which is regulated under CLIA as qualified to perform high-complexity testing.    This test has not been reviewed by the FDA.  Testing performed by [facility], [facility], [address], [address], [place] [address]             You have tested NEGATIVE for COVID-19 antibodies. This suggests you have not had or been exposed to COVID-19. But it does not mean that for sure.     The test finds antibodies in most people 10 days after they get sick. For some people, it takes longer than 10 days for antibodies to show up. Others may never show antibodies against COVID-19, especially if they have weak immune  systems.    If you have COVID-19 symptoms now, please stay home and away from others.     What is antibody testing?    This is a kind of blood test. We take a small sample of your blood, and then test it for something called  antibodies.      Your body makes antibodies to fight infection. If your blood has antibodies for a certain germ, it means you ve been infected with that germ in the past.     Sometimes, antibodies stay in your body for years after you ve had the infection. They can be there even if the germ didn t make you sick. They are a sign that your body fought off the infection.    Will this test find antibodies in everyone who s had COVID-19?    No. The test finds antibodies in most people 10 days after they get sick. For some people, it takes longer than 10 days for antibodies to show up. Others may never show antibodies against COVID-19, especially if they have weak immune systems.    What does it mean if the test finds COVID-19 antibodies?    If we find these antibodies, it suggests:     This person has had the virus.     Their body s immune system fought the virus.     We don t know if this will help protect someone from getting COVID-19 again. Scientists are still learning about this.    What are the signs of COVID-19?    Signs of COVID-19 can appear from 2 to 14 days (up to 2 weeks) after you re infected. Some people have no symptoms or only mild symptoms. Others get very sick. The most common symptoms are:          Cough    Shortness of breath or trouble breathing  Or at least 2 of these symptoms:    Fever    Chills    Repeated shaking with chills    Muscle pain    Headache    Sore throat    Losing your sense of taste or smell    You may have other symptoms. Please contact your doctor or clinic for any symptoms that worry you.    Where can I get more information?     To learn the [place] s guidelines for staying home, please visit the [facility] of Health website at  [email or web address].state.[place]/diseases/coronavirus/basics.html    To learn more about COVID-19 and how to care for yourself at home, please visit the CDC website at https://www.cdc.gov/coronavirus/2019-ncov/about/steps-when-sick.html    For more options for care at [facility], please visit our website at [email or web address]    [place] [facility] ([facility]) COVID-19 Hotline:  [phone]

## 2020-06-27 LAB
COVID-19 SPIKE RBD ABY TITER: NORMAL
COVID-19 SPIKE RBD ABY: NEGATIVE

## 2020-07-13 ENCOUNTER — TELEPHONE (OUTPATIENT)
Dept: ENDOCRINOLOGY | Facility: CLINIC | Age: 50
End: 2020-07-13

## 2020-07-13 DIAGNOSIS — E10.3291 TYPE 1 DIABETES MELLITUS WITH MILD NONPROLIFERATIVE RETINOPATHY OF RIGHT EYE, MACULAR EDEMA PRESENCE UNSPECIFIED (H): Primary | ICD-10-CM

## 2020-07-13 RX ORDER — DULAGLUTIDE 0.75 MG/.5ML
0.75 INJECTION, SOLUTION SUBCUTANEOUS
Qty: 2 ML | Refills: 6 | Status: SHIPPED | OUTPATIENT
Start: 2020-07-13 | End: 2021-02-22

## 2020-07-13 NOTE — TELEPHONE ENCOUNTER
New script routed to Dr Mckeon for 0.75 trulicity pen.She was on 1.5 weekly but dose decreased .Orquidea Waterman RN on 7/13/2020 at 4:35 PM

## 2020-07-13 NOTE — TELEPHONE ENCOUNTER
[facility] Call Center    Phone Message    May a detailed message be left on voicemail: yes     Reason for Call: Medication Question or concern regarding medication   Prescription Clarification  Name of Medication: dulaglutide (TRULICITY) 1.5 MG/0.5ML pen   Prescribing Provider: Dr. Mckeon   Pharmacy: [id number] - [place], [place] - [address]    What on the order needs clarification? Needs a call back to clarify dosage. Prescription states 1.5 MG, but the pen is .75 mg. Please call to discuss at [phone].     Action Taken: Message routed to:  [facility]    Travel Screening: Not Applicable

## 2020-08-18 ENCOUNTER — VIRTUAL VISIT (OUTPATIENT)
Dept: PULMONOLOGY | Facility: CLINIC | Age: 50
End: 2020-08-18
Attending: INTERNAL MEDICINE
Payer: COMMERCIAL

## 2020-08-18 DIAGNOSIS — J45.40 MODERATE PERSISTENT ASTHMA WITHOUT COMPLICATION: Primary | ICD-10-CM

## 2020-08-18 NOTE — LETTER
"    8/18/2020         RE: Dali Martines  [address]  [facility] [address]        Dear Colleague,    Thank you for referring your patient, Dali Martines, to the [facility]. Please see a copy of my visit note below.    Dali Martines is a 49 year old female who is being evaluated via a billable video visit.        Video-Visit Details    Type of service:  Video Visit    Video Start Time: 3:10  Video End Time: 3:33 PM    Originating Location (pt. Location): Home    Distant Location (provider location):  [facility]     Platform used for Video Visit: Mark Anthony Carter MD      Reason for Visit  Dali Martines is a 48 year old female who is followed for asthma  Pulmonary HPI  No \"incidents\" since May since her exposure to stir redman smoke. She had a few other incidents with the stove/steam and smoke from cooking but none since May. She had a 3-week episode in March with cough, then fever and chills. Then, in May she had another one, but it lasted 2 days. She did get antibody testing in June that was negative. She has been doing peak flow values lately, they are better closer to 500 (480 today). Thinks she is now back to the way she was before the stir redman incident. She has noticed some issues with voice, raspy, has lost it a few times. Maybe it's too many Zoom calls. She is diligent with rinsing after inhalers. NO nasal symptoms or indications of infection. She has chronic acid reflux and duodenal ulcer, she has been more stressed and noticed more stomach issues. She is not on acid blockers now.     The patient was seen and examined by Sara Carter MD   Current Outpatient Medications   Medication     albuterol (PROAIR HFA/PROVENTIL HFA/VENTOLIN HFA) 108 (90 Base) MCG/ACT inhaler     aspirin 81 MG tablet     blood glucose (ACCU-CHEK COLIN PLUS) test strip     blood glucose monitoring (ACCU-CHEK FASTCLIX) lancets     Calcium " Carbonate-Vitamin D (CALCIUM + D PO)     citalopram (CELEXA) 20 MG tablet     Continuous Blood Gluc Sensor (DEXCOM G5 MOB/G4 PLAT SENSOR) MISC     Continuous Blood Gluc Transmit (DEXCOM G4 PLATINUM TRANSMITTER) MISC     diclofenac (VOLTAREN) 1 % topical gel     dulaglutide (TRULICITY) 0.75 MG/0.5ML pen     dulaglutide (TRULICITY) 1.5 MG/0.5ML pen     econazole nitrate 1 % cream     fish oil-omega-3 fatty acids (FISH OIL) 1000 MG capsule     folic acid (FOLVITE) 1 MG tablet     insulin aspart (FIASP FLEXTOUCH) 100 UNIT/ML pen-injector     insulin degludec (TRESIBA) 100 UNIT/ML pen     insulin pen needle (B-D U/F) 31G X 8 MM miscellaneous     Ketoconazole (NIZORAL PO)     levothyroxine (SYNTHROID/LEVOTHROID) 112 MCG tablet     methotrexate 2.5 MG tablet     methylphenidate (METADATE CD) 20 MG CR capsule     Minoxidil (ROGAINE EX)     mometasone-formoterol (DULERA) 100-5 MCG/ACT inhaler     montelukast (SINGULAIR) 5 MG chewable tablet     Multiple Vitamin (MULTIVITAMIN OR)     NOVOLOG PENFILL 100 UNIT/ML soln     simvastatin (ZOCOR) 20 MG tablet     traZODone (DESYREL) 50 MG tablet     Current Facility-Administered Medications   Medication     betamethasone acet & sod phos (CELESTONE) injection 6 mg     Allergies   Allergen Reactions     Sulfasalazine      Developed rash, HA, dizziness     Social History     Socioeconomic History     Marital status: Single     Spouse name: Not on file     Number of children: 0     Years of education: Not on file     Highest education level: Not on file   Occupational History     Occupation: research     Employer: [employer]   Social Needs     Financial resource strain: Not on file     Food insecurity     Worry: Not on file     Inability: Not on file     Transportation needs     Medical: Not on file     Non-medical: Not on file   Tobacco Use     Smoking status: Never Smoker     Smokeless tobacco: Never Used   Substance and Sexual Activity     Alcohol use: Yes     Alcohol/week:  0.0 standard drinks     Comment: moderately     Drug use: No     Sexual activity: Not on file   Lifestyle     Physical activity     Days per week: Not on file     Minutes per session: Not on file     Stress: Not on file   Relationships     Social connections     Talks on phone: Not on file     Gets together: Not on file     Attends Sabianist service: Not on file     Active member of club or organization: Not on file     Attends meetings of clubs or organizations: Not on file     Relationship status: Not on file     Intimate partner violence     Fear of current or ex partner: Not on file     Emotionally abused: Not on file     Physically abused: Not on file     Forced sexual activity: Not on file   Other Topics Concern     Parent/sibling w/ CABG, MI or angioplasty before 65F 55M? Yes   Social History Narrative     Not on file     Past Medical History:   Diagnosis Date     Anemia      Anxiety      Arthritis 2014    Psoriatic arthritis     Back injury 1988     Dry eye syndrome      Dyslipidemia      Endometriosis 2002    adehsions seen at laparoscopy     GERD (gastroesophageal reflux disease)      Hypothyroidism     8 yoa     SOB (shortness of breath) 3/11/2014     Type I (juvenile type) diabetes mellitus without mention of complication, not stated as uncontrolled     when 15      Uncomplicated asthma Fall 2013     ROS Pulmonary  Dyspnea: No, Cough: No, Chest pain: Yes, Wheezing: No, Sputum Production: No, Hemoptysis: No  A complete ROS was otherwise negative except as noted in the HPI.  There were no vitals taken for this visit.  Exam:   GENERAL APPEARANCE: Well developed, well nourished, alert, and in no apparent distress.  NEURO: Mentation intact, speech normal,   PSYCH: mentation appears normal. and affect normal/bright  Results:  Stress echo 12/30/14 normal  PFTs normal 3/14/16     Assessment and plan: Dali Martines is a 49-year-old female with type 1 diabetes who was clinically diagnosed with asthma as an adult.   She has been intermittently sedentary due to repeated lower extremity ankle injury, surgery, poor wound healing.   1.  Asthma.  Mild intermittent asthma. Better control (cough) and Dulera side effects (hoarseness)   Up to date on vaccinations.    - She will continue Dulera, daily use if needed, rinse and gargle afterwards and use spacer  - Singulair for asthma maintenance, 5mg because she has noticed constipation with full dose      I will see her back in clinic in 6 months.         Again, thank you for allowing me to participate in the care of your patient.        Sincerely,        Sara Carter MD

## 2020-08-18 NOTE — PATIENT INSTRUCTIONS
Continue current plan.  Consider elevating head of bed to prevent reflux onto vocal cords, which could be causing hoarseness.

## 2020-08-18 NOTE — PROGRESS NOTES
"Dali Martines is a 49 year old female who is being evaluated via a billable video visit.      The patient has been notified of following:     \"This video visit will be conducted via a call between you and your physician/provider. We have found that certain health care needs can be provided without the need for an in-person physical exam.  This service lets us provide the care you need with a video conversation.  If a prescription is necessary we can send it directly to your pharmacy.  If lab work is needed we can place an order for that and you can then stop by our lab to have the test done at a later time.    Video visits are billed at different rates depending on your insurance coverage.  Please reach out to your insurance provider with any questions.    If during the course of the call the physician/provider feels a video visit is not appropriate, you will not be charged for this service.\"    Patient has given verbal consent for Video visit? Yes  How would you like to obtain your AVS? MyChart  If you are dropped from the video visit, the video invite should be resent to: Other e-mail: [email or web address]  Will anyone else be joining your video visit? No        Video-Visit Details    Type of service:  Video Visit    Video Start Time: 3:10  Video End Time: 3:33 PM    Originating Location (pt. Location): Home    Distant Location (provider location):  [facility]     Platform used for Video Visit: Mark Anthony Carter MD      Reason for Visit  Dali Martines is a 48 year old female who is followed for asthma  Pulmonary HPI  No \"incidents\" since May since her exposure to stir redman smoke. She had a few other incidents with the stove/steam and smoke from cooking but none since May. She had a 3-week episode in March with cough, then fever and chills. Then, in May she had another one, but it lasted 2 days. She did get antibody testing in June that was negative. She has been doing peak flow " values lately, they are better closer to 500 (480 today). Thinks she is now back to the way she was before the stir redman incident. She has noticed some issues with voice, raspy, has lost it a few times. Maybe it's too many Zoom calls. She is diligent with rinsing after inhalers. NO nasal symptoms or indications of infection. She has chronic acid reflux and duodenal ulcer, she has been more stressed and noticed more stomach issues. She is not on acid blockers now.     The patient was seen and examined by Sara Carter MD   Current Outpatient Medications   Medication     albuterol (PROAIR HFA/PROVENTIL HFA/VENTOLIN HFA) 108 (90 Base) MCG/ACT inhaler     aspirin 81 MG tablet     blood glucose (ACCU-CHEK COLIN PLUS) test strip     blood glucose monitoring (ACCU-CHEK FASTCLIX) lancets     Calcium Carbonate-Vitamin D (CALCIUM + D PO)     citalopram (CELEXA) 20 MG tablet     Continuous Blood Gluc Sensor (DEXCOM G5 MOB/G4 PLAT SENSOR) MISC     Continuous Blood Gluc Transmit (DEXCOM G4 PLATINUM TRANSMITTER) MISC     diclofenac (VOLTAREN) 1 % topical gel     dulaglutide (TRULICITY) 0.75 MG/0.5ML pen     dulaglutide (TRULICITY) 1.5 MG/0.5ML pen     econazole nitrate 1 % cream     fish oil-omega-3 fatty acids (FISH OIL) 1000 MG capsule     folic acid (FOLVITE) 1 MG tablet     insulin aspart (FIASP FLEXTOUCH) 100 UNIT/ML pen-injector     insulin degludec (TRESIBA) 100 UNIT/ML pen     insulin pen needle (B-D U/F) 31G X 8 MM miscellaneous     Ketoconazole (NIZORAL PO)     levothyroxine (SYNTHROID/LEVOTHROID) 112 MCG tablet     methotrexate 2.5 MG tablet     methylphenidate (METADATE CD) 20 MG CR capsule     Minoxidil (ROGAINE EX)     mometasone-formoterol (DULERA) 100-5 MCG/ACT inhaler     montelukast (SINGULAIR) 5 MG chewable tablet     Multiple Vitamin (MULTIVITAMIN OR)     NOVOLOG PENFILL 100 UNIT/ML soln     simvastatin (ZOCOR) 20 MG tablet     traZODone (DESYREL) 50 MG tablet     Current Facility-Administered Medications    Medication     betamethasone acet & sod phos (CELESTONE) injection 6 mg     Allergies   Allergen Reactions     Sulfasalazine      Developed rash, HA, dizziness     Social History     Socioeconomic History     Marital status: Single     Spouse name: Not on file     Number of children: 0     Years of education: Not on file     Highest education level: Not on file   Occupational History     Occupation: research     Employer: [employer]   Social Needs     Financial resource strain: Not on file     Food insecurity     Worry: Not on file     Inability: Not on file     Transportation needs     Medical: Not on file     Non-medical: Not on file   Tobacco Use     Smoking status: Never Smoker     Smokeless tobacco: Never Used   Substance and Sexual Activity     Alcohol use: Yes     Alcohol/week: 0.0 standard drinks     Comment: moderately     Drug use: No     Sexual activity: Not on file   Lifestyle     Physical activity     Days per week: Not on file     Minutes per session: Not on file     Stress: Not on file   Relationships     Social connections     Talks on phone: Not on file     Gets together: Not on file     Attends Mosque service: Not on file     Active member of club or organization: Not on file     Attends meetings of clubs or organizations: Not on file     Relationship status: Not on file     Intimate partner violence     Fear of current or ex partner: Not on file     Emotionally abused: Not on file     Physically abused: Not on file     Forced sexual activity: Not on file   Other Topics Concern     Parent/sibling w/ CABG, MI or angioplasty before 65F 55M? Yes   Social History Narrative     Not on file     Past Medical History:   Diagnosis Date     Anemia      Anxiety      Arthritis 2014    Psoriatic arthritis     Back injury 1988     Dry eye syndrome      Dyslipidemia      Endometriosis 2002    adehsions seen at laparoscopy     GERD (gastroesophageal reflux disease)      Hypothyroidism     8 yoa      SOB (shortness of breath) 3/11/2014     Type I (juvenile type) diabetes mellitus without mention of complication, not stated as uncontrolled     when 15      Uncomplicated asthma Fall 2013     ROS Pulmonary  Dyspnea: No, Cough: No, Chest pain: Yes, Wheezing: No, Sputum Production: No, Hemoptysis: No  A complete ROS was otherwise negative except as noted in the HPI.  There were no vitals taken for this visit.  Exam:   GENERAL APPEARANCE: Well developed, well nourished, alert, and in no apparent distress.  NEURO: Mentation intact, speech normal,   PSYCH: mentation appears normal. and affect normal/bright  Results:  Stress echo 12/30/14 normal  PFTs normal 3/14/16     Assessment and plan: Dali Martines is a 49-year-old female with type 1 diabetes who was clinically diagnosed with asthma as an adult.  She has been intermittently sedentary due to repeated lower extremity ankle injury, surgery, poor wound healing.   1.  Asthma.  Mild intermittent asthma. Better control (cough) and Dulera side effects (hoarseness)   Up to date on vaccinations.    - She will continue Dulera, daily use if needed, rinse and gargle afterwards and use spacer  - Singulair for asthma maintenance, 5mg because she has noticed constipation with full dose      I will see her back in clinic in 6 months.

## 2020-09-02 DIAGNOSIS — E11.620 NLD (NECROBIOSIS LIPOIDICA DIABETICORUM) (H): ICD-10-CM

## 2020-09-02 DIAGNOSIS — L40.50 PSORIATIC ARTHRITIS (H): ICD-10-CM

## 2020-09-02 DIAGNOSIS — M25.50 PAIN IN JOINT, MULTIPLE SITES: ICD-10-CM

## 2020-09-02 DIAGNOSIS — M77.9 TENDINITIS: ICD-10-CM

## 2020-09-05 NOTE — TELEPHONE ENCOUNTER
Monitoring labs required every 8-12 weeks for medication refills.  methotrexate 2.5 MG tablet       Last Written Prescription Date:  4/20/20  Last Fill Quantity: 24,   # refills: 3  Last Office Visit: 1/7/20 with recommended 6 month follow up  Future Office visit:  1/5/21    CBC RESULTS:   Recent Labs   Lab Test 06/18/20  1456   WBC 6.9   RBC 3.70*   HGB 11.7   HCT 35.3   MCV 95   MCH 31.6   MCHC 33.1   RDW 13.2          Creatinine   Date Value Ref Range Status   06/18/2020 0.56 0.52 - 1.04 mg/dL Final   ]    Liver Function Studies -   Recent Labs   Lab Test 06/18/20  1456  04/27/16  1402   PROTTOTAL  --   --  8.0   ALBUMIN 3.9   < > 4.0   BILITOTAL  --   --  0.3   ALKPHOS  --   --  108   AST 23   < > 21   ALT 33   < > 25    < > = values in this interval not displayed.       Routing refill request to provider for review/approval because:  Drug not on the [facility], [facility] or [facility] refill protocol

## 2020-09-08 DIAGNOSIS — E10.319 TYPE 1 DIABETES MELLITUS WITH RETINOPATHY (H): ICD-10-CM

## 2020-09-12 NOTE — TELEPHONE ENCOUNTER
DEXCOM G5 MOB/G4 PLAT SENSOR  MISC     Last Written Prescription Date:  5/13/2019  Last Fill Quantity: 12,   # refills: 3  Last Office Visit : 6/23/2020  Future Office visit:  None    Routing refill request to provider for review/approval because:  Drug not on the [facility], [facility] or  [facility] refill protocol or controlled substance      Randa Espinoza RN  Central Triage Red Flags/Med Refills

## 2020-09-14 RX ORDER — BLOOD-GLUCOSE SENSOR
EACH MISCELLANEOUS
Qty: 12 EACH | Refills: 4 | Status: SHIPPED | OUTPATIENT
Start: 2020-09-14 | End: 2021-01-04

## 2020-09-18 DIAGNOSIS — E10.3291 TYPE 1 DIABETES MELLITUS WITH MILD NONPROLIFERATIVE RETINOPATHY OF RIGHT EYE, MACULAR EDEMA PRESENCE UNSPECIFIED (H): ICD-10-CM

## 2020-09-22 RX ORDER — SIMVASTATIN 20 MG
20 TABLET ORAL AT BEDTIME
Qty: 90 TABLET | Refills: 3 | Status: SHIPPED | OUTPATIENT
Start: 2020-09-22 | End: 2021-11-17

## 2020-09-22 NOTE — TELEPHONE ENCOUNTER
simvastatin (ZOCOR) 20 MG tablet      Last Written Prescription Date:  6/25/2020  Last Fill Quantity: 90,   # refills: 0  Last Office Visit : 6/23/2020  Future Office visit:  none    Routing refill request to provider for review/approval because:  Failed Endocrine medication protocol: lab past due- LDL.

## 2020-10-01 ENCOUNTER — VIRTUAL VISIT (OUTPATIENT)
Dept: ENDOCRINOLOGY | Facility: CLINIC | Age: 50
End: 2020-10-01
Payer: COMMERCIAL

## 2020-10-01 DIAGNOSIS — E10.9 TYPE 1 DIABETES MELLITUS WITHOUT COMPLICATION (H): ICD-10-CM

## 2020-10-01 DIAGNOSIS — E10.3291 TYPE 1 DIABETES MELLITUS WITH MILD NONPROLIFERATIVE RETINOPATHY OF RIGHT EYE, MACULAR EDEMA PRESENCE UNSPECIFIED (H): Primary | ICD-10-CM

## 2020-10-01 PROCEDURE — 99214 OFFICE O/P EST MOD 30 MIN: CPT | Mod: TEL | Performed by: PHYSICIAN ASSISTANT

## 2020-10-01 NOTE — LETTER
"10/1/2020       RE: Dali Martines  [address]  [facility] [place] [address]     Dear Colleague,    Thank you for referring your patient, Dali Martines, to the [facility] [place] at [facility]. Please see a copy of my visit note below.    Dali Martines is a 49 year old female who is being evaluated via a billable telephone visit.      The patient has been notified of following:     \"This telephone visit will be conducted via a call between you and your physician/provider. We have found that certain health care needs can be provided without the need for an in-person physical exam.  This service lets us provide the care you need with a telephone conversation.  If a prescription is necessary we can send it directly to your pharmacy.  If lab work is needed we can place an order for that and you can then stop by our lab to have the test done at a later time.    Telephone visits are billed at different rates depending on your insurance coverage.  Please reach out to your insurance provider with any questions.    If during the course of the call the physician/provider feels a telephone visit is not appropriate, you will not be charged for this service.\"    Patient has given verbal consent for telephone visit? Yes    How would you like to obtain your AVS? Dejon Santoyo MA    Patients Glucose Data was Sent via Email      Due to the COVID 19 pandemic this visit was converted to a telephone visit in order to help prevent spread of infection in this patient and the general population.    Time of start: 2:30 pm  Time of end: 2:32 pm  Total duration of telephone visit:  32 minutes.    HPI  Dali Martines is a 49 year old female with type 1 diabetes mellitus and hypothyroidism.  Telephone visit today for diabetes and hypothyroid follow up.  Pt was dx having type 1 diabetes at age 15.  Her hx is also significant for mild NPDR right eye only,  " "psoriatic arthritis, anxiety, hyperlipidemia and GERD.  For her diabetes, she is currently taking Tresiba 17 units daily, Novolog 1 unit/7 gms CHO with meals and snacks, plus correction - 1 unit drops BG 30 mg/mL.  She has been taking Fiasp with her evening meal which has helped reduce her hyperglycemia following her dinner meal.  Most recent A1C was 7.6 % on 6/26/2020.  I reviewed her DexcomG5 sensor download with her today and her average glucose is 163 with SD 71.  Her estimated A1C is 7.2 %.  Her glucose is in target 54 % of the time, above target 36 % of the time and below target 10 % of the time.  She has had some low blood sugars intermittently from 1 pm - 5 pm.  She is able to self treat her hypoglycemia and she wears her Dexcom sensor full time.  On ROS today, she feels stressed during the COVID19 pandemic and she also helps care for her parents.  Less nausea on lower dose of Trulicity.  She has had pain in her \" upper chest \" now for the past month. Pain is under the left clavicle.  The pain is intermittent and can last for up to 1 hr at both rest and with exertion.  No pain in the arms, n/v or diaphoresis.  She describes this pain as \" pressure pain\".  Intermittent dysuria and hematuria per patient. None at this time.  She denies fevers or chills.  Pt denies headaches, blurred vision, SOB at rest, cough, abd pain, diarrhea, numbness or tingling in her feet or hands.  She denies foot ulcers a this time.    Diabetes Care  Retinopathy:yes in right eye; pt seen by Oph here in Oct 2019 with mild NPDR right eye only.  Nephropathy:none; urine microalbuminuria negative in 12/2019.   Neuropathy: none.  Foot Exam: no exam today.  Taking aspirin: yes.  Lipids: LDL 92 and HDL 91 in 12/2018. Pt is taking Simvastatin.  CAD: no hx of CAD.  Mental health: hx of anxiety.  Insulin: Basal and meal time insulin with correction insulin. DM meds: low dose Trulicity.  Testing: DexcomG5 sensor.    ROS  Please see under " HPI.    Allergies  Allergies   Allergen Reactions     Sulfasalazine      Developed rash, HA, dizziness       Medications  Current Outpatient Medications   Medication Sig Dispense Refill     albuterol (PROAIR HFA/PROVENTIL HFA/VENTOLIN HFA) 108 (90 Base) MCG/ACT inhaler Inhale 2 puffs into the lungs every 4 hours as needed for shortness of breath / dyspnea or wheezing 1 Inhaler 11     aspirin 81 MG tablet Take 1 tablet by mouth daily.       blood glucose (ACCU-CHEK COLIN PLUS) test strip Test Blood Sugar 8 times daily or as directed 800 strip 3     blood glucose monitoring (ACCU-CHEK FASTCLIX) lancets Use to test blood sugar 8 times daily or as directed. 4 Box 3     Calcium Carbonate-Vitamin D (CALCIUM + D PO) Take one daily       citalopram (CELEXA) 20 MG tablet Take 1.5 tablets (30 mg) by mouth daily 135 tablet 1     Continuous Blood Gluc Sensor (DEXCOM G5 MOB/G4 PLAT SENSOR) MISC CHANGE EVERY 7 DAYS 12 each 4     Continuous Blood Gluc Transmit (DEXCOM G4 PLATINUM TRANSMITTER) MISC 1 each every 6 months 1 each 1     diclofenac (VOLTAREN) 1 % topical gel APPLY 2 GRAMS ONTO THE SKIN FOUR TIMES DAILY AS NEEDED FOR MODERATE PAIN 100 g 5     dulaglutide (TRULICITY) 0.75 MG/0.5ML pen Inject 0.75 mg Subcutaneous every 7 days 2 mL 6     dulaglutide (TRULICITY) 1.5 MG/0.5ML pen Inject 0.75 mg Subcutaneous every 7 days 6 mL 3     econazole nitrate 1 % cream Apply 0.5 inches topically daily.       fish oil-omega-3 fatty acids (FISH OIL) 1000 MG capsule Take one daily       folic acid (FOLVITE) 1 MG tablet Take 1 tablet (1 mg) by mouth daily 90 tablet 2     insulin aspart (FIASP FLEXTOUCH) 100 UNIT/ML pen-injector Use as directed up to 20 units a day at the start of meals 3 mL 3     insulin degludec (TRESIBA) 100 UNIT/ML pen Inject 16 units subcutaneous at hs. 15 mL 3     insulin pen needle (B-D U/F) 31G X 8 MM miscellaneous Use 5 pen needles daily 450 each 3     Ketoconazole (NIZORAL PO) Take  by mouth. Shampoo daily        levothyroxine (SYNTHROID/LEVOTHROID) 112 MCG tablet Take 1 tablet (112 mcg) by mouth daily 90 tablet 3     methotrexate 2.5 MG tablet Take 6 tablets (15 mg) by mouth every 7 days 24 tablet 3     methylphenidate (METADATE CD) 20 MG CR capsule Take 20 mg by mouth daily       Minoxidil (ROGAINE EX) Externally apply  topically. daily       mometasone-formoterol (DULERA) 100-5 MCG/ACT inhaler Inhale 2 puffs into the lungs 2 times daily 3 Inhaler 11     montelukast (SINGULAIR) 5 MG chewable tablet Take 1 tablet (5 mg) by mouth At Bedtime 90 tablet 3     Multiple Vitamin (MULTIVITAMIN OR) Take  by mouth. Take one daily tab       NOVOLOG PENFILL 100 UNIT/ML soln INJECT 1 UNIT PER 15 GRAMS CHO AT ALL MEALS AND SNACKS WITH CORRECTION SCALE OF 1 UNIT PER 50 MG/DL OVER 150, AVERAGE DAILY USE OF 30 UNITS 30 mL 4     simvastatin (ZOCOR) 20 MG tablet Take 1 tablet (20 mg) by mouth At Bedtime 90 tablet 3     traZODone (DESYREL) 50 MG tablet Take 1-2 tablets by mouth nightly as needed for sleep. 180 tablet 0       Family History  family history includes Alcoholism in her father; Anxiety Disorder in her father; Arthritis in her father; Breast Cancer in her mother; C.A.D. in her father and maternal grandmother; Cancer in her paternal grandfather; Cardiac Sudden Death in her maternal grandfather; Cerebrovascular Disease in her paternal grandmother; Circulatory in her father; Coronary Artery Disease in her father and maternal grandmother; Diabetes in her father, maternal grandmother, and another family member; Eye Disorder in her father; Gynecology in an other family member; Heart Disease in her father, maternal grandfather, maternal grandmother, and paternal grandfather; Hypothyroidism in her father; Lipids in her father; Macular Degeneration in her father and maternal aunt; Rheumatoid Arthritis in her father; Thyroid Disease in her father and another family member.    Social History   reports that she has never smoked. She has never  used smokeless tobacco. She reports current alcohol use. She reports that she does not use drugs.     Past Medical History  Past Medical History:   Diagnosis Date     Anemia      Anxiety      Arthritis 2014    Psoriatic arthritis     Back injury 1988     Dry eye syndrome      Dyslipidemia      Endometriosis 2002    adehsions seen at laparoscopy     GERD (gastroesophageal reflux disease)      Hypothyroidism     8 yoa     SOB (shortness of breath) 3/11/2014     Type I (juvenile type) diabetes mellitus without mention of complication, not stated as uncontrolled     when 15      Uncomplicated asthma Fall 2013       Past Surgical History:   Procedure Laterality Date     C REPAIR CRUCIATE LIGAMENT,KNEE       C STOMACH SURGERY PROCEDURE UNLISTED  December 2002     COLONOSCOPY  2002     COLONOSCOPY N/A 6/23/2020    Procedure: COLONOSCOPY;  Surgeon: Lauryn Rivera MD;  Location:  GI     ESOPHAGOSCOPY, GASTROSCOPY, DUODENOSCOPY (EGD), COMBINED  1/7/2014    Procedure: COMBINED ESOPHAGOSCOPY, GASTROSCOPY, DUODENOSCOPY (EGD), BIOPSY SINGLE OR MULTIPLE;;  Surgeon: Kraig Nicole MD;  Location:  GI     GYN SURGERY      Laparotomy to remove endometrial tissue     HC BREATH HYDROGEN TEST N/A 6/17/2014    Procedure: HYDROGEN BREATH TEST;  Surgeon: Deacon Alberts MD;  Location:  GI     HYDROGEN BREATH TEST  6/17/14     LAPAROSCOPIC APPENDECTOMY  2002     LAPAROTOMY, LYSIS ADHESIONS, COMBINED  2002    endometriosis     SOFT TISSUE SURGERY  December 2014    right ankle tendon injury       Physical Exam    No exam.    RESULTS  Creatinine   Date Value Ref Range Status   06/18/2020 0.56 0.52 - 1.04 mg/dL Final     GFR Estimate   Date Value Ref Range Status   06/18/2020 >90 >60 mL/min/[1.73_m2] Final     Comment:     Non  GFR Calc  Starting 12/18/2018, serum creatinine based estimated GFR (eGFR) will be   calculated using the Chronic Kidney Disease Epidemiology Collaboration   (CKD-EPI) equation.        Microalbumin Urine   Date Value Ref Range Status   06/26/2009 5@ MG/L      Hemoglobin A1C   Date Value Ref Range Status   06/26/2020 7.6 (H) 0 - 5.6 % Final     Comment:     Normal <5.7% Prediabetes 5.7-6.4%  Diabetes 6.5% or higher - adopted from ADA   consensus guidelines.       Potassium   Date Value Ref Range Status   04/27/2016 4.1 3.4 - 5.3 mmol/L Final     ALT   Date Value Ref Range Status   06/18/2020 33 0 - 50 U/L Final     AST   Date Value Ref Range Status   06/18/2020 23 0 - 45 U/L Final     TSH   Date Value Ref Range Status   12/17/2019 1.26 0.40 - 4.00 mU/L Final     T4 Total   Date Value Ref Range Status   11/29/2010 8.5 5.0 - 11.0 ug/dL Final     T4 Free   Date Value Ref Range Status   03/21/2019 1.09 0.76 - 1.46 ng/dL Final       Cholesterol   Date Value Ref Range Status   12/18/2018 194 <200 mg/dL Final   09/27/2013 211 (H) 0 - 200 mg/dL Final     Comment:     LDL Cholesterol is the primary guide to therapy.   The NCEP recommends further evaluation of: patients with cholesterol greater   than 200 mg/dL if additional risk factors are present, cholesterol greater   than   240 mg/dL, triglycerides greater than 150 mg/dL, or HDL less than 40 mg/dL.     HDL Cholesterol   Date Value Ref Range Status   12/18/2018 91 >49 mg/dL Final   09/27/2013 78 50 - 110 mg/dL Final     LDL Cholesterol Calculated   Date Value Ref Range Status   12/18/2018 92 <100 mg/dL Final     Comment:     Desirable:       <100 mg/dl   09/27/2013 123 0 - 129 mg/dL Final     Comment:     LDL Cholesterol is the primary guide to therapy: LDL-cholesterol goal in high   risk patients is <100 mg/dL and in very high risk patients is <70 mg/dL.     Triglycerides   Date Value Ref Range Status   12/18/2018 54 <150 mg/dL Final   09/27/2013 51 0 - 150 mg/dL Final     Cholesterol/HDL Ratio   Date Value Ref Range Status   09/27/2013 2.7 0.0 - 5.0 Final   02/15/2013 2.9 0.0 - 5.0 Final       A1C   7.6 % on 6/26/2020      ASSESSMENT/PLAN:    1.  TYPE 1 DIABETES MELLITUS: We talked about upgrading her Dexcom sensor to the DexcomG6 sensor today and use of the Tandem insulin pump - control IQ.  Dali would like to proceed with upgrading her Dexcom sensor and is interested in the Tandem insulin pump.  I have referred he to meet with our CDE Dali Maximiliano.  No change in insulin doses today.  I did change her CF to 50 today ( was 30 ).  Will continue low dose Trulicity.  Her urine microalbuminuria has been negative with normal creat/GFR.  She denies sx of neuropathy or foot ulcers.  Pt is taking ASA daily.    2.  RETINOPATHY: Hx of mild NPDR right eye only.  She was seen here by Oph in Oct 2020.  She will be seeing Oph in a few weeks.    3.  CHEST PAIN: I asked her to see Dr. Alas to report chest pain for evaluation.    4.  DYSURIA/HEMATURIA: Intermittent dysuria and hematuria.  Check UA.    5.  FOLLOW UP: with me or Dr. Mckeon in 3 months.    Marissa Sebastian PA-C

## 2020-10-12 ENCOUNTER — OFFICE VISIT (OUTPATIENT)
Dept: OPHTHALMOLOGY | Facility: CLINIC | Age: 50
End: 2020-10-12
Attending: OPHTHALMOLOGY
Payer: COMMERCIAL

## 2020-10-12 DIAGNOSIS — H40.003 GLAUCOMA SUSPECT OF BOTH EYES: ICD-10-CM

## 2020-10-12 DIAGNOSIS — H40.003 GLAUCOMA SUSPECT OF BOTH EYES: Primary | ICD-10-CM

## 2020-10-12 PROCEDURE — 92134 CPTRZ OPH DX IMG PST SGM RTA: CPT | Performed by: OPHTHALMOLOGY

## 2020-10-12 PROCEDURE — G0463 HOSPITAL OUTPT CLINIC VISIT: HCPCS

## 2020-10-12 PROCEDURE — 92015 DETERMINE REFRACTIVE STATE: CPT

## 2020-10-12 PROCEDURE — 99213 OFFICE O/P EST LOW 20 MIN: CPT | Mod: GC | Performed by: OPHTHALMOLOGY

## 2020-10-12 PROCEDURE — 92133 CPTRZD OPH DX IMG PST SGM ON: CPT | Performed by: OPHTHALMOLOGY

## 2020-10-12 PROCEDURE — 99207 OCT OPTIC NERVE RNFL SPECTRALIS OU (BOTH EYES): CPT | Performed by: OPHTHALMOLOGY

## 2020-10-12 PROCEDURE — 92133 CPTRZD OPH DX IMG PST SGM ON: CPT | Mod: 26 | Performed by: OPHTHALMOLOGY

## 2020-10-12 PROCEDURE — 92083 EXTENDED VISUAL FIELD XM: CPT | Performed by: OPHTHALMOLOGY

## 2020-10-12 ASSESSMENT — REFRACTION_MANIFEST
OD_SPHERE: -0.50
OS_ADD: +2.25
OS_AXIS: 165
OD_AXIS: 017
OD_ADD: +2.25
OS_SPHERE: -0.50
OS_CYLINDER: +0.25
OD_CYLINDER: +0.50

## 2020-10-12 ASSESSMENT — VISUAL ACUITY
OD_SC+: -1
OS_SC+: -3
OS_SC: 20/20
METHOD: SNELLEN - LINEAR
OD_SC: 20/20 SLOWLY

## 2020-10-12 ASSESSMENT — EXTERNAL EXAM - RIGHT EYE: OD_EXAM: NORMAL

## 2020-10-12 ASSESSMENT — TONOMETRY
IOP_METHOD: APPLANATION
OS_IOP_MMHG: 12
OD_IOP_MMHG: 14

## 2020-10-12 ASSESSMENT — EXTERNAL EXAM - LEFT EYE: OS_EXAM: NORMAL

## 2020-10-12 ASSESSMENT — CUP TO DISC RATIO
OD_RATIO: 0.4
OS_RATIO: 0.5

## 2020-10-12 ASSESSMENT — CONF VISUAL FIELD
OS_NORMAL: 1
OD_NORMAL: 1

## 2020-10-12 ASSESSMENT — SLIT LAMP EXAM - LIDS
COMMENTS: NORMAL
COMMENTS: NORMAL

## 2020-10-12 NOTE — PROGRESS NOTES
CC Diabetes follow up , glaucoma suspect    HPI: Dali Martines is a 49 year old female with the following ophthalmologic problems:    Here for diabetic retinopathy check and glaucoma suspect evaluation. No major changes in vision in past year. Denies flashes or floaters.   Last HbA1c 7.0 on 6/2019. Wearing glasses for reading only.   6.7 2 weeks ago    Optical Coherence Tomography 2019  Right eye - normal foveal contour; normal NFL  Left eye - normal foveal contour; normal NFL    VF 10/12/2020  right eye non specific defect, normal; left eye: normal    Optical Coherence Tomography retinal nerve fiber layer 10/2020  Normal OU    ASSESSMENT/PLAN    1. DM-1 with history of mild nonproliferative diabetic retinopathy right eye   No Diabetic retinopathy left eye   - Blood pressure (<120/80) and blood glucose (HbA1c 7.2) control discussed with patient.     2. C:D asymmetry  - intraocular pressure within normal limits, Optical Coherence Tomography retinal nerve fiber layer without thinning, visual field 24-2 within normal limits   Plan for optic nerve Optical Coherence Tomography nerve fiber layer with OVF24-2 every other year or as needed     3. H/o TIA-like vision loss RE in ~2008  - eval by Dr Schaffer felt to be migraine related rather than embolic or inflammatory    return to retina clinic: 1 yr with dilated exam and Optical Coherence Tomography mac  Plan for optic nerve Optical Coherence Tomography nerve fiber layer with OVF24-2 every other year or as needed and macula Optical Coherence Tomography     Sha Coley MD  Ophthalmology PGY-3  [facility]  Pager: [phone]  ~~~~~~~~~~~~~~~~~~~~~~~~~~~~~~~~~~   Complete documentation of historical and exam elements from today's encounter can be found in the full encounter summary report (not reduplicated in this progress note).  I personally obtained the chief complaint(s) and history of present illness.  I confirmed and edited as necessary the review of systems,  past medical/surgical history, family history, social history, and examination findings as documented by others; and I examined the patient myself.  I personally reviewed the relevant tests, images, and reports as documented above.  I formulated and edited as necessary the assessment and plan and discussed the findings and management plan with the patient and family    Naz Lira MD   of Ophthalmology.  Retina Service   Department of Ophthalmology and Visual Neurosciences   [facility]  Phone: [phone]   Fax: [phone]

## 2020-12-08 DIAGNOSIS — E10.9 TYPE 1 DIABETES, HBA1C GOAL < 7% (H): ICD-10-CM

## 2020-12-09 RX ORDER — BLOOD SUGAR DIAGNOSTIC
STRIP MISCELLANEOUS
Qty: 800 STRIP | Refills: 3 | Status: SHIPPED | OUTPATIENT
Start: 2020-12-09

## 2020-12-09 NOTE — TELEPHONE ENCOUNTER
blood glucose (ACCU-CHEK COLIN PLUS) test strip  Last Written Prescription Date:  12/12/2019  Last Fill Quantity: 800,   # refills: 3  Last Office Visit : 10/1/2020  Future Office visit:  None  800 Strips, 3 Refills sent to pharm 12/9/2020      Randa Espinoza RN  Central Triage Red Flags/Med Refills

## 2020-12-31 ENCOUNTER — TELEPHONE (OUTPATIENT)
Dept: ENDOCRINOLOGY | Facility: CLINIC | Age: 50
End: 2020-12-31

## 2020-12-31 ENCOUNTER — TELEPHONE (OUTPATIENT)
Dept: EDUCATION SERVICES | Facility: CLINIC | Age: 50
End: 2020-12-31

## 2020-12-31 DIAGNOSIS — E10.319 TYPE 1 DIABETES MELLITUS WITH RETINOPATHY (H): Primary | ICD-10-CM

## 2020-12-31 DIAGNOSIS — E10.3299 TYPE 1 DIABETES MELLITUS WITH MILD NONPROLIFERATIVE RETINOPATHY, MACULAR EDEMA PRESENCE UNSPECIFIED, UNSPECIFIED LATERALITY (H): Primary | ICD-10-CM

## 2020-12-31 NOTE — TELEPHONE ENCOUNTER
Prior Authorization Specialty Medication Request    Medication/Dose: Outpatient Medication Detail     Disp Refills Start End EMELYN   blood glucose (ACCU-CHEK COLIN PLUS) test strip 800 strip 3 12/9/2020  No   Sig: Test Blood Sugar 8 times daily or as directed   Sent to pharmacy as: Accu-Chek Colin Plus In Vitro Strip (blood        ICD code (if different than what is on RX):    Previously Tried and Failed:      Important Lab Values: Hemoglobin A1c  Order: [id number]  Status:  Final result   Visible to patient:  Yes (MyChart)   Dx:  Type 1 diabetes mellitus without comp...    Ref Range & Units 6mo ago    Hemoglobin A1C 0 - 5.6 % 7.6High               Rationale: Pt to test multiple times daily related to widely fluctuating glucose levels.     Insurance Name:   Insurance ID:   Insurance Phone Number:     Pharmacy Information (if different than what is on RX)  Name:    Phone:

## 2020-12-31 NOTE — TELEPHONE ENCOUNTER
PA Initiation    Medication: blood glucose (ACCU-CHEK COLIN PLUS) test strip   Insurance Company: [employer] - Phone [phone] Fax [phone]  Pharmacy Filling the Rx: [facility] - [place], [place] - [address] AT [facility]  Filling Pharmacy Phone: [phone]  Filling Pharmacy Fax: [phone]  Start Date: 12/31/2020

## 2020-12-31 NOTE — TELEPHONE ENCOUNTER
[facility] Call Center    Phone Message    May a detailed message be left on voicemail: yes     Reason for Call: Medication Question or concern regarding medication   Prescription Clarification  Name of Medication: Dexcom G6  Prescribing Provider: Dali Viera   Pharmacy:    [id number] - [place], [place] - [address] [facility] [address]     What on the order needs clarification? Patient said she's currently using a Dexcom G4 but they're discontinuing it. Please send in a new script for a G6. Please call patient if there's any questions. Thank you.           Action Taken: Message routed to:  [facility] ([facility] Endo    Travel Screening: Not Applicable

## 2021-01-04 RX ORDER — PROCHLORPERAZINE 25 MG/1
SUPPOSITORY RECTAL
Qty: 9 EACH | Refills: 3 | Status: SHIPPED | OUTPATIENT
Start: 2021-01-04 | End: 2021-01-12

## 2021-01-04 RX ORDER — PROCHLORPERAZINE 25 MG/1
SUPPOSITORY RECTAL
Qty: 1 EACH | Refills: 3 | Status: SHIPPED | OUTPATIENT
Start: 2021-01-04 | End: 2021-01-12

## 2021-01-04 RX ORDER — PROCHLORPERAZINE 25 MG/1
SUPPOSITORY RECTAL
Qty: 1 EACH | Refills: 0 | Status: SHIPPED | OUTPATIENT
Start: 2021-01-04 | End: 2021-01-12

## 2021-01-04 NOTE — TELEPHONE ENCOUNTER
Prescription for Dexcom g6 sensors,  and transmitters sent to [facility] in [place].

## 2021-01-04 NOTE — TELEPHONE ENCOUNTER
Prior Authorization Approval    Authorization Effective Date: 12/31/2020  Authorization Expiration Date: 12/31/2021  Medication: blood glucose (ACCU-CHEK COLIN PLUS) test strip--APPROVED  Approved Dose/Quantity:   Reference #:     Insurance Company: [employer] - Phone [phone] Fax [phone]  Expected CoPay:       CoPay Card Available:      Foundation Assistance Needed:    Which Pharmacy is filling the prescription (Not needed for infusion/clinic administered): [facility] - [place], [place] - [address] AT [facility]  Pharmacy Notified: Yes  Patient Notified: Yes **Instructed pharmacy to notify patient when script is ready to /ship.**

## 2021-01-05 ENCOUNTER — VIRTUAL VISIT (OUTPATIENT)
Dept: RHEUMATOLOGY | Facility: CLINIC | Age: 51
End: 2021-01-05
Attending: INTERNAL MEDICINE
Payer: COMMERCIAL

## 2021-01-05 DIAGNOSIS — L40.50 PSORIATIC ARTHRITIS (H): Primary | ICD-10-CM

## 2021-01-05 DIAGNOSIS — Z51.81 ENCOUNTER FOR THERAPEUTIC DRUG MONITORING: ICD-10-CM

## 2021-01-05 PROCEDURE — 99215 OFFICE O/P EST HI 40 MIN: CPT | Mod: 95 | Performed by: INTERNAL MEDICINE

## 2021-01-05 ASSESSMENT — PAIN SCALES - GENERAL: PAINLEVEL: MILD PAIN (3)

## 2021-01-05 NOTE — LETTER
2021       RE: Dali Martines  [address]  [facility] [address]     Dear Colleague,    Thank you for referring your patient, Dali Martines, to the [facility] RHEUMATOLOGY CLINIC [place] [facility]. Please see a copy of my visit note below.    Dali Martines is a 50 year old female who is being evaluated via a billable video visit.      How would you like to obtain your AVS? MyChart  If the video visit is dropped, the invitation should be resent by: Text to cell phone: [phone]  Will anyone else be joining your video visit? No      HPI   Prior Note carried forward:     [facility]  Rheumatology Clinic  Yoel Betancourt MD  2021     Name: Dali Martines  MRN: [id number]  Age: 49 year old  : 1970  Referring provider: Referred Self    Problem List:  1. Psoriatic arthritis   2. Psoriasis with arthropathy  3. Pain in joint, multiple sites  4. Chronic pain of right ankle  5. Right foot pain  6. Pain in joint involving ankle and foot, right    Assessment and Plan:  The patient relates increased medial knee and elbow pain in the past couple months as well as an increase in right 1st MTP pain with some occasional swelling, but she denies any morning stiffness. She has used diclofenac gel in the past and I will prescribe this for her today to try before adding or changing her current medication regimen. Exam today shows a palpable node on the right side of her neck which is somewhat tender today. She will continue to monitor this.    The patient is doing well overall today and we agreed to continue with her current medication regimen unchanged. She will make sure to do her labs periodically, and will call our office if worsening.    Orders:  - ALT  - AST  - Albumin level  - Creatinine  - CBC with platelets differential  - CRP inflammation  - diclofenac (VOLTAREN) 1 % topical gel  Dispense: 100 g; Refill: 5    Follow-up: Return in about 6  months (around 7/7/2020).    01/07/2020 :   Dali Martines is a 49 year old female with a history including type I diabetes, psoriatic arthritis, and Hashimoto's thyroiditis who presents for follow-up. I last saw the patient on 11/20/2018, at which time she reported increased left hand pain, most prominently in the left 2nd MCP. The plan was to continue on increased methotrexate dosage of 12.5 mg, and, if well-tolerated, then increasing dosage further to 15 mg weekly.    Background history:  Symptoms first occurred in August 2008, when she began having stiffness and pain in the left hand, particularly in the PIPs and MCPs. Symptoms spontaneously resolved after about 6-8 weeks. She was previously seen by Rheumatology here around that time (see note from Dr. Betancourt on 12/9/2008). Briefly, assessment was that she had a self-limited episode of inflammatory arthralgias, possibly viral or early psoriatic arthritis. Negative NOLVIA, RF, Lyme serologies and normal CRP and ESR. Given her family history of RA, there was a thought to start her on HCQ if anti-CCP antibodies were positive, but they were found to be negative. No imaging of the hand was performed. September 2014, she experienced recurrent acute onset pain, stiffness, and swelling in the L hand very similar to her symptoms in 2008. Specifically, had swelling across MCPs and in 3rd PIP. She could hardly bend the hand sometimes due to the welling. Had morning stiffness lasting 30-45 minutes. No other joints were involved. Issues with her left hand lingered for several months but eventually self-resolved that January and her only notable symptom is heel pain in the mornings. No fevers, chills, or weight loss. No back pain, vision changes, nausea, vomiting, diarrhea, abdominal pain, or blood in stool or urine. No rash, sicca symptoms, or oral ulcers. It seems she has a history of hypermobility, e.g. could bend wrist back to forearm, bend down and touch palms flat on  "floor, when she was younger. Used to get frequent ankle sprains, and has had multiple tendon and ligament tears over the years.    Today, the patient reports that she had recent upper respiratory symptoms which triggered asthma, and she was placed on steroids for this. She describes some lingering post-nasal drip over most of the fall, but this has since resolved. She has been taking Dulera finished course of prednisone.     She reports that she is having more joint pain in her right 1st MTP. She explains that her pain here is not bad, but she does feel soreness if someone squeezes her hand. She notes that she has more prominent left-sided 1st MTP pain. She states that her 1st MTPs do swell.     She states that she had psoriasic skin rashes around her temple area. She denies any issues tolerating methotrexate. She denies any significant morning joint stiffness. She describes a \"feeling like tendonitis\" in her medial knees and elbows that feels like she has hyperextended the joints. She adds that these sensations take hours to resolve. She reports that these joint sensations have worsened in the last couple months. She states that she historically has joint issues at the beginning of the fall, but these joint issues seem to be lasting into the winter time presently.      She explains that she has had a swollen gland on the right side of her neck which has been so significant in the past that she had difficulty turning her head to the side.     Review of Systems:   Pertinent items are noted in HPI or as below, remainder of complete ROS is negative.      No recent problems with hearing or vision. No swallowing problems.   No breathing difficulty, shortness of breath, coughing, or wheezing.  No chest pain or palpitations.  No heart burn, indigestion, abdominal pain, nausea, vomiting, diarrhea.  No urination problems, no bloody, cloudy urine, no dysuria.  No numbing, tingling, weakness.  No headaches or confusion.  No " rashes. No easy bleeding or bruising.     Active Medications:   Current Outpatient Medications:      albuterol (PROAIR HFA/PROVENTIL HFA/VENTOLIN HFA) 108 (90 Base) MCG/ACT inhaler, Inhale 2 puffs into the lungs every 4 hours as needed for shortness of breath / dyspnea or wheezing, Disp: 1 Inhaler, Rfl: 11     aspirin 81 MG tablet, Take 1 tablet by mouth daily., Disp: , Rfl:      blood glucose (ACCU-CHEK COLIN PLUS) test strip, Test Blood Sugar 8 times daily or as directed, Disp: 800 strip, Rfl: 3     blood glucose monitoring (ACCU-CHEK FASTCLIX) lancets, Use to test blood sugar 8 times daily or as directed., Disp: 4 Box, Rfl: 3     Calcium Carbonate-Vitamin D (CALCIUM + D PO), Take one daily, Disp: , Rfl:      citalopram (CELEXA) 20 MG tablet, Take 1.5 tablets (30 mg) by mouth daily, Disp: 135 tablet, Rfl: 1     Continuous Blood Gluc Sensor (DEXCOM G5 MOB/G4 PLAT SENSOR) MISC, 1 each every 7 days, Disp: 12 each, Rfl: 3     Continuous Blood Gluc Transmit (DEXCOM G4 PLATINUM TRANSMITTER) MISC, 1 each every 6 months, Disp: 1 each, Rfl: 1     diclofenac (VOLTAREN) 1 % topical gel, APPLY 2 GRAMS ONTO THE SKIN FOUR TIMES DAILY AS NEEDED FOR MODERATE PAIN, Disp: 100 g, Rfl: 5     dulaglutide (TRULICITY) 1.5 MG/0.5ML pen, Inject 1.5 mg Subcutaneous every 7 days, Disp: 3 mL, Rfl: 1     econazole nitrate 1 % cream, Apply 0.5 inches topically daily., Disp: , Rfl:      fish oil-omega-3 fatty acids (FISH OIL) 1000 MG capsule, Take one daily, Disp: , Rfl:      folic acid (FOLVITE) 1 MG tablet, Take 1 tablet (1 mg) by mouth daily, Disp: 90 tablet, Rfl: 0     insulin degludec (TRESIBA) 100 UNIT/ML pen, Inject 15 units subcutaneous at hs., Disp: 15 mL, Rfl: 11     insulin pen needle (B-D U/F) 31G X 8 MM miscellaneous, Use 5 pen needles daily, Disp: 450 each, Rfl: 3     Ketoconazole (NIZORAL PO), Take  by mouth. Shampoo daily, Disp: , Rfl:      levothyroxine (SYNTHROID/LEVOTHROID) 112 MCG tablet, Take 1 tablet (112 mcg) by mouth  daily, Disp: 90 tablet, Rfl: 3     methotrexate 2.5 MG tablet, Take 6 tablets (15 mg) by mouth once a week, Disp: 24 tablet, Rfl: 1     methylphenidate (METADATE CD) 20 MG CR capsule, Take 20 mg by mouth daily, Disp: , Rfl:      Minoxidil (ROGAINE EX), Externally apply  topically. daily, Disp: , Rfl:      mometasone-formoterol (DULERA) 100-5 MCG/ACT inhaler, Inhale 2 puffs into the lungs 2 times daily, Disp: 3 Inhaler, Rfl: 11     montelukast (SINGULAIR) 5 MG chewable tablet, Take 1 tablet (5 mg) by mouth At Bedtime, Disp: 90 tablet, Rfl: 3     Multiple Vitamin (MULTIVITAMIN OR), Take  by mouth. Take one daily tab, Disp: , Rfl:      NOVOLOG PENFILL 100 UNIT/ML soln, 1 unit per 15 grams CHO at all meals and snacks with correction scale of 1 unit per 50 mg/dL over 150. Average daily use is 30  units., Disp: 30 mL, Rfl: 4     simvastatin (ZOCOR) 20 MG tablet, Take 1 tablet (20 mg) by mouth At Bedtime, Disp: 90 tablet, Rfl: 3     traZODone (DESYREL) 50 MG tablet, Take 1-2 tablets by mouth nightly as needed for sleep., Disp: 180 tablet, Rfl: 0    Current Facility-Administered Medications:      betamethasone acet & sod phos (CELESTONE) injection 6 mg, 6 mg, INTRA-ARTICULAR, Once, Chris Uribe MD    Allergies:   Sulfasalazine      Past Medical History:  Anemia   Anxiety   Psoriatic arthritis  Dry eye syndrome   Endometriosis   Gastroesophageal reflux disease   Hypothyroidism   Type 1 diabetes mellitus   Asthma   Necrobiosis lipoidica diabeticorum  Chronic low back pain   Enthesopathy   Chronic right ankle pain      Past Surgical History:  Knee cruciate ligament repair   Stomach surgery 12/2002  Hydrogen breath test 6/17/2014   Appendectomy 2002   Lysis adhesions 2002   Right ankle surgery 12/2014      Family History:   Mother: Breast cancer   Father: Heart disease, eye disorder, hyperlipidemia, macular degeneration, anxiety, alcoholism, rheumatoid arthritis, hypothyroidism, diabetes mellitus   Paternal  grandmother: Cerebrovascular disease  Paternal grandfather: Pancreatic cancer, heart disease   Maternal grandmother: Type 2 diabetes mellitus, coronary artery disease  Maternal aunt: Type 1 diabetes mellitus     Social History:  The patient reports that she has never smoked. She has never used smokeless tobacco. She reports current alcohol use. She reports that she does not use drugs.   PCP: Dimitri Alas  Marital Status: Single    Physical Exam:   There were no vitals taken for this visit.   Wt Readings from Last 4 Encounters:   06/23/20 83.9 kg (185 lb)   01/07/20 87.6 kg (193 lb 1.6 oz)   12/17/19 86.5 kg (190 lb 12.8 oz)   10/08/19 85.2 kg (187 lb 12.8 oz)     Constitutional: Well-developed, appearing stated age; cooperative.      Laboratory:   RHEUM RESULTS Latest Ref Rng & Units 6/18/2019 1/7/2020 6/18/2020   SED RATE 0 - 20 mm/h - - -   CRP, INFLAMMATION 0.0 - 8.0 mg/L <2.9 <2.9 <2.9   CYCLIC CIT PEPT IGG <5 U/mL - - -   RHEUMATOID FACTOR <20 IU/mL - - -   NOLVIA SCREEN BY EIA <1.0 - - -   AST 0 - 45 U/L 40 28 23   ALT 0 - 50 U/L 47 44 33   ALBUMIN 3.4 - 5.0 g/dL 4.0 4.1 3.9   WBC 4.0 - 11.0 10e9/L 6.9 5.0 6.9   RBC 3.8 - 5.2 10e12/L 3.75(L) 3.84 3.70(L)   HGB 11.7 - 15.7 g/dL 11.7 12.0 11.7   HCT 35.0 - 47.0 % 35.9 36.7 35.3   MCV 78 - 100 fl 96 96 95   MCHC 31.5 - 36.5 g/dL 32.6 32.7 33.1   RDW 10.0 - 15.0 % 12.8 12.9 13.2    - 450 10e9/L 277 311 311   CREATININE 0.52 - 1.04 mg/dL 0.49(L) 0.60 0.56   GFR ESTIMATE, IF BLACK >60 mL/min/[1.73:m2] >90 >90 >90   GFR ESTIMATE >60 mL/min/[1.73:m2] >90 >90 >90    - 1620 mg/dL - - -   IGA 70 - 380 mg/dL - - -   IGM 60 - 265 mg/dL - - -     Rheumatoid Factor   Date Value Ref Range Status   12/22/2014 <20 <20 IU/mL Final     Cyclic Cit Pept IgG/IgA   Date Value Ref Range Status   03/17/2015 <20  Interpretation:  Negative   <20 UNITS Final     Cyclic Citrullinated Peptide IgG   Date Value Ref Range Status   12/09/2008 <2 <5 U/mL Final     Comment:      Interpretation:  Negative     Scleroderma Antibody Scl-70 ELEAZAR IgG   Date Value Ref Range Status   03/17/2015  0.0 - 0.9 AI Final    <0.2  Negative   Antibody index (AI) values reflect qualitative changes in antibody   concentration that cannot be directly associated with clinical condition or   disease state.       SSA (Ro) (ELEAZAR) Antibody, IgG   Date Value Ref Range Status   03/17/2015 0.2 0.0 - 0.9 AI Final     Comment:     Negative   Antibody index (AI) values reflect qualitative changes in antibody   concentration that cannot be directly associated with clinical condition or   disease state.       SSB (La) (ELEAZAR) Antibody, IgG   Date Value Ref Range Status   03/17/2015 0.2 0.0 - 0.9 AI Final     Comment:     Negative   Antibody index (AI) values reflect qualitative changes in antibody   concentration that cannot be directly associated with clinical condition or   disease state.       NOLVIA Screen by EIA   Date Value Ref Range Status   12/22/2014 1.6 (H) <1.0 Final     Comment:     Interpretation:  Positive     IGG   Date Value Ref Range Status   07/11/2005 1410 695 - 1620 mg/dL Final     IGA   Date Value Ref Range Status   06/10/2014 190 70 - 380 mg/dL Final     IGM   Date Value Ref Range Status   07/11/2005 151 60 - 265 mg/dL Final     Cyr ELEAZAR Antibody IgG   Date Value Ref Range Status   03/17/2015  0.0 - 0.9 AI Final    <0.2  Negative   Antibody index (AI) values reflect qualitative changes in antibody   concentration that cannot be directly associated with clinical condition or   disease state.       Scleroderma Antibody Scl-70 ELEAZAR IgG   Date Value Ref Range Status   03/17/2015  0.0 - 0.9 AI Final    <0.2  Negative   Antibody index (AI) values reflect qualitative changes in antibody   concentration that cannot be directly associated with clinical condition or   disease state.       January 5, 2021 interval history:    Since we have last seen the patient, she has, with the combination of coronavirus issues and  other stressful issues had a somewhat stressful year.    She has had several flares of her arthritis symptoms that she attributes that increased stress.  She is not even sure exactly when it happened.  She thinks this may have been back in the fall but it could have been earlier in the year that she had some increased pain and a little bit of swelling in her metatarsals of the right foot.  That went on for several weeks then seem to go away on its own with no additional therapy.  She also had some increased pain in one of her thumbs at one point.  That is also back to normal.    However over the last several months she has had some increased numbness in her right hand.  She first had some numbness in the right middle finger after a blood draw that apparently hit a nerve because the nerve pain went on for many weeks.  However, more recently she has had rather typical carpal tunnel type symptoms worse first thing in the morning and occasionally waking her from sleep affecting the second third and fourth fingers.  On further questioning, only the medial aspect of the fourth finger is involved and the fifth finger and the thumb are entirely spared.    In reviewing her record, it also seems that her hemoglobin A1c is a little bit on the high side right now in the mid sevens.  She is aware of this and thinks that this also may be contributed to by the stressors she has been under.    In terms of her psoriasis itself that is been pretty stable.  Its affected primarily the area around her temples and is a little worse with the colder weather but is minimally apparent on the video which is high-quality.    Physical examination by video shows her to be in no acute distress HEENT examination is normal.  She is speaking in full sentences with no shortness of breath.  The skin examination is I can see it looks essentially normal with no significant psoriasis.  Examination of her hands shows there is no convincing areas of  swelling no joint deformity and there is full and normal range of motion.    Impression:    Per the problem list, with inflammatory arthritis that is generally been kept under good control with methotrexate.    She has not had any labs however to check for toxicity in quite some time nor to look at inflammation so we should do those and I have reordered them today.    I did also  her extensively on using splinting to prevent carpal tunnel syndrome at night and also to use something in the way of a more flexible soft splint during the day because she does spend a lot of time on the keyboard.    Finally we spoke about coronavirus vaccination.  Based on her chronic conditions of diabetes, asthma, and inflammatory arthritis on methotrexate immunosuppression she should be on phase 1C.  She will keep track of where the state is at and when we get to where we are vaccinating folks in phase 1B she may reach out to us with a my chart message to see whether that vaccine can be obtained with our orders or how she can get it.    She is going to try these conservative approaches for carpal tunnel and will then plan on touching bases in about 2 to 3 months sooner as needed.    This video visit commenced at 4 PM and was completed at 4:34 PM      Originating Location (pt. Location): Home    Distant Location (provider location):  [facility] RHEUMATOLOGY CLINIC [place]     Platform used for Video Visit: Mark Anthony Betancourt MD, PhD    Rheumatology

## 2021-01-05 NOTE — PROGRESS NOTES
Dali Martines is a 50 year old female who is being evaluated via a billable video visit.      How would you like to obtain your AVS? MyChart  If the video visit is dropped, the invitation should be resent by: Text to cell phone: [phone]  Will anyone else be joining your video visit? No      HPI   Prior Note carried forward:     [facility]  Rheumatology Clinic  Yoel Betancourt MD  2021     Name: Dali Martines  MRN: [id number]  Age: 49 year old  : 1970  Referring provider: Referred Self    Problem List:  1. Psoriatic arthritis   2. Psoriasis with arthropathy  3. Pain in joint, multiple sites  4. Chronic pain of right ankle  5. Right foot pain  6. Pain in joint involving ankle and foot, right    Assessment and Plan:  The patient relates increased medial knee and elbow pain in the past couple months as well as an increase in right 1st MTP pain with some occasional swelling, but she denies any morning stiffness. She has used diclofenac gel in the past and I will prescribe this for her today to try before adding or changing her current medication regimen. Exam today shows a palpable node on the right side of her neck which is somewhat tender today. She will continue to monitor this.    The patient is doing well overall today and we agreed to continue with her current medication regimen unchanged. She will make sure to do her labs periodically, and will call our office if worsening.    Orders:  - ALT  - AST  - Albumin level  - Creatinine  - CBC with platelets differential  - CRP inflammation  - diclofenac (VOLTAREN) 1 % topical gel  Dispense: 100 g; Refill: 5    Follow-up: Return in about 6 months (around 2020).    2020 :   Dali Martines is a 49 year old female with a history including type I diabetes, psoriatic arthritis, and Hashimoto's thyroiditis who presents for follow-up. I last saw the patient on 2018, at which time she reported increased left hand pain, most prominently in  the left 2nd MCP. The plan was to continue on increased methotrexate dosage of 12.5 mg, and, if well-tolerated, then increasing dosage further to 15 mg weekly.    Background history:  Symptoms first occurred in August 2008, when she began having stiffness and pain in the left hand, particularly in the PIPs and MCPs. Symptoms spontaneously resolved after about 6-8 weeks. She was previously seen by Rheumatology here around that time (see note from Dr. Betancourt on 12/9/2008). Briefly, assessment was that she had a self-limited episode of inflammatory arthralgias, possibly viral or early psoriatic arthritis. Negative NOLVIA, RF, Lyme serologies and normal CRP and ESR. Given her family history of RA, there was a thought to start her on HCQ if anti-CCP antibodies were positive, but they were found to be negative. No imaging of the hand was performed. September 2014, she experienced recurrent acute onset pain, stiffness, and swelling in the L hand very similar to her symptoms in 2008. Specifically, had swelling across MCPs and in 3rd PIP. She could hardly bend the hand sometimes due to the welling. Had morning stiffness lasting 30-45 minutes. No other joints were involved. Issues with her left hand lingered for several months but eventually self-resolved that January and her only notable symptom is heel pain in the mornings. No fevers, chills, or weight loss. No back pain, vision changes, nausea, vomiting, diarrhea, abdominal pain, or blood in stool or urine. No rash, sicca symptoms, or oral ulcers. It seems she has a history of hypermobility, e.g. could bend wrist back to forearm, bend down and touch palms flat on floor, when she was younger. Used to get frequent ankle sprains, and has had multiple tendon and ligament tears over the years.    Today, the patient reports that she had recent upper respiratory symptoms which triggered asthma, and she was placed on steroids for this. She describes some lingering post-nasal drip  "over most of the fall, but this has since resolved. She has been taking Dulera finished course of prednisone.     She reports that she is having more joint pain in her right 1st MTP. She explains that her pain here is not bad, but she does feel soreness if someone squeezes her hand. She notes that she has more prominent left-sided 1st MTP pain. She states that her 1st MTPs do swell.     She states that she had psoriasic skin rashes around her temple area. She denies any issues tolerating methotrexate. She denies any significant morning joint stiffness. She describes a \"feeling like tendonitis\" in her medial knees and elbows that feels like she has hyperextended the joints. She adds that these sensations take hours to resolve. She reports that these joint sensations have worsened in the last couple months. She states that she historically has joint issues at the beginning of the fall, but these joint issues seem to be lasting into the winter time presently.      She explains that she has had a swollen gland on the right side of her neck which has been so significant in the past that she had difficulty turning her head to the side.     Review of Systems:   Pertinent items are noted in HPI or as below, remainder of complete ROS is negative.      No recent problems with hearing or vision. No swallowing problems.   No breathing difficulty, shortness of breath, coughing, or wheezing.  No chest pain or palpitations.  No heart burn, indigestion, abdominal pain, nausea, vomiting, diarrhea.  No urination problems, no bloody, cloudy urine, no dysuria.  No numbing, tingling, weakness.  No headaches or confusion.  No rashes. No easy bleeding or bruising.     Active Medications:   Current Outpatient Medications:      albuterol (PROAIR HFA/PROVENTIL HFA/VENTOLIN HFA) 108 (90 Base) MCG/ACT inhaler, Inhale 2 puffs into the lungs every 4 hours as needed for shortness of breath / dyspnea or wheezing, Disp: 1 Inhaler, Rfl: 11     " aspirin 81 MG tablet, Take 1 tablet by mouth daily., Disp: , Rfl:      blood glucose (ACCU-CHEK COLIN PLUS) test strip, Test Blood Sugar 8 times daily or as directed, Disp: 800 strip, Rfl: 3     blood glucose monitoring (ACCU-CHEK FASTCLIX) lancets, Use to test blood sugar 8 times daily or as directed., Disp: 4 Box, Rfl: 3     Calcium Carbonate-Vitamin D (CALCIUM + D PO), Take one daily, Disp: , Rfl:      citalopram (CELEXA) 20 MG tablet, Take 1.5 tablets (30 mg) by mouth daily, Disp: 135 tablet, Rfl: 1     Continuous Blood Gluc Sensor (DEXCOM G5 MOB/G4 PLAT SENSOR) MISC, 1 each every 7 days, Disp: 12 each, Rfl: 3     Continuous Blood Gluc Transmit (DEXCOM G4 PLATINUM TRANSMITTER) MISC, 1 each every 6 months, Disp: 1 each, Rfl: 1     diclofenac (VOLTAREN) 1 % topical gel, APPLY 2 GRAMS ONTO THE SKIN FOUR TIMES DAILY AS NEEDED FOR MODERATE PAIN, Disp: 100 g, Rfl: 5     dulaglutide (TRULICITY) 1.5 MG/0.5ML pen, Inject 1.5 mg Subcutaneous every 7 days, Disp: 3 mL, Rfl: 1     econazole nitrate 1 % cream, Apply 0.5 inches topically daily., Disp: , Rfl:      fish oil-omega-3 fatty acids (FISH OIL) 1000 MG capsule, Take one daily, Disp: , Rfl:      folic acid (FOLVITE) 1 MG tablet, Take 1 tablet (1 mg) by mouth daily, Disp: 90 tablet, Rfl: 0     insulin degludec (TRESIBA) 100 UNIT/ML pen, Inject 15 units subcutaneous at hs., Disp: 15 mL, Rfl: 11     insulin pen needle (B-D U/F) 31G X 8 MM miscellaneous, Use 5 pen needles daily, Disp: 450 each, Rfl: 3     Ketoconazole (NIZORAL PO), Take  by mouth. Shampoo daily, Disp: , Rfl:      levothyroxine (SYNTHROID/LEVOTHROID) 112 MCG tablet, Take 1 tablet (112 mcg) by mouth daily, Disp: 90 tablet, Rfl: 3     methotrexate 2.5 MG tablet, Take 6 tablets (15 mg) by mouth once a week, Disp: 24 tablet, Rfl: 1     methylphenidate (METADATE CD) 20 MG CR capsule, Take 20 mg by mouth daily, Disp: , Rfl:      Minoxidil (ROGAINE EX), Externally apply  topically. daily, Disp: , Rfl:       mometasone-formoterol (DULERA) 100-5 MCG/ACT inhaler, Inhale 2 puffs into the lungs 2 times daily, Disp: 3 Inhaler, Rfl: 11     montelukast (SINGULAIR) 5 MG chewable tablet, Take 1 tablet (5 mg) by mouth At Bedtime, Disp: 90 tablet, Rfl: 3     Multiple Vitamin (MULTIVITAMIN OR), Take  by mouth. Take one daily tab, Disp: , Rfl:      NOVOLOG PENFILL 100 UNIT/ML soln, 1 unit per 15 grams CHO at all meals and snacks with correction scale of 1 unit per 50 mg/dL over 150. Average daily use is 30  units., Disp: 30 mL, Rfl: 4     simvastatin (ZOCOR) 20 MG tablet, Take 1 tablet (20 mg) by mouth At Bedtime, Disp: 90 tablet, Rfl: 3     traZODone (DESYREL) 50 MG tablet, Take 1-2 tablets by mouth nightly as needed for sleep., Disp: 180 tablet, Rfl: 0    Current Facility-Administered Medications:      betamethasone acet & sod phos (CELESTONE) injection 6 mg, 6 mg, INTRA-ARTICULAR, Once, Chris Uribe MD    Allergies:   Sulfasalazine      Past Medical History:  Anemia   Anxiety   Psoriatic arthritis  Dry eye syndrome   Endometriosis   Gastroesophageal reflux disease   Hypothyroidism   Type 1 diabetes mellitus   Asthma   Necrobiosis lipoidica diabeticorum  Chronic low back pain   Enthesopathy   Chronic right ankle pain      Past Surgical History:  Knee cruciate ligament repair   Stomach surgery 12/2002  Hydrogen breath test 6/17/2014   Appendectomy 2002   Lysis adhesions 2002   Right ankle surgery 12/2014      Family History:   Mother: Breast cancer   Father: Heart disease, eye disorder, hyperlipidemia, macular degeneration, anxiety, alcoholism, rheumatoid arthritis, hypothyroidism, diabetes mellitus   Paternal grandmother: Cerebrovascular disease  Paternal grandfather: Pancreatic cancer, heart disease   Maternal grandmother: Type 2 diabetes mellitus, coronary artery disease  Maternal aunt: Type 1 diabetes mellitus     Social History:  The patient reports that she has never smoked. She has never used smokeless tobacco. She  reports current alcohol use. She reports that she does not use drugs.   PCP: Dimitri Alas  Marital Status: Single    Physical Exam:   There were no vitals taken for this visit.   Wt Readings from Last 4 Encounters:   06/23/20 83.9 kg (185 lb)   01/07/20 87.6 kg (193 lb 1.6 oz)   12/17/19 86.5 kg (190 lb 12.8 oz)   10/08/19 85.2 kg (187 lb 12.8 oz)     Constitutional: Well-developed, appearing stated age; cooperative.      Laboratory:   RHEUM RESULTS Latest Ref Rng & Units 6/18/2019 1/7/2020 6/18/2020   SED RATE 0 - 20 mm/h - - -   CRP, INFLAMMATION 0.0 - 8.0 mg/L <2.9 <2.9 <2.9   CYCLIC CIT PEPT IGG <5 U/mL - - -   RHEUMATOID FACTOR <20 IU/mL - - -   NOLVIA SCREEN BY EIA <1.0 - - -   AST 0 - 45 U/L 40 28 23   ALT 0 - 50 U/L 47 44 33   ALBUMIN 3.4 - 5.0 g/dL 4.0 4.1 3.9   WBC 4.0 - 11.0 10e9/L 6.9 5.0 6.9   RBC 3.8 - 5.2 10e12/L 3.75(L) 3.84 3.70(L)   HGB 11.7 - 15.7 g/dL 11.7 12.0 11.7   HCT 35.0 - 47.0 % 35.9 36.7 35.3   MCV 78 - 100 fl 96 96 95   MCHC 31.5 - 36.5 g/dL 32.6 32.7 33.1   RDW 10.0 - 15.0 % 12.8 12.9 13.2    - 450 10e9/L 277 311 311   CREATININE 0.52 - 1.04 mg/dL 0.49(L) 0.60 0.56   GFR ESTIMATE, IF BLACK >60 mL/min/[1.73:m2] >90 >90 >90   GFR ESTIMATE >60 mL/min/[1.73:m2] >90 >90 >90    - 1620 mg/dL - - -   IGA 70 - 380 mg/dL - - -   IGM 60 - 265 mg/dL - - -     Rheumatoid Factor   Date Value Ref Range Status   12/22/2014 <20 <20 IU/mL Final     Cyclic Cit Pept IgG/IgA   Date Value Ref Range Status   03/17/2015 <20  Interpretation:  Negative   <20 UNITS Final     Cyclic Citrullinated Peptide IgG   Date Value Ref Range Status   12/09/2008 <2 <5 U/mL Final     Comment:     Interpretation:  Negative     Scleroderma Antibody Scl-70 ELEAZAR IgG   Date Value Ref Range Status   03/17/2015  0.0 - 0.9 AI Final    <0.2  Negative   Antibody index (AI) values reflect qualitative changes in antibody   concentration that cannot be directly associated with clinical condition or   disease state.       SSA  (Ro) (ELEAZAR) Antibody, IgG   Date Value Ref Range Status   03/17/2015 0.2 0.0 - 0.9 AI Final     Comment:     Negative   Antibody index (AI) values reflect qualitative changes in antibody   concentration that cannot be directly associated with clinical condition or   disease state.       SSB (La) (ELEAZAR) Antibody, IgG   Date Value Ref Range Status   03/17/2015 0.2 0.0 - 0.9 AI Final     Comment:     Negative   Antibody index (AI) values reflect qualitative changes in antibody   concentration that cannot be directly associated with clinical condition or   disease state.       NOLVIA Screen by EIA   Date Value Ref Range Status   12/22/2014 1.6 (H) <1.0 Final     Comment:     Interpretation:  Positive     IGG   Date Value Ref Range Status   07/11/2005 1410 695 - 1620 mg/dL Final     IGA   Date Value Ref Range Status   06/10/2014 190 70 - 380 mg/dL Final     IGM   Date Value Ref Range Status   07/11/2005 151 60 - 265 mg/dL Final     Cyr ELEAZAR Antibody IgG   Date Value Ref Range Status   03/17/2015  0.0 - 0.9 AI Final    <0.2  Negative   Antibody index (AI) values reflect qualitative changes in antibody   concentration that cannot be directly associated with clinical condition or   disease state.       Scleroderma Antibody Scl-70 ELEAZAR IgG   Date Value Ref Range Status   03/17/2015  0.0 - 0.9 AI Final    <0.2  Negative   Antibody index (AI) values reflect qualitative changes in antibody   concentration that cannot be directly associated with clinical condition or   disease state.       January 5, 2021 interval history:    Since we have last seen the patient, she has, with the combination of coronavirus issues and other stressful issues had a somewhat stressful year.    She has had several flares of her arthritis symptoms that she attributes that increased stress.  She is not even sure exactly when it happened.  She thinks this may have been back in the fall but it could have been earlier in the year that she had some increased  pain and a little bit of swelling in her metatarsals of the right foot.  That went on for several weeks then seem to go away on its own with no additional therapy.  She also had some increased pain in one of her thumbs at one point.  That is also back to normal.    However over the last several months she has had some increased numbness in her right hand.  She first had some numbness in the right middle finger after a blood draw that apparently hit a nerve because the nerve pain went on for many weeks.  However, more recently she has had rather typical carpal tunnel type symptoms worse first thing in the morning and occasionally waking her from sleep affecting the second third and fourth fingers.  On further questioning, only the medial aspect of the fourth finger is involved and the fifth finger and the thumb are entirely spared.    In reviewing her record, it also seems that her hemoglobin A1c is a little bit on the high side right now in the mid sevens.  She is aware of this and thinks that this also may be contributed to by the stressors she has been under.    In terms of her psoriasis itself that is been pretty stable.  Its affected primarily the area around her temples and is a little worse with the colder weather but is minimally apparent on the video which is high-quality.    Physical examination by video shows her to be in no acute distress HEENT examination is normal.  She is speaking in full sentences with no shortness of breath.  The skin examination is I can see it looks essentially normal with no significant psoriasis.  Examination of her hands shows there is no convincing areas of swelling no joint deformity and there is full and normal range of motion.    Impression:    Per the problem list, with inflammatory arthritis that is generally been kept under good control with methotrexate.    She has not had any labs however to check for toxicity in quite some time nor to look at inflammation so we should  do those and I have reordered them today.    I did also  her extensively on using splinting to prevent carpal tunnel syndrome at night and also to use something in the way of a more flexible soft splint during the day because she does spend a lot of time on the keyboard.    Finally we spoke about coronavirus vaccination.  Based on her chronic conditions of diabetes, asthma, and inflammatory arthritis on methotrexate immunosuppression she should be on phase 1C.  She will keep track of where the state is at and when we get to where we are vaccinating folks in phase 1B she may reach out to us with a my chart message to see whether that vaccine can be obtained with our orders or how she can get it.    She is going to try these conservative approaches for carpal tunnel and will then plan on touching bases in about 2 to 3 months sooner as needed.    This video visit commenced at 4 PM and was completed at 4:34 PM      Originating Location (pt. Location): Home    Distant Location (provider location):  [facility] [place]     Platform used for Video Visit: Mark Anthony Betancourt MD, PhD    Rheumatology

## 2021-01-06 NOTE — PATIENT INSTRUCTIONS
We appreciate your assistance in coordinating your healthcare.     Please upload your insulin pump, blood sugar meter and/or continuous glucose monitor at home 1-2 days before your next diabetes-related appointment.   This will allow your provider to review your  data before your scheduled virtual visit.    To ask a question to your Endocrine care team, please send them a [employer] message, or reach them by phone at [phone]     To expedite your medication refill(s), please contact your pharmacy and have them   fax a refill request to: [phone].  *Please allow 3 business days for routine medication refills.  *Please allow 5 business days for controlled substance medication refills.    For after-hours urgent Endocrine issues, that do not require 391, please dial [phone], and ask to speak with the Endocrinologist On-Call

## 2021-01-06 NOTE — PROGRESS NOTES
Dali Martines is a 50 year old female who is being evaluated via a billable video visit.      How would you like to obtain your AVS? MyChart     Will anyone else be joining your video visit? No     Outcome for 01/06/21 12:06 PM :Glucose sent via Email    Sara Santoyo MA

## 2021-01-07 ENCOUNTER — VIRTUAL VISIT (OUTPATIENT)
Dept: ENDOCRINOLOGY | Facility: CLINIC | Age: 51
End: 2021-01-07
Payer: COMMERCIAL

## 2021-01-07 DIAGNOSIS — E10.9 TYPE 1 DIABETES MELLITUS WITHOUT COMPLICATION (H): ICD-10-CM

## 2021-01-07 PROCEDURE — 99215 OFFICE O/P EST HI 40 MIN: CPT | Mod: 95 | Performed by: PHYSICIAN ASSISTANT

## 2021-01-07 NOTE — LETTER
1/7/2021       RE: Dali Martines  [address]  [facility] [place] [address]     Dear Colleague,    Thank you for referring your patient, Dali Martines, to the [facility] [place] [facility]. Please see a copy of my visit note below.    Dali Martines is a 50 year old female who is being evaluated via a billable video visit.      How would you like to obtain your AVS? MyChart     Will anyone else be joining your video visit? No     Outcome for 01/06/21 12:06 PM :Glucose sent via Email    Sara Santoyo MA          Due to the COVID 19 pandemic this visit was converted to a video visit in order to help prevent spread of infection in this patient and the general population.    Time of start: 11:30 am  Time of end: 12:01 pm  Total duration of video visit: 31 minutes.    HPI  Dali Martines is a 50 year old female with type 1 diabetes mellitus and hypothyroidism.  Video visit today for diabetes and hypothyroid follow up.  Pt was dx having type 1 diabetes at age 15.  Her hx is also significant for mild NPDR right eye only,  psoriatic arthritis, anxiety, hyperlipidemia and GERD.  For her diabetes, she is currently taking Tresiba 17 units daily, Novolog 1 unit/7 gms CHO with meals and snacks, plus correction - 1 unit drops BG 30 mg/mL.  She has been taking Fiasp with her evening meal which has helped reduce her hyperglycemia following her dinner meal.  Most recent A1C was 7.6 % on 6/26/2020.  I reviewed her DexcomG4 sensor download with her today and her average glucose is 154  with SD 66.  Her estimated A1C is 7.0 %.  Her glucose is in target 64 % of the time, above target 29 % of the time and below target 7 % of the time.  She has had some low blood sugars intermittently from 6 am - 9 am.    She is able to self treat her hypoglycemia and she wears her Dexcom sensor full time.  On ROS today, she reports doing better overall.  Less nausea on lower  dose of Trulicity.  She was recently seen by her Rheum staff - knee and elbow pain, right  finertips numb and left great toe decrease sensation.  Intermittent dysuria and hematuria per patient. None at this time.  Pt reports having chest pain when she eats food with MSG.  She denies fevers or chills.  Pt denies headaches, blurred vision, SOB at rest, cough, abd pain or diarrhea.  She denies foot ulcers a this time.    Diabetes Care  Retinopathy:yes in right eye; pt seen by O[facility] here in Oct 2020 with mild NPDR right eye only.  Nephropathy:none; urine microalbuminuria negative in 12/2019.   Neuropathy: none.  Foot Exam: no exam today.  Taking aspirin: yes.  Lipids: LDL 92 and HDL 91 in 12/2018. Pt is taking Simvastatin.  CAD: no hx of CAD.  Mental health: hx of anxiety.  Insulin: Basal and meal time insulin with correction insulin. DM meds: low dose Trulicity.  Testing: DexcomG4 sensor.  An order for a DexcomG6 sensor has been placed.    ROS  Please see under HPI.    Allergies  Allergies   Allergen Reactions     Monosodium Glutamate Palpitations and Shortness Of Breath     Sulfasalazine      Developed rash, HA, dizziness       Medications  Current Outpatient Medications   Medication Sig Dispense Refill     insulin degludec (TRESIBA) 100 UNIT/ML pen Inject 17 units subcutaneous at hs. 15 mL 3     albuterol (PROAIR HFA/PROVENTIL HFA/VENTOLIN HFA) 108 (90 Base) MCG/ACT inhaler Inhale 2 puffs into the lungs every 4 hours as needed for shortness of breath / dyspnea or wheezing 1 Inhaler 11     aspirin 81 MG tablet Take 1 tablet by mouth daily.       blood glucose (ACCU-CHEK COLIN PLUS) test strip Test Blood Sugar 8 times daily or as directed 800 strip 3     blood glucose (NO BRAND SPECIFIED) test strip Use to test blood sugar 8 times daily or as directed. Any covered brand. 800 strip 3     blood glucose monitoring (ACCU-CHEK FASTCLIX) lancets Use to test blood sugar 8 times daily or as directed. 4 Box 3     blood glucose  monitoring (NO BRAND SPECIFIED) meter device kit Use to test blood sugar 8 times daily or as directed. Any covered brand, 1 kit 0     Calcium Carbonate-Vitamin D (CALCIUM + D PO) Take one daily       citalopram (CELEXA) 20 MG tablet Take 1.5 tablets (30 mg) by mouth daily 135 tablet 1     Continuous Blood Gluc  (DEXCOM G6 ) SUZANNE Use to read blood sugars as per 's instructions. 1 each 0     Continuous Blood Gluc Sensor (DEXCOM G6 SENSOR) MISC Change every 10 days. 9 each 3     Continuous Blood Gluc Transmit (DEXCOM G6 TRANSMITTER) MISC Change every 3 months. 1 each 3     diclofenac (VOLTAREN) 1 % topical gel APPLY 2 GRAMS ONTO THE SKIN FOUR TIMES DAILY AS NEEDED FOR MODERATE PAIN 100 g 5     dulaglutide (TRULICITY) 0.75 MG/0.5ML pen Inject 0.75 mg Subcutaneous every 7 days 2 mL 6     econazole nitrate 1 % cream Apply 0.5 inches topically daily.       fish oil-omega-3 fatty acids (FISH OIL) 1000 MG capsule Take one daily       folic acid (FOLVITE) 1 MG tablet Take 1 tablet (1 mg) by mouth daily 90 tablet 2     insulin aspart (FIASP FLEXTOUCH) 100 UNIT/ML pen-injector Use as directed up to 20 units a day at the start of meals 3 mL 3     insulin pen needle (B-D U/F) 31G X 8 MM miscellaneous Use 5 pen needles daily 450 each 3     Ketoconazole (NIZORAL PO) Take  by mouth. Shampoo daily       levothyroxine (SYNTHROID/LEVOTHROID) 112 MCG tablet Take 1 tablet (112 mcg) by mouth daily 90 tablet 3     methotrexate 2.5 MG tablet Take 6 tablets (15 mg) by mouth every 7 days 24 tablet 3     methylphenidate (METADATE CD) 20 MG CR capsule Take 20 mg by mouth daily       Minoxidil (ROGAINE EX) Externally apply  topically. daily       mometasone-formoterol (DULERA) 100-5 MCG/ACT inhaler Inhale 2 puffs into the lungs 2 times daily 3 Inhaler 11     montelukast (SINGULAIR) 5 MG chewable tablet Take 1 tablet (5 mg) by mouth At Bedtime 90 tablet 3     Multiple Vitamin (MULTIVITAMIN OR) Take  by mouth. Take one  daily tab       NOVOLOG PENFILL 100 UNIT/ML soln INJECT 1 UNIT PER 15 GRAMS CHO AT ALL MEALS AND SNACKS WITH CORRECTION SCALE OF 1 UNIT PER 50 MG/DL OVER 150, AVERAGE DAILY USE OF 30 UNITS 30 mL 4     simvastatin (ZOCOR) 20 MG tablet Take 1 tablet (20 mg) by mouth At Bedtime 90 tablet 3     traZODone (DESYREL) 50 MG tablet Take 1-2 tablets by mouth nightly as needed for sleep. 180 tablet 0       Family History  family history includes Alcoholism in her father; Anxiety Disorder in her father; Arthritis in her father; Breast Cancer in her mother; C.A.D. in her father and maternal grandmother; Cancer in her paternal grandfather; Cardiac Sudden Death in her maternal grandfather; Cerebrovascular Disease in her paternal grandmother; Circulatory in her father; Coronary Artery Disease in her father and maternal grandmother; Diabetes in her father, maternal grandmother, and another family member; Eye Disorder in her father; Gynecology in an other family member; Heart Disease in her father, maternal grandfather, maternal grandmother, and paternal grandfather; Hypothyroidism in her father; Lipids in her father; Macular Degeneration in her father and maternal aunt; Rheumatoid Arthritis in her father; Thyroid Disease in her father and another family member.    Social History   reports that she has never smoked. She has never used smokeless tobacco. She reports current alcohol use. She reports that she does not use drugs.     Past Medical History  Past Medical History:   Diagnosis Date     Anemia      Anxiety      Arthritis 2014    Psoriatic arthritis     Back injury 1988     Dry eye syndrome      Dyslipidemia      Endometriosis 2002    adehsions seen at laparoscopy     GERD (gastroesophageal reflux disease)      Hypothyroidism     8 yoa     SOB (shortness of breath) 3/11/2014     Type I (juvenile type) diabetes mellitus without mention of complication, not stated as uncontrolled     when 15      Uncomplicated asthma Fall 2013        Past Surgical History:   Procedure Laterality Date     C REPAIR CRUCIATE LIGAMENT,KNEE       C STOMACH SURGERY PROCEDURE UNLISTED  December 2002     COLONOSCOPY  2002     COLONOSCOPY N/A 6/23/2020    Procedure: COLONOSCOPY;  Surgeon: Lauryn Rivera MD;  Location:  GI     ESOPHAGOSCOPY, GASTROSCOPY, DUODENOSCOPY (EGD), COMBINED  1/7/2014    Procedure: COMBINED ESOPHAGOSCOPY, GASTROSCOPY, DUODENOSCOPY (EGD), BIOPSY SINGLE OR MULTIPLE;;  Surgeon: Kraig Nicole MD;  Location:  GI     GYN SURGERY      Laparotomy to remove endometrial tissue     HC BREATH HYDROGEN TEST N/A 6/17/2014    Procedure: HYDROGEN BREATH TEST;  Surgeon: Deacon Alberts MD;  Location:  GI     HYDROGEN BREATH TEST  6/17/14     LAPAROSCOPIC APPENDECTOMY  2002     LAPAROTOMY, LYSIS ADHESIONS, COMBINED  2002    endometriosis     SOFT TISSUE SURGERY  December 2014    right ankle tendon injury       Physical Exam    No exam.    RESULTS  Creatinine   Date Value Ref Range Status   06/18/2020 0.56 0.52 - 1.04 mg/dL Final     GFR Estimate   Date Value Ref Range Status   06/18/2020 >90 >60 mL/min/[1.73_m2] Final     Comment:     Non  GFR Calc  Starting 12/18/2018, serum creatinine based estimated GFR (eGFR) will be   calculated using the Chronic Kidney Disease Epidemiology Collaboration   (CKD-EPI) equation.       Microalbumin Urine   Date Value Ref Range Status   06/26/2009 5@ MG/L      Hemoglobin A1C   Date Value Ref Range Status   06/26/2020 7.6 (H) 0 - 5.6 % Final     Comment:     Normal <5.7% Prediabetes 5.7-6.4%  Diabetes 6.5% or higher - adopted from ADA   consensus guidelines.       Potassium   Date Value Ref Range Status   04/27/2016 4.1 3.4 - 5.3 mmol/L Final     ALT   Date Value Ref Range Status   06/18/2020 33 0 - 50 U/L Final     AST   Date Value Ref Range Status   06/18/2020 23 0 - 45 U/L Final     TSH   Date Value Ref Range Status   12/17/2019 1.26 0.40 - 4.00 mU/L Final     T4 Total   Date Value Ref  Range Status   11/29/2010 8.5 5.0 - 11.0 ug/dL Final     T4 Free   Date Value Ref Range Status   03/21/2019 1.09 0.76 - 1.46 ng/dL Final       Cholesterol   Date Value Ref Range Status   12/18/2018 194 <200 mg/dL Final   09/27/2013 211 (H) 0 - 200 mg/dL Final     Comment:     LDL Cholesterol is the primary guide to therapy.   The NCEP recommends further evaluation of: patients with cholesterol greater   than 200 mg/dL if additional risk factors are present, cholesterol greater   than   240 mg/dL, triglycerides greater than 150 mg/dL, or HDL less than 40 mg/dL.     HDL Cholesterol   Date Value Ref Range Status   12/18/2018 91 >49 mg/dL Final   09/27/2013 78 50 - 110 mg/dL Final     LDL Cholesterol Calculated   Date Value Ref Range Status   12/18/2018 92 <100 mg/dL Final     Comment:     Desirable:       <100 mg/dl   09/27/2013 123 0 - 129 mg/dL Final     Comment:     LDL Cholesterol is the primary guide to therapy: LDL-cholesterol goal in high   risk patients is <100 mg/dL and in very high risk patients is <70 mg/dL.     Triglycerides   Date Value Ref Range Status   12/18/2018 54 <150 mg/dL Final   09/27/2013 51 0 - 150 mg/dL Final     Cholesterol/HDL Ratio   Date Value Ref Range Status   09/27/2013 2.7 0.0 - 5.0 Final   02/15/2013 2.9 0.0 - 5.0 Final       A1C   7.6 % on 6/26/2020      ASSESSMENT/PLAN:    1. TYPE 1 DIABETES MELLITUS: The DexcomG6 sensor has been ordered. Pt has not received the sensor yet.  Pt states once she is comfortable with use of her new Dexcom sensor, she plan to consider ordering an insulin pump. She is interested in the Tandem and OmniPod insulin pumps.  No change in insulin doses today.  Continue low dose Trulicity 0.75 mg subcutaneous once a week.  Her urine microalbuminuria has been negative with normal creat/GFR.  She denies sx of neuropathy or foot ulcers.  Pt is taking ASA daily.  Dali had the flu vaccine this season.    2.  RETINOPATHY: Hx of mild NPDR right eye only.  She was seen  here by Oph in Oct 2020.    3.  CHEST PAIN: This occurs when she eats food with MSG. I asked her to talk to Dr. Alas about this issue and avoid food with MSG.    4.  FOLLOW UP: with Dr. Mckeon in 3 months.  I placed an order for annual fasting diabetes labs including an A1C today.      Sincerely,    Marissa Sebastian PA-C

## 2021-01-07 NOTE — PROGRESS NOTES
Due to the COVID 19 pandemic this visit was converted to a video visit in order to help prevent spread of infection in this patient and the general population.    Time of start: 11:30 am  Time of end: 12:01 pm  Total duration of video visit: 31 minutes.    MEENA Martines is a 50 year old female with type 1 diabetes mellitus and hypothyroidism.  Video visit today for diabetes and hypothyroid follow up.  Pt was dx having type 1 diabetes at age 15.  Her hx is also significant for mild NPDR right eye only,  psoriatic arthritis, anxiety, hyperlipidemia and GERD.  For her diabetes, she is currently taking Tresiba 17 units daily, Novolog 1 unit/7 gms CHO with meals and snacks, plus correction - 1 unit drops BG 30 mg/mL.  She has been taking Fiasp with her evening meal which has helped reduce her hyperglycemia following her dinner meal.  Most recent A1C was 7.6 % on 6/26/2020.  I reviewed her DexcomG4 sensor download with her today and her average glucose is 154  with SD 66.  Her estimated A1C is 7.0 %.  Her glucose is in target 64 % of the time, above target 29 % of the time and below target 7 % of the time.  She has had some low blood sugars intermittently from 6 am - 9 am.    She is able to self treat her hypoglycemia and she wears her Dexcom sensor full time.  On ROS today, she reports doing better overall.  Less nausea on lower dose of Trulicity.  She was recently seen by her Rheum staff - knee and elbow pain, right  finertips numb and left great toe decrease sensation.  Intermittent dysuria and hematuria per patient. None at this time.  Pt reports having chest pain when she eats food with MSG.  She denies fevers or chills.  Pt denies headaches, blurred vision, SOB at rest, cough, abd pain or diarrhea.  She denies foot ulcers a this time.    Diabetes Care  Retinopathy:yes in right eye; pt seen by Oph here in Oct 2020 with mild NPDR right eye only.  Nephropathy:none; urine microalbuminuria negative in 12/2019.    Neuropathy: none.  Foot Exam: no exam today.  Taking aspirin: yes.  Lipids: LDL 92 and HDL 91 in 12/2018. Pt is taking Simvastatin.  CAD: no hx of CAD.  Mental health: hx of anxiety.  Insulin: Basal and meal time insulin with correction insulin. DM meds: low dose Trulicity.  Testing: DexcomG4 sensor.  An order for a DexcomG6 sensor has been placed.    ROS  Please see under HPI.    Allergies  Allergies   Allergen Reactions     Monosodium Glutamate Palpitations and Shortness Of Breath     Sulfasalazine      Developed rash, HA, dizziness       Medications  Current Outpatient Medications   Medication Sig Dispense Refill     insulin degludec (TRESIBA) 100 UNIT/ML pen Inject 17 units subcutaneous at hs. 15 mL 3     albuterol (PROAIR HFA/PROVENTIL HFA/VENTOLIN HFA) 108 (90 Base) MCG/ACT inhaler Inhale 2 puffs into the lungs every 4 hours as needed for shortness of breath / dyspnea or wheezing 1 Inhaler 11     aspirin 81 MG tablet Take 1 tablet by mouth daily.       blood glucose (ACCU-CHEK COLIN PLUS) test strip Test Blood Sugar 8 times daily or as directed 800 strip 3     blood glucose (NO BRAND SPECIFIED) test strip Use to test blood sugar 8 times daily or as directed. Any covered brand. 800 strip 3     blood glucose monitoring (ACCU-CHEK FASTCLIX) lancets Use to test blood sugar 8 times daily or as directed. 4 Box 3     blood glucose monitoring (NO BRAND SPECIFIED) meter device kit Use to test blood sugar 8 times daily or as directed. Any covered brand, 1 kit 0     Calcium Carbonate-Vitamin D (CALCIUM + D PO) Take one daily       citalopram (CELEXA) 20 MG tablet Take 1.5 tablets (30 mg) by mouth daily 135 tablet 1     Continuous Blood Gluc  (DEXCOM G6 ) SUZANNE Use to read blood sugars as per 's instructions. 1 each 0     Continuous Blood Gluc Sensor (DEXCOM G6 SENSOR) MISC Change every 10 days. 9 each 3     Continuous Blood Gluc Transmit (DEXCOM G6 TRANSMITTER) MISC Change every 3 months. 1  each 3     diclofenac (VOLTAREN) 1 % topical gel APPLY 2 GRAMS ONTO THE SKIN FOUR TIMES DAILY AS NEEDED FOR MODERATE PAIN 100 g 5     dulaglutide (TRULICITY) 0.75 MG/0.5ML pen Inject 0.75 mg Subcutaneous every 7 days 2 mL 6     econazole nitrate 1 % cream Apply 0.5 inches topically daily.       fish oil-omega-3 fatty acids (FISH OIL) 1000 MG capsule Take one daily       folic acid (FOLVITE) 1 MG tablet Take 1 tablet (1 mg) by mouth daily 90 tablet 2     insulin aspart (FIASP FLEXTOUCH) 100 UNIT/ML pen-injector Use as directed up to 20 units a day at the start of meals 3 mL 3     insulin pen needle (B-D U/F) 31G X 8 MM miscellaneous Use 5 pen needles daily 450 each 3     Ketoconazole (NIZORAL PO) Take  by mouth. Shampoo daily       levothyroxine (SYNTHROID/LEVOTHROID) 112 MCG tablet Take 1 tablet (112 mcg) by mouth daily 90 tablet 3     methotrexate 2.5 MG tablet Take 6 tablets (15 mg) by mouth every 7 days 24 tablet 3     methylphenidate (METADATE CD) 20 MG CR capsule Take 20 mg by mouth daily       Minoxidil (ROGAINE EX) Externally apply  topically. daily       mometasone-formoterol (DULERA) 100-5 MCG/ACT inhaler Inhale 2 puffs into the lungs 2 times daily 3 Inhaler 11     montelukast (SINGULAIR) 5 MG chewable tablet Take 1 tablet (5 mg) by mouth At Bedtime 90 tablet 3     Multiple Vitamin (MULTIVITAMIN OR) Take  by mouth. Take one daily tab       NOVOLOG PENFILL 100 UNIT/ML soln INJECT 1 UNIT PER 15 GRAMS CHO AT ALL MEALS AND SNACKS WITH CORRECTION SCALE OF 1 UNIT PER 50 MG/DL OVER 150, AVERAGE DAILY USE OF 30 UNITS 30 mL 4     simvastatin (ZOCOR) 20 MG tablet Take 1 tablet (20 mg) by mouth At Bedtime 90 tablet 3     traZODone (DESYREL) 50 MG tablet Take 1-2 tablets by mouth nightly as needed for sleep. 180 tablet 0       Family History  family history includes Alcoholism in her father; Anxiety Disorder in her father; Arthritis in her father; Breast Cancer in her mother; C.A.D. in her father and maternal  grandmother; Cancer in her paternal grandfather; Cardiac Sudden Death in her maternal grandfather; Cerebrovascular Disease in her paternal grandmother; Circulatory in her father; Coronary Artery Disease in her father and maternal grandmother; Diabetes in her father, maternal grandmother, and another family member; Eye Disorder in her father; Gynecology in an other family member; Heart Disease in her father, maternal grandfather, maternal grandmother, and paternal grandfather; Hypothyroidism in her father; Lipids in her father; Macular Degeneration in her father and maternal aunt; Rheumatoid Arthritis in her father; Thyroid Disease in her father and another family member.    Social History   reports that she has never smoked. She has never used smokeless tobacco. She reports current alcohol use. She reports that she does not use drugs.     Past Medical History  Past Medical History:   Diagnosis Date     Anemia      Anxiety      Arthritis 2014    Psoriatic arthritis     Back injury 1988     Dry eye syndrome      Dyslipidemia      Endometriosis 2002    adehsions seen at laparoscopy     GERD (gastroesophageal reflux disease)      Hypothyroidism     8 yoa     SOB (shortness of breath) 3/11/2014     Type I (juvenile type) diabetes mellitus without mention of complication, not stated as uncontrolled     when 15      Uncomplicated asthma Fall 2013       Past Surgical History:   Procedure Laterality Date     C REPAIR CRUCIATE LIGAMENT,KNEE       C STOMACH SURGERY PROCEDURE UNLISTED  December 2002     COLONOSCOPY  2002     COLONOSCOPY N/A 6/23/2020    Procedure: COLONOSCOPY;  Surgeon: Lauryn Rivera MD;  Location:  GI     ESOPHAGOSCOPY, GASTROSCOPY, DUODENOSCOPY (EGD), COMBINED  1/7/2014    Procedure: COMBINED ESOPHAGOSCOPY, GASTROSCOPY, DUODENOSCOPY (EGD), BIOPSY SINGLE OR MULTIPLE;;  Surgeon: Kraig Nicole MD;  Location:  GI     GYN SURGERY      Laparotomy to remove endometrial tissue     HC BREATH HYDROGEN  TEST N/A 6/17/2014    Procedure: HYDROGEN BREATH TEST;  Surgeon: Deacon Alberts MD;  Location: [facility]     HYDROGEN BREATH TEST  6/17/14     LAPAROSCOPIC APPENDECTOMY  2002     LAPAROTOMY, LYSIS ADHESIONS, COMBINED  2002    endometriosis     SOFT TISSUE SURGERY  December 2014    right ankle tendon injury       Physical Exam    No exam.    RESULTS  Creatinine   Date Value Ref Range Status   06/18/2020 0.56 0.52 - 1.04 mg/dL Final     GFR Estimate   Date Value Ref Range Status   06/18/2020 >90 >60 mL/min/[1.73_m2] Final     Comment:     Non  GFR Calc  Starting 12/18/2018, serum creatinine based estimated GFR (eGFR) will be   calculated using the Chronic Kidney Disease Epidemiology Collaboration   (CKD-EPI) equation.       Microalbumin Urine   Date Value Ref Range Status   06/26/2009 5@ MG/L      Hemoglobin A1C   Date Value Ref Range Status   06/26/2020 7.6 (H) 0 - 5.6 % Final     Comment:     Normal <5.7% Prediabetes 5.7-6.4%  Diabetes 6.5% or higher - adopted from ADA   consensus guidelines.       Potassium   Date Value Ref Range Status   04/27/2016 4.1 3.4 - 5.3 mmol/L Final     ALT   Date Value Ref Range Status   06/18/2020 33 0 - 50 U/L Final     AST   Date Value Ref Range Status   06/18/2020 23 0 - 45 U/L Final     TSH   Date Value Ref Range Status   12/17/2019 1.26 0.40 - 4.00 mU/L Final     T4 Total   Date Value Ref Range Status   11/29/2010 8.5 5.0 - 11.0 ug/dL Final     T4 Free   Date Value Ref Range Status   03/21/2019 1.09 0.76 - 1.46 ng/dL Final       Cholesterol   Date Value Ref Range Status   12/18/2018 194 <200 mg/dL Final   09/27/2013 211 (H) 0 - 200 mg/dL Final     Comment:     LDL Cholesterol is the primary guide to therapy.   The NCEP recommends further evaluation of: patients with cholesterol greater   than 200 mg/dL if additional risk factors are present, cholesterol greater   than   240 mg/dL, triglycerides greater than 150 mg/dL, or HDL less than 40 mg/dL.     HDL  Cholesterol   Date Value Ref Range Status   12/18/2018 91 >49 mg/dL Final   09/27/2013 78 50 - 110 mg/dL Final     LDL Cholesterol Calculated   Date Value Ref Range Status   12/18/2018 92 <100 mg/dL Final     Comment:     Desirable:       <100 mg/dl   09/27/2013 123 0 - 129 mg/dL Final     Comment:     LDL Cholesterol is the primary guide to therapy: LDL-cholesterol goal in high   risk patients is <100 mg/dL and in very high risk patients is <70 mg/dL.     Triglycerides   Date Value Ref Range Status   12/18/2018 54 <150 mg/dL Final   09/27/2013 51 0 - 150 mg/dL Final     Cholesterol/HDL Ratio   Date Value Ref Range Status   09/27/2013 2.7 0.0 - 5.0 Final   02/15/2013 2.9 0.0 - 5.0 Final       A1C   7.6 % on 6/26/2020      ASSESSMENT/PLAN:    1. TYPE 1 DIABETES MELLITUS: The DexcomG6 sensor has been ordered. Pt has not received the sensor yet.  Pt states once she is comfortable with use of her new Dexcom sensor, she plan to consider ordering an insulin pump. She is interested in the Tandem and OmniPod insulin pumps.  No change in insulin doses today.  Continue low dose Trulicity 0.75 mg subcutaneous once a week.  Her urine microalbuminuria has been negative with normal creat/GFR.  She denies sx of neuropathy or foot ulcers.  Pt is taking ASA daily.  Dali had the flu vaccine this season.    2.  RETINOPATHY: Hx of mild NPDR right eye only.  She was seen here by Oph in Oct 2020.    3.  CHEST PAIN: This occurs when she eats food with MSG. I asked her to talk to Dr. Alas about this issue and avoid food with MSG.    4.  FOLLOW UP: with Dr. Mckeon in 3 months.  I placed an order for annual fasting diabetes labs including an A1C today.    Total time spent reviewing chart notes, labs and review of Dexcom sensor download today= 8 minutes.  Total time video visit today = 31 minutes.  Total time for documentation today = 12 minutes.     TOTAL TIME FOR VISIT TODAY = 51 minutes.     Marissa Sebastian PA-C

## 2021-01-08 ENCOUNTER — TELEPHONE (OUTPATIENT)
Dept: ENDOCRINOLOGY | Facility: CLINIC | Age: 51
End: 2021-01-08
Payer: COMMERCIAL

## 2021-01-08 ENCOUNTER — TELEPHONE (OUTPATIENT)
Dept: ENDOCRINOLOGY | Facility: CLINIC | Age: 51
End: 2021-01-08

## 2021-01-08 DIAGNOSIS — E10.319 TYPE 1 DIABETES MELLITUS WITH RETINOPATHY (H): ICD-10-CM

## 2021-01-08 NOTE — TELEPHONE ENCOUNTER
Medication/Dose: Dexcom G6 sensor    ICD code E10.9  Previously Tried and Failed:    Rationale:  Type 1 diabetes mellitus with retinopathy     Insurance Name:    Insurance ID:        Pharmacy Information (if different than what is on RX)  Name:    Phone:

## 2021-01-08 NOTE — TELEPHONE ENCOUNTER
Prior Authorization Retail Medication Request    Medication/Dose: Dexcom G6      ICD code E10.9  Previously Tried and Failed:    Rationale:  Type 1 diabetes mellitus with retinopathy     Insurance Name:    Insurance ID:        Pharmacy Information (if different than what is on RX)  Name:    Phone:

## 2021-01-08 NOTE — TELEPHONE ENCOUNTER
Medication/Dose: Dexcom G6 transmitter    ICD code E10.9  Previously Tried and Failed:    Rationale:  Type 1 diabetes mellitus with retinopathy     Insurance Name:    Insurance ID:        Pharmacy Information (if different than what is on RX)  Name:    Phone:

## 2021-01-10 ENCOUNTER — HEALTH MAINTENANCE LETTER (OUTPATIENT)
Age: 51
End: 2021-01-10

## 2021-01-11 DIAGNOSIS — E10.9 TYPE 1 DIABETES MELLITUS WITHOUT COMPLICATION (H): ICD-10-CM

## 2021-01-11 DIAGNOSIS — L40.50 PSORIATIC ARTHRITIS (H): ICD-10-CM

## 2021-01-11 DIAGNOSIS — Z51.81 ENCOUNTER FOR THERAPEUTIC DRUG MONITORING: ICD-10-CM

## 2021-01-11 LAB
ALBUMIN SERPL-MCNC: 3.8 G/DL (ref 3.4–5)
ALT SERPL W P-5'-P-CCNC: 37 U/L (ref 0–50)
AST SERPL W P-5'-P-CCNC: 31 U/L (ref 0–45)
BASOPHILS # BLD AUTO: 0 10E9/L (ref 0–0.2)
BASOPHILS NFR BLD AUTO: 0.2 %
CHOLEST SERPL-MCNC: 223 MG/DL
CREAT SERPL-MCNC: 0.56 MG/DL (ref 0.52–1.04)
CREAT UR-MCNC: 92 MG/DL
CRP SERPL-MCNC: <2.9 MG/L (ref 0–8)
DIFFERENTIAL METHOD BLD: ABNORMAL
EOSINOPHIL # BLD AUTO: 0.1 10E9/L (ref 0–0.7)
EOSINOPHIL NFR BLD AUTO: 3 %
ERYTHROCYTE [DISTWIDTH] IN BLOOD BY AUTOMATED COUNT: 12.9 % (ref 10–15)
ERYTHROCYTE [SEDIMENTATION RATE] IN BLOOD BY WESTERGREN METHOD: 27 MM/H (ref 0–30)
GFR SERPL CREATININE-BSD FRML MDRD: >90 ML/MIN/{1.73_M2}
HBA1C MFR BLD: 7.1 % (ref 0–5.6)
HCT VFR BLD AUTO: 34.2 % (ref 35–47)
HDLC SERPL-MCNC: 92 MG/DL
HGB BLD-MCNC: 11.4 G/DL (ref 11.7–15.7)
LDLC SERPL CALC-MCNC: 122 MG/DL
LYMPHOCYTES # BLD AUTO: 1.7 10E9/L (ref 0.8–5.3)
LYMPHOCYTES NFR BLD AUTO: 36.5 %
MCH RBC QN AUTO: 32.1 PG (ref 26.5–33)
MCHC RBC AUTO-ENTMCNC: 33.3 G/DL (ref 31.5–36.5)
MCV RBC AUTO: 96 FL (ref 78–100)
MICROALBUMIN UR-MCNC: <5 MG/L
MICROALBUMIN/CREAT UR: NORMAL MG/G CR (ref 0–25)
MONOCYTES # BLD AUTO: 0.5 10E9/L (ref 0–1.3)
MONOCYTES NFR BLD AUTO: 11.3 %
NEUTROPHILS # BLD AUTO: 2.3 10E9/L (ref 1.6–8.3)
NEUTROPHILS NFR BLD AUTO: 49 %
NONHDLC SERPL-MCNC: 131 MG/DL
PLATELET # BLD AUTO: 288 10E9/L (ref 150–450)
RBC # BLD AUTO: 3.55 10E12/L (ref 3.8–5.2)
TRIGL SERPL-MCNC: 44 MG/DL
TSH SERPL DL<=0.005 MIU/L-ACNC: 2.01 MU/L (ref 0.4–4)
WBC # BLD AUTO: 4.7 10E9/L (ref 4–11)

## 2021-01-11 PROCEDURE — 82565 ASSAY OF CREATININE: CPT | Performed by: INTERNAL MEDICINE

## 2021-01-11 PROCEDURE — 80061 LIPID PANEL: CPT | Performed by: PHYSICIAN ASSISTANT

## 2021-01-11 PROCEDURE — 82043 UR ALBUMIN QUANTITATIVE: CPT | Performed by: PHYSICIAN ASSISTANT

## 2021-01-11 PROCEDURE — 84443 ASSAY THYROID STIM HORMONE: CPT | Performed by: PHYSICIAN ASSISTANT

## 2021-01-11 PROCEDURE — 36415 COLL VENOUS BLD VENIPUNCTURE: CPT | Performed by: PHYSICIAN ASSISTANT

## 2021-01-11 PROCEDURE — 86140 C-REACTIVE PROTEIN: CPT | Performed by: INTERNAL MEDICINE

## 2021-01-11 PROCEDURE — 83036 HEMOGLOBIN GLYCOSYLATED A1C: CPT | Performed by: PHYSICIAN ASSISTANT

## 2021-01-11 PROCEDURE — 84460 ALANINE AMINO (ALT) (SGPT): CPT | Performed by: INTERNAL MEDICINE

## 2021-01-11 PROCEDURE — 82040 ASSAY OF SERUM ALBUMIN: CPT | Performed by: INTERNAL MEDICINE

## 2021-01-11 PROCEDURE — 85652 RBC SED RATE AUTOMATED: CPT | Performed by: INTERNAL MEDICINE

## 2021-01-11 PROCEDURE — 84450 TRANSFERASE (AST) (SGOT): CPT | Performed by: INTERNAL MEDICINE

## 2021-01-11 PROCEDURE — 85025 COMPLETE CBC W/AUTO DIFF WBC: CPT | Performed by: INTERNAL MEDICINE

## 2021-01-11 NOTE — TELEPHONE ENCOUNTER
Central Prior Authorization Team   Phone: [phone]      PA Initiation    Medication: Dexcom G6 Sensor -PA initiated  Insurance Company: [employer] - Phone [phone] Fax [phone]  Pharmacy Filling the Rx: [id number] - [place], [place] - [address] & [facility]  Filling Pharmacy Phone: [phone]  Filling Pharmacy Fax:    Start Date: 1/11/2021

## 2021-01-11 NOTE — TELEPHONE ENCOUNTER
Central Prior Authorization Team   Phone: [phone]  PA Initiation    Medication: Dexcom G6  -PA initiated  Insurance Company: [employer] - Phone [phone] Fax [phone]  Pharmacy Filling the Rx: [facility] #[id number] - [place], [place] - [address] & [facility]  Filling Pharmacy Phone: [phone]  Filling Pharmacy Fax:    Start Date: 1/11/2021

## 2021-01-11 NOTE — LETTER
January 12, 2021      Dali Martines  [address]  [facility] [address]        Dear Dali:    I hope you are doing well. Happy New Year!!    Below is a copy of your lab results.    Your A1C is good at 7.1 %.    Keep up the good work.    Your are not spilling any protein in your urine which is a good thing.    Your LDL ( the bad cholesterol ) is high at 122. However, your HDL ( the good cholesterol ) is good at 92.  You may want to reduce fat in your diet to lower your LDL to 100 or less.    I can be reached via swiftQueue or by phone if you have any questions.    Sincerely,  Marissa Sebastian PA-C      Resulted Orders   Hemoglobin A1c   Result Value Ref Range    Hemoglobin A1C 7.1 (H) 0 - 5.6 %      Comment:      Normal <5.7% Prediabetes 5.7-6.4%  Diabetes 6.5% or higher - adopted from ADA   consensus guidelines.     Albumin Random Urine Quantitative with Creat Ratio   Result Value Ref Range    Creatinine Urine 92 mg/dL    Albumin Urine mg/L <5 mg/L    Albumin Urine mg/g Cr Unable to calculate due to low value 0 - 25 mg/g Cr   Lipid Profile   Result Value Ref Range    Cholesterol 223 (H) <200 mg/dL      Comment:      Desirable:       <200 mg/dl    Triglycerides 44 <150 mg/dL      Comment:      Non Fasting    HDL Cholesterol 92 >49 mg/dL    LDL Cholesterol Calculated 122 (H) <100 mg/dL      Comment:      Above desirable:  100-129 mg/dl  Borderline High:  130-159 mg/dL  High:             160-189 mg/dL  Very high:       >189 mg/dl      Non HDL Cholesterol 131 (H) <130 mg/dL      Comment:      Above Desirable:  130-159 mg/dl  Borderline high:  160-189 mg/dl  High:             190-219 mg/dl  Very high:       >219 mg/dl     TSH   Result Value Ref Range    TSH 2.01 0.40 - 4.00 mU/L

## 2021-01-11 NOTE — TELEPHONE ENCOUNTER
Central Prior Authorization Team   Phone: [phone]      PA Initiation    Medication: Dexcom G6 transmitter -PA initiated  Insurance Company: [employer] - Phone [phone] Fax [phone]  Pharmacy Filling the Rx: [id number] - [place], [place] - [address] & [facility]  Filling Pharmacy Phone: [phone]  Filling Pharmacy Fax:    Start Date: 1/11/2021

## 2021-01-12 RX ORDER — PROCHLORPERAZINE 25 MG/1
SUPPOSITORY RECTAL
Qty: 1 EACH | Refills: 3 | Status: SHIPPED | OUTPATIENT
Start: 2021-01-12 | End: 2022-04-20

## 2021-01-12 RX ORDER — PROCHLORPERAZINE 25 MG/1
SUPPOSITORY RECTAL
Qty: 1 EACH | Refills: 0 | Status: SHIPPED | OUTPATIENT
Start: 2021-01-12 | End: 2022-11-17

## 2021-01-12 RX ORDER — PROCHLORPERAZINE 25 MG/1
SUPPOSITORY RECTAL
Qty: 9 EACH | Refills: 3 | Status: SHIPPED | OUTPATIENT
Start: 2021-01-12 | End: 2022-02-12

## 2021-01-12 NOTE — TELEPHONE ENCOUNTER
Orders sent to [facility]  For Dexcom G6  Supplies Orquidea Waterman RN on 1/12/2021 at 1:18 PM

## 2021-01-12 NOTE — TELEPHONE ENCOUNTER
PRIOR AUTHORIZATION DENIED    Medication: Dexcom G6 transmitter -PA DENIED    Denial Date: 1/11/2021    Denial Rational: This would need to bill to medical benefits, not pharmacy. Rx would need to be sent to a pharmacy that bills medical benefits. Marlys Haque at this pharmacy confirmed they do not bill medical.

## 2021-01-12 NOTE — TELEPHONE ENCOUNTER
PRIOR AUTHORIZATION DENIED    Medication: Dexcom G6  -PA denied    Denial Date: 1/11/2021    Denial Rational: This would need to bill to medical benefits, not pharmacy. Rx would need to be sent to a pharmacy that bills medical benefits. Marlys Haque at this pharmacy confirmed they do not bill medical.

## 2021-01-12 NOTE — TELEPHONE ENCOUNTER
PRIOR AUTHORIZATION DENIED    Medication: Dexcom G6 Sensor -PA denied    Denial Date: 1/11/2021    Denial Rational: This would need to bill to medical benefits, not pharmacy. Rx would need to be sent to a pharmacy that bills medical benefits. Marlys Haque at this pharmacy confirmed they do not bill medical.

## 2021-01-13 ENCOUNTER — TELEPHONE (OUTPATIENT)
Dept: RHEUMATOLOGY | Facility: CLINIC | Age: 51
End: 2021-01-13

## 2021-01-18 DIAGNOSIS — E11.620 NLD (NECROBIOSIS LIPOIDICA DIABETICORUM) (H): ICD-10-CM

## 2021-01-18 DIAGNOSIS — M25.50 PAIN IN JOINT, MULTIPLE SITES: ICD-10-CM

## 2021-01-18 DIAGNOSIS — M77.9 TENDINITIS: ICD-10-CM

## 2021-01-18 DIAGNOSIS — L40.50 PSORIATIC ARTHRITIS (H): ICD-10-CM

## 2021-01-20 NOTE — TELEPHONE ENCOUNTER
methotrexate 2.5 MG tablet      Last Written Prescription Date:  9/5/20  Last Fill Quantity: 24,   # refills: 3  Last Office Visit: 1/7/21  Future Office visit:  3/16/21    CBC RESULTS:   Recent Labs   Lab Test 01/11/21  1109   WBC 4.7   RBC 3.55*   HGB 11.4*   HCT 34.2*   MCV 96   MCH 32.1   MCHC 33.3   RDW 12.9          Creatinine   Date Value Ref Range Status   01/11/2021 0.56 0.52 - 1.04 mg/dL Final   ]    Liver Function Studies -   Recent Labs   Lab Test 01/11/21  1109 04/27/16  1402 04/27/16  1402   PROTTOTAL  --   --  8.0   ALBUMIN 3.8   < > 4.0   BILITOTAL  --   --  0.3   ALKPHOS  --   --  108   AST 31   < > 21   ALT 37   < > 25    < > = values in this interval not displayed.       Routing refill request to provider for review/approval because:  Drug not on the [facility] or  Health refill protocol or controlled substance  Thank-you, Lor SALAS RN Medication Refill Team

## 2021-01-29 ENCOUNTER — HOSPITAL ENCOUNTER (OUTPATIENT)
Dept: MAMMOGRAPHY | Facility: CLINIC | Age: 51
Discharge: HOME | End: 2021-01-29
Attending: INTERNAL MEDICINE | Admitting: INTERNAL MEDICINE
Payer: COMMERCIAL

## 2021-01-29 DIAGNOSIS — Z12.31 OTHER SCREENING MAMMOGRAM: ICD-10-CM

## 2021-01-29 PROCEDURE — 77067 SCR MAMMO BI INCL CAD: CPT

## 2021-02-16 ENCOUNTER — VIRTUAL VISIT (OUTPATIENT)
Dept: PULMONOLOGY | Facility: CLINIC | Age: 51
End: 2021-02-16
Attending: INTERNAL MEDICINE
Payer: COMMERCIAL

## 2021-02-16 DIAGNOSIS — J45.40 MODERATE PERSISTENT ASTHMA WITHOUT COMPLICATION: Primary | ICD-10-CM

## 2021-02-16 PROCEDURE — 99213 OFFICE O/P EST LOW 20 MIN: CPT | Mod: 95 | Performed by: INTERNAL MEDICINE

## 2021-02-16 NOTE — LETTER
2/16/2021         RE: Dali Martines  [address] [place] [address]        Dear Colleague,    Thank you for referring your patient, Dali Martines, to the [facility]. Please see a copy of my visit note below.    Dali is a 50 year old who is being evaluated via a billable video visit.      How would you like to obtain your AVS? DocSendhart  If the video visit is dropped, the invitation should be resent by: Other e-mail: [employer]  Will anyone else be joining your video visit? No      Video Start Time: 11am  Video-Visit Details    Type of service:  Video Visit    Video End Time:11:26    Originating Location (pt. Location): Home    Distant Location (provider location):  [facility]     Platform used for Video Visit: [employer] M[employer]    Reason for Visit  Dali Martines is a 50 year old female who is followed for asthma  Pulmonary HPI  She continues to have some issues with voice, raspy. She is diligent with rinsing after inhalers. She has some nasal congestion and postnasal drip. She has experienced it once a month since Christmas. Scratchy throat. She notices dyspnea with laundry, carrying it up 2 flights of stairs. She has chronic acid reflux and duodenal ulcer, she has been more stressed and noticed more stomach issues. She is not on acid blockers now but elevates head of bed.     The patient was seen and examined by Sara Carter MD   Current Outpatient Medications   Medication     albuterol (PROAIR HFA/PROVENTIL HFA/VENTOLIN HFA) 108 (90 Base) MCG/ACT inhaler     aspirin 81 MG tablet     blood glucose (ACCU-CHEK COLIN PLUS) test strip     blood glucose (NO BRAND SPECIFIED) test strip     blood glucose monitoring (ACCU-CHEK FASTCLIX) lancets     blood glucose monitoring (NO BRAND SPECIFIED) meter device kit     Calcium Carbonate-Vitamin D (CALCIUM + D PO)     citalopram (CELEXA) 20 MG  tablet     Continuous Blood Gluc  (DEXCOM G6 ) SUZANNE     Continuous Blood Gluc Sensor (DEXCOM G6 SENSOR) MISC     Continuous Blood Gluc Transmit (DEXCOM G6 TRANSMITTER) MISC     diclofenac (VOLTAREN) 1 % topical gel     dulaglutide (TRULICITY) 0.75 MG/0.5ML pen     econazole nitrate 1 % cream     fish oil-omega-3 fatty acids (FISH OIL) 1000 MG capsule     folic acid (FOLVITE) 1 MG tablet     insulin aspart (FIASP FLEXTOUCH) 100 UNIT/ML pen-injector     insulin degludec (TRESIBA) 100 UNIT/ML pen     insulin pen needle (B-D U/F) 31G X 8 MM miscellaneous     Ketoconazole (NIZORAL PO)     levothyroxine (SYNTHROID/LEVOTHROID) 112 MCG tablet     methotrexate 2.5 MG tablet     methylphenidate (METADATE CD) 20 MG CR capsule     Minoxidil (ROGAINE EX)     mometasone-formoterol (DULERA) 100-5 MCG/ACT inhaler     montelukast (SINGULAIR) 5 MG chewable tablet     Multiple Vitamin (MULTIVITAMIN OR)     NOVOLOG PENFILL 100 UNIT/ML soln     simvastatin (ZOCOR) 20 MG tablet     traZODone (DESYREL) 50 MG tablet     No current facility-administered medications for this visit.      Allergies   Allergen Reactions     Monosodium Glutamate Palpitations and Shortness Of Breath     Sulfasalazine      Developed rash, HA, dizziness     Social History     Socioeconomic History     Marital status: Single     Spouse name: Not on file     Number of children: 0     Years of education: Not on file     Highest education level: Not on file   Occupational History     Occupation: research     Employer: [employer]   Social Needs     Financial resource strain: Not on file     Food insecurity     Worry: Not on file     Inability: Not on file     Transportation needs     Medical: Not on file     Non-medical: Not on file   Tobacco Use     Smoking status: Never Smoker     Smokeless tobacco: Never Used   Substance and Sexual Activity     Alcohol use: Yes     Alcohol/week: 0.0 standard drinks     Comment: moderately     Drug use: No      Sexual activity: Not on file   Lifestyle     Physical activity     Days per week: Not on file     Minutes per session: Not on file     Stress: Not on file   Relationships     Social connections     Talks on phone: Not on file     Gets together: Not on file     Attends Gnosticist service: Not on file     Active member of club or organization: Not on file     Attends meetings of clubs or organizations: Not on file     Relationship status: Not on file     Intimate partner violence     Fear of current or ex partner: Not on file     Emotionally abused: Not on file     Physically abused: Not on file     Forced sexual activity: Not on file   Other Topics Concern     Parent/sibling w/ CABG, MI or angioplasty before 65F 55M? Yes   Social History Narrative     Not on file     Past Medical History:   Diagnosis Date     Anemia      Anxiety      Arthritis 2014    Psoriatic arthritis     Back injury 1988     Dry eye syndrome      Dyslipidemia      Endometriosis 2002    adehsions seen at laparoscopy     GERD (gastroesophageal reflux disease)      Hypothyroidism     8 yoa     SOB (shortness of breath) 3/11/2014     Type I (juvenile type) diabetes mellitus without mention of complication, not stated as uncontrolled     when 15      Uncomplicated asthma Fall 2013     ROS Pulmonary  Dyspnea: No, Cough: No, Chest pain: No, Wheezing: No, Sputum Production: No, Hemoptysis: No  A complete ROS was otherwise negative except as noted in the HPI.  There were no vitals taken for this visit.  Exam:   GENERAL APPEARANCE: Well developed, well nourished, alert, and in no apparent distress.  NEURO: Mentation intact, speech normal,   PSYCH: mentation appears normal. and affect normal/bright  Results:  Stress echo 12/30/14 normal  PFTs normal 3/14/16     Assessment and plan: Dali Martines is a 50-year-old female with type 1 diabetes who was clinically diagnosed with asthma as an adult.  She has been intermittently sedentary due to repeated lower  extremity ankle injury, surgery, poor wound healing.   1.  Asthma.  Mild intermittent asthma. Better control (cough) and Dulera side effects (hoarseness)   Up to date on vaccinations.  Eager to get COVID but hasn't heard about eligiblity yet. I advised her that diabetes would be a medical condition that would make her at risk for severe infection. Asthma is not.     - She will continue Dulera, daily use if needed, rinse and gargle afterwards and use spacer  - Singulair for asthma maintenance, 5mg because she has noticed constipation with full dose      I will see her back in 6 months.       Again, thank you for allowing me to participate in the care of your patient.        Sincerely,        Sara Carter MD

## 2021-02-16 NOTE — PROGRESS NOTES
Dali is a 50 year old who is being evaluated via a billable video visit.      How would you like to obtain your AVS? [employer]  If the video visit is dropped, the invitation should be resent by: Other e-mail: [employer] [place]  Will anyone else be joining your video visit? No      Video Start Time: 11am  Video-Visit Details    Type of service:  Video Visit    Video End Time:11:26    Originating Location (pt. Location): Home    Distant Location (provider location):  [facility]     Platform used for Video Visit: [employer]    Reason for Visit  Dali Martines is a 50 year old female who is followed for asthma  Pulmonary HPI  She continues to have some issues with voice, raspy. She is diligent with rinsing after inhalers. She has some nasal congestion and postnasal drip. She has experienced it once a month since Christmas. Scratchy throat. She notices dyspnea with laundry, carrying it up 2 flights of stairs. She has chronic acid reflux and duodenal ulcer, she has been more stressed and noticed more stomach issues. She is not on acid blockers now but elevates head of bed.     The patient was seen and examined by Sraa Carter MD   Current Outpatient Medications   Medication     albuterol (PROAIR HFA/PROVENTIL HFA/VENTOLIN HFA) 108 (90 Base) MCG/ACT inhaler     aspirin 81 MG tablet     blood glucose (ACCU-CHEK COLIN PLUS) test strip     blood glucose (NO BRAND SPECIFIED) test strip     blood glucose monitoring (ACCU-CHEK FASTCLIX) lancets     blood glucose monitoring (NO BRAND SPECIFIED) meter device kit     Calcium Carbonate-Vitamin D (CALCIUM + D PO)     citalopram (CELEXA) 20 MG tablet     Continuous Blood Gluc  (DEXCOM G6 ) SUZANNE     Continuous Blood Gluc Sensor (DEXCOM G6 SENSOR) MISC     Continuous Blood Gluc Transmit (DEXCOM G6 TRANSMITTER) MISC     diclofenac (VOLTAREN) 1 % topical gel     dulaglutide (TRULICITY) 0.75 MG/0.5ML pen     econazole  nitrate 1 % cream     fish oil-omega-3 fatty acids (FISH OIL) 1000 MG capsule     folic acid (FOLVITE) 1 MG tablet     insulin aspart (FIASP FLEXTOUCH) 100 UNIT/ML pen-injector     insulin degludec (TRESIBA) 100 UNIT/ML pen     insulin pen needle (B-D U/F) 31G X 8 MM miscellaneous     Ketoconazole (NIZORAL PO)     levothyroxine (SYNTHROID/LEVOTHROID) 112 MCG tablet     methotrexate 2.5 MG tablet     methylphenidate (METADATE CD) 20 MG CR capsule     Minoxidil (ROGAINE EX)     mometasone-formoterol (DULERA) 100-5 MCG/ACT inhaler     montelukast (SINGULAIR) 5 MG chewable tablet     Multiple Vitamin (MULTIVITAMIN OR)     NOVOLOG PENFILL 100 UNIT/ML soln     simvastatin (ZOCOR) 20 MG tablet     traZODone (DESYREL) 50 MG tablet     No current facility-administered medications for this visit.      Allergies   Allergen Reactions     Monosodium Glutamate Palpitations and Shortness Of Breath     Sulfasalazine      Developed rash, HA, dizziness     Social History     Socioeconomic History     Marital status: Single     Spouse name: Not on file     Number of children: 0     Years of education: Not on file     Highest education level: Not on file   Occupational History     Occupation: research     Employer: [employer]   Social Needs     Financial resource strain: Not on file     Food insecurity     Worry: Not on file     Inability: Not on file     Transportation needs     Medical: Not on file     Non-medical: Not on file   Tobacco Use     Smoking status: Never Smoker     Smokeless tobacco: Never Used   Substance and Sexual Activity     Alcohol use: Yes     Alcohol/week: 0.0 standard drinks     Comment: moderately     Drug use: No     Sexual activity: Not on file   Lifestyle     Physical activity     Days per week: Not on file     Minutes per session: Not on file     Stress: Not on file   Relationships     Social connections     Talks on phone: Not on file     Gets together: Not on file     Attends Anabaptist service: Not  on file     Active member of club or organization: Not on file     Attends meetings of clubs or organizations: Not on file     Relationship status: Not on file     Intimate partner violence     Fear of current or ex partner: Not on file     Emotionally abused: Not on file     Physically abused: Not on file     Forced sexual activity: Not on file   Other Topics Concern     Parent/sibling w/ CABG, MI or angioplasty before 65F 55M? Yes   Social History Narrative     Not on file     Past Medical History:   Diagnosis Date     Anemia      Anxiety      Arthritis 2014    Psoriatic arthritis     Back injury 1988     Dry eye syndrome      Dyslipidemia      Endometriosis 2002    adehsions seen at laparoscopy     GERD (gastroesophageal reflux disease)      Hypothyroidism     8 yoa     SOB (shortness of breath) 3/11/2014     Type I (juvenile type) diabetes mellitus without mention of complication, not stated as uncontrolled     when 15      Uncomplicated asthma Fall 2013     ROS Pulmonary  Dyspnea: No, Cough: No, Chest pain: No, Wheezing: No, Sputum Production: No, Hemoptysis: No  A complete ROS was otherwise negative except as noted in the HPI.  There were no vitals taken for this visit.  Exam:   GENERAL APPEARANCE: Well developed, well nourished, alert, and in no apparent distress.  NEURO: Mentation intact, speech normal,   PSYCH: mentation appears normal. and affect normal/bright  Results:  Stress echo 12/30/14 normal  PFTs normal 3/14/16     Assessment and plan: Dali Martines is a 50-year-old female with type 1 diabetes who was clinically diagnosed with asthma as an adult.  She has been intermittently sedentary due to repeated lower extremity ankle injury, surgery, poor wound healing.   1.  Asthma.  Mild intermittent asthma. Better control (cough) and Dulera side effects (hoarseness)   Up to date on vaccinations.  Eager to get COVID but hasn't heard about eligiblity yet. I advised her that diabetes would be a medical  condition that would make her at risk for severe infection. Asthma is not.     - She will continue Dulera, daily use if needed, rinse and gargle afterwards and use spacer  - Singulair for asthma maintenance, 5mg because she has noticed constipation with full dose      I will see her back in 6 months.

## 2021-02-18 DIAGNOSIS — M25.50 PAIN IN JOINT, MULTIPLE SITES: ICD-10-CM

## 2021-02-18 DIAGNOSIS — Z51.81 ENCOUNTER FOR THERAPEUTIC DRUG MONITORING: ICD-10-CM

## 2021-02-18 DIAGNOSIS — M77.9 TENDINITIS: ICD-10-CM

## 2021-02-20 DIAGNOSIS — E10.3291 TYPE 1 DIABETES MELLITUS WITH MILD NONPROLIFERATIVE RETINOPATHY OF RIGHT EYE, MACULAR EDEMA PRESENCE UNSPECIFIED (H): ICD-10-CM

## 2021-02-20 RX ORDER — FOLIC ACID 1 MG/1
1 TABLET ORAL DAILY
Qty: 90 TABLET | Refills: 3 | Status: SHIPPED | OUTPATIENT
Start: 2021-02-20 | End: 2022-04-06

## 2021-02-20 NOTE — TELEPHONE ENCOUNTER
1/5/2021  [facility] [place]   Yoel Betancourt MD  Rheumatology    folic acid (FOLVITE) 1 MG tablet

## 2021-02-22 RX ORDER — DULAGLUTIDE 0.75 MG/.5ML
0.75 INJECTION, SOLUTION SUBCUTANEOUS
Qty: 3 ML | Refills: 0 | Status: SHIPPED | OUTPATIENT
Start: 2021-02-22 | End: 2021-03-23

## 2021-02-22 NOTE — TELEPHONE ENCOUNTER
Last Clinic Visit:  1/7/2021  [facility] [place]  Future Visit: 4/13/2021

## 2021-03-05 ENCOUNTER — MYC MEDICAL ADVICE (OUTPATIENT)
Dept: RHEUMATOLOGY | Facility: CLINIC | Age: 51
End: 2021-03-05

## 2021-03-08 DIAGNOSIS — E03.8 OTHER SPECIFIED HYPOTHYROIDISM: Primary | ICD-10-CM

## 2021-03-08 NOTE — CONFIDENTIAL NOTE
Contacted pt. She reports the spots that she is calling psoriasis has improved but she continues to get the red spots. She has not had anything like this before. Dali states she had a cold the end of last week and the beginning of this week. No increase in joint pain. Please see her [employer] message.    Dali states she has started Vit D recently, but nothing else. Denies any changes in laundry soap, had or body soap, shampoos, etc.  Pt reports that she has not seen Dermatology.    I did ask her to send us pictures, she said she would try. She uses [email or web address] on [address].    ANGÉLICA WillettN, RN  Rheumatology Care Coordinator  [facility]

## 2021-03-09 RX ORDER — LEVOTHYROXINE SODIUM 112 UG/1
112 TABLET ORAL DAILY
Qty: 90 TABLET | Refills: 3 | Status: SHIPPED | OUTPATIENT
Start: 2021-03-09 | End: 2022-03-09

## 2021-03-09 NOTE — TELEPHONE ENCOUNTER
We can wait for the pictures but she should probably see dermatology.    I do not think we should simply give her some sort of topical steroid without a better idea what we are treating.

## 2021-03-10 ENCOUNTER — MYC MEDICAL ADVICE (OUTPATIENT)
Dept: RHEUMATOLOGY | Facility: CLINIC | Age: 51
End: 2021-03-10

## 2021-03-10 DIAGNOSIS — L40.50 PSORIATIC ARTHRITIS (H): ICD-10-CM

## 2021-03-10 DIAGNOSIS — R21 RASH, SKIN: Primary | ICD-10-CM

## 2021-03-14 NOTE — TELEPHONE ENCOUNTER
Lets please refer her to dermatology.  I am not even sure this represents psoriasis given its rather odd location although that is probably what it is, but we would want their help on treatment anyway.

## 2021-03-16 ENCOUNTER — VIRTUAL VISIT (OUTPATIENT)
Dept: RHEUMATOLOGY | Facility: CLINIC | Age: 51
End: 2021-03-16
Attending: INTERNAL MEDICINE
Payer: COMMERCIAL

## 2021-03-16 DIAGNOSIS — L40.50 PSORIATIC ARTHRITIS (H): Primary | ICD-10-CM

## 2021-03-16 DIAGNOSIS — L40.9 PSORIASIS: ICD-10-CM

## 2021-03-16 DIAGNOSIS — Z51.81 ENCOUNTER FOR THERAPEUTIC DRUG MONITORING: ICD-10-CM

## 2021-03-16 PROCEDURE — 99215 OFFICE O/P EST HI 40 MIN: CPT | Mod: 95 | Performed by: INTERNAL MEDICINE

## 2021-03-16 ASSESSMENT — PAIN SCALES - GENERAL: PAINLEVEL: MILD PAIN (2)

## 2021-03-16 NOTE — LETTER
3/16/2021       RE: Dali Martines  [address]  [facility] [address]     Dear Colleague,    Thank you for referring your patient, Dali Martines, to the [facility] RHEUMATOLOGY CLINIC [place] at [facility]. Please see a copy of my visit note below.    Dali is a 50 year old who is being evaluated via a billable video visit.      How would you like to obtain your AVS? MyChart  If the video visit is dropped, the invitation should be resent by: Send to e-mail at: [email or web address]  Will anyone else be joining your video visit? No    Video-Visit Details    Type of service:  Video Visit    Originating Location (pt. Location): Home    Distant Location (provider location):  [facility] RHEUMATOLOGY [facility]     Platform used for Video Visit: Mark Anthony Martines is a 50 year old female who is being evaluated via a billable video visit.      How would you like to obtain your AVS? MyChart  If the video visit is dropped, the invitation should be resent by: Text to cell phone: [phone]  Will anyone else be joining your video visit? No      HPI   Prior Note carried forward:     [facility]  Rheumatology Clinic  Yoel Betancourt MD  2021     Name: Dali Martines  MRN: [id number]  Age: 49 year old  : 1970  Referring provider: Referred Self    Problem List:  1. Psoriatic arthritis   2. Psoriasis with arthropathy  3. Pain in joint, multiple sites  4. Chronic pain of right ankle  5. Right foot pain  6. Pain in joint involving ankle and foot, right      2020 :   Dali Martines is a 49 year old female with a history including type I diabetes, psoriatic arthritis, and Hashimoto's thyroiditis who presents for follow-up. I last saw the patient on 2018, at which time she reported increased left hand pain, most prominently in the left 2nd MCP. The plan was to continue on increased methotrexate dosage of 12.5 mg,  and, if well-tolerated, then increasing dosage further to 15 mg weekly.    Background history:  Symptoms first occurred in August 2008, when she began having stiffness and pain in the left hand, particularly in the PIPs and MCPs. Symptoms spontaneously resolved after about 6-8 weeks. She was previously seen by Rheumatology here around that time (see note from Dr. Betancourt on 12/9/2008). Briefly, assessment was that she had a self-limited episode of inflammatory arthralgias, possibly viral or early psoriatic arthritis. Negative NOLVIA, RF, Lyme serologies and normal CRP and ESR. Given her family history of RA, there was a thought to start her on HCQ if anti-CCP antibodies were positive, but they were found to be negative. No imaging of the hand was performed. September 2014, she experienced recurrent acute onset pain, stiffness, and swelling in the L hand very similar to her symptoms in 2008. Specifically, had swelling across MCPs and in 3rd PIP. She could hardly bend the hand sometimes due to the welling. Had morning stiffness lasting 30-45 minutes. No other joints were involved. Issues with her left hand lingered for several months but eventually self-resolved that January and her only notable symptom is heel pain in the mornings. No fevers, chills, or weight loss. No back pain, vision changes, nausea, vomiting, diarrhea, abdominal pain, or blood in stool or urine. No rash, sicca symptoms, or oral ulcers. It seems she has a history of hypermobility, e.g. could bend wrist back to forearm, bend down and touch palms flat on floor, when she was younger. Used to get frequent ankle sprains, and has had multiple tendon and ligament tears over the years.    Today, the patient reports that she had recent upper respiratory symptoms which triggered asthma, and she was placed on steroids for this. She describes some lingering post-nasal drip over most of the fall, but this has since resolved. She has been taking Dulera finished  "course of prednisone.     She reports that she is having more joint pain in her right 1st MTP. She explains that her pain here is not bad, but she does feel soreness if someone squeezes her hand. She notes that she has more prominent left-sided 1st MTP pain. She states that her 1st MTPs do swell.     She states that she had psoriasic skin rashes around her temple area. She denies any issues tolerating methotrexate. She denies any significant morning joint stiffness. She describes a \"feeling like tendonitis\" in her medial knees and elbows that feels like she has hyperextended the joints. She adds that these sensations take hours to resolve. She reports that these joint sensations have worsened in the last couple months. She states that she historically has joint issues at the beginning of the fall, but these joint issues seem to be lasting into the winter time presently.      She explains that she has had a swollen gland on the right side of her neck which has been so significant in the past that she had difficulty turning her head to the side.     Review of Systems:   Pertinent items are noted in HPI or as below, remainder of complete ROS is negative.      No recent problems with hearing or vision. No swallowing problems.   No breathing difficulty, shortness of breath, coughing, or wheezing.  No chest pain or palpitations.  No heart burn, indigestion, abdominal pain, nausea, vomiting, diarrhea.  No urination problems, no bloody, cloudy urine, no dysuria.  No numbing, tingling, weakness.  No headaches or confusion.  No rashes. No easy bleeding or bruising.     Active Medications:   Current Outpatient Medications:      albuterol (PROAIR HFA/PROVENTIL HFA/VENTOLIN HFA) 108 (90 Base) MCG/ACT inhaler, Inhale 2 puffs into the lungs every 4 hours as needed for shortness of breath / dyspnea or wheezing, Disp: 1 Inhaler, Rfl: 11     aspirin 81 MG tablet, Take 1 tablet by mouth daily., Disp: , Rfl:      blood glucose " (ACCU-CHEK COLIN PLUS) test strip, Test Blood Sugar 8 times daily or as directed, Disp: 800 strip, Rfl: 3     blood glucose monitoring (ACCU-CHEK FASTCLIX) lancets, Use to test blood sugar 8 times daily or as directed., Disp: 4 Box, Rfl: 3     Calcium Carbonate-Vitamin D (CALCIUM + D PO), Take one daily, Disp: , Rfl:      citalopram (CELEXA) 20 MG tablet, Take 1.5 tablets (30 mg) by mouth daily, Disp: 135 tablet, Rfl: 1     Continuous Blood Gluc Sensor (DEXCOM G5 MOB/G4 PLAT SENSOR) MISC, 1 each every 7 days, Disp: 12 each, Rfl: 3     Continuous Blood Gluc Transmit (DEXCOM G4 PLATINUM TRANSMITTER) MISC, 1 each every 6 months, Disp: 1 each, Rfl: 1     diclofenac (VOLTAREN) 1 % topical gel, APPLY 2 GRAMS ONTO THE SKIN FOUR TIMES DAILY AS NEEDED FOR MODERATE PAIN, Disp: 100 g, Rfl: 5     dulaglutide (TRULICITY) 1.5 MG/0.5ML pen, Inject 1.5 mg Subcutaneous every 7 days, Disp: 3 mL, Rfl: 1     econazole nitrate 1 % cream, Apply 0.5 inches topically daily., Disp: , Rfl:      fish oil-omega-3 fatty acids (FISH OIL) 1000 MG capsule, Take one daily, Disp: , Rfl:      folic acid (FOLVITE) 1 MG tablet, Take 1 tablet (1 mg) by mouth daily, Disp: 90 tablet, Rfl: 0     insulin degludec (TRESIBA) 100 UNIT/ML pen, Inject 15 units subcutaneous at hs., Disp: 15 mL, Rfl: 11     insulin pen needle (B-D U/F) 31G X 8 MM miscellaneous, Use 5 pen needles daily, Disp: 450 each, Rfl: 3     Ketoconazole (NIZORAL PO), Take  by mouth. Shampoo daily, Disp: , Rfl:      levothyroxine (SYNTHROID/LEVOTHROID) 112 MCG tablet, Take 1 tablet (112 mcg) by mouth daily, Disp: 90 tablet, Rfl: 3     methotrexate 2.5 MG tablet, Take 6 tablets (15 mg) by mouth once a week, Disp: 24 tablet, Rfl: 1     methylphenidate (METADATE CD) 20 MG CR capsule, Take 20 mg by mouth daily, Disp: , Rfl:      Minoxidil (ROGAINE EX), Externally apply  topically. daily, Disp: , Rfl:      mometasone-formoterol (DULERA) 100-5 MCG/ACT inhaler, Inhale 2 puffs into the lungs 2 times  daily, Disp: 3 Inhaler, Rfl: 11     montelukast (SINGULAIR) 5 MG chewable tablet, Take 1 tablet (5 mg) by mouth At Bedtime, Disp: 90 tablet, Rfl: 3     Multiple Vitamin (MULTIVITAMIN OR), Take  by mouth. Take one daily tab, Disp: , Rfl:      NOVOLOG PENFILL 100 UNIT/ML soln, 1 unit per 15 grams CHO at all meals and snacks with correction scale of 1 unit per 50 mg/dL over 150. Average daily use is 30  units., Disp: 30 mL, Rfl: 4     simvastatin (ZOCOR) 20 MG tablet, Take 1 tablet (20 mg) by mouth At Bedtime, Disp: 90 tablet, Rfl: 3     traZODone (DESYREL) 50 MG tablet, Take 1-2 tablets by mouth nightly as needed for sleep., Disp: 180 tablet, Rfl: 0    Current Facility-Administered Medications:      betamethasone acet & sod phos (CELESTONE) injection 6 mg, 6 mg, INTRA-ARTICULAR, Once, Chris Uribe MD    Allergies:   Sulfasalazine      Past Medical History:  Anemia   Anxiety   Psoriatic arthritis  Dry eye syndrome   Endometriosis   Gastroesophageal reflux disease   Hypothyroidism   Type 1 diabetes mellitus   Asthma   Necrobiosis lipoidica diabeticorum  Chronic low back pain   Enthesopathy   Chronic right ankle pain      Past Surgical History:  Knee cruciate ligament repair   Stomach surgery 12/2002  Hydrogen breath test 6/17/2014   Appendectomy 2002   Lysis adhesions 2002   Right ankle surgery 12/2014      Family History:   Mother: Breast cancer   Father: Heart disease, eye disorder, hyperlipidemia, macular degeneration, anxiety, alcoholism, rheumatoid arthritis, hypothyroidism, diabetes mellitus   Paternal grandmother: Cerebrovascular disease  Paternal grandfather: Pancreatic cancer, heart disease   Maternal grandmother: Type 2 diabetes mellitus, coronary artery disease  Maternal aunt: Type 1 diabetes mellitus     Social History:  The patient reports that she has never smoked. She has never used smokeless tobacco. She reports current alcohol use. She reports that she does not use drugs.   PCP: Dimitri Alas  W  Marital Status: Single    Physical Exam:   There were no vitals taken for this visit.   Wt Readings from Last 4 Encounters:   06/23/20 83.9 kg (185 lb)   01/07/20 87.6 kg (193 lb 1.6 oz)   12/17/19 86.5 kg (190 lb 12.8 oz)   10/08/19 85.2 kg (187 lb 12.8 oz)     Constitutional: Well-developed, appearing stated age; cooperative.      Laboratory:   RHEUM RESULTS Latest Ref Rng & Units 1/7/2020 6/18/2020 1/11/2021   SED RATE 0 - 30 mm/h - - 27   CRP, INFLAMMATION 0.0 - 8.0 mg/L <2.9 <2.9 <2.9   CYCLIC CIT PEPT IGG <5 U/mL - - -   RHEUMATOID FACTOR <20 IU/mL - - -   NOLVIA SCREEN BY EIA <1.0 - - -   AST 0 - 45 U/L 28 23 31   ALT 0 - 50 U/L 44 33 37   ALBUMIN 3.4 - 5.0 g/dL 4.1 3.9 3.8   WBC 4.0 - 11.0 10e9/L 5.0 6.9 4.7   RBC 3.8 - 5.2 10e12/L 3.84 3.70(L) 3.55(L)   HGB 11.7 - 15.7 g/dL 12.0 11.7 11.4(L)   HCT 35.0 - 47.0 % 36.7 35.3 34.2(L)   MCV 78 - 100 fl 96 95 96   MCHC 31.5 - 36.5 g/dL 32.7 33.1 33.3   RDW 10.0 - 15.0 % 12.9 13.2 12.9    - 450 10e9/L 311 311 288   CREATININE 0.52 - 1.04 mg/dL 0.60 0.56 0.56   GFR ESTIMATE, IF BLACK >60 mL/min/[1.73:m2] >90 >90 >90   GFR ESTIMATE >60 mL/min/[1.73:m2] >90 >90 >90    - 1620 mg/dL - - -   IGA 70 - 380 mg/dL - - -   IGM 60 - 265 mg/dL - - -     Rheumatoid Factor   Date Value Ref Range Status   12/22/2014 <20 <20 IU/mL Final     Cyclic Cit Pept IgG/IgA   Date Value Ref Range Status   03/17/2015 <20  Interpretation:  Negative   <20 UNITS Final     Cyclic Citrullinated Peptide IgG   Date Value Ref Range Status   12/09/2008 <2 <5 U/mL Final     Comment:     Interpretation:  Negative     Scleroderma Antibody Scl-70 ELEAZAR IgG   Date Value Ref Range Status   03/17/2015  0.0 - 0.9 AI Final    <0.2  Negative   Antibody index (AI) values reflect qualitative changes in antibody   concentration that cannot be directly associated with clinical condition or   disease state.       SSA (Ro) (ELEAZAR) Antibody, IgG   Date Value Ref Range Status   03/17/2015 0.2 0.0 - 0.9 AI  Final     Comment:     Negative   Antibody index (AI) values reflect qualitative changes in antibody   concentration that cannot be directly associated with clinical condition or   disease state.       SSB (La) (ELEAZAR) Antibody, IgG   Date Value Ref Range Status   03/17/2015 0.2 0.0 - 0.9 AI Final     Comment:     Negative   Antibody index (AI) values reflect qualitative changes in antibody   concentration that cannot be directly associated with clinical condition or   disease state.       NOLVIA Screen by EIA   Date Value Ref Range Status   12/22/2014 1.6 (H) <1.0 Final     Comment:     Interpretation:  Positive     IGG   Date Value Ref Range Status   07/11/2005 1410 695 - 1620 mg/dL Final     IGA   Date Value Ref Range Status   06/10/2014 190 70 - 380 mg/dL Final     IGM   Date Value Ref Range Status   07/11/2005 151 60 - 265 mg/dL Final     [place]yr ELEAZAR Antibody IgG   Date Value Ref Range Status   03/17/2015  0.0 - 0.9 AI Final    <0.2  Negative   Antibody index (AI) values reflect qualitative changes in antibody   concentration that cannot be directly associated with clinical condition or   disease state.       Scleroderma Antibody Scl-70 ELEAZAR IgG   Date Value Ref Range Status   03/17/2015  0.0 - 0.9 AI Final    <0.2  Negative   Antibody index (AI) values reflect qualitative changes in antibody   concentration that cannot be directly associated with clinical condition or   disease state.       January 5, 2021 interval history:    Since we have last seen the patient, she has, with the combination of coronavirus issues and other stressful issues had a somewhat stressful year.    She has had several flares of her arthritis symptoms that she attributes that increased stress.  She is not even sure exactly when it happened.  She thinks this may have been back in the fall but it could have been earlier in the year that she had some increased pain and a little bit of swelling in her metatarsals of the right foot.  That went on  for several weeks then seem to go away on its own with no additional therapy.  She also had some increased pain in one of her thumbs at one point.  That is also back to normal.    However over the last several months she has had some increased numbness in her right hand.  She first had some numbness in the right middle finger after a blood draw that apparently hit a nerve because the nerve pain went on for many weeks.  However, more recently she has had rather typical carpal tunnel type symptoms worse first thing in the morning and occasionally waking her from sleep affecting the second third and fourth fingers.  On further questioning, only the medial aspect of the fourth finger is involved and the fifth finger and the thumb are entirely spared.    In reviewing her record, it also seems that her hemoglobin A1c is a little bit on the high side right now in the mid sevens.  She is aware of this and thinks that this also may be contributed to by the stressors she has been under.    In terms of her psoriasis itself that is been pretty stable.  Its affected primarily the area around her temples and is a little worse with the colder weather but is minimally apparent on the video which is high-quality.    Physical examination by video shows her to be in no acute distress HEENT examination is normal.  She is speaking in full sentences with no shortness of breath.  The skin examination is I can see it looks essentially normal with no significant psoriasis.  Examination of her hands shows there is no convincing areas of swelling no joint deformity and there is full and normal range of motion.    Impression:    Per the problem list, with inflammatory arthritis that is generally been kept under good control with methotrexate.    She has not had any labs however to check for toxicity in quite some time nor to look at inflammation so we should do those and I have reordered them today.    I did also  her extensively on  using splinting to prevent carpal tunnel syndrome at night and also to use something in the way of a more flexible soft splint during the day because she does spend a lot of time on the keyboard.    Finally we spoke about coronavirus vaccination.  Based on her chronic conditions of diabetes, asthma, and inflammatory arthritis on methotrexate immunosuppression she should be on phase 1C.  She will keep track of where the state is at and when we get to where we are vaccinating folks in phase 1B she may reach out to us with a my chart message to see whether that vaccine can be obtained with our orders or how she can get it.    She is going to try these conservative approaches for carpal tunnel and will then plan on touching bases in about 2 to 3 months sooner as needed.    This video visit commenced at 4 PM and was completed at 4:34 PM      Originating Location (pt. Location): Home    Distant Location (provider location):  [facility] RHEUMATOLOGY CLINIC [place]     Platform used for Video Visit: Mark Anthony Betancourt MD, PhD    Rheumatology        March 16, 2021 interval history:    I last saw the patient just several months ago, but within several weeks at that time she developed a rash consistent with psoriasis over her left shin at a place where she had had previous thinning of the skin related to her type 1 diabetes.  In February she developed a similar patch over her right shin but then at the end of ever developed some kind of a viral illness that she does not think was Covid.  She had only a slight cough and no fever but profound fatigue and brain fog.  This was bad enough she was having trouble working so took 1 full day off and then work 3/2 days before going back to work full-time.    Within the next week she developed multiple psoriasis patches all over her trunk and limbs.  With this she started to take a vitamin D supplement and increase her methotrexate back up to 6  tablets/week which she had previously decreased as she was doing so well.  Interestingly, her joints did not really cause much trouble and were not particular painful or stiff though she did notice a little bit of swelling.  Over the last several weeks on that increased dose of methotrexate and with the vitamin D she feels she has stabilized and some of the most recent patches have started to resolve but she does continue to have quite a few of them including the longest lived 1 over her shin, and one on the back of her neck that is particularly irritating.    She does have a strong family history of psoriasis and was apparently already noted to have some psoriasis at one point during childhood but has never had flares like this.    Physical examination by video shows her to be in no acute distress HEENT examination is normal.  She is speaking in full sentences with no shortness of breath.  Her upper extremity examination looks essentially normal with no significant areas of joint swelling or deformity.    Impression:    Per the problem list she has had psoriatic arthritis with relatively minimal psoriasis lesions previously but with this very significant flare, apparently triggered by a viral infection that she does not believe was Covid.    Plan/recommendation:    We have already set up a referral for her for dermatology and I urged her to keep that.  I am not certain why this happened and more to the point of not sure whether it will continue to happen.  She does believe she is stabilized somewhat on the current dose of methotrexate, but if this becomes harder to control on an ongoing basis we will need to decide whether she can be treated adequately with methotrexate plus topical steroids or whether we need to add an additional agent such as a TNF inhibitor.  We did discuss the possible use of those, as well as my preference that that would be the next step in her rather than proceeding to something more  immunosuppressive such as IL-17 inhibition or Melquiades kinase inhibitor.    She is tolerating the increased dose of methotrexate well we will continue that and do labs again in about 3 weeks after she has been on that dose for about 6 weeks.    We did discuss her getting the coronavirus vaccination as well and holding her methotrexate for 1 week after each of those shots.    I will see her back in 3 months for another video visit, which she has been able to do smoothly as she has a very good connection and she does like doing these in preference to coming in at this time.  We did discuss that should she develop significant joint symptoms or skin features that would benefit from being seen in person that we can convert that visit to in person visit.    This video visit commenced at 5:36 PM was completed at 6:06 PM, 30 minutes in face-to-face time, with an additional 10 mins of chart review, , and documentation on DOS.       Again, thank you for allowing me to participate in the care of your patient.      Sincerely,    Yoel Betancourt MD

## 2021-03-16 NOTE — CONFIDENTIAL NOTE
Referral placed to the Dermatology clinic at the [facility] message sent to pt along with the phone number to call and schedule if she hasn't heard from them in 2 days. Asked pt to let us know if she has any other questions.    ANGÉLICA WillettN, RN  Rheumatology Care Coordinator  [facility]

## 2021-03-16 NOTE — PROGRESS NOTES
Dali is a 50 year old who is being evaluated via a billable video visit.      How would you like to obtain your AVS? MyChart  If the video visit is dropped, the invitation should be resent by: Send to e-mail at: [email or web address]  Will anyone else be joining your video visit? No    Video-Visit Details    Type of service:  Video Visit    Originating Location (pt. Location): Home    Distant Location (provider location):  [facility] [employer]     Platform used for Video Visit: Mark Anthony Martines is a 50 year old female who is being evaluated via a billable video visit.      How would you like to obtain your AVS? MyChart  If the video visit is dropped, the invitation should be resent by: Text to cell phone: [phone]  Will anyone else be joining your video visit? No      HPI   Prior Note carried forward:     [facility]  Rheumatology Clinic  Yoel Betancourt MD  2021     Name: Dali Martines  MRN: [id number]  Age: 49 year old  : 1970  Referring provider: Referred Self    Problem List:  1. Psoriatic arthritis   2. Psoriasis with arthropathy  3. Pain in joint, multiple sites  4. Chronic pain of right ankle  5. Right foot pain  6. Pain in joint involving ankle and foot, right      2020 :   Dali Martines is a 49 year old female with a history including type I diabetes, psoriatic arthritis, and Hashimoto's thyroiditis who presents for follow-up. I last saw the patient on 2018, at which time she reported increased left hand pain, most prominently in the left 2nd MCP. The plan was to continue on increased methotrexate dosage of 12.5 mg, and, if well-tolerated, then increasing dosage further to 15 mg weekly.    Background history:  Symptoms first occurred in 2008, when she began having stiffness and pain in the left hand, particularly in the PIPs and MCPs. Symptoms spontaneously resolved after about 6-8 weeks. She was previously seen by  Rheumatology here around that time (see note from Dr. Betancourt on 12/9/2008). Briefly, assessment was that she had a self-limited episode of inflammatory arthralgias, possibly viral or early psoriatic arthritis. Negative NOLVIA, RF, Lyme serologies and normal CRP and ESR. Given her family history of RA, there was a thought to start her on HCQ if anti-CCP antibodies were positive, but they were found to be negative. No imaging of the hand was performed. September 2014, she experienced recurrent acute onset pain, stiffness, and swelling in the L hand very similar to her symptoms in 2008. Specifically, had swelling across MCPs and in 3rd PIP. She could hardly bend the hand sometimes due to the welling. Had morning stiffness lasting 30-45 minutes. No other joints were involved. Issues with her left hand lingered for several months but eventually self-resolved that January and her only notable symptom is heel pain in the mornings. No fevers, chills, or weight loss. No back pain, vision changes, nausea, vomiting, diarrhea, abdominal pain, or blood in stool or urine. No rash, sicca symptoms, or oral ulcers. It seems she has a history of hypermobility, e.g. could bend wrist back to forearm, bend down and touch palms flat on floor, when she was younger. Used to get frequent ankle sprains, and has had multiple tendon and ligament tears over the years.    Today, the patient reports that she had recent upper respiratory symptoms which triggered asthma, and she was placed on steroids for this. She describes some lingering post-nasal drip over most of the fall, but this has since resolved. She has been taking Dulera finished course of prednisone.     She reports that she is having more joint pain in her right 1st MTP. She explains that her pain here is not bad, but she does feel soreness if someone squeezes her hand. She notes that she has more prominent left-sided 1st MTP pain. She states that her 1st MTPs do swell.     She states  "that she had psoriasic skin rashes around her temple area. She denies any issues tolerating methotrexate. She denies any significant morning joint stiffness. She describes a \"feeling like tendonitis\" in her medial knees and elbows that feels like she has hyperextended the joints. She adds that these sensations take hours to resolve. She reports that these joint sensations have worsened in the last couple months. She states that she historically has joint issues at the beginning of the fall, but these joint issues seem to be lasting into the winter time presently.      She explains that she has had a swollen gland on the right side of her neck which has been so significant in the past that she had difficulty turning her head to the side.     Review of Systems:   Pertinent items are noted in HPI or as below, remainder of complete ROS is negative.      No recent problems with hearing or vision. No swallowing problems.   No breathing difficulty, shortness of breath, coughing, or wheezing.  No chest pain or palpitations.  No heart burn, indigestion, abdominal pain, nausea, vomiting, diarrhea.  No urination problems, no bloody, cloudy urine, no dysuria.  No numbing, tingling, weakness.  No headaches or confusion.  No rashes. No easy bleeding or bruising.     Active Medications:   Current Outpatient Medications:      albuterol (PROAIR HFA/PROVENTIL HFA/VENTOLIN HFA) 108 (90 Base) MCG/ACT inhaler, Inhale 2 puffs into the lungs every 4 hours as needed for shortness of breath / dyspnea or wheezing, Disp: 1 Inhaler, Rfl: 11     aspirin 81 MG tablet, Take 1 tablet by mouth daily., Disp: , Rfl:      blood glucose (ACCU-CHEK COLIN PLUS) test strip, Test Blood Sugar 8 times daily or as directed, Disp: 800 strip, Rfl: 3     blood glucose monitoring (ACCU-CHEK FASTCLIX) lancets, Use to test blood sugar 8 times daily or as directed., Disp: 4 Box, Rfl: 3     Calcium Carbonate-Vitamin D (CALCIUM + D PO), Take one daily, Disp: , Rfl: "      citalopram (CELEXA) 20 MG tablet, Take 1.5 tablets (30 mg) by mouth daily, Disp: 135 tablet, Rfl: 1     Continuous Blood Gluc Sensor (DEXCOM G5 MOB/G4 PLAT SENSOR) MISC, 1 each every 7 days, Disp: 12 each, Rfl: 3     Continuous Blood Gluc Transmit (DEXCOM G4 PLATINUM TRANSMITTER) MISC, 1 each every 6 months, Disp: 1 each, Rfl: 1     diclofenac (VOLTAREN) 1 % topical gel, APPLY 2 GRAMS ONTO THE SKIN FOUR TIMES DAILY AS NEEDED FOR MODERATE PAIN, Disp: 100 g, Rfl: 5     dulaglutide (TRULICITY) 1.5 MG/0.5ML pen, Inject 1.5 mg Subcutaneous every 7 days, Disp: 3 mL, Rfl: 1     econazole nitrate 1 % cream, Apply 0.5 inches topically daily., Disp: , Rfl:      fish oil-omega-3 fatty acids (FISH OIL) 1000 MG capsule, Take one daily, Disp: , Rfl:      folic acid (FOLVITE) 1 MG tablet, Take 1 tablet (1 mg) by mouth daily, Disp: 90 tablet, Rfl: 0     insulin degludec (TRESIBA) 100 UNIT/ML pen, Inject 15 units subcutaneous at hs., Disp: 15 mL, Rfl: 11     insulin pen needle (B-D U/F) 31G X 8 MM miscellaneous, Use 5 pen needles daily, Disp: 450 each, Rfl: 3     Ketoconazole (NIZORAL PO), Take  by mouth. Shampoo daily, Disp: , Rfl:      levothyroxine (SYNTHROID/LEVOTHROID) 112 MCG tablet, Take 1 tablet (112 mcg) by mouth daily, Disp: 90 tablet, Rfl: 3     methotrexate 2.5 MG tablet, Take 6 tablets (15 mg) by mouth once a week, Disp: 24 tablet, Rfl: 1     methylphenidate (METADATE CD) 20 MG CR capsule, Take 20 mg by mouth daily, Disp: , Rfl:      Minoxidil (ROGAINE EX), Externally apply  topically. daily, Disp: , Rfl:      mometasone-formoterol (DULERA) 100-5 MCG/ACT inhaler, Inhale 2 puffs into the lungs 2 times daily, Disp: 3 Inhaler, Rfl: 11     montelukast (SINGULAIR) 5 MG chewable tablet, Take 1 tablet (5 mg) by mouth At Bedtime, Disp: 90 tablet, Rfl: 3     Multiple Vitamin (MULTIVITAMIN OR), Take  by mouth. Take one daily tab, Disp: , Rfl:      NOVOLOG PENFILL 100 UNIT/ML soln, 1 unit per 15 grams CHO at all meals and  snacks with correction scale of 1 unit per 50 mg/dL over 150. Average daily use is 30  units., Disp: 30 mL, Rfl: 4     simvastatin (ZOCOR) 20 MG tablet, Take 1 tablet (20 mg) by mouth At Bedtime, Disp: 90 tablet, Rfl: 3     traZODone (DESYREL) 50 MG tablet, Take 1-2 tablets by mouth nightly as needed for sleep., Disp: 180 tablet, Rfl: 0    Current Facility-Administered Medications:      betamethasone acet & sod phos (CELESTONE) injection 6 mg, 6 mg, INTRA-ARTICULAR, Once, Chris Uribe MD    Allergies:   Sulfasalazine      Past Medical History:  Anemia   Anxiety   Psoriatic arthritis  Dry eye syndrome   Endometriosis   Gastroesophageal reflux disease   Hypothyroidism   Type 1 diabetes mellitus   Asthma   Necrobiosis lipoidica diabeticorum  Chronic low back pain   Enthesopathy   Chronic right ankle pain      Past Surgical History:  Knee cruciate ligament repair   Stomach surgery 12/2002  Hydrogen breath test 6/17/2014   Appendectomy 2002   Lysis adhesions 2002   Right ankle surgery 12/2014      Family History:   Mother: Breast cancer   Father: Heart disease, eye disorder, hyperlipidemia, macular degeneration, anxiety, alcoholism, rheumatoid arthritis, hypothyroidism, diabetes mellitus   Paternal grandmother: Cerebrovascular disease  Paternal grandfather: Pancreatic cancer, heart disease   Maternal grandmother: Type 2 diabetes mellitus, coronary artery disease  Maternal aunt: Type 1 diabetes mellitus     Social History:  The patient reports that she has never smoked. She has never used smokeless tobacco. She reports current alcohol use. She reports that she does not use drugs.   PCP: Dimitri Alas  Marital Status: Single    Physical Exam:   There were no vitals taken for this visit.   Wt Readings from Last 4 Encounters:   06/23/20 83.9 kg (185 lb)   01/07/20 87.6 kg (193 lb 1.6 oz)   12/17/19 86.5 kg (190 lb 12.8 oz)   10/08/19 85.2 kg (187 lb 12.8 oz)     Constitutional: Well-developed, appearing stated  age; cooperative.      Laboratory:   RHEUM RESULTS Latest Ref Rng & Units 1/7/2020 6/18/2020 1/11/2021   SED RATE 0 - 30 mm/h - - 27   CRP, INFLAMMATION 0.0 - 8.0 mg/L <2.9 <2.9 <2.9   CYCLIC CIT PEPT IGG <5 U/mL - - -   RHEUMATOID FACTOR <20 IU/mL - - -   NOLVIA SCREEN BY EIA <1.0 - - -   AST 0 - 45 U/L 28 23 31   ALT 0 - 50 U/L 44 33 37   ALBUMIN 3.4 - 5.0 g/dL 4.1 3.9 3.8   WBC 4.0 - 11.0 10e9/L 5.0 6.9 4.7   RBC 3.8 - 5.2 10e12/L 3.84 3.70(L) 3.55(L)   HGB 11.7 - 15.7 g/dL 12.0 11.7 11.4(L)   HCT 35.0 - 47.0 % 36.7 35.3 34.2(L)   MCV 78 - 100 fl 96 95 96   MCHC 31.5 - 36.5 g/dL 32.7 33.1 33.3   RDW 10.0 - 15.0 % 12.9 13.2 12.9    - 450 10e9/L 311 311 288   CREATININE 0.52 - 1.04 mg/dL 0.60 0.56 0.56   GFR ESTIMATE, IF BLACK >60 mL/min/[1.73:m2] >90 >90 >90   GFR ESTIMATE >60 mL/min/[1.73:m2] >90 >90 >90    - 1620 mg/dL - - -   IGA 70 - 380 mg/dL - - -   IGM 60 - 265 mg/dL - - -     Rheumatoid Factor   Date Value Ref Range Status   12/22/2014 <20 <20 IU/mL Final     Cyclic Cit Pept IgG/IgA   Date Value Ref Range Status   03/17/2015 <20  Interpretation:  Negative   <20 UNITS Final     Cyclic Citrullinated Peptide IgG   Date Value Ref Range Status   12/09/2008 <2 <5 U/mL Final     Comment:     Interpretation:  Negative     Scleroderma Antibody Scl-70 ELEAZAR IgG   Date Value Ref Range Status   03/17/2015  0.0 - 0.9 AI Final    <0.2  Negative   Antibody index (AI) values reflect qualitative changes in antibody   concentration that cannot be directly associated with clinical condition or   disease state.       SSA (Ro) (ELEAZAR) Antibody, IgG   Date Value Ref Range Status   03/17/2015 0.2 0.0 - 0.9 AI Final     Comment:     Negative   Antibody index (AI) values reflect qualitative changes in antibody   concentration that cannot be directly associated with clinical condition or   disease state.       SSB (La) (ELEAZAR) Antibody, IgG   Date Value Ref Range Status   03/17/2015 0.2 0.0 - 0.9 AI Final     Comment:      Negative   Antibody index (AI) values reflect qualitative changes in antibody   concentration that cannot be directly associated with clinical condition or   disease state.       NOLVIA Screen by EIA   Date Value Ref Range Status   12/22/2014 1.6 (H) <1.0 Final     Comment:     Interpretation:  Positive     IGG   Date Value Ref Range Status   07/11/2005 1410 695 - 1620 mg/dL Final     IGA   Date Value Ref Range Status   06/10/2014 190 70 - 380 mg/dL Final     IGM   Date Value Ref Range Status   07/11/2005 151 60 - 265 mg/dL Final     Cyr ELEAZAR Antibody IgG   Date Value Ref Range Status   03/17/2015  0.0 - 0.9 AI Final    <0.2  Negative   Antibody index (AI) values reflect qualitative changes in antibody   concentration that cannot be directly associated with clinical condition or   disease state.       Scleroderma Antibody Scl-70 ELEAZAR IgG   Date Value Ref Range Status   03/17/2015  0.0 - 0.9 AI Final    <0.2  Negative   Antibody index (AI) values reflect qualitative changes in antibody   concentration that cannot be directly associated with clinical condition or   disease state.       January 5, 2021 interval history:    Since we have last seen the patient, she has, with the combination of coronavirus issues and other stressful issues had a somewhat stressful year.    She has had several flares of her arthritis symptoms that she attributes that increased stress.  She is not even sure exactly when it happened.  She thinks this may have been back in the fall but it could have been earlier in the year that she had some increased pain and a little bit of swelling in her metatarsals of the right foot.  That went on for several weeks then seem to go away on its own with no additional therapy.  She also had some increased pain in one of her thumbs at one point.  That is also back to normal.    However over the last several months she has had some increased numbness in her right hand.  She first had some numbness in the right  middle finger after a blood draw that apparently hit a nerve because the nerve pain went on for many weeks.  However, more recently she has had rather typical carpal tunnel type symptoms worse first thing in the morning and occasionally waking her from sleep affecting the second third and fourth fingers.  On further questioning, only the medial aspect of the fourth finger is involved and the fifth finger and the thumb are entirely spared.    In reviewing her record, it also seems that her hemoglobin A1c is a little bit on the high side right now in the mid sevens.  She is aware of this and thinks that this also may be contributed to by the stressors she has been under.    In terms of her psoriasis itself that is been pretty stable.  Its affected primarily the area around her temples and is a little worse with the colder weather but is minimally apparent on the video which is high-quality.    Physical examination by video shows her to be in no acute distress HEENT examination is normal.  She is speaking in full sentences with no shortness of breath.  The skin examination is I can see it looks essentially normal with no significant psoriasis.  Examination of her hands shows there is no convincing areas of swelling no joint deformity and there is full and normal range of motion.    Impression:    Per the problem list, with inflammatory arthritis that is generally been kept under good control with methotrexate.    She has not had any labs however to check for toxicity in quite some time nor to look at inflammation so we should do those and I have reordered them today.    I did also  her extensively on using splinting to prevent carpal tunnel syndrome at night and also to use something in the way of a more flexible soft splint during the day because she does spend a lot of time on the keyboard.    Finally we spoke about coronavirus vaccination.  Based on her chronic conditions of diabetes, asthma, and inflammatory  arthritis on methotrexate immunosuppression she should be on phase 1C.  She will keep track of where the state is at and when we get to where we are vaccinating folks in phase 1B she may reach out to us with a my chart message to see whether that vaccine can be obtained with our orders or how she can get it.    She is going to try these conservative approaches for carpal tunnel and will then plan on touching bases in about 2 to 3 months sooner as needed.    This video visit commenced at 4 PM and was completed at 4:34 PM      Originating Location (pt. Location): Home    Distant Location (provider location):  [facility] [place]     Platform used for Video Visit: Mark Anthony Betancourt MD, PhD    Rheumatology        March 16, 2021 interval history:    I last saw the patient just several months ago, but within several weeks at that time she developed a rash consistent with psoriasis over her left shin at a place where she had had previous thinning of the skin related to her type 1 diabetes.  In February she developed a similar patch over her right shin but then at the end of ever developed some kind of a viral illness that she does not think was Covid.  She had only a slight cough and no fever but profound fatigue and brain fog.  This was bad enough she was having trouble working so took 1 full day off and then work 3/2 days before going back to work full-time.    Within the next week she developed multiple psoriasis patches all over her trunk and limbs.  With this she started to take a vitamin D supplement and increase her methotrexate back up to 6 tablets/week which she had previously decreased as she was doing so well.  Interestingly, her joints did not really cause much trouble and were not particular painful or stiff though she did notice a little bit of swelling.  Over the last several weeks on that increased dose of methotrexate and with the vitamin D she feels  she has stabilized and some of the most recent patches have started to resolve but she does continue to have quite a few of them including the longest lived 1 over her shin, and one on the back of her neck that is particularly irritating.    She does have a strong family history of psoriasis and was apparently already noted to have some psoriasis at one point during childhood but has never had flares like this.    Physical examination by video shows her to be in no acute distress HEENT examination is normal.  She is speaking in full sentences with no shortness of breath.  Her upper extremity examination looks essentially normal with no significant areas of joint swelling or deformity.    Impression:    Per the problem list she has had psoriatic arthritis with relatively minimal psoriasis lesions previously but with this very significant flare, apparently triggered by a viral infection that she does not believe was Covid.    Plan/recommendation:    We have already set up a referral for her for dermatology and I urged her to keep that.  I am not certain why this happened and more to the point of not sure whether it will continue to happen.  She does believe she is stabilized somewhat on the current dose of methotrexate, but if this becomes harder to control on an ongoing basis we will need to decide whether she can be treated adequately with methotrexate plus topical steroids or whether we need to add an additional agent such as a TNF inhibitor.  We did discuss the possible use of those, as well as my preference that that would be the next step in her rather than proceeding to something more immunosuppressive such as IL-17 inhibition or Melquiades kinase inhibitor.    She is tolerating the increased dose of methotrexate well we will continue that and do labs again in about 3 weeks after she has been on that dose for about 6 weeks.    We did discuss her getting the coronavirus vaccination as well and holding her methotrexate  for 1 week after each of those shots.    I will see her back in 3 months for another video visit, which she has been able to do smoothly as she has a very good connection and she does like doing these in preference to coming in at this time.  We did discuss that should she develop significant joint symptoms or skin features that would benefit from being seen in person that we can convert that visit to in person visit.    This video visit commenced at 5:36 PM was completed at 6:06 PM, 30 minutes in face-to-face time, with an additional 10 mins of chart review, , and documentation on DOS.

## 2021-03-19 DIAGNOSIS — J45.30 MILD PERSISTENT ASTHMA WITHOUT COMPLICATION: ICD-10-CM

## 2021-03-19 RX ORDER — MONTELUKAST SODIUM 5 MG/1
5 TABLET, CHEWABLE ORAL AT BEDTIME
Qty: 90 TABLET | Refills: 4 | Status: SHIPPED | OUTPATIENT
Start: 2021-03-19 | End: 2022-04-04

## 2021-03-20 DIAGNOSIS — E10.3291 TYPE 1 DIABETES MELLITUS WITH MILD NONPROLIFERATIVE RETINOPATHY OF RIGHT EYE, MACULAR EDEMA PRESENCE UNSPECIFIED (H): ICD-10-CM

## 2021-03-23 ENCOUNTER — TELEPHONE (OUTPATIENT)
Dept: ENDOCRINOLOGY | Facility: CLINIC | Age: 51
End: 2021-03-23

## 2021-03-23 RX ORDER — DULAGLUTIDE 0.75 MG/.5ML
0.75 INJECTION, SOLUTION SUBCUTANEOUS
Qty: 3 ML | Refills: 0 | Status: SHIPPED | OUTPATIENT
Start: 2021-03-23 | End: 2021-03-24

## 2021-03-23 NOTE — TELEPHONE ENCOUNTER
[facility] Call Center    Phone Message    May a detailed message be left on voicemail: yes     Reason for Call: Medication Question or concern regarding medication   Prescription Clarification  Name of Medication: dulaglutide (TRULICITY) 0.75 MG/0.5ML pen    Prescribing Provider:  [place] Sebastian      Pharmacy: [id number] - [place], [place] - [address]      What on the order needs clarification?   Unable to break box, need rx for 1 month or 2 month supply.           Action Taken: Message routed to:  [facility] ([facility] endo    Travel Screening: Not Applicable

## 2021-03-23 NOTE — TELEPHONE ENCOUNTER
dulaglutide (TRULICITY) 0.75 MG/0.5ML pen    Inject 0.75 mg Subcutaneous every 7 days   Last Written Prescription Date:  2/22/21  Last Fill Quantity: 3 ml,   # refills: 0  Last Office Visit : 1/7/21  Continue low dose Trulicity 0.75 mg subcutaneous once a week.  Future Office visit:  4/13/21    Refill to pharmacy.

## 2021-03-24 DIAGNOSIS — E10.3291 TYPE 1 DIABETES MELLITUS WITH MILD NONPROLIFERATIVE RETINOPATHY OF RIGHT EYE, MACULAR EDEMA PRESENCE UNSPECIFIED (H): ICD-10-CM

## 2021-03-24 RX ORDER — DULAGLUTIDE 0.75 MG/.5ML
0.75 INJECTION, SOLUTION SUBCUTANEOUS
Qty: 4 ML | Refills: 0 | Status: SHIPPED | OUTPATIENT
Start: 2021-03-24 | End: 2021-06-16

## 2021-04-12 NOTE — PROGRESS NOTES
Outcome for 04/12/21 10:25 AM :Glucose sent via Email- dexcom pt states will upload today  Outcome for 04/12/21 3:50 PM gdgthart reminder sent to upload dexcom      Dali Martines  is being evaluated via a billable video visit.      How would you like to obtain your AVS? [facility]  For the video visit, send the invitation by: log onto [employer]  10 minutes before appointment   Will anyone else be joining your video visit? No  2    This 49 year old woman was seen using [facility]  for f/u of her type 1 diabetes. She also has hypothyroidism and psoriatic arthritis.  Pt was dx having type 1 diabetes at age 15.  Her hx is also significant for mild NPDR right eye only,anxiety, hyperlipidemia and GERD.    She is currently taking Tresiba 17 units daily.      Insulin to Carb ratio:  1 unit per 7 grams CHO  Correction Factor:     1 unit drops BG 30 mg/mL      She wears a Dexcom and the data are listed below.    Trena reports that she is once again having more problems with insomnia.  She thinks this has an impact on her blood sugars.  She tends to eat a bit later.  She is eating differently now that she is at home nearly all of the time.  She thinks each of her 3 meals might be bigger and that she may have been eating more frequent smaller meals when she was physically at her work.  She does not sleep as well when she has sugars that are higher or lower than usual.  She was evaluated in the sleep clinic about 5 years ago and wonders if she should go again.  She continues to have challenges giving insulin 15 minutes before her meals but decided to not switch to the Fiasp after our last visit because she relies on the memory function of her Melchor pen echo.  She wonders if Fiasp can be used in that device.  She also wonders if she should undergo some additional diabetes education because she does not feel as fresh on some of the management points as she did in the past.  She is not having serious hypoglycemia and recognizes the lows  that she has.    She remains on Trulicity 0.75 mg each week.  She was having some nausea when she was on a higher dose and feels much better on this lower dose.  She does continue to have some reflux if she changes her position so that her head is lower than her abdomen.  She does not have any burning or abdominal pain.    She has had several episodes of cough that have been evaluated by the pulmonary clinic.  They seem to come in episodes that last days and are associated with fatigue.  She has been using her inhalers more often than usual.    She is up-to-date with her eye visits and has no concerns.  She has no paresthesias or foot ulcers.    She continues to take her statin and her thyroid medication.  She notes that her last lipid panel was obtained after drinking milk in her coffee.    She has not checked her blood pressure recently.                       Current Outpatient Medications   Medication     albuterol (PROAIR HFA/PROVENTIL HFA/VENTOLIN HFA) 108 (90 Base) MCG/ACT inhaler     aspirin 81 MG tablet     blood glucose (ACCU-CHEK COLIN PLUS) test strip     blood glucose (NO BRAND SPECIFIED) test strip     blood glucose monitoring (ACCU-CHEK FASTCLIX) lancets     blood glucose monitoring (NO BRAND SPECIFIED) meter device kit     Calcium Carbonate-Vitamin D (CALCIUM + D PO)     citalopram (CELEXA) 20 MG tablet     Continuous Blood Gluc  (DEXCOM G6 ) SUZANNE     Continuous Blood Gluc Sensor (DEXCOM G6 SENSOR) MISC     Continuous Blood Gluc Transmit (DEXCOM G6 TRANSMITTER) MISC     diclofenac (VOLTAREN) 1 % topical gel     dulaglutide (TRULICITY) 0.75 MG/0.5ML pen     econazole nitrate 1 % cream     fish oil-omega-3 fatty acids (FISH OIL) 1000 MG capsule     folic acid (FOLVITE) 1 MG tablet     insulin aspart (FIASP FLEXTOUCH) 100 UNIT/ML pen-injector     insulin degludec (TRESIBA) 100 UNIT/ML pen     insulin pen needle (B-D U/F) 31G X 8 MM miscellaneous     Ketoconazole (NIZORAL PO)      levothyroxine (SYNTHROID/LEVOTHROID) 112 MCG tablet     methotrexate 2.5 MG tablet     methylphenidate (METADATE CD) 20 MG CR capsule     Minoxidil (ROGAINE EX)     mometasone-formoterol (DULERA) 100-5 MCG/ACT inhaler     montelukast (SINGULAIR) 5 MG chewable tablet     Multiple Vitamin (MULTIVITAMIN OR)     NOVOLOG PENFILL 100 UNIT/ML soln     simvastatin (ZOCOR) 20 MG tablet     traZODone (DESYREL) 50 MG tablet     No current facility-administered medications for this visit.    She is in no acute distress.Assessment and plan:          Recent Labs   Lab Test 01/11/21  1109 01/11/21  1102 01/11/21  1101 06/26/20  1357 06/18/20  1456 12/17/19  1631 12/17/19  1631 12/17/19  1626 12/17/19 06/18/19  0000 06/18/19 03/21/19  1359 12/18/18  1610 12/18/18  1610 07/09/14  1641 07/09/14  1641   A1C  --   --  7.1* 7.6*  --   --   --   --   --   --   --   --   --   --   --   --    HEMOGLOBINA1  --   --   --   --   --   --   --   --  7.4*  --  7.0*  --    < >  --    < >  --    TSH  --   --  2.01  --   --   --   --  1.26  --    < >  --  0.19*  --  0.12*   < > 0.11*   T4  --   --   --   --   --   --   --   --   --   --   --  1.09  --   --   --  1.31   LDL  --   --  122*  --   --   --   --   --   --   --   --   --   --  92  --   --    HDL  --   --  92  --   --   --   --   --   --   --   --   --   --  91  --   --    TRIG  --   --  44  --   --   --   --   --   --   --   --   --   --  54  --   --    CR 0.56  --   --   --  0.56   < >  --   --   --    < >  --   --    < >  --    < >  --    MICROL  --  <5  --   --   --   --  <5  --   --   --   --   --    < >  --    < >  --     < > = values in this interval not displayed.     1.  Diabetes control.  Overall her control is quite good but she is having some wide excursions of glycemia.  I suggested she consider getting a tandem control IQ pump since it works with the Dexcom.  She is willing to discuss this with the nurse educator.  At that time they can also review her educational needs.  I  suggested she might reach out to JOSUE CARDOZO to join an adult support group.  She is still interested in using Fiasp but only if it can be used in her echo pen.  I will prescribe cartridges for her.    2.  Diabetes complications.  She has background retinopathy that is stable.  Her renal function is normal.  She has no paresthesias.    3.hypothyroidism.  Her TSH was checked in January and is fine.    4.hyperlipidemia.  She remains on a statin.  Her last LDL was above target but she was not fasting.  We will repeat at again next year.    5.insomnia.  I will send her back to the sleep clinic for evaluation.    Follow-up with Marissa Sebastian with a virtual visit in 3 months and with me in a face-to-face visit in 6 months.    I spent 20 minutes with the patient on a virtual visit and an additional 30 minutes reviewing and interpreting her Dexcom report, reviewing and interpreting her lab data, ordering labs, ordering medications, and doing documentation on the day of visit      Felicia Mckeon MD

## 2021-04-12 NOTE — PATIENT INSTRUCTIONS
We appreciate your assistance in coordinating your healthcare.     Please upload your insulin pump, blood sugar meter and/or continuous glucose monitor at home 1-2 days before your next diabetes-related appointment.   This will allow your provider to review your  data before your scheduled virtual visit.    To ask a question to your Endocrine care team, please send them a "[employer]" message, or reach them by phone at [phone]     To expedite your medication refill(s), please contact your pharmacy and have them   fax a refill request to: [phone].  *Please allow 3 business days for routine medication refills.  *Please allow 5 business days for controlled substance medication refills.    For after-hours urgent Endocrine issues, that do not require 911, please dial [phone], and ask to speak with the Endocrinologist On-Call        I ordered Fiasp and a pen cartridge that should fit in your Melchor pen echo.    Your Dexcom 6 prescription has been sent to [facility].    The pump we talked about was a tandem control IQ

## 2021-04-13 ENCOUNTER — VIRTUAL VISIT (OUTPATIENT)
Dept: ENDOCRINOLOGY | Facility: CLINIC | Age: 51
End: 2021-04-13
Payer: COMMERCIAL

## 2021-04-13 DIAGNOSIS — E10.3291 TYPE 1 DIABETES MELLITUS WITH MILD NONPROLIFERATIVE RETINOPATHY OF RIGHT EYE, MACULAR EDEMA PRESENCE UNSPECIFIED (H): Primary | ICD-10-CM

## 2021-04-13 PROCEDURE — 99215 OFFICE O/P EST HI 40 MIN: CPT | Mod: 95 | Performed by: INTERNAL MEDICINE

## 2021-04-13 RX ORDER — INSULIN ASPART INJECTION 100 [IU]/ML
1 INJECTION, SOLUTION SUBCUTANEOUS PRN
Qty: 3 ML | Refills: 11 | Status: SHIPPED | OUTPATIENT
Start: 2021-04-13 | End: 2021-07-21

## 2021-04-13 NOTE — LETTER
4/13/2021       RE: Dali Martines  [address]  [place] [facility] [address]     Dear Colleague,    Thank you for referring your patient, Dali Martines, to the [facility] [place] [facility]. Please see a copy of my visit note below.    Outcome for 04/12/21 10:25 AM :Glucose sent via Email- dexcom pt states will upload today  Outcome for 04/12/21 3:50 PM mychart reminder sent to upload dexcom      Dali Martines  is being evaluated via a billable video visit.      How would you like to obtain your AVS? [employer]  For the video visit, send the invitation by: log onto [employer]  10 minutes before appointment   Will anyone else be joining your video visit? No  2    This 49 year old woman was seen using [facility]  for f/u of her type 1 diabetes. She also has hypothyroidism and psoriatic arthritis.  Pt was dx having type 1 diabetes at age 15.  Her hx is also significant for mild NPDR right eye only,anxiety, hyperlipidemia and GERD.    She is currently taking Tresiba 17 units daily.      Insulin to Carb ratio:  1 unit per 7 grams CHO  Correction Factor:     1 unit drops BG 30 mg/mL      She wears a Dexcom and the data are listed below.    Trena reports that she is once again having more problems with insomnia.  She thinks this has an impact on her blood sugars.  She tends to eat a bit later.  She is eating differently now that she is at home nearly all of the time.  She thinks each of her 3 meals might be bigger and that she may have been eating more frequent smaller meals when she was physically at her work.  She does not sleep as well when she has sugars that are higher or lower than usual.  She was evaluated in the sleep clinic about 5 years ago and wonders if she should go again.  She continues to have challenges giving insulin 15 minutes before her meals but decided to not switch to the Fiasp after our last visit because she relies on the  memory function of her Melchor pen echo.  She wonders if Fiasp can be used in that device.  She also wonders if she should undergo some additional diabetes education because she does not feel as fresh on some of the management points as she did in the past.  She is not having serious hypoglycemia and recognizes the lows that she has.    She remains on Trulicity 0.75 mg each week.  She was having some nausea when she was on a higher dose and feels much better on this lower dose.  She does continue to have some reflux if she changes her position so that her head is lower than her abdomen.  She does not have any burning or abdominal pain.    She has had several episodes of cough that have been evaluated by the pulmonary clinic.  They seem to come in episodes that last days and are associated with fatigue.  She has been using her inhalers more often than usual.    She is up-to-date with her eye visits and has no concerns.  She has no paresthesias or foot ulcers.    She continues to take her statin and her thyroid medication.  She notes that her last lipid panel was obtained after drinking milk in her coffee.    She has not checked her blood pressure recently.                       Current Outpatient Medications   Medication     albuterol (PROAIR HFA/PROVENTIL HFA/VENTOLIN HFA) 108 (90 Base) MCG/ACT inhaler     aspirin 81 MG tablet     blood glucose (ACCU-CHEK COLIN PLUS) test strip     blood glucose (NO BRAND SPECIFIED) test strip     blood glucose monitoring (ACCU-CHEK FASTCLIX) lancets     blood glucose monitoring (NO BRAND SPECIFIED) meter device kit     Calcium Carbonate-Vitamin D (CALCIUM + D PO)     citalopram (CELEXA) 20 MG tablet     Continuous Blood Gluc  (DEXCOM G6 ) SUZANNE     Continuous Blood Gluc Sensor (DEXCOM G6 SENSOR) MISC     Continuous Blood Gluc Transmit (DEXCOM G6 TRANSMITTER) MISC     diclofenac (VOLTAREN) 1 % topical gel     dulaglutide (TRULICITY) 0.75 MG/0.5ML pen     econazole  nitrate 1 % cream     fish oil-omega-3 fatty acids (FISH OIL) 1000 MG capsule     folic acid (FOLVITE) 1 MG tablet     insulin aspart (FIASP FLEXTOUCH) 100 UNIT/ML pen-injector     insulin degludec (TRESIBA) 100 UNIT/ML pen     insulin pen needle (B-D U/F) 31G X 8 MM miscellaneous     Ketoconazole (NIZORAL PO)     levothyroxine (SYNTHROID/LEVOTHROID) 112 MCG tablet     methotrexate 2.5 MG tablet     methylphenidate (METADATE CD) 20 MG CR capsule     Minoxidil (ROGAINE EX)     mometasone-formoterol (DULERA) 100-5 MCG/ACT inhaler     montelukast (SINGULAIR) 5 MG chewable tablet     Multiple Vitamin (MULTIVITAMIN OR)     NOVOLOG PENFILL 100 UNIT/ML soln     simvastatin (ZOCOR) 20 MG tablet     traZODone (DESYREL) 50 MG tablet     No current facility-administered medications for this visit.    She is in no acute distress.Assessment and plan:          Recent Labs   Lab Test 01/11/21  1109 01/11/21  1102 01/11/21  1101 06/26/20  1357 06/18/20  1456 12/17/19  1631 12/17/19  1631 12/17/19  1626 12/17/19 06/18/19  0000 06/18/19 03/21/19  1359 12/18/18  1610 12/18/18  1610 07/09/14  1641 07/09/14  1641   A1C  --   --  7.1* 7.6*  --   --   --   --   --   --   --   --   --   --   --   --    HEMOGLOBINA1  --   --   --   --   --   --   --   --  7.4*  --  7.0*  --    < >  --    < >  --    TSH  --   --  2.01  --   --   --   --  1.26  --    < >  --  0.19*  --  0.12*   < > 0.11*   T4  --   --   --   --   --   --   --   --   --   --   --  1.09  --   --   --  1.31   LDL  --   --  122*  --   --   --   --   --   --   --   --   --   --  92  --   --    HDL  --   --  92  --   --   --   --   --   --   --   --   --   --  91  --   --    TRIG  --   --  44  --   --   --   --   --   --   --   --   --   --  54  --   --    CR 0.56  --   --   --  0.56   < >  --   --   --    < >  --   --    < >  --    < >  --    MICROL  --  <5  --   --   --   --  <5  --   --   --   --   --    < >  --    < >  --     < > = values in this interval not displayed.     1.   Diabetes control.  Overall her control is quite good but she is having some wide excursions of glycemia.  I suggested she consider getting a tandem control IQ pump since it works with the Dexcom.  She is willing to discuss this with the nurse educator.  At that time they can also review her educational needs.  I suggested she might reach out to JOSUE CARDOZO to join an adult support group.  She is still interested in using Fiasp but only if it can be used in her echo pen.  I will prescribe cartridges for her.    2.  Diabetes complications.  She has background retinopathy that is stable.  Her renal function is normal.  She has no paresthesias.    3.hypothyroidism.  Her TSH was checked in January and is fine.    4.hyperlipidemia.  She remains on a statin.  Her last LDL was above target but she was not fasting.  We will repeat at again next year.    5.insomnia.  I will send her back to the sleep clinic for evaluation.    Follow-up with Marissa Sebastian with a virtual visit in 3 months and with me in a face-to-face visit in 6 months.    I spent 20 minutes with the patient on a virtual visit and an additional 30 minutes reviewing and interpreting her Dexcom report, reviewing and interpreting her lab data, ordering labs, ordering medications, and doing documentation on the day of visit      Felicia Mckeon MD

## 2021-05-04 ENCOUNTER — VIRTUAL VISIT (OUTPATIENT)
Dept: EDUCATION SERVICES | Facility: CLINIC | Age: 51
End: 2021-05-04
Payer: COMMERCIAL

## 2021-05-04 DIAGNOSIS — E10.3299 TYPE 1 DIABETES MELLITUS WITH MILD NONPROLIFERATIVE RETINOPATHY, MACULAR EDEMA PRESENCE UNSPECIFIED, UNSPECIFIED LATERALITY (H): Primary | ICD-10-CM

## 2021-05-04 PROCEDURE — 97803 MED NUTRITION INDIV SUBSEQ: CPT | Mod: 95 | Performed by: NUTRITIONIST

## 2021-05-05 DIAGNOSIS — E10.9 TYPE 1 DIABETES MELLITUS WITHOUT COMPLICATION (H): ICD-10-CM

## 2021-05-05 NOTE — TELEPHONE ENCOUNTER
insulin degludec (TRESIBA) 100 UNIT/ML penInject 17 units subcutaneous at hs.  Last Written Prescription Date:  1/7/21  Last Fill Quantity: 15 ml ,   # refills: 3  Last Office Visit : 4/13/21 Linda  Future Office visit:  7/15/21 Romulo    Routing refill request to provider for review/approval because:  Insulin - refilled per clinic  CR and A1C a were done 1/11/21

## 2021-05-12 DIAGNOSIS — L40.50 PSORIATIC ARTHRITIS (H): ICD-10-CM

## 2021-05-12 DIAGNOSIS — M77.9 TENDINITIS: ICD-10-CM

## 2021-05-12 DIAGNOSIS — M25.50 PAIN IN JOINT, MULTIPLE SITES: ICD-10-CM

## 2021-05-12 DIAGNOSIS — E11.620 NLD (NECROBIOSIS LIPOIDICA DIABETICORUM) (H): ICD-10-CM

## 2021-05-13 NOTE — TELEPHONE ENCOUNTER
methotrexate 2.5 MG tablet      Last Written Prescription Date:  1-20-21  Last Fill Quantity: 24,   # refills: 3  Last Office Visit: 3-16-21  Future Office visit:  6-15-21    CBC RESULTS:   Recent Labs   Lab Test 01/11/21  1109   WBC 4.7   RBC 3.55*   HGB 11.4*   HCT 34.2*   MCV 96   MCH 32.1   MCHC 33.3   RDW 12.9          Creatinine   Date Value Ref Range Status   01/11/2021 0.56 0.52 - 1.04 mg/dL Final   ]    Liver Function Studies -   Recent Labs   Lab Test 01/11/21  1109 04/27/16  1402 04/27/16  1402   PROTTOTAL  --   --  8.0   ALBUMIN 3.8   < > 4.0   BILITOTAL  --   --  0.3   ALKPHOS  --   --  108   AST 31   < > 21   ALT 37   < > 25    < > = values in this interval not displayed.       Routing refill request to provider for review/approval because:  Per protocol  Last labs 1-11-21,  FYI to clinic RN

## 2021-05-15 NOTE — PROGRESS NOTES
"Dali is a 50 year old who is being evaluated via a billable video visit.      How would you like to obtain your AVS? MyChart  If the video visit is dropped, the invitation should be resent by: Send to e-mail at: [email or web address]  Will anyone else be joining your video visit? No      Video Start Time: 2:18pm    Video-Visit Details    Type of service:  Video Visit    Originating Location (pt. Location): Home    Distant Location (provider location):   [facility] [place]     Platform used for Video Visit: [employer]vents    Diabetes Self-Management Education & Support    Presents for:  Review MNT Type 1 diabetes  Diagnosed age 15    SUBJECTIVE/OBJECTIVE:     Cultural Influences/Ethnic Background:  Not  or   Considering pump, meeting with Dali later today  Has taken courses in diabetes management, attended recent summit  Working from home, busy work schedule, erratic hours, also caregiving for parents  Not as much activity lately due to injured ankle  Diabetes Symptoms & Complications:          Patient Problem List and Family Medical History reviewed for relevant medical history, current medical status, and diabetes risk factors.    Vitals:  There were no vitals taken for this visit.  Estimated body mass index is 26.54 kg/m  as calculated from the following:    Height as of 6/23/20: 1.778 m (5' 10\").    Weight as of 6/23/20: 83.9 kg (185 lb).   Last 3 BP:   BP Readings from Last 3 Encounters:   06/23/20 135/83   01/07/20 102/69   12/17/19 125/83       History   Smoking Status     Never Smoker   Smokeless Tobacco     Never Used       Labs:  Lab Results   Component Value Date    A1C 7.1 01/11/2021     Lab Results   Component Value Date     04/27/2016     Lab Results   Component Value Date     01/11/2021     HDL Cholesterol   Date Value Ref Range Status   01/11/2021 92 >49 mg/dL Final   ]  GFR Estimate   Date Value Ref Range Status   01/11/2021 >90 >60 " "mL/min/[1.73_m2] Final     Comment:     Non  GFR Calc  Starting 12/18/2018, serum creatinine based estimated GFR (eGFR) will be   calculated using the Chronic Kidney Disease Epidemiology Collaboration   (CKD-EPI) equation.       GFR Estimate If Black   Date Value Ref Range Status   01/11/2021 >90 >60 mL/min/[1.73_m2] Final     Comment:      GFR Calc  Starting 12/18/2018, serum creatinine based estimated GFR (eGFR) will be   calculated using the Chronic Kidney Disease Epidemiology Collaboration   (CKD-EPI) equation.       Lab Results   Component Value Date    CR 0.56 01/11/2021     No results found for: MICROALBUMIN    Healthy Eating:  Wakes at 830am coffee immediately with 1% milk  An hour later eats 1 or 2 slices whole grain toast with jam or bowl of granola from co op with dried fruit, ground flax and milk  Sometimes skips lunch or lunch is most variable  Sometimes PBJ or leftovers or ivette bar  Three times per week has happy hour drink  Dinner 7-8 pasta with meatballs or some protein, salad or jim plate or red beans and rice or greek pasta wheat or chili or soup or fish or turkey burger or salmon cake  Keeps fats down because they seem to keep glucose elevated  Has nuts for snacks. Reviewed labels for carb counting.   Snacks in evening. But snacks less if has more protein with dinner  Strawberries for dessert with powdered sugar sprinkled on, Greek or vanilla yogurt - satisfies cravings  Might have popcorn with parm cheese for snack  \"winging it\" with carb counting sometimes out of habit    Monitoring:  Reviewed recently by Dr. Linda Velez G6    Taking Medications:  Diabetes Medication(s)     Insulin       insulin aspart (FIASP FLEXTOUCH) 100 UNIT/ML pen-injector    Use as directed up to 20 units a day at the start of meals     Insulin Aspart, w/Niacinamide, (FIASP PENFILL) 100 UNIT/ML SOCT    Inject 1 unit marking on an U-100 insulin syringe Subcutaneous as needed (for each " 7 gm of carb) Use as directed in calderon pen echo up to 30 units a day     insulin degludec (TRESIBA) 100 UNIT/ML pen    Inject 17 units subcutaneous at hs.     NOVOLOG PENFILL 100 UNIT/ML soln    INJECT 1 UNIT PER 15 GRAMS CHO AT ALL MEALS AND SNACKS WITH CORRECTION SCALE OF 1 UNIT PER 50 MG/DL OVER 150, AVERAGE DAILY USE OF 30 UNITS    Incretin Mimetic Agents (GLP-1 Receptor Agonists)       dulaglutide (TRULICITY) 0.75 MG/0.5ML pen    Inject 0.75 mg Subcutaneous every 7 days          Healthy Coping:     Patient Activation Measure Survey Score:  No flowsheet data found.    Diabetes knowledge and skills assessment:   Patient is knowledgeable in diabetes management concepts related to: Healthy Eating    Patient needs further education on the following diabetes management concepts: Healthy Eating    Based on learning assessment above, most appropriate setting for further diabetes education would be: Individual setting.      INTERVENTIONS:    Education provided today on:  AADE Self-Care Behaviors:  Healthy Eating: consistency in amount, composition, and timing of food intake, carb counting, fiber, plate planning method, label reading and general healthy eating    Opportunities for ongoing education and support in diabetes-self management were discussed.    Pt verbalized understanding of concepts discussed and recommendations provided today.       Education Materials Provided:  Carbohydrate Counting    ASSESSMENT:  Patient's most recent   Lab Results   Component Value Date    A1C 7.1 01/11/2021    is not meeting goal of <7.0    PLAN  Allow time for lunch, make a balanced meal that include vegetable and protein along with carb  Try to have some routine to meal timing to allow for three meals per day  Use labels when carb counting. Download calorie dawson as a resource to use when foods don't have labels or when eating out. Other options: my fitness pal, Ernesto. I sent an updated carb counting guide book.  Time Spent: 45  minutes  Encounter Type: Individual    Any diabetes medication dose changes were made via the CDE Protocol and Collaborative Practice Agreement with the patient's referring provider. A copy of this encounter was shared with the provider.

## 2021-05-17 NOTE — PROGRESS NOTES
Do you have wifi? Y  Do you have a smart phone? Y  Can you download an dominic on your phone comfortably with out assistance? Y  Can you watch a Youtube video? ADELIA Mcnally is a 50 year old who is being evaluated via a billable video visit.      How would you like to obtain your AVS? MyChart  If the video visit is dropped, the invitation should be resent by: Send to e-mail at: [email or web address]  Will anyone else be joining your video visit? No    Aline Gaytan MA      Video-Visit Details    Type of service:  Video Visit    Video Start Time: 3:29 PM    Video End Time:4:19 PM    Originating Location (pt. Location): Home    Distant Location (provider location):  [facility] Home Office    Platform used for Video Visit: [employer]     Chief complaint: Consultation requested by Felicia Mckeon MD for evaluation of chronic insomnia    History of Present Illness: 50-year-old female with history of type 1 diabetes with long history of insomnia. She has been taking medications for insomnia for many years. She was actually seen in the sleep clinic in 2014. No obstructive sleep apnea was identified. She was offered a trial of CBT-I however declined it at that time being concerned about getting worse before she gets better. Things have gotten worse however in the last year or so specifically. She continues to take trazodone to help her fall asleep. However she is noticing earlier awakenings with poor quality sleep. She gets very sleepy in the late afternoon and can doze off watching TV. She has problems with attention and brain fog.    No symptoms of restless legs or parasomnias. She does not think she snores or has apneas. She does drink caffeinated beverages during the day and sometimes in the afternoon. She also takes stimulants for ADD with 20 mg long-acting and occasional short acting in the afternoon. She admits to a lot of stress in the last year with increasing needs of her aging  parents. She has been drinking more alcohol in the last year. Probably drinking a couple of beverages most nights of the week. She is not getting as much exercise as she would like due to complex issues with ankle. She is try physical therapy and surgery. Her bedroom is cool dark and quiet. She does have 2 dogs but they do not disrupt her sleep. Her sleep is otherwise not witnessed.    [place] Sleepiness Scale  ESS 12  (Less than 10 normal)    Insomnia Severity Scale  HILARY 19  Total score categories:  0-7 = No clinically significant insomnia   8-14 = Subthreshold insomnia   15-21 = Clinical insomnia (moderate severity)  22-28 = Clinical insomnia (severe)        Past Medical History:   Diagnosis Date     Anemia      Anxiety      Arthritis 2014    Psoriatic arthritis     Back injury 1988     Dry eye syndrome      Dyslipidemia      Endometriosis 2002    adehsions seen at laparoscopy     GERD (gastroesophageal reflux disease)      Hypothyroidism     8 yoa     SOB (shortness of breath) 3/11/2014     Type I (juvenile type) diabetes mellitus without mention of complication, not stated as uncontrolled     when 15      Uncomplicated asthma Fall 2013       Allergies   Allergen Reactions     Monosodium Glutamate Palpitations and Shortness Of Breath     Sulfasalazine      Developed rash, HA, dizziness       Current Outpatient Medications   Medication     albuterol (PROAIR HFA/PROVENTIL HFA/VENTOLIN HFA) 108 (90 Base) MCG/ACT inhaler     aspirin 81 MG tablet     blood glucose (ACCU-CHEK COLIN PLUS) test strip     blood glucose (NO BRAND SPECIFIED) test strip     blood glucose monitoring (ACCU-CHEK FASTCLIX) lancets     blood glucose monitoring (NO BRAND SPECIFIED) meter device kit     Calcium Carbonate-Vitamin D (CALCIUM + D PO)     citalopram (CELEXA) 20 MG tablet     Continuous Blood Gluc  (DEXCOM G6 ) SUZANNE     Continuous Blood Gluc Sensor (DEXCOM G6 SENSOR) MISC     Continuous Blood Gluc Transmit (DEXCOM G6  TRANSMITTER) MISC     diclofenac (VOLTAREN) 1 % topical gel     dulaglutide (TRULICITY) 0.75 MG/0.5ML pen     econazole nitrate 1 % cream     fish oil-omega-3 fatty acids (FISH OIL) 1000 MG capsule     folic acid (FOLVITE) 1 MG tablet     insulin aspart (FIASP FLEXTOUCH) 100 UNIT/ML pen-injector     Insulin Aspart, w/Niacinamide, (FIASP PENFILL) 100 UNIT/ML SOCT     insulin degludec (TRESIBA) 100 UNIT/ML pen     insulin pen needle (B-D U/F) 31G X 8 MM miscellaneous     Ketoconazole (NIZORAL PO)     levothyroxine (SYNTHROID/LEVOTHROID) 112 MCG tablet     methotrexate 2.5 MG tablet     methylphenidate (METADATE CD) 20 MG CR capsule     Minoxidil (ROGAINE EX)     mometasone-formoterol (DULERA) 100-5 MCG/ACT inhaler     montelukast (SINGULAIR) 5 MG chewable tablet     Multiple Vitamin (MULTIVITAMIN OR)     NOVOLOG PENFILL 100 UNIT/ML soln     simvastatin (ZOCOR) 20 MG tablet     traZODone (DESYREL) 50 MG tablet     Injection Device for insulin (INPEN 100-BLUE-TANIYA) SUZANNE     methylphenidate (RITALIN) 10 MG tablet     No current facility-administered medications for this visit.        Social History     Socioeconomic History     Marital status: Single     Spouse name: Not on file     Number of children: 0     Years of education: Not on file     Highest education level: Not on file   Occupational History     Occupation: research     Employer: [employer]   Social Needs     Financial resource strain: Not on file     Food insecurity     Worry: Not on file     Inability: Not on file     Transportation needs     Medical: Not on file     Non-medical: Not on file   Tobacco Use     Smoking status: Never Smoker     Smokeless tobacco: Never Used   Substance and Sexual Activity     Alcohol use: Yes     Frequency: 4 or more times a week     Drinks per session: 1 or 2     Binge frequency: Never     Comment: moderately     Drug use: No     Sexual activity: Not on file   Lifestyle     Physical activity     Days per week: Not on  file     Minutes per session: Not on file     Stress: Not on file   Relationships     Social connections     Talks on phone: Not on file     Gets together: Not on file     Attends Cheondo[employer] service: Not on file     Active member of club or organization: Not on file     Attends meetings of clubs or organizations: Not on file     Relationship status: Not on file     Intimate partner violence     Fear of current or ex partner: Not on file     Emotionally abused: Not on file     Physically abused: Not on file     Forced sexual activity: Not on file   Other Topics Concern     Parent/sibling w/ CABG, MI or angioplasty before 65F 55M? Yes   Social History Narrative     Not on file       Family History   Problem Relation Age of Onset     Breast Cancer Mother      Heart Disease Father      C.A.D. Father      Arthritis Father      Circulatory Father      Eye Disorder Father      Lipids Father      Thyroid Disease Father      Macular Degeneration Father      Coronary Artery Disease Father      Anxiety Disorder Father      Alcoholism Father      Rheumatoid Arthritis Father      Hypothyroidism Father      Diabetes Father      Cerebrovascular Disease Paternal Grandmother         ,     Cancer Paternal Grandfather         Pancreatic     Heart Disease Paternal Grandfather      Diabetes Maternal Grandmother         Type 2     C.A.D. Maternal Grandmother      Heart Disease Maternal Grandmother      Coronary Artery Disease Maternal Grandmother      Heart Disease Maternal Grandfather      Cardiac Sudden Death Maternal Grandfather      Gynecology Other         self     Thyroid Disease Other         self     Macular Degeneration Maternal Aunt      Diabetes Other         Type 1     Glaucoma No family hx of        Review of Systems: Positve for ankle issues, trouble with balance and coordination, stress and anxiety, and per HPI, otherwise comprehensive review of systems is negative.    EXAM:  There were no vitals taken for this  visit.  GENERAL: Healthy, alert and no distress  EYES: Eyes grossly normal to inspection.  No discharge or erythema, or obvious scleral/conjunctival abnormalities.  RESP: No audible wheeze, cough, or visible cyanosis.  No visible retractions or increased work of breathing.    SKIN: Visible skin clear. No significant rash, abnormal pigmentation or lesions.  NEURO: Cranial nerves grossly intact.  Mentation and speech appropriate for age.  PSYCH: Mentation appears normal, affect normal/bright, judgement and insight intact, normal speech and appearance well-groomed.       PSG 9/4/2014  Weight 184 lbs, BMI 26.6  AHI 3.0, Lowest O2 sat 95%    ASSESSMENT:  50-year-old female with chronic insomnia that has been aggravated in the last year likely multifactorial. Factors that could be aggravating include more issues with mood, increased alcohol use in the evenings, later working schedule and later taking stimulants. She is a good candidate for cognitive behavioral therapy for insomnia. I do not think a repeat sleep study is indicated.    PLAN:  Extensive discussion regarding cognitive behavioral therapy for insomnia. She is motivated. In the meantime I recommended that she cut back and try to eliminate alcohol in the evenings. She should do everything she can to avoid dozing off in the evenings. She should keep a regular wake-up time and get bright light exposure upon awakening. Try to avoid caffeine and stimulants after 3 PM and consider moving that even earlier. Please see patient instructions for further details of counseling provided.    62 minutes spent on the date of the encounter doing chart review, history and exam, documentation and further activities per the note    Shonna Rosa M.D.  Pulmonary/Critical Care/Sleep Medicine    [facility]   Floor 1, [address]   [address] [place] [address]. [place] [address]   Appointments: [phone]    The above note  was dictated using voice recognition software and may include typographical errors. Please contact the author for any clarifications.

## 2021-05-19 ENCOUNTER — VIRTUAL VISIT (OUTPATIENT)
Dept: SLEEP MEDICINE | Facility: CLINIC | Age: 51
End: 2021-05-19
Attending: INTERNAL MEDICINE
Payer: COMMERCIAL

## 2021-05-19 DIAGNOSIS — Z79.899 LONG-TERM CURRENT USE OF STIMULANT: ICD-10-CM

## 2021-05-19 DIAGNOSIS — G47.00 INSOMNIA, UNSPECIFIED TYPE: Primary | ICD-10-CM

## 2021-05-19 DIAGNOSIS — F41.9 ANXIETY: ICD-10-CM

## 2021-05-19 PROCEDURE — 99215 OFFICE O/P EST HI 40 MIN: CPT | Mod: 95 | Performed by: INTERNAL MEDICINE

## 2021-05-19 RX ORDER — METHYLPHENIDATE HYDROCHLORIDE 10 MG/1
TABLET ORAL
COMMUNITY
Start: 2020-12-16

## 2021-05-19 SDOH — HEALTH STABILITY: MENTAL HEALTH: HOW OFTEN DO YOU HAVE 6 OR MORE DRINKS ON ONE OCCASION?: NEVER

## 2021-05-19 SDOH — HEALTH STABILITY: MENTAL HEALTH: HOW MANY STANDARD DRINKS CONTAINING ALCOHOL DO YOU HAVE ON A TYPICAL DAY?: 1 OR 2

## 2021-05-19 SDOH — HEALTH STABILITY: MENTAL HEALTH: HOW OFTEN DO YOU HAVE A DRINK CONTAINING ALCOHOL?: 4 OR MORE TIMES A WEEK

## 2021-05-19 NOTE — LETTER
5/19/2021         RE: Dali Martines  [address]  [facility] [address]        Dear Colleague,    Thank you for referring your patient, Dali Martines, to the [facility]. Please see a copy of my visit note below.    Do you have wifi? Y  Do you have a smart phone? Y  Can you download an dominic on your phone comfortably with out assistance? Y  Can you watch a Youtube video? Y    Dali is a 50 year old who is being evaluated via a billable video visit.      How would you like to obtain your AVS? MyChart  If the video visit is dropped, the invitation should be resent by: Send to e-mail at: [email or web address]  Will anyone else be joining your video visit? No    Aline Gaytan MA      Video-Visit Details    Type of service:  Video Visit    Video Start Time: 3:29 PM    Video End Time:4:19 PM    Originating Location (pt. Location): Home    Distant Location (provider location):  [facility] Home Office    Platform used for Video Visit: [employer]     Chief complaint: Consultation requested by Felicia Mckeon MD for evaluation of chronic insomnia    History of Present Illness: 50-year-old female with history of type 1 diabetes with long history of insomnia. She has been taking medications for insomnia for many years. She was actually seen in the sleep clinic in 2014. No obstructive sleep apnea was identified. She was offered a trial of CBT-I however declined it at that time being concerned about getting worse before she gets better. Things have gotten worse however in the last year or so specifically. She continues to take trazodone to help her fall asleep. However she is noticing earlier awakenings with poor quality sleep. She gets very sleepy in the late afternoon and can doze off watching TV. She has problems with attention and brain fog.    No symptoms of restless legs or parasomnias. She does not think she snores or has apneas. She does  drink caffeinated beverages during the day and sometimes in the afternoon. She also takes stimulants for ADD with 20 mg long-acting and occasional short acting in the afternoon. She admits to a lot of stress in the last year with increasing needs of her aging parents. She has been drinking more alcohol in the last year. Probably drinking a couple of beverages most nights of the week. She is not getting as much exercise as she would like due to complex issues with ankle. She is try physical therapy and surgery. Her bedroom is cool dark and quiet. She does have 2 dogs but they do not disrupt her sleep. Her sleep is otherwise not witnessed.    [place] Sleepiness Scale  ESS 12  (Less than 10 normal)    Insomnia Severity Scale  HILARY 19  Total score categories:  0-7 = No clinically significant insomnia   8-14 = Subthreshold insomnia   15-21 = Clinical insomnia (moderate severity)  22-28 = Clinical insomnia (severe)        Past Medical History:   Diagnosis Date     Anemia      Anxiety      Arthritis 2014    Psoriatic arthritis     Back injury 1988     Dry eye syndrome      Dyslipidemia      Endometriosis 2002    adehsions seen at laparoscopy     GERD (gastroesophageal reflux disease)      Hypothyroidism     8 yoa     SOB (shortness of breath) 3/11/2014     Type I (juvenile type) diabetes mellitus without mention of complication, not stated as uncontrolled     when 15      Uncomplicated asthma Fall 2013       Allergies   Allergen Reactions     Monosodium Glutamate Palpitations and Shortness Of Breath     Sulfasalazine      Developed rash, HA, dizziness       Current Outpatient Medications   Medication     albuterol (PROAIR HFA/PROVENTIL HFA/VENTOLIN HFA) 108 (90 Base) MCG/ACT inhaler     aspirin 81 MG tablet     blood glucose (ACCU-CHEK COLIN PLUS) test strip     blood glucose (NO BRAND SPECIFIED) test strip     blood glucose monitoring (ACCU-CHEK FASTCLIX) lancets     blood glucose monitoring (NO BRAND SPECIFIED) meter  device kit     Calcium Carbonate-Vitamin D (CALCIUM + D PO)     citalopram (CELEXA) 20 MG tablet     Continuous Blood Gluc  (DEXCOM G6 ) SUZANNE     Continuous Blood Gluc Sensor (DEXCOM G6 SENSOR) MISC     Continuous Blood Gluc Transmit (DEXCOM G6 TRANSMITTER) MISC     diclofenac (VOLTAREN) 1 % topical gel     dulaglutide (TRULICITY) 0.75 MG/0.5ML pen     econazole nitrate 1 % cream     fish oil-omega-3 fatty acids (FISH OIL) 1000 MG capsule     folic acid (FOLVITE) 1 MG tablet     insulin aspart (FIASP FLEXTOUCH) 100 UNIT/ML pen-injector     Insulin Aspart, w/Niacinamide, (FIASP PENFILL) 100 UNIT/ML SOCT     insulin degludec (TRESIBA) 100 UNIT/ML pen     insulin pen needle (B-D U/F) 31G X 8 MM miscellaneous     Ketoconazole (NIZORAL PO)     levothyroxine (SYNTHROID/LEVOTHROID) 112 MCG tablet     methotrexate 2.5 MG tablet     methylphenidate (METADATE CD) 20 MG CR capsule     Minoxidil (ROGAINE EX)     mometasone-formoterol (DULERA) 100-5 MCG/ACT inhaler     montelukast (SINGULAIR) 5 MG chewable tablet     Multiple Vitamin (MULTIVITAMIN OR)     NOVOLOG PENFILL 100 UNIT/ML soln     simvastatin (ZOCOR) 20 MG tablet     traZODone (DESYREL) 50 MG tablet     Injection Device for insulin (INPEN 100-BLUE-TANIYA) SUZANNE     methylphenidate (RITALIN) 10 MG tablet     No current facility-administered medications for this visit.        Social History     Socioeconomic History     Marital status: Single     Spouse name: Not on file     Number of children: 0     Years of education: Not on file     Highest education level: Not on file   Occupational History     Occupation: research     Employer: [employer]   Social Needs     Financial resource strain: Not on file     Food insecurity     Worry: Not on file     Inability: Not on file     Transportation needs     Medical: Not on file     Non-medical: Not on file   Tobacco Use     Smoking status: Never Smoker     Smokeless tobacco: Never Used   Substance and Sexual  Activity     Alcohol use: Yes     Frequency: 4 or more times a week     Drinks per session: 1 or 2     Binge frequency: Never     Comment: moderately     Drug use: No     Sexual activity: Not on file   Lifestyle     Physical activity     Days per week: Not on file     Minutes per session: Not on file     Stress: Not on file   Relationships     Social connections     Talks on phone: Not on file     Gets together: Not on file     Attends Synagogue service: Not on file     Active member of club or organization: Not on file     Attends meetings of clubs or organizations: Not on file     Relationship status: Not on file     Intimate partner violence     Fear of current or ex partner: Not on file     Emotionally abused: Not on file     Physically abused: Not on file     Forced sexual activity: Not on file   Other Topics Concern     Parent/sibling w/ CABG, MI or angioplasty before 65F 55M? Yes   Social History Narrative     Not on file       Family History   Problem Relation Age of Onset     Breast Cancer Mother      Heart Disease Father      C.A.D. Father      Arthritis Father      Circulatory Father      Eye Disorder Father      Lipids Father      Thyroid Disease Father      Macular Degeneration Father      Coronary Artery Disease Father      Anxiety Disorder Father      Alcoholism Father      Rheumatoid Arthritis Father      Hypothyroidism Father      Diabetes Father      Cerebrovascular Disease Paternal Grandmother         ,     Cancer Paternal Grandfather         Pancreatic     Heart Disease Paternal Grandfather      Diabetes Maternal Grandmother         Type 2     C.A.D. Maternal Grandmother      Heart Disease Maternal Grandmother      Coronary Artery Disease Maternal Grandmother      Heart Disease Maternal Grandfather      Cardiac Sudden Death Maternal Grandfather      Gynecology Other         self     Thyroid Disease Other         self     Macular Degeneration Maternal Aunt      Diabetes Other         Type 1      Glaucoma No family hx of        Review of Systems: Positve for ankle issues, trouble with balance and coordination, stress and anxiety, and per HPI, otherwise comprehensive review of systems is negative.    EXAM:  There were no vitals taken for this visit.  GENERAL: Healthy, alert and no distress  EYES: Eyes grossly normal to inspection.  No discharge or erythema, or obvious scleral/conjunctival abnormalities.  RESP: No audible wheeze, cough, or visible cyanosis.  No visible retractions or increased work of breathing.    SKIN: Visible skin clear. No significant rash, abnormal pigmentation or lesions.  NEURO: Cranial nerves grossly intact.  Mentation and speech appropriate for age.  PSYCH: Mentation appears normal, affect normal/bright, judgement and insight intact, normal speech and appearance well-groomed.       PSG 9/4/2014  Weight 184 lbs, BMI 26.6  AHI 3.0, Lowest O2 sat 95%    ASSESSMENT:  50-year-old female with chronic insomnia that has been aggravated in the last year likely multifactorial. Factors that could be aggravating include more issues with mood, increased alcohol use in the evenings, later working schedule and later taking stimulants. She is a good candidate for cognitive behavioral therapy for insomnia. I do not think a repeat sleep study is indicated.    PLAN:  Extensive discussion regarding cognitive behavioral therapy for insomnia. She is motivated. In the meantime I recommended that she cut back and try to eliminate alcohol in the evenings. She should do everything she can to avoid dozing off in the evenings. She should keep a regular wake-up time and get bright light exposure upon awakening. Try to avoid caffeine and stimulants after 3 PM and consider moving that even earlier. Please see patient instructions for further details of counseling provided.    62 minutes spent on the date of the encounter doing chart review, history and exam, documentation and further activities per the  note    Shonna Rosa M.D.  Pulmonary/Critical Care/Sleep Medicine    [facility]   Floor 1, [address]   [address] [place] [address]   Appointments: [phone]    The above note was dictated using voice recognition software and may include typographical errors. Please contact the author for any clarifications.                Again, thank you for allowing me to participate in the care of your patient.        Sincerely,        Shonna Rosa MD

## 2021-05-19 NOTE — PATIENT INSTRUCTIONS
Avoid stimulants/caffeine after 3 PM.  Try to get up at the same time every day such as 7:00. Get bright light exposure after awakening. Try not to fall asleep in the early evening. Avoid alcohol and consider cutting it out altogether for a few weeks. Don't go to bed until you're actually sleepy probably around midnight with a goal of getting 7 hours of sleep. Keep keep track of what you try.          Insomnia and Behavioral Sleep Medicine Program    The [facility] Insomnia and Behavioral Sleep Medicine Program provides evidence-based non-drug treatment including:      Cognitive-behavioral Therapies for Insomnia (CBT-I)    Management of Shift-work and Jet Lag    Management of Delayed, Advanced and Irregular Circadian Rhythm Sleep Disorders    Frequently Asked Questions:    What is CBT-I?    Cognitive Behavioral Therapy for Insomnia, also known as CBT-I, is a highly effective non-drug treatment for insomnia. The American College of Physicians recommends CBT-I as the first treatment for chronic insomnia.  Research has shown CBT-I to be safer and more effective long term than sleeping pills.    What does CBT-I involve?     CBT-I targets behaviors that lead to chronic insomnia:    Habits that weaken the bed as a cue for sleep    Habits that weaken your body's sleep drive and sleep/wake clock     Unhelpful sleep thoughts that increase sleep-related worry and arousal.    The process works like a 4-6 session training program that provides you with the information and coaching needed to implement proven strategies to get a better night's sleep.    People often see improvement in their sleep within a few weeks. Research shows if you keep practicing the skills you learn your sleep is likely to continue to improve 6-12 months after treatment.    Does this program prescribe or manage sleep medication?    No.  Your prescribing provider is responsible to assist you in managing your sleep medications.  Some people choose to  stop using sleep medication prior to or during CBT-I.  Our program can work with your prescribing providers to help reduce or eliminate use of sleep medications. Always talk with you prescribing provider before making any changes to your medication.     Preparing for your Insomnia Evaluation:    The [facility] Insomnia and Behavioral Sleep Medicine Program offers treatment at our [facility] as well as certain primary and specialty care clinics.  You can also receive care from the convenience of your own home through Telehealth visits or a guided internet CBT-I option.     If you have never been seen at an [facility], you will need to complete the Insomnia Health Questionnaire prior to your initial visit.  Click or copy the following link into your browser to complete: [email or web address]    Complete a daily Sleep Diary by downloading the free sleep dominic CBTi- dominic, enter your sleep data in the My Sleep section daily and have available for your visit. You can also use a paper diary which you can return by [employer] message, by email to [email or web address], or by fax to [phone].    Questions?      Call [phone] or email us at [email or web address]

## 2021-05-20 ENCOUNTER — VIRTUAL VISIT (OUTPATIENT)
Dept: EDUCATION SERVICES | Facility: CLINIC | Age: 51
End: 2021-05-20
Payer: COMMERCIAL

## 2021-05-20 DIAGNOSIS — E10.9 TYPE 1 DIABETES MELLITUS WITHOUT COMPLICATION (H): Primary | ICD-10-CM

## 2021-05-20 PROCEDURE — 99207 PR NO CHARGE LOS: CPT | Performed by: REGISTERED NURSE

## 2021-05-23 NOTE — PROGRESS NOTES
Video-Visit Details    Type of service:  Video Visit  Patient consented to video visit  Video Start Time:  1403   Video End Time:    1502  Originating Location (pt. Location): Home  Distant Location (provider location):  [facility] DIABETES EDUCATION [facility]   Platform used for Video Visit: [facility]     Diabetes Self-Management Education & Support  Dali CH Conrad presents today for education related to Type 1 diabetes    Patient is being treated with:  MDI insulin  She is accompanied by self     Year of diagnosis: 1988 at age 15  Referring provider:  Dr. Mckeon  Living Situation: Lives alone  Employment: [employer]      PATIENT CONCERNS RELATED TO DIABETES SELF MANAGEMENT:     Has been using a [employer] Expert meter which helps with dosing calculations.  It is no longer made and the strips will be discontinued, so at our last visit, we discussed using the In Pen phone application with the In Pen.  She is using a Calderon Echo pen currently so familiar with how these work.      She just recently started using the Dexcom G6 CGM.  She had been using the Dexcom G4.      ASSESSMENT:    Taking Medication:     Current Diabetes Management per Patient:  Taking diabetes medications?   yes:     Diabetes Medication(s)     Insulin       insulin aspart (FIASP FLEXTOUCH) 100 UNIT/ML pen-injector    Use as directed up to 20 units a day at the start of meals     Insulin Aspart, w/Niacinamide, (FIASP PENFILL) 100 UNIT/ML SOCT    Inject 1 unit marking on an U-100 insulin syringe Subcutaneous as needed (for each 7 gm of carb) Use as directed in calderon pen echo up to 30 units a day     insulin degludec (TRESIBA) 100 UNIT/ML pen    Inject 17 units subcutaneous at hs.     NOVOLOG PENFILL 100 UNIT/ML soln    INJECT 1 UNIT PER 15 GRAMS CHO AT ALL MEALS AND SNACKS WITH CORRECTION SCALE OF 1 UNIT PER 50 MG/DL OVER 150, AVERAGE DAILY USE OF 30 UNITS    Incretin Mimetic Agents (GLP-1 Receptor Agonists)       dulaglutide (TRULICITY) 0.75  MG/0.5ML pen    Inject 0.75 mg Subcutaneous every 7 days          Monitoring    Patient glucose self monitoring as follows: continuously using a CDE.   BG meter: Dexcom   meter  Results not available.      Patient's most recent   Lab Results   Component Value Date    A1C 7.1 01/11/2021      Patient's A1C goal: <7.0     EDUCATION and INSTRUCTION PROVIDED AT THIS VISIT:       Explained how the In Pen application works to calculate more accurately the dose of rapid acting insulin to cover carbohydrates eaten and to correct blood glucoses that are over-target.    Explained the following terms:     Insulin to Carbohydrate ratio  Insulin sensitivity factor  Active insulin time  Blood glucose target   Maximum Calculated bolus    Explained the difference between carb counting, meal estimation and fixed dosing.     The following therapy settings were entered:     Start time ICR  1 unit/gm Insulin Sensitivity Factor Target BG    07:00 AM 16 50 110   11:00 AM 16 50 110   05:00 PM 14 40 110   09:30 PM 16 50 150         Active Insulin Time:  3 hours   Maximum Bolus: 16 units  Reminders:    Reviewed how to load the cartridge in the In Pen, and how to pair it with the phone application.    Discussed that the pen has a memory and will download data to the phone dominic whenever it is in pairing range.   Had patient practice doing a meal bolus calculation, a blood glucose correction and a combined calculation.    Discussed how to generate a report to share with health care providers.    She reports that she has been having some higher evening glucoses, which she attributes to needing a higher ICR at dinner, so we increased both her ICR from 16 to 14 and intensified her correction factor from 50 to 40 for the same period of time.      Patient-stated goal written and given to Dali Martines.  Verbalized and demonstrated understanding of instructions.     PLAN:  See patient instructions  AVS printed and given to patient    FOLLOW-UP:       She has a follow up visit with Shakira Sebastian on July 15  Encouraged to reach out if she has any concerns or questions before then, or if having hypoglycemia.   Time spent with patient at today's visit was 60 minutes.      Any diabetes medication dose changes were made via the CDE Protocol and Collaborative Practice Agreement with Faith and  Rita.  A copy of this encounter was provided to patient's referring provider.

## 2021-06-15 ENCOUNTER — VIRTUAL VISIT (OUTPATIENT)
Dept: RHEUMATOLOGY | Facility: CLINIC | Age: 51
End: 2021-06-15
Attending: INTERNAL MEDICINE
Payer: COMMERCIAL

## 2021-06-15 DIAGNOSIS — L40.50 PSORIATIC ARTHRITIS (H): Primary | ICD-10-CM

## 2021-06-15 DIAGNOSIS — M77.9 ENTHESOPATHY: ICD-10-CM

## 2021-06-15 DIAGNOSIS — L40.9 PSORIASIS: ICD-10-CM

## 2021-06-15 PROCEDURE — 99214 OFFICE O/P EST MOD 30 MIN: CPT | Mod: 95 | Performed by: INTERNAL MEDICINE

## 2021-06-15 NOTE — PROGRESS NOTES
Dali is a 50 year old who is being evaluated via a billable video visit.      How would you like to obtain your AVS? MyChart  If the video visit is dropped, the invitation should be resent by: Send to e-mail at: [email or web address]  Will anyone else be joining your video visit? No    Video-Visit Details    Type of service:  Video Visit    Originating Location (pt. Location): Home    Distant Location (provider location):  [facility] RHEUMATOLOGY CLINIC [place]     Platform used for Video Visit: Mark Anthony Martines is a 50 year old female who is being evaluated via a billable video visit.      How would you like to obtain your AVS? MyChart  If the video visit is dropped, the invitation should be resent by: Text to cell phone: [phone]  Will anyone else be joining your video visit? No      HPI   Prior Note carried forward:     [facility]  Rheumatology Clinic  Yoel Betancourt MD  06/15/2021     Name: Dali Martines  MRN: [id number]  Age: 49 year old  : 1970  Referring provider: Referred Self    Problem List:  1. Psoriatic arthritis   2. Psoriasis with arthropathy  3. Pain in joint, multiple sites  4. Chronic pain of right ankle  5. Right foot pain  6. Pain in joint involving ankle and foot, right      2020 :   Dali Martines is a 49 year old female with a history including type I diabetes, psoriatic arthritis, and Hashimoto's thyroiditis who presents for follow-up. I last saw the patient on 2018, at which time she reported increased left hand pain, most prominently in the left 2nd MCP. The plan was to continue on increased methotrexate dosage of 12.5 mg, and, if well-tolerated, then increasing dosage further to 15 mg weekly.    Background history:  Symptoms first occurred in 2008, when she began having stiffness and pain in the left hand, particularly in the PIPs and MCPs. Symptoms spontaneously resolved after about 6-8 weeks. She was previously seen by  Rheumatology here around that time (see note from Dr. Betancourt on 12/9/2008). Briefly, assessment was that she had a self-limited episode of inflammatory arthralgias, possibly viral or early psoriatic arthritis. Negative NOLVIA, RF, Lyme serologies and normal CRP and ESR. Given her family history of RA, there was a thought to start her on HCQ if anti-CCP antibodies were positive, but they were found to be negative. No imaging of the hand was performed. September 2014, she experienced recurrent acute onset pain, stiffness, and swelling in the L hand very similar to her symptoms in 2008. Specifically, had swelling across MCPs and in 3rd PIP. She could hardly bend the hand sometimes due to the welling. Had morning stiffness lasting 30-45 minutes. No other joints were involved. Issues with her left hand lingered for several months but eventually self-resolved that January and her only notable symptom is heel pain in the mornings. No fevers, chills, or weight loss. No back pain, vision changes, nausea, vomiting, diarrhea, abdominal pain, or blood in stool or urine. No rash, sicca symptoms, or oral ulcers. It seems she has a history of hypermobility, e.g. could bend wrist back to forearm, bend down and touch palms flat on floor, when she was younger. Used to get frequent ankle sprains, and has had multiple tendon and ligament tears over the years.    Today, the patient reports that she had recent upper respiratory symptoms which triggered asthma, and she was placed on steroids for this. She describes some lingering post-nasal drip over most of the fall, but this has since resolved. She has been taking Dulera finished course of prednisone.     She reports that she is having more joint pain in her right 1st MTP. She explains that her pain here is not bad, but she does feel soreness if someone squeezes her hand. She notes that she has more prominent left-sided 1st MTP pain. She states that her 1st MTPs do swell.     She states  "that she had psoriasic skin rashes around her temple area. She denies any issues tolerating methotrexate. She denies any significant morning joint stiffness. She describes a \"feeling like tendonitis\" in her medial knees and elbows that feels like she has hyperextended the joints. She adds that these sensations take hours to resolve. She reports that these joint sensations have worsened in the last couple months. She states that she historically has joint issues at the beginning of the fall, but these joint issues seem to be lasting into the winter time presently.      She explains that she has had a swollen gland on the right side of her neck which has been so significant in the past that she had difficulty turning her head to the side.     Review of Systems:   Pertinent items are noted in HPI or as below, remainder of complete ROS is negative.      No recent problems with hearing or vision. No swallowing problems.   No breathing difficulty, shortness of breath, coughing, or wheezing.  No chest pain or palpitations.  No heart burn, indigestion, abdominal pain, nausea, vomiting, diarrhea.  No urination problems, no bloody, cloudy urine, no dysuria.  No numbing, tingling, weakness.  No headaches or confusion.  No rashes. No easy bleeding or bruising.     Active Medications:   Current Outpatient Medications:      albuterol (PROAIR HFA/PROVENTIL HFA/VENTOLIN HFA) 108 (90 Base) MCG/ACT inhaler, Inhale 2 puffs into the lungs every 4 hours as needed for shortness of breath / dyspnea or wheezing, Disp: 1 Inhaler, Rfl: 11     aspirin 81 MG tablet, Take 1 tablet by mouth daily., Disp: , Rfl:      blood glucose (ACCU-CHEK COLIN PLUS) test strip, Test Blood Sugar 8 times daily or as directed, Disp: 800 strip, Rfl: 3     blood glucose monitoring (ACCU-CHEK FASTCLIX) lancets, Use to test blood sugar 8 times daily or as directed., Disp: 4 Box, Rfl: 3     Calcium Carbonate-Vitamin D (CALCIUM + D PO), Take one daily, Disp: , Rfl: "      citalopram (CELEXA) 20 MG tablet, Take 1.5 tablets (30 mg) by mouth daily, Disp: 135 tablet, Rfl: 1     Continuous Blood Gluc Sensor (DEXCOM G5 MOB/G4 PLAT SENSOR) MISC, 1 each every 7 days, Disp: 12 each, Rfl: 3     Continuous Blood Gluc Transmit (DEXCOM G4 PLATINUM TRANSMITTER) MISC, 1 each every 6 months, Disp: 1 each, Rfl: 1     diclofenac (VOLTAREN) 1 % topical gel, APPLY 2 GRAMS ONTO THE SKIN FOUR TIMES DAILY AS NEEDED FOR MODERATE PAIN, Disp: 100 g, Rfl: 5     dulaglutide (TRULICITY) 1.5 MG/0.5ML pen, Inject 1.5 mg Subcutaneous every 7 days, Disp: 3 mL, Rfl: 1     econazole nitrate 1 % cream, Apply 0.5 inches topically daily., Disp: , Rfl:      fish oil-omega-3 fatty acids (FISH OIL) 1000 MG capsule, Take one daily, Disp: , Rfl:      folic acid (FOLVITE) 1 MG tablet, Take 1 tablet (1 mg) by mouth daily, Disp: 90 tablet, Rfl: 0     insulin degludec (TRESIBA) 100 UNIT/ML pen, Inject 15 units subcutaneous at hs., Disp: 15 mL, Rfl: 11     insulin pen needle (B-D U/F) 31G X 8 MM miscellaneous, Use 5 pen needles daily, Disp: 450 each, Rfl: 3     Ketoconazole (NIZORAL PO), Take  by mouth. Shampoo daily, Disp: , Rfl:      levothyroxine (SYNTHROID/LEVOTHROID) 112 MCG tablet, Take 1 tablet (112 mcg) by mouth daily, Disp: 90 tablet, Rfl: 3     methotrexate 2.5 MG tablet, Take 6 tablets (15 mg) by mouth once a week, Disp: 24 tablet, Rfl: 1     methylphenidate (METADATE CD) 20 MG CR capsule, Take 20 mg by mouth daily, Disp: , Rfl:      Minoxidil (ROGAINE EX), Externally apply  topically. daily, Disp: , Rfl:      mometasone-formoterol (DULERA) 100-5 MCG/ACT inhaler, Inhale 2 puffs into the lungs 2 times daily, Disp: 3 Inhaler, Rfl: 11     montelukast (SINGULAIR) 5 MG chewable tablet, Take 1 tablet (5 mg) by mouth At Bedtime, Disp: 90 tablet, Rfl: 3     Multiple Vitamin (MULTIVITAMIN OR), Take  by mouth. Take one daily tab, Disp: , Rfl:      NOVOLOG PENFILL 100 UNIT/ML soln, 1 unit per 15 grams CHO at all meals and  snacks with correction scale of 1 unit per 50 mg/dL over 150. Average daily use is 30  units., Disp: 30 mL, Rfl: 4     simvastatin (ZOCOR) 20 MG tablet, Take 1 tablet (20 mg) by mouth At Bedtime, Disp: 90 tablet, Rfl: 3     traZODone (DESYREL) 50 MG tablet, Take 1-2 tablets by mouth nightly as needed for sleep., Disp: 180 tablet, Rfl: 0    Current Facility-Administered Medications:      betamethasone acet & sod phos (CELESTONE) injection 6 mg, 6 mg, INTRA-ARTICULAR, Once, Chris Uribe MD    Allergies:   Sulfasalazine      Past Medical History:  Anemia   Anxiety   Psoriatic arthritis  Dry eye syndrome   Endometriosis   Gastroesophageal reflux disease   Hypothyroidism   Type 1 diabetes mellitus   Asthma   Necrobiosis lipoidica diabeticorum  Chronic low back pain   Enthesopathy   Chronic right ankle pain      Past Surgical History:  Knee cruciate ligament repair   Stomach surgery 12/2002  Hydrogen breath test 6/17/2014   Appendectomy 2002   Lysis adhesions 2002   Right ankle surgery 12/2014      Family History:   Mother: Breast cancer   Father: Heart disease, eye disorder, hyperlipidemia, macular degeneration, anxiety, alcoholism, rheumatoid arthritis, hypothyroidism, diabetes mellitus   Paternal grandmother: Cerebrovascular disease  Paternal grandfather: Pancreatic cancer, heart disease   Maternal grandmother: Type 2 diabetes mellitus, coronary artery disease  Maternal aunt: Type 1 diabetes mellitus     Social History:  The patient reports that she has never smoked. She has never used smokeless tobacco. She reports current alcohol use. She reports that she does not use drugs.   PCP: Dimitri Alas  Marital Status: Single    Physical Exam:   There were no vitals taken for this visit.   Wt Readings from Last 4 Encounters:   06/23/20 83.9 kg (185 lb)   01/07/20 87.6 kg (193 lb 1.6 oz)   12/17/19 86.5 kg (190 lb 12.8 oz)   10/08/19 85.2 kg (187 lb 12.8 oz)     Constitutional: Well-developed, appearing stated  age; cooperative.      Laboratory:   RHEUM RESULTS Latest Ref Rng & Units 1/7/2020 6/18/2020 1/11/2021   SED RATE 0 - 30 mm/h - - 27   CRP, INFLAMMATION 0.0 - 8.0 mg/L <2.9 <2.9 <2.9   CYCLIC CIT PEPT IGG <5 U/mL - - -   RHEUMATOID FACTOR <20 IU/mL - - -   NOLVIA SCREEN BY EIA <1.0 - - -   AST 0 - 45 U/L 28 23 31   ALT 0 - 50 U/L 44 33 37   ALBUMIN 3.4 - 5.0 g/dL 4.1 3.9 3.8   WBC 4.0 - 11.0 10e9/L 5.0 6.9 4.7   RBC 3.8 - 5.2 10e12/L 3.84 3.70(L) 3.55(L)   HGB 11.7 - 15.7 g/dL 12.0 11.7 11.4(L)   HCT 35.0 - 47.0 % 36.7 35.3 34.2(L)   MCV 78 - 100 fl 96 95 96   MCHC 31.5 - 36.5 g/dL 32.7 33.1 33.3   RDW 10.0 - 15.0 % 12.9 13.2 12.9    - 450 10e9/L 311 311 288   CREATININE 0.52 - 1.04 mg/dL 0.60 0.56 0.56   GFR ESTIMATE, IF BLACK >60 mL/min/[1.73:m2] >90 >90 >90   GFR ESTIMATE >60 mL/min/[1.73:m2] >90 >90 >90    - 1620 mg/dL - - -   IGA 70 - 380 mg/dL - - -   IGM 60 - 265 mg/dL - - -     Rheumatoid Factor   Date Value Ref Range Status   12/22/2014 <20 <20 IU/mL Final     Cyclic Cit Pept IgG/IgA   Date Value Ref Range Status   03/17/2015 <20  Interpretation:  Negative   <20 UNITS Final     Cyclic Citrullinated Peptide IgG   Date Value Ref Range Status   12/09/2008 <2 <5 U/mL Final     Comment:     Interpretation:  Negative     Scleroderma Antibody Scl-70 ELEAZAR IgG   Date Value Ref Range Status   03/17/2015  0.0 - 0.9 AI Final    <0.2  Negative   Antibody index (AI) values reflect qualitative changes in antibody   concentration that cannot be directly associated with clinical condition or   disease state.       SSA (Ro) (ELEAZAR) Antibody, IgG   Date Value Ref Range Status   03/17/2015 0.2 0.0 - 0.9 AI Final     Comment:     Negative   Antibody index (AI) values reflect qualitative changes in antibody   concentration that cannot be directly associated with clinical condition or   disease state.       SSB (La) (ELEAZAR) Antibody, IgG   Date Value Ref Range Status   03/17/2015 0.2 0.0 - 0.9 AI Final     Comment:      Negative   Antibody index (AI) values reflect qualitative changes in antibody   concentration that cannot be directly associated with clinical condition or   disease state.       NOLVIA Screen by EIA   Date Value Ref Range Status   12/22/2014 1.6 (H) <1.0 Final     Comment:     Interpretation:  Positive     IGG   Date Value Ref Range Status   07/11/2005 1410 695 - 1620 mg/dL Final     IGA   Date Value Ref Range Status   06/10/2014 190 70 - 380 mg/dL Final     IGM   Date Value Ref Range Status   07/11/2005 151 60 - 265 mg/dL Final     Cyr ELEAZAR Antibody IgG   Date Value Ref Range Status   03/17/2015  0.0 - 0.9 AI Final    <0.2  Negative   Antibody index (AI) values reflect qualitative changes in antibody   concentration that cannot be directly associated with clinical condition or   disease state.       Scleroderma Antibody Scl-70 ELEAZAR IgG   Date Value Ref Range Status   03/17/2015  0.0 - 0.9 AI Final    <0.2  Negative   Antibody index (AI) values reflect qualitative changes in antibody   concentration that cannot be directly associated with clinical condition or   disease state.       January 5, 2021 interval history:    Since we have last seen the patient, she has, with the combination of coronavirus issues and other stressful issues had a somewhat stressful year.    She has had several flares of her arthritis symptoms that she attributes that increased stress.  She is not even sure exactly when it happened.  She thinks this may have been back in the fall but it could have been earlier in the year that she had some increased pain and a little bit of swelling in her metatarsals of the right foot.  That went on for several weeks then seem to go away on its own with no additional therapy.  She also had some increased pain in one of her thumbs at one point.  That is also back to normal.    However over the last several months she has had some increased numbness in her right hand.  She first had some numbness in the right  middle finger after a blood draw that apparently hit a nerve because the nerve pain went on for many weeks.  However, more recently she has had rather typical carpal tunnel type symptoms worse first thing in the morning and occasionally waking her from sleep affecting the second third and fourth fingers.  On further questioning, only the medial aspect of the fourth finger is involved and the fifth finger and the thumb are entirely spared.    In reviewing her record, it also seems that her hemoglobin A1c is a little bit on the high side right now in the mid sevens.  She is aware of this and thinks that this also may be contributed to by the stressors she has been under.    In terms of her psoriasis itself that is been pretty stable.  Its affected primarily the area around her temples and is a little worse with the colder weather but is minimally apparent on the video which is high-quality.    Physical examination by video shows her to be in no acute distress HEENT examination is normal.  She is speaking in full sentences with no shortness of breath.  The skin examination is I can see it looks essentially normal with no significant psoriasis.  Examination of her hands shows there is no convincing areas of swelling no joint deformity and there is full and normal range of motion.    Impression:    Per the problem list, with inflammatory arthritis that is generally been kept under good control with methotrexate.    She has not had any labs however to check for toxicity in quite some time nor to look at inflammation so we should do those and I have reordered them today.    I did also  her extensively on using splinting to prevent carpal tunnel syndrome at night and also to use something in the way of a more flexible soft splint during the day because she does spend a lot of time on the keyboard.    Finally we spoke about coronavirus vaccination.  Based on her chronic conditions of diabetes, asthma, and inflammatory  arthritis on methotrexate immunosuppression she should be on phase 1C.  She will keep track of where the state is at and when we get to where we are vaccinating folks in phase 1B she may reach out to us with a my chart message to see whether that vaccine can be obtained with our orders or how she can get it.    She is going to try these conservative approaches for carpal tunnel and will then plan on touching bases in about 2 to 3 months sooner as needed.    This video visit commenced at 4 PM and was completed at 4:34 PM      Originating Location (pt. Location): Home    Distant Location (provider location):  [facility] RHEUMATOLOGY CLINIC [place]     Platform used for Video Visit: Mark Anthony Betancourt MD, PhD    Rheumatology        March 16, 2021 interval history:    I last saw the patient just several months ago, but within several weeks at that time she developed a rash consistent with psoriasis over her left shin at a place where she had had previous thinning of the skin related to her type 1 diabetes.  In February she developed a similar patch over her right shin but then at the end of ever developed some kind of a viral illness that she does not think was Covid.  She had only a slight cough and no fever but profound fatigue and brain fog.  This was bad enough she was having trouble working so took 1 full day off and then work 3/2 days before going back to work full-time.    Within the next week she developed multiple psoriasis patches all over her trunk and limbs.  With this she started to take a vitamin D supplement and increase her methotrexate back up to 6 tablets/week which she had previously decreased as she was doing so well.  Interestingly, her joints did not really cause much trouble and were not particular painful or stiff though she did notice a little bit of swelling.  Over the last several weeks on that increased dose of methotrexate and with the vitamin D she feels  she has stabilized and some of the most recent patches have started to resolve but she does continue to have quite a few of them including the longest lived 1 over her shin, and one on the back of her neck that is particularly irritating.    She does have a strong family history of psoriasis and was apparently already noted to have some psoriasis at one point during childhood but has never had flares like this.    Physical examination by video shows her to be in no acute distress HEENT examination is normal.  She is speaking in full sentences with no shortness of breath.  Her upper extremity examination looks essentially normal with no significant areas of joint swelling or deformity.    Impression:    Per the problem list she has had psoriatic arthritis with relatively minimal psoriasis lesions previously but with this very significant flare, apparently triggered by a viral infection that she does not believe was Covid.    Plan/recommendation:    We have already set up a referral for her for dermatology and I urged her to keep that.  I am not certain why this happened and more to the point of not sure whether it will continue to happen.  She does believe she is stabilized somewhat on the current dose of methotrexate, but if this becomes harder to control on an ongoing basis we will need to decide whether she can be treated adequately with methotrexate plus topical steroids or whether we need to add an additional agent such as a TNF inhibitor.  We did discuss the possible use of those, as well as my preference that that would be the next step in her rather than proceeding to something more immunosuppressive such as IL-17 inhibition or Melquiades kinase inhibitor.    She is tolerating the increased dose of methotrexate well we will continue that and do labs again in about 3 weeks after she has been on that dose for about 6 weeks.    We did discuss her getting the coronavirus vaccination as well and holding her methotrexate  for 1 week after each of those shots.    I will see her back in 3 months for another video visit, which she has been able to do smoothly as she has a very good connection and she does like doing these in preference to coming in at this time.  We did discuss that should she develop significant joint symptoms or skin features that would benefit from being seen in person that we can convert that visit to in person visit.    This video visit commenced at 5:36 PM was completed at 6:06 PM, 30 minutes in face-to-face time, with an additional 10 mins of chart review, , and documentation on DOS.       Zaynab 15, 2021 interval history:    Since last seen the patient she has been doing generally well.  She got her vaccines the last of which was about a month ago.  She got the Fritz vaccinations and had definitely had quite a bit of a reaction to the second 1 with a bad headache fevers chills and sweats for much of the following day extending on into the following morning when she actually had enough nausea that she had vomiting as well and then gradual improvement thereafter.  Her diabetes control was also difficult during that time with significant elevation of her glucoses.  She was back to normal within 48 hours however.    She did stop her methotrexate for about a week before and week after each of those doses and stopped it for 2 weeks after the second dose and by that time was definitely noticing a substantial increase in joint pains and and her psoriasis though she never developed actual joint swelling.  Overall however, her joint pain and her psoriasis had been doing much better with the supplemental vitamin D that she started back on in the winter.    With all of this she has not done labs since December.  She has not been having any symptomatic toxicity however.    Physical examination by video shows her to be in no acute distress.  HEENT examination is normal.  She is speaking in full sentences with no  shortness of breath.  Examination of her hands shows no convincing areas of swelling or deformity.    Impression:    Per the problem list.    Plan/recommendation:    She is doing well and her being temporarily off the methotrexate was enough to demonstrate that it really is helping with respect to her joints and her psoriasis.    Her control is been very going on it so we will simply continue on the current dose.  Should going to do her labs in the next couple of weeks.  I will see her back in 6 months sooner as needed.    This video visit commenced at 4:05 PM, and was completed at 4:26 PM, 21 mins in face to face time, with an additional 10 mins in chart review, , and documentation completed on DOS.     JOSUE Betancourt MD, PhD    Rheumatology

## 2021-06-15 NOTE — LETTER
6/15/2021       RE: Dali Martines  [address]  [facility] [address]     Dear Colleague,    Thank you for referring your patient, Dali Martines, to the [facility] [place] at [facility]. Please see a copy of my visit note below.    Dali is a 50 year old who is being evaluated via a billable video visit.      How would you like to obtain your AVS? MyChart  If the video visit is dropped, the invitation should be resent by: Send to e-mail at: [email or web address]  Will anyone else be joining your video visit? No    Video-Visit Details    Type of service:  Video Visit    Originating Location (pt. Location): Home    Distant Location (provider location):  [facility] RHEUMAT[facility]     Platform used for Video Visit: Mark Anthony Martines is a 50 year old female who is being evaluated via a billable video visit.      How would you like to obtain your AVS? MyChart  If the video visit is dropped, the invitation should be resent by: Text to cell phone: [phone]  Will anyone else be joining your video visit? No      HPI   Prior Note carried forward:     [facility]  Rheumatology Clinic  Yoel Betancourt MD  06/15/2021     Name: Dali Martines  MRN: [id number]  Age: 49 year old  : 1970  Referring provider: Referred Self    Problem List:  1. Psoriatic arthritis   2. Psoriasis with arthropathy  3. Pain in joint, multiple sites  4. Chronic pain of right ankle  5. Right foot pain  6. Pain in joint involving ankle and foot, right      2020 :   Dali Martines is a 49 year old female with a history including type I diabetes, psoriatic arthritis, and Hashimoto's thyroiditis who presents for follow-up. I last saw the patient on 2018, at which time she reported increased left hand pain, most prominently in the left 2nd MCP. The plan was to continue on increased methotrexate dosage of 12.5 mg,  and, if well-tolerated, then increasing dosage further to 15 mg weekly.    Background history:  Symptoms first occurred in August 2008, when she began having stiffness and pain in the left hand, particularly in the PIPs and MCPs. Symptoms spontaneously resolved after about 6-8 weeks. She was previously seen by Rheumatology here around that time (see note from Dr. Betancourt on 12/9/2008). Briefly, assessment was that she had a self-limited episode of inflammatory arthralgias, possibly viral or early psoriatic arthritis. Negative NOLVIA, RF, Lyme serologies and normal CRP and ESR. Given her family history of RA, there was a thought to start her on HCQ if anti-CCP antibodies were positive, but they were found to be negative. No imaging of the hand was performed. September 2014, she experienced recurrent acute onset pain, stiffness, and swelling in the L hand very similar to her symptoms in 2008. Specifically, had swelling across MCPs and in 3rd PIP. She could hardly bend the hand sometimes due to the welling. Had morning stiffness lasting 30-45 minutes. No other joints were involved. Issues with her left hand lingered for several months but eventually self-resolved that January and her only notable symptom is heel pain in the mornings. No fevers, chills, or weight loss. No back pain, vision changes, nausea, vomiting, diarrhea, abdominal pain, or blood in stool or urine. No rash, sicca symptoms, or oral ulcers. It seems she has a history of hypermobility, e.g. could bend wrist back to forearm, bend down and touch palms flat on floor, when she was younger. Used to get frequent ankle sprains, and has had multiple tendon and ligament tears over the years.    Today, the patient reports that she had recent upper respiratory symptoms which triggered asthma, and she was placed on steroids for this. She describes some lingering post-nasal drip over most of the fall, but this has since resolved. She has been taking Dulera finished  "course of prednisone.     She reports that she is having more joint pain in her right 1st MTP. She explains that her pain here is not bad, but she does feel soreness if someone squeezes her hand. She notes that she has more prominent left-sided 1st MTP pain. She states that her 1st MTPs do swell.     She states that she had psoriasic skin rashes around her temple area. She denies any issues tolerating methotrexate. She denies any significant morning joint stiffness. She describes a \"feeling like tendonitis\" in her medial knees and elbows that feels like she has hyperextended the joints. She adds that these sensations take hours to resolve. She reports that these joint sensations have worsened in the last couple months. She states that she historically has joint issues at the beginning of the fall, but these joint issues seem to be lasting into the winter time presently.      She explains that she has had a swollen gland on the right side of her neck which has been so significant in the past that she had difficulty turning her head to the side.     Review of Systems:   Pertinent items are noted in HPI or as below, remainder of complete ROS is negative.      No recent problems with hearing or vision. No swallowing problems.   No breathing difficulty, shortness of breath, coughing, or wheezing.  No chest pain or palpitations.  No heart burn, indigestion, abdominal pain, nausea, vomiting, diarrhea.  No urination problems, no bloody, cloudy urine, no dysuria.  No numbing, tingling, weakness.  No headaches or confusion.  No rashes. No easy bleeding or bruising.     Active Medications:   Current Outpatient Medications:      albuterol (PROAIR HFA/PROVENTIL HFA/VENTOLIN HFA) 108 (90 Base) MCG/ACT inhaler, Inhale 2 puffs into the lungs every 4 hours as needed for shortness of breath / dyspnea or wheezing, Disp: 1 Inhaler, Rfl: 11     aspirin 81 MG tablet, Take 1 tablet by mouth daily., Disp: , Rfl:      blood glucose " (ACCU-CHEK COLIN PLUS) test strip, Test Blood Sugar 8 times daily or as directed, Disp: 800 strip, Rfl: 3     blood glucose monitoring (ACCU-CHEK FASTCLIX) lancets, Use to test blood sugar 8 times daily or as directed., Disp: 4 Box, Rfl: 3     Calcium Carbonate-Vitamin D (CALCIUM + D PO), Take one daily, Disp: , Rfl:      citalopram (CELEXA) 20 MG tablet, Take 1.5 tablets (30 mg) by mouth daily, Disp: 135 tablet, Rfl: 1     Continuous Blood Gluc Sensor (DEXCOM G5 MOB/G4 PLAT SENSOR) MISC, 1 each every 7 days, Disp: 12 each, Rfl: 3     Continuous Blood Gluc Transmit (DEXCOM G4 PLATINUM TRANSMITTER) MISC, 1 each every 6 months, Disp: 1 each, Rfl: 1     diclofenac (VOLTAREN) 1 % topical gel, APPLY 2 GRAMS ONTO THE SKIN FOUR TIMES DAILY AS NEEDED FOR MODERATE PAIN, Disp: 100 g, Rfl: 5     dulaglutide (TRULICITY) 1.5 MG/0.5ML pen, Inject 1.5 mg Subcutaneous every 7 days, Disp: 3 mL, Rfl: 1     econazole nitrate 1 % cream, Apply 0.5 inches topically daily., Disp: , Rfl:      fish oil-omega-3 fatty acids (FISH OIL) 1000 MG capsule, Take one daily, Disp: , Rfl:      folic acid (FOLVITE) 1 MG tablet, Take 1 tablet (1 mg) by mouth daily, Disp: 90 tablet, Rfl: 0     insulin degludec (TRESIBA) 100 UNIT/ML pen, Inject 15 units subcutaneous at hs., Disp: 15 mL, Rfl: 11     insulin pen needle (B-D U/F) 31G X 8 MM miscellaneous, Use 5 pen needles daily, Disp: 450 each, Rfl: 3     Ketoconazole (NIZORAL PO), Take  by mouth. Shampoo daily, Disp: , Rfl:      levothyroxine (SYNTHROID/LEVOTHROID) 112 MCG tablet, Take 1 tablet (112 mcg) by mouth daily, Disp: 90 tablet, Rfl: 3     methotrexate 2.5 MG tablet, Take 6 tablets (15 mg) by mouth once a week, Disp: 24 tablet, Rfl: 1     methylphenidate (METADATE CD) 20 MG CR capsule, Take 20 mg by mouth daily, Disp: , Rfl:      Minoxidil (ROGAINE EX), Externally apply  topically. daily, Disp: , Rfl:      mometasone-formoterol (DULERA) 100-5 MCG/ACT inhaler, Inhale 2 puffs into the lungs 2 times  daily, Disp: 3 Inhaler, Rfl: 11     montelukast (SINGULAIR) 5 MG chewable tablet, Take 1 tablet (5 mg) by mouth At Bedtime, Disp: 90 tablet, Rfl: 3     Multiple Vitamin (MULTIVITAMIN OR), Take  by mouth. Take one daily tab, Disp: , Rfl:      NOVOLOG PENFILL 100 UNIT/ML soln, 1 unit per 15 grams CHO at all meals and snacks with correction scale of 1 unit per 50 mg/dL over 150. Average daily use is 30  units., Disp: 30 mL, Rfl: 4     simvastatin (ZOCOR) 20 MG tablet, Take 1 tablet (20 mg) by mouth At Bedtime, Disp: 90 tablet, Rfl: 3     traZODone (DESYREL) 50 MG tablet, Take 1-2 tablets by mouth nightly as needed for sleep., Disp: 180 tablet, Rfl: 0    Current Facility-Administered Medications:      betamethasone acet & sod phos (CELESTONE) injection 6 mg, 6 mg, INTRA-ARTICULAR, Once, Chris Uribe MD    Allergies:   Sulfasalazine      Past Medical History:  Anemia   Anxiety   Psoriatic arthritis  Dry eye syndrome   Endometriosis   Gastroesophageal reflux disease   Hypothyroidism   Type 1 diabetes mellitus   Asthma   Necrobiosis lipoidica diabeticorum  Chronic low back pain   Enthesopathy   Chronic right ankle pain      Past Surgical History:  Knee cruciate ligament repair   Stomach surgery 12/2002  Hydrogen breath test 6/17/2014   Appendectomy 2002   Lysis adhesions 2002   Right ankle surgery 12/2014      Family History:   Mother: Breast cancer   Father: Heart disease, eye disorder, hyperlipidemia, macular degeneration, anxiety, alcoholism, rheumatoid arthritis, hypothyroidism, diabetes mellitus   Paternal grandmother: Cerebrovascular disease  Paternal grandfather: Pancreatic cancer, heart disease   Maternal grandmother: Type 2 diabetes mellitus, coronary artery disease  Maternal aunt: Type 1 diabetes mellitus     Social History:  The patient reports that she has never smoked. She has never used smokeless tobacco. She reports current alcohol use. She reports that she does not use drugs.   PCP: Dimitri Alas  W  Marital Status: Single    Physical Exam:   There were no vitals taken for this visit.   Wt Readings from Last 4 Encounters:   06/23/20 83.9 kg (185 lb)   01/07/20 87.6 kg (193 lb 1.6 oz)   12/17/19 86.5 kg (190 lb 12.8 oz)   10/08/19 85.2 kg (187 lb 12.8 oz)     Constitutional: Well-developed, appearing stated age; cooperative.      Laboratory:   RHEUM RESULTS Latest Ref Rng & Units 1/7/2020 6/18/2020 1/11/2021   SED RATE 0 - 30 mm/h - - 27   CRP, INFLAMMATION 0.0 - 8.0 mg/L <2.9 <2.9 <2.9   CYCLIC CIT PEPT IGG <5 U/mL - - -   RHEUMATOID FACTOR <20 IU/mL - - -   NOLVIA SCREEN BY EIA <1.0 - - -   AST 0 - 45 U/L 28 23 31   ALT 0 - 50 U/L 44 33 37   ALBUMIN 3.4 - 5.0 g/dL 4.1 3.9 3.8   WBC 4.0 - 11.0 10e9/L 5.0 6.9 4.7   RBC 3.8 - 5.2 10e12/L 3.84 3.70(L) 3.55(L)   HGB 11.7 - 15.7 g/dL 12.0 11.7 11.4(L)   HCT 35.0 - 47.0 % 36.7 35.3 34.2(L)   MCV 78 - 100 fl 96 95 96   MCHC 31.5 - 36.5 g/dL 32.7 33.1 33.3   RDW 10.0 - 15.0 % 12.9 13.2 12.9    - 450 10e9/L 311 311 288   CREATININE 0.52 - 1.04 mg/dL 0.60 0.56 0.56   GFR ESTIMATE, IF BLACK >60 mL/min/[1.73:m2] >90 >90 >90   GFR ESTIMATE >60 mL/min/[1.73:m2] >90 >90 >90    - 1620 mg/dL - - -   IGA 70 - 380 mg/dL - - -   IGM 60 - 265 mg/dL - - -     Rheumatoid Factor   Date Value Ref Range Status   12/22/2014 <20 <20 IU/mL Final     Cyclic Cit Pept IgG/IgA   Date Value Ref Range Status   03/17/2015 <20  Interpretation:  Negative   <20 UNITS Final     Cyclic Citrullinated Peptide IgG   Date Value Ref Range Status   12/09/2008 <2 <5 U/mL Final     Comment:     Interpretation:  Negative     Scleroderma Antibody Scl-70 ELEAZAR IgG   Date Value Ref Range Status   03/17/2015  0.0 - 0.9 AI Final    <0.2  Negative   Antibody index (AI) values reflect qualitative changes in antibody   concentration that cannot be directly associated with clinical condition or   disease state.       SSA (Ro) (ELEAZAR) Antibody, IgG   Date Value Ref Range Status   03/17/2015 0.2 0.0 - 0.9 AI  Final     Comment:     Negative   Antibody index (AI) values reflect qualitative changes in antibody   concentration that cannot be directly associated with clinical condition or   disease state.       SSB (La) (ELEAZAR) Antibody, IgG   Date Value Ref Range Status   03/17/2015 0.2 0.0 - 0.9 AI Final     Comment:     Negative   Antibody index (AI) values reflect qualitative changes in antibody   concentration that cannot be directly associated with clinical condition or   disease state.       NOLVIA Screen by EIA   Date Value Ref Range Status   12/22/2014 1.6 (H) <1.0 Final     Comment:     Interpretation:  Positive     IGG   Date Value Ref Range Status   07/11/2005 1410 695 - 1620 mg/dL Final     IGA   Date Value Ref Range Status   06/10/2014 190 70 - 380 mg/dL Final     IGM   Date Value Ref Range Status   07/11/2005 151 60 - 265 mg/dL Final     Cyr ELEAZAR Antibody IgG   Date Value Ref Range Status   03/17/2015  0.0 - 0.9 AI Final    <0.2  Negative   Antibody index (AI) values reflect qualitative changes in antibody   concentration that cannot be directly associated with clinical condition or   disease state.       Scleroderma Antibody Scl-70 ELEAZAR IgG   Date Value Ref Range Status   03/17/2015  0.0 - 0.9 AI Final    <0.2  Negative   Antibody index (AI) values reflect qualitative changes in antibody   concentration that cannot be directly associated with clinical condition or   disease state.       January 5, 2021 interval history:    Since we have last seen the patient, she has, with the combination of coronavirus issues and other stressful issues had a somewhat stressful year.    She has had several flares of her arthritis symptoms that she attributes that increased stress.  She is not even sure exactly when it happened.  She thinks this may have been back in the fall but it could have been earlier in the year that she had some increased pain and a little bit of swelling in her metatarsals of the right foot.  That went on  for several weeks then seem to go away on its own with no additional therapy.  She also had some increased pain in one of her thumbs at one point.  That is also back to normal.    However over the last several months she has had some increased numbness in her right hand.  She first had some numbness in the right middle finger after a blood draw that apparently hit a nerve because the nerve pain went on for many weeks.  However, more recently she has had rather typical carpal tunnel type symptoms worse first thing in the morning and occasionally waking her from sleep affecting the second third and fourth fingers.  On further questioning, only the medial aspect of the fourth finger is involved and the fifth finger and the thumb are entirely spared.    In reviewing her record, it also seems that her hemoglobin A1c is a little bit on the high side right now in the mid sevens.  She is aware of this and thinks that this also may be contributed to by the stressors she has been under.    In terms of her psoriasis itself that is been pretty stable.  Its affected primarily the area around her temples and is a little worse with the colder weather but is minimally apparent on the video which is high-quality.    Physical examination by video shows her to be in no acute distress HEENT examination is normal.  She is speaking in full sentences with no shortness of breath.  The skin examination is I can see it looks essentially normal with no significant psoriasis.  Examination of her hands shows there is no convincing areas of swelling no joint deformity and there is full and normal range of motion.    Impression:    Per the problem list, with inflammatory arthritis that is generally been kept under good control with methotrexate.    She has not had any labs however to check for toxicity in quite some time nor to look at inflammation so we should do those and I have reordered them today.    I did also  her extensively on  using splinting to prevent carpal tunnel syndrome at night and also to use something in the way of a more flexible soft splint during the day because she does spend a lot of time on the keyboard.    Finally we spoke about coronavirus vaccination.  Based on her chronic conditions of diabetes, asthma, and inflammatory arthritis on methotrexate immunosuppression she should be on phase 1C.  She will keep track of where the state is at and when we get to where we are vaccinating folks in phase 1B she may reach out to us with a my chart message to see whether that vaccine can be obtained with our orders or how she can get it.    She is going to try these conservative approaches for carpal tunnel and will then plan on touching bases in about 2 to 3 months sooner as needed.    This video visit commenced at 4 PM and was completed at 4:34 PM      Originating Location (pt. Location): Home    Distant Location (provider location):  [facility] RHEUMATOLOGY CLINIC [place]     Platform used for Video Visit: Mark Anthony Betancourt MD, PhD    Rheumatology        March 16, 2021 interval history:    I last saw the patient just several months ago, but within several weeks at that time she developed a rash consistent with psoriasis over her left shin at a place where she had had previous thinning of the skin related to her type 1 diabetes.  In February she developed a similar patch over her right shin but then at the end of ever developed some kind of a viral illness that she does not think was Covid.  She had only a slight cough and no fever but profound fatigue and brain fog.  This was bad enough she was having trouble working so took 1 full day off and then work 3/2 days before going back to work full-time.    Within the next week she developed multiple psoriasis patches all over her trunk and limbs.  With this she started to take a vitamin D supplement and increase her methotrexate back up to 6  tablets/week which she had previously decreased as she was doing so well.  Interestingly, her joints did not really cause much trouble and were not particular painful or stiff though she did notice a little bit of swelling.  Over the last several weeks on that increased dose of methotrexate and with the vitamin D she feels she has stabilized and some of the most recent patches have started to resolve but she does continue to have quite a few of them including the longest lived 1 over her shin, and one on the back of her neck that is particularly irritating.    She does have a strong family history of psoriasis and was apparently already noted to have some psoriasis at one point during childhood but has never had flares like this.    Physical examination by video shows her to be in no acute distress HEENT examination is normal.  She is speaking in full sentences with no shortness of breath.  Her upper extremity examination looks essentially normal with no significant areas of joint swelling or deformity.    Impression:    Per the problem list she has had psoriatic arthritis with relatively minimal psoriasis lesions previously but with this very significant flare, apparently triggered by a viral infection that she does not believe was Covid.    Plan/recommendation:    We have already set up a referral for her for dermatology and I urged her to keep that.  I am not certain why this happened and more to the point of not sure whether it will continue to happen.  She does believe she is stabilized somewhat on the current dose of methotrexate, but if this becomes harder to control on an ongoing basis we will need to decide whether she can be treated adequately with methotrexate plus topical steroids or whether we need to add an additional agent such as a TNF inhibitor.  We did discuss the possible use of those, as well as my preference that that would be the next step in her rather than proceeding to something more  immunosuppressive such as IL-17 inhibition or Melquiades kinase inhibitor.    She is tolerating the increased dose of methotrexate well we will continue that and do labs again in about 3 weeks after she has been on that dose for about 6 weeks.    We did discuss her getting the coronavirus vaccination as well and holding her methotrexate for 1 week after each of those shots.    I will see her back in 3 months for another video visit, which she has been able to do smoothly as she has a very good connection and she does like doing these in preference to coming in at this time.  We did discuss that should she develop significant joint symptoms or skin features that would benefit from being seen in person that we can convert that visit to in person visit.    This video visit commenced at 5:36 PM was completed at 6:06 PM, 30 minutes in face-to-face time, with an additional 10 mins of chart review, , and documentation on DOS.       Zaynab 15, 2021 interval history:    Since last seen the patient she has been doing generally well.  She got her vaccines the last of which was about a month ago.  She got the Fritz vaccinations and had definitely had quite a bit of a reaction to the second 1 with a bad headache fevers chills and sweats for much of the following day extending on into the following morning when she actually had enough nausea that she had vomiting as well and then gradual improvement thereafter.  Her diabetes control was also difficult during that time with significant elevation of her glucoses.  She was back to normal within 48 hours however.    She did stop her methotrexate for about a week before and week after each of those doses and stopped it for 2 weeks after the second dose and by that time was definitely noticing a substantial increase in joint pains and and her psoriasis though she never developed actual joint swelling.  Overall however, her joint pain and her psoriasis had been doing much better with  the supplemental vitamin D that she started back on in the winter.    With all of this she has not done labs since December.  She has not been having any symptomatic toxicity however.    Physical examination by video shows her to be in no acute distress.  HEENT examination is normal.  She is speaking in full sentences with no shortness of breath.  Examination of her hands shows no convincing areas of swelling or deformity.    Impression:    Per the problem list.    Plan/recommendation:    She is doing well and her being temporarily off the methotrexate was enough to demonstrate that it really is helping with respect to her joints and her psoriasis.    Her control is been very going on it so we will simply continue on the current dose.  Should going to do her labs in the next couple of weeks.  I will see her back in 6 months sooner as needed.    This video visit commenced at 4:05 PM, and was completed at 4:26 PM, 21 mins in face to face time, with an additional 10 mins in chart review, , and documentation completed on DOS.     JOSUE Betancourt MD, PhD    Rheumatology

## 2021-06-16 DIAGNOSIS — E10.3291 TYPE 1 DIABETES MELLITUS WITH MILD NONPROLIFERATIVE RETINOPATHY OF RIGHT EYE, MACULAR EDEMA PRESENCE UNSPECIFIED (H): ICD-10-CM

## 2021-06-16 DIAGNOSIS — E10.9 TYPE 1 DIABETES MELLITUS WITHOUT COMPLICATION (H): ICD-10-CM

## 2021-06-16 RX ORDER — INSULIN ASPART 100 [IU]/ML
INJECTION, SOLUTION INTRAVENOUS; SUBCUTANEOUS
Qty: 30 ML | Refills: 4 | Status: SHIPPED | OUTPATIENT
Start: 2021-06-16 | End: 2024-01-19

## 2021-06-16 RX ORDER — DULAGLUTIDE 0.75 MG/.5ML
0.75 INJECTION, SOLUTION SUBCUTANEOUS
Qty: 4 ML | Refills: 1 | Status: SHIPPED | OUTPATIENT
Start: 2021-06-16 | End: 2021-09-10

## 2021-06-16 NOTE — TELEPHONE ENCOUNTER
NOVOLOG PENFILL 100 UNIT/ML soln   Last Written Prescription Date:  5/18/2020  Last Fill Quantity: 30,   # refills: 4  Last Office Visit : 4/13/2021  Future Office visit:  7/15/2021    Routing refill request to provider for review/approval because:  Drug not on the [facility], P or  Health refill protocol or controlled substance      Randa Espinoza RN  Central Triage Red Flags/Med Refills

## 2021-06-16 NOTE — TELEPHONE ENCOUNTER
[facility] Call Center    Phone Message    May a detailed message be left on voicemail: yes     Reason for Call: Medication Refill Request    Has the patient contacted the pharmacy for the refill? Yes   Name of medication being requested:   dulaglutide (TRULICITY) 0.75 MG/0.5ML pen    Pt requesting Novolog pen, did not like the Fiasp per Juli       Provider who prescribed the medication: elizabeth    Pharmacy: [id number] - [place], [place] - [address] AT  [address]    Date medication is needed: asap           Action Taken: Message routed to:  [facility] endo    Travel Screening: Not Applicable

## 2021-07-14 NOTE — PATIENT INSTRUCTIONS
We appreciate your assistance in coordinating your healthcare.     Please upload your insulin pump, blood sugar meter and/or continuous glucose monitor at home 1-2 days before your next diabetes-related appointment.   This will allow your provider to review your  data before your scheduled virtual visit.    To ask a question to your Endocrine care team, please send them a [employer] message, or reach them by phone at [phone]     To expedite your medication refill(s), please contact your pharmacy and have them   fax a refill request to: [phone].  *Please allow 3 business days for routine medication refills.  *Please allow 5 business days for controlled substance medication refills.    For after-hours urgent Endocrine issues, that do not require 311, please dial [phone], and ask to speak with the Endocrinologist On-Call

## 2021-07-14 NOTE — PROGRESS NOTES
Dali Martines  is being evaluated via a billable video visit.      How would you like to obtain your AVS? MyChart     Will anyone else be joining your video visit? No    Sara WILLIS MA    Outcome for 07/14/21 1:25 PM :Glucose sent via Email

## 2021-07-15 ENCOUNTER — VIRTUAL VISIT (OUTPATIENT)
Dept: ENDOCRINOLOGY | Facility: CLINIC | Age: 51
End: 2021-07-15
Payer: COMMERCIAL

## 2021-07-15 DIAGNOSIS — E10.3291 TYPE 1 DIABETES MELLITUS WITH MILD NONPROLIFERATIVE RETINOPATHY OF RIGHT EYE, MACULAR EDEMA PRESENCE UNSPECIFIED (H): Primary | ICD-10-CM

## 2021-07-15 PROCEDURE — 99215 OFFICE O/P EST HI 40 MIN: CPT | Mod: 95 | Performed by: PHYSICIAN ASSISTANT

## 2021-07-15 NOTE — LETTER
7/15/2021       RE: Dali Martines  [address]  [facility] [address]     Dear Colleague,    Thank you for referring your patient, Dali Martines, to the [facility] [place] [facility]. Please see a copy of my visit note below.    Dali Martines  is being evaluated via a billable video visit.      How would you like to obtain your AVS? MyChart     Will anyone else be joining your video visit? No    Sara WILLIS MA    Outcome for 07/14/21 1:25 PM :Glucose sent via Email      Due to the COVID 19 pandemic this visit was converted to a video visit in order to help prevent spread of infection in this patient and the general population.    Time of start: 1:30 pm  Time of end: 2:00 pm  Total duration of video visit: 30 minutes.    HPI  Dali Martines is a 50 year old female with type 1 diabetes mellitus and hypothyroidism.  Video visit today for diabetes and hypothyroid follow up.  Pt was dx having type 1 diabetes at age 15.  Her hx is also significant for mild NPDR right eye only,  psoriatic arthritis, hypothyroidism, anxiety, hyperlipidemia and GERD.  For her diabetes, she is currently taking Tresiba 18 units daily, Novolog 1 unit/7 gms CHO with meals and snacks, plus correction insulin.  Dali is also taking Trulicity 0.75 mg subcutaneous once a week.  Most recent A1C was 7.1 % on 1/11/2021 and her A1C was 7.6 % on 6/26/2020.  I reviewed her DexcomG6 sensor download data today and her average glucose is 173  with SD 71.  Her estimated A1C is 7.4 %.  Her glucose is in target 56 % of the time, above target 40 % of the time and below target 4 % of the time.  She is able to self treat her hypoglycemia and she wears her Dexcom sensor full time.  She states she has been stressed with work and help caring for her parents lately which has caused some blood sugar fluctuation.  On ROS today, stressed as above.  Numbness in toes.  Less nausea  on lower dose of Trulicity.  She had an episode of RUQ pain in early June 2021.RUQ still slightly sore.  No severe n/v, diarrhea, fever or chills.  Intermittent dysuria and hematuria per patient. None at this time.  Pt reports having chest pain when she eats food with MSG.  Pt denies headaches, blurred vision, SOB at rest or cough.  She denies foot ulcers a this time.  Dali received the COVID vaccine (Moderna).    Diabetes Care  Retinopathy:yes in right eye; pt seen by Oph here in Oct 2020 with mild NPDR right eye only.  Nephropathy:none; urine microalbuminuria negative in Jan 2021.   Neuropathy: reports mild numbness in toes.  Foot Exam: no exam today.  Taking aspirin: yes.  Lipids:  in Jan 2021. She was NOT fasting. Pt is taking Simvastatin and Fish Oil.  CAD: no hx of CAD.  Mental health: hx of anxiety.  Insulin: Basal and meal time insulin with correction insulin. DM meds: low dose Trulicity.  Testing: DexcomG6 sensor.      ROS  Please see under HPI.    Allergies  Allergies   Allergen Reactions     Monosodium Glutamate Palpitations and Shortness Of Breath     Sulfasalazine      Developed rash, HA, dizziness       Medications  Current Outpatient Medications   Medication Sig Dispense Refill     albuterol (PROAIR HFA/PROVENTIL HFA/VENTOLIN HFA) 108 (90 Base) MCG/ACT inhaler Inhale 2 puffs into the lungs every 4 hours as needed for shortness of breath / dyspnea or wheezing 1 Inhaler 11     aspirin 81 MG tablet Take 1 tablet by mouth daily.       blood glucose (ACCU-CHEK COLIN PLUS) test strip Test Blood Sugar 8 times daily or as directed 800 strip 3     blood glucose (NO BRAND SPECIFIED) test strip Use to test blood sugar 8 times daily or as directed. Any covered brand. 800 strip 3     blood glucose monitoring (ACCU-CHEK FASTCLIX) lancets Use to test blood sugar 8 times daily or as directed. 4 Box 3     blood glucose monitoring (NO BRAND SPECIFIED) meter device kit Use to test blood sugar 8 times daily or  as directed. Any covered brand, 1 kit 0     Calcium Carbonate-Vitamin D (CALCIUM + D PO) Take one daily       citalopram (CELEXA) 20 MG tablet Take 1.5 tablets (30 mg) by mouth daily 135 tablet 1     Continuous Blood Gluc  (DEXCOM G6 ) SUZANNE Use to read blood sugars as per 's instructions. 1 each 0     Continuous Blood Gluc Sensor (DEXCOM G6 SENSOR) MISC Change every 10 days. 9 each 3     Continuous Blood Gluc Transmit (DEXCOM G6 TRANSMITTER) MISC Change every 3 months. 1 each 3     diclofenac (VOLTAREN) 1 % topical gel APPLY 2 GRAMS ONTO THE SKIN FOUR TIMES DAILY AS NEEDED FOR MODERATE PAIN 100 g 5     dulaglutide (TRULICITY) 0.75 MG/0.5ML pen Inject 0.75 mg Subcutaneous every 7 days 4 mL 1     econazole nitrate 1 % cream Apply 0.5 inches topically daily.       fish oil-omega-3 fatty acids (FISH OIL) 1000 MG capsule Take one daily       folic acid (FOLVITE) 1 MG tablet Take 1 tablet (1 mg) by mouth daily 90 tablet 3     insulin aspart (FIASP FLEXTOUCH) 100 UNIT/ML pen-injector Use as directed up to 20 units a day at the start of meals 3 mL 3     Insulin Aspart, w/Niacinamide, (FIASP PENFILL) 100 UNIT/ML SOCT Inject 1 unit marking on an U-100 insulin syringe Subcutaneous as needed (for each 7 gm of carb) Use as directed in calderon pen echo up to 30 units a day 3 mL 11     insulin degludec (TRESIBA) 100 UNIT/ML pen Inject 17 units subcutaneous at hs. 15 mL 3     insulin pen needle (B-D U/F) 31G X 8 MM miscellaneous Use 5 pen needles daily 450 each 3     Ketoconazole (NIZORAL PO) Take  by mouth. Shampoo daily       levothyroxine (SYNTHROID/LEVOTHROID) 112 MCG tablet Take 1 tablet (112 mcg) by mouth daily 90 tablet 3     methotrexate 2.5 MG tablet Take 6 tablets (15 mg) by mouth every 7 days Labs due every 8-12 weeks. 24 tablet 3     methylphenidate (METADATE CD) 20 MG CR capsule Take 20 mg by mouth daily       methylphenidate (RITALIN) 10 MG tablet TAKE UP TO 2 TABLETS BY MOUTH IN THE LATE  AFTERNOON.       Minoxidil (ROGAINE EX) Externally apply  topically. daily       mometasone-formoterol (DULERA) 100-5 MCG/ACT inhaler Inhale 2 puffs into the lungs 2 times daily 3 Inhaler 11     montelukast (SINGULAIR) 5 MG chewable tablet Take 1 tablet (5 mg) by mouth At Bedtime 90 tablet 4     Multiple Vitamin (MULTIVITAMIN OR) Take  by mouth. Take one daily tab       NOVOLOG PENFILL 100 UNIT/ML soln INJECT 1 UNIT PER 15 GRAMS CHO AT ALL MEALS AND SNACKS WITH CORRECTION SCALE OF 1 UNIT PER 50 MG/DL OVER 150, AVERAGE DAILY USE OF 30 UNITS 30 mL 4     simvastatin (ZOCOR) 20 MG tablet Take 1 tablet (20 mg) by mouth At Bedtime 90 tablet 3     traZODone (DESYREL) 50 MG tablet Take 1-2 tablets by mouth nightly as needed for sleep. 180 tablet 0     Injection Device for insulin (INPEN 100-BLUE-TANIYA) SUZANNE 1 each once for 1 dose 1 each 0       Family History  family history includes Alcoholism in her father; Anxiety Disorder in her father; Arthritis in her father; Breast Cancer in her mother; C.A.D. in her father and maternal grandmother; Cancer in her paternal grandfather; Cardiac Sudden Death in her maternal grandfather; Cerebrovascular Disease in her paternal grandmother; Circulatory in her father; Coronary Artery Disease in her father and maternal grandmother; Diabetes in her father, maternal grandmother, and another family member; Eye Disorder in her father; Gynecology in an other family member; Heart Disease in her father, maternal grandfather, maternal grandmother, and paternal grandfather; Hypothyroidism in her father; Lipids in her father; Macular Degeneration in her father and maternal aunt; Rheumatoid Arthritis in her father; Thyroid Disease in her father and another family member.    Social History   reports that she has never smoked. She has never used smokeless tobacco. She reports current alcohol use. She reports that she does not use drugs.     Past Medical History  Past Medical History:   Diagnosis Date      Anemia      Anxiety      Arthritis 2014    Psoriatic arthritis     Back injury 1988     Dry eye syndrome      Dyslipidemia      Endometriosis 2002    adehsions seen at laparoscopy     GERD (gastroesophageal reflux disease)      Hypothyroidism     8 yoa     SOB (shortness of breath) 3/11/2014     Type I (juvenile type) diabetes mellitus without mention of complication, not stated as uncontrolled     when 15      Uncomplicated asthma Fall 2013       Past Surgical History:   Procedure Laterality Date     C REPAIR CRUCIATE LIGAMENT,KNEE       C STOMACH SURGERY PROCEDURE UNLISTED  December 2002     COLONOSCOPY  2002     COLONOSCOPY N/A 6/23/2020    Procedure: COLONOSCOPY;  Surgeon: Lauryn Rivera MD;  Location:  GI     ESOPHAGOSCOPY, GASTROSCOPY, DUODENOSCOPY (EGD), COMBINED  1/7/2014    Procedure: COMBINED ESOPHAGOSCOPY, GASTROSCOPY, DUODENOSCOPY (EGD), BIOPSY SINGLE OR MULTIPLE;;  Surgeon: Kraig Nicole MD;  Location:  GI     GYN SURGERY      Laparotomy to remove endometrial tissue     HC BREATH HYDROGEN TEST N/A 6/17/2014    Procedure: HYDROGEN BREATH TEST;  Surgeon: Deacon Alberts MD;  Location:  GI     HYDROGEN BREATH TEST  6/17/14     LAPAROSCOPIC APPENDECTOMY  2002     LAPAROTOMY, LYSIS ADHESIONS, COMBINED  2002    endometriosis     SOFT TISSUE SURGERY  December 2014    right ankle tendon injury       Physical Exam    No exam.    RESULTS  Creatinine   Date Value Ref Range Status   01/11/2021 0.56 0.52 - 1.04 mg/dL Final     GFR Estimate   Date Value Ref Range Status   01/11/2021 >90 >60 mL/min/[1.73_m2] Final     Comment:     Non  GFR Calc  Starting 12/18/2018, serum creatinine based estimated GFR (eGFR) will be   calculated using the Chronic Kidney Disease Epidemiology Collaboration   (CKD-EPI) equation.       Microalbumin Urine   Date Value Ref Range Status   06/26/2009 5@ MG/L      Hemoglobin A1C   Date Value Ref Range Status   01/11/2021 7.1 (H) 0 - 5.6 % Final      Comment:     Normal <5.7% Prediabetes 5.7-6.4%  Diabetes 6.5% or higher - adopted from ADA   consensus guidelines.       Potassium   Date Value Ref Range Status   04/27/2016 4.1 3.4 - 5.3 mmol/L Final     ALT   Date Value Ref Range Status   01/11/2021 37 0 - 50 U/L Final     AST   Date Value Ref Range Status   01/11/2021 31 0 - 45 U/L Final     TSH   Date Value Ref Range Status   01/11/2021 2.01 0.40 - 4.00 mU/L Final     T4 Total   Date Value Ref Range Status   11/29/2010 8.5 5.0 - 11.0 ug/dL Final     T4 Free   Date Value Ref Range Status   03/21/2019 1.09 0.76 - 1.46 ng/dL Final       Cholesterol   Date Value Ref Range Status   01/11/2021 223 (H) <200 mg/dL Final     Comment:     Desirable:       <200 mg/dl   12/18/2018 194 <200 mg/dL Final     HDL Cholesterol   Date Value Ref Range Status   01/11/2021 92 >49 mg/dL Final   12/18/2018 91 >49 mg/dL Final     LDL Cholesterol Calculated   Date Value Ref Range Status   01/11/2021 122 (H) <100 mg/dL Final     Comment:     Above desirable:  100-129 mg/dl  Borderline High:  130-159 mg/dL  High:             160-189 mg/dL  Very high:       >189 mg/dl     12/18/2018 92 <100 mg/dL Final     Comment:     Desirable:       <100 mg/dl     Triglycerides   Date Value Ref Range Status   01/11/2021 44 <150 mg/dL Final     Comment:     Non Fasting   12/18/2018 54 <150 mg/dL Final     Cholesterol/HDL Ratio   Date Value Ref Range Status   09/27/2013 2.7 0.0 - 5.0 Final   02/15/2013 2.9 0.0 - 5.0 Final     a1C   7.2 % on 1/11/2021  A1C   7.6 % on 6/26/2020      ASSESSMENT/PLAN:    1. TYPE 1 DIABETES MELLITUS: Dali is pleased with the DexcomG6 sensor.   We discuss use of a Tandem control IQ pump again today. Pt is also interested in the OmniPod insulin pump. She will reach out to Dali Viera CDE.  No change in insulin doses today.  Reminded her to take Novolog before eating.  Continue low dose Trulicity 0.75 mg subcutaneous once a week.  Her urine microalbuminuria has been negative  with normal creat/GFR.  She has noticed numbness in toes. No foot ulcers.  Pt is taking ASA daily.  I have no vitals today. BP was 135/83 in March 2021.  Dali received the COVID vaccines ( Moderna).    2.  RETINOPATHY: Hx of mild NPDR right eye only.  She was seen here by Oph in Oct 2020.    3.  HYPOTHYROIDISM:  TSH normal in Jan 2021.  Continue Levothyroxine 112 mcg po daily.    4. HYPERLIPIDEMIA:  in Jan 2021. This was not fasting.  She remains on Simvastatin.    5.   FOLLOW UP: with Dr. Mckeon in Oct 2021.  I placed an order for an A1C today.    Total time spent reviewing chart notes, labs and review of Dexcom sensor download today= 8 minutes.  Total time video visit today = 30 minutes.  Total time for documentation today = 15 minutes.     TOTAL TIME FOR VISIT TODAY = 53 minutes.    Marissa Sebastian PA-C

## 2021-07-22 NOTE — PROGRESS NOTES
Due to the COVID 19 pandemic this visit was converted to a video visit in order to help prevent spread of infection in this patient and the general population.    Time of start: 1:30 pm  Time of end: 2:00 pm  Total duration of video visit: 30 minutes.    HPI  Dali Martines is a 50 year old female with type 1 diabetes mellitus and hypothyroidism.  Video visit today for diabetes and hypothyroid follow up.  Pt was dx having type 1 diabetes at age 15.  Her hx is also significant for mild NPDR right eye only,  psoriatic arthritis, hypothyroidism, anxiety, hyperlipidemia and GERD.  For her diabetes, she is currently taking Tresiba 18 units daily, Novolog 1 unit/7 gms CHO with meals and snacks, plus correction insulin.  Dali is also taking Trulicity 0.75 mg subcutaneous once a week.  Most recent A1C was 7.1 % on 1/11/2021 and her A1C was 7.6 % on 6/26/2020.  I reviewed her DexcomG6 sensor download data today and her average glucose is 173  with SD 71.  Her estimated A1C is 7.4 %.  Her glucose is in target 56 % of the time, above target 40 % of the time and below target 4 % of the time.  She is able to self treat her hypoglycemia and she wears her Dexcom sensor full time.  She states she has been stressed with work and help caring for her parents lately which has caused some blood sugar fluctuation.  On ROS today, stressed as above.  Numbness in toes.  Less nausea on lower dose of Trulicity.  She had an episode of RUQ pain in early June 2021.RUQ still slightly sore.  No severe n/v, diarrhea, fever or chills.  Intermittent dysuria and hematuria per patient. None at this time.  Pt reports having chest pain when she eats food with MSG.  Pt denies headaches, blurred vision, SOB at rest or cough.  She denies foot ulcers a this time.  Dali received the COVID vaccine (Moderna).    Diabetes Care  Retinopathy:yes in right eye; pt seen by Oph here in Oct 2020 with mild NPDR right eye only.  Nephropathy:none; urine  microalbuminuria negative in Jan 2021.   Neuropathy: reports mild numbness in toes.  Foot Exam: no exam today.  Taking aspirin: yes.  Lipids:  in Jan 2021. She was NOT fasting. Pt is taking Simvastatin and Fish Oil.  CAD: no hx of CAD.  Mental health: hx of anxiety.  Insulin: Basal and meal time insulin with correction insulin. DM meds: low dose Trulicity.  Testing: DexcomG6 sensor.      ROS  Please see under HPI.    Allergies  Allergies   Allergen Reactions     Monosodium Glutamate Palpitations and Shortness Of Breath     Sulfasalazine      Developed rash, HA, dizziness       Medications  Current Outpatient Medications   Medication Sig Dispense Refill     albuterol (PROAIR HFA/PROVENTIL HFA/VENTOLIN HFA) 108 (90 Base) MCG/ACT inhaler Inhale 2 puffs into the lungs every 4 hours as needed for shortness of breath / dyspnea or wheezing 1 Inhaler 11     aspirin 81 MG tablet Take 1 tablet by mouth daily.       blood glucose (ACCU-CHEK COLIN PLUS) test strip Test Blood Sugar 8 times daily or as directed 800 strip 3     blood glucose (NO BRAND SPECIFIED) test strip Use to test blood sugar 8 times daily or as directed. Any covered brand. 800 strip 3     blood glucose monitoring (ACCU-CHEK FASTCLIX) lancets Use to test blood sugar 8 times daily or as directed. 4 Box 3     blood glucose monitoring (NO BRAND SPECIFIED) meter device kit Use to test blood sugar 8 times daily or as directed. Any covered brand, 1 kit 0     Calcium Carbonate-Vitamin D (CALCIUM + D PO) Take one daily       citalopram (CELEXA) 20 MG tablet Take 1.5 tablets (30 mg) by mouth daily 135 tablet 1     Continuous Blood Gluc  (DEXCOM G6 ) SUZANNE Use to read blood sugars as per 's instructions. 1 each 0     Continuous Blood Gluc Sensor (DEXCOM G6 SENSOR) MISC Change every 10 days. 9 each 3     Continuous Blood Gluc Transmit (DEXCOM G6 TRANSMITTER) MISC Change every 3 months. 1 each 3     diclofenac (VOLTAREN) 1 % topical gel  APPLY 2 GRAMS ONTO THE SKIN FOUR TIMES DAILY AS NEEDED FOR MODERATE PAIN 100 g 5     dulaglutide (TRULICITY) 0.75 MG/0.5ML pen Inject 0.75 mg Subcutaneous every 7 days 4 mL 1     econazole nitrate 1 % cream Apply 0.5 inches topically daily.       fish oil-omega-3 fatty acids (FISH OIL) 1000 MG capsule Take one daily       folic acid (FOLVITE) 1 MG tablet Take 1 tablet (1 mg) by mouth daily 90 tablet 3     insulin aspart (FIASP FLEXTOUCH) 100 UNIT/ML pen-injector Use as directed up to 20 units a day at the start of meals 3 mL 3     Insulin Aspart, w/Niacinamide, (FIASP PENFILL) 100 UNIT/ML SOCT Inject 1 unit marking on an U-100 insulin syringe Subcutaneous as needed (for each 7 gm of carb) Use as directed in calderon pen echo up to 30 units a day 3 mL 11     insulin degludec (TRESIBA) 100 UNIT/ML pen Inject 17 units subcutaneous at hs. 15 mL 3     insulin pen needle (B-D U/F) 31G X 8 MM miscellaneous Use 5 pen needles daily 450 each 3     Ketoconazole (NIZORAL PO) Take  by mouth. Shampoo daily       levothyroxine (SYNTHROID/LEVOTHROID) 112 MCG tablet Take 1 tablet (112 mcg) by mouth daily 90 tablet 3     methotrexate 2.5 MG tablet Take 6 tablets (15 mg) by mouth every 7 days Labs due every 8-12 weeks. 24 tablet 3     methylphenidate (METADATE CD) 20 MG CR capsule Take 20 mg by mouth daily       methylphenidate (RITALIN) 10 MG tablet TAKE UP TO 2 TABLETS BY MOUTH IN THE LATE AFTERNOON.       Minoxidil (ROGAINE EX) Externally apply  topically. daily       mometasone-formoterol (DULERA) 100-5 MCG/ACT inhaler Inhale 2 puffs into the lungs 2 times daily 3 Inhaler 11     montelukast (SINGULAIR) 5 MG chewable tablet Take 1 tablet (5 mg) by mouth At Bedtime 90 tablet 4     Multiple Vitamin (MULTIVITAMIN OR) Take  by mouth. Take one daily tab       NOVOLOG PENFILL 100 UNIT/ML soln INJECT 1 UNIT PER 15 GRAMS CHO AT ALL MEALS AND SNACKS WITH CORRECTION SCALE OF 1 UNIT PER 50 MG/DL OVER 150, AVERAGE DAILY USE OF 30 UNITS 30 mL 4      simvastatin (ZOCOR) 20 MG tablet Take 1 tablet (20 mg) by mouth At Bedtime 90 tablet 3     traZODone (DESYREL) 50 MG tablet Take 1-2 tablets by mouth nightly as needed for sleep. 180 tablet 0     Injection Device for insulin (INPEN 100-BLUE-TANIYA) SUZANNE 1 each once for 1 dose 1 each 0       Family History  family history includes Alcoholism in her father; Anxiety Disorder in her father; Arthritis in her father; Breast Cancer in her mother; C.A.D. in her father and maternal grandmother; Cancer in her paternal grandfather; Cardiac Sudden Death in her maternal grandfather; Cerebrovascular Disease in her paternal grandmother; Circulatory in her father; Coronary Artery Disease in her father and maternal grandmother; Diabetes in her father, maternal grandmother, and another family member; Eye Disorder in her father; Gynecology in an other family member; Heart Disease in her father, maternal grandfather, maternal grandmother, and paternal grandfather; Hypothyroidism in her father; Lipids in her father; Macular Degeneration in her father and maternal aunt; Rheumatoid Arthritis in her father; Thyroid Disease in her father and another family member.    Social History   reports that she has never smoked. She has never used smokeless tobacco. She reports current alcohol use. She reports that she does not use drugs.     Past Medical History  Past Medical History:   Diagnosis Date     Anemia      Anxiety      Arthritis 2014    Psoriatic arthritis     Back injury 1988     Dry eye syndrome      Dyslipidemia      Endometriosis 2002    adehsions seen at laparoscopy     GERD (gastroesophageal reflux disease)      Hypothyroidism     8 yoa     SOB (shortness of breath) 3/11/2014     Type I (juvenile type) diabetes mellitus without mention of complication, not stated as uncontrolled     when 15      Uncomplicated asthma Fall 2013       Past Surgical History:   Procedure Laterality Date     C REPAIR CRUCIATE LIGAMENT,KNEE       C STOMACH  SURGERY PROCEDURE UNLISTED  December 2002     COLONOSCOPY  2002     COLONOSCOPY N/A 6/23/2020    Procedure: COLONOSCOPY;  Surgeon: Lauryn Rivera MD;  Location:  GI     ESOPHAGOSCOPY, GASTROSCOPY, DUODENOSCOPY (EGD), COMBINED  1/7/2014    Procedure: COMBINED ESOPHAGOSCOPY, GASTROSCOPY, DUODENOSCOPY (EGD), BIOPSY SINGLE OR MULTIPLE;;  Surgeon: Kraig Nicole MD;  Location:  GI     GYN SURGERY      Laparotomy to remove endometrial tissue     HC BREATH HYDROGEN TEST N/A 6/17/2014    Procedure: HYDROGEN BREATH TEST;  Surgeon: Deacon Alberts MD;  Location:  GI     HYDROGEN BREATH TEST  6/17/14     LAPAROSCOPIC APPENDECTOMY  2002     LAPAROTOMY, LYSIS ADHESIONS, COMBINED  2002    endometriosis     SOFT TISSUE SURGERY  December 2014    right ankle tendon injury       Physical Exam    No exam.    RESULTS  Creatinine   Date Value Ref Range Status   01/11/2021 0.56 0.52 - 1.04 mg/dL Final     GFR Estimate   Date Value Ref Range Status   01/11/2021 >90 >60 mL/min/[1.73_m2] Final     Comment:     Non  GFR Calc  Starting 12/18/2018, serum creatinine based estimated GFR (eGFR) will be   calculated using the Chronic Kidney Disease Epidemiology Collaboration   (CKD-EPI) equation.       Microalbumin Urine   Date Value Ref Range Status   06/26/2009 5@ MG/L      Hemoglobin A1C   Date Value Ref Range Status   01/11/2021 7.1 (H) 0 - 5.6 % Final     Comment:     Normal <5.7% Prediabetes 5.7-6.4%  Diabetes 6.5% or higher - adopted from ADA   consensus guidelines.       Potassium   Date Value Ref Range Status   04/27/2016 4.1 3.4 - 5.3 mmol/L Final     ALT   Date Value Ref Range Status   01/11/2021 37 0 - 50 U/L Final     AST   Date Value Ref Range Status   01/11/2021 31 0 - 45 U/L Final     TSH   Date Value Ref Range Status   01/11/2021 2.01 0.40 - 4.00 mU/L Final     T4 Total   Date Value Ref Range Status   11/29/2010 8.5 5.0 - 11.0 ug/dL Final     T4 Free   Date Value Ref Range Status   03/21/2019  1.09 0.76 - 1.46 ng/dL Final       Cholesterol   Date Value Ref Range Status   01/11/2021 223 (H) <200 mg/dL Final     Comment:     Desirable:       <200 mg/dl   12/18/2018 194 <200 mg/dL Final     HDL Cholesterol   Date Value Ref Range Status   01/11/2021 92 >49 mg/dL Final   12/18/2018 91 >49 mg/dL Final     LDL Cholesterol Calculated   Date Value Ref Range Status   01/11/2021 122 (H) <100 mg/dL Final     Comment:     Above desirable:  100-129 mg/dl  Borderline High:  130-159 mg/dL  High:             160-189 mg/dL  Very high:       >189 mg/dl     12/18/2018 92 <100 mg/dL Final     Comment:     Desirable:       <100 mg/dl     Triglycerides   Date Value Ref Range Status   01/11/2021 44 <150 mg/dL Final     Comment:     Non Fasting   12/18/2018 54 <150 mg/dL Final     Cholesterol/HDL Ratio   Date Value Ref Range Status   09/27/2013 2.7 0.0 - 5.0 Final   02/15/2013 2.9 0.0 - 5.0 Final     a1C   7.2 % on 1/11/2021  A1C   7.6 % on 6/26/2020      ASSESSMENT/PLAN:    1. TYPE 1 DIABETES MELLITUS: Dali is pleased with the DexcomG6 sensor.   We discuss use of a Tandem control IQ pump again today. Pt is also interested in the OmniPod insulin pump. She will reach out to Dali Viera CDE.  No change in insulin doses today.  Reminded her to take Novolog before eating.  Continue low dose Trulicity 0.75 mg subcutaneous once a week.  Her urine microalbuminuria has been negative with normal creat/GFR.  She has noticed numbness in toes. No foot ulcers.  Pt is taking ASA daily.  I have no vitals today. BP was 135/83 in March 2021.  Dali received the COVID vaccines ( Moderna).    2.  RETINOPATHY: Hx of mild NPDR right eye only.  She was seen here by Oph in Oct 2020.    3.  HYPOTHYROIDISM:  TSH normal in Jan 2021.  Continue Levothyroxine 112 mcg po daily.    4. HYPERLIPIDEMIA:  in Jan 2021. This was not fasting.  She remains on Simvastatin.    5.   FOLLOW UP: with Dr. Mckeon in Oct 2021.  I placed an order for an A1C  today.    Total time spent reviewing chart notes, labs and review of Dexcom sensor download today= 8 minutes.  Total time video visit today = 30 minutes.  Total time for documentation today = 15 minutes.     TOTAL TIME FOR VISIT TODAY = 53 minutes.    Marissa Sebastian PA-C

## 2021-07-23 ENCOUNTER — LAB (OUTPATIENT)
Dept: LAB | Facility: CLINIC | Age: 51
End: 2021-07-23
Payer: COMMERCIAL

## 2021-07-23 DIAGNOSIS — E10.3291 TYPE 1 DIABETES MELLITUS WITH MILD NONPROLIFERATIVE RETINOPATHY OF RIGHT EYE, MACULAR EDEMA PRESENCE UNSPECIFIED (H): ICD-10-CM

## 2021-07-23 DIAGNOSIS — L40.50 PSORIATIC ARTHRITIS (H): ICD-10-CM

## 2021-07-23 DIAGNOSIS — Z51.81 ENCOUNTER FOR THERAPEUTIC DRUG MONITORING: ICD-10-CM

## 2021-07-23 LAB
BASOPHILS # BLD AUTO: 0 10E3/UL (ref 0–0.2)
BASOPHILS NFR BLD AUTO: 1 %
EOSINOPHIL # BLD AUTO: 0.1 10E3/UL (ref 0–0.7)
EOSINOPHIL NFR BLD AUTO: 1 %
ERYTHROCYTE [DISTWIDTH] IN BLOOD BY AUTOMATED COUNT: 13.5 % (ref 10–15)
ERYTHROCYTE [SEDIMENTATION RATE] IN BLOOD BY WESTERGREN METHOD: 28 MM/HR (ref 0–30)
HBA1C MFR BLD: 7.3 % (ref 0–5.6)
HCT VFR BLD AUTO: 37.6 % (ref 35–47)
HGB BLD-MCNC: 12.2 G/DL (ref 11.7–15.7)
LYMPHOCYTES # BLD AUTO: 2.2 10E3/UL (ref 0.8–5.3)
LYMPHOCYTES NFR BLD AUTO: 39 %
MCH RBC QN AUTO: 31.2 PG (ref 26.5–33)
MCHC RBC AUTO-ENTMCNC: 32.4 G/DL (ref 31.5–36.5)
MCV RBC AUTO: 96 FL (ref 78–100)
MONOCYTES # BLD AUTO: 0.5 10E3/UL (ref 0–1.3)
MONOCYTES NFR BLD AUTO: 9 %
NEUTROPHILS # BLD AUTO: 2.9 10E3/UL (ref 1.6–8.3)
NEUTROPHILS NFR BLD AUTO: 50 %
PLATELET # BLD AUTO: 327 10E3/UL (ref 150–450)
RBC # BLD AUTO: 3.91 10E6/UL (ref 3.8–5.2)
WBC # BLD AUTO: 5.7 10E3/UL (ref 4–11)

## 2021-07-23 PROCEDURE — 83036 HEMOGLOBIN GLYCOSYLATED A1C: CPT

## 2021-07-23 PROCEDURE — 82565 ASSAY OF CREATININE: CPT

## 2021-07-23 PROCEDURE — 84450 TRANSFERASE (AST) (SGOT): CPT

## 2021-07-23 PROCEDURE — 82040 ASSAY OF SERUM ALBUMIN: CPT

## 2021-07-23 PROCEDURE — 85652 RBC SED RATE AUTOMATED: CPT

## 2021-07-23 PROCEDURE — 86140 C-REACTIVE PROTEIN: CPT

## 2021-07-23 PROCEDURE — 85025 COMPLETE CBC W/AUTO DIFF WBC: CPT

## 2021-07-23 PROCEDURE — 84460 ALANINE AMINO (ALT) (SGPT): CPT

## 2021-07-23 PROCEDURE — 36415 COLL VENOUS BLD VENIPUNCTURE: CPT

## 2021-07-24 LAB
ALBUMIN SERPL-MCNC: 4.2 G/DL (ref 3.4–5)
ALT SERPL W P-5'-P-CCNC: 43 U/L (ref 0–50)
AST SERPL W P-5'-P-CCNC: 31 U/L (ref 0–45)
CREAT SERPL-MCNC: 0.61 MG/DL (ref 0.52–1.04)
CRP SERPL-MCNC: <2.9 MG/L (ref 0–8)
GFR SERPL CREATININE-BSD FRML MDRD: >90 ML/MIN/1.73M2

## 2021-08-03 ENCOUNTER — VIRTUAL VISIT (OUTPATIENT)
Dept: PULMONOLOGY | Facility: CLINIC | Age: 51
End: 2021-08-03
Attending: INTERNAL MEDICINE
Payer: COMMERCIAL

## 2021-08-03 DIAGNOSIS — J68.3 REACTIVE AIRWAYS DYSFUNCTION SYNDROME (H): Primary | ICD-10-CM

## 2021-08-03 PROCEDURE — 99214 OFFICE O/P EST MOD 30 MIN: CPT | Mod: 95 | Performed by: INTERNAL MEDICINE

## 2021-08-03 RX ORDER — PREDNISONE 20 MG/1
20 TABLET ORAL DAILY
Qty: 10 TABLET | Refills: 0 | Status: SHIPPED | OUTPATIENT
Start: 2021-08-03 | End: 2022-08-27

## 2021-08-03 NOTE — PATIENT INSTRUCTIONS
In the event of an exacerbation,   1. Increase Dulera to 2 puffs twice a day  2. Increase albuterol to every 1-2 puffs every 2 hours as needed  3. Prednisone 20mg daily for 5 days and call the office.       If you need us after hours but don't think you need emergency attention, you can call the main [facility] hospital [phone] and ask for the pulmonary doctor on call.
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
## 2021-08-05 ASSESSMENT — SLEEP AND FATIGUE QUESTIONNAIRES
HOW LIKELY ARE YOU TO NOD OFF OR FALL ASLEEP WHILE SITTING QUIETLY AFTER LUNCH WITHOUT ALCOHOL: SLIGHT CHANCE OF DOZING
HOW LIKELY ARE YOU TO NOD OFF OR FALL ASLEEP WHILE SITTING AND TALKING TO SOMEONE: WOULD NEVER DOZE
HOW LIKELY ARE YOU TO NOD OFF OR FALL ASLEEP WHILE LYING DOWN TO REST IN THE AFTERNOON WHEN CIRCUMSTANCES PERMIT: MODERATE CHANCE OF DOZING
HOW LIKELY ARE YOU TO NOD OFF OR FALL ASLEEP WHEN YOU ARE A PASSENGER IN A CAR FOR AN HOUR WITHOUT A BREAK: SLIGHT CHANCE OF DOZING
HOW LIKELY ARE YOU TO NOD OFF OR FALL ASLEEP WHILE SITTING AND READING: MODERATE CHANCE OF DOZING
HOW LIKELY ARE YOU TO NOD OFF OR FALL ASLEEP WHILE SITTING INACTIVE IN A PUBLIC PLACE: SLIGHT CHANCE OF DOZING
HOW LIKELY ARE YOU TO NOD OFF OR FALL ASLEEP IN A CAR, WHILE STOPPED FOR A FEW MINUTES IN TRAFFIC: WOULD NEVER DOZE
HOW LIKELY ARE YOU TO NOD OFF OR FALL ASLEEP WHILE WATCHING TV: HIGH CHANCE OF DOZING

## 2021-08-06 ENCOUNTER — VIRTUAL VISIT (OUTPATIENT)
Dept: SLEEP MEDICINE | Facility: CLINIC | Age: 51
End: 2021-08-06
Attending: INTERNAL MEDICINE
Payer: COMMERCIAL

## 2021-08-06 DIAGNOSIS — F51.04 CHRONIC INSOMNIA: Primary | ICD-10-CM

## 2021-08-06 PROCEDURE — 90791 PSYCH DIAGNOSTIC EVALUATION: CPT | Mod: 95 | Performed by: PSYCHOLOGIST

## 2021-08-06 NOTE — PROGRESS NOTES
Dali is a 50 year old who is being evaluated via a billable video visit.      How would you like to obtain your AVS? MyChart  If the video visit is dropped, the invitation should be resent by: Text to cell phone: [phone]  Will anyone else be joining your video visit? Asiya Castrobeth Yungharitha CMA    Video Start Time: 2:37 PM  Video-Visit Details    Type of service:  Video Visit    Video End Time:3:30 PM estimate    Originating Location (pt. Location): Home    Distant Location (provider location):  [facility]     Platform used for Video Visit: [facility]     SLEEP MEDICINE CONSULTATION  Sleep Psychology    Name: Dali Martines MRN# [id number]   Age: 50 year old YOB: 1970     Date of Consultation: Aug 6, 2021  Consultation is requested by: Shonna Rosa MD  [address]  [place] [address]  Primary care provider: Dimitri Alas    Reason for Sleep Consultation     Dali Martines is a 50 year old female seen today for a behavioral sleep medicine consultation because of     Assessment and Plan     Sleep Diagnoses/Recommendations:    Chronic insomnia doing    Dali Martines was referred for behavioral sleep medicine consultation and consideration of CBT for insomnia.  Patient's insomnia appears to be multi factored, associated with type 1 diabetes, significant ongoing personal and work-related stressors, and inadequate sleep hygiene.  It is my recommendation that rather than a limited stimulus control/sleep restriction approach to treating insomnia, that an integrated health psychology intervention be employed over the course of 8-10 sessions focusing not only on sleep-wake cycle and habits but also mitigating the effects of maladaptive response to stress during the day that may also contribute to poor quality sleep.  Patient was very open to this more holistic approach to achieving improvement in sleep quality.  The first step of treatment  will be to have patient complete and expanded sleep diary that will include not only sleep-wake parameters but also pattern of the subjective rating of stress, alcohol use, rest times and hours worked.  She will also chart her overall use of stimulants.    A primary goal will be to stabilize a consistent wake time, implement stress reduction and relaxation strategies, help patient connect to experiences and break times during the day, etc.  We will also examine the overall burden she experiences in a demanding professional life, care needs of aging parents, and other stressors.    Summary Counseling:      Dali was provided information about the pathophysiology of insomnia, psychophysiological factors contributing to the onset and maintenance of insomnia and how co-occurring medical conditions and intrinsic sleep disorders can affect sleep.  Treatment options were discussed including component of cognitive-behavioral therapy for insomnia as applicable to patient specific sleep concerns and symptoms. The benefits and potential early side effects of treatment including increased daytime sleepiness were discussed.     Patient was advised to consult with their prescribing provider around use of or changes to prescription sleep medication.  Patient was counseled on the importance of avoiding driving if drowsy.    See patient instructions for initial treatment recommendations and behavioral sleep plan.    Follow-up: 3 weeks     History of Present Sleep Complaint     Dali Martines is a 50 year old year old female with a relevant medical history of  Type 1 diabetes who presents with with long history of insomnia. She has been taking medications for insomnia for many years. She was seen in the sleep clinic in 2014. No obstructive sleep apnea was identified. She was offered a trial of CBT-I however declined it at that time being concerned about getting worse before she gets better.     Things have gotten worse however in  "the last year or so and she sought a sleep consultation again with Dr. Shonna Rosa in May 2021, whose evalution did not suggest signs or symptoms of an intrinsic sleep disorder.No symptoms of restless legs or parasomnias. She continues to take trazodone to help her fall asleep. However she is noticing earlier awakenings with poor quality sleep. Several days a week she gets very sleepy in the late afternoon and can doze off watching TV in the evening She has problems with attention and brain fog and has been on stimulant medication for several years to maintain focus at work.  She usually does no use the medications on weekends.       She notices more frequent early morning waking and overall reports poor quality sleep and notices more frequent interruptions of sleep.  She also has 2-3 days of feeling as though she is getting insufficient sleep.      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  She reports increased stress during pandemic due to organizational restructuring.    She also notes a chronic ankle injury that prevents her from regular or vigorous exercise.    She does drink caffeinated beverages during the day and sometimes in the afternoon. She has been drinking more alcohol in the last year. She drinks 1-2 alcoholic beverages a night on most evenings though she has been intentianal about trying to decrease alcohol use.  She does not report use of recreational drugs.    She notes that her A1C has been somewhat higher in recent months.  She indicates that she feels \"constantly  On\" and in \"overdrive.\"  She notices that she has few times when she feels rested or relaxed.      Previous Sleep Studies/Consultations:    Previous sleep study did not indicate any evidence of obstructive sleep apnea.    Screening     EPWORTH SLEEPINESS SCALE    Sitting and reading 2   Watching TV 3   Sitting, inactive in a public place (theatre or mtg.) 1    As a passenger in a " car 1   Lying down to rest in the afternoon when circumstance permit 2   Sitting and talking to someone 0   Sitting quietly after lunch without alcohol 1   In a car, while stopped for a few minutes in traffic 0   TOTAL SCORE (nl <11) 10     INSOMNIA SEVERITY INDEX     Difficulty falling asleep 1   Difficult staying asleep 4   Problems waking up to early 3   How SATISFIED/DISSATISFIED are you with your CURRENT sleep pattern? 4   How NOTICEABLE to others do you think your sleep pattern is in terms of your quality of life? 2   How WORRIED/DISTRESSED are you about your current sleep pattern? 3   To what extent do you consider your sleep problem to INTERFERE with your daily fuctioning(e.g. daytime fatigue, mood, ability to function at work/daily chores, concentration, mood,etc.) CURRENTLY? 4   INSOMNIA SEVERITY INDEX TOTAL SCORE 21   Absence of insomnia (0-7); sub-threshold insomnia (8-14); moderate insomnia (15-21); and severe insomnia (22-28)      STOP-Bang LUPILLO Risk Score: N/a  Low Risk:  0-2  Intermediate Risk:  3-4  High Risk:  5-8      Vitals     There were no vitals taken for this visit.     Medical History     Patient Active Problem List   Diagnosis     GERD (gastroesophageal reflux disease)     CARDIOVASCULAR SCREENING; LDL GOAL LESS THAN 100     Type 1 diabetes mellitus with retinopathy (H)     Anxiety     NLD (necrobiosis lipoidica diabeticorum) (H)     Cutaneous skin tags     Cervicalgia     Chronic low back pain     SOB (shortness of breath)     Encounter for other specified aftercare     PAIN FOOT     Diabetes (H)     Screening for other eye conditions     PAIN KNEE JOINT     Enthesopathy     Pain in joint, multiple sites     Chronic pain of right ankle     Right foot pain     ESR raised     Swelling of limb     Other postprocedural status(V45.89)     Left knee pain     Type 1 diabetes mellitus without complication (H)     Psoriatic arthritis (H)     Psoriasis with arthropathy (H)     History of adenomatous  polyp of colon     Glaucoma suspect of both eyes     Long-term current use of stimulant     Insomnia, unspecified type        Current Outpatient Medications   Medication Sig Dispense Refill     albuterol (PROAIR HFA/PROVENTIL HFA/VENTOLIN HFA) 108 (90 Base) MCG/ACT inhaler Inhale 2 puffs into the lungs every 4 hours as needed for shortness of breath / dyspnea or wheezing 1 Inhaler 11     aspirin 81 MG tablet Take 1 tablet by mouth daily.       blood glucose (ACCU-CHEK COLIN PLUS) test strip Test Blood Sugar 8 times daily or as directed 800 strip 3     blood glucose (NO BRAND SPECIFIED) test strip Use to test blood sugar 8 times daily or as directed. Any covered brand. 800 strip 3     blood glucose monitoring (ACCU-CHEK FASTCLIX) lancets Use to test blood sugar 8 times daily or as directed. 4 Box 3     blood glucose monitoring (NO BRAND SPECIFIED) meter device kit Use to test blood sugar 8 times daily or as directed. Any covered brand, 1 kit 0     Calcium Carbonate-Vitamin D (CALCIUM + D PO) Take one daily       citalopram (CELEXA) 20 MG tablet Take 1.5 tablets (30 mg) by mouth daily 135 tablet 1     Continuous Blood Gluc  (DEXCOM G6 ) SUZANNE Use to read blood sugars as per 's instructions. 1 each 0     Continuous Blood Gluc Sensor (DEXCOM G6 SENSOR) MISC Change every 10 days. 9 each 3     Continuous Blood Gluc Transmit (DEXCOM G6 TRANSMITTER) MISC Change every 3 months. 1 each 3     diclofenac (VOLTAREN) 1 % topical gel APPLY 2 GRAMS ONTO THE SKIN FOUR TIMES DAILY AS NEEDED FOR MODERATE PAIN 100 g 5     dulaglutide (TRULICITY) 0.75 MG/0.5ML pen Inject 0.75 mg Subcutaneous every 7 days 4 mL 1     econazole nitrate 1 % cream Apply 0.5 inches topically daily.       fish oil-omega-3 fatty acids (FISH OIL) 1000 MG capsule Take one daily       folic acid (FOLVITE) 1 MG tablet Take 1 tablet (1 mg) by mouth daily 90 tablet 3     insulin degludec (TRESIBA) 100 UNIT/ML pen Inject 17 units  subcutaneous at hs. 15 mL 3     insulin pen needle (B-D U/F) 31G X 8 MM miscellaneous Use 5 pen needles daily 450 each 3     Ketoconazole (NIZORAL PO) Take  by mouth. Shampoo daily       levothyroxine (SYNTHROID/LEVOTHROID) 112 MCG tablet Take 1 tablet (112 mcg) by mouth daily 90 tablet 3     methotrexate 2.5 MG tablet Take 6 tablets (15 mg) by mouth every 7 days Labs due every 8-12 weeks. 24 tablet 3     methylphenidate (METADATE CD) 20 MG CR capsule Take 20 mg by mouth daily       methylphenidate (RITALIN) 10 MG tablet TAKE UP TO 2 TABLETS BY MOUTH IN THE LATE AFTERNOON.       Minoxidil (ROGAINE EX) Externally apply  topically. daily       mometasone-formoterol (DULERA) 100-5 MCG/ACT inhaler Inhale 2 puffs into the lungs 2 times daily 3 Inhaler 11     montelukast (SINGULAIR) 5 MG chewable tablet Take 1 tablet (5 mg) by mouth At Bedtime 90 tablet 4     Multiple Vitamin (MULTIVITAMIN OR) Take  by mouth. Take one daily tab       NOVOLOG PENFILL 100 UNIT/ML soln INJECT 1 UNIT PER 15 GRAMS CHO AT ALL MEALS AND SNACKS WITH CORRECTION SCALE OF 1 UNIT PER 50 MG/DL OVER 150, AVERAGE DAILY USE OF 30 UNITS 30 mL 4     predniSONE (DELTASONE) 20 MG tablet Take 1 tablet (20 mg) by mouth daily 10 tablet 0     simvastatin (ZOCOR) 20 MG tablet Take 1 tablet (20 mg) by mouth At Bedtime 90 tablet 3     traZODone (DESYREL) 50 MG tablet Take 1-2 tablets by mouth nightly as needed for sleep. 180 tablet 0     Injection Device for insulin (INPEN 100-BLUE-TANIYA) SUZANNE 1 each once for 1 dose 1 each 0       Past Surgical History:   Procedure Laterality Date     C REPAIR CRUCIATE LIGAMENT,KNEE       C STOMACH SURGERY PROCEDURE UNLISTED  December 2002     COLONOSCOPY  2002     COLONOSCOPY N/A 6/23/2020    Procedure: COLONOSCOPY;  Surgeon: Lauryn Rivera MD;  Location:  GI     ESOPHAGOSCOPY, GASTROSCOPY, DUODENOSCOPY (EGD), COMBINED  1/7/2014    Procedure: COMBINED ESOPHAGOSCOPY, GASTROSCOPY, DUODENOSCOPY (EGD), BIOPSY SINGLE OR  MULTIPLE;;  Surgeon: Kraig Nicole MD;  Location:  GI     GYN SURGERY      Laparotomy to remove endometrial tissue     HC BREATH HYDROGEN TEST N/A 6/17/2014    Procedure: HYDROGEN BREATH TEST;  Surgeon: Deacon Alberts MD;  Location:  GI     HYDROGEN BREATH TEST  6/17/14     LAPAROSCOPIC APPENDECTOMY  2002     LAPAROTOMY, LYSIS ADHESIONS, COMBINED  2002    endometriosis     SOFT TISSUE SURGERY  December 2014    right ankle tendon injury          Allergies   Allergen Reactions     Monosodium Glutamate Palpitations and Shortness Of Breath     Sulfasalazine      Developed rash, HA, dizziness       Mental Health History      Prior Mental Health Diagnosis: anxiety    Current Mental Health Treatment: primary care medication management, Citalopram, Adderall extended release    Prior Chemical Dependency Treatment:  None reported    Family History     Family History   Problem Relation Age of Onset     Breast Cancer Mother      Heart Disease Father      C.A.D. Father      Arthritis Father      Circulatory Father      Eye Disorder Father      Lipids Father      Thyroid Disease Father      Macular Degeneration Father      Coronary Artery Disease Father      Anxiety Disorder Father      Alcoholism Father      Rheumatoid Arthritis Father      Hypothyroidism Father      Diabetes Father      Cerebrovascular Disease Paternal Grandmother         ,     Cancer Paternal Grandfather         Pancreatic     Heart Disease Paternal Grandfather      Diabetes Maternal Grandmother         Type 2     C.A.D. Maternal Grandmother      Heart Disease Maternal Grandmother      Coronary Artery Disease Maternal Grandmother      Heart Disease Maternal Grandfather      Cardiac Sudden Death Maternal Grandfather      Gynecology Other         self     Thyroid Disease Other         self     Macular Degeneration Maternal Aunt      Diabetes Other         Type 1     Glaucoma No family hx of        Family History of Sleep Disorders: None  reported    Social History     Social History     Socioeconomic History     Marital status: Single     Spouse name: None     Number of children: 0     Years of education: None     Highest education level: None   Occupational History     Occupation: research     Employer: [employer]   Tobacco Use     Smoking status: Never Smoker     Smokeless tobacco: Never Used   Substance and Sexual Activity     Alcohol use: Yes     Comment: moderately     Drug use: No     Sexual activity: None   Other Topics Concern     Parent/sibling w/ CABG, MI or angioplasty before 65F 55M? Yes   Social History Narrative     None     Social Determinants of Health     Financial Resource Strain:      Difficulty of Paying Living Expenses:    Food Insecurity:      Worried About Running Out of Food in the Last Year:      Ran Out of Food in the Last Year:    Transportation Needs:      Lack of Transportation (Medical):      Lack of Transportation (Non-Medical):    Physical Activity:      Days of Exercise per Week:      Minutes of Exercise per Session:    Stress:      Feeling of Stress :    Social Connections:      Frequency of Communication with Friends and Family:      Frequency of Social Gatherings with Friends and Family:      Attends Taoist Services:      Active Member of Clubs or Organizations:      Attends Club or Organization Meetings:      Marital Status:    Intimate Partner Violence:      Fear of Current or Ex-Partner:      Emotionally Abused:      Physically Abused:      Sexually Abused:           Mental Status Examination     Dali presented as oriented X3 with speech and language intact.  The patient was cooperative throughout the evaluation with no signs of hallucinations, delusions, loosening of associations or other thought disturbance.  Mood was normal Affect was congruent with mood. Insight and judgement were intact.  Memory appeared intact for recent and remote elements.  There was no report of suicidal ideation,  intention or plan. Attention and concentration were within normal.      Copy:   Dimitri Alas MD  [address] [facility] [address]  [place] [address]    Philip Quiroz PsyD, LP, [facility]  Diplomate, Behavioral Sleep Medicine  [facility]    Note: This dictation was created using voice recognition software. This document may contain an error not identified before finalizing the document. If the error changes the accuracy of the document, I would appreciate it being brought to my attention.

## 2021-09-07 DIAGNOSIS — E10.3291 TYPE 1 DIABETES MELLITUS WITH MILD NONPROLIFERATIVE RETINOPATHY OF RIGHT EYE, MACULAR EDEMA PRESENCE UNSPECIFIED (H): ICD-10-CM

## 2021-09-09 ENCOUNTER — VIRTUAL VISIT (OUTPATIENT)
Dept: SLEEP MEDICINE | Facility: CLINIC | Age: 51
End: 2021-09-09
Payer: COMMERCIAL

## 2021-09-09 VITALS — WEIGHT: 190 LBS | HEIGHT: 70 IN | BODY MASS INDEX: 27.2 KG/M2

## 2021-09-09 DIAGNOSIS — F41.9 ANXIETY: Primary | ICD-10-CM

## 2021-09-09 DIAGNOSIS — F43.9 STRESS: ICD-10-CM

## 2021-09-09 PROCEDURE — 90834 PSYTX W PT 45 MINUTES: CPT | Mod: 95 | Performed by: PSYCHOLOGIST

## 2021-09-09 ASSESSMENT — MIFFLIN-ST. JEOR: SCORE: 1552.08

## 2021-09-09 NOTE — PROGRESS NOTES
"Dali is a 50 year old who is being evaluated via a billable video visit.      How would you like to obtain your AVS? MyChart  If the video visit is dropped, the invitation should be resent by: Send to e-mail at: [email or web address]  Will anyone else be joining your video visit? No    Video Start Time: 9:03 AM  Video-Visit Details    Type of service:  Video Visit    Video End Time:9:41 AM    Originating Location (pt. Location): Home    Distant Location (provider location):  [facility]     Platform used for Video Visit: [facility]     SLEEP [facility] VIRTUAL VIDEO FOLLOW-UP VISIT  Sleep Psychology    Patient Name: Dali Martines  MRN:  [id number]  Date of Service: Sep 9, 2021       Subjective Report     Dali Martines  returns for a telehealth video visit to discuss progress in implementing behavioral strategies for the management of insomnia.  Patient consent for initiation of video visit was obtained and documented prior to initiation of visit.     Dali reports she did keep a sleep journal and notices how regular it had become that she fell asleep on the sofa on the bed in the evening.    She reports increased tiredness during the day and napping during day for up to an hour..    She has tried to take moments to \"decrompress\" during the day.  She reflected on increased health care needs of her parents and how it increased her stress level..  Discussed \"flight or flightl\" response.  In particular her father's health needs concern her.  Her  mother has cognitive impairment and now that she is living without  her impairment is more evident.      She finds it is challenging to relax after times when she is more active during the day.         Sleep Data:     Source of Sleep Estimates:  verbal self-report        EPWORTH SLEEPINESS SCALE    Sitting and reading 1   Watching TV 2   Sitting, inactive in a public place (theatre or mtg.) 0    As a passenger in a car 0   Lying down " "to rest in the afternoon when circumstance permit 2   Sitting and talking to someone 0   Sitting quietly after lunch without alcohol 0   In a car, while stopped for a few minutes in traffic 0   TOTAL SCORE (nl <11) 5     INSOMNIA SEVERITY INDEX     Difficulty falling asleep 1   Difficult staying asleep 3   Problems waking up to early 3   How SATISFIED/DISSATISFIED are you with your CURRENT sleep pattern? 3   How NOTICEABLE to others do you think your sleep pattern is in terms of your quality of life? 1   How WORRIED/DISTRESSED are you about your current sleep pattern? 3   To what extent do you consider your sleep problem to INTERFERE with your daily fuctioning(e.g. daytime fatigue, mood, ability to function at work/daily chores, concentration, mood,etc.) CURRENTLY? 4   INSOMNIA SEVERITY INDEX TOTAL SCORE 18    Absence of insomnia (0-7); sub-threshold insomnia (8-14); moderate insomnia (15-21); and severe insomnia (22-28)       Interventions     Strategies and recommendations including implementation of stimulus control, cognitive restructuring and sleep and stress management were discussed today.       Vital Signs     Ht 1.778 m (5' 10\")   Wt 86.2 kg (190 lb)   BMI 27.26 kg/m       Mental Status     Orientation:  X3  Mood:  normal  Affect:  Congruent with mood  Speech/Language:  Normal  Thought Process: Intact  Associations:  Normal  Thought Content: Normal  Patient does not report any suicidal ideation, intention or plan.    Diagnostic Impressions and Plan     Chronic insomnia   Stress reaction    Plan:  Review cognitive restructuring task, implement constructive worry. consider short early afternoon nap, 30 min to avoid evening inacvertent napping.  Continue to avoid afternoon and evening use of caffeine    Follow-up: 3 weeks      Philip Quiroz PsyD, GARY, DBSM  Diplomate, Behavioral Sleep Medicine  [facility]      Note: This dictation was created using voice recognition software. " This document may contain an error not identified before finalizing the document. If the error changes the accuracy of the document, I would appreciate it being brought to my attention.

## 2021-09-09 NOTE — PATIENT INSTRUCTIONS
Your BMI is Body mass index is 27.26 kg/m .  Weight management is a personal decision.  If you are interested in exploring weight loss strategies, the following discussion covers the approaches that may be successful. Body mass index (BMI) is one way to tell whether you are at a healthy weight, overweight, or obese. It measures your weight in relation to your height.  A BMI of 18.5 to 24.9 is in the healthy range. A person with a BMI of 25 to 29.9 is considered overweight, and someone with a BMI of 30 or greater is considered obese. More than two-thirds of American adults are considered overweight or obese.  Being overweight or obese increases the risk for further weight gain. Excess weight may lead to heart disease and diabetes.  Creating and following plans for healthy eating and physical activity may help you improve your health.  Weight control is part of healthy lifestyle and includes exercise, emotional health, and healthy eating habits. Careful eating habits lifelong are the mainstay of weight control. Though there are significant health benefits from weight loss, long-term weight loss with diet alone may be very difficult to achieve- studies show long-term success with dietary management in less than 10% of people. Attaining a healthy weight may be especially difficult to achieve in those with severe obesity. In some cases, medications, devices and surgical management might be considered.  What can you do?  If you are overweight or obese and are interested in methods for weight loss, you should discuss this with your provider.     Consider reducing daily calorie intake by 500 calories.     Keep a food journal.     Avoiding skipping meals, consider cutting portions instead.    Diet combined with exercise helps maintain muscle while optimizing fat loss. Strength training is particularly important for building and maintaining muscle mass. Exercise helps reduce stress, increase energy, and improves fitness.  Increasing exercise without diet control, however, may not burn enough calories to loose weight.       Start walking three days a week 10-20 minutes at a time    Work towards walking thirty minutes five days a week     Eventually, increase the speed of your walking for 1-2 minutes at time    In addition, we recommend that you review healthy lifestyles and methods for weight loss available through the National Institutes of Health patient information sites:  http://win.niddk.nih.gov/publications/index.htm    And look into health and wellness programs that may be available through your health insurance provider, employer, local community center, or roger club.    Weight management plan: Patient was referred to their PCP to discuss a diet and exercise plan.

## 2021-09-10 DIAGNOSIS — E10.3291 TYPE 1 DIABETES MELLITUS WITH MILD NONPROLIFERATIVE RETINOPATHY OF RIGHT EYE, MACULAR EDEMA PRESENCE UNSPECIFIED (H): ICD-10-CM

## 2021-09-10 RX ORDER — DULAGLUTIDE 0.75 MG/.5ML
0.75 INJECTION, SOLUTION SUBCUTANEOUS
Qty: 2 ML | Refills: 5 | Status: SHIPPED | OUTPATIENT
Start: 2021-09-10 | End: 2022-03-02

## 2021-09-14 RX ORDER — DULAGLUTIDE 0.75 MG/.5ML
INJECTION, SOLUTION SUBCUTANEOUS
Qty: 4 ML | OUTPATIENT
Start: 2021-09-14

## 2021-09-29 NOTE — PROGRESS NOTES
"Dali is a 50 year old who is being evaluated via a billable video visit.      How would you like to obtain your AVS? MyChart  If the video visit is dropped, the invitation should be resent by: Send to e-mail at: [email or web address]  Will anyone else be joining your video visit? Asiya Gaytan MA    Video Start Time: 3:31 PM  Video-Visit Details    Type of service:  Video Visit    Video End Time:4:16 PM    Originating Location (pt. Location): Home    Distant Location (provider location):  [facility]     Platform used for Video Visit: [facility]     SLEEP [facility] VIRTUAL VIDEO FOLLOW-UP VISIT  Sleep Psychology    Patient Name: Dali Martines  MRN:  [id number]  Date of Service: Sep 30, 2021       Subjective Report     Dali Martines  returns for a telehealth video visit to discuss progress in implementing behavioral strategies for the management of insomnia.and stress  Patient consent for initiation of video visit was obtained and documented prior to initiation of visit.     Dali reports she has significantly reduced napping on sofa.  Reduced to once per week. Dicussed preventing inadvertent napping in the evening and strategy to prevent it.    She reports less success in carving out time to relax or do an earlier nap time.    Discussed challenge to schedule nap time for herself.    She also reports a challenge building in \"buffer time\" between responsibilities.  She has done this and finds it useful.     Reintroduced constructive worry strategy as well as and reaching a strategy with bilateral stimulation (example tapping on (with use of imagery compartmentalization)     Sleep Data:     Source of Sleep Estimates:  verbal self-report        EPWORTH SLEEPINESS SCALE    Sitting and reading 2   Watching TV 3   Sitting, inactive in a public place (theatre or mtg.) 1    As a passenger in a car 1   Lying down to rest in the afternoon when circumstance permit 2   Sitting and " talking to someone 0   Sitting quietly after lunch without alcohol 1   In a car, while stopped for a few minutes in traffic 0   TOTAL SCORE (nl <11) 10     INSOMNIA SEVERITY INDEX     Difficulty falling asleep 2   Difficult staying asleep 2   Problems waking up to early 4   How SATISFIED/DISSATISFIED are you with your CURRENT sleep pattern? 3   How NOTICEABLE to others do you think your sleep pattern is in terms of your quality of life? 2   How WORRIED/DISTRESSED are you about your current sleep pattern? 3   To what extent do you consider your sleep problem to INTERFERE with your daily fuctioning(e.g. daytime fatigue, mood, ability to function at work/daily chores, concentration, mood,etc.) CURRENTLY? 3   INSOMNIA SEVERITY INDEX TOTAL SCORE 19    Absence of insomnia (0-7); sub-threshold insomnia (8-14); moderate insomnia (15-21); and severe insomnia (22-28)       Interventions     Strategies and recommendations including managing sleep restriction/compression and stress management were discussed today.       Vital Signs     There were no vitals taken for this visit.     Mental Status     Orientation:  X3  Mood:  normal  Affect:  Congruent with mood  Speech/Language:  Normal  Thought Process: Intact  Associations:  Normal  Thought Content: Normal  Patient does not report any suicidal ideation, intention or plan.    Diagnostic Impressions and Plan        Chronic insomnia  Stress  Sleep latency, wake after sleep onset and sleep efficiency improved.  Patient is adopted several stress management techniques which have resulted in better management of daytime stress, in particular managing her mother's care needs and schedule.    Plan:  continue current sleep schedule and plan and Begin constructive worry technique.  Patient may want to consider including bilateral stimulation and imagery technique.  Discussed scheduled breaks for management of stress. and introduce this into her daily schedule.    Follow-up: 4  abelardo Quiroz PsyD, GARY, [facility]  Diplomate, Behavioral Sleep Medicine  [facility]      Note: This dictation was created using voice recognition software. This document may contain an error not identified before finalizing the document. If the error changes the accuracy of the document, I would appreciate it being brought to my attention.

## 2021-09-30 ENCOUNTER — VIRTUAL VISIT (OUTPATIENT)
Dept: SLEEP MEDICINE | Facility: CLINIC | Age: 51
End: 2021-09-30
Payer: COMMERCIAL

## 2021-09-30 DIAGNOSIS — F43.9 STRESS: ICD-10-CM

## 2021-09-30 DIAGNOSIS — F51.04 CHRONIC INSOMNIA: Primary | ICD-10-CM

## 2021-09-30 PROCEDURE — 90834 PSYTX W PT 45 MINUTES: CPT | Mod: 95 | Performed by: PSYCHOLOGIST

## 2021-09-30 ASSESSMENT — SLEEP AND FATIGUE QUESTIONNAIRES
HOW LIKELY ARE YOU TO NOD OFF OR FALL ASLEEP WHILE SITTING AND READING: MODERATE CHANCE OF DOZING
HOW LIKELY ARE YOU TO NOD OFF OR FALL ASLEEP WHILE WATCHING TV: HIGH CHANCE OF DOZING
HOW LIKELY ARE YOU TO NOD OFF OR FALL ASLEEP WHEN YOU ARE A PASSENGER IN A CAR FOR AN HOUR WITHOUT A BREAK: SLIGHT CHANCE OF DOZING
HOW LIKELY ARE YOU TO NOD OFF OR FALL ASLEEP WHILE LYING DOWN TO REST IN THE AFTERNOON WHEN CIRCUMSTANCES PERMIT: MODERATE CHANCE OF DOZING
HOW LIKELY ARE YOU TO NOD OFF OR FALL ASLEEP WHILE SITTING AND TALKING TO SOMEONE: WOULD NEVER DOZE
HOW LIKELY ARE YOU TO NOD OFF OR FALL ASLEEP IN A CAR, WHILE STOPPED FOR A FEW MINUTES IN TRAFFIC: WOULD NEVER DOZE
HOW LIKELY ARE YOU TO NOD OFF OR FALL ASLEEP WHILE SITTING QUIETLY AFTER LUNCH WITHOUT ALCOHOL: SLIGHT CHANCE OF DOZING
HOW LIKELY ARE YOU TO NOD OFF OR FALL ASLEEP WHILE SITTING INACTIVE IN A PUBLIC PLACE: SLIGHT CHANCE OF DOZING

## 2021-10-19 ENCOUNTER — VIRTUAL VISIT (OUTPATIENT)
Dept: RHEUMATOLOGY | Facility: CLINIC | Age: 51
End: 2021-10-19
Attending: INTERNAL MEDICINE
Payer: COMMERCIAL

## 2021-10-19 VITALS — BODY MASS INDEX: 27.92 KG/M2 | HEIGHT: 70 IN | WEIGHT: 195 LBS

## 2021-10-19 DIAGNOSIS — Z79.631 LONG TERM METHOTREXATE USER: ICD-10-CM

## 2021-10-19 DIAGNOSIS — L40.50 PSORIATIC ARTHRITIS (H): Primary | ICD-10-CM

## 2021-10-19 PROCEDURE — 99214 OFFICE O/P EST MOD 30 MIN: CPT | Mod: 95 | Performed by: INTERNAL MEDICINE

## 2021-10-19 ASSESSMENT — PAIN SCALES - GENERAL: PAINLEVEL: MILD PAIN (3)

## 2021-10-19 ASSESSMENT — MIFFLIN-ST. JEOR: SCORE: 1574.76

## 2021-10-19 NOTE — LETTER
"10/19/2021       RE: Dali Martines  [address]  [facility] [id number]     Dear Colleague,    Thank you for referring your patient, Dali Martines, to the [facility] RHEUMATOLOGY [facility] [place] at [facility]. Please see a copy of my visit note below.    Chief Complaint   Patient presents with     Follow Up     Height 1.778 m (5' 10\"), weight 88.5 kg (195 lb), not currently breastfeeding.      HPI   Prior Note carried forward:     [facility]  Rheumatology Clinic  Yoel Betancourt MD  10/19/2021     Name: Dali Martines  MRN: [id number]  Age: 49 year old  : 1970  Referring provider: Referred Self    Problem List:  1. Psoriatic arthritis   2. Psoriasis with arthropathy  3. Pain in joint, multiple sites  4. Chronic pain of right ankle  5. Right foot pain  6. Pain in joint involving ankle and foot, right      2020 :   Dali Martines is a 49 year old female with a history including type I diabetes, psoriatic arthritis, and Hashimoto's thyroiditis who presents for follow-up. I last saw the patient on 2018, at which time she reported increased left hand pain, most prominently in the left 2nd MCP. The plan was to continue on increased methotrexate dosage of 12.5 mg, and, if well-tolerated, then increasing dosage further to 15 mg weekly.    Background history:  Symptoms first occurred in 2008, when she began having stiffness and pain in the left hand, particularly in the PIPs and MCPs. Symptoms spontaneously resolved after about 6-8 weeks. She was previously seen by Rheumatology here around that time (see note from Dr. Betancourt on 2008). Briefly, assessment was that she had a self-limited episode of inflammatory arthralgias, possibly viral or early psoriatic arthritis. Negative NOLVIA, RF, Lyme serologies and normal CRP and ESR. Given her family history of RA, there was a thought to start her on HCQ if anti-CCP antibodies were " "positive, but they were found to be negative. No imaging of the hand was performed. September 2014, she experienced recurrent acute onset pain, stiffness, and swelling in the L hand very similar to her symptoms in 2008. Specifically, had swelling across MCPs and in 3rd PIP. She could hardly bend the hand sometimes due to the welling. Had morning stiffness lasting 30-45 minutes. No other joints were involved. Issues with her left hand lingered for several months but eventually self-resolved that January and her only notable symptom is heel pain in the mornings. No fevers, chills, or weight loss. No back pain, vision changes, nausea, vomiting, diarrhea, abdominal pain, or blood in stool or urine. No rash, sicca symptoms, or oral ulcers. It seems she has a history of hypermobility, e.g. could bend wrist back to forearm, bend down and touch palms flat on floor, when she was younger. Used to get frequent ankle sprains, and has had multiple tendon and ligament tears over the years.    Today, the patient reports that she had recent upper respiratory symptoms which triggered asthma, and she was placed on steroids for this. She describes some lingering post-nasal drip over most of the fall, but this has since resolved. She has been taking Dulera finished course of prednisone.     She reports that she is having more joint pain in her right 1st MTP. She explains that her pain here is not bad, but she does feel soreness if someone squeezes her hand. She notes that she has more prominent left-sided 1st MTP pain. She states that her 1st MTPs do swell.     She states that she had psoriasic skin rashes around her temple area. She denies any issues tolerating methotrexate. She denies any significant morning joint stiffness. She describes a \"feeling like tendonitis\" in her medial knees and elbows that feels like she has hyperextended the joints. She adds that these sensations take hours to resolve. She reports that these joint " sensations have worsened in the last couple months. She states that she historically has joint issues at the beginning of the fall, but these joint issues seem to be lasting into the winter time presently.      She explains that she has had a swollen gland on the right side of her neck which has been so significant in the past that she had difficulty turning her head to the side.     Review of Systems:   Pertinent items are noted in HPI or as below, remainder of complete ROS is negative.      No recent problems with hearing or vision. No swallowing problems.   No breathing difficulty, shortness of breath, coughing, or wheezing.  No chest pain or palpitations.  No heart burn, indigestion, abdominal pain, nausea, vomiting, diarrhea.  No urination problems, no bloody, cloudy urine, no dysuria.  No numbing, tingling, weakness.  No headaches or confusion.  No rashes. No easy bleeding or bruising.     Active Medications:   Current Outpatient Medications:      albuterol (PROAIR HFA/PROVENTIL HFA/VENTOLIN HFA) 108 (90 Base) MCG/ACT inhaler, Inhale 2 puffs into the lungs every 4 hours as needed for shortness of breath / dyspnea or wheezing, Disp: 1 Inhaler, Rfl: 11     aspirin 81 MG tablet, Take 1 tablet by mouth daily., Disp: , Rfl:      blood glucose (ACCU-CHEK COLIN PLUS) test strip, Test Blood Sugar 8 times daily or as directed, Disp: 800 strip, Rfl: 3     blood glucose monitoring (ACCU-CHEK FASTCLIX) lancets, Use to test blood sugar 8 times daily or as directed., Disp: 4 Box, Rfl: 3     Calcium Carbonate-Vitamin D (CALCIUM + D PO), Take one daily, Disp: , Rfl:      citalopram (CELEXA) 20 MG tablet, Take 1.5 tablets (30 mg) by mouth daily, Disp: 135 tablet, Rfl: 1     Continuous Blood Gluc Sensor (DEXCOM G5 MOB/G4 PLAT SENSOR) MISC, 1 each every 7 days, Disp: 12 each, Rfl: 3     Continuous Blood Gluc Transmit (DEXCOM G4 PLATINUM TRANSMITTER) MISC, 1 each every 6 months, Disp: 1 each, Rfl: 1     diclofenac (VOLTAREN) 1 %  topical gel, APPLY 2 GRAMS ONTO THE SKIN FOUR TIMES DAILY AS NEEDED FOR MODERATE PAIN, Disp: 100 g, Rfl: 5     dulaglutide (TRULICITY) 1.5 MG/0.5ML pen, Inject 1.5 mg Subcutaneous every 7 days, Disp: 3 mL, Rfl: 1     econazole nitrate 1 % cream, Apply 0.5 inches topically daily., Disp: , Rfl:      fish oil-omega-3 fatty acids (FISH OIL) 1000 MG capsule, Take one daily, Disp: , Rfl:      folic acid (FOLVITE) 1 MG tablet, Take 1 tablet (1 mg) by mouth daily, Disp: 90 tablet, Rfl: 0     insulin degludec (TRESIBA) 100 UNIT/ML pen, Inject 15 units subcutaneous at hs., Disp: 15 mL, Rfl: 11     insulin pen needle (B-D U/F) 31G X 8 MM miscellaneous, Use 5 pen needles daily, Disp: 450 each, Rfl: 3     Ketoconazole (NIZORAL PO), Take  by mouth. Shampoo daily, Disp: , Rfl:      levothyroxine (SYNTHROID/LEVOTHROID) 112 MCG tablet, Take 1 tablet (112 mcg) by mouth daily, Disp: 90 tablet, Rfl: 3     methotrexate 2.5 MG tablet, Take 6 tablets (15 mg) by mouth once a week, Disp: 24 tablet, Rfl: 1     methylphenidate (METADATE CD) 20 MG CR capsule, Take 20 mg by mouth daily, Disp: , Rfl:      Minoxidil (ROGAINE EX), Externally apply  topically. daily, Disp: , Rfl:      mometasone-formoterol (DULERA) 100-5 MCG/ACT inhaler, Inhale 2 puffs into the lungs 2 times daily, Disp: 3 Inhaler, Rfl: 11     montelukast (SINGULAIR) 5 MG chewable tablet, Take 1 tablet (5 mg) by mouth At Bedtime, Disp: 90 tablet, Rfl: 3     Multiple Vitamin (MULTIVITAMIN OR), Take  by mouth. Take one daily tab, Disp: , Rfl:      NOVOLOG PENFILL 100 UNIT/ML soln, 1 unit per 15 grams CHO at all meals and snacks with correction scale of 1 unit per 50 mg/dL over 150. Average daily use is 30  units., Disp: 30 mL, Rfl: 4     simvastatin (ZOCOR) 20 MG tablet, Take 1 tablet (20 mg) by mouth At Bedtime, Disp: 90 tablet, Rfl: 3     traZODone (DESYREL) 50 MG tablet, Take 1-2 tablets by mouth nightly as needed for sleep., Disp: 180 tablet, Rfl: 0    Current  "Facility-Administered Medications:      betamethasone acet & sod phos (CELESTONE) injection 6 mg, 6 mg, INTRA-ARTICULAR, Once, Chris Uribe MD    Allergies:   Sulfasalazine      Past Medical History:  Anemia   Anxiety   Psoriatic arthritis  Dry eye syndrome   Endometriosis   Gastroesophageal reflux disease   Hypothyroidism   Type 1 diabetes mellitus   Asthma   Necrobiosis lipoidica diabeticorum  Chronic low back pain   Enthesopathy   Chronic right ankle pain      Past Surgical History:  Knee cruciate ligament repair   Stomach surgery 12/2002  Hydrogen breath test 6/17/2014   Appendectomy 2002   Lysis adhesions 2002   Right ankle surgery 12/2014      Family History:   Mother: Breast cancer   Father: Heart disease, eye disorder, hyperlipidemia, macular degeneration, anxiety, alcoholism, rheumatoid arthritis, hypothyroidism, diabetes mellitus   Paternal grandmother: Cerebrovascular disease  Paternal grandfather: Pancreatic cancer, heart disease   Maternal grandmother: Type 2 diabetes mellitus, coronary artery disease  Maternal aunt: Type 1 diabetes mellitus     Social History:  The patient reports that she has never smoked. She has never used smokeless tobacco. She reports current alcohol use. She reports that she does not use drugs.   PCP: Dimitri Alas  Marital Status: Single    Physical Exam:   Ht 1.778 m (5' 10\")   Wt 88.5 kg (195 lb)   Breastfeeding No   BMI 27.98 kg/m     Wt Readings from Last 4 Encounters:   10/19/21 88.5 kg (195 lb)   09/09/21 86.2 kg (190 lb)   06/23/20 83.9 kg (185 lb)   01/07/20 87.6 kg (193 lb 1.6 oz)     Constitutional: Well-developed, appearing stated age; cooperative.      Laboratory:   RHEUM RESULTS Latest Ref Rng & Units 3/7/2005 6/22/2005 7/11/2005   ALBUMIN 3.4 - 5.0 g/dL - - -   ALT 0 - 50 U/L 16 - -   AST 0 - 45 U/L - - -   CREATININE 0.52 - 1.04 mg/dL 0.60 - -   CRP 0.0 - 8.0 mg/L - - -   VICTORINO <1.0 - - -   GFR ESTIMATE, IF BLACK >60 mL/min/[1.73:m2] >80 - -   GFR " ESTIMATE >60 mL/min/1.73m2 >80 - -   HEMATOCRIT 35.0 - 47.0 % - - 38.0   HEMOGLOBIN 11.7 - 15.7 g/dL - 11.4(L) 12.8   IGA 70 - 380 mg/dL - - 194   IGM 60 - 265 mg/dL - - 151    - 1620 mg/dL - - 1410   WBC 4.0 - 11.0 10e3/uL - - 7.9   RBC 3.80 - 5.20 10e6/uL - - 4.02   RDW 10.0 - 15.0 % - - 13.1   MCHC 31.5 - 36.5 g/dL - - 33.8   MCV 78 - 100 fL - - 95   PLATELET COUNT 150 - 450 10e3/uL - - 258   RHEUMATOID FACTOR <20 IU/mL - - -   ESR 0 - 30 mm/hr - - -     Rheumatoid Factor   Date Value Ref Range Status   12/22/2014 <20 <20 IU/mL Final     Cyclic Cit Pept IgG/IgA   Date Value Ref Range Status   03/17/2015 <20  Interpretation:  Negative   <20 UNITS Final     Cyclic Citrullinated Peptide IgG   Date Value Ref Range Status   12/09/2008 <2 <5 U/mL Final     Comment:     Interpretation:  Negative     Scleroderma Antibody Scl-70 ELEAZAR IgG   Date Value Ref Range Status   03/17/2015  0.0 - 0.9 AI Final    <0.2  Negative   Antibody index (AI) values reflect qualitative changes in antibody   concentration that cannot be directly associated with clinical condition or   disease state.       SSA (Ro) (ELEAZAR) Antibody, IgG   Date Value Ref Range Status   03/17/2015 0.2 0.0 - 0.9 AI Final     Comment:     Negative   Antibody index (AI) values reflect qualitative changes in antibody   concentration that cannot be directly associated with clinical condition or   disease state.       SSB (La) (ELEAZAR) Antibody, IgG   Date Value Ref Range Status   03/17/2015 0.2 0.0 - 0.9 AI Final     Comment:     Negative   Antibody index (AI) values reflect qualitative changes in antibody   concentration that cannot be directly associated with clinical condition or   disease state.       NOLVIA Screen by EIA   Date Value Ref Range Status   12/22/2014 1.6 (H) <1.0 Final     Comment:     Interpretation:  Positive     IGG   Date Value Ref Range Status   07/11/2005 1410 695 - 1620 mg/dL Final     IGA   Date Value Ref Range Status   06/10/2014 190 70 - 380  mg/dL Final     IGM   Date Value Ref Range Status   07/11/2005 151 60 - 265 mg/dL Final     Cyr ELEAZAR Antibody IgG   Date Value Ref Range Status   03/17/2015  0.0 - 0.9 AI Final    <0.2  Negative   Antibody index (AI) values reflect qualitative changes in antibody   concentration that cannot be directly associated with clinical condition or   disease state.       Scleroderma Antibody Scl-70 ELEAZAR IgG   Date Value Ref Range Status   03/17/2015  0.0 - 0.9 AI Final    <0.2  Negative   Antibody index (AI) values reflect qualitative changes in antibody   concentration that cannot be directly associated with clinical condition or   disease state.       January 5, 2021 interval history:    Since we have last seen the patient, she has, with the combination of coronavirus issues and other stressful issues had a somewhat stressful year.    She has had several flares of her arthritis symptoms that she attributes that increased stress.  She is not even sure exactly when it happened.  She thinks this may have been back in the fall but it could have been earlier in the year that she had some increased pain and a little bit of swelling in her metatarsals of the right foot.  That went on for several weeks then seem to go away on its own with no additional therapy.  She also had some increased pain in one of her thumbs at one point.  That is also back to normal.    However over the last several months she has had some increased numbness in her right hand.  She first had some numbness in the right middle finger after a blood draw that apparently hit a nerve because the nerve pain went on for many weeks.  However, more recently she has had rather typical carpal tunnel type symptoms worse first thing in the morning and occasionally waking her from sleep affecting the second third and fourth fingers.  On further questioning, only the medial aspect of the fourth finger is involved and the fifth finger and the thumb are entirely  spared.    In reviewing her record, it also seems that her hemoglobin A1c is a little bit on the high side right now in the mid sevens.  She is aware of this and thinks that this also may be contributed to by the stressors she has been under.    In terms of her psoriasis itself that is been pretty stable.  Its affected primarily the area around her temples and is a little worse with the colder weather but is minimally apparent on the video which is high-quality.    Physical examination by video shows her to be in no acute distress HEENT examination is normal.  She is speaking in full sentences with no shortness of breath.  The skin examination is I can see it looks essentially normal with no significant psoriasis.  Examination of her hands shows there is no convincing areas of swelling no joint deformity and there is full and normal range of motion.    Impression:    Per the problem list, with inflammatory arthritis that is generally been kept under good control with methotrexate.    She has not had any labs however to check for toxicity in quite some time nor to look at inflammation so we should do those and I have reordered them today.    I did also  her extensively on using splinting to prevent carpal tunnel syndrome at night and also to use something in the way of a more flexible soft splint during the day because she does spend a lot of time on the keyboard.    Finally we spoke about coronavirus vaccination.  Based on her chronic conditions of diabetes, asthma, and inflammatory arthritis on methotrexate immunosuppression she should be on phase 1C.  She will keep track of where the state is at and when we get to where we are vaccinating folks in phase 1B she may reach out to us with a my chart message to see whether that vaccine can be obtained with our orders or how she can get it.    She is going to try these conservative approaches for carpal tunnel and will then plan on touching bases in about 2 to 3  months sooner as needed.    This video visit commenced at 4 PM and was completed at 4:34 PM      Originating Location (pt. Location): Home    Distant Location (provider location):  [facility] RHEUMATOLOGY [facility] [place]     Platform used for Video Visit: Mark Anthony Betancourt MD, PhD    Rheumatology        March 16, 2021 interval history:    I last saw the patient just several months ago, but within several weeks at that time she developed a rash consistent with psoriasis over her left shin at a place where she had had previous thinning of the skin related to her type 1 diabetes.  In February she developed a similar patch over her right shin but then at the end of ever developed some kind of a viral illness that she does not think was Covid.  She had only a slight cough and no fever but profound fatigue and brain fog.  This was bad enough she was having trouble working so took 1 full day off and then work 3/2 days before going back to work full-time.    Within the next week she developed multiple psoriasis patches all over her trunk and limbs.  With this she started to take a vitamin D supplement and increase her methotrexate back up to 6 tablets/week which she had previously decreased as she was doing so well.  Interestingly, her joints did not really cause much trouble and were not particular painful or stiff though she did notice a little bit of swelling.  Over the last several weeks on that increased dose of methotrexate and with the vitamin D she feels she has stabilized and some of the most recent patches have started to resolve but she does continue to have quite a few of them including the longest lived 1 over her shin, and one on the back of her neck that is particularly irritating.    She does have a strong family history of psoriasis and was apparently already noted to have some psoriasis at one point during childhood but has never had flares like this.    Physical  examination by video shows her to be in no acute distress HEENT examination is normal.  She is speaking in full sentences with no shortness of breath.  Her upper extremity examination looks essentially normal with no significant areas of joint swelling or deformity.    Impression:    Per the problem list she has had psoriatic arthritis with relatively minimal psoriasis lesions previously but with this very significant flare, apparently triggered by a viral infection that she does not believe was Covid.    Plan/recommendation:    We have already set up a referral for her for dermatology and I urged her to keep that.  I am not certain why this happened and more to the point of not sure whether it will continue to happen.  She does believe she is stabilized somewhat on the current dose of methotrexate, but if this becomes harder to control on an ongoing basis we will need to decide whether she can be treated adequately with methotrexate plus topical steroids or whether we need to add an additional agent such as a TNF inhibitor.  We did discuss the possible use of those, as well as my preference that that would be the next step in her rather than proceeding to something more immunosuppressive such as IL-17 inhibition or Melquiades kinase inhibitor.    She is tolerating the increased dose of methotrexate well we will continue that and do labs again in about 3 weeks after she has been on that dose for about 6 weeks.    We did discuss her getting the coronavirus vaccination as well and holding her methotrexate for 1 week after each of those shots.    I will see her back in 3 months for another video visit, which she has been able to do smoothly as she has a very good connection and she does like doing these in preference to coming in at this time.  We did discuss that should she develop significant joint symptoms or skin features that would benefit from being seen in person that we can convert that visit to in person  visit.    This video visit commenced at 5:36 PM was completed at 6:06 PM, 30 minutes in face-to-face time, with an additional 10 mins of chart review, , and documentation on DOS.       Zaynab 15, 2021 interval history:    Since last seen the patient she has been doing generally well.  She got her vaccines the last of which was about a month ago.  She got the Fritz vaccinations and had definitely had quite a bit of a reaction to the second 1 with a bad headache fevers chills and sweats for much of the following day extending on into the following morning when she actually had enough nausea that she had vomiting as well and then gradual improvement thereafter.  Her diabetes control was also difficult during that time with significant elevation of her glucoses.  She was back to normal within 48 hours however.    She did stop her methotrexate for about a week before and week after each of those doses and stopped it for 2 weeks after the second dose and by that time was definitely noticing a substantial increase in joint pains and and her psoriasis though she never developed actual joint swelling.  Overall however, her joint pain and her psoriasis had been doing much better with the supplemental vitamin D that she started back on in the winter.    With all of this she has not done labs since December.  She has not been having any symptomatic toxicity however.    Physical examination by video shows her to be in no acute distress.  HEENT examination is normal.  She is speaking in full sentences with no shortness of breath.  Examination of her hands shows no convincing areas of swelling or deformity.    Impression:    Per the problem list.    Plan/recommendation:    She is doing well and her being temporarily off the methotrexate was enough to demonstrate that it really is helping with respect to her joints and her psoriasis.    Her control is been very going on it so we will simply continue on the current dose.   Should going to do her labs in the next couple of weeks.  I will see her back in 6 months sooner as needed.    This video visit commenced at 4:05 PM, and was completed at 4:26 PM, 21 mins in face to face time, with an additional 10 mins in chart review, , and documentation completed on DOS.     October 15, 2021 interval history:    Since I have last seen her she found herself having what often happens for her as an episode of flaring early in the fall.  She starts getting this often in September.  She particularly notices it in her hands although this years she was also having more symptoms in her feet.  She particularly noted it at the base of the toes.    It is actually quieted down quite a bit now.  She still can have morning stiffness up to an hour but generally less than that.  When things were rather worse she was having some sharp twinges of pain but that has quieted down.  She denies any episodes of detectable swelling.  The only area that has significant residual tenderness at this point is the right third PIP.  She previously had problems with the left index finger but that has improved.  Overall she believes her function has been stable.    Her psoriasis is actually significantly better on a steady dose of 2000 international units of vitamin D.  She still has a little bit right in her temples and her scalp.    She has been dealing with a fair amount of stress as her father has had to go into hospice because of worsening problems with congestive heart failure.  He however just passed away the last 2 days from a syncopal episode that was almost certainly a fatal cardiac arrhythmia.    She is otherwise doing okay.  Her only other real concern is that the [employer] had initially intended to have a policy where everyone needed to be back at work in person all of the time this fall.  With the current outbreak of the delta variant of COVID-19 that policy has been delayed.  She feels prickly  vulnerable because of the combination of her diabetes and her immunosuppression I assured her that we would write a letter of support for her to continue to work remotely as her job does not require face-to-face in person contact and it would probably be somewhat safer for her although she has now been vaccinated but has not yet had the booster.    Physical examination by video shows her to be in no acute distress HEENT examination is normal.  I cannot really even appreciate psoriatic patches on her scalp.  Her musculoskeletal examination of the upper extremity shows good range of motion with minimal if any swelling in a couple of her PIPs and none elsewhere.    Impression:    Per the problem list she has longstanding psoriatic arthritis that is seronegative.  She has responded well to the methotrexate and continues under good control.    She is due for labs and I have urged her to do those in the next several weeks.  Other than for that I have no acute concerns.  She does have a history of sometimes flaring somewhat in the winter so we will plan on seeing her back in 4 months sooner as needed.  As noted I am happy to support her with a letter if she is being asked to return to work in person prior to the pandemic really having run its course and Covid becoming an endemic virus.    This video visit commenced at 6 PM and was completed at 6:27 PM, 27 minutes in face-to-face time, with an additional 8 minutes in chart review,  and documentation completed on the date of service.    JOSUE Betancourt MD, PhD    Rheumatology

## 2021-10-19 NOTE — PROGRESS NOTES
"Chief Complaint   Patient presents with     Follow Up     Height 1.778 m (5' 10\"), weight 88.5 kg (195 lb), not currently breastfeeding.    Dali is a 50 year old who is being evaluated via a billable video visit.      How would you like to obtain your AVS? MyChart  If the video visit is dropped, the invitation should be resent by: Other e-mail: use [employer]  Will anyone else be joining your video visit? No      Video-Visit Details    Type of service:  Video Visit    Originating Location (pt. Location): Home    Distant Location (provider location):  [facility] RHEUMATOLOG[facility] [place]     Platform used for Video Visit: [facility]     Dali Martines is a 50 year old female who is being evaluated via a billable video visit.      How would you like to obtain your AVS? MyChart  If the video visit is dropped, the invitation should be resent by: Text to cell phone: [phone]  Will anyone else be joining your video visit? No      HPI   Prior Note carried forward:     [facility]  Rheumatology Clinic  Yoel Betancourt MD  10/19/2021     Name: Dali Martines  MRN: [id number]  Age: 49 year old  : 1970  Referring provider: Referred Self    Problem List:  1. Psoriatic arthritis   2. Psoriasis with arthropathy  3. Pain in joint, multiple sites  4. Chronic pain of right ankle  5. Right foot pain  6. Pain in joint involving ankle and foot, right      2020 :   Dali Martines is a 49 year old female with a history including type I diabetes, psoriatic arthritis, and Hashimoto's thyroiditis who presents for follow-up. I last saw the patient on 2018, at which time she reported increased left hand pain, most prominently in the left 2nd MCP. The plan was to continue on increased methotrexate dosage of 12.5 mg, and, if well-tolerated, then increasing dosage further to 15 mg weekly.    Background history:  Symptoms first occurred in 2008, when she began having stiffness and pain in the left hand, " particularly in the PIPs and MCPs. Symptoms spontaneously resolved after about 6-8 weeks. She was previously seen by Rheumatology here around that time (see note from Dr. Betancourt on 12/9/2008). Briefly, assessment was that she had a self-limited episode of inflammatory arthralgias, possibly viral or early psoriatic arthritis. Negative NOLVIA, RF, Lyme serologies and normal CRP and ESR. Given her family history of RA, there was a thought to start her on HCQ if anti-CCP antibodies were positive, but they were found to be negative. No imaging of the hand was performed. September 2014, she experienced recurrent acute onset pain, stiffness, and swelling in the L hand very similar to her symptoms in 2008. Specifically, had swelling across MCPs and in 3rd PIP. She could hardly bend the hand sometimes due to the welling. Had morning stiffness lasting 30-45 minutes. No other joints were involved. Issues with her left hand lingered for several months but eventually self-resolved that January and her only notable symptom is heel pain in the mornings. No fevers, chills, or weight loss. No back pain, vision changes, nausea, vomiting, diarrhea, abdominal pain, or blood in stool or urine. No rash, sicca symptoms, or oral ulcers. It seems she has a history of hypermobility, e.g. could bend wrist back to forearm, bend down and touch palms flat on floor, when she was younger. Used to get frequent ankle sprains, and has had multiple tendon and ligament tears over the years.    Today, the patient reports that she had recent upper respiratory symptoms which triggered asthma, and she was placed on steroids for this. She describes some lingering post-nasal drip over most of the fall, but this has since resolved. She has been taking Dulera finished course of prednisone.     She reports that she is having more joint pain in her right 1st MTP. She explains that her pain here is not bad, but she does feel soreness if someone squeezes her hand.  "She notes that she has more prominent left-sided 1st MTP pain. She states that her 1st MTPs do swell.     She states that she had psoriasic skin rashes around her temple area. She denies any issues tolerating methotrexate. She denies any significant morning joint stiffness. She describes a \"feeling like tendonitis\" in her medial knees and elbows that feels like she has hyperextended the joints. She adds that these sensations take hours to resolve. She reports that these joint sensations have worsened in the last couple months. She states that she historically has joint issues at the beginning of the fall, but these joint issues seem to be lasting into the winter time presently.      She explains that she has had a swollen gland on the right side of her neck which has been so significant in the past that she had difficulty turning her head to the side.     Review of Systems:   Pertinent items are noted in HPI or as below, remainder of complete ROS is negative.      No recent problems with hearing or vision. No swallowing problems.   No breathing difficulty, shortness of breath, coughing, or wheezing.  No chest pain or palpitations.  No heart burn, indigestion, abdominal pain, nausea, vomiting, diarrhea.  No urination problems, no bloody, cloudy urine, no dysuria.  No numbing, tingling, weakness.  No headaches or confusion.  No rashes. No easy bleeding or bruising.     Active Medications:   Current Outpatient Medications:      albuterol (PROAIR HFA/PROVENTIL HFA/VENTOLIN HFA) 108 (90 Base) MCG/ACT inhaler, Inhale 2 puffs into the lungs every 4 hours as needed for shortness of breath / dyspnea or wheezing, Disp: 1 Inhaler, Rfl: 11     aspirin 81 MG tablet, Take 1 tablet by mouth daily., Disp: , Rfl:      blood glucose (ACCU-CHEK COLIN PLUS) test strip, Test Blood Sugar 8 times daily or as directed, Disp: 800 strip, Rfl: 3     blood glucose monitoring (ACCU-CHEK FASTCLIX) lancets, Use to test blood sugar 8 times daily " or as directed., Disp: 4 Box, Rfl: 3     Calcium Carbonate-Vitamin D (CALCIUM + D PO), Take one daily, Disp: , Rfl:      citalopram (CELEXA) 20 MG tablet, Take 1.5 tablets (30 mg) by mouth daily, Disp: 135 tablet, Rfl: 1     Continuous Blood Gluc Sensor (DEXCOM G5 MOB/G4 PLAT SENSOR) MISC, 1 each every 7 days, Disp: 12 each, Rfl: 3     Continuous Blood Gluc Transmit (DEXCOM G4 PLATINUM TRANSMITTER) MISC, 1 each every 6 months, Disp: 1 each, Rfl: 1     diclofenac (VOLTAREN) 1 % topical gel, APPLY 2 GRAMS ONTO THE SKIN FOUR TIMES DAILY AS NEEDED FOR MODERATE PAIN, Disp: 100 g, Rfl: 5     dulaglutide (TRULICITY) 1.5 MG/0.5ML pen, Inject 1.5 mg Subcutaneous every 7 days, Disp: 3 mL, Rfl: 1     econazole nitrate 1 % cream, Apply 0.5 inches topically daily., Disp: , Rfl:      fish oil-omega-3 fatty acids (FISH OIL) 1000 MG capsule, Take one daily, Disp: , Rfl:      folic acid (FOLVITE) 1 MG tablet, Take 1 tablet (1 mg) by mouth daily, Disp: 90 tablet, Rfl: 0     insulin degludec (TRESIBA) 100 UNIT/ML pen, Inject 15 units subcutaneous at hs., Disp: 15 mL, Rfl: 11     insulin pen needle (B-D U/F) 31G X 8 MM miscellaneous, Use 5 pen needles daily, Disp: 450 each, Rfl: 3     Ketoconazole (NIZORAL PO), Take  by mouth. Shampoo daily, Disp: , Rfl:      levothyroxine (SYNTHROID/LEVOTHROID) 112 MCG tablet, Take 1 tablet (112 mcg) by mouth daily, Disp: 90 tablet, Rfl: 3     methotrexate 2.5 MG tablet, Take 6 tablets (15 mg) by mouth once a week, Disp: 24 tablet, Rfl: 1     methylphenidate (METADATE CD) 20 MG CR capsule, Take 20 mg by mouth daily, Disp: , Rfl:      Minoxidil (ROGAINE EX), Externally apply  topically. daily, Disp: , Rfl:      mometasone-formoterol (DULERA) 100-5 MCG/ACT inhaler, Inhale 2 puffs into the lungs 2 times daily, Disp: 3 Inhaler, Rfl: 11     montelukast (SINGULAIR) 5 MG chewable tablet, Take 1 tablet (5 mg) by mouth At Bedtime, Disp: 90 tablet, Rfl: 3     Multiple Vitamin (MULTIVITAMIN OR), Take  by mouth.  "Take one daily tab, Disp: , Rfl:      NOVOLOG PENFILL 100 UNIT/ML soln, 1 unit per 15 grams CHO at all meals and snacks with correction scale of 1 unit per 50 mg/dL over 150. Average daily use is 30  units., Disp: 30 mL, Rfl: 4     simvastatin (ZOCOR) 20 MG tablet, Take 1 tablet (20 mg) by mouth At Bedtime, Disp: 90 tablet, Rfl: 3     traZODone (DESYREL) 50 MG tablet, Take 1-2 tablets by mouth nightly as needed for sleep., Disp: 180 tablet, Rfl: 0    Current Facility-Administered Medications:      betamethasone acet & sod phos (CELESTONE) injection 6 mg, 6 mg, INTRA-ARTICULAR, Once, Chris Uribe MD    Allergies:   Sulfasalazine      Past Medical History:  Anemia   Anxiety   Psoriatic arthritis  Dry eye syndrome   Endometriosis   Gastroesophageal reflux disease   Hypothyroidism   Type 1 diabetes mellitus   Asthma   Necrobiosis lipoidica diabeticorum  Chronic low back pain   Enthesopathy   Chronic right ankle pain      Past Surgical History:  Knee cruciate ligament repair   Stomach surgery 12/2002  Hydrogen breath test 6/17/2014   Appendectomy 2002   Lysis adhesions 2002   Right ankle surgery 12/2014      Family History:   Mother: Breast cancer   Father: Heart disease, eye disorder, hyperlipidemia, macular degeneration, anxiety, alcoholism, rheumatoid arthritis, hypothyroidism, diabetes mellitus   Paternal grandmother: Cerebrovascular disease  Paternal grandfather: Pancreatic cancer, heart disease   Maternal grandmother: Type 2 diabetes mellitus, coronary artery disease  Maternal aunt: Type 1 diabetes mellitus     Social History:  The patient reports that she has never smoked. She has never used smokeless tobacco. She reports current alcohol use. She reports that she does not use drugs.   PCP: Dimitri Alas  Marital Status: Single    Physical Exam:   Ht 1.778 m (5' 10\")   Wt 88.5 kg (195 lb)   Breastfeeding No   BMI 27.98 kg/m     Wt Readings from Last 4 Encounters:   10/19/21 88.5 kg (195 lb) "   09/09/21 86.2 kg (190 lb)   06/23/20 83.9 kg (185 lb)   01/07/20 87.6 kg (193 lb 1.6 oz)     Constitutional: Well-developed, appearing stated age; cooperative.      Laboratory:   RHEUM RESULTS Latest Ref Rng & Units 3/7/2005 6/22/2005 7/11/2005   ALBUMIN 3.4 - 5.0 g/dL - - -   ALT 0 - 50 U/L 16 - -   AST 0 - 45 U/L - - -   CREATININE 0.52 - 1.04 mg/dL 0.60 - -   CRP 0.0 - 8.0 mg/L - - -   VICTORINO <1.0 - - -   GFR ESTIMATE, IF BLACK >60 mL/min/[1.73:m2] >80 - -   GFR ESTIMATE >60 mL/min/1.73m2 >80 - -   HEMATOCRIT 35.0 - 47.0 % - - 38.0   HEMOGLOBIN 11.7 - 15.7 g/dL - 11.4(L) 12.8   IGA 70 - 380 mg/dL - - 194   IGM 60 - 265 mg/dL - - 151    - 1620 mg/dL - - 1410   WBC 4.0 - 11.0 10e3/uL - - 7.9   RBC 3.80 - 5.20 10e6/uL - - 4.02   RDW 10.0 - 15.0 % - - 13.1   MCHC 31.5 - 36.5 g/dL - - 33.8   MCV 78 - 100 fL - - 95   PLATELET COUNT 150 - 450 10e3/uL - - 258   RHEUMATOID FACTOR <20 IU/mL - - -   ESR 0 - 30 mm/hr - - -     Rheumatoid Factor   Date Value Ref Range Status   12/22/2014 <20 <20 IU/mL Final     Cyclic Cit Pept IgG/IgA   Date Value Ref Range Status   03/17/2015 <20  Interpretation:  Negative   <20 UNITS Final     Cyclic Citrullinated Peptide IgG   Date Value Ref Range Status   12/09/2008 <2 <5 U/mL Final     Comment:     Interpretation:  Negative     Scleroderma Antibody Scl-70 ELEAZAR IgG   Date Value Ref Range Status   03/17/2015  0.0 - 0.9 AI Final    <0.2  Negative   Antibody index (AI) values reflect qualitative changes in antibody   concentration that cannot be directly associated with clinical condition or   disease state.       SSA (Ro) (ELEAZAR) Antibody, IgG   Date Value Ref Range Status   03/17/2015 0.2 0.0 - 0.9 AI Final     Comment:     Negative   Antibody index (AI) values reflect qualitative changes in antibody   concentration that cannot be directly associated with clinical condition or   disease state.       SSB (La) (LEEAZAR) Antibody, IgG   Date Value Ref Range Status   03/17/2015 0.2 0.0 - 0.9  AI Final     Comment:     Negative   Antibody index (AI) values reflect qualitative changes in antibody   concentration that cannot be directly associated with clinical condition or   disease state.       NOLVIA Screen by EIA   Date Value Ref Range Status   12/22/2014 1.6 (H) <1.0 Final     Comment:     Interpretation:  Positive     IGG   Date Value Ref Range Status   07/11/2005 1410 695 - 1620 mg/dL Final     IGA   Date Value Ref Range Status   06/10/2014 190 70 - 380 mg/dL Final     IGM   Date Value Ref Range Status   07/11/2005 151 60 - 265 mg/dL Final     Cyr ELEZAAR Antibody IgG   Date Value Ref Range Status   03/17/2015  0.0 - 0.9 AI Final    <0.2  Negative   Antibody index (AI) values reflect qualitative changes in antibody   concentration that cannot be directly associated with clinical condition or   disease state.       Scleroderma Antibody Scl-70 ELEAZAR IgG   Date Value Ref Range Status   03/17/2015  0.0 - 0.9 AI Final    <0.2  Negative   Antibody index (AI) values reflect qualitative changes in antibody   concentration that cannot be directly associated with clinical condition or   disease state.       January 5, 2021 interval history:    Since we have last seen the patient, she has, with the combination of coronavirus issues and other stressful issues had a somewhat stressful year.    She has had several flares of her arthritis symptoms that she attributes that increased stress.  She is not even sure exactly when it happened.  She thinks this may have been back in the fall but it could have been earlier in the year that she had some increased pain and a little bit of swelling in her metatarsals of the right foot.  That went on for several weeks then seem to go away on its own with no additional therapy.  She also had some increased pain in one of her thumbs at one point.  That is also back to normal.    However over the last several months she has had some increased numbness in her right hand.  She first had  some numbness in the right middle finger after a blood draw that apparently hit a nerve because the nerve pain went on for many weeks.  However, more recently she has had rather typical carpal tunnel type symptoms worse first thing in the morning and occasionally waking her from sleep affecting the second third and fourth fingers.  On further questioning, only the medial aspect of the fourth finger is involved and the fifth finger and the thumb are entirely spared.    In reviewing her record, it also seems that her hemoglobin A1c is a little bit on the high side right now in the mid sevens.  She is aware of this and thinks that this also may be contributed to by the stressors she has been under.    In terms of her psoriasis itself that is been pretty stable.  Its affected primarily the area around her temples and is a little worse with the colder weather but is minimally apparent on the video which is high-quality.    Physical examination by video shows her to be in no acute distress HEENT examination is normal.  She is speaking in full sentences with no shortness of breath.  The skin examination is I can see it looks essentially normal with no significant psoriasis.  Examination of her hands shows there is no convincing areas of swelling no joint deformity and there is full and normal range of motion.    Impression:    Per the problem list, with inflammatory arthritis that is generally been kept under good control with methotrexate.    She has not had any labs however to check for toxicity in quite some time nor to look at inflammation so we should do those and I have reordered them today.    I did also  her extensively on using splinting to prevent carpal tunnel syndrome at night and also to use something in the way of a more flexible soft splint during the day because she does spend a lot of time on the keyboard.    Finally we spoke about coronavirus vaccination.  Based on her chronic conditions of  diabetes, asthma, and inflammatory arthritis on methotrexate immunosuppression she should be on phase 1C.  She will keep track of where the state is at and when we get to where we are vaccinating folks in phase 1B she may reach out to us with a my chart message to see whether that vaccine can be obtained with our orders or how she can get it.    She is going to try these conservative approaches for carpal tunnel and will then plan on touching bases in about 2 to 3 months sooner as needed.    This video visit commenced at 4 PM and was completed at 4:34 PM      Originating Location (pt. Location): Home    Distant Location (provider location):  [facility] RHEUM[facility]OLOGY [facility]IN[facility] [place]     Platform used for Video Visit: Mark Anthony Betancourt MD, PhD    Rheumatology        March 16, 2021 interval history:    I last saw the patient just several months ago, but within several weeks at that time she developed a rash consistent with psoriasis over her left shin at a place where she had had previous thinning of the skin related to her type 1 diabetes.  In February she developed a similar patch over her right shin but then at the end of ever developed some kind of a viral illness that she does not think was Covid.  She had only a slight cough and no fever but profound fatigue and brain fog.  This was bad enough she was having trouble working so took 1 full day off and then work 3/2 days before going back to work full-time.    Within the next week she developed multiple psoriasis patches all over her trunk and limbs.  With this she started to take a vitamin D supplement and increase her methotrexate back up to 6 tablets/week which she had previously decreased as she was doing so well.  Interestingly, her joints did not really cause much trouble and were not particular painful or stiff though she did notice a little bit of swelling.  Over the last several weeks on that increased dose of methotrexate  and with the vitamin D she feels she has stabilized and some of the most recent patches have started to resolve but she does continue to have quite a few of them including the longest lived 1 over her shin, and one on the back of her neck that is particularly irritating.    She does have a strong family history of psoriasis and was apparently already noted to have some psoriasis at one point during childhood but has never had flares like this.    Physical examination by video shows her to be in no acute distress HEENT examination is normal.  She is speaking in full sentences with no shortness of breath.  Her upper extremity examination looks essentially normal with no significant areas of joint swelling or deformity.    Impression:    Per the problem list she has had psoriatic arthritis with relatively minimal psoriasis lesions previously but with this very significant flare, apparently triggered by a viral infection that she does not believe was Covid.    Plan/recommendation:    We have already set up a referral for her for dermatology and I urged her to keep that.  I am not certain why this happened and more to the point of not sure whether it will continue to happen.  She does believe she is stabilized somewhat on the current dose of methotrexate, but if this becomes harder to control on an ongoing basis we will need to decide whether she can be treated adequately with methotrexate plus topical steroids or whether we need to add an additional agent such as a TNF inhibitor.  We did discuss the possible use of those, as well as my preference that that would be the next step in her rather than proceeding to something more immunosuppressive such as IL-17 inhibition or Melquiades kinase inhibitor.    She is tolerating the increased dose of methotrexate well we will continue that and do labs again in about 3 weeks after she has been on that dose for about 6 weeks.    We did discuss her getting the coronavirus vaccination as  well and holding her methotrexate for 1 week after each of those shots.    I will see her back in 3 months for another video visit, which she has been able to do smoothly as she has a very good connection and she does like doing these in preference to coming in at this time.  We did discuss that should she develop significant joint symptoms or skin features that would benefit from being seen in person that we can convert that visit to in person visit.    This video visit commenced at 5:36 PM was completed at 6:06 PM, 30 minutes in face-to-face time, with an additional 10 mins of chart review, , and documentation on DOS.       Zaynab 15, 2021 interval history:    Since last seen the patient she has been doing generally well.  She got her vaccines the last of which was about a month ago.  She got the Fritz vaccinations and had definitely had quite a bit of a reaction to the second 1 with a bad headache fevers chills and sweats for much of the following day extending on into the following morning when she actually had enough nausea that she had vomiting as well and then gradual improvement thereafter.  Her diabetes control was also difficult during that time with significant elevation of her glucoses.  She was back to normal within 48 hours however.    She did stop her methotrexate for about a week before and week after each of those doses and stopped it for 2 weeks after the second dose and by that time was definitely noticing a substantial increase in joint pains and and her psoriasis though she never developed actual joint swelling.  Overall however, her joint pain and her psoriasis had been doing much better with the supplemental vitamin D that she started back on in the winter.    With all of this she has not done labs since December.  She has not been having any symptomatic toxicity however.    Physical examination by video shows her to be in no acute distress.  HEENT examination is normal.  She is  speaking in full sentences with no shortness of breath.  Examination of her hands shows no convincing areas of swelling or deformity.    Impression:    Per the problem list.    Plan/recommendation:    She is doing well and her being temporarily off the methotrexate was enough to demonstrate that it really is helping with respect to her joints and her psoriasis.    Her control is been very going on it so we will simply continue on the current dose.  Should going to do her labs in the next couple of weeks.  I will see her back in 6 months sooner as needed.    This video visit commenced at 4:05 PM, and was completed at 4:26 PM, 21 mins in face to face time, with an additional 10 mins in chart review, , and documentation completed on DOS.     October 15, 2021 interval history:    Since I have last seen her she found herself having what often happens for her as an episode of flaring early in the fall.  She starts getting this often in September.  She particularly notices it in her hands although this years she was also having more symptoms in her feet.  She particularly noted it at the base of the toes.    It is actually quieted down quite a bit now.  She still can have morning stiffness up to an hour but generally less than that.  When things were rather worse she was having some sharp twinges of pain but that has quieted down.  She denies any episodes of detectable swelling.  The only area that has significant residual tenderness at this point is the right third PIP.  She previously had problems with the left index finger but that has improved.  Overall she believes her function has been stable.    Her psoriasis is actually significantly better on a steady dose of 2000 international units of vitamin D.  She still has a little bit right in her temples and her scalp.    She has been dealing with a fair amount of stress as her father has had to go into hospice because of worsening problems with congestive heart  failure.  He however just passed away the last 2 days from a syncopal episode that was almost certainly a fatal cardiac arrhythmia.    She is otherwise doing okay.  Her only other real concern is that the [place] had initially intended to have a policy where everyone needed to be back at work in person all of the time this fall.  With the current outbreak of the delta variant of COVID-19 that policy has been delayed.  She feels prickly vulnerable because of the combination of her diabetes and her immunosuppression I assured her that we would write a letter of support for her to continue to work remotely as her job does not require face-to-face in person contact and it would probably be somewhat safer for her although she has now been vaccinated but has not yet had the booster.    Physical examination by video shows her to be in no acute distress HEENT examination is normal.  I cannot really even appreciate psoriatic patches on her scalp.  Her musculoskeletal examination of the upper extremity shows good range of motion with minimal if any swelling in a couple of her PIPs and none elsewhere.    Impression:    Per the problem list she has longstanding psoriatic arthritis that is seronegative.  She has responded well to the methotrexate and continues under good control.    She is due for labs and I have urged her to do those in the next several weeks.  Other than for that I have no acute concerns.  She does have a history of sometimes flaring somewhat in the winter so we will plan on seeing her back in 4 months sooner as needed.  As noted I am happy to support her with a letter if she is being asked to return to work in person prior to the pandemic really having run its course and Covid becoming an endemic virus.    This video visit commenced at 6 PM and was completed at 6:27 PM, 27 minutes in face-to-face time, with an additional 8 minutes in chart review,  and documentation completed on the date of  service.    JOSUE Betancourt MD, PhD    Rheumatology

## 2021-10-23 ENCOUNTER — HEALTH MAINTENANCE LETTER (OUTPATIENT)
Age: 51
End: 2021-10-23

## 2021-10-26 ENCOUNTER — OFFICE VISIT (OUTPATIENT)
Dept: ENDOCRINOLOGY | Facility: CLINIC | Age: 51
End: 2021-10-26
Payer: COMMERCIAL

## 2021-10-26 VITALS
DIASTOLIC BLOOD PRESSURE: 84 MMHG | BODY MASS INDEX: 27.56 KG/M2 | HEIGHT: 70 IN | WEIGHT: 192.5 LBS | HEART RATE: 84 BPM | SYSTOLIC BLOOD PRESSURE: 142 MMHG

## 2021-10-26 DIAGNOSIS — E10.3291 TYPE 1 DIABETES MELLITUS WITH MILD NONPROLIFERATIVE RETINOPATHY OF RIGHT EYE, MACULAR EDEMA PRESENCE UNSPECIFIED (H): Primary | ICD-10-CM

## 2021-10-26 LAB — HBA1C MFR BLD: 7.3 % (ref 4.3–?)

## 2021-10-26 PROCEDURE — 83036 HEMOGLOBIN GLYCOSYLATED A1C: CPT | Performed by: INTERNAL MEDICINE

## 2021-10-26 PROCEDURE — 99215 OFFICE O/P EST HI 40 MIN: CPT | Performed by: INTERNAL MEDICINE

## 2021-10-26 ASSESSMENT — MIFFLIN-ST. JEOR: SCORE: 1558.42

## 2021-10-26 NOTE — LETTER
10/26/2021       RE: Dali Martines  [address]  [facility] [address]     Dear Colleague,    Thank you for referring your patient, Dali Martines, to the [facility] [place] at [facility]. Please see a copy of my visit note below.    2    This 50 year old woman was returns for f/u of her type 1 diabetes. She also has hypothyroidism and psoriatic arthritis.  Pt was dx having type 1 diabetes at age 15.  Her hx is also significant for mild NPDR right eye only,anxiety, hyperlipidemia, psoriatic arthritis,  and GERD.    She is currently taking Tresiba 17 units daily.      Insulin to Carb ratio:  1 unit per 7 grams CHO  Correction Factor:     1 unit drops BG 30 mg/mL      She wears a Dexcom.  Over the last 2 weeks, her average blood sugar was 167.  Her range was from low to high.  56% of her sugars were in range, 27% were between 180 and 250, 12% were over 250, 3% were between 54 and 70, and 2% were less than 54.  She generally recognizes her lows when the alarm goes off.  She does sometimes have symptoms.  She has not had any serious episodes of lows but does seem to have more lows than in the past.    She reports that the last many months has been quite stressful for her.  Her father had a chronic disease and subsequently .  Her mother was recently diagnosed with Alzheimer's disease and she is trying to arrange the appropriate care for her.  With all of these family stressors, she has not been taking care of herself.  She continues to take Trulicity 0.75 mg each week.  She was previously on the 1.5 mg dose but had lots of GI upset.  She feels much better on the lower dose.  She is currently using NovoLog but was on Fiasp for the last several months.  She really prefers it and is just using up the old NovoLog.  She thinks it helps her with her postprandial control.  She was using an inpen device but she stopped using it because  of trouble pairing all of her devices.  It seems it interfered with the CGM.    She continues to be troubled by insomnia.  She is now working with a sleep expert.  She developed psoriasis in the last year that is part of her psoriatic arthritis.  This is now resolved.  She continues to see Dr. Xiong for her arthritis.  She is taking methotrexate  for this and her symptoms are reasonably controlled.    She reports that she has had a new intermittent right upper quadrant pain over the last several months.  It occurs about twice a month and can last a day or 2 at a time.  She has 2 types of pain in her right upper quadrant.  One is a sharp stabbing pain that is present more laterally in a well-circumscribed area.  The other is more of pain with that is more diffuse in location.  When the pain is present she does not have a change in bowel habits.  She does not have nausea or vomiting.  The pain goes away on its own.  It has not been severe enough for her to go to the emergency room.    She also reports that the paresthesias she is reported in one of her feet on the tips of her toes has now extended to both feet.  It is a feeling of numbness that she experiences on her toes.  Its not troubling to her beyond being found pleasant.  It does not keep her awake at night.    She is up-to-date with her eye visits and has no concerns.     She continues to take her statin and her thyroid medication.     She has not checked her blood pressure recently.    Current Outpatient Medications   Medication     albuterol (PROAIR HFA/PROVENTIL HFA/VENTOLIN HFA) 108 (90 Base) MCG/ACT inhaler     aspirin 81 MG tablet     blood glucose (ACCU-CHEK COLIN PLUS) test strip     blood glucose (NO BRAND SPECIFIED) test strip     blood glucose monitoring (ACCU-CHEK FASTCLIX) lancets     blood glucose monitoring (NO BRAND SPECIFIED) meter device kit     Calcium Carbonate-Vitamin D (CALCIUM + D PO)     citalopram (CELEXA) 20 MG tablet     Continuous  "Blood Gluc  (DEXCOM G6 ) SUZANNE     Continuous Blood Gluc Sensor (DEXCOM G6 SENSOR) MISC     Continuous Blood Gluc Transmit (DEXCOM G6 TRANSMITTER) MISC     diclofenac (VOLTAREN) 1 % topical gel     dulaglutide (TRULICITY) 0.75 MG/0.5ML pen     econazole nitrate 1 % cream     fish oil-omega-3 fatty acids (FISH OIL) 1000 MG capsule     folic acid (FOLVITE) 1 MG tablet     Injection Device for insulin (INPEN 100-BLUE-TANIYA) SUZANNE     insulin degludec (TRESIBA) 100 UNIT/ML pen     insulin pen needle (B-D U/F) 31G X 8 MM miscellaneous     Ketoconazole (NIZORAL PO)     levothyroxine (SYNTHROID/LEVOTHROID) 112 MCG tablet     methotrexate 2.5 MG tablet     methylphenidate (METADATE CD) 20 MG CR capsule     methylphenidate (RITALIN) 10 MG tablet     Minoxidil (ROGAINE EX)     mometasone-formoterol (DULERA) 100-5 MCG/ACT inhaler     montelukast (SINGULAIR) 5 MG chewable tablet     Multiple Vitamin (MULTIVITAMIN OR)     NOVOLOG PENFILL 100 UNIT/ML soln     predniSONE (DELTASONE) 20 MG tablet     simvastatin (ZOCOR) 20 MG tablet     traZODone (DESYREL) 50 MG tablet     No current facility-administered medications for this visit.     BP (!) 142/84   Pulse 84   Ht 1.77 m (5' 9.69\")   Wt 87.3 kg (192 lb 8 oz)   BMI 27.87 kg/m    VSS  NAD but appears tired and stressed  Eyes - no periorbital edema, conjunctival injection, scleral icterus  Neck - no thyromegaly  Lungs - clear  CV - RRR.  Normal pulses in feet.  No edema  Abd - NBS. Soft and non-tender.  Liver edge smooth with normal span.  Neuro - sensation intact to monofilament on soles of feet and vibration sense in her toes.  Skin - normal texture   Feet - no ulcers   Mood - neutral  '    Recent Labs   Lab Test 07/23/21  1444 01/11/21  1109 01/11/21  1102 01/11/21  1101 06/18/20  1456 01/07/20  1454 12/17/19  1631 12/17/19  1626 12/17/19  0000 06/18/19  1556 06/18/19  0000 03/21/19  1359 03/21/19  0000 12/18/18  1617 12/18/18  1610 10/31/14  0000 07/09/14  1641 "   A1C 7.3*  --   --  7.1*  --    < >  --   --   --   --   --   --   --   --   --   --   --    HEMOGLOBINA1  --   --   --   --   --   --   --   --  7.4*  --  7.0*  --    < >   < >  --    < >  --    TSH  --   --   --  2.01  --   --   --  1.26  --    < >  --  0.19*  --    < > 0.12*   < > 0.11*   T4  --   --   --   --   --   --   --   --   --   --   --  1.09  --   --   --   --  1.31   LDL  --   --   --  122*  --   --   --   --   --   --   --   --   --   --  92  --   --    HDL  --   --   --  92  --   --   --   --   --   --   --   --   --   --  91  --   --    TRIG  --   --   --  44  --   --   --   --   --   --   --   --   --   --  54  --   --    CR 0.61 0.56  --   --    < >   < >  --   --   --    < >  --   --   --    < >  --    < >  --    MICROL  --   --  <5  --   --   --  <5  --   --   --   --   --   --    < >  --    < >  --     < > = values in this interval not displayed.     A1c today 7.3    Lab on 07/23/2021   Component Date Value Ref Range Status     Erythrocyte Sedimentation Rate 07/23/2021 28  0 - 30 mm/hr Final     CRP Inflammation 07/23/2021 <2.9  0.0 - 8.0 mg/L Final     Creatinine 07/23/2021 0.61  0.52 - 1.04 mg/dL Final     GFR Estimate 07/23/2021 >90  >60 mL/min/1.73m2 Final    As of July 11, 2021, eGFR is calculated by the CKD-EPI creatinine equation, without race adjustment. eGFR can be influenced by muscle mass, exercise, and diet. The reported eGFR is an estimation only and is only applicable if the renal function is stable.     Albumin 07/23/2021 4.2  3.4 - 5.0 g/dL Final     AST 07/23/2021 31  0 - 45 U/L Final     ALT 07/23/2021 43  0 - 50 U/L Final     Hemoglobin A1C 07/23/2021 7.3* 0.0 - 5.6 % Final    Normal <5.7%   Prediabetes 5.7-6.4%    Diabetes 6.5% or higher     Note: Adopted from ADA consensus guidelines.     WBC Count 07/23/2021 5.7  4.0 - 11.0 10e3/uL Final     RBC Count 07/23/2021 3.91  3.80 - 5.20 10e6/uL Final     Hemoglobin 07/23/2021 12.2  11.7 - 15.7 g/dL Final     Hematocrit 07/23/2021  37.6  35.0 - 47.0 % Final     MCV 07/23/2021 96  78 - 100 fL Final     MCH 07/23/2021 31.2  26.5 - 33.0 pg Final     MCHC 07/23/2021 32.4  31.5 - 36.5 g/dL Final     RDW 07/23/2021 13.5  10.0 - 15.0 % Final     Platelet Count 07/23/2021 327  150 - 450 10e3/uL Final     % Neutrophils 07/23/2021 50  % Final     % Lymphocytes 07/23/2021 39  % Final     % Monocytes 07/23/2021 9  % Final     % Eosinophils 07/23/2021 1  % Final     % Basophils 07/23/2021 1  % Final     Absolute Neutrophils 07/23/2021 2.9  1.6 - 8.3 10e3/uL Final     Absolute Lymphocytes 07/23/2021 2.2  0.8 - 5.3 10e3/uL Final     Absolute Monocytes 07/23/2021 0.5  0.0 - 1.3 10e3/uL Final     Absolute Eosinophils 07/23/2021 0.1  0.0 - 0.7 10e3/uL Final     Absolute Basophils 07/23/2021 0.0  0.0 - 0.2 10e3/uL Final     her last visit with me was on Visit date not found.  Assessment and plan:    1.  Diabetes control.  Her A1c is in target but she is having quite a few lows.  I suggested she reduce her Tresiba  by 1 unit.  If the lows continue during the next week, she should go down by another unit.  She is now tolerating the Trulicity at a lower dose.  She has gained some weight but I am reluctant to increase the dose given the problems she has with nausea.  I suggested we have her meet with them dietitian to develop some meal plans that can help her with weight loss.  She is agreeable to this plan.      2.  Diabetes complications.  Her renal function is fine.  She has background retinopathy and is followed by the eye doctor.  She has some paresthesias in the tips of her toes on both feet.  In the past this could play and only pertain to 1 foot.  My exam shows that she has normal vibratory sense and normal monofilament detection.  Because of this normal exam, I wonder if her paresthesias are really from diabetic neuropathy or if they could be related to a structural problem.  I will refer her to podiatry for evaluation.  Perhaps an insert can be put in her  shoes that will complete some of this concern.    3.hypothyroidism.  TSH was in target earlier this year.  Will repeat it next time.    3.CVD risk.  Blood pressure was a bit high today.  I suggested she track it at home.  She is agreeable to do that.  She is taking Zocor but her last LDL was that above target.  We will plan to repeat it next year.    4.right upper quadrant pain.  She has 2 distinct kinds of pain.  One sounds musculoskeletal in origin.  The other may be related to a gastrointestinal problem.  On exam she has no palpable masses and her liver appears to be normal in size and consistency.  Her recent liver function tests are also normal.  She has a history of GERD that has presented differently in the past but it is possible that some of the symptoms are related to that.  It is also possible that she could be having recurrent cholecystitis.  I suggested she let me know if the symptoms recur so we can schedule her for ultrasound of her gallbladder.  If the symptoms recur and she is having nausea, vomiting, difficulty eating, or severe pain she should go to the emergency room for evaluation.  I do not think there is any value in doing an ultrasound in the absence of symptoms because it may not help us with the diagnosis.  I will continue to follow this complaint.      F/u with Shakira Sebastian in 3 mo and f/u with me in 6 mo    I spent 30 minutes with the patient on today's visit.  (Start time 3: 3 0 and end time 4: 00).  On the day of visit I spent an additional 20 minutes reviewing and interpreting her CGM data, reviewing and interpreting her lab data, reviewing her chart, placing orders, and doing documentation.      Felicia Mckeon MD

## 2021-10-26 NOTE — PROGRESS NOTES
2    This 50 year old woman was returns for f/u of her type 1 diabetes. She also has hypothyroidism and psoriatic arthritis.  Pt was dx having type 1 diabetes at age 15.  Her hx is also significant for mild NPDR right eye only,anxiety, hyperlipidemia, psoriatic arthritis,  and GERD.    She is currently taking Tresiba 17 units daily.      Insulin to Carb ratio:  1 unit per 7 grams CHO  Correction Factor:     1 unit drops BG 30 mg/mL      She wears a Dexcom.  Over the last 2 weeks, her average blood sugar was 167.  Her range was from low to high.  56% of her sugars were in range, 27% were between 180 and 250, 12% were over 250, 3% were between 54 and 70, and 2% were less than 54.  She generally recognizes her lows when the alarm goes off.  She does sometimes have symptoms.  She has not had any serious episodes of lows but does seem to have more lows than in the past.    She reports that the last many months has been quite stressful for her.  Her father had a chronic disease and subsequently .  Her mother was recently diagnosed with Alzheimer's disease and she is trying to arrange the appropriate care for her.  With all of these family stressors, she has not been taking care of herself.  She continues to take Trulicity 0.75 mg each week.  She was previously on the 1.5 mg dose but had lots of GI upset.  She feels much better on the lower dose.  She is currently using NovoLog but was on Fiasp for the last several months.  She really prefers it and is just using up the old NovoLog.  She thinks it helps her with her postprandial control.  She was using an inpen device but she stopped using it because of trouble pairing all of her devices.  It seems it interfered with the CGM.    She continues to be troubled by insomnia.  She is now working with a sleep expert.  She developed psoriasis in the last year that is part of her psoriatic arthritis.  This is now resolved.  She continues to see Dr. Xiong for her arthritis.   She is taking methotrexate  for this and her symptoms are reasonably controlled.    She reports that she has had a new intermittent right upper quadrant pain over the last several months.  It occurs about twice a month and can last a day or 2 at a time.  She has 2 types of pain in her right upper quadrant.  One is a sharp stabbing pain that is present more laterally in a well-circumscribed area.  The other is more of pain with that is more diffuse in location.  When the pain is present she does not have a change in bowel habits.  She does not have nausea or vomiting.  The pain goes away on its own.  It has not been severe enough for her to go to the emergency room.    She also reports that the paresthesias she is reported in one of her feet on the tips of her toes has now extended to both feet.  It is a feeling of numbness that she experiences on her toes.  Its not troubling to her beyond being found pleasant.  It does not keep her awake at night.    She is up-to-date with her eye visits and has no concerns.     She continues to take her statin and her thyroid medication.     She has not checked her blood pressure recently.    Current Outpatient Medications   Medication     albuterol (PROAIR HFA/PROVENTIL HFA/VENTOLIN HFA) 108 (90 Base) MCG/ACT inhaler     aspirin 81 MG tablet     blood glucose (ACCU-CHEK COLIN PLUS) test strip     blood glucose (NO BRAND SPECIFIED) test strip     blood glucose monitoring (ACCU-CHEK FASTCLIX) lancets     blood glucose monitoring (NO BRAND SPECIFIED) meter device kit     Calcium Carbonate-Vitamin D (CALCIUM + D PO)     citalopram (CELEXA) 20 MG tablet     Continuous Blood Gluc  (DEXCOM G6 ) SUZANNE     Continuous Blood Gluc Sensor (DEXCOM G6 SENSOR) MISC     Continuous Blood Gluc Transmit (DEXCOM G6 TRANSMITTER) MISC     diclofenac (VOLTAREN) 1 % topical gel     dulaglutide (TRULICITY) 0.75 MG/0.5ML pen     econazole nitrate 1 % cream     fish oil-omega-3 fatty acids  "(FISH OIL) 1000 MG capsule     folic acid (FOLVITE) 1 MG tablet     Injection Device for insulin (INPEN 100-BLUE-TANIYA) SUZANNE     insulin degludec (TRESIBA) 100 UNIT/ML pen     insulin pen needle (B-D U/F) 31G X 8 MM miscellaneous     Ketoconazole (NIZORAL PO)     levothyroxine (SYNTHROID/LEVOTHROID) 112 MCG tablet     methotrexate 2.5 MG tablet     methylphenidate (METADATE CD) 20 MG CR capsule     methylphenidate (RITALIN) 10 MG tablet     Minoxidil (ROGAINE EX)     mometasone-formoterol (DULERA) 100-5 MCG/ACT inhaler     montelukast (SINGULAIR) 5 MG chewable tablet     Multiple Vitamin (MULTIVITAMIN OR)     NOVOLOG PENFILL 100 UNIT/ML soln     predniSONE (DELTASONE) 20 MG tablet     simvastatin (ZOCOR) 20 MG tablet     traZODone (DESYREL) 50 MG tablet     No current facility-administered medications for this visit.     BP (!) 142/84   Pulse 84   Ht 1.77 m (5' 9.69\")   Wt 87.3 kg (192 lb 8 oz)   BMI 27.87 kg/m    VSS  NAD but appears tired and stressed  Eyes - no periorbital edema, conjunctival injection, scleral icterus  Neck - no thyromegaly  Lungs - clear  CV - RRR.  Normal pulses in feet.  No edema  Abd - NBS. Soft and non-tender.  Liver edge smooth with normal span.  Neuro - sensation intact to monofilament on soles of feet and vibration sense in her toes.  Skin - normal texture   Feet - no ulcers   Mood - neutral  '    Recent Labs   Lab Test 07/23/21  1444 01/11/21  1109 01/11/21  1102 01/11/21  1101 06/18/20  1456 01/07/20  1454 12/17/19  1631 12/17/19  1626 12/17/19  0000 06/18/19  1556 06/18/19  0000 03/21/19  1359 03/21/19  0000 12/18/18  1617 12/18/18  1610 10/31/14  0000 07/09/14  1641   A1C 7.3*  --   --  7.1*  --    < >  --   --   --   --   --   --   --   --   --   --   --    HEMOGLOBINA1  --   --   --   --   --   --   --   --  7.4*  --  7.0*  --    < >   < >  --    < >  --    TSH  --   --   --  2.01  --   --   --  1.26  --    < >  --  0.19*  --    < > 0.12*   < > 0.11*   T4  --   --   --   --   -- "   --   --   --   --   --   --  1.09  --   --   --   --  1.31   LDL  --   --   --  122*  --   --   --   --   --   --   --   --   --   --  92  --   --    HDL  --   --   --  92  --   --   --   --   --   --   --   --   --   --  91  --   --    TRIG  --   --   --  44  --   --   --   --   --   --   --   --   --   --  54  --   --    CR 0.61 0.56  --   --    < >   < >  --   --   --    < >  --   --   --    < >  --    < >  --    MICROL  --   --  <5  --   --   --  <5  --   --   --   --   --   --    < >  --    < >  --     < > = values in this interval not displayed.     A1c today 7.3    Lab on 07/23/2021   Component Date Value Ref Range Status     Erythrocyte Sedimentation Rate 07/23/2021 28  0 - 30 mm/hr Final     CRP Inflammation 07/23/2021 <2.9  0.0 - 8.0 mg/L Final     Creatinine 07/23/2021 0.61  0.52 - 1.04 mg/dL Final     GFR Estimate 07/23/2021 >90  >60 mL/min/1.73m2 Final    As of July 11, 2021, eGFR is calculated by the CKD-EPI creatinine equation, without race adjustment. eGFR can be influenced by muscle mass, exercise, and diet. The reported eGFR is an estimation only and is only applicable if the renal function is stable.     Albumin 07/23/2021 4.2  3.4 - 5.0 g/dL Final     AST 07/23/2021 31  0 - 45 U/L Final     ALT 07/23/2021 43  0 - 50 U/L Final     Hemoglobin A1C 07/23/2021 7.3* 0.0 - 5.6 % Final    Normal <5.7%   Prediabetes 5.7-6.4%    Diabetes 6.5% or higher     Note: Adopted from ADA consensus guidelines.     WBC Count 07/23/2021 5.7  4.0 - 11.0 10e3/uL Final     RBC Count 07/23/2021 3.91  3.80 - 5.20 10e6/uL Final     Hemoglobin 07/23/2021 12.2  11.7 - 15.7 g/dL Final     Hematocrit 07/23/2021 37.6  35.0 - 47.0 % Final     MCV 07/23/2021 96  78 - 100 fL Final     MCH 07/23/2021 31.2  26.5 - 33.0 pg Final     MCHC 07/23/2021 32.4  31.5 - 36.5 g/dL Final     RDW 07/23/2021 13.5  10.0 - 15.0 % Final     Platelet Count 07/23/2021 327  150 - 450 10e3/uL Final     % Neutrophils 07/23/2021 50  % Final     %  Lymphocytes 07/23/2021 39  % Final     % Monocytes 07/23/2021 9  % Final     % Eosinophils 07/23/2021 1  % Final     % Basophils 07/23/2021 1  % Final     Absolute Neutrophils 07/23/2021 2.9  1.6 - 8.3 10e3/uL Final     Absolute Lymphocytes 07/23/2021 2.2  0.8 - 5.3 10e3/uL Final     Absolute Monocytes 07/23/2021 0.5  0.0 - 1.3 10e3/uL Final     Absolute Eosinophils 07/23/2021 0.1  0.0 - 0.7 10e3/uL Final     Absolute Basophils 07/23/2021 0.0  0.0 - 0.2 10e3/uL Final     her last visit with me was on Visit date not found.  Assessment and plan:    1.  Diabetes control.  Her A1c is in target but she is having quite a few lows.  I suggested she reduce her Tresiba  by 1 unit.  If the lows continue during the next week, she should go down by another unit.  She is now tolerating the Trulicity at a lower dose.  She has gained some weight but I am reluctant to increase the dose given the problems she has with nausea.  I suggested we have her meet with them dietitian to develop some meal plans that can help her with weight loss.  She is agreeable to this plan.      2.  Diabetes complications.  Her renal function is fine.  She has background retinopathy and is followed by the eye doctor.  She has some paresthesias in the tips of her toes on both feet.  In the past this could play and only pertain to 1 foot.  My exam shows that she has normal vibratory sense and normal monofilament detection.  Because of this normal exam, I wonder if her paresthesias are really from diabetic neuropathy or if they could be related to a structural problem.  I will refer her to podiatry for evaluation.  Perhaps an insert can be put in her shoes that will complete some of this concern.    3.hypothyroidism.  TSH was in target earlier this year.  Will repeat it next time.    3.CVD risk.  Blood pressure was a bit high today.  I suggested she track it at home.  She is agreeable to do that.  She is taking Zocor but her last LDL was that above target.   We will plan to repeat it next year.    4.right upper quadrant pain.  She has 2 distinct kinds of pain.  One sounds musculoskeletal in origin.  The other may be related to a gastrointestinal problem.  On exam she has no palpable masses and her liver appears to be normal in size and consistency.  Her recent liver function tests are also normal.  She has a history of GERD that has presented differently in the past but it is possible that some of the symptoms are related to that.  It is also possible that she could be having recurrent cholecystitis.  I suggested she let me know if the symptoms recur so we can schedule her for ultrasound of her gallbladder.  If the symptoms recur and she is having nausea, vomiting, difficulty eating, or severe pain she should go to the emergency room for evaluation.  I do not think there is any value in doing an ultrasound in the absence of symptoms because it may not help us with the diagnosis.  I will continue to follow this complaint.      F/u with Shakira Sebastian in 3 mo and f/u with me in 6 mo    I spent 30 minutes with the patient on today's visit.  (Start time 3: 3 0 and end time 4: 00).  On the day of visit I spent an additional 20 minutes reviewing and interpreting her CGM data, reviewing and interpreting her lab data, reviewing her chart, placing orders, and doing documentation.      Felicia Mckeon MD

## 2021-10-27 ENCOUNTER — TELEPHONE (OUTPATIENT)
Dept: ENDOCRINOLOGY | Facility: CLINIC | Age: 51
End: 2021-10-27
Payer: COMMERCIAL

## 2021-10-27 NOTE — TELEPHONE ENCOUNTER
Diabetes Education Scheduling Outreach #1:    Call to patient to schedule. Left message with phone number to call to schedule.    Plan for 2nd outreach attempt within 1 week.    Lor Vega  [facility]  Diabetes and Nutrition Scheduling

## 2021-10-28 ENCOUNTER — OFFICE VISIT (OUTPATIENT)
Dept: OPHTHALMOLOGY | Facility: CLINIC | Age: 51
End: 2021-10-28
Attending: OPHTHALMOLOGY
Payer: COMMERCIAL

## 2021-10-28 DIAGNOSIS — E10.3291 TYPE 1 DIABETES MELLITUS WITH MILD NONPROLIFERATIVE RETINOPATHY OF RIGHT EYE WITHOUT MACULAR EDEMA (H): ICD-10-CM

## 2021-10-28 DIAGNOSIS — E10.3291 TYPE 1 DIABETES MELLITUS WITH MILD NONPROLIFERATIVE RETINOPATHY OF RIGHT EYE WITHOUT MACULAR EDEMA (H): Primary | ICD-10-CM

## 2021-10-28 PROCEDURE — 92134 CPTRZ OPH DX IMG PST SGM RTA: CPT | Performed by: OPHTHALMOLOGY

## 2021-10-28 PROCEDURE — 92015 DETERMINE REFRACTIVE STATE: CPT

## 2021-10-28 PROCEDURE — 92014 COMPRE OPH EXAM EST PT 1/>: CPT | Performed by: OPHTHALMOLOGY

## 2021-10-28 PROCEDURE — G0463 HOSPITAL OUTPT CLINIC VISIT: HCPCS

## 2021-10-28 ASSESSMENT — VISUAL ACUITY
METHOD: SNELLEN - LINEAR
OS_SC: 20/20
OS_SC+: -1
OD_SC: 20/20

## 2021-10-28 ASSESSMENT — SLIT LAMP EXAM - LIDS
COMMENTS: NORMAL
COMMENTS: NORMAL

## 2021-10-28 ASSESSMENT — CONF VISUAL FIELD
OD_NORMAL: 1
OS_NORMAL: 1

## 2021-10-28 ASSESSMENT — REFRACTION_MANIFEST
OS_ADD: +2.25
OD_SPHERE: -0.50
OD_CYLINDER: +0.50
OD_ADD: +2.25
OD_AXIS: 035
OS_CYLINDER: SPHERE
OS_SPHERE: -0.75

## 2021-10-28 ASSESSMENT — EXTERNAL EXAM - RIGHT EYE: OD_EXAM: NORMAL

## 2021-10-28 ASSESSMENT — TONOMETRY
OS_IOP_MMHG: 12
IOP_METHOD: TONOPEN
OD_IOP_MMHG: 13

## 2021-10-28 ASSESSMENT — CUP TO DISC RATIO
OS_RATIO: 0.5
OD_RATIO: 0.4

## 2021-10-28 ASSESSMENT — EXTERNAL EXAM - LEFT EYE: OS_EXAM: NORMAL

## 2021-10-28 NOTE — PROGRESS NOTES
CC Diabetes follow up , glaucoma suspect    HPI: Dali Martines is a 49 year old female with the following ophthalmologic problems:    Here for diabetic retinopathy check and glaucoma suspect evaluation. No major changes in vision in past year. Denies flashes or floaters.   Last HbA1c 7.0 on 6/2019. Wearing glasses for reading only.   6.7 2 weeks ago    Optical Coherence Tomography 2019  Right eye - normal foveal contour; normal NFL  Left eye - normal foveal contour; normal NFL    VF 10/12/2020  right eye non specific defect, normal; left eye: normal    Optical Coherence Tomography retinal nerve fiber layer 10/2020  Normal OU    ASSESSMENT/PLAN    # DM-1 with history of mild nonproliferative diabetic retinopathy right eye   No Diabetic retinopathy left eye   - Blood pressure (<120/80) and blood glucose (HbA1c 7.2) control discussed with patient.     # glaucoma suspect  C:D asymmetry  - intraocular pressure within normal limits, Optical Coherence Tomography retinal nerve fiber layer without thinning, visual field 24-2 within normal limits   Plan for optic nerve Optical Coherence Tomography nerve fiber layer with OVF24-2 every other year or as needed     # H/o TIA-like vision loss RE in ~2008  - eval by Dr Schaffer felt to be migraine related rather than embolic or inflammatory    return to retina clinic: 1 yr with dilated exam; Optical Coherence Tomography nerve fiber layer with OVF24-2 and gonio and dilation ; sooner as needed   ~~~~~~~~~~~~~~~~~~~~~~~~~~~~~~~~~~   Complete documentation of historical and exam elements from today's encounter can be found in the full encounter summary report (not reduplicated in this progress note).  I personally obtained the chief complaint(s) and history of present illness.  I confirmed and edited as necessary the review of systems, past medical/surgical history, family history, social history, and examination findings as documented by others; and I examined the patient myself.  I  personally reviewed the relevant tests, images, and reports as documented above.  I formulated and edited as necessary the assessment and plan and discussed the findings and management plan with the patient and family    Naz Lira MD   of Ophthalmology.  Retina Service   Department of Ophthalmology and Visual Neurosciences   [facility]  Phone: [phone]   Fax: [phone]

## 2021-10-29 ENCOUNTER — IMMUNIZATION (OUTPATIENT)
Dept: NURSING | Facility: CLINIC | Age: 51
End: 2021-10-29
Payer: COMMERCIAL

## 2021-10-29 PROCEDURE — 0064A PR COVID VAC MODERNA 100 MCG/0.5 ML IM: CPT

## 2021-10-29 PROCEDURE — 91301 PR COVID VAC MODERNA 100 MCG/0.5 ML IM: CPT

## 2021-11-06 DIAGNOSIS — E11.620 NLD (NECROBIOSIS LIPOIDICA DIABETICORUM) (H): ICD-10-CM

## 2021-11-06 DIAGNOSIS — M25.50 PAIN IN JOINT, MULTIPLE SITES: ICD-10-CM

## 2021-11-06 DIAGNOSIS — M77.9 TENDINITIS: ICD-10-CM

## 2021-11-06 DIAGNOSIS — L40.50 PSORIATIC ARTHRITIS (H): ICD-10-CM

## 2021-11-08 ASSESSMENT — SLEEP AND FATIGUE QUESTIONNAIRES
HOW LIKELY ARE YOU TO NOD OFF OR FALL ASLEEP WHILE LYING DOWN TO REST IN THE AFTERNOON WHEN CIRCUMSTANCES PERMIT: MODERATE CHANCE OF DOZING
HOW LIKELY ARE YOU TO NOD OFF OR FALL ASLEEP WHEN YOU ARE A PASSENGER IN A CAR FOR AN HOUR WITHOUT A BREAK: SLIGHT CHANCE OF DOZING
HOW LIKELY ARE YOU TO NOD OFF OR FALL ASLEEP IN A CAR, WHILE STOPPED FOR A FEW MINUTES IN TRAFFIC: WOULD NEVER DOZE
HOW LIKELY ARE YOU TO NOD OFF OR FALL ASLEEP WHILE SITTING INACTIVE IN A PUBLIC PLACE: SLIGHT CHANCE OF DOZING
HOW LIKELY ARE YOU TO NOD OFF OR FALL ASLEEP WHILE SITTING QUIETLY AFTER LUNCH WITHOUT ALCOHOL: SLIGHT CHANCE OF DOZING
HOW LIKELY ARE YOU TO NOD OFF OR FALL ASLEEP WHILE SITTING AND TALKING TO SOMEONE: WOULD NEVER DOZE
HOW LIKELY ARE YOU TO NOD OFF OR FALL ASLEEP WHILE WATCHING TV: HIGH CHANCE OF DOZING
HOW LIKELY ARE YOU TO NOD OFF OR FALL ASLEEP WHILE SITTING AND READING: HIGH CHANCE OF DOZING

## 2021-11-08 NOTE — PROGRESS NOTES
"Are you currently in the state of [place]: Yes   Dali is a 50 year old who is being evaluated via a billable video visit.      How would you like to obtain your AVS? MyChart  If the video visit is dropped, the invitation should be resent by: Send to e-mail at: [email or web address]  Will anyone else be joining your video visit? No  Aline Gaytan MA    Video Start Time: 3:38 PM  Video-Visit Details    Type of service:  Video Visit    Video End Time:4:05 PM    Originating Location (pt. Location): Home    Distant Location (provider location):  [facility] SLEEP [facility]     Platform used for Video Visit: [facility]     SLEEP MEDICINE VIRTUAL VIDEO FOLLOW-UP VISIT  Sleep Psychology    Patient Name: Dali Martines  MRN:  [id number]  Date of Service: 2021       Subjective Report     Dali Martines  returns for a telehealth video visit to discuss progress in implementing behavioral strategies for the management of insomnia.  Patient consent for initiation of video visit was obtained and documented prior to initiation of visit.     Dali reports her father  suddently.  Discussed the emotional and adjustment concerns related this loss.  .Increased care burdent helping her mother cope with her 's loss.    She reports that all these stressors have resulted in \"sleep gong off the rails.\"  Discussed stress response and sleep.  She reports reduced TST and some inadvertent napping   We reviewed acceptance and commitment strategies to use as overall level of stress gradually diminishes and she processes grief and increased in post  planning and increased caregiving responsibilities for her mother.  .     Sleep Data:     Source of Sleep Estimates:  verbal self-report        EPWORTH SLEEPINESS SCALE    Sitting and reading 3   Watching TV 3   Sitting, inactive in a public place (theatre or mtg.) 1    As a passenger in a car 1   Lying down to rest in the afternoon when circumstance permit 2   Sitting " and talking to someone 0   Sitting quietly after lunch without alcohol 1   In a car, while stopped for a few minutes in traffic 0   TOTAL SCORE (nl <11) 11     INSOMNIA SEVERITY INDEX     Difficulty falling asleep 1   Difficult staying asleep 3   Problems waking up to early 4   How SATISFIED/DISSATISFIED are you with your CURRENT sleep pattern? 4   How NOTICEABLE to others do you think your sleep pattern is in terms of your quality of life? 2   How WORRIED/DISTRESSED are you about your current sleep pattern? 3   To what extent do you consider your sleep problem to INTERFERE with your daily fuctioning(e.g. daytime fatigue, mood, ability to function at work/daily chores, concentration, mood,etc.) CURRENTLY? 3   INSOMNIA SEVERITY INDEX TOTAL SCORE 20    Absence of insomnia (0-7); sub-threshold insomnia (8-14); moderate insomnia (15-21); and severe insomnia (22-28)       Interventions     Strategies and recommendations including implementation and maintenance of of stimulus control, stress management and insomnia and sleep and anxiety were discussed today.       Vital Signs     There were no vitals taken for this visit.     Mental Status     Orientation:  X3  Mood:  normal  Affect:  Congruent with mood  Speech/Language:  Normal  Thought Process: Intact  Associations:  Normal  Thought Content: Normal  Patient does not report any suicidal ideation, intention or plan.    Diagnostic Impressions and Plan     Chronic insomnia    Dali is reporting deterioration in sleep quality directly associated with death of her father suddenly and increased stress related to post  responsibilities and increased care needs of her mother.    Plan:  Over the next 1-2 months, focus will be on acceptance and commitment strategies rather than stimulus control to ensure that patient gets sufficient rest during the day and does not engage in unhelpful sleep effort.  Early afternoon nap is likely to be helpful to mitigate the effects of  extreme tiredness she is now experiencing in late afternoon.  She was advised that a nap may be rejuvenating but may also reduce total sleep time at night and to account for that in perhaps a reduced time in bed at night.    Follow-up: 2 months      Philip Quiroz PsyD, GARY, [facility]  Diplomate, Behavioral Sleep Medicine  [facility]      Note: This dictation was created using voice recognition software. This document may contain an error not identified before finalizing the document. If the error changes the accuracy of the document, I would appreciate it being brought to my attention.

## 2021-11-09 ENCOUNTER — VIRTUAL VISIT (OUTPATIENT)
Dept: SLEEP MEDICINE | Facility: CLINIC | Age: 51
End: 2021-11-09
Payer: COMMERCIAL

## 2021-11-09 DIAGNOSIS — F51.04 CHRONIC INSOMNIA: Primary | ICD-10-CM

## 2021-11-09 PROCEDURE — 90832 PSYTX W PT 30 MINUTES: CPT | Mod: 95 | Performed by: PSYCHOLOGIST

## 2021-11-09 NOTE — TELEPHONE ENCOUNTER
METHOTREXATE 2.5MG TABLETS -     Last Written Prescription Date:  4/13/21  Last Fill Quantity: 24,   # refills: 3  Last Office Visit: 10/19/21  Future Office visit:  None    CARE EVERYWHERE CHECKED FOR MOST RECENT LABS    CBC RESULTS: Recent Labs   Lab Test 07/23/21  1444   WBC 5.7   RBC 3.91   HGB 12.2   HCT 37.6   MCV 96   MCH 31.2   MCHC 32.4   RDW 13.5          Creatinine   Date Value Ref Range Status   07/23/2021 0.61 0.52 - 1.04 mg/dL Final   01/11/2021 0.56 0.52 - 1.04 mg/dL Final   ]    Liver Function Studies -   Recent Labs   Lab Test 07/23/21  1444 07/28/17  1428 04/27/16  1402   PROTTOTAL  --   --  8.0   ALBUMIN 4.2   < > 4.0   BILITOTAL  --   --  0.3   ALKPHOS  --   --  108   AST 31   < > 21   ALT 43   < > 25    < > = values in this interval not displayed.       Routing refill request to provider for review/approval because:  Drug not on the [facility], P or  Health refill protocol or controlled substance  Thank-you, Lor SALAS RN Medication Refill Team

## 2021-11-16 ENCOUNTER — ANCILLARY PROCEDURE (OUTPATIENT)
Dept: GENERAL RADIOLOGY | Facility: CLINIC | Age: 51
End: 2021-11-16
Attending: PODIATRIST
Payer: COMMERCIAL

## 2021-11-16 ENCOUNTER — OFFICE VISIT (OUTPATIENT)
Dept: PODIATRY | Facility: CLINIC | Age: 51
End: 2021-11-16
Attending: INTERNAL MEDICINE
Payer: COMMERCIAL

## 2021-11-16 VITALS
DIASTOLIC BLOOD PRESSURE: 74 MMHG | SYSTOLIC BLOOD PRESSURE: 118 MMHG | BODY MASS INDEX: 28.35 KG/M2 | WEIGHT: 198 LBS | HEIGHT: 70 IN

## 2021-11-16 DIAGNOSIS — E10.3291 TYPE 1 DIABETES MELLITUS WITH MILD NONPROLIFERATIVE RETINOPATHY OF RIGHT EYE, MACULAR EDEMA PRESENCE UNSPECIFIED (H): ICD-10-CM

## 2021-11-16 DIAGNOSIS — S86.311D TEAR OF PERONEAL TENDON, RIGHT, SUBSEQUENT ENCOUNTER: Primary | ICD-10-CM

## 2021-11-16 DIAGNOSIS — R20.2 NUMBNESS AND TINGLING OF LEFT LOWER EXTREMITY: ICD-10-CM

## 2021-11-16 DIAGNOSIS — M25.371 ANKLE INSTABILITY, RIGHT: ICD-10-CM

## 2021-11-16 DIAGNOSIS — S86.311D TEAR OF PERONEAL TENDON, RIGHT, SUBSEQUENT ENCOUNTER: ICD-10-CM

## 2021-11-16 DIAGNOSIS — R20.0 NUMBNESS AND TINGLING OF LEFT LOWER EXTREMITY: ICD-10-CM

## 2021-11-16 PROCEDURE — 73630 X-RAY EXAM OF FOOT: CPT | Mod: RT | Performed by: RADIOLOGY

## 2021-11-16 PROCEDURE — 73600 X-RAY EXAM OF ANKLE: CPT | Mod: RT | Performed by: RADIOLOGY

## 2021-11-16 PROCEDURE — 99204 OFFICE O/P NEW MOD 45 MIN: CPT | Performed by: PODIATRIST

## 2021-11-16 ASSESSMENT — MIFFLIN-ST. JEOR: SCORE: 1578.45

## 2021-11-16 NOTE — PATIENT INSTRUCTIONS
PATIENT INSTRUCTIONS    Wear the brace when active.  Another option is Zensah ankle compression sleeve (goes under high top shoes)  We can perform a R ankle steroid injection at follow-up if needed.  If you are still having pain in 6 weeks, we can repeat ankle MRI    EMG (Motor Nerve Testing):    Please call [facility] Neurology:  [phone]    Physical Therapy  You have been referred to [facility] Physical Therapy.  Please call to schedule an appointment:  [phone].

## 2021-11-16 NOTE — PROGRESS NOTES
"Chief Complaint:     Patient presents with:  Foot Problems: R ankle, BL toe numbness       HPI:  Dali Martines is a 51 year old year old female who presents for evaluation of ankle pain.    Pain location- right ankle pain, bilateral foot toe numbness  Quality- pain is described as throbbing, numbness, and shooting.  Severity- 9/10, symptoms are unchanged.  Duration- months  Timing- symptom occurrence is constant.  Context- occurs while wearing certain shoes  Improved by rest and elevation.    In high school, pt sprained her R ankle; after that, during cycling fit test she injured her peroneals on that side.  She is type 1 DM, HbA1c runs ~7.  She also has h/o psoriatic arthritis, on methotrexate for this.  She relates ongoing numbness to plantar L foot, mostly in big toe and occasionally in lesser toes on the bottom.  She also has occasional numbness in the R foot toes.  She has PT for the ankle, 2 MRIs, PRP injection, but none have provided relief and she feels like she still \"turns\" the ankle.    Past Medical & Surgical History:  Past Medical History:   Diagnosis Date     Anemia      Anxiety      Arthritis 2014    Psoriatic arthritis     Back injury 1988     Dry eye syndrome      Dyslipidemia      Endometriosis 2002    adehsions seen at laparoscopy     GERD (gastroesophageal reflux disease)      Hypothyroidism     8 yoa     SOB (shortness of breath) 3/11/2014     Type I (juvenile type) diabetes mellitus without mention of complication, not stated as uncontrolled     when 15      Uncomplicated asthma Fall 2013      Past Surgical History:   Procedure Laterality Date     C REPAIR CRUCIATE LIGAMENT,KNEE       C STOMACH SURGERY PROCEDURE UNLISTED  December 2002     COLONOSCOPY  2002     COLONOSCOPY N/A 6/23/2020    Procedure: COLONOSCOPY;  Surgeon: Lauryn Rivera MD;  Location:  GI     ESOPHAGOSCOPY, GASTROSCOPY, DUODENOSCOPY (EGD), COMBINED  1/7/2014    Procedure: COMBINED ESOPHAGOSCOPY, GASTROSCOPY, " DUODENOSCOPY (EGD), BIOPSY SINGLE OR MULTIPLE;;  Surgeon: Kraig Nicole MD;  Location:  GI     GYN SURGERY      Laparotomy to remove endometrial tissue     HC BREATH HYDROGEN TEST N/A 6/17/2014    Procedure: HYDROGEN BREATH TEST;  Surgeon: Deacon Alberts MD;  Location:  GI     HYDROGEN BREATH TEST  6/17/14     LAPAROSCOPIC APPENDECTOMY  2002     LAPAROTOMY, LYSIS ADHESIONS, COMBINED  2002    endometriosis     SOFT TISSUE SURGERY  December 2014    right ankle tendon injury      Family History   Problem Relation Age of Onset     Breast Cancer Mother      Heart Disease Father      C.A.D. Father      Arthritis Father      Circulatory Father      Eye Disorder Father      Lipids Father      Thyroid Disease Father      Macular Degeneration Father      Coronary Artery Disease Father      Anxiety Disorder Father      Alcoholism Father      Rheumatoid Arthritis Father      Hypothyroidism Father      Diabetes Father      Cerebrovascular Disease Paternal Grandmother         ,     Cancer Paternal Grandfather         Pancreatic     Heart Disease Paternal Grandfather      Diabetes Maternal Grandmother         Type 2     C.A.D. Maternal Grandmother      Heart Disease Maternal Grandmother      Coronary Artery Disease Maternal Grandmother      Heart Disease Maternal Grandfather      Cardiac Sudden Death Maternal Grandfather      Gynecology Other         self     Thyroid Disease Other         self     Macular Degeneration Maternal Aunt      Diabetes Other         Type 1     Glaucoma No family hx of         Social History:  ?  History   Smoking Status     Never Smoker   Smokeless Tobacco     Never Used     History   Drug Use No     Social History    Substance and Sexual Activity      Alcohol use: Yes        Comment: moderately      Allergies:  ?   Allergies   Allergen Reactions     Monosodium Glutamate Palpitations and Shortness Of Breath     Sulfasalazine      Developed rash, HA, dizziness        Medications:    Current  "Outpatient Medications   Medication     albuterol (PROAIR HFA/PROVENTIL HFA/VENTOLIN HFA) 108 (90 Base) MCG/ACT inhaler     aspirin 81 MG tablet     blood glucose (ACCU-CHEK COLIN PLUS) test strip     blood glucose (NO BRAND SPECIFIED) test strip     blood glucose monitoring (ACCU-CHEK FASTCLIX) lancets     blood glucose monitoring (NO BRAND SPECIFIED) meter device kit     Calcium Carbonate-Vitamin D (CALCIUM + D PO)     citalopram (CELEXA) 20 MG tablet     Continuous Blood Gluc  (DEXCOM G6 ) SUZANNE     Continuous Blood Gluc Sensor (DEXCOM G6 SENSOR) MISC     Continuous Blood Gluc Transmit (DEXCOM G6 TRANSMITTER) MISC     diclofenac (VOLTAREN) 1 % topical gel     dulaglutide (TRULICITY) 0.75 MG/0.5ML pen     econazole nitrate 1 % cream     fish oil-omega-3 fatty acids (FISH OIL) 1000 MG capsule     folic acid (FOLVITE) 1 MG tablet     Injection Device for insulin (INPEN 100-BLUE-TANIYA) SUZANNE     insulin degludec (TRESIBA) 100 UNIT/ML pen     insulin pen needle (B-D U/F) 31G X 8 MM miscellaneous     Ketoconazole (NIZORAL PO)     levothyroxine (SYNTHROID/LEVOTHROID) 112 MCG tablet     methotrexate 2.5 MG tablet     methylphenidate (METADATE CD) 20 MG CR capsule     methylphenidate (RITALIN) 10 MG tablet     Minoxidil (ROGAINE EX)     mometasone-formoterol (DULERA) 100-5 MCG/ACT inhaler     montelukast (SINGULAIR) 5 MG chewable tablet     Multiple Vitamin (MULTIVITAMIN OR)     NOVOLOG PENFILL 100 UNIT/ML soln     predniSONE (DELTASONE) 20 MG tablet     simvastatin (ZOCOR) 20 MG tablet     traZODone (DESYREL) 50 MG tablet     No current facility-administered medications for this visit.         Physical Exam:  ?  Vitals:  /74   Ht 1.77 m (5' 9.69\")   Wt 89.8 kg (198 lb)   BMI 28.66 kg/m     General:  WD/WN, in NAD.  A&O x3.  Dermatologic:    Skin is intact, open lesions absent.   Skin texture, turgor is normal.  Vascular:  Pulses palpable bilateral.  Digital capillary refill time normal " bilateral.  Skin temperature is normal bilateral.  Generalized edema- none bilateral.  Focal edema- mild lateral ankle, retromalleolar region, right.  Neurologic:    Sharp/dull subjectively diminished plantar hallux & digits 2-4 wrt dorsal foot cutaneous nerves, L.  Gait and balance normal.  Musculoskeletal:  Maximal pain to palpation of ATFL/ lateral gutter, peroneals inframalleolar region & at styloid insertion, right.  mild pain to palpation of CFL / subfibular region, medial ankle gutter, right.  Mild pain to palpation plantar hallux, Left.  Ankle ROM full, pain free right.  Anterior drawer mildly increased (wrt contralateral side), uncomfortable, right.  Talar tilt increased, uncomfortable, right.  Muscle strength to PB 5/5, midly painful; PL 5/5, not painful, right.  HIPJ, 1st MPJ ROM full, smooth, not painful, Left.    FF:RF:  neutral bilateral.   Stance:  RCSP Neutral to mild varus bilateral.  Foot type:  Flexible cavus, no PF 1st ray  Clinical deformity: none gross    Imaging:   x-ray  Weight-bearing views right foot & ankle dated 11/16/21, reveal rectus ankle w/o diastasis or tilt.  No significant degeneration.  Mild cavus foot, calc inclination 40 deg, but without PF 1st ray on lateral. Lateral meary's ~0 deg.  AP meary's 10.4 deg abducted, no adductus present.    MRI R ankle dated 2/15/18 reveals ATFL attenuation, fluid w/in anterolateral gutter & anterior syndesmosis, anterommedial ankle impingement tissue (Ser 801 Img 12, Ser 301 Img 10).    MRI R ankle dated 2016 at [facility] read as tear of PB and ATFL.      Assessment:      ICD-10-CM    1. Tear of peroneal tendon, right, subsequent encounter  S86.311D XR Ankle Right 2 Views     [facility]     EMG     Ankle/Foot Bracing Supplies Order for DME - ONLY FOR DME   2. Ankle instability, right  M25.371 XR Ankle Right 2 Views     [facility] Ref[facility]     EMG     Ankle/Foot Bracing Supplies Order for DME - ONLY FOR DME   3. Type 1 diabetes  mellitus with mild nonproliferative retinopathy of right eye, macular edema presence unspecified (H)  E10.3291 XR Foot Right G/E 3 Views     XR Ankle Right 2 Views     Orthopedic  Referral     [facility]     EMG     Ankle/Foot Bracing Supplies Order for DME - ONLY FOR DME   4. Numbness and tingling of left lower extremity  R20.0 [facility] Referral    R20.2 EMG          Plan:  Orders Placed This Encounter   Procedures     XR Foot Right G/E 3 Views     XR Ankle Right 2 Views     [facility] Ref[facility]     EMG     Ankle/Foot Bracing Supplies Order for DME - ONLY FOR DME       Discussed the etiology and treatment of the condition with the patient.  Imaging studies reviewed and discussed with the patient.  Discussed surgical and conservative options.      -Ankle brace  -PT  -Injection- offered, discussed  -NSAID- already on methotrexate, she declines  -Referral to neurology to eval for early neuropathy to both sides  -Discussion on surgical intervention, specifically ankle scope/Brostrom cardoso/ peroneal tendon repair.   -I did discuss risks of elective surgery, moreso if peripheral neuropathy is present even than an elevated HbA1c; which is why I will refer her to neurology   -Update MRI at follow-up if still symptomatic.  -Calc axial to eval heel position    Return:  Return in about 6 weeks (around 12/28/2021), or Ankle instability DEEPA Coronel MD

## 2021-11-16 NOTE — LETTER
"    11/16/2021         RE: Dali Martines  [address]  [facility] [address]        Dear Colleague,    Thank you for referring your patient, Dali Martines, to the [facility]. Please see a copy of my visit note below.    Chief Complaint:     Patient presents with:  Foot Problems: R ankle, BL toe numbness       HPI:  Dali Martines is a 51 year old year old female who presents for evaluation of ankle pain.    Pain location- right ankle pain, bilateral foot toe numbness  Quality- pain is described as throbbing, numbness, and shooting.  Severity- 9/10, symptoms are unchanged.  Duration- months  Timing- symptom occurrence is constant.  Context- occurs while wearing certain shoes  Improved by rest and elevation.    In high school, pt sprained her R ankle; after that, during cycling fit test she injured her peroneals on that side.  She is type 1 DM, HbA1c runs ~7.  She also has h/o psoriatic arthritis, on methotrexate for this.  She relates ongoing numbness to plantar L foot, mostly in big toe and occasionally in lesser toes on the bottom.  She also has occasional numbness in the R foot toes.  She has PT for the ankle, 2 MRIs, PRP injection, but none have provided relief and she feels like she still \"turns\" the ankle.    Past Medical & Surgical History:  Past Medical History:   Diagnosis Date     Anemia      Anxiety      Arthritis 2014    Psoriatic arthritis     Back injury 1988     Dry eye syndrome      Dyslipidemia      Endometriosis 2002    adehsions seen at laparoscopy     GERD (gastroesophageal reflux disease)      Hypothyroidism     8 yoa     SOB (shortness of breath) 3/11/2014     Type I (juvenile type) diabetes mellitus without mention of complication, not stated as uncontrolled     when 15      Uncomplicated asthma Fall 2013      Past Surgical History:   Procedure Laterality Date     C REPAIR CRUCIATE LIGAMENT,KNEE       C STOMACH SURGERY PROCEDURE UNLISTED  December " 2002     COLONOSCOPY  2002     COLONOSCOPY N/A 6/23/2020    Procedure: COLONOSCOPY;  Surgeon: Lauryn Rivera MD;  Location:  GI     ESOPHAGOSCOPY, GASTROSCOPY, DUODENOSCOPY (EGD), COMBINED  1/7/2014    Procedure: COMBINED ESOPHAGOSCOPY, GASTROSCOPY, DUODENOSCOPY (EGD), BIOPSY SINGLE OR MULTIPLE;;  Surgeon: Kraig Nicole MD;  Location:  GI     GYN SURGERY      Laparotomy to remove endometrial tissue     HC BREATH HYDROGEN TEST N/A 6/17/2014    Procedure: HYDROGEN BREATH TEST;  Surgeon: Deacon Alberts MD;  Location:  GI     HYDROGEN BREATH TEST  6/17/14     LAPAROSCOPIC APPENDECTOMY  2002     LAPAROTOMY, LYSIS ADHESIONS, COMBINED  2002    endometriosis     SOFT TISSUE SURGERY  December 2014    right ankle tendon injury      Family History   Problem Relation Age of Onset     Breast Cancer Mother      Heart Disease Father      C.A.D. Father      Arthritis Father      Circulatory Father      Eye Disorder Father      Lipids Father      Thyroid Disease Father      Macular Degeneration Father      Coronary Artery Disease Father      Anxiety Disorder Father      Alcoholism Father      Rheumatoid Arthritis Father      Hypothyroidism Father      Diabetes Father      Cerebrovascular Disease Paternal Grandmother         ,     Cancer Paternal Grandfather         Pancreatic     Heart Disease Paternal Grandfather      Diabetes Maternal Grandmother         Type 2     C.A.D. Maternal Grandmother      Heart Disease Maternal Grandmother      Coronary Artery Disease Maternal Grandmother      Heart Disease Maternal Grandfather      Cardiac Sudden Death Maternal Grandfather      Gynecology Other         self     Thyroid Disease Other         self     Macular Degeneration Maternal Aunt      Diabetes Other         Type 1     Glaucoma No family hx of         Social History:  ?  History   Smoking Status     Never Smoker   Smokeless Tobacco     Never Used     History   Drug Use No     Social History    Substance and  Sexual Activity      Alcohol use: Yes        Comment: moderately      Allergies:  ?   Allergies   Allergen Reactions     Monosodium Glutamate Palpitations and Shortness Of Breath     Sulfasalazine      Developed rash, HA, dizziness        Medications:    Current Outpatient Medications   Medication     albuterol (PROAIR HFA/PROVENTIL HFA/VENTOLIN HFA) 108 (90 Base) MCG/ACT inhaler     aspirin 81 MG tablet     blood glucose (ACCU-CHEK COLIN PLUS) test strip     blood glucose (NO BRAND SPECIFIED) test strip     blood glucose monitoring (ACCU-CHEK FASTCLIX) lancets     blood glucose monitoring (NO BRAND SPECIFIED) meter device kit     Calcium Carbonate-Vitamin D (CALCIUM + D PO)     citalopram (CELEXA) 20 MG tablet     Continuous Blood Gluc  (DEXCOM G6 ) SUZANNE     Continuous Blood Gluc Sensor (DEXCOM G6 SENSOR) MISC     Continuous Blood Gluc Transmit (DEXCOM G6 TRANSMITTER) MISC     diclofenac (VOLTAREN) 1 % topical gel     dulaglutide (TRULICITY) 0.75 MG/0.5ML pen     econazole nitrate 1 % cream     fish oil-omega-3 fatty acids (FISH OIL) 1000 MG capsule     folic acid (FOLVITE) 1 MG tablet     Injection Device for insulin (INPEN 100-BLUE-TANIYA) SUZANNE     insulin degludec (TRESIBA) 100 UNIT/ML pen     insulin pen needle (B-D U/F) 31G X 8 MM miscellaneous     Ketoconazole (NIZORAL PO)     levothyroxine (SYNTHROID/LEVOTHROID) 112 MCG tablet     methotrexate 2.5 MG tablet     methylphenidate (METADATE CD) 20 MG CR capsule     methylphenidate (RITALIN) 10 MG tablet     Minoxidil (ROGAINE EX)     mometasone-formoterol (DULERA) 100-5 MCG/ACT inhaler     montelukast (SINGULAIR) 5 MG chewable tablet     Multiple Vitamin (MULTIVITAMIN OR)     NOVOLOG PENFILL 100 UNIT/ML soln     predniSONE (DELTASONE) 20 MG tablet     simvastatin (ZOCOR) 20 MG tablet     traZODone (DESYREL) 50 MG tablet     No current facility-administered medications for this visit.         Physical Exam:  ?  Vitals:  /74   Ht 1.77 m (5'  "9.69\")   Wt 89.8 kg (198 lb)   BMI 28.66 kg/m     General:  WD/WN, in NAD.  A&O x3.  Dermatologic:    Skin is intact, open lesions absent.   Skin texture, turgor is normal.  Vascular:  Pulses palpable bilateral.  Digital capillary refill time normal bilateral.  Skin temperature is normal bilateral.  Generalized edema- none bilateral.  Focal edema- mild lateral ankle, retromalleolar region, right.  Neurologic:    Sharp/dull subjectively diminished plantar hallux & digits 2-4 wrt dorsal foot cutaneous nerves, L.  Gait and balance normal.  Musculoskeletal:  Maximal pain to palpation of ATFL/ lateral gutter, peroneals inframalleolar region & at styloid insertion, right.  mild pain to palpation of CFL / subfibular region, medial ankle gutter, right.  Mild pain to palpation plantar hallux, Left.  Ankle ROM full, pain free right.  Anterior drawer mildly increased (wrt contralateral side), uncomfortable, right.  Talar tilt increased, uncomfortable, right.  Muscle strength to PB 5/5, midly painful; PL 5/5, not painful, right.  HIPJ, 1st MPJ ROM full, smooth, not painful, Left.    FF:RF:  neutral bilateral.   Stance:  RCSP Neutral to mild varus bilateral.  Foot type:  Flexible cavus, no PF 1st ray  Clinical deformity: none gross    Imaging:   x-ray  Weight-bearing views right foot & ankle dated 11/16/21, reveal rectus ankle w/o diastasis or tilt.  No significant degeneration.  Mild cavus foot, calc inclination 40 deg, but without PF 1st ray on lateral. Lateral meary's ~0 deg.  AP meary's 10.4 deg abducted, no adductus present.    MRI R ankle dated 2/15/18 reveals ATFL attenuation, fluid w/in anterolateral gutter & anterior syndesmosis, anterommedial ankle impingement tissue (Ser 801 Img 12, Ser 301 Img 10).    MRI R ankle dated 2016 at [facility] read as tear of PB and ATFL.      Assessment:      ICD-10-CM    1. Tear of peroneal tendon, right, subsequent encounter  S86.311D XR Ankle Right 2 Views     [facility]     " EMG     Ankle/Foot Bracing Supplies Order for DME - ONLY FOR DME   2. Ankle instability, right  M25.371 XR Ankle Right 2 Views     [facility] Referral     EMG     Ankle/Foot Bracing Supplies Order for DME - ONLY FOR DME   3. 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 Numbness and tingling of left lower extremity  R20.0 [facility] Referral    R20.2 EMG          Plan:  Orders Placed This Encounter   Procedures     XR Foot Right G/E 3 Views     XR Ankle Right 2 Views     [facility] Ref[facility]     EMG     Ankle/Foot Bracing Supplies Order for DME - ONLY FOR DME       Discussed the etiology and treatment of the condition with the patient.  Imaging studies reviewed and discussed with the patient.  Discussed surgical and conservative options.      -Ankle brace  -PT  -Injection- offered, discussed  -NSAID- already on methotrexate, she declines  -Referral to neurology to [facility] for early neuropathy to both sides  -Discussion on surgical intervention, specifically ankle scope/Brostrom cardoso/ peroneal tendon repair.   -I did discuss risks of elective surgery, moreso if peripheral neuropathy is present even than an elevated HbA1c; which is why I will refer her to neurology   -Update MRI at follow-up if still symptomatic.  -Calc axial to eval heel position    Return:  Return in about 6 weeks (around 12/28/2021), or Ankle instability R.    Gabby Coronel MD            Again, thank you for allowing me to participate in the care of your patient.        Sincerely,        Gabby Coronel MD

## 2021-11-17 DIAGNOSIS — E10.3291 TYPE 1 DIABETES MELLITUS WITH MILD NONPROLIFERATIVE RETINOPATHY OF RIGHT EYE, MACULAR EDEMA PRESENCE UNSPECIFIED (H): ICD-10-CM

## 2021-11-17 RX ORDER — SIMVASTATIN 20 MG
20 TABLET ORAL AT BEDTIME
Qty: 90 TABLET | Refills: 3 | Status: SHIPPED | OUTPATIENT
Start: 2021-11-17 | End: 2021-11-24

## 2021-11-17 NOTE — TELEPHONE ENCOUNTER
[facility] Call Center    Phone Message    May a detailed message be left on voicemail: yes     Reason for Call: Medication Refill Request    Has the patient contacted the pharmacy for the refill? Yes   Name of medication being requested: simvastatin (ZOCOR) 20 MG tablet  Provider who prescribed the medication: Dr. Mckeon  Pharmacy:[id number] - [place], [place] - [address]  Date medication is needed: asap per patient pharmacy instructed them to call          Action Taken: Other: ENdo    Travel Screening: Not Applicable

## 2021-11-17 NOTE — TELEPHONE ENCOUNTER
Last Clinic Visit: 10/26/21, [place] 2/1/22  Call to Dali, message left of refill having been sent to requested pharmacy

## 2021-11-18 ENCOUNTER — OFFICE VISIT (OUTPATIENT)
Dept: FAMILY MEDICINE | Facility: CLINIC | Age: 51
End: 2021-11-18
Payer: COMMERCIAL

## 2021-11-18 VITALS
BODY MASS INDEX: 27.99 KG/M2 | HEIGHT: 70 IN | DIASTOLIC BLOOD PRESSURE: 82 MMHG | WEIGHT: 195.5 LBS | TEMPERATURE: 98 F | OXYGEN SATURATION: 97 % | HEART RATE: 54 BPM | SYSTOLIC BLOOD PRESSURE: 120 MMHG

## 2021-11-18 DIAGNOSIS — K52.9 GASTROENTERITIS: Primary | ICD-10-CM

## 2021-11-18 DIAGNOSIS — E10.9 TYPE 1 DIABETES MELLITUS WITHOUT COMPLICATION (H): ICD-10-CM

## 2021-11-18 LAB
BASOPHILS # BLD AUTO: 0 10E3/UL (ref 0–0.2)
BASOPHILS NFR BLD AUTO: 0 %
EOSINOPHIL # BLD AUTO: 0.1 10E3/UL (ref 0–0.7)
EOSINOPHIL NFR BLD AUTO: 1 %
ERYTHROCYTE [DISTWIDTH] IN BLOOD BY AUTOMATED COUNT: 12.9 % (ref 10–15)
HCT VFR BLD AUTO: 34.8 % (ref 35–47)
HGB BLD-MCNC: 11.1 G/DL (ref 11.7–15.7)
LYMPHOCYTES # BLD AUTO: 2 10E3/UL (ref 0.8–5.3)
LYMPHOCYTES NFR BLD AUTO: 32 %
MCH RBC QN AUTO: 31.1 PG (ref 26.5–33)
MCHC RBC AUTO-ENTMCNC: 31.9 G/DL (ref 31.5–36.5)
MCV RBC AUTO: 98 FL (ref 78–100)
MONOCYTES # BLD AUTO: 0.6 10E3/UL (ref 0–1.3)
MONOCYTES NFR BLD AUTO: 10 %
NEUTROPHILS # BLD AUTO: 3.5 10E3/UL (ref 1.6–8.3)
NEUTROPHILS NFR BLD AUTO: 57 %
PLATELET # BLD AUTO: 279 10E3/UL (ref 150–450)
RBC # BLD AUTO: 3.57 10E6/UL (ref 3.8–5.2)
WBC # BLD AUTO: 6.1 10E3/UL (ref 4–11)

## 2021-11-18 PROCEDURE — 80053 COMPREHEN METABOLIC PANEL: CPT | Performed by: PHYSICIAN ASSISTANT

## 2021-11-18 PROCEDURE — 99213 OFFICE O/P EST LOW 20 MIN: CPT | Performed by: PHYSICIAN ASSISTANT

## 2021-11-18 PROCEDURE — 85025 COMPLETE CBC W/AUTO DIFF WBC: CPT | Performed by: PHYSICIAN ASSISTANT

## 2021-11-18 PROCEDURE — 36415 COLL VENOUS BLD VENIPUNCTURE: CPT | Performed by: PHYSICIAN ASSISTANT

## 2021-11-18 ASSESSMENT — MIFFLIN-ST. JEOR: SCORE: 1565.68

## 2021-11-18 NOTE — PROGRESS NOTES
Assessment & Plan     Gastroenteritis  Exam and history favor not acute abdomen.  Will check some labs, WBCs, electrolytes, etc  - Comprehensive metabolic panel (BMP + Alb, Alk Phos, ALT, AST, Total. Bili, TP); Future  - CBC with platelets and differential; Future  - Comprehensive metabolic panel (BMP + Alb, Alk Phos, ALT, AST, Total. Bili, TP)  - CBC with platelets and differential    Type 1 diabetes mellitus without complication (H)  At home readings have been ok, higher risk of diarrhea complications.  - Comprehensive metabolic panel (BMP + Alb, Alk Phos, ALT, AST, Total. Bili, TP); Future  - CBC with platelets and differential; Future  - Comprehensive metabolic panel (BMP + Alb, Alk Phos, ALT, AST, Total. Bili, TP)  - CBC with platelets and differential                 No follow-ups on file.    Jarrett Carballo PA-C  [facility]   Dali is a 51 year old who presents for the following health issues     History of Present Illness       She eats 2-3 servings of fruits and vegetables daily.She consumes 0 sweetened beverage(s) daily.She exercises with enough effort to increase her heart rate 10 to 19 minutes per day.  She exercises with enough effort to increase her heart rate 4 days per week.   She is taking medications regularly.       Pain History:  When did you first notice your pain? - Less than 1 week   Have you seen anyone else for your pain? No  Where in your body do you have pain? Abdominal/Flank Pain  Onset/Duration: Since Monday  Description:   Character: Cramping  Location: lower abd  Radiation: None  Intensity: severe  Progression of Symptoms:  improving  Accompanying Signs & Symptoms:  Fever/Chills: no  Gas/Bloating: YES for the past day  Nausea: no  Vomitting: YES  Diarrhea: YES  Constipation: no  Dysuria or Hematuria: no  History:   Trauma: no  Previous similar pain: no  Previous tests done: none  Precipitating factors:   Does the pain change with:     Food:  "YES 4 hours after eating pt experienced stomach cramps     Bowel Movement: YES    Urination: no   Other factors:  no  Therapies tried and outcome: None  No LMP recorded. (Menstrual status: UNKNOWN).            Review of Systems   Constitutional, HEENT, cardiovascular, pulmonary, gi and gu systems are negative, except as otherwise noted.      Objective    /82   Pulse 54   Temp 98  F (36.7  C)   Ht 1.768 m (5' 9.6\")   Wt 88.7 kg (195 lb 8 oz)   SpO2 97%   BMI 28.37 kg/m    Body mass index is 28.37 kg/m .  Physical Exam   GENERAL: alert and no distress  EYES: Eyes grossly normal to inspection  RESP: lungs clear to auscultation - no rales, rhonchi or wheezes  CV: regular rate and rhythm, normal S1 S2, no S3 or S4, no murmur, click or rub, no peripheral edema and peripheral pulses strong  ABDOMEN: soft, nontender, no hepatosplenomegaly, no masses and bowel sounds normal  PSYCH: mentation appears normal, affect normal/bright                "

## 2021-11-19 LAB
ALBUMIN SERPL-MCNC: 3.4 G/DL (ref 3.4–5)
ALP SERPL-CCNC: 88 U/L (ref 40–150)
ALT SERPL W P-5'-P-CCNC: 25 U/L (ref 0–50)
ANION GAP SERPL CALCULATED.3IONS-SCNC: 3 MMOL/L (ref 3–14)
AST SERPL W P-5'-P-CCNC: 21 U/L (ref 0–45)
BILIRUB SERPL-MCNC: 0.2 MG/DL (ref 0.2–1.3)
BUN SERPL-MCNC: 5 MG/DL (ref 7–30)
CALCIUM SERPL-MCNC: 8.6 MG/DL (ref 8.5–10.1)
CHLORIDE BLD-SCNC: 105 MMOL/L (ref 94–109)
CO2 SERPL-SCNC: 30 MMOL/L (ref 20–32)
CREAT SERPL-MCNC: 0.5 MG/DL (ref 0.52–1.04)
GFR SERPL CREATININE-BSD FRML MDRD: >90 ML/MIN/1.73M2
GLUCOSE BLD-MCNC: 174 MG/DL (ref 70–99)
POTASSIUM BLD-SCNC: 3.5 MMOL/L (ref 3.4–5.3)
PROT SERPL-MCNC: 7.1 G/DL (ref 6.8–8.8)
SODIUM SERPL-SCNC: 138 MMOL/L (ref 133–144)

## 2021-11-19 ASSESSMENT — ASTHMA QUESTIONNAIRES
QUESTION_2 LAST FOUR WEEKS HOW OFTEN HAVE YOU HAD SHORTNESS OF BREATH: THREE TO SIX TIMES A WEEK
QUESTION_1 LAST FOUR WEEKS HOW MUCH OF THE TIME DID YOUR ASTHMA KEEP YOU FROM GETTING AS MUCH DONE AT WORK, SCHOOL OR AT HOME: NONE OF THE TIME
QUESTION_4 LAST FOUR WEEKS HOW OFTEN HAVE YOU USED YOUR RESCUE INHALER OR NEBULIZER MEDICATION (SUCH AS ALBUTEROL): NOT AT ALL
QUESTION_5 LAST FOUR WEEKS HOW WOULD YOU RATE YOUR ASTHMA CONTROL: WELL CONTROLLED
ACT_TOTALSCORE: 22
QUESTION_3 LAST FOUR WEEKS HOW OFTEN DID YOUR ASTHMA SYMPTOMS (WHEEZING, COUGHING, SHORTNESS OF BREATH, CHEST TIGHTNESS OR PAIN) WAKE YOU UP AT NIGHT OR EARLIER THAN USUAL IN THE MORNING: NOT AT ALL
ACUTE_EXACERBATION_TODAY: NO

## 2021-11-19 NOTE — RESULT ENCOUNTER NOTE
Dear Dali    Your test results are attached, feel free to contact me via [employer]     Your white blood cell  count is normal, and that is an overall good sign.  With acute infections, along with pancreatitis, and a few others, the WBC is rather high.  How have you been feeling since yesterday afternoon?   I am still waiting for the electrolyte test to come back.    Lexa Carballo PA-C

## 2021-11-20 ASSESSMENT — ASTHMA QUESTIONNAIRES: ACT_TOTALSCORE: 22

## 2021-11-24 DIAGNOSIS — E10.3291 TYPE 1 DIABETES MELLITUS WITH MILD NONPROLIFERATIVE RETINOPATHY OF RIGHT EYE, MACULAR EDEMA PRESENCE UNSPECIFIED (H): ICD-10-CM

## 2021-11-24 RX ORDER — SIMVASTATIN 20 MG
20 TABLET ORAL AT BEDTIME
Qty: 90 TABLET | Refills: 3 | Status: SHIPPED | OUTPATIENT
Start: 2021-11-24 | End: 2022-11-19

## 2021-12-22 ENCOUNTER — APPOINTMENT (OUTPATIENT)
Dept: EDUCATION SERVICES | Facility: CLINIC | Age: 51
End: 2021-12-22
Payer: COMMERCIAL

## 2022-01-04 ENCOUNTER — VIRTUAL VISIT (OUTPATIENT)
Dept: SLEEP MEDICINE | Facility: CLINIC | Age: 52
End: 2022-01-04
Payer: COMMERCIAL

## 2022-01-04 VITALS — BODY MASS INDEX: 27.2 KG/M2 | WEIGHT: 190 LBS | HEIGHT: 70 IN

## 2022-01-04 DIAGNOSIS — E10.3299 TYPE 1 DIABETES MELLITUS WITH MILD NONPROLIFERATIVE RETINOPATHY, MACULAR EDEMA PRESENCE UNSPECIFIED, UNSPECIFIED LATERALITY (H): Primary | ICD-10-CM

## 2022-01-04 DIAGNOSIS — F43.9 STRESS: ICD-10-CM

## 2022-01-04 DIAGNOSIS — F51.04 CHRONIC INSOMNIA: Primary | ICD-10-CM

## 2022-01-04 PROCEDURE — 90832 PSYTX W PT 30 MINUTES: CPT | Mod: 95 | Performed by: PSYCHOLOGIST

## 2022-01-04 RX ORDER — INSULIN ASPART INJECTION 100 [IU]/ML
INJECTION, SOLUTION SUBCUTANEOUS
Qty: 45 ML | Refills: 1 | Status: SHIPPED | OUTPATIENT
Start: 2022-01-04 | End: 2022-11-16

## 2022-01-04 ASSESSMENT — SLEEP AND FATIGUE QUESTIONNAIRES
HOW LIKELY ARE YOU TO NOD OFF OR FALL ASLEEP IN A CAR, WHILE STOPPED FOR A FEW MINUTES IN TRAFFIC: WOULD NEVER DOZE
HOW LIKELY ARE YOU TO NOD OFF OR FALL ASLEEP WHILE WATCHING TV: HIGH CHANCE OF DOZING
HOW LIKELY ARE YOU TO NOD OFF OR FALL ASLEEP WHILE SITTING AND TALKING TO SOMEONE: WOULD NEVER DOZE
HOW LIKELY ARE YOU TO NOD OFF OR FALL ASLEEP WHILE SITTING QUIETLY AFTER LUNCH WITHOUT ALCOHOL: SLIGHT CHANCE OF DOZING
HOW LIKELY ARE YOU TO NOD OFF OR FALL ASLEEP WHILE SITTING INACTIVE IN A PUBLIC PLACE: SLIGHT CHANCE OF DOZING
HOW LIKELY ARE YOU TO NOD OFF OR FALL ASLEEP WHILE SITTING AND READING: MODERATE CHANCE OF DOZING
HOW LIKELY ARE YOU TO NOD OFF OR FALL ASLEEP WHEN YOU ARE A PASSENGER IN A CAR FOR AN HOUR WITHOUT A BREAK: SLIGHT CHANCE OF DOZING
HOW LIKELY ARE YOU TO NOD OFF OR FALL ASLEEP WHILE LYING DOWN TO REST IN THE AFTERNOON WHEN CIRCUMSTANCES PERMIT: HIGH CHANCE OF DOZING

## 2022-01-04 ASSESSMENT — PAIN SCALES - GENERAL: PAINLEVEL: MODERATE PAIN (5)

## 2022-01-04 ASSESSMENT — MIFFLIN-ST. JEOR: SCORE: 1547.08

## 2022-01-04 NOTE — TELEPHONE ENCOUNTER
" Centralized Medication Refill Team note:   insulin aspart (FIASP FLEXTOUCH) 100 UNIT/ML pen-injector Use as directed up to 20 units a day at the start of meals  Last Written Prescription Date:  6/23/20-7/21/21  Last Fill Quantity: 3 ml ,   # refills: 3  Last Office Visit : 10/26/21 Linda    Future Office visit:  2/1/22 Romulo    Routing refill request to provider for review/approval because:    Insulin - refilled per clinic- requesting FIASP ( discontinued 7/21/21 per chart )but per note and phone call this is preferred medication:(10/26/21: \"She is currently using NovoLog but was on Fiasp for the last several months. She really prefers it and is just using up the old NovoLog. She thinks it helps her with her postprandial control. She was using an inpen device but she stopped using it because of trouble pairing all of her devices. It seems it interfered with the CGM.\"  Pended with last instructions \"Use as directed up to 20 units a day at the start of meals\"           Date medication is needed:ASAP- Pt stated she thinks the medication was cancelled in error- please send refill ASAP- pt has about a day left.    "

## 2022-01-04 NOTE — PROGRESS NOTES
Dali is a 51 year old who is being evaluated via a billable video visit.      How would you like to obtain your AVS? MyChart  If the video visit is dropped, the invitation should be resent by: Send to e-mail at: [email or web address]  Will anyone else be joining your video visit? No      Video Start Time: 4:02 PM  Video-Visit Details    Type of service:  Video Visit    Video End Time:4:36 PM    Originating Location (pt. Location): Home    Distant Location (provider location):  [facility]     Platform used for Video Visit: [facility]     SLEEP MEDICINE VIRTUAL VIDEO FOLLOW-UP VISIT  Sleep Psychology    Patient Name: Dali Martines  MRN:  [id number]  Date of Service: 2022       Subjective Report     Dali Martines  returns for a telehealth video visit to discuss progress in implementing behavioral strategies for the management of insomnia.  Patient consent for initiation of video visit was obtained and documented prior to initiation of visit.     Dali reports after the sudden death of her father this past fall her uncle  recently.  She reflected on the differing experience and effect on stress, sleep and routine.    Discussed grief process and sleep.  She reports he sleep patterns and quality have improved short of the holidays and death of uncle.    She reports that now that she is back to her own house she is sleeping a bit more but has returned to falling asleep on the couch around 830-9 pm.  However, in recent nights she has been reducing this behavior and had a sustained night of in bed sleep of 7 hours.    .     .     Sleep Data:     Source of Sleep Estimates:  verbal self-report        EPWORTH SLEEPINESS SCALE    Sitting and reading 2   Watching TV 3   Sitting, inactive in a public place (theatre or mtg.) 1    As a passenger in a car 1   Lying down to rest in the afternoon when circumstance permit 3   Sitting and talking to someone 0   Sitting quietly after lunch  "without alcohol 1   In a car, while stopped for a few minutes in traffic 0   TOTAL SCORE (nl <11) 11     INSOMNIA SEVERITY INDEX     Difficulty falling asleep 0   Difficult staying asleep 2   Problems waking up to early 3   How SATISFIED/DISSATISFIED are you with your CURRENT sleep pattern? 3   How NOTICEABLE to others do you think your sleep pattern is in terms of your quality of life? 2   How WORRIED/DISTRESSED are you about your current sleep pattern? 4   To what extent do you consider your sleep problem to INTERFERE with your daily fuctioning(e.g. daytime fatigue, mood, ability to function at work/daily chores, concentration, mood,etc.) CURRENTLY? 4   INSOMNIA SEVERITY INDEX TOTAL SCORE 18    Absence of insomnia (0-7); sub-threshold insomnia (8-14); moderate insomnia (15-21); and severe insomnia (22-28)       Interventions     Strategies and recommendations including implementation and maintenance of of stimulus control, relaxation strategies for insomnia and stress management and insomnia were discussed today.       Vital Signs     Ht 1.778 m (5' 10\")   Wt 86.2 kg (190 lb)   BMI 27.26 kg/m       Mental Status     Orientation:  X3  Mood:  normal  Affect:  Congruent with mood  Speech/Language:  Normal  Thought Process: Intact  Associations:  Normal  Thought Content: Normal  Patient does not report any suicidal ideation, intention or plan.    Diagnostic Impressions and Plan            Chronic insomnia  Stress    Focus of his sleep follow-up was on the impact of stress on overall sleep quality.  Importantly, patient experienced additional stressors since last visit involving death of another relative.  We discussed the impact of stress and grief on sleep.  The patient has made some steps in resuming prior improvements in sleep hygiene and stimulus control.  We discussed the importance of avoiding sleeping on the sofa for extended periods of time and using the bed as the sole place for sleep.  We reviewed stress " management strategies introduced last session.      Plan:  Continue current sleep schedule and plan    Follow-up: 2 months      Philip Quiroz PsyD, GARY, DBSM  Diplomate, Behavioral Sleep Medicine  [facility]      Note: This dictation was created using voice recognition software. This document may contain an error not identified before finalizing the document. If the error changes the accuracy of the document, I would appreciate it being brought to my attention.

## 2022-01-04 NOTE — TELEPHONE ENCOUNTER
[facility] Call Center    Phone Message    May a detailed message be left on voicemail: yes     Reason for Call: Medication Refill Request    Has the patient contacted the pharmacy for the refill? Yes   Name of medication being requested: Fiasp insulin penfill  Provider who prescribed the medication: Linda  Pharmacy: [facility][id number] - [place], [place] - [address]  Date medication is needed:ASAP- Pt stated she thinks the medication was cancelled in error- please send refill ASAP- pt has about a day left.      Action Taken: Message routed to:  [facility] endo    Travel Screening: Not Applicable

## 2022-01-04 NOTE — PATIENT INSTRUCTIONS

## 2022-01-25 NOTE — PROGRESS NOTES
Outcome for 01/25/22 4:13 PM: [employer]ht[employer] message sent  Outcome for 01/31/22 11:11 AM: Per patient, will upload Dexcom today   Outcome for 01/31/22 4:27 PM: Sneht[employer] message sent   Outcome for 02/01/22 7:02 AM: Data emailed to provider

## 2022-01-31 DIAGNOSIS — E11.620 NLD (NECROBIOSIS LIPOIDICA DIABETICORUM) (H): ICD-10-CM

## 2022-01-31 DIAGNOSIS — L40.50 PSORIATIC ARTHRITIS (H): ICD-10-CM

## 2022-01-31 DIAGNOSIS — M77.9 TENDINITIS: ICD-10-CM

## 2022-01-31 DIAGNOSIS — M25.50 PAIN IN JOINT, MULTIPLE SITES: ICD-10-CM

## 2022-02-01 ENCOUNTER — VIRTUAL VISIT (OUTPATIENT)
Dept: ENDOCRINOLOGY | Facility: CLINIC | Age: 52
End: 2022-02-01
Payer: COMMERCIAL

## 2022-02-01 DIAGNOSIS — E10.3291 TYPE 1 DIABETES MELLITUS WITH MILD NONPROLIFERATIVE RETINOPATHY OF RIGHT EYE, MACULAR EDEMA PRESENCE UNSPECIFIED (H): Primary | ICD-10-CM

## 2022-02-01 PROCEDURE — 99215 OFFICE O/P EST HI 40 MIN: CPT | Mod: 95 | Performed by: PHYSICIAN ASSISTANT

## 2022-02-01 ASSESSMENT — ENCOUNTER SYMPTOMS
EYE IRRITATION: 0
ARTHRALGIAS: 1
SKIN CHANGES: 0
EYE WATERING: 0
PANIC: 0
SINUS CONGESTION: 1
STIFFNESS: 1
HYPERTENSION: 0
PALPITATIONS: 0
HYPOTENSION: 0
MUSCLE WEAKNESS: 0
NECK MASS: 1
EYE REDNESS: 0
MYALGIAS: 0
SYNCOPE: 0
JOINT SWELLING: 0
ORTHOPNEA: 0
EYE PAIN: 1
INSOMNIA: 1
TASTE DISTURBANCE: 0
BACK PAIN: 1
MUSCLE CRAMPS: 0
TROUBLE SWALLOWING: 0
LIGHT-HEADEDNESS: 0
NERVOUS/ANXIOUS: 1
NECK PAIN: 0
SMELL DISTURBANCE: 0
POOR WOUND HEALING: 0
NAIL CHANGES: 0
DOUBLE VISION: 0
SINUS PAIN: 0
SORE THROAT: 0
SLEEP DISTURBANCES DUE TO BREATHING: 0
EXERCISE INTOLERANCE: 1
LEG PAIN: 0
DECREASED CONCENTRATION: 1
HOARSE VOICE: 0
SWOLLEN GLANDS: 1
BRUISES/BLEEDS EASILY: 0
DEPRESSION: 1

## 2022-02-01 NOTE — LETTER
2/1/2022       RE: Dali Martines  [address]  [facility] [address]     Dear Colleague,    Thank you for referring your patient, Dali Martines, to the [facility] [place] at [facility]. Please see a copy of my visit note below.    Outcome for 01/25/22 4:13 PM: [employer] message sent  Outcome for 01/31/22 11:11 AM: Per patient, will upload Dexcom today   Outcome for 01/31/22 4:27 PM: [employer] message sent   Outcome for 02/01/22 7:02 AM: Data emailed to provider        Due to the COVID 19 pandemic this visit was converted to a video visit in order to help prevent spread of infection in this patient and the general population.    Time of start: 10:00 am  Time of end: 10:26 am  Total duration of video visit: 26 minutes.    HPI  Dali Martines is a 51 year old female with type 1 diabetes mellitus and hypothyroidism.  Video visit today for diabetes and hypothyroid follow up.  Pt was dx having type 1 diabetes at age 15.  Her hx is also significant for mild NPDR right eye,  psoriatic arthritis, hypothyroidism, anxiety, hyperlipidemia and GERD.  For her diabetes, she is currently taking Tresiba 17 units daily, Fiasp 1 unit/7 gms CHO with meals and snacks, plus correction insulin.  Dali is also taking Trulicity 0.75 mg subcutaneous once a week.  Most recent A1C was 7.3 % on 10/26/2021.  I reviewed her DexcomG6 sensor download data today and scanned the data in her note below.  Pt's average glucose was 179  with SD 66.    Her glucose is in target 51 % of the time, above target 46 % of the time and below target 3 % of the time.  She is able to self treat her hypoglycemia and she wears her Dexcom sensor full time.  Her blood sugars are highest in the evening and until 3 am.  On ROS today, doing ok.   Numbness in toes.   Less nausea on lower dose of Trulicity.  Intermittent episodes of RUQ pain.  No severe n/v, diarrhea, fever or chills.  Pt  reports having chest pain when she eats food with MSG.  Pt denies headaches, blurred vision, SOB at rest or cough.  She denies dysuria, hematuria or foot ulcers a this time.  Dali received the COVID vaccine (Moderna) and COVID booster.    Diabetes Care  Retinopathy:yes in right eye; pt seen by Oph here in Oct 2021 with mild NPDR right eye only.  Nephropathy:none; urine microalbuminuria negative in Jan 2021.   Neuropathy: reports mild numbness in toes.  Foot Exam: no exam today.  Taking aspirin: yes.  Lipids:  in Jan 2021. She was NOT fasting. Pt is taking Simvastatin and Fish Oil.  CAD: no hx of CAD.  Mental health: hx of anxiety.  Insulin: Basal and meal time insulin with correction insulin. DM meds: low dose Trulicity.  Testing: DexcomG6 sensor.          ROS  Please see under HPI.    Allergies  Allergies   Allergen Reactions     Monosodium Glutamate Palpitations and Shortness Of Breath     Sulfasalazine      Developed rash, HA, dizziness       Medications  Current Outpatient Medications   Medication Sig Dispense Refill     albuterol (PROAIR HFA/PROVENTIL HFA/VENTOLIN HFA) 108 (90 Base) MCG/ACT inhaler Inhale 2 puffs into the lungs every 4 hours as needed for shortness of breath / dyspnea or wheezing 1 Inhaler 11     aspirin 81 MG tablet Take 1 tablet by mouth daily.       blood glucose (ACCU-CHEK COLIN PLUS) test strip Test Blood Sugar 8 times daily or as directed 800 strip 3     blood glucose (NO BRAND SPECIFIED) test strip Use to test blood sugar 8 times daily or as directed. Any covered brand. 800 strip 3     blood glucose monitoring (ACCU-CHEK FASTCLIX) lancets Use to test blood sugar 8 times daily or as directed. 4 Box 3     blood glucose monitoring (NO BRAND SPECIFIED) meter device kit Use to test blood sugar 8 times daily or as directed. Any covered brand, 1 kit 0     Calcium Carbonate-Vitamin D (CALCIUM + D PO) Take one daily       citalopram (CELEXA) 20 MG tablet Take 1.5 tablets (30 mg) by mouth  daily 135 tablet 1     Continuous Blood Gluc  (DEXCOM G6 ) SUZANNE Use to read blood sugars as per 's instructions. 1 each 0     Continuous Blood Gluc Sensor (DEXCOM G6 SENSOR) MISC Change every 10 days. 9 each 3     Continuous Blood Gluc Transmit (DEXCOM G6 TRANSMITTER) MISC Change every 3 months. 1 each 3     diclofenac (VOLTAREN) 1 % topical gel APPLY 2 GRAMS ONTO THE SKIN FOUR TIMES DAILY AS NEEDED FOR MODERATE PAIN 100 g 5     dulaglutide (TRULICITY) 0.75 MG/0.5ML pen Inject 0.75 mg Subcutaneous every 7 days 2 mL 5     econazole nitrate 1 % cream Apply 0.5 inches topically daily.       fish oil-omega-3 fatty acids (FISH OIL) 1000 MG capsule Take one daily       folic acid (FOLVITE) 1 MG tablet Take 1 tablet (1 mg) by mouth daily 90 tablet 3     Injection Device for insulin (INPEN 100-BLUE-TANIYA) SUZANNE 1 each once for 1 dose 1 each 0     insulin aspart (FIASP FLEXTOUCH) 100 UNIT/ML pen-injector Inject subcu 1 unit per 7 grams CHO with Correction Factor of1 unit drops BG 30 mg/mL. Approx 45 units daily. 45 mL 1     insulin degludec (TRESIBA) 100 UNIT/ML pen Inject 17 units subcutaneous at hs. 15 mL 3     insulin pen needle (B-D U/F) 31G X 8 MM miscellaneous Use 5 pen needles daily 450 each 3     Ketoconazole (NIZORAL PO) Take  by mouth. Shampoo daily       levothyroxine (SYNTHROID/LEVOTHROID) 112 MCG tablet Take 1 tablet (112 mcg) by mouth daily 90 tablet 3     methotrexate 2.5 MG tablet Take 6 tabs every 7 days 24 tablet 0     methylphenidate (METADATE CD) 20 MG CR capsule Take 20 mg by mouth daily       methylphenidate (RITALIN) 10 MG tablet TAKE UP TO 2 TABLETS BY MOUTH IN THE LATE AFTERNOON.       Minoxidil (ROGAINE EX) Externally apply  topically. daily       mometasone-formoterol (DULERA) 100-5 MCG/ACT inhaler Inhale 2 puffs into the lungs 2 times daily 3 Inhaler 11     montelukast (SINGULAIR) 5 MG chewable tablet Take 1 tablet (5 mg) by mouth At Bedtime 90 tablet 4     Multiple  Vitamin (MULTIVITAMIN OR) Take  by mouth. Take one daily tab       NOVOLOG PENFILL 100 UNIT/ML soln INJECT 1 UNIT PER 15 GRAMS CHO AT ALL MEALS AND SNACKS WITH CORRECTION SCALE OF 1 UNIT PER 50 MG/DL OVER 150, AVERAGE DAILY USE OF 30 UNITS 30 mL 4     predniSONE (DELTASONE) 20 MG tablet Take 1 tablet (20 mg) by mouth daily 10 tablet 0     simvastatin (ZOCOR) 20 MG tablet Take 1 tablet (20 mg) by mouth At Bedtime 90 tablet 3     traZODone (DESYREL) 50 MG tablet Take 1-2 tablets by mouth nightly as needed for sleep. 180 tablet 0       Family History  family history includes Alcoholism in her father; Anxiety Disorder in her father; Arthritis in her father; Breast Cancer in her mother; C.A.D. in her father and maternal grandmother; Cancer in her paternal grandfather; Cardiac Sudden Death in her maternal grandfather; Cerebrovascular Disease in her paternal grandmother; Circulatory in her father; Coronary Artery Disease in her father and maternal grandmother; Diabetes in her father, maternal grandmother, and another family member; Eye Disorder in her father; Gynecology in an other family member; Heart Disease in her father, maternal grandfather, maternal grandmother, and paternal grandfather; Hypothyroidism in her father; Lipids in her father; Macular Degeneration in her father and maternal aunt; Rheumatoid Arthritis in her father; Thyroid Disease in her father and another family member.    Social History   reports that she has never smoked. She has never used smokeless tobacco. She reports current alcohol use. She reports that she does not use drugs.     Past Medical History  Past Medical History:   Diagnosis Date     Anemia      Anxiety      Arthritis 2014    Psoriatic arthritis     Back injury 1988     Dry eye syndrome      Dyslipidemia      Endometriosis 2002    adehsions seen at laparoscopy     GERD (gastroesophageal reflux disease)      Hypothyroidism     8 yoa     SOB (shortness of breath) 3/11/2014     Type I  (juvenile type) diabetes mellitus without mention of complication, not stated as uncontrolled     when 15      Uncomplicated asthma Fall 2013       Past Surgical History:   Procedure Laterality Date     COLONOSCOPY  2002     COLONOSCOPY N/A 6/23/2020    Procedure: COLONOSCOPY;  Surgeon: Lauryn Rivera MD;  Location:  GI     ESOPHAGOSCOPY, GASTROSCOPY, DUODENOSCOPY (EGD), COMBINED  1/7/2014    Procedure: COMBINED ESOPHAGOSCOPY, GASTROSCOPY, DUODENOSCOPY (EGD), BIOPSY SINGLE OR MULTIPLE;;  Surgeon: Kraig Nicole MD;  Location:  GI     GYN SURGERY      Laparotomy to remove endometrial tissue     HC BREATH HYDROGEN TEST N/A 6/17/2014    Procedure: HYDROGEN BREATH TEST;  Surgeon: Deacon Alberts MD;  Location:  GI     HYDROGEN BREATH TEST  6/17/14     LAPAROSCOPIC APPENDECTOMY  2002     LAPAROTOMY, LYSIS ADHESIONS, COMBINED  2002    endometriosis     SOFT TISSUE SURGERY  December 2014    right ankle tendon injury     ZZC REPAIR CRUCIATE LIGAMENT,KNEE       ZZC STOMACH SURGERY PROCEDURE UNLISTED  December 2002       Physical Exam    No exam.    RESULTS  Creatinine   Date Value Ref Range Status   11/18/2021 0.50 (L) 0.52 - 1.04 mg/dL Final   01/11/2021 0.56 0.52 - 1.04 mg/dL Final     GFR Estimate   Date Value Ref Range Status   11/18/2021 >90 >60 mL/min/1.73m2 Final     Comment:     As of July 11, 2021, eGFR is calculated by the CKD-EPI creatinine equation, without race adjustment. eGFR can be influenced by muscle mass, exercise, and diet. The reported eGFR is an estimation only and is only applicable if the renal function is stable.   01/11/2021 >90 >60 mL/min/[1.73_m2] Final     Comment:     Non  GFR Calc  Starting 12/18/2018, serum creatinine based estimated GFR (eGFR) will be   calculated using the Chronic Kidney Disease Epidemiology Collaboration   (CKD-EPI) equation.       Microalbumin Urine   Date Value Ref Range Status   06/26/2009 5@ MG/L      Hemoglobin A1C POCT   Date Value  Ref Range Status   01/11/2021 7.1 (H) 0 - 5.6 % Final     Comment:     Normal <5.7% Prediabetes 5.7-6.4%  Diabetes 6.5% or higher - adopted from ADA   consensus guidelines.       Hemoglobin A1C   Date Value Ref Range Status   07/23/2021 7.3 (H) 0.0 - 5.6 % Final     Comment:     Normal <5.7%   Prediabetes 5.7-6.4%    Diabetes 6.5% or higher     Note: Adopted from ADA consensus guidelines.     Potassium   Date Value Ref Range Status   11/18/2021 3.5 3.4 - 5.3 mmol/L Final   04/27/2016 4.1 3.4 - 5.3 mmol/L Final     ALT   Date Value Ref Range Status   11/18/2021 25 0 - 50 U/L Final   01/11/2021 37 0 - 50 U/L Final     AST   Date Value Ref Range Status   11/18/2021 21 0 - 45 U/L Final   01/11/2021 31 0 - 45 U/L Final     TSH   Date Value Ref Range Status   01/11/2021 2.01 0.40 - 4.00 mU/L Final     T4 Total   Date Value Ref Range Status   11/29/2010 8.5 5.0 - 11.0 ug/dL Final     T4 Free   Date Value Ref Range Status   03/21/2019 1.09 0.76 - 1.46 ng/dL Final       Cholesterol   Date Value Ref Range Status   01/11/2021 223 (H) <200 mg/dL Final     Comment:     Desirable:       <200 mg/dl   12/18/2018 194 <200 mg/dL Final     HDL Cholesterol   Date Value Ref Range Status   01/11/2021 92 >49 mg/dL Final   12/18/2018 91 >49 mg/dL Final     LDL Cholesterol Calculated   Date Value Ref Range Status   01/11/2021 122 (H) <100 mg/dL Final     Comment:     Above desirable:  100-129 mg/dl  Borderline High:  130-159 mg/dL  High:             160-189 mg/dL  Very high:       >189 mg/dl     12/18/2018 92 <100 mg/dL Final     Comment:     Desirable:       <100 mg/dl     Triglycerides   Date Value Ref Range Status   01/11/2021 44 <150 mg/dL Final     Comment:     Non Fasting   12/18/2018 54 <150 mg/dL Final     Cholesterol/HDL Ratio   Date Value Ref Range Status   09/27/2013 2.7 0.0 - 5.0 Final   02/15/2013 2.9 0.0 - 5.0 Final       ASSESSMENT/PLAN:    1. TYPE 1 DIABETES MELLITUS: Patient is considering using an insulin pump in the near  future.   She plans to check her blood sugar 2 hr postdinner and if her 2 hr postdinner blood sugar is > 180, she will give herself an insulin correction dose.  I referred her to see the dietitian.  No change in insulin doses today.  Continue low dose Trulicity 0.75 mg subcutaneous once a week.  Her urine microalbuminuria has been negative with normal creat/GFR.  She has noticed numbness in toes. No foot ulcers. Referral for Podiatry evaluation placed today.  Pt is taking ASA daily.  I have no vitals today. BP was 120/82 in Nov 2021.  Dali received the COVID vaccines ( Moderna) and COVID booster.  Pt also received the flu vaccine this season.    2.  RETINOPATHY: Hx of mild NPDR right eye only.  She was seen here by Oph in Oct 2021.    3.  HYPOTHYROIDISM:  TSH normal in Jan 2021.  Continue Levothyroxine 112 mcg po daily.    4. HYPERLIPIDEMIA:  in Jan 2021. This was not fasting.  She remains on Simvastatin.    5. RUQ PAIN: Pt has intermittent RUQ pain. I asked her to see Dr. Alas for evaluation.    6.   FOLLOW UP: with Dr. Mckeon in May 2022.  I placed an order for an A1C and annual fasting diabetes labs today.    Time spent reviewing chart notes, labs and Dexcom sensor download today= 8 minutes.  Time video visit today = 21 minutes.  Time for documentation today = 15 minutes.     TOTAL TIME FOR VISIT TODAY =  44 minutes.    Marissa Sebastian PA-C       Dali is a 51 year old who is being evaluated via a billable video visit.      How would you like to obtain your AVS? MyChart  If the video visit is dropped, the invitation should be resent by: Send to e-mail at: [email or web address]  Will anyone else be joining your video visit? No

## 2022-02-01 NOTE — TELEPHONE ENCOUNTER
Patient of Dr. Betancourt's (pharmacy linked it to Dr. James, but patient has never seen that provider) Requesting methotrexate refill.

## 2022-02-01 NOTE — PROGRESS NOTES
Carly is a 51 year old who is being evaluated via a billable video visit.      How would you like to obtain your AVS? MyChart  If the video visit is dropped, the invitation should be resent by: Send to e-mail at: [email or web address]  Will anyone else be joining your video visit? No

## 2022-02-01 NOTE — PROGRESS NOTES
Due to the COVID 19 pandemic this visit was converted to a video visit in order to help prevent spread of infection in this patient and the general population.    Time of start: 10:00 am  Time of end: 10:26 am  Total duration of video visit: 26 minutes.    HPI  Dali Martines is a 51 year old female with type 1 diabetes mellitus and hypothyroidism.  Video visit today for diabetes and hypothyroid follow up.  Pt was dx having type 1 diabetes at age 15.  Her hx is also significant for mild NPDR right eye,  psoriatic arthritis, hypothyroidism, anxiety, hyperlipidemia and GERD.  For her diabetes, she is currently taking Tresiba 17 units daily, Fiasp 1 unit/7 gms CHO with meals and snacks, plus correction insulin.  Dali is also taking Trulicity 0.75 mg subcutaneous once a week.  Most recent A1C was 7.3 % on 10/26/2021.  I reviewed her DexcomG6 sensor download data today and scanned the data in her note below.  Pt's average glucose was 179  with SD 66.    Her glucose is in target 51 % of the time, above target 46 % of the time and below target 3 % of the time.  She is able to self treat her hypoglycemia and she wears her Dexcom sensor full time.  Her blood sugars are highest in the evening and until 3 am.  On ROS today, doing ok.   Numbness in toes.   Less nausea on lower dose of Trulicity.  Intermittent episodes of RUQ pain.  No severe n/v, diarrhea, fever or chills.  Pt reports having chest pain when she eats food with MSG.  Pt denies headaches, blurred vision, SOB at rest or cough.  She denies dysuria, hematuria or foot ulcers a this time.  Dali received the COVID vaccine (Moderna) and COVID booster.    Diabetes Care  Retinopathy:yes in right eye; pt seen by Oph here in Oct 2021 with mild NPDR right eye only.  Nephropathy:none; urine microalbuminuria negative in Jan 2021.   Neuropathy: reports mild numbness in toes.  Foot Exam: no exam today.  Taking aspirin: yes.  Lipids:  in Jan 2021. She was NOT fasting. Pt  is taking Simvastatin and Fish Oil.  CAD: no hx of CAD.  Mental health: hx of anxiety.  Insulin: Basal and meal time insulin with correction insulin. DM meds: low dose Trulicity.  Testing: DexcomG6 sensor.          ROS  Please see under HPI.    Allergies  Allergies   Allergen Reactions     Monosodium Glutamate Palpitations and Shortness Of Breath     Sulfasalazine      Developed rash, HA, dizziness       Medications  Current Outpatient Medications   Medication Sig Dispense Refill     albuterol (PROAIR HFA/PROVENTIL HFA/VENTOLIN HFA) 108 (90 Base) MCG/ACT inhaler Inhale 2 puffs into the lungs every 4 hours as needed for shortness of breath / dyspnea or wheezing 1 Inhaler 11     aspirin 81 MG tablet Take 1 tablet by mouth daily.       blood glucose (ACCU-CHEK COLIN PLUS) test strip Test Blood Sugar 8 times daily or as directed 800 strip 3     blood glucose (NO BRAND SPECIFIED) test strip Use to test blood sugar 8 times daily or as directed. Any covered brand. 800 strip 3     blood glucose monitoring (ACCU-CHEK FASTCLIX) lancets Use to test blood sugar 8 times daily or as directed. 4 Box 3     blood glucose monitoring (NO BRAND SPECIFIED) meter device kit Use to test blood sugar 8 times daily or as directed. Any covered brand, 1 kit 0     Calcium Carbonate-Vitamin D (CALCIUM + D PO) Take one daily       citalopram (CELEXA) 20 MG tablet Take 1.5 tablets (30 mg) by mouth daily 135 tablet 1     Continuous Blood Gluc  (DEXCOM G6 ) SUZANNE Use to read blood sugars as per 's instructions. 1 each 0     Continuous Blood Gluc Sensor (DEXCOM G6 SENSOR) MISC Change every 10 days. 9 each 3     Continuous Blood Gluc Transmit (DEXCOM G6 TRANSMITTER) MISC Change every 3 months. 1 each 3     diclofenac (VOLTAREN) 1 % topical gel APPLY 2 GRAMS ONTO THE SKIN FOUR TIMES DAILY AS NEEDED FOR MODERATE PAIN 100 g 5     dulaglutide (TRULICITY) 0.75 MG/0.5ML pen Inject 0.75 mg Subcutaneous every 7 days 2 mL 5      econazole nitrate 1 % cream Apply 0.5 inches topically daily.       fish oil-omega-3 fatty acids (FISH OIL) 1000 MG capsule Take one daily       folic acid (FOLVITE) 1 MG tablet Take 1 tablet (1 mg) by mouth daily 90 tablet 3     Injection Device for insulin (INPEN 100-BLUE-TANIYA) SUZANNE 1 each once for 1 dose 1 each 0     insulin aspart (FIASP FLEXTOUCH) 100 UNIT/ML pen-injector Inject subcu 1 unit per 7 grams CHO with Correction Factor of1 unit drops BG 30 mg/mL. Approx 45 units daily. 45 mL 1     insulin degludec (TRESIBA) 100 UNIT/ML pen Inject 17 units subcutaneous at hs. 15 mL 3     insulin pen needle (B-D U/F) 31G X 8 MM miscellaneous Use 5 pen needles daily 450 each 3     Ketoconazole (NIZORAL PO) Take  by mouth. Shampoo daily       levothyroxine (SYNTHROID/LEVOTHROID) 112 MCG tablet Take 1 tablet (112 mcg) by mouth daily 90 tablet 3     methotrexate 2.5 MG tablet Take 6 tabs every 7 days 24 tablet 0     methylphenidate (METADATE CD) 20 MG CR capsule Take 20 mg by mouth daily       methylphenidate (RITALIN) 10 MG tablet TAKE UP TO 2 TABLETS BY MOUTH IN THE LATE AFTERNOON.       Minoxidil (ROGAINE EX) Externally apply  topically. daily       mometasone-formoterol (DULERA) 100-5 MCG/ACT inhaler Inhale 2 puffs into the lungs 2 times daily 3 Inhaler 11     montelukast (SINGULAIR) 5 MG chewable tablet Take 1 tablet (5 mg) by mouth At Bedtime 90 tablet 4     Multiple Vitamin (MULTIVITAMIN OR) Take  by mouth. Take one daily tab       NOVOLOG PENFILL 100 UNIT/ML soln INJECT 1 UNIT PER 15 GRAMS CHO AT ALL MEALS AND SNACKS WITH CORRECTION SCALE OF 1 UNIT PER 50 MG/DL OVER 150, AVERAGE DAILY USE OF 30 UNITS 30 mL 4     predniSONE (DELTASONE) 20 MG tablet Take 1 tablet (20 mg) by mouth daily 10 tablet 0     simvastatin (ZOCOR) 20 MG tablet Take 1 tablet (20 mg) by mouth At Bedtime 90 tablet 3     traZODone (DESYREL) 50 MG tablet Take 1-2 tablets by mouth nightly as needed for sleep. 180 tablet 0       Family  History  family history includes Alcoholism in her father; Anxiety Disorder in her father; Arthritis in her father; Breast Cancer in her mother; C.A.D. in her father and maternal grandmother; Cancer in her paternal grandfather; Cardiac Sudden Death in her maternal grandfather; Cerebrovascular Disease in her paternal grandmother; Circulatory in her father; Coronary Artery Disease in her father and maternal grandmother; Diabetes in her father, maternal grandmother, and another family member; Eye Disorder in her father; Gynecology in an other family member; Heart Disease in her father, maternal grandfather, maternal grandmother, and paternal grandfather; Hypothyroidism in her father; Lipids in her father; Macular Degeneration in her father and maternal aunt; Rheumatoid Arthritis in her father; Thyroid Disease in her father and another family member.    Social History   reports that she has never smoked. She has never used smokeless tobacco. She reports current alcohol use. She reports that she does not use drugs.     Past Medical History  Past Medical History:   Diagnosis Date     Anemia      Anxiety      Arthritis 2014    Psoriatic arthritis     Back injury 1988     Dry eye syndrome      Dyslipidemia      Endometriosis 2002    adehsions seen at laparoscopy     GERD (gastroesophageal reflux disease)      Hypothyroidism     8 yoa     SOB (shortness of breath) 3/11/2014     Type I (juvenile type) diabetes mellitus without mention of complication, not stated as uncontrolled     when 15      Uncomplicated asthma Fall 2013       Past Surgical History:   Procedure Laterality Date     COLONOSCOPY  2002     COLONOSCOPY N/A 6/23/2020    Procedure: COLONOSCOPY;  Surgeon: Lauryn Rivera MD;  Location: [facility]     ESOPHAGOSCOPY, GASTROSCOPY, DUODENOSCOPY (EGD), COMBINED  1/7/2014    Procedure: COMBINED ESOPHAGOSCOPY, GASTROSCOPY, DUODENOSCOPY (EGD), BIOPSY SINGLE OR MULTIPLE;;  Surgeon: Kraig Nicole MD;  Location: [facility]      GYN SURGERY      Laparotomy to remove endometrial tissue     HC BREATH HYDROGEN TEST N/A 6/17/2014    Procedure: HYDROGEN BREATH TEST;  Surgeon: Deacon Alberts MD;  Location: [facility]     HYDROGEN BREATH TEST  6/17/14     LAPAROSCOPIC APPENDECTOMY  2002     LAPAROTOMY, LYSIS ADHESIONS, COMBINED  2002    endometriosis     SOFT TISSUE SURGERY  December 2014    right ankle tendon injury     ZZC REPAIR CRUCIATE LIGAMENT,KNEE       ZZC STOMACH SURGERY PROCEDURE UNLISTED  December 2002       Physical Exam    No exam.    RESULTS  Creatinine   Date Value Ref Range Status   11/18/2021 0.50 (L) 0.52 - 1.04 mg/dL Final   01/11/2021 0.56 0.52 - 1.04 mg/dL Final     GFR Estimate   Date Value Ref Range Status   11/18/2021 >90 >60 mL/min/1.73m2 Final     Comment:     As of July 11, 2021, eGFR is calculated by the CKD-EPI creatinine equation, without race adjustment. eGFR can be influenced by muscle mass, exercise, and diet. The reported eGFR is an estimation only and is only applicable if the renal function is stable.   01/11/2021 >90 >60 mL/min/[1.73_m2] Final     Comment:     Non  GFR Calc  Starting 12/18/2018, serum creatinine based estimated GFR (eGFR) will be   calculated using the Chronic Kidney Disease Epidemiology Collaboration   (CKD-EPI) equation.       Microalbumin Urine   Date Value Ref Range Status   06/26/2009 5@ MG/L      Hemoglobin A1C POCT   Date Value Ref Range Status   01/11/2021 7.1 (H) 0 - 5.6 % Final     Comment:     Normal <5.7% Prediabetes 5.7-6.4%  Diabetes 6.5% or higher - adopted from ADA   consensus guidelines.       Hemoglobin A1C   Date Value Ref Range Status   07/23/2021 7.3 (H) 0.0 - 5.6 % Final     Comment:     Normal <5.7%   Prediabetes 5.7-6.4%    Diabetes 6.5% or higher     Note: Adopted from ADA consensus guidelines.     Potassium   Date Value Ref Range Status   11/18/2021 3.5 3.4 - 5.3 mmol/L Final   04/27/2016 4.1 3.4 - 5.3 mmol/L Final     ALT   Date Value Ref Range  Status   11/18/2021 25 0 - 50 U/L Final   01/11/2021 37 0 - 50 U/L Final     AST   Date Value Ref Range Status   11/18/2021 21 0 - 45 U/L Final   01/11/2021 31 0 - 45 U/L Final     TSH   Date Value Ref Range Status   01/11/2021 2.01 0.40 - 4.00 mU/L Final     T4 Total   Date Value Ref Range Status   11/29/2010 8.5 5.0 - 11.0 ug/dL Final     T4 Free   Date Value Ref Range Status   03/21/2019 1.09 0.76 - 1.46 ng/dL Final       Cholesterol   Date Value Ref Range Status   01/11/2021 223 (H) <200 mg/dL Final     Comment:     Desirable:       <200 mg/dl   12/18/2018 194 <200 mg/dL Final     HDL Cholesterol   Date Value Ref Range Status   01/11/2021 92 >49 mg/dL Final   12/18/2018 91 >49 mg/dL Final     LDL Cholesterol Calculated   Date Value Ref Range Status   01/11/2021 122 (H) <100 mg/dL Final     Comment:     Above desirable:  100-129 mg/dl  Borderline High:  130-159 mg/dL  High:             160-189 mg/dL  Very high:       >189 mg/dl     12/18/2018 92 <100 mg/dL Final     Comment:     Desirable:       <100 mg/dl     Triglycerides   Date Value Ref Range Status   01/11/2021 44 <150 mg/dL Final     Comment:     Non Fasting   12/18/2018 54 <150 mg/dL Final     Cholesterol/HDL Ratio   Date Value Ref Range Status   09/27/2013 2.7 0.0 - 5.0 Final   02/15/2013 2.9 0.0 - 5.0 Final       ASSESSMENT/PLAN:    1. TYPE 1 DIABETES MELLITUS: Patient is considering using an insulin pump in the near future.   She plans to check her blood sugar 2 hr postdinner and if her 2 hr postdinner blood sugar is > 180, she will give herself an insulin correction dose.  I referred her to see the dietitian.  No change in insulin doses today.  Continue low dose Trulicity 0.75 mg subcutaneous once a week.  Her urine microalbuminuria has been negative with normal creat/GFR.  She has noticed numbness in toes. No foot ulcers. Referral for Podiatry evaluation placed today.  Pt is taking ASA daily.  I have no vitals today. BP was 120/82 in Nov 2021.  Dali  received the COVID vaccines ( Moderna) and COVID booster.  Pt also received the flu vaccine this season.    2.  RETINOPATHY: Hx of mild NPDR right eye only.  She was seen here by Oph in Oct 2021.    3.  HYPOTHYROIDISM:  TSH normal in Jan 2021.  Continue Levothyroxine 112 mcg po daily.    4. HYPERLIPIDEMIA:  in Jan 2021. This was not fasting.  She remains on Simvastatin.    5. RUQ PAIN: Pt has intermittent RUQ pain. I asked her to see Dr. Alas for evaluation.    6.   FOLLOW UP: with Dr. Mckeon in May 2022.  I placed an order for an A1C and annual fasting diabetes labs today.    Time spent reviewing chart notes, labs and Dexcom sensor download today= 8 minutes.  Time video visit today = 21 minutes.  Time for documentation today = 15 minutes.     TOTAL TIME FOR VISIT TODAY =  44 minutes.    Marissa Sebastian PA-C

## 2022-02-01 NOTE — TELEPHONE ENCOUNTER
Methotrexate 2.5 mg tab - take 6 tabs every 7 days    Last Written Prescription Date:  11/10/2021  Last Fill Quantity: 24,   # refills: 0  Last Office Visit: 10/19/2021  Future Office visit:  none    CBC RESULTS: Recent Labs   Lab Test 11/18/21  1659   WBC 6.1   RBC 3.57*   HGB 11.1*   HCT 34.8*   MCV 98   MCH 31.1   MCHC 31.9   RDW 12.9          Creatinine   Date Value Ref Range Status   11/18/2021 0.50 (L) 0.52 - 1.04 mg/dL Final   01/11/2021 0.56 0.52 - 1.04 mg/dL Final   ]    Liver Function Studies -   Recent Labs   Lab Test 11/18/21  1659   PROTTOTAL 7.1   ALBUMIN 3.4   BILITOTAL 0.2   ALKPHOS 88   AST 21   ALT 25       Routing refill request to provider for review/approval because:  DMARD        Forwarded this now to Dr vides

## 2022-02-08 ENCOUNTER — OFFICE VISIT (OUTPATIENT)
Dept: NEUROLOGY | Facility: CLINIC | Age: 52
End: 2022-02-08
Attending: PODIATRIST
Payer: COMMERCIAL

## 2022-02-08 DIAGNOSIS — R20.2 NUMBNESS AND TINGLING OF LEFT LOWER EXTREMITY: ICD-10-CM

## 2022-02-08 DIAGNOSIS — R20.0 NUMBNESS AND TINGLING OF LEFT LOWER EXTREMITY: ICD-10-CM

## 2022-02-08 DIAGNOSIS — M25.371 ANKLE INSTABILITY, RIGHT: ICD-10-CM

## 2022-02-08 DIAGNOSIS — E10.3291 TYPE 1 DIABETES MELLITUS WITH MILD NONPROLIFERATIVE RETINOPATHY OF RIGHT EYE, MACULAR EDEMA PRESENCE UNSPECIFIED (H): ICD-10-CM

## 2022-02-08 DIAGNOSIS — S86.311D TEAR OF PERONEAL TENDON, RIGHT, SUBSEQUENT ENCOUNTER: ICD-10-CM

## 2022-02-08 NOTE — PROGRESS NOTES
[facility]  Electrodiagnostic Laboratory    Nerve Conduction & EMG Report          Patient:       Dali Martines  Patient ID:    [id number]  Gender:        Female  YOB: 1970  Age:           51 Years 2 Months        History & Examination:  51 year old woman with numbness in her big toes. Onset on the right 20+ years ago. The left side began within the last year. She also reports intermittent low back pain. Evaluate for focal neuropathy vs polyneuropathy vs radiculopathy.    Techniques: Motor and sensory conduction studies were done with surface recording electrodes. EMG was done with a concentric needle electrode.      Results:  Nerve conduction studies:   1. Bilateral sural, superficial peroneal, and medial plantar sensory responses are normal.   2. Left peroneal-EDB motor response shows normal DL but prolonged duration with stimulation at the ankle, moderately reduced amplitude, borderline slow CV in the leg and normal CV in the across knee segment.   3. Bilateral peroneal-TA, right peroneal-EDB and bilateral tibial-AH motor responses are normal.     Needle EMG of selected proximal and distal left and right lower limb muscles was performed as tabulated below. No abnormal spontaneous activity was observed in the sampled muscles. Motor unit potential morphology and recruitment patterns were normal.     Interpretation:  This is an abnormal study. 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 This finding is of uncertain clinical significance. There is no evidence of a large-fiber polyneuropathy or lumbosacral radiculopathy affecting the lower limbs on the basis of this study. Clinical correlation is recommended.     Jarrett Gomez MD  Department of Neurology         Sensory NCS      Nerve / Sites Rec. Site Onset Peak NP Amp Ref. PP Amp Dist Rupesh Ref. Temp     ms ms  V  V  V cm m/s m/s  C   L SURAL - Lat Mall       Calf Ankle 2.55 3.44 13.2 8.0 9.6 14 54.9 38.0 30.2   R SURAL - Lat Mall      Calf Ankle 2.71 3.80 12.1 8.0 15.5 14 51.7 38.0 31   L SUP PERONEAL      Lat Leg Hill 2.97 3.75 3.8  3.6 12.5 42.1 38.0 30.2   R SUP PERONEAL      Lat Leg Hill 2.86 3.75 5.6  6.7 12.5 43.6 38.0 31.1   L MEDIAL PLANTAR      Sole Med Mall 3.13 4.06 4.0  3.1 14 44.8  30.1   R MEDIAL PLANTAR      Sole Med Mall 2.92 3.75 3.4  4.3 14 48.0  30.2       Motor NCS      Nerve / Sites Rec. Site Lat Ref. Amp Ref. Rel Amp Dist Rupesh Ref. Dur. Area Temp.     ms ms mV mV % cm m/s m/s ms %  C   L DEEP PERONEAL - EDB 60      Ankle EDB 4.58 6.00 1.2 2.0 100 8   17.19 100 30.2      FibHead EDB 13.18  1.3  109 32 37.2 38.0 18.49 95.5       Pop Fos EDB 15.31  1.1  90.1 9 42.1 38.0 17.86 79.3    R DEEP PERONEAL - EDB 60      Ankle EDB 4.95 6.00 2.8 2.0 100 8   6.30 100 30.6      FibHead EDB 13.23  2.3  80 37 44.7 38.0 10.36 124 30.7      Pop Fos EDB 15.36  2.1  73.6 10 46.8 38.0 9.69 108 30.7   L TIBIAL - AH      Ankle AH 3.70 6.00 15.9 4.0 100 8   6.51 100 30.2      Pop Fos AH 12.60  2.9  18.5 44 49.4 38.0 8.39 23.5 30.2   R TIBIAL - AH      Ankle AH 3.65 6.00 13.4 4.0 100 8   7.29 100 31.2   L PERONEAL - Tib Ant      Fib Head Tib Ant 3.33  4.5  100 12   15.05 100 30.3      Knee Tib Ant 5.16  4.2  94.5 10 54.9  14.53 90.6 30.2   R PERONEAL - Tib Ant      Fib Head Tib Ant 3.70  5.6  100 12   12.60 100 31      Knee Tib Ant 5.31  4.8  86.1 10 61.9  10.89 84.8 31.1       F  Wave      Nerve Min F Lat Max F Lat Mean FLat Temp.    ms ms ms  C   L TIBIAL 53.13 57.19 55.10 30.2       EMG Summary Table     Spontaneous MUAP Recruitment    IA Fib/PSW Fasc H.F. Amp Dur. PPP Pattern   L. VAST LATERALIS N None None None N N N Normal   L. TIB ANTERIOR N None None None N N N Normal   L. GASTROCN (MED) N None None None N N N Normal   L. PERON LONGUS N None None None N N N Normal   L. TIB POSTERIOR N None None None N N N Normal   R. VAST LATERALIS N None None None N N N Normal    R. TIB ANTERIOR N None None None N N N Normal   R. GASTROCN (MED) N None None None N N N Normal

## 2022-02-08 NOTE — LETTER
2/8/2022     RE: Dali Martines  [address]  [facility] [address]     Dear Colleague,    Thank you for referring your patient, Dali Martines, to the [facility] NEUROSPECIALTIES at [facility]. Please see a copy of my visit note below.        [facility]  Electrodiagnostic Laboratory    Nerve Conduction & EMG Report          Patient:       Dali Martines  Patient ID:    [id number]  Gender:        Female  YOB: 1970  Age:           51 Years 2 Months        History & Examination:  51 year old woman with numbness in her big toes. Onset on the right 20+ years ago. The left side began within the last year. She also reports intermittent low back pain. Evaluate for focal neuropathy vs polyneuropathy vs radiculopathy.    Techniques: Motor and sensory conduction studies were done with surface recording electrodes. EMG was done with a concentric needle electrode.      Results:  Nerve conduction studies:   1. Bilateral sural, superficial peroneal, and medial plantar sensory responses are normal.   2. Left peroneal-EDB motor response shows normal DL but prolonged duration with stimulation at the ankle, moderately reduced amplitude, borderline slow CV in the leg and normal CV in the across knee segment.   3. Bilateral peroneal-TA, right peroneal-EDB and bilateral tibial-AH motor responses are normal.     Needle EMG of selected proximal and distal left and right lower limb muscles was performed as tabulated below. No abnormal spontaneous activity was observed in the sampled muscles. Motor unit potential morphology and recruitment patterns were normal.     Interpretation:  This is an abnormal study. 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 This finding is of uncertain clinical significance. There is no evidence of a large-fiber polyneuropathy or lumbosacral radiculopathy  Ruchi Vieira  1940 02/24/21    SUBJECTIVE:    Hypothyroid has been asymptomatic w/o sx of fatigue, constipation, cold intolerance, depression. DUE FOR F/U TFTS    GERD:  Is without sx of heartburn or dysphagia, abd pain.    ugi was pos for gerd and HH. Also sx better w looser fitting pants and slight wt loss w diet. atr fib, seeing Dr Shani Mcfarlane, no recent palpitations. On rythmol and also lopressor, CURRENTLY BEEN HOLDING Prakash Flax. Had holter in Oct, w some PAF. Then Dr Shani Mcfarlane referred to Dr Jenn Martin who rec ablation. She defers for now. Plans for second opinion from Dr Dail Mcardle in [place]. Had fall then needing ORIF L wrist fx by Dr Maria Esther Miranda, now healed. OBJECTIVE:    /68   Pulse 60   Ht 5' 4\" (1.626 m)   Wt 143 lb (64.9 kg)   SpO2 97%   BMI 24.55 kg/m²     Physical Exam  Vitals signs reviewed. Constitutional:       Appearance: She is well-developed. HENT:      Head: Normocephalic and atraumatic. Eyes:      General: No scleral icterus. Right eye: No discharge. Left eye: No discharge. Conjunctiva/sclera: Conjunctivae normal.      Pupils: Pupils are equal, round, and reactive to light. Neck:      Musculoskeletal: Neck supple. Thyroid: No thyromegaly. Vascular: No JVD. Trachea: No tracheal deviation. Cardiovascular:      Rate and Rhythm: Normal rate and regular rhythm. Heart sounds: Normal heart sounds. No murmur. No friction rub. No gallop. Pulmonary:      Effort: Pulmonary effort is normal. No respiratory distress. Breath sounds: Normal breath sounds. No wheezing or rales. Abdominal:      General: Bowel sounds are normal. There is no distension. Palpations: Abdomen is soft. There is no mass. Tenderness: There is no abdominal tenderness. There is no guarding or rebound. Hernia: No hernia is present. Lymphadenopathy:      Cervical: No cervical adenopathy. Skin:     General: Skin is warm and dry.    Neurological: Mental Status: She is alert. Psychiatric:         Mood and Affect: Mood normal.         ASSESSMENT:    1. Paroxysmal atrial fibrillation (HCC)    2. Hypothyroidism due to acquired atrophy of thyroid    3. Hiatal hernia    4. B12 deficiency    5. Mixed hyperlipidemia    6. Closed fracture of left wrist, sequela    7. Gastroesophageal reflux disease without esophagitis        PLAN:    Marcel Temple was seen today for 6 month follow-up and discuss medications. Diagnoses and all orders for this visit:    Paroxysmal atrial fibrillation ([address]- PENDING EVAL DR RANDLE. GAVE HER COPY OF LAST EKG, DR CAM'S NOTE AND THE HOLTER REPT TO BRING TO CONSULTATION. CHECK LAB.  **REITERATED THE IMPORTANCE OF HER CHECKING W DR RANDLE IF NEEDS TO START ANTICOAGULATION GIVEN HER PAF ON HOLTER**  -     Comprehensive Metabolic Panel; Future  -     CBC Auto Differential; Future  -     Lipid Panel; Future  -     TSH without Reflex; Future    Hypothyroidism due to acquired atrophy of thyroid  - Clinically hypothyroidism appears stable and will continue current dosing, also periodic monitoring of TFTs. -     levothyroxine (SYNTHROID) 150 MCG tablet; Take 1 tablet by mouth daily  -     TSH without Reflex; Future  -     T4, Free; Future    Hiatal hernia- GI UPSET ON 20-40MG PROTONIX SO SHE'LL TRY 1/2 TAB OF THE 20MG=10MG DAILY  -     Comprehensive Metabolic Panel; Future  -     CBC Auto Differential; Future  -     pantoprazole (PROTONIX) 20 MG tablet; Take 1 tablet by mouth every morning (before breakfast)    B12 deficiency- MONITORING LEVELS  -     Vitamin B12; Future    Mixed hyperlipidemia  - Pt will continue to work on a low fat diet and also exercise, wt loss as appropriate. Will continue periodic monitoring of fasting lipid profile, glucose, liver function. -     Comprehensive Metabolic Panel; Future  -     CBC Auto Differential; Future  -     Lipid Panel;  Future    Closed fracture of left wrist, sequela- HEALED POSTOP FR  affecting the lower limbs on the basis of this study. Clinical correlation is recommended.     Jarrett Gomez MD  Department of Neurology         Sensory NCS      Nerve / Sites Rec. Site Onset Peak NP Amp Ref. PP Amp Dist Rupesh Ref. Temp     ms ms  V  V  V cm m/s m/s  C   L SURAL - Lat Mall      Calf Ankle 2.55 3.44 13.2 8.0 9.6 14 54.9 38.0 30.2   R SURAL - Lat Mall      Calf Ankle 2.71 3.80 12.1 8.0 15.5 14 51.7 38.0 31   L SUP PERONEAL      Lat Leg [place] 2.97 3.75 3.8  3.6 12.5 42.1 38.0 30.2   R SUP PERONEAL      Lat [place] 2.86 3.75 5.6  6.7 12.5 43.6 38.0 31.1   L MEDIAL PLANTAR      Sole Med Mall 3.13 4.06 4.0  3.1 14 44.8  30.1   R MEDIAL PLANTAR      Sole Med Mall 2.92 3.75 3.4  4.3 14 48.0  30.2       Motor NCS      Nerve / Sites Rec. Site Lat Ref. Amp Ref. Rel Amp Dist Rupesh Ref. Dur. Area Temp.     ms ms mV mV % cm m/s m/s ms %  C   L DEEP PERONEAL - EDB 60      Ankle EDB 4.58 6.00 1.2 2.0 100 8   17.19 100 30.2      FibHead EDB 13.18  1.3  109 32 37.2 38.0 18.49 95.5       Pop Fos EDB 15.31  1.1  90.1 9 42.1 38.0 17.86 79.3    R DEEP PERONEAL - EDB 60      Ankle EDB 4.95 6.00 2.8 2.0 100 8   6.30 100 30.6      FibHead EDB 13.23  2.3  80 37 44.7 38.0 10.36 124 30.7      Pop Fos EDB 15.36  2.1  73.6 10 46.8 38.0 9.69 108 30.7   L TIBIAL - AH      Ankle AH 3.70 6.00 15.9 4.0 100 8   6.51 100 30.2      Pop Fos AH 12.60  2.9  18.5 44 49.4 38.0 8.39 23.5 30.2   R TIBIAL - AH      Ankle AH 3.65 6.00 13.4 4.0 100 8   7.29 100 31.2   L PERONEAL - Tib Ant      Fib Head Tib Ant 3.33  4.5  100 12   15.05 100 30.3      Knee Tib Ant 5.16  4.2  94.5 10 54.9  14.53 90.6 30.2   R PERONEAL - Tib Ant      Fib Head Tib Ant 3.70  5.6  100 12   12.60 100 31      Knee Tib Ant 5.31  4.8  86.1 10 61.9  10.89 84.8 31.1       F  Wave      Nerve Min F Lat Max F Lat Mean FLat Temp.    ms ms ms  C   L TIBIAL 53.13 57.19 55.10 30.2       EMG Summary Table     Spontaneous MUAP Recruitment    IA Fib/PSW Fasc H.F. Amp Dur. PPP Pattern   L.  NICOLE    Gastroesophageal reflux disease without esophagitis  -     pantoprazole (PROTONIX) 20 MG tablet;  Take 1 tablet by mouth every morning (before breakfast) VAST LATERALIS N None None None N N N Normal   L. TIB ANTERIOR N None None None N N N Normal   L. GASTROCN (MED) N None None None N N N Normal   L. PERON LONGUS N None None None N N N Normal   L. TIB POSTERIOR N None None None N N N Normal   R. VAST LATERALIS N None None None N N N Normal   R. TIB ANTERIOR N None None None N N N Normal   R. GASTROCN (MED) N None None None N N N Normal                                  Again, thank you for allowing me to participate in the care of your patient.      Sincerely,    Jarrett Gomez MD

## 2022-02-09 ENCOUNTER — TELEPHONE (OUTPATIENT)
Dept: RHEUMATOLOGY | Facility: CLINIC | Age: 52
End: 2022-02-09
Payer: COMMERCIAL

## 2022-02-09 DIAGNOSIS — L40.50 PSORIATIC ARTHRITIS (H): ICD-10-CM

## 2022-02-09 DIAGNOSIS — Z79.899 HIGH RISK MEDICATION USE: Primary | ICD-10-CM

## 2022-02-09 NOTE — TELEPHONE ENCOUNTER
Received notification that pt is scheduled for a lab appointment and her orders have . Orders placed per standing orders and provider preference.    ANGÉLICA WillettN, RN  Rheumatology Care Coordinator  [facility]

## 2022-02-11 DIAGNOSIS — E10.319 TYPE 1 DIABETES MELLITUS WITH RETINOPATHY (H): ICD-10-CM

## 2022-02-12 ENCOUNTER — HEALTH MAINTENANCE LETTER (OUTPATIENT)
Age: 52
End: 2022-02-12

## 2022-02-12 RX ORDER — PROCHLORPERAZINE 25 MG/1
SUPPOSITORY RECTAL
Qty: 9 EACH | Refills: 3 | Status: SHIPPED | OUTPATIENT
Start: 2022-02-12 | End: 2023-04-18

## 2022-02-12 NOTE — TELEPHONE ENCOUNTER
Continuous Blood Gluc Sensor (DEXCOM G6 SENSOR) [facility]    2/1/2022  [facility] Marissa Conte PA-C    Endocrinology, Diabetes, and Metabolism

## 2022-02-14 ENCOUNTER — LAB (OUTPATIENT)
Dept: LAB | Facility: CLINIC | Age: 52
End: 2022-02-14
Payer: COMMERCIAL

## 2022-02-14 DIAGNOSIS — L40.50 PSORIATIC ARTHRITIS (H): ICD-10-CM

## 2022-02-14 DIAGNOSIS — Z79.899 HIGH RISK MEDICATION USE: ICD-10-CM

## 2022-02-14 DIAGNOSIS — E10.3291 TYPE 1 DIABETES MELLITUS WITH MILD NONPROLIFERATIVE RETINOPATHY OF RIGHT EYE, MACULAR EDEMA PRESENCE UNSPECIFIED (H): ICD-10-CM

## 2022-02-14 LAB
ALBUMIN SERPL-MCNC: 3.8 G/DL (ref 3.4–5)
ALT SERPL W P-5'-P-CCNC: 44 U/L (ref 0–50)
AST SERPL W P-5'-P-CCNC: 34 U/L (ref 0–45)
BASOPHILS # BLD AUTO: 0 10E3/UL (ref 0–0.2)
BASOPHILS NFR BLD AUTO: 1 %
CHOLEST SERPL-MCNC: 218 MG/DL
CREAT SERPL-MCNC: 0.6 MG/DL (ref 0.52–1.04)
CREAT UR-MCNC: 91 MG/DL
CRP SERPL-MCNC: <2.9 MG/L (ref 0–8)
EOSINOPHIL # BLD AUTO: 0.1 10E3/UL (ref 0–0.7)
EOSINOPHIL NFR BLD AUTO: 2 %
ERYTHROCYTE [DISTWIDTH] IN BLOOD BY AUTOMATED COUNT: 13.5 % (ref 10–15)
ERYTHROCYTE [SEDIMENTATION RATE] IN BLOOD BY WESTERGREN METHOD: 32 MM/HR (ref 0–30)
FASTING STATUS PATIENT QL REPORTED: YES
GFR SERPL CREATININE-BSD FRML MDRD: >90 ML/MIN/1.73M2
HBA1C MFR BLD: 8.1 % (ref 0–5.6)
HCT VFR BLD AUTO: 35.5 % (ref 35–47)
HDLC SERPL-MCNC: 94 MG/DL
HGB BLD-MCNC: 11.5 G/DL (ref 11.7–15.7)
LDLC SERPL CALC-MCNC: 111 MG/DL
LYMPHOCYTES # BLD AUTO: 2.2 10E3/UL (ref 0.8–5.3)
LYMPHOCYTES NFR BLD AUTO: 41 %
MCH RBC QN AUTO: 31.3 PG (ref 26.5–33)
MCHC RBC AUTO-ENTMCNC: 32.4 G/DL (ref 31.5–36.5)
MCV RBC AUTO: 97 FL (ref 78–100)
MICROALBUMIN UR-MCNC: 5 MG/L
MICROALBUMIN/CREAT UR: 5.49 MG/G CR (ref 0–25)
MONOCYTES # BLD AUTO: 0.6 10E3/UL (ref 0–1.3)
MONOCYTES NFR BLD AUTO: 11 %
NEUTROPHILS # BLD AUTO: 2.6 10E3/UL (ref 1.6–8.3)
NEUTROPHILS NFR BLD AUTO: 46 %
NONHDLC SERPL-MCNC: 124 MG/DL
PLATELET # BLD AUTO: 310 10E3/UL (ref 150–450)
RBC # BLD AUTO: 3.68 10E6/UL (ref 3.8–5.2)
TRIGL SERPL-MCNC: 66 MG/DL
TSH SERPL DL<=0.005 MIU/L-ACNC: 3.34 MU/L (ref 0.4–4)
WBC # BLD AUTO: 5.5 10E3/UL (ref 4–11)

## 2022-02-14 PROCEDURE — 85652 RBC SED RATE AUTOMATED: CPT

## 2022-02-14 PROCEDURE — 80061 LIPID PANEL: CPT

## 2022-02-14 PROCEDURE — 85025 COMPLETE CBC W/AUTO DIFF WBC: CPT

## 2022-02-14 PROCEDURE — 86140 C-REACTIVE PROTEIN: CPT

## 2022-02-14 PROCEDURE — 83036 HEMOGLOBIN GLYCOSYLATED A1C: CPT

## 2022-02-14 PROCEDURE — 84460 ALANINE AMINO (ALT) (SGPT): CPT

## 2022-02-14 PROCEDURE — 84450 TRANSFERASE (AST) (SGOT): CPT

## 2022-02-14 PROCEDURE — 84443 ASSAY THYROID STIM HORMONE: CPT

## 2022-02-14 PROCEDURE — 82565 ASSAY OF CREATININE: CPT

## 2022-02-14 PROCEDURE — 82040 ASSAY OF SERUM ALBUMIN: CPT

## 2022-02-14 PROCEDURE — 36415 COLL VENOUS BLD VENIPUNCTURE: CPT

## 2022-02-14 PROCEDURE — 82043 UR ALBUMIN QUANTITATIVE: CPT

## 2022-02-15 ENCOUNTER — VIRTUAL VISIT (OUTPATIENT)
Dept: PULMONOLOGY | Facility: CLINIC | Age: 52
End: 2022-02-15
Attending: INTERNAL MEDICINE
Payer: COMMERCIAL

## 2022-02-15 DIAGNOSIS — J45.40 MODERATE PERSISTENT ASTHMA WITHOUT COMPLICATION: Primary | ICD-10-CM

## 2022-02-15 PROCEDURE — 99213 OFFICE O/P EST LOW 20 MIN: CPT | Mod: 95 | Performed by: INTERNAL MEDICINE
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
## 2022-02-15 NOTE — PATIENT INSTRUCTIONS
Continue using Dulera at night, once a day. At the sign of respiratory infection, increase Dulera to twice a day, and if that doesn't work, start prednisone.     If you develop any respiratory symptoms, I recommend you seek medical attention right away.

## 2022-02-18 ENCOUNTER — HOSPITAL ENCOUNTER (OUTPATIENT)
Dept: MAMMOGRAPHY | Facility: CLINIC | Age: 52
Discharge: HOME | End: 2022-02-18
Attending: INTERNAL MEDICINE | Admitting: INTERNAL MEDICINE
Payer: COMMERCIAL

## 2022-02-18 DIAGNOSIS — Z12.31 VISIT FOR SCREENING MAMMOGRAM: ICD-10-CM

## 2022-02-18 PROCEDURE — 77067 SCR MAMMO BI INCL CAD: CPT

## 2022-02-23 ASSESSMENT — SLEEP AND FATIGUE QUESTIONNAIRES
HOW LIKELY ARE YOU TO NOD OFF OR FALL ASLEEP WHILE LYING DOWN TO REST IN THE AFTERNOON WHEN CIRCUMSTANCES PERMIT: HIGH CHANCE OF DOZING
HOW LIKELY ARE YOU TO NOD OFF OR FALL ASLEEP WHILE SITTING INACTIVE IN A PUBLIC PLACE: SLIGHT CHANCE OF DOZING
HOW LIKELY ARE YOU TO NOD OFF OR FALL ASLEEP WHILE SITTING AND TALKING TO SOMEONE: WOULD NEVER DOZE
HOW LIKELY ARE YOU TO NOD OFF OR FALL ASLEEP WHILE SITTING AND READING: HIGH CHANCE OF DOZING
HOW LIKELY ARE YOU TO NOD OFF OR FALL ASLEEP WHILE SITTING QUIETLY AFTER LUNCH WITHOUT ALCOHOL: SLIGHT CHANCE OF DOZING
HOW LIKELY ARE YOU TO NOD OFF OR FALL ASLEEP WHILE WATCHING TV: HIGH CHANCE OF DOZING
HOW LIKELY ARE YOU TO NOD OFF OR FALL ASLEEP WHEN YOU ARE A PASSENGER IN A CAR FOR AN HOUR WITHOUT A BREAK: SLIGHT CHANCE OF DOZING
HOW LIKELY ARE YOU TO NOD OFF OR FALL ASLEEP IN A CAR, WHILE STOPPED FOR A FEW MINUTES IN TRAFFIC: WOULD NEVER DOZE

## 2022-02-24 ENCOUNTER — VIRTUAL VISIT (OUTPATIENT)
Dept: SLEEP MEDICINE | Facility: CLINIC | Age: 52
End: 2022-02-24
Payer: COMMERCIAL

## 2022-02-24 VITALS — HEIGHT: 70 IN | BODY MASS INDEX: 27.2 KG/M2 | WEIGHT: 190 LBS

## 2022-02-24 DIAGNOSIS — F43.9 STRESS: ICD-10-CM

## 2022-02-24 DIAGNOSIS — F51.04 CHRONIC INSOMNIA: Primary | ICD-10-CM

## 2022-02-24 PROCEDURE — 90832 PSYTX W PT 30 MINUTES: CPT | Mod: 95 | Performed by: PSYCHOLOGIST

## 2022-02-24 NOTE — PROGRESS NOTES
Dali is a 51 year old who is being evaluated via a billable video visit.      How would you like to obtain your AVS? MyChart  If the video visit is dropped, the invitation should be resent by: Send to e-mail at: [email or web address]  Will anyone else be joining your video visit? No    Video Start Time: 3:34 PM  Video-Visit Details    Type of service:  Video Visit    Video End Time:4 PM    Originating Location (pt. Location): Home    Distant Location (provider location):  [facility]     Platform used for Video Visit: [facility]     SLEEP MEDICINE VIRTUAL VIDEO FOLLOW-UP VISIT  Sleep Psychology    Patient Name: Dali Martines  MRN:  [id number]  Date of Service: 2022       Subjective Report     Dali Martines  returns for a telehealth video visit to discuss progress in implementing behavioral strategies for the management of insomnia.  Patient consent for initiation of video visit was obtained and documented prior to initiation of visit.     Dali reports sleep has improved overall but there continues to be variaation related to experienced stressors.  She continues to feel exhaused after dinner at times and may take a short nap..    There has been some new work stresses and  of Uncle who  in December was postponed to March compouned by fathers relatively recent death.      She feels the acute level of stress has dissipated but she continues to feel somwhat exhausted due to ongoing grief and stress.         .     Sleep Data:     Source of Sleep Estimates:  verbal self-report        EPWORTH SLEEPINESS SCALE    Sitting and reading 3   Watching TV 3   Sitting, inactive in a public place (theatre or mtg.) 1    As a passenger in a car 1   Lying down to rest in the afternoon when circumstance permit 3   Sitting and talking to someone 0   Sitting quietly after lunch without alcohol 1   In a car, while stopped for a few minutes in traffic 0   TOTAL SCORE (nl <11) 12  "    INSOMNIA SEVERITY INDEX     Difficulty falling asleep 1   Difficult staying asleep 3   Problems waking up to early 3   How SATISFIED/DISSATISFIED are you with your CURRENT sleep pattern? 3   How NOTICEABLE to others do you think your sleep pattern is in terms of your quality of life? 2   How WORRIED/DISTRESSED are you about your current sleep pattern? 3   To what extent do you consider your sleep problem to INTERFERE with your daily fuctioning(e.g. daytime fatigue, mood, ability to function at work/daily chores, concentration, mood,etc.) CURRENTLY? 3   INSOMNIA SEVERITY INDEX TOTAL SCORE 18    Absence of insomnia (0-7); sub-threshold insomnia (8-14); moderate insomnia (15-21); and severe insomnia (22-28)       Interventions     Strategies and recommendations including implementation and maintenance of of stimulus control and stress management and insomnia were discussed today.       Vital Signs     Ht 1.778 m (5' 10\")   Wt 86.2 kg (190 lb)   BMI 27.26 kg/m       Mental Status     Orientation:  X3  Mood:  normal  Affect:  Congruent with mood  Speech/Language:  Normal  Thought Process: Intact  Associations:  Normal  Thought Content: Normal  Patient does not report any suicidal ideation, intention or plan.    Diagnostic Impressions and Plan        Chronic insomnia  Stress    There is been overall general improvement in sleep with the exception of the last couple of weeks due to increasing family stressors.  Acute stress has been somewhat alleviated.    Plan:  We will provide patient resources on setting boundaries with family members and other strategies to take time for her on rest and relaxation in the midst of several current stressors.  Continue with her current sleep schedule and plan recommended previously.    Follow-up: 6 weeks      Philip Quiroz PsyD, GARY, DBSM  Diplomate, Behavioral Sleep Medicine  [facility]      Note: This dictation was created using voice recognition " software. This document may contain an error not identified before finalizing the document. If the error changes the accuracy of the document, I would appreciate it being brought to my attention.

## 2022-02-24 NOTE — PATIENT INSTRUCTIONS
[place] articles/video/books    https://[email or web address]/[email or web address]-way-to-build-and-preserve-better-boundaries    https://individualmatters.org/bpcanyxb-oaavaa-nsh-to-treat-you-the-art-of-boundary-setting/

## 2022-02-28 DIAGNOSIS — M25.50 PAIN IN JOINT, MULTIPLE SITES: ICD-10-CM

## 2022-02-28 DIAGNOSIS — E11.620 NLD (NECROBIOSIS LIPOIDICA DIABETICORUM) (H): ICD-10-CM

## 2022-02-28 DIAGNOSIS — L40.50 PSORIATIC ARTHRITIS (H): ICD-10-CM

## 2022-02-28 DIAGNOSIS — E10.3291 TYPE 1 DIABETES MELLITUS WITH MILD NONPROLIFERATIVE RETINOPATHY OF RIGHT EYE, MACULAR EDEMA PRESENCE UNSPECIFIED (H): ICD-10-CM

## 2022-02-28 DIAGNOSIS — M77.9 TENDINITIS: ICD-10-CM

## 2022-03-02 RX ORDER — DULAGLUTIDE 0.75 MG/.5ML
0.75 INJECTION, SOLUTION SUBCUTANEOUS
Qty: 2 ML | Refills: 11 | Status: SHIPPED | OUTPATIENT
Start: 2022-03-02 | End: 2022-08-10

## 2022-03-03 NOTE — TELEPHONE ENCOUNTER
METHOTREXATE 2.5MG TABLETS - YELLOW     Last Written Prescription Date:  2/2/22  Last Fill Quantity: 24,   # refills: 0  Last Office Visit: 10/19/21  Future Office visit:  4/5/22    CBC RESULTS: Recent Labs   Lab Test 02/14/22  1004   WBC 5.5   RBC 3.68*   HGB 11.5*   HCT 35.5   MCV 97   MCH 31.3   MCHC 32.4   RDW 13.5          Creatinine   Date Value Ref Range Status   02/14/2022 0.60 0.52 - 1.04 mg/dL Final   01/11/2021 0.56 0.52 - 1.04 mg/dL Final   ]    Liver Function Studies -   Recent Labs   Lab Test 02/14/22  1004 11/18/21  1659   PROTTOTAL  --  7.1   ALBUMIN 3.8 3.4   BILITOTAL  --  0.2   ALKPHOS  --  88   AST 34 21   ALT 44 25       Routing refill request to provider for review/approval because:  Drug not on the [facility] [place] [facility]  [facility] refill protocol or controlled substance  Thank-you, Lor SALAS RN Medication Refill Team

## 2022-03-07 DIAGNOSIS — E10.9 TYPE 1 DIABETES MELLITUS WITHOUT COMPLICATION (H): ICD-10-CM

## 2022-03-07 RX ORDER — INSULIN DEGLUDEC 100 U/ML
INJECTION, SOLUTION SUBCUTANEOUS
Qty: 15 ML | Refills: 3 | Status: CANCELLED | OUTPATIENT
Start: 2022-03-07

## 2022-03-08 DIAGNOSIS — E03.8 OTHER SPECIFIED HYPOTHYROIDISM: ICD-10-CM

## 2022-03-09 RX ORDER — LEVOTHYROXINE SODIUM 112 UG/1
TABLET ORAL
Qty: 90 TABLET | Refills: 3 | Status: SHIPPED | OUTPATIENT
Start: 2022-03-09 | End: 2023-03-06

## 2022-03-09 RX ORDER — INSULIN DEGLUDEC 100 U/ML
INJECTION, SOLUTION SUBCUTANEOUS
Qty: 15 ML | Refills: 1 | Status: SHIPPED | OUTPATIENT
Start: 2022-03-09 | End: 2022-08-15

## 2022-03-09 NOTE — TELEPHONE ENCOUNTER
insulin degludec (TRESIBA) 100 UNIT/ML pen    Inject 17 units subcutaneous at hs.    Last Written Prescription Date:  5/5/21  Last Fill Quantity: 15 ml,   # refills: 3  Last Office Visit : 2/1/22  Future Office visit:  5/10/22    Routing refill request to provider for review/approval because:  Insulin refilled by Clinic RN

## 2022-03-17 ENCOUNTER — VIRTUAL VISIT (OUTPATIENT)
Dept: EDUCATION SERVICES | Facility: CLINIC | Age: 52
End: 2022-03-17
Attending: PHYSICIAN ASSISTANT
Payer: COMMERCIAL

## 2022-03-17 DIAGNOSIS — E10.3291 TYPE 1 DIABETES MELLITUS WITH MILD NONPROLIFERATIVE RETINOPATHY OF RIGHT EYE, MACULAR EDEMA PRESENCE UNSPECIFIED (H): ICD-10-CM

## 2022-03-17 PROCEDURE — G0108 DIAB MANAGE TRN  PER INDIV: HCPCS | Performed by: NUTRITIONIST

## 2022-03-22 NOTE — PROGRESS NOTES
"Dali is a 51 year old who is being evaluated via a billable video visit.      How would you like to obtain your AVS? MyChart  If the video visit is dropped, the invitation should be resent by: Send to e-mail at: [email or web address]  Will anyone else be joining your video visit? No      Video Start Time: 11:15am    Video-Visit Details    Type of service:  Video Visit    Video End Time:11:45am    Originating Location (pt. Location): Home    Distant Location ([facility] [place]     Platform used for Video Visit: [employer]    Diabetes Self-Management Education & Support    Presents for:  Type 1 diabetes  Referred by Marissa Sebastian    SUBJECTIVE/OBJECTIVE:     Cultural Influences/Ethnic Background:  Not  or     Diabetes Symptoms & Complications:          Patient Problem List and Family Medical History reviewed for relevant medical history, current medical status, and diabetes risk factors.    Vitals:  There were no vitals taken for this visit.  Estimated body mass index is 27.26 kg/m  as calculated from the following:    Height as of 2/24/22: 1.778 m (5' 10\").    Weight as of 2/24/22: 86.2 kg (190 lb).   Last 3 BP:   BP Readings from Last 3 Encounters:   11/18/21 120/82   11/16/21 118/74   10/26/21 (!) 142/84       History   Smoking Status     Never Smoker   Smokeless Tobacco     Never Used       Labs:  Lab Results   Component Value Date    A1C 8.1 02/14/2022    A1C 7.1 01/11/2021     Lab Results   Component Value Date     11/18/2021     04/27/2016     Lab Results   Component Value Date     02/14/2022     01/11/2021     HDL Cholesterol   Date Value Ref Range Status   01/11/2021 92 >49 mg/dL Final     Direct Measure HDL   Date Value Ref Range Status   02/14/2022 94 >=50 mg/dL Final   ]  GFR Estimate   Date Value Ref Range Status   02/14/2022 >90 >60 mL/min/1.73m2 Final     Comment:     Effective December 21, 2021 eGFRcr in adults " is calculated using the 2021 CKD-EPI creatinine equation which includes age and gender (Paola woody al., [place], DOI: 10.1056/HGVHmb9964068)   01/11/2021 >90 >60 mL/min/[1.73_m2] Final     Comment:     Non  GFR Calc  Starting 12/18/2018, serum creatinine based estimated GFR (eGFR) will be   calculated using the Chronic Kidney Disease Epidemiology Collaboration   (CKD-EPI) equation.       GFR Estimate If Black   Date Value Ref Range Status   01/11/2021 >90 >60 mL/min/[1.73_m2] Final     Comment:      GFR Calc  Starting 12/18/2018, serum creatinine based estimated GFR (eGFR) will be   calculated using the Chronic Kidney Disease Epidemiology Collaboration   (CKD-EPI) equation.       Lab Results   Component Value Date    CR 0.60 02/14/2022    CR 0.56 01/11/2021     No results found for: MICROALBUMIN    Healthy Eating:  She feels that protein and fat affect glucose and cause more of a rise. She has noticed this since starting Fiasp as apposed to other rapid acting insulin. She is under the idea that fiasp lasts only two hours in her system. States that even if she puts a little cheese on her pasta, her glucose will be higher, however also admits to eyeballing pasta portions/carb counts which also may be a factor. She is now using labels more often than at first apt months ago.   She is thinking more these days about getting a pump.   Coffee with 1% milk, higher fiber toast 1-2 slices or granola with fruit and flax  Small lunch might just be chips and hummus or cottage cheese  Dinner is pasta with marinara or red beans and rice or turkey burger  She is using an insulin to carb ratio of 1:7g    Monitoring:  Dali did not upload her dexcom so there is no new report to review today. Last upload on 2/1 reviewed by cherelle wadsworth  She will sometimes correct her glucose two hours after eating    Taking Medications:  Diabetes Medication(s)     Insulin       insulin aspart (FIASP FLEXTOUCH) 100 UNIT/ML  pen-injector    Inject subcu 1 unit per 7 grams CHO with Correction Factor of1 unit drops BG 30 mg/mL. Approx 45 units daily.     insulin degludec (TRESIBA FLEXTOUCH) 100 UNIT/ML pen    Inject subcu 17 units daily.     NOVOLOG PENFILL 100 UNIT/ML soln    INJECT 1 UNIT PER 15 GRAMS CHO AT ALL MEALS AND SNACKS WITH CORRECTION SCALE OF 1 UNIT PER 50 MG/DL OVER 150, AVERAGE DAILY USE OF 30 UNITS    Incretin Mimetic Agents (GLP-1 Receptor Agonists)       dulaglutide (TRULICITY) 0.75 MG/0.5ML pen    Inject 0.75 mg Subcutaneous every 7 days          Healthy Coping:     Patient Activation Measure Survey Score:  No flowsheet data found.      INTERVENTIONS:    Opportunities for ongoing education and support in diabetes-self management were discussed.    Pt verbalized understanding of concepts discussed and recommendations provided today.       Education Materials Provided:  Carb counting    ASSESSMENT:    Patient's most recent   Lab Results   Component Value Date    A1C 8.1 02/14/2022    A1C 7.1 01/11/2021    is not meeting goal of <7.0    PLAN  We made a follow up apt and I asked carly to upload her Dexcom before that appointment. I sent her a new carb book as well as a food log. She will keep food records for a week prior to follow up. This will help us gain better understanding of her carb counting accuracy as well as what she is seeing in the way of fat and protein affecting her readings.   Time Spent: 30 minutes  Encounter Type: Individual    Any diabetes medication dose changes were made via the CDE Protocol and Collaborative Practice Agreement with the patient's referring provider. A copy of this encounter was shared with the provider.

## 2022-04-04 DIAGNOSIS — Z51.81 ENCOUNTER FOR THERAPEUTIC DRUG MONITORING: ICD-10-CM

## 2022-04-04 DIAGNOSIS — J45.30 MILD PERSISTENT ASTHMA WITHOUT COMPLICATION: ICD-10-CM

## 2022-04-04 DIAGNOSIS — M25.50 PAIN IN JOINT, MULTIPLE SITES: ICD-10-CM

## 2022-04-04 DIAGNOSIS — M77.9 TENDINITIS: ICD-10-CM

## 2022-04-04 RX ORDER — MONTELUKAST SODIUM 5 MG/1
TABLET, CHEWABLE ORAL
Qty: 90 TABLET | Refills: 4 | Status: SHIPPED | OUTPATIENT
Start: 2022-04-04 | End: 2023-04-18

## 2022-04-05 ENCOUNTER — VIRTUAL VISIT (OUTPATIENT)
Dept: RHEUMATOLOGY | Facility: CLINIC | Age: 52
End: 2022-04-05
Attending: INTERNAL MEDICINE
Payer: COMMERCIAL

## 2022-04-05 DIAGNOSIS — Z79.631 LONG TERM METHOTREXATE USER: ICD-10-CM

## 2022-04-05 DIAGNOSIS — L40.50 PSORIATIC ARTHRITIS (H): Primary | ICD-10-CM

## 2022-04-05 DIAGNOSIS — Z79.899 HIGH RISK MEDICATION USE: ICD-10-CM

## 2022-04-05 DIAGNOSIS — L40.50 PSORIASIS WITH ARTHROPATHY (H): ICD-10-CM

## 2022-04-05 PROCEDURE — 99215 OFFICE O/P EST HI 40 MIN: CPT | Mod: 95 | Performed by: INTERNAL MEDICINE

## 2022-04-05 NOTE — PROGRESS NOTES
Dali Martines  is being evaluated via a billable video visit.      How would you like to obtain your AVS? RipCode  For the video visit, send the invitation by: Text to cell phone: [phone]  Will anyone else be joining your video visit? No    Chief Complaint   Patient presents with     Video Visit     not currently breastfeeding.        HPI   Prior Note carried forward:     [facility]  Rheumatology Clinic  Yoel Betancourt MD  2022     Name: Dali Martines  MRN: [id number]  Age: 49 year old  : 1970  Referring provider: Referred Self    Problem List:  1. Psoriatic arthritis   2. Psoriasis with arthropathy  3. Pain in joint, multiple sites  4. Chronic pain of right ankle  5. Right foot pain  6. Pain in joint involving ankle and foot, right      2020 :   Dali Martines is a 49 year old female with a history including type I diabetes, psoriatic arthritis, and Hashimoto's thyroiditis who presents for follow-up. I last saw the patient on 2018, at which time she reported increased left hand pain, most prominently in the left 2nd MCP. The plan was to continue on increased methotrexate dosage of 12.5 mg, and, if well-tolerated, then increasing dosage further to 15 mg weekly.    Background history:  Symptoms first occurred in 2008, when she began having stiffness and pain in the left hand, particularly in the PIPs and MCPs. Symptoms spontaneously resolved after about 6-8 weeks. She was previously seen by Rheumatology here around that time (see note from Dr. Betancourt on 2008). Briefly, assessment was that she had a self-limited episode of inflammatory arthralgias, possibly viral or early psoriatic arthritis. Negative NOLVIA, RF, Lyme serologies and normal CRP and ESR. Given her family history of RA, there was a thought to start her on HCQ if anti-CCP antibodies were positive, but they were found to be negative. No imaging of the hand was performed. 2014, she experienced  "recurrent acute onset pain, stiffness, and swelling in the L hand very similar to her symptoms in 2008. Specifically, had swelling across MCPs and in 3rd PIP. She could hardly bend the hand sometimes due to the welling. Had morning stiffness lasting 30-45 minutes. No other joints were involved. Issues with her left hand lingered for several months but eventually self-resolved that January and her only notable symptom is heel pain in the mornings. No fevers, chills, or weight loss. No back pain, vision changes, nausea, vomiting, diarrhea, abdominal pain, or blood in stool or urine. No rash, sicca symptoms, or oral ulcers. It seems she has a history of hypermobility, e.g. could bend wrist back to forearm, bend down and touch palms flat on floor, when she was younger. Used to get frequent ankle sprains, and has had multiple tendon and ligament tears over the years.    Today, the patient reports that she had recent upper respiratory symptoms which triggered asthma, and she was placed on steroids for this. She describes some lingering post-nasal drip over most of the fall, but this has since resolved. She has been taking Dulera finished course of prednisone.     She reports that she is having more joint pain in her right 1st MTP. She explains that her pain here is not bad, but she does feel soreness if someone squeezes her hand. She notes that she has more prominent left-sided 1st MTP pain. She states that her 1st MTPs do swell.     She states that she had psoriasic skin rashes around her temple area. She denies any issues tolerating methotrexate. She denies any significant morning joint stiffness. She describes a \"feeling like tendonitis\" in her medial knees and elbows that feels like she has hyperextended the joints. She adds that these sensations take hours to resolve. She reports that these joint sensations have worsened in the last couple months. She states that she historically has joint issues at the beginning of " the fall, but these joint issues seem to be lasting into the winter time presently.      She explains that she has had a swollen gland on the right side of her neck which has been so significant in the past that she had difficulty turning her head to the side.     Review of Systems:   Pertinent items are noted in HPI or as below, remainder of complete ROS is negative.      No recent problems with hearing or vision. No swallowing problems.   No breathing difficulty, shortness of breath, coughing, or wheezing.  No chest pain or palpitations.  No heart burn, indigestion, abdominal pain, nausea, vomiting, diarrhea.  No urination problems, no bloody, cloudy urine, no dysuria.  No numbing, tingling, weakness.  No headaches or confusion.  No rashes. No easy bleeding or bruising.     Active Medications:   Current Outpatient Medications:      albuterol (PROAIR HFA/PROVENTIL HFA/VENTOLIN HFA) 108 (90 Base) MCG/ACT inhaler, Inhale 2 puffs into the lungs every 4 hours as needed for shortness of breath / dyspnea or wheezing, Disp: 1 Inhaler, Rfl: 11     aspirin 81 MG tablet, Take 1 tablet by mouth daily., Disp: , Rfl:      blood glucose (ACCU-CHEK COLIN PLUS) test strip, Test Blood Sugar 8 times daily or as directed, Disp: 800 strip, Rfl: 3     blood glucose monitoring (ACCU-CHEK FASTCLIX) lancets, Use to test blood sugar 8 times daily or as directed., Disp: 4 Box, Rfl: 3     Calcium Carbonate-Vitamin D (CALCIUM + D PO), Take one daily, Disp: , Rfl:      citalopram (CELEXA) 20 MG tablet, Take 1.5 tablets (30 mg) by mouth daily, Disp: 135 tablet, Rfl: 1     Continuous Blood Gluc Sensor (DEXCOM G5 MOB/G4 PLAT SENSOR) MISC, 1 each every 7 days, Disp: 12 each, Rfl: 3     Continuous Blood Gluc Transmit (DEXCOM G4 PLATINUM TRANSMITTER) MISC, 1 each every 6 months, Disp: 1 each, Rfl: 1     diclofenac (VOLTAREN) 1 % topical gel, APPLY 2 GRAMS ONTO THE SKIN FOUR TIMES DAILY AS NEEDED FOR MODERATE PAIN, Disp: 100 g, Rfl: 5     dulaglutide  (TRULICITY) 1.5 MG/0.5ML pen, Inject 1.5 mg Subcutaneous every 7 days, Disp: 3 mL, Rfl: 1     econazole nitrate 1 % cream, Apply 0.5 inches topically daily., Disp: , Rfl:      fish oil-omega-3 fatty acids (FISH OIL) 1000 MG capsule, Take one daily, Disp: , Rfl:      folic acid (FOLVITE) 1 MG tablet, Take 1 tablet (1 mg) by mouth daily, Disp: 90 tablet, Rfl: 0     insulin degludec (TRESIBA) 100 UNIT/ML pen, Inject 15 units subcutaneous at hs., Disp: 15 mL, Rfl: 11     insulin pen needle (B-D U/F) 31G X 8 MM miscellaneous, Use 5 pen needles daily, Disp: 450 each, Rfl: 3     Ketoconazole (NIZORAL PO), Take  by mouth. Shampoo daily, Disp: , Rfl:      levothyroxine (SYNTHROID/LEVOTHROID) 112 MCG tablet, Take 1 tablet (112 mcg) by mouth daily, Disp: 90 tablet, Rfl: 3     methotrexate 2.5 MG tablet, Take 6 tablets (15 mg) by mouth once a week, Disp: 24 tablet, Rfl: 1     methylphenidate (METADATE CD) 20 MG CR capsule, Take 20 mg by mouth daily, Disp: , Rfl:      Minoxidil (ROGAINE EX), Externally apply  topically. daily, Disp: , Rfl:      mometasone-formoterol (DULERA) 100-5 MCG/ACT inhaler, Inhale 2 puffs into the lungs 2 times daily, Disp: 3 Inhaler, Rfl: 11     montelukast (SINGULAIR) 5 MG chewable tablet, Take 1 tablet (5 mg) by mouth At Bedtime, Disp: 90 tablet, Rfl: 3     Multiple Vitamin (MULTIVITAMIN OR), Take  by mouth. Take one daily tab, Disp: , Rfl:      NOVOLOG PENFILL 100 UNIT/ML soln, 1 unit per 15 grams CHO at all meals and snacks with correction scale of 1 unit per 50 mg/dL over 150. Average daily use is 30  units., Disp: 30 mL, Rfl: 4     simvastatin (ZOCOR) 20 MG tablet, Take 1 tablet (20 mg) by mouth At Bedtime, Disp: 90 tablet, Rfl: 3     traZODone (DESYREL) 50 MG tablet, Take 1-2 tablets by mouth nightly as needed for sleep., Disp: 180 tablet, Rfl: 0    Current Facility-Administered Medications:      betamethasone acet & sod phos (CELESTONE) injection 6 mg, 6 mg, INTRA-ARTICULAR, Once, Chris Uribe  MD Jim    Allergies:   Sulfasalazine      Past Medical History:  Anemia   Anxiety   Psoriatic arthritis  Dry eye syndrome   Endometriosis   Gastroesophageal reflux disease   Hypothyroidism   Type 1 diabetes mellitus   Asthma   Necrobiosis lipoidica diabeticorum  Chronic low back pain   Enthesopathy   Chronic right ankle pain      Past Surgical History:  Knee cruciate ligament repair   Stomach surgery 12/2002  Hydrogen breath test 6/17/2014   Appendectomy 2002   Lysis adhesions 2002   Right ankle surgery 12/2014      Family History:   Mother: Breast cancer   Father: Heart disease, eye disorder, hyperlipidemia, macular degeneration, anxiety, alcoholism, rheumatoid arthritis, hypothyroidism, diabetes mellitus   Paternal grandmother: Cerebrovascular disease  Paternal grandfather: Pancreatic cancer, heart disease   Maternal grandmother: Type 2 diabetes mellitus, coronary artery disease  Maternal aunt: Type 1 diabetes mellitus     Social History:  The patient reports that she has never smoked. She has never used smokeless tobacco. She reports current alcohol use. She reports that she does not use drugs.   PCP: Dimitri Alas  Marital Status: Single    Physical Exam:   There were no vitals taken for this visit.   Wt Readings from Last 4 Encounters:   02/24/22 86.2 kg (190 lb)   01/04/22 86.2 kg (190 lb)   11/18/21 88.7 kg (195 lb 8 oz)   11/16/21 89.8 kg (198 lb)     Constitutional: Well-developed, appearing stated age; cooperative.      Laboratory:   RHEUM RESULTS Latest Ref Rng & Units 3/7/2005 6/22/2005 7/11/2005   ALBUMIN 3.4 - 5.0 g/dL - - -   ALT 0 - 50 U/L 16 - -   AST 0 - 45 U/L - - -   CREATININE 0.52 - 1.04 mg/dL 0.60 - -   CRP 0.0 - 8.0 mg/L - - -   VICTORINO <1.0 - - -   GFR ESTIMATE, IF BLACK >60 mL/min/[1.73:m2] >80 - -   GFR ESTIMATE >60 mL/min/1.73m2 >80 - -   HEMATOCRIT 35.0 - 47.0 % - - 38.0   HEMOGLOBIN 11.7 - 15.7 g/dL - 11.4(L) 12.8   IGA 70 - 380 mg/dL - - 194   IGM 60 - 265 mg/dL - - 151    -  1620 mg/dL - - 1410   WBC 4.0 - 11.0 10e3/uL - - 7.9   RBC 3.80 - 5.20 10e6/uL - - 4.02   RDW 10.0 - 15.0 % - - 13.1   MCHC 31.5 - 36.5 g/dL - - 33.8   MCV 78 - 100 fL - - 95   PLATELET COUNT 150 - 450 10e3/uL - - 258   RHEUMATOID FACTOR <20 IU/mL - - -   ESR 0 - 30 mm/hr - - -     Rheumatoid Factor   Date Value Ref Range Status   12/22/2014 <20 <20 IU/mL Final     Cyclic Cit Pept IgG/IgA   Date Value Ref Range Status   03/17/2015 <20  Interpretation:  Negative   <20 UNITS Final     Cyclic Citrullinated Peptide IgG   Date Value Ref Range Status   12/09/2008 <2 <5 U/mL Final     Comment:     Interpretation:  Negative     Scleroderma Antibody Scl-70 ELEAZAR IgG   Date Value Ref Range Status   03/17/2015  0.0 - 0.9 AI Final    <0.2  Negative   Antibody index (AI) values reflect qualitative changes in antibody   concentration that cannot be directly associated with clinical condition or   disease state.       SSA (Ro) (ELEAZAR) Antibody, IgG   Date Value Ref Range Status   03/17/2015 0.2 0.0 - 0.9 AI Final     Comment:     Negative   Antibody index (AI) values reflect qualitative changes in antibody   concentration that cannot be directly associated with clinical condition or   disease state.       SSB (La) (ELEAZAR) Antibody, IgG   Date Value Ref Range Status   03/17/2015 0.2 0.0 - 0.9 AI Final     Comment:     Negative   Antibody index (AI) values reflect qualitative changes in antibody   concentration that cannot be directly associated with clinical condition or   disease state.       NOLVIA Screen by EIA   Date Value Ref Range Status   12/22/2014 1.6 (H) <1.0 Final     Comment:     Interpretation:  Positive     IGG   Date Value Ref Range Status   07/11/2005 1410 695 - 1620 mg/dL Final     IGA   Date Value Ref Range Status   06/10/2014 190 70 - 380 mg/dL Final     IGM   Date Value Ref Range Status   07/11/2005 151 60 - 265 mg/dL Final     Cyr ELEAZAR Antibody IgG   Date Value Ref Range Status   03/17/2015  0.0 - 0.9 AI Final     <0.2  Negative   Antibody index (AI) values reflect qualitative changes in antibody   concentration that cannot be directly associated with clinical condition or   disease state.       Scleroderma Antibody Scl-70 ELEAZAR IgG   Date Value Ref Range Status   03/17/2015  0.0 - 0.9 AI Final    <0.2  Negative   Antibody index (AI) values reflect qualitative changes in antibody   concentration that cannot be directly associated with clinical condition or   disease state.       January 5, 2021 interval history:    Since we have last seen the patient, she has, with the combination of coronavirus issues and other stressful issues had a somewhat stressful year.    She has had several flares of her arthritis symptoms that she attributes that increased stress.  She is not even sure exactly when it happened.  She thinks this may have been back in the fall but it could have been earlier in the year that she had some increased pain and a little bit of swelling in her metatarsals of the right foot.  That went on for several weeks then seem to go away on its own with no additional therapy.  She also had some increased pain in one of her thumbs at one point.  That is also back to normal.    However over the last several months she has had some increased numbness in her right hand.  She first had some numbness in the right middle finger after a blood draw that apparently hit a nerve because the nerve pain went on for many weeks.  However, more recently she has had rather typical carpal tunnel type symptoms worse first thing in the morning and occasionally waking her from sleep affecting the second third and fourth fingers.  On further questioning, only the medial aspect of the fourth finger is involved and the fifth finger and the thumb are entirely spared.    In reviewing her record, it also seems that her hemoglobin A1c is a little bit on the high side right now in the mid sevens.  She is aware of this and thinks that this also may be  contributed to by the stressors she has been under.    In terms of her psoriasis itself that is been pretty stable.  Its affected primarily the area around her temples and is a little worse with the colder weather but is minimally apparent on the video which is high-quality.    Physical examination by video shows her to be in no acute distress HEENT examination is normal.  She is speaking in full sentences with no shortness of breath.  The skin examination is I can see it looks essentially normal with no significant psoriasis.  Examination of her hands shows there is no convincing areas of swelling no joint deformity and there is full and normal range of motion.    Impression:    Per the problem list, with inflammatory arthritis that is generally been kept under good control with methotrexate.    She has not had any labs however to check for toxicity in quite some time nor to look at inflammation so we should do those and I have reordered them today.    I did also  her extensively on using splinting to prevent carpal tunnel syndrome at night and also to use something in the way of a more flexible soft splint during the day because she does spend a lot of time on the keyboard.    Finally we spoke about coronavirus vaccination.  Based on her chronic conditions of diabetes, asthma, and inflammatory arthritis on methotrexate immunosuppression she should be on phase 1C.  She will keep track of where the state is at and when we get to where we are vaccinating folks in phase 1B she may reach out to us with a my chart message to see whether that vaccine can be obtained with our orders or how she can get it.    She is going to try these conservative approaches for carpal tunnel and will then plan on touching bases in about 2 to 3 months sooner as needed.    This video visit commenced at 4 PM and was completed at 4:34 PM      Originating Location (pt. Location): Home    Distant Location (provider location):  [facility]  [place] [facility] [place]     Platform used for Video Visit: Mark Anthony Betancourt MD, PhD    Rheumatology        March 16, 2021 interval history:    I last saw the patient just several months ago, but within several weeks at that time she developed a rash consistent with psoriasis over her left shin at a place where she had had previous thinning of the skin related to her type 1 diabetes.  In February she developed a similar patch over her right shin but then at the end of ever developed some kind of a viral illness that she does not think was Covid.  She had only a slight cough and no fever but profound fatigue and brain fog.  This was bad enough she was having trouble working so took 1 full day off and then work 3/2 days before going back to work full-time.    Within the next week she developed multiple psoriasis patches all over her trunk and limbs.  With this she started to take a vitamin D supplement and increase her methotrexate back up to 6 tablets/week which she had previously decreased as she was doing so well.  Interestingly, her joints did not really cause much trouble and were not particular painful or stiff though she did notice a little bit of swelling.  Over the last several weeks on that increased dose of methotrexate and with the vitamin D she feels she has stabilized and some of the most recent patches have started to resolve but she does continue to have quite a few of them including the longest lived 1 over her shin, and one on the back of her neck that is particularly irritating.    She does have a strong family history of psoriasis and was apparently already noted to have some psoriasis at one point during childhood but has never had flares like this.    Physical examination by video shows her to be in no acute distress HEENT examination is normal.  She is speaking in full sentences with no shortness of breath.  Her upper extremity examination looks  essentially normal with no significant areas of joint swelling or deformity.    Impression:    Per the problem list she has had psoriatic arthritis with relatively minimal psoriasis lesions previously but with this very significant flare, apparently triggered by a viral infection that she does not believe was Covid.    Plan/recommendation:    We have already set up a referral for her for dermatology and I urged her to keep that.  I am not certain why this happened and more to the point of not sure whether it will continue to happen.  She does believe she is stabilized somewhat on the current dose of methotrexate, but if this becomes harder to control on an ongoing basis we will need to decide whether she can be treated adequately with methotrexate plus topical steroids or whether we need to add an additional agent such as a TNF inhibitor.  We did discuss the possible use of those, as well as my preference that that would be the next step in her rather than proceeding to something more immunosuppressive such as IL-17 inhibition or Melquiades kinase inhibitor.    She is tolerating the increased dose of methotrexate well we will continue that and do labs again in about 3 weeks after she has been on that dose for about 6 weeks.    We did discuss her getting the coronavirus vaccination as well and holding her methotrexate for 1 week after each of those shots.    I will see her back in 3 months for another video visit, which she has been able to do smoothly as she has a very good connection and she does like doing these in preference to coming in at this time.  We did discuss that should she develop significant joint symptoms or skin features that would benefit from being seen in person that we can convert that visit to in person visit.    This video visit commenced at 5:36 PM was completed at 6:06 PM, 30 minutes in face-to-face time, with an additional 10 mins of chart review, , and documentation on DOS. June  15, 2021 interval history:    Since last seen the patient she has been doing generally well.  She got her vaccines the last of which was about a month ago.  She got the Fritz vaccinations and had definitely had quite a bit of a reaction to the second 1 with a bad headache fevers chills and sweats for much of the following day extending on into the following morning when she actually had enough nausea that she had vomiting as well and then gradual improvement thereafter.  Her diabetes control was also difficult during that time with significant elevation of her glucoses.  She was back to normal within 48 hours however.    She did stop her methotrexate for about a week before and week after each of those doses and stopped it for 2 weeks after the second dose and by that time was definitely noticing a substantial increase in joint pains and and her psoriasis though she never developed actual joint swelling.  Overall however, her joint pain and her psoriasis had been doing much better with the supplemental vitamin D that she started back on in the winter.    With all of this she has not done labs since December.  She has not been having any symptomatic toxicity however.    Physical examination by video shows her to be in no acute distress.  HEENT examination is normal.  She is speaking in full sentences with no shortness of breath.  Examination of her hands shows no convincing areas of swelling or deformity.    Impression:    Per the problem list.    Plan/recommendation:    She is doing well and her being temporarily off the methotrexate was enough to demonstrate that it really is helping with respect to her joints and her psoriasis.    Her control is been very going on it so we will simply continue on the current dose.  Should going to do her labs in the next couple of weeks.  I will see her back in 6 months sooner as needed.    This video visit commenced at 4:05 PM, and was completed at 4:26 PM, 21 mins in face to  face time, with an additional 10 mins in chart review, , and documentation completed on DOS.     October 15, 2021 interval history:    Since I have last seen her she found herself having what often happens for her as an episode of flaring early in the fall.  She starts getting this often in September.  She particularly notices it in her hands although this years she was also having more symptoms in her feet.  She particularly noted it at the base of the toes.    It is actually quieted down quite a bit now.  She still can have morning stiffness up to an hour but generally less than that.  When things were rather worse she was having some sharp twinges of pain but that has quieted down.  She denies any episodes of detectable swelling.  The only area that has significant residual tenderness at this point is the right third PIP.  She previously had problems with the left index finger but that has improved.  Overall she believes her function has been stable.    Her psoriasis is actually significantly better on a steady dose of 2000 international units of vitamin D.  She still has a little bit right in her temples and her scalp.    She has been dealing with a fair amount of stress as her father has had to go into hospice because of worsening problems with congestive heart failure.  He however just passed away the last 2 days from a syncopal episode that was almost certainly a fatal cardiac arrhythmia.    She is otherwise doing okay.  Her only other real concern is that the [employer] had initially intended to have a policy where everyone needed to be back at work in person all of the time this fall.  With the current outbreak of the delta variant of COVID-19 that policy has been delayed.  She feels prickly vulnerable because of the combination of her diabetes and her immunosuppression I assured her that we would write a letter of support for her to continue to work remotely as her job does not require  face-to-face in person contact and it would probably be somewhat safer for her although she has now been vaccinated but has not yet had the booster.    Physical examination by video shows her to be in no acute distress HEENT examination is normal.  I cannot really even appreciate psoriatic patches on her scalp.  Her musculoskeletal examination of the upper extremity shows good range of motion with minimal if any swelling in a couple of her PIPs and none elsewhere.    Impression:    Per the problem list she has longstanding psoriatic arthritis that is seronegative.  She has responded well to the methotrexate and continues under good control.    She is due for labs and I have urged her to do those in the next several weeks.  Other than for that I have no acute concerns.  She does have a history of sometimes flaring somewhat in the winter so we will plan on seeing her back in 4 months sooner as needed.  As noted I am happy to support her with a letter if she is being asked to return to work in person prior to the pandemic really having run its course and Covid becoming an endemic virus.    This video visit commenced at 6 PM and was completed at 6:27 PM, 27 minutes in face-to-face time, with an additional 8 minutes in chart review,  and documentation completed on the date of service.    2022 interval history:    Since we have last seen the patient she has had ongoing personal challenges.  After her father  an uncle close to her also .  Her mother has been diagnosed with early dementia.  Just in this past week her job has been eliminated with the [employer].  She has been given another position at least for the next year but is feeling the loss of security with that and is therefore under a lot of stress.    Fortunately from the standpoint of her psoriasis and for that matter her arthritis she has continued to do well.  She has been taking quite a bit of vitamin D supplementation.  She had  already gone on 2000 international units a day and now is been taking a fish oil supplement that it turns out is also supplemented with vitamin D.  She is probably still under 3500 units a day however based on how she is added up and the good thing is is that she has had much less psoriasis since she has been taking these larger amounts of vitamin D.    She is also having very little in the way of inflammatory joint symptoms with morning stiffness generally less than 10 minutes a day.  The only area that is really bothered her much is her left great toe which is been hurting her more in the fall of the year but got a little bit better more recently.  Interestingly when it was bothering her more she had been off of methotrexate for several weeks to get her Covid booster and then remain off of it for another week or 2 thereafter so she was actually off the drug for nearly a month.  Back on the methotrexate that total has been better.  Notably nothing else really seem to work worsened much.    In general she is taking 12.5 mg weekly methotrexate, but if she starts having more in the way of joint symptoms will increase it temporarily to 15 mg weekly for a few weeks.    Physical examination by video shows her to be in no acute distress HEENT examination is normal.  She is speaking in full sentences with no shortness of breath.  Upper extremity examination shows essentially normal range of motion of the hands with no convincing areas of swelling or deformity.    Impression:    Per the problem list, with good control of her inflammatory arthritis and psoriasis on relatively low-dose methotrexate with no significant toxicity.    I think she is doing well enough she can continue on this regimen.  Although I have not been able to examine her joints in person, based on her history, her laboratory testing, and my ability to get a good look at the joints of her hands at least on the video I do not think she has significant joint  inflammation that would necessitate more aggressive therapy.    Accordingly I think it is fine for us to see her back in about 4 months with another video exam but I have again told her that if she thinks things are worsening that we should reconsider and potentially do an in person examination.  She will let us know if that is necessary.    This video visit commenced at 4:32 PM was completed at 5:05 PM, 33 minutes in face-to-face time, with an additional 10 minutes spent in chart review,  and documentation completed on the date of service.    JOSUE Betancourt MD, PhD    Rheumatology      JOSUE Betancourt MD, PhD    Rheumatology

## 2022-04-05 NOTE — LETTER
2022       RE: Dali Martines  [address]  [facility] [address]     Dear Colleague,    Thank you for referring your patient, Dali Martines, to the [facility] RHEUMATOLOGY [facility]INIC [place] at [facility]. Please see a copy of my visit note below.        Chief Complaint   Patient presents with     Video Visit     not currently breastfeeding.      HPI   Prior Note carried forward:     [facility]  Rheumatology Clinic  Yoel Betancourt MD  2022     Name: Dali Martines  MRN: [id number]  Age: 49 year old  : 1970  Referring provider: Referred Self    Problem List:  1. Psoriatic arthritis   2. Psoriasis with arthropathy  3. Pain in joint, multiple sites  4. Chronic pain of right ankle  5. Right foot pain  6. Pain in joint involving ankle and foot, right      2020 :   Dali Martines is a 49 year old female with a history including type I diabetes, psoriatic arthritis, and Hashimoto's thyroiditis who presents for follow-up. I last saw the patient on 2018, at which time she reported increased left hand pain, most prominently in the left 2nd MCP. The plan was to continue on increased methotrexate dosage of 12.5 mg, and, if well-tolerated, then increasing dosage further to 15 mg weekly.    Background history:  Symptoms first occurred in 2008, when she began having stiffness and pain in the left hand, particularly in the PIPs and MCPs. Symptoms spontaneously resolved after about 6-8 weeks. She was previously seen by Rheumatology here around that time (see note from Dr. Betancourt on 2008). Briefly, assessment was that she had a self-limited episode of inflammatory arthralgias, possibly viral or early psoriatic arthritis. Negative NOLVIA, RF, Lyme serologies and normal CRP and ESR. Given her family history of RA, there was a thought to start her on HCQ if anti-CCP antibodies were positive, but they were found to be negative. No  "imaging of the hand was performed. September 2014, she experienced recurrent acute onset pain, stiffness, and swelling in the L hand very similar to her symptoms in 2008. Specifically, had swelling across MCPs and in 3rd PIP. She could hardly bend the hand sometimes due to the welling. Had morning stiffness lasting 30-45 minutes. No other joints were involved. Issues with her left hand lingered for several months but eventually self-resolved that January and her only notable symptom is heel pain in the mornings. No fevers, chills, or weight loss. No back pain, vision changes, nausea, vomiting, diarrhea, abdominal pain, or blood in stool or urine. No rash, sicca symptoms, or oral ulcers. It seems she has a history of hypermobility, e.g. could bend wrist back to forearm, bend down and touch palms flat on floor, when she was younger. Used to get frequent ankle sprains, and has had multiple tendon and ligament tears over the years.    Today, the patient reports that she had recent upper respiratory symptoms which triggered asthma, and she was placed on steroids for this. She describes some lingering post-nasal drip over most of the fall, but this has since resolved. She has been taking Dulera finished course of prednisone.     She reports that she is having more joint pain in her right 1st MTP. She explains that her pain here is not bad, but she does feel soreness if someone squeezes her hand. She notes that she has more prominent left-sided 1st MTP pain. She states that her 1st MTPs do swell.     She states that she had psoriasic skin rashes around her temple area. She denies any issues tolerating methotrexate. She denies any significant morning joint stiffness. She describes a \"feeling like tendonitis\" in her medial knees and elbows that feels like she has hyperextended the joints. She adds that these sensations take hours to resolve. She reports that these joint sensations have worsened in the last couple months. She " states that she historically has joint issues at the beginning of the fall, but these joint issues seem to be lasting into the winter time presently.      She explains that she has had a swollen gland on the right side of her neck which has been so significant in the past that she had difficulty turning her head to the side.     Review of Systems:   Pertinent items are noted in HPI or as below, remainder of complete ROS is negative.      No recent problems with hearing or vision. No swallowing problems.   No breathing difficulty, shortness of breath, coughing, or wheezing.  No chest pain or palpitations.  No heart burn, indigestion, abdominal pain, nausea, vomiting, diarrhea.  No urination problems, no bloody, cloudy urine, no dysuria.  No numbing, tingling, weakness.  No headaches or confusion.  No rashes. No easy bleeding or bruising.     Active Medications:   Current Outpatient Medications:      albuterol (PROAIR HFA/PROVENTIL HFA/VENTOLIN HFA) 108 (90 Base) MCG/ACT inhaler, Inhale 2 puffs into the lungs every 4 hours as needed for shortness of breath / dyspnea or wheezing, Disp: 1 Inhaler, Rfl: 11     aspirin 81 MG tablet, Take 1 tablet by mouth daily., Disp: , Rfl:      blood glucose (ACCU-CHEK COLIN PLUS) test strip, Test Blood Sugar 8 times daily or as directed, Disp: 800 strip, Rfl: 3     blood glucose monitoring (ACCU-CHEK FASTCLIX) lancets, Use to test blood sugar 8 times daily or as directed., Disp: 4 Box, Rfl: 3     Calcium Carbonate-Vitamin D (CALCIUM + D PO), Take one daily, Disp: , Rfl:      citalopram (CELEXA) 20 MG tablet, Take 1.5 tablets (30 mg) by mouth daily, Disp: 135 tablet, Rfl: 1     Continuous Blood Gluc Sensor (DEXCOM G5 MOB/G4 PLAT SENSOR) MISC, 1 each every 7 days, Disp: 12 each, Rfl: 3     Continuous Blood Gluc Transmit (DEXCOM G4 PLATINUM TRANSMITTER) MISC, 1 each every 6 months, Disp: 1 each, Rfl: 1     diclofenac (VOLTAREN) 1 % topical gel, APPLY 2 GRAMS ONTO THE SKIN FOUR TIMES  DAILY AS NEEDED FOR MODERATE PAIN, Disp: 100 g, Rfl: 5     dulaglutide (TRULICITY) 1.5 MG/0.5ML pen, Inject 1.5 mg Subcutaneous every 7 days, Disp: 3 mL, Rfl: 1     econazole nitrate 1 % cream, Apply 0.5 inches topically daily., Disp: , Rfl:      fish oil-omega-3 fatty acids (FISH OIL) 1000 MG capsule, Take one daily, Disp: , Rfl:      folic acid (FOLVITE) 1 MG tablet, Take 1 tablet (1 mg) by mouth daily, Disp: 90 tablet, Rfl: 0     insulin degludec (TRESIBA) 100 UNIT/ML pen, Inject 15 units subcutaneous at hs., Disp: 15 mL, Rfl: 11     insulin pen needle (B-D U/F) 31G X 8 MM miscellaneous, Use 5 pen needles daily, Disp: 450 each, Rfl: 3     Ketoconazole (NIZORAL PO), Take  by mouth. Shampoo daily, Disp: , Rfl:      levothyroxine (SYNTHROID/LEVOTHROID) 112 MCG tablet, Take 1 tablet (112 mcg) by mouth daily, Disp: 90 tablet, Rfl: 3     methotrexate 2.5 MG tablet, Take 6 tablets (15 mg) by mouth once a week, Disp: 24 tablet, Rfl: 1     methylphenidate (METADATE CD) 20 MG CR capsule, Take 20 mg by mouth daily, Disp: , Rfl:      Minoxidil (ROGAINE EX), Externally apply  topically. daily, Disp: , Rfl:      mometasone-formoterol (DULERA) 100-5 MCG/ACT inhaler, Inhale 2 puffs into the lungs 2 times daily, Disp: 3 Inhaler, Rfl: 11     montelukast (SINGULAIR) 5 MG chewable tablet, Take 1 tablet (5 mg) by mouth At Bedtime, Disp: 90 tablet, Rfl: 3     Multiple Vitamin (MULTIVITAMIN OR), Take  by mouth. Take one daily tab, Disp: , Rfl:      NOVOLOG PENFILL 100 UNIT/ML soln, 1 unit per 15 grams CHO at all meals and snacks with correction scale of 1 unit per 50 mg/dL over 150. Average daily use is 30  units., Disp: 30 mL, Rfl: 4     simvastatin (ZOCOR) 20 MG tablet, Take 1 tablet (20 mg) by mouth At Bedtime, Disp: 90 tablet, Rfl: 3     traZODone (DESYREL) 50 MG tablet, Take 1-2 tablets by mouth nightly as needed for sleep., Disp: 180 tablet, Rfl: 0    Current Facility-Administered Medications:      betamethasone acet & sod phos  (CELESTONE) injection 6 mg, 6 mg, INTRA-ARTICULAR, Once, Chris Uribe MD    Allergies:   Sulfasalazine      Past Medical History:  Anemia   Anxiety   Psoriatic arthritis  Dry eye syndrome   Endometriosis   Gastroesophageal reflux disease   Hypothyroidism   Type 1 diabetes mellitus   Asthma   Necrobiosis lipoidica diabeticorum  Chronic low back pain   Enthesopathy   Chronic right ankle pain      Past Surgical History:  Knee cruciate ligament repair   Stomach surgery 12/2002  Hydrogen breath test 6/17/2014   Appendectomy 2002   Lysis adhesions 2002   Right ankle surgery 12/2014      Family History:   Mother: Breast cancer   Father: Heart disease, eye disorder, hyperlipidemia, macular degeneration, anxiety, alcoholism, rheumatoid arthritis, hypothyroidism, diabetes mellitus   Paternal grandmother: Cerebrovascular disease  Paternal grandfather: Pancreatic cancer, heart disease   Maternal grandmother: Type 2 diabetes mellitus, coronary artery disease  Maternal aunt: Type 1 diabetes mellitus     Social History:  The patient reports that she has never smoked. She has never used smokeless tobacco. She reports current alcohol use. She reports that she does not use drugs.   PCP: Dimitri Alas  Marital Status: Single    Physical Exam:   There were no vitals taken for this visit.   Wt Readings from Last 4 Encounters:   02/24/22 86.2 kg (190 lb)   01/04/22 86.2 kg (190 lb)   11/18/21 88.7 kg (195 lb 8 oz)   11/16/21 89.8 kg (198 lb)     Constitutional: Well-developed, appearing stated age; cooperative.      Laboratory:   RHEUM RESULTS Latest Ref Rng & Units 3/7/2005 6/22/2005 7/11/2005   ALBUMIN 3.4 - 5.0 g/dL - - -   ALT 0 - 50 U/L 16 - -   AST 0 - 45 U/L - - -   CREATININE 0.52 - 1.04 mg/dL 0.60 - -   CRP 0.0 - 8.0 mg/L - - -   VICTORINO <1.0 - - -   GFR ESTIMATE, IF BLACK >60 mL/min/[1.73:m2] >80 - -   GFR ESTIMATE >60 mL/min/1.73m2 >80 - -   HEMATOCRIT 35.0 - 47.0 % - - 38.0   HEMOGLOBIN 11.7 - 15.7 g/dL - 11.4(L)  12.8   IGA 70 - 380 mg/dL - - 194   IGM 60 - 265 mg/dL - - 151    - 1620 mg/dL - - 1410   WBC 4.0 - 11.0 10e3/uL - - 7.9   RBC 3.80 - 5.20 10e6/uL - - 4.02   RDW 10.0 - 15.0 % - - 13.1   MCHC 31.5 - 36.5 g/dL - - 33.8   MCV 78 - 100 fL - - 95   PLATELET COUNT 150 - 450 10e3/uL - - 258   RHEUMATOID FACTOR <20 IU/mL - - -   ESR 0 - 30 mm/hr - - -     Rheumatoid Factor   Date Value Ref Range Status   12/22/2014 <20 <20 IU/mL Final     Cyclic Cit Pept IgG/IgA   Date Value Ref Range Status   03/17/2015 <20  Interpretation:  Negative   <20 UNITS Final     Cyclic Citrullinated Peptide IgG   Date Value Ref Range Status   12/09/2008 <2 <5 U/mL Final     Comment:     Interpretation:  Negative     Scleroderma Antibody Scl-70 ELEAZAR IgG   Date Value Ref Range Status   03/17/2015  0.0 - 0.9 AI Final    <0.2  Negative   Antibody index (AI) values reflect qualitative changes in antibody   concentration that cannot be directly associated with clinical condition or   disease state.       SSA (Ro) (ELEAZAR) Antibody, IgG   Date Value Ref Range Status   03/17/2015 0.2 0.0 - 0.9 AI Final     Comment:     Negative   Antibody index (AI) values reflect qualitative changes in antibody   concentration that cannot be directly associated with clinical condition or   disease state.       SSB (La) (ELEAZAR) Antibody, IgG   Date Value Ref Range Status   03/17/2015 0.2 0.0 - 0.9 AI Final     Comment:     Negative   Antibody index (AI) values reflect qualitative changes in antibody   concentration that cannot be directly associated with clinical condition or   disease state.       NOLVIA Screen by EIA   Date Value Ref Range Status   12/22/2014 1.6 (H) <1.0 Final     Comment:     Interpretation:  Positive     IGG   Date Value Ref Range Status   07/11/2005 1410 695 - 1620 mg/dL Final     IGA   Date Value Ref Range Status   06/10/2014 190 70 - 380 mg/dL Final     IGM   Date Value Ref Range Status   07/11/2005 151 60 - 265 mg/dL Final     Cyr ELEAZAR Antibody  IgG   Date Value Ref Range Status   03/17/2015  0.0 - 0.9 AI Final    <0.2  Negative   Antibody index (AI) values reflect qualitative changes in antibody   concentration that cannot be directly associated with clinical condition or   disease state.       Scleroderma Antibody Scl-70 ELEAZAR IgG   Date Value Ref Range Status   03/17/2015  0.0 - 0.9 AI Final    <0.2  Negative   Antibody index (AI) values reflect qualitative changes in antibody   concentration that cannot be directly associated with clinical condition or   disease state.       January 5, 2021 interval history:    Since we have last seen the patient, she has, with the combination of coronavirus issues and other stressful issues had a somewhat stressful year.    She has had several flares of her arthritis symptoms that she attributes that increased stress.  She is not even sure exactly when it happened.  She thinks this may have been back in the fall but it could have been earlier in the year that she had some increased pain and a little bit of swelling in her metatarsals of the right foot.  That went on for several weeks then seem to go away on its own with no additional therapy.  She also had some increased pain in one of her thumbs at one point.  That is also back to normal.    However over the last several months she has had some increased numbness in her right hand.  She first had some numbness in the right middle finger after a blood draw that apparently hit a nerve because the nerve pain went on for many weeks.  However, more recently she has had rather typical carpal tunnel type symptoms worse first thing in the morning and occasionally waking her from sleep affecting the second third and fourth fingers.  On further questioning, only the medial aspect of the fourth finger is involved and the fifth finger and the thumb are entirely spared.    In reviewing her record, it also seems that her hemoglobin A1c is a little bit on the high side right now in  the mid sevens.  She is aware of this and thinks that this also may be contributed to by the stressors she has been under.    In terms of her psoriasis itself that is been pretty stable.  Its affected primarily the area around her temples and is a little worse with the colder weather but is minimally apparent on the video which is high-quality.    Physical examination by video shows her to be in no acute distress HEENT examination is normal.  She is speaking in full sentences with no shortness of breath.  The skin examination is I can see it looks essentially normal with no significant psoriasis.  Examination of her hands shows there is no convincing areas of swelling no joint deformity and there is full and normal range of motion.    Impression:    Per the problem list, with inflammatory arthritis that is generally been kept under good control with methotrexate.    She has not had any labs however to check for toxicity in quite some time nor to look at inflammation so we should do those and I have reordered them today.    I did also  her extensively on using splinting to prevent carpal tunnel syndrome at night and also to use something in the way of a more flexible soft splint during the day because she does spend a lot of time on the keyboard.    Finally we spoke about coronavirus vaccination.  Based on her chronic conditions of diabetes, asthma, and inflammatory arthritis on methotrexate immunosuppression she should be on phase 1C.  She will keep track of where the state is at and when we get to where we are vaccinating folks in phase 1B she may reach out to us with a my chart message to see whether that vaccine can be obtained with our orders or how she can get it.    She is going to try these conservative approaches for carpal tunnel and will then plan on touching bases in about 2 to 3 months sooner as needed.    This video visit commenced at 4 PM and was completed at 4:34 PM      Originating Location  (pt. Location): Home    Distant Location (provider location):  [facility] RHEUMATOLOGY CLINIC [place]     Platform used for Video Visit: Mark Anthony Betancourt MD, PhD    Rheumatology        March 16, 2021 interval history:    I last saw the patient just several months ago, but within several weeks at that time she developed a rash consistent with psoriasis over her left shin at a place where she had had previous thinning of the skin related to her type 1 diabetes.  In February she developed a similar patch over her right shin but then at the end of ever developed some kind of a viral illness that she does not think was Covid.  She had only a slight cough and no fever but profound fatigue and brain fog.  This was bad enough she was having trouble working so took 1 full day off and then work 3/2 days before going back to work full-time.    Within the next week she developed multiple psoriasis patches all over her trunk and limbs.  With this she started to take a vitamin D supplement and increase her methotrexate back up to 6 tablets/week which she had previously decreased as she was doing so well.  Interestingly, her joints did not really cause much trouble and were not particular painful or stiff though she did notice a little bit of swelling.  Over the last several weeks on that increased dose of methotrexate and with the vitamin D she feels she has stabilized and some of the most recent patches have started to resolve but she does continue to have quite a few of them including the longest lived 1 over her shin, and one on the back of her neck that is particularly irritating.    She does have a strong family history of psoriasis and was apparently already noted to have some psoriasis at one point during childhood but has never had flares like this.    Physical examination by video shows her to be in no acute distress HEENT examination is normal.  She is speaking in full sentences with  no shortness of breath.  Her upper extremity examination looks essentially normal with no significant areas of joint swelling or deformity.    Impression:    Per the problem list she has had psoriatic arthritis with relatively minimal psoriasis lesions previously but with this very significant flare, apparently triggered by a viral infection that she does not believe was Covid.    Plan/recommendation:    We have already set up a referral for her for dermatology and I urged her to keep that.  I am not certain why this happened and more to the point of not sure whether it will continue to happen.  She does believe she is stabilized somewhat on the current dose of methotrexate, but if this becomes harder to control on an ongoing basis we will need to decide whether she can be treated adequately with methotrexate plus topical steroids or whether we need to add an additional agent such as a TNF inhibitor.  We did discuss the possible use of those, as well as my preference that that would be the next step in her rather than proceeding to something more immunosuppressive such as IL-17 inhibition or Melquiades kinase inhibitor.    She is tolerating the increased dose of methotrexate well we will continue that and do labs again in about 3 weeks after she has been on that dose for about 6 weeks.    We did discuss her getting the coronavirus vaccination as well and holding her methotrexate for 1 week after each of those shots.    I will see her back in 3 months for another video visit, which she has been able to do smoothly as she has a very good connection and she does like doing these in preference to coming in at this time.  We did discuss that should she develop significant joint symptoms or skin features that would benefit from being seen in person that we can convert that visit to in person visit.    This video visit commenced at 5:36 PM was completed at 6:06 PM, 30 minutes in face-to-face time, with an additional 10 mins of  chart review, , and documentation on DOS.       Zaynab 15, 2021 interval history:    Since last seen the patient she has been doing generally well.  She got her vaccines the last of which was about a month ago.  She got the Fritz vaccinations and had definitely had quite a bit of a reaction to the second 1 with a bad headache fevers chills and sweats for much of the following day extending on into the following morning when she actually had enough nausea that she had vomiting as well and then gradual improvement thereafter.  Her diabetes control was also difficult during that time with significant elevation of her glucoses.  She was back to normal within 48 hours however.    She did stop her methotrexate for about a week before and week after each of those doses and stopped it for 2 weeks after the second dose and by that time was definitely noticing a substantial increase in joint pains and and her psoriasis though she never developed actual joint swelling.  Overall however, her joint pain and her psoriasis had been doing much better with the supplemental vitamin D that she started back on in the winter.    With all of this she has not done labs since December.  She has not been having any symptomatic toxicity however.    Physical examination by video shows her to be in no acute distress.  HEENT examination is normal.  She is speaking in full sentences with no shortness of breath.  Examination of her hands shows no convincing areas of swelling or deformity.    Impression:    Per the problem list.    Plan/recommendation:    She is doing well and her being temporarily off the methotrexate was enough to demonstrate that it really is helping with respect to her joints and her psoriasis.    Her control is been very going on it so we will simply continue on the current dose.  Should going to do her labs in the next couple of weeks.  I will see her back in 6 months sooner as needed.    This video visit commenced  at 4:05 PM, and was completed at 4:26 PM, 21 mins in face to face time, with an additional 10 mins in chart review, , and documentation completed on DOS.     October 15, 2021 interval history:    Since I have last seen her she found herself having what often happens for her as an episode of flaring early in the fall.  She starts getting this often in September.  She particularly notices it in her hands although this years she was also having more symptoms in her feet.  She particularly noted it at the base of the toes.    It is actually quieted down quite a bit now.  She still can have morning stiffness up to an hour but generally less than that.  When things were rather worse she was having some sharp twinges of pain but that has quieted down.  She denies any episodes of detectable swelling.  The only area that has significant residual tenderness at this point is the right third PIP.  She previously had problems with the left index finger but that has improved.  Overall she believes her function has been stable.    Her psoriasis is actually significantly better on a steady dose of 2000 international units of vitamin D.  She still has a little bit right in her temples and her scalp.    She has been dealing with a fair amount of stress as her father has had to go into hospice because of worsening problems with congestive heart failure.  He however just passed away the last 2 days from a syncopal episode that was almost certainly a fatal cardiac arrhythmia.    She is otherwise doing okay.  Her only other real concern is that the [employer] had initially intended to have a policy where everyone needed to be back at work in person all of the time this fall.  With the current outbreak of the delta variant of COVID-19 that policy has been delayed.  She feels prickly vulnerable because of the combination of her diabetes and her immunosuppression I assured her that we would write a letter of support for her to  continue to work remotely as her job does not require face-to-face in person contact and it would probably be somewhat safer for her although she has now been vaccinated but has not yet had the booster.    Physical examination by video shows her to be in no acute distress HEENT examination is normal.  I cannot really even appreciate psoriatic patches on her scalp.  Her musculoskeletal examination of the upper extremity shows good range of motion with minimal if any swelling in a couple of her PIPs and none elsewhere.    Impression:    Per the problem list she has longstanding psoriatic arthritis that is seronegative.  She has responded well to the methotrexate and continues under good control.    She is due for labs and I have urged her to do those in the next several weeks.  Other than for that I have no acute concerns.  She does have a history of sometimes flaring somewhat in the winter so we will plan on seeing her back in 4 months sooner as needed.  As noted I am happy to support her with a letter if she is being asked to return to work in person prior to the pandemic really having run its course and Covid becoming an endemic virus.    This video visit commenced at 6 PM and was completed at 6:27 PM, 27 minutes in face-to-face time, with an additional 8 minutes in chart review,  and documentation completed on the date of service.    2022 interval history:    Since we have last seen the patient she has had ongoing personal challenges.  After her father  an uncle close to her also .  Her mother has been diagnosed with early dementia.  Just in this past week her job has been eliminated with the [employer].  She has been given another position at least for the next year but is feeling the loss of security with that and is therefore under a lot of stress.    Fortunately from the standpoint of her psoriasis and for that matter her arthritis she has continued to do well.  She has been taking  quite a bit of vitamin D supplementation.  She had already gone on 2000 international units a day and now is been taking a fish oil supplement that it turns out is also supplemented with vitamin D.  She is probably still under 3500 units a day however based on how she is added up and the good thing is is that she has had much less psoriasis since she has been taking these larger amounts of vitamin D.    She is also having very little in the way of inflammatory joint symptoms with morning stiffness generally less than 10 minutes a day.  The only area that is really bothered her much is her left great toe which is been hurting her more in the fall of the year but got a little bit better more recently.  Interestingly when it was bothering her more she had been off of methotrexate for several weeks to get her Covid booster and then remain off of it for another week or 2 thereafter so she was actually off the drug for nearly a month.  Back on the methotrexate that total has been better.  Notably nothing else really seem to work worsened much.    In general she is taking 12.5 mg weekly methotrexate, but if she starts having more in the way of joint symptoms will increase it temporarily to 15 mg weekly for a few weeks.    Physical examination by video shows her to be in no acute distress HEENT examination is normal.  She is speaking in full sentences with no shortness of breath.  Upper extremity examination shows essentially normal range of motion of the hands with no convincing areas of swelling or deformity.    Impression:    Per the problem list, with good control of her inflammatory arthritis and psoriasis on relatively low-dose methotrexate with no significant toxicity.    I think she is doing well enough she can continue on this regimen.  Although I have not been able to examine her joints in person, based on her history, her laboratory testing, and my ability to get a good look at the joints of her hands at least on  the video I do not think she has significant joint inflammation that would necessitate more aggressive therapy.    Accordingly I think it is fine for us to see her back in about 4 months with another video exam but I have again told her that if she thinks things are worsening that we should reconsider and potentially do an in person examination.  She will let us know if that is necessary.    This video visit commenced at 4:32 PM was completed at 5:05 PM, 33 minutes in face-to-face time, with an additional 10 minutes spent in chart review,  and documentation completed on the date of service.    JOSUE Betancourt MD, PhD    Rheumatology

## 2022-04-06 RX ORDER — FOLIC ACID 1 MG/1
1 TABLET ORAL DAILY
Qty: 90 TABLET | Refills: 3 | Status: SHIPPED | OUTPATIENT
Start: 2022-04-06 | End: 2023-10-04

## 2022-04-06 NOTE — TELEPHONE ENCOUNTER
Last Clinic Visit: 4/5/2022 [facility] [place]      
2

## 2022-04-19 DIAGNOSIS — E10.319 TYPE 1 DIABETES MELLITUS WITH RETINOPATHY (H): ICD-10-CM

## 2022-04-20 RX ORDER — PROCHLORPERAZINE 25 MG/1
SUPPOSITORY RECTAL
Qty: 1 EACH | Refills: 3 | Status: SHIPPED | OUTPATIENT
Start: 2022-04-20 | End: 2023-04-18

## 2022-04-20 NOTE — TELEPHONE ENCOUNTER
Continuous Blood Gluc Transmit (DEXCOM G6 TRANSMITTER) [facility]    2/1/2022  [facility] [place]       Marissa Sebastian PA-C    Endocrinology, Diabetes, and Metabolism    Nv: 5/10//22

## 2022-04-21 ENCOUNTER — VIRTUAL VISIT (OUTPATIENT)
Dept: EDUCATION SERVICES | Facility: CLINIC | Age: 52
End: 2022-04-21
Payer: COMMERCIAL

## 2022-04-21 DIAGNOSIS — E10.3299 TYPE 1 DIABETES MELLITUS WITH MILD NONPROLIFERATIVE RETINOPATHY, MACULAR EDEMA PRESENCE UNSPECIFIED, UNSPECIFIED LATERALITY (H): Primary | ICD-10-CM

## 2022-04-21 PROCEDURE — 97803 MED NUTRITION INDIV SUBSEQ: CPT | Mod: 95 | Performed by: NUTRITIONIST

## 2022-04-26 NOTE — PROGRESS NOTES
"Dali is a 51 year old who is being evaluated via a billable video visit.      How would you like to obtain your AVS? MyChart  If the video visit is dropped, the invitation should be resent by: Send to e-mail at: [email or web address]  Will anyone else be joining your video visit? No      Video Start Time: 11:15am    Video-Visit Details    Type of service:  Video Visit    Video End Time:12:15pm    Originating Location (pt. Location): Home    Distant Location (provider location):  [facility] DIABETES EDUCATION [place]     Platform used for Video Visit: [employer]    Diabetes Self-Management Education & Support    Presents for:  Follow up    SUBJECTIVE/OBJECTIVE:     Cultural Influences/Ethnic Background:  Not  or       Diabetes Symptoms & Complications:          Patient Problem List and Family Medical History reviewed for relevant medical history, current medical status, and diabetes risk factors.    Vitals:  There were no vitals taken for this visit.  Estimated body mass index is 27.26 kg/m  as calculated from the following:    Height as of 2/24/22: 1.778 m (5' 10\").    Weight as of 2/24/22: 86.2 kg (190 lb).   Last 3 BP:   BP Readings from Last 3 Encounters:   11/18/21 120/82   11/16/21 118/74   10/26/21 (!) 142/84       History   Smoking Status     Never Smoker   Smokeless Tobacco     Never Used       Labs:  Lab Results   Component Value Date    A1C 8.1 02/14/2022    A1C 7.1 01/11/2021     Lab Results   Component Value Date     11/18/2021     04/27/2016     Lab Results   Component Value Date     02/14/2022     01/11/2021     HDL Cholesterol   Date Value Ref Range Status   01/11/2021 92 >49 mg/dL Final     Direct Measure HDL   Date Value Ref Range Status   02/14/2022 94 >=50 mg/dL Final   ]  GFR Estimate   Date Value Ref Range Status   02/14/2022 >90 >60 mL/min/1.73m2 Final     Comment:     Effective December 21, 2021 eGFRcr in adults is calculated using the 2021 " "CKD-EPI creatinine equation which includes age and gender (Paola woody al., NEJ, DOI: 10.1056/IDEDcx6801206)   01/11/2021 >90 >60 mL/min/[1.73_m2] Final     Comment:     Non  GFR Calc  Starting 12/18/2018, serum creatinine based estimated GFR (eGFR) will be   calculated using the Chronic Kidney Disease Epidemiology Collaboration   (CKD-EPI) equation.       GFR Estimate If Black   Date Value Ref Range Status   01/11/2021 >90 >60 mL/min/[1.73_m2] Final     Comment:      GFR Calc  Starting 12/18/2018, serum creatinine based estimated GFR (eGFR) will be   calculated using the Chronic Kidney Disease Epidemiology Collaboration   (CKD-EPI) equation.       Lab Results   Component Value Date    CR 0.60 02/14/2022    CR 0.56 01/11/2021     No results found for: MICROALBUMIN    Healthy Eating:  Patient kept food records and sent them to me  She found it to be an important tool  More aware of timing of insulin injections  We found from the records that she is often overestimating the carb and sometimes also underestimating  She found measuring some foods to be really helpful and found she was also off in her \"eye balling\" of portion sizes  Was off quite a bit with pasta, corn chips, yogurt    Monitoring:  dexcom  174 average  13% very high  27% high  57% in range  2% low  <1% very low    Patient takes insulin to carb ratio 1:7g  Correction as well   Tresiba 17 units    Taking Medications:  Diabetes Medication(s)     Insulin       insulin aspart (FIASP FLEXTOUCH) 100 UNIT/ML pen-injector    Inject subcu 1 unit per 7 grams CHO with Correction Factor of1 unit drops BG 30 mg/mL. Approx 45 units daily.     insulin degludec (TRESIBA FLEXTOUCH) 100 UNIT/ML pen    Inject subcu 17 units daily.     NOVOLOG PENFILL 100 UNIT/ML soln    INJECT 1 UNIT PER 15 GRAMS CHO AT ALL MEALS AND SNACKS WITH CORRECTION SCALE OF 1 UNIT PER 50 MG/DL OVER 150, AVERAGE DAILY USE OF 30 UNITS    Incretin Mimetic Agents (GLP-1 " Receptor Agonists)       dulaglutide (TRULICITY) 0.75 MG/0.5ML pen    Inject 0.75 mg Subcutaneous every 7 days            Healthy Coping:     Patient Activation Measure Survey Score:  No flowsheet data found.    Diabetes knowledge and skills assessment:   Patient is knowledgeable in diabetes management concepts related to: Healthy Eating    Patient needs further education on the following diabetes management concepts: Healthy Eating    Based on learning assessment above, most appropriate setting for further diabetes education would be: Individual setting.      INTERVENTIONS:    Education provided today on:  AADE Self-Care Behaviors:  Healthy Eating: carbohydrate counting    Opportunities for ongoing education and support in diabetes-self management were discussed.    Pt verbalized understanding of concepts discussed and recommendations provided today.       Education Materials Provided:  No new materials provided today      ASSESSMENT:    Patient's most recent   Lab Results   Component Value Date    A1C 8.1 02/14/2022    A1C 7.1 01/11/2021    is not meeting goal of <7.0    PLAN  After review today of her food records the plan is for Dali to continue to work on accuracy of her carb counting. This was discussed in detail today.   She will continue to keep some food records and will send them again before follow up.  She is also going to work on having healthy fat/protein with carbs  Time Spent: 60 minutes  Encounter Type: Individual    Any diabetes medication dose changes were made via the CDE Protocol and Collaborative Practice Agreement with the patient's referring provider. A copy of this encounter was shared with the provider.

## 2022-05-05 ENCOUNTER — VIRTUAL VISIT (OUTPATIENT)
Dept: EDUCATION SERVICES | Facility: CLINIC | Age: 52
End: 2022-05-05
Payer: COMMERCIAL

## 2022-05-05 DIAGNOSIS — E10.9 TYPE 1 DIABETES MELLITUS WITHOUT COMPLICATION (H): Primary | ICD-10-CM

## 2022-05-05 PROCEDURE — G0108 DIAB MANAGE TRN  PER INDIV: HCPCS | Mod: 95 | Performed by: NUTRITIONIST

## 2022-05-05 NOTE — PROGRESS NOTES
Outcome for 05/09/22 7:45 AM :Glucose sent via Email NL  Outcome for 05/05/22 12:28 PM :Patient is sharing data via clinic device website and patient has been instructed to update data on website. Find login information by using [employer]  NL    This 51 year old woman was returns for f/u of her type 1 diabetes. She also has hypothyroidism and psoriatic arthritis.  Pt was dx having type 1 diabetes at age 15.  Her hx is also significant for mild NPDR right eye only,anxiety, hyperlipidemia, psoriatic arthritis,  and GERD.    She is currently taking Tresiba 17 units daily.      Insulin to Carb ratio:  1 unit per 7 grams CHO  Correction Factor:     1 unit drops BG 30 mg/mL    She is taking fiasp.    She continues to take Trulicity 0.75 mg each week.  Tolerating this dose much better than she did the 1.5 mg a week dose but she finds she does get very hungry on the sixth day after the dose.  She says her weight continues to go up. She was using an inpen device but she stopped using it because of trouble pairing all of her devices.  It seems it interfered with the CGM.  She has met several times with Lor Marie and has found the sessions very helpful.  She thinks that is overall helped her glucose control.    She continues to be troubled by insomnia.  She is now working with a sleep expert.  She developed psoriasis in the last year that is part of her psoriatic arthritis.  This is now resolved.  She continues to see Dr. Xiong for her arthritis.  She is taking methotrexate  for this and her symptoms are reasonably controlled.    She also reports that the paresthesias she is reported in one of her feet on the tips of her toes has now extended to both feet.  It is a feeling of numbness that she experiences on her toes.  Its not troubling to her beyond being found pleasant.  It does not keep her awake at night.    She is up-to-date with her eye visits and has no concerns.     She continues to take her statin and her thyroid  medication.                 Patient Active Problem List   Diagnosis     GERD (gastroesophageal reflux disease)     CARDIOVASCULAR SCREENING; LDL GOAL LESS THAN 100     Type 1 diabetes mellitus with retinopathy (H)     Anxiety     NLD (necrobiosis lipoidica diabeticorum) (H)     Cutaneous skin tags     Cervicalgia     Chronic low back pain     SOB (shortness of breath)     Encounter for other specified aftercare     PAIN FOOT     Diabetes (H)     Screening for other eye conditions     PAIN KNEE JOINT     Enthesopathy     Pain in joint, multiple sites     Chronic pain of right ankle     Right foot pain     ESR raised     Swelling of limb     Other postprocedural status(V45.89)     Left knee pain     Type 1 diabetes mellitus without complication (H)     Psoriatic arthritis (H)     Psoriasis with arthropathy (H)     History of adenomatous polyp of colon     Glaucoma suspect of both eyes     Long-term current use of stimulant     Insomnia, unspecified type     Long term methotrexate user       Current Outpatient Medications   Medication     albuterol (PROAIR HFA/PROVENTIL HFA/VENTOLIN HFA) 108 (90 Base) MCG/ACT inhaler     aspirin 81 MG tablet     blood glucose (ACCU-CHEK COLIN PLUS) test strip     blood glucose (NO BRAND SPECIFIED) test strip     blood glucose monitoring (ACCU-CHEK FASTCLIX) lancets     blood glucose monitoring (NO BRAND SPECIFIED) meter device kit     Calcium Carbonate-Vitamin D (CALCIUM + D PO)     citalopram (CELEXA) 20 MG tablet     Continuous Blood Gluc  (DEXCOM G6 ) SUZANNE     Continuous Blood Gluc Sensor (DEXCOM G6 SENSOR) MISC     Continuous Blood Gluc Transmit (DEXCOM G6 TRANSMITTER) MISC     diclofenac (VOLTAREN) 1 % topical gel     dulaglutide (TRULICITY) 0.75 MG/0.5ML pen     econazole nitrate 1 % cream     fish oil-omega-3 fatty acids 1000 MG capsule     folic acid (FOLVITE) 1 MG tablet     insulin aspart (FIASP FLEXTOUCH) 100 UNIT/ML pen-injector     insulin degludec (TRESIBA  "FLEXTOUCH) 100 UNIT/ML pen     insulin pen needle (B-D U/F) 31G X 8 MM miscellaneous     Ketoconazole (NIZORAL PO)     levothyroxine (SYNTHROID/LEVOTHROID) 112 MCG tablet     methotrexate 2.5 MG tablet     methylphenidate (METADATE CD) 20 MG CR capsule     methylphenidate (RITALIN) 10 MG tablet     Minoxidil (ROGAINE EX)     mometasone-formoterol (DULERA) 100-5 MCG/ACT inhaler     montelukast (SINGULAIR) 5 MG chewable tablet     Multiple Vitamin (MULTIVITAMIN OR)     NOVOLOG PENFILL 100 UNIT/ML soln     predniSONE (DELTASONE) 20 MG tablet     simvastatin (ZOCOR) 20 MG tablet     traZODone (DESYREL) 50 MG tablet     Injection Device for insulin (INPEN 100-BLUE-TANIYA) SUZANNE     No current facility-administered medications for this visit.     Social history is significant for the death of her father earlier this year.  Her mother has been diagnosed with Alzheimer's and the patient is her mother's primary source of support.  Her job was eliminated at [employer] in March and she is now working in another department.  She has to decide if she wants to take a severance payment or hang on for 11 more months.  She is not sure what she is going to do.  /76 (BP Location: Left arm, Patient Position: Sitting, Cuff Size: Adult Regular)   Pulse 70   Ht 1.753 m (5' 9\")   Wt 90.4 kg (199 lb 3.2 oz)   BMI 29.42 kg/m      VSS  NAD  Eyes - no periorbital edema, conjunctival injection, scleral icterus  CV - RRR.  Normal pulses in feet.  No edema  Neuro - sensation intact to monofilament on soles of feet.    Skin - normal texture   Feet - no ulcers       Recent Labs   Lab Test 02/14/22  1004 11/18/21  1659 10/26/21  1556 07/23/21  1444 01/11/21  1109 01/11/21  1102 01/11/21  1101 12/17/19  1626 12/17/19  0000 06/18/19  0000 03/21/19  1359 10/31/14  0000 07/09/14  1641   A1C 8.1*  --   --  7.3*  --   --  7.1*   < >  --   --   --   --   --    HEMOGLOBINA1  --   --  7.3  --   --   --   --   --  7.4*   < >  --    < >  --    TSH " 3.34  --   --   --   --   --  2.01   < >  --    < > 0.19*   < > 0.11*   T4  --   --   --   --   --   --   --   --   --   --  1.09  --  1.31   *  --   --   --   --   --  122*  --   --   --   --    < >  --    HDL 94  --   --   --   --   --  92  --   --   --   --    < >  --    TRIG 66  --   --   --   --   --  44  --   --   --   --    < >  --    CR 0.60 0.50*  --  0.61   < >  --   --    < >  --    < >  --    < >  --    MICROL 5  --   --   --   --  <5  --    < >  --   --   --    < >  --     < > = values in this interval not displayed.     A1c today is 7.5.      Assessment and plan:    1.  Diabetes control.  Her A1c is now improved from February.  She has variability in her daytime measures but she thinks this is due to changes in diet and the timing of insulin administration.  She thinks things are getting better because of the advice she is gotten from our dietitian.  We looked at her daily glucose tracings together and I explained how use of the tandem control IQ pump or another hybrid closed loop might provide her with some additional help.  She is interested in using the OmniPod 5 when it becomes available.  She does not want to use tubing.  She does want to use a device that will help her record her insulin doses and any note she takes.  I suggested she start using in pen again.  She will call the company and find out why she has so many problems.  The devices.  I suggested we try using Ozempic as a replacement for the Trulicity.  It is possible it may be associated with lower symptoms and it may control her appetite better.  I will start her at a dose of 0.25 mg each week for 4 weeks and then increase to 0.5 mg each week.  We will keep her on that dose until she returns to clinic.    2.  Diabetes complications.  She has retinopathy and sees her eye doctor regularly.  She has symptoms of paresthesias but her foot exam is normal.  Renal function is normal.    3.hypothyroidism.  Her TSH was normal in February.   She will remain on her current dose.    4.CVD risk.  She is on simvastatin at a moderate dose.  Her LDL is a little bit high but she does not want to increase the dose.  Her blood pressure is fine.    She will will see Shakira Sebastian in follow-up in 3 months and me in 6 months.    I spent 30 minutes with the patient today.  On the day of visit I spent an additional 15 minutes reviewing and interpreting her CGM data, reviewing and interpreting her lab data, and doing documentation.    Felicia Mckeon MD

## 2022-05-10 ENCOUNTER — OFFICE VISIT (OUTPATIENT)
Dept: ENDOCRINOLOGY | Facility: CLINIC | Age: 52
End: 2022-05-10
Payer: COMMERCIAL

## 2022-05-10 VITALS
DIASTOLIC BLOOD PRESSURE: 76 MMHG | BODY MASS INDEX: 29.51 KG/M2 | SYSTOLIC BLOOD PRESSURE: 116 MMHG | WEIGHT: 199.2 LBS | HEART RATE: 70 BPM | HEIGHT: 69 IN

## 2022-05-10 DIAGNOSIS — E10.9 TYPE 1 DIABETES MELLITUS WITHOUT COMPLICATION (H): Primary | ICD-10-CM

## 2022-05-10 LAB — HBA1C MFR BLD: 7.5 % (ref 4.3–?)

## 2022-05-10 PROCEDURE — 83036 HEMOGLOBIN GLYCOSYLATED A1C: CPT | Performed by: INTERNAL MEDICINE

## 2022-05-10 PROCEDURE — 99215 OFFICE O/P EST HI 40 MIN: CPT | Performed by: INTERNAL MEDICINE

## 2022-05-10 RX ORDER — SEMAGLUTIDE 1.34 MG/ML
INJECTION, SOLUTION SUBCUTANEOUS
Qty: 5 MG | Refills: 0 | Status: SHIPPED | OUTPATIENT
Start: 2022-05-10 | End: 2022-09-16

## 2022-05-10 ASSESSMENT — PAIN SCALES - GENERAL: PAINLEVEL: NO PAIN (0)

## 2022-05-10 NOTE — PATIENT INSTRUCTIONS
When you run out of trulicity, switch to ozempic.  Start 0.25 mg each week for 4 weeks, then increase to 0.5 mg each week.

## 2022-05-10 NOTE — NURSING NOTE
"Chief Complaint   Patient presents with     Diabetes     Vital signs:      BP: 116/76 Pulse: 70           Height: 175.3 cm (5' 9\") Weight: 90.4 kg (199 lb 3.2 oz)  Estimated body mass index is 29.42 kg/m  as calculated from the following:    Height as of this encounter: 1.753 m (5' 9\").    Weight as of this encounter: 90.4 kg (199 lb 3.2 oz).          "

## 2022-05-10 NOTE — LETTER
5/10/2022       RE: Dali Martines  [address]  [facility] [address]     Dear Colleague,    Thank you for referring your patient, Dali Martines, to the [facility] ENDOCRINOLOGY [facility]INIC [place] at [facility]. Please see a copy of my visit note below.    Outcome for 05/09/22 7:45 AM :Glucose sent via Email NL  Outcome for 05/05/22 12:28 PM :Patient is sharing data via clinic device website and patient has been instructed to update data on website. Find login information by using .[email or web address]  NL    This 51 year old woman was returns for f/u of her type 1 diabetes. She also has hypothyroidism and psoriatic arthritis.  Pt was dx having type 1 diabetes at age 15.  Her hx is also significant for mild NPDR right eye only,anxiety, hyperlipidemia, psoriatic arthritis,  and GERD.    She is currently taking Tresiba 17 units daily.      Insulin to Carb ratio:  1 unit per 7 grams CHO  Correction Factor:     1 unit drops BG 30 mg/mL    She is taking fiasp.    She continues to take Trulicity 0.75 mg each week.  Tolerating this dose much better than she did the 1.5 mg a week dose but she finds she does get very hungry on the sixth day after the dose.  She says her weight continues to go up. She was using an inpen device but she stopped using it because of trouble pairing all of her devices.  It seems it interfered with the CGM.  She has met several times with Lor Marie and has found the sessions very helpful.  She thinks that is overall helped her glucose control.    She continues to be troubled by insomnia.  She is now working with a sleep expert.  She developed psoriasis in the last year that is part of her psoriatic arthritis.  This is now resolved.  She continues to see Dr. Xiong for her arthritis.  She is taking methotrexate  for this and her symptoms are reasonably controlled.    She also reports that the paresthesias she is reported in one of her feet on  the tips of her toes has now extended to both feet.  It is a feeling of numbness that she experiences on her toes.  Its not troubling to her beyond being found pleasant.  It does not keep her awake at night.    She is up-to-date with her eye visits and has no concerns.     She continues to take her statin and her thyroid medication.                 Patient Active Problem List   Diagnosis     GERD (gastroesophageal reflux disease)     CARDIOVASCULAR SCREENING; LDL GOAL LESS THAN 100     Type 1 diabetes mellitus with retinopathy (H)     Anxiety     NLD (necrobiosis lipoidica diabeticorum) (H)     Cutaneous skin tags     Cervicalgia     Chronic low back pain     SOB (shortness of breath)     Encounter for other specified aftercare     PAIN FOOT     Diabetes (H)     Screening for other eye conditions     PAIN KNEE JOINT     Enthesopathy     Pain in joint, multiple sites     Chronic pain of right ankle     Right foot pain     ESR raised     Swelling of limb     Other postprocedural status(V45.89)     Left knee pain     Type 1 diabetes mellitus without complication (H)     Psoriatic arthritis (H)     Psoriasis with arthropathy (H)     History of adenomatous polyp of colon     Glaucoma suspect of both eyes     Long-term current use of stimulant     Insomnia, unspecified type     Long term methotrexate user       Current Outpatient Medications   Medication     albuterol (PROAIR HFA/PROVENTIL HFA/VENTOLIN HFA) 108 (90 Base) MCG/ACT inhaler     aspirin 81 MG tablet     blood glucose (ACCU-CHEK COLIN PLUS) test strip     blood glucose (NO BRAND SPECIFIED) test strip     blood glucose monitoring (ACCU-CHEK FASTCLIX) lancets     blood glucose monitoring (NO BRAND SPECIFIED) meter device kit     Calcium Carbonate-Vitamin D (CALCIUM + D PO)     citalopram (CELEXA) 20 MG tablet     Continuous Blood Gluc  (DEXCOM G6 ) SUZANNE     Continuous Blood Gluc Sensor (DEXCOM G6 SENSOR) MISC     Continuous Blood Gluc Transmit  "(DEXCOM G6 TRANSMITTER) MISC     diclofenac (VOLTAREN) 1 % topical gel     dulaglutide (TRULICITY) 0.75 MG/0.5ML pen     econazole nitrate 1 % cream     fish oil-omega-3 fatty acids 1000 MG capsule     folic acid (FOLVITE) 1 MG tablet     insulin aspart (FIASP FLEXTOUCH) 100 UNIT/ML pen-injector     insulin degludec (TRESIBA FLEXTOUCH) 100 UNIT/ML pen     insulin pen needle (B-D U/F) 31G X 8 MM miscellaneous     Ketoconazole (NIZORAL PO)     levothyroxine (SYNTHROID/LEVOTHROID) 112 MCG tablet     methotrexate 2.5 MG tablet     methylphenidate (METADATE CD) 20 MG CR capsule     methylphenidate (RITALIN) 10 MG tablet     Minoxidil (ROGAINE EX)     mometasone-formoterol (DULERA) 100-5 MCG/ACT inhaler     montelukast (SINGULAIR) 5 MG chewable tablet     Multiple Vitamin (MULTIVITAMIN OR)     NOVOLOG PENFILL 100 UNIT/ML soln     predniSONE (DELTASONE) 20 MG tablet     simvastatin (ZOCOR) 20 MG tablet     traZODone (DESYREL) 50 MG tablet     Injection Device for insulin (INPEN 100-BLUE-TANIYA) SUZANNE     No current facility-administered medications for this visit.     Social history is significant for the death of her father earlier this year.  Her mother has been diagnosed with Alzheimer's and the patient is her mother's primary source of support.  Her job was eliminated at the [employer] in March and she is now working in another department.  She has to decide if she wants to take a severance payment or hang on for 11 more months.  She is not sure what she is going to do.  /76 (BP Location: Left arm, Patient Position: Sitting, Cuff Size: Adult Regular)   Pulse 70   Ht 1.753 m (5' 9\")   Wt 90.4 kg (199 lb 3.2 oz)   BMI 29.42 kg/m      VSS  NAD  Eyes - no periorbital edema, conjunctival injection, scleral icterus  CV - RRR.  Normal pulses in feet.  No edema  Neuro - sensation intact to monofilament on soles of feet.    Skin - normal texture   Feet - no ulcers       Recent Labs   Lab Test 02/14/22  1004 " 11/18/21  1659 10/26/21  1556 07/23/21  1444 01/11/21  1109 01/11/21  1102 01/11/21  1101 12/17/19  1626 12/17/19  0000 06/18/19  0000 03/21/19  1359 10/31/14  0000 07/09/14  1641   A1C 8.1*  --   --  7.3*  --   --  7.1*   < >  --   --   --   --   --    HEMOGLOBINA1  --   --  7.3  --   --   --   --   --  7.4*   < >  --    < >  --    TSH 3.34  --   --   --   --   --  2.01   < >  --    < > 0.19*   < > 0.11*   T4  --   --   --   --   --   --   --   --   --   --  1.09  --  1.31   *  --   --   --   --   --  122*  --   --   --   --    < >  --    HDL 94  --   --   --   --   --  92  --   --   --   --    < >  --    TRIG 66  --   --   --   --   --  44  --   --   --   --    < >  --    CR 0.60 0.50*  --  0.61   < >  --   --    < >  --    < >  --    < >  --    MICROL 5  --   --   --   --  <5  --    < >  --   --   --    < >  --     < > = values in this interval not displayed.     A1c today is 7.5.      Assessment and plan:    1.  Diabetes control.  Her A1c is now improved from February.  She has variability in her daytime measures but she thinks this is due to changes in diet and the timing of insulin administration.  She thinks things are getting better because of the advice she is gotten from our dietitian.  We looked at her daily glucose tracings together and I explained how use of the tandem control IQ pump or another hybrid closed loop might provide her with some additional help.  She is interested in using the OmniPod 5 when it becomes available.  She does not want to use tubing.  She does want to use a device that will help her record her insulin doses and any note she takes.  I suggested she start using in pen again.  She will call the company and find out why she has so many problems.  The devices.  I suggested we try using Ozempic as a replacement for the Trulicity.  It is possible it may be associated with lower symptoms and it may control her appetite better.  I will start her at a dose of 0.25 mg each week for  4 weeks and then increase to 0.5 mg each week.  We will keep her on that dose until she returns to clinic.    2.  Diabetes complications.  She has retinopathy and sees her eye doctor regularly.  She has symptoms of paresthesias but her foot exam is normal.  Renal function is normal.    3.hypothyroidism.  Her TSH was normal in February.  She will remain on her current dose.    4.CVD risk.  She is on simvastatin at a moderate dose.  Her LDL is a little bit high but she does not want to increase the dose.  Her blood pressure is fine.    She will will see Shakira Sebastian in follow-up in 3 months and me in 6 months.    I spent 30 minutes with the patient today.  On the day of visit I spent an additional 15 minutes reviewing and interpreting her CGM data, reviewing and interpreting her lab data, and doing documentation.    Felicia Mckeno MD

## 2022-05-11 ENCOUNTER — IMMUNIZATION (OUTPATIENT)
Dept: NURSING | Facility: CLINIC | Age: 52
End: 2022-05-11
Payer: COMMERCIAL

## 2022-05-11 PROCEDURE — 0064A COVID-19,PF,MODERNA (18+ YRS BOOSTER .25ML): CPT

## 2022-05-11 PROCEDURE — 91306 COVID-19,PF,MODERNA (18+ YRS BOOSTER .25ML): CPT

## 2022-05-12 ENCOUNTER — VIRTUAL VISIT (OUTPATIENT)
Dept: SLEEP MEDICINE | Facility: CLINIC | Age: 52
End: 2022-05-12
Payer: COMMERCIAL

## 2022-05-12 VITALS — WEIGHT: 190 LBS | BODY MASS INDEX: 27.2 KG/M2 | HEIGHT: 70 IN

## 2022-05-12 DIAGNOSIS — F43.9 STRESS: ICD-10-CM

## 2022-05-12 DIAGNOSIS — F51.04 CHRONIC INSOMNIA: Primary | ICD-10-CM

## 2022-05-12 PROCEDURE — 90832 PSYTX W PT 30 MINUTES: CPT | Mod: 95 | Performed by: PSYCHOLOGIST

## 2022-05-12 ASSESSMENT — SLEEP AND FATIGUE QUESTIONNAIRES
HOW LIKELY ARE YOU TO NOD OFF OR FALL ASLEEP WHILE SITTING INACTIVE IN A PUBLIC PLACE: SLIGHT CHANCE OF DOZING
HOW LIKELY ARE YOU TO NOD OFF OR FALL ASLEEP WHEN YOU ARE A PASSENGER IN A CAR FOR AN HOUR WITHOUT A BREAK: MODERATE CHANCE OF DOZING
HOW LIKELY ARE YOU TO NOD OFF OR FALL ASLEEP WHILE SITTING AND READING: HIGH CHANCE OF DOZING
HOW LIKELY ARE YOU TO NOD OFF OR FALL ASLEEP WHILE LYING DOWN TO REST IN THE AFTERNOON WHEN CIRCUMSTANCES PERMIT: HIGH CHANCE OF DOZING
HOW LIKELY ARE YOU TO NOD OFF OR FALL ASLEEP WHILE WATCHING TV: HIGH CHANCE OF DOZING
HOW LIKELY ARE YOU TO NOD OFF OR FALL ASLEEP WHILE SITTING QUIETLY AFTER LUNCH WITHOUT ALCOHOL: SLIGHT CHANCE OF DOZING
HOW LIKELY ARE YOU TO NOD OFF OR FALL ASLEEP WHILE SITTING AND TALKING TO SOMEONE: WOULD NEVER DOZE
HOW LIKELY ARE YOU TO NOD OFF OR FALL ASLEEP IN A CAR, WHILE STOPPED FOR A FEW MINUTES IN TRAFFIC: WOULD NEVER DOZE

## 2022-05-12 NOTE — PROGRESS NOTES
Dali is a 51 year old who is being evaluated via a billable video visit.      How would you like to obtain your AVS? MyChart  If the video visit is dropped, the invitation should be resent by: Send to e-mail at: [email or web address]  Will anyone else be joining your video visit? Asiya  Madelyn Fernandez      Video Start Time: 3:39 PM  Video-Visit Details    Type of service:  Video Visit    Video End Time:4:02 PM    Originating Location (pt. Location): Home    Distant Location (provider location):  [facility]     Platform used for Video Visit: [facility]     SLEEP [facility] VIRTUAL VIDEO FOLLOW-UP VISIT  Sleep Psychology    Patient Name: Dali Martines  MRN:  [id number]  Date of Service: May 12, 2022       Subjective Report     Dali Martines  returns for a telehealth video visit to discuss progress in implementing behavioral strategies for the management of insomnia.  Patient consent for initiation of video visit was obtained and documented prior to initiation of visit.     Dali reports her position was redrawn and eliminated resulting in loss of job and temporary work status. .She has moved to a new office and reports that it functions better with improved office dynamics.  The pace of her work has also slowed resulting in reduction in stress.    Discussed possible benefits of completing an 8-week stress reduction course.  Patient completed similar course 8 years ago.   Sleep Data:     Source of Sleep Estimates:  Verbal Self-report      EPWORTH SLEEPINESS SCALE    Sitting and reading High chance of dozing   Watching TV High chance of dozing   Sitting, inactive in a public place (theatre or mtg.) Slight chance of dozing    As a passenger in a car Moderate chance of dozing   Lying down to rest in the afternoon when circumstance permit High chance of dozing   Sitting and talking to someone Would never doze   Sitting quietly after lunch without alcohol Slight chance of dozing   In a car,  "while stopped for a few minutes in traffic Would never doze   TOTAL SCORE (nl <11) 13     INSOMNIA SEVERITY INDEX     Difficulty falling asleep Moderate   Difficult staying asleep Moderate   Problems waking up to early Very Severe   How SATISFIED/DISSATISFIED are you with your CURRENT sleep pattern? Dissatisfied   How NOTICEABLE to others do you think your sleep pattern is in terms of your quality of life? Somewhat   How WORRIED/DISTRESSED are you about your current sleep pattern? Much   To what extent do you consider your sleep problem to INTERFERE with your daily fuctioning(e.g. daytime fatigue, mood, ability to function at work/daily chores, concentration, mood,etc.) CURRENTLY? Much   INSOMNIA SEVERITY INDEX TOTAL SCORE 19    Absence of insomnia (0-7); sub-threshold insomnia (8-14); moderate insomnia (15-21); and severe insomnia (22-28)       Interventions     Strategies and recommendations including Stimulus control therapy and Stress management were discussed today.       Vital Signs     Ht 1.778 m (5' 10\")   Wt 86.2 kg (190 lb)   BMI 27.26 kg/m       Mental Status     Orientation:  X3  Mood:  normal  Affect:  Congruent with mood  Speech/Language:  Normal  Thought Process: Intact  Associations:  Normal  Thought Content: Normal  Patient does not report any suicidal ideation, intention or plan.    Diagnostic Impressions and Plan        Chronic insomnia  Stress    Patient reports subjective reduction in overall stress with job change.  Sleep has markedly improved.    Plan:  Continue current sleep schedule and plan Consider 8 week stress reduction course    Follow-up: 2 months      Philip Quiroz PsyD, GARY, DBSM  Diplomate, Behavioral Sleep Medicine  [facility]      Note: This dictation was created using voice recognition software. This document may contain an error not identified before finalizing the document. If the error changes the accuracy of the document, I would appreciate " it being brought to my attention.

## 2022-05-12 NOTE — PATIENT INSTRUCTIONS
Mindfulness training evidence of benefits:    https://www.integrativepractitioner.com/mental-emotional-spiritual/2019-10-17-[facility]-general-mindfulness-pain-fear    https://news.[employer].edu/gazette/story/2011/01/eight-weeks-to-a-better-brain/      [facility] Online Mindfulness Based Stress Reduction Course:    https://www.[facility]/services-treatments/center-for-mindfulness/mindfulness-programs/mbsr-8-week-online-live    [facility] Mindfulness Resources    [email or web address].edu/events/mindfulness-programs

## 2022-05-22 NOTE — PROGRESS NOTES
"Dali is a 51 year old who is being evaluated via a billable video visit.      How would you like to obtain your AVS? MyChart  If the video visit is dropped, the invitation should be resent by: Send to e-mail at: [email or web address]  Will anyone else be joining your video visit? No      Video Start Time: 10am     Video-Visit Details    Type of service:  Video Visit    Video End Time:11am    Originating Location (pt. Location): Home    Distant Location (provider location[facility]  [facility] [place]     Platform used for Video Visit: [employer]    Diabetes Self-Management Education & Support    Presents for:  Type 1   Follow up    SUBJECTIVE/OBJECTIVE:     Cultural Influences/Ethnic Background:  Not  or     Diabetes Symptoms & Complications:          Patient Problem List and Family Medical History reviewed for relevant medical history, current medical status, and diabetes risk factors.    Vitals:  There were no vitals taken for this visit.  Estimated body mass index is 27.26 kg/m  as calculated from the following:    Height as of 5/12/22: 1.778 m (5' 10\").    Weight as of 5/12/22: 86.2 kg (190 lb).   Last 3 BP:   BP Readings from Last 3 Encounters:   05/10/22 116/76   11/18/21 120/82   11/16/21 118/74       History   Smoking Status     Never Smoker   Smokeless Tobacco     Never Used       Labs:  Lab Results   Component Value Date    A1C 8.1 02/14/2022    A1C 7.1 01/11/2021     Lab Results   Component Value Date     11/18/2021     04/27/2016     Lab Results   Component Value Date     02/14/2022     01/11/2021     HDL Cholesterol   Date Value Ref Range Status   01/11/2021 92 >49 mg/dL Final     Direct Measure HDL   Date Value Ref Range Status   02/14/2022 94 >=50 mg/dL Final   ]  GFR Estimate   Date Value Ref Range Status   02/14/2022 >90 >60 mL/min/1.73m2 Final     Comment:     Effective December 21, 2021 eGFRcr in adults is calculated using the 2021 " CKD-EPI creatinine equation which includes age and gender (Paola woody al., NEJM, DOI: 10.1056/LVLGxp0228061)   01/11/2021 >90 >60 mL/min/[1.73_m2] Final     Comment:     Non  GFR Calc  Starting 12/18/2018, serum creatinine based estimated GFR (eGFR) will be   calculated using the Chronic Kidney Disease Epidemiology Collaboration   (CKD-EPI) equation.       GFR Estimate If Black   Date Value Ref Range Status   01/11/2021 >90 >60 mL/min/[1.73_m2] Final     Comment:      GFR Calc  Starting 12/18/2018, serum creatinine based estimated GFR (eGFR) will be   calculated using the Chronic Kidney Disease Epidemiology Collaboration   (CKD-EPI) equation.       Lab Results   Component Value Date    CR 0.60 02/14/2022    CR 0.56 01/11/2021     No results found for: MICROALBUMIN    Healthy Eating:  Dali sent food records again.  Her carb counting accuracy is improving.   She has added raw almonds to granola with breakfast.  Found that she is more satiated.  Found that she was off with carb counting pasta, measured it.  States she has a tendency to over treat lows  She would like some snack ideas    Monitoring:              Taking Medications:  Diabetes Medication(s)     Insulin       insulin aspart (FIASP FLEXTOUCH) 100 UNIT/ML pen-injector    Inject subcu 1 unit per 7 grams CHO with Correction Factor of1 unit drops BG 30 mg/mL. Approx 45 units daily.     insulin degludec (TRESIBA FLEXTOUCH) 100 UNIT/ML pen    Inject subcu 17 units daily.     NOVOLOG PENFILL 100 UNIT/ML soln    INJECT 1 UNIT PER 15 GRAMS CHO AT ALL MEALS AND SNACKS WITH CORRECTION SCALE OF 1 UNIT PER 50 MG/DL OVER 150, AVERAGE DAILY USE OF 30 UNITS    Incretin Mimetic Agents (GLP-1 Receptor Agonists)       dulaglutide (TRULICITY) 0.75 MG/0.5ML pen    Inject 0.75 mg Subcutaneous every 7 days     semaglutide (OZEMPIC, 0.25 OR 0.5 MG/DOSE,) 2 MG/1.5ML SOPN pen    Inject 0.25 mg Subcutaneous every 7 days for 28 days, THEN 0.5 mg every 7  days.            Healthy Coping:     Patient Activation Measure Survey Score:  No flowsheet data found.      INTERVENTIONS:    Opportunities for ongoing education and support in diabetes-self management were discussed.    Pt verbalized understanding of concepts discussed and recommendations provided today.         ASSESSMENT:  Patient is having a pattern of lows around 3/4pm after lunch.     Patient's most recent   Lab Results   Component Value Date    A1C 8.1 02/14/2022    A1C 7.1 01/11/2021    is not meeting goal of <7.0    PLAN  Use insulin to carb ratio of 1:15g at lunch time to see if that helps reduce lows  Continue to work on accuracy of carb counting    Time Spent: 60 minutes  Encounter Type: Individual    Any diabetes medication dose changes were made via the CDE Protocol and Collaborative Practice Agreement with the patient's referring provider. A copy of this encounter was shared with the provider.

## 2022-06-15 ENCOUNTER — TELEPHONE (OUTPATIENT)
Dept: PULMONOLOGY | Facility: CLINIC | Age: 52
End: 2022-06-15
Payer: COMMERCIAL

## 2022-06-15 NOTE — TELEPHONE ENCOUNTER
Talked with patient and reschedule video appt with Dr. Carter on 9/6/22 to 9/9/22 with Dr. Dunbar. Patient agreeable to seeing another provider. Patient agreeable to appt on 9/9/22. Details of video appt confirmed with patient. Patient declined a copy of the Patient Bill of Rights.

## 2022-06-17 ENCOUNTER — VIRTUAL VISIT (OUTPATIENT)
Dept: EDUCATION SERVICES | Facility: CLINIC | Age: 52
End: 2022-06-17
Payer: COMMERCIAL

## 2022-06-17 DIAGNOSIS — E10.3299 TYPE 1 DIABETES MELLITUS WITH MILD NONPROLIFERATIVE RETINOPATHY, MACULAR EDEMA PRESENCE UNSPECIFIED, UNSPECIFIED LATERALITY (H): Primary | ICD-10-CM

## 2022-06-17 PROCEDURE — 97803 MED NUTRITION INDIV SUBSEQ: CPT | Mod: 95 | Performed by: NUTRITIONIST

## 2022-07-07 ENCOUNTER — VIRTUAL VISIT (OUTPATIENT)
Dept: SLEEP MEDICINE | Facility: CLINIC | Age: 52
End: 2022-07-07
Payer: COMMERCIAL

## 2022-07-07 VITALS — BODY MASS INDEX: 28.49 KG/M2 | WEIGHT: 199 LBS | HEIGHT: 70 IN

## 2022-07-07 DIAGNOSIS — F43.9 STRESS: ICD-10-CM

## 2022-07-07 DIAGNOSIS — F51.04 CHRONIC INSOMNIA: Primary | ICD-10-CM

## 2022-07-07 PROCEDURE — 90834 PSYTX W PT 45 MINUTES: CPT | Mod: 95 | Performed by: PSYCHOLOGIST

## 2022-07-07 ASSESSMENT — SLEEP AND FATIGUE QUESTIONNAIRES
HOW LIKELY ARE YOU TO NOD OFF OR FALL ASLEEP WHILE SITTING AND READING: HIGH CHANCE OF DOZING
HOW LIKELY ARE YOU TO NOD OFF OR FALL ASLEEP WHILE LYING DOWN TO REST IN THE AFTERNOON WHEN CIRCUMSTANCES PERMIT: MODERATE CHANCE OF DOZING
HOW LIKELY ARE YOU TO NOD OFF OR FALL ASLEEP WHILE SITTING INACTIVE IN A PUBLIC PLACE: SLIGHT CHANCE OF DOZING
HOW LIKELY ARE YOU TO NOD OFF OR FALL ASLEEP IN A CAR, WHILE STOPPED FOR A FEW MINUTES IN TRAFFIC: WOULD NEVER DOZE
HOW LIKELY ARE YOU TO NOD OFF OR FALL ASLEEP WHEN YOU ARE A PASSENGER IN A CAR FOR AN HOUR WITHOUT A BREAK: SLIGHT CHANCE OF DOZING
HOW LIKELY ARE YOU TO NOD OFF OR FALL ASLEEP WHILE SITTING AND TALKING TO SOMEONE: WOULD NEVER DOZE
HOW LIKELY ARE YOU TO NOD OFF OR FALL ASLEEP WHILE WATCHING TV: HIGH CHANCE OF DOZING
HOW LIKELY ARE YOU TO NOD OFF OR FALL ASLEEP WHILE SITTING QUIETLY AFTER LUNCH WITHOUT ALCOHOL: SLIGHT CHANCE OF DOZING

## 2022-07-07 NOTE — PROGRESS NOTES
Dali is a 51 year old who is being evaluated via a billable video visit.      How would you like to obtain your AVS? MyChart  If the video visit is dropped, the invitation should be resent by: Send to e-mail at: [email or web address]  Will anyone else be joining your video visit? No      Video-Visit Details    Video Start Time: 2:34 PM    Type of service:  Video Visit    Video End Time:3:20 PM    Originating Location (pt. Location): Home    Distant Location (provider location):  [facility]     Platform used for Video Visit: [employer] [place]    SLEEP MEDICINE VIRTUAL VIDEO FOLLOW-UP VISIT  Sleep Psychology    Patient Name: Dali Martines  MRN:  [id number]  Date of Service: Jul 7, 2022       Subjective Report     Dali Martines  returns for a telehealth video visit to discuss progress in implementing behavioral strategies for the management of insomnia.  Patient consent for initiation of video visit was obtained and documented prior to initiation of visit.     Dali reports she notices that her sleep has improved with reduced overall stress.    Discussed setting limits and boundaries as a stress management strategy.  .    Discussed issues of sleep and travel and resulting stressors during pandemic.    Reviewed balance of structure and positive health behavior during the day that support sleep.         .     Sleep Data:     Source of Sleep Estimates:  Verbal Self-report      EPWORTH SLEEPINESS SCALE    Sitting and reading 3   Watching TV 3   Sitting, inactive in a public place (theatre or mtg.) 1    As a passenger in a car 1   Lying down to rest in the afternoon when circumstance permit 2   Sitting and talking to someone 0   Sitting quietly after lunch without alcohol 1   In a car, while stopped for a few minutes in traffic 0   TOTAL SCORE (nl <11) 11     INSOMNIA SEVERITY INDEX     Difficulty falling asleep 1   Difficult staying asleep 3   Problems waking up to early  "4   How SATISFIED/DISSATISFIED are you with your CURRENT sleep pattern? 3   How NOTICEABLE to others do you think your sleep pattern is in terms of your quality of life? 3   How WORRIED/DISTRESSED are you about your current sleep pattern? 3   To what extent do you consider your sleep problem to INTERFERE with your daily fuctioning(e.g. daytime fatigue, mood, ability to function at work/daily chores, concentration, mood,etc.) CURRENTLY? 4   INSOMNIA SEVERITY INDEX TOTAL SCORE 21    Absence of insomnia (0-7); sub-threshold insomnia (8-14); moderate insomnia (15-21); and severe insomnia (22-28)       Interventions     Strategies and recommendations including Stimulus control therapy and Stress management were discussed today.       Vital Signs     Ht 1.778 m (5' 10\")   Wt 90.3 kg (199 lb)   BMI 28.55 kg/m       Mental Status     Orientation:  X3  Mood:  normal  Affect:  Congruent with mood  Speech/Language:  Normal  Thought Process: Intact  Associations:  Normal  Thought Content: Normal  Patient does not report any suicidal ideation, intention or plan.    Diagnostic Impressions and Plan        Chronic insomnia  Stress    Dali Martines returns for follow-up to address stress related insomnia.  With reduction in overall stress she is experiencing reduced wake after sleep onset and early morning awakenings.  She has not initiated mindfulness based stress reduction but has taken some steps to create structure and breaks during her workday.    Plan:  Continue current sleep schedule and plan Establish consistent morning routine along with reestablishing consistent wake time.  Focus on establishing a lunchtime break and discontinuing work by 5 PM followed by evening relaxed routine    Follow-up: 3 months      Philip Quiroz, Daniele, LP, DBSM  Diplomate, Behavioral Sleep Medicine  [facility]      Note: This dictation was created using voice recognition software. This document may contain an " error not identified before finalizing the document. If the error changes the accuracy of the document, I would appreciate it being brought to my attention.

## 2022-07-09 NOTE — PROGRESS NOTES
"Dali is a 51 year old who is being evaluated via a billable video visit.      How would you like to obtain your AVS? MyChart  If the video visit is dropped, the invitation should be resent by: Send to e-mail at: [email or web address]  Will anyone else be joining your video visit? No        Video-Visit Details    Video Start Time: 10am    Type of service:  Video Visit    Video End Time:10:30am    Originating Location (pt. Location): Home    Distant Location (provider location[facility]  [facility] [place]     Platform used for Video Visit: [employer]      Diabetes Self-Management Education & Support    Presents for:  Follow up    SUBJECTIVE/OBJECTIVE:     Cultural Influences/Ethnic Background:  Not  or     Diabetes Symptoms & Complications:          Patient Problem List and Family Medical History reviewed for relevant medical history, current medical status, and diabetes risk factors.    Vitals:  There were no vitals taken for this visit.  Estimated body mass index is 28.55 kg/m  as calculated from the following:    Height as of 7/7/22: 1.778 m (5' 10\").    Weight as of 7/7/22: 90.3 kg (199 lb).   Last 3 BP:   BP Readings from Last 3 Encounters:   05/10/22 116/76   11/18/21 120/82   11/16/21 118/74       History   Smoking Status     Never Smoker   Smokeless Tobacco     Never Used       Labs:  Lab Results   Component Value Date    A1C 8.1 02/14/2022    A1C 7.1 01/11/2021     Lab Results   Component Value Date     11/18/2021     04/27/2016     Lab Results   Component Value Date     02/14/2022     01/11/2021     HDL Cholesterol   Date Value Ref Range Status   01/11/2021 92 >49 mg/dL Final     Direct Measure HDL   Date Value Ref Range Status   02/14/2022 94 >=50 mg/dL Final   ]  GFR Estimate   Date Value Ref Range Status   02/14/2022 >90 >60 mL/min/1.73m2 Final     Comment:     Effective December 21, 2021 eGFRcr in adults is calculated using the 2021 CKD-EPI " creatinine equation which includes age and gender (Paola woody al., NEJ, DOI: 10.1056/WVRZgk7465101)   01/11/2021 >90 >60 mL/min/[1.73_m2] Final     Comment:     Non  GFR Calc  Starting 12/18/2018, serum creatinine based estimated GFR (eGFR) will be   calculated using the Chronic Kidney Disease Epidemiology Collaboration   (CKD-EPI) equation.       GFR Estimate If Black   Date Value Ref Range Status   01/11/2021 >90 >60 mL/min/[1.73_m2] Final     Comment:      GFR Calc  Starting 12/18/2018, serum creatinine based estimated GFR (eGFR) will be   calculated using the Chronic Kidney Disease Epidemiology Collaboration   (CKD-EPI) equation.       Lab Results   Component Value Date    CR 0.60 02/14/2022    CR 0.56 01/11/2021     No results found for: MICROALBUMIN    Healthy Eating:  Patient is getting much better at counting carbohydrate though still find some errors today  Somewhat variable insulin to carb ratio as well      Monitoring:  Improving slightly with 59% in target range now.   <1% low    Taking Medications:  Diabetes Medication(s)     Insulin       insulin aspart (FIASP FLEXTOUCH) 100 UNIT/ML pen-injector    Inject subcu 1 unit per 7 grams CHO with Correction Factor of1 unit drops BG 30 mg/mL. Approx 45 units daily.     insulin degludec (TRESIBA FLEXTOUCH) 100 UNIT/ML pen    Inject subcu 17 units daily.     NOVOLOG PENFILL 100 UNIT/ML soln    INJECT 1 UNIT PER 15 GRAMS CHO AT ALL MEALS AND SNACKS WITH CORRECTION SCALE OF 1 UNIT PER 50 MG/DL OVER 150, AVERAGE DAILY USE OF 30 UNITS    Incretin Mimetic Agents (GLP-1 Receptor Agonists)       dulaglutide (TRULICITY) 0.75 MG/0.5ML pen    Inject 0.75 mg Subcutaneous every 7 days     semaglutide (OZEMPIC, 0.25 OR 0.5 MG/DOSE,) 2 MG/1.5ML SOPN pen    Inject 0.25 mg Subcutaneous every 7 days for 28 days, THEN 0.5 mg every 7 days.          Healthy Coping:     Patient Activation Measure Survey Score:  No flowsheet data found.    Diabetes  knowledge and skills assessment:   Patient is knowledgeable in diabetes management concepts related to: Healthy Eating    Patient needs further education on the following diabetes management concepts: Healthy Eating    Based on learning assessment above, most appropriate setting for further diabetes education would be: Individual setting.      INTERVENTIONS:    Education provided today on:  AADE Self-Care Behaviors:  Healthy Eating: carbohydrate counting    Opportunities for ongoing education and support in diabetes-self management were discussed.    Pt verbalized understanding of concepts discussed and recommendations provided today.       Education Materials Provided:  No new materials provided today      ASSESSMENT:  Patient's most recent   Lab Results   Component Value Date    A1C 8.1 02/14/2022    A1C 7.1 01/11/2021    is not meeting goal of <7.0    PLAN  See Patient Instructions for co-developed, patient-stated behavior change goals.    Time Spent: 30 minutes  Encounter Type: Individual    Any diabetes medication dose changes were made via the CDE Protocol and Collaborative Practice Agreement with the patient's referring provider. A copy of this encounter was shared with the provider.

## 2022-08-02 NOTE — PROGRESS NOTES
Outcome for 08/02/22 3:27 PM: [employer] message sent  CHANCE Bautista  Outcome for 08/08/22 1:29 PM: Left Voicemail   CHANCE Bautista  Outcome for 08/09/22 12:42 PM: Left Voicemail   CHANCE Clark  Outcome for 08/10/22 7:06 AM: Dexcom emailed to provider  CHANCE Clark

## 2022-08-09 ENCOUNTER — VIRTUAL VISIT (OUTPATIENT)
Dept: RHEUMATOLOGY | Facility: CLINIC | Age: 52
End: 2022-08-09
Attending: INTERNAL MEDICINE
Payer: COMMERCIAL

## 2022-08-09 DIAGNOSIS — Z79.899 HIGH RISK MEDICATION USE: ICD-10-CM

## 2022-08-09 DIAGNOSIS — L40.50 PSORIATIC ARTHRITIS (H): Primary | ICD-10-CM

## 2022-08-09 DIAGNOSIS — Z79.631 LONG TERM METHOTREXATE USER: ICD-10-CM

## 2022-08-09 DIAGNOSIS — L40.50 PSORIASIS WITH ARTHROPATHY (H): ICD-10-CM

## 2022-08-09 DIAGNOSIS — Z51.81 ENCOUNTER FOR THERAPEUTIC DRUG MONITORING: ICD-10-CM

## 2022-08-09 PROCEDURE — 99214 OFFICE O/P EST MOD 30 MIN: CPT | Mod: 95 | Performed by: INTERNAL MEDICINE

## 2022-08-09 NOTE — PROGRESS NOTES
Dali Martines  is being evaluated via a billable video visit.      How would you like to obtain your AVS? [email or web address]  For the video visit, send the invitation by: Text to cell phone: [phone]  Will anyone else be joining your video visit? No    Chief Complaint   Patient presents with     rheumatology     not currently breastfeeding.        HPI   Prior Note carried forward:     [facility]  Rheumatology Clinic  Yoel Betancourt MD  2022     Name: Dali Martines  MRN: [id number]  Age: 49 year old  : 1970  Referring provider: Referred Self    Problem List:  1. Psoriatic arthritis   2. Psoriasis with arthropathy  3. Pain in joint, multiple sites  4. Chronic pain of right ankle  5. Right foot pain  6. Pain in joint involving ankle and foot, right      2020 :   Dali Martines is a 49 year old female with a history including type I diabetes, psoriatic arthritis, and Hashimoto's thyroiditis who presents for follow-up. I last saw the patient on 2018, at which time she reported increased left hand pain, most prominently in the left 2nd MCP. The plan was to continue on increased methotrexate dosage of 12.5 mg, and, if well-tolerated, then increasing dosage further to 15 mg weekly.    Background history:  Symptoms first occurred in 2008, when she began having stiffness and pain in the left hand, particularly in the PIPs and MCPs. Symptoms spontaneously resolved after about 6-8 weeks. She was previously seen by Rheumatology here around that time (see note from Dr. Betancourt on 2008). Briefly, assessment was that she had a self-limited episode of inflammatory arthralgias, possibly viral or early psoriatic arthritis. Negative ONLVIA, RF, Lyme serologies and normal CRP and ESR. Given her family history of RA, there was a thought to start her on HCQ if anti-CCP antibodies were positive, but they were found to be negative. No imaging of the hand was performed. 2014, she experienced  "recurrent acute onset pain, stiffness, and swelling in the L hand very similar to her symptoms in 2008. Specifically, had swelling across MCPs and in 3rd PIP. She could hardly bend the hand sometimes due to the welling. Had morning stiffness lasting 30-45 minutes. No other joints were involved. Issues with her left hand lingered for several months but eventually self-resolved that January and her only notable symptom is heel pain in the mornings. No fevers, chills, or weight loss. No back pain, vision changes, nausea, vomiting, diarrhea, abdominal pain, or blood in stool or urine. No rash, sicca symptoms, or oral ulcers. It seems she has a history of hypermobility, e.g. could bend wrist back to forearm, bend down and touch palms flat on floor, when she was younger. Used to get frequent ankle sprains, and has had multiple tendon and ligament tears over the years.    Today, the patient reports that she had recent upper respiratory symptoms which triggered asthma, and she was placed on steroids for this. She describes some lingering post-nasal drip over most of the fall, but this has since resolved. She has been taking Dulera finished course of prednisone.     She reports that she is having more joint pain in her right 1st MTP. She explains that her pain here is not bad, but she does feel soreness if someone squeezes her hand. She notes that she has more prominent left-sided 1st MTP pain. She states that her 1st MTPs do swell.     She states that she had psoriasic skin rashes around her temple area. She denies any issues tolerating methotrexate. She denies any significant morning joint stiffness. She describes a \"feeling like tendonitis\" in her medial knees and elbows that feels like she has hyperextended the joints. She adds that these sensations take hours to resolve. She reports that these joint sensations have worsened in the last couple months. She states that she historically has joint issues at the beginning of " the fall, but these joint issues seem to be lasting into the winter time presently.      She explains that she has had a swollen gland on the right side of her neck which has been so significant in the past that she had difficulty turning her head to the side.     Review of Systems:   Pertinent items are noted in HPI or as below, remainder of complete ROS is negative.      No recent problems with hearing or vision. No swallowing problems.   No breathing difficulty, shortness of breath, coughing, or wheezing.  No chest pain or palpitations.  No heart burn, indigestion, abdominal pain, nausea, vomiting, diarrhea.  No urination problems, no bloody, cloudy urine, no dysuria.  No numbing, tingling, weakness.  No headaches or confusion.  No rashes. No easy bleeding or bruising.     Active Medications:   Current Outpatient Medications:      albuterol (PROAIR HFA/PROVENTIL HFA/VENTOLIN HFA) 108 (90 Base) MCG/ACT inhaler, Inhale 2 puffs into the lungs every 4 hours as needed for shortness of breath / dyspnea or wheezing, Disp: 1 Inhaler, Rfl: 11     aspirin 81 MG tablet, Take 1 tablet by mouth daily., Disp: , Rfl:      blood glucose (ACCU-CHEK COLIN PLUS) test strip, Test Blood Sugar 8 times daily or as directed, Disp: 800 strip, Rfl: 3     blood glucose monitoring (ACCU-CHEK FASTCLIX) lancets, Use to test blood sugar 8 times daily or as directed., Disp: 4 Box, Rfl: 3     Calcium Carbonate-Vitamin D (CALCIUM + D PO), Take one daily, Disp: , Rfl:      citalopram (CELEXA) 20 MG tablet, Take 1.5 tablets (30 mg) by mouth daily, Disp: 135 tablet, Rfl: 1     Continuous Blood Gluc Sensor (DEXCOM G5 MOB/G4 PLAT SENSOR) MISC, 1 each every 7 days, Disp: 12 each, Rfl: 3     Continuous Blood Gluc Transmit (DEXCOM G4 PLATINUM TRANSMITTER) MISC, 1 each every 6 months, Disp: 1 each, Rfl: 1     diclofenac (VOLTAREN) 1 % topical gel, APPLY 2 GRAMS ONTO THE SKIN FOUR TIMES DAILY AS NEEDED FOR MODERATE PAIN, Disp: 100 g, Rfl: 5     dulaglutide  (TRULICITY) 1.5 MG/0.5ML pen, Inject 1.5 mg Subcutaneous every 7 days, Disp: 3 mL, Rfl: 1     econazole nitrate 1 % cream, Apply 0.5 inches topically daily., Disp: , Rfl:      fish oil-omega-3 fatty acids (FISH OIL) 1000 MG capsule, Take one daily, Disp: , Rfl:      folic acid (FOLVITE) 1 MG tablet, Take 1 tablet (1 mg) by mouth daily, Disp: 90 tablet, Rfl: 0     insulin degludec (TRESIBA) 100 UNIT/ML pen, Inject 15 units subcutaneous at hs., Disp: 15 mL, Rfl: 11     insulin pen needle (B-D U/F) 31G X 8 MM miscellaneous, Use 5 pen needles daily, Disp: 450 each, Rfl: 3     Ketoconazole (NIZORAL PO), Take  by mouth. Shampoo daily, Disp: , Rfl:      levothyroxine (SYNTHROID/LEVOTHROID) 112 MCG tablet, Take 1 tablet (112 mcg) by mouth daily, Disp: 90 tablet, Rfl: 3     methotrexate 2.5 MG tablet, Take 6 tablets (15 mg) by mouth once a week, Disp: 24 tablet, Rfl: 1     methylphenidate (METADATE CD) 20 MG CR capsule, Take 20 mg by mouth daily, Disp: , Rfl:      Minoxidil (ROGAINE EX), Externally apply  topically. daily, Disp: , Rfl:      mometasone-formoterol (DULERA) 100-5 MCG/ACT inhaler, Inhale 2 puffs into the lungs 2 times daily, Disp: 3 Inhaler, Rfl: 11     montelukast (SINGULAIR) 5 MG chewable tablet, Take 1 tablet (5 mg) by mouth At Bedtime, Disp: 90 tablet, Rfl: 3     Multiple Vitamin (MULTIVITAMIN OR), Take  by mouth. Take one daily tab, Disp: , Rfl:      NOVOLOG PENFILL 100 UNIT/ML soln, 1 unit per 15 grams CHO at all meals and snacks with correction scale of 1 unit per 50 mg/dL over 150. Average daily use is 30  units., Disp: 30 mL, Rfl: 4     simvastatin (ZOCOR) 20 MG tablet, Take 1 tablet (20 mg) by mouth At Bedtime, Disp: 90 tablet, Rfl: 3     traZODone (DESYREL) 50 MG tablet, Take 1-2 tablets by mouth nightly as needed for sleep., Disp: 180 tablet, Rfl: 0    Current Facility-Administered Medications:      betamethasone acet & sod phos (CELESTONE) injection 6 mg, 6 mg, INTRA-ARTICULAR, Once, Chris Uribe  MD Jim    Allergies:   Sulfasalazine      Past Medical History:  Anemia   Anxiety   Psoriatic arthritis  Dry eye syndrome   Endometriosis   Gastroesophageal reflux disease   Hypothyroidism   Type 1 diabetes mellitus   Asthma   Necrobiosis lipoidica diabeticorum  Chronic low back pain   Enthesopathy   Chronic right ankle pain      Past Surgical History:  Knee cruciate ligament repair   Stomach surgery 12/2002  Hydrogen breath test 6/17/2014   Appendectomy 2002   Lysis adhesions 2002   Right ankle surgery 12/2014      Family History:   Mother: Breast cancer   Father: Heart disease, eye disorder, hyperlipidemia, macular degeneration, anxiety, alcoholism, rheumatoid arthritis, hypothyroidism, diabetes mellitus   Paternal grandmother: Cerebrovascular disease  Paternal grandfather: Pancreatic cancer, heart disease   Maternal grandmother: Type 2 diabetes mellitus, coronary artery disease  Maternal aunt: Type 1 diabetes mellitus     Social History:  The patient reports that she has never smoked. She has never used smokeless tobacco. She reports current alcohol use. She reports that she does not use drugs.   PCP: Dimitri Alas  Marital Status: Single    Physical Exam:   There were no vitals taken for this visit.   Wt Readings from Last 4 Encounters:   07/07/22 90.3 kg (199 lb)   05/12/22 86.2 kg (190 lb)   05/10/22 90.4 kg (199 lb 3.2 oz)   02/24/22 86.2 kg (190 lb)     Constitutional: Well-developed, appearing stated age; cooperative.      Laboratory:   RHEUM RESULTS Latest Ref Rng & Units 3/7/2005 6/22/2005 7/11/2005   ALBUMIN 3.4 - 5.0 g/dL - - -   ALT 0 - 50 U/L 16 - -   AST 0 - 45 U/L - - -   CREATININE 0.52 - 1.04 mg/dL 0.60 - -   CRP 0.0 - 8.0 mg/L - - -   VICTORINO <1.0 - - -   GFR ESTIMATE, IF BLACK >60 mL/min/[1.73:m2] >80 - -   GFR ESTIMATE >60 mL/min/1.73m2 >80 - -   HEMATOCRIT 35.0 - 47.0 % - - 38.0   HEMOGLOBIN 11.7 - 15.7 g/dL - 11.4(L) 12.8   IGA 70 - 380 mg/dL - - 194   IGM 60 - 265 mg/dL - - 151     - 1620 mg/dL - - 1410   WBC 4.0 - 11.0 10e3/uL - - 7.9   RBC 3.80 - 5.20 10e6/uL - - 4.02   RDW 10.0 - 15.0 % - - 13.1   MCHC 31.5 - 36.5 g/dL - - 33.8   MCV 78 - 100 fL - - 95   PLATELET COUNT 150 - 450 10e3/uL - - 258   RHEUMATOID FACTOR <20 IU/mL - - -   ESR 0 - 30 mm/hr - - -     Rheumatoid Factor   Date Value Ref Range Status   12/22/2014 <20 <20 IU/mL Final     Cyclic Cit Pept IgG/IgA   Date Value Ref Range Status   03/17/2015 <20  Interpretation:  Negative   <20 UNITS Final     Cyclic Citrullinated Peptide IgG   Date Value Ref Range Status   12/09/2008 <2 <5 U/mL Final     Comment:     Interpretation:  Negative     Scleroderma Antibody Scl-70 ELEAZAR IgG   Date Value Ref Range Status   03/17/2015  0.0 - 0.9 AI Final    <0.2  Negative   Antibody index (AI) values reflect qualitative changes in antibody   concentration that cannot be directly associated with clinical condition or   disease state.       SSA (Ro) (ELEAZAR) Antibody, IgG   Date Value Ref Range Status   03/17/2015 0.2 0.0 - 0.9 AI Final     Comment:     Negative   Antibody index (AI) values reflect qualitative changes in antibody   concentration that cannot be directly associated with clinical condition or   disease state.       SSB (La) (ELEAZAR) Antibody, IgG   Date Value Ref Range Status   03/17/2015 0.2 0.0 - 0.9 AI Final     Comment:     Negative   Antibody index (AI) values reflect qualitative changes in antibody   concentration that cannot be directly associated with clinical condition or   disease state.       NOLVIA Screen by EIA   Date Value Ref Range Status   12/22/2014 1.6 (H) <1.0 Final     Comment:     Interpretation:  Positive     IGG   Date Value Ref Range Status   07/11/2005 1410 695 - 1620 mg/dL Final     IGA   Date Value Ref Range Status   06/10/2014 190 70 - 380 mg/dL Final     IGM   Date Value Ref Range Status   07/11/2005 151 60 - 265 mg/dL Final     Cyr ELEAZAR Antibody IgG   Date Value Ref Range Status   03/17/2015  0.0 - 0.9 AI Final     <0.2  Negative   Antibody index (AI) values reflect qualitative changes in antibody   concentration that cannot be directly associated with clinical condition or   disease state.       Scleroderma Antibody Scl-70 ELEAZAR IgG   Date Value Ref Range Status   03/17/2015  0.0 - 0.9 AI Final    <0.2  Negative   Antibody index (AI) values reflect qualitative changes in antibody   concentration that cannot be directly associated with clinical condition or   disease state.       January 5, 2021 interval history:    Since we have last seen the patient, she has, with the combination of coronavirus issues and other stressful issues had a somewhat stressful year.    She has had several flares of her arthritis symptoms that she attributes that increased stress.  She is not even sure exactly when it happened.  She thinks this may have been back in the fall but it could have been earlier in the year that she had some increased pain and a little bit of swelling in her metatarsals of the right foot.  That went on for several weeks then seem to go away on its own with no additional therapy.  She also had some increased pain in one of her thumbs at one point.  That is also back to normal.    However over the last several months she has had some increased numbness in her right hand.  She first had some numbness in the right middle finger after a blood draw that apparently hit a nerve because the nerve pain went on for many weeks.  However, more recently she has had rather typical carpal tunnel type symptoms worse first thing in the morning and occasionally waking her from sleep affecting the second third and fourth fingers.  On further questioning, only the medial aspect of the fourth finger is involved and the fifth finger and the thumb are entirely spared.    In reviewing her record, it also seems that her hemoglobin A1c is a little bit on the high side right now in the mid sevens.  She is aware of this and thinks that this also may be  contributed to by the stressors she has been under.    In terms of her psoriasis itself that is been pretty stable.  Its affected primarily the area around her temples and is a little worse with the colder weather but is minimally apparent on the video which is high-quality.    Physical examination by video shows her to be in no acute distress HEENT examination is normal.  She is speaking in full sentences with no shortness of breath.  The skin examination is I can see it looks essentially normal with no significant psoriasis.  Examination of her hands shows there is no convincing areas of swelling no joint deformity and there is full and normal range of motion.    Impression:    Per the problem list, with inflammatory arthritis that is generally been kept under good control with methotrexate.    She has not had any labs however to check for toxicity in quite some time nor to look at inflammation so we should do those and I have reordered them today.    I did also  her extensively on using splinting to prevent carpal tunnel syndrome at night and also to use something in the way of a more flexible soft splint during the day because she does spend a lot of time on the keyboard.    Finally we spoke about coronavirus vaccination.  Based on her chronic conditions of diabetes, asthma, and inflammatory arthritis on methotrexate immunosuppression she should be on phase 1C.  She will keep track of where the state is at and when we get to where we are vaccinating folks in phase 1B she may reach out to us with a my chart message to see whether that vaccine can be obtained with our orders or how she can get it.    She is going to try these conservative approaches for carpal tunnel and will then plan on touching bases in about 2 to 3 months sooner as needed.    This video visit commenced at 4 PM and was completed at 4:34 PM      Originating Location (pt. Location): Home    Distant Location (provider location):  [facility]  [facility] [place]     Platform used for Video Visit: Mark Anthony Betancourt MD, PhD    Rheumatology        March 16, 2021 interval history:    I last saw the patient just several months ago, but within several weeks at that time she developed a rash consistent with psoriasis over her left shin at a place where she had had previous thinning of the skin related to her type 1 diabetes.  In February she developed a similar patch over her right shin but then at the end of ever developed some kind of a viral illness that she does not think was Covid.  She had only a slight cough and no fever but profound fatigue and brain fog.  This was bad enough she was having trouble working so took 1 full day off and then work 3/2 days before going back to work full-time.    Within the next week she developed multiple psoriasis patches all over her trunk and limbs.  With this she started to take a vitamin D supplement and increase her methotrexate back up to 6 tablets/week which she had previously decreased as she was doing so well.  Interestingly, her joints did not really cause much trouble and were not particular painful or stiff though she did notice a little bit of swelling.  Over the last several weeks on that increased dose of methotrexate and with the vitamin D she feels she has stabilized and some of the most recent patches have started to resolve but she does continue to have quite a few of them including the longest lived 1 over her shin, and one on the back of her neck that is particularly irritating.    She does have a strong family history of psoriasis and was apparently already noted to have some psoriasis at one point during childhood but has never had flares like this.    Physical examination by video shows her to be in no acute distress HEENT examination is normal.  She is speaking in full sentences with no shortness of breath.  Her upper extremity examination looks  essentially normal with no significant areas of joint swelling or deformity.    Impression:    Per the problem list she has had psoriatic arthritis with relatively minimal psoriasis lesions previously but with this very significant flare, apparently triggered by a viral infection that she does not believe was Covid.    Plan/recommendation:    We have already set up a referral for her for dermatology and I urged her to keep that.  I am not certain why this happened and more to the point of not sure whether it will continue to happen.  She does believe she is stabilized somewhat on the current dose of methotrexate, but if this becomes harder to control on an ongoing basis we will need to decide whether she can be treated adequately with methotrexate plus topical steroids or whether we need to add an additional agent such as a TNF inhibitor.  We did discuss the possible use of those, as well as my preference that that would be the next step in her rather than proceeding to something more immunosuppressive such as IL-17 inhibition or Melquiades kinase inhibitor.    She is tolerating the increased dose of methotrexate well we will continue that and do labs again in about 3 weeks after she has been on that dose for about 6 weeks.    We did discuss her getting the coronavirus vaccination as well and holding her methotrexate for 1 week after each of those shots.    I will see her back in 3 months for another video visit, which she has been able to do smoothly as she has a very good connection and she does like doing these in preference to coming in at this time.  We did discuss that should she develop significant joint symptoms or skin features that would benefit from being seen in person that we can convert that visit to in person visit.    This video visit commenced at 5:36 PM was completed at 6:06 PM, 30 minutes in face-to-face time, with an additional 10 mins of chart review, , and documentation on DOS. June  15, 2021 interval history:    Since last seen the patient she has been doing generally well.  She got her vaccines the last of which was about a month ago.  She got the Fritz vaccinations and had definitely had quite a bit of a reaction to the second 1 with a bad headache fevers chills and sweats for much of the following day extending on into the following morning when she actually had enough nausea that she had vomiting as well and then gradual improvement thereafter.  Her diabetes control was also difficult during that time with significant elevation of her glucoses.  She was back to normal within 48 hours however.    She did stop her methotrexate for about a week before and week after each of those doses and stopped it for 2 weeks after the second dose and by that time was definitely noticing a substantial increase in joint pains and and her psoriasis though she never developed actual joint swelling.  Overall however, her joint pain and her psoriasis had been doing much better with the supplemental vitamin D that she started back on in the winter.    With all of this she has not done labs since December.  She has not been having any symptomatic toxicity however.    Physical examination by video shows her to be in no acute distress.  HEENT examination is normal.  She is speaking in full sentences with no shortness of breath.  Examination of her hands shows no convincing areas of swelling or deformity.    Impression:    Per the problem list.    Plan/recommendation:    She is doing well and her being temporarily off the methotrexate was enough to demonstrate that it really is helping with respect to her joints and her psoriasis.    Her control is been very going on it so we will simply continue on the current dose.  Should going to do her labs in the next couple of weeks.  I will see her back in 6 months sooner as needed.    This video visit commenced at 4:05 PM, and was completed at 4:26 PM, 21 mins in face to  face time, with an additional 10 mins in chart review, , and documentation completed on DOS.     October 15, 2021 interval history:    Since I have last seen her she found herself having what often happens for her as an episode of flaring early in the fall.  She starts getting this often in September.  She particularly notices it in her hands although this years she was also having more symptoms in her feet.  She particularly noted it at the base of the toes.    It is actually quieted down quite a bit now.  She still can have morning stiffness up to an hour but generally less than that.  When things were rather worse she was having some sharp twinges of pain but that has quieted down.  She denies any episodes of detectable swelling.  The only area that has significant residual tenderness at this point is the right third PIP.  She previously had problems with the left index finger but that has improved.  Overall she believes her function has been stable.    Her psoriasis is actually significantly better on a steady dose of 2000 international units of vitamin D.  She still has a little bit right in her temples and her scalp.    She has been dealing with a fair amount of stress as her father has had to go into hospice because of worsening problems with congestive heart failure.  He however just passed away the last 2 days from a syncopal episode that was almost certainly a fatal cardiac arrhythmia.    She is otherwise doing okay.  Her only other real concern is that the [employer] had initially intended to have a policy where everyone needed to be back at work in person all of the time this fall.  With the current outbreak of the delta variant of COVID-19 that policy has been delayed.  She feels prickly vulnerable because of the combination of her diabetes and her immunosuppression I assured her that we would write a letter of support for her to continue to work remotely as her job does not require  face-to-face in person contact and it would probably be somewhat safer for her although she has now been vaccinated but has not yet had the booster.    Physical examination by video shows her to be in no acute distress HEENT examination is normal.  I cannot really even appreciate psoriatic patches on her scalp.  Her musculoskeletal examination of the upper extremity shows good range of motion with minimal if any swelling in a couple of her PIPs and none elsewhere.    Impression:    Per the problem list she has longstanding psoriatic arthritis that is seronegative.  She has responded well to the methotrexate and continues under good control.    She is due for labs and I have urged her to do those in the next several weeks.  Other than for that I have no acute concerns.  She does have a history of sometimes flaring somewhat in the winter so we will plan on seeing her back in 4 months sooner as needed.  As noted I am happy to support her with a letter if she is being asked to return to work in person prior to the pandemic really having run its course and Covid becoming an endemic virus.    This video visit commenced at 6 PM and was completed at 6:27 PM, 27 minutes in face-to-face time, with an additional 8 minutes in chart review,  and documentation completed on the date of service.    2022 interval history:    Since we have last seen the patient she has had ongoing personal challenges.  After her father  an uncle close to her also .  Her mother has been diagnosed with early dementia.  Just in this past week her job has been eliminated with the [employer].  She has been given another position at least for the next year but is feeling the loss of security with that and is therefore under a lot of stress.    Fortunately from the standpoint of her psoriasis and for that matter her arthritis she has continued to do well.  She has been taking quite a bit of vitamin D supplementation.  She had  already gone on 2000 international units a day and now is been taking a fish oil supplement that it turns out is also supplemented with vitamin D.  She is probably still under 3500 units a day however based on how she is added up and the good thing is is that she has had much less psoriasis since she has been taking these larger amounts of vitamin D.    She is also having very little in the way of inflammatory joint symptoms with morning stiffness generally less than 10 minutes a day.  The only area that is really bothered her much is her left great toe which is been hurting her more in the fall of the year but got a little bit better more recently.  Interestingly when it was bothering her more she had been off of methotrexate for several weeks to get her Covid booster and then remain off of it for another week or 2 thereafter so she was actually off the drug for nearly a month.  Back on the methotrexate that total has been better.  Notably nothing else really seem to work worsened much.    In general she is taking 12.5 mg weekly methotrexate, but if she starts having more in the way of joint symptoms will increase it temporarily to 15 mg weekly for a few weeks.    Physical examination by video shows her to be in no acute distress HEENT examination is normal.  She is speaking in full sentences with no shortness of breath.  Upper extremity examination shows essentially normal range of motion of the hands with no convincing areas of swelling or deformity.    Impression:    Per the problem list, with good control of her inflammatory arthritis and psoriasis on relatively low-dose methotrexate with no significant toxicity.    I think she is doing well enough she can continue on this regimen.  Although I have not been able to examine her joints in person, based on her history, her laboratory testing, and my ability to get a good look at the joints of her hands at least on the video I do not think she has significant joint  inflammation that would necessitate more aggressive therapy.    Accordingly I think it is fine for us to see her back in about 4 months with another video exam but I have again told her that if she thinks things are worsening that we should reconsider and potentially do an in person examination.  She will let us know if that is necessary.    This video visit commenced at 4:32 PM was completed at 5:05 PM, 33 minutes in face-to-face time, with an additional 10 minutes spent in chart review,  and documentation completed on the date of service.  August 9, 2022 interval history:    Since we last seen the patient she is generally done pretty well.  She went back up to 15 mg once weekly of methotrexate and feels like she is definitely done better on that dose with relatively minimal morning stiffness.  She has not done her labs however since February so we do not have an actual measure of inflammation.    She did feel like she flared a little bit in June but in retrospect that may simply been overuse.  She traveled for the first time in 2-1/2 years and felt quite achy after that.  She actually was concerned that she may contracted COVID, but tested negative x2.    She also had a recent bug bite that concern her.  She however felt poorly only transiently and did not have any prolonged symptoms.    She is noticing some increasing hand pain with use.  That particular pain however is definitely not associated with morning stiffness.    Notably, she did increase her vitamin D down to 3000 units/day at the beginning of the summer thinking she would not need as much because of sun exposure.  However, she definitely started noticing some new psoriasis lesions.  That has not been an issue for her for some time.  For the time being she remains on the 3000 units rather than higher dose but intends to go back up when she does not have any way to get sun exposure.    Physical examination by video shows her to be in no acute  distress HEENT examination is normal.  She is speaking in full sentences with no shortness of breath.  Her upper extremity examination shows no evidence of any swollen joints joint deformity or decrease in range of motion anywhere in her hands or arms.    Impression:    Per the problem list with well-controlled psoriatic arthritis on methotrexate monotherapy.    Plan/recommendation:    She is 6 months out from her last labs and I reiterated for her today that 3 months is pretty much the maximum we want her going without labs.  She is not experiencing any symptomatic toxicity however so I suspect she is fine but she will get them done soon.    I have discussed with her today that my general availability will be quite poor starting in September and asked her to establish with Tracey our nurse practitioner.  I have put in for her to get in that clinic within 3 to 4 months sooner as needed.    Today's visit commenced at 5:40 PM was completed at 6 PM.      JOSUE Betancourt MD, PhD    Rheumatology              [phone]}

## 2022-08-09 NOTE — NURSING NOTE
Patient denies any changes since echeck-in regarding medication and allergies.Patient also states all information entered during echeck-in remains accurate.    Patient states they are in [place] for today's virtual visit.     Diana Lerner, Virtual [facility]

## 2022-08-09 NOTE — LETTER
2022       RE: Dali Martines  [address]  [facility] [id number]     Dear Colleague,    Thank you for referring your patient, Dali Martines, to the [facility]HE[facility]ATOLOGY [facility] [place] at [facility]. Please see a copy of my visit note below.    Dali Martines  is being evaluated via a billable video visit.      How would you like to obtain your AVS? [email or web address]  For the video visit, send the invitation by: Text to cell phone: [phone]  Will anyone else be joining your video visit? No    Chief Complaint   Patient presents with     rheumatology     not currently breastfeeding.        HPI   Prior Note carried forward:     [facility]  Rheumatology Clinic  Yoel Betancourt MD  2022     Name: Dali Martines  MRN: [id number]  Age: 49 year old  : 1970  Referring provider: Referred Self    Problem List:  1. Psoriatic arthritis   2. Psoriasis with arthropathy  3. Pain in joint, multiple sites  4. Chronic pain of right ankle  5. Right foot pain  6. Pain in joint involving ankle and foot, right      2020 :   Dali Martines is a 49 year old female with a history including type I diabetes, psoriatic arthritis, and Hashimoto's thyroiditis who presents for follow-up. I last saw the patient on 2018, at which time she reported increased left hand pain, most prominently in the left 2nd MCP. The plan was to continue on increased methotrexate dosage of 12.5 mg, and, if well-tolerated, then increasing dosage further to 15 mg weekly.    Background history:  Symptoms first occurred in 2008, when she began having stiffness and pain in the left hand, particularly in the PIPs and MCPs. Symptoms spontaneously resolved after about 6-8 weeks. She was previously seen by Rheumatology here around that time (see note from Dr. Betancourt on 2008). Briefly, assessment was that she had a self-limited episode of inflammatory arthralgias,  possibly viral or early psoriatic arthritis. Negative NOLVIA, RF, Lyme serologies and normal CRP and ESR. Given her family history of RA, there was a thought to start her on HCQ if anti-CCP antibodies were positive, but they were found to be negative. No imaging of the hand was performed. September 2014, she experienced recurrent acute onset pain, stiffness, and swelling in the L hand very similar to her symptoms in 2008. Specifically, had swelling across MCPs and in 3rd PIP. She could hardly bend the hand sometimes due to the welling. Had morning stiffness lasting 30-45 minutes. No other joints were involved. Issues with her left hand lingered for several months but eventually self-resolved that January and her only notable symptom is heel pain in the mornings. No fevers, chills, or weight loss. No back pain, vision changes, nausea, vomiting, diarrhea, abdominal pain, or blood in stool or urine. No rash, sicca symptoms, or oral ulcers. It seems she has a history of hypermobility, e.g. could bend wrist back to forearm, bend down and touch palms flat on floor, when she was younger. Used to get frequent ankle sprains, and has had multiple tendon and ligament tears over the years.    Today, the patient reports that she had recent upper respiratory symptoms which triggered asthma, and she was placed on steroids for this. She describes some lingering post-nasal drip over most of the fall, but this has since resolved. She has been taking Dulera finished course of prednisone.     She reports that she is having more joint pain in her right 1st MTP. She explains that her pain here is not bad, but she does feel soreness if someone squeezes her hand. She notes that she has more prominent left-sided 1st MTP pain. She states that her 1st MTPs do swell.     She states that she had psoriasic skin rashes around her temple area. She denies any issues tolerating methotrexate. She denies any significant morning joint stiffness. She  "describes a \"feeling like tendonitis\" in her medial knees and elbows that feels like she has hyperextended the joints. She adds that these sensations take hours to resolve. She reports that these joint sensations have worsened in the last couple months. She states that she historically has joint issues at the beginning of the fall, but these joint issues seem to be lasting into the winter time presently.      She explains that she has had a swollen gland on the right side of her neck which has been so significant in the past that she had difficulty turning her head to the side.     Review of Systems:   Pertinent items are noted in HPI or as below, remainder of complete ROS is negative.      No recent problems with hearing or vision. No swallowing problems.   No breathing difficulty, shortness of breath, coughing, or wheezing.  No chest pain or palpitations.  No heart burn, indigestion, abdominal pain, nausea, vomiting, diarrhea.  No urination problems, no bloody, cloudy urine, no dysuria.  No numbing, tingling, weakness.  No headaches or confusion.  No rashes. No easy bleeding or bruising.     Active Medications:   Current Outpatient Medications:      albuterol (PROAIR HFA/PROVENTIL HFA/VENTOLIN HFA) 108 (90 Base) MCG/ACT inhaler, Inhale 2 puffs into the lungs every 4 hours as needed for shortness of breath / dyspnea or wheezing, Disp: 1 Inhaler, Rfl: 11     aspirin 81 MG tablet, Take 1 tablet by mouth daily., Disp: , Rfl:      blood glucose (ACCU-CHEK COLIN PLUS) test strip, Test Blood Sugar 8 times daily or as directed, Disp: 800 strip, Rfl: 3     blood glucose monitoring (ACCU-CHEK FASTCLIX) lancets, Use to test blood sugar 8 times daily or as directed., Disp: 4 Box, Rfl: 3     Calcium Carbonate-Vitamin D (CALCIUM + D PO), Take one daily, Disp: , Rfl:      citalopram (CELEXA) 20 MG tablet, Take 1.5 tablets (30 mg) by mouth daily, Disp: 135 tablet, Rfl: 1     Continuous Blood Gluc Sensor (DEXCOM G5 MOB/G4 PLAT " SENSOR) MISC, 1 each every 7 days, Disp: 12 each, Rfl: 3     Continuous Blood Gluc Transmit (DEXCOM G4 PLATINUM TRANSMITTER) MISC, 1 each every 6 months, Disp: 1 each, Rfl: 1     diclofenac (VOLTAREN) 1 % topical gel, APPLY 2 GRAMS ONTO THE SKIN FOUR TIMES DAILY AS NEEDED FOR MODERATE PAIN, Disp: 100 g, Rfl: 5     dulaglutide (TRULICITY) 1.5 MG/0.5ML pen, Inject 1.5 mg Subcutaneous every 7 days, Disp: 3 mL, Rfl: 1     econazole nitrate 1 % cream, Apply 0.5 inches topically daily., Disp: , Rfl:      fish oil-omega-3 fatty acids (FISH OIL) 1000 MG capsule, Take one daily, Disp: , Rfl:      folic acid (FOLVITE) 1 MG tablet, Take 1 tablet (1 mg) by mouth daily, Disp: 90 tablet, Rfl: 0     insulin degludec (TRESIBA) 100 UNIT/ML pen, Inject 15 units subcutaneous at hs., Disp: 15 mL, Rfl: 11     insulin pen needle (B-D U/F) 31G X 8 MM miscellaneous, Use 5 pen needles daily, Disp: 450 each, Rfl: 3     Ketoconazole (NIZORAL PO), Take  by mouth. Shampoo daily, Disp: , Rfl:      levothyroxine (SYNTHROID/LEVOTHROID) 112 MCG tablet, Take 1 tablet (112 mcg) by mouth daily, Disp: 90 tablet, Rfl: 3     methotrexate 2.5 MG tablet, Take 6 tablets (15 mg) by mouth once a week, Disp: 24 tablet, Rfl: 1     methylphenidate (METADATE CD) 20 MG CR capsule, Take 20 mg by mouth daily, Disp: , Rfl:      Minoxidil (ROGAINE EX), Externally apply  topically. daily, Disp: , Rfl:      mometasone-formoterol (DULERA) 100-5 MCG/ACT inhaler, Inhale 2 puffs into the lungs 2 times daily, Disp: 3 Inhaler, Rfl: 11     montelukast (SINGULAIR) 5 MG chewable tablet, Take 1 tablet (5 mg) by mouth At Bedtime, Disp: 90 tablet, Rfl: 3     Multiple Vitamin (MULTIVITAMIN OR), Take  by mouth. Take one daily tab, Disp: , Rfl:      NOVOLOG PENFILL 100 UNIT/ML soln, 1 unit per 15 grams CHO at all meals and snacks with correction scale of 1 unit per 50 mg/dL over 150. Average daily use is 30  units., Disp: 30 mL, Rfl: 4     simvastatin (ZOCOR) 20 MG tablet, Take 1  tablet (20 mg) by mouth At Bedtime, Disp: 90 tablet, Rfl: 3     traZODone (DESYREL) 50 MG tablet, Take 1-2 tablets by mouth nightly as needed for sleep., Disp: 180 tablet, Rfl: 0    Current Facility-Administered Medications:      betamethasone acet & sod phos (CELESTONE) injection 6 mg, 6 mg, INTRA-ARTICULAR, Once, Chris Uribe MD    Allergies:   Sulfasalazine      Past Medical History:  Anemia   Anxiety   Psoriatic arthritis  Dry eye syndrome   Endometriosis   Gastroesophageal reflux disease   Hypothyroidism   Type 1 diabetes mellitus   Asthma   Necrobiosis lipoidica diabeticorum  Chronic low back pain   Enthesopathy   Chronic right ankle pain      Past Surgical History:  Knee cruciate ligament repair   Stomach surgery 12/2002  Hydrogen breath test 6/17/2014   Appendectomy 2002   Lysis adhesions 2002   Right ankle surgery 12/2014      Family History:   Mother: Breast cancer   Father: Heart disease, eye disorder, hyperlipidemia, macular degeneration, anxiety, alcoholism, rheumatoid arthritis, hypothyroidism, diabetes mellitus   Paternal grandmother: Cerebrovascular disease  Paternal grandfather: Pancreatic cancer, heart disease   Maternal grandmother: Type 2 diabetes mellitus, coronary artery disease  Maternal aunt: Type 1 diabetes mellitus     Social History:  The patient reports that she has never smoked. She has never used smokeless tobacco. She reports current alcohol use. She reports that she does not use drugs.   PCP: Dimitri Alas  Marital Status: Single    Physical Exam:   There were no vitals taken for this visit.   Wt Readings from Last 4 Encounters:   07/07/22 90.3 kg (199 lb)   05/12/22 86.2 kg (190 lb)   05/10/22 90.4 kg (199 lb 3.2 oz)   02/24/22 86.2 kg (190 lb)     Constitutional: Well-developed, appearing stated age; cooperative.      Laboratory:   RHEUM RESULTS Latest Ref Rng & Units 3/7/2005 6/22/2005 7/11/2005   ALBUMIN 3.4 - 5.0 g/dL - - -   ALT 0 - 50 U/L 16 - -   AST 0 - 45 U/L -  - -   CREATININE 0.52 - 1.04 mg/dL 0.60 - -   CRP 0.0 - 8.0 mg/L - - -   VICTORINO <1.0 - - -   GFR ESTIMATE, IF BLACK >60 mL/min/[1.73:m2] >80 - -   GFR ESTIMATE >60 mL/min/1.73m2 >80 - -   HEMATOCRIT 35.0 - 47.0 % - - 38.0   HEMOGLOBIN 11.7 - 15.7 g/dL - 11.4(L) 12.8   IGA 70 - 380 mg/dL - - 194   IGM 60 - 265 mg/dL - - 151    - 1620 mg/dL - - 1410   WBC 4.0 - 11.0 10e3/uL - - 7.9   RBC 3.80 - 5.20 10e6/uL - - 4.02   RDW 10.0 - 15.0 % - - 13.1   MCHC 31.5 - 36.5 g/dL - - 33.8   MCV 78 - 100 fL - - 95   PLATELET COUNT 150 - 450 10e3/uL - - 258   RHEUMATOID FACTOR <20 IU/mL - - -   ESR 0 - 30 mm/hr - - -     Rheumatoid Factor   Date Value Ref Range Status   12/22/2014 <20 <20 IU/mL Final     Cyclic Cit Pept IgG/IgA   Date Value Ref Range Status   03/17/2015 <20  Interpretation:  Negative   <20 UNITS Final     Cyclic Citrullinated Peptide IgG   Date Value Ref Range Status   12/09/2008 <2 <5 U/mL Final     Comment:     Interpretation:  Negative     Scleroderma Antibody Scl-70 ELEAZAR IgG   Date Value Ref Range Status   03/17/2015  0.0 - 0.9 AI Final    <0.2  Negative   Antibody index (AI) values reflect qualitative changes in antibody   concentration that cannot be directly associated with clinical condition or   disease state.       SSA (Ro) (ELEAZAR) Antibody, IgG   Date Value Ref Range Status   03/17/2015 0.2 0.0 - 0.9 AI Final     Comment:     Negative   Antibody index (AI) values reflect qualitative changes in antibody   concentration that cannot be directly associated with clinical condition or   disease state.       SSB (La) (ELEAZAR) Antibody, IgG   Date Value Ref Range Status   03/17/2015 0.2 0.0 - 0.9 AI Final     Comment:     Negative   Antibody index (AI) values reflect qualitative changes in antibody   concentration that cannot be directly associated with clinical condition or   disease state.       NOLVIA Screen by EIA   Date Value Ref Range Status   12/22/2014 1.6 (H) <1.0 Final     Comment:     Interpretation:   Positive     IGG   Date Value Ref Range Status   07/11/2005 1410 695 - 1620 mg/dL Final     IGA   Date Value Ref Range Status   06/10/2014 190 70 - 380 mg/dL Final     IGM   Date Value Ref Range Status   07/11/2005 151 60 - 265 mg/dL Final     Cyr ELEAZAR Antibody IgG   Date Value Ref Range Status   03/17/2015  0.0 - 0.9 AI Final    <0.2  Negative   Antibody index (AI) values reflect qualitative changes in antibody   concentration that cannot be directly associated with clinical condition or   disease state.       Scleroderma Antibody Scl-70 ELEAZAR IgG   Date Value Ref Range Status   03/17/2015  0.0 - 0.9 AI Final    <0.2  Negative   Antibody index (AI) values reflect qualitative changes in antibody   concentration that cannot be directly associated with clinical condition or   disease state.       January 5, 2021 interval history:    Since we have last seen the patient, she has, with the combination of coronavirus issues and other stressful issues had a somewhat stressful year.    She has had several flares of her arthritis symptoms that she attributes that increased stress.  She is not even sure exactly when it happened.  She thinks this may have been back in the fall but it could have been earlier in the year that she had some increased pain and a little bit of swelling in her metatarsals of the right foot.  That went on for several weeks then seem to go away on its own with no additional therapy.  She also had some increased pain in one of her thumbs at one point.  That is also back to normal.    However over the last several months she has had some increased numbness in her right hand.  She first had some numbness in the right middle finger after a blood draw that apparently hit a nerve because the nerve pain went on for many weeks.  However, more recently she has had rather typical carpal tunnel type symptoms worse first thing in the morning and occasionally waking her from sleep affecting the second third and  fourth fingers.  On further questioning, only the medial aspect of the fourth finger is involved and the fifth finger and the thumb are entirely spared.    In reviewing her record, it also seems that her hemoglobin A1c is a little bit on the high side right now in the mid sevens.  She is aware of this and thinks that this also may be contributed to by the stressors she has been under.    In terms of her psoriasis itself that is been pretty stable.  Its affected primarily the area around her temples and is a little worse with the colder weather but is minimally apparent on the video which is high-quality.    Physical examination by video shows her to be in no acute distress HEENT examination is normal.  She is speaking in full sentences with no shortness of breath.  The skin examination is I can see it looks essentially normal with no significant psoriasis.  Examination of her hands shows there is no convincing areas of swelling no joint deformity and there is full and normal range of motion.    Impression:    Per the problem list, with inflammatory arthritis that is generally been kept under good control with methotrexate.    She has not had any labs however to check for toxicity in quite some time nor to look at inflammation so we should do those and I have reordered them today.    I did also  her extensively on using splinting to prevent carpal tunnel syndrome at night and also to use something in the way of a more flexible soft splint during the day because she does spend a lot of time on the keyboard.    Finally we spoke about coronavirus vaccination.  Based on her chronic conditions of diabetes, asthma, and inflammatory arthritis on methotrexate immunosuppression she should be on phase 1C.  She will keep track of where the state is at and when we get to where we are vaccinating folks in phase 1B she may reach out to us with a my chart message to see whether that vaccine can be obtained with our orders or  how she can get it.    She is going to try these conservative approaches for carpal tunnel and will then plan on touching bases in about 2 to 3 months sooner as needed.    This video visit commenced at 4 PM and was completed at 4:34 PM      Originating Location (pt. Location): Home    Distant Location (provider location):  [facility] RHEUMATOLOGY [facility]IN[facility] [place]     Platform used for Video Visit: Mark Anthony Betancourt MD, PhD    Rheumatology        March 16, 2021 interval history:    I last saw the patient just several months ago, but within several weeks at that time she developed a rash consistent with psoriasis over her left shin at a place where she had had previous thinning of the skin related to her type 1 diabetes.  In February she developed a similar patch over her right shin but then at the end of ever developed some kind of a viral illness that she does not think was Covid.  She had only a slight cough and no fever but profound fatigue and brain fog.  This was bad enough she was having trouble working so took 1 full day off and then work 3/2 days before going back to work full-time.    Within the next week she developed multiple psoriasis patches all over her trunk and limbs.  With this she started to take a vitamin D supplement and increase her methotrexate back up to 6 tablets/week which she had previously decreased as she was doing so well.  Interestingly, her joints did not really cause much trouble and were not particular painful or stiff though she did notice a little bit of swelling.  Over the last several weeks on that increased dose of methotrexate and with the vitamin D she feels she has stabilized and some of the most recent patches have started to resolve but she does continue to have quite a few of them including the longest lived 1 over her shin, and one on the back of her neck that is particularly irritating.    She does have a strong family history of  psoriasis and was apparently already noted to have some psoriasis at one point during childhood but has never had flares like this.    Physical examination by video shows her to be in no acute distress HEENT examination is normal.  She is speaking in full sentences with no shortness of breath.  Her upper extremity examination looks essentially normal with no significant areas of joint swelling or deformity.    Impression:    Per the problem list she has had psoriatic arthritis with relatively minimal psoriasis lesions previously but with this very significant flare, apparently triggered by a viral infection that she does not believe was Covid.    Plan/recommendation:    We have already set up a referral for her for dermatology and I urged her to keep that.  I am not certain why this happened and more to the point of not sure whether it will continue to happen.  She does believe she is stabilized somewhat on the current dose of methotrexate, but if this becomes harder to control on an ongoing basis we will need to decide whether she can be treated adequately with methotrexate plus topical steroids or whether we need to add an additional agent such as a TNF inhibitor.  We did discuss the possible use of those, as well as my preference that that would be the next step in her rather than proceeding to something more immunosuppressive such as IL-17 inhibition or Melquiades kinase inhibitor.    She is tolerating the increased dose of methotrexate well we will continue that and do labs again in about 3 weeks after she has been on that dose for about 6 weeks.    We did discuss her getting the coronavirus vaccination as well and holding her methotrexate for 1 week after each of those shots.    I will see her back in 3 months for another video visit, which she has been able to do smoothly as she has a very good connection and she does like doing these in preference to coming in at this time.  We did discuss that should she develop  significant joint symptoms or skin features that would benefit from being seen in person that we can convert that visit to in person visit.    This video visit commenced at 5:36 PM was completed at 6:06 PM, 30 minutes in face-to-face time, with an additional 10 mins of chart review, , and documentation on DOS.       Zaynab 15, 2021 interval history:    Since last seen the patient she has been doing generally well.  She got her vaccines the last of which was about a month ago.  She got the Fritz vaccinations and had definitely had quite a bit of a reaction to the second 1 with a bad headache fevers chills and sweats for much of the following day extending on into the following morning when she actually had enough nausea that she had vomiting as well and then gradual improvement thereafter.  Her diabetes control was also difficult during that time with significant elevation of her glucoses.  She was back to normal within 48 hours however.    She did stop her methotrexate for about a week before and week after each of those doses and stopped it for 2 weeks after the second dose and by that time was definitely noticing a substantial increase in joint pains and and her psoriasis though she never developed actual joint swelling.  Overall however, her joint pain and her psoriasis had been doing much better with the supplemental vitamin D that she started back on in the winter.    With all of this she has not done labs since December.  She has not been having any symptomatic toxicity however.    Physical examination by video shows her to be in no acute distress.  HEENT examination is normal.  She is speaking in full sentences with no shortness of breath.  Examination of her hands shows no convincing areas of swelling or deformity.    Impression:    Per the problem list.    Plan/recommendation:    She is doing well and her being temporarily off the methotrexate was enough to demonstrate that it really is helping  with respect to her joints and her psoriasis.    Her control is been very going on it so we will simply continue on the current dose.  Should going to do her labs in the next couple of weeks.  I will see her back in 6 months sooner as needed.    This video visit commenced at 4:05 PM, and was completed at 4:26 PM, 21 mins in face to face time, with an additional 10 mins in chart review, , and documentation completed on DOS.     October 15, 2021 interval history:    Since I have last seen her she found herself having what often happens for her as an episode of flaring early in the fall.  She starts getting this often in September.  She particularly notices it in her hands although this years she was also having more symptoms in her feet.  She particularly noted it at the base of the toes.    It is actually quieted down quite a bit now.  She still can have morning stiffness up to an hour but generally less than that.  When things were rather worse she was having some sharp twinges of pain but that has quieted down.  She denies any episodes of detectable swelling.  The only area that has significant residual tenderness at this point is the right third PIP.  She previously had problems with the left index finger but that has improved.  Overall she believes her function has been stable.    Her psoriasis is actually significantly better on a steady dose of 2000 international units of vitamin D.  She still has a little bit right in her temples and her scalp.    She has been dealing with a fair amount of stress as her father has had to go into hospice because of worsening problems with congestive heart failure.  He however just passed away the last 2 days from a syncopal episode that was almost certainly a fatal cardiac arrhythmia.    She is otherwise doing okay.  Her only other real concern is that the [employer] had initially intended to have a policy where everyone needed to be back at work in person all of the  time this fall.  With the current outbreak of the delta variant of COVID-19 that policy has been delayed.  She feels prickly vulnerable because of the combination of her diabetes and her immunosuppression I assured her that we would write a letter of support for her to continue to work remotely as her job does not require face-to-face in person contact and it would probably be somewhat safer for her although she has now been vaccinated but has not yet had the booster.    Physical examination by video shows her to be in no acute distress HEENT examination is normal.  I cannot really even appreciate psoriatic patches on her scalp.  Her musculoskeletal examination of the upper extremity shows good range of motion with minimal if any swelling in a couple of her PIPs and none elsewhere.    Impression:    Per the problem list she has longstanding psoriatic arthritis that is seronegative.  She has responded well to the methotrexate and continues under good control.    She is due for labs and I have urged her to do those in the next several weeks.  Other than for that I have no acute concerns.  She does have a history of sometimes flaring somewhat in the winter so we will plan on seeing her back in 4 months sooner as needed.  As noted I am happy to support her with a letter if she is being asked to return to work in person prior to the pandemic really having run its course and Covid becoming an endemic virus.    This video visit commenced at 6 PM and was completed at 6:27 PM, 27 minutes in face-to-face time, with an additional 8 minutes in chart review,  and documentation completed on the date of service.    2022 interval history:    Since we have last seen the patient she has had ongoing personal challenges.  After her father  an uncle close to her also .  Her mother has been diagnosed with early dementia.  Just in this past week her job has been eliminated with the [employer].  She has been  given another position at least for the next year but is feeling the loss of security with that and is therefore under a lot of stress.    Fortunately from the standpoint of her psoriasis and for that matter her arthritis she has continued to do well.  She has been taking quite a bit of vitamin D supplementation.  She had already gone on 2000 international units a day and now is been taking a fish oil supplement that it turns out is also supplemented with vitamin D.  She is probably still under 3500 units a day however based on how she is added up and the good thing is is that she has had much less psoriasis since she has been taking these larger amounts of vitamin D.    She is also having very little in the way of inflammatory joint symptoms with morning stiffness generally less than 10 minutes a day.  The only area that is really bothered her much is her left great toe which is been hurting her more in the fall of the year but got a little bit better more recently.  Interestingly when it was bothering her more she had been off of methotrexate for several weeks to get her Covid booster and then remain off of it for another week or 2 thereafter so she was actually off the drug for nearly a month.  Back on the methotrexate that total has been better.  Notably nothing else really seem to work worsened much.    In general she is taking 12.5 mg weekly methotrexate, but if she starts having more in the way of joint symptoms will increase it temporarily to 15 mg weekly for a few weeks.    Physical examination by video shows her to be in no acute distress HEENT examination is normal.  She is speaking in full sentences with no shortness of breath.  Upper extremity examination shows essentially normal range of motion of the hands with no convincing areas of swelling or deformity.    Impression:    Per the problem list, with good control of her inflammatory arthritis and psoriasis on relatively low-dose methotrexate with no  significant toxicity.    I think she is doing well enough she can continue on this regimen.  Although I have not been able to examine her joints in person, based on her history, her laboratory testing, and my ability to get a good look at the joints of her hands at least on the video I do not think she has significant joint inflammation that would necessitate more aggressive therapy.    Accordingly I think it is fine for us to see her back in about 4 months with another video exam but I have again told her that if she thinks things are worsening that we should reconsider and potentially do an in person examination.  She will let us know if that is necessary.    This video visit commenced at 4:32 PM was completed at 5:05 PM, 33 minutes in face-to-face time, with an additional 10 minutes spent in chart review,  and documentation completed on the date of service.  August 9, 2022 interval history:    Since we last seen the patient she is generally done pretty well.  She went back up to 15 mg once weekly of methotrexate and feels like she is definitely done better on that dose with relatively minimal morning stiffness.  She has not done her labs however since February so we do not have an actual measure of inflammation.    She did feel like she flared a little bit in June but in retrospect that may simply been overuse.  She traveled for the first time in 2-1/2 years and felt quite achy after that.  She actually was concerned that she may contracted COVID, but tested negative x2.    She also had a recent bug bite that concern her.  She however felt poorly only transiently and did not have any prolonged symptoms.    She is noticing some increasing hand pain with use.  That particular pain however is definitely not associated with morning stiffness.    Notably, she did increase her vitamin D down to 3000 units/day at the beginning of the summer thinking she would not need as much because of sun exposure.   However, she definitely started noticing some new psoriasis lesions.  That has not been an issue for her for some time.  For the time being she remains on the 3000 units rather than higher dose but intends to go back up when she does not have any way to get sun exposure.    Physical examination by video shows her to be in no acute distress HEENT examination is normal.  She is speaking in full sentences with no shortness of breath.  Her upper extremity examination shows no evidence of any swollen joints joint deformity or decrease in range of motion anywhere in her hands or arms.    Impression:    Per the problem list with well-controlled psoriatic arthritis on methotrexate monotherapy.    Plan/recommendation:    She is 6 months out from her last labs and I reiterated for her today that 3 months is pretty much the maximum we want her going without labs.  She is not experiencing any symptomatic toxicity however so I suspect she is fine but she will get them done soon.    I have discussed with her today that my general availability will be quite poor starting in September and asked her to establish with Tracey our nurse practitioner.  I have put in for her to get in that clinic within 3 to 4 months sooner as needed.    Today's visit commenced at 5:40 PM was completed at 6 PM.      JOSUE Betancourt MD, PhD    Rheumatology

## 2022-08-10 ENCOUNTER — VIRTUAL VISIT (OUTPATIENT)
Dept: ENDOCRINOLOGY | Facility: CLINIC | Age: 52
End: 2022-08-10
Payer: COMMERCIAL

## 2022-08-10 DIAGNOSIS — E10.3291 TYPE 1 DIABETES MELLITUS WITH MILD NONPROLIFERATIVE RETINOPATHY OF RIGHT EYE, MACULAR EDEMA PRESENCE UNSPECIFIED (H): Primary | ICD-10-CM

## 2022-08-10 PROCEDURE — 99215 OFFICE O/P EST HI 40 MIN: CPT | Mod: 95 | Performed by: PHYSICIAN ASSISTANT

## 2022-08-10 ASSESSMENT — ENCOUNTER SYMPTOMS
WEIGHT GAIN: 0
TROUBLE SWALLOWING: 0
MUSCLE WEAKNESS: 0
FATIGUE: 1
MEMORY LOSS: 0
SINUS CONGESTION: 1
NECK PAIN: 0
MYALGIAS: 1
DIZZINESS: 1
DECREASED CONCENTRATION: 1
HEADACHES: 0
SINUS PAIN: 0
BACK PAIN: 1
POLYPHAGIA: 0
JOINT SWELLING: 1
HOARSE VOICE: 0
MUSCLE CRAMPS: 0
FEVER: 0
HALLUCINATIONS: 0
INSOMNIA: 1
PARALYSIS: 0
ALTERED TEMPERATURE REGULATION: 1
INCREASED ENERGY: 0
NUMBNESS: 0
SORE THROAT: 0
TINGLING: 0
WEAKNESS: 0
STIFFNESS: 1
NECK MASS: 0
SMELL DISTURBANCE: 0
SKIN CHANGES: 0
SEIZURES: 0
DISTURBANCES IN COORDINATION: 1
LOSS OF CONSCIOUSNESS: 0
TREMORS: 0
WEIGHT LOSS: 0
CHILLS: 0
NIGHT SWEATS: 0
POLYDIPSIA: 0
NERVOUS/ANXIOUS: 1
PANIC: 0
TASTE DISTURBANCE: 0
NAIL CHANGES: 0
ARTHRALGIAS: 1
POOR WOUND HEALING: 0
DEPRESSION: 1
DECREASED APPETITE: 0

## 2022-08-10 NOTE — PROGRESS NOTES
Due to the COVID 19 pandemic this visit was converted to a video visit in order to help prevent spread of infection in this patient and the general population.    Time of start: 9:00 am   Time of end: 9:20 am  Total duration of video visit: 20 minutes.    HPI  Dali Martines is a 51 year old female with type 1 diabetes mellitus and hypothyroidism.  Video visit today for diabetes and hypothyroid follow up.  Pt was dx having type 1 diabetes at age 15.  Her hx is also significant for mild NPDR right eye,  psoriatic arthritis, hypothyroidism, anxiety, hyperlipidemia and GERD.  For her diabetes, she is currently taking Tresiba 17 units daily, Fiasp 1 unit/9 gms CHO with meals and snacks, plus correction insulin.  Dali is also taking Ozempic 0.5 mg subcutaneous once a week.  Most recent A1C was 7.5 % in May 2022.  I reviewed her DexcomG6 sensor download data today and scanned the data in her note below.  Pt's average glucose was 172  with SD 72.  Her estimated A1C is 7.4 % at this time.  She had recently forgot to take a few insulin bolus doses.  She is able to self treat her hypoglycemia and she wears her Dexcom sensor full time.  On ROS today, vertigo sx and she has an appt with ENT next week.   Mild nausea,  Numbness in toes.   Mild cough.  Intermittent episodes of RUQ pain.  No severe n/v, diarrhea, fever or chills.  Pt reports having chest pain when she eats food with MSG.  Pt denies headaches, blurred vision or SOB at rest.  She denies dysuria, hematuria or foot ulcers a this time.  Dali received the COVID vaccine (Moderna) and COVID booster.    Diabetes Care  Retinopathy:yes in right eye; pt seen by Oph here in Oct 2021 with mild NPDR right eye only.  Nephropathy:none; urine microalbuminuria negative in  2/2022.  Neuropathy: reports mild numbness in toes.  Foot Exam: no exam today.  Taking aspirin: yes.  Lipids:  in Feb 2022.  Pt is taking Simvastatin and Fish Oil.  CAD: no hx of CAD.  Mental health: hx  of anxiety.  Insulin: Basal and meal time insulin with correction insulin. DM meds: Ozempic.  Testing: DexcomG6 sensor.          ROS  Please see under HPI.    Allergies  Allergies   Allergen Reactions     Monosodium Glutamate Palpitations and Shortness Of Breath     Sulfasalazine      Developed rash, HA, dizziness       Medications  Current Outpatient Medications   Medication Sig Dispense Refill     albuterol (PROAIR HFA/PROVENTIL HFA/VENTOLIN HFA) 108 (90 Base) MCG/ACT inhaler Inhale 2 puffs into the lungs every 4 hours as needed for shortness of breath / dyspnea or wheezing 1 Inhaler 11     aspirin 81 MG tablet Take 1 tablet by mouth daily.       blood glucose (ACCU-CHEK COLIN PLUS) test strip Test Blood Sugar 8 times daily or as directed 800 strip 3     blood glucose (NO BRAND SPECIFIED) test strip Use to test blood sugar 8 times daily or as directed. Any covered brand. 800 strip 3     blood glucose monitoring (ACCU-CHEK FASTCLIX) lancets Use to test blood sugar 8 times daily or as directed. 4 Box 3     blood glucose monitoring (NO BRAND SPECIFIED) meter device kit Use to test blood sugar 8 times daily or as directed. Any covered brand, 1 kit 0     Calcium Carbonate-Vitamin D (CALCIUM + D PO) Take one daily       citalopram (CELEXA) 20 MG tablet Take 1.5 tablets (30 mg) by mouth daily 135 tablet 1     Continuous Blood Gluc  (DEXCOM G6 ) SUZANNE Use to read blood sugars as per 's instructions. 1 each 0     Continuous Blood Gluc Sensor (DEXCOM G6 SENSOR) MISC Change every 10 days. 9 each 3     Continuous Blood Gluc Transmit (DEXCOM G6 TRANSMITTER) MISC CHANGE EVERY 3 MONTHS 1 each 3     diclofenac (VOLTAREN) 1 % topical gel APPLY 2 GRAMS ONTO THE SKIN FOUR TIMES DAILY AS NEEDED FOR MODERATE PAIN 100 g 5     econazole nitrate 1 % cream Apply 0.5 inches topically daily.       fish oil-omega-3 fatty acids 1000 MG capsule Take one daily       folic acid (FOLVITE) 1 MG tablet Take 1 tablet (1  mg) by mouth daily 90 tablet 3     Injection Device for insulin (INPEN 100-BLUE-TANIYA) SUZANNE 1 each once for 1 dose 1 each 0     insulin aspart (FIASP FLEXTOUCH) 100 UNIT/ML pen-injector Inject subcu 1 unit per 7 grams CHO with Correction Factor of1 unit drops BG 30 mg/mL. Approx 45 units daily. 45 mL 1     insulin degludec (TRESIBA FLEXTOUCH) 100 UNIT/ML pen Inject subcu 17 units daily. 15 mL 1     insulin pen needle (B-D U/F) 31G X 8 MM miscellaneous Use 5 pen needles daily 450 each 3     Ketoconazole (NIZORAL PO) Shampoo daily       levothyroxine (SYNTHROID/LEVOTHROID) 112 MCG tablet TAKE 1 TABLET(112 MCG) BY MOUTH DAILY 90 tablet 3     methotrexate 2.5 MG tablet TAKE 6 TABLETS BY MOUTH EVERY 7 DAYS 77 tablet 1     methylphenidate (METADATE CD) 20 MG CR capsule Take 20 mg by mouth daily       methylphenidate (RITALIN) 10 MG tablet TAKE UP TO 2 TABLETS BY MOUTH IN THE LATE AFTERNOON.       Minoxidil (ROGAINE EX) daily       mometasone-formoterol (DULERA) 100-5 MCG/ACT inhaler Inhale 2 puffs into the lungs 2 times daily 3 Inhaler 11     montelukast (SINGULAIR) 5 MG chewable tablet CHEW AND SWALLOW 1 TABLET(5 MG) BY MOUTH AT BEDTIME 90 tablet 4     Multiple Vitamin (MULTIVITAMIN OR) Take one daily tab       NOVOLOG PENFILL 100 UNIT/ML soln INJECT 1 UNIT PER 15 GRAMS CHO AT ALL MEALS AND SNACKS WITH CORRECTION SCALE OF 1 UNIT PER 50 MG/DL OVER 150, AVERAGE DAILY USE OF 30 UNITS 30 mL 4     predniSONE (DELTASONE) 20 MG tablet Take 1 tablet (20 mg) by mouth daily 10 tablet 0     simvastatin (ZOCOR) 20 MG tablet Take 1 tablet (20 mg) by mouth At Bedtime 90 tablet 3     traZODone (DESYREL) 50 MG tablet Take 1-2 tablets by mouth nightly as needed for sleep. 180 tablet 0       Family History  family history includes Alcoholism in her father; Anxiety Disorder in her father; Arthritis in her father; Breast Cancer in her mother; C.A.D. in her father and maternal grandmother; Cancer in her paternal grandfather; Cardiac Sudden  Death in her maternal grandfather; Cerebrovascular Disease in her paternal grandmother; Circulatory in her father; Coronary Artery Disease in her father and maternal grandmother; Diabetes in her father, maternal grandmother, and another family member; Eye Disorder in her father; Gynecology in an other family member; Heart Disease in her father, maternal grandfather, maternal grandmother, and paternal grandfather; Hypothyroidism in her father; Lipids in her father; Macular Degeneration in her father and maternal aunt; Rheumatoid Arthritis in her father; Thyroid Disease in her father and another family member.    Social History   reports that she has never smoked. She has never used smokeless tobacco. She reports current alcohol use. She reports that she does not use drugs.     Past Medical History  Past Medical History:   Diagnosis Date     Anemia      Anxiety      Arthritis 2014    Psoriatic arthritis     Back injury 1988     Dry eye syndrome      Dyslipidemia      Endometriosis 2002    adehsions seen at laparoscopy     GERD (gastroesophageal reflux disease)      Hypothyroidism     8 yoa     SOB (shortness of breath) 3/11/2014     Type I (juvenile type) diabetes mellitus without mention of complication, not stated as uncontrolled     when 15      Uncomplicated asthma Fall 2013       Past Surgical History:   Procedure Laterality Date     COLONOSCOPY  2002     COLONOSCOPY N/A 6/23/2020    Procedure: COLONOSCOPY;  Surgeon: Lauryn Rivera MD;  Location: [facility]     ESOPHAGOSCOPY, GASTROSCOPY, DUODENOSCOPY (EGD), COMBINED  1/7/2014    Procedure: COMBINED ESOPHAGOSCOPY, GASTROSCOPY, DUODENOSCOPY (EGD), BIOPSY SINGLE OR MULTIPLE;;  Surgeon: Kraig Nicole MD;  Location:  GI     GYN SURGERY      Laparotomy to remove endometrial tissue     HC BREATH HYDROGEN TEST N/A 6/17/2014    Procedure: HYDROGEN BREATH TEST;  Surgeon: Deacon Alberts MD;  Location:  GI     HYDROGEN BREATH TEST  6/17/14     LAPAROSCOPIC  APPENDECTOMY  2002     LAPAROTOMY, LYSIS ADHESIONS, COMBINED  2002    endometriosis     SOFT TISSUE SURGERY  December 2014    right ankle tendon injury     ZZC REPAIR CRUCIATE LIGAMENT,KNEE       ZZC STOMACH SURGERY PROCEDURE UNLISTED  December 2002       Physical Exam    No exam.    RESULTS  Creatinine   Date Value Ref Range Status   02/14/2022 0.60 0.52 - 1.04 mg/dL Final   01/11/2021 0.56 0.52 - 1.04 mg/dL Final     GFR Estimate   Date Value Ref Range Status   02/14/2022 >90 >60 mL/min/1.73m2 Final     Comment:     Effective December 21, 2021 eGFRcr in adults is calculated using the 2021 CKD-EPI creatinine equation which includes age and gender (Paola et al., NEJ, DOI: 10.1056/SUDEjm0633130)   01/11/2021 >90 >60 mL/min/[1.73_m2] Final     Comment:     Non  GFR Calc  Starting 12/18/2018, serum creatinine based estimated GFR (eGFR) will be   calculated using the Chronic Kidney Disease Epidemiology Collaboration   (CKD-EPI) equation.       Microalbumin Urine   Date Value Ref Range Status   06/26/2009 5@ MG/L      Hemoglobin A1C   Date Value Ref Range Status   02/14/2022 8.1 (H) 0.0 - 5.6 % Final     Comment:     Normal <5.7%   Prediabetes 5.7-6.4%    Diabetes 6.5% or higher     Note: Adopted from ADA consensus guidelines.   01/11/2021 7.1 (H) 0 - 5.6 % Final     Comment:     Normal <5.7% Prediabetes 5.7-6.4%  Diabetes 6.5% or higher - adopted from ADA   consensus guidelines.       Potassium   Date Value Ref Range Status   11/18/2021 3.5 3.4 - 5.3 mmol/L Final   04/27/2016 4.1 3.4 - 5.3 mmol/L Final     ALT   Date Value Ref Range Status   02/14/2022 44 0 - 50 U/L Final   01/11/2021 37 0 - 50 U/L Final     AST   Date Value Ref Range Status   02/14/2022 34 0 - 45 U/L Final   01/11/2021 31 0 - 45 U/L Final     TSH   Date Value Ref Range Status   02/14/2022 3.34 0.40 - 4.00 mU/L Final   01/11/2021 2.01 0.40 - 4.00 mU/L Final     T4 Total   Date Value Ref Range Status   11/29/2010 8.5 5.0 - 11.0 ug/dL  Final     T4 Free   Date Value Ref Range Status   03/21/2019 1.09 0.76 - 1.46 ng/dL Final       Cholesterol   Date Value Ref Range Status   02/14/2022 218 (H) <200 mg/dL Final   01/11/2021 223 (H) <200 mg/dL Final     Comment:     Desirable:       <200 mg/dl   12/18/2018 194 <200 mg/dL Final     HDL Cholesterol   Date Value Ref Range Status   01/11/2021 92 >49 mg/dL Final   12/18/2018 91 >49 mg/dL Final     Direct Measure HDL   Date Value Ref Range Status   02/14/2022 94 >=50 mg/dL Final     LDL Cholesterol Calculated   Date Value Ref Range Status   02/14/2022 111 (H) <=100 mg/dL Final   01/11/2021 122 (H) <100 mg/dL Final     Comment:     Above desirable:  100-129 mg/dl  Borderline High:  130-159 mg/dL  High:             160-189 mg/dL  Very high:       >189 mg/dl     12/18/2018 92 <100 mg/dL Final     Comment:     Desirable:       <100 mg/dl     Triglycerides   Date Value Ref Range Status   02/14/2022 66 <150 mg/dL Final   01/11/2021 44 <150 mg/dL Final     Comment:     Non Fasting   12/18/2018 54 <150 mg/dL Final     Cholesterol/HDL Ratio   Date Value Ref Range Status   09/27/2013 2.7 0.0 - 5.0 Final   02/15/2013 2.9 0.0 - 5.0 Final       ASSESSMENT/PLAN:    1. TYPE 1 DIABETES MELLITUS: Patient is considering using an insulin pump in the near future.   She has been working with the dietitian on her carb counting which she has found to be helpful and has follow up with the dietitian next month.  No change in insulin doses today.  Continue Ozempic 0.5 mg subcutaneous once a week.  Most recent A1C improved and was 7.5 % in May 2022.  Her urine microalbuminuria has been negative with normal creat/GFR in Feb 2022.  She has noticed numbness in toes. No foot ulcers. Referral for Podiatry evaluation placed today.  Pt is taking ASA daily.  I have no vitals today.   Dali received the COVID vaccines ( Moderna) and COVID booster.  P  2.  RETINOPATHY: Hx of mild NPDR right eye only.  She was seen here by Oph in Oct  2021.    3.  HYPOTHYROIDISM:  TSH normal in Feb 2022.  Continue Levothyroxine 112 mcg po daily.    4. HYPERLIPIDEMIA:  in 2/2022.  She remains on Simvastatin.    6.   FOLLOW UP: with Dr. Mckeon in Dec 2022. Pt has follow up with dietitian/CDE in 9/2022.  I placed an order for an A1C today.  Podiatry referral here with Dr. Sinha or Dr. Fernández.    Time spent reviewing chart notes, labs and Dexcom sensor download today= 8 minutes.  Time video visit today = 20 minutes.  Time for documentation today = 15 minutes.     TOTAL TIME FOR VISIT TODAY = 43 minutes.    Marissa Sebastian PA-C

## 2022-08-10 NOTE — LETTER
8/10/2022       RE: Dali Martines  [address]  [facility] [address]     Dear Colleague,    Thank you for referring your patient, Dali Martines, to the [facility] [place] [facility]. Please see a copy of my visit note below.    Outcome for 08/02/22 3:27 PM: [place] message sent  Skylar Benoit CHANCE  Outcome for 08/08/22 1:29 PM: Left Voicemail   Skylar Benoit, VF  Outcome for 08/09/22 12:42 PM: Left Voicemail   Sarakaylie Santoyo, VF  Outcome for 08/10/22 7:06 AM: Dexcom emailed to provider  CHANCE Clark              Dali Martines is being evaluated via a billable video visit.        How would you like to obtain your AVS? EndoIn[employer]ight  For the video visit, send the invitation by: Send to e-mail at: [email or web address]  Will anyone else be joining your video visit? No      Due to the COVID 19 pandemic this visit was converted to a video visit in order to help prevent spread of infection in this patient and the general population.    Time of start: 9:00 am   Time of end: 9:20 am  Total duration of video visit: 20 minutes.    HPI  Dali Martines is a 51 year old female with type 1 diabetes mellitus and hypothyroidism.  Video visit today for diabetes and hypothyroid follow up.  Pt was dx having type 1 diabetes at age 15.  Her hx is also significant for mild NPDR right eye,  psoriatic arthritis, hypothyroidism, anxiety, hyperlipidemia and GERD.  For her diabetes, she is currently taking Tresiba 17 units daily, Fiasp 1 unit/9 gms CHO with meals and snacks, plus correction insulin.  Dali is also taking Ozempic 0.5 mg subcutaneous once a week.  Most recent A1C was 7.5 % in May 2022.  I reviewed her DexcomG6 sensor download data today and scanned the data in her note below.  Pt's average glucose was 172  with SD 72.  Her estimated A1C is 7.4 % at this time.  She had recently forgot to take a few insulin bolus doses.  She  is able to self treat her hypoglycemia and she wears her Dexcom sensor full time.  On ROS today, vertigo sx and she has an appt with ENT next week.   Mild nausea,  Numbness in toes.   Mild cough.  Intermittent episodes of RUQ pain.  No severe n/v, diarrhea, fever or chills.  Pt reports having chest pain when she eats food with MSG.  Pt denies headaches, blurred vision or SOB at rest.  She denies dysuria, hematuria or foot ulcers a this time.  Dali received the COVID vaccine (Moderna) and COVID booster.    Diabetes Care  Retinopathy:yes in right eye; pt seen by Oph here in Oct 2021 with mild NPDR right eye only.  Nephropathy:none; urine microalbuminuria negative in  2/2022.  Neuropathy: reports mild numbness in toes.  Foot Exam: no exam today.  Taking aspirin: yes.  Lipids:  in Feb 2022.  Pt is taking Simvastatin and Fish Oil.  CAD: no hx of CAD.  Mental health: hx of anxiety.  Insulin: Basal and meal time insulin with correction insulin. DM meds: Ozempic.  Testing: DexcomG6 sensor.          ROS  Please see under HPI.    Allergies  Allergies   Allergen Reactions     Monosodium Glutamate Palpitations and Shortness Of Breath     Sulfasalazine      Developed rash, HA, dizziness       Medications  Current Outpatient Medications   Medication Sig Dispense Refill     albuterol (PROAIR HFA/PROVENTIL HFA/VENTOLIN HFA) 108 (90 Base) MCG/ACT inhaler Inhale 2 puffs into the lungs every 4 hours as needed for shortness of breath / dyspnea or wheezing 1 Inhaler 11     aspirin 81 MG tablet Take 1 tablet by mouth daily.       blood glucose (ACCU-CHEK COLIN PLUS) test strip Test Blood Sugar 8 times daily or as directed 800 strip 3     blood glucose (NO BRAND SPECIFIED) test strip Use to test blood sugar 8 times daily or as directed. Any covered brand. 800 strip 3     blood glucose monitoring (ACCU-CHEK FASTCLIX) lancets Use to test blood sugar 8 times daily or as directed. 4 Box 3     blood glucose monitoring (NO BRAND  SPECIFIED) meter device kit Use to test blood sugar 8 times daily or as directed. Any covered brand, 1 kit 0     Calcium Carbonate-Vitamin D (CALCIUM + D PO) Take one daily       citalopram (CELEXA) 20 MG tablet Take 1.5 tablets (30 mg) by mouth daily 135 tablet 1     Continuous Blood Gluc  (DEXCOM G6 ) SUZANNE Use to read blood sugars as per 's instructions. 1 each 0     Continuous Blood Gluc Sensor (DEXCOM G6 SENSOR) MISC Change every 10 days. 9 each 3     Continuous Blood Gluc Transmit (DEXCOM G6 TRANSMITTER) MISC CHANGE EVERY 3 MONTHS 1 each 3     diclofenac (VOLTAREN) 1 % topical gel APPLY 2 GRAMS ONTO THE SKIN FOUR TIMES DAILY AS NEEDED FOR MODERATE PAIN 100 g 5     econazole nitrate 1 % cream Apply 0.5 inches topically daily.       fish oil-omega-3 fatty acids 1000 MG capsule Take one daily       folic acid (FOLVITE) 1 MG tablet Take 1 tablet (1 mg) by mouth daily 90 tablet 3     Injection Device for insulin (INPEN 100-BLUE-TANIYA) SUZANNE 1 each once for 1 dose 1 each 0     insulin aspart (FIASP FLEXTOUCH) 100 UNIT/ML pen-injector Inject subcu 1 unit per 7 grams CHO with Correction Factor of1 unit drops BG 30 mg/mL. Approx 45 units daily. 45 mL 1     insulin degludec (TRESIBA FLEXTOUCH) 100 UNIT/ML pen Inject subcu 17 units daily. 15 mL 1     insulin pen needle (B-D U/F) 31G X 8 MM miscellaneous Use 5 pen needles daily 450 each 3     Ketoconazole (NIZORAL PO) Shampoo daily       levothyroxine (SYNTHROID/LEVOTHROID) 112 MCG tablet TAKE 1 TABLET(112 MCG) BY MOUTH DAILY 90 tablet 3     methotrexate 2.5 MG tablet TAKE 6 TABLETS BY MOUTH EVERY 7 DAYS 77 tablet 1     methylphenidate (METADATE CD) 20 MG CR capsule Take 20 mg by mouth daily       methylphenidate (RITALIN) 10 MG tablet TAKE UP TO 2 TABLETS BY MOUTH IN THE LATE AFTERNOON.       Minoxidil (ROGAINE EX) daily       mometasone-formoterol (DULERA) 100-5 MCG/ACT inhaler Inhale 2 puffs into the lungs 2 times daily 3 Inhaler 11      montelukast (SINGULAIR) 5 MG chewable tablet CHEW AND SWALLOW 1 TABLET(5 MG) BY MOUTH AT BEDTIME 90 tablet 4     Multiple Vitamin (MULTIVITAMIN OR) Take one daily tab       NOVOLOG PENFILL 100 UNIT/ML soln INJECT 1 UNIT PER 15 GRAMS CHO AT ALL MEALS AND SNACKS WITH CORRECTION SCALE OF 1 UNIT PER 50 MG/DL OVER 150, AVERAGE DAILY USE OF 30 UNITS 30 mL 4     predniSONE (DELTASONE) 20 MG tablet Take 1 tablet (20 mg) by mouth daily 10 tablet 0     simvastatin (ZOCOR) 20 MG tablet Take 1 tablet (20 mg) by mouth At Bedtime 90 tablet 3     traZODone (DESYREL) 50 MG tablet Take 1-2 tablets by mouth nightly as needed for sleep. 180 tablet 0       Family History  family history includes Alcoholism in her father; Anxiety Disorder in her father; Arthritis in her father; Breast Cancer in her mother; C.A.D. in her father and maternal grandmother; Cancer in her paternal grandfather; Cardiac Sudden Death in her maternal grandfather; Cerebrovascular Disease in her paternal grandmother; Circulatory in her father; Coronary Artery Disease in her father and maternal grandmother; Diabetes in her father, maternal grandmother, and another family member; Eye Disorder in her father; Gynecology in an other family member; Heart Disease in her father, maternal grandfather, maternal grandmother, and paternal grandfather; Hypothyroidism in her father; Lipids in her father; Macular Degeneration in her father and maternal aunt; Rheumatoid Arthritis in her father; Thyroid Disease in her father and another family member.    Social History   reports that she has never smoked. She has never used smokeless tobacco. She reports current alcohol use. She reports that she does not use drugs.     Past Medical History  Past Medical History:   Diagnosis Date     Anemia      Anxiety      Arthritis 2014    Psoriatic arthritis     Back injury 1988     Dry eye syndrome      Dyslipidemia      Endometriosis 2002    adehsions seen at laparoscopy     GERD  (gastroesophageal reflux disease)      Hypothyroidism     8 yoa     SOB (shortness of breath) 3/11/2014     Type I (juvenile type) diabetes mellitus without mention of complication, not stated as uncontrolled     when 15      Uncomplicated asthma Fall 2013       Past Surgical History:   Procedure Laterality Date     COLONOSCOPY  2002     COLONOSCOPY N/A 6/23/2020    Procedure: COLONOSCOPY;  Surgeon: Lauryn Rivera MD;  Location:  GI     ESOPHAGOSCOPY, GASTROSCOPY, DUODENOSCOPY (EGD), COMBINED  1/7/2014    Procedure: COMBINED ESOPHAGOSCOPY, GASTROSCOPY, DUODENOSCOPY (EGD), BIOPSY SINGLE OR MULTIPLE;;  Surgeon: Kraig Nicole MD;  Location:  GI     GYN SURGERY      Laparotomy to remove endometrial tissue     HC BREATH HYDROGEN TEST N/A 6/17/2014    Procedure: HYDROGEN BREATH TEST;  Surgeon: Deacon Alberts MD;  Location:  GI     HYDROGEN BREATH TEST  6/17/14     LAPAROSCOPIC APPENDECTOMY  2002     LAPAROTOMY, LYSIS ADHESIONS, COMBINED  2002    endometriosis     SOFT TISSUE SURGERY  December 2014    right ankle tendon injury     ZZC REPAIR CRUCIATE LIGAMENT,KNEE       ZZC STOMACH SURGERY PROCEDURE UNLISTED  December 2002       Physical Exam    No exam.    RESULTS  Creatinine   Date Value Ref Range Status   02/14/2022 0.60 0.52 - 1.04 mg/dL Final   01/11/2021 0.56 0.52 - 1.04 mg/dL Final     GFR Estimate   Date Value Ref Range Status   02/14/2022 >90 >60 mL/min/1.73m2 Final     Comment:     Effective December 21, 2021 eGFRcr in adults is calculated using the 2021 CKD-EPI creatinine equation which includes age and gender (Paola et al., NEJM, DOI: 10.1056/ZRSAmf8334807)   01/11/2021 >90 >60 mL/min/[1.73_m2] Final     Comment:     Non  GFR Calc  Starting 12/18/2018, serum creatinine based estimated GFR (eGFR) will be   calculated using the Chronic Kidney Disease Epidemiology Collaboration   (CKD-EPI) equation.       Microalbumin Urine   Date Value Ref Range Status   06/26/2009 5@ MG/L       Hemoglobin A1C   Date Value Ref Range Status   02/14/2022 8.1 (H) 0.0 - 5.6 % Final     Comment:     Normal <5.7%   Prediabetes 5.7-6.4%    Diabetes 6.5% or higher     Note: Adopted from ADA consensus guidelines.   01/11/2021 7.1 (H) 0 - 5.6 % Final     Comment:     Normal <5.7% Prediabetes 5.7-6.4%  Diabetes 6.5% or higher - adopted from ADA   consensus guidelines.       Potassium   Date Value Ref Range Status   11/18/2021 3.5 3.4 - 5.3 mmol/L Final   04/27/2016 4.1 3.4 - 5.3 mmol/L Final     ALT   Date Value Ref Range Status   02/14/2022 44 0 - 50 U/L Final   01/11/2021 37 0 - 50 U/L Final     AST   Date Value Ref Range Status   02/14/2022 34 0 - 45 U/L Final   01/11/2021 31 0 - 45 U/L Final     TSH   Date Value Ref Range Status   02/14/2022 3.34 0.40 - 4.00 mU/L Final   01/11/2021 2.01 0.40 - 4.00 mU/L Final     T4 Total   Date Value Ref Range Status   11/29/2010 8.5 5.0 - 11.0 ug/dL Final     T4 Free   Date Value Ref Range Status   03/21/2019 1.09 0.76 - 1.46 ng/dL Final       Cholesterol   Date Value Ref Range Status   02/14/2022 218 (H) <200 mg/dL Final   01/11/2021 223 (H) <200 mg/dL Final     Comment:     Desirable:       <200 mg/dl   12/18/2018 194 <200 mg/dL Final     HDL Cholesterol   Date Value Ref Range Status   01/11/2021 92 >49 mg/dL Final   12/18/2018 91 >49 mg/dL Final     Direct Measure HDL   Date Value Ref Range Status   02/14/2022 94 >=50 mg/dL Final     LDL Cholesterol Calculated   Date Value Ref Range Status   02/14/2022 111 (H) <=100 mg/dL Final   01/11/2021 122 (H) <100 mg/dL Final     Comment:     Above desirable:  100-129 mg/dl  Borderline High:  130-159 mg/dL  High:             160-189 mg/dL  Very high:       >189 mg/dl     12/18/2018 92 <100 mg/dL Final     Comment:     Desirable:       <100 mg/dl     Triglycerides   Date Value Ref Range Status   02/14/2022 66 <150 mg/dL Final   01/11/2021 44 <150 mg/dL Final     Comment:     Non Fasting   12/18/2018 54 <150 mg/dL Final      Cholesterol/HDL Ratio   Date Value Ref Range Status   09/27/2013 2.7 0.0 - 5.0 Final   02/15/2013 2.9 0.0 - 5.0 Final       ASSESSMENT/PLAN:    1. TYPE 1 DIABETES MELLITUS: Patient is considering using an insulin pump in the near future.   She has been working with the dietitian on her carb counting which she has found to be helpful and has follow up with the dietitian next month.  No change in insulin doses today.  Continue Ozempic 0.5 mg subcutaneous once a week.  Most recent A1C improved and was 7.5 % in May 2022.  Her urine microalbuminuria has been negative with normal creat/GFR in Feb 2022.  She has noticed numbness in toes. No foot ulcers. Referral for Podiatry evaluation placed today.  Pt is taking ASA daily.  I have no vitals today.   Dali received the COVID vaccines ( Moderna) and COVID booster.  P  2.  RETINOPATHY: Hx of mild NPDR right eye only.  She was seen here by Oph in Oct 2021.    3.  HYPOTHYROIDISM:  TSH normal in Feb 2022.  Continue Levothyroxine 112 mcg po daily.    4. HYPERLIPIDEMIA:  in 2/2022.  She remains on Simvastatin.    6.   FOLLOW UP: with Dr. Mckeon in Dec 2022. Pt has follow up with dietitian/CDE in 9/2022.  I placed an order for an A1C today.  Podiatry referral here with Dr. Sinha or Dr. Fernández.    Time spent reviewing chart notes, labs and Dexcom sensor download today= 8 minutes.  Time video visit today = 20 minutes.  Time for documentation today = 15 minutes.     TOTAL TIME FOR VISIT TODAY = 43 minutes.    Marissa Sebastian PA-C

## 2022-08-10 NOTE — PROGRESS NOTES
Dali Martines is being evaluated via a billable video visit.        How would you like to obtain your AVS? MyChar  For the video visit, send the invitation by: Send to e-mail at: [email or web address]  Will anyone else be joining your video visit? No

## 2022-08-11 ENCOUNTER — TELEPHONE (OUTPATIENT)
Dept: RHEUMATOLOGY | Facility: CLINIC | Age: 52
End: 2022-08-11

## 2022-08-11 ENCOUNTER — LAB (OUTPATIENT)
Dept: LAB | Facility: CLINIC | Age: 52
End: 2022-08-11
Payer: COMMERCIAL

## 2022-08-11 DIAGNOSIS — L40.50 PSORIATIC ARTHRITIS (H): ICD-10-CM

## 2022-08-11 DIAGNOSIS — Z79.899 HIGH RISK MEDICATION USE: ICD-10-CM

## 2022-08-11 DIAGNOSIS — E10.3291 TYPE 1 DIABETES MELLITUS WITH MILD NONPROLIFERATIVE RETINOPATHY OF RIGHT EYE, MACULAR EDEMA PRESENCE UNSPECIFIED (H): ICD-10-CM

## 2022-08-11 LAB
BASOPHILS # BLD AUTO: 0 10E3/UL (ref 0–0.2)
BASOPHILS NFR BLD AUTO: 0 %
CRP SERPL-MCNC: <3 MG/L
EOSINOPHIL # BLD AUTO: 0.1 10E3/UL (ref 0–0.7)
EOSINOPHIL NFR BLD AUTO: 2 %
ERYTHROCYTE [DISTWIDTH] IN BLOOD BY AUTOMATED COUNT: 12.9 % (ref 10–15)
ERYTHROCYTE [SEDIMENTATION RATE] IN BLOOD BY WESTERGREN METHOD: 32 MM/HR (ref 0–30)
HBA1C MFR BLD: 7.4 % (ref 0–5.6)
HCT VFR BLD AUTO: 36.4 % (ref 35–47)
HGB BLD-MCNC: 12.2 G/DL (ref 11.7–15.7)
LYMPHOCYTES # BLD AUTO: 1.7 10E3/UL (ref 0.8–5.3)
LYMPHOCYTES NFR BLD AUTO: 36 %
MCH RBC QN AUTO: 31.9 PG (ref 26.5–33)
MCHC RBC AUTO-ENTMCNC: 33.5 G/DL (ref 31.5–36.5)
MCV RBC AUTO: 95 FL (ref 78–100)
MONOCYTES # BLD AUTO: 0.5 10E3/UL (ref 0–1.3)
MONOCYTES NFR BLD AUTO: 10 %
NEUTROPHILS # BLD AUTO: 2.4 10E3/UL (ref 1.6–8.3)
NEUTROPHILS NFR BLD AUTO: 52 %
PLATELET # BLD AUTO: 296 10E3/UL (ref 150–450)
RBC # BLD AUTO: 3.83 10E6/UL (ref 3.8–5.2)
WBC # BLD AUTO: 4.6 10E3/UL (ref 4–11)

## 2022-08-11 PROCEDURE — 85025 COMPLETE CBC W/AUTO DIFF WBC: CPT

## 2022-08-11 PROCEDURE — 85652 RBC SED RATE AUTOMATED: CPT

## 2022-08-11 PROCEDURE — 82040 ASSAY OF SERUM ALBUMIN: CPT

## 2022-08-11 PROCEDURE — 86140 C-REACTIVE PROTEIN: CPT

## 2022-08-11 PROCEDURE — 84450 TRANSFERASE (AST) (SGOT): CPT

## 2022-08-11 PROCEDURE — 84460 ALANINE AMINO (ALT) (SGPT): CPT

## 2022-08-11 PROCEDURE — 36415 COLL VENOUS BLD VENIPUNCTURE: CPT

## 2022-08-11 PROCEDURE — 82565 ASSAY OF CREATININE: CPT

## 2022-08-11 PROCEDURE — 83036 HEMOGLOBIN GLYCOSYLATED A1C: CPT

## 2022-08-11 NOTE — TELEPHONE ENCOUNTER
[facility] Call Center    Phone Message    May a detailed message be left on voicemail: yes     Reason for Call: Pt was advised by Dr. Betancourt to schedule an appt to establish care with LATISHA Mercedes CNP at the [facility].   Pt understands that she would not be able to do her initial visit with Tracey as a video visit, but she is wondering if her follow-up visits could be done virtually?     If she is unable to do virtual visits, she would likely go with a different provider. Says that was part of the reason things worked so well with Dr. Betancourt, she didn't have to drive into his office for all of her appts.     Action Taken: Message routed to:  Adult Clinics: Rheumatology p [id number]    Travel Screening: Not Applicable

## 2022-08-12 LAB
ALBUMIN SERPL-MCNC: 4 G/DL (ref 3.4–5)
ALT SERPL W P-5'-P-CCNC: 42 U/L (ref 0–50)
AST SERPL W P-5'-P-CCNC: 36 U/L (ref 0–45)
CREAT SERPL-MCNC: 0.57 MG/DL (ref 0.52–1.04)
GFR SERPL CREATININE-BSD FRML MDRD: >90 ML/MIN/1.73M2

## 2022-08-13 DIAGNOSIS — E10.9 TYPE 1 DIABETES MELLITUS WITHOUT COMPLICATION (H): ICD-10-CM

## 2022-08-14 NOTE — TELEPHONE ENCOUNTER
insulin degludec (TRESIBA FLEXTOUCH) 100 UNIT/ML pen  Last Written Prescription Date:  3/9/22  Last Fill Quantity: 15ml,   # refills: 1  Last Office Visit :8/10/22  Future Office visit:  12/20/22    Routing refill request to provider for review/approval because: endo triage to fill

## 2022-08-15 RX ORDER — INSULIN DEGLUDEC 100 U/ML
INJECTION, SOLUTION SUBCUTANEOUS
Qty: 15 ML | Refills: 1 | Status: SHIPPED | OUTPATIENT
Start: 2022-08-15 | End: 2022-12-20

## 2022-08-17 ENCOUNTER — OFFICE VISIT (OUTPATIENT)
Dept: AUDIOLOGY | Facility: CLINIC | Age: 52
End: 2022-08-17

## 2022-08-17 ENCOUNTER — OFFICE VISIT (OUTPATIENT)
Dept: OTOLARYNGOLOGY | Facility: CLINIC | Age: 52
End: 2022-08-17
Payer: COMMERCIAL

## 2022-08-17 DIAGNOSIS — R42 DIZZINESS: Primary | ICD-10-CM

## 2022-08-17 DIAGNOSIS — H61.21 IMPACTED CERUMEN OF RIGHT EAR: Primary | ICD-10-CM

## 2022-08-17 DIAGNOSIS — H61.21 IMPACTED CERUMEN OF RIGHT EAR: ICD-10-CM

## 2022-08-17 PROCEDURE — 99203 OFFICE O/P NEW LOW 30 MIN: CPT | Mod: 25 | Performed by: OTOLARYNGOLOGY

## 2022-08-17 PROCEDURE — 69210 REMOVE IMPACTED EAR WAX UNI: CPT | Performed by: OTOLARYNGOLOGY

## 2022-08-17 NOTE — LETTER
"    8/17/2022         RE: Dali Martines  [address]  [facility] [address]        Dear Colleague,    Thank you for referring your patient, Dali Martines, to the [facility]. Please see a copy of my visit note below.    Chief Complaint - Dizziness    History of Present Illness - Dali Martines is a 51 year old female who presents with dizziness. She took a flight in June, got air sick. Then afterwards she felt imbalance, dizzy, tired. Symptoms are improving. She gets waves of dizziness. It is intermittent with head movements, worse with standing and walking. Walking upstairs bothered her. She tried the Epley maneuver, it helped \"a bit.\" It seems worse in the right ear. Right ear feels plugged, pressure. Father had BPPV.     Tests personally reviewed today for this visit:   1.) hgb A1C 7.4 on 8/11/22  2.) CBC showed Hgb 11.5 on 8/11/22    Past Medical History -   Patient Active Problem List   Diagnosis     GERD (gastroesophageal reflux disease)     CARDIOVASCULAR SCREENING; LDL GOAL LESS THAN 100     Type 1 diabetes mellitus with retinopathy (H)     Anxiety     NLD (necrobiosis lipoidica diabeticorum) (H)     Cutaneous skin tags     Cervicalgia     Chronic low back pain     SOB (shortness of breath)     Encounter for other specified aftercare     PAIN FOOT     Diabetes (H)     Screening for other eye conditions     PAIN KNEE JOINT     Enthesopathy     Pain in joint, multiple sites     Chronic pain of right ankle     Right foot pain     ESR raised     Swelling of limb     Other postprocedural status(V45.89)     Left knee pain     Type 1 diabetes mellitus without complication (H)     Psoriatic arthritis (H)     Psoriasis with arthropathy (H)     History of adenomatous polyp of colon     Glaucoma suspect of both eyes     Long-term current use of stimulant     Insomnia, unspecified type     Long term methotrexate user       Current Medications -   Current Outpatient Medications:      " albuterol (PROAIR HFA/PROVENTIL HFA/VENTOLIN HFA) 108 (90 Base) MCG/ACT inhaler, Inhale 2 puffs into the lungs every 4 hours as needed for shortness of breath / dyspnea or wheezing, Disp: 1 Inhaler, Rfl: 11     aspirin 81 MG tablet, Take 1 tablet by mouth daily., Disp: , Rfl:      blood glucose (ACCU-CHEK COLIN PLUS) test strip, Test Blood Sugar 8 times daily or as directed, Disp: 800 strip, Rfl: 3     blood glucose (NO BRAND SPECIFIED) test strip, Use to test blood sugar 8 times daily or as directed. Any covered brand., Disp: 800 strip, Rfl: 3     blood glucose monitoring (ACCU-CHEK FASTCLIX) lancets, Use to test blood sugar 8 times daily or as directed., Disp: 4 Box, Rfl: 3     blood glucose monitoring (NO BRAND SPECIFIED) meter device kit, Use to test blood sugar 8 times daily or as directed. Any covered brand,, Disp: 1 kit, Rfl: 0     Calcium Carbonate-Vitamin D (CALCIUM + D PO), Take one daily, Disp: , Rfl:      citalopram (CELEXA) 20 MG tablet, Take 1.5 tablets (30 mg) by mouth daily, Disp: 135 tablet, Rfl: 1     Continuous Blood Gluc  (DEXCOM G6 ) SUZANNE, Use to read blood sugars as per 's instructions., Disp: 1 each, Rfl: 0     Continuous Blood Gluc Sensor (DEXCOM G6 SENSOR) MISC, Change every 10 days., Disp: 9 each, Rfl: 3     Continuous Blood Gluc Transmit (DEXCOM G6 TRANSMITTER) MISC, CHANGE EVERY 3 MONTHS, Disp: 1 each, Rfl: 3     diclofenac (VOLTAREN) 1 % topical gel, APPLY 2 GRAMS ONTO THE SKIN FOUR TIMES DAILY AS NEEDED FOR MODERATE PAIN, Disp: 100 g, Rfl: 5     econazole nitrate 1 % cream, Apply 0.5 inches topically daily., Disp: , Rfl:      fish oil-omega-3 fatty acids 1000 MG capsule, Take one daily, Disp: , Rfl:      folic acid (FOLVITE) 1 MG tablet, Take 1 tablet (1 mg) by mouth daily, Disp: 90 tablet, Rfl: 3     Injection Device for insulin (INPEN 100-BLUE-TANIYA) SUZANNE, 1 each once for 1 dose, Disp: 1 each, Rfl: 0     insulin aspart (FIASP FLEXTOUCH) 100 UNIT/ML  pen-injector, Inject subcu 1 unit per 7 grams CHO with Correction Factor of1 unit drops BG 30 mg/mL. Approx 45 units daily., Disp: 45 mL, Rfl: 1     insulin degludec (TRESIBA FLEXTOUCH) 100 UNIT/ML pen, ADMINISTER 17 UNITS UNDER THE SKIN DAILY, Disp: 15 mL, Rfl: 1     insulin pen needle (B-D U/F) 31G X 8 MM miscellaneous, Use 5 pen needles daily, Disp: 450 each, Rfl: 3     Ketoconazole (NIZORAL PO), Shampoo daily, Disp: , Rfl:      levothyroxine (SYNTHROID/LEVOTHROID) 112 MCG tablet, TAKE 1 TABLET(112 MCG) BY MOUTH DAILY, Disp: 90 tablet, Rfl: 3     methotrexate 2.5 MG tablet, TAKE 6 TABLETS BY MOUTH EVERY 7 DAYS, Disp: 77 tablet, Rfl: 1     methylphenidate (METADATE CD) 20 MG CR capsule, Take 20 mg by mouth daily, Disp: , Rfl:      methylphenidate (RITALIN) 10 MG tablet, TAKE UP TO 2 TABLETS BY MOUTH IN THE LATE AFTERNOON., Disp: , Rfl:      Minoxidil (ROGAINE EX), daily, Disp: , Rfl:      mometasone-formoterol (DULERA) 100-5 MCG/ACT inhaler, Inhale 2 puffs into the lungs 2 times daily, Disp: 3 Inhaler, Rfl: 11     montelukast (SINGULAIR) 5 MG chewable tablet, CHEW AND SWALLOW 1 TABLET(5 MG) BY MOUTH AT BEDTIME, Disp: 90 tablet, Rfl: 4     Multiple Vitamin (MULTIVITAMIN OR), Take one daily tab, Disp: , Rfl:      NOVOLOG PENFILL 100 UNIT/ML soln, INJECT 1 UNIT PER 15 GRAMS CHO AT ALL MEALS AND SNACKS WITH CORRECTION SCALE OF 1 UNIT PER 50 MG/DL OVER 150, AVERAGE DAILY USE OF 30 UNITS, Disp: 30 mL, Rfl: 4     predniSONE (DELTASONE) 20 MG tablet, Take 1 tablet (20 mg) by mouth daily, Disp: 10 tablet, Rfl: 0     simvastatin (ZOCOR) 20 MG tablet, Take 1 tablet (20 mg) by mouth At Bedtime, Disp: 90 tablet, Rfl: 3     traZODone (DESYREL) 50 MG tablet, Take 1-2 tablets by mouth nightly as needed for sleep., Disp: 180 tablet, Rfl: 0    Allergies -   Allergies   Allergen Reactions     Monosodium Glutamate Palpitations and Shortness Of Breath     Sulfasalazine      Developed rash, HA, dizziness       Social History -   Social  History     Socioeconomic History     Marital status: Single     Number of children: 0   Occupational History     Occupation: research     Employer: [employer]   Tobacco Use     Smoking status: Never Smoker     Smokeless tobacco: Never Used   Substance and Sexual Activity     Alcohol use: Yes     Comment: moderately     Drug use: No   Other Topics Concern     Parent/sibling w/ CABG, MI or angioplasty before 65F 55M? Yes       Family History -   Family History   Problem Relation Age of Onset     Breast Cancer Mother      Heart Disease Father      C.A.D. Father      Arthritis Father      Circulatory Father      Eye Disorder Father      Lipids Father      Thyroid Disease Father      Macular Degeneration Father      Coronary Artery Disease Father      Anxiety Disorder Father      Alcoholism Father      Rheumatoid Arthritis Father      Hypothyroidism Father      Diabetes Father      Cerebrovascular Disease Paternal Grandmother         ,     Cancer Paternal Grandfather         Pancreatic     Heart Disease Paternal Grandfather      Diabetes Maternal Grandmother         Type 2     C.A.D. Maternal Grandmother      Heart Disease Maternal Grandmother      Coronary Artery Disease Maternal Grandmother      Heart Disease Maternal Grandfather      Cardiac Sudden Death Maternal Grandfather      Gynecology Other         self     Thyroid Disease Other         self     Macular Degeneration Maternal Aunt      Diabetes Other         Type 1     Glaucoma No family hx of      Physical Exam  General - The patient is in no distress.  Alert and oriented x3, answers questions and cooperates with examination appropriately.   Voice and Breathing - The patient was breathing comfortably without the use of accessory muscles. There was no wheezing, stridor, or stertor.  The patients voice was clear and strong, with no dysphonia.    Eyes - Pupils are reactive to light. Extraocular movements intact. Sclera were not icteric or injected,  conjunctiva were pink and moist. No nystagmus.  Ears - The auricles appeared normal. Right ear canal with cerumen impaction.  Laid the patient supine use otomicroscope.  The right ear canal is impacted with cerumen.  I used a curette to tease the wax impaction off the external auditory canal walls.  I then was able to scoop up a large column of wax out of the ear canal.  Once clean the right tympanic membrane is intact no middle ear effusion no infection. The external auditory canals were nonedematous and nonerythematous. The tympanic membranes are normal in appearance, bony landmarks are intact.  No retraction, perforation, or masses.  No fluid or purulence was seen in the external canal or the middle ear.   Neurologic - Cranial nerves II-XII are grossly intact. Specifically, the facial nerve is intact, House-Brackmann grade 1 of 6. Finger-nose-finger is normal. Normal Romberg. Normal gait.  Neck -  Soft, nontender. Palpation of the occipital, submental, submandibular, internal jugular chain, and supraclavicular nodes did not demonstrate any abnormal lymph nodes or masses. The parotid glands were without masses. Palpation of the thyroid was soft and smooth, with no nodules or goiter appreciated.  The trachea was midline.    A/P -     ICD-10-CM    1. Dizziness  R42    2. Impacted cerumen of right ear  H61.21 REMOVE IMPACTED CERUMEN       Dali Martines is a 51 year old female with dizziness. This seems most likely BPPV.  It has improved with the Epley maneuver, but she was not adhering to measures to keep it from returning such as sleeping upright and not putting her head and neck in position that could cause recurrence. Therefore I want her to continue with the Epley maneuver and take extra precaution.  If this worsens again or fails to completely resolve we could consider comprehensive vestibular testing.  She had right cerumen impaction I cleaned today.  This should help her plugging, but I recommend she return  for comprehensive audiogram.      Robbin Castano MD  Otolaryngology  [facility]          Again, thank you for allowing me to participate in the care of your patient.        Sincerely,        Robbin Castano MD

## 2022-08-17 NOTE — PROGRESS NOTES
Erroneous Encounter    Otoscopy revealed cerumen impaction of right ear.      Patient was sent to ENT for followup.    Unfortunately, patient was 24 minutes late for her appointment and left following ENT without being tested.    Sunil Falk MA, CCC-A  [place] Licensed Audiologist #[id number]  [facility]        8/17/2022

## 2022-08-17 NOTE — PROGRESS NOTES
"Chief Complaint - Dizziness    History of Present Illness - Dali Martines is a 51 year old female who presents with dizziness. She took a flight in June, got air sick. Then afterwards she felt imbalance, dizzy, tired. Symptoms are improving. She gets waves of dizziness. It is intermittent with head movements, worse with standing and walking. Walking upstairs bothered her. She tried the Epley maneuver, it helped \"a bit.\" It seems worse in the right ear. Right ear feels plugged, pressure. Father had BPPV.     Tests personally reviewed today for this visit:   1.) hgb A1C 7.4 on 8/11/22  2.) CBC showed Hgb 11.5 on 8/11/22    Past Medical History -   Patient Active Problem List   Diagnosis     GERD (gastroesophageal reflux disease)     CARDIOVASCULAR SCREENING; LDL GOAL LESS THAN 100     Type 1 diabetes mellitus with retinopathy (H)     Anxiety     NLD (necrobiosis lipoidica diabeticorum) (H)     Cutaneous skin tags     Cervicalgia     Chronic low back pain     SOB (shortness of breath)     Encounter for other specified aftercare     PAIN FOOT     Diabetes (H)     Screening for other eye conditions     PAIN KNEE JOINT     Enthesopathy     Pain in joint, multiple sites     Chronic pain of right ankle     Right foot pain     ESR raised     Swelling of limb     Other postprocedural status(V45.89)     Left knee pain     Type 1 diabetes mellitus without complication (H)     Psoriatic arthritis (H)     Psoriasis with arthropathy (H)     History of adenomatous polyp of colon     Glaucoma suspect of both eyes     Long-term current use of stimulant     Insomnia, unspecified type     Long term methotrexate user       Current Medications -   Current Outpatient Medications:      albuterol (PROAIR HFA/PROVENTIL HFA/VENTOLIN HFA) 108 (90 Base) MCG/ACT inhaler, Inhale 2 puffs into the lungs every 4 hours as needed for shortness of breath / dyspnea or wheezing, Disp: 1 Inhaler, Rfl: 11     aspirin 81 MG tablet, Take 1 tablet by mouth " daily., Disp: , Rfl:      blood glucose (ACCU-CHEK COLIN PLUS) test strip, Test Blood Sugar 8 times daily or as directed, Disp: 800 strip, Rfl: 3     blood glucose (NO BRAND SPECIFIED) test strip, Use to test blood sugar 8 times daily or as directed. Any covered brand., Disp: 800 strip, Rfl: 3     blood glucose monitoring (ACCU-CHEK FASTCLIX) lancets, Use to test blood sugar 8 times daily or as directed., Disp: 4 Box, Rfl: 3     blood glucose monitoring (NO BRAND SPECIFIED) meter device kit, Use to test blood sugar 8 times daily or as directed. Any covered brand,, Disp: 1 kit, Rfl: 0     Calcium Carbonate-Vitamin D (CALCIUM + D PO), Take one daily, Disp: , Rfl:      citalopram (CELEXA) 20 MG tablet, Take 1.5 tablets (30 mg) by mouth daily, Disp: 135 tablet, Rfl: 1     Continuous Blood Gluc  (DEXCOM G6 ) SUZANNE, Use to read blood sugars as per 's instructions., Disp: 1 each, Rfl: 0     Continuous Blood Gluc Sensor (DEXCOM G6 SENSOR) MISC, Change every 10 days., Disp: 9 each, Rfl: 3     Continuous Blood Gluc Transmit (DEXCOM G6 TRANSMITTER) MISC, CHANGE EVERY 3 MONTHS, Disp: 1 each, Rfl: 3     diclofenac (VOLTAREN) 1 % topical gel, APPLY 2 GRAMS ONTO THE SKIN FOUR TIMES DAILY AS NEEDED FOR MODERATE PAIN, Disp: 100 g, Rfl: 5     econazole nitrate 1 % cream, Apply 0.5 inches topically daily., Disp: , Rfl:      fish oil-omega-3 fatty acids 1000 MG capsule, Take one daily, Disp: , Rfl:      folic acid (FOLVITE) 1 MG tablet, Take 1 tablet (1 mg) by mouth daily, Disp: 90 tablet, Rfl: 3     Injection Device for insulin (INPEN 100-BLUE-TANIYA) SUZANNE, 1 each once for 1 dose, Disp: 1 each, Rfl: 0     insulin aspart (FIASP FLEXTOUCH) 100 UNIT/ML pen-injector, Inject subcu 1 unit per 7 grams CHO with Correction Factor of1 unit drops BG 30 mg/mL. Approx 45 units daily., Disp: 45 mL, Rfl: 1     insulin degludec (TRESIBA FLEXTOUCH) 100 UNIT/ML pen, ADMINISTER 17 UNITS UNDER THE SKIN DAILY, Disp: 15 mL, Rfl:  1     insulin pen needle (B-D U/F) 31G X 8 MM miscellaneous, Use 5 pen needles daily, Disp: 450 each, Rfl: 3     Ketoconazole (NIZORAL PO), Shampoo daily, Disp: , Rfl:      levothyroxine (SYNTHROID/LEVOTHROID) 112 MCG tablet, TAKE 1 TABLET(112 MCG) BY MOUTH DAILY, Disp: 90 tablet, Rfl: 3     methotrexate 2.5 MG tablet, TAKE 6 TABLETS BY MOUTH EVERY 7 DAYS, Disp: 77 tablet, Rfl: 1     methylphenidate (METADATE CD) 20 MG CR capsule, Take 20 mg by mouth daily, Disp: , Rfl:      methylphenidate (RITALIN) 10 MG tablet, TAKE UP TO 2 TABLETS BY MOUTH IN THE LATE AFTERNOON., Disp: , Rfl:      Minoxidil (ROGAINE EX), daily, Disp: , Rfl:      mometasone-formoterol (DULERA) 100-5 MCG/ACT inhaler, Inhale 2 puffs into the lungs 2 times daily, Disp: 3 Inhaler, Rfl: 11     montelukast (SINGULAIR) 5 MG chewable tablet, CHEW AND SWALLOW 1 TABLET(5 MG) BY MOUTH AT BEDTIME, Disp: 90 tablet, Rfl: 4     Multiple Vitamin (MULTIVITAMIN OR), Take one daily tab, Disp: , Rfl:      NOVOLOG PENFILL 100 UNIT/ML soln, INJECT 1 UNIT PER 15 GRAMS CHO AT ALL MEALS AND SNACKS WITH CORRECTION SCALE OF 1 UNIT PER 50 MG/DL OVER 150, AVERAGE DAILY USE OF 30 UNITS, Disp: 30 mL, Rfl: 4     predniSONE (DELTASONE) 20 MG tablet, Take 1 tablet (20 mg) by mouth daily, Disp: 10 tablet, Rfl: 0     simvastatin (ZOCOR) 20 MG tablet, Take 1 tablet (20 mg) by mouth At Bedtime, Disp: 90 tablet, Rfl: 3     traZODone (DESYREL) 50 MG tablet, Take 1-2 tablets by mouth nightly as needed for sleep., Disp: 180 tablet, Rfl: 0    Allergies -   Allergies   Allergen Reactions     Monosodium Glutamate Palpitations and Shortness Of Breath     Sulfasalazine      Developed rash, HA, dizziness       Social History -   Social History     Socioeconomic History     Marital status: Single     Number of children: 0   Occupational History     Occupation: research     Employer: [employer]   Tobacco Use     Smoking status: Never Smoker     Smokeless tobacco: Never Used   Substance  and Sexual Activity     Alcohol use: Yes     Comment: moderately     Drug use: No   Other Topics Concern     Parent/sibling w/ CABG, MI or angioplasty before 65F 55M? Yes       Family History -   Family History   Problem Relation Age of Onset     Breast Cancer Mother      Heart Disease Father      C.A.D. Father      Arthritis Father      Circulatory Father      Eye Disorder Father      Lipids Father      Thyroid Disease Father      Macular Degeneration Father      Coronary Artery Disease Father      Anxiety Disorder Father      Alcoholism Father      Rheumatoid Arthritis Father      Hypothyroidism Father      Diabetes Father      Cerebrovascular Disease Paternal Grandmother         ,     Cancer Paternal Grandfather         Pancreatic     Heart Disease Paternal Grandfather      Diabetes Maternal Grandmother         Type 2     C.A.D. Maternal Grandmother      Heart Disease Maternal Grandmother      Coronary Artery Disease Maternal Grandmother      Heart Disease Maternal Grandfather      Cardiac Sudden Death Maternal Grandfather      Gynecology Other         self     Thyroid Disease Other         self     Macular Degeneration Maternal Aunt      Diabetes Other         Type 1     Glaucoma No family hx of      Physical Exam  General - The patient is in no distress.  Alert and oriented x3, answers questions and cooperates with examination appropriately.   Voice and Breathing - The patient was breathing comfortably without the use of accessory muscles. There was no wheezing, stridor, or stertor.  The patients voice was clear and strong, with no dysphonia.    Eyes - Pupils are reactive to light. Extraocular movements intact. Sclera were not icteric or injected, conjunctiva were pink and moist. No nystagmus.  Ears - The auricles appeared normal. Right ear canal with cerumen impaction.  Laid the patient supine use otomicroscope.  The right ear canal is impacted with cerumen.  I used a curette to tease the wax impaction off the  external auditory canal walls.  I then was able to scoop up a large column of wax out of the ear canal.  Once clean the right tympanic membrane is intact no middle ear effusion no infection. The external auditory canals were nonedematous and nonerythematous. The tympanic membranes are normal in appearance, bony landmarks are intact.  No retraction, perforation, or masses.  No fluid or purulence was seen in the external canal or the middle ear.   Neurologic - Cranial nerves II-XII are grossly intact. Specifically, the facial nerve is intact, House-Brackmann grade 1 of 6. Finger-nose-finger is normal. Normal Romberg. Normal gait.  Neck -  Soft, nontender. Palpation of the occipital, submental, submandibular, internal jugular chain, and supraclavicular nodes did not demonstrate any abnormal lymph nodes or masses. The parotid glands were without masses. Palpation of the thyroid was soft and smooth, with no nodules or goiter appreciated.  The trachea was midline.    A/P -     ICD-10-CM    1. Dizziness  R42    2. Impacted cerumen of right ear  H61.21 REMOVE IMPACTED CERUMEN       Dali IJNG Martines is a 51 year old female with dizziness. This seems most likely BPPV.  It has improved with the Epley maneuver, but she was not adhering to measures to keep it from returning such as sleeping upright and not putting her head and neck in position that could cause recurrence. Therefore I want her to continue with the Epley maneuver and take extra precaution.  If this worsens again or fails to completely resolve we could consider comprehensive vestibular testing.  She had right cerumen impaction I cleaned today.  This should help her plugging, but I recommend she return for comprehensive audiogram.      Robbin Castano MD  Otolaryngology  [facility]

## 2022-08-26 ENCOUNTER — TELEPHONE (OUTPATIENT)
Dept: ENDOCRINOLOGY | Facility: CLINIC | Age: 52
End: 2022-08-26

## 2022-08-26 ENCOUNTER — NURSE TRIAGE (OUTPATIENT)
Dept: NURSING | Facility: CLINIC | Age: 52
End: 2022-08-26

## 2022-08-26 NOTE — TELEPHONE ENCOUNTER
Coronavirus (COVID-19) Notification    Caller Name (Patient, parent, daughter/son, grandparent, etc)  Patient    Reason for call  Patient tested positive for Covid with an at-home test    Gather patient reported symptoms   Assessment   Current Symptoms at time of phone call, reported by patient: (if no symptoms, document No symptoms] Sore throat, headache, cough   Date of Symptom(s) onset (if applicable) 8/23/2022     If at time of call, Patients symptoms hare worsened, the Patient should contact 911 or have someone drive them to Emergency Dept promptly:      If Patient calling 911, inform 911 personal that you have tested positive for the Coronavirus (COVID-19).  Place mask on and await 911 to arrive.    If Emergency Dept, If possible, please have another adult drive you to the Emergency Dept but you need to wear mask when in contact with other people.      Monoclonal Antibody Administration    You may be eligible to receive a new treatment with a monoclonal antibody for preventing hospitalization in patients at high risk for complications from COVID-19. This medication is still experimental and available on a limited basis; it is given through an IV and must be given at an infusion center. Please note that not all people who are eligible will receive the medication since it is in limited supply.  Is the patient symptomatic and onset of symptoms within the last 7 days?  Yes  Is the patient interested in a visit with a provider to discuss treatment options?: Yes  Is the patient seen at [facility]?  No: Warm transfer caller to [phone] to be scheduled with a virtual urgent provider.  During transfer, instruct  on appropriate time frame for visit     Review information with Patient    Your result was positive. This means you have COVID-19 (coronavirus).      How can I protect others?    These guidelines are for isolating before returning to work, school or .       If you DO have  symptoms:  o Stay home and away from others  - For at least 5 days after your symptoms started, AND   - You are fever free for 24 hours (with no medicine that reduces fever), AND  - Your other symptoms are better.  o Wear a mask for 10 full days any time you are around others.    If you DON'T have symptoms:  o Stay at home and away from others for at least 5 days after your positive test.  o Wear a mask for 10 full days any time you are around others.    There may be different guidelines for healthcare facilities. Please check with the specific sites before arriving.     If you've been told by a doctor that you were severely ill with COVID-19 or are immunocompromised, you should isolate for at least 10 days.    You should not go back to work until you meet the guidelines above for ending your home isolation. You don't need to be retested for COVID-19 before going back to work--studies show that you won't spread the virus if it's been at least 10 days since your symptoms started (or 20 days, if you have a weak immune system).    Employers, schools, and daycares: This is an official notice for this person's medical guidelines for returning in-person. They must meet the above guidelines before going back to work, school, or  in person.    You will receive a positive COVID-19 letter via [employer] or the mail soon with additional self-care information.      Would you like me to review some of that information with you now?  Yes    How can I take care of myself?      Get lots of rest. Drink extra fluids (unless a doctor has told you not to).      Take Tylenol (acetaminophen) for fever or pain. If you have liver or kidney problems, ask your family doctor if it's okay to take Tylenol.     Take either:     650 mg (two 325 mg pills) every 4 to 6 hours, or     1,000 mg (two 500 mg pills) every 8 hours as needed.     Note: Do not take more than 3,000 mg in one day. Acetaminophen is found in many medicines (both prescribed  and over-the-counter medicines). Read all labels to be sure you don't take too much.    For children, check the Tylenol bottle for the right dose (based on their age or weight).      If you have other health problems (like cancer, heart failure, an organ transplant or severe kidney disease): Call your specialty clinic if you don't feel better in the next 2 days.      Know when to call 911: Emergency warning signs include:    Trouble breathing or shortness of breath    Pain or pressure in the chest that doesn't go away    Feeling confused like you haven't felt before, or not being able to wake up    Bluish-colored lips or face        If you were tested for an upcoming procedure, please contact your provider for next steps.     Naila Moya RN    Reason for Disposition    [1] HIGH RISK for severe COVID complications (e.g., weak immune system, age > 64 years, obesity with BMI of 30 or higher, pregnant, chronic lung disease or other chronic medical condition) AND [2] COVID symptoms (e.g., cough, fever)  (Exceptions: Already seen by PCP and no new or worsening symptoms.)    Additional Information    Negative: SEVERE difficulty breathing (e.g., struggling for each breath, speaks in single words)    Negative: Difficult to awaken or acting confused (e.g., disoriented, slurred speech)    Negative: Bluish (or gray) lips or face now    Negative: Shock suspected (e.g., cold/pale/clammy skin, too weak to stand, low BP, rapid pulse)    Negative: Sounds like a life-threatening emergency to the triager    Negative: [1] Diagnosed or suspected COVID-19 AND [2] symptoms lasting 3 or more weeks    Negative: [1] COVID-19 exposure AND [2] no symptoms    Negative: COVID-19 vaccine reaction suspected (e.g., fever, headache, muscle aches) occurring 1 to 3 days after getting vaccine    Negative: COVID-19 vaccine, questions about    Negative: [1] Lives with someone known to have influenza (flu test positive) AND [2] flu-like symptoms  (e.g., cough, runny nose, sore throat, SOB; with or without fever)    Negative: [1] Adult with possible COVID-19 symptoms AND [2] triager concerned about severity of symptoms or other causes    Negative: COVID-19 and breastfeeding, questions about    Negative: SEVERE or constant chest pain or pressure  (Exception: Mild central chest pain, present only when coughing.)    Negative: MODERATE difficulty breathing (e.g., speaks in phrases, SOB even at rest, pulse 100-120)    Negative: Headache and stiff neck (can't touch chin to chest)    Negative: Oxygen level (e.g., pulse oximetry) 90 percent or lower    Negative: Chest pain or pressure  (Exception: MILD central chest pain, present only when coughing)    Negative: Patient sounds very sick or weak to the triager    Negative: [1] Fever > 100.0 F (37.8 C) AND [2] bedridden (e.g., nursing home patient, CVA, chronic illness, recovering from surgery)    Negative: [1] Fever > 101 F (38.3 C) AND [2] over 60 years of age    Negative: Fever > 103 F (39.4 C)    Negative: MILD difficulty breathing (e.g., minimal/no SOB at rest, SOB with walking, pulse <100)    Protocols used: CORONAVIRUS (COVID-19) DIAGNOSED OR [id number]

## 2022-08-26 NOTE — TELEPHONE ENCOUNTER
Sent my chart to contact PCP and drink extra water .  No symptoms mentioned. Orquidea Waterman RN on 8/26/2022 at 1:42 PM

## 2022-08-26 NOTE — TELEPHONE ENCOUNTER
[facility] Call Center    Phone Message    May a detailed message be left on voicemail: yes     Reason for Call: Other: Pt stated she tested postive for Covid and is wondering if there is any medication she would be able to take to lessen the symptoms specifically for diabetics. Pt wanting a call from care team. Please follow up. Thank you     Action Taken: Message routed to:  Other: endo    Travel Screening: Not Applicable

## 2022-08-27 ENCOUNTER — VIRTUAL VISIT (OUTPATIENT)
Dept: URGENT CARE | Facility: CLINIC | Age: 52
End: 2022-08-27
Payer: COMMERCIAL

## 2022-08-27 DIAGNOSIS — U07.1 CLINICAL DIAGNOSIS OF COVID-19: Primary | ICD-10-CM

## 2022-08-27 PROCEDURE — 99213 OFFICE O/P EST LOW 20 MIN: CPT | Mod: CS

## 2022-08-27 NOTE — PROGRESS NOTES
"Dali is a 51 year old who is being evaluated via a billable telephone visit.      Sx started on Tuesday.  Tested + yesterday.  ST, fever, sinus sx.  COVID vaccinated and boosted 8/23  +DM Type 1    What phone number would you like to be contacted at? cell  How would you like to obtain your AVS? MyChart    Assessment & Plan     Clinical diagnosis of COVID-19    - nirmatrelvir and ritonavir (PAXLOVID) therapy pack; Take 3 tablets by mouth 2 times daily for 5 days (Take 2 Nirmatrelvir tablets and 1 Ritonavir tablet twice daily for 5 days)    COVID-19 positive patient.  Encounter for consideration of medication intervention. Patient does qualify for a prescription. Full discussion with patient including medication options, risks and benefits. Potential drug interactions reviewed with patient.     Treatment Planned Paxlovid sent to [facility]    Temporary change to home medications:   Hold simvastatin while on Paxlovid.  Reduce dose of trazodone by 50% while on Paxlovid.  Hold Dulera while on Paxlovid.    Estimated body mass index is 28.55 kg/m  as calculated from the following:    Height as of 7/7/22: 1.778 m (5' 10\").    Weight as of 7/7/22: 90.3 kg (199 lb).  GFR Estimate   Date Value Ref Range Status   08/11/2022 >90 >60 mL/min/1.73m2 Final     Comment:     Effective December 21, 2021 eGFRcr in adults is calculated using the 2021 CKD-EPI creatinine equation which includes age and gender (Paola et al., NEJ, DOI: 10.1056/FVSDsh6040407)   01/11/2021 >90 >60 mL/min/[1.73_m2] Final     Comment:     Non  GFR Calc  Starting 12/18/2018, serum creatinine based estimated GFR (eGFR) will be   calculated using the Chronic Kidney Disease Epidemiology Collaboration   (CKD-EPI) equation.       Chinyere Casillas MD  Virtual Urgent Care  [facility] VIRTUAL URGENT CARE    Subjective   Dali is a 51 year old, presenting for the following health issues:  No chief complaint on file.      HPI     Sx started " on Tuesday.  Tested + yesterday.  ST, fever, sinus sx.  COVID vaccinated and boosted 8/23  +DM Type 1        Review of Systems   Constitutional, HEENT, cardiovascular, pulmonary, GI, , musculoskeletal, neuro, skin, endocrine and psych systems are negative, except as otherwise noted.      Objective           Vitals:  No vitals were obtained today due to virtual visit.    Physical Exam   healthy, alert and no distress  PSYCH: Alert and oriented times 3; coherent speech, normal   rate and volume, able to articulate logical thoughts, able   to abstract reason, no tangential thoughts, no hallucinations   or delusions  Her affect is normal and pleasant  RESP: No cough, no audible wheezing, able to talk in full sentences  Remainder of exam unable to be completed due to telephone visits          Phone call duration: 10 minutes    .  ..

## 2022-08-30 NOTE — TELEPHONE ENCOUNTER
DIAGNOSIS: Numbness in toes/ Marissa Sebastian PA-C   APPOINTMENT DATE: 9.23.22   NOTES STATUS DETAILS   OFFICE NOTE from referring provider Internal 8.10.22 RAMAKRISHNA Mckinley    OFFICE NOTE from other specialist Internal 5.10.22 Dr Felicia Mckeon, [facility]   MEDICATION LIST Internal    LABS     CBC/DIFF Internal    MRI Internal 12.15.18 lumbar spine   XRAYS (IMAGES & REPORTS) Internal 11.16.11 R foot  12.11.18 lumbar spine  12.5.17 R foot

## 2022-09-02 ENCOUNTER — VIRTUAL VISIT (OUTPATIENT)
Dept: SLEEP MEDICINE | Facility: CLINIC | Age: 52
End: 2022-09-02
Payer: COMMERCIAL

## 2022-09-02 VITALS — WEIGHT: 199 LBS | HEIGHT: 70 IN | BODY MASS INDEX: 28.49 KG/M2

## 2022-09-02 DIAGNOSIS — F51.04 CHRONIC INSOMNIA: Primary | ICD-10-CM

## 2022-09-02 DIAGNOSIS — F43.9 STRESS: ICD-10-CM

## 2022-09-02 PROCEDURE — 90834 PSYTX W PT 45 MINUTES: CPT | Mod: 95 | Performed by: PSYCHOLOGIST

## 2022-09-02 ASSESSMENT — SLEEP AND FATIGUE QUESTIONNAIRES
HOW LIKELY ARE YOU TO NOD OFF OR FALL ASLEEP WHEN YOU ARE A PASSENGER IN A CAR FOR AN HOUR WITHOUT A BREAK: SLIGHT CHANCE OF DOZING
HOW LIKELY ARE YOU TO NOD OFF OR FALL ASLEEP WHILE WATCHING TV: HIGH CHANCE OF DOZING
HOW LIKELY ARE YOU TO NOD OFF OR FALL ASLEEP WHILE LYING DOWN TO REST IN THE AFTERNOON WHEN CIRCUMSTANCES PERMIT: HIGH CHANCE OF DOZING
HOW LIKELY ARE YOU TO NOD OFF OR FALL ASLEEP IN A CAR, WHILE STOPPED FOR A FEW MINUTES IN TRAFFIC: WOULD NEVER DOZE
HOW LIKELY ARE YOU TO NOD OFF OR FALL ASLEEP WHILE SITTING AND READING: MODERATE CHANCE OF DOZING
HOW LIKELY ARE YOU TO NOD OFF OR FALL ASLEEP WHILE SITTING AND TALKING TO SOMEONE: SLIGHT CHANCE OF DOZING
HOW LIKELY ARE YOU TO NOD OFF OR FALL ASLEEP WHILE SITTING QUIETLY AFTER LUNCH WITHOUT ALCOHOL: MODERATE CHANCE OF DOZING
HOW LIKELY ARE YOU TO NOD OFF OR FALL ASLEEP WHILE SITTING INACTIVE IN A PUBLIC PLACE: SLIGHT CHANCE OF DOZING

## 2022-09-02 NOTE — PROGRESS NOTES
Dali is a 51 year old who is being evaluated via a billable video visit.      How would you like to obtain your AVS? MyChart  If the video visit is dropped, the invitation should be resent by: Send to e-mail at: [email or web address]  Will anyone else be joining your video visit? No      Video-Visit Details    Video Start Time: 3:36 PM    Type of service:  Video Visit    Video End Time:4:15 PM, estimate    Originating Location (pt. Location): Home    Distant Location (provider location):  [facility]     Platform used for Video Visit: [employer]    SLEEP MEDICINE VIRTUAL VIDEO FOLLOW-UP VISIT  Sleep Psychology    Patient Name: Dali Martines  MRN:  [id number]  Date of Service: Sep 2, 2022       Subjective Report     Dali Martines  returns for a telehealth video visit to discuss progress in implementing behavioral strategies for the management of insomnia.  Patient consent for initiation of video visit was obtained and documented prior to initiation of visit.     Dali reports she contracted COVID and has been coping with mild to moderate symptoms.  Initially it worsened insomnia.  She got on course of antiviral medication.  She notes her sleep schedule and habits have been affected by the viral infection.    Prior to onset of illness she had been focused on implementing more daily routines not ly in her sleep habits but her work schedule as well.  Caregiving responsibilities for mother continue though a bit more manageable.     .     .     Sleep Data:     Source of Sleep Estimates:  Verbal Self-report    Average Time in Bed: 8  Average Total Sleep Time: 6.5 hrs  Sleep Efficiency: Reduced to less than 85% due to stress, COVID-19 and increased napping that likely reduces nighttime homeostatic sleep drive    EPWORTH SLEEPINESS SCALE    Sitting and reading 2   Watching TV 3   Sitting, inactive in a public place (theatre or mtg.) 1    As a passenger in a car 1  "  Lying down to rest in the afternoon when circumstance permit 3   Sitting and talking to someone 1   Sitting quietly after lunch without alcohol 2   In a car, while stopped for a few minutes in traffic 0   TOTAL SCORE (nl <11) 13     INSOMNIA SEVERITY INDEX     Difficulty falling asleep 2   Difficult staying asleep 3   Problems waking up to early 3   How SATISFIED/DISSATISFIED are you with your CURRENT sleep pattern? 4   How NOTICEABLE to others do you think your sleep pattern is in terms of your quality of life? 3   How WORRIED/DISTRESSED are you about your current sleep pattern? 3   To what extent do you consider your sleep problem to INTERFERE with your daily fuctioning(e.g. daytime fatigue, mood, ability to function at work/daily chores, concentration, mood,etc.) CURRENTLY? 4   INSOMNIA SEVERITY INDEX TOTAL SCORE 22    Absence of insomnia (0-7); sub-threshold insomnia (8-14); moderate insomnia (15-21); and severe insomnia (22-28)       Interventions     Strategies and recommendations including Stimulus control therapy and Stress management were discussed today.       Vital Signs     Ht 1.778 m (5' 10\")   Wt 90.3 kg (199 lb)   BMI 28.55 kg/m       Mental Status     Orientation:  X3  Mood:  normal  Affect:  Congruent with mood  Speech/Language:  Normal  Thought Process: Intact  Associations:  Normal  Thought Content: Normal  Patient does not report any suicidal ideation, intention or plan.    Diagnostic Impressions and Plan        Chronic insomnia  Stress    Patient in recent weeks has contracted COVID which has increased daytime tiredness, increased sleep needed during the day and also resulting in increased sleep fragmentation at night.  Overall stress has been somewhat reduced with family and caregiving expectations but managing current illness has been a new stressor.    Plan:  Continue current sleep schedule and plan    Follow-up: 4 weeks      Philip Quiroz PsyD, LP, DBSM  Diplomate, Behavioral " Sleep Medicine  [facility]      Note: This dictation was created using voice recognition software. This document may contain an error not identified before finalizing the document. If the error changes the accuracy of the document, I would appreciate it being brought to my attention.

## 2022-09-02 NOTE — NURSING NOTE
Flu vaccine 11/16/21.  Patient declined individual allergy and medication review by support staff because - pt stated she reviewed during eCheck in.

## 2022-09-09 ENCOUNTER — VIRTUAL VISIT (OUTPATIENT)
Dept: PULMONOLOGY | Facility: CLINIC | Age: 52
End: 2022-09-09
Attending: INTERNAL MEDICINE
Payer: COMMERCIAL

## 2022-09-09 DIAGNOSIS — J45.20 MILD INTERMITTENT ASTHMA, UNSPECIFIED WHETHER COMPLICATED: Primary | ICD-10-CM

## 2022-09-09 PROCEDURE — 99213 OFFICE O/P EST LOW 20 MIN: CPT | Mod: 95 | Performed by: INTERNAL MEDICINE

## 2022-09-09 PROCEDURE — G0463 HOSPITAL OUTPT CLINIC VISIT: HCPCS | Mod: PN,RTG | Performed by: INTERNAL MEDICINE

## 2022-09-09 RX ORDER — PREDNISONE 20 MG/1
20 TABLET ORAL DAILY
Qty: 5 TABLET | Refills: 4 | Status: SHIPPED | OUTPATIENT
Start: 2022-09-09 | End: 2022-09-14

## 2022-09-09 NOTE — LETTER
9/9/2022         RE: Dali Martines  [address] [facility] [facility] [address]        Dear Colleague,    Thank you for referring your patient, Dali Martines, to the [facility] [place]. Please see a copy of my visit note below.    Dali is a 51 year old who is being evaluated via a billable video visit.        Reason for Visit  Dali Martines is a 51 year old female who is followed for asthma  Pulmonary HPI  Dali caught COVID around 3 weeks ago which was confirmed by a home test. She had upper respiratory symptoms initially, which resolved then her asthma symptoms became notable, mainly dry cough and exertional dyspnea. She tried increasing Dulera as previously instructed, to two puffs BID for a few days, then back to prescribed dose of 1 puff BID around 5 days ago. She does remain short of breath on exertion. She has not used prednisone. She received paxlovid for COVID.        Exam:   GENERAL APPEARANCE: Well developed, well nourished, alert, and in no apparent distress.  NEURO: Mentation intact, speech normal,   PSYCH: mentation appears normal. and affect normal/bright  Results:  Stress echo 12/30/14 normal  PFTs normal 3/14/16     Assessment and plan: Dali Martines is a 51-year-old female with type 1 diabetes who was clinically diagnosed with asthma as an adult.  She has a recent mild flare with COVID that was diagnosed 3 weeks ago and she completed plaxovid. She did increase duelara to 2 puffs BID with partial relief.   1.  Asthma.  Mild intermittent asthma.     - She will continue Dulera, daily use if needed, rinse and gargle afterwards and use spacer. She will increase at the first sign of asthma exacerbation  - Prednisone 20 mg po daily for 5 days if no improvement with dulera dose increase over the next two days, further refills were sent  - If prednisone is needed again in the future, she will call us and we might have to escalate maintenance  "therapy (adding spiriva)  - Continue Singulair for asthma maintenance, 5mg      I will see her back in 8 months with PFTs.     Dali is a 51 year old who is being evaluated via a billable video visit.      How would you like to obtain your AVS? MyChart  If the video visit is dropped, the invitation should be resent by: Send to e-mail at: [email or web address]  Will anyone else be joining your video visit? No           Gely FLETCHER          Video-Visit Details    Video Start Time: 1:01 PM    Type of service:  Video Visit    Video End Time:1:23 PM    Originating Location (pt. Location): Home    Distant Location (provider location):  [facility]     Platform used for Video Visit: [employer]     The patient has been notified of following:      \"This video visit will be conducted via a call between you and your physician/provider. We have found that certain health care needs can be provided without the need for a physical exam.  This service lets us provide the care you need with a video conversation.  If a prescription is necessary we can send it directly to your pharmacy.  If lab work is needed we can place an order for that and you can then stop by our lab to have the test done at a later time.     Video visits are billed at different rates depending on your insurance coverage. During this emergency period, for some insurers they may be billed the same as an in-person visit.  Please reach out to your insurance provider with any questions.     If during the course of the call the physician/provider feels a telephone visit is not appropriate, you will not be charged for this service.\"     Patient has given verbal consent for Telephone visit?  Yes     How would you like to obtain your AVS? Electronic        Again, thank you for allowing me to participate in the care of your patient.        Sincerely,        Frieda Dunbar MD    "

## 2022-09-09 NOTE — PATIENT INSTRUCTIONS
For clinical questions, please call our nursing line [phone]    For scheduling, please call [phone]

## 2022-09-09 NOTE — PROGRESS NOTES
Dali is a 51 year old who is being evaluated via a billable video visit.        Reason for Visit  Dali Martines is a 51 year old female who is followed for asthma  Pulmonary HPI  Dali caught COVID around 3 weeks ago which was confirmed by a home test. She had upper respiratory symptoms initially, which resolved then her asthma symptoms became notable, mainly dry cough and exertional dyspnea. She tried increasing Dulera as previously instructed, to two puffs BID for a few days, then back to prescribed dose of 1 puff BID around 5 days ago. She does remain short of breath on exertion. She has not used prednisone. She received paxlovid for COVID.        Exam:   GENERAL APPEARANCE: Well developed, well nourished, alert, and in no apparent distress.  NEURO: Mentation intact, speech normal,   PSYCH: mentation appears normal. and affect normal/bright  Results:  Stress echo 12/30/14 normal  PFTs normal 3/14/16     Assessment and plan: Dali Martines is a 51-year-old female with type 1 diabetes who was clinically diagnosed with asthma as an adult.  She has a recent mild flare with COVID that was diagnosed 3 weeks ago and she completed plaxovid. She did increase duelara to 2 puffs BID with partial relief.   1.  Asthma.  Mild intermittent asthma.     - She will continue Dulera, daily use if needed, rinse and gargle afterwards and use spacer. She will increase at the first sign of asthma exacerbation  - Prednisone 20 mg po daily for 5 days if no improvement with dulera dose increase over the next two days, further refills were sent  - If prednisone is needed again in the future, she will call us and we might have to escalate maintenance therapy (adding spiriva)  - Continue Singulair for asthma maintenance, 5mg      I will see her back in 8 months with PFTs.     Dali is a 51 year old who is being evaluated via a billable video visit.      How would you like to obtain your AVS? MyChart  If the video visit is dropped, the  "invitation should be resent by: Send to e-mail at: [email or web address]  Will anyone else be joining your video visit? No           Gely FLETCHER          Video-Visit Details    Video Start Time: 1:01 PM    Type of service:  Video Visit    Video End Time:1:23 PM    Originating Location (pt. Location): Home    Distant Location (provider location):  [facility]     Platform used for Video Visit: [employer]     The patient has been notified of following:      \"This video visit will be conducted via a call between you and your physician/provider. We have found that certain health care needs can be provided without the need for a physical exam.  This service lets us provide the care you need with a video conversation.  If a prescription is necessary we can send it directly to your pharmacy.  If lab work is needed we can place an order for that and you can then stop by our lab to have the test done at a later time.     Video visits are billed at different rates depending on your insurance coverage. During this emergency period, for some insurers they may be billed the same as an in-person visit.  Please reach out to your insurance provider with any questions.     If during the course of the call the physician/provider feels a telephone visit is not appropriate, you will not be charged for this service.\"     Patient has given verbal consent for Telephone visit?  Yes     How would you like to obtain your AVS? Electronic    "

## 2022-09-12 ENCOUNTER — VIRTUAL VISIT (OUTPATIENT)
Dept: EDUCATION SERVICES | Facility: CLINIC | Age: 52
End: 2022-09-12
Payer: COMMERCIAL

## 2022-09-12 DIAGNOSIS — E10.9 TYPE 1 DIABETES MELLITUS WITHOUT COMPLICATION (H): ICD-10-CM

## 2022-09-12 DIAGNOSIS — E10.9 TYPE 1 DIABETES MELLITUS WITHOUT COMPLICATION (H): Primary | ICD-10-CM

## 2022-09-12 PROCEDURE — G0108 DIAB MANAGE TRN  PER INDIV: HCPCS | Mod: 95 | Performed by: NUTRITIONIST

## 2022-09-12 NOTE — PROGRESS NOTES
Dali is a 51 year old who is being evaluated via a billable telephone visit.      What phone number would you like to be contacted at? Number on file  How would you like to obtain your AVS? Deojn    Phone call duration: 30 minutes    Diabetes Self-Management Education & Support    Dali Martines presents today for education related to Type 1 diabetes    Patient is being treated with:  insulin  She is accompanied by self    PATIENT CONCERNS RELATED TO DIABETES SELF MANAGEMENT: feels her glucose is improving and she is doing better with insulin to carb ratio      ASSESSMENT:    Taking Medication:     Current Diabetes Management per Patient:  Taking diabetes medications?   yes:     Diabetes Medication(s)     Insulin       insulin aspart (FIASP FLEXTOUCH) 100 UNIT/ML pen-injector    Inject subcu 1 unit per 7 grams CHO with Correction Factor of1 unit drops BG 30 mg/mL. Approx 45 units daily.     insulin degludec (TRESIBA FLEXTOUCH) 100 UNIT/ML pen    ADMINISTER 17 UNITS UNDER THE SKIN DAILY     NOVOLOG PENFILL 100 UNIT/ML soln    INJECT 1 UNIT PER 15 GRAMS CHO AT ALL MEALS AND SNACKS WITH CORRECTION SCALE OF 1 UNIT PER 50 MG/DL OVER 150, AVERAGE DAILY USE OF 30 UNITS        She is using a 1 to 9g ratio for meal time insulin    Monitoring            Patient is having some elevated glucose after high carb dinners and lows after correction when she does not follow her correction scale and gives extra insulin.     Patient's most recent   Lab Results   Component Value Date    A1C 7.4 08/11/2022    A1C 7.1 01/11/2021      Patient's A1C goal: <7.0    Healthy Eating:   Dali sent food records and unfortunately I can no longer view them for documentation. But they did show much improvement in the accuracy of her carb counting.  The 1:9g insulin to carb ratio seems to be correct for her. Some meals are very high carb and lower in protein.  She tends to get higher carb later in the day. Sometimes skips breakfast     Problem  Solving:      Patient is at risk of hypoglycemia?: YES  Hospitalizations for hyper or hypoglycemia: Unknown    Healthy Coping and Stress Management:   Sources of stress identified by patient [employer]      EDUCATION and INSTRUCTION PROVIDED AT THIS VISIT:    Carb counting  Correction scale    Patient-stated goal written and given to Dali Martines.  Verbalized and demonstrated understanding of instructions.     PLAN:  Work on using prescribed correction scale and not over-riding, to help reduce lows  Continue carb counting accurately and insulin to carb ratio 1:9g  Try to keep meals <75 grams carbohydrate    FOLLOW-UP:        Time spent with patient at today's visit was 30 minutes.      Any diabetes medication dose changes were made via the CDE Protocol and Collaborative Practice Agreement with [place] [facility].  A copy of this encounter was provided to patient's referring provider.

## 2022-09-15 NOTE — TELEPHONE ENCOUNTER
OZEMPIC 0.25 OR 0.5MG/DOS 1X2MG PEN      Last Written Prescription Date:  5-10-22  END 8-2-22  Last Fill Quantity: 5 mg ,   # refills: 0  Last Office Visit : 8-10-22 ( Romulo)  Future Office visit:  12-20-22    Routing refill request to provider for review/approval because:  Drug not active on patient's medication list  END DATE 8-2-22

## 2022-09-16 RX ORDER — SEMAGLUTIDE 1.34 MG/ML
INJECTION, SOLUTION SUBCUTANEOUS
Qty: 4.5 ML | Refills: 1 | Status: SHIPPED | OUTPATIENT
Start: 2022-09-16 | End: 2023-03-22 | Stop reason: DRUGHIGH

## 2022-09-23 ENCOUNTER — ANCILLARY PROCEDURE (OUTPATIENT)
Dept: GENERAL RADIOLOGY | Facility: CLINIC | Age: 52
End: 2022-09-23
Attending: PODIATRIST
Payer: COMMERCIAL

## 2022-09-23 ENCOUNTER — PRE VISIT (OUTPATIENT)
Dept: ORTHOPEDICS | Facility: CLINIC | Age: 52
End: 2022-09-23

## 2022-09-23 ENCOUNTER — OFFICE VISIT (OUTPATIENT)
Dept: ORTHOPEDICS | Facility: CLINIC | Age: 52
End: 2022-09-23
Payer: COMMERCIAL

## 2022-09-23 DIAGNOSIS — R20.2 PARESTHESIA OF LEFT FOOT: ICD-10-CM

## 2022-09-23 DIAGNOSIS — S93.492A SPRAIN OF ANTERIOR TALOFIBULAR LIGAMENT OF LEFT ANKLE, INITIAL ENCOUNTER: ICD-10-CM

## 2022-09-23 DIAGNOSIS — M25.872 SESAMOIDITIS OF LEFT FOOT: ICD-10-CM

## 2022-09-23 DIAGNOSIS — S86.311A TEAR OF PERONEAL TENDON, RIGHT, INITIAL ENCOUNTER: ICD-10-CM

## 2022-09-23 DIAGNOSIS — M25.571 CHRONIC PAIN OF RIGHT ANKLE: ICD-10-CM

## 2022-09-23 DIAGNOSIS — G89.29 CHRONIC PAIN OF RIGHT ANKLE: ICD-10-CM

## 2022-09-23 DIAGNOSIS — M79.672 LEFT FOOT PAIN: ICD-10-CM

## 2022-09-23 DIAGNOSIS — M79.672 LEFT FOOT PAIN: Primary | ICD-10-CM

## 2022-09-23 PROCEDURE — 99205 OFFICE O/P NEW HI 60 MIN: CPT | Performed by: PODIATRIST

## 2022-09-23 PROCEDURE — 73630 X-RAY EXAM OF FOOT: CPT | Mod: LT | Performed by: RADIOLOGY

## 2022-09-23 ASSESSMENT — ENCOUNTER SYMPTOMS
HALLUCINATIONS: 0
FEVER: 1
DIARRHEA: 0
POLYDIPSIA: 0
SNORES LOUDLY: 0
POSTURAL DYSPNEA: 0
INCREASED ENERGY: 1
HEMOPTYSIS: 0
MUSCLE CRAMPS: 0
POLYPHAGIA: 0
NERVOUS/ANXIOUS: 1
WEIGHT LOSS: 0
HEARTBURN: 0
FATIGUE: 1
PANIC: 0
JOINT SWELLING: 0
BLOATING: 0
CONSTIPATION: 0
COUGH DISTURBING SLEEP: 1
MUSCLE WEAKNESS: 0
DYSPNEA ON EXERTION: 1
DEPRESSION: 0
BOWEL INCONTINENCE: 0
SPUTUM PRODUCTION: 0
WHEEZING: 0
ARTHRALGIAS: 1
WEIGHT GAIN: 0
DECREASED CONCENTRATION: 1
ABDOMINAL PAIN: 0
STIFFNESS: 1
VOMITING: 1
BLOOD IN STOOL: 0
MYALGIAS: 0
INSOMNIA: 1
JAUNDICE: 0
DECREASED APPETITE: 1
COUGH: 1
NAUSEA: 1
RECTAL PAIN: 0
CHILLS: 1
NECK PAIN: 0
BACK PAIN: 1
SHORTNESS OF BREATH: 1
ALTERED TEMPERATURE REGULATION: 0
NIGHT SWEATS: 1

## 2022-09-23 NOTE — LETTER
9/23/2022         RE: Dali Martines  [address]  [facility] [address]        Dear Colleague,    Thank you for referring your patient, Dali Martines, to the [facility] [place]. Please see a copy of my visit note below.      Date of Service: 9/23/2022    Chief Complaint   Patient presents with     Consult     Right ankle ligament and tendon tear. Numbness, left great toe.           HPI: Dali is a 51 year old female who presents today for diabetic foot exam management.  She is been diabetic since she was 15.  She is currently taking Tresiba and insulin correction.  She is also taking Ozempic.  She does have a Dexcom sensor.  She relates that she injured the right ankle years ago.  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  She had a significant sprain on the right side.  She relates that since then, she has had pain in the peroneal tendons.  She has had a surgery with Dr. Tran to remove a bone fragment from where the ATFL was torn.  She also relates that since around that same time, she had numbness in the right big toe.  Over the last few months, she has been having numbness in the left big toe.  She does have lumbar spine MRI.  She does have low back pain.  She relates that she also has pain in the left first MTPJ.  This is when she is walking.    Review of Systems: Answers for HPI/ROS submitted by the patient on 9/23/2022  General Symptoms: Yes  Skin Symptoms: No  HENT Symptoms: No  EYE SYMPTOMS: No  HEART SYMPTOMS: No  LUNG SYMPTOMS: Yes  INTESTINAL SYMPTOMS: Yes  URINARY SYMPTOMS: No  GYNECOLOGIC SYMPTOMS: No  BREAST SYMPTOMS: No  SKELETAL SYMPTOMS: Yes  BLOOD SYMPTOMS: No  NERVOUS SYSTEM SYMPTOMS: No  MENTAL HEALTH SYMPTOMS: Yes  Fever: Yes  Loss of appetite: Yes  Weight loss: No  Weight gain: No  Fatigue: Yes  Night sweats: Yes  Chills: Yes  Increased stress: No  Excessive hunger: No  Excessive thirst: No  Feeling hot or cold when others believe the  temperature is normal: No  Loss of height: No  Post-operative complications: No  Surgical site pain: No  Hallucinations: No  Change in or Loss of Energy: Yes  Hyperactivity: No  Confusion: Yes  Cough: Yes  Sputum or phlegm: No  Coughing up blood: No  Difficulty breating or shortness of breath: Yes  Snoring: No  Wheezing: No  Difficulty breathing on exertion: Yes  Nighttime Cough: Yes  Difficulty breathing when lying flat: No  Heart burn or indigestion: No  Nausea: Yes  Vomiting: Yes  Abdominal pain: No  Bloating: No  Constipation: No  Diarrhea: No  Blood in stool: No  Black stools: No  Rectal or Anal pain: No  Fecal incontinence: No  Yellowing of skin or eyes: No  Vomit with blood: No  Change in stools: No  Back pain: Yes  Muscle aches: No  Neck pain: No  Swollen joints: No  Joint pain: Yes  Bone pain: No  Muscle cramps: No  Muscle weakness: No  Joint stiffness: Yes  Bone fracture: No  Nervous or Anxious: Yes  Depression: No  Trouble sleeping: Yes  Trouble thinking or concentrating: Yes  Mood changes: No  Panic attacks: No        PMH:   Past Medical History:   Diagnosis Date     Anemia      Anxiety      Arthritis 2014    Psoriatic arthritis     Back injury 1988     Dry eye syndrome      Dyslipidemia      Endometriosis 2002    adehsions seen at laparoscopy     GERD (gastroesophageal reflux disease)      Hypothyroidism     8 yoa     SOB (shortness of breath) 3/11/2014     Type I (juvenile type) diabetes mellitus without mention of complication, not stated as uncontrolled     when 15      Uncomplicated asthma Fall 2013       PSxH:   Past Surgical History:   Procedure Laterality Date     COLONOSCOPY  2002     COLONOSCOPY N/A 6/23/2020    Procedure: COLONOSCOPY;  Surgeon: Lauryn Rivera MD;  [facility]     ESOPHAGOSCOPY, GASTROSCOPY, DUODENOSCOPY (EGD), COMBINED  1/7/2014    Procedure: COMBINED ESOPHAGOSCOPY, GASTROSCOPY, DUODENOSCOPY (EGD), BIOPSY SINGLE OR MULTIPLE;;  Surgeon: Kraig Nicole MD;   Location:  GI     GYN SURGERY      Laparotomy to remove endometrial tissue     HC BREATH HYDROGEN TEST N/A 6/17/2014    Procedure: HYDROGEN BREATH TEST;  Surgeon: Deacon Alberts MD;  Location:  GI     HYDROGEN BREATH TEST  6/17/14     LAPAROSCOPIC APPENDECTOMY  2002     LAPAROTOMY, LYSIS ADHESIONS, COMBINED  2002    endometriosis     SOFT TISSUE SURGERY  December 2014    right ankle tendon injury     ZZC REPAIR CRUCIATE LIGAMENT,KNEE       ZZC STOMACH SURGERY PROCEDURE UNLISTED  December 2002       Allergies: Monosodium glutamate and Sulfasalazine    SH:   Social History     Socioeconomic History     Marital status: Single     Spouse name: Not on file     Number of children: 0     Years of education: Not on file     Highest education level: Not on file   Occupational History     Occupation: research     Employer: [employer]   Tobacco Use     Smoking status: Never Smoker     Smokeless tobacco: Never Used   Vaping Use     Vaping Use: Never used   Substance and Sexual Activity     Alcohol use: Yes     Comment: moderately     Drug use: No     Sexual activity: Not on file   Other Topics Concern     Parent/sibling w/ CABG, MI or angioplasty before 65F 55M? Yes   Social History Narrative     Not on file     Social Determinants of Health     Financial Resource Strain: Not on file   Food Insecurity: Not on file   Transportation Needs: Not on file   Physical Activity: Not on file   Stress: Not on file   Social Connections: Not on file   Intimate Partner Violence: Not on file   Housing Stability: Not on file       FH:   Family History   Problem Relation Age of Onset     Breast Cancer Mother      Heart Disease Father      C.A.D. Father      Arthritis Father      Circulatory Father      Eye Disorder Father      Lipids Father      Thyroid Disease Father      Macular Degeneration Father      Coronary Artery Disease Father      Anxiety Disorder Father      Alcoholism Father      Rheumatoid Arthritis Father       Hypothyroidism Father      Diabetes Father      Cerebrovascular Disease Paternal Grandmother         ,     Cancer Paternal Grandfather         Pancreatic     Heart Disease Paternal Grandfather      Diabetes Maternal Grandmother         Type 2     C.A.D. Maternal Grandmother      Heart Disease Maternal Grandmother      Coronary Artery Disease Maternal Grandmother      Heart Disease Maternal Grandfather      Cardiac Sudden Death Maternal Grandfather      Gynecology Other         self     Thyroid Disease Other         self     Macular Degeneration Maternal Aunt      Diabetes Other         Type 1     Glaucoma No family hx of        Objective:  Hemoglobin A1C   Date Value Ref Range Status   08/11/2022 7.4 (H) 0.0 - 5.6 % Final     Comment:     Normal <5.7%   Prediabetes 5.7-6.4%    Diabetes 6.5% or higher     Note: Adopted from ADA consensus guidelines.   02/14/2022 8.1 (H) 0.0 - 5.6 % Final     Comment:     Normal <5.7%   Prediabetes 5.7-6.4%    Diabetes 6.5% or higher     Note: Adopted from ADA consensus guidelines.   07/23/2021 7.3 (H) 0.0 - 5.6 % Final     Comment:     Normal <5.7%   Prediabetes 5.7-6.4%    Diabetes 6.5% or higher     Note: Adopted from ADA consensus guidelines.   01/11/2021 7.1 (H) 0 - 5.6 % Final     Comment:     Normal <5.7% Prediabetes 5.7-6.4%  Diabetes 6.5% or higher - adopted from ADA   consensus guidelines.     06/26/2020 7.6 (H) 0 - 5.6 % Final     Comment:     Normal <5.7% Prediabetes 5.7-6.4%  Diabetes 6.5% or higher - adopted from ADA   consensus guidelines.     09/16/2013 7.2 (A) 4.3 - 6.0 % Final     Hemoglobin A1C POCT   Date Value Ref Range Status   05/10/2022 7.5 4.3 - <5.7 % Final   10/26/2021 7.3 4.3 - <5.7 % Final   12/17/2019 7.4 (A) 4.3 - 6 % Final         PT and DP pulses are 2/4 bilaterally. CRT is instant.  Positive pedal hair.   Gross sensation is intact bilaterally.  Protective sensation is intact as demonstrated the 5.07G monofilament.  Equinus is mild bilaterally.  Pain  noted with palpation of the left fibular sesamoid.  Pain with range of motion of the sesamoid apparatus.  No pain with range of motion of the corresponding joint.  No pain with joint distraction.  When she would have pain on the right side, it would be an area of ATFL and in the peroneus brevis tendon, as it courses around the lateral malleolus and into its insertion point on the fifth metatarsal tuberosity.  Nails normal bilaterally. No open lesions are noted.     left foot xrays indicated in 3 weightbearing views.    Proximal spurring noted to the fibular sesamoid on the left foot.  No acute processes noted.      Assessment:   Encounter Diagnoses   Name Primary?     Left foot pain Yes     Paresthesia of left foot      Tear of peroneal tendon, right, initial encounter      Chronic pain of right ankle      Sprain of anterior talofibular ligament of left ankle, initial encounter      Sesamoiditis of left foot            Plan:  - Pt seen and evaluated.  -X-rays taken and discussed with her.  She does have sesamoiditis of the left foot.  I recommended shoes with dancers pad.  If this works, we can build this into an orthotic.  -She has a remote injury to the right lateral ankle.  I recommended that we get an MRI of the area.  She may need surgical invention for the ATFL or the peroneal tendons.  We may also be able to perform something like a steroid injection, depending on the damage to the peroneal's and the ATFL area.  -She has paresthesia, especially to the left foot that is new.  I recommend we get an EMG to the area.  This does not follow the course of diabetic neuropathy.  She does agree to this.  She may have radiculopathy.  I did review her previous lumbar spine MRI.  -We will see her again after the MRI is completed.  On the date of service I spent 67 minutes with patient care, documentation, chart review, image review, and care coordination          Tolu Fernández DPM

## 2022-09-23 NOTE — NURSING NOTE
Reason For Visit:   Chief Complaint   Patient presents with     Consult     Right ankle ligament and tendon tear. Numbness, left great toe.        Pain Assessment  Patient Currently in Pain: Yes  Primary Pain Location: Foot        Allergies   Allergen Reactions     Monosodium Glutamate Palpitations and Shortness Of Breath     Sulfasalazine      Developed rash, HA, dizziness           Antonella Meadows LPN

## 2022-09-23 NOTE — PROGRESS NOTES
Date of Service: 9/23/2022    Chief Complaint   Patient presents with     Consult     Right ankle ligament and tendon tear. Numbness, left great toe.           HPI: Dali is a 51 year old female who presents today for diabetic foot exam management.  She is been diabetic since she was 15.  She is currently taking Tresiba and insulin correction.  She is also taking Ozempic.  She does have a Dexcom sensor.  She relates that she injured the right ankle years ago.  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  She had a significant sprain on the right side.  She relates that since then, she has had pain in the peroneal tendons.  She has had a surgery with Dr. Tran to remove a bone fragment from where the ATFL was torn.  She also relates that since around that same time, she had numbness in the right big toe.  Over the last few months, she has been having numbness in the left big toe.  She does have lumbar spine MRI.  She does have low back pain.  She relates that she also has pain in the left first MTPJ.  This is when she is walking.    Review of Systems: Answers for HPI/ROS submitted by the patient on 9/23/2022  General Symptoms: Yes  Skin Symptoms: No  HENT Symptoms: No  EYE SYMPTOMS: No  HEART SYMPTOMS: No  LUNG SYMPTOMS: Yes  INTESTINAL SYMPTOMS: Yes  URINARY SYMPTOMS: No  GYNECOLOGIC SYMPTOMS: No  BREAST SYMPTOMS: No  SKELETAL SYMPTOMS: Yes  BLOOD SYMPTOMS: No  NERVOUS SYSTEM SYMPTOMS: No  MENTAL HEALTH SYMPTOMS: Yes  Fever: Yes  Loss of appetite: Yes  Weight loss: No  Weight gain: No  Fatigue: Yes  Night sweats: Yes  Chills: Yes  Increased stress: No  Excessive hunger: No  Excessive thirst: No  Feeling hot or cold when others believe the temperature is normal: No  Loss of height: No  Post-operative complications: No  Surgical site pain: No  Hallucinations: No  Change in or Loss of Energy: Yes  Hyperactivity: No  Confusion: Yes  Cough: Yes  Sputum or phlegm: No  Coughing up blood: No  Difficulty breating  or shortness of breath: Yes  Snoring: No  Wheezing: No  Difficulty breathing on exertion: Yes  Nighttime Cough: Yes  Difficulty breathing when lying flat: No  Heart burn or indigestion: No  Nausea: Yes  Vomiting: Yes  Abdominal pain: No  Bloating: No  Constipation: No  Diarrhea: No  Blood in stool: No  Black stools: No  Rectal or Anal pain: No  Fecal incontinence: No  Yellowing of skin or eyes: No  Vomit with blood: No  Change in stools: No  Back pain: Yes  Muscle aches: No  Neck pain: No  Swollen joints: No  Joint pain: Yes  Bone pain: No  Muscle cramps: No  Muscle weakness: No  Joint stiffness: Yes  Bone fracture: No  Nervous or Anxious: Yes  Depression: No  Trouble sleeping: Yes  Trouble thinking or concentrating: Yes  Mood changes: No  Panic attacks: No        PMH:   Past Medical History:   Diagnosis Date     Anemia      Anxiety      Arthritis 2014    Psoriatic arthritis     Back injury 1988     Dry eye syndrome      Dyslipidemia      Endometriosis 2002    adehsions seen at laparoscopy     GERD (gastroesophageal reflux disease)      Hypothyroidism     8 yoa     SOB (shortness of breath) 3/11/2014     Type I (juvenile type) diabetes mellitus without mention of complication, not stated as uncontrolled     when 15      Uncomplicated asthma Fall 2013       PSxH:   Past Surgical History:   Procedure Laterality Date     COLONOSCOPY  2002     COLONOSCOPY N/A 6/23/2020    Procedure: COLONOSCOPY;  Surgeon: Lauryn Rivera MD;  Location: [facility]     ESOPHAGOSCOPY, GASTROSCOPY, DUODENOSCOPY (EGD), COMBINED  1/7/2014    Procedure: COMBINED ESOPHAGOSCOPY, GASTROSCOPY, DUODENOSCOPY (EGD), BIOPSY SINGLE OR MULTIPLE;;  Surgeon: Kraig Nicole MD;  Location:  GI     GYN SURGERY      Laparotomy to remove endometrial tissue     HC BREATH HYDROGEN TEST N/A 6/17/2014    Procedure: HYDROGEN BREATH TEST;  Surgeon: Deacon Alberts MD;  Location:  GI     HYDROGEN BREATH TEST  6/17/14     LAPAROSCOPIC APPENDECTOMY  2002      LAPAROTOMY, LYSIS ADHESIONS, COMBINED  2002    endometriosis     SOFT TISSUE SURGERY  December 2014    right ankle tendon injury     ZZC REPAIR CRUCIATE LIGAMENT,KNEE       ZZC STOMACH SURGERY PROCEDURE UNLISTED  December 2002       Allergies: Monosodium glutamate and Sulfasalazine    SH:   Social History     Socioeconomic History     Marital status: Single     Spouse name: Not on file     Number of children: 0     Years of education: Not on file     Highest education level: Not on file   Occupational History     Occupation: research     Employer: [employer]   Tobacco Use     Smoking status: Never Smoker     Smokeless tobacco: Never Used   Vaping Use     Vaping Use: Never used   Substance and Sexual Activity     Alcohol use: Yes     Comment: moderately     Drug use: No     Sexual activity: Not on file   Other Topics Concern     Parent/sibling w/ CABG, MI or angioplasty before 65F 55M? Yes   Social History Narrative     Not on file     Social Determinants of Health     Financial Resource Strain: Not on file   Food Insecurity: Not on file   Transportation Needs: Not on file   Physical Activity: Not on file   Stress: Not on file   Social Connections: Not on file   Intimate Partner Violence: Not on file   Housing Stability: Not on file       FH:   Family History   Problem Relation Age of Onset     Breast Cancer Mother      Heart Disease Father      C.A.D. Father      Arthritis Father      Circulatory Father      Eye Disorder Father      Lipids Father      Thyroid Disease Father      Macular Degeneration Father      Coronary Artery Disease Father      Anxiety Disorder Father      Alcoholism Father      Rheumatoid Arthritis Father      Hypothyroidism Father      Diabetes Father      Cerebrovascular Disease Paternal Grandmother         ,     Cancer Paternal Grandfather         Pancreatic     Heart Disease Paternal Grandfather      Diabetes Maternal Grandmother         Type 2     C.A.D. Maternal Grandmother       Heart Disease Maternal Grandmother      Coronary Artery Disease Maternal Grandmother      Heart Disease Maternal Grandfather      Cardiac Sudden Death Maternal Grandfather      Gynecology Other         self     Thyroid Disease Other         self     Macular Degeneration Maternal Aunt      Diabetes Other         Type 1     Glaucoma No family hx of        Objective:  Hemoglobin A1C   Date Value Ref Range Status   08/11/2022 7.4 (H) 0.0 - 5.6 % Final     Comment:     Normal <5.7%   Prediabetes 5.7-6.4%    Diabetes 6.5% or higher     Note: Adopted from ADA consensus guidelines.   02/14/2022 8.1 (H) 0.0 - 5.6 % Final     Comment:     Normal <5.7%   Prediabetes 5.7-6.4%    Diabetes 6.5% or higher     Note: Adopted from ADA consensus guidelines.   07/23/2021 7.3 (H) 0.0 - 5.6 % Final     Comment:     Normal <5.7%   Prediabetes 5.7-6.4%    Diabetes 6.5% or higher     Note: Adopted from ADA consensus guidelines.   01/11/2021 7.1 (H) 0 - 5.6 % Final     Comment:     Normal <5.7% Prediabetes 5.7-6.4%  Diabetes 6.5% or higher - adopted from ADA   consensus guidelines.     06/26/2020 7.6 (H) 0 - 5.6 % Final     Comment:     Normal <5.7% Prediabetes 5.7-6.4%  Diabetes 6.5% or higher - adopted from ADA   consensus guidelines.     09/16/2013 7.2 (A) 4.3 - 6.0 % Final     Hemoglobin A1C POCT   Date Value Ref Range Status   05/10/2022 7.5 4.3 - <5.7 % Final   10/26/2021 7.3 4.3 - <5.7 % Final   12/17/2019 7.4 (A) 4.3 - 6 % Final         PT and DP pulses are 2/4 bilaterally. CRT is instant.  Positive pedal hair.   Gross sensation is intact bilaterally.  Protective sensation is intact as demonstrated the 5.07G monofilament.  Equinus is mild bilaterally.  Pain noted with palpation of the left fibular sesamoid.  Pain with range of motion of the sesamoid apparatus.  No pain with range of motion of the corresponding joint.  No pain with joint distraction.  When she would have pain on the right side, it would be an area of ATFL and in the  peroneus brevis tendon, as it courses around the lateral malleolus and into its insertion point on the fifth metatarsal tuberosity.  Nails normal bilaterally. No open lesions are noted.     left foot xrays indicated in 3 weightbearing views.    Proximal spurring noted to the fibular sesamoid on the left foot.  No acute processes noted.      Assessment:   Encounter Diagnoses   Name Primary?     Left foot pain Yes     Paresthesia of left foot      Tear of peroneal tendon, right, initial encounter      Chronic pain of right ankle      Sprain of anterior talofibular ligament of left ankle, initial encounter      Sesamoiditis of left foot            Plan:  - Pt seen and evaluated.  -X-rays taken and discussed with her.  She does have sesamoiditis of the left foot.  I recommended shoes with dancers pad.  If this works, we can build this into an orthotic.  -She has a remote injury to the right lateral ankle.  I recommended that we get an MRI of the area.  She may need surgical invention for the ATFL or the peroneal tendons.  We may also be able to perform something like a steroid injection, depending on the damage to the peroneal's and the ATFL area.  -She has paresthesia, especially to the left foot that is new.  I recommend we get an EMG to the area.  This does not follow the course of diabetic neuropathy.  She does agree to this.  She may have radiculopathy.  I did review her previous lumbar spine MRI.  -We will see her again after the MRI is completed.  On the date of service I spent 67 minutes with patient care, documentation, chart review, image review, and care coordination

## 2022-10-07 ENCOUNTER — VIRTUAL VISIT (OUTPATIENT)
Dept: SLEEP MEDICINE | Facility: CLINIC | Age: 52
End: 2022-10-07
Payer: COMMERCIAL

## 2022-10-07 VITALS — WEIGHT: 200 LBS | HEIGHT: 70 IN | BODY MASS INDEX: 28.63 KG/M2

## 2022-10-07 DIAGNOSIS — F43.9 STRESS: ICD-10-CM

## 2022-10-07 DIAGNOSIS — F51.04 CHRONIC INSOMNIA: Primary | ICD-10-CM

## 2022-10-07 PROCEDURE — 90832 PSYTX W PT 30 MINUTES: CPT | Mod: 95 | Performed by: PSYCHOLOGIST

## 2022-10-07 ASSESSMENT — SLEEP AND FATIGUE QUESTIONNAIRES
HOW LIKELY ARE YOU TO NOD OFF OR FALL ASLEEP WHILE SITTING AND READING: HIGH CHANCE OF DOZING
HOW LIKELY ARE YOU TO NOD OFF OR FALL ASLEEP IN A CAR, WHILE STOPPED FOR A FEW MINUTES IN TRAFFIC: WOULD NEVER DOZE
HOW LIKELY ARE YOU TO NOD OFF OR FALL ASLEEP WHILE LYING DOWN TO REST IN THE AFTERNOON WHEN CIRCUMSTANCES PERMIT: HIGH CHANCE OF DOZING
HOW LIKELY ARE YOU TO NOD OFF OR FALL ASLEEP WHILE WATCHING TV: HIGH CHANCE OF DOZING
HOW LIKELY ARE YOU TO NOD OFF OR FALL ASLEEP WHEN YOU ARE A PASSENGER IN A CAR FOR AN HOUR WITHOUT A BREAK: MODERATE CHANCE OF DOZING
HOW LIKELY ARE YOU TO NOD OFF OR FALL ASLEEP WHILE SITTING INACTIVE IN A PUBLIC PLACE: SLIGHT CHANCE OF DOZING
HOW LIKELY ARE YOU TO NOD OFF OR FALL ASLEEP WHILE SITTING AND TALKING TO SOMEONE: WOULD NEVER DOZE
HOW LIKELY ARE YOU TO NOD OFF OR FALL ASLEEP WHILE SITTING QUIETLY AFTER LUNCH WITHOUT ALCOHOL: SLIGHT CHANCE OF DOZING

## 2022-10-07 ASSESSMENT — PAIN SCALES - GENERAL: PAINLEVEL: SEVERE PAIN (6)

## 2022-10-07 NOTE — PROGRESS NOTES
Dali is a 51 year old who is being evaluated via a billable video visit.      How would you like to obtain your AVS? MyChart  If the video visit is dropped, the invitation should be resent by: Send to e-mail at: [email or web address]  Will anyone else be joining your video visit? Asiya Schaffer    Video-Visit Details    Video Start Time: 3:30 PM    Type of service:  Video Visit    Video End Time:3:59 PM    Originating Location (pt. Location): Home    Distant Location (provider location):  [facility]     SLEEP MEDICINE VIRTUAL VIDEO FOLLOW-UP VISIT  Sleep Psychology    Patient Name: Dali Martines  MRN:  [id number]  Date of Service: Oct 7, 2022       Subjective Report     Dali Martines  returns for a telehealth video visit to discuss progress in implementing behavioral strategies for the management of insomnia.  Patient consent for initiation of video visit was obtained and documented prior to initiation of visit.     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    She noticed that she was less tolerant of psychological stressors during COVID recovery, particularly caregiving burden.    Revisited work and life stress management strategies.  Particular focus on setting boundaries with her mother..     .     Sleep Data:     Source of Sleep Estimates:  Verbal Self-report        EPWORTH SLEEPINESS SCALE    Sitting and reading 3   Watching TV 3   Sitting, inactive in a public place (theatre or mtg.) 1    As a passenger in a car 2   Lying down to rest in the afternoon when circumstance permit 3   Sitting and talking to someone 0   Sitting quietly after lunch without alcohol 1   In a car, while stopped for a few minutes in traffic 0   TOTAL SCORE (nl <11) 13     INSOMNIA SEVERITY INDEX     Difficulty falling asleep 1   Difficult staying asleep 4   Problems waking up to early 3   How SATISFIED/DISSATISFIED are you with  "your CURRENT sleep pattern? 4   How NOTICEABLE to others do you think your sleep pattern is in terms of your quality of life? 2   How WORRIED/DISTRESSED are you about your current sleep pattern? 2   To what extent do you consider your sleep problem to INTERFERE with your daily fuctioning(e.g. daytime fatigue, mood, ability to function at work/daily chores, concentration, mood,etc.) CURRENTLY? 3   INSOMNIA SEVERITY INDEX TOTAL SCORE 19    Absence of insomnia (0-7); sub-threshold insomnia (8-14); moderate insomnia (15-21); and severe insomnia (22-28)       Interventions     Strategies and recommendations including Stimulus control therapy, Stress management and Anxiety and sleep were discussed today.       Vital Signs     Ht 1.778 m (5' 10\")   Wt 90.7 kg (200 lb)   BMI 28.70 kg/m       Mental Status     Orientation:  X3  Mood:  normal  Affect:  Congruent with mood  Speech/Language:  Normal  Thought Process: Intact  Associations:  Normal  Thought Content: Normal  Patient does not report any suicidal ideation, intention or plan.    Diagnostic Impressions and Plan        Chronic insomnia  Stress    Ongoing stress and COVID recovery issues continue to impact sleep with symptoms in the moderate level of severity, worsened from apex of improvement.      Plan:  Continue current sleep schedule and plan.  At follow-up, check in on stress reduction strategies, particularly setting aside time to focus on self-care.    Follow-up: 6 weeks      Philip Quiroz PsyD, GARY, [facility]  Diplomate, Behavioral Sleep Medicine  [facility]      Note: This dictation was created using voice recognition software. This document may contain an error not identified before finalizing the document. If the error changes the accuracy of the document, I would appreciate it being brought to my attention.                               Platform used for Video Visit: [employer]  "

## 2022-10-08 ENCOUNTER — HOSPITAL ENCOUNTER (OUTPATIENT)
Dept: MRI IMAGING | Facility: CLINIC | Age: 52
Discharge: HOME | End: 2022-10-08
Attending: PODIATRIST | Admitting: PODIATRIST
Payer: COMMERCIAL

## 2022-10-08 DIAGNOSIS — S93.492A SPRAIN OF ANTERIOR TALOFIBULAR LIGAMENT OF LEFT ANKLE, INITIAL ENCOUNTER: ICD-10-CM

## 2022-10-08 DIAGNOSIS — G89.29 CHRONIC PAIN OF RIGHT ANKLE: ICD-10-CM

## 2022-10-08 DIAGNOSIS — S86.311A TEAR OF PERONEAL TENDON, RIGHT, INITIAL ENCOUNTER: ICD-10-CM

## 2022-10-08 DIAGNOSIS — M25.571 CHRONIC PAIN OF RIGHT ANKLE: ICD-10-CM

## 2022-10-08 PROCEDURE — 73721 MRI JNT OF LWR EXTRE W/O DYE: CPT | Mod: RT

## 2022-10-09 ENCOUNTER — HEALTH MAINTENANCE LETTER (OUTPATIENT)
Age: 52
End: 2022-10-09

## 2022-10-27 ENCOUNTER — VIRTUAL VISIT (OUTPATIENT)
Dept: EDUCATION SERVICES | Facility: CLINIC | Age: 52
End: 2022-10-27
Payer: COMMERCIAL

## 2022-10-27 DIAGNOSIS — E10.9 TYPE 1 DIABETES MELLITUS WITHOUT COMPLICATION (H): Primary | ICD-10-CM

## 2022-10-27 PROCEDURE — G0108 DIAB MANAGE TRN  PER INDIV: HCPCS | Mod: 95 | Performed by: NUTRITIONIST

## 2022-11-03 NOTE — PROGRESS NOTES
Dali is a 51 year old who is being evaluated via a billable video visit.      How would you like to obtain your AVS? MyChart  If the video visit is dropped, the invitation should be resent by: Send to e-mail at: [email or web address]  Will anyone else be joining your video visit? No      Video-Visit Details    Video Start Time: 11am    Type of service:  Video Visit    Video End Time:1130am    Originating Location (pt. Location): Home        Distant Location (provider location):  Off-site    Platform used for Video Visit: Pigeonly    Diabetes Self-Management Education & Support    Dali Martines presents today for education related to Type 1 diabetes    Patient is being treated with:  insulin  She is accompanied by self    ASSESSMENT:    Taking Medication:     Current Diabetes Management per Patient:  Taking diabetes medications?   yes:     Diabetes Medication(s)     Insulin       insulin aspart (FIASP FLEXTOUCH) 100 UNIT/ML pen-injector    Inject subcu 1 unit per 7 grams CHO with Correction Factor of1 unit drops BG 30 mg/mL. Approx 45 units daily.     insulin degludec (TRESIBA FLEXTOUCH) 100 UNIT/ML pen    ADMINISTER 17 UNITS UNDER THE SKIN DAILY     NOVOLOG PENFILL 100 UNIT/ML soln    INJECT 1 UNIT PER 15 GRAMS CHO AT ALL MEALS AND SNACKS WITH CORRECTION SCALE OF 1 UNIT PER 50 MG/DL OVER 150, AVERAGE DAILY USE OF 30 UNITS    Incretin Mimetic Agents (GLP-1 Receptor Agonists)       OZEMPIC, 0.25 OR 0.5 MG/DOSE, 2 MG/1.5ML SOPN pen    INJECT 0.25 MG UNDER THE SKIN EVERY 7 DAYS FOR 28 DAYS, THEN 0.5 MG EVERY 7 DAYS.        Tresiba 17 units  Fiasp 1:9g plus correction 1/50/150    Monitoring        She is having some spikes in glucose with subsequent lows.  Likely do to bolusing after the meal  Patient hasn't been feeling well and isn't sure how much she will tolerate before the meal    Patient's most recent   Lab Results   Component Value Date    A1C 7.4 08/11/2022    A1C 7.1 01/11/2021      Patient's A1C goal:  <7.0      Healthy Eating:   Carb counting has improved significantly     Problem Solving:      Patient is at risk of hypoglycemia?: YES  Hospitalizations for hyper or hypoglycemia: Unknown    Healthy Coping and Stress Management:   Sources of stress identified by patient My health  My Job    EDUCATION and INSTRUCTION PROVIDED AT THIS VISIT:    Reviewed glucose reports in detail with Dali and discussed factors affecting spikes and subsequent lows.  Developed plan    Patient-stated goal written and given to Dali Martines.  Verbalized and demonstrated understanding of instructions.     PLAN:  We discussed timing of insulin and to cover at least what she is certain she will eat. And then give additional bolus if eats more carbs.    FOLLOW-UP:    scheduled    Time spent with patient at today's visit was 30 minutes.      Any diabetes medication dose changes were made via the CDE Protocol and Collaborative Practice Agreement with Faith and  Rita.  A copy of this encounter was provided to patient's referring provider.

## 2022-11-09 ENCOUNTER — OFFICE VISIT (OUTPATIENT)
Dept: ORTHOPEDICS | Facility: CLINIC | Age: 52
End: 2022-11-09
Payer: COMMERCIAL

## 2022-11-09 DIAGNOSIS — G89.29 CHRONIC PAIN OF RIGHT ANKLE: Primary | ICD-10-CM

## 2022-11-09 DIAGNOSIS — S93.492A SPRAIN OF ANTERIOR TALOFIBULAR LIGAMENT OF LEFT ANKLE, INITIAL ENCOUNTER: ICD-10-CM

## 2022-11-09 DIAGNOSIS — M25.571 CHRONIC PAIN OF RIGHT ANKLE: Primary | ICD-10-CM

## 2022-11-09 PROCEDURE — 99212 OFFICE O/P EST SF 10 MIN: CPT | Mod: 25 | Performed by: PODIATRIST

## 2022-11-09 PROCEDURE — 20605 DRAIN/INJ JOINT/BURSA W/O US: CPT | Mod: LT | Performed by: PODIATRIST

## 2022-11-09 RX ORDER — DEXAMETHASONE SODIUM PHOSPHATE 4 MG/ML
4 INJECTION, SOLUTION INTRA-ARTICULAR; INTRALESIONAL; INTRAMUSCULAR; INTRAVENOUS; SOFT TISSUE
Status: DISCONTINUED | OUTPATIENT
Start: 2022-11-09 | End: 2023-10-05

## 2022-11-09 RX ORDER — LIDOCAINE HYDROCHLORIDE 10 MG/ML
1 INJECTION, SOLUTION INFILTRATION; PERINEURAL
Status: SHIPPED | OUTPATIENT
Start: 2022-11-09

## 2022-11-09 RX ADMIN — LIDOCAINE HYDROCHLORIDE 1 ML: 10 INJECTION, SOLUTION INFILTRATION; PERINEURAL at 14:29

## 2022-11-09 RX ADMIN — DEXAMETHASONE SODIUM PHOSPHATE 4 MG: 4 INJECTION, SOLUTION INTRA-ARTICULAR; INTRALESIONAL; INTRAMUSCULAR; INTRAVENOUS; SOFT TISSUE at 14:29

## 2022-11-09 NOTE — PROGRESS NOTES
Chief Complaint   Patient presents with     Results     MRI, right ankle.             HPI: Dali is a 51 year old female who presents today for left foot and ankle pain.  She did have the MRI performed.  EMG is scheduled for later this month. The dancer's pad on the left foot is helping some.     Review of Systems: No n/v/d/f/c/ns/sob/cp      PMH:   Past Medical History:   Diagnosis Date     Anemia      Anxiety      Arthritis 2014    Psoriatic arthritis     Back injury 1988     Dry eye syndrome      Dyslipidemia      Endometriosis 2002    adehsions seen at laparoscopy     GERD (gastroesophageal reflux disease)      Hypothyroidism     8 yoa     SOB (shortness of breath) 3/11/2014     Type I (juvenile type) diabetes mellitus without mention of complication, not stated as uncontrolled     when 15      Uncomplicated asthma Fall 2013       PSxH:   Past Surgical History:   Procedure Laterality Date     COLONOSCOPY  2002     COLONOSCOPY N/A 6/23/2020    Procedure: COLONOSCOPY;  Surgeon: Lauryn Rivera MD;  Location:  GI     ESOPHAGOSCOPY, GASTROSCOPY, DUODENOSCOPY (EGD), COMBINED  1/7/2014    Procedure: COMBINED ESOPHAGOSCOPY, GASTROSCOPY, DUODENOSCOPY (EGD), BIOPSY SINGLE OR MULTIPLE;;  Surgeon: Kraig Nicole MD;  Location:  GI     GYN SURGERY      Laparotomy to remove endometrial tissue     HC BREATH HYDROGEN TEST N/A 6/17/2014    Procedure: HYDROGEN BREATH TEST;  Surgeon: Deacon Alberts MD;  Location:  GI     HYDROGEN BREATH TEST  6/17/14     LAPAROSCOPIC APPENDECTOMY  2002     LAPAROTOMY, LYSIS ADHESIONS, COMBINED  2002    endometriosis     SOFT TISSUE SURGERY  December 2014    right ankle tendon injury     ZZC REPAIR CRUCIATE LIGAMENT,KNEE       ZZC STOMACH SURGERY PROCEDURE UNLISTED  December 2002       Allergies: Monosodium glutamate and Sulfasalazine    SH:   Social History     Socioeconomic History     Marital status: Single     Spouse name: Not on file     Number of children: 0     Years  of education: Not on file     Highest education level: Not on file   Occupational History     Occupation: research     Employer: [employer]   Tobacco Use     Smoking status: Never     Smokeless tobacco: Never   Vaping Use     Vaping Use: Never used   Substance and Sexual Activity     Alcohol use: Yes     Comment: moderately     Drug use: No     Sexual activity: Not on file   Other Topics Concern     Parent/sibling w/ CABG, MI or angioplasty before 65F 55M? Yes   Social History Narrative     Not on file     Social Determinants of Health     Financial Resource Strain: Not on file   Food Insecurity: Not on file   Transportation Needs: Not on file   Physical Activity: Not on file   Stress: Not on file   Social Connections: Not on file   Intimate Partner Violence: Not on file   Housing Stability: Not on file       FH:   Family History   Problem Relation Age of Onset     Breast Cancer Mother      Heart Disease Father      C.A.D. Father      Arthritis Father      Circulatory Father      Eye Disorder Father      Lipids Father      Thyroid Disease Father      Macular Degeneration Father      Coronary Artery Disease Father      Anxiety Disorder Father      Alcoholism Father      Rheumatoid Arthritis Father      Hypothyroidism Father      Diabetes Father      Cerebrovascular Disease Paternal Grandmother         ,     Cancer Paternal Grandfather         Pancreatic     Heart Disease Paternal Grandfather      Diabetes Maternal Grandmother         Type 2     C.A.D. Maternal Grandmother      Heart Disease Maternal Grandmother      Coronary Artery Disease Maternal Grandmother      Heart Disease Maternal Grandfather      Cardiac Sudden Death Maternal Grandfather      Gynecology Other         self     Thyroid Disease Other         self     Macular Degeneration Maternal Aunt      Diabetes Other         Type 1     Glaucoma No family hx of        Objective:  Hemoglobin A1C   Date Value Ref Range Status   08/11/2022 7.4 (H) 0.0 -  5.6 % Final     Comment:     Normal <5.7%   Prediabetes 5.7-6.4%    Diabetes 6.5% or higher     Note: Adopted from ADA consensus guidelines.   02/14/2022 8.1 (H) 0.0 - 5.6 % Final     Comment:     Normal <5.7%   Prediabetes 5.7-6.4%    Diabetes 6.5% or higher     Note: Adopted from ADA consensus guidelines.   07/23/2021 7.3 (H) 0.0 - 5.6 % Final     Comment:     Normal <5.7%   Prediabetes 5.7-6.4%    Diabetes 6.5% or higher     Note: Adopted from ADA consensus guidelines.   01/11/2021 7.1 (H) 0 - 5.6 % Final     Comment:     Normal <5.7% Prediabetes 5.7-6.4%  Diabetes 6.5% or higher - adopted from ADA   consensus guidelines.     06/26/2020 7.6 (H) 0 - 5.6 % Final     Comment:     Normal <5.7% Prediabetes 5.7-6.4%  Diabetes 6.5% or higher - adopted from ADA   consensus guidelines.     09/16/2013 7.2 (A) 4.3 - 6.0 % Final     Hemoglobin A1C POCT   Date Value Ref Range Status   05/10/2022 7.5 4.3 - <5.7 % Final   10/26/2021 7.3 4.3 - <5.7 % Final   12/17/2019 7.4 (A) 4.3 - 6 % Final         PT and DP pulses are 2/4 bilaterally. CRT is instant.  Positive pedal hair.   Gross sensation is intact bilaterally.  Protective sensation is intact as demonstrated the 5.07G monofilament.  Equinus is mild bilaterally.  Pain noted with palpation of the left fibular sesamoid.  Pain with range of motion of the sesamoid apparatus.  No pain with range of motion of the corresponding joint.  No pain with joint distraction.  When she would have pain on the right side, it would be an area of ATFL and in the peroneus brevis tendon, as it courses around the lateral malleolus and into its insertion point on the fifth metatarsal tuberosity.  Nails normal bilaterally. No open lesions are noted.     left foot xrays indicated in 3 weightbearing views.    Proximal spurring noted to the fibular sesamoid on the left foot.  No acute processes noted.    MRI IMPRESSION:  1.  Sequela of chronic anterior talofibular ligament sprain. Bony proliferation and  spurring along the anterolateral talus at the anterior talofibular ligament attachment. No acute lateral ankle ligament injury.  2.  No significant peroneal tendinopathy or high-grade tear. No lateral tenosynovitis or tendon displacement.  3.  No acute ankle or hindfoot fracture. No stress fracture.    Assessment:   Dali is a 52 YO with chronic ATFL pain with chronic changes on the MRI. We discussed tx options today. Initially, I would recommend starting with a steroid injection into the sinus tarsi. We also discussed possible PRP injections. At some point, she may need to see Dr. Tran again.   She also has sesamoiditis on the left foot.         Plan:  - Pt seen and evaluated.  - Cont with the dancer's pad. Consider orthotics at next appt.   -She has a remote injury to the right lateral ankle. She elects for CSI into the sinus tarsi. We discussed the risks, benefits, alternatives, complications, and I answered all of her questions. Consent was signed. After skin prep, an injection consisting of 3cc's of a 1:1 mix of 1% lidocaine plain + dexamethasone 4mg + Kenalog 10 was injected into the right sinus tarsi. She tolerated this well with no complications.  - Easy activity for 3 days, in a tri-lock ankle brace. Then, activity as tolerated in an ankle brace.  - Awaiting EMG.   - See again in 3 weeks.

## 2022-11-09 NOTE — PROGRESS NOTES
Medium Joint Injection/Arthrocentesis: R ankle    Date/Time: 2022 2:29 PM  Performed by: Tolu Fernández DPM  Authorized by: Tolu Fernández DPM     Needle Size:  25 G  Location:  Ankle  Site:  R ankle  Medications:  4 mg dexamethasone 4 MG/ML; 1 mL lidocaine 1 %; 10 mg triamcinolone acetonide 10 MG/ML  Outcome:  Tolerated well, no immediate complications  Procedure discussed: discussed risks, benefits, and alternatives    Timeout: timeout called immediately prior to procedure    Prep: patient was prepped and draped in usual sterile fashion          [facility] ORTHOP[facility] [address]  [address] [facility] [address] [phone]  Dept: [phone]  ______________________________________________________________________________    Patient: Dali Martines   : 1970   MRN: [id number]   2022    INVASIVE PROCEDURE SAFETY CHECKLIST    Date: 2022   Procedure: Right ankle injection  Patient Name: Dali Martines  MRN: [id number]  YOB: 1970    Action: Complete sections as appropriate. Any discrepancy results in a HARD COPY until resolved.     PRE PROCEDURE:  Patient ID verified with 2 identifiers (name and  or MRN): Yes  Procedure and site verified with patient/designee (when able): Yes  Accurate consent documentation in medical record: Yes  H&P (or appropriate assessment) documented in medical record: Yes  H&P must be up to 20 days prior to procedure and updates within 24 hours of procedure as applicable: NA  Relevant diagnostic and radiology test results appropriately labeled and displayed as applicable: Yes  Procedure site(s) marked with provider initials: NA    TIMEOUT:  Time-Out performed immediately prior to starting procedure, including verbal and active participation of all team members addressing the following:Yes  * Correct patient identify  * Confirmed that the correct side and site are marked  * An accurate  procedure consent form  * Agreement on the procedure to be done  * Correct patient position  * Relevant images and results are properly labeled and appropriately displayed  * The need to administer antibiotics or fluids for irrigation purposes during the procedure as applicable   * Safety precautions based on patient history or medication use    DURING PROCEDURE: Verification of correct person, site, and procedures any time the responsibility for care of the patient is transferred to another member of the care team.       The following medications were given:         Prior to injection, verified patient identity using patient's name and date of birth.  Due to injection administration, patient instructed to remain in clinic for 15 minutes  afterwards, and to report any adverse reaction to me immediately.      Medication Name:  Lidocaine 1%, 50 mg per 5 ml  NDC: 37383-852-70  Drug Amount Wasted:  Yes: 4 ml  Vial/Syringe: Single dose vial  Expiration Date:  08/26    Medication Name: kenaog-10, 50 mg per 5 ml   NDC: 1737-0403-81  Drug Amount Wasted:  Yes: 4 ml  Vial/Syringe: Multi dose vial  Expiration Date:  APR 2024      Scribed by Antonella Meadows LPN for Dr. Fernández on November 9, 2022 based on the provider's statements to me.     Antonella Meadows LPN

## 2022-11-09 NOTE — LETTER
11/9/2022         RE: Dali Martines  [address]  [facility] [address]        Dear Colleague,    Thank you for referring your patient, Dali Martines, to the [facility] [place]. Please see a copy of my visit note below.    Chief Complaint   Patient presents with     Results     MRI, right ankle.             HPI: Dali is a 51 year old female who presents today for left foot and ankle pain.  She did have the MRI performed.  EMG is scheduled for later this month. The dancer's pad on the left foot is helping some.     Review of Systems: No n/v/d/f/c/ns/sob/cp      PMH:   Past Medical History:   Diagnosis Date     Anemia      Anxiety      Arthritis 2014    Psoriatic arthritis     Back injury 1988     Dry eye syndrome      Dyslipidemia      Endometriosis 2002    adehsions seen at laparoscopy     GERD (gastroesophageal reflux disease)      Hypothyroidism     8 yoa     SOB (shortness of breath) 3/11/2014     Type I (juvenile type) diabetes mellitus without mention of complication, not stated as uncontrolled     when 15      Uncomplicated asthma Fall 2013       PSxH:   Past Surgical History:   Procedure Laterality Date     COLONOSCOPY  2002     COLONOSCOPY N/A 6/23/2020    Procedure: COLONOSCOPY;  Surgeon: Lauryn Rivera MD;  Location:  GI     ESOPHAGOSCOPY, GASTROSCOPY, DUODENOSCOPY (EGD), COMBINED  1/7/2014    Procedure: COMBINED ESOPHAGOSCOPY, GASTROSCOPY, DUODENOSCOPY (EGD), BIOPSY SINGLE OR MULTIPLE;;  Surgeon: Kraig Nicole MD;  Location:  GI     GYN SURGERY      Laparotomy to remove endometrial tissue     HC BREATH HYDROGEN TEST N/A 6/17/2014    Procedure: HYDROGEN BREATH TEST;  Surgeon: Deacon Alberts MD;  Location:  GI     HYDROGEN BREATH TEST  6/17/14     LAPAROSCOPIC APPENDECTOMY  2002     LAPAROTOMY, LYSIS ADHESIONS, COMBINED  2002    endometriosis     SOFT TISSUE SURGERY  December 2014    right ankle tendon injury     ZZC REPAIR CRUCIATE  LIGAMENT,KNEE       ZZC STOMACH SURGERY PROCEDURE UNLISTED  December 2002       Allergies: Monosodium glutamate and Sulfasalazine    SH:   Social History     Socioeconomic History     Marital status: Single     Spouse name: Not on file     Number of children: 0     Years of education: Not on file     Highest education level: Not on file   Occupational History     Occupation: research     Employer: [employer]   Tobacco Use     Smoking status: Never     Smokeless tobacco: Never   Vaping Use     Vaping Use: Never used   Substance and Sexual Activity     Alcohol use: Yes     Comment: moderately     Drug use: No     Sexual activity: Not on file   Other Topics Concern     Parent/sibling w/ CABG, MI or angioplasty before 65F 55M? Yes   Social History Narrative     Not on file     Social Determinants of Health     Financial Resource Strain: Not on file   Food Insecurity: Not on file   Transportation Needs: Not on file   Physical Activity: Not on file   Stress: Not on file   Social Connections: Not on file   Intimate Partner Violence: Not on file   Housing Stability: Not on file       FH:   Family History   Problem Relation Age of Onset     Breast Cancer Mother      Heart Disease Father      C.A.D. Father      Arthritis Father      Circulatory Father      Eye Disorder Father      Lipids Father      Thyroid Disease Father      Macular Degeneration Father      Coronary Artery Disease Father      Anxiety Disorder Father      Alcoholism Father      Rheumatoid Arthritis Father      Hypothyroidism Father      Diabetes Father      Cerebrovascular Disease Paternal Grandmother         ,     Cancer Paternal Grandfather         Pancreatic     Heart Disease Paternal Grandfather      Diabetes Maternal Grandmother         Type 2     C.A.D. Maternal Grandmother      Heart Disease Maternal Grandmother      Coronary Artery Disease Maternal Grandmother      Heart Disease Maternal Grandfather      Cardiac Sudden Death Maternal  Grandfather      Gynecology Other         self     Thyroid Disease Other         self     Macular Degeneration Maternal Aunt      Diabetes Other         Type 1     Glaucoma No family hx of        Objective:  Hemoglobin A1C   Date Value Ref Range Status   08/11/2022 7.4 (H) 0.0 - 5.6 % Final     Comment:     Normal <5.7%   Prediabetes 5.7-6.4%    Diabetes 6.5% or higher     Note: Adopted from ADA consensus guidelines.   02/14/2022 8.1 (H) 0.0 - 5.6 % Final     Comment:     Normal <5.7%   Prediabetes 5.7-6.4%    Diabetes 6.5% or higher     Note: Adopted from ADA consensus guidelines.   07/23/2021 7.3 (H) 0.0 - 5.6 % Final     Comment:     Normal <5.7%   Prediabetes 5.7-6.4%    Diabetes 6.5% or higher     Note: Adopted from ADA consensus guidelines.   01/11/2021 7.1 (H) 0 - 5.6 % Final     Comment:     Normal <5.7% Prediabetes 5.7-6.4%  Diabetes 6.5% or higher - adopted from ADA   consensus guidelines.     06/26/2020 7.6 (H) 0 - 5.6 % Final     Comment:     Normal <5.7% Prediabetes 5.7-6.4%  Diabetes 6.5% or higher - adopted from ADA   consensus guidelines.     09/16/2013 7.2 (A) 4.3 - 6.0 % Final     Hemoglobin A1C POCT   Date Value Ref Range Status   05/10/2022 7.5 4.3 - <5.7 % Final   10/26/2021 7.3 4.3 - <5.7 % Final   12/17/2019 7.4 (A) 4.3 - 6 % Final         PT and DP pulses are 2/4 bilaterally. CRT is instant.  Positive pedal hair.   Gross sensation is intact bilaterally.  Protective sensation is intact as demonstrated the 5.07G monofilament.  Equinus is mild bilaterally.  Pain noted with palpation of the left fibular sesamoid.  Pain with range of motion of the sesamoid apparatus.  No pain with range of motion of the corresponding joint.  No pain with joint distraction.  When she would have pain on the right side, it would be an area of ATFL and in the peroneus brevis tendon, as it courses around the lateral malleolus and into its insertion point on the fifth metatarsal tuberosity.  Nails normal bilaterally. No  open lesions are noted.     left foot xrays indicated in 3 weightbearing views.    Proximal spurring noted to the fibular sesamoid on the left foot.  No acute processes noted.    MRI IMPRESSION:  1.  Sequela of chronic anterior talofibular ligament sprain. Bony proliferation and spurring along the anterolateral talus at the anterior talofibular ligament attachment. No acute lateral ankle ligament injury.  2.  No significant peroneal tendinopathy or high-grade tear. No lateral tenosynovitis or tendon displacement.  3.  No acute ankle or hindfoot fracture. No stress fracture.    Assessment:   Dali is a 50 YO with chronic ATFL pain with chronic changes on the MRI. We discussed tx options today. Initially, I would recommend starting with a steroid injection into the sinus tarsi. We also discussed possible PRP injections. At some point, she may need to see Dr. Tran again.   She also has sesamoiditis on the left foot.         Plan:  - Pt seen and evaluated.  - Cont with the dancer's pad. Consider orthotics at next appt.   -She has a remote injury to the right lateral ankle. She elects for CSI into the sinus tarsi. We discussed the risks, benefits, alternatives, complications, and I answered all of her questions. Consent was signed. After skin prep, an injection consisting of 3cc's of a 1:1 mix of 1% lidocaine plain + dexamethasone 4mg + Kenalog 10 was injected into the right sinus tarsi. She tolerated this well with no complications.  - Easy activity for 3 days, in a tri-lock ankle brace. Then, activity as tolerated in an ankle brace.  - Awaiting EMG.   - See again in 3 weeks.           Medium Joint Injection/Arthrocentesis: R ankle    Date/Time: 11/9/2022 2:29 PM  Performed by: Tolu Fernández DPM  Authorized by: Tolu Fernández DPM     Needle Size:  25 G  Location:  Ankle  Site:  R ankle  Medications:  4 mg dexamethasone 4 MG/ML; 1 mL lidocaine 1 %; 10 mg triamcinolone acetonide 10 MG/ML  Outcome:   Tolerated well, no immediate complications  Procedure discussed: discussed risks, benefits, and alternatives    Timeout: timeout called immediately prior to procedure    Prep: patient was prepped and draped in usual sterile fashion          [facility] [address]  [address] [facility] [address] [phone]  Dept: [phone]  ______________________________________________________________________________    Patient: Dali Martines   : 1970   MRN: [id number]   2022    INVASIVE PROCEDURE SAFETY CHECKLIST    Date: 2022   Procedure: Right ankle injection  Patient Name: Dali Martines  MRN: [id number]  YOB: 1970    Action: Complete sections as appropriate. Any discrepancy results in a HARD COPY until resolved.     PRE PROCEDURE:  Patient ID verified with 2 identifiers (name and  or MRN): Yes  Procedure and site verified with patient/designee (when able): Yes  Accurate consent documentation in medical record: Yes  H&P (or appropriate assessment) documented in medical record: Yes  H&P must be up to 20 days prior to procedure and updates within 24 hours of procedure as applicable: NA  Relevant diagnostic and radiology test results appropriately labeled and displayed as applicable: Yes  Procedure site(s) marked with provider initials: NA    TIMEOUT:  Time-Out performed immediately prior to starting procedure, including verbal and active participation of all team members addressing the following:Yes  * Correct patient identify  * Confirmed that the correct side and site are marked  * An accurate procedure consent form  * Agreement on the procedure to be done  * Correct patient position  * Relevant images and results are properly labeled and appropriately displayed  * The need to administer antibiotics or fluids for irrigation purposes during the procedure as applicable   * Safety precautions based on patient history or  medication use    DURING PROCEDURE: Verification of correct person, site, and procedures any time the responsibility for care of the patient is transferred to another member of the care team.       The following medications were given:         Prior to injection, verified patient identity using patient's name and date of birth.  Due to injection administration, patient instructed to remain in clinic for 15 minutes  afterwards, and to report any adverse reaction to me immediately.      Medication Name:  Lidocaine 1%, 50 mg per 5 ml  NDC: 73964-907-70  Drug Amount Wasted:  Yes: 4 ml  Vial/Syringe: Single dose vial  Expiration Date:  08/26    Medication Name: kenaog-10, 50 mg per 5 ml   NDC: 4300-2225-32  Drug Amount Wasted:  Yes: 4 ml  Vial/Syringe: Multi dose vial  Expiration Date:  APR 2024      Scribed by Antonella Meadows LPN for Dr. Fernández on November 9, 2022 based on the provider's statements to me.     MAURICE Arce DPM

## 2022-11-15 DIAGNOSIS — E10.3291 TYPE 1 DIABETES MELLITUS WITH MILD NONPROLIFERATIVE RETINOPATHY OF RIGHT EYE, MACULAR EDEMA PRESENCE UNSPECIFIED (H): ICD-10-CM

## 2022-11-16 DIAGNOSIS — E10.3299 TYPE 1 DIABETES MELLITUS WITH MILD NONPROLIFERATIVE RETINOPATHY, MACULAR EDEMA PRESENCE UNSPECIFIED, UNSPECIFIED LATERALITY (H): ICD-10-CM

## 2022-11-16 RX ORDER — INSULIN ASPART INJECTION 100 [IU]/ML
INJECTION, SOLUTION SUBCUTANEOUS
Qty: 45 ML | Refills: 3 | Status: SHIPPED | OUTPATIENT
Start: 2022-11-16 | End: 2024-09-04

## 2022-11-16 NOTE — TELEPHONE ENCOUNTER
insulin aspart (FIASP FLEXTOUCH) 100 UNIT/ML pen-injector        Last Written Prescription Date:  01/04/22  Last Fill Quantity: 45mL,   # refills: 1  Last Office Visit : 08/10/22  Future Office visit:  12/20/22    Routing refill request to provider for review/approval because:  Insulin - refilled per clinic

## 2022-11-17 ENCOUNTER — TELEPHONE (OUTPATIENT)
Dept: ENDOCRINOLOGY | Facility: CLINIC | Age: 52
End: 2022-11-17

## 2022-11-17 DIAGNOSIS — E10.319 TYPE 1 DIABETES MELLITUS WITH RETINOPATHY (H): ICD-10-CM

## 2022-11-17 RX ORDER — PROCHLORPERAZINE 25 MG/1
SUPPOSITORY RECTAL
Qty: 1 EACH | Refills: 0 | Status: SHIPPED | OUTPATIENT
Start: 2022-11-17

## 2022-11-17 NOTE — TELEPHONE ENCOUNTER
[facility] Call Center    Phone Message    May a detailed message be left on voicemail: yes     Reason for Call: Medication Refill Request    Has the patient contacted the pharmacy for the refill? Yes   Name of medication being requested: Dexcom G6    Provider who prescribed the medication: Linda  Pharmacy: [facility]   Date medication is needed:ASAP patient states that hers is no longer working.     Action Taken: Other: Endo     Travel Screening: Not Applicable

## 2022-11-19 RX ORDER — SIMVASTATIN 20 MG
TABLET ORAL
Qty: 90 TABLET | Refills: 0 | Status: SHIPPED | OUTPATIENT
Start: 2022-11-19 | End: 2023-03-05

## 2022-11-21 NOTE — TELEPHONE ENCOUNTER
NOTES Status Details   OFFICE NOTE from referring provider     OFFICE NOTE from other specialist Internal 08.09.2022 Yoel Betancourt MD   DISCHARGE SUMMARY from hospital     DISCHARGE REPORT from the ER     MEDICATION LIST Internal    LABS (Any and all labs)      Internal    Biopsy/pathology (Anything related to diagnoses I.e. fluid aspirations, lip biopsy, muscle biopsy)               Imaging (All imaging related to diagnoses)     Echo     HRCT     CXR     EMG Internal 09.23.2022                   Scleroderma/Dermatomyositis diagnoses     Previous Cardiology notes      Previous Pulmonary notes     Previous Dermatology notes     Previous GI notes     Lupus diagnoses     Previous Nephrology notes     Previous Dermatology notes     Previous Cardiology notes

## 2022-11-22 ENCOUNTER — TELEPHONE (OUTPATIENT)
Dept: RHEUMATOLOGY | Facility: CLINIC | Age: 52
End: 2022-11-22

## 2022-11-22 NOTE — TELEPHONE ENCOUNTER
[facility] Call Center    Phone Message    May a detailed message be left on voicemail: yes     Reason for Call: Appointment Intake        Pt originally schedule for 01/26/23 with Dr. Arshad, clinician reschedule. Pt schedule for first availability for 05/25/23. Would like to be seen sooner.     Action Taken: Other: [facility]    Travel Screening: Not Applicable

## 2022-11-28 ENCOUNTER — OFFICE VISIT (OUTPATIENT)
Dept: NEUROLOGY | Facility: CLINIC | Age: 52
End: 2022-11-28
Attending: PODIATRIST
Payer: COMMERCIAL

## 2022-11-28 DIAGNOSIS — R20.2 PARESTHESIA OF LEFT FOOT: ICD-10-CM

## 2022-11-28 PROCEDURE — 95886 MUSC TEST DONE W/N TEST COMP: CPT | Performed by: PSYCHIATRY & NEUROLOGY

## 2022-11-28 PROCEDURE — 95909 NRV CNDJ TST 5-6 STUDIES: CPT | Performed by: PSYCHIATRY & NEUROLOGY

## 2022-11-28 NOTE — LETTER
2022       RE: Dali Martines  [address]  [facility] [address]     Dear Colleague,    Thank you for referring your patient, Dali Martines, to the [facility] EM[facility] [place] [facility]. Please see a copy of my visit note below.                        [facility]  Electrodiagnostic Laboratory                 Department of Neurology                                                                                                         Test Date:  2022    Patient: Dali Martines : 1970 Physician: Jarrett Gomez MD   Sex: Female AGE: 52 year Ref Phys: Tolu Fernández DPM   ID#: [id number]   Technician: Kristy Behling     Clinical Information:  52 year old woman with intermittent numbness in the big toe of her left foot. Evaluate for focal neuropathy vs radiculopathy.     Techniques:  Motor and sensory conduction studies were done with surface recording electrodes. EMG was done with a concentric needle electrode.     Results:  Nerve conduction studies:   1. Left sural and superficial peroneal sensory responses are normal.   2. Bilateral medial plantar responses are normal.   3. Left peroneal-EDB and tibial-AH motor responses are normal.     Needle EMG of selected proximal and distal left lower limb muscles was performed as tabulated below. No abnormal spontaneous activity was observed in the sampled muscles. Motor unit potential morphology and recruitment patterns were normal.     Interpretation:  This is a normal study. There is no electrophysiologic evidence of a focal neuropathy or lumbosacral radiculopathy affecting the left lower limb on the basis of this study.     Jarrett Gomez MD  Department of Neurology    Nerve Conduction Studies  Motor Sites      Latency Amplitude Neg. Amp Diff Segment Distance Velocity Neg. Dur Neg Area Diff Temperature Comment   Site (ms) Norm (mV) Norm %  cm m/s Norm ms %  C    Left  Fibular (EDB) Motor   Ankle 4.2  < 6.0 3.8  > 2.5  Ankle-EDB 8   9.3  29.6    Bel Fib Head 11.8 - 2.9 - -23.7 Bel Fib Head-Ankle 36 47  > 38 11.1 -15.4 29.6    Pop Fossa 14.1 - 3.1 - 6.9 Pop Fossa-Bel Fib Head 10 43  > 38 9.7 -1.30 29.6    Left Tibial (AHB) Motor   Ankle 3.2  < 6.5 12.8  > 4.4  Ankle-AHB 8   6.8  30.3    Knee 12.8 - 3.5 - -72.7 Knee-Ankle 40 42  > 38 7.0 -64.1 30.4      Sensory Sites      Onset Lat Peak Lat Amp (O-P) Amp (P-P) Segment Distance Velocity Temperature Comment   Site ms ms  V Norm  V  cm m/s Norm  C    Left Medial Plantar (Ortho) Sensory   Great Toe-Med Mall 2.6 3.7 9 - 15 Great Toe-Med Mall - - - 30.1    Right Medial Plantar (Ortho) Sensory   Great Toe-Med Mall 2.5 3.4 5 - 8 Great Toe-Med Mall - - - 31    Left Superficial Fibular Sensory   14 cm-Ankle 2.9 4.0 8  > 3 7 14 cm-Ankle 12.5 43  > 38 27.6    Left Sural Sensory   Calf-Lat Mall 2.3 3.1 11  > 5 11 Calf-Lat Mall 14 61  > 38 29.3      F Wave Studies     Min-F Max-F Dispersion Persistence Mean-F F-Norm L-R Mean-F L-R Mean-F Norm F/M Ratio F-M Lat (ms)   Left Tibial (Abd Hallucis)  30.6  C   50.47 53.28 2.81 100.00 51.72 <61  <5.7 2.01 45.78       Electromyography     Side Muscle Ins Act Fibs/PSW Fasc HF Amp Dur Poly Recrt Int Pat   Left Vastus Lat Nml None Nml 0 Nml Nml 0 Nml Nml   Left Tib Anterior Nml None Nml 0 Nml Nml 0 Nml Nml   Left Fib Longus Nml None Nml 0 Nml Nml 0 Nml Nml   Left Gastroc Nml None Nml 0 Nml Nml 0 Nml Nml   Left Tib Posterior Nml None Nml 0 Nml Nml 0 Nml Nml         NCS Waveforms:    Motor         Sensory                   Again, thank you for allowing me to participate in the care of your patient.      Sincerely,    Jarrett Gomez MD

## 2022-11-29 DIAGNOSIS — M77.9 TENDINITIS: ICD-10-CM

## 2022-11-29 DIAGNOSIS — E11.620 NLD (NECROBIOSIS LIPOIDICA DIABETICORUM) (H): ICD-10-CM

## 2022-11-29 DIAGNOSIS — L40.50 PSORIATIC ARTHRITIS (H): ICD-10-CM

## 2022-11-29 DIAGNOSIS — M25.50 PAIN IN JOINT, MULTIPLE SITES: ICD-10-CM

## 2022-12-01 ENCOUNTER — VIRTUAL VISIT (OUTPATIENT)
Dept: EDUCATION SERVICES | Facility: CLINIC | Age: 52
End: 2022-12-01
Payer: COMMERCIAL

## 2022-12-01 DIAGNOSIS — E10.3299 TYPE 1 DIABETES MELLITUS WITH MILD NONPROLIFERATIVE RETINOPATHY, MACULAR EDEMA PRESENCE UNSPECIFIED, UNSPECIFIED LATERALITY (H): Primary | ICD-10-CM

## 2022-12-01 PROCEDURE — G0108 DIAB MANAGE TRN  PER INDIV: HCPCS | Mod: 95 | Performed by: NUTRITIONIST

## 2022-12-01 RX ORDER — INSULIN PEN,REUSABLE,BT LISPRO
1 INSULIN PEN (EA) SUBCUTANEOUS ONCE
Qty: 1 EACH | Refills: 0 | Status: SHIPPED | OUTPATIENT
Start: 2022-12-01 | End: 2024-02-01

## 2022-12-02 ENCOUNTER — OFFICE VISIT (OUTPATIENT)
Dept: ORTHOPEDICS | Facility: CLINIC | Age: 52
End: 2022-12-02
Payer: COMMERCIAL

## 2022-12-02 DIAGNOSIS — M79.672 LEFT FOOT PAIN: ICD-10-CM

## 2022-12-02 DIAGNOSIS — R26.89 BALANCE DISORDER: ICD-10-CM

## 2022-12-02 DIAGNOSIS — S86.311A TEAR OF PERONEAL TENDON, RIGHT, INITIAL ENCOUNTER: ICD-10-CM

## 2022-12-02 DIAGNOSIS — R26.9 GAIT ABNORMALITY: Primary | ICD-10-CM

## 2022-12-02 DIAGNOSIS — S93.492A SPRAIN OF ANTERIOR TALOFIBULAR LIGAMENT OF LEFT ANKLE, INITIAL ENCOUNTER: ICD-10-CM

## 2022-12-02 DIAGNOSIS — M25.872 SESAMOIDITIS OF LEFT FOOT: ICD-10-CM

## 2022-12-02 DIAGNOSIS — R20.2 PARESTHESIA OF LEFT FOOT: ICD-10-CM

## 2022-12-02 PROCEDURE — 99215 OFFICE O/P EST HI 40 MIN: CPT | Performed by: PODIATRIST

## 2022-12-02 NOTE — LETTER
12/2/2022         RE: Dali Martines  [address]  [facility] [id number]        Dear Colleague,    Thank you for referring your patient, Dali Martines, to the [facility] [place]. Please see a copy of my visit note below.    Chief Complaint   Patient presents with     Results     MRI, right ankle.             HPI: Dali is a 51 year old female who presents today for left foot and ankle pain.   The dancer's pad on the left foot is helping some.  She had the EMG done on 28 November.  She had injection at the last visit with me.  She relates that this has been helpful in reducing her ankle pain.  She has been wearing the brace.  She relates that she does walk just a little bit in the house without it.  The ankle feels fine without the brace.  She relates that she is also wearing the dancers pad left side.  This is also helping.  She was not wearing the brace does cause the IPJ on hallux on the right foot to hurt a little bit.  Overall, she is doing better.    She does relate to me that chronically, she has been losing her balance some.  She has had a fall from this.  She says it happens twice a day.    Review of Systems: No n/v/d/f/c/ns/sob/cp      PMH:   Past Medical History:   Diagnosis Date     Anemia      Anxiety      Arthritis 2014    Psoriatic arthritis     Back injury 1988     Dry eye syndrome      Dyslipidemia      Endometriosis 2002    adehsions seen at laparoscopy     GERD (gastroesophageal reflux disease)      Hypothyroidism     8 yoa     SOB (shortness of breath) 3/11/2014     Type I (juvenile type) diabetes mellitus without mention of complication, not stated as uncontrolled     when 15      Uncomplicated asthma Fall 2013       PSxH:   Past Surgical History:   Procedure Laterality Date     COLONOSCOPY  2002     COLONOSCOPY N/A 6/23/2020    Procedure: COLONOSCOPY;  Surgeon: Lauryn Rivera MD;  Location:  GI     ESOPHAGOSCOPY, GASTROSCOPY, DUODENOSCOPY  (EGD), COMBINED  1/7/2014    Procedure: COMBINED ESOPHAGOSCOPY, GASTROSCOPY, DUODENOSCOPY (EGD), BIOPSY SINGLE OR MULTIPLE;;  Surgeon: Kriag Nicole MD;  Location: [facility]     GYN SURGERY      Laparotomy to remove endometrial tissue     HC BREATH HYDROGEN TEST N/A 6/17/2014    Procedure: HYDROGEN BREATH TEST;  Surgeon: Deacon Alberts MD;  Location:  GI     HYDROGEN BREATH TEST  6/17/14     LAPAROSCOPIC APPENDECTOMY  2002     LAPAROTOMY, LYSIS ADHESIONS, COMBINED  2002    endometriosis     SOFT TISSUE SURGERY  December 2014    right ankle tendon injury     ZZC REPAIR CRUCIATE LIGAMENT,KNEE       ZZC STOMACH SURGERY PROCEDURE UNLISTED  December 2002       Allergies: Monosodium glutamate and Sulfasalazine    SH:   Social History     Socioeconomic History     Marital status: Single     Spouse name: Not on file     Number of children: 0     Years of education: Not on file     Highest education level: Not on file   Occupational History     Occupation: research     Employer: [employer]   Tobacco Use     Smoking status: Never     Smokeless tobacco: Never   Vaping Use     Vaping Use: Never used   Substance and Sexual Activity     Alcohol use: Yes     Comment: moderately     Drug use: No     Sexual activity: Not on file   Other Topics Concern     Parent/sibling w/ CABG, MI or angioplasty before 65F 55M? Yes   Social History Narrative     Not on file     Social Determinants of Health     Financial Resource Strain: Not on file   Food Insecurity: Not on file   Transportation Needs: Not on file   Physical Activity: Not on file   Stress: Not on file   Social Connections: Not on file   Intimate Partner Violence: Not on file   Housing Stability: Not on file       FH:   Family History   Problem Relation Age of Onset     Breast Cancer Mother      Heart Disease Father      C.A.D. Father      Arthritis Father      Circulatory Father      Eye Disorder Father      Lipids Father      Thyroid Disease Father      Macular  Degeneration Father      Coronary Artery Disease Father      Anxiety Disorder Father      Alcoholism Father      Rheumatoid Arthritis Father      Hypothyroidism Father      Diabetes Father      Cerebrovascular Disease Paternal Grandmother         ,     Cancer Paternal Grandfather         Pancreatic     Heart Disease Paternal Grandfather      Diabetes Maternal Grandmother         Type 2     C.A.D. Maternal Grandmother      Heart Disease Maternal Grandmother      Coronary Artery Disease Maternal Grandmother      Heart Disease Maternal Grandfather      Cardiac Sudden Death Maternal Grandfather      Gynecology Other         self     Thyroid Disease Other         self     Macular Degeneration Maternal Aunt      Diabetes Other         Type 1     Glaucoma No family hx of        Objective:  Hemoglobin A1C   Date Value Ref Range Status   08/11/2022 7.4 (H) 0.0 - 5.6 % Final     Comment:     Normal <5.7%   Prediabetes 5.7-6.4%    Diabetes 6.5% or higher     Note: Adopted from ADA consensus guidelines.   02/14/2022 8.1 (H) 0.0 - 5.6 % Final     Comment:     Normal <5.7%   Prediabetes 5.7-6.4%    Diabetes 6.5% or higher     Note: Adopted from ADA consensus guidelines.   07/23/2021 7.3 (H) 0.0 - 5.6 % Final     Comment:     Normal <5.7%   Prediabetes 5.7-6.4%    Diabetes 6.5% or higher     Note: Adopted from ADA consensus guidelines.   01/11/2021 7.1 (H) 0 - 5.6 % Final     Comment:     Normal <5.7% Prediabetes 5.7-6.4%  Diabetes 6.5% or higher - adopted from ADA   consensus guidelines.     06/26/2020 7.6 (H) 0 - 5.6 % Final     Comment:     Normal <5.7% Prediabetes 5.7-6.4%  Diabetes 6.5% or higher - adopted from ADA   consensus guidelines.     09/16/2013 7.2 (A) 4.3 - 6.0 % Final     Hemoglobin A1C POCT   Date Value Ref Range Status   05/10/2022 7.5 4.3 - <5.7 % Final   10/26/2021 7.3 4.3 - <5.7 % Final   12/17/2019 7.4 (A) 4.3 - 6 % Final         PT and DP pulses are 2/4 bilaterally. CRT is instant.  Positive pedal hair.    Gross sensation is intact bilaterally.  Protective sensation is intact as demonstrated the 5.07G monofilament.  Equinus is mild bilaterally.  Pain noted with palpation of the left fibular sesamoid.  Pain with range of motion of the sesamoid apparatus.  No pain with range of motion of the corresponding joint.  No pain with joint distraction.  Nails normal bilaterally. No open lesions are noted.     left foot xrays indicated in 3 weightbearing views.    Proximal spurring noted to the fibular sesamoid on the left foot.  No acute processes noted.    MRI IMPRESSION:  1.  Sequela of chronic anterior talofibular ligament sprain. Bony proliferation and spurring along the anterolateral talus at the anterior talofibular ligament attachment. No acute lateral ankle ligament injury.  2.  No significant peroneal tendinopathy or high-grade tear. No lateral tenosynovitis or tendon displacement.  3.  No acute ankle or hindfoot fracture. No stress fracture.    EMG Interpretation:  This is a normal study. There is no electrophysiologic evidence of a focal neuropathy or lumbosacral radiculopathy affecting the left lower limb on the basis of this study.      Jarrett Gomez MD  Department of Neurology    Assessment:   Dali is a 50 YO with chronic ATFL pain with chronic changes on the MRI. We discussed tx options today.  I discussed with her that we should watch to see what the ankle does for another 2 weeks.  Because she is diabetic, she may have a increased healing time after the injection.  She does agree to this.  She will send a MyChart 2 weeks to me how she feels.  She does have gait instability.  I [facility]  She does agree to this.  Order was written.  She also has sesamoiditis on the left foot.  Continue with a dancers pad.        Plan:  - Pt seen and evaluated.  - Cont with the dancer's pad.   -She can stop the brace for now.  She will watch the area to see what it does  next 2 weeks.  -Discussed EMG.   - See again in -clinic as needed.   On the date of service I spent 40 minutes with patient care, documentation, chart review, image review, and care coordination      Tolu Fernández DPM

## 2022-12-02 NOTE — PROGRESS NOTES
Chief Complaint   Patient presents with     Results     MRI, right ankle.             HPI: Dali is a 51 year old female who presents today for left foot and ankle pain.   The dancer's pad on the left foot is helping some.  She had the EMG done on 28 November.  She had injection at the last visit with me.  She relates that this has been helpful in reducing her ankle pain.  She has been wearing the brace.  She relates that she does walk just a little bit in the house without it.  The ankle feels fine without the brace.  She relates that she is also wearing the dancers pad left side.  This is also helping.  She was not wearing the brace does cause the IPJ on hallux on the right foot to hurt a little bit.  Overall, she is doing better.    She does relate to me that chronically, she has been losing her balance some.  She has had a fall from this.  She says it happens twice a day.    Review of Systems: No n/v/d/f/c/ns/sob/cp      PMH:   Past Medical History:   Diagnosis Date     Anemia      Anxiety      Arthritis 2014    Psoriatic arthritis     Back injury 1988     Dry eye syndrome      Dyslipidemia      Endometriosis 2002    adehsions seen at laparoscopy     GERD (gastroesophageal reflux disease)      Hypothyroidism     8 yoa     SOB (shortness of breath) 3/11/2014     Type I (juvenile type) diabetes mellitus without mention of complication, not stated as uncontrolled     when 15      Uncomplicated asthma Fall 2013       PSxH:   Past Surgical History:   Procedure Laterality Date     COLONOSCOPY  2002     COLONOSCOPY N/A 6/23/2020    Procedure: COLONOSCOPY;  Surgeon: Lauryn Rivera MD;  Location:  GI     ESOPHAGOSCOPY, GASTROSCOPY, DUODENOSCOPY (EGD), COMBINED  1/7/2014    Procedure: COMBINED ESOPHAGOSCOPY, GASTROSCOPY, DUODENOSCOPY (EGD), BIOPSY SINGLE OR MULTIPLE;;  Surgeon: Kraig Nicole MD;  Location:  GI     GYN SURGERY      Laparotomy to remove endometrial tissue     HC BREATH HYDROGEN TEST N/A  6/17/2014    Procedure: HYDROGEN BREATH TEST;  Surgeon: Deacon Alberts MD;  Location:  GI     HYDROGEN BREATH TEST  6/17/14     LAPAROSCOPIC APPENDECTOMY  2002     LAPAROTOMY, LYSIS ADHESIONS, COMBINED  2002    endometriosis     SOFT TISSUE SURGERY  December 2014    right ankle tendon injury     ZZC REPAIR CRUCIATE LIGAMENT,KNEE       ZZC STOMACH SURGERY PROCEDURE UNLISTED  December 2002       Allergies: Monosodium glutamate and Sulfasalazine    SH:   Social History     Socioeconomic History     Marital status: Single     Spouse name: Not on file     Number of children: 0     Years of education: Not on file     Highest education level: Not on file   Occupational History     Occupation: research     Employer: [employer]   Tobacco Use     Smoking status: Never     Smokeless tobacco: Never   Vaping Use     Vaping Use: Never used   Substance and Sexual Activity     Alcohol use: Yes     Comment: moderately     Drug use: No     Sexual activity: Not on file   Other Topics Concern     Parent/sibling w/ CABG, MI or angioplasty before 65F 55M? Yes   Social History Narrative     Not on file     Social Determinants of Health     Financial Resource Strain: Not on file   Food Insecurity: Not on file   Transportation Needs: Not on file   Physical Activity: Not on file   Stress: Not on file   Social Connections: Not on file   Intimate Partner Violence: Not on file   Housing Stability: Not on file       FH:   Family History   Problem Relation Age of Onset     Breast Cancer Mother      Heart Disease Father      C.A.D. Father      Arthritis Father      Circulatory Father      Eye Disorder Father      Lipids Father      Thyroid Disease Father      Macular Degeneration Father      Coronary Artery Disease Father      Anxiety Disorder Father      Alcoholism Father      Rheumatoid Arthritis Father      Hypothyroidism Father      Diabetes Father      Cerebrovascular Disease Paternal Grandmother         ,     Cancer Paternal  Grandfather         Pancreatic     Heart Disease Paternal Grandfather      Diabetes Maternal Grandmother         Type 2     C.A.D. Maternal Grandmother      Heart Disease Maternal Grandmother      Coronary Artery Disease Maternal Grandmother      Heart Disease Maternal Grandfather      Cardiac Sudden Death Maternal Grandfather      Gynecology Other         self     Thyroid Disease Other         self     Macular Degeneration Maternal Aunt      Diabetes Other         Type 1     Glaucoma No family hx of        Objective:  Hemoglobin A1C   Date Value Ref Range Status   08/11/2022 7.4 (H) 0.0 - 5.6 % Final     Comment:     Normal <5.7%   Prediabetes 5.7-6.4%    Diabetes 6.5% or higher     Note: Adopted from ADA consensus guidelines.   02/14/2022 8.1 (H) 0.0 - 5.6 % Final     Comment:     Normal <5.7%   Prediabetes 5.7-6.4%    Diabetes 6.5% or higher     Note: Adopted from ADA consensus guidelines.   07/23/2021 7.3 (H) 0.0 - 5.6 % Final     Comment:     Normal <5.7%   Prediabetes 5.7-6.4%    Diabetes 6.5% or higher     Note: Adopted from ADA consensus guidelines.   01/11/2021 7.1 (H) 0 - 5.6 % Final     Comment:     Normal <5.7% Prediabetes 5.7-6.4%  Diabetes 6.5% or higher - adopted from ADA   consensus guidelines.     06/26/2020 7.6 (H) 0 - 5.6 % Final     Comment:     Normal <5.7% Prediabetes 5.7-6.4%  Diabetes 6.5% or higher - adopted from ADA   consensus guidelines.     09/16/2013 7.2 (A) 4.3 - 6.0 % Final     Hemoglobin A1C POCT   Date Value Ref Range Status   05/10/2022 7.5 4.3 - <5.7 % Final   10/26/2021 7.3 4.3 - <5.7 % Final   12/17/2019 7.4 (A) 4.3 - 6 % Final         PT and DP pulses are 2/4 bilaterally. CRT is instant.  Positive pedal hair.   Gross sensation is intact bilaterally.  Protective sensation is intact as demonstrated the 5.07G monofilament.  Equinus is mild bilaterally.  Pain noted with palpation of the left fibular sesamoid.  Pain with range of motion of the sesamoid apparatus.  No pain with range  of motion of the corresponding joint.  No pain with joint distraction.  Nails normal bilaterally. No open lesions are noted.     left foot xrays indicated in 3 weightbearing views.    Proximal spurring noted to the fibular sesamoid on the left foot.  No acute processes noted.    MRI IMPRESSION:  1.  Sequela of chronic anterior talofibular ligament sprain. Bony proliferation and spurring along the anterolateral talus at the anterior talofibular ligament attachment. No acute lateral ankle ligament injury.  2.  No significant peroneal tendinopathy or high-grade tear. No lateral tenosynovitis or tendon displacement.  3.  No acute ankle or hindfoot fracture. No stress fracture.    EMG Interpretation:  This is a normal study. There is no electrophysiologic evidence of a focal neuropathy or lumbosacral radiculopathy affecting the left lower limb on the basis of this study.      Jarrett Gomez MD  Department of Neurology    Assessment:   Dali is a 52 YO with chronic ATFL pain with chronic changes on the MRI. We discussed tx options today.  I discussed with her that we should watch to see what the ankle does for another 2 weeks.  Because she is diabetic, she may have a increased healing time after the injection.  She does agree to this.  She will send a MyChart 2 weeks to me how she feels.  She does have gait instability.  I [facility]  She does agree to this.  Order was written.  She also has sesamoiditis on the left foot.  Continue with a dancers pad.        Plan:  - Pt seen and evaluated.  - Cont with the dancer's pad.   -She can stop the brace for now.  She will watch the area to see what it does next 2 weeks.  -Discussed EMG.   - See again in -clinic as needed.   On the date of service I spent 40 minutes with patient care, documentation, chart review, image review, and care coordination

## 2022-12-05 ENCOUNTER — TELEPHONE (OUTPATIENT)
Dept: PULMONOLOGY | Facility: CLINIC | Age: 52
End: 2022-12-05

## 2022-12-05 DIAGNOSIS — J45.40 MODERATE PERSISTENT ASTHMA WITHOUT COMPLICATION: ICD-10-CM

## 2022-12-05 NOTE — TELEPHONE ENCOUNTER
[facility] Call Center    Phone Message    May a detailed message be left on voicemail: yes     Reason for Call: Medication Refill Request    Has the patient contacted the pharmacy for the refill? Yes   Name of medication being requested: mometasone-formoterol (DULERA) 100-5 MCG/ACT inhaler  Provider who prescribed the medication: Dr Dunbar  Pharmacy: [facility] in [place]  Date medication is needed: asap       Action Taken: Message routed to:  [facility] ([facility]): Pulm    Travel Screening: Not Applicable

## 2022-12-05 NOTE — TELEPHONE ENCOUNTER
Medication/Dose: methotrexate 2.5 MG tablet    Last Written : 3/3/22  Last Quantity: 77, # refills: 1  Last Office Visit :  8/9/22  Pending appointment: 5/23/23 with Dr Arshad      WBC   Date Value Ref Range Status   01/11/2021 4.7 4.0 - 11.0 10e9/L Final     WBC Count   Date Value Ref Range Status   08/11/2022 4.6 4.0 - 11.0 10e3/uL Final     RBC Count   Date Value Ref Range Status   08/11/2022 3.83 3.80 - 5.20 10e6/uL Final   01/11/2021 3.55 (L) 3.8 - 5.2 10e12/L Final     Hemoglobin   Date Value Ref Range Status   08/11/2022 12.2 11.7 - 15.7 g/dL Final   01/11/2021 11.4 (L) 11.7 - 15.7 g/dL Final     Hematocrit   Date Value Ref Range Status   08/11/2022 36.4 35.0 - 47.0 % Final   01/11/2021 34.2 (L) 35.0 - 47.0 % Final     MCV   Date Value Ref Range Status   08/11/2022 95 78 - 100 fL Final   01/11/2021 96 78 - 100 fl Final     MCH   Date Value Ref Range Status   08/11/2022 31.9 26.5 - 33.0 pg Final   01/11/2021 32.1 26.5 - 33.0 pg Final     MCHC   Date Value Ref Range Status   08/11/2022 33.5 31.5 - 36.5 g/dL Final   01/11/2021 33.3 31.5 - 36.5 g/dL Final     RDW   Date Value Ref Range Status   08/11/2022 12.9 10.0 - 15.0 % Final   01/11/2021 12.9 10.0 - 15.0 % Final     Platelet Count   Date Value Ref Range Status   08/11/2022 296 150 - 450 10e3/uL Final   01/11/2021 288 150 - 450 10e9/L Final     AST   Date Value Ref Range Status   08/11/2022 36 0 - 45 U/L Final   01/11/2021 31 0 - 45 U/L Final     ALT   Date Value Ref Range Status   08/11/2022 42 0 - 50 U/L Final   01/11/2021 37 0 - 50 U/L Final     Creatinine   Date Value Ref Range Status   08/11/2022 0.57 0.52 - 1.04 mg/dL Final   01/11/2021 0.56 0.52 - 1.04 mg/dL Final     Albumin   Date Value Ref Range Status   08/11/2022 4.0 3.4 - 5.0 g/dL Final   01/11/2021 3.8 3.4 - 5.0 g/dL Final     CRP Inflammation   Date Value Ref Range Status   08/11/2022 <3.00 <5.00 mg/L Final   02/14/2022 <2.9 0.0 - 8.0 mg/L Final   01/11/2021 <2.9 0.0 - 8.0 mg/L Final     No  components found for: ESR    Prescription set up and routed to provider per Refill Protocol, also routed to Rheumatology team as pt is overdue for med monitoring labs.    Sarbjit Rocha, ANGÉLICAN, RN  [facility] Refill Team

## 2022-12-12 NOTE — TELEPHONE ENCOUNTER
insulin degludec (TRESIBA) 100 UNIT/ML pen        Last Written Prescription Date:  03/21/19  Last Fill Quantity: 15mL,   # refills: 11  Last Office Visit : 06/18/19  Future Office visit:  09/19/19    Routing refill request to provider for review/approval because:  Insulin - refilled per clinic           Additional Comments: Sample used

## 2022-12-13 NOTE — PROGRESS NOTES
Dali is a 52 year old who is being evaluated via a billable video visit.      How would you like to obtain your AVS? [employer]  If the video visit is dropped, the invitation should be resent by: Send to e-mail at: [email or web address]  Will anyone else be joining your video visit? No        Video-Visit Details        Type of service:  Video Visit        Originating Location (pt. Location): Home        Distant Location (provider location):  Off-site    Platform used for Video Visit: AmPranav    Outcome for 12/13/22 4:34 PM: [employer] message sent  CHANCE Bautista  Outcome for 12/16/22 11:13 AM: Per patient, will upload device before appointment  CHANCE Bautista  Outcome for 12/19/22 10:55 AM: Per patient, will upload device before appointment  CHANCE Bautista  Outcome for 12/19/22 1:51 PM: Dexcom emailed to provider  CHANCE Bautista    This 52 year old woman was returns for f/u of her type 1 diabetes. She also has hypothyroidism and psoriatic arthritis.  Pt was dx having type 1 diabetes at age 15.  Her hx is also significant for mild NPDR right eye only,anxiety, hyperlipidemia, psoriatic arthritis,  and GERD.    She is currently taking Tresiba 17 units daily.      Insulin to Carb ratio:  1 unit per 9  grams CHO  Correction Factor:     1 unit drops BG 30 mg/mL    She is taking fiasp.  She has had several visits with our dietitian and is working on improving her carb ratio and correction factor.  She is following these visits very helpful.  Despite this, she notes her blood sugars have been a bit above target.  Her CGM data are below.  She had a steroid injection into her right ankle a couple of months ago and this did send her blood sugars up quite a bit.  They were high for a couple of weeks and she increased her Tresiba up to 20 units at that time.  She also switched from Trulicity to Ozempic and has been taking a dose of 0.5 mg once a week.  She was previously on Trulicity 0.75 mg each week.  She  thinks she is having some higher postmeal sugars since starting the Ozempic.  Initially it took away her appetite but she thinks that its not as effective as it was in the past.  She is tolerating the Ozempic without any nausea.  She also had COVID in the fall and her sugars have not been quite right since then.  She is not having hypoglycemia.  She has been thinking more about using a hybrid closed-loop pump but is not quite sure she is ready to make the switch.  She continues to be largely inactive because of pain in her right ankle.    She has been seeing Dr. avelar for help with her feet pain.  He is done a lot of work to diagnose what is causing the pain and she feels confident that things are on the track to be improved.  She reports that she did have an EMG to evaluate the numbness she has been complaining in her left foot for some time.  It was normal.  She does not have diabetic neuropathy.  She has no foot ulcers today.    She had to be off her methotrexate for several weeks weeks this fall so that she could get her vaccinations.  She is now back on the drug and her joints are starting to feel better again.    She is up-to-date with her eye visits and has no concerns.     She continues to take her statin and her thyroid medication.               Current Outpatient Medications   Medication     albuterol (PROAIR HFA/PROVENTIL HFA/VENTOLIN HFA) 108 (90 Base) MCG/ACT inhaler     aspirin 81 MG tablet     blood glucose (ACCU-CHEK COLIN PLUS) test strip     blood glucose (NO BRAND SPECIFIED) test strip     blood glucose monitoring (ACCU-CHEK FASTCLIX) lancets     blood glucose monitoring (NO BRAND SPECIFIED) meter device kit     Calcium Carbonate-Vitamin D (CALCIUM + D PO)     citalopram (CELEXA) 20 MG tablet     Continuous Blood Gluc  (DEXCOM G6 ) SUAZNNE     Continuous Blood Gluc Sensor (DEXCOM G6 SENSOR) MISC     Continuous Blood Gluc Transmit (DEXCOM G6 TRANSMITTER) MISC     diclofenac  "(VOLTAREN) 1 % topical gel     diclofenac (VOLTAREN) 1 % topical gel     econazole nitrate 1 % cream     FIASP PENFILL 100 UNIT/ML SOCT     fish oil-omega-3 fatty acids 1000 MG capsule     folic acid (FOLVITE) 1 MG tablet     insulin degludec (TRESIBA FLEXTOUCH) 100 UNIT/ML pen     insulin pen needle (B-D U/F) 31G X 8 MM miscellaneous     Ketoconazole (NIZORAL PO)     levothyroxine (SYNTHROID/LEVOTHROID) 112 MCG tablet     methotrexate 2.5 MG tablet     methylphenidate (METADATE CD) 20 MG CR capsule     methylphenidate (RITALIN) 10 MG tablet     Minoxidil (ROGAINE EX)     mometasone-formoterol (DULERA) 100-5 MCG/ACT inhaler     montelukast (SINGULAIR) 5 MG chewable tablet     Multiple Vitamin (MULTIVITAMIN OR)     NOVOLOG PENFILL 100 UNIT/ML soln     OZEMPIC, 0.25 OR 0.5 MG/DOSE, 2 MG/1.5ML SOPN pen     simvastatin (ZOCOR) 20 MG tablet     traZODone (DESYREL) 50 MG tablet     Injection Device for insulin (INPEN 100-BLUE-TANIYA) SUZANNE     Injection Device for insulin (INPEN 100-BLUE-NOVOLOG-FIASP) SUZANNE     Current Facility-Administered Medications   Medication     dexamethasone (DECADRON) injection 4 mg     lidocaine 1 % injection 1 mL     triamcinolone acetonide (KENALOG-10) injection 10 mg          12/14/2022   1336 12/16/2022   1510 Most Recent Value    Temp: -- -- 98  F (36.7  C)  as of 11/18/2021    Pulse: 76 -- 76  as of 12/14/2022    BP: 112/74 112/74 112/74  as of 12/16/2022    Resp: -- -- 7 Abnormal   as of 6/23/2020    SpO2: 97 % -- 97%  as of 12/14/2022    Body Mass Index:   27.98 kg/m  Abnormal    1.778 m (5' 10\")  as of 12/16/2022   88.5 kg (195 lb)  as of 12/16/2022      On exam she is in no acute distress.  She is without periorbital edema.  Cranial nerves are grossly intact.  Mood is upbeat.    Recent Labs   Lab Test 08/11/22  1133 08/11/22  1129 05/10/22  1523 02/14/22  1004 11/18/21  1659 10/26/21  1556 01/11/21  1109 01/11/21  1102 01/11/21  1101 06/18/19  0000 03/21/19  1359   A1C  --  7.4*  --  8.1* "  --   --    < >  --  7.1*   < >  --    HEMOGLOBINA1  --   --  7.5  --   --  7.3  --   --   --    < >  --    TSH  --   --   --  3.34  --   --   --   --  2.01   < > 0.19*   T4  --   --   --   --   --   --   --   --   --   --  1.09   LDL  --   --   --  111*  --   --   --   --  122*  --   --    HDL  --   --   --  94  --   --   --   --  92  --   --    TRIG  --   --   --  66  --   --   --   --  44  --   --    CR 0.57  --   --  0.60   < >  --    < >  --   --    < >  --    MICROL  --   --   --  5  --   --   --  <5  --    < >  --     < > = values in this interval not displayed.     Assessment and plan:    1.  Diabetes control.  Her sugars do look higher than normal.  I suspect this is a combination of having had COVID, a steroid injection, and switching from Trulicity to Ozempic at less than therapeutic dose.  I recommended she increase the Ozempic to 1 mg each week.  We also discussed starting a hybrid closed-loop pump.  She will think more about this.  I will check an A1c to be sure she has not gotten far off track.    2.  Diabetes complications.  She sees her eye doctor and has some mild retinopathy.  She has no evidence of neuropathy.  Her renal function is normal.    3.hypothyroidism.  Her TSH was in target when measured last.  We will plan to measure it in February and March again.    4.CVD risk.  She is on a statin with reasonable LDL control.  Her blood pressure has been normal.    Follow-up with Shakira Sebastian in 3 months and me in 6 months.    I spent 30 minutes with her on the video visit (start time 3: 3 0 and end time 4: 00).  On the day of visit I spent an additional 17 minutes reviewing her chart, reviewing and interpreting her lab and CGM data, and doing documentation.    Felicia Mckeon MD

## 2022-12-13 NOTE — PROGRESS NOTES
[facility] Note     Patient Name: Dali Martines : 1970 Medical Record: [id number]     Requesting Physician/clinician: Dr. Tolu Fernández            History of Present Illness:     Dali Martines is a 52 year old female referred to us for gait imbalance. She has chronic left foot and ankle pain due to chronic ATFL sprain, for which she follows with Dr. Fernández. Other medical issues include DM with HbAic 7-8 range. She had an EMG of the LLE on 2022, normal findings. They tested bilateral surals, sup per sens which were also normal. MRI L spine unremarkable besides age related changes. Normal vision, last eye exam 1 year ago, no glasses or contacts.     She feels instability in the low back with episodic pain flare ups. She also has chronic lateral tracking of the left patella. She feels feet and ankles (particularly on the R) are unstable at times. She gets lateral right ankle pain over ATFL with walking more than a few blocks. She is looking to improve her chronic gait imbalance. She's had issues for over 20 years. She may consider future surgery on the R ankle.     She fell 6 months ago in her home, rug surface, no dizziness or light headed prior. Falls sideways. She is hoping to reduce her risk of future falls.                Past Medical and Surgical History:     Past Medical History:   Diagnosis Date     Anemia      Anxiety      Arthritis     Psoriatic arthritis     Back injury 1988     Dry eye syndrome      Dyslipidemia      Endometriosis     adehsions seen at laparoscopy     GERD (gastroesophageal reflux disease)      Hypothyroidism     8 yoa     SOB (shortness of breath) 3/11/2014     Type I (juvenile type) diabetes mellitus without mention of complication, not stated as uncontrolled     when 15      Uncomplicated asthma Fall 2013     Past Surgical History:   Procedure Laterality Date     COLONOSCOPY       COLONOSCOPY N/A 2020    Procedure: COLONOSCOPY;   Surgeon: Lauryn Rivera MD;  Location:  GI     ESOPHAGOSCOPY, GASTROSCOPY, DUODENOSCOPY (EGD), COMBINED  1/7/2014    Procedure: COMBINED ESOPHAGOSCOPY, GASTROSCOPY, DUODENOSCOPY (EGD), BIOPSY SINGLE OR MULTIPLE;;  Surgeon: Kraig Nicole MD;  Location:  GI     GYN SURGERY      Laparotomy to remove endometrial tissue     HC BREATH HYDROGEN TEST N/A 6/17/2014    Procedure: HYDROGEN BREATH TEST;  Surgeon: Deacon Alberts MD;  Location:  GI     HYDROGEN BREATH TEST  6/17/14     LAPAROSCOPIC APPENDECTOMY  2002     LAPAROTOMY, LYSIS ADHESIONS, COMBINED  2002    endometriosis     SOFT TISSUE SURGERY  December 2014    right ankle tendon injury     ZZC REPAIR CRUCIATE LIGAMENT,KNEE       ZZC STOMACH SURGERY PROCEDURE UNLISTED  December 2002            Social History:     Social History     Tobacco Use     Smoking status: Never     Smokeless tobacco: Never   Substance Use Topics     Alcohol use: Yes     Comment: moderately                Functional history:     Dali Martines is independent with ADL's           Family History:     Family History   Problem Relation Age of Onset     Breast Cancer Mother      Heart Disease Father      C.A.D. Father      Arthritis Father      Circulatory Father      Eye Disorder Father      Lipids Father      Thyroid Disease Father      Macular Degeneration Father      Coronary Artery Disease Father      Anxiety Disorder Father      Alcoholism Father      Rheumatoid Arthritis Father      Hypothyroidism Father      Diabetes Father      Cerebrovascular Disease Paternal Grandmother         ,     Cancer Paternal Grandfather         Pancreatic     Heart Disease Paternal Grandfather      Diabetes Maternal Grandmother         Type 2     C.A.D. Maternal Grandmother      Heart Disease Maternal Grandmother      Coronary Artery Disease Maternal Grandmother      Heart Disease Maternal Grandfather      Cardiac Sudden Death Maternal Grandfather      Gynecology Other         self      Thyroid Disease Other         self     Macular Degeneration Maternal Aunt      Diabetes Other         Type 1     Glaucoma No family hx of             Medications:     Current Outpatient Medications   Medication Sig Dispense Refill     albuterol (PROAIR HFA/PROVENTIL HFA/VENTOLIN HFA) 108 (90 Base) MCG/ACT inhaler Inhale 2 puffs into the lungs every 4 hours as needed for shortness of breath / dyspnea or wheezing 1 Inhaler 11     aspirin 81 MG tablet Take 1 tablet by mouth daily.       blood glucose (ACCU-CHEK COLIN PLUS) test strip Test Blood Sugar 8 times daily or as directed 800 strip 3     blood glucose (NO BRAND SPECIFIED) test strip Use to test blood sugar 8 times daily or as directed. Any covered brand. 800 strip 3     blood glucose monitoring (ACCU-CHEK FASTCLIX) lancets Use to test blood sugar 8 times daily or as directed. 4 Box 3     blood glucose monitoring (NO BRAND SPECIFIED) meter device kit Use to test blood sugar 8 times daily or as directed. Any covered brand, 1 kit 0     Calcium Carbonate-Vitamin D (CALCIUM + D PO) Take one daily       citalopram (CELEXA) 20 MG tablet Take 1.5 tablets (30 mg) by mouth daily 135 tablet 1     Continuous Blood Gluc  (DEXCOM G6 ) SUZANNE Use to read blood sugars as per 's instructions. 1 each 0     Continuous Blood Gluc Sensor (DEXCOM G6 SENSOR) MISC Change every 10 days. 9 each 3     Continuous Blood Gluc Transmit (DEXCOM G6 TRANSMITTER) MISC CHANGE EVERY 3 MONTHS 1 each 3     diclofenac (VOLTAREN) 1 % topical gel Apply 2 g topically 4 times daily 100 g 4     diclofenac (VOLTAREN) 1 % topical gel APPLY 2 GRAMS ONTO THE SKIN FOUR TIMES DAILY AS NEEDED FOR MODERATE PAIN 100 g 5     econazole nitrate 1 % cream Apply 0.5 inches topically daily.       FIASP PENFILL 100 UNIT/ML SOCT INJECT UP TO 45 UNITS DAILY AS DIRECTED ACCORDING TO CORRECTION FACTOR. 45 mL 3     fish oil-omega-3 fatty acids 1000 MG capsule Take one daily       folic acid (FOLVITE)  1 MG tablet Take 1 tablet (1 mg) by mouth daily 90 tablet 3     Injection Device for insulin (INPEN 100-BLUE-TANIYA) SUZANNE 1 each once for 1 dose 1 each 0     Injection Device for insulin (INPEN 100-BLUE-NOVOLOG-FIASP) SUZANNE 1 each once for 1 dose 1 each 0     insulin degludec (TRESIBA FLEXTOUCH) 100 UNIT/ML pen ADMINISTER 17 UNITS UNDER THE SKIN DAILY 15 mL 1     insulin pen needle (B-D U/F) 31G X 8 MM miscellaneous Use 5 pen needles daily 450 each 3     Ketoconazole (NIZORAL PO) Shampoo daily       levothyroxine (SYNTHROID/LEVOTHROID) 112 MCG tablet TAKE 1 TABLET(112 MCG) BY MOUTH DAILY 90 tablet 3     methotrexate 2.5 MG tablet Take 6 tablets (15 mg) by mouth every 7 days Labs required every 8-12 weeks while taking this medication, overdue: please have these now. 24 tablet 0     methylphenidate (METADATE CD) 20 MG CR capsule Take 20 mg by mouth daily       methylphenidate (RITALIN) 10 MG tablet TAKE UP TO 2 TABLETS BY MOUTH IN THE LATE AFTERNOON.       Minoxidil (ROGAINE EX) daily       mometasone-formoterol (DULERA) 100-5 MCG/ACT inhaler Inhale 2 puffs into the lungs 2 times daily 13 g 5     montelukast (SINGULAIR) 5 MG chewable tablet CHEW AND SWALLOW 1 TABLET(5 MG) BY MOUTH AT BEDTIME 90 tablet 4     Multiple Vitamin (MULTIVITAMIN OR) Take one daily tab       NOVOLOG PENFILL 100 UNIT/ML soln INJECT 1 UNIT PER 15 GRAMS CHO AT ALL MEALS AND SNACKS WITH CORRECTION SCALE OF 1 UNIT PER 50 MG/DL OVER 150, AVERAGE DAILY USE OF 30 UNITS 30 mL 4     OZEMPIC, 0.25 OR 0.5 MG/DOSE, 2 MG/1.5ML SOPN pen INJECT 0.25 MG UNDER THE SKIN EVERY 7 DAYS FOR 28 DAYS, THEN 0.5 MG EVERY 7 DAYS. 4.5 mL 1     simvastatin (ZOCOR) 20 MG tablet TAKE 1 TABLET(20 MG) BY MOUTH AT BEDTIME 90 tablet 0     traZODone (DESYREL) 50 MG tablet Take 1-2 tablets by mouth nightly as needed for sleep. 180 tablet 0            Allergies:     Allergies   Allergen Reactions     Monosodium Glutamate Palpitations and Shortness Of Breath     Sulfasalazine       "Developed rash, HA, dizziness              ROS:     A focused ROS is negative other than the symptoms noted above in the HPI.             Physical Examiniation:     VITAL SIGNS: There were no vitals taken for this visit.  BMI: Estimated body mass index is 28.7 kg/m  as calculated from the following:    Height as of 10/7/22: 1.778 m (5' 10\").    Weight as of 10/7/22: 90.7 kg (200 lb).    Gen: NAD, pleasant and cooperative   Neuro/MSK:   Normal gait, able to heel and toe walk with and w/o shoes  MMT BLE 5/5 bilateral HF, Hadd,Habd, KF,KE,ADF,APF,GTE  Sensation light touch intact BLE   DTR's 2+ patella bilaterally   Vibratory sense diminished L med mall and great toe, and R great toe.   Heel shin heel   Lumbar , TTP both sides lumbar extensor bundles, SIJ's, no spasm today  Normocephalic,   MMT 5/5 Bilateral SA,EF,EE,WF,WE,, DI  ttp at C7 midline, no spasm  ,Sensation LT intact BUE  DTR's 2+ biceps, triceps, brachioradialis             Laboratory/Imaging:     EMG LLE normal (11/28/2022)            Assessment/Plan:     This is a 51 y/o female with gait imbalance. She has multiple risk factors for poor balance, including chronic painful left sided ATFL sprain, generalized deconditioning due to limited activity from COVID pandemic, and painful low back, L knee, R ankle anmd stiffness on the r toes 2,3,4 after healed fractures.     1. Patient education: In depth discussion and education was provided about the assessment and implications of each of the below recommendations for management. Patient indicated readiness to learn, all questions were answered and understanding of material presented was confirmed.    2. Work-up: No further imaging necessary: upper exam not concerning for cervical stenosis as cause of imbalance, will observe. MRI L and EMG results reassuring and r/o PN and lumbar radiculopathy.     3. Therapy/equipment/braces: She has bilateral ankle braces which do improve her stability.     4. Medications: " none    5. Interventions:Recommend an updated course of PT to strengthen and support lumbar spine, knees, ankle and feet, and teach balance exercises.     6. Referral / follow up with other providers:    7. Follow up: 3 months    Marlen Bailey MD    Physical Medicine & Rehabilitation    I spent a total of 48 minutes face-to-face with Dali Martines during today's office visit. Over 50% of this time was spent counseling the patient and/or coordinating care. See note for details.     15 minutes spent on the date of the encounter doing chart review, history and exam, documentation and further activities as noted above

## 2022-12-14 ENCOUNTER — OFFICE VISIT (OUTPATIENT)
Dept: PHYSICAL MEDICINE AND REHAB | Facility: CLINIC | Age: 52
End: 2022-12-14
Payer: COMMERCIAL

## 2022-12-14 VITALS — DIASTOLIC BLOOD PRESSURE: 74 MMHG | HEART RATE: 76 BPM | OXYGEN SATURATION: 97 % | SYSTOLIC BLOOD PRESSURE: 112 MMHG

## 2022-12-14 DIAGNOSIS — M25.571 PAIN IN JOINT, ANKLE AND FOOT, RIGHT: ICD-10-CM

## 2022-12-14 DIAGNOSIS — G89.29 CHRONIC PAIN OF LEFT KNEE: ICD-10-CM

## 2022-12-14 DIAGNOSIS — M25.562 CHRONIC PAIN OF LEFT KNEE: ICD-10-CM

## 2022-12-14 DIAGNOSIS — R26.9 GAIT ABNORMALITY: ICD-10-CM

## 2022-12-14 DIAGNOSIS — E10.3291 TYPE 1 DIABETES MELLITUS WITH MILD NONPROLIFERATIVE RETINOPATHY OF RIGHT EYE WITHOUT MACULAR EDEMA (H): Primary | ICD-10-CM

## 2022-12-14 DIAGNOSIS — R26.89 BALANCE DISORDER: Primary | ICD-10-CM

## 2022-12-14 DIAGNOSIS — G89.29 CHRONIC BILATERAL LOW BACK PAIN WITHOUT SCIATICA: ICD-10-CM

## 2022-12-14 DIAGNOSIS — H40.003 GLAUCOMA SUSPECT OF BOTH EYES: ICD-10-CM

## 2022-12-14 DIAGNOSIS — M54.50 CHRONIC BILATERAL LOW BACK PAIN WITHOUT SCIATICA: ICD-10-CM

## 2022-12-14 PROCEDURE — 99205 OFFICE O/P NEW HI 60 MIN: CPT | Performed by: PHYSICAL MEDICINE & REHABILITATION

## 2022-12-14 ASSESSMENT — PAIN SCALES - GENERAL: PAINLEVEL: SEVERE PAIN (7)

## 2022-12-14 NOTE — LETTER
2022       RE: Dali Martines  [address]  [facility] [address]     Dear Colleague,    Thank you for referring your patient, Dali Martines, to the [facility] PHYSICAL MEDICINE AND REHABILITATION CLINIC [place] at [facility]. Please see a copy of my visit note below.           PM&R Clinic Note     Patient Name: Dali Martines : 1970 Medical Record: [id number]     Requesting Physician/clinician: Dr. Tolu Fernández            History of Present Illness:     Dali Martines is a 52 year old female referred to us for gait imbalance. She has chronic left foot and ankle pain due to chronic ATFL sprain, for which she follows with Dr. Fernández. Other medical issues include DM with HbAic 7-8 range. She had an EMG of the LLE on 2022, normal findings. They tested bilateral surals, sup per sens which were also normal. MRI L spine unremarkable besides age related changes. Normal vision, last eye exam 1 year ago, no glasses or contacts.     She feels instability in the low back with episodic pain flare ups. She also has chronic lateral tracking of the left patella. She feels feet and ankles (particularly on the R) are unstable at times. She gets lateral right ankle pain over ATFL with walking more than a few blocks. She is looking to improve her chronic gait imbalance. She's had issues for over 20 years. She may consider future surgery on the R ankle.     She fell 6 months ago in her home, rug surface, no dizziness or light headed prior. Falls sideways. She is hoping to reduce her risk of future falls.                Past Medical and Surgical History:     Past Medical History:   Diagnosis Date     Anemia      Anxiety      Arthritis     Psoriatic arthritis     Back injury 1988     Dry eye syndrome      Dyslipidemia      Endometriosis     adehsions seen at laparoscopy     GERD (gastroesophageal reflux disease)       Hypothyroidism     8 yoa     SOB (shortness of breath) 3/11/2014     Type I (juvenile type) diabetes mellitus without mention of complication, not stated as uncontrolled     when 15      Uncomplicated asthma Fall 2013     Past Surgical History:   Procedure Laterality Date     COLONOSCOPY  2002     COLONOSCOPY N/A 6/23/2020    Procedure: COLONOSCOPY;  Surgeon: Lauryn Rivera MD;  Location:  GI     ESOPHAGOSCOPY, GASTROSCOPY, DUODENOSCOPY (EGD), COMBINED  1/7/2014    Procedure: COMBINED ESOPHAGOSCOPY, GASTROSCOPY, DUODENOSCOPY (EGD), BIOPSY SINGLE OR MULTIPLE;;  Surgeon: Kraig Nicole MD;  Location:  GI     GYN SURGERY      Laparotomy to remove endometrial tissue     HC BREATH HYDROGEN TEST N/A 6/17/2014    Procedure: HYDROGEN BREATH TEST;  Surgeon: Deacon Alberts MD;  Location:  GI     HYDROGEN BREATH TEST  6/17/14     LAPAROSCOPIC APPENDECTOMY  2002     LAPAROTOMY, LYSIS ADHESIONS, COMBINED  2002    endometriosis     SOFT TISSUE SURGERY  December 2014    right ankle tendon injury     ZZC REPAIR CRUCIATE LIGAMENT,KNEE       ZZC STOMACH SURGERY PROCEDURE UNLISTED  December 2002            Social History:     Social History     Tobacco Use     Smoking status: Never     Smokeless tobacco: Never   Substance Use Topics     Alcohol use: Yes     Comment: moderately                Functional history:     Dali Martines is independent with ADL's           Family History:     Family History   Problem Relation Age of Onset     Breast Cancer Mother      Heart Disease Father      C.A.D. Father      Arthritis Father      Circulatory Father      Eye Disorder Father      Lipids Father      Thyroid Disease Father      Macular Degeneration Father      Coronary Artery Disease Father      Anxiety Disorder Father      Alcoholism Father      Rheumatoid Arthritis Father      Hypothyroidism Father      Diabetes Father      Cerebrovascular Disease Paternal Grandmother         ,     Cancer Paternal Grandfather          Pancreatic     Heart Disease Paternal Grandfather      Diabetes Maternal Grandmother         Type 2     C.A.D. Maternal Grandmother      Heart Disease Maternal Grandmother      Coronary Artery Disease Maternal Grandmother      Heart Disease Maternal Grandfather      Cardiac Sudden Death Maternal Grandfather      Gynecology Other         self     Thyroid Disease Other         self     Macular Degeneration Maternal Aunt      Diabetes Other         Type 1     Glaucoma No family hx of             Medications:     Current Outpatient Medications   Medication Sig Dispense Refill     albuterol (PROAIR HFA/PROVENTIL HFA/VENTOLIN HFA) 108 (90 Base) MCG/ACT inhaler Inhale 2 puffs into the lungs every 4 hours as needed for shortness of breath / dyspnea or wheezing 1 Inhaler 11     aspirin 81 MG tablet Take 1 tablet by mouth daily.       blood glucose (ACCU-CHEK COLIN PLUS) test strip Test Blood Sugar 8 times daily or as directed 800 strip 3     blood glucose (NO BRAND SPECIFIED) test strip Use to test blood sugar 8 times daily or as directed. Any covered brand. 800 strip 3     blood glucose monitoring (ACCU-CHEK FASTCLIX) lancets Use to test blood sugar 8 times daily or as directed. 4 Box 3     blood glucose monitoring (NO BRAND SPECIFIED) meter device kit Use to test blood sugar 8 times daily or as directed. Any covered brand, 1 kit 0     Calcium Carbonate-Vitamin D (CALCIUM + D PO) Take one daily       citalopram (CELEXA) 20 MG tablet Take 1.5 tablets (30 mg) by mouth daily 135 tablet 1     Continuous Blood Gluc  (DEXCOM G6 ) SUZANNE Use to read blood sugars as per 's instructions. 1 each 0     Continuous Blood Gluc Sensor (DEXCOM G6 SENSOR) MISC Change every 10 days. 9 each 3     Continuous Blood Gluc Transmit (DEXCOM G6 TRANSMITTER) MISC CHANGE EVERY 3 MONTHS 1 each 3     diclofenac (VOLTAREN) 1 % topical gel Apply 2 g topically 4 times daily 100 g 4     diclofenac (VOLTAREN) 1 % topical gel APPLY 2  GRAMS ONTO THE SKIN FOUR TIMES DAILY AS NEEDED FOR MODERATE PAIN 100 g 5     econazole nitrate 1 % cream Apply 0.5 inches topically daily.       FIASP PENFILL 100 UNIT/ML SOCT INJECT UP TO 45 UNITS DAILY AS DIRECTED ACCORDING TO CORRECTION FACTOR. 45 mL 3     fish oil-omega-3 fatty acids 1000 MG capsule Take one daily       folic acid (FOLVITE) 1 MG tablet Take 1 tablet (1 mg) by mouth daily 90 tablet 3     Injection Device for insulin (INPEN 100-BLUE-TANIYA) SUZANNE 1 each once for 1 dose 1 each 0     Injection Device for insulin (INPEN 100-BLUE-NOVOLOG-FIASP) SUZANNE 1 each once for 1 dose 1 each 0     insulin degludec (TRESIBA FLEXTOUCH) 100 UNIT/ML pen ADMINISTER 17 UNITS UNDER THE SKIN DAILY 15 mL 1     insulin pen needle (B-D U/F) 31G X 8 MM miscellaneous Use 5 pen needles daily 450 each 3     Ketoconazole (NIZORAL PO) Shampoo daily       levothyroxine (SYNTHROID/LEVOTHROID) 112 MCG tablet TAKE 1 TABLET(112 MCG) BY MOUTH DAILY 90 tablet 3     methotrexate 2.5 MG tablet Take 6 tablets (15 mg) by mouth every 7 days Labs required every 8-12 weeks while taking this medication, overdue: please have these now. 24 tablet 0     methylphenidate (METADATE CD) 20 MG CR capsule Take 20 mg by mouth daily       methylphenidate (RITALIN) 10 MG tablet TAKE UP TO 2 TABLETS BY MOUTH IN THE LATE AFTERNOON.       Minoxidil (ROGAINE EX) daily       mometasone-formoterol (DULERA) 100-5 MCG/ACT inhaler Inhale 2 puffs into the lungs 2 times daily 13 g 5     montelukast (SINGULAIR) 5 MG chewable tablet CHEW AND SWALLOW 1 TABLET(5 MG) BY MOUTH AT BEDTIME 90 tablet 4     Multiple Vitamin (MULTIVITAMIN OR) Take one daily tab       NOVOLOG PENFILL 100 UNIT/ML soln INJECT 1 UNIT PER 15 GRAMS CHO AT ALL MEALS AND SNACKS WITH CORRECTION SCALE OF 1 UNIT PER 50 MG/DL OVER 150, AVERAGE DAILY USE OF 30 UNITS 30 mL 4     OZEMPIC, 0.25 OR 0.5 MG/DOSE, 2 MG/1.5ML SOPN pen INJECT 0.25 MG UNDER THE SKIN EVERY 7 DAYS FOR 28 DAYS, THEN 0.5 MG EVERY 7 DAYS. 4.5  "mL 1     simvastatin (ZOCOR) 20 MG tablet TAKE 1 TABLET(20 MG) BY MOUTH AT BEDTIME 90 tablet 0     traZODone (DESYREL) 50 MG tablet Take 1-2 tablets by mouth nightly as needed for sleep. 180 tablet 0            Allergies:     Allergies   Allergen Reactions     Monosodium Glutamate Palpitations and Shortness Of Breath     Sulfasalazine      Developed rash, HA, dizziness              ROS:     A focused ROS is negative other than the symptoms noted above in the HPI.             Physical Examiniation:     VITAL SIGNS: There were no vitals taken for this visit.  BMI: Estimated body mass index is 28.7 kg/m  as calculated from the following:    Height as of 10/7/22: 1.778 m (5' 10\").    Weight as of 10/7/22: 90.7 kg (200 lb).    Gen: NAD, pleasant and cooperative   Neuro/MSK:   Normal gait, able to heel and toe walk with and w/o shoes  MMT BLE 5/5 bilateral HF, Hadd,Habd, KF,KE,ADF,APF,GTE  Sensation light touch intact BLE   DTR's 2+ patella bilaterally   Vibratory sense diminished L med mall and great toe, and R great toe.   Heel shin heel   Lumbar , TTP both sides lumbar extensor bundles, SIJ's, no spasm today  Normocephalic,   MMT 5/5 Bilateral SA,EF,EE,WF,WE,, DI  ttp at C7 midline, no spasm  ,Sensation LT intact BUE  DTR's 2+ biceps, triceps, brachioradialis             Laboratory/Imaging:     EMG LLE normal (11/28/2022)            Assessment/Plan:     This is a 53 y/o female with gait imbalance. She has multiple risk factors for poor balance, including chronic painful left sided ATFL sprain, generalized deconditioning due to limited activity from COVID pandemic, and painful low back, L knee, R ankle anmd stiffness on the r toes 2,3,4 after healed fractures.     1. Patient education: In depth discussion and education was provided about the assessment and implications of each of the below recommendations for management. Patient indicated readiness to learn, all questions were answered and understanding of material " presented was confirmed.    2. Work-up: No further imaging necessary: upper exam not concerning for cervical stenosis as cause of imbalance, will observe. MRI L and EMG results reassuring and r/o PN and lumbar radiculopathy.     3. Therapy/equipment/braces: She has bilateral ankle braces which do improve her stability.     4. Medications: none    5. Interventions:Recommend an updated course of PT to strengthen and support lumbar spine, knees, ankle and feet, and teach balance exercises.     6. Referral / follow up with other providers:    7. Follow up: 3 months    Physical Medicine & Rehabilitation    I spent a total of 48 minutes face-to-face with Dali Martines during today's office visit. Over 50% of this time was spent counseling the patient and/or coordinating care. See note for details.     15 minutes spent on the date of the encounter doing chart review, history and exam, documentation and further activities as noted above      Again, thank you for allowing me to participate in the care of your patient.      Sincerely,    Marlen Bailey MD

## 2022-12-14 NOTE — NURSING NOTE
Chief Complaint   Patient presents with     Consult     New UMP, gait abnormality and balance disorder, muscle weakness and multiple musculoskeletal injuries     /74   Pulse 76   SpO2 97%       Felicia Barba, EMT

## 2022-12-16 ENCOUNTER — VIRTUAL VISIT (OUTPATIENT)
Dept: SLEEP MEDICINE | Facility: CLINIC | Age: 52
End: 2022-12-16
Payer: COMMERCIAL

## 2022-12-16 ENCOUNTER — TELEPHONE (OUTPATIENT)
Dept: ENDOCRINOLOGY | Facility: CLINIC | Age: 52
End: 2022-12-16

## 2022-12-16 VITALS
BODY MASS INDEX: 27.92 KG/M2 | HEIGHT: 70 IN | SYSTOLIC BLOOD PRESSURE: 112 MMHG | DIASTOLIC BLOOD PRESSURE: 74 MMHG | WEIGHT: 195 LBS

## 2022-12-16 DIAGNOSIS — F43.9 STRESS: ICD-10-CM

## 2022-12-16 DIAGNOSIS — F51.04 CHRONIC INSOMNIA: Primary | ICD-10-CM

## 2022-12-16 PROCEDURE — 90834 PSYTX W PT 45 MINUTES: CPT | Mod: 95 | Performed by: PSYCHOLOGIST

## 2022-12-16 ASSESSMENT — SLEEP AND FATIGUE QUESTIONNAIRES
HOW LIKELY ARE YOU TO NOD OFF OR FALL ASLEEP WHEN YOU ARE A PASSENGER IN A CAR FOR AN HOUR WITHOUT A BREAK: SLIGHT CHANCE OF DOZING
HOW LIKELY ARE YOU TO NOD OFF OR FALL ASLEEP WHILE SITTING QUIETLY AFTER LUNCH WITHOUT ALCOHOL: MODERATE CHANCE OF DOZING
HOW LIKELY ARE YOU TO NOD OFF OR FALL ASLEEP WHILE LYING DOWN TO REST IN THE AFTERNOON WHEN CIRCUMSTANCES PERMIT: HIGH CHANCE OF DOZING
HOW LIKELY ARE YOU TO NOD OFF OR FALL ASLEEP WHILE SITTING AND TALKING TO SOMEONE: WOULD NEVER DOZE
HOW LIKELY ARE YOU TO NOD OFF OR FALL ASLEEP WHILE WATCHING TV: HIGH CHANCE OF DOZING
HOW LIKELY ARE YOU TO NOD OFF OR FALL ASLEEP WHILE SITTING INACTIVE IN A PUBLIC PLACE: SLIGHT CHANCE OF DOZING
HOW LIKELY ARE YOU TO NOD OFF OR FALL ASLEEP WHILE SITTING AND READING: HIGH CHANCE OF DOZING
HOW LIKELY ARE YOU TO NOD OFF OR FALL ASLEEP IN A CAR, WHILE STOPPED FOR A FEW MINUTES IN TRAFFIC: WOULD NEVER DOZE

## 2022-12-16 ASSESSMENT — PATIENT HEALTH QUESTIONNAIRE - PHQ9
SUM OF ALL RESPONSES TO PHQ QUESTIONS 1-9: 12
10. IF YOU CHECKED OFF ANY PROBLEMS, HOW DIFFICULT HAVE THESE PROBLEMS MADE IT FOR YOU TO DO YOUR WORK, TAKE CARE OF THINGS AT HOME, OR GET ALONG WITH OTHER PEOPLE: SOMEWHAT DIFFICULT
SUM OF ALL RESPONSES TO PHQ QUESTIONS 1-9: 12

## 2022-12-16 ASSESSMENT — PAIN SCALES - GENERAL: PAINLEVEL: MILD PAIN (2)

## 2022-12-16 NOTE — TELEPHONE ENCOUNTER
Outcome for 12/16/22 11:14 AM: Per patient, will upload device before appointment  CHANCE Bautista

## 2022-12-16 NOTE — PROGRESS NOTES
Dali is a 52 year old who is being evaluated via a billable video visit.      How would you like to obtain your AVS? MyChart  If the video visit is dropped, the invitation should be resent by: Text to cell phone: [phone]  Will anyone else be joining your video visit? Asiya FLETCHER      Video-Visit Details    Video Start Time: 3:30 PM    Type of service:  Video Visit    Video End Time:4:08 PM    Originating Location (pt. Location): Home    Distant Location (provider location):  Off-site    Platform used for Video Visit: [facility]     SLEEP MEDICINE VIRTUAL VIDEO FOLLOW-UP VISIT  Sleep Psychology    Patient Name: Dali Martines  MRN:  [id number]  Date of Service: Dec 16, 2022       Subjective Report     Dali Martines  returns for a telehealth video visit to discuss progress in implementing behavioral strategies for the management of insomnia.  Patient consent for initiation of video visit was obtained and documented prior to initiation of visit.     Dali reports improved ability to return to sleep with reduced WASO.  She is able to get an additional two hours of sleep and feels better during the day.    She reports overall level of stress is reduced.since last visit.      Additionally she found resolution related to the experience of the traumatic murder of a work colleague 20 years agol.    Discussed a kimbrough     .     Sleep Data:     Source of Sleep Estimates:  Verbal Self-report    Average Time in Bed: 7.5  Average Total Sleep Time:  7 hrs  Sleep Efficiency: greater than 85%%    EPWORTH SLEEPINESS SCALE    Sitting and reading 3   Watching TV 3   Sitting, inactive in a public place (theatre or mtg.) 1    As a passenger in a car 1   Lying down to rest in the afternoon when circumstance permit 3   Sitting and talking to someone 0   Sitting quietly after lunch without alcohol 2   In a car, while stopped for a few minutes in traffic 0   TOTAL SCORE (nl <11) 13     INSOMNIA SEVERITY INDEX     Difficulty  "falling asleep 1   Difficult staying asleep 3   Problems waking up to early 3   How SATISFIED/DISSATISFIED are you with your CURRENT sleep pattern? 3   How NOTICEABLE to others do you think your sleep pattern is in terms of your quality of life? 2   How WORRIED/DISTRESSED are you about your current sleep pattern? 3   To what extent do you consider your sleep problem to INTERFERE with your daily fuctioning(e.g. daytime fatigue, mood, ability to function at work/daily chores, concentration, mood,etc.) CURRENTLY? 3   INSOMNIA SEVERITY INDEX TOTAL SCORE 18    Absence of insomnia (0-7); sub-threshold insomnia (8-14); moderate insomnia (15-21); and severe insomnia (22-28)       Interventions     Strategies and recommendations including Stimulus control therapy, Stress management and Anxiety and sleep were discussed today.       Vital Signs     /74   Ht 1.778 m (5' 10\")   Wt 88.5 kg (195 lb)   BMI 27.98 kg/m       Mental Status     Orientation:  X3  Mood:  normal  Affect:  Congruent with mood  Speech/Language:  Normal  Thought Process: Intact  Associations:  Normal  Thought Content: Normal  Patient does not report any suicidal ideation, intention or plan.    Diagnostic Impressions and Plan        Chronic insomnia  Stress    Improved sleep and reduction in stress symptoms reported.  SL, WASO, SE not normal.    Plan:  Continue current sleep schedule and plan    Follow-up: 3 months      Philip Quiroz PsyD, GARY, DBSM  Diplomate, Behavioral Sleep Medicine  [facility]      Note: This dictation was created using voice recognition software. This document may contain an error not identified before finalizing the document. If the error changes the accuracy of the document, I would appreciate it being brought to my attention.                           "

## 2022-12-18 DIAGNOSIS — E10.9 TYPE 1 DIABETES MELLITUS WITHOUT COMPLICATION (H): ICD-10-CM

## 2022-12-19 NOTE — TELEPHONE ENCOUNTER
Outcome for 12/19/22 10:56 AM: Per patient, will upload device before appointment  CHANCE Bautista

## 2022-12-20 ENCOUNTER — VIRTUAL VISIT (OUTPATIENT)
Dept: ENDOCRINOLOGY | Facility: CLINIC | Age: 52
End: 2022-12-20
Payer: COMMERCIAL

## 2022-12-20 ENCOUNTER — TELEPHONE (OUTPATIENT)
Dept: ENDOCRINOLOGY | Facility: CLINIC | Age: 52
End: 2022-12-20

## 2022-12-20 DIAGNOSIS — E10.3299 TYPE 1 DIABETES MELLITUS WITH MILD NONPROLIFERATIVE RETINOPATHY, MACULAR EDEMA PRESENCE UNSPECIFIED, UNSPECIFIED LATERALITY (H): Primary | ICD-10-CM

## 2022-12-20 PROCEDURE — 99215 OFFICE O/P EST HI 40 MIN: CPT | Mod: 95 | Performed by: INTERNAL MEDICINE

## 2022-12-20 RX ORDER — INSULIN DEGLUDEC 100 U/ML
INJECTION, SOLUTION SUBCUTANEOUS
Qty: 30 ML | Refills: 1 | Status: SHIPPED | OUTPATIENT
Start: 2022-12-20 | End: 2023-10-18

## 2022-12-20 NOTE — LETTER
12/20/2022       RE: Dali Martines  [address]  [facility] [address]     Dear Colleague,    Thank you for referring your patient, Dali Martines, to the [facility] ENDOCRINOLOGY CLINIC [place] at [facility]. Please see a copy of my visit note below.    Dali is a 52 year old who is being evaluated via a billable video visit.      How would you like to obtain your AVS? [employer]  If the video visit is dropped, the invitation should be resent by: Send to e-mail at: [email or web address]  Will anyone else be joining your video visit? No        Video-Visit Details        Type of service:  Video Visit        Originating Location (pt. Location): Home        Distant Location (provider location):  Off-site    Platform used for Video Visit: [employer]    Outcome for 12/13/22 4:34 PM: [employer] message sent  CHANCE Bautista  Outcome for 12/16/22 11:13 AM: Per patient, will upload device before appointment  CHANCE Bautista  Outcome for 12/19/22 10:55 AM: Per patient, will upload device before appointment  CHANCE Bautista  Outcome for 12/19/22 1:51 PM: Dexcom emailed to provider  CHANCE Bautista    This 52 year old woman was returns for f/u of her type 1 diabetes. She also has hypothyroidism and psoriatic arthritis.  Pt was dx having type 1 diabetes at age 15.  Her hx is also significant for mild NPDR right eye only,anxiety, hyperlipidemia, psoriatic arthritis,  and GERD.    She is currently taking Tresiba 17 units daily.      Insulin to Carb ratio:  1 unit per 9  grams CHO  Correction Factor:     1 unit drops BG 30 mg/mL    She is taking fiasp.  She has had several visits with our dietitian and is working on improving her carb ratio and correction factor.  She is following these visits very helpful.  Despite this, she notes her blood sugars have been a bit above target.  Her CGM data are below.  She had a steroid injection into her right ankle  a couple of months ago and this did send her blood sugars up quite a bit.  They were high for a couple of weeks and she increased her Tresiba up to 20 units at that time.  She also switched from Trulicity to Ozempic and has been taking a dose of 0.5 mg once a week.  She was previously on Trulicity 0.75 mg each week.  She thinks she is having some higher postmeal sugars since starting the Ozempic.  Initially it took away her appetite but she thinks that its not as effective as it was in the past.  She is tolerating the Ozempic without any nausea.  She also had COVID in the fall and her sugars have not been quite right since then.  She is not having hypoglycemia.  She has been thinking more about using a hybrid closed-loop pump but is not quite sure she is ready to make the switch.  She continues to be largely inactive because of pain in her right ankle.    She has been seeing Dr. avelar for help with her feet pain.  He is done a lot of work to diagnose what is causing the pain and she feels confident that things are on the track to be improved.  She reports that she did have an EMG to evaluate the numbness she has been complaining in her left foot for some time.  It was normal.  She does not have diabetic neuropathy.  She has no foot ulcers today.    She had to be off her methotrexate for several weeks weeks this fall so that she could get her vaccinations.  She is now back on the drug and her joints are starting to feel better again.    She is up-to-date with her eye visits and has no concerns.     She continues to take her statin and her thyroid medication.               Current Outpatient Medications   Medication     albuterol (PROAIR HFA/PROVENTIL HFA/VENTOLIN HFA) 108 (90 Base) MCG/ACT inhaler     aspirin 81 MG tablet     blood glucose (ACCU-CHEK COLIN PLUS) test strip     blood glucose (NO BRAND SPECIFIED) test strip     blood glucose monitoring (ACCU-CHEK FASTCLIX) lancets     blood glucose monitoring (NO  "BRAND SPECIFIED) meter device kit     Calcium Carbonate-Vitamin D (CALCIUM + D PO)     citalopram (CELEXA) 20 MG tablet     Continuous Blood Gluc  (DEXCOM G6 ) SUZANNE     Continuous Blood Gluc Sensor (DEXCOM G6 SENSOR) MISC     Continuous Blood Gluc Transmit (DEXCOM G6 TRANSMITTER) MISC     diclofenac (VOLTAREN) 1 % topical gel     diclofenac (VOLTAREN) 1 % topical gel     econazole nitrate 1 % cream     FIASP PENFILL 100 UNIT/ML SOCT     fish oil-omega-3 fatty acids 1000 MG capsule     folic acid (FOLVITE) 1 MG tablet     insulin degludec (TRESIBA FLEXTOUCH) 100 UNIT/ML pen     insulin pen needle (B-D U/F) 31G X 8 MM miscellaneous     Ketoconazole (NIZORAL PO)     levothyroxine (SYNTHROID/LEVOTHROID) 112 MCG tablet     methotrexate 2.5 MG tablet     methylphenidate (METADATE CD) 20 MG CR capsule     methylphenidate (RITALIN) 10 MG tablet     Minoxidil (ROGAINE EX)     mometasone-formoterol (DULERA) 100-5 MCG/ACT inhaler     montelukast (SINGULAIR) 5 MG chewable tablet     Multiple Vitamin (MULTIVITAMIN OR)     NOVOLOG PENFILL 100 UNIT/ML soln     OZEMPIC, 0.25 OR 0.5 MG/DOSE, 2 MG/1.5ML SOPN pen     simvastatin (ZOCOR) 20 MG tablet     traZODone (DESYREL) 50 MG tablet     Injection Device for insulin (INPEN 100-BLUE-TANIYA) SUZANNE     Injection Device for insulin (INPEN 100-BLUE-NOVOLOG-FIASP) SUZANNE     Current Facility-Administered Medications   Medication     dexamethasone (DECADRON) injection 4 mg     lidocaine 1 % injection 1 mL     triamcinolone acetonide (KENALOG-10) injection 10 mg          12/14/2022   1336 12/16/2022   1510 Most Recent Value    Temp: -- -- 98  F (36.7  C)  as of 11/18/2021    Pulse: 76 -- 76  as of 12/14/2022    BP: 112/74 112/74 112/74  as of 12/16/2022    Resp: -- -- 7 Abnormal   as of 6/23/2020    SpO2: 97 % -- 97%  as of 12/14/2022    Body Mass Index:   27.98 kg/m  Abnormal    1.778 m (5' 10\")  as of 12/16/2022   88.5 kg (195 lb)  as of 12/16/2022      On exam she is in no " acute distress.  She is without periorbital edema.  Cranial nerves are grossly intact.  Mood is upbeat.    Recent Labs   Lab Test 08/11/22  1133 08/11/22  1129 05/10/22  1523 02/14/22  1004 11/18/21  1659 10/26/21  1556 01/11/21  1109 01/11/21  1102 01/11/21  1101 06/18/19  0000 03/21/19  1359   A1C  --  7.4*  --  8.1*  --   --    < >  --  7.1*   < >  --    HEMOGLOBINA1  --   --  7.5  --   --  7.3  --   --   --    < >  --    TSH  --   --   --  3.34  --   --   --   --  2.01   < > 0.19*   T4  --   --   --   --   --   --   --   --   --   --  1.09   LDL  --   --   --  111*  --   --   --   --  122*  --   --    HDL  --   --   --  94  --   --   --   --  92  --   --    TRIG  --   --   --  66  --   --   --   --  44  --   --    CR 0.57  --   --  0.60   < >  --    < >  --   --    < >  --    MICROL  --   --   --  5  --   --   --  <5  --    < >  --     < > = values in this interval not displayed.     Assessment and plan:    1.  Diabetes control.  Her sugars do look higher than normal.  I suspect this is a combination of having had COVID, a steroid injection, and switching from Trulicity to Ozempic at less than therapeutic dose.  I recommended she increase the Ozempic to 1 mg each week.  We also discussed starting a hybrid closed-loop pump.  She will think more about this.  I will check an A1c to be sure she has not gotten far off track.    2.  Diabetes complications.  She sees her eye doctor and has some mild retinopathy.  She has no evidence of neuropathy.  Her renal function is normal.    3.hypothyroidism.  Her TSH was in target when measured last.  We will plan to measure it in February and March again.    4.CVD risk.  She is on a statin with reasonable LDL control.  Her blood pressure has been normal.    Follow-up with Shakira Sebastian in 3 months and me in 6 months.    I spent 30 minutes with her on the video visit (start time 3: 3 0 and end time 4: 00).  On the day of visit I spent an additional 17 minutes reviewing her chart,  reviewing and interpreting her lab and CGM data, and doing documentation.    Felicia Mckeon MD

## 2022-12-20 NOTE — TELEPHONE ENCOUNTER
insulin degludec (TRESIBA FLEXTOUCH) 100 UNIT/ML pen         Last Written Prescription Date:  8/15/22  Last Fill Quantity: 15 ml,   # refills: 1  Last Office Visit : 8/10/22  Future Office visit:  12/20/22    Routing refill request to provider for review/approval because:  Insulin - refilled per clinic

## 2022-12-21 ENCOUNTER — LAB (OUTPATIENT)
Dept: LAB | Facility: CLINIC | Age: 52
End: 2022-12-21
Payer: COMMERCIAL

## 2022-12-21 DIAGNOSIS — L40.50 PSORIATIC ARTHRITIS (H): ICD-10-CM

## 2022-12-21 DIAGNOSIS — E10.3299 TYPE 1 DIABETES MELLITUS WITH MILD NONPROLIFERATIVE RETINOPATHY, MACULAR EDEMA PRESENCE UNSPECIFIED, UNSPECIFIED LATERALITY (H): ICD-10-CM

## 2022-12-21 DIAGNOSIS — Z79.899 HIGH RISK MEDICATION USE: ICD-10-CM

## 2022-12-21 LAB
ALBUMIN SERPL BCG-MCNC: 4.6 G/DL (ref 3.5–5.2)
ALT SERPL W P-5'-P-CCNC: 28 U/L (ref 10–35)
AST SERPL W P-5'-P-CCNC: 37 U/L (ref 10–35)
BASOPHILS # BLD AUTO: 0 10E3/UL (ref 0–0.2)
BASOPHILS NFR BLD AUTO: 0 %
CREAT SERPL-MCNC: 0.59 MG/DL (ref 0.51–0.95)
CRP SERPL-MCNC: <3 MG/L
EOSINOPHIL # BLD AUTO: 0.1 10E3/UL (ref 0–0.7)
EOSINOPHIL NFR BLD AUTO: 2 %
ERYTHROCYTE [DISTWIDTH] IN BLOOD BY AUTOMATED COUNT: 13.3 % (ref 10–15)
ERYTHROCYTE [SEDIMENTATION RATE] IN BLOOD BY WESTERGREN METHOD: 32 MM/HR (ref 0–30)
GFR SERPL CREATININE-BSD FRML MDRD: >90 ML/MIN/1.73M2
HBA1C MFR BLD: 6.5 % (ref 0–5.6)
HCT VFR BLD AUTO: 37.3 % (ref 35–47)
HGB BLD-MCNC: 12.2 G/DL (ref 11.7–15.7)
LYMPHOCYTES # BLD AUTO: 2 10E3/UL (ref 0.8–5.3)
LYMPHOCYTES NFR BLD AUTO: 39 %
MCH RBC QN AUTO: 31.4 PG (ref 26.5–33)
MCHC RBC AUTO-ENTMCNC: 32.7 G/DL (ref 31.5–36.5)
MCV RBC AUTO: 96 FL (ref 78–100)
MONOCYTES # BLD AUTO: 0.4 10E3/UL (ref 0–1.3)
MONOCYTES NFR BLD AUTO: 8 %
NEUTROPHILS # BLD AUTO: 2.6 10E3/UL (ref 1.6–8.3)
NEUTROPHILS NFR BLD AUTO: 51 %
PLATELET # BLD AUTO: 337 10E3/UL (ref 150–450)
RBC # BLD AUTO: 3.89 10E6/UL (ref 3.8–5.2)
WBC # BLD AUTO: 5.2 10E3/UL (ref 4–11)

## 2022-12-21 PROCEDURE — 36415 COLL VENOUS BLD VENIPUNCTURE: CPT

## 2022-12-21 PROCEDURE — 82040 ASSAY OF SERUM ALBUMIN: CPT

## 2022-12-21 PROCEDURE — 83036 HEMOGLOBIN GLYCOSYLATED A1C: CPT

## 2022-12-21 PROCEDURE — 86140 C-REACTIVE PROTEIN: CPT

## 2022-12-21 PROCEDURE — 84460 ALANINE AMINO (ALT) (SGPT): CPT

## 2022-12-21 PROCEDURE — 84450 TRANSFERASE (AST) (SGOT): CPT

## 2022-12-21 PROCEDURE — 85652 RBC SED RATE AUTOMATED: CPT

## 2022-12-21 PROCEDURE — 82565 ASSAY OF CREATININE: CPT

## 2022-12-21 PROCEDURE — 85025 COMPLETE CBC W/AUTO DIFF WBC: CPT

## 2022-12-21 NOTE — TELEPHONE ENCOUNTER
Scheduled 3 month follow up with Marissa Sebastian on 3/22/23 and follow up with Dr. Mckeon on 7/5/23. Skylar Benoit on 12/20/2022 at 6:21 PM

## 2022-12-24 NOTE — PROGRESS NOTES
Dali is a 52 year old who is being evaluated via a billable video visit.      How would you like to obtain your AVS? MyChart  If the video visit is dropped, the invitation should be resent by: Text to cell phone: [phone]  Will anyone else be joining your video visit? No        Video-Visit Details    Type of service:  Video Visit   Video start time 1pm  Video end time 130pm  Originating Location (pt. Location): Home    Distant Location (provider location):  Off-site  Platform used for Video Visit: [employer]    Diabetes Self-Management Education & Support    Dali Martines presents today for education related to Type 1 diabetes    Patient is being treated with:  insulin  She is accompanied by self    PATIENT CONCERNS RELATED TO DIABETES SELF MANAGEMENT: Cortisone shot for ankle a few weeks ago. Had to increase tresiba up to 20 units. Is not doing 19 units, having some lows. Was previously taking 17 units.       ASSESSMENT:    Taking Medication:     Current Diabetes Management per Patient:  Taking diabetes medications?   yes:     Diabetes Medication(s)     Insulin       FIASP PENFILL 100 UNIT/ML SOCT    INJECT UP TO 45 UNITS DAILY AS DIRECTED ACCORDING TO CORRECTION FACTOR.     insulin degludec (TRESIBA FLEXTOUCH) 100 UNIT/ML pen    ADMINISTER 17 UNITS UNDER THE SKIN DAILY     NOVOLOG PENFILL 100 UNIT/ML soln    INJECT 1 UNIT PER 15 GRAMS CHO AT ALL MEALS AND SNACKS WITH CORRECTION SCALE OF 1 UNIT PER 50 MG/DL OVER 150, AVERAGE DAILY USE OF 30 UNITS    Incretin Mimetic Agents       OZEMPIC, 0.25 OR 0.5 MG/DOSE, 2 MG/1.5ML SOPN pen    INJECT 0.25 MG UNDER THE SKIN EVERY 7 DAYS FOR 28 DAYS, THEN 0.5 MG EVERY 7 DAYS.     Semaglutide, 1 MG/DOSE, 4 MG/3ML SOPN    Inject 1 mg Subcutaneous every 7 days          Monitoring    Time in target is down a bit the last two weeks to 53% (from 62% at last visit)  She is experiencing more lows with the increase in tresiba    Patient's most recent   Lab Results   Component Value  Date    A1C 6.5 12/21/2022    A1C 7.1 01/11/2021      Patient's A1C goal: <7.0    Activity: not assessed    Healthy Eating:   Dali has made big strides in her carb counting over the last number of months.  However, she is not always covering all carbs with insulin.      Problem Solving:      Patient is at risk of hypoglycemia?: YES  Hospitalizations for hyper or hypoglycemia: No    EDUCATION and INSTRUCTION PROVIDED AT THIS VISIT:    Discussed in pen with patient. She is interested in starting this. Placed order.     Patient-stated goal written and given to Dali JING Martines.  Verbalized and demonstrated understanding of instructions.     PLAN:  Decrease tresiba back to 17 units   Continue to work on covering all carbs  In Pen ordered    FOLLOW-UP:    scheduled    Time spent with patient at today's visit was 30 minutes.      Any diabetes medication dose changes were made via the CDE Protocol and Collaborative Practice Agreement with [place] and  [facility].  A copy of this encounter was provided to patient's referring provider.

## 2022-12-28 ENCOUNTER — THERAPY VISIT (OUTPATIENT)
Dept: PHYSICAL THERAPY | Facility: CLINIC | Age: 52
End: 2022-12-28
Attending: PHYSICAL MEDICINE & REHABILITATION
Payer: COMMERCIAL

## 2022-12-28 DIAGNOSIS — R26.89 BALANCE DISORDER: ICD-10-CM

## 2022-12-28 DIAGNOSIS — R26.9 GAIT ABNORMALITY: ICD-10-CM

## 2022-12-28 DIAGNOSIS — G89.29 CHRONIC BILATERAL LOW BACK PAIN WITHOUT SCIATICA: ICD-10-CM

## 2022-12-28 DIAGNOSIS — M54.50 CHRONIC BILATERAL LOW BACK PAIN WITHOUT SCIATICA: ICD-10-CM

## 2022-12-28 DIAGNOSIS — M25.562 CHRONIC PAIN OF LEFT KNEE: ICD-10-CM

## 2022-12-28 DIAGNOSIS — G89.29 CHRONIC PAIN OF LEFT KNEE: ICD-10-CM

## 2022-12-28 DIAGNOSIS — M25.571 PAIN IN JOINT, ANKLE AND FOOT, RIGHT: ICD-10-CM

## 2022-12-28 PROCEDURE — 97162 PT EVAL MOD COMPLEX 30 MIN: CPT | Mod: GP | Performed by: PHYSICAL THERAPIST

## 2022-12-28 PROCEDURE — 97110 THERAPEUTIC EXERCISES: CPT | Mod: GP | Performed by: PHYSICAL THERAPIST

## 2022-12-28 NOTE — PROGRESS NOTES
[facility] Initial Evaluation     Present: The history is provided by the patient. No  was used.    Referring doctor: Marlen Bailey MD  Referring diagnosis: gait abnormality, balance disorder, cLBP, L knee pain, R ankle/foot pain  Date of referral: 12/15/22      Subjective:  Dali Martines is a 52 year old female with complaints of right lateral ankle, left knee and left low back pain with corresponding deconditioning and decreased balance. Pt reports that she has been to physical therapy previously and has ligamentous laxity with associated problems with ligament tears. Left ACL repair in high school from basketball. She also recalls tearing a ligament in her low back in college and says it responded well to physical therapy. She says in 2015 she was doing a medical stress test and tore a ligament in her right foot/lateral ankle. She has ongoing instability in her right ankle so she drives with her left foot. She has done a series of PRP injections in right lateral foot/ankle and says her last MRI showed improvements in tendon. She is primarily interested in working on ligamentous stability in her ankle. She had an injection in November which helped her pain while walking. She says she has to return home and ice ankle after running errands. Reports pain in left low back which she says may be connected to compensating for increased pain in right ankle and left knee. She says she has also noted decreased balance including falling at home in October when she lost balance going to sit in a chair.      Symptoms commenced as a result of: tore lateral ATFL on right ankle doing medical stress test. Location of symptoms: right lateral ankle. Pain level on number scale: 7/10. Quality of pain: achy, swollen, occasional sharp pain. Pain frequency (constant/intermittent): intermittently worse. Symptoms are exacerbated by: walking, driving. Symptoms are relieved by: cortisone  "injection, ice. Progression of symptoms since onset: same. Imaging: see Epic. Previous treatment/response: PT in past has been helpful.      General health as reported by patient is fair. Pertinent medical history includes: See Epic. Medical allergies: see Epic. Other pertinent surgeries: see Epic. Current medications: See Epic.     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 Primary job tasks: prolonged sitting, computer work. Barriers at home/work: None reported by patient.     Social history: would like to be able to go for long walks with her dogs, says she used to walk them for several miles prior to initial ankle injury in 2015; used to be more athletic playing sports, would love to play tennis again but would be happy with being able to walk regularly with less pain    Red flags:  None reported by patient.    Objective  Observation:  Gait: ambulates with typical gait, neutral foot/ankle alignment    Knee AROM: 0-134 deg bilaterally, L knee \"feels tight\"      Ankle AROM/PROM (* = pain) Right Left   DF 10 10   PF 50 60   INV 30 50   EV 15 20   Great Toe EXT NT NT     Ankle Strength (* = pain) Right Left   DF 5/5 5/5   PF 4/5 4+/5   INV 5/5 5/5   EV 4/5 5/5     Special Tests Right Left   Anterior Drawer (ATF) - -   Posterior Drawer (PTF) - -   Varus Stress (Calc Fib) - -   Valgus Stress (Deltoid) - -          Palpation Tenderness: ttp right ATFL    Swelling: none observed today, patient reports it will swell frequently with prolonged walking    Hip Strength: S/L hip AB: L=4/5, R=4+/5    Other [facility] 5/9 (hyperextend elbows, knees, fwd bend)        Key Findings  Patient presents with reports of right lateral ankle pain, left knee pain, and left low back pain; decreased ROM for right ankle; typical gait; hip AB weakness and decreased balance likely contributory to symptoms      Assessment/Plan:    Patient is a 52 year old female with lumbar, left side knee and right side ankle " complaints.    Patient has the following significant findings with corresponding treatment plan.                Diagnosis 1:  R lateral ankle  Pain -  self management, education and home program  Decreased ROM/flexibility - manual therapy, therapeutic exercise, therapeutic activity and home program  Impaired balance - neuro re-education, therapeutic activities and home program  Instability -  Therapeutic Activity  Therapeutic Exercise  Neuromuscular Re-education  home program  Diagnosis 2:  Left knee   Pain -  self management, education and home program  Decreased ROM/flexibility - manual therapy, therapeutic exercise, therapeutic activity and home program  Diagnosis 3:  Low back   Pain -  self management, education and home program     Therapy Evaluation Codes:     1) History comprised of:   Personal factors that impact the plan of care:      Time since onset of symptoms and Work status.    Comorbidity factors that impact the plan of care are:      Asthma, Diabetes and Overweight.     Medications impacting care: see Epic.  2) Examination of Body Systems comprised of:   Body structures and functions that impact the plan of care:      Ankle, Knee and Lumbar spine.   Activity limitations that impact the plan of care are:      Bathing, Driving, Reading/Computer work, Running, Sitting, Sports and Walking.  3) Clinical presentation characteristics are:   Stable/Uncomplicated.  4) Decision-Making    Moderate complexity using standardized patient assessment instrument and/or measureable assessment of functional outcome.    Cumulative Therapy Evaluation is: Moderate complexity.    Previous and current functional limitations:  (See Goal Flow Sheet for this information)    Short term and Long term goals: (See Goal Flow Sheet for this information)     Communication ability:  Patient appears to be able to clearly communicate and understand verbal and written communication and follow directions correctly.  Treatment Explanation  - The following has been discussed with the patient:   RX ordered/plan of care  Anticipated outcomes  Possible risks and side effects  This patient would benefit from PT intervention to resume normal activities.   Rehab potential is good.    Frequency:  1 X week, once daily; decreasing to every other week as symptoms improve and patient becomes more independent with HEP  Duration:  for 10 weeks  Discharge Plan:  Achieve all LTG.  Independent in home treatment program.  Reach maximal therapeutic benefit.    Please refer to the daily flowsheet for treatment today, total treatment time and time spent performing 1:1 timed codes.     Please Contact me with any questions or concerns. Thank you for your time and entrusting me with your care.     Mario Garcia, PT, DPT, OCS, CSMT  Physical Therapist  [facility]  [phone]

## 2022-12-30 ENCOUNTER — VIRTUAL VISIT (OUTPATIENT)
Dept: EDUCATION SERVICES | Facility: CLINIC | Age: 52
End: 2022-12-30
Payer: COMMERCIAL

## 2022-12-30 DIAGNOSIS — E10.3299 TYPE 1 DIABETES MELLITUS WITH MILD NONPROLIFERATIVE RETINOPATHY, MACULAR EDEMA PRESENCE UNSPECIFIED, UNSPECIFIED LATERALITY (H): Primary | ICD-10-CM

## 2022-12-30 PROCEDURE — G0108 DIAB MANAGE TRN  PER INDIV: HCPCS | Mod: 95 | Performed by: NUTRITIONIST

## 2023-01-02 NOTE — PROGRESS NOTES
Dali is a 52 year old who is being evaluated via a billable video visit.      How would you like to obtain your AVS? MyChart  If the video visit is dropped, the invitation should be resent by: Text to cell phone: [phone]  Will anyone else be joining your video visit? No      Video-Visit Details    Type of service:  Video Visit     Originating Location (pt. Location): Home    Distant Location (provider location):  Off-site  Platform used for Video Visit: [employer]   Start time 1pm  End time 1:55pm    Diabetes Self-Management Education & Support    Dali Martines presents today for education related to Type 1 diabetes    Patient is being treated with:  insulin  She is accompanied by self      PATIENT CONCERNS RELATED TO DIABETES SELF MANAGEMENT: Appointment for in pen start      ASSESSMENT:    Taking Medication:     Current Diabetes Management per Patient:  Taking diabetes medications?   yes:     Diabetes Medication(s)     Insulin       FIASP PENFILL 100 UNIT/ML SOCT    INJECT UP TO 45 UNITS DAILY AS DIRECTED ACCORDING TO CORRECTION FACTOR.     insulin degludec (TRESIBA FLEXTOUCH) 100 UNIT/ML pen    ADMINISTER 17 UNITS UNDER THE SKIN DAILY     NOVOLOG PENFILL 100 UNIT/ML soln    INJECT 1 UNIT PER 15 GRAMS CHO AT ALL MEALS AND SNACKS WITH CORRECTION SCALE OF 1 UNIT PER 50 MG/DL OVER 150, AVERAGE DAILY USE OF 30 UNITS    Incretin Mimetic Agents       OZEMPIC, 0.25 OR 0.5 MG/DOSE, 2 MG/1.5ML SOPN pen    INJECT 0.25 MG UNDER THE SKIN EVERY 7 DAYS FOR 28 DAYS, THEN 0.5 MG EVERY 7 DAYS.     Semaglutide, 1 MG/DOSE, 4 MG/3ML SOPN    Inject 1 mg Subcutaneous every 7 days          Monitoring    Patient did not upload her dexcom    Patient's most recent   Lab Results   Component Value Date    A1C 6.5 12/21/2022    A1C 7.1 01/11/2021      Patient's A1C goal: <7.0    Problem Solving:      Patient is at risk of hypoglycemia?: YES  Hospitalizations for hyper or hypoglycemia: No    EDUCATION and INSTRUCTION PROVIDED AT THIS  VISIT:       Start time ICR  1 unit/gm Insulin Sensitivity Factor Target BG    06:00 AM 10 45 110   11:00 AM 10 45 110   05:00 PM 10 45 110   10:00 PM 10 40 150         Active Insulin Time:  3  Maximum Bolus:  16  IOB On    Unfotunately we were not able to proceed as her pen would not pair with her phone after multiple attempts  Tried reloading the dominic and still unsuccessful.  Will contact [employer] rep to see if they have a suggestion so we can proceed.     Patient-stated goal written and given to Dali Martines.  Verbalized and demonstrated understanding of instructions.       FOLLOW-UP:        Time spent with patient at today's visit was 55 minutes.      Any diabetes medication dose changes were made via the CDE Protocol and Collaborative Practice Agreement with [facility] and  [facility].  A copy of this encounter was provided to patient's referring provider.

## 2023-01-05 ENCOUNTER — THERAPY VISIT (OUTPATIENT)
Dept: PHYSICAL THERAPY | Facility: CLINIC | Age: 53
End: 2023-01-05
Attending: PHYSICAL MEDICINE & REHABILITATION
Payer: COMMERCIAL

## 2023-01-05 DIAGNOSIS — M25.571 CHRONIC PAIN OF RIGHT ANKLE: Primary | ICD-10-CM

## 2023-01-05 DIAGNOSIS — G89.29 CHRONIC PAIN OF RIGHT ANKLE: Primary | ICD-10-CM

## 2023-01-05 PROCEDURE — 97530 THERAPEUTIC ACTIVITIES: CPT | Mod: GP | Performed by: PHYSICAL THERAPIST

## 2023-01-05 PROCEDURE — 97110 THERAPEUTIC EXERCISES: CPT | Mod: GP | Performed by: PHYSICAL THERAPIST

## 2023-01-11 ENCOUNTER — TELEPHONE (OUTPATIENT)
Dept: ENDOCRINOLOGY | Facility: CLINIC | Age: 53
End: 2023-01-11

## 2023-01-11 DIAGNOSIS — E10.3299 TYPE 1 DIABETES MELLITUS WITH MILD NONPROLIFERATIVE RETINOPATHY, MACULAR EDEMA PRESENCE UNSPECIFIED, UNSPECIFIED LATERALITY (H): ICD-10-CM

## 2023-01-11 NOTE — TELEPHONE ENCOUNTER
PA Initiation    Medication: Ozempic - PA Pending  Insurance Company: [employer] - Phone [phone] Fax [phone]  Pharmacy Filling the Rx:    Filling Pharmacy Phone:    Filling Pharmacy Fax:    Start Date: 1/11/2023

## 2023-01-12 ENCOUNTER — THERAPY VISIT (OUTPATIENT)
Dept: PHYSICAL THERAPY | Facility: CLINIC | Age: 53
End: 2023-01-12
Attending: PHYSICAL MEDICINE & REHABILITATION
Payer: COMMERCIAL

## 2023-01-12 ENCOUNTER — TELEPHONE (OUTPATIENT)
Dept: PULMONOLOGY | Facility: CLINIC | Age: 53
End: 2023-01-12

## 2023-01-12 DIAGNOSIS — M25.571 CHRONIC PAIN OF RIGHT ANKLE: Primary | ICD-10-CM

## 2023-01-12 DIAGNOSIS — M25.562 CHRONIC PAIN OF LEFT KNEE: ICD-10-CM

## 2023-01-12 DIAGNOSIS — G89.29 CHRONIC PAIN OF RIGHT ANKLE: Primary | ICD-10-CM

## 2023-01-12 DIAGNOSIS — G89.29 CHRONIC PAIN OF LEFT KNEE: ICD-10-CM

## 2023-01-12 PROCEDURE — 97110 THERAPEUTIC EXERCISES: CPT | Mod: GP | Performed by: PHYSICAL THERAPIST

## 2023-01-12 NOTE — TELEPHONE ENCOUNTER
Called pt no answer vmail is full sent my chart to schedule follow up with  in May via call center [phone]

## 2023-01-12 NOTE — TELEPHONE ENCOUNTER
Prior Authorization Approval    Authorization Effective Date: 1/12/2023  Authorization Expiration Date: 1/12/2024  Medication: Ozempic - [place] Approved   Approved Dose/Quantity: 1 year  Reference #: [id number]   Insurance Company: [employer] [phone] Fax [phone]  Expected CoPay:       CoPay Card Available:      Foundation Assistance Needed:    Which Pharmacy is filling the prescription (Not needed for infusion/clinic administered):    Pharmacy Notified:    Patient Notified:

## 2023-01-13 ENCOUNTER — VIRTUAL VISIT (OUTPATIENT)
Dept: RHEUMATOLOGY | Facility: CLINIC | Age: 53
End: 2023-01-13
Attending: INTERNAL MEDICINE
Payer: COMMERCIAL

## 2023-01-13 DIAGNOSIS — L40.50 PSORIATIC ARTHRITIS (H): Primary | ICD-10-CM

## 2023-01-13 DIAGNOSIS — E55.9 VITAMIN D DEFICIENCY: ICD-10-CM

## 2023-01-13 DIAGNOSIS — Z79.899 HIGH RISK MEDICATION USE: ICD-10-CM

## 2023-01-13 DIAGNOSIS — L40.9 PSORIASIS: ICD-10-CM

## 2023-01-13 PROCEDURE — 99215 OFFICE O/P EST HI 40 MIN: CPT | Mod: 95 | Performed by: INTERNAL MEDICINE

## 2023-01-13 NOTE — PATIENT INSTRUCTIONS
1) continue on methotrexate 15mg once weekly and folic acid 1mg once daily  2) We will call you to schedule a lab appointment on/around 3/21/23  3) We will call you to schedule a follow-up in about 6 months with a follow-up the week or so prior.    Once your labs come back in March, I will let you know your Vit D level and can provide guidance on what to do with your daily current dose of 2,000 international unit(s) of vit D.     Very nice to meet you today.     Thanks  Byron

## 2023-01-13 NOTE — LETTER
2023       RE: Dali Martines  [address]  [facility] [address]     Dear Colleague,    Thank you for referring your patient, Dali Martines, to the [facility] [place] at [facility]. Please see a copy of my visit note below.    Dali Martines  is being evaluated via a billable video visit.      How would you like to obtain your AVS? MyChart  For the video visit, send the invitation by: Send to e-mail at: [email or web address]  Will anyone else be joining your video visit? No      Video visit start time 11:27:47 am.  Video visit end time 12:13:32 pm.  Penny  Patient location: home  Physician location: off site  Byron Arshad MD  Rheumatology    [facility]  Rheumatology Clinic  Byron Arshad  2023     Name: Dali Martines  MRN: [id number]  Age: 52 year old  : 1970    Problem List:  1. Psoriatic arthritis   2. Psoriasis with arthropathy  3. Pain in joint, multiple sites  4. Chronic pain of right ankle  5. Right foot pain  6. Pain in joint involving ankle and foot, right    Subjective:  Interval history 2023  -has continued on methotrexate 15mg once weekly and folic acid 1mg once daily. Has been tolerating this without noticeable side effects, GI or other.   -Overall her joint symptoms have been relatively quiet without new/progressive red/hot/swollen joints   -No symptoms consistent with active dactylitis  -Has intermittent psoriatic lesions on thighs. Her initial presenting site of plaque psoriasis was her upper back.  -No recurrent oral ulcers/ no GI complaints or blood in the stool/recurrent or persistent infection  14 point ROS collected and negative if not documented above    HPI from last encounter with jim vides in 2022  Since we last seen the patient she is generally done pretty well.  She went back up to 15 mg once weekly of methotrexate and feels like she is definitely done  better on that dose with relatively minimal morning stiffness.  She has not done her labs however since February so we do not have an actual measure of inflammation.    She did feel like she flared a little bit in June but in retrospect that may simply been overuse.  She traveled for the first time in 2-1/2 years and felt quite achy after that.  She actually was concerned that she may contracted COVID, but tested negative x2.    She also had a recent bug bite that concern her.  She however felt poorly only transiently and did not have any prolonged symptoms.    She is noticing some increasing hand pain with use.  That particular pain however is definitely not associated with morning stiffness.    Notably, she did increase her vitamin D down to 3000 units/day at the beginning of the summer thinking she would not need as much because of sun exposure.  However, she definitely started noticing some new psoriasis lesions.  That has not been an issue for her for some time.  For the time being she remains on the 3000 units rather than higher dose but intends to go back up when she does not have any way to get sun exposure.    Physical examination by video shows her to be in no acute distress HEENT examination is normal.  She is speaking in full sentences with no shortness of breath.  Her upper extremity examination shows no evidence of any swollen joints joint deformity or decrease in range of motion anywhere in her hands or arms.    Impression:    Per the problem list with well-controlled psoriatic arthritis on methotrexate monotherapy.    Plan/recommendation:    She is 6 months out from her last labs and I reiterated for her today that 3 months is pretty much the maximum we want her going without labs.  She is not experiencing any symptomatic toxicity however so I suspect she is fine but she will get them done soon.    I have discussed with her today that my general availability will be quite poor starting in September  and asked her to establish with Tracey our nurse practitioner.  I have put in for her to get in that clinic within 3 to 4 months sooner as needed.    Father, MI early 52's. He had hx of rheumatic fever. He had multiple after that. He ultimately had bypass.     Maternal grandfather 59 cardiac   Uncle 59 cardiac  Background history:  Symptoms first occurred in August 2008, when she began having stiffness and pain in the left hand, particularly in the PIPs and MCPs. Symptoms spontaneously resolved after about 6-8 weeks. She was previously seen by Rheumatology here around that time (see note from Dr. Beatncourt on 12/9/2008). Briefly, assessment was that she had a self-limited episode of inflammatory arthralgias, possibly viral or early psoriatic arthritis. Negative NOLVIA, RF, Lyme serologies and normal CRP and ESR. Given her family history of RA, there was a thought to start her on HCQ if anti-CCP antibodies were positive, but they were found to be negative. No imaging of the hand was performed. September 2014, she experienced recurrent acute onset pain, stiffness, and swelling in the L hand very similar to her symptoms in 2008. Specifically, had swelling across MCPs and in 3rd PIP. She could hardly bend the hand sometimes due to the welling. Had morning stiffness lasting 30-45 minutes. No other joints were involved. Issues with her left hand lingered for several months but eventually self-resolved that January and her only notable symptom is heel pain in the mornings. No fevers, chills, or weight loss. No back pain, vision changes, nausea, vomiting, diarrhea, abdominal pain, or blood in stool or urine. No rash, sicca symptoms, or oral ulcers. It seems she has a history of hypermobility, e.g. could bend wrist back to forearm, bend down and touch palms flat on floor, when she was younger. Used to get frequent ankle sprains, and has had multiple tendon and ligament tears over the years.    Allergies:   Sulfasalazine       Past Medical History:  Anemia   Anxiety   Psoriatic arthritis  Dry eye syndrome   Endometriosis   Gastroesophageal reflux disease   Hypothyroidism   Type 1 diabetes mellitus   Asthma   Necrobiosis lipoidica diabeticorum  Chronic low back pain   Enthesopathy   Chronic right ankle pain      Past Surgical History:  Knee cruciate ligament repair   Stomach surgery 12/2002  Hydrogen breath test 6/17/2014   Appendectomy 2002   Lysis adhesions 2002   Right ankle surgery 12/2014      Family History:   Mother: Breast cancer   Father: Heart disease, eye disorder, hyperlipidemia, macular degeneration, anxiety, alcoholism, rheumatoid arthritis, hypothyroidism, diabetes mellitus   Paternal grandmother: Cerebrovascular disease  Paternal grandfather: Pancreatic cancer, heart disease   Maternal grandmother: Type 2 diabetes mellitus, coronary artery disease  Maternal aunt: Type 1 diabetes mellitus     Social History:  The patient reports that she has never smoked. She has never used smokeless tobacco. She reports current alcohol use. She reports that she does not use drugs.     Objective:  No vitals for this video visit. Vitals reviewed in Epic.  GEN: Sitting up at desk. NAD  HEENT: no facial rash, sclera clear, no inflammatory nose/ external ear changes  CV: no upper extremity dependent edema  Pulm: speaking in full sentences, no cough, no audible wheezing, no use of accessory muscles  Skin: no acute cutaneous lesions on exposed skin  MSK: full active ROM of bilateral shoulders, elbows, wrists, MCPs, PIPs, DIPs. Can make full fist without difficulty. No erythema or edema of these joints.    WBC   Date Value Ref Range Status   01/11/2021 4.7 4.0 - 11.0 10e9/L Final     WBC Count   Date Value Ref Range Status   12/21/2022 5.2 4.0 - 11.0 10e3/uL Final     Hemoglobin   Date Value Ref Range Status   12/21/2022 12.2 11.7 - 15.7 g/dL Final   01/11/2021 11.4 (L) 11.7 - 15.7 g/dL Final     Platelet Count   Date Value Ref Range Status    12/21/2022 337 150 - 450 10e3/uL Final   01/11/2021 288 150 - 450 10e9/L Final     Creatinine   Date Value Ref Range Status   12/21/2022 0.59 0.51 - 0.95 mg/dL Final   01/11/2021 0.56 0.52 - 1.04 mg/dL Final     Lab Results   Component Value Date    ALKPHOS 88 11/18/2021    ALKPHOS 108 04/27/2016     AST   Date Value Ref Range Status   12/21/2022 37 (H) 10 - 35 U/L Final   01/11/2021 31 0 - 45 U/L Final     Lab Results   Component Value Date    ALT 28 12/21/2022    ALT 37 01/11/2021     Sed Rate   Date Value Ref Range Status   01/11/2021 27 0 - 30 mm/h Final     Erythrocyte Sedimentation Rate   Date Value Ref Range Status   12/21/2022 32 (H) 0 - 30 mm/hr Final     CRP Inflammation   Date Value Ref Range Status   02/14/2022 <2.9 0.0 - 8.0 mg/L Final   01/11/2021 <2.9 0.0 - 8.0 mg/L Final     UA RESULTS:  Recent Labs   Lab Test 02/14/19  1642 07/28/17  1452   COLOR Yellow Yellow   APPEARANCE Clear Clear   URINEGLC Negative Negative   URINEBILI Negative Negative   URINEKETONE Negative Negative   SG 1.013 <=1.005   UBLD Negative Trace*   URINEPH 6.0 6.0   PROTEIN Negative Negative   UROBILINOGEN  --  0.2   NITRITE Negative Negative   LEUKEST Negative Negative   RBCU <1 O - 2   WBCU 1 O - 2      NOLVIA Screen by EIA   Date Value Ref Range Status   12/22/2014 1.6 (H) <1.0 Final     Comment:     Interpretation:  Positive     RNP Antibody IgG   Date Value Ref Range Status   03/17/2015  0.0 - 0.9 AI Final    <0.2  Negative   Antibody index (AI) values reflect qualitative changes in antibody   concentration that cannot be directly associated with clinical condition or   disease state.       Rheumatoid Factor   Date Value Ref Range Status   12/22/2014 <20 <20 IU/mL Final     A/P  #Psoriatic arthritis  #Plaque psoriasis  Very pleasant 52 year old female presents to rheumatology clinic today for follow-up of Psoriatic arthritis and plaque psoriasis. Her inflammatory arthritis/tenosynovisit is currently quiet and in remission by  history/video exam and review of most recent inflammatory markers (CRP negative and ESR 32, which is likely her normal/baseline when ULN is corrected for age/gender). Will continue with her current therapy without changes.   -Continue methotrexate 15mg one weekly and folic acid 1mg daily  -Labs mid March  -Follow-up in 6 months with labs again the week or so prior    #high risk medication use: methotrexate  --Risks and benefits of methotrexate discussed today to include infection, BM suppression, GI/hepatoxicity, malignancy among others. Patient knows not to consume ETOH. Patient knows the drug is not compatible with conception/pregnancy. Patient  is agreeable.  -Routine screening drug toxicity labs to include Albumin, ALT, CBC, creatinine, CRP, ESR reviewed from 12/21/22 and did not show evidence of drug toxicity. She had a borderline elevation in her AST to 37 (ULN 35). Her AST has been stable over the last almost 2 years. She does report drinking 1 bottle of wine on average per week. Discussed this in the setting of methotrexate and her lab findings. Will continue to monitor CBC, CMP, ESR, CRP every 3 months while on this therapy for drug toxicity monitoring. Standing orders updated today.   -She is due again for labs on/around 3/21/23    Byron Arshad MD  Rheumatology    I spent a total of 45 minutes on the date of service on chart review, patient video encounter, , documentation.

## 2023-01-13 NOTE — PROGRESS NOTES
Dali Martines  is being evaluated via a billable video visit.      How would you like to obtain your AVS? [facility]  For the video visit, send the invitation by: Send to e-mail at: [email or web address]  Will anyone else be joining your video visit? No      Video visit start time 11:27:47 am.  Video visit end time 12:13:32 pm.  [facility]  Patient location: home  Physician location: off site  Byron Arshad MD  Rheumatology    [facility]  Rheumatology Clinic  Byron Arshad  2023     Name: Dali Martines  MRN: [id number]  Age: 52 year old  : 1970    Problem List:  1. Psoriatic arthritis   2. Psoriasis with arthropathy  3. Pain in joint, multiple sites  4. Chronic pain of right ankle  5. Right foot pain  6. Pain in joint involving ankle and foot, right    Subjective:  Interval history 2023  -has continued on methotrexate 15mg once weekly and folic acid 1mg once daily. Has been tolerating this without noticeable side effects, GI or other.   -Overall her joint symptoms have been relatively quiet without new/progressive red/hot/swollen joints   -No symptoms consistent with active dactylitis  -Has intermittent psoriatic lesions on thighs. Her initial presenting site of plaque psoriasis was her upper back.  -No recurrent oral ulcers/ no GI complaints or blood in the stool/recurrent or persistent infection  14 point ROS collected and negative if not documented above    HPI from last encounter with jim vides in 2022  Since we last seen the patient she is generally done pretty well.  She went back up to 15 mg once weekly of methotrexate and feels like she is definitely done better on that dose with relatively minimal morning stiffness.  She has not done her labs however since February so we do not have an actual measure of inflammation.    She did feel like she flared a little bit in  but in retrospect that may simply been overuse.  She traveled for the first time in 2-1/2 years and  felt quite achy after that.  She actually was concerned that she may contracted COVID, but tested negative x2.    She also had a recent bug bite that concern her.  She however felt poorly only transiently and did not have any prolonged symptoms.    She is noticing some increasing hand pain with use.  That particular pain however is definitely not associated with morning stiffness.    Notably, she did increase her vitamin D down to 3000 units/day at the beginning of the summer thinking she would not need as much because of sun exposure.  However, she definitely started noticing some new psoriasis lesions.  That has not been an issue for her for some time.  For the time being she remains on the 3000 units rather than higher dose but intends to go back up when she does not have any way to get sun exposure.    Physical examination by video shows her to be in no acute distress HEENT examination is normal.  She is speaking in full sentences with no shortness of breath.  Her upper extremity examination shows no evidence of any swollen joints joint deformity or decrease in range of motion anywhere in her hands or arms.    Impression:    Per the problem list with well-controlled psoriatic arthritis on methotrexate monotherapy.    Plan/recommendation:    She is 6 months out from her last labs and I reiterated for her today that 3 months is pretty much the maximum we want her going without labs.  She is not experiencing any symptomatic toxicity however so I suspect she is fine but she will get them done soon.    I have discussed with her today that my general availability will be quite poor starting in September and asked her to establish with Tracey our nurse practitioner.  I have put in for her to get in that clinic within 3 to 4 months sooner as needed.    Father, MI early 52's. He had hx of rheumatic fever. He had multiple after that. He ultimately had bypass.     Maternal grandfather 59 cardiac   Uncle 59  cardiac  Background history:  Symptoms first occurred in August 2008, when she began having stiffness and pain in the left hand, particularly in the PIPs and MCPs. Symptoms spontaneously resolved after about 6-8 weeks. She was previously seen by Rheumatology here around that time (see note from Dr. Betancourt on 12/9/2008). Briefly, assessment was that she had a self-limited episode of inflammatory arthralgias, possibly viral or early psoriatic arthritis. Negative NOLVIA, RF, Lyme serologies and normal CRP and ESR. Given her family history of RA, there was a thought to start her on HCQ if anti-CCP antibodies were positive, but they were found to be negative. No imaging of the hand was performed. September 2014, she experienced recurrent acute onset pain, stiffness, and swelling in the L hand very similar to her symptoms in 2008. Specifically, had swelling across MCPs and in 3rd PIP. She could hardly bend the hand sometimes due to the welling. Had morning stiffness lasting 30-45 minutes. No other joints were involved. Issues with her left hand lingered for several months but eventually self-resolved that January and her only notable symptom is heel pain in the mornings. No fevers, chills, or weight loss. No back pain, vision changes, nausea, vomiting, diarrhea, abdominal pain, or blood in stool or urine. No rash, sicca symptoms, or oral ulcers. It seems she has a history of hypermobility, e.g. could bend wrist back to forearm, bend down and touch palms flat on floor, when she was younger. Used to get frequent ankle sprains, and has had multiple tendon and ligament tears over the years.    Allergies:   Sulfasalazine      Past Medical History:  Anemia   Anxiety   Psoriatic arthritis  Dry eye syndrome   Endometriosis   Gastroesophageal reflux disease   Hypothyroidism   Type 1 diabetes mellitus   Asthma   Necrobiosis lipoidica diabeticorum  Chronic low back pain   Enthesopathy   Chronic right ankle pain      Past Surgical  History:  Knee cruciate ligament repair   Stomach surgery 12/2002  Hydrogen breath test 6/17/2014   Appendectomy 2002   Lysis adhesions 2002   Right ankle surgery 12/2014      Family History:   Mother: Breast cancer   Father: Heart disease, eye disorder, hyperlipidemia, macular degeneration, anxiety, alcoholism, rheumatoid arthritis, hypothyroidism, diabetes mellitus   Paternal grandmother: Cerebrovascular disease  Paternal grandfather: Pancreatic cancer, heart disease   Maternal grandmother: Type 2 diabetes mellitus, coronary artery disease  Maternal aunt: Type 1 diabetes mellitus     Social History:  The patient reports that she has never smoked. She has never used smokeless tobacco. She reports current alcohol use. She reports that she does not use drugs.     Objective:  No vitals for this video visit. Vitals reviewed in Epic.  GEN: Sitting up at desk. NAD  HEENT: no facial rash, sclera clear, no inflammatory nose/ external ear changes  CV: no upper extremity dependent edema  Pulm: speaking in full sentences, no cough, no audible wheezing, no use of accessory muscles  Skin: no acute cutaneous lesions on exposed skin  MSK: full active ROM of bilateral shoulders, elbows, wrists, MCPs, PIPs, DIPs. Can make full fist without difficulty. No erythema or edema of these joints.    WBC   Date Value Ref Range Status   01/11/2021 4.7 4.0 - 11.0 10e9/L Final     WBC Count   Date Value Ref Range Status   12/21/2022 5.2 4.0 - 11.0 10e3/uL Final     Hemoglobin   Date Value Ref Range Status   12/21/2022 12.2 11.7 - 15.7 g/dL Final   01/11/2021 11.4 (L) 11.7 - 15.7 g/dL Final     Platelet Count   Date Value Ref Range Status   12/21/2022 337 150 - 450 10e3/uL Final   01/11/2021 288 150 - 450 10e9/L Final     Creatinine   Date Value Ref Range Status   12/21/2022 0.59 0.51 - 0.95 mg/dL Final   01/11/2021 0.56 0.52 - 1.04 mg/dL Final     Lab Results   Component Value Date    ALKPHOS 88 11/18/2021    ALKPHOS 108 04/27/2016     AST    Date Value Ref Range Status   12/21/2022 37 (H) 10 - 35 U/L Final   01/11/2021 31 0 - 45 U/L Final     Lab Results   Component Value Date    ALT 28 12/21/2022    ALT 37 01/11/2021     Sed Rate   Date Value Ref Range Status   01/11/2021 27 0 - 30 mm/h Final     Erythrocyte Sedimentation Rate   Date Value Ref Range Status   12/21/2022 32 (H) 0 - 30 mm/hr Final     CRP Inflammation   Date Value Ref Range Status   02/14/2022 <2.9 0.0 - 8.0 mg/L Final   01/11/2021 <2.9 0.0 - 8.0 mg/L Final     UA RESULTS:  Recent Labs   Lab Test 02/14/19  1642 07/28/17  1452   COLOR Yellow Yellow   APPEARANCE Clear Clear   URINEGLC Negative Negative   URINEBILI Negative Negative   URINEKETONE Negative Negative   SG 1.013 <=1.005   UBLD Negative Trace*   URINEPH 6.0 6.0   PROTEIN Negative Negative   UROBILINOGEN  --  0.2   NITRITE Negative Negative   LEUKEST Negative Negative   RBCU <1 O - 2   WBCU 1 O - 2      NOLVIA Screen by EIA   Date Value Ref Range Status   12/22/2014 1.6 (H) <1.0 Final     Comment:     Interpretation:  Positive     RNP Antibody IgG   Date Value Ref Range Status   03/17/2015  0.0 - 0.9 AI Final    <0.2  Negative   Antibody index (AI) values reflect qualitative changes in antibody   concentration that cannot be directly associated with clinical condition or   disease state.       Rheumatoid Factor   Date Value Ref Range Status   12/22/2014 <20 <20 IU/mL Final     A/P  #Psoriatic arthritis  #Plaque psoriasis  Very pleasant 52 year old female presents to rheumatology clinic today for follow-up of Psoriatic arthritis and plaque psoriasis. Her inflammatory arthritis/tenosynovisit is currently quiet and in remission by history/video exam and review of most recent inflammatory markers (CRP negative and ESR 32, which is likely her normal/baseline when ULN is corrected for age/gender). Will continue with her current therapy without changes.   -Continue methotrexate 15mg one weekly and folic acid 1mg daily  -Labs mid  March  -Follow-up in 6 months with labs again the week or so prior    #high risk medication use: methotrexate  --Risks and benefits of methotrexate discussed today to include infection, BM suppression, GI/hepatoxicity, malignancy among others. Patient knows not to consume ETOH. Patient knows the drug is not compatible with conception/pregnancy. Patient  is agreeable.  -Routine screening drug toxicity labs to include Albumin, ALT, CBC, creatinine, CRP, ESR reviewed from 12/21/22 and did not show evidence of drug toxicity. She had a borderline elevation in her AST to 37 (ULN 35). Her AST has been stable over the last almost 2 years. She does report drinking 1 bottle of wine on average per week. Discussed this in the setting of methotrexate and her lab findings. Will continue to monitor CBC, CMP, ESR, CRP every 3 months while on this therapy for drug toxicity monitoring. Standing orders updated today.   -She is due again for labs on/around 3/21/23    Byron Arshad MD  Rheumatology    I spent a total of 45 minutes on the date of service on chart review, patient video encounter, , documentation.

## 2023-01-19 ENCOUNTER — THERAPY VISIT (OUTPATIENT)
Dept: PHYSICAL THERAPY | Facility: CLINIC | Age: 53
End: 2023-01-19
Payer: COMMERCIAL

## 2023-01-19 DIAGNOSIS — G89.29 CHRONIC PAIN OF RIGHT ANKLE: Primary | ICD-10-CM

## 2023-01-19 DIAGNOSIS — M54.50 CHRONIC LOW BACK PAIN: ICD-10-CM

## 2023-01-19 DIAGNOSIS — M25.571 CHRONIC PAIN OF RIGHT ANKLE: Primary | ICD-10-CM

## 2023-01-19 DIAGNOSIS — G89.29 CHRONIC LOW BACK PAIN: ICD-10-CM

## 2023-01-19 DIAGNOSIS — M25.562 LEFT KNEE PAIN: ICD-10-CM

## 2023-01-19 PROCEDURE — 97110 THERAPEUTIC EXERCISES: CPT | Mod: GP | Performed by: PHYSICAL THERAPIST

## 2023-01-25 ENCOUNTER — OFFICE VISIT (OUTPATIENT)
Dept: OPHTHALMOLOGY | Facility: CLINIC | Age: 53
End: 2023-01-25
Attending: OPHTHALMOLOGY
Payer: COMMERCIAL

## 2023-01-25 DIAGNOSIS — H40.003 GLAUCOMA SUSPECT OF BOTH EYES: ICD-10-CM

## 2023-01-25 PROCEDURE — G0463 HOSPITAL OUTPT CLINIC VISIT: HCPCS | Mod: 25

## 2023-01-25 PROCEDURE — 92133 CPTRZD OPH DX IMG PST SGM ON: CPT | Performed by: OPHTHALMOLOGY

## 2023-01-25 PROCEDURE — 99213 OFFICE O/P EST LOW 20 MIN: CPT | Mod: GC | Performed by: OPHTHALMOLOGY

## 2023-01-25 PROCEDURE — 92083 EXTENDED VISUAL FIELD XM: CPT | Performed by: OPHTHALMOLOGY

## 2023-01-25 ASSESSMENT — VISUAL ACUITY
OS_CC: 20/20+2
METHOD: SNELLEN - LINEAR
OD_SC: 20/20-2
OD_CC: 20/20
OS_SC: 20/20-3
CORRECTION_TYPE: GLASSES

## 2023-01-25 ASSESSMENT — CONF VISUAL FIELD
OS_SUPERIOR_NASAL_RESTRICTION: 0
OD_SUPERIOR_TEMPORAL_RESTRICTION: 0
OD_INFERIOR_NASAL_RESTRICTION: 0
OD_NORMAL: 1
OD_INFERIOR_TEMPORAL_RESTRICTION: 0
OS_NORMAL: 1
OS_SUPERIOR_TEMPORAL_RESTRICTION: 0
OS_INFERIOR_TEMPORAL_RESTRICTION: 0
OD_SUPERIOR_NASAL_RESTRICTION: 0
OS_INFERIOR_NASAL_RESTRICTION: 0
METHOD: COUNTING FINGERS

## 2023-01-25 ASSESSMENT — GONIOSCOPY
OS_TEMPORAL: OPEN TO CB
OD_SUPERIOR: OPEN TO CB
OD_INFERIOR: OPEN TO CB
OS_NASAL: OPEN TO CB
OD_NASAL: OPEN TO CB
OS_SUPERIOR: OPEN TO CB
OD_TEMPORAL: OPEN TO CB
OS_INFERIOR: OPEN TO CB

## 2023-01-25 ASSESSMENT — TONOMETRY
IOP_METHOD: TONOPEN
OD_IOP_MMHG: 13
OS_IOP_MMHG: 13

## 2023-01-25 ASSESSMENT — EXTERNAL EXAM - LEFT EYE: OS_EXAM: NORMAL

## 2023-01-25 ASSESSMENT — CUP TO DISC RATIO
OS_RATIO: 0.5
OD_RATIO: 0.4

## 2023-01-25 ASSESSMENT — EXTERNAL EXAM - RIGHT EYE: OD_EXAM: NORMAL

## 2023-01-25 ASSESSMENT — REFRACTION_WEARINGRX: SPECS_TYPE: OTC READERS

## 2023-01-25 ASSESSMENT — SLIT LAMP EXAM - LIDS
COMMENTS: NORMAL
COMMENTS: NORMAL

## 2023-01-25 NOTE — PROGRESS NOTES
CC Diabetes follow up , glaucoma suspect    Interval: Here for diabetic eye exam. Not noticing any visual changes except need for reading glasses. Last A1c 6.5 12/21/22. Uses continuous glucose monitor.     HPI: Dali Martines is a 52 year old female with the following ophthalmologic problems:    Here for diabetic retinopathy check and glaucoma suspect evaluation. No major changes in vision in past year. Denies flashes or floaters.   Last HbA1c 7.0 on 6/2019. Wearing glasses for reading only.   6.7 2 weeks ago    OCT macula 1/25/23  Right eye - normal foveal contour; normal NFL, posterior hyaloid partially attached vitreomacular adhesion with no traction no retinal changes compared to10/2021  Left eye - normal foveal contour; normal NFL, posterior hyaloid partially attached, vitreomacular adhesion with no traction no retinal changes compared to 10/2021    VF 1/25/23  Both eyes reliable and normal    Optical Coherence Tomography retinal nerve fiber layer 1/25/23  Both eyes normal thickness and stable    ASSESSMENT/PLAN    # DM-1 with history of mild nonproliferative diabetic retinopathy right eye   No Diabetic retinopathy left eye   - Blood pressure (<120/80) and blood glucose (HbA1c 7.2) control discussed with patient.     # glaucoma suspect  C:D asymmetry  - no family hx of glaucoma  - IOP 13/13  - gonio exam 1/25/23 open to CB bilaterally  - intraocular pressure within normal limits, Optical Coherence Tomography retinal nerve fiber layer without thinning, visual field 24-2 within normal limits   Plan for optic nerve Optical Coherence Tomography nerve fiber layer with OVF 24-2 every other year or as needed     # H/o TIA-like vision loss RE in ~2008  - eval by Dr Schaffer felt to be migraine related rather than embolic or inflammatory    # Family hx of of macular degeneration  - father and paternal aunt  - pt doesn't smoke    return to retina clinic: 1 year DFE; OCT RNFL and macula, Optos    Lucia Peralta,  MD  Ophthalmology Resident, PGY-3  [facility]    ~~~~~~~~~~~~~~~~~~~~~~~~~~~~~~~~~~   Complete documentation of historical and exam elements from today's encounter can be found in the full encounter summary report (not reduplicated in this progress note).  I personally obtained the chief complaint(s) and history of present illness.  I confirmed and edited as necessary the review of systems, past medical/surgical history, family history, social history, and examination findings as documented by others; and I examined the patient myself.  I personally reviewed the relevant tests, images, and reports as documented above.  I personally reviewed the ophthalmic test(s) associated with this encounter, agree with the interpretation(s) as documented by the resident/fellow, and have edited the corresponding report(s) as necessary.   I formulated and edited as necessary the assessment and plan and discussed the findings and management plan with the patient and family    Naz Lira MD   of Ophthalmology.  Retina Service   Department of Ophthalmology and Visual Neurosciences   [facility]  Phone: [phone]   Fax: [phone]

## 2023-01-26 ENCOUNTER — PRE VISIT (OUTPATIENT)
Dept: RHEUMATOLOGY | Facility: CLINIC | Age: 53
End: 2023-01-26

## 2023-01-27 ENCOUNTER — THERAPY VISIT (OUTPATIENT)
Dept: PHYSICAL THERAPY | Facility: CLINIC | Age: 53
End: 2023-01-27
Payer: COMMERCIAL

## 2023-01-27 DIAGNOSIS — M54.50 CHRONIC LOW BACK PAIN: ICD-10-CM

## 2023-01-27 DIAGNOSIS — G89.29 CHRONIC LOW BACK PAIN: ICD-10-CM

## 2023-01-27 DIAGNOSIS — M25.562 LEFT KNEE PAIN: ICD-10-CM

## 2023-01-27 DIAGNOSIS — G89.29 CHRONIC PAIN OF RIGHT ANKLE: Primary | ICD-10-CM

## 2023-01-27 DIAGNOSIS — M25.571 CHRONIC PAIN OF RIGHT ANKLE: Primary | ICD-10-CM

## 2023-01-27 PROCEDURE — 97530 THERAPEUTIC ACTIVITIES: CPT | Mod: GP | Performed by: PHYSICAL THERAPIST

## 2023-01-27 PROCEDURE — 97110 THERAPEUTIC EXERCISES: CPT | Mod: GP | Performed by: PHYSICAL THERAPIST

## 2023-02-02 ENCOUNTER — THERAPY VISIT (OUTPATIENT)
Dept: PHYSICAL THERAPY | Facility: CLINIC | Age: 53
End: 2023-02-02
Payer: COMMERCIAL

## 2023-02-02 DIAGNOSIS — G89.29 CHRONIC LOW BACK PAIN: ICD-10-CM

## 2023-02-02 DIAGNOSIS — M54.50 CHRONIC LOW BACK PAIN: ICD-10-CM

## 2023-02-02 DIAGNOSIS — G89.29 CHRONIC PAIN OF RIGHT ANKLE: Primary | ICD-10-CM

## 2023-02-02 DIAGNOSIS — M25.562 LEFT KNEE PAIN: ICD-10-CM

## 2023-02-02 DIAGNOSIS — M25.571 CHRONIC PAIN OF RIGHT ANKLE: Primary | ICD-10-CM

## 2023-02-02 PROCEDURE — 97530 THERAPEUTIC ACTIVITIES: CPT | Mod: GP | Performed by: PHYSICAL THERAPIST

## 2023-02-02 PROCEDURE — 97110 THERAPEUTIC EXERCISES: CPT | Mod: GP | Performed by: PHYSICAL THERAPIST

## 2023-02-06 ENCOUNTER — TELEPHONE (OUTPATIENT)
Dept: PULMONOLOGY | Facility: CLINIC | Age: 53
End: 2023-02-06
Payer: COMMERCIAL

## 2023-02-06 DIAGNOSIS — E11.620 NLD (NECROBIOSIS LIPOIDICA DIABETICORUM) (H): ICD-10-CM

## 2023-02-06 DIAGNOSIS — M25.50 PAIN IN JOINT, MULTIPLE SITES: ICD-10-CM

## 2023-02-06 DIAGNOSIS — L40.50 PSORIATIC ARTHRITIS (H): ICD-10-CM

## 2023-02-06 DIAGNOSIS — M77.9 TENDINITIS: ICD-10-CM

## 2023-02-08 NOTE — TELEPHONE ENCOUNTER
methotrexate 2.5 MG tablet       Last Written Prescription Date:  12-  Last Fill Quantity: 24,   # refills: 0  Last Office Visit : 01-  Future Office visit:  05-    Labs completed on :12-

## 2023-02-16 ENCOUNTER — THERAPY VISIT (OUTPATIENT)
Dept: PHYSICAL THERAPY | Facility: CLINIC | Age: 53
End: 2023-02-16
Payer: COMMERCIAL

## 2023-02-16 DIAGNOSIS — G89.29 CHRONIC LOW BACK PAIN: ICD-10-CM

## 2023-02-16 DIAGNOSIS — M54.50 CHRONIC LOW BACK PAIN: ICD-10-CM

## 2023-02-16 DIAGNOSIS — M25.571 CHRONIC PAIN OF RIGHT ANKLE: Primary | ICD-10-CM

## 2023-02-16 DIAGNOSIS — M25.562 LEFT KNEE PAIN: ICD-10-CM

## 2023-02-16 DIAGNOSIS — G89.29 CHRONIC PAIN OF RIGHT ANKLE: Primary | ICD-10-CM

## 2023-02-16 PROCEDURE — 97140 MANUAL THERAPY 1/> REGIONS: CPT | Mod: GP | Performed by: PHYSICAL THERAPIST

## 2023-02-16 PROCEDURE — 97530 THERAPEUTIC ACTIVITIES: CPT | Mod: GP | Performed by: PHYSICAL THERAPIST

## 2023-02-16 PROCEDURE — 97110 THERAPEUTIC EXERCISES: CPT | Mod: GP | Performed by: PHYSICAL THERAPIST

## 2023-02-18 ENCOUNTER — HEALTH MAINTENANCE LETTER (OUTPATIENT)
Age: 53
End: 2023-02-18

## 2023-02-20 ENCOUNTER — HOSPITAL ENCOUNTER (OUTPATIENT)
Dept: MAMMOGRAPHY | Facility: CLINIC | Age: 53
Discharge: HOME | End: 2023-02-20
Attending: INTERNAL MEDICINE | Admitting: INTERNAL MEDICINE
Payer: COMMERCIAL

## 2023-02-20 DIAGNOSIS — Z12.31 VISIT FOR SCREENING MAMMOGRAM: ICD-10-CM

## 2023-02-20 PROCEDURE — 77067 SCR MAMMO BI INCL CAD: CPT

## 2023-03-02 ENCOUNTER — THERAPY VISIT (OUTPATIENT)
Dept: PHYSICAL THERAPY | Facility: CLINIC | Age: 53
End: 2023-03-02
Payer: COMMERCIAL

## 2023-03-02 DIAGNOSIS — M54.50 CHRONIC LOW BACK PAIN: ICD-10-CM

## 2023-03-02 DIAGNOSIS — G89.29 CHRONIC LOW BACK PAIN: ICD-10-CM

## 2023-03-02 DIAGNOSIS — G89.29 CHRONIC PAIN OF RIGHT ANKLE: Primary | ICD-10-CM

## 2023-03-02 DIAGNOSIS — M25.571 CHRONIC PAIN OF RIGHT ANKLE: Primary | ICD-10-CM

## 2023-03-02 DIAGNOSIS — E03.8 OTHER SPECIFIED HYPOTHYROIDISM: ICD-10-CM

## 2023-03-02 DIAGNOSIS — M25.562 LEFT KNEE PAIN: ICD-10-CM

## 2023-03-02 PROCEDURE — 97530 THERAPEUTIC ACTIVITIES: CPT | Mod: GP | Performed by: PHYSICAL THERAPIST

## 2023-03-02 PROCEDURE — 97140 MANUAL THERAPY 1/> REGIONS: CPT | Mod: GP | Performed by: PHYSICAL THERAPIST

## 2023-03-02 PROCEDURE — 97110 THERAPEUTIC EXERCISES: CPT | Mod: GP | Performed by: PHYSICAL THERAPIST

## 2023-03-05 DIAGNOSIS — E10.3291 TYPE 1 DIABETES MELLITUS WITH MILD NONPROLIFERATIVE RETINOPATHY OF RIGHT EYE, MACULAR EDEMA PRESENCE UNSPECIFIED (H): ICD-10-CM

## 2023-03-05 NOTE — TELEPHONE ENCOUNTER
simvastatin (ZOCOR) 20 MG tablet      Last Written Prescription Date:  11-19-22  Last Fill Quantity: 90,   # refills: 0  Last Office Visit : 12-20-22  Future Office visit:  3-22-23    Routing refill request to provider for review/approval because:  Overdue LDL

## 2023-03-06 RX ORDER — LEVOTHYROXINE SODIUM 112 UG/1
TABLET ORAL
Qty: 90 TABLET | Refills: 4 | Status: SHIPPED | OUTPATIENT
Start: 2023-03-06 | End: 2024-04-16

## 2023-03-06 RX ORDER — SIMVASTATIN 20 MG
20 TABLET ORAL AT BEDTIME
Qty: 90 TABLET | Refills: 3 | Status: SHIPPED | OUTPATIENT
Start: 2023-03-06 | End: 2024-03-03

## 2023-03-06 NOTE — TELEPHONE ENCOUNTER
levothyroxine 112 MCG  Last Written Prescription Date:  3/9/22  Last Fill Quantity: 90,   # refills: 3  Last Office Visit : 12/20/22  Future Office visit:  3/22/23  Routing refill request to provider for review/approval because:  TSH  OVER DUE   90 DAY RF PENDING    Lab Results   Component Value Date    TSH 3.34 02/14/2022    TSH 2.01 01/11/2021

## 2023-03-10 ENCOUNTER — VIRTUAL VISIT (OUTPATIENT)
Dept: SLEEP MEDICINE | Facility: CLINIC | Age: 53
End: 2023-03-10
Payer: COMMERCIAL

## 2023-03-10 VITALS — WEIGHT: 190 LBS | HEIGHT: 70 IN | BODY MASS INDEX: 27.2 KG/M2

## 2023-03-10 DIAGNOSIS — F51.04 CHRONIC INSOMNIA: Primary | ICD-10-CM

## 2023-03-10 DIAGNOSIS — F43.9 STRESS: ICD-10-CM

## 2023-03-10 PROCEDURE — 90834 PSYTX W PT 45 MINUTES: CPT | Mod: VID | Performed by: PSYCHOLOGIST

## 2023-03-10 ASSESSMENT — SLEEP AND FATIGUE QUESTIONNAIRES
HOW LIKELY ARE YOU TO NOD OFF OR FALL ASLEEP WHILE SITTING QUIETLY AFTER LUNCH WITHOUT ALCOHOL: MODERATE CHANCE OF DOZING
HOW LIKELY ARE YOU TO NOD OFF OR FALL ASLEEP IN A CAR, WHILE STOPPED FOR A FEW MINUTES IN TRAFFIC: WOULD NEVER DOZE
HOW LIKELY ARE YOU TO NOD OFF OR FALL ASLEEP WHILE WATCHING TV: HIGH CHANCE OF DOZING
HOW LIKELY ARE YOU TO NOD OFF OR FALL ASLEEP WHILE LYING DOWN TO REST IN THE AFTERNOON WHEN CIRCUMSTANCES PERMIT: HIGH CHANCE OF DOZING
HOW LIKELY ARE YOU TO NOD OFF OR FALL ASLEEP WHILE SITTING AND READING: SLIGHT CHANCE OF DOZING
HOW LIKELY ARE YOU TO NOD OFF OR FALL ASLEEP WHILE SITTING AND TALKING TO SOMEONE: WOULD NEVER DOZE
HOW LIKELY ARE YOU TO NOD OFF OR FALL ASLEEP WHEN YOU ARE A PASSENGER IN A CAR FOR AN HOUR WITHOUT A BREAK: SLIGHT CHANCE OF DOZING
HOW LIKELY ARE YOU TO NOD OFF OR FALL ASLEEP WHILE SITTING INACTIVE IN A PUBLIC PLACE: SLIGHT CHANCE OF DOZING

## 2023-03-10 ASSESSMENT — PATIENT HEALTH QUESTIONNAIRE - PHQ9
SUM OF ALL RESPONSES TO PHQ QUESTIONS 1-9: 13
SUM OF ALL RESPONSES TO PHQ QUESTIONS 1-9: 13
10. IF YOU CHECKED OFF ANY PROBLEMS, HOW DIFFICULT HAVE THESE PROBLEMS MADE IT FOR YOU TO DO YOUR WORK, TAKE CARE OF THINGS AT HOME, OR GET ALONG WITH OTHER PEOPLE: SOMEWHAT DIFFICULT

## 2023-03-10 NOTE — PROGRESS NOTES
Video-Visit Details    Type of service:  Video Visit    Video Start Time (time video started): 3:32 PM    Video End Time (time video stopped): 4:40 PM    Originating Location (pt. Location): Home    Distant Location (provider location):  Off-site    Mode of Communication:  Video Conference via [facility] VIRTUAL VIDEO FOLLOW-UP VISIT  Sleep Psychology    Patient Name: Dali Martines  MRN:  [id number]  Date of Service: Mar 10, 2023       Subjective Report     Dali Martines  returns for a telehealth video visit to discuss progress in implementing behavioral strategies for the management of insomnia.  Patient consent for initiation of video visit was obtained and documented prior to initiation of visit.     Dali reports stressors related to work and notice that she would not be contracted to a continuing position.  She got an extension tokeep her position andis faced with finding a new position.    Additionally she suffered the loss of her dog with whom she was very attached.      These stressors have thrown off her sleep habits and schedule.  She also reports increased sleep fragmentation in the midst of stress and sense of loss.    .     .     Sleep Data:     Source of Sleep Selfreport  Average Time In Bed:  Variable and also increased variation  Average Total Sleep Time:  5-6 hours hrs  Sleep Efficiency:subiptimal%    EPWORTH SLEEPINESS SCALE    Sitting and reading 1   Watching TV 3   Sitting, inactive in a public place (theatre or mtg.) 1    As a passenger in a car 1   Lying down to rest in the afternoon when circumstance permit 3   Sitting and talking to someone 0   Sitting quietly after lunch without alcohol 2   In a car, while stopped for a few minutes in traffic 0   TOTAL SCORE (nl <11) 11     INSOMNIA SEVERITY INDEX     Difficulty falling asleep 1   Difficult staying asleep 3   Problems waking up to early 4   How SATISFIED/DISSATISFIED are you with your CURRENT sleep pattern? 4   How  "NOTICEABLE to others do you think your sleep pattern is in terms of your quality of life? 2   How WORRIED/DISTRESSED are you about your current sleep pattern? 3   To what extent do you consider your sleep problem to INTERFERE with your daily fuctioning(e.g. daytime fatigue, mood, ability to function at work/daily chores, concentration, mood,etc.) CURRENTLY? 3   INSOMNIA SEVERITY INDEX TOTAL SCORE 20    Absence of insomnia (0-7); sub-threshold insomnia (8-14); moderate insomnia (15-21); and severe insomnia (22-28)    PHQ-9 score:    PHQ 3/10/2023   PHQ-9 Total Score 13   Q9: Thoughts of better off dead/self-harm past 2 weeks Not at all              Interventions     Strategies and recommendations including Stimulus control therapy, Stress management and Depression and sleep were reviewed and discussed today.     Services provided are compliant with the requirements of [place] Statute SS 256B.0625 Subd. 3b and paragraph (b)       Vital Signs     Ht 1.778 m (5' 10\")   Wt 86.2 kg (190 lb)   BMI 27.26 kg/m       Mental Status     Orientation:  X3  Mood:  depressed  Affect:  Congruent with mood  Speech/Language:  Normal  Thought Process: Intact  Associations:  Normal  Thought Content: Normal  Patient does not report any suicidal ideation, intention or plan.    Diagnostic Impressions and Plan        Chronic insomnia  Stress  Elevated PHQ screening score of 12, no SI, symtpoms likely related to elevated stressors and appear at this point to be situational.  R) Adjustment Reaction with Depressed Mood    Patient reports that during acute stressors death of her dog and announcement that she would not be given a renewal of her work contract that her sleep deteriorated to about 3-4 hours a night.  Over the last month gradual improvement has occurred with total sleep time now about 6 hours.  Today patient is reporting moderately severe symptoms of depression without any evidence or report of suicidal ideation intention or " plan.  Discussed recommendation that patient initiate individual psychotherapy to process grief, loss and stress as she moves forward into finding a new job in the next few months.    Plan:  Continue current sleep schedule and plan Referral made for psychotherapy to [facility] and Dr. Panfilo Kaiser PsyD at [facility]    Follow-up: 3 months      Philip Quiroz PsyD, , [facility]  Diplomate, Behavioral Sleep Medicine  [facility]      Note: This dictation was created using voice recognition software. This document may contain an error not identified before finalizing the document. If the error changes the accuracy of the document, I would appreciate it being brought to my attention.

## 2023-03-10 NOTE — NURSING NOTE
Flu vaccine 10/26/2022    Patient declined individual allergy and medication review by support staff because - Pt checked during eChek in      Is the patient currently in the state of [place]? YES    Visit mode:VIDEO    If the visit is dropped, the patient can be reconnected by: VIDEO VISIT: Send to e-mail at: [email or web address]    Will anyone else be joining the visit? NO      How would you like to obtain your AVS? MyChart    Are changes needed to the allergy or medication list? NO    Reason for visit: Follow Up    Krista Donis

## 2023-03-15 ENCOUNTER — OFFICE VISIT (OUTPATIENT)
Dept: PHYSICAL MEDICINE AND REHAB | Facility: CLINIC | Age: 53
End: 2023-03-15
Payer: COMMERCIAL

## 2023-03-15 VITALS — SYSTOLIC BLOOD PRESSURE: 115 MMHG | DIASTOLIC BLOOD PRESSURE: 77 MMHG | OXYGEN SATURATION: 96 % | HEART RATE: 80 BPM

## 2023-03-15 DIAGNOSIS — M26.621 ARTHRALGIA OF RIGHT TEMPOROMANDIBULAR JOINT: ICD-10-CM

## 2023-03-15 DIAGNOSIS — G89.29 CHRONIC BILATERAL LOW BACK PAIN WITHOUT SCIATICA: ICD-10-CM

## 2023-03-15 DIAGNOSIS — R26.9 GAIT ABNORMALITY: ICD-10-CM

## 2023-03-15 DIAGNOSIS — M54.50 CHRONIC BILATERAL LOW BACK PAIN WITHOUT SCIATICA: ICD-10-CM

## 2023-03-15 DIAGNOSIS — R26.89 BALANCE DISORDER: ICD-10-CM

## 2023-03-15 DIAGNOSIS — H92.01 RIGHT EAR PAIN: Primary | ICD-10-CM

## 2023-03-15 PROCEDURE — 99215 OFFICE O/P EST HI 40 MIN: CPT | Performed by: PHYSICAL MEDICINE & REHABILITATION

## 2023-03-15 NOTE — PROGRESS NOTES
Outcome for 03/15/23 9:25 AM: Motility Countt message sent  Tanesha Garcia LPN   Outcome for 03/20/23 1:49 PM: Per patient, will upload device before appointment  Tanesha Garcia LPN   Outcome for 03/21/23 3:39 PM: Per patient, will upload later today 3/21  Taylor Myhre, RMA  Outcome for 03/22/23 7:11 AM: Data obtained via Dexcom website  Tanesha Garcia LPN

## 2023-03-15 NOTE — LETTER
3/15/2023       RE: Dali Martines  [address]  [facility] [address]     Dear Colleague,    Thank you for referring your patient, Dali Martines, to the [facility] PHYSICAL [facility]IC[facility]IL[facility] CLINIC [place] [facility]. Please see a copy of my visit note below.           PM&R Clinic Note Followup Visit     Patient Name: Dali Martines : 1970 Medical Record: [id number]            History of Present Illness:     Dali Martines is a 52 year old female with deconditioning, chronic painful areas and gait instability.    Last visit: This is a 51 y/o female with gait imbalance. She has multiple risk factors for poor balance, including chronic painful left sided ATFL sprain, generalized deconditioning due to limited activity from COVID pandemic, and painful low back, L knee, R ankle anmd stiffness on the r toes 2,3,4 after healed fractures.      1. Patient education: In depth discussion and education was provided about the assessment and implications of each of the below recommendations for management. Patient indicated readiness to learn, all questions were answered and understanding of material presented was confirmed.     2. Work-up: No further imaging necessary: upper exam not concerning for cervical stenosis as cause of imbalance, will observe. MRI L and EMG results reassuring and r/o PN and lumbar radiculopathy.      3. Therapy/equipment/braces: She has bilateral ankle braces which do improve her stability.      4. Medications: none     5. Interventions:Recommend an updated course of PT to strengthen and support lumbar spine, knees, ankle and feet, and teach balance exercises.      6. Referral / follow up with other providers:     Follow up: 3 months    Today she is seeing improvements. She started PT with Mario who she really likes, but he has been out for a few weeks. They focused on low back and R hip, planning to move on to  R ankle and L knee next.     She marguerite feel gait is more steady, no falls since starting PT. She gets R ear pain/TMJ and R MENDEZ.     R ankle feels worse, injection wore off about 3 weeks ago.          Past Medical and Surgical History:     Past Medical History:   Diagnosis Date     Anemia      Anxiety      Arthritis 2014    Psoriatic arthritis     Back injury 1988     Dry eye syndrome      Dyslipidemia      Endometriosis 2002    adehsions seen at laparoscopy     GERD (gastroesophageal reflux disease)      Hypothyroidism     8 yoa     SOB (shortness of breath) 3/11/2014     Type I (juvenile type) diabetes mellitus without mention of complication, not stated as uncontrolled     when 15      Uncomplicated asthma Fall 2013     Past Surgical History:   Procedure Laterality Date     COLONOSCOPY  2002     COLONOSCOPY N/A 6/23/2020    Procedure: COLONOSCOPY;  Surgeon: Lauryn Rivera MD;  Location:  GI     ESOPHAGOSCOPY, GASTROSCOPY, DUODENOSCOPY (EGD), COMBINED  1/7/2014    Procedure: COMBINED ESOPHAGOSCOPY, GASTROSCOPY, DUODENOSCOPY (EGD), BIOPSY SINGLE OR MULTIPLE;;  Surgeon: Kraig Nicole MD;  Location:  GI     GYN SURGERY      Laparotomy to remove endometrial tissue     HC BREATH HYDROGEN TEST N/A 6/17/2014    Procedure: HYDROGEN BREATH TEST;  Surgeon: Deacon Alberts MD;  Location:  GI     HYDROGEN BREATH TEST  6/17/14     LAPAROSCOPIC APPENDECTOMY  2002     LAPAROTOMY, LYSIS ADHESIONS, COMBINED  2002    endometriosis     SOFT TISSUE SURGERY  December 2014    right ankle tendon injury     ZZC REPAIR CRUCIATE LIGAMENT,KNEE       ZZC STOMACH SURGERY PROCEDURE UNLISTED  December 2002            Social History:     Social History     Tobacco Use     Smoking status: Never     Smokeless tobacco: Never   Substance Use Topics     Alcohol use: Yes     Comment: moderately            Functional history:     Dali Martines is independent with ADL's             Family History:     Family History   Problem  Relation Age of Onset     Breast Cancer Mother      Heart Disease Father      C.A.D. Father      Arthritis Father      Circulatory Father      Eye Disorder Father      Lipids Father      Thyroid Disease Father      Macular Degeneration Father      Coronary Artery Disease Father      Anxiety Disorder Father      Alcoholism Father      Rheumatoid Arthritis Father      Hypothyroidism Father      Diabetes Father      Cerebrovascular Disease Paternal Grandmother         ,     Cancer Paternal Grandfather         Pancreatic     Heart Disease Paternal Grandfather      Diabetes Maternal Grandmother         Type 2     C.A.D. Maternal Grandmother      Heart Disease Maternal Grandmother      Coronary Artery Disease Maternal Grandmother      Heart Disease Maternal Grandfather      Cardiac Sudden Death Maternal Grandfather      Gynecology Other         self     Thyroid Disease Other         self     Macular Degeneration Maternal Aunt      Diabetes Other         Type 1     Glaucoma No family hx of             Medications:     Current Outpatient Medications   Medication Sig Dispense Refill     albuterol (PROAIR HFA/PROVENTIL HFA/VENTOLIN HFA) 108 (90 Base) MCG/ACT inhaler Inhale 2 puffs into the lungs every 4 hours as needed for shortness of breath / dyspnea or wheezing 1 Inhaler 11     aspirin 81 MG tablet Take 1 tablet by mouth daily.       blood glucose (ACCU-CHEK COLIN PLUS) test strip Test Blood Sugar 8 times daily or as directed 800 strip 3     blood glucose monitoring (ACCU-CHEK FASTCLIX) lancets Use to test blood sugar 8 times daily or as directed. 4 Box 3     blood glucose monitoring (NO BRAND SPECIFIED) meter device kit Use to test blood sugar 8 times daily or as directed. Any covered brand, 1 kit 0     Calcium Carbonate-Vitamin D (CALCIUM + D PO) Take one daily       citalopram (CELEXA) 20 MG tablet Take 1.5 tablets (30 mg) by mouth daily 135 tablet 1     Continuous Blood Gluc  (DEXCOM G6 ) SUZANNE Use to  read blood sugars as per 's instructions. 1 each 0     Continuous Blood Gluc Sensor (DEXCOM G6 SENSOR) MISC Change every 10 days. 9 each 3     Continuous Blood Gluc Transmit (DEXCOM G6 TRANSMITTER) MISC CHANGE EVERY 3 MONTHS 1 each 3     diclofenac (VOLTAREN) 1 % topical gel Apply 2 g topically 4 times daily 100 g 4     diclofenac (VOLTAREN) 1 % topical gel APPLY 2 GRAMS ONTO THE SKIN FOUR TIMES DAILY AS NEEDED FOR MODERATE PAIN 100 g 5     econazole nitrate 1 % cream Apply 0.5 inches topically daily.       FIASP PENFILL 100 UNIT/ML SOCT INJECT UP TO 45 UNITS DAILY AS DIRECTED ACCORDING TO CORRECTION FACTOR. 45 mL 3     fish oil-omega-3 fatty acids 1000 MG capsule Take one daily       folic acid (FOLVITE) 1 MG tablet Take 1 tablet (1 mg) by mouth daily 90 tablet 3     Injection Device for insulin (INPEN 100-BLUE-NOVOLOG-FIASP) SUZANNE 1 each once for 1 dose 1 each 0     insulin degludec (TRESIBA FLEXTOUCH) 100 UNIT/ML pen ADMINISTER 17 UNITS UNDER THE SKIN DAILY 30 mL 1     insulin pen needle (B-D U/F) 31G X 8 MM miscellaneous Use 5 pen needles daily 450 each 3     Ketoconazole (NIZORAL PO) Shampoo daily       levothyroxine (SYNTHROID/LEVOTHROID) 112 MCG tablet TAKE 1 TABLET(112 MCG) BY MOUTH DAILY 90 tablet 4     methotrexate 2.5 MG tablet Take 6 tablets (15 mg) by mouth every 7 days Labs required every 8-12 weeks while taking this medication 72 tablet 0     methylphenidate (METADATE CD) 20 MG CR capsule Take 20 mg by mouth daily       methylphenidate (RITALIN) 10 MG tablet TAKE UP TO 2 TABLETS BY MOUTH IN THE LATE AFTERNOON.       Minoxidil (ROGAINE EX) daily       mometasone-formoterol (DULERA) 100-5 MCG/ACT inhaler Inhale 2 puffs into the lungs 2 times daily 13 g 5     montelukast (SINGULAIR) 5 MG chewable tablet CHEW AND SWALLOW 1 TABLET(5 MG) BY MOUTH AT BEDTIME 90 tablet 4     Multiple Vitamin (MULTIVITAMIN OR) Take one daily tab       NOVOLOG PENFILL 100 UNIT/ML soln INJECT 1 UNIT PER 15 GRAMS CHO  "AT ALL MEALS AND SNACKS WITH CORRECTION SCALE OF 1 UNIT PER 50 MG/DL OVER 150, AVERAGE DAILY USE OF 30 UNITS 30 mL 4     OZEMPIC, 0.25 OR 0.5 MG/DOSE, 2 MG/1.5ML SOPN pen INJECT 0.25 MG UNDER THE SKIN EVERY 7 DAYS FOR 28 DAYS, THEN 0.5 MG EVERY 7 DAYS. 4.5 mL 1     Semaglutide, 1 MG/DOSE, 4 MG/3ML SOPN Inject 1 mg Subcutaneous every 7 days 12 mL 3     simvastatin (ZOCOR) 20 MG tablet Take 1 tablet (20 mg) by mouth At Bedtime 90 tablet 3     traZODone (DESYREL) 50 MG tablet Take 1-2 tablets by mouth nightly as needed for sleep. 180 tablet 0            Allergies:     Allergies   Allergen Reactions     Monosodium Glutamate Palpitations and Shortness Of Breath     Sulfasalazine      Developed rash, HA, dizziness              ROS:     A focused ROS is negative other than the symptoms noted above in the HPI.           Physical Examiniation:     VITAL SIGNS: There were no vitals taken for this visit.  BMI: Estimated body mass index is 27.26 kg/m  as calculated from the following:    Height as of 3/10/23: 1.778 m (5' 10\").    Weight as of 3/10/23: 86.2 kg (190 lb).    Gen: NAD, pleasant and cooperative            Laboratory/Imaging:     NA           Assessment/Plan:   Dali Martines is a 52 year old female with gait instability. Better balance with strength work on low back and pelvic stabilizers, but still struggling with other areas including L knee, R ankle and R ear/jaw.     1. Recommend she reinitiate PT with another clinician if Mario still on leave. Will add TMJ/ear pain orders.     2. F/u in 3 months to evaluate progress.      Marlen Bailey MD  3/15/2023  Physical Medicine & Rehabilitation    I spent a total of 24 minutes face-to-face with Dali Martines during today's office visit. Over 50% of this time was spent counseling the patient and/or coordinating care. See note for details.     18 minutes spent on the date of the encounter doing chart review, history and exam, documentation and further activities as noted " above

## 2023-03-15 NOTE — PROGRESS NOTES
[facility] Note Followup Visit     Patient Name: Dali Martines : 1970 Medical Record: [id number]            History of Present Illness:     Dali Martines is a 52 year old female with deconditioning, chronic painful areas and gait instability.    Last visit: This is a 53 y/o female with gait imbalance. She has multiple risk factors for poor balance, including chronic painful left sided ATFL sprain, generalized deconditioning due to limited activity from COVID pandemic, and painful low back, L knee, R ankle anmd stiffness on the r toes 2,3,4 after healed fractures.      1. Patient education: In depth discussion and education was provided about the assessment and implications of each of the below recommendations for management. Patient indicated readiness to learn, all questions were answered and understanding of material presented was confirmed.     2. Work-up: No further imaging necessary: upper exam not concerning for cervical stenosis as cause of imbalance, will observe. MRI L and EMG results reassuring and r/o PN and lumbar radiculopathy.      3. Therapy/equipment/braces: She has bilateral ankle braces which do improve her stability.      4. Medications: none     5. Interventions:Recommend an updated course of PT to strengthen and support lumbar spine, knees, ankle and feet, and teach balance exercises.      6. Referral / follow up with other providers:     Follow up: 3 months    Today she is seeing improvements. She started PT with Mario who she really likes, but he has been out for a few weeks. They focused on low back and R hip, planning to move on to R ankle and L knee next.     She marguerite feel gait is more steady, no falls since starting PT. She gets R ear pain/TMJ and R MENDEZ.     R ankle feels worse, injection wore off about 3 weeks ago.          Past Medical and Surgical History:     Past Medical History:   Diagnosis Date     Anemia      Anxiety      Arthritis 2014    Psoriatic arthritis      Back injury 1988     Dry eye syndrome      Dyslipidemia      Endometriosis 2002    adehsions seen at laparoscopy     GERD (gastroesophageal reflux disease)      Hypothyroidism     8 yoa     SOB (shortness of breath) 3/11/2014     Type I (juvenile type) diabetes mellitus without mention of complication, not stated as uncontrolled     when 15      Uncomplicated asthma Fall 2013     Past Surgical History:   Procedure Laterality Date     COLONOSCOPY  2002     COLONOSCOPY N/A 6/23/2020    Procedure: COLONOSCOPY;  Surgeon: Lauryn Rivera MD;  Location:  GI     ESOPHAGOSCOPY, GASTROSCOPY, DUODENOSCOPY (EGD), COMBINED  1/7/2014    Procedure: COMBINED ESOPHAGOSCOPY, GASTROSCOPY, DUODENOSCOPY (EGD), BIOPSY SINGLE OR MULTIPLE;;  Surgeon: Kraig Nicole MD;  Location:  GI     GYN SURGERY      Laparotomy to remove endometrial tissue     HC BREATH HYDROGEN TEST N/A 6/17/2014    Procedure: HYDROGEN BREATH TEST;  Surgeon: Deacon Alberts MD;  Location:  GI     HYDROGEN BREATH TEST  6/17/14     LAPAROSCOPIC APPENDECTOMY  2002     LAPAROTOMY, LYSIS ADHESIONS, COMBINED  2002    endometriosis     SOFT TISSUE SURGERY  December 2014    right ankle tendon injury     ZZC REPAIR CRUCIATE LIGAMENT,KNEE       ZZC STOMACH SURGERY PROCEDURE UNLISTED  December 2002            Social History:     Social History     Tobacco Use     Smoking status: Never     Smokeless tobacco: Never   Substance Use Topics     Alcohol use: Yes     Comment: moderately            Functional history:     Dali A Conrad is independent with ADL's             Family History:     Family History   Problem Relation Age of Onset     Breast Cancer Mother      Heart Disease Father      C.A.D. Father      Arthritis Father      Circulatory Father      Eye Disorder Father      Lipids Father      Thyroid Disease Father      Macular Degeneration Father      Coronary Artery Disease Father      Anxiety Disorder Father      Alcoholism Father      Rheumatoid  Arthritis Father      Hypothyroidism Father      Diabetes Father      Cerebrovascular Disease Paternal Grandmother         ,     Cancer Paternal Grandfather         Pancreatic     Heart Disease Paternal Grandfather      Diabetes Maternal Grandmother         Type 2     C.A.D. Maternal Grandmother      Heart Disease Maternal Grandmother      Coronary Artery Disease Maternal Grandmother      Heart Disease Maternal Grandfather      Cardiac Sudden Death Maternal Grandfather      Gynecology Other         self     Thyroid Disease Other         self     Macular Degeneration Maternal Aunt      Diabetes Other         Type 1     Glaucoma No family hx of             Medications:     Current Outpatient Medications   Medication Sig Dispense Refill     albuterol (PROAIR HFA/PROVENTIL HFA/VENTOLIN HFA) 108 (90 Base) MCG/ACT inhaler Inhale 2 puffs into the lungs every 4 hours as needed for shortness of breath / dyspnea or wheezing 1 Inhaler 11     aspirin 81 MG tablet Take 1 tablet by mouth daily.       blood glucose (ACCU-CHEK COLIN PLUS) test strip Test Blood Sugar 8 times daily or as directed 800 strip 3     blood glucose monitoring (ACCU-CHEK FASTCLIX) lancets Use to test blood sugar 8 times daily or as directed. 4 Box 3     blood glucose monitoring (NO BRAND SPECIFIED) meter device kit Use to test blood sugar 8 times daily or as directed. Any covered brand, 1 kit 0     Calcium Carbonate-Vitamin D (CALCIUM + D PO) Take one daily       citalopram (CELEXA) 20 MG tablet Take 1.5 tablets (30 mg) by mouth daily 135 tablet 1     Continuous Blood Gluc  (DEXCOM G6 ) SUZANNE Use to read blood sugars as per 's instructions. 1 each 0     Continuous Blood Gluc Sensor (DEXCOM G6 SENSOR) MISC Change every 10 days. 9 each 3     Continuous Blood Gluc Transmit (DEXCOM G6 TRANSMITTER) MISC CHANGE EVERY 3 MONTHS 1 each 3     diclofenac (VOLTAREN) 1 % topical gel Apply 2 g topically 4 times daily 100 g 4     diclofenac  (VOLTAREN) 1 % topical gel APPLY 2 GRAMS ONTO THE SKIN FOUR TIMES DAILY AS NEEDED FOR MODERATE PAIN 100 g 5     econazole nitrate 1 % cream Apply 0.5 inches topically daily.       FIASP PENFILL 100 UNIT/ML SOCT INJECT UP TO 45 UNITS DAILY AS DIRECTED ACCORDING TO CORRECTION FACTOR. 45 mL 3     fish oil-omega-3 fatty acids 1000 MG capsule Take one daily       folic acid (FOLVITE) 1 MG tablet Take 1 tablet (1 mg) by mouth daily 90 tablet 3     Injection Device for insulin (INPEN 100-BLUE-NOVOLOG-FIASP) SUZANNE 1 each once for 1 dose 1 each 0     insulin degludec (TRESIBA FLEXTOUCH) 100 UNIT/ML pen ADMINISTER 17 UNITS UNDER THE SKIN DAILY 30 mL 1     insulin pen needle (B-D U/F) 31G X 8 MM miscellaneous Use 5 pen needles daily 450 each 3     Ketoconazole (NIZORAL PO) Shampoo daily       levothyroxine (SYNTHROID/LEVOTHROID) 112 MCG tablet TAKE 1 TABLET(112 MCG) BY MOUTH DAILY 90 tablet 4     methotrexate 2.5 MG tablet Take 6 tablets (15 mg) by mouth every 7 days Labs required every 8-12 weeks while taking this medication 72 tablet 0     methylphenidate (METADATE CD) 20 MG CR capsule Take 20 mg by mouth daily       methylphenidate (RITALIN) 10 MG tablet TAKE UP TO 2 TABLETS BY MOUTH IN THE LATE AFTERNOON.       Minoxidil (ROGAINE EX) daily       mometasone-formoterol (DULERA) 100-5 MCG/ACT inhaler Inhale 2 puffs into the lungs 2 times daily 13 g 5     montelukast (SINGULAIR) 5 MG chewable tablet CHEW AND SWALLOW 1 TABLET(5 MG) BY MOUTH AT BEDTIME 90 tablet 4     Multiple Vitamin (MULTIVITAMIN OR) Take one daily tab       NOVOLOG PENFILL 100 UNIT/ML soln INJECT 1 UNIT PER 15 GRAMS CHO AT ALL MEALS AND SNACKS WITH CORRECTION SCALE OF 1 UNIT PER 50 MG/DL OVER 150, AVERAGE DAILY USE OF 30 UNITS 30 mL 4     OZEMPIC, 0.25 OR 0.5 MG/DOSE, 2 MG/1.5ML SOPN pen INJECT 0.25 MG UNDER THE SKIN EVERY 7 DAYS FOR 28 DAYS, THEN 0.5 MG EVERY 7 DAYS. 4.5 mL 1     Semaglutide, 1 MG/DOSE, 4 MG/3ML SOPN Inject 1 mg Subcutaneous every 7 days 12  "mL 3     simvastatin (ZOCOR) 20 MG tablet Take 1 tablet (20 mg) by mouth At Bedtime 90 tablet 3     traZODone (DESYREL) 50 MG tablet Take 1-2 tablets by mouth nightly as needed for sleep. 180 tablet 0            Allergies:     Allergies   Allergen Reactions     Monosodium Glutamate Palpitations and Shortness Of Breath     Sulfasalazine      Developed rash, HA, dizziness              ROS:     A focused ROS is negative other than the symptoms noted above in the HPI.           Physical Examiniation:     VITAL SIGNS: There were no vitals taken for this visit.  BMI: Estimated body mass index is 27.26 kg/m  as calculated from the following:    Height as of 3/10/23: 1.778 m (5' 10\").    Weight as of 3/10/23: 86.2 kg (190 lb).    Gen: NAD, pleasant and cooperative            Laboratory/Imaging:     NA           Assessment/Plan:   Dali Martines is a 52 year old female with gait instability. Better balance with strength work on low back and pelvic stabilizers, but still struggling with other areas including L knee, R ankle and R ear/jaw.     1. Recommend she reinitiate PT with another clinician if Mario still on leave. Will add TMJ/ear pain orders.     2. F/u in 3 months to evaluate progress.      Marlen Bailey MD  3/15/2023  Physical Medicine & Rehabilitation    I spent a total of 24 minutes face-to-face with Dali Martines during today's office visit. Over 50% of this time was spent counseling the patient and/or coordinating care. See note for details.     18 minutes spent on the date of the encounter doing chart review, history and exam, documentation and further activities as noted above      "

## 2023-03-15 NOTE — NURSING NOTE
Chief Complaint   Patient presents with     RECHECK     Return P     /77   Pulse 80   SpO2 96%     Felicia Barba, EMT

## 2023-03-20 ENCOUNTER — TELEPHONE (OUTPATIENT)
Dept: ENDOCRINOLOGY | Facility: CLINIC | Age: 53
End: 2023-03-20
Payer: COMMERCIAL

## 2023-03-20 NOTE — TELEPHONE ENCOUNTER
Spoke with patient in regards to uploading dexcom data for appointment scheduled 3/22/2023. Patient will upload prior to appointment.    Tanesha Garcia LPN 03/20/23 1:49 PM

## 2023-03-22 ENCOUNTER — VIRTUAL VISIT (OUTPATIENT)
Dept: ENDOCRINOLOGY | Facility: CLINIC | Age: 53
End: 2023-03-22
Payer: COMMERCIAL

## 2023-03-22 DIAGNOSIS — E10.3291 TYPE 1 DIABETES MELLITUS WITH MILD NONPROLIFERATIVE RETINOPATHY OF RIGHT EYE, MACULAR EDEMA PRESENCE UNSPECIFIED (H): Primary | ICD-10-CM

## 2023-03-22 PROCEDURE — 99215 OFFICE O/P EST HI 40 MIN: CPT | Mod: VID | Performed by: PHYSICIAN ASSISTANT

## 2023-03-22 ASSESSMENT — ENCOUNTER SYMPTOMS
COUGH DISTURBING SLEEP: 0
DISTURBANCES IN COORDINATION: 0
FEVER: 0
SMELL DISTURBANCE: 0
WEIGHT LOSS: 0
BACK PAIN: 1
BLOATING: 0
NIGHT SWEATS: 0
LOSS OF CONSCIOUSNESS: 0
NAUSEA: 1
ABDOMINAL PAIN: 0
JOINT SWELLING: 0
COUGH: 0
STIFFNESS: 1
POSTURAL DYSPNEA: 0
NECK MASS: 1
DYSPNEA ON EXERTION: 1
DIARRHEA: 0
DEPRESSION: 1
PANIC: 0
NUMBNESS: 0
POLYDIPSIA: 0
DECREASED APPETITE: 0
TROUBLE SWALLOWING: 0
MUSCLE CRAMPS: 1
FATIGUE: 1
WEIGHT GAIN: 0
POLYPHAGIA: 0
SNORES LOUDLY: 0
WHEEZING: 0
MYALGIAS: 0
SINUS CONGESTION: 0
HEMOPTYSIS: 0
ARTHRALGIAS: 1
SINUS PAIN: 0
SEIZURES: 0
HEADACHES: 0
BLOOD IN STOOL: 0
INCREASED ENERGY: 1
CHILLS: 0
HEARTBURN: 0
WEAKNESS: 0
DECREASED CONCENTRATION: 1
BOWEL INCONTINENCE: 0
JAUNDICE: 0
DIZZINESS: 1
NERVOUS/ANXIOUS: 1
MEMORY LOSS: 0
VOMITING: 0
NECK PAIN: 0
CONSTIPATION: 0
RECTAL PAIN: 0
SPUTUM PRODUCTION: 1
SPEECH CHANGE: 0
HALLUCINATIONS: 0
SHORTNESS OF BREATH: 1
HOARSE VOICE: 1
TREMORS: 0
SORE THROAT: 0
PARALYSIS: 0
INSOMNIA: 1
ALTERED TEMPERATURE REGULATION: 0
TASTE DISTURBANCE: 0
TINGLING: 0
MUSCLE WEAKNESS: 1

## 2023-03-22 NOTE — LETTER
3/22/2023       RE: Dali Martines  [address]  [facility] [address]     Dear Colleague,    Thank you for referring your patient, Dali Martines, to the [facility]. Please see a copy of my visit note below.    Outcome for 03/15/23 9:25 AM: Wrike message sent  Tanesha Garcia LPN   Outcome for 03/20/23 1:49 PM: Per patient, will upload device before appointment  Tanesha Garcia LPN   Outcome for 03/21/23 3:39 PM: Per patient, will upload later today 3/21  Taylor Myhre, RMA  Outcome for 03/22/23 7:11 AM: Data obtained via Dexcom website  Tanesha Garcia LPN                           Due to the COVID 19 pandemic this visit was converted to a video visit in order to help prevent spread of infection in this patient and the general population.    Time of start: 10:07 am  Time of end: 10:37 am  Total duration of video visit: 30 minutes.    Providers location: offsite.  Patients location: [place].    [facility]  Dali Martines is a 52 year old female with type 1 diabetes mellitus and hypothyroidism.  Video visit today for diabetes and hypothyroid follow up.  Pt was dx having type 1 diabetes at age 15.  Her hx is also significant for mild NPDR right eye, psoriatic arthritis, hypothyroidism, anxiety, hyperlipidemia and GERD.  Dali also had COVID infection late Aug 2022.  For her diabetes, she is currently taking Tresiba 17 units daily, Fiasp 1 unit/9 gms CHO with meals and snacks, plus correction insulin.  Dali is also taking Ozempic 1 mg subcutaneous once a week.  Most recent A1C was 6.5 % on 12/21/2022.  I reviewed her DexcomG6 sensor download data today and scanned the data in her note below.  Pt's average glucose was 188  with SD 41.  Her blood sugar is in target 49 % of the time, above target 49 % of the time and below target 2 % of the time.  She has been under a lot of stress lately and having issues with her right ear and  balance.  Increase use of her inhalers since COVID infection.  She also lost her dog a few weeks ago.  On ROS today, vertigo sx and balance issues. She has seen ENT. Undergoing PT.  Right TMJ pain.  Increase use of inhalers since COVID infection late Aug 2022. SOB when she uses stairs.  No chest pain.  Mild intermittent nausea. No abd pain or diarrhea.   Nausea was present before starting Ozempic per patient. She also get nauseated when she is going to sneeze or if her blood sugar is low.  No cough or fever. Intermittent chills.  Left great toe numbness- she has seen Podiatry.  No foot ulcers.  Pt denies blurred vision, dysuria or hematuria.    Diabetes Care  Retinopathy:yes in right eye; pt seen by Oph here in Jan 2023.  Nephropathy:none; urine microalbuminuria negative in  2/2022.  Neuropathy: reports mild numbness left toe.  Foot Exam: no exam today.  Taking aspirin: yes.  Lipids:  in Feb 2022.  Pt is taking Simvastatin and Fish Oil.  CAD: no hx of CAD.  Mental health: hx of anxiety.  Insulin: Basal and meal time insulin with correction insulin.   DM meds: Ozempic.  Testing: DexcomG6 sensor.                ROS  Please see under HPI.    Allergies  Allergies   Allergen Reactions     Monosodium Glutamate Palpitations and Shortness Of Breath     Sulfasalazine      Developed rash, HA, dizziness       Medications  Current Outpatient Medications   Medication Sig Dispense Refill     albuterol (PROAIR HFA/PROVENTIL HFA/VENTOLIN HFA) 108 (90 Base) MCG/ACT inhaler Inhale 2 puffs into the lungs every 4 hours as needed for shortness of breath / dyspnea or wheezing 1 Inhaler 11     aspirin 81 MG tablet Take 1 tablet by mouth daily.       blood glucose (ACCU-CHEK COLIN PLUS) test strip Test Blood Sugar 8 times daily or as directed 800 strip 3     blood glucose monitoring (ACCU-CHEK FASTCLIX) lancets Use to test blood sugar 8 times daily or as directed. 4 Box 3     blood glucose monitoring (NO BRAND SPECIFIED) meter  device kit Use to test blood sugar 8 times daily or as directed. Any covered brand, 1 kit 0     Calcium Carbonate-Vitamin D (CALCIUM + D PO) Take one daily       citalopram (CELEXA) 20 MG tablet Take 1.5 tablets (30 mg) by mouth daily 135 tablet 1     Continuous Blood Gluc  (DEXCOM G6 ) SUZANNE Use to read blood sugars as per 's instructions. 1 each 0     Continuous Blood Gluc Sensor (DEXCOM G6 SENSOR) MISC Change every 10 days. 9 each 3     Continuous Blood Gluc Transmit (DEXCOM G6 TRANSMITTER) MISC CHANGE EVERY 3 MONTHS 1 each 3     diclofenac (VOLTAREN) 1 % topical gel Apply 2 g topically 4 times daily 100 g 4     diclofenac (VOLTAREN) 1 % topical gel APPLY 2 GRAMS ONTO THE SKIN FOUR TIMES DAILY AS NEEDED FOR MODERATE PAIN 100 g 5     econazole nitrate 1 % cream Apply 0.5 inches topically daily.       FIASP PENFILL 100 UNIT/ML SOCT INJECT UP TO 45 UNITS DAILY AS DIRECTED ACCORDING TO CORRECTION FACTOR. 45 mL 3     fish oil-omega-3 fatty acids 1000 MG capsule Take one daily       folic acid (FOLVITE) 1 MG tablet Take 1 tablet (1 mg) by mouth daily 90 tablet 3     insulin degludec (TRESIBA FLEXTOUCH) 100 UNIT/ML pen ADMINISTER 17 UNITS UNDER THE SKIN DAILY 30 mL 1     insulin pen needle (B-D U/F) 31G X 8 MM miscellaneous Use 5 pen needles daily 450 each 3     Ketoconazole (NIZORAL PO) Shampoo daily       levothyroxine (SYNTHROID/LEVOTHROID) 112 MCG tablet TAKE 1 TABLET(112 MCG) BY MOUTH DAILY 90 tablet 4     methotrexate 2.5 MG tablet Take 6 tablets (15 mg) by mouth every 7 days Labs required every 8-12 weeks while taking this medication 72 tablet 0     methylphenidate (METADATE CD) 20 MG CR capsule Take 20 mg by mouth daily       methylphenidate (RITALIN) 10 MG tablet TAKE UP TO 2 TABLETS BY MOUTH IN THE LATE AFTERNOON.       Minoxidil (ROGAINE EX) daily       mometasone-formoterol (DULERA) 100-5 MCG/ACT inhaler Inhale 2 puffs into the lungs 2 times daily 13 g 5     montelukast (SINGULAIR)  5 MG chewable tablet CHEW AND SWALLOW 1 TABLET(5 MG) BY MOUTH AT BEDTIME 90 tablet 4     Multiple Vitamin (MULTIVITAMIN OR) Take one daily tab       NOVOLOG PENFILL 100 UNIT/ML soln INJECT 1 UNIT PER 15 GRAMS CHO AT ALL MEALS AND SNACKS WITH CORRECTION SCALE OF 1 UNIT PER 50 MG/DL OVER 150, AVERAGE DAILY USE OF 30 UNITS 30 mL 4     Semaglutide, 1 MG/DOSE, 4 MG/3ML SOPN Inject 1 mg Subcutaneous every 7 days 12 mL 3     simvastatin (ZOCOR) 20 MG tablet Take 1 tablet (20 mg) by mouth At Bedtime 90 tablet 3     traZODone (DESYREL) 50 MG tablet Take 1-2 tablets by mouth nightly as needed for sleep. 180 tablet 0     Injection Device for insulin (INPEN 100-BLUE-NOVOLOG-FIASP) SUZANNE 1 each once for 1 dose 1 each 0     OZEMPIC, 0.25 OR 0.5 MG/DOSE, 2 MG/1.5ML SOPN pen INJECT 0.25 MG UNDER THE SKIN EVERY 7 DAYS FOR 28 DAYS, THEN 0.5 MG EVERY 7 DAYS. 4.5 mL 1       Family History  family history includes Alcoholism in her father; Anxiety Disorder in her father; Arthritis in her father; Breast Cancer in her mother; C.A.D. in her father and maternal grandmother; Cancer in her paternal grandfather; Cardiac Sudden Death in her maternal grandfather; Cerebrovascular Disease in her paternal grandmother; Circulatory in her father; Coronary Artery Disease in her father and maternal grandmother; Diabetes in her father, maternal grandmother, and another family member; Eye Disorder in her father; Gynecology in an other family member; Heart Disease in her father, maternal grandfather, maternal grandmother, and paternal grandfather; Hypothyroidism in her father; Lipids in her father; Macular Degeneration in her father and maternal aunt; Rheumatoid Arthritis in her father; Thyroid Disease in her father and another family member.    Social History   reports that she has never smoked. She has never used smokeless tobacco. She reports current alcohol use. She reports that she does not use drugs.     Past Medical History  Past Medical History:    Diagnosis Date     Anemia      Anxiety      Arthritis 2014    Psoriatic arthritis     Back injury 1988     Dry eye syndrome      Dyslipidemia      Endometriosis 2002    adehsions seen at laparoscopy     GERD (gastroesophageal reflux disease)      Hypothyroidism     8 yoa     SOB (shortness of breath) 3/11/2014     Type I (juvenile type) diabetes mellitus without mention of complication, not stated as uncontrolled     when 15      Uncomplicated asthma Fall 2013       Past Surgical History:   Procedure Laterality Date     COLONOSCOPY  2002     COLONOSCOPY N/A 6/23/2020    Procedure: COLONOSCOPY;  Surgeon: Lauryn Rivera MD;  Location:  GI     ESOPHAGOSCOPY, GASTROSCOPY, DUODENOSCOPY (EGD), COMBINED  1/7/2014    Procedure: COMBINED ESOPHAGOSCOPY, GASTROSCOPY, DUODENOSCOPY (EGD), BIOPSY SINGLE OR MULTIPLE;;  Surgeon: Kraig Nicole MD;  Location:  GI     GYN SURGERY      Laparotomy to remove endometrial tissue     HC BREATH HYDROGEN TEST N/A 6/17/2014    Procedure: HYDROGEN BREATH TEST;  Surgeon: Deacon Alberts MD;  Location:  GI     HYDROGEN BREATH TEST  6/17/14     LAPAROSCOPIC APPENDECTOMY  2002     LAPAROTOMY, LYSIS ADHESIONS, COMBINED  2002    endometriosis     SOFT TISSUE SURGERY  December 2014    right ankle tendon injury     ZZC REPAIR CRUCIATE LIGAMENT,KNEE       ZZC STOMACH SURGERY PROCEDURE UNLISTED  December 2002       Physical Exam    No exam.    RESULTS  Creatinine   Date Value Ref Range Status   12/21/2022 0.59 0.51 - 0.95 mg/dL Final   01/11/2021 0.56 0.52 - 1.04 mg/dL Final     GFR Estimate   Date Value Ref Range Status   12/21/2022 >90 >60 mL/min/1.73m2 Final     Comment:     Effective December 21, 2021 eGFRcr in adults is calculated using the 2021 CKD-EPI creatinine equation which includes age and gender (Paola et al., NEJM, DOI: 10.1056/RRJBjr4551631)   01/11/2021 >90 >60 mL/min/[1.73_m2] Final     Comment:     Non  GFR Calc  Starting 12/18/2018, serum  creatinine based estimated GFR (eGFR) will be   calculated using the Chronic Kidney Disease Epidemiology Collaboration   (CKD-EPI) equation.       Microalbumin Urine   Date Value Ref Range Status   06/26/2009 5@ MG/L      Hemoglobin A1C   Date Value Ref Range Status   12/21/2022 6.5 (H) 0.0 - 5.6 % Final     Comment:     Normal <5.7%   Prediabetes 5.7-6.4%    Diabetes 6.5% or higher     Note: Adopted from ADA consensus guidelines.   01/11/2021 7.1 (H) 0 - 5.6 % Final     Comment:     Normal <5.7% Prediabetes 5.7-6.4%  Diabetes 6.5% or higher - adopted from ADA   consensus guidelines.       Potassium   Date Value Ref Range Status   11/18/2021 3.5 3.4 - 5.3 mmol/L Final   04/27/2016 4.1 3.4 - 5.3 mmol/L Final     ALT   Date Value Ref Range Status   12/21/2022 28 10 - 35 U/L Final   01/11/2021 37 0 - 50 U/L Final     AST   Date Value Ref Range Status   12/21/2022 37 (H) 10 - 35 U/L Final   01/11/2021 31 0 - 45 U/L Final     TSH   Date Value Ref Range Status   02/14/2022 3.34 0.40 - 4.00 mU/L Final   01/11/2021 2.01 0.40 - 4.00 mU/L Final     T4 Total   Date Value Ref Range Status   11/29/2010 8.5 5.0 - 11.0 ug/dL Final     T4 Free   Date Value Ref Range Status   03/21/2019 1.09 0.76 - 1.46 ng/dL Final       Cholesterol   Date Value Ref Range Status   02/14/2022 218 (H) <200 mg/dL Final   01/11/2021 223 (H) <200 mg/dL Final     Comment:     Desirable:       <200 mg/dl   12/18/2018 194 <200 mg/dL Final     HDL Cholesterol   Date Value Ref Range Status   01/11/2021 92 >49 mg/dL Final   12/18/2018 91 >49 mg/dL Final     Direct Measure HDL   Date Value Ref Range Status   02/14/2022 94 >=50 mg/dL Final     LDL Cholesterol Calculated   Date Value Ref Range Status   02/14/2022 111 (H) <=100 mg/dL Final   01/11/2021 122 (H) <100 mg/dL Final     Comment:     Above desirable:  100-129 mg/dl  Borderline High:  130-159 mg/dL  High:             160-189 mg/dL  Very high:       >189 mg/dl     12/18/2018 92 <100 mg/dL Final      Comment:     Desirable:       <100 mg/dl     Triglycerides   Date Value Ref Range Status   02/14/2022 66 <150 mg/dL Final   01/11/2021 44 <150 mg/dL Final     Comment:     Non Fasting   12/18/2018 54 <150 mg/dL Final     Cholesterol/HDL Ratio   Date Value Ref Range Status   09/27/2013 2.7 0.0 - 5.0 Final   02/15/2013 2.9 0.0 - 5.0 Final       ASSESSMENT/PLAN:    1. TYPE 1 DIABETES MELLITUS: Patient is considering using an insulin pump in the near future.   She has been working with the dietitian on her carb counting.  No change in insulin doses today.  Continue Ozempic 1 mg subcutaneous once a week.  Her urine microalbuminuria has been negative with normal creat/GFR in Feb 2022.  She has noticed numbness in left toe.  No foot ulcers. Seen by Podiatry.  Pt is taking ASA daily.  I have no vitals today.     2.  RETINOPATHY: Hx of mild NPDR right eye only.  She was seen here by Oph in Jan 2023.    3.  HYPOTHYROIDISM:  TSH normal in Feb 2022.  Continue Levothyroxine 112 mcg po daily.    4. HYPERLIPIDEMIA:  in 2/2022.  She remains on Simvastatin.    5.  MENTAL HEALTH: Provided pt with contact number for Behavior Health services.  Chronic illness with increase medical problems, still recovering from COVID, caring for parents and recently lost her dog.    6.   FOLLOW UP: with Dr. Mckeon on 7/5/2023.  Fasting lipid panel, A1C, TSH and urine microalbuminuria ordered today.    Time spent reviewing chart notes, labs and Dexcom sensor download today= 6 minutes.  Time video visit today = 30 minutes.  Time for documentation today = 15 minutes.     TOTAL TIME FOR VISIT TODAY =52 minutes.    Marissa Sebastian PA-C     Answers for HPI/ROS submitted by the patient on 3/22/2023  General Symptoms: Yes  Skin Symptoms: No  HENT Symptoms: Yes  EYE SYMPTOMS: No  HEART SYMPTOMS: No  LUNG SYMPTOMS: Yes  INTESTINAL SYMPTOMS: Yes  URINARY SYMPTOMS: No  GYNECOLOGIC SYMPTOMS: No  BREAST SYMPTOMS: No  SKELETAL SYMPTOMS: Yes  BLOOD SYMPTOMS:  No  NERVOUS SYSTEM SYMPTOMS: Yes  MENTAL HEALTH SYMPTOMS: Yes  Ear pain: No  Ear discharge: No  Hearing loss: No  Tinnitus: Yes  Nosebleeds: No  Congestion: No  Sinus pain: No  Trouble swallowing: No   Voice hoarseness: Yes  Mouth sores: No  Sore throat: No  Tooth pain: No  Gum tenderness: No  Bleeding gums: No  Change in taste: No  Change in sense of smell: No  Dry mouth: No  Hearing aid used: No  Neck lump: Yes  Fever: No  Loss of appetite: No  Weight loss: No  Weight gain: No  Fatigue: Yes  Night sweats: No  Chills: No  Increased stress: Yes  Excessive hunger: No  Excessive thirst: No  Feeling hot or cold when others believe the temperature is normal: No  Loss of height: No  Post-operative complications: No  Surgical site pain: No  Hallucinations: No  Change in or Loss of Energy: Yes  Hyperactivity: No  Confusion: No  Cough: No  Sputum or phlegm: Yes  Coughing up blood: No  Difficulty breating or shortness of breath: Yes  Snoring: No  Wheezing: No  Difficulty breathing on exertion: Yes  Nighttime Cough: No  Difficulty breathing when lying flat: No  Heart burn or indigestion: No  Nausea: Yes  Vomiting: No  Abdominal pain: No  Bloating: No  Constipation: No  Diarrhea: No  Blood in stool: No  Black stools: No  Rectal or Anal pain: No  Fecal incontinence: No  Yellowing of skin or eyes: No  Vomit with blood: No  Change in stools: No  Back pain: Yes  Muscle aches: No  Neck pain: No  Swollen joints: No  Joint pain: Yes  Bone pain: Yes  Muscle cramps: Yes  Muscle weakness: Yes  Joint stiffness: Yes  Bone fracture: No  Trouble with coordination: No  Dizziness or trouble with balance: Yes  Fainting or black-out spells: No  Memory loss: No  Headache: No  Seizures: No  Speech problems: No  Tingling: No  Tremor: No  Weakness: No  Difficulty walking: No  Paralysis: No  Numbness: No  Nervous or Anxious: Yes  Depression: Yes  Trouble sleeping: Yes  Trouble thinking or concentrating: Yes  Mood changes: No  Panic attacks: No

## 2023-03-22 NOTE — NURSING NOTE
Is the patient currently in the state of [place]? YES    Visit mode:VIDEO    If the visit is dropped, the patient can be reconnected by: TELEPHONE VISIT: Phone number: [phone]    Will anyone else be joining the visit? NO    How would you like to obtain your AVS? MyChart    Are changes needed to the allergy or medication list? NO    Reason for visit:   Chief Complaint   Patient presents with     Video Visit     Type 1 Diabetes       Sara Santoyo MA

## 2023-03-22 NOTE — PROGRESS NOTES
Due to the COVID 19 pandemic this visit was converted to a video visit in order to help prevent spread of infection in this patient and the general population.    Time of start: 10:07 am  Time of end: 10:37 am  Total duration of video visit: 30 minutes.    Providers location: offsite.  Patients location: [place].    [facility]  Dali Martines is a 52 year old female with type 1 diabetes mellitus and hypothyroidism.  Video visit today for diabetes and hypothyroid follow up.  Pt was dx having type 1 diabetes at age 15.  Her hx is also significant for mild NPDR right eye, psoriatic arthritis, hypothyroidism, anxiety, hyperlipidemia and GERD.  Dali also had COVID infection late Aug 2022.  For her diabetes, she is currently taking Tresiba 17 units daily, Fiasp 1 unit/9 gms CHO with meals and snacks, plus correction insulin.  Dali is also taking Ozempic 1 mg subcutaneous once a week.  Most recent A1C was 6.5 % on 12/21/2022.  I reviewed her DexcomG6 sensor download data today and scanned the data in her note below.  Pt's average glucose was 188  with SD 41.  Her blood sugar is in target 49 % of the time, above target 49 % of the time and below target 2 % of the time.  She has been under a lot of stress lately and having issues with her right ear and balance.  Increase use of her inhalers since COVID infection.  She also lost her dog a few weeks ago.  On ROS today, vertigo sx and balance issues. She has seen ENT. Undergoing PT.  Right TMJ pain.  Increase use of inhalers since COVID infection late Aug 2022. SOB when she uses stairs.  No chest pain.  Mild intermittent nausea. No abd pain or diarrhea.   Nausea was present before starting Ozempic per patient. She also get nauseated when she is going to sneeze or if her blood sugar is low.  No cough or fever. Intermittent chills.  Left great toe numbness- she has seen Podiatry.  No foot ulcers.  Pt denies blurred vision, dysuria or hematuria.    Diabetes Care  Retinopathy:yes in right  eye; pt seen by Oph here in Jan 2023.  Nephropathy:none; urine microalbuminuria negative in  2/2022.  Neuropathy: reports mild numbness left toe.  Foot Exam: no exam today.  Taking aspirin: yes.  Lipids:  in Feb 2022.  Pt is taking Simvastatin and Fish Oil.  CAD: no hx of CAD.  Mental health: hx of anxiety.  Insulin: Basal and meal time insulin with correction insulin.   DM meds: Ozempic.  Testing: DexcomG6 sensor.                ROS  Please see under HPI.    Allergies  Allergies   Allergen Reactions     Monosodium Glutamate Palpitations and Shortness Of Breath     Sulfasalazine      Developed rash, HA, dizziness       Medications  Current Outpatient Medications   Medication Sig Dispense Refill     albuterol (PROAIR HFA/PROVENTIL HFA/VENTOLIN HFA) 108 (90 Base) MCG/ACT inhaler Inhale 2 puffs into the lungs every 4 hours as needed for shortness of breath / dyspnea or wheezing 1 Inhaler 11     aspirin 81 MG tablet Take 1 tablet by mouth daily.       blood glucose (ACCU-CHEK COLIN PLUS) test strip Test Blood Sugar 8 times daily or as directed 800 strip 3     blood glucose monitoring (ACCU-CHEK FASTCLIX) lancets Use to test blood sugar 8 times daily or as directed. 4 Box 3     blood glucose monitoring (NO BRAND SPECIFIED) meter device kit Use to test blood sugar 8 times daily or as directed. Any covered brand, 1 kit 0     Calcium Carbonate-Vitamin D (CALCIUM + D PO) Take one daily       citalopram (CELEXA) 20 MG tablet Take 1.5 tablets (30 mg) by mouth daily 135 tablet 1     Continuous Blood Gluc  (DEXCOM G6 ) SUZANNE Use to read blood sugars as per 's instructions. 1 each 0     Continuous Blood Gluc Sensor (DEXCOM G6 SENSOR) MISC Change every 10 days. 9 each 3     Continuous Blood Gluc Transmit (DEXCOM G6 TRANSMITTER) MISC CHANGE EVERY 3 MONTHS 1 each 3     diclofenac (VOLTAREN) 1 % topical gel Apply 2 g topically 4 times daily 100 g 4     diclofenac (VOLTAREN) 1 % topical gel APPLY 2  GRAMS ONTO THE SKIN FOUR TIMES DAILY AS NEEDED FOR MODERATE PAIN 100 g 5     econazole nitrate 1 % cream Apply 0.5 inches topically daily.       FIASP PENFILL 100 UNIT/ML SOCT INJECT UP TO 45 UNITS DAILY AS DIRECTED ACCORDING TO CORRECTION FACTOR. 45 mL 3     fish oil-omega-3 fatty acids 1000 MG capsule Take one daily       folic acid (FOLVITE) 1 MG tablet Take 1 tablet (1 mg) by mouth daily 90 tablet 3     insulin degludec (TRESIBA FLEXTOUCH) 100 UNIT/ML pen ADMINISTER 17 UNITS UNDER THE SKIN DAILY 30 mL 1     insulin pen needle (B-D U/F) 31G X 8 MM miscellaneous Use 5 pen needles daily 450 each 3     Ketoconazole (NIZORAL PO) Shampoo daily       levothyroxine (SYNTHROID/LEVOTHROID) 112 MCG tablet TAKE 1 TABLET(112 MCG) BY MOUTH DAILY 90 tablet 4     methotrexate 2.5 MG tablet Take 6 tablets (15 mg) by mouth every 7 days Labs required every 8-12 weeks while taking this medication 72 tablet 0     methylphenidate (METADATE CD) 20 MG CR capsule Take 20 mg by mouth daily       methylphenidate (RITALIN) 10 MG tablet TAKE UP TO 2 TABLETS BY MOUTH IN THE LATE AFTERNOON.       Minoxidil (ROGAINE EX) daily       mometasone-formoterol (DULERA) 100-5 MCG/ACT inhaler Inhale 2 puffs into the lungs 2 times daily 13 g 5     montelukast (SINGULAIR) 5 MG chewable tablet CHEW AND SWALLOW 1 TABLET(5 MG) BY MOUTH AT BEDTIME 90 tablet 4     Multiple Vitamin (MULTIVITAMIN OR) Take one daily tab       NOVOLOG PENFILL 100 UNIT/ML soln INJECT 1 UNIT PER 15 GRAMS CHO AT ALL MEALS AND SNACKS WITH CORRECTION SCALE OF 1 UNIT PER 50 MG/DL OVER 150, AVERAGE DAILY USE OF 30 UNITS 30 mL 4     Semaglutide, 1 MG/DOSE, 4 MG/3ML SOPN Inject 1 mg Subcutaneous every 7 days 12 mL 3     simvastatin (ZOCOR) 20 MG tablet Take 1 tablet (20 mg) by mouth At Bedtime 90 tablet 3     traZODone (DESYREL) 50 MG tablet Take 1-2 tablets by mouth nightly as needed for sleep. 180 tablet 0     Injection Device for insulin (INPEN 100-BLUE-NOVOLOG-FIASP) SUZANNE 1 each once  for 1 dose 1 each 0     OZEMPIC, 0.25 OR 0.5 MG/DOSE, 2 MG/1.5ML SOPN pen INJECT 0.25 MG UNDER THE SKIN EVERY 7 DAYS FOR 28 DAYS, THEN 0.5 MG EVERY 7 DAYS. 4.5 mL 1       Family History  family history includes Alcoholism in her father; Anxiety Disorder in her father; Arthritis in her father; Breast Cancer in her mother; C.A.D. in her father and maternal grandmother; Cancer in her paternal grandfather; Cardiac Sudden Death in her maternal grandfather; Cerebrovascular Disease in her paternal grandmother; Circulatory in her father; Coronary Artery Disease in her father and maternal grandmother; Diabetes in her father, maternal grandmother, and another family member; Eye Disorder in her father; Gynecology in an other family member; Heart Disease in her father, maternal grandfather, maternal grandmother, and paternal grandfather; Hypothyroidism in her father; Lipids in her father; Macular Degeneration in her father and maternal aunt; Rheumatoid Arthritis in her father; Thyroid Disease in her father and another family member.    Social History   reports that she has never smoked. She has never used smokeless tobacco. She reports current alcohol use. She reports that she does not use drugs.     Past Medical History  Past Medical History:   Diagnosis Date     Anemia      Anxiety      Arthritis 2014    Psoriatic arthritis     Back injury 1988     Dry eye syndrome      Dyslipidemia      Endometriosis 2002    adehsions seen at laparoscopy     GERD (gastroesophageal reflux disease)      Hypothyroidism     8 yoa     SOB (shortness of breath) 3/11/2014     Type I (juvenile type) diabetes mellitus without mention of complication, not stated as uncontrolled     when 15      Uncomplicated asthma Fall 2013       Past Surgical History:   Procedure Laterality Date     COLONOSCOPY  2002     COLONOSCOPY N/A 6/23/2020    Procedure: COLONOSCOPY;  Surgeon: Lauryn Rivera MD;  Location:  GI     ESOPHAGOSCOPY, GASTROSCOPY,  DUODENOSCOPY (EGD), COMBINED  1/7/2014    Procedure: COMBINED ESOPHAGOSCOPY, GASTROSCOPY, DUODENOSCOPY (EGD), BIOPSY SINGLE OR MULTIPLE;;  Surgeon: Kraig Nicole MD;  Location:  GI     GYN SURGERY      Laparotomy to remove endometrial tissue     HC BREATH HYDROGEN TEST N/A 6/17/2014    Procedure: HYDROGEN BREATH TEST;  Surgeon: Deacon Alberts MD;  Location:  GI     HYDROGEN BREATH TEST  6/17/14     LAPAROSCOPIC APPENDECTOMY  2002     LAPAROTOMY, LYSIS ADHESIONS, COMBINED  2002    endometriosis     SOFT TISSUE SURGERY  December 2014    right ankle tendon injury     ZZC REPAIR CRUCIATE LIGAMENT,KNEE       ZZC STOMACH SURGERY PROCEDURE UNLISTED  December 2002       Physical Exam    No exam.    RESULTS  Creatinine   Date Value Ref Range Status   12/21/2022 0.59 0.51 - 0.95 mg/dL Final   01/11/2021 0.56 0.52 - 1.04 mg/dL Final     GFR Estimate   Date Value Ref Range Status   12/21/2022 >90 >60 mL/min/1.73m2 Final     Comment:     Effective December 21, 2021 eGFRcr in adults is calculated using the 2021 CKD-EPI creatinine equation which includes age and gender (Paola et al., NEJM, DOI: 10.1056/WLEZnx7303748)   01/11/2021 >90 >60 mL/min/[1.73_m2] Final     Comment:     Non  GFR Calc  Starting 12/18/2018, serum creatinine based estimated GFR (eGFR) will be   calculated using the Chronic Kidney Disease Epidemiology Collaboration   (CKD-EPI) equation.       Microalbumin Urine   Date Value Ref Range Status   06/26/2009 5@ MG/L      Hemoglobin A1C   Date Value Ref Range Status   12/21/2022 6.5 (H) 0.0 - 5.6 % Final     Comment:     Normal <5.7%   Prediabetes 5.7-6.4%    Diabetes 6.5% or higher     Note: Adopted from ADA consensus guidelines.   01/11/2021 7.1 (H) 0 - 5.6 % Final     Comment:     Normal <5.7% Prediabetes 5.7-6.4%  Diabetes 6.5% or higher - adopted from ADA   consensus guidelines.       Potassium   Date Value Ref Range Status   11/18/2021 3.5 3.4 - 5.3 mmol/L Final   04/27/2016 4.1 3.4  - 5.3 mmol/L Final     ALT   Date Value Ref Range Status   12/21/2022 28 10 - 35 U/L Final   01/11/2021 37 0 - 50 U/L Final     AST   Date Value Ref Range Status   12/21/2022 37 (H) 10 - 35 U/L Final   01/11/2021 31 0 - 45 U/L Final     TSH   Date Value Ref Range Status   02/14/2022 3.34 0.40 - 4.00 mU/L Final   01/11/2021 2.01 0.40 - 4.00 mU/L Final     T4 Total   Date Value Ref Range Status   11/29/2010 8.5 5.0 - 11.0 ug/dL Final     T4 Free   Date Value Ref Range Status   03/21/2019 1.09 0.76 - 1.46 ng/dL Final       Cholesterol   Date Value Ref Range Status   02/14/2022 218 (H) <200 mg/dL Final   01/11/2021 223 (H) <200 mg/dL Final     Comment:     Desirable:       <200 mg/dl   12/18/2018 194 <200 mg/dL Final     HDL Cholesterol   Date Value Ref Range Status   01/11/2021 92 >49 mg/dL Final   12/18/2018 91 >49 mg/dL Final     Direct Measure HDL   Date Value Ref Range Status   02/14/2022 94 >=50 mg/dL Final     LDL Cholesterol Calculated   Date Value Ref Range Status   02/14/2022 111 (H) <=100 mg/dL Final   01/11/2021 122 (H) <100 mg/dL Final     Comment:     Above desirable:  100-129 mg/dl  Borderline High:  130-159 mg/dL  High:             160-189 mg/dL  Very high:       >189 mg/dl     12/18/2018 92 <100 mg/dL Final     Comment:     Desirable:       <100 mg/dl     Triglycerides   Date Value Ref Range Status   02/14/2022 66 <150 mg/dL Final   01/11/2021 44 <150 mg/dL Final     Comment:     Non Fasting   12/18/2018 54 <150 mg/dL Final     Cholesterol/HDL Ratio   Date Value Ref Range Status   09/27/2013 2.7 0.0 - 5.0 Final   02/15/2013 2.9 0.0 - 5.0 Final       ASSESSMENT/PLAN:    1. TYPE 1 DIABETES MELLITUS: Patient is considering using an insulin pump in the near future.   She has been working with the dietitian on her carb counting.  No change in insulin doses today.  Continue Ozempic 1 mg subcutaneous once a week.  Her urine microalbuminuria has been negative with normal creat/GFR in Feb 2022.  She has noticed  numbness in left toe.  No foot ulcers. Seen by Podiatry.  Pt is taking ASA daily.  I have no vitals today.     2.  RETINOPATHY: Hx of mild NPDR right eye only.  She was seen here by Oph in Jan 2023.    3.  HYPOTHYROIDISM:  TSH normal in Feb 2022.  Continue Levothyroxine 112 mcg po daily.    4. HYPERLIPIDEMIA:  in 2/2022.  She remains on Simvastatin.    5.  MENTAL HEALTH: Provided pt with contact number for Behavior Health services.  Chronic illness with increase medical problems, still recovering from COVID, caring for parents and recently lost her dog.    6.   FOLLOW UP: with Dr. Mckeon on 7/5/2023.  Fasting lipid panel, A1C, TSH and urine microalbuminuria ordered today.    Time spent reviewing chart notes, labs and Dexcom sensor download today= 6 minutes.  Time video visit today = 30 minutes.  Time for documentation today = 15 minutes.     TOTAL TIME FOR VISIT TODAY =52 minutes.    Marissa Sebastian PA-C     Answers for HPI/ROS submitted by the patient on 3/22/2023  General Symptoms: Yes  Skin Symptoms: No  HENT Symptoms: Yes  EYE SYMPTOMS: No  HEART SYMPTOMS: No  LUNG SYMPTOMS: Yes  INTESTINAL SYMPTOMS: Yes  URINARY SYMPTOMS: No  GYNECOLOGIC SYMPTOMS: No  BREAST SYMPTOMS: No  SKELETAL SYMPTOMS: Yes  BLOOD SYMPTOMS: No  NERVOUS SYSTEM SYMPTOMS: Yes  MENTAL HEALTH SYMPTOMS: Yes  Ear pain: No  Ear discharge: No  Hearing loss: No  Tinnitus: Yes  Nosebleeds: No  Congestion: No  Sinus pain: No  Trouble swallowing: No   Voice hoarseness: Yes  Mouth sores: No  Sore throat: No  Tooth pain: No  Gum tenderness: No  Bleeding gums: No  Change in taste: No  Change in sense of smell: No  Dry mouth: No  Hearing aid used: No  Neck lump: Yes  Fever: No  Loss of appetite: No  Weight loss: No  Weight gain: No  Fatigue: Yes  Night sweats: No  Chills: No  Increased stress: Yes  Excessive hunger: No  Excessive thirst: No  Feeling hot or cold when others believe the temperature is normal: No  Loss of height: No  Post-operative  complications: No  Surgical site pain: No  Hallucinations: No  Change in or Loss of Energy: Yes  Hyperactivity: No  Confusion: No  Cough: No  Sputum or phlegm: Yes  Coughing up blood: No  Difficulty breating or shortness of breath: Yes  Snoring: No  Wheezing: No  Difficulty breathing on exertion: Yes  Nighttime Cough: No  Difficulty breathing when lying flat: No  Heart burn or indigestion: No  Nausea: Yes  Vomiting: No  Abdominal pain: No  Bloating: No  Constipation: No  Diarrhea: No  Blood in stool: No  Black stools: No  Rectal or Anal pain: No  Fecal incontinence: No  Yellowing of skin or eyes: No  Vomit with blood: No  Change in stools: No  Back pain: Yes  Muscle aches: No  Neck pain: No  Swollen joints: No  Joint pain: Yes  Bone pain: Yes  Muscle cramps: Yes  Muscle weakness: Yes  Joint stiffness: Yes  Bone fracture: No  Trouble with coordination: No  Dizziness or trouble with balance: Yes  Fainting or black-out spells: No  Memory loss: No  Headache: No  Seizures: No  Speech problems: No  Tingling: No  Tremor: No  Weakness: No  Difficulty walking: No  Paralysis: No  Numbness: No  Nervous or Anxious: Yes  Depression: Yes  Trouble sleeping: Yes  Trouble thinking or concentrating: Yes  Mood changes: No  Panic attacks: No

## 2023-03-27 ENCOUNTER — THERAPY VISIT (OUTPATIENT)
Dept: PHYSICAL THERAPY | Facility: CLINIC | Age: 53
End: 2023-03-27
Payer: COMMERCIAL

## 2023-03-27 DIAGNOSIS — G89.29 CHRONIC PAIN OF RIGHT ANKLE: Primary | ICD-10-CM

## 2023-03-27 DIAGNOSIS — M54.50 CHRONIC LOW BACK PAIN: ICD-10-CM

## 2023-03-27 DIAGNOSIS — M25.571 CHRONIC PAIN OF RIGHT ANKLE: Primary | ICD-10-CM

## 2023-03-27 DIAGNOSIS — M25.562 LEFT KNEE PAIN: ICD-10-CM

## 2023-03-27 DIAGNOSIS — G89.29 CHRONIC LOW BACK PAIN: ICD-10-CM

## 2023-03-27 PROCEDURE — 97110 THERAPEUTIC EXERCISES: CPT | Mod: 59 | Performed by: PHYSICAL THERAPIST

## 2023-03-27 PROCEDURE — 97530 THERAPEUTIC ACTIVITIES: CPT | Mod: GP | Performed by: PHYSICAL THERAPIST

## 2023-03-28 ENCOUNTER — TELEPHONE (OUTPATIENT)
Dept: PULMONOLOGY | Facility: CLINIC | Age: 53
End: 2023-03-28
Payer: COMMERCIAL

## 2023-03-31 ENCOUNTER — LAB (OUTPATIENT)
Dept: LAB | Facility: CLINIC | Age: 53
End: 2023-03-31
Payer: COMMERCIAL

## 2023-03-31 DIAGNOSIS — Z79.899 HIGH RISK MEDICATION USE: ICD-10-CM

## 2023-03-31 DIAGNOSIS — L40.50 PSORIATIC ARTHRITIS (H): ICD-10-CM

## 2023-03-31 DIAGNOSIS — E55.9 VITAMIN D DEFICIENCY: ICD-10-CM

## 2023-03-31 DIAGNOSIS — E10.3291 TYPE 1 DIABETES MELLITUS WITH MILD NONPROLIFERATIVE RETINOPATHY OF RIGHT EYE, MACULAR EDEMA PRESENCE UNSPECIFIED (H): ICD-10-CM

## 2023-03-31 LAB
ALBUMIN SERPL BCG-MCNC: 4.4 G/DL (ref 3.5–5.2)
ALP SERPL-CCNC: 94 U/L (ref 35–104)
ALT SERPL W P-5'-P-CCNC: 25 U/L (ref 10–35)
ANION GAP SERPL CALCULATED.3IONS-SCNC: 13 MMOL/L (ref 7–15)
AST SERPL W P-5'-P-CCNC: 36 U/L (ref 10–35)
BASOPHILS # BLD AUTO: 0 10E3/UL (ref 0–0.2)
BASOPHILS NFR BLD AUTO: 1 %
BILIRUB SERPL-MCNC: 0.3 MG/DL
BUN SERPL-MCNC: 6.9 MG/DL (ref 6–20)
CALCIUM SERPL-MCNC: 9.9 MG/DL (ref 8.6–10)
CHLORIDE SERPL-SCNC: 103 MMOL/L (ref 98–107)
CHOLEST SERPL-MCNC: 194 MG/DL
CREAT SERPL-MCNC: 0.61 MG/DL (ref 0.51–0.95)
CRP SERPL-MCNC: <3 MG/L
DEPRECATED CALCIDIOL+CALCIFEROL SERPL-MC: 49 UG/L (ref 20–75)
DEPRECATED HCO3 PLAS-SCNC: 25 MMOL/L (ref 22–29)
EOSINOPHIL # BLD AUTO: 0.1 10E3/UL (ref 0–0.7)
EOSINOPHIL NFR BLD AUTO: 1 %
ERYTHROCYTE [DISTWIDTH] IN BLOOD BY AUTOMATED COUNT: 13.1 % (ref 10–15)
ERYTHROCYTE [SEDIMENTATION RATE] IN BLOOD BY WESTERGREN METHOD: 41 MM/HR (ref 0–30)
GFR SERPL CREATININE-BSD FRML MDRD: >90 ML/MIN/1.73M2
GLUCOSE SERPL-MCNC: 95 MG/DL (ref 70–99)
HBA1C MFR BLD: 6.5 % (ref 0–5.6)
HCT VFR BLD AUTO: 35.2 % (ref 35–47)
HDLC SERPL-MCNC: 78 MG/DL
HGB BLD-MCNC: 11.8 G/DL (ref 11.7–15.7)
LDLC SERPL CALC-MCNC: 107 MG/DL
LYMPHOCYTES # BLD AUTO: 2 10E3/UL (ref 0.8–5.3)
LYMPHOCYTES NFR BLD AUTO: 39 %
MCH RBC QN AUTO: 32 PG (ref 26.5–33)
MCHC RBC AUTO-ENTMCNC: 33.5 G/DL (ref 31.5–36.5)
MCV RBC AUTO: 95 FL (ref 78–100)
MONOCYTES # BLD AUTO: 0.4 10E3/UL (ref 0–1.3)
MONOCYTES NFR BLD AUTO: 8 %
NEUTROPHILS # BLD AUTO: 2.6 10E3/UL (ref 1.6–8.3)
NEUTROPHILS NFR BLD AUTO: 51 %
NONHDLC SERPL-MCNC: 116 MG/DL
PLATELET # BLD AUTO: 313 10E3/UL (ref 150–450)
POTASSIUM SERPL-SCNC: 4.1 MMOL/L (ref 3.4–5.3)
PROT SERPL-MCNC: 7.6 G/DL (ref 6.4–8.3)
RBC # BLD AUTO: 3.69 10E6/UL (ref 3.8–5.2)
SODIUM SERPL-SCNC: 141 MMOL/L (ref 136–145)
TRIGL SERPL-MCNC: 47 MG/DL
TSH SERPL DL<=0.005 MIU/L-ACNC: 6.94 UIU/ML (ref 0.3–4.2)
WBC # BLD AUTO: 5.1 10E3/UL (ref 4–11)

## 2023-03-31 PROCEDURE — 85652 RBC SED RATE AUTOMATED: CPT

## 2023-03-31 PROCEDURE — 83036 HEMOGLOBIN GLYCOSYLATED A1C: CPT

## 2023-03-31 PROCEDURE — 36415 COLL VENOUS BLD VENIPUNCTURE: CPT

## 2023-03-31 PROCEDURE — 80050 GENERAL HEALTH PANEL: CPT

## 2023-03-31 PROCEDURE — 80061 LIPID PANEL: CPT

## 2023-03-31 PROCEDURE — 82306 VITAMIN D 25 HYDROXY: CPT

## 2023-03-31 PROCEDURE — 86140 C-REACTIVE PROTEIN: CPT

## 2023-04-03 ENCOUNTER — THERAPY VISIT (OUTPATIENT)
Dept: PHYSICAL THERAPY | Facility: CLINIC | Age: 53
End: 2023-04-03
Payer: COMMERCIAL

## 2023-04-03 DIAGNOSIS — G89.29 CHRONIC PAIN OF RIGHT ANKLE: Primary | ICD-10-CM

## 2023-04-03 DIAGNOSIS — M25.562 LEFT KNEE PAIN: ICD-10-CM

## 2023-04-03 DIAGNOSIS — M25.571 CHRONIC PAIN OF RIGHT ANKLE: Primary | ICD-10-CM

## 2023-04-03 PROCEDURE — 97140 MANUAL THERAPY 1/> REGIONS: CPT | Mod: GP | Performed by: PHYSICAL THERAPIST

## 2023-04-03 PROCEDURE — 97530 THERAPEUTIC ACTIVITIES: CPT | Mod: GP | Performed by: PHYSICAL THERAPIST

## 2023-04-03 PROCEDURE — 97110 THERAPEUTIC EXERCISES: CPT | Mod: GP | Performed by: PHYSICAL THERAPIST

## 2023-04-04 ENCOUNTER — MYC MEDICAL ADVICE (OUTPATIENT)
Dept: ENDOCRINOLOGY | Facility: CLINIC | Age: 53
End: 2023-04-04
Payer: COMMERCIAL

## 2023-04-04 DIAGNOSIS — E10.3291 TYPE 1 DIABETES MELLITUS WITH MILD NONPROLIFERATIVE RETINOPATHY OF RIGHT EYE, MACULAR EDEMA PRESENCE UNSPECIFIED (H): Primary | ICD-10-CM

## 2023-04-10 ENCOUNTER — THERAPY VISIT (OUTPATIENT)
Dept: PHYSICAL THERAPY | Facility: CLINIC | Age: 53
End: 2023-04-10
Payer: COMMERCIAL

## 2023-04-10 ENCOUNTER — LAB (OUTPATIENT)
Dept: LAB | Facility: CLINIC | Age: 53
End: 2023-04-10
Payer: COMMERCIAL

## 2023-04-10 DIAGNOSIS — G89.29 CHRONIC PAIN OF RIGHT ANKLE: Primary | ICD-10-CM

## 2023-04-10 DIAGNOSIS — M25.571 CHRONIC PAIN OF RIGHT ANKLE: Primary | ICD-10-CM

## 2023-04-10 DIAGNOSIS — M54.50 CHRONIC LOW BACK PAIN: ICD-10-CM

## 2023-04-10 DIAGNOSIS — M25.562 LEFT KNEE PAIN: ICD-10-CM

## 2023-04-10 DIAGNOSIS — E10.3291 TYPE 1 DIABETES MELLITUS WITH MILD NONPROLIFERATIVE RETINOPATHY OF RIGHT EYE, MACULAR EDEMA PRESENCE UNSPECIFIED (H): ICD-10-CM

## 2023-04-10 DIAGNOSIS — G89.29 CHRONIC LOW BACK PAIN: ICD-10-CM

## 2023-04-10 LAB
CREAT UR-MCNC: 39.6 MG/DL
MICROALBUMIN UR-MCNC: <12 MG/L
MICROALBUMIN/CREAT UR: NORMAL MG/G{CREAT}

## 2023-04-10 PROCEDURE — 97110 THERAPEUTIC EXERCISES: CPT | Mod: GP | Performed by: PHYSICAL THERAPIST

## 2023-04-10 PROCEDURE — 97112 NEUROMUSCULAR REEDUCATION: CPT | Mod: GP | Performed by: PHYSICAL THERAPIST

## 2023-04-10 PROCEDURE — 82570 ASSAY OF URINE CREATININE: CPT

## 2023-04-10 PROCEDURE — 82043 UR ALBUMIN QUANTITATIVE: CPT

## 2023-04-10 PROCEDURE — 97140 MANUAL THERAPY 1/> REGIONS: CPT | Mod: GP | Performed by: PHYSICAL THERAPIST

## 2023-04-14 DIAGNOSIS — E10.319 TYPE 1 DIABETES MELLITUS WITH RETINOPATHY (H): ICD-10-CM

## 2023-04-17 ENCOUNTER — THERAPY VISIT (OUTPATIENT)
Dept: PHYSICAL THERAPY | Facility: CLINIC | Age: 53
End: 2023-04-17
Payer: COMMERCIAL

## 2023-04-17 DIAGNOSIS — M54.50 CHRONIC LOW BACK PAIN: ICD-10-CM

## 2023-04-17 DIAGNOSIS — M25.562 LEFT KNEE PAIN: ICD-10-CM

## 2023-04-17 DIAGNOSIS — G89.29 CHRONIC LOW BACK PAIN: ICD-10-CM

## 2023-04-17 DIAGNOSIS — G89.29 CHRONIC PAIN OF RIGHT ANKLE: Primary | ICD-10-CM

## 2023-04-17 DIAGNOSIS — M25.571 CHRONIC PAIN OF RIGHT ANKLE: Primary | ICD-10-CM

## 2023-04-17 PROCEDURE — 97112 NEUROMUSCULAR REEDUCATION: CPT | Mod: GP | Performed by: PHYSICAL THERAPIST

## 2023-04-17 PROCEDURE — 97110 THERAPEUTIC EXERCISES: CPT | Mod: GP | Performed by: PHYSICAL THERAPIST

## 2023-04-18 DIAGNOSIS — J45.30 MILD PERSISTENT ASTHMA WITHOUT COMPLICATION: ICD-10-CM

## 2023-04-18 RX ORDER — PROCHLORPERAZINE 25 MG/1
SUPPOSITORY RECTAL
Qty: 9 EACH | Refills: 3 | Status: SHIPPED | OUTPATIENT
Start: 2023-04-18 | End: 2024-05-25

## 2023-04-18 RX ORDER — PROCHLORPERAZINE 25 MG/1
SUPPOSITORY RECTAL
Qty: 1 EACH | Refills: 3 | Status: SHIPPED | OUTPATIENT
Start: 2023-04-18 | End: 2024-05-25

## 2023-04-18 RX ORDER — MONTELUKAST SODIUM 5 MG/1
TABLET, CHEWABLE ORAL
Qty: 90 TABLET | Refills: 4 | Status: SHIPPED | OUTPATIENT
Start: 2023-04-18 | End: 2024-04-29

## 2023-04-18 NOTE — TELEPHONE ENCOUNTER
Last Clinic Visit: 3/22/2023  [facility] [place]

## 2023-04-24 ENCOUNTER — THERAPY VISIT (OUTPATIENT)
Dept: PHYSICAL THERAPY | Facility: CLINIC | Age: 53
End: 2023-04-24
Payer: COMMERCIAL

## 2023-04-24 DIAGNOSIS — G89.29 CHRONIC PAIN OF RIGHT ANKLE: Primary | ICD-10-CM

## 2023-04-24 DIAGNOSIS — G89.29 CHRONIC LOW BACK PAIN: ICD-10-CM

## 2023-04-24 DIAGNOSIS — M54.50 CHRONIC LOW BACK PAIN: ICD-10-CM

## 2023-04-24 DIAGNOSIS — M25.571 CHRONIC PAIN OF RIGHT ANKLE: Primary | ICD-10-CM

## 2023-04-24 DIAGNOSIS — M25.562 LEFT KNEE PAIN: ICD-10-CM

## 2023-04-24 PROCEDURE — 97140 MANUAL THERAPY 1/> REGIONS: CPT | Mod: GP | Performed by: PHYSICAL THERAPIST

## 2023-04-24 PROCEDURE — 97110 THERAPEUTIC EXERCISES: CPT | Mod: GP | Performed by: PHYSICAL THERAPIST

## 2023-05-01 ENCOUNTER — THERAPY VISIT (OUTPATIENT)
Dept: PHYSICAL THERAPY | Facility: CLINIC | Age: 53
End: 2023-05-01
Payer: COMMERCIAL

## 2023-05-01 DIAGNOSIS — G89.29 CHRONIC LOW BACK PAIN: ICD-10-CM

## 2023-05-01 DIAGNOSIS — M25.562 LEFT KNEE PAIN: ICD-10-CM

## 2023-05-01 DIAGNOSIS — G89.29 CHRONIC PAIN OF RIGHT ANKLE: Primary | ICD-10-CM

## 2023-05-01 DIAGNOSIS — M25.571 CHRONIC PAIN OF RIGHT ANKLE: Primary | ICD-10-CM

## 2023-05-01 DIAGNOSIS — M54.50 CHRONIC LOW BACK PAIN: ICD-10-CM

## 2023-05-01 PROCEDURE — 97112 NEUROMUSCULAR REEDUCATION: CPT | Mod: GP | Performed by: PHYSICAL THERAPIST

## 2023-05-01 PROCEDURE — 97110 THERAPEUTIC EXERCISES: CPT | Mod: GP | Performed by: PHYSICAL THERAPIST

## 2023-05-11 ENCOUNTER — THERAPY VISIT (OUTPATIENT)
Dept: PHYSICAL THERAPY | Facility: CLINIC | Age: 53
End: 2023-05-11
Payer: COMMERCIAL

## 2023-05-11 DIAGNOSIS — M25.571 CHRONIC PAIN OF RIGHT ANKLE: Primary | ICD-10-CM

## 2023-05-11 DIAGNOSIS — G89.29 CHRONIC PAIN OF RIGHT ANKLE: Primary | ICD-10-CM

## 2023-05-11 DIAGNOSIS — M25.562 LEFT KNEE PAIN: ICD-10-CM

## 2023-05-11 DIAGNOSIS — M54.50 CHRONIC LOW BACK PAIN: ICD-10-CM

## 2023-05-11 DIAGNOSIS — G89.29 CHRONIC LOW BACK PAIN: ICD-10-CM

## 2023-05-11 PROCEDURE — 97112 NEUROMUSCULAR REEDUCATION: CPT | Mod: GP | Performed by: PHYSICAL THERAPIST

## 2023-05-11 PROCEDURE — 97110 THERAPEUTIC EXERCISES: CPT | Mod: GP | Performed by: PHYSICAL THERAPIST

## 2023-05-18 ENCOUNTER — OFFICE VISIT (OUTPATIENT)
Dept: PULMONOLOGY | Facility: CLINIC | Age: 53
End: 2023-05-18
Attending: INTERNAL MEDICINE
Payer: COMMERCIAL

## 2023-05-18 VITALS
BODY MASS INDEX: 25.8 KG/M2 | RESPIRATION RATE: 17 BRPM | WEIGHT: 180.2 LBS | DIASTOLIC BLOOD PRESSURE: 85 MMHG | HEIGHT: 70 IN | SYSTOLIC BLOOD PRESSURE: 121 MMHG | OXYGEN SATURATION: 97 % | HEART RATE: 77 BPM

## 2023-05-18 DIAGNOSIS — J45.40 MODERATE PERSISTENT ASTHMA WITHOUT COMPLICATION: Primary | ICD-10-CM

## 2023-05-18 DIAGNOSIS — K44.9 HIATAL HERNIA: ICD-10-CM

## 2023-05-18 DIAGNOSIS — J45.20 MILD INTERMITTENT ASTHMA, UNSPECIFIED WHETHER COMPLICATED: ICD-10-CM

## 2023-05-18 DIAGNOSIS — K30 DELAYED GASTRIC EMPTYING: ICD-10-CM

## 2023-05-18 PROCEDURE — 94729 DIFFUSING CAPACITY: CPT | Performed by: INTERNAL MEDICINE

## 2023-05-18 PROCEDURE — 99214 OFFICE O/P EST MOD 30 MIN: CPT | Mod: 25 | Performed by: INTERNAL MEDICINE

## 2023-05-18 PROCEDURE — 94726 PLETHYSMOGRAPHY LUNG VOLUMES: CPT | Performed by: INTERNAL MEDICINE

## 2023-05-18 PROCEDURE — G0463 HOSPITAL OUTPT CLINIC VISIT: HCPCS | Performed by: INTERNAL MEDICINE

## 2023-05-18 PROCEDURE — 94375 RESPIRATORY FLOW VOLUME LOOP: CPT | Performed by: INTERNAL MEDICINE

## 2023-05-18 ASSESSMENT — PAIN SCALES - GENERAL: PAINLEVEL: NO PAIN (0)

## 2023-05-18 NOTE — LETTER
5/18/2023         RE: Dali Martines  [address]  [facility] [address]        Dear Colleague,    Thank you for referring your patient, Dali Martines, to the [facility] [place]. Please see a copy of my visit note below.    [facility]      Reason for Visit  Dali Martines is a 51 year old female who is followed for asthma  Pulmonary HPI  Dali returns for follow up. Last seen was over video in 9/2022. Her asthma got worse in winter, when she had to take breaks from carrying laundry in her house, and reduce amount of grocery she is able to carry with dyspnea. She had an associated productive cough, and refrained from using oral prednisone due to concerns of hyperglycemia (being type 1 diabetic and insulin adjustments not very effective with previous steroids use). Her symptoms lasted for weeks, no ED or urgent care visits. Eventually started getting better in 3/2023. Continues to take dulera 200, 1 puff BID and singulair 5 mg nightly. Symptoms are not back to baseline however, and probably at 80% as estimated by her. Concerned about reflux given h/o hiatal hernia but hasn't seen GI since 2014. Lifetime nonsmoker. Takes methotrexate for psoriasis.       Exam:   GENERAL APPEARANCE: Well developed, well nourished, alert, and in no apparent distress.  NEURO: Mentation intact, speech normal.   RESP: Good AE BL with no added sounds  CVS: NL S1+S2 with no murmurs.   PSYCH: mentation appears normal. and affect normal/bright  Results:  Stress echo 12/30/14 normal    My interpretation: Normal PFTs today and previously.     Assessment and plan: Dali Martines is a 51-year-old female with type 1 diabetes who was clinically diagnosed with asthma as an adult.  She had COVID in 9/2022 and has never felt the same ever since in terms of breathing. Asthma flared in 12/2022 and struggled until 3/2023 with reduced activity level  and needing albuterol.   1.  Asthma, moderate persistent  - Continue dulera 100 for now  - Increase dulera to two puffs BID in November  - Increase singulair to 10 mg in November  - Prednisone 20 mg po daily for 5 days if flare refractory to three days of albuterol, coordinate with PCP to adjust insulin during those days   - Pulmonary rehab referral     2. Psoriasis on methotrexate  No clinical suspicion of pulmonary side effects by PFTs, imaging not indicated.     3. Type 1 DM       I will see her back in 12 months with PFTs & FeNO.       Sincerely,        Frieda Dunbar MD

## 2023-05-18 NOTE — NURSING NOTE
Chief Complaint   Patient presents with     General Visit     F/u     Vitals were taken and medications were reconciled.     NICHOLAS Parish

## 2023-05-18 NOTE — PATIENT INSTRUCTIONS
Exhale dulera through the nose to help with any nasal congestion or postnasal drip.     Increase singulair to 10 mg in November.   Increase dulera to two puffs twice a day in November, then back to one puff twice a day in April     Start prednisone if symptoms are not controlled with 3 days of albuterol

## 2023-05-18 NOTE — PROGRESS NOTES
[facility]      Reason for Visit  Dali Martines is a 51 year old female who is followed for asthma  Pulmonary HPI  Dali returns for follow up. Last seen was over video in 9/2022. Her asthma got worse in winter, when she had to take breaks from carrying laundry in her house, and reduce amount of grocery she is able to carry with dyspnea. She had an associated productive cough, and refrained from using oral prednisone due to concerns of hyperglycemia (being type 1 diabetic and insulin adjustments not very effective with previous steroids use). Her symptoms lasted for weeks, no ED or urgent care visits. Eventually started getting better in 3/2023. Continues to take dulera 200, 1 puff BID and singulair 5 mg nightly. Symptoms are not back to baseline however, and probably at 80% as estimated by her. Concerned about reflux given h/o hiatal hernia but hasn't seen GI since 2014. Lifetime nonsmoker. Takes methotrexate for psoriasis.       Exam:   GENERAL APPEARANCE: Well developed, well nourished, alert, and in no apparent distress.  NEURO: Mentation intact, speech normal.   RESP: Good AE BL with no added sounds  CVS: NL S1+S2 with no murmurs.   PSYCH: mentation appears normal. and affect normal/bright  Results:  Stress echo 12/30/14 normal    My interpretation: Normal PFTs today and previously.     Assessment and plan: Dali Martines is a 51-year-old female with type 1 diabetes who was clinically diagnosed with asthma as an adult.  She had COVID in 9/2022 and has never felt the same ever since in terms of breathing. Asthma flared in 12/2022 and struggled until 3/2023 with reduced activity level and needing albuterol.   1.  Asthma, moderate persistent  - Continue dulera 100 for now  - Increase dulera to two puffs BID in November  - Increase singulair to 10 mg in November  - Prednisone 20 mg po daily for 5 days if flare refractory to three days of albuterol, coordinate with PCP to  adjust insulin during those days   - Pulmonary rehab referral     2. Psoriasis on methotrexate  No clinical suspicion of pulmonary side effects by PFTs, imaging not indicated.     3. Type 1 DM       I will see her back in 12 months with PFTs & FeNO.

## 2023-05-20 LAB
DLCOUNC-%PRED-PRE: 102 %
DLCOUNC-PRE: 24.04 ML/MIN/MMHG
DLCOUNC-PRED: 23.43 ML/MIN/MMHG
ERV-%PRED-PRE: 126 %
ERV-PRE: 1.35 L
ERV-PRED: 1.06 L
EXPTIME-PRE: 10.97 SEC
FEF2575-%PRED-PRE: 107 %
FEF2575-PRE: 2.94 L/SEC
FEF2575-PRED: 2.74 L/SEC
FEFMAX-%PRED-PRE: 117 %
FEFMAX-PRE: 8.86 L/SEC
FEFMAX-PRED: 7.55 L/SEC
FEV1-%PRED-PRE: 109 %
FEV1-PRE: 3.28 L
FEV1FEV6-PRE: 81 %
FEV1FEV6-PRED: 82 %
FEV1FVC-PRE: 78 %
FEV1FVC-PRED: 80 %
FEV1SVC-PRE: 81 %
FEV1SVC-PRED: 72 %
FIFMAX-PRE: 5.29 L/SEC
FRCPLETH-%PRED-PRE: 125 %
FRCPLETH-PRE: 3.8 L
FRCPLETH-PRED: 3.04 L
FVC-%PRED-PRE: 111 %
FVC-PRE: 4.19 L
FVC-PRED: 3.77 L
IC-%PRED-PRE: 86 %
IC-PRE: 2.68 L
IC-PRED: 3.09 L
RVPLETH-%PRED-PRE: 117 %
RVPLETH-PRE: 2.45 L
RVPLETH-PRED: 2.08 L
TLCPLETH-%PRED-PRE: 108 %
TLCPLETH-PRE: 6.48 L
TLCPLETH-PRED: 5.94 L
VA-%PRED-PRE: 101 %
VA-PRE: 5.9 L
VC-%PRED-PRE: 96 %
VC-PRE: 4.03 L
VC-PRED: 4.16 L

## 2023-05-25 ENCOUNTER — VIRTUAL VISIT (OUTPATIENT)
Dept: RHEUMATOLOGY | Facility: CLINIC | Age: 53
End: 2023-05-25
Attending: INTERNAL MEDICINE
Payer: COMMERCIAL

## 2023-05-25 ENCOUNTER — THERAPY VISIT (OUTPATIENT)
Dept: PHYSICAL THERAPY | Facility: CLINIC | Age: 53
End: 2023-05-25
Payer: COMMERCIAL

## 2023-05-25 DIAGNOSIS — L40.50 PSORIATIC ARTHRITIS (H): Primary | ICD-10-CM

## 2023-05-25 DIAGNOSIS — G89.29 CHRONIC PAIN OF RIGHT ANKLE: Primary | ICD-10-CM

## 2023-05-25 DIAGNOSIS — M25.571 CHRONIC PAIN OF RIGHT ANKLE: Primary | ICD-10-CM

## 2023-05-25 DIAGNOSIS — G89.29 CHRONIC BILATERAL LOW BACK PAIN WITHOUT SCIATICA: ICD-10-CM

## 2023-05-25 DIAGNOSIS — M25.562 CHRONIC PAIN OF LEFT KNEE: ICD-10-CM

## 2023-05-25 DIAGNOSIS — G89.29 CHRONIC LOW BACK PAIN: ICD-10-CM

## 2023-05-25 DIAGNOSIS — M54.50 CHRONIC LOW BACK PAIN: ICD-10-CM

## 2023-05-25 DIAGNOSIS — M54.50 CHRONIC BILATERAL LOW BACK PAIN WITHOUT SCIATICA: ICD-10-CM

## 2023-05-25 DIAGNOSIS — G89.29 CHRONIC PAIN OF LEFT KNEE: ICD-10-CM

## 2023-05-25 DIAGNOSIS — Z79.899 HIGH RISK MEDICATION USE: ICD-10-CM

## 2023-05-25 PROCEDURE — 97110 THERAPEUTIC EXERCISES: CPT | Mod: GP | Performed by: PHYSICAL THERAPIST

## 2023-05-25 PROCEDURE — 97112 NEUROMUSCULAR REEDUCATION: CPT | Mod: GP | Performed by: PHYSICAL THERAPIST

## 2023-05-25 PROCEDURE — 99215 OFFICE O/P EST HI 40 MIN: CPT | Mod: VID | Performed by: INTERNAL MEDICINE

## 2023-05-25 NOTE — Clinical Note
Could you please reach out to Ms Martines and schedule 1) labs in the next few weeks (early to middle of July)  2) labs in early to middle of October 3) Follow-up with me in December. Needs 1 hour slot. Can combine 2 returns/PAOs if needed. Virtual or in person, whichever she prefers. Can use NP slot if needed.

## 2023-05-25 NOTE — PROGRESS NOTES
Virtual Visit Details    Type of service:  Video Visit     Originating Location (pt. Location): home    Distant Location (provider location):  on site  Platform used for Video Visit: precious  Joined the call at 2023, 1:50:28?pm.  Left the call at 2023, 2:34:04?pm.  You were on the call for 43 minutes 35 seconds .      [facility]  Rheumatology Clinic  Byron Arshad  2023     Name: Dali Martines  MRN: [id number]  Age: 52 year old  : 1970    Problem List:  1. Psoriatic arthritis   2. Psoriasis with arthropathy  3. Pain in joint, multiple sites  4. Chronic pain of right ankle  5. Right foot pain  6. Pain in joint involving ankle and foot, right    Subjective:  May 25, 2023   -still with a few psoriatic skin lesions on her thighs. Has not spread beyond that. Smaller than a dime in size. Will get bigger in the winter months. Thinks she probably has about 3 of these skin lesions.   -Still on MVI with Vit D and also 2000 international unit(s) of vit  -No interval infections since her last visit in 2023  -Has had times where she will have sensation where she will be developing a fever, getting flushed, getting chills and then it will improve.   -Did have severe asthma flare. Met with pulmonologist.  -Did go down to 5 tabs of methotrexate for some time. Then went back up to 6 tabs.   -Has been having left hand thumb/middle finger. Pain/stiffness.   -With warmer weather doing more gardening, so questions whether this was a .   -During her initial presentation, her left hand was more affected.   -She went back up from 5 tabs to 6 tabs (15mg) in feb. Noticed improvement with this change.  -Has tenderness to self palpation MCPs and PIPs in left hand. Stiffness and soreness in left hand CMC.   -No right hand symptoms.   -has prior fractures to right foot 2nd/3rd/4th toes from prior fracture from 25 pound patio umbrella weight falling on this foot  -Last time she had covid (end   2022), few weeks later had it again. Same infection.   -other than covid, no other interval infections  -no new/progressive fevers/chills/night sweats/unintentional weight loss.  -No new/progressive rash other than noted above  -Tolerating methotrexate without noticeable side effects, GI or other  14 point ROS collected and negative if not documented above.     Interval history January 13, 2023  -has continued on methotrexate 15mg once weekly and folic acid 1mg once daily. Has been tolerating this without noticeable side effects, GI or other.   -Overall her joint symptoms have been relatively quiet without new/progressive red/hot/swollen joints   -No symptoms consistent with active dactylitis  -Has intermittent psoriatic lesions on thighs. Her initial presenting site of plaque psoriasis was her upper back.  -No recurrent oral ulcers/ no GI complaints or blood in the stool/recurrent or persistent infection  14 point ROS collected and negative if not documented above    HPI from last encounter with jim vides in August 2022  Since we last seen the patient she is generally done pretty well.  She went back up to 15 mg once weekly of methotrexate and feels like she is definitely done better on that dose with relatively minimal morning stiffness.  She has not done her labs however since February so we do not have an actual measure of inflammation.    She did feel like she flared a little bit in June but in retrospect that may simply been overuse.  She traveled for the first time in 2-1/2 years and felt quite achy after that.  She actually was concerned that she may contracted COVID, but tested negative x2.    She also had a recent bug bite that concern her.  She however felt poorly only transiently and did not have any prolonged symptoms.    She is noticing some increasing hand pain with use.  That particular pain however is definitely not associated with morning stiffness.    Notably, she did increase her vitamin D  down to 3000 units/day at the beginning of the summer thinking she would not need as much because of sun exposure.  However, she definitely started noticing some new psoriasis lesions.  That has not been an issue for her for some time.  For the time being she remains on the 3000 units rather than higher dose but intends to go back up when she does not have any way to get sun exposure.    Physical examination by video shows her to be in no acute distress HEENT examination is normal.  She is speaking in full sentences with no shortness of breath.  Her upper extremity examination shows no evidence of any swollen joints joint deformity or decrease in range of motion anywhere in her hands or arms.    Impression:    Per the problem list with well-controlled psoriatic arthritis on methotrexate monotherapy.    Plan/recommendation:    She is 6 months out from her last labs and I reiterated for her today that 3 months is pretty much the maximum we want her going without labs.  She is not experiencing any symptomatic toxicity however so I suspect she is fine but she will get them done soon.    Father, MI early 52's. He had hx of rheumatic fever. He had multiple after that. He ultimately had bypass.     Maternal grandfather 59 cardiac   Uncle 59 cardiac  Background history:  Symptoms first occurred in August 2008, when she began having stiffness and pain in the left hand, particularly in the PIPs and MCPs. Symptoms spontaneously resolved after about 6-8 weeks. She was previously seen by Rheumatology here around that time (see note from Dr. Betancourt on 12/9/2008). Briefly, assessment was that she had a self-limited episode of inflammatory arthralgias, possibly viral or early psoriatic arthritis. Negative NOLVIA, RF, Lyme serologies and normal CRP and ESR. Given her family history of RA, there was a thought to start her on HCQ if anti-CCP antibodies were positive, but they were found to be negative. No imaging of the hand was  performed. September 2014, she experienced recurrent acute onset pain, stiffness, and swelling in the L hand very similar to her symptoms in 2008. Specifically, had swelling across MCPs and in 3rd PIP. She could hardly bend the hand sometimes due to the welling. Had morning stiffness lasting 30-45 minutes. No other joints were involved. Issues with her left hand lingered for several months but eventually self-resolved that January and her only notable symptom is heel pain in the mornings. No fevers, chills, or weight loss. No back pain, vision changes, nausea, vomiting, diarrhea, abdominal pain, or blood in stool or urine. No rash, sicca symptoms, or oral ulcers. It seems she has a history of hypermobility, e.g. could bend wrist back to forearm, bend down and touch palms flat on floor, when she was younger. Used to get frequent ankle sprains, and has had multiple tendon and ligament tears over the years.    Allergies:   Sulfasalazine      Past Medical History:  Anemia   Anxiety   Psoriatic arthritis  Dry eye syndrome   Endometriosis   Gastroesophageal reflux disease   Hypothyroidism   Type 1 diabetes mellitus   Asthma   Necrobiosis lipoidica diabeticorum  Chronic low back pain   Enthesopathy   Chronic right ankle pain      Past Surgical History:  Knee cruciate ligament repair   Stomach surgery 12/2002  Hydrogen breath test 6/17/2014   Appendectomy 2002   Lysis adhesions 2002   Right ankle surgery 12/2014      Family History:   Mother: Breast cancer   Father: Heart disease, eye disorder, hyperlipidemia, macular degeneration, anxiety, alcoholism, rheumatoid arthritis, hypothyroidism, diabetes mellitus   Paternal grandmother: Cerebrovascular disease  Paternal grandfather: Pancreatic cancer, heart disease   Maternal grandmother: Type 2 diabetes mellitus, coronary artery disease  Maternal aunt: Type 1 diabetes mellitus     Social History:  The patient reports that she has never smoked. She has never used smokeless  tobacco. She reports current alcohol use. She reports that she does not use drugs.     Objective:  No vitals for this video visit. Vitals reviewed in Epic.  GEN: Sitting up at desk. NAD  HEENT: no facial rash, sclera clear, no inflammatory nose/ external ear changes  Skin: no acute cutaneous lesions on exposed skin  MSK: fluid movement of bilateral shoulders, elbows, wrists, MCPs, PIPs, DIPs. Has tenderness to self palpation of the left hand CMC. No obvious/sanjeev edema or erythema of this site or other sites in the hands. No loss of MCP valleys bilaterally. Can make a full fist bilaterally.     WBC   Date Value Ref Range Status   01/11/2021 4.7 4.0 - 11.0 10e9/L Final     WBC Count   Date Value Ref Range Status   03/31/2023 5.1 4.0 - 11.0 10e3/uL Final     Hemoglobin   Date Value Ref Range Status   03/31/2023 11.8 11.7 - 15.7 g/dL Final   01/11/2021 11.4 (L) 11.7 - 15.7 g/dL Final     Platelet Count   Date Value Ref Range Status   03/31/2023 313 150 - 450 10e3/uL Final   01/11/2021 288 150 - 450 10e9/L Final     Creatinine   Date Value Ref Range Status   03/31/2023 0.61 0.51 - 0.95 mg/dL Final   01/11/2021 0.56 0.52 - 1.04 mg/dL Final     Lab Results   Component Value Date    ALKPHOS 88 11/18/2021    ALKPHOS 108 04/27/2016     AST   Date Value Ref Range Status   03/31/2023 36 (H) 10 - 35 U/L Final   01/11/2021 31 0 - 45 U/L Final     Lab Results   Component Value Date    ALT 28 12/21/2022    ALT 37 01/11/2021     Sed Rate   Date Value Ref Range Status   01/11/2021 27 0 - 30 mm/h Final     Erythrocyte Sedimentation Rate   Date Value Ref Range Status   03/31/2023 41 (H) 0 - 30 mm/hr Final     CRP Inflammation   Date Value Ref Range Status   02/14/2022 <2.9 0.0 - 8.0 mg/L Final   01/11/2021 <2.9 0.0 - 8.0 mg/L Final     UA RESULTS:  Recent Labs   Lab Test 02/14/19  1642 07/28/17  1452   COLOR Yellow Yellow   APPEARANCE Clear Clear   URINEGLC Negative Negative   URINEBILI Negative Negative   URINEKETONE Negative  Negative   SG 1.013 <=1.005   UBLD Negative Trace*   URINEPH 6.0 6.0   PROTEIN Negative Negative   UROBILINOGEN  --  0.2   NITRITE Negative Negative   LEUKEST Negative Negative   RBCU <1 O - 2   WBCU 1 O - 2      NOLVIA Screen by EIA   Date Value Ref Range Status   12/22/2014 1.6 (H) <1.0 Final     Comment:     Interpretation:  Positive     RNP Antibody IgG   Date Value Ref Range Status   03/17/2015  0.0 - 0.9 AI Final    <0.2  Negative   Antibody index (AI) values reflect qualitative changes in antibody   concentration that cannot be directly associated with clinical condition or   disease state.       Rheumatoid Factor   Date Value Ref Range Status   12/22/2014 <20 <20 IU/mL Final     A/P  #Psoriatic arthritis  #Plaque psoriasis  Very pleasant 52 year old female presents to rheumatology clinic today for follow-up of Psoriatic arthritis and plaque psoriasis. Her inflammatory arthritis/tenosynovisit is currently in remission by history and exam. This is supported by most recent serologies from 3/31/23 which show normal CRP and ESR of 41. Her ESR baseline over the last year has consistently been 32 so this is not far from her baseline.   PLAN continue current therapy without changes.   -Continue methotrexate 15mg one weekly and folic acid 1mg daily  -Follow-up with me in December    #high risk medication use: methotrexate  --Risks and benefits of methotrexate discussed today to include infection, BM suppression, GI/hepatoxicity, malignancy among others. Patient knows not to consume ETOH. Patient knows the drug is not compatible with conception/pregnancy. Patient  is agreeable.  -Routine screening drug toxicity labs to include Albumin, ALT, CBC, creatinine, CRP, ESR reviewed from 3/31/23 and did not show evidence of drug toxicity. She had a borderline elevation in her AST to 36 (ULN 35). Her AST has been stable over the last almost 2 years. HGB/WBC/PLT all normal. She does report drinking 1 bottle of wine on average per  week. Discussed this in the setting of methotrexate and her lab findings. Will continue to monitor CBC, CMP, ESR, CRP every 3 months while on this therapy for drug toxicity monitoring. Standing orders updated at last visit.   -She is due again for labs end of June/early july. Will call to schedule these  -Again in October    Byron Arshad MD  Rheumatology    I spent a total of 45 minutes on the date of service on chart review, patient video encounter, , documentation.

## 2023-05-25 NOTE — LETTER
2023       RE: Dali Martines  [address]  [facility] [address]     Dear Colleague,    Thank you for referring your patient, Dali Martines, to the [facility] RHEUMATOLOGY CLINIC [place] [facility]. Please see a copy of my visit note below.    Virtual Visit Details    Type of service:  Video Visit     Originating Location (pt. Location): home    Distant Location (provider location):  on site  Platform used for Video Visit: precious  Joined the call at 2023, 1:50:28?pm.  Left the call at 2023, 2:34:04?pm.  You were on the call for 43 minutes 35 seconds .      [facility]  Rheumatology Clinic  Byron Arshad  2023     Name: Dali Martines  MRN: [id number]  Age: 52 year old  : 1970    Problem List:  1. Psoriatic arthritis   2. Psoriasis with arthropathy  3. Pain in joint, multiple sites  4. Chronic pain of right ankle  5. Right foot pain  6. Pain in joint involving ankle and foot, right    Subjective:  May 25, 2023   -still with a few psoriatic skin lesions on her thighs. Has not spread beyond that. Smaller than a dime in size. Will get bigger in the winter months. Thinks she probably has about 3 of these skin lesions.   -Still on MVI with Vit D and also 2000 international unit(s) of vit  -No interval infections since her last visit in 2023  -Has had times where she will have sensation where she will be developing a fever, getting flushed, getting chills and then it will improve.   -Did have severe asthma flare. Met with pulmonologist.  -Did go down to 5 tabs of methotrexate for some time. Then went back up to 6 tabs.   -Has been having left hand thumb/middle finger. Pain/stiffness.   -With warmer weather doing more gardening, so questions whether this was a .   -During her initial presentation, her left hand was more affected.   -She went back up from 5 tabs to 6 tabs (15mg) in feb. Noticed improvement with  this change.  -Has tenderness to self palpation MCPs and PIPs in left hand. Stiffness and soreness in left hand CMC.   -No right hand symptoms.   -has prior fractures to right foot 2nd/3rd/4th toes from prior fracture from 25 pound patio umbrella weight falling on this foot  -Last time she had covid (end of august 2022), few weeks later had it again. Same infection.   -other than covid, no other interval infections  -no new/progressive fevers/chills/night sweats/unintentional weight loss.  -No new/progressive rash other than noted above  -Tolerating methotrexate without noticeable side effects, GI or other  14 point ROS collected and negative if not documented above.     Interval history January 13, 2023  -has continued on methotrexate 15mg once weekly and folic acid 1mg once daily. Has been tolerating this without noticeable side effects, GI or other.   -Overall her joint symptoms have been relatively quiet without new/progressive red/hot/swollen joints   -No symptoms consistent with active dactylitis  -Has intermittent psoriatic lesions on thighs. Her initial presenting site of plaque psoriasis was her upper back.  -No recurrent oral ulcers/ no GI complaints or blood in the stool/recurrent or persistent infection  14 point ROS collected and negative if not documented above    HPI from last encounter with jim vides in August 2022  Since we last seen the patient she is generally done pretty well.  She went back up to 15 mg once weekly of methotrexate and feels like she is definitely done better on that dose with relatively minimal morning stiffness.  She has not done her labs however since February so we do not have an actual measure of inflammation.    She did feel like she flared a little bit in June but in retrospect that may simply been overuse.  She traveled for the first time in 2-1/2 years and felt quite achy after that.  She actually was concerned that she may contracted COVID, but tested negative  x2.    She also had a recent bug bite that concern her.  She however felt poorly only transiently and did not have any prolonged symptoms.    She is noticing some increasing hand pain with use.  That particular pain however is definitely not associated with morning stiffness.    Notably, she did increase her vitamin D down to 3000 units/day at the beginning of the summer thinking she would not need as much because of sun exposure.  However, she definitely started noticing some new psoriasis lesions.  That has not been an issue for her for some time.  For the time being she remains on the 3000 units rather than higher dose but intends to go back up when she does not have any way to get sun exposure.    Physical examination by video shows her to be in no acute distress HEENT examination is normal.  She is speaking in full sentences with no shortness of breath.  Her upper extremity examination shows no evidence of any swollen joints joint deformity or decrease in range of motion anywhere in her hands or arms.    Impression:    Per the problem list with well-controlled psoriatic arthritis on methotrexate monotherapy.    Plan/recommendation:    She is 6 months out from her last labs and I reiterated for her today that 3 months is pretty much the maximum we want her going without labs.  She is not experiencing any symptomatic toxicity however so I suspect she is fine but she will get them done soon.    Father, MI early 52's. He had hx of rheumatic fever. He had multiple after that. He ultimately had bypass.     Maternal grandfather 59 cardiac   Uncle 59 cardiac  Background history:  Symptoms first occurred in August 2008, when she began having stiffness and pain in the left hand, particularly in the PIPs and MCPs. Symptoms spontaneously resolved after about 6-8 weeks. She was previously seen by Rheumatology here around that time (see note from Dr. Betancourt on 12/9/2008). Briefly, assessment was that she had a self-limited  episode of inflammatory arthralgias, possibly viral or early psoriatic arthritis. Negative NOLVIA, RF, Lyme serologies and normal CRP and ESR. Given her family history of RA, there was a thought to start her on HCQ if anti-CCP antibodies were positive, but they were found to be negative. No imaging of the hand was performed. September 2014, she experienced recurrent acute onset pain, stiffness, and swelling in the L hand very similar to her symptoms in 2008. Specifically, had swelling across MCPs and in 3rd PIP. She could hardly bend the hand sometimes due to the welling. Had morning stiffness lasting 30-45 minutes. No other joints were involved. Issues with her left hand lingered for several months but eventually self-resolved that January and her only notable symptom is heel pain in the mornings. No fevers, chills, or weight loss. No back pain, vision changes, nausea, vomiting, diarrhea, abdominal pain, or blood in stool or urine. No rash, sicca symptoms, or oral ulcers. It seems she has a history of hypermobility, e.g. could bend wrist back to forearm, bend down and touch palms flat on floor, when she was younger. Used to get frequent ankle sprains, and has had multiple tendon and ligament tears over the years.    Allergies:   Sulfasalazine      Past Medical History:  Anemia   Anxiety   Psoriatic arthritis  Dry eye syndrome   Endometriosis   Gastroesophageal reflux disease   Hypothyroidism   Type 1 diabetes mellitus   Asthma   Necrobiosis lipoidica diabeticorum  Chronic low back pain   Enthesopathy   Chronic right ankle pain      Past Surgical History:  Knee cruciate ligament repair   Stomach surgery 12/2002  Hydrogen breath test 6/17/2014   Appendectomy 2002   Lysis adhesions 2002   Right ankle surgery 12/2014      Family History:   Mother: Breast cancer   Father: Heart disease, eye disorder, hyperlipidemia, macular degeneration, anxiety, alcoholism, rheumatoid arthritis, hypothyroidism, diabetes mellitus    Paternal grandmother: Cerebrovascular disease  Paternal grandfather: Pancreatic cancer, heart disease   Maternal grandmother: Type 2 diabetes mellitus, coronary artery disease  Maternal aunt: Type 1 diabetes mellitus     Social History:  The patient reports that she has never smoked. She has never used smokeless tobacco. She reports current alcohol use. She reports that she does not use drugs.     Objective:  No vitals for this video visit. Vitals reviewed in Epic.  GEN: Sitting up at desk. NAD  HEENT: no facial rash, sclera clear, no inflammatory nose/ external ear changes  Skin: no acute cutaneous lesions on exposed skin  MSK: fluid movement of bilateral shoulders, elbows, wrists, MCPs, PIPs, DIPs. Has tenderness to self palpation of the left hand CMC. No obvious/sanjeev edema or erythema of this site or other sites in the hands. No loss of MCP valleys bilaterally. Can make a full fist bilaterally.     WBC   Date Value Ref Range Status   01/11/2021 4.7 4.0 - 11.0 10e9/L Final     WBC Count   Date Value Ref Range Status   03/31/2023 5.1 4.0 - 11.0 10e3/uL Final     Hemoglobin   Date Value Ref Range Status   03/31/2023 11.8 11.7 - 15.7 g/dL Final   01/11/2021 11.4 (L) 11.7 - 15.7 g/dL Final     Platelet Count   Date Value Ref Range Status   03/31/2023 313 150 - 450 10e3/uL Final   01/11/2021 288 150 - 450 10e9/L Final     Creatinine   Date Value Ref Range Status   03/31/2023 0.61 0.51 - 0.95 mg/dL Final   01/11/2021 0.56 0.52 - 1.04 mg/dL Final     Lab Results   Component Value Date    ALKPHOS 88 11/18/2021    ALKPHOS 108 04/27/2016     AST   Date Value Ref Range Status   03/31/2023 36 (H) 10 - 35 U/L Final   01/11/2021 31 0 - 45 U/L Final     Lab Results   Component Value Date    ALT 28 12/21/2022    ALT 37 01/11/2021     Sed Rate   Date Value Ref Range Status   01/11/2021 27 0 - 30 mm/h Final     Erythrocyte Sedimentation Rate   Date Value Ref Range Status   03/31/2023 41 (H) 0 - 30 mm/hr Final     CRP  Inflammation   Date Value Ref Range Status   02/14/2022 <2.9 0.0 - 8.0 mg/L Final   01/11/2021 <2.9 0.0 - 8.0 mg/L Final     UA RESULTS:  Recent Labs   Lab Test 02/14/19  1642 07/28/17  1452   COLOR Yellow Yellow   APPEARANCE Clear Clear   URINEGLC Negative Negative   URINEBILI Negative Negative   URINEKETONE Negative Negative   SG 1.013 <=1.005   UBLD Negative Trace*   URINEPH 6.0 6.0   PROTEIN Negative Negative   UROBILINOGEN  --  0.2   NITRITE Negative Negative   LEUKEST Negative Negative   RBCU <1 O - 2   WBCU 1 O - 2      NOLVIA Screen by EIA   Date Value Ref Range Status   12/22/2014 1.6 (H) <1.0 Final     Comment:     Interpretation:  Positive     RNP Antibody IgG   Date Value Ref Range Status   03/17/2015  0.0 - 0.9 AI Final    <0.2  Negative   Antibody index (AI) values reflect qualitative changes in antibody   concentration that cannot be directly associated with clinical condition or   disease state.       Rheumatoid Factor   Date Value Ref Range Status   12/22/2014 <20 <20 IU/mL Final     A/P  #Psoriatic arthritis  #Plaque psoriasis  Very pleasant 52 year old female presents to rheumatology clinic today for follow-up of Psoriatic arthritis and plaque psoriasis. Her inflammatory arthritis/tenosynovisit is currently in remission by history and exam. This is supported by most recent serologies from 3/31/23 which show normal CRP and ESR of 41. Her ESR baseline over the last year has consistently been 32 so this is not far from her baseline.   PLAN continue current therapy without changes.   -Continue methotrexate 15mg one weekly and folic acid 1mg daily  -Follow-up with me in December    #high risk medication use: methotrexate  --Risks and benefits of methotrexate discussed today to include infection, BM suppression, GI/hepatoxicity, malignancy among others. Patient knows not to consume ETOH. Patient knows the drug is not compatible with conception/pregnancy. Patient  is agreeable.  -Routine screening drug  toxicity labs to include Albumin, ALT, CBC, creatinine, CRP, ESR reviewed from 3/31/23 and did not show evidence of drug toxicity. She had a borderline elevation in her AST to 36 (ULN 35). Her AST has been stable over the last almost 2 years. HGB/WBC/PLT all normal. She does report drinking 1 bottle of wine on average per week. Discussed this in the setting of methotrexate and her lab findings. Will continue to monitor CBC, CMP, ESR, CRP every 3 months while on this therapy for drug toxicity monitoring. Standing orders updated at last visit.   -She is due again for labs end of June/early july. Will call to schedule these  -Again in October    Byron Arshad MD  Rheumatology    I spent a total of 45 minutes on the date of service on chart review, patient video encounter, , documentation.

## 2023-05-25 NOTE — NURSING NOTE
Is the patient currently in the state of [place]? YES    Visit mode:VIDEO    If the visit is dropped, the patient can be reconnected by: VIDEO VISIT: Send to e-mail at: [email or web address]    Will anyone else be joining the visit? NO      How would you like to obtain your AVS? MyChart    Are changes needed to the allergy or medication list? NO    Reason for visit: Follow Up      Geraldine Rogers on 5/25/2023 at 1:43 PM

## 2023-05-25 NOTE — PROGRESS NOTES
PLAN  Continue therapy per current plan of care.   05/25/23 0500   Appointment Info   Signing clinician's name / credentials Laura Crowe, PT, OCS   Total/Authorized Visits 15+6 ENT   Visits Used 16   PT Tx Diagnosis R ankle pain/lateral instability, L low back pain   Progress Note/Certification   Onset of illness/injury or Date of Surgery 12/15/22   Therapy Frequency 2X/Month   Predicted Duration 3 months   Progress Note Due Date 05/25/23   Progress Note Completed Date 05/25/23   PT Goal 1   Goal Identifier ambulation   Goal Description able to walk 1 mile <3/10 pain   Rationale to maximize safety and independence with performance of ADLs and functional tasks;to maximize safety and independence within the community;to maximize safety and independence with self cares   Goal Progress able to walk 400 feet 4/10 pain   Target Date 08/25/23   Subjective Report   Subjective Report Reports improvement in ankle pain, last week has been noticing more low back pain and B hip pain R>L, pain early in the moring, improves a little and then worsens end of the day, pain in low back when trying to reach forward   Objective Measures   Objective Measures Objective Measure 2;Objective Measure 1   Objective Measure 1   Objective Measure core strenght   Details core weakness present, fatigue with 5 reps of abdominal exercise #3 with marching, bridging mild pain today, improved post stretching into flexion   Objective Measure 2   Objective Measure lumbar ROM   Details supine flexion - able to perform single and double knee to chest full ROM without pain, thoracic ROM rot with ext to L mod loss, R min loss; improved after 10 reps of thoracic rot on all 4s B   Treatment Interventions (PT)   Interventions Therapeutic Procedure/Exercise;Neuromuscular Re-education   Therapeutic Procedure/Exercise   PTRx Ther Proc 1 Upper Trapezius Stretch   PTRx Ther Proc 1 - Details No Notes   PTRx Ther Proc 2 Cervical Retraction   PTRx Ther Proc 2 -  Details No Notes   PTRx Ther Proc 3 Posterior Pelvic Tilt   PTRx Ther Proc 3 - Details No Notes   PTRx Ther Proc 4 Single Knee to Chest   PTRx Ther Proc 4 - Details No Notes   PTRx Ther Proc 5 Double Knee to Chest   PTRx Ther Proc 5 - Details No Notes   PTRx Ther Proc 6 Lumbar Flexion Rotation   PTRx Ther Proc 6 - Details No Notes   PTRx Ther Proc 7 All 4s Cat Cow   PTRx Ther Proc 7 - Details No Notes   PTRx Ther Proc 8 All Fours Thoracic Rotation   PTRx Ther Proc 8 - Details No Notes   PTRx Ther Proc 9 Hip Flexion Straight Leg Raise   PTRx Ther Proc 9 - Details No Notes   PTRx Ther Proc 10 Hip Abduction Straight Leg Raise   PTRx Ther Proc 10 - Details x 10 WAYNE; verbal/tactile cues to roll hips forward to bias posterior gluteus medius   PTRx Ther Proc 11 Theraband Ankle Plantarflexion   PTRx Ther Proc 11 - Details No Notes   PTRx Ther Proc 12 Seated Toe Raises/Heel Raises   PTRx Ther Proc 12 - Details No Notes   PTRx Ther Proc 13 Standing Gastroc Stretch   PTRx Ther Proc 13 - Details No Notes   PTRx Ther Proc 14 Standing Soleus Stretch   PTRx Ther Proc 14 - Details No Notes   PTRx Ther Proc 15 Hip Adduction Straight Leg Raise   PTRx Ther Proc 15 - Details No Notes   PTRx Ther Proc 16 Standing Hip Adduction   PTRx Ther Proc 16 - Details No Notes   Therapeutic Procedures: strength, endurance, ROM, flexibillity minutes (27087) 23   PTRx Ther Proc 17 Standing IT Band Stretch Using Wall   PTRx Ther Proc 17 - Details No Notes   PTRx Ther Proc 18 Standing Hip Flexor Stretch   PTRx Ther Proc 18 - Details No Notes   Therapeutic Activity   PTRx Ther Act 1 Suboccipital Release   PTRx Ther Act 1 - Details No Notes   PTRx Ther Act 2 Step Up   PTRx Ther Act 2 - Details No Notes   PTRx Ther Act 3 Foam Roller Thoracic Extension   PTRx Ther Act 3 - Details No Notes   PTRx Ther Act 4 Foam Roller Shoulder Flexion/Horizontal Abduction   PTRx Ther Act 4 - Details No Notes   Neuromuscular Re-education   PTRx Neuro Re-ed 1 All 4s Arm Lift    PTRx Neuro Re-ed 1 - Details No Notes   PTRx Neuro Re-ed 2 Supine Abdominal Exercise #3B (Two Leg Marching)   PTRx Neuro Re-ed 2 - Details NMR for correct technique    PTRx Neuro Re-ed 3 Bridging #1   PTRx Neuro Re-ed 3 - Details No Notes   PTRx Neuro Re-ed 4 Hip Extension Straight Leg Raise   PTRx Neuro Re-ed 4 - Details No Notes   Neuromuscular re-ed of mvmt, balance, coord, kinesthetic sense, posture, proprioception minutes (31567) 15   PTRx Neuro Re-ed 5 Stance On Pillow   PTRx Neuro Re-ed 5 - Details No Notes   PTRx Neuro Re-ed 6 Marching in Place   PTRx Neuro Re-ed 6 - Details No Notes   PTRx Neuro Re-ed 7 Standing Fire Hydrant without Support   PTRx Neuro Re-ed 7 - Details No Notes   Total Session Time   Timed Code Treatment Minutes 38   Total Treatment Time (sum of timed and untimed services) 38       Beginning/End Dates of Progress Note Reporting Period:  05/25/23 to 05/25/2023    Referring Provider:  Marlen Bailey MD

## 2023-05-30 ENCOUNTER — VIRTUAL VISIT (OUTPATIENT)
Dept: SLEEP MEDICINE | Facility: CLINIC | Age: 53
End: 2023-05-30
Payer: COMMERCIAL

## 2023-05-30 VITALS — HEIGHT: 70 IN | BODY MASS INDEX: 26.48 KG/M2 | WEIGHT: 185 LBS

## 2023-05-30 DIAGNOSIS — F51.04 CHRONIC INSOMNIA: Primary | ICD-10-CM

## 2023-05-30 DIAGNOSIS — F43.9 STRESS: ICD-10-CM

## 2023-05-30 PROCEDURE — 90834 PSYTX W PT 45 MINUTES: CPT | Mod: VID | Performed by: PSYCHOLOGIST

## 2023-05-30 ASSESSMENT — SLEEP AND FATIGUE QUESTIONNAIRES
HOW LIKELY ARE YOU TO NOD OFF OR FALL ASLEEP WHEN YOU ARE A PASSENGER IN A CAR FOR AN HOUR WITHOUT A BREAK: SLIGHT CHANCE OF DOZING
HOW LIKELY ARE YOU TO NOD OFF OR FALL ASLEEP WHILE SITTING QUIETLY AFTER LUNCH WITHOUT ALCOHOL: SLIGHT CHANCE OF DOZING
HOW LIKELY ARE YOU TO NOD OFF OR FALL ASLEEP WHILE SITTING AND TALKING TO SOMEONE: WOULD NEVER DOZE
HOW LIKELY ARE YOU TO NOD OFF OR FALL ASLEEP WHILE SITTING INACTIVE IN A PUBLIC PLACE: SLIGHT CHANCE OF DOZING
HOW LIKELY ARE YOU TO NOD OFF OR FALL ASLEEP WHILE LYING DOWN TO REST IN THE AFTERNOON WHEN CIRCUMSTANCES PERMIT: HIGH CHANCE OF DOZING
HOW LIKELY ARE YOU TO NOD OFF OR FALL ASLEEP WHILE WATCHING TV: HIGH CHANCE OF DOZING
HOW LIKELY ARE YOU TO NOD OFF OR FALL ASLEEP IN A CAR, WHILE STOPPED FOR A FEW MINUTES IN TRAFFIC: WOULD NEVER DOZE
HOW LIKELY ARE YOU TO NOD OFF OR FALL ASLEEP WHILE SITTING AND READING: MODERATE CHANCE OF DOZING

## 2023-05-30 ASSESSMENT — PAIN SCALES - GENERAL: PAINLEVEL: NO PAIN (0)

## 2023-05-30 NOTE — PROGRESS NOTES
Virtual Visit Details    Type of service:  Video Visit     Originating Location (pt. Location): Home  Distant Location (provider location):  Off-site  Platform used for Video Visit: AmWell     Visit Start Time: 4:02 PM  Visit End Time:4:50 PM     SLEEP MEDICINE VIRTUAL VIDEO FOLLOW-UP VISIT  Sleep Psychology    Patient Name: Dali Martines  MRN:  [id number]  Date of Service: May 30, 2023       Subjective Report     Dali Martines  returns for a telehealth video visit to discuss progress in implementing behavioral strategies for the management of insomnia.  Patient consent for initiation of video visit was obtained and documented prior to initiation of visit.     Dali reports she did reach to her leadership and was encouraged to reach out to her EAP.  She has been working with an EAP therapst nadeem early April.  She reports that the therapy has been good overall.    She  She continues to feel tired and attributes it to the fatigue of caregiving her mother and resulting stressors.     .  We reviewed basic sleep hygiene including some fall off and maintaining evening routine and sleep hygiene.  We revisited the rationale for discontinuing use of screens and devices in the later evening along with managing the content of her television watching.     Sleep Data:     Source of Sleep Estimates:  Verbal Self-report    Average Time in Bed: 7.5-8 hours  Average Total Sleep Time: 7 hrs  Sleep Efficiency: Greater than 85% the patient continues to report daytime tiredness likely secondary to stressors    EPWORTH SLEEPINESS SCALE    Sitting and reading 2   Watching TV 3   Sitting, inactive in a public place (theatre or mtg.) 1    As a passenger in a car 1   Lying down to rest in the afternoon when circumstance permit 3   Sitting and talking to someone 0   Sitting quietly after lunch without alcohol 1   In a car, while stopped for a few minutes in traffic 0   TOTAL SCORE (nl <11) 11     INSOMNIA SEVERITY INDEX     Difficulty  "falling asleep 2   Difficult staying asleep 2   Problems waking up to early 3   How SATISFIED/DISSATISFIED are you with your CURRENT sleep pattern? 3   How NOTICEABLE to others do you think your sleep pattern is in terms of your quality of life? 2   How WORRIED/DISTRESSED are you about your current sleep pattern? 2   To what extent do you consider your sleep problem to INTERFERE with your daily fuctioning(e.g. daytime fatigue, mood, ability to function at work/daily chores, concentration, mood,etc.) CURRENTLY? 2   INSOMNIA SEVERITY INDEX TOTAL SCORE 16    Absence of insomnia (0-7); sub-threshold insomnia (8-14); moderate insomnia (15-21); and severe insomnia (22-28)       Interventions     Strategies and recommendations including Stimulus control therapy, Sleep hygiene training and Stress management were reviewed and discussed today.     Services provided are compliant with the requirements of [place] Statute SS 256B.0625 Subd. 3b and paragraph (b)       Vital Signs     Ht 1.778 m (5' 10\")   Wt 83.9 kg (185 lb)   BMI 26.54 kg/m       Mental Status     Orientation:  X3  Mood:  normal  Affect:  Congruent with mood  Speech/Language:  Normal  Thought Process: Intact  Associations:  Normal  Thought Content: Normal  Patient does not report any suicidal ideation, intention or plan.    Diagnostic Impressions and Plan        Chronic insomnia  Stress    Patient reports a follow-up that she did take recommendations from sleep medicine to initiate individual psychotherapy to address caregiver stress and other adjustment issues likely contributing to poor quality sleep and independently to daytime tiredness.  She states that she is benefiting from therapy.  Sleep maintenance and terminal insomnia has largely resolved.  She continues to have some challenges with sleep initiation and    Plan:  Continue current sleep schedule and plan , Patient requests follow-up with sleep psychology in 4 months, continue individual " psychotherapy for stress/anxiety management    Follow-up: 4 months      Philip Quiroz PsyD, GARY, DBSM  Diplomate, Behavioral Sleep Medicine  [facility]      Note: This dictation was created using voice recognition software. This document may contain an error not identified before finalizing the document. If the error changes the accuracy of the document, I would appreciate it being brought to my attention.

## 2023-05-30 NOTE — NURSING NOTE
Has patient had flu shot for current/most recent flu season? If so, when? Yes: 10/26/2022    Patient declined individual allergy and medication review by support staff because - Pt stated already reviewed during eCheck in.         Is the patient currently in the state of [place]? YES    Visit mode:VIDEO    If the visit is dropped, the patient can be reconnected by: VIDEO VISIT: Text to cell phone: [phone]    Will anyone else be joining the visit? NO      How would you like to obtain your AVS? MyChart    Are changes needed to the allergy or medication list? NO    Reason for visit: RECHECK      Krista Donis

## 2023-06-18 DIAGNOSIS — J45.40 MODERATE PERSISTENT ASTHMA WITHOUT COMPLICATION: ICD-10-CM

## 2023-06-19 RX ORDER — MOMETASONE FUROATE AND FORMOTEROL FUMARATE DIHYDRATE 100; 5 UG/1; UG/1
AEROSOL RESPIRATORY (INHALATION)
Qty: 13 G | Refills: 5 | Status: SHIPPED | OUTPATIENT
Start: 2023-06-19 | End: 2024-07-16

## 2023-06-20 ENCOUNTER — THERAPY VISIT (OUTPATIENT)
Dept: PHYSICAL THERAPY | Facility: CLINIC | Age: 53
End: 2023-06-20
Payer: COMMERCIAL

## 2023-06-20 ENCOUNTER — TELEPHONE (OUTPATIENT)
Dept: PULMONOLOGY | Facility: CLINIC | Age: 53
End: 2023-06-20

## 2023-06-20 DIAGNOSIS — G89.29 CHRONIC PAIN OF RIGHT ANKLE: ICD-10-CM

## 2023-06-20 DIAGNOSIS — M54.50 CHRONIC BILATERAL LOW BACK PAIN WITHOUT SCIATICA: ICD-10-CM

## 2023-06-20 DIAGNOSIS — M25.571 CHRONIC PAIN OF RIGHT ANKLE: ICD-10-CM

## 2023-06-20 DIAGNOSIS — G89.29 CHRONIC BILATERAL LOW BACK PAIN WITHOUT SCIATICA: ICD-10-CM

## 2023-06-20 DIAGNOSIS — M54.50 CHRONIC LOW BACK PAIN: ICD-10-CM

## 2023-06-20 DIAGNOSIS — M25.562 CHRONIC PAIN OF LEFT KNEE: Primary | ICD-10-CM

## 2023-06-20 DIAGNOSIS — G89.29 CHRONIC PAIN OF LEFT KNEE: Primary | ICD-10-CM

## 2023-06-20 DIAGNOSIS — G89.29 CHRONIC LOW BACK PAIN: ICD-10-CM

## 2023-06-20 PROCEDURE — 97112 NEUROMUSCULAR REEDUCATION: CPT | Mod: GP | Performed by: PHYSICAL THERAPIST

## 2023-06-20 PROCEDURE — 97110 THERAPEUTIC EXERCISES: CPT | Mod: GP | Performed by: PHYSICAL THERAPIST

## 2023-06-20 NOTE — TELEPHONE ENCOUNTER
REFERRAL INFORMATION:    Referring Provider:  Dr. Frieda Dunbar    Referring Clinic:   [facility]    Reason for Visit/Diagnosis: Hiatal hernia     FUTURE VISIT INFORMATION:    Appointment Date: 06-21-23    Appointment Time: @ 10:45am      NOTES STATUS DETAILS   OFFICE NOTE from Referring Provider Internal 05-18-23 Dr. Frieda Dunbar    OFFICE NOTE from Other Specialist Internal 12-11-18 Dr. Dimitri Heart  10-26-21 Dr. Felicia Mckeon   MEDICATION LIST Internal         COLONOSCOPY Internal 06-23-20, 03-15-19   PERTINENT LABS Internal CBC/diff:   03-31-23, 01-13-23, 12-21-22, 08-11-22, 02-14-22, 11-18-21  CMP:  03-31-23, 01-13-23, 11-18-21  *additional in epic*

## 2023-06-20 NOTE — TELEPHONE ENCOUNTER
Patient Contacted for the patient to call back and schedule the following:    Appointment type: RTN   Provider: Manjinder   Return date: 05/16/2023  Specialty phone number: [phone]  Additional appointment(s) needed: PFT   Additonal Notes: Scheduled PFT and follow up 05/16/2023

## 2023-06-21 ENCOUNTER — OFFICE VISIT (OUTPATIENT)
Dept: PHYSICAL MEDICINE AND REHAB | Facility: CLINIC | Age: 53
End: 2023-06-21
Payer: COMMERCIAL

## 2023-06-21 ENCOUNTER — PRE VISIT (OUTPATIENT)
Dept: GASTROENTEROLOGY | Facility: CLINIC | Age: 53
End: 2023-06-21

## 2023-06-21 ENCOUNTER — VIRTUAL VISIT (OUTPATIENT)
Dept: GASTROENTEROLOGY | Facility: CLINIC | Age: 53
End: 2023-06-21
Attending: INTERNAL MEDICINE
Payer: COMMERCIAL

## 2023-06-21 VITALS — OXYGEN SATURATION: 97 % | SYSTOLIC BLOOD PRESSURE: 102 MMHG | HEART RATE: 79 BPM | DIASTOLIC BLOOD PRESSURE: 70 MMHG

## 2023-06-21 VITALS — HEIGHT: 70 IN | WEIGHT: 185 LBS | BODY MASS INDEX: 26.48 KG/M2

## 2023-06-21 DIAGNOSIS — M54.2 CERVICALGIA: ICD-10-CM

## 2023-06-21 DIAGNOSIS — R10.11 RUQ ABDOMINAL PAIN: Primary | ICD-10-CM

## 2023-06-21 DIAGNOSIS — M54.50 CHRONIC BILATERAL LOW BACK PAIN WITHOUT SCIATICA: Primary | ICD-10-CM

## 2023-06-21 DIAGNOSIS — K30 DELAYED GASTRIC EMPTYING: ICD-10-CM

## 2023-06-21 DIAGNOSIS — H92.09 OTALGIA, UNSPECIFIED LATERALITY: ICD-10-CM

## 2023-06-21 DIAGNOSIS — K44.9 HIATAL HERNIA: ICD-10-CM

## 2023-06-21 DIAGNOSIS — H81.10 BENIGN PAROXYSMAL POSITIONAL VERTIGO, UNSPECIFIED LATERALITY: ICD-10-CM

## 2023-06-21 DIAGNOSIS — M79.671 RIGHT FOOT PAIN: ICD-10-CM

## 2023-06-21 DIAGNOSIS — G89.29 CHRONIC BILATERAL LOW BACK PAIN WITHOUT SCIATICA: Primary | ICD-10-CM

## 2023-06-21 DIAGNOSIS — R11.2 NAUSEA AND VOMITING, UNSPECIFIED VOMITING TYPE: ICD-10-CM

## 2023-06-21 PROCEDURE — 99417 PROLNG OP E/M EACH 15 MIN: CPT | Mod: VID | Performed by: PHYSICIAN ASSISTANT

## 2023-06-21 PROCEDURE — 99214 OFFICE O/P EST MOD 30 MIN: CPT | Performed by: PHYSICAL MEDICINE & REHABILITATION

## 2023-06-21 PROCEDURE — 99205 OFFICE O/P NEW HI 60 MIN: CPT | Mod: VID | Performed by: PHYSICIAN ASSISTANT

## 2023-06-21 RX ORDER — ONDANSETRON 4 MG/1
4 TABLET, ORALLY DISINTEGRATING ORAL EVERY 8 HOURS PRN
Qty: 30 TABLET | Refills: 1 | Status: SHIPPED | OUTPATIENT
Start: 2023-06-21

## 2023-06-21 ASSESSMENT — PAIN SCALES - GENERAL
PAINLEVEL: MODERATE PAIN (5)
PAINLEVEL: MODERATE PAIN (4)

## 2023-06-21 NOTE — NURSING NOTE
Chief Complaint   Patient presents with     RECHECK     Follow up patient doing physical therapy     Madonna Keene CMA at 12:44 PM on 6/21/2023.

## 2023-06-21 NOTE — PROGRESS NOTES
[facility] Note Followup Visit     Patient Name: Dali Martines : 1970 Medical Record: [id number]            History of Present Illness:     Dali Martines is a 52 year old female with chronic pain and gait imbalance. Last visit she restarted PT and TMJ/ear pain orders were added.     FROM old notes by me:   This is a 53 y/o female with gait imbalance. She has multiple risk factors for poor balance, including chronic painful left sided ATFL sprain, generalized deconditioning due to limited activity from COVID pandemic, and painful low back, L knee, R ankle anmd stiffness on the r toes 2,3,4 after healed fractures. Work-up: No further imaging necessary: upper exam not concerning for cervical stenosis as cause of imbalance, will observe. MRI L and EMG results reassuring and r/o PN and lumbar radiculopathy. Therapy/equipment/braces: She has bilateral ankle braces which do improve her stability.     Today she reports a recent fall, with brief sense of not knowing where her body was in space, similar to her fall last spring. She did see Dr. Castano in August (ENT) he dx her with BPPV. She isn't doing her Epleys but has the exercises at home. She is wondring if her recent COVID booster triggered it, although she isn't sure it she fell before or after. Other than the recent fall, she really hasn't had episodes of vertigo since last fall. No med changes, diabetes stable with good sugar control, last hgAiC 6.4 in March. Back pain improved, L ankle flared up in fall.          Past Medical and Surgical History:     Past Medical History:   Diagnosis Date     Anemia      Anxiety      Arthritis     Psoriatic arthritis     Back injury      Dry eye syndrome      Dyslipidemia      Endometriosis     adehsions seen at laparoscopy     GERD (gastroesophageal reflux disease)      Hypothyroidism     8 yoa     SOB (shortness of breath) 3/11/2014     Type I (juvenile type) diabetes mellitus without mention of  complication, not stated as uncontrolled     when 15      Uncomplicated asthma Fall 2013     Past Surgical History:   Procedure Laterality Date     COLONOSCOPY  2002     COLONOSCOPY N/A 6/23/2020    Procedure: COLONOSCOPY;  Surgeon: Lauryn Rivera MD;  Location:  GI     ESOPHAGOSCOPY, GASTROSCOPY, DUODENOSCOPY (EGD), COMBINED  1/7/2014    Procedure: COMBINED ESOPHAGOSCOPY, GASTROSCOPY, DUODENOSCOPY (EGD), BIOPSY SINGLE OR MULTIPLE;;  Surgeon: Kraig Nicole MD;  Location:  GI     GYN SURGERY      Laparotomy to remove endometrial tissue     HC BREATH HYDROGEN TEST N/A 6/17/2014    Procedure: HYDROGEN BREATH TEST;  Surgeon: Deacon Alberts MD;  Location:  GI     HYDROGEN BREATH TEST  6/17/14     LAPAROSCOPIC APPENDECTOMY  2002     LAPAROTOMY, LYSIS ADHESIONS, COMBINED  2002    endometriosis     SOFT TISSUE SURGERY  December 2014    right ankle tendon injury     ZZC REPAIR CRUCIATE LIGAMENT,KNEE       ZZC STOMACH SURGERY PROCEDURE UNLISTED  December 2002            Social History:     Social History     Tobacco Use     Smoking status: Never     Smokeless tobacco: Never   Substance Use Topics     Alcohol use: Yes     Comment: moderately            Functional history:     Dali Steinl is independent with ADL's             Family History:     Family History   Problem Relation Age of Onset     Breast Cancer Mother      Heart Disease Father      C.A.D. Father      Arthritis Father      Circulatory Father      Eye Disorder Father      Lipids Father      Thyroid Disease Father      Macular Degeneration Father      Coronary Artery Disease Father      Anxiety Disorder Father      Alcoholism Father      Rheumatoid Arthritis Father      Hypothyroidism Father      Diabetes Father      Cerebrovascular Disease Paternal Grandmother         ,     Cancer Paternal Grandfather         Pancreatic     Heart Disease Paternal Grandfather      Diabetes Maternal Grandmother         Type 2     C.A.D. Maternal Grandmother       Heart Disease Maternal Grandmother      Coronary Artery Disease Maternal Grandmother      Heart Disease Maternal Grandfather      Cardiac Sudden Death Maternal Grandfather      Gynecology Other         self     Thyroid Disease Other         self     Macular Degeneration Maternal Aunt      Diabetes Other         Type 1     Glaucoma No family hx of             Medications:     Current Outpatient Medications   Medication Sig Dispense Refill     albuterol (PROAIR HFA/PROVENTIL HFA/VENTOLIN HFA) 108 (90 Base) MCG/ACT inhaler Inhale 2 puffs into the lungs every 4 hours as needed for shortness of breath / dyspnea or wheezing 1 Inhaler 11     aspirin 81 MG tablet Take 1 tablet by mouth daily.       blood glucose (ACCU-CHEK COLIN PLUS) test strip Test Blood Sugar 8 times daily or as directed 800 strip 3     blood glucose monitoring (ACCU-CHEK FASTCLIX) lancets Use to test blood sugar 8 times daily or as directed. 4 Box 3     blood glucose monitoring (NO BRAND SPECIFIED) meter device kit Use to test blood sugar 8 times daily or as directed. Any covered brand, 1 kit 0     Calcium Carbonate-Vitamin D (CALCIUM + D PO) Take one daily       citalopram (CELEXA) 20 MG tablet Take 1.5 tablets (30 mg) by mouth daily 135 tablet 1     Continuous Blood Gluc  (DEXCOM G6 ) SUZANNE Use to read blood sugars as per 's instructions. 1 each 0     Continuous Blood Gluc Sensor (DEXCOM G6 SENSOR) MISC Change every 10 days. 9 each 3     Continuous Blood Gluc Transmit (DEXCOM G6 TRANSMITTER) MISC Change every 3 months. 1 each 3     diclofenac (VOLTAREN) 1 % topical gel Apply 2 g topically 4 times daily 100 g 4     diclofenac (VOLTAREN) 1 % topical gel APPLY 2 GRAMS ONTO THE SKIN FOUR TIMES DAILY AS NEEDED FOR MODERATE PAIN 100 g 5     DULERA 100-5 MCG/ACT inhaler INHALE 2 PUFFS INTO THE LUNGS TWICE DAILY 13 g 5     econazole nitrate 1 % cream Apply 0.5 inches topically daily.       FIASP PENFILL 100 UNIT/ML SOCT INJECT  UP TO 45 UNITS DAILY AS DIRECTED ACCORDING TO CORRECTION FACTOR. 45 mL 3     fish oil-omega-3 fatty acids 1000 MG capsule Take one daily       folic acid (FOLVITE) 1 MG tablet Take 1 tablet (1 mg) by mouth daily 90 tablet 3     Injection Device for insulin (INPEN 100-BLUE-NOVOLOG-FIASP) SUZANNE 1 each once for 1 dose 1 each 0     insulin degludec (TRESIBA FLEXTOUCH) 100 UNIT/ML pen ADMINISTER 17 UNITS UNDER THE SKIN DAILY 30 mL 1     insulin pen needle (B-D U/F) 31G X 8 MM miscellaneous Use 5 pen needles daily 450 each 3     Ketoconazole (NIZORAL PO) Shampoo daily       levothyroxine (SYNTHROID/LEVOTHROID) 112 MCG tablet TAKE 1 TABLET(112 MCG) BY MOUTH DAILY 90 tablet 4     methotrexate 2.5 MG tablet Take 6 tablets (15 mg) by mouth every 7 days Labs every 8 - 12 weeks for refills. 72 tablet 0     methotrexate 2.5 MG tablet Take 6 tablets (15 mg) by mouth every 7 days Labs required every 8-12 weeks while taking this medication 72 tablet 0     methylphenidate (METADATE CD) 20 MG CR capsule Take 20 mg by mouth daily       methylphenidate (RITALIN) 10 MG tablet TAKE UP TO 2 TABLETS BY MOUTH IN THE LATE AFTERNOON.       Minoxidil (ROGAINE EX) daily       montelukast (SINGULAIR) 5 MG chewable tablet CHEW AND SWALLOW 1 TABLET(5 MG) BY MOUTH AT BEDTIME 90 tablet 4     Multiple Vitamin (MULTIVITAMIN OR) Take one daily tab       NOVOLOG PENFILL 100 UNIT/ML soln INJECT 1 UNIT PER 15 GRAMS CHO AT ALL MEALS AND SNACKS WITH CORRECTION SCALE OF 1 UNIT PER 50 MG/DL OVER 150, AVERAGE DAILY USE OF 30 UNITS 30 mL 4     Semaglutide, 1 MG/DOSE, 4 MG/3ML SOPN Inject 1 mg Subcutaneous every 7 days 12 mL 3     simvastatin (ZOCOR) 20 MG tablet Take 1 tablet (20 mg) by mouth At Bedtime 90 tablet 3     traZODone (DESYREL) 50 MG tablet Take 1-2 tablets by mouth nightly as needed for sleep. 180 tablet 0            Allergies:     Allergies   Allergen Reactions     Monosodium Glutamate Palpitations and Shortness Of Breath     Sulfasalazine       "Developed rash, HA, dizziness              ROS:     A focused ROS is negative other than the symptoms noted above in the HPI.           Physical Examiniation:     VITAL SIGNS: There were no vitals taken for this visit.  BMI: Estimated body mass index is 26.54 kg/m  as calculated from the following:    Height as of an earlier encounter on 6/21/23: 1.778 m (5' 10\").    Weight as of an earlier encounter on 6/21/23: 83.9 kg (185 lb).    Gen: NAD, pleasant and cooperative            Laboratory/Imaging:     NA           Assessment/Plan:     Dali Martines is a 52 year old female with chronic pain and gait imbalance.       1. Recent fall with vertigo preceding, recommend she resume Epleys, HEP, postural advice, and f/u with Dr. Castano. Referral sent.  2. Chronic pain: continue PT, she may switch facilities when she begins her 6 session for ear/TMJ.   3. F/u BASIL Bailey MD  6/21/2023  Physical Medicine & Rehabilitation    I spent a total of 36 minutes face-to-face with Dali Martines during today's office visit. Over 50% of this time was spent counseling the patient and/or coordinating care. See note for details.     18 minutes spent on the date of the encounter doing chart review, history and exam, documentation and further activities as noted above      "

## 2023-06-21 NOTE — PROGRESS NOTES
Virtual Visit Details    Type of service:  Video Visit     Originating Location (pt. Location): Home  Distant Location (provider location):  Off-site  Platform used for Video Visit: Well     GI CLINIC VISIT    CC/REFERRING PROVIDER: Frieda Dunbar    HPI: 52 year old female with PMH of psoriatic arthritis on methotrexate, asthma, type I diabetes, endometriosis presenting to GI clinic for hiatal hernia and delayed gastric emptying    Dali states she has had GI issues off and on since the mid-90s. She previously followed with Dr. Shelton in [id number]. She underwent evaluations as noted below and ultimately was felt to have functional dyspepsia.  - NM GES in 2014 with normal emptying, study was limited to 180 minutes  - HBT 2014 positive, report indicates she was constipated at time of study  - EGD 1/2014- small hiatal hernia. Normal esophagus. Normal duodenum.  Colonoscopy 2020 - normal, repeat 5 years    Today, she is presenting with progressive symptoms as follows:    1. She is concerned about the hiatal hernia and anti-reflux valve. If she has any liquid or food in her stomach, and laying down at all, it will reflux up into her esophagus. She feels this is very positional, with the reclined position being the worst. The regurgitant is bland. If there is regurgitation of food, it is partially digested. There does not seem to be any relation to types of food that trigger symptoms, but rather just the physical quanity. She does note that high glucose does exacerbate this. No dysphagia, odynophagia. Not experiencing heartburn or acid reflux symptoms on any regularly basis. Currently not on any anti-acids. She previously did try famotidine in the past without clear improvement.    2. She is also having what she describes as extreme bouts of nausea, occurring episodically. During these episodes, she cannot keep anything down, including water. This last occurred several weeks ago and has occurred several times in total.  During this most recent episode, she had a stressful day prior to that. The next morning, she had coffee, breakfast, and an hour later, threw everything up. This can be more consistent with regurgitation at times as there is no retching present. The emesis mostly is bland. She wasn't able to tolerate any PO intake for a full day. She felt like there was a physical overflow, describing a fullness in her stomach. She notes some extreme hypoglycemia and feeling like she has to sneeze and nausea. She notes she can get the most extreme nausea after a low blood glucose episode, which she relates to the high sugar. She has lost some weight on ozempic.These symptoms tend to be present within an hour of eating. There is often an epigastric pain in relation to this. She notes periodic RUQ pain.    In between episodes, she does have to pay attention to how full her stomach is, as this can contribute to nausea and/or vomiting. She does endorse early satiety and postprandial fullness. Notably, she is on ozempic. She has been on this for one to two years, and on an increased dose since last Fall or so she thinks. She does have some intermittent postprandial nausea to a more mild degree compared to the more extreme episodes, above. She recalls having a full glass of water the other night and went to go lay down (using elevated pillows) and then proceeded to throw up.    3. Bowel pattern tends towards constipation per her account. SHe has a BM daily to every other day, stools can be marble-like (once per week) to hard or dry and difficult to pass (estimates once every other week). Rare sense of incomplete evacuation. She does occasionally have BRBPR. No melena.     She notes that when she was a child, she had a severe case of mono, out of school for months. She developed constipation, remembers mineral oil, enemas. She feels like after recovering from this, she feels like this changed her metabolism, contributed to new endocrine  problems.    Most recent A1c 6.5%  TSH mildly elevated    No known FHx of GI malignancy.    ROS: 10pt ROS performed and otherwise negative.    PAST MEDICAL HISTORY:  Past Medical History:   Diagnosis Date     Anemia      Anxiety      Arthritis 2014    Psoriatic arthritis     Back injury 1988     Dry eye syndrome      Dyslipidemia      Endometriosis 2002    adehsions seen at laparoscopy     GERD (gastroesophageal reflux disease)      Hypothyroidism     8 yoa     SOB (shortness of breath) 3/11/2014     Type I (juvenile type) diabetes mellitus without mention of complication, not stated as uncontrolled     when 15      Uncomplicated asthma Fall 2013       PREVIOUS ABDOMINAL/GYNECOLOGIC SURGERIES:  Past Surgical History:   Procedure Laterality Date     COLONOSCOPY  2002     COLONOSCOPY N/A 6/23/2020    Procedure: COLONOSCOPY;  Surgeon: Lauryn Rivera MD;  Location:  GI     ESOPHAGOSCOPY, GASTROSCOPY, DUODENOSCOPY (EGD), COMBINED  1/7/2014    Procedure: COMBINED ESOPHAGOSCOPY, GASTROSCOPY, DUODENOSCOPY (EGD), BIOPSY SINGLE OR MULTIPLE;;  Surgeon: Kraig Nicole MD;  Location:  GI     GYN SURGERY      Laparotomy to remove endometrial tissue     HC BREATH HYDROGEN TEST N/A 6/17/2014    Procedure: HYDROGEN BREATH TEST;  Surgeon: Deacon Alberts MD;  Location:  GI     HYDROGEN BREATH TEST  6/17/14     LAPAROSCOPIC APPENDECTOMY  2002     LAPAROTOMY, LYSIS ADHESIONS, COMBINED  2002    endometriosis     SOFT TISSUE SURGERY  December 2014    right ankle tendon injury     ZZC REPAIR CRUCIATE LIGAMENT,KNEE       ZZC STOMACH SURGERY PROCEDURE UNLISTED  December 2002         PERTINENT MEDICATIONS:  Current Outpatient Medications   Medication Sig Dispense Refill     albuterol (PROAIR HFA/PROVENTIL HFA/VENTOLIN HFA) 108 (90 Base) MCG/ACT inhaler Inhale 2 puffs into the lungs every 4 hours as needed for shortness of breath / dyspnea or wheezing 1 Inhaler 11     aspirin 81 MG tablet Take 1 tablet by mouth daily.        blood glucose (ACCU-CHEK COLIN PLUS) test strip Test Blood Sugar 8 times daily or as directed 800 strip 3     blood glucose monitoring (ACCU-CHEK FASTCLIX) lancets Use to test blood sugar 8 times daily or as directed. 4 Box 3     blood glucose monitoring (NO BRAND SPECIFIED) meter device kit Use to test blood sugar 8 times daily or as directed. Any covered brand, 1 kit 0     Calcium Carbonate-Vitamin D (CALCIUM + D PO) Take one daily       citalopram (CELEXA) 20 MG tablet Take 1.5 tablets (30 mg) by mouth daily 135 tablet 1     Continuous Blood Gluc  (DEXCOM G6 ) SUZANNE Use to read blood sugars as per 's instructions. 1 each 0     Continuous Blood Gluc Sensor (DEXCOM G6 SENSOR) MISC Change every 10 days. 9 each 3     Continuous Blood Gluc Transmit (DEXCOM G6 TRANSMITTER) MISC Change every 3 months. 1 each 3     diclofenac (VOLTAREN) 1 % topical gel Apply 2 g topically 4 times daily 100 g 4     diclofenac (VOLTAREN) 1 % topical gel APPLY 2 GRAMS ONTO THE SKIN FOUR TIMES DAILY AS NEEDED FOR MODERATE PAIN 100 g 5     DULERA 100-5 MCG/ACT inhaler INHALE 2 PUFFS INTO THE LUNGS TWICE DAILY 13 g 5     econazole nitrate 1 % cream Apply 0.5 inches topically daily.       FIASP PENFILL 100 UNIT/ML SOCT INJECT UP TO 45 UNITS DAILY AS DIRECTED ACCORDING TO CORRECTION FACTOR. 45 mL 3     fish oil-omega-3 fatty acids 1000 MG capsule Take one daily       folic acid (FOLVITE) 1 MG tablet Take 1 tablet (1 mg) by mouth daily 90 tablet 3     Injection Device for insulin (INPEN 100-BLUE-NOVOLOG-FIASP) SUZANNE 1 each once for 1 dose 1 each 0     insulin degludec (TRESIBA FLEXTOUCH) 100 UNIT/ML pen ADMINISTER 17 UNITS UNDER THE SKIN DAILY 30 mL 1     insulin pen needle (B-D U/F) 31G X 8 MM miscellaneous Use 5 pen needles daily 450 each 3     Ketoconazole (NIZORAL PO) Shampoo daily       levothyroxine (SYNTHROID/LEVOTHROID) 112 MCG tablet TAKE 1 TABLET(112 MCG) BY MOUTH DAILY 90 tablet 4     methotrexate 2.5 MG  tablet Take 6 tablets (15 mg) by mouth every 7 days Labs every 8 - 12 weeks for refills. 72 tablet 0     methotrexate 2.5 MG tablet Take 6 tablets (15 mg) by mouth every 7 days Labs required every 8-12 weeks while taking this medication 72 tablet 0     methylphenidate (METADATE CD) 20 MG CR capsule Take 20 mg by mouth daily       methylphenidate (RITALIN) 10 MG tablet TAKE UP TO 2 TABLETS BY MOUTH IN THE LATE AFTERNOON.       Minoxidil (ROGAINE EX) daily       montelukast (SINGULAIR) 5 MG chewable tablet CHEW AND SWALLOW 1 TABLET(5 MG) BY MOUTH AT BEDTIME 90 tablet 4     Multiple Vitamin (MULTIVITAMIN OR) Take one daily tab       NOVOLOG PENFILL 100 UNIT/ML soln INJECT 1 UNIT PER 15 GRAMS CHO AT ALL MEALS AND SNACKS WITH CORRECTION SCALE OF 1 UNIT PER 50 MG/DL OVER 150, AVERAGE DAILY USE OF 30 UNITS 30 mL 4     Semaglutide, 1 MG/DOSE, 4 MG/3ML SOPN Inject 1 mg Subcutaneous every 7 days 12 mL 3     simvastatin (ZOCOR) 20 MG tablet Take 1 tablet (20 mg) by mouth At Bedtime 90 tablet 3     traZODone (DESYREL) 50 MG tablet Take 1-2 tablets by mouth nightly as needed for sleep. 180 tablet 0     .    SOCIAL HISTORY:  Social History     Socioeconomic History     Marital status: Single     Spouse name: Not on file     Number of children: 0     Years of education: Not on file     Highest education level: Not on file   Occupational History     Occupation: research     Employer: [employer]   Tobacco Use     Smoking status: Never     Smokeless tobacco: Never   Vaping Use     Vaping Use: Never used   Substance and Sexual Activity     Alcohol use: Yes     Comment: moderately     Drug use: No     Sexual activity: Not on file   Other Topics Concern     Parent/sibling w/ CABG, MI or angioplasty before 65F 55M? Yes   Social History Narrative     Not on file     Social Determinants of Health     Financial Resource Strain: Not on file   Food Insecurity: Not on file   Transportation Needs: Not on file   Physical Activity: Not on  file   Stress: Not on file   Social Connections: Not on file   Intimate Partner Violence: Not on file   Housing Stability: Not on file       FAMILY HISTORY:    Family History   Problem Relation Age of Onset     Breast Cancer Mother      Heart Disease Father      C.A.D. Father      Arthritis Father      Circulatory Father      Eye Disorder Father      Lipids Father      Thyroid Disease Father      Macular Degeneration Father      Coronary Artery Disease Father      Anxiety Disorder Father      Alcoholism Father      Rheumatoid Arthritis Father      Hypothyroidism Father      Diabetes Father      Cerebrovascular Disease Paternal Grandmother         ,     Cancer Paternal Grandfather         Pancreatic     Heart Disease Paternal Grandfather      Diabetes Maternal Grandmother         Type 2     C.A.D. Maternal Grandmother      Heart Disease Maternal Grandmother      Coronary Artery Disease Maternal Grandmother      Heart Disease Maternal Grandfather      Cardiac Sudden Death Maternal Grandfather      Gynecology Other         self     Thyroid Disease Other         self     Macular Degeneration Maternal Aunt      Diabetes Other         Type 1     Glaucoma No family hx of        PHYSICAL EXAMINATION:  Vitals reviewed  There were no vitals taken for this visit.  Video physical exam  Wt Readings from Last 10 Encounters:   06/21/23 83.9 kg (185 lb)   05/30/23 83.9 kg (185 lb)   05/18/23 81.7 kg (180 lb 3.2 oz)   03/10/23 86.2 kg (190 lb)   12/16/22 88.5 kg (195 lb)   10/07/22 90.7 kg (200 lb)   09/02/22 90.3 kg (199 lb)   07/07/22 90.3 kg (199 lb)   05/12/22 86.2 kg (190 lb)   05/10/22 90.4 kg (199 lb 3.2 oz)   General: Patient appears well in no acute distress.   Skin: No visualized rash or lesions on visualized skin  Eyes: EOMI, no erythema, sclera icterus or discharge noted  Resp: Appears to be breathing comfortably without accessory muscle usage, speaking in full sentences, no cough  MSK: Appears to have normal range of  motion based on visualized movements  Neurologic: No apparent tremors, facial movements symmetric  Psych: affect normal, alert and oriented    The rest of a comprehensive physical examination is deferred due to PHE (public health emergency) video restrictions      PERTINENT STUDIES Reviewed in EMR    ASSESSMENT/PLAN:    # Nausea with vomiting, episodic  # Postprandial fullness  # Regurgitation  Dali has a long history of GI concerns, with evaluation in 2014 notable for positive breath test, and otherwise unremarkable NM GES and EGD, outside of small hiatal hernia. She was felt to possibly have intrmittent delayed emptying and functional dyspepsia. She is now presenting with progressive symptoms, particularly with frequent regurgitation of predominantly bland liquid and/or partially digested bolus. She is also experiencing episodic nausea, vomiting, and RUQ pain, however the vomiting, too, does seem possibly more consistent with regurgitation in these settings as well.    We discussed a wide differential for her symptoms. It is certainly possible that underlying semaglutide use is contributing to these symptoms. A biliary etiology is also possible, which can also be associated with semaglutide. Delayed gastric emptying in the setting of diabetes is also possible. We discussed that even transient hyperglycemia can delay gastric emptying. She does have a history of SIBO with possible benefit with previous treatment, so this could be considered as well. We discussed several options and mutually developed the following plan:    1) EGD, will recheck biopsies for celiac (previously negative)  2) RUQ US  3) Trial of Gaviscon  4) May use Zofran ODT 4 mg if she experiences the severe nausea/vomiting again. Discussed risk of constipation.  5) We discussed possible empiric trial of gastroparesis diet versus NM GES, knowing that ozempic could conribute to a positive finding. She wished to hold off for now.  6) Future  considerations pending the above, and may include HIDA scan for further evaluation of biliary etiology, HBT versus empiric trial of Rifaximin, trial of medication for functional dyspepsia (PPI, FDGard, or neuromodulation)    # Intermittent constipation  We discussed that constipation can contribute to delayed gastric emptying, nausea, etc. Recommended to initiate osmotic laxative in the form of magnesium or miraLAX.    RTC 3 months    Thank you for this consultation. It was a pleasure to participate in the care of this patient; please contact us with any further questions.    Gloria House PA-C    87 minutes spent on the date of the encounter doing chart review, review of outside records, review of test results, patient visit and documentation

## 2023-06-21 NOTE — PATIENT INSTRUCTIONS
"It was a pleasure taking care of you today.  I've included a brief summary of our discussion and care plan from today's visit below.  Please review this information with your primary care provider.  _______________________________________________________________________    My recommendations are summarized as follows:    1) Order placed for upper endoscopy. The endoscopy scheduling team will typically call within 2-5 business days, or you can call [phone], option #2 to schedule.     2) Order placed for ultrasound of the gallbladder. To schedule imaging, please call [phone]     3).You can try adding in a product containing alginate. There are two brands that contain alginate:  -- Gaviscon - available over the counter - you want the \"maximum\" or \"extra strength\" version (easy to order from Amazon). Take 1 ounce (2 tablespoons) before meals and before bed, as needed.  -- Reflux Gourmet - also available on Amazon, Follow directions on product label.    4) Consider adding in a gentle, osmotic laxative. You can choose one of the following:  -- You can use over-the-counter magnesium supplements, which work as a gentle, non-habit forming laxative. Look for magnesium citrate, magnesium oxide, or magnesium glycinate. These are often available in tablets, capsules, powders, or gummies. A good starting dose for most people is 300-400 mg daily. If you experience diarrhea with this dosing, you can decrease to 100-200 mg daily. It may be increased up to 1000 mg daily.  -- Or, you can use MiraLAX 1 capful of powder daily.      Return to GI Clinic in 3 months to review your progress.    ______________________________________________________________________    How do I schedule labs, imaging studies, or procedures that were ordered in clinic today?     Labs: To schedule lab appointment at the [facility], use my chart or call [phone]. If you have a [place] lab closer to home where you are regularly " seen you can give them a call.     Procedures: If a colonoscopy, upper endoscopy, breath test, esophageal manometry, or pH impedence was ordered today, our endoscopy team will call you to schedule this. If you have not heard from our endoscopy team within a week, please call [phone] option 2 to schedule.     Imaging Studies: If you were scheduled for a CT scan, X-ray, MRI, ultrasound, HIDA scan or other imaging study, please call [phone] to have this scheduled.     Referral: If a referral to another specialty was ordered, expect a phone call or follow instructions above. If you have not heard from anyone regarding your referral in a week, please call our clinic to check the status.     Who do I call with any questions after my visit?  Please be in touch if there are any further questions that arise following today's visit.  There are multiple ways to contact your gastroenterology care team.      During business hours, you may reach a Gastroenterology nurse at [phone]    To schedule or reschedule an appointment, please call [phone].     You can always send a secure message through Plix.  Plix messages are answered by your nurse or doctor typically within 24 hours.  Please allow extra time on weekends and holidays.      For urgent/emergent questions after business hours, you may reach the on-call GI Fellow by contacting the [facility]  at [phone].     How will I get the results of any tests ordered?    You will receive all of your results.  If you have signed up for Plix, any tests ordered at your visit will be available to you after your physician reviews them.  Typically this takes 1-2 weeks.  If there are urgent results that require a change in your care plan, your physician or nurse will call you to discuss the next steps.      What is Plix?  Plix is a secure way for you to access all of your healthcare records from the [facility].  It is a  web based computer program, so you can sign on to it from any location.  It also allows you to send secure messages to your care team.  I recommend signing up for Tallyfy access if you have not already done so and are comfortable with using a computer.      How to I schedule a follow-up visit?  If you did not schedule a follow-up visit today, please call [phone] to schedule a follow-up office visit.      Sincerely,    Gloria House PA-C  Division of Gastroenterology, Hepatology & Nutrition  [facility]

## 2023-06-21 NOTE — LETTER
6/21/2023         RE: Dali Martines  [address]  [facility] [place] [address]        Dear Colleague,    Thank you for referring your patient, Dali Martines, to the [facility] [place]. Please see a copy of my visit note below.    Virtual Visit Details    Type of service:  Video Visit     Originating Location (pt. Location): Home  Distant Location (provider location):  Off-site  Platform used for Video Visit: [facility]     GI CLINIC VISIT    CC/REFERRING PROVIDER: Frieda Dunbar    HPI: 52 year old female with PMH of psoriatic arthritis on methotrexate, asthma, type I diabetes, endometriosis presenting to GI clinic for hiatal hernia and delayed gastric emptying    Dali states she has had GI issues off and on since the mid-90s. She previously followed with Dr. Shelton in [phone]. She underwent evaluations as noted below and ultimately was felt to have functional dyspepsia.  - NM GES in 2014 with normal emptying, study was limited to 180 minutes  - HBT 2014 positive, report indicates she was constipated at time of study  - EGD 1/2014- small hiatal hernia. Normal esophagus. Normal duodenum.  Colonoscopy 2020 - normal, repeat 5 years    Today, she is presenting with progressive symptoms as follows:    1. She is concerned about the hiatal hernia and anti-reflux valve. If she has any liquid or food in her stomach, and laying down at all, it will reflux up into her esophagus. She feels this is very positional, with the reclined position being the worst. The regurgitant is bland. If there is regurgitation of food, it is partially digested. There does not seem to be any relation to types of food that trigger symptoms, but rather just the physical quanity. She does note that high glucose does exacerbate this. No dysphagia, odynophagia. Not experiencing heartburn or acid reflux symptoms on any regularly basis. Currently not on any anti-acids. She previously did try famotidine in the past  without clear improvement.    2. She is also having what she describes as extreme bouts of nausea, occurring episodically. During these episodes, she cannot keep anything down, including water. This last occurred several weeks ago and has occurred several times in total. During this most recent episode, she had a stressful day prior to that. The next morning, she had coffee, breakfast, and an hour later, threw everything up. This can be more consistent with regurgitation at times as there is no retching present. The emesis mostly is bland. She wasn't able to tolerate any PO intake for a full day. She felt like there was a physical overflow, describing a fullness in her stomach. She notes some extreme hypoglycemia and feeling like she has to sneeze and nausea. She notes she can get the most extreme nausea after a low blood glucose episode, which she relates to the high sugar. She has lost some weight on ozempic.These symptoms tend to be present within an hour of eating. There is often an epigastric pain in relation to this. She notes periodic RUQ pain.    In between episodes, she does have to pay attention to how full her stomach is, as this can contribute to nausea and/or vomiting. She does endorse early satiety and postprandial fullness. Notably, she is on ozempic. She has been on this for one to two years, and on an increased dose since last Fall or so she thinks. She does have some intermittent postprandial nausea to a more mild degree compared to the more extreme episodes, above. She recalls having a full glass of water the other night and went to go lay down (using elevated pillows) and then proceeded to throw up.    3. Bowel pattern tends towards constipation per her account. SHe has a BM daily to every other day, stools can be marble-like (once per week) to hard or dry and difficult to pass (estimates once every other week). Rare sense of incomplete evacuation. She does occasionally have BRBPR. No melena.      She notes that when she was a child, she had a severe case of mono, out of school for months. She developed constipation, remembers mineral oil, enemas. She feels like after recovering from this, she feels like this changed her metabolism, contributed to new endocrine problems.    Most recent A1c 6.5%  TSH mildly elevated    No known FHx of GI malignancy.    ROS: 10pt ROS performed and otherwise negative.    PAST MEDICAL HISTORY:  Past Medical History:   Diagnosis Date    Anemia     Anxiety     Arthritis 2014    Psoriatic arthritis    Back injury 1988    Dry eye syndrome     Dyslipidemia     Endometriosis 2002    adehsions seen at laparoscopy    GERD (gastroesophageal reflux disease)     Hypothyroidism     8 yoa    SOB (shortness of breath) 3/11/2014    Type I (juvenile type) diabetes mellitus without mention of complication, not stated as uncontrolled     when 15     Uncomplicated asthma Fall 2013       PREVIOUS ABDOMINAL/GYNECOLOGIC SURGERIES:  Past Surgical History:   Procedure Laterality Date    COLONOSCOPY  2002    COLONOSCOPY N/A 6/23/2020    Procedure: COLONOSCOPY;  Surgeon: Lauryn Rivera MD;  Location:  GI    ESOPHAGOSCOPY, GASTROSCOPY, DUODENOSCOPY (EGD), COMBINED  1/7/2014    Procedure: COMBINED ESOPHAGOSCOPY, GASTROSCOPY, DUODENOSCOPY (EGD), BIOPSY SINGLE OR MULTIPLE;;  Surgeon: Kraig Nicole MD;  Location:  GI    GYN SURGERY      Laparotomy to remove endometrial tissue    HC BREATH HYDROGEN TEST N/A 6/17/2014    Procedure: HYDROGEN BREATH TEST;  Surgeon: Deacon Alberts MD;  Location:  GI    HYDROGEN BREATH TEST  6/17/14    LAPAROSCOPIC APPENDECTOMY  2002    LAPAROTOMY, LYSIS ADHESIONS, COMBINED  2002    endometriosis    SOFT TISSUE SURGERY  December 2014    right ankle tendon injury    ZZC REPAIR CRUCIATE LIGAMENT,KNEE      ZZC STOMACH SURGERY PROCEDURE UNLISTED  December 2002         PERTINENT MEDICATIONS:  Current Outpatient Medications   Medication Sig Dispense Refill     albuterol (PROAIR HFA/PROVENTIL HFA/VENTOLIN HFA) 108 (90 Base) MCG/ACT inhaler Inhale 2 puffs into the lungs every 4 hours as needed for shortness of breath / dyspnea or wheezing 1 Inhaler 11    aspirin 81 MG tablet Take 1 tablet by mouth daily.      blood glucose (ACCU-CHEK COLIN PLUS) test strip Test Blood Sugar 8 times daily or as directed 800 strip 3    blood glucose monitoring (ACCU-CHEK FASTCLIX) lancets Use to test blood sugar 8 times daily or as directed. 4 Box 3    blood glucose monitoring (NO BRAND SPECIFIED) meter device kit Use to test blood sugar 8 times daily or as directed. Any covered brand, 1 kit 0    Calcium Carbonate-Vitamin D (CALCIUM + D PO) Take one daily      citalopram (CELEXA) 20 MG tablet Take 1.5 tablets (30 mg) by mouth daily 135 tablet 1    Continuous Blood Gluc  (DEXCOM G6 ) SUAZNNE Use to read blood sugars as per 's instructions. 1 each 0    Continuous Blood Gluc Sensor (DEXCOM G6 SENSOR) MISC Change every 10 days. 9 each 3    Continuous Blood Gluc Transmit (DEXCOM G6 TRANSMITTER) MISC Change every 3 months. 1 each 3    diclofenac (VOLTAREN) 1 % topical gel Apply 2 g topically 4 times daily 100 g 4    diclofenac (VOLTAREN) 1 % topical gel APPLY 2 GRAMS ONTO THE SKIN FOUR TIMES DAILY AS NEEDED FOR MODERATE PAIN 100 g 5    DULERA 100-5 MCG/ACT inhaler INHALE 2 PUFFS INTO THE LUNGS TWICE DAILY 13 g 5    econazole nitrate 1 % cream Apply 0.5 inches topically daily.      FIASP PENFILL 100 UNIT/ML SOCT INJECT UP TO 45 UNITS DAILY AS DIRECTED ACCORDING TO CORRECTION FACTOR. 45 mL 3    fish oil-omega-3 fatty acids 1000 MG capsule Take one daily      folic acid (FOLVITE) 1 MG tablet Take 1 tablet (1 mg) by mouth daily 90 tablet 3    Injection Device for insulin (INPEN 100-BLUE-NOVOLOG-FIASP) SUZANNE 1 each once for 1 dose 1 each 0    insulin degludec (TRESIBA FLEXTOUCH) 100 UNIT/ML pen ADMINISTER 17 UNITS UNDER THE SKIN DAILY 30 mL 1    insulin pen needle (B-D U/F) 31G X  8 MM miscellaneous Use 5 pen needles daily 450 each 3    Ketoconazole (NIZORAL PO) Shampoo daily      levothyroxine (SYNTHROID/LEVOTHROID) 112 MCG tablet TAKE 1 TABLET(112 MCG) BY MOUTH DAILY 90 tablet 4    methotrexate 2.5 MG tablet Take 6 tablets (15 mg) by mouth every 7 days Labs every 8 - 12 weeks for refills. 72 tablet 0    methotrexate 2.5 MG tablet Take 6 tablets (15 mg) by mouth every 7 days Labs required every 8-12 weeks while taking this medication 72 tablet 0    methylphenidate (METADATE CD) 20 MG CR capsule Take 20 mg by mouth daily      methylphenidate (RITALIN) 10 MG tablet TAKE UP TO 2 TABLETS BY MOUTH IN THE LATE AFTERNOON.      Minoxidil (ROGAINE EX) daily      montelukast (SINGULAIR) 5 MG chewable tablet CHEW AND SWALLOW 1 TABLET(5 MG) BY MOUTH AT BEDTIME 90 tablet 4    Multiple Vitamin (MULTIVITAMIN OR) Take one daily tab      NOVOLOG PENFILL 100 UNIT/ML soln INJECT 1 UNIT PER 15 GRAMS CHO AT ALL MEALS AND SNACKS WITH CORRECTION SCALE OF 1 UNIT PER 50 MG/DL OVER 150, AVERAGE DAILY USE OF 30 UNITS 30 mL 4    Semaglutide, 1 MG/DOSE, 4 MG/3ML SOPN Inject 1 mg Subcutaneous every 7 days 12 mL 3    simvastatin (ZOCOR) 20 MG tablet Take 1 tablet (20 mg) by mouth At Bedtime 90 tablet 3    traZODone (DESYREL) 50 MG tablet Take 1-2 tablets by mouth nightly as needed for sleep. 180 tablet 0     .    SOCIAL HISTORY:  Social History     Socioeconomic History    Marital status: Single     Spouse name: Not on file    Number of children: 0    Years of education: Not on file    Highest education level: Not on file   Occupational History    Occupation: research     Employer: [employer]   Tobacco Use    Smoking status: Never    Smokeless tobacco: Never   Vaping Use    Vaping Use: Never used   Substance and Sexual Activity    Alcohol use: Yes     Comment: moderately    Drug use: No    Sexual activity: Not on file   Other Topics Concern    Parent/sibling w/ CABG, MI or angioplasty before 65F 55M? Yes   Social  History Narrative    Not on file     Social Determinants of Health     Financial Resource Strain: Not on file   Food Insecurity: Not on file   Transportation Needs: Not on file   Physical Activity: Not on file   Stress: Not on file   Social Connections: Not on file   Intimate Partner Violence: Not on file   Housing Stability: Not on file       FAMILY HISTORY:    Family History   Problem Relation Age of Onset    Breast Cancer Mother     Heart Disease Father     C.A.D. Father     Arthritis Father     Circulatory Father     Eye Disorder Father     Lipids Father     Thyroid Disease Father     Macular Degeneration Father     Coronary Artery Disease Father     Anxiety Disorder Father     Alcoholism Father     Rheumatoid Arthritis Father     Hypothyroidism Father     Diabetes Father     Cerebrovascular Disease Paternal Grandmother         ,    Cancer Paternal Grandfather         Pancreatic    Heart Disease Paternal Grandfather     Diabetes Maternal Grandmother         Type 2    C.A.D. Maternal Grandmother     Heart Disease Maternal Grandmother     Coronary Artery Disease Maternal Grandmother     Heart Disease Maternal Grandfather     Cardiac Sudden Death Maternal Grandfather     Gynecology Other         self    Thyroid Disease Other         self    Macular Degeneration Maternal Aunt     Diabetes Other         Type 1    Glaucoma No family hx of        PHYSICAL EXAMINATION:  Vitals reviewed  There were no vitals taken for this visit.  Video physical exam  Wt Readings from Last 10 Encounters:   06/21/23 83.9 kg (185 lb)   05/30/23 83.9 kg (185 lb)   05/18/23 81.7 kg (180 lb 3.2 oz)   03/10/23 86.2 kg (190 lb)   12/16/22 88.5 kg (195 lb)   10/07/22 90.7 kg (200 lb)   09/02/22 90.3 kg (199 lb)   07/07/22 90.3 kg (199 lb)   05/12/22 86.2 kg (190 lb)   05/10/22 90.4 kg (199 lb 3.2 oz)   General: Patient appears well in no acute distress.   Skin: No visualized rash or lesions on visualized skin  Eyes: EOMI, no erythema, sclera  icterus or discharge noted  Resp: Appears to be breathing comfortably without accessory muscle usage, speaking in full sentences, no cough  MSK: Appears to have normal range of motion based on visualized movements  Neurologic: No apparent tremors, facial movements symmetric  Psych: affect normal, alert and oriented    The rest of a comprehensive physical examination is deferred due to PHE (public health emergency) video restrictions      PERTINENT STUDIES Reviewed in EMR    ASSESSMENT/PLAN:    # Nausea with vomiting, episodic  # Postprandial fullness  # Regurgitation  Dali has a long history of GI concerns, with evaluation in 2014 notable for positive breath test, and otherwise unremarkable NM GES and EGD, outside of small hiatal hernia. She was felt to possibly have intrmittent delayed emptying and functional dyspepsia. She is now presenting with progressive symptoms, particularly with frequent regurgitation of predominantly bland liquid and/or partially digested bolus. She is also experiencing episodic nausea, vomiting, and RUQ pain, however the vomiting, too, does seem possibly more consistent with regurgitation in these settings as well.    We discussed a wide differential for her symptoms. It is certainly possible that underlying semaglutide use is contributing to these symptoms. A biliary etiology is also possible, which can also be associated with semaglutide. Delayed gastric emptying in the setting of diabetes is also possible. We discussed that even transient hyperglycemia can delay gastric emptying. She does have a history of SIBO with possible benefit with previous treatment, so this could be considered as well. We discussed several options and mutually developed the following plan:    1) EGD, will recheck biopsies for celiac (previously negative)  2) RUQ US  3) Trial of Gaviscon  4) May use Zofran ODT 4 mg if she experiences the severe nausea/vomiting again. Discussed risk of constipation.  5) We  discussed possible empiric trial of gastroparesis diet versus NM GES, knowing that ozempic could conribute to a positive finding. She wished to hold off for now.  6) Future considerations pending the above, and may include HIDA scan for further evaluation of biliary etiology, HBT versus empiric trial of Rifaximin, trial of medication for functional dyspepsia (PPI, FDGard, or neuromodulation)    # Intermittent constipation  We discussed that constipation can contribute to delayed gastric emptying, nausea, etc. Recommended to initiate osmotic laxative in the form of magnesium or miraLAX.    RTC 3 months    Thank you for this consultation. It was a pleasure to participate in the care of this patient; please contact us with any further questions.    Gloria House PA-C    87 minutes spent on the date of the encounter doing chart review, review of outside records, review of test results, patient visit and documentation

## 2023-06-21 NOTE — NURSING NOTE
Is the patient currently in the state of [place]? NO    Visit mode:VIDEO    If the visit is dropped, the patient can be reconnected by: VIDEO VISIT: Send to e-mail at: [email or web address]    Will anyone else be joining the visit? NO      How would you like to obtain your AVS? MyChart    Are changes needed to the allergy or medication list? NO    Reason for visit: Consult

## 2023-06-22 ENCOUNTER — TELEPHONE (OUTPATIENT)
Dept: OTOLARYNGOLOGY | Facility: CLINIC | Age: 53
End: 2023-06-22

## 2023-06-22 ENCOUNTER — TELEPHONE (OUTPATIENT)
Dept: GASTROENTEROLOGY | Facility: CLINIC | Age: 53
End: 2023-06-22
Payer: COMMERCIAL

## 2023-06-22 ENCOUNTER — TELEPHONE (OUTPATIENT)
Dept: OTOLARYNGOLOGY | Facility: CLINIC | Age: 53
End: 2023-06-22
Payer: COMMERCIAL

## 2023-06-22 NOTE — TELEPHONE ENCOUNTER
1. Have you noticed any changes in hearing? No  2. Do you have ringing, buzzing, or other sounds in your ears or head, this is also referred to as Tinnitus? Yes  3. When and where was your last hearing test? no  4. Do you feel lightheaded or foggy? Yes  5. Do you have a spinning sensation? No  6. Is there any specific position that can bring on dizziness? random  7. Does looking up cause dizziness? Yes  8. Does getting in and our of bed cause dizziness? No  9. Does turning over in bed increase or cause dizziness? No  10. Does bending over cause dizziness? No  11. Is there anything that you can do to prevent the dizziness? Has tried home eply maneuvers   12. Has the dizziness gotten better with time? No  13. Have you seen Physical Therapy for dizziness? (Please indicate clinic and as much of the location as possible): No  14. Are you being referred to a specific physician? No  15. Have you been evaluated/treated for your dizziness at any other location?  (If yes,obtian as much clinic/provider/locaiton as possible) Yes. (If yes answer the following questions:)   Have you seen any ENT, Neurology, or other providers for these symptoms?             Yes, If yes, where? Dr. Mickey Cuevas  if yes, who?    Have you had any balance or Audiology testing? No Have you had an MRI or CT scan of your head or neck? Yes, If yes, where? CT Face in 2005 if yes, who?     Would you like to receive your Release of Information by mail or e-mail?  e-mail

## 2023-06-22 NOTE — TELEPHONE ENCOUNTER
[facility] Call Center    Phone Message    May a detailed message be left on voicemail: yes     Reason for Call: Other: Pt is needing to schedule appt from referral. Referral states Otalgia and vertigo. Pt lives in [facility] [place] [facility] and would like to be seen at [facility]. Please call to discuss and schedule     Action Taken: Other: ENT    Travel Screening: Not Applicable

## 2023-06-22 NOTE — TELEPHONE ENCOUNTER
Spoke with patient regarding follow-up orders per Gloria House. Patient preferred to schedule follow up appointment at this time. Video visit with Gloria is scheduled for 9/29/23. Return slot okay per VANE Posada.    CC number and imaging number provided for patient to call and schedule procedures/ultrasound

## 2023-06-27 NOTE — TELEPHONE ENCOUNTER
FUTURE VISIT INFORMATION:      FUTURE VISIT INFORMATION:    Date: 10/17/23     Time: 3 pm    Location: [facility]  REFERRAL INFORMATION:    Referring provider: Marlen Bailey MD    Referring providers clinic: [facility]    Reason for visit/diagnosis:  Vertigo- Referred by Marlen Bailey MD in [facility]    RECORDS REQUESTED FROM:       Clinic name Comments Records Status Imaging Status   [facility] 6/21/23 note- Marlen Bailey MD [facility] 8/17/20 note- Robbin Castano MD [facility]    Imaging CT Face 8/9/2005 Epic Pacs             June 27, 2023 12:07 PM - Forward to audiology to review -[place]

## 2023-06-27 NOTE — PROGRESS NOTES
Outcome for 23 2:58 PM: Squirro message sent  Sara Santoyo MA  Outcome for 23 6:59 AM: Data obtained via Dexcom website  Sraa Santoyo MA        This 52 year old woman was returns for f/u of her type 1 diabetes. She also has hypothyroidism and psoriatic arthritis.  Pt was dx having type 1 diabetes at age 15. Her hx is also significant for mild NPDR right eye only,anxiety, hyperlipidemia, psoriatic arthritis,  and GERD.    For her diabetes, she is currently taking Tresiba 17 units daily, Fiasp 1 unit/9 gms CHO with meals and snacks, plus correction insulin. Dali is also taking Ozempic 1 mg subcutaneous once a week..  Her Dexcom data are shown below.    Trena reports that she has been pretty overwhelmed for the last several months.  She lost her job at the [employer].  When she lost her job she also lost access to the psychologist and employee assistance.  She thinks she needs to see a psychologist again to help her with coping.  She is caring for her 92-year-old mother who has dementia and is insisting on living independently.  Her dog recently  unexpectedly.  She continues to worry a lot about her health.  She has had a recurrence of heartburn and nausea that she has experienced off and on throughout her life.  She believes the Ozempic has nothing to do with the symptoms and that most likely the cause is multifactorial.  She particularly thinks that hypoglycemia can set her off on an episode of nausea and abdominal pain that can last for hours.  When she has some lows, she has difficulty taking carbohydrate in.  This upsets her stomach and she then sometimes will vomit.  She saw GI recently for the symptoms and they are planning to repeat her endoscopy.  She did take a break from Ozempic for a week or 2 and was not positive it really made a difference in her symptoms.  She was at another clinician's office recently and had herself weighed.  She was surprised to see she was 20 pounds lighter  than she was last time she weighed herself.    She has no trouble recognizing her hypoglycemia.  She does report she sometimes forgets if she takes her insulin.  She is using the InPen device so that she can track that carefully.  She is not exercising very much because of pain in her ankle.  She has seen her eye doctor and reports that things are stable.  She has some numbness in one of her toes but has no foot ulcers.  She denies chest pain.  She does have shortness of breath from asthma.  It was exacerbated by the smoky air last week.  This is improved.    While she does not feel overwhelmed, she denies having suicidal ideation.  She has not changed her drinking habits nor is she taking recreational drugs.                              Current Outpatient Medications   Medication     albuterol (PROAIR HFA/PROVENTIL HFA/VENTOLIN HFA) 108 (90 Base) MCG/ACT inhaler     aspirin 81 MG tablet     blood glucose (ACCU-CHEK COLIN PLUS) test strip     blood glucose monitoring (ACCU-CHEK FASTCLIX) lancets     blood glucose monitoring (NO BRAND SPECIFIED) meter device kit     Calcium Carbonate-Vitamin D (CALCIUM + D PO)     citalopram (CELEXA) 20 MG tablet     Continuous Blood Gluc  (DEXCOM G6 ) SUZANNE     Continuous Blood Gluc Sensor (DEXCOM G6 SENSOR) MISC     Continuous Blood Gluc Transmit (DEXCOM G6 TRANSMITTER) MISC     diclofenac (VOLTAREN) 1 % topical gel     diclofenac (VOLTAREN) 1 % topical gel     DULERA 100-5 MCG/ACT inhaler     econazole nitrate 1 % cream     FIASP PENFILL 100 UNIT/ML SOCT     fish oil-omega-3 fatty acids 1000 MG capsule     folic acid (FOLVITE) 1 MG tablet     Injection Device for insulin (INPEN 100-BLUE-NOVOLOG-FIASP) SUZANNE     insulin degludec (TRESIBA FLEXTOUCH) 100 UNIT/ML pen     insulin pen needle (B-D U/F) 31G X 8 MM miscellaneous     Ketoconazole (NIZORAL PO)     levothyroxine (SYNTHROID/LEVOTHROID) 112 MCG tablet     methotrexate 2.5 MG tablet     methotrexate 2.5 MG tablet  "    methylphenidate (METADATE CD) 20 MG CR capsule     methylphenidate (RITALIN) 10 MG tablet     Minoxidil (ROGAINE EX)     montelukast (SINGULAIR) 5 MG chewable tablet     Multiple Vitamin (MULTIVITAMIN OR)     NOVOLOG PENFILL 100 UNIT/ML soln     ondansetron (ZOFRAN ODT) 4 MG ODT tab     Semaglutide, 1 MG/DOSE, 4 MG/3ML SOPN     simvastatin (ZOCOR) 20 MG tablet     traZODone (DESYREL) 50 MG tablet     Current Facility-Administered Medications   Medication     dexamethasone (DECADRON) injection 4 mg     lidocaine 1 % injection 1 mL     triamcinolone acetonide (KENALOG-10) injection 10 mg        Most Recent Value      Temp: -- 98  F (36.7  C)  as of 11/18/2021     Pulse: -- 79  as of 6/21/2023     BP: -- 102/70  as of 6/21/2023     Resp: -- 17  as of 5/18/2023     SpO2: -- 97%  as of 6/21/2023     Body Mass Index:  None     Systolic (Patient Repo...: -- Not taken     Diastolic (Patient Rep...: -- Not taken     Height: 1.778 m (5' 10\") 1.778 m (5' 10\")  as of 6/21/2023     Weight: 83.9 kg (185 lb) 83.9 kg (185 lb)  as of 6/21/2023     Body Surface Area:  2.04 m    1.778 m (5' 10\")  as of 6/21/2023   83.9 kg (185 lb)  as of 6/21/2023       On exam she is in no acute distress.  She has a flat affect.  Cranial nerves are grossly intact.    Recent Labs   Lab Test 04/10/23  1406 03/31/23  1033 12/21/22  1146 08/11/22  1129 05/10/22  1523 02/14/22  1004 11/18/21  1659 10/26/21  1556 06/18/19  0000 03/21/19  1359   A1C  --  6.5* 6.5*   < >  --  8.1*  --   --    < >  --    HEMOGLOBINA1  --   --   --   --  7.5  --   --  7.3   < >  --    TSH  --  6.94*  --   --   --  3.34  --   --    < > 0.19*   T4  --   --   --   --   --   --   --   --   --  1.09   LDL  --  107*  --   --   --  111*  --   --    < >  --    HDL  --  78  --   --   --  94  --   --    < >  --    TRIG  --  47  --   --   --  66  --   --    < >  --    CR  --  0.61 0.59   < >  --  0.60   < >  --    < >  --    MICROL <12.0  --   --   --   --  5  --   --    < >  --     " < > = values in this interval not displayed.       Assessment and plan:    1.  Diabetes control.  Overall she has not estimated A1c that is at target.  I will schedule her to have a laboratory A1c done.  She has much more variability than she would have if she were on a hybrid closed-loop.  I am particularly concerned that the low blood sugars are exacerbating her GI symptoms.  She agreed to meet with the diabetes nurse educators and learn about the options available to her to get a hybrid closed-loop.  I made no changes in her insulin dosing today.  I do worry that the Ozempic is contributing to her GI symptoms.  She really does not want to decrease the dose because she likes the weight loss that has caused.  If she were to reduce the dose, I encouraged her to go down to 0.5 mg each week.  I also told her I would not be against her stopping it.    2.  Diabetes complications.  She has background retinopathy and sees her eye doctor regularly.  Her renal function is normal.  She has some mild symptoms of neuropathy without injury.    3.hypothyroidism.  I will check her TSH.  It is possible that it could be exacerbating some of her GI symptoms if it is low.    4.CVD risk.  Her blood pressure was in target recently.  Her LDL is fine on her statin.    5.stress and depression.  I will refer her to Elise Sutton for evaluation and management.    Follow-up with Marissa Sebastian in 3 to 4 months and me in 6 to 8 months.    I spent 30 minutes with the patient on the video visit (start time 2: 3 0 and end time 3: 00).  On the day of visit I spent an additional 15 minutes reviewing and interpreting her pump and CGM data, ordering tests, and doing documentation.    Felicia Mckeon MD

## 2023-07-03 ENCOUNTER — TELEPHONE (OUTPATIENT)
Dept: RHEUMATOLOGY | Facility: CLINIC | Age: 53
End: 2023-07-03
Payer: COMMERCIAL

## 2023-07-03 NOTE — TELEPHONE ENCOUNTER
No answer and voicemail is full. [facility] sent for the patient to call back and schedule the following:    Appointment type: Return 60 minutes  Provider: Dr. Arshad  Return date: December 2023  Specialty phone number: [phone]  Additional appointment(s) needed: 1) labs in the next few weeks (early to middle of July) and 2) labs in early to middle of October     Additonal Notes: 3) Follow-up with me in December. Needs 1 hour slot. Can combine 2 returns/PAOs if needed. Virtual or in person, whichever she prefers. Can use NP slot if needed.

## 2023-07-03 NOTE — PROGRESS NOTES
No answer and voicemail is full.  M[employer] message sent for patient to call and schedule.  7/3/23  LG

## 2023-07-05 ENCOUNTER — VIRTUAL VISIT (OUTPATIENT)
Dept: ENDOCRINOLOGY | Facility: CLINIC | Age: 53
End: 2023-07-05
Payer: COMMERCIAL

## 2023-07-05 DIAGNOSIS — E03.4 HYPOTHYROIDISM DUE TO ACQUIRED ATROPHY OF THYROID: ICD-10-CM

## 2023-07-05 DIAGNOSIS — E10.3291 TYPE 1 DIABETES MELLITUS WITH MILD NONPROLIFERATIVE RETINOPATHY OF RIGHT EYE, MACULAR EDEMA PRESENCE UNSPECIFIED (H): Primary | ICD-10-CM

## 2023-07-05 PROCEDURE — 99215 OFFICE O/P EST HI 40 MIN: CPT | Mod: VID | Performed by: INTERNAL MEDICINE

## 2023-07-05 NOTE — PATIENT INSTRUCTIONS
Have your lab test done.Please contact us to schedule at any of our [place] lab locations  Call [phone] ([phone]), select option 1     Consider reducing the Ozempic to 0.5 mg each week or even stopping it.  It could be exacerbating her GI symptoms.    Meet with the nurse educator to review the options available to you for a hybrid closed-loop pump.    I will refer you to Elise Sutton in health psychology.  She should be reaching out to schedule an appointment.

## 2023-07-05 NOTE — LETTER
2023       RE: Dali Martines  [address]  [facility] [address]     Dear Colleague,    Thank you for referring your patient, Dali Martines, to the [facility] [place] at [facility]. Please see a copy of my visit note below.    Outcome for 23 2:58 PM: Edfolio message sent  Sara Santoyo MA  Outcome for 23 6:59 AM: Data obtained via Dexcom website  Sara Santoyo MA        This 52 year old woman was returns for f/u of her type 1 diabetes. She also has hypothyroidism and psoriatic arthritis.  Pt was dx having type 1 diabetes at age 15. Her hx is also significant for mild NPDR right eye only,anxiety, hyperlipidemia, psoriatic arthritis,  and GERD.    For her diabetes, she is currently taking Tresiba 17 units daily, Fiasp 1 unit/9 gms CHO with meals and snacks, plus correction insulin. Dali is also taking Ozempic 1 mg subcutaneous once a week..  Her Dexcom data are shown below.    Trena reports that she has been pretty overwhelmed for the last several months.  She lost her job at the [employer].  When she lost her job she also lost access to the psychologist and employee assistance.  She thinks she needs to see a psychologist again to help her with coping.  She is caring for her 92-year-old mother who has dementia and is insisting on living independently.  Her dog recently  unexpectedly.  She continues to worry a lot about her health.  She has had a recurrence of heartburn and nausea that she has experienced off and on throughout her life.  She believes the Ozempic has nothing to do with the symptoms and that most likely the cause is multifactorial.  She particularly thinks that hypoglycemia can set her off on an episode of nausea and abdominal pain that can last for hours.  When she has some lows, she has difficulty taking carbohydrate in.  This upsets her stomach and she then sometimes will vomit.  She  saw GI recently for the symptoms and they are planning to repeat her endoscopy.  She did take a break from Ozempic for a week or 2 and was not positive it really made a difference in her symptoms.  She was at another clinician's office recently and had herself weighed.  She was surprised to see she was 20 pounds lighter than she was last time she weighed herself.    She has no trouble recognizing her hypoglycemia.  She does report she sometimes forgets if she takes her insulin.  She is using the InPen device so that she can track that carefully.  She is not exercising very much because of pain in her ankle.  She has seen her eye doctor and reports that things are stable.  She has some numbness in one of her toes but has no foot ulcers.  She denies chest pain.  She does have shortness of breath from asthma.  It was exacerbated by the smoky air last week.  This is improved.    While she does not feel overwhelmed, she denies having suicidal ideation.  She has not changed her drinking habits nor is she taking recreational drugs.                              Current Outpatient Medications   Medication    albuterol (PROAIR HFA/PROVENTIL HFA/VENTOLIN HFA) 108 (90 Base) MCG/ACT inhaler    aspirin 81 MG tablet    blood glucose (ACCU-CHEK COLIN PLUS) test strip    blood glucose monitoring (ACCU-CHEK FASTCLIX) lancets    blood glucose monitoring (NO BRAND SPECIFIED) meter device kit    Calcium Carbonate-Vitamin D (CALCIUM + D PO)    citalopram (CELEXA) 20 MG tablet    Continuous Blood Gluc  (DEXCOM G6 ) SUZANNE    Continuous Blood Gluc Sensor (DEXCOM G6 SENSOR) MISC    Continuous Blood Gluc Transmit (DEXCOM G6 TRANSMITTER) MISC    diclofenac (VOLTAREN) 1 % topical gel    diclofenac (VOLTAREN) 1 % topical gel    DULERA 100-5 MCG/ACT inhaler    econazole nitrate 1 % cream    FIASP PENFILL 100 UNIT/ML SOCT    fish oil-omega-3 fatty acids 1000 MG capsule    folic acid (FOLVITE) 1 MG tablet    Injection Device for  "insulin (INPEN 100-BLUE-NOVOLOG-FIASP) SUZANNE    insulin degludec (TRESIBA FLEXTOUCH) 100 UNIT/ML pen    insulin pen needle (B-D U/F) 31G X 8 MM miscellaneous    Ketoconazole (NIZORAL PO)    levothyroxine (SYNTHROID/LEVOTHROID) 112 MCG tablet    methotrexate 2.5 MG tablet    methotrexate 2.5 MG tablet    methylphenidate (METADATE CD) 20 MG CR capsule    methylphenidate (RITALIN) 10 MG tablet    Minoxidil (ROGAINE EX)    montelukast (SINGULAIR) 5 MG chewable tablet    Multiple Vitamin (MULTIVITAMIN OR)    NOVOLOG PENFILL 100 UNIT/ML soln    ondansetron (ZOFRAN ODT) 4 MG ODT tab    Semaglutide, 1 MG/DOSE, 4 MG/3ML SOPN    simvastatin (ZOCOR) 20 MG tablet    traZODone (DESYREL) 50 MG tablet     Current Facility-Administered Medications   Medication    dexamethasone (DECADRON) injection 4 mg    lidocaine 1 % injection 1 mL    triamcinolone acetonide (KENALOG-10) injection 10 mg        Most Recent Value      Temp: -- 98  F (36.7  C)  as of 11/18/2021     Pulse: -- 79  as of 6/21/2023     BP: -- 102/70  as of 6/21/2023     Resp: -- 17  as of 5/18/2023     SpO2: -- 97%  as of 6/21/2023     Body Mass Index:  None     Systolic (Patient Repo...: -- Not taken     Diastolic (Patient Rep...: -- Not taken     Height: 1.778 m (5' 10\") 1.778 m (5' 10\")  as of 6/21/2023     Weight: 83.9 kg (185 lb) 83.9 kg (185 lb)  as of 6/21/2023     Body Surface Area:  2.04 m    1.778 m (5' 10\")  as of 6/21/2023   83.9 kg (185 lb)  as of 6/21/2023       On exam she is in no acute distress.  She has a flat affect.  Cranial nerves are grossly intact.    Recent Labs   Lab Test 04/10/23  1406 03/31/23  1033 12/21/22  1146 08/11/22  1129 05/10/22  1523 02/14/22  1004 11/18/21  1659 10/26/21  1556 06/18/19  0000 03/21/19  1359   A1C  --  6.5* 6.5*   < >  --  8.1*  --   --    < >  --    HEMOGLOBINA1  --   --   --   --  7.5  --   --  7.3   < >  --    TSH  --  6.94*  --   --   --  3.34  --   --    < > 0.19*   T4  --   --   --   --   --   --   --   --   --  " 1.09   LDL  --  107*  --   --   --  111*  --   --    < >  --    HDL  --  78  --   --   --  94  --   --    < >  --    TRIG  --  47  --   --   --  66  --   --    < >  --    CR  --  0.61 0.59   < >  --  0.60   < >  --    < >  --    MICROL <12.0  --   --   --   --  5  --   --    < >  --     < > = values in this interval not displayed.       Assessment and plan:    1.  Diabetes control.  Overall she has not estimated A1c that is at target.  I will schedule her to have a laboratory A1c done.  She has much more variability than she would have if she were on a hybrid closed-loop.  I am particularly concerned that the low blood sugars are exacerbating her GI symptoms.  She agreed to meet with the diabetes nurse educators and learn about the options available to her to get a hybrid closed-loop.  I made no changes in her insulin dosing today.  I do worry that the Ozempic is contributing to her GI symptoms.  She really does not want to decrease the dose because she likes the weight loss that has caused.  If she were to reduce the dose, I encouraged her to go down to 0.5 mg each week.  I also told her I would not be against her stopping it.    2.  Diabetes complications.  She has background retinopathy and sees her eye doctor regularly.  Her renal function is normal.  She has some mild symptoms of neuropathy without injury.    3.hypothyroidism.  I will check her TSH.  It is possible that it could be exacerbating some of her GI symptoms if it is low.    4.CVD risk.  Her blood pressure was in target recently.  Her LDL is fine on her statin.    5.stress and depression.  I will refer her to Elise Sutton for evaluation and management.    Follow-up with Marissa Sebastian in 3 to 4 months and me in 6 to 8 months.    I spent 30 minutes with the patient on the video visit (start time 2: 3 0 and end time 3: 00).  On the day of visit I spent an additional 15 minutes reviewing and interpreting her pump and CGM data, ordering tests, and doing  documentation.    Felicia Mckeon MD

## 2023-07-05 NOTE — Clinical Note
Stanley Olivares - Could you pulease see them patient?  She is overwhelmed and has lost the psychology she had from employee assistance since she lost her job.  She also has type 1 dm. Can you call her to set up an appointment, or do I need to send referral.?  Thanks!  Jonelle Mckeon

## 2023-07-05 NOTE — NURSING NOTE
Is the patient currently in the state of [place]? YES    Visit mode:VIDEO    If the visit is dropped, the patient can be reconnected by: VIDEO VISIT: Send to e-mail at: [email or web address]    Will anyone else be joining the visit? NO      How would you like to obtain your AVS? MyChart    Are changes needed to the allergy or medication list? Patient requested to skip reviewing medications and allergies as she already reviewed them during e-check in for visit.    Reason for visit: RECHECK (Follow up- diabetes)

## 2023-07-07 ENCOUNTER — LAB (OUTPATIENT)
Dept: LAB | Facility: CLINIC | Age: 53
End: 2023-07-07
Payer: COMMERCIAL

## 2023-07-07 DIAGNOSIS — Z79.899 HIGH RISK MEDICATION USE: ICD-10-CM

## 2023-07-07 DIAGNOSIS — L40.50 PSORIATIC ARTHRITIS (H): ICD-10-CM

## 2023-07-07 DIAGNOSIS — E10.3291 TYPE 1 DIABETES MELLITUS WITH MILD NONPROLIFERATIVE RETINOPATHY OF RIGHT EYE, MACULAR EDEMA PRESENCE UNSPECIFIED (H): ICD-10-CM

## 2023-07-07 LAB
ALBUMIN SERPL BCG-MCNC: 4.5 G/DL (ref 3.5–5.2)
ALP SERPL-CCNC: 91 U/L (ref 35–104)
ALT SERPL W P-5'-P-CCNC: 21 U/L (ref 0–50)
ANION GAP SERPL CALCULATED.3IONS-SCNC: 10 MMOL/L (ref 7–15)
AST SERPL W P-5'-P-CCNC: 22 U/L (ref 0–45)
BASOPHILS # BLD AUTO: 0 10E3/UL (ref 0–0.2)
BASOPHILS NFR BLD AUTO: 0 %
BILIRUB SERPL-MCNC: 0.2 MG/DL
BUN SERPL-MCNC: 7 MG/DL (ref 6–20)
CALCIUM SERPL-MCNC: 9.7 MG/DL (ref 8.6–10)
CHLORIDE SERPL-SCNC: 99 MMOL/L (ref 98–107)
CREAT SERPL-MCNC: 0.56 MG/DL (ref 0.51–0.95)
CRP SERPL-MCNC: <3 MG/L
DEPRECATED HCO3 PLAS-SCNC: 26 MMOL/L (ref 22–29)
EOSINOPHIL # BLD AUTO: 0.1 10E3/UL (ref 0–0.7)
EOSINOPHIL NFR BLD AUTO: 1 %
ERYTHROCYTE [DISTWIDTH] IN BLOOD BY AUTOMATED COUNT: 12.5 % (ref 10–15)
ERYTHROCYTE [SEDIMENTATION RATE] IN BLOOD BY WESTERGREN METHOD: 36 MM/HR (ref 0–30)
GFR SERPL CREATININE-BSD FRML MDRD: >90 ML/MIN/1.73M2
GLUCOSE SERPL-MCNC: 65 MG/DL (ref 70–99)
HBA1C MFR BLD: 7.1 % (ref 0–5.6)
HCT VFR BLD AUTO: 35.3 % (ref 35–47)
HGB BLD-MCNC: 11.6 G/DL (ref 11.7–15.7)
IMM GRANULOCYTES # BLD: 0 10E3/UL
IMM GRANULOCYTES NFR BLD: 0 %
LYMPHOCYTES # BLD AUTO: 2.2 10E3/UL (ref 0.8–5.3)
LYMPHOCYTES NFR BLD AUTO: 44 %
MCH RBC QN AUTO: 30.8 PG (ref 26.5–33)
MCHC RBC AUTO-ENTMCNC: 32.9 G/DL (ref 31.5–36.5)
MCV RBC AUTO: 94 FL (ref 78–100)
MONOCYTES # BLD AUTO: 0.5 10E3/UL (ref 0–1.3)
MONOCYTES NFR BLD AUTO: 9 %
NEUTROPHILS # BLD AUTO: 2.3 10E3/UL (ref 1.6–8.3)
NEUTROPHILS NFR BLD AUTO: 45 %
PLATELET # BLD AUTO: 279 10E3/UL (ref 150–450)
POTASSIUM SERPL-SCNC: 4.1 MMOL/L (ref 3.4–5.3)
PROT SERPL-MCNC: 7.5 G/DL (ref 6.4–8.3)
RBC # BLD AUTO: 3.77 10E6/UL (ref 3.8–5.2)
SODIUM SERPL-SCNC: 135 MMOL/L (ref 136–145)
TSH SERPL DL<=0.005 MIU/L-ACNC: 1.53 UIU/ML (ref 0.3–4.2)
WBC # BLD AUTO: 5 10E3/UL (ref 4–11)

## 2023-07-07 PROCEDURE — 83036 HEMOGLOBIN GLYCOSYLATED A1C: CPT

## 2023-07-07 PROCEDURE — 86140 C-REACTIVE PROTEIN: CPT

## 2023-07-07 PROCEDURE — 80050 GENERAL HEALTH PANEL: CPT

## 2023-07-07 PROCEDURE — 85652 RBC SED RATE AUTOMATED: CPT

## 2023-07-07 PROCEDURE — 36415 COLL VENOUS BLD VENIPUNCTURE: CPT

## 2023-07-11 ENCOUNTER — TRANSFERRED RECORDS (OUTPATIENT)
Dept: HEALTH INFORMATION MANAGEMENT | Facility: CLINIC | Age: 53
End: 2023-07-11

## 2023-07-11 ENCOUNTER — HOSPITAL ENCOUNTER (OUTPATIENT)
Dept: CARDIAC REHAB | Facility: CLINIC | Age: 53
Discharge: HOME | End: 2023-07-11
Attending: INTERNAL MEDICINE
Payer: COMMERCIAL

## 2023-07-11 PROCEDURE — G0238 OTH RESP PROC, INDIV: HCPCS | Performed by: CLINICAL EXERCISE PHYSIOLOGIST

## 2023-07-13 DIAGNOSIS — R42 DIZZINESS: Primary | ICD-10-CM

## 2023-07-13 NOTE — TELEPHONE ENCOUNTER
Requesting orders for referral to vestibular physical therapy, hearing test, and VNG prior to appt with Dr. Nissen on 10/17/2023:    Per Record Review: Pt reported waves of dizziness in Aug 2022 post airplane ride and performed self epley (worse on R side) which may have helped somewhat. Was intermittent with head movements, worse with standing and walking. Right ear felt plugged w/pressure at that time, had cerumen removed. Never returned to ENT (Dr. Castano). Hx of type 1 diabetes, hypothyroidism and psoriatic arthritis. Has seen physical therapy for right ankle issues but does not appear to have seen vestibular PT in past.     No recent head or neck imaging.     No audiogram to review.     Sally Chaudhary. CCC-A  Vestibular Audiologist   [place] #[id number]

## 2023-07-19 ENCOUNTER — THERAPY VISIT (OUTPATIENT)
Dept: PHYSICAL THERAPY | Facility: CLINIC | Age: 53
End: 2023-07-19
Attending: OTOLARYNGOLOGY
Payer: COMMERCIAL

## 2023-07-19 DIAGNOSIS — R26.89 BALANCE PROBLEMS: Primary | ICD-10-CM

## 2023-07-19 DIAGNOSIS — R42 DIZZINESS: ICD-10-CM

## 2023-07-19 PROCEDURE — 97112 NEUROMUSCULAR REEDUCATION: CPT | Mod: GP | Performed by: PHYSICAL THERAPIST

## 2023-07-19 PROCEDURE — 97161 PT EVAL LOW COMPLEX 20 MIN: CPT | Mod: GP | Performed by: PHYSICAL THERAPIST

## 2023-07-19 NOTE — PROGRESS NOTES
PHYSICAL THERAPY EVALUATION  Type of Visit: Evaluation    See electronic medical record for Abuse and Falls Screening details.    Subjective       Presenting condition or subjective complaint:   Per chart review  And pt inteveiw Pt reported waves of dizziness in June 2022 post airplane ride that had a lot of turbulence. Had extreme nausea and dizziness. Continued to feel bad after landing, feeling like she needed to lie down and rest frequently. Also felt very dizzy after getting out of bed. Overall sensations lessened but did not go away. Did a self epley maneuver which seems to help a bit. Was intermittent with head movements, worse with standing and walking. Overall things have been stable but does have episodes. Continues to have difficulty with fast movements. Turning corners are still challenging. Does not know when it is going to come on, has resulted in a couple of falls due to disorientation. Most recent episode was 3 weeks ago. Tripped and didn't seem to know she was falling and was unable to brace.  Hx of type 1 diabetes, hypothyroidism and psoriatic arthritis. Did see an ENT as R ear seems more plugged. ENT states that ear canal is more narrowed. Will occasionally get a long hum that lasts for about 1 min in her ear.   Date of onset: 07/18/23 (date of order, began in June 2022)    Relevant medical history:     Past Medical History:   Diagnosis Date     Anemia      Anxiety      Arthritis 2014    Psoriatic arthritis     Back injury 1988     Dry eye syndrome      Dyslipidemia      Endometriosis 2002    adehsions seen at laparoscopy     GERD (gastroesophageal reflux disease)      Hypothyroidism     8 yoa     SOB (shortness of breath) 3/11/2014     Type I (juvenile type) diabetes mellitus without mention of complication, not stated as uncontrolled     when 15      Uncomplicated asthma Fall 2013       Prior Level of Function  Transfers: Independent  Ambulation: Independent  ADL: Independent  IADL:  Independent    Living Environment  Social support:     Type of home:   House    Patient goals for therapy:  Decrease dizziness    Pain assessment: Mild neck pain during evaluation with end range extension     Objective     Cognitive Status Examination  Orientation: Oriented to person, place and time   Level of Consciousness: Alert      STRENGTH: not formally assessed but demonstrates functional strength    BED MOBILITY: Independent    TRANSFERS: Independent    GAIT:   Gait Description: Appears slightly cautious, decreased head turns    BALANCE:     SPECIAL TESTS    Modified CTSIB Conditions (sec) Cond 1: 30  Cond 2: 30 but increased sway     4 Item DGI 11/12        VESTIBULAR  Cervicogenic Screen    Neck ROM Normal     Oculomotor Screen    Ocular ROM Normal   Smooth Pursuit Normal   Saccades Normal   VOR Abnormal   VOR Cancellation Abnormal  Saccadic intrusions   Head Impulse Test Abnormal R HT   Convergence Testing Normal        Infrared Goggle Exam Vestibular Suppressant in Last 24 Hours? No  Exam Completed With: Infrared goggles   Spontaneous Nystagmus Negative   Gaze Evoked Nystagmus Horizontal L   Head Shake Horizontal Nystagmus Negative   Supine Head-Hanging Test     Left Right   [place]-Hallpike Negative Negative   HSCC Supine Roll Test Negative Negative       Dynamic Visual Acuity (DVA)    Static Acuity (LogMar) -0.1   Horizontal Head Movement at 1 Hz (LogMar)    Horizontal Head Movement at 2 Hz (LogMar) -0.1   But increases symptoms         Assessment & Plan   CLINICAL IMPRESSIONS  Medical Diagnosis: Dizziness    Treatment Diagnosis: Dizziness and imbalance   Impression/Assessment: Patient is a 52 year old female with dizziness complaints.  The following significant findings have been identified: Impaired balance, Impaired gait, Instability, Dizziness and Disequilibrium . These impairments interfere with their ability to perform self care tasks, work tasks, recreational activities, household chores, household  mobility and community mobility as compared to previous level of function.     Clinical Decision Making (Complexity):  Clinical Presentation: Stable/Uncomplicated  Clinical Presentation Rationale: based on medical and personal factors listed in PT evaluation  Clinical Decision Making (Complexity): Low complexity    PLAN OF CARE  Treatment Interventions:  Interventions: Gait Training, Manual Therapy, Neuromuscular Re-education, Therapeutic Activity, Therapeutic Exercise    Long Term Goals     PT Goal 1  Goal Identifier: DHI  Goal Description: The pt will score <25/100 on the DHI indicating a reduction in subjective dizziness  Target Date: 10/16/23  PT Goal 2  Goal Identifier: FGA  Goal Description: The pt will score >24/30 on the FGA indicating improvement in dynamic balance  Target Date: 10/16/23  PT Goal 3  Goal Identifier: HEP  Goal Description: The pt will be independent with a HEP in order to improve self management of symptoms  Target Date: 10/16/23      Frequency of Treatment: 1x/week  Duration of Treatment: 60 days    Recommended Referrals to Other Professionals: Neurology  Education Assessment:   Learner/Method: Patient  Education Comments: PT role, POC    Risks and benefits of evaluation/treatment have been explained.   Patient/Family/caregiver agrees with Plan of Care.     Evaluation Time:             Signing Clinician: Nivia Cornell, PT

## 2023-07-20 ENCOUNTER — HOSPITAL ENCOUNTER (OUTPATIENT)
Dept: CARDIAC REHAB | Facility: CLINIC | Age: 53
Discharge: HOME | End: 2023-07-20
Attending: INTERNAL MEDICINE
Payer: COMMERCIAL

## 2023-07-20 PROCEDURE — G0238 OTH RESP PROC, INDIV: HCPCS | Performed by: CLINICAL EXERCISE PHYSIOLOGIST

## 2023-07-25 ENCOUNTER — HOSPITAL ENCOUNTER (OUTPATIENT)
Dept: CARDIAC REHAB | Facility: CLINIC | Age: 53
Discharge: HOME | End: 2023-07-25
Attending: INTERNAL MEDICINE
Payer: COMMERCIAL

## 2023-07-25 PROCEDURE — G0239 OTH RESP PROC, GROUP: HCPCS

## 2023-07-27 ENCOUNTER — HOSPITAL ENCOUNTER (OUTPATIENT)
Dept: CARDIAC REHAB | Facility: CLINIC | Age: 53
Discharge: HOME | End: 2023-07-27
Attending: INTERNAL MEDICINE
Payer: COMMERCIAL

## 2023-07-27 PROCEDURE — G0239 OTH RESP PROC, GROUP: HCPCS

## 2023-08-01 ASSESSMENT — ANXIETY QUESTIONNAIRES
1. FEELING NERVOUS, ANXIOUS, OR ON EDGE: SEVERAL DAYS
3. WORRYING TOO MUCH ABOUT DIFFERENT THINGS: SEVERAL DAYS
6. BECOMING EASILY ANNOYED OR IRRITABLE: SEVERAL DAYS
GAD7 TOTAL SCORE: 6
7. FEELING AFRAID AS IF SOMETHING AWFUL MIGHT HAPPEN: SEVERAL DAYS
4. TROUBLE RELAXING: SEVERAL DAYS
IF YOU CHECKED OFF ANY PROBLEMS ON THIS QUESTIONNAIRE, HOW DIFFICULT HAVE THESE PROBLEMS MADE IT FOR YOU TO DO YOUR WORK, TAKE CARE OF THINGS AT HOME, OR GET ALONG WITH OTHER PEOPLE: SOMEWHAT DIFFICULT
2. NOT BEING ABLE TO STOP OR CONTROL WORRYING: SEVERAL DAYS
GAD7 TOTAL SCORE: 6
5. BEING SO RESTLESS THAT IT IS HARD TO SIT STILL: NOT AT ALL

## 2023-08-01 ASSESSMENT — PATIENT HEALTH QUESTIONNAIRE - PHQ9
SUM OF ALL RESPONSES TO PHQ QUESTIONS 1-9: 9
10. IF YOU CHECKED OFF ANY PROBLEMS, HOW DIFFICULT HAVE THESE PROBLEMS MADE IT FOR YOU TO DO YOUR WORK, TAKE CARE OF THINGS AT HOME, OR GET ALONG WITH OTHER PEOPLE: SOMEWHAT DIFFICULT
SUM OF ALL RESPONSES TO PHQ QUESTIONS 1-9: 9

## 2023-08-02 ENCOUNTER — VIRTUAL VISIT (OUTPATIENT)
Dept: PSYCHOLOGY | Facility: CLINIC | Age: 53
End: 2023-08-02
Payer: COMMERCIAL

## 2023-08-02 DIAGNOSIS — G89.29 OTHER CHRONIC PAIN: ICD-10-CM

## 2023-08-02 DIAGNOSIS — F43.22 ADJUSTMENT DISORDER WITH ANXIETY: Primary | ICD-10-CM

## 2023-08-02 DIAGNOSIS — E10.3299 TYPE 1 DIABETES MELLITUS WITH MILD NONPROLIFERATIVE RETINOPATHY, MACULAR EDEMA PRESENCE UNSPECIFIED, UNSPECIFIED LATERALITY (H): ICD-10-CM

## 2023-08-02 PROCEDURE — 90834 PSYTX W PT 45 MINUTES: CPT | Mod: VID | Performed by: PSYCHOLOGIST

## 2023-08-02 NOTE — PROGRESS NOTES
Health Psychology          Latanya Mayfield, Ph.D.,  [phone]  Hansa Arzate, Ph.D.,  [phone]  Sarah Beth Jack, Ph.D.,  [phone]  Pushpa Mendez, Ph.D., , [facility] [phone]  Panfilo Cornejo, Ph.D., , [facility] [phone]  Krystel Hannah, Ph.D.,  [phone]  Elise Sutton, Ph.D., , [facility] [phone]    [facility], 3rd Floor  [address] [place] [address]       Health Psychology Progress Note    Patient Name: Dali Martines    Service Type: Individual  Length of Visit: 50 minutes  Start time: 9:03 AM  Stop time: 9:53 AM   Attendees: patient attended alone  Session #: 1    Telemedicine Information:     Telemedicine Visit: The patient's condition can be safely assessed and treated via synchronous audio and visual telemedicine encounter.    Reason for Telemedicine Visit: Patient has requested telehealth visit and Patient convenience (e.g. access to timely appointments / distance to available provider)  Originating Site (Patient Location): Patient's home, [place]  Distant Location (provider location):  On-site: [facility]  Consent:  The patient/guardian has verbally consented to: the potential risks and benefits of telemedicine (video visit) versus in person care; bill my insurance or make self-payment for services provided; and responsibility for payment of non-covered services.   Mode of Communication:  Video Conference via Askem  As the provider I attest to compliance with applicable laws and regulations related to telemedicine.     Identifying Information and Presenting Problem:  The patient is a 52 year old adult who is being seen for problematic symptoms of anxiety and distress in the context of multiple significant psychosocial stressors and T1DM.    Topics Discussed/Interventions Provided:    Orientation to Service:  Is patient the family member of an employee who works at this clinic or connected to a patient  health psychology provider is already treating? No  Discussed dual role conflicts? Yes   Discussed privacy and confidentiality, including limits? Yes   Is patient already established with a mental health provider? Yes: Patient has a psychiatric medication provider at [facility] for her ADHD diagnosis and methylphenidate and citalopram prescriptions    Health Psychology Service Introduction: Discussed the health psychology service. Provided education about role as health psychology provider and model of care. Patient was given the opportunity to ask questions and state her goals/expectations for initiating services with the health psychology provider. Patient is interested in establishing services with the health psychology provider.    Session Content:     Background/Context: Patient with a PMH significant for T1DM and recent significant life stressors. She was referred to health psychology by Dr. Mckeon at a recent diabetes management visit for assistance in coping with stress in the context of T1DM. Patient reports experiencing significant psychosocial stress in the last several years including the COVID19 pandemic, the re-emergence of a past traumatic loss, grief at the loss of her father and uncle in 2021, the loss of a pet, mother's health concerns, caregiver stress, job-related stress, and recent job loss in June 2023. She notes that the stress has led to high levels of fatigue, guilt, feelings of overwhelm, and social isolation and withdrawal. Impact of stress has negatively impacted her engagement with regard to her career and interpersonal relationships and has also negatively impacted her sense of self and her overall quality of life. Patient also reports that she is coping with a chronic ankle injury as well as managing T1DM. She notes that physical activity has been difficult due to her ankle injury and limits her ability to walk. Patient acknowledges that this injury has been challenging because she does not  "have access to many of the activities that she used to rely on for stress management and health maintenance. She also notes that she often will put diabetes management on the \"back burner,\" and she has recently decided to start an insulin pump to help mitigate this pattern. Given the number and severity of significant stressors over the past 4 years, patient notes that she is feeling overwhelmed and that the stress has stretched her beyond her effective coping capacity. Overall goals for treatment include improving her physical health (e.g., diabetes, physical activity, and GI discomfort), learning strategies for enhancing coping capacity, and decreasing avoidance-based coping.       Skills/intervention: Assisted patient in processing her thoughts and emotions around the impact that her recent stressors have had on her health and well-being. Patient also recounted a traumatic experience from a loss of a friend 25 years ago and discussed the impact that this stressor had on her emotional well-being and coping capacity. Briefly introduced ACT as a therapeutic framework. Next step will be to complete a diagnostic assessment.      Treatment Objective(s) Addressed in This Session:  Psychological distress related to Diabetes  Psychological distress related to caregiver stress    Progress on / Status of Treatment Objective(s) / Homework:  In progress    Medication Adherence: Patient did not report medication changes. Current medication list is below:     Current Outpatient Medications   Medication    albuterol (PROAIR HFA/PROVENTIL HFA/VENTOLIN HFA) 108 (90 Base) MCG/ACT inhaler    aspirin 81 MG tablet    blood glucose (ACCU-CHEK COLIN PLUS) test strip    blood glucose monitoring (ACCU-CHEK FASTCLIX) lancets    blood glucose monitoring (NO BRAND SPECIFIED) meter device kit    Calcium Carbonate-Vitamin D (CALCIUM + D PO)    citalopram (CELEXA) 20 MG tablet    Continuous Blood Gluc  (DEXCOM G6 ) SUZANNE    " "Continuous Blood Gluc Sensor (DEXCOM G6 SENSOR) MISC    Continuous Blood Gluc Transmit (DEXCOM G6 TRANSMITTER) MISC    diclofenac (VOLTAREN) 1 % topical gel    diclofenac (VOLTAREN) 1 % topical gel    DULERA 100-5 MCG/ACT inhaler    econazole nitrate 1 % cream    FIASP PENFILL 100 UNIT/ML SOCT    fish oil-omega-3 fatty acids 1000 MG capsule    folic acid (FOLVITE) 1 MG tablet    Injection Device for insulin (INPEN 100-BLUE-NOVOLOG-FIASP) SUZANNE    insulin degludec (TRESIBA FLEXTOUCH) 100 UNIT/ML pen    insulin pen needle (B-D U/F) 31G X 8 MM miscellaneous    Ketoconazole (NIZORAL PO)    levothyroxine (SYNTHROID/LEVOTHROID) 112 MCG tablet    methotrexate 2.5 MG tablet    methotrexate 2.5 MG tablet    methylphenidate (METADATE CD) 20 MG CR capsule    methylphenidate (RITALIN) 10 MG tablet    Minoxidil (ROGAINE EX)    montelukast (SINGULAIR) 5 MG chewable tablet    Multiple Vitamin (MULTIVITAMIN OR)    NOVOLOG PENFILL 100 UNIT/ML soln    ondansetron (ZOFRAN ODT) 4 MG ODT tab    Semaglutide, 1 MG/DOSE, 4 MG/3ML SOPN    simvastatin (ZOCOR) 20 MG tablet    traZODone (DESYREL) 50 MG tablet     Current Facility-Administered Medications   Medication    dexamethasone (DECADRON) injection 4 mg    lidocaine 1 % injection 1 mL    triamcinolone acetonide (KENALOG-10) injection 10 mg     Assessment: The patient appeared to be active and engaged in today's session and was receptive to feedback.     Mental Status Exam:   Appearance: Appropriate   Eye Contact: Good    Orientation: Yes, x4  Behavior/relationship to provider/demeanor: Cooperative, Engaged, and Pleasant  Motor Activity: normal or unremarkable  Mood (subjective report): \"Overwhelmed\"  Affect (objective appearance): Appropriate  Speech Rate: Normal  Speech Volume: Normal  Speech Articulation: Normal  Speech Coherence: Normal  Speech Spontaneity: Normal  Thought Content: no suicidal/homicidal ideation, plans, or intent  Thought Process (associations): Logical, Linear, and " Goal directed  Thought Process (rate): Normal  Abnormal Perception: None  Attention/Concentration: Normal  Memory: Appears grossly intact   Fund of Knowledge: Above average  Abstraction:  Normal  Insight:  Good  Judgment:  good    Does the patient appear to be at imminent risk of harm to self/others at this time? No    The session was necessary to address psychological distress in the context of multiple psychosocial stressors and chronic health concerns.    Diagnoses (provisional):    1. Adjustment disorder with anxiety    2. Type 1 diabetes mellitus with mild nonproliferative retinopathy, macular edema presence unspecified, unspecified laterality (H)    3. Other chronic pain      Plan:    Follow-up video visit with me on 8/16/23 at 2:00pm   Patient to use 911 or other crisis resources in the event of a psychiatric emergency  Primary care needs and medications are managed by Dimitri Alas MD. Referring provider is Dr. Mckeon.  Next visit agenda/goals:   Review educational materials about ACT  Complete diagnostic assessment    NOTE: Treatment plan due in future visit    Elise Sutton, Ph.D., LP, ABPP  Clinical Health Psychologist  Phone: [phone]   Pager: [phone]  8/2/2023 9:03 AM

## 2023-08-03 ENCOUNTER — HOSPITAL ENCOUNTER (OUTPATIENT)
Dept: CARDIAC REHAB | Facility: CLINIC | Age: 53
Discharge: HOME | End: 2023-08-03
Attending: INTERNAL MEDICINE
Payer: COMMERCIAL

## 2023-08-03 PROCEDURE — G0239 OTH RESP PROC, GROUP: HCPCS

## 2023-08-04 ENCOUNTER — TELEPHONE (OUTPATIENT)
Dept: GASTROENTEROLOGY | Facility: CLINIC | Age: 53
End: 2023-08-04
Payer: COMMERCIAL

## 2023-08-04 NOTE — TELEPHONE ENCOUNTER
"Endoscopy Scheduling Screen    Have you had a positive Covid test in the last 14 days?  No    Are you active on MyChart?   Yes    What insurance is in the chart?  Other:  [employer]    Ordering/Referring Provider:     Gloria House PA-C      (If ordering provider performs procedure, schedule with ordering provider unless otherwise instructed. )    BMI: Estimated body mass index is 26.54 kg/m  as calculated from the following:    Height as of 6/21/23: 1.778 m (5' 10\").    Weight as of 6/21/23: 83.9 kg (185 lb).     Sedation Ordered  moderate sedation.   If patient BMI > 50 do not schedule in ASC.    Are you taking any prescription medications for pain?   No    Are you taking methadone or Suboxone?  No    Do you have a history of malignant hyperthermia or adverse reaction to anesthesia?  No    (Females) Are you currently pregnant?   No     Have you been diagnosed or told you have pulmonary hypertension?   No    Do you have an LVAD?  No    Have you been told you have moderate to severe sleep apnea?  No    Have you been told you have COPD, asthma, or any other lung disease?  Yes     What breathing problems do you have?  Asthma     Do you use home oxygen?  No    Have your breathing problems required an ED visit or hospitalization in the last year?  No    Do you have any heart conditions?  No     Have you ever had or are you awaiting a heart or lung transplant?   No    Have you had a stroke or transient ischemic attack (TIA aka \"mini stroke\" in the last 6 months?   No    Have you been diagnosed with or been told you have cirrhosis of the liver?   No    Are you currently on dialysis?   No    Do you need assistance transferring?   No    BMI: Estimated body mass index is 26.54 kg/m  as calculated from the following:    Height as of 6/21/23: 1.778 m (5' 10\").    Weight as of 6/21/23: 83.9 kg (185 lb).     Is patients BMI > 40 and scheduling location [facility]?  No    Do you take the medication Phentermine, Ozempic or " Wegovy?  Yes Recommended hold time is 7 days before your procedure. Please contact your prescribing provider to discuss.    Do you take the medication Naltrexone?  No    Do you take blood thinners?  No      Prep   Are you currently on dialysis or do you have chronic kidney disease?  No    Do you have a diagnosis of diabetes?  Yes (Golytely Prep)    Do you have a diagnosis of cystic fibrosis (CF)?  No    On a regular basis do you go 3 -5 days between bowel movements?  No    BMI > 40?  No    Preferred Pharmacy:    [id number] - [place] - [address] & [facility]  [address]  [place] [address]  Phone: [phone] Fax: [phone]    [id number] - [place], [place] - [address] [facility] [address] [facility]  [address]  [address]  [facility] [address]  Phone: [phone] Fax: [phone]    [facility] - [place], [place] - [address]   [address] [facility] [address]  Phone: [phone] Fax: [phone]    [id number] - [place], [place] - [address] [place] [address]  Phone: [phone] Fax: [phone]    [id number] - [place] - [address] AT [address] & [facility]  [address]  [facility] [address]  Phone: [phone] Fax: [phone]      Final Scheduling Details   Colonoscopy prep sent?  N/A    Procedure scheduled  Upper endoscopy (EGD)    Surgeon:  MANAN    Date of procedure:  10/05/2023    Schedule PAC:   No    Location  [facility]    Sedation   Moderate Sedation    Patient Reminders:   You will receive a call from a Nurse to review instructions and health history.  This assessment must be completed prior to your procedure.  Failure to complete the Nurse assessment may result in the procedure being cancelled.      On the day of your procedure, please designate an adult(s) who can drive you home stay with you for the next 24 hours. The  medicines used in the exam will make you sleepy. You will not be able to drive.      You cannot take public transportation, ride share services, or non-medical taxi service without a responsible caregiver.  Medical transport services are allowed with the requirement that a responsible caregiver will receive you at your destination.  We require that drivers and caregivers are confirmed prior to your procedure.

## 2023-08-08 ENCOUNTER — THERAPY VISIT (OUTPATIENT)
Dept: PHYSICAL THERAPY | Facility: CLINIC | Age: 53
End: 2023-08-08
Payer: COMMERCIAL

## 2023-08-08 ENCOUNTER — HOSPITAL ENCOUNTER (OUTPATIENT)
Dept: CARDIAC REHAB | Facility: CLINIC | Age: 53
Discharge: HOME | End: 2023-08-08
Attending: INTERNAL MEDICINE
Payer: COMMERCIAL

## 2023-08-08 DIAGNOSIS — G89.29 CHRONIC PAIN OF LEFT KNEE: Primary | ICD-10-CM

## 2023-08-08 DIAGNOSIS — M54.50 CHRONIC LOW BACK PAIN: ICD-10-CM

## 2023-08-08 DIAGNOSIS — G89.29 CHRONIC BILATERAL LOW BACK PAIN WITHOUT SCIATICA: ICD-10-CM

## 2023-08-08 DIAGNOSIS — M54.50 CHRONIC BILATERAL LOW BACK PAIN WITHOUT SCIATICA: ICD-10-CM

## 2023-08-08 DIAGNOSIS — R26.89 BALANCE PROBLEMS: ICD-10-CM

## 2023-08-08 DIAGNOSIS — R42 DIZZINESS: Primary | ICD-10-CM

## 2023-08-08 DIAGNOSIS — G89.29 CHRONIC LOW BACK PAIN: ICD-10-CM

## 2023-08-08 DIAGNOSIS — M25.571 CHRONIC PAIN OF RIGHT ANKLE: ICD-10-CM

## 2023-08-08 DIAGNOSIS — M25.562 CHRONIC PAIN OF LEFT KNEE: Primary | ICD-10-CM

## 2023-08-08 DIAGNOSIS — G89.29 CHRONIC PAIN OF RIGHT ANKLE: ICD-10-CM

## 2023-08-08 PROCEDURE — 97110 THERAPEUTIC EXERCISES: CPT | Mod: GP | Performed by: PHYSICAL THERAPIST

## 2023-08-08 PROCEDURE — 97112 NEUROMUSCULAR REEDUCATION: CPT | Mod: GP | Performed by: PHYSICAL THERAPIST

## 2023-08-08 PROCEDURE — 97750 PHYSICAL PERFORMANCE TEST: CPT | Mod: GP | Performed by: PHYSICAL THERAPIST

## 2023-08-08 PROCEDURE — G0239 OTH RESP PROC, GROUP: HCPCS

## 2023-08-08 NOTE — PROGRESS NOTES
08/08/23 0900   Signing Clinician's Name / Credentials   Signing clinician's name / credentials JA Cornell, REANNA   Functional Gait Assessment (HAYLEY Estrada, SOULEYMANE Huber, et al. (2004))   1. GAIT LEVEL SURFACE 3  (5.18)   2. CHANGE IN GAIT SPEED 3   3. GAIT WITH HORIZONTAL HEAD TURNS 3   4. GAIT WITH VERTICAL HEAD TURNS 3   5. GAIT AND PIVOT TURN 3   6. STEP OVER OBSTACLE 3   7. GAIT WITH NARROW BASE OF SUPPORT 3   8. GAIT WITH EYES CLOSED 1  (21)   9. AMBULATING BACKWARDS 3  (10)   10. STEPS 3   Total Functional Gait Assessment Score   TOTAL SCORE: (MAXIMUM SCORE 30) 28     Functional Gait Assessment (FGA): The FGA assesses postural stability during various walking tasks.   Gait assistive device used: None    Scores of <22 /30 have been correlated with predicting falls in community-dwelling older adults according to Natalie & Iraj 2010.   Scores of <18 /30 have been correlated with increased risk for falls in patients with Parkinsons Disease according to Kelvin Gramajo Zhou et al 2014.  Minimal Detectable Change for patients with acute/chronic stroke = 4.2 according to Nona & Nonischel 2009  Minimal Detectable Change for patients with vestibular disorder = 8 according to Natalie & Iraj 2010    Assessment (rationale for performing, application to patient s function & care plan): The pt is above the cut off for fall risk, meeting goal.  (Minutes billed as physical performance test): 10

## 2023-08-10 ENCOUNTER — HOSPITAL ENCOUNTER (OUTPATIENT)
Dept: CARDIAC REHAB | Facility: CLINIC | Age: 53
Discharge: HOME | End: 2023-08-10
Attending: INTERNAL MEDICINE
Payer: COMMERCIAL

## 2023-08-10 PROCEDURE — G0239 OTH RESP PROC, GROUP: HCPCS | Performed by: REHABILITATION PRACTITIONER

## 2023-08-15 ENCOUNTER — THERAPY VISIT (OUTPATIENT)
Dept: PHYSICAL THERAPY | Facility: CLINIC | Age: 53
End: 2023-08-15
Payer: COMMERCIAL

## 2023-08-15 ENCOUNTER — HOSPITAL ENCOUNTER (OUTPATIENT)
Dept: CARDIAC REHAB | Facility: CLINIC | Age: 53
Discharge: HOME | End: 2023-08-15
Attending: INTERNAL MEDICINE
Payer: COMMERCIAL

## 2023-08-15 DIAGNOSIS — R26.89 BALANCE PROBLEMS: ICD-10-CM

## 2023-08-15 DIAGNOSIS — R42 DIZZINESS: Primary | ICD-10-CM

## 2023-08-15 PROCEDURE — G0239 OTH RESP PROC, GROUP: HCPCS | Performed by: CLINICAL EXERCISE PHYSIOLOGIST

## 2023-08-15 PROCEDURE — 97112 NEUROMUSCULAR REEDUCATION: CPT | Mod: GP | Performed by: PHYSICAL THERAPIST

## 2023-08-16 ENCOUNTER — VIRTUAL VISIT (OUTPATIENT)
Dept: PSYCHOLOGY | Facility: CLINIC | Age: 53
End: 2023-08-16
Payer: COMMERCIAL

## 2023-08-16 DIAGNOSIS — F33.41 MAJOR DEPRESSIVE DISORDER, RECURRENT, IN PARTIAL REMISSION (H): ICD-10-CM

## 2023-08-16 DIAGNOSIS — F43.22 ADJUSTMENT DISORDER WITH ANXIETY: Primary | ICD-10-CM

## 2023-08-16 DIAGNOSIS — G89.29 OTHER CHRONIC PAIN: ICD-10-CM

## 2023-08-16 DIAGNOSIS — E10.3299 TYPE 1 DIABETES MELLITUS WITH MILD NONPROLIFERATIVE RETINOPATHY, MACULAR EDEMA PRESENCE UNSPECIFIED, UNSPECIFIED LATERALITY (H): ICD-10-CM

## 2023-08-16 PROCEDURE — 90837 PSYTX W PT 60 MINUTES: CPT | Mod: VID | Performed by: PSYCHOLOGIST

## 2023-08-16 ASSESSMENT — ANXIETY QUESTIONNAIRES
GAD7 TOTAL SCORE: 6
4. TROUBLE RELAXING: MORE THAN HALF THE DAYS
5. BEING SO RESTLESS THAT IT IS HARD TO SIT STILL: NOT AT ALL
3. WORRYING TOO MUCH ABOUT DIFFERENT THINGS: SEVERAL DAYS
2. NOT BEING ABLE TO STOP OR CONTROL WORRYING: NOT AT ALL
6. BECOMING EASILY ANNOYED OR IRRITABLE: NOT AT ALL
IF YOU CHECKED OFF ANY PROBLEMS ON THIS QUESTIONNAIRE, HOW DIFFICULT HAVE THESE PROBLEMS MADE IT FOR YOU TO DO YOUR WORK, TAKE CARE OF THINGS AT HOME, OR GET ALONG WITH OTHER PEOPLE: SOMEWHAT DIFFICULT
1. FEELING NERVOUS, ANXIOUS, OR ON EDGE: SEVERAL DAYS
GAD7 TOTAL SCORE: 6
7. FEELING AFRAID AS IF SOMETHING AWFUL MIGHT HAPPEN: MORE THAN HALF THE DAYS
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
## 2023-08-16 NOTE — Clinical Note
Stanley Mckeon - just routing this to you as an FYI only and to close the referral loop. Dali and I will be completing a course of treatment to address stress and coping within the context of managing multiple psychosocial and medical stressors. Thanks so much for this referral!  -Elise

## 2023-08-17 ENCOUNTER — HOSPITAL ENCOUNTER (OUTPATIENT)
Dept: CARDIAC REHAB | Facility: CLINIC | Age: 53
Discharge: HOME | End: 2023-08-17
Attending: INTERNAL MEDICINE
Payer: COMMERCIAL

## 2023-08-17 DIAGNOSIS — L40.50 PSORIATIC ARTHRITIS (H): ICD-10-CM

## 2023-08-17 PROCEDURE — G0239 OTH RESP PROC, GROUP: HCPCS

## 2023-08-17 NOTE — TELEPHONE ENCOUNTER
Medication: methotrexate 2.5 MG tablet       Last Written Prescription Date:  5/25/23  Last Fill Quantity: 72,   # refills: 0  Last Office Visit:  5/25/23  Next Office Visit:  12/01 23      CBC RESULTS:   Recent Labs   Lab Test 07/07/23  1422   WBC 5.0   RBC 3.77*   HGB 11.6*   HCT 35.3   MCV 94   MCH 30.8   MCHC 32.9   RDW 12.5          Creatinine   Date Value Ref Range Status   07/07/2023 0.56 0.51 - 0.95 mg/dL Final   01/11/2021 0.56 0.52 - 1.04 mg/dL Final   ]    Liver Function Studies -   Recent Labs   Lab Test 07/07/23  1422   PROTTOTAL 7.5   ALBUMIN 4.5   BILITOTAL 0.2   ALKPHOS 91   AST 22   ALT 21       Routing refill request to provider for review/approval because:  Per clinic med refill protocol for DMARD    Barbie Eagle RN   8/17/2023 3:24 PM

## 2023-08-22 ENCOUNTER — THERAPY VISIT (OUTPATIENT)
Dept: PHYSICAL THERAPY | Facility: CLINIC | Age: 53
End: 2023-08-22
Payer: COMMERCIAL

## 2023-08-22 ENCOUNTER — HOSPITAL ENCOUNTER (OUTPATIENT)
Dept: CARDIAC REHAB | Facility: CLINIC | Age: 53
Discharge: HOME | End: 2023-08-22
Attending: INTERNAL MEDICINE
Payer: COMMERCIAL

## 2023-08-22 DIAGNOSIS — R42 DIZZINESS: Primary | ICD-10-CM

## 2023-08-22 DIAGNOSIS — R26.89 BALANCE PROBLEMS: ICD-10-CM

## 2023-08-22 PROCEDURE — G0239 OTH RESP PROC, GROUP: HCPCS

## 2023-08-22 PROCEDURE — 97112 NEUROMUSCULAR REEDUCATION: CPT | Mod: GP | Performed by: PHYSICAL THERAPIST

## 2023-08-24 ENCOUNTER — HOSPITAL ENCOUNTER (OUTPATIENT)
Dept: CARDIAC REHAB | Facility: CLINIC | Age: 53
Discharge: HOME | End: 2023-08-24
Attending: INTERNAL MEDICINE
Payer: COMMERCIAL

## 2023-08-24 PROCEDURE — G0239 OTH RESP PROC, GROUP: HCPCS

## 2023-08-29 ENCOUNTER — HOSPITAL ENCOUNTER (OUTPATIENT)
Dept: CARDIAC REHAB | Facility: CLINIC | Age: 53
Discharge: HOME | End: 2023-08-29
Attending: INTERNAL MEDICINE
Payer: COMMERCIAL

## 2023-08-29 ENCOUNTER — THERAPY VISIT (OUTPATIENT)
Dept: PHYSICAL THERAPY | Facility: CLINIC | Age: 53
End: 2023-08-29
Payer: COMMERCIAL

## 2023-08-29 DIAGNOSIS — R42 DIZZINESS: Primary | ICD-10-CM

## 2023-08-29 DIAGNOSIS — R26.89 BALANCE PROBLEMS: ICD-10-CM

## 2023-08-29 PROCEDURE — 97112 NEUROMUSCULAR REEDUCATION: CPT | Mod: GP | Performed by: PHYSICAL THERAPIST

## 2023-08-29 PROCEDURE — G0239 OTH RESP PROC, GROUP: HCPCS

## 2023-08-31 ENCOUNTER — HOSPITAL ENCOUNTER (OUTPATIENT)
Dept: CARDIAC REHAB | Facility: CLINIC | Age: 53
Discharge: HOME | End: 2023-08-31
Attending: INTERNAL MEDICINE
Payer: COMMERCIAL

## 2023-08-31 PROCEDURE — G0239 OTH RESP PROC, GROUP: HCPCS

## 2023-09-01 ENCOUNTER — ALLIED HEALTH/NURSE VISIT (OUTPATIENT)
Dept: EDUCATION SERVICES | Facility: CLINIC | Age: 53
End: 2023-09-01
Attending: INTERNAL MEDICINE
Payer: COMMERCIAL

## 2023-09-01 DIAGNOSIS — E10.3291 TYPE 1 DIABETES MELLITUS WITH MILD NONPROLIFERATIVE RETINOPATHY OF RIGHT EYE, MACULAR EDEMA PRESENCE UNSPECIFIED (H): ICD-10-CM

## 2023-09-01 PROCEDURE — G0108 DIAB MANAGE TRN  PER INDIV: HCPCS | Performed by: REGISTERED NURSE

## 2023-09-05 ENCOUNTER — THERAPY VISIT (OUTPATIENT)
Dept: PHYSICAL THERAPY | Facility: CLINIC | Age: 53
End: 2023-09-05
Payer: COMMERCIAL

## 2023-09-05 ENCOUNTER — HOSPITAL ENCOUNTER (OUTPATIENT)
Dept: CARDIAC REHAB | Facility: CLINIC | Age: 53
Discharge: HOME | End: 2023-09-05
Attending: INTERNAL MEDICINE
Payer: COMMERCIAL

## 2023-09-05 DIAGNOSIS — R42 DIZZINESS: Primary | ICD-10-CM

## 2023-09-05 DIAGNOSIS — R26.89 BALANCE PROBLEMS: ICD-10-CM

## 2023-09-05 PROCEDURE — G0239 OTH RESP PROC, GROUP: HCPCS

## 2023-09-05 PROCEDURE — 97112 NEUROMUSCULAR REEDUCATION: CPT | Mod: GP | Performed by: PHYSICAL THERAPIST

## 2023-09-05 RX ORDER — INSULIN PMP CART,AUT,G6/7,CNTR
1 EACH SUBCUTANEOUS
Qty: 30 EACH | Refills: 3 | Status: SHIPPED | OUTPATIENT
Start: 2023-09-05

## 2023-09-05 RX ORDER — INSULIN PMP CART,AUT,G6/7,CNTR
1 EACH SUBCUTANEOUS CONTINUOUS
Qty: 1 KIT | Refills: 0 | Status: SHIPPED | OUTPATIENT
Start: 2023-09-05

## 2023-09-05 NOTE — PROGRESS NOTES
Diabetes Self-Management Education & Support    Dali Martines presents today for education related to Type 1 diabetes    Patient is being treated with:  insulin  She is accompanied by self    Year of diagnosis: 1986  Referring provider:  Felicia Mckeon MD  Living Situation: Lives alone and has a dog.  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  Her job was eliminated and because of many other stressors in her life, she opted to not work for a time.      PATIENT CONCERNS RELATED TO DIABETES SELF MANAGEMENT:   She is interested in pursuing an insulin pump.       ASSESSMENT:    Taking Medication:     Current Diabetes Management per Patient:  Taking diabetes medications? yes:     Diabetes Medication(s)       Insulin       FIASP PENFILL 100 UNIT/ML SOCT    INJECT UP TO 45 UNITS DAILY AS DIRECTED ACCORDING TO CORRECTION FACTOR.     insulin degludec (TRESIBA FLEXTOUCH) 100 UNIT/ML pen    ADMINISTER 17 UNITS UNDER THE SKIN DAILY     NOVOLOG PENFILL 100 UNIT/ML soln    INJECT 1 UNIT PER 15 GRAMS CHO AT ALL MEALS AND SNACKS WITH CORRECTION SCALE OF 1 UNIT PER 50 MG/DL OVER 150, AVERAGE DAILY USE OF 30 UNITS      Incretin Mimetic Agents       Semaglutide, 1 MG/DOSE, 4 MG/3ML SOPN    Inject 1 mg Subcutaneous every 7 days            Monitoring    Patient glucose self monitoring as follows: continuously using a continuous glucose monitor (CGM)  BG meter: Dexcom   meter  BG results: Not discussed at this visit.      Patient's most recent   Lab Results   Component Value Date    A1C 7.1 07/07/2023    A1C 7.1 01/11/2021      Patient's A1C goal: <7.0    Activity:   She has been limited in her normal activities because of a rough asthma season, made worse after she got Covid last year.  She also suffered an ankle injury a few years ago which has made walking difficult.   She is currently in pulmonary rehab.  She becomes fatigued more easily than she used to.  As a result of lower  activity, she experienced some weight gain, and has been successful in losing some weight with the help of a GLP-1 agonist.     Healthy Eating:   Generally she eats three meals a day and doesn't snack between meals.  She has experienced some delayed gastric emptying, although she doesn't feel that it is due to the effect of Ozempic.  She has been experiencing this for some time.       Problem Solving:      Patient is at risk of hypoglycemia?: YES, Frequency is one to two times per week, Feels symptoms when glucose is between 60 and 70, and Usual treatment is a regular size marshmallow.   She states that hypoglycemia has been difficult for her as one of the symptoms she has with hypoglycemia is nausea, which complicates her treatment.  She finds that marshmallows don't cause nausea.    Hospitalizations for hyper or hypoglycemia: No    Healthy Coping and Stress Management:   Sources of stress identified by patient: She has been experiencing multiple stressors; job loss, caring for her elderly mother who has health issues, and concern about her own health.   Coping mechanisms identified by patient:  She has been working with a therapist, Elise Sutton, and this is helpful, she states.  She feels that she has been putting her diabetes care lower on her priority list, and is wondering if an insulin pump might relieve some of the burden of managing diabetes.      EDUCATION and INSTRUCTION PROVIDED AT THIS VISIT:       Today we reviewed the 3 pumps available on the market.  She immediately eliminated the Medtronic 780G because it doesn't use the Dexcom sensor.   We went through the load process in both the Omnipod 5 and the Tandem X2 pumps, and reviewed the differences and similarities between the two.   Discussed possible untoward effects of using an insulin pump, which is primarily a statistically higher incidence of DKA.    Dali has experienced some really positive effects of Tresiba, and feels that it has helped  enormously in keeping her glucoses more stable.  She is wondering if it is possible to continue using Tresiba while she also uses the pump.  Explained that although that is not usual, it has certainly been done, and I'll check with Dr. Mckeon.      After careful consideration, Dali has decided that she would like to move forward with the Omnipod insulin pump.    She finds that lack of tubing and it's compatibility with the Dexcom CGM the main reasons.  She is currently using a [employer] phone and doesn't have her Dexcom paired with it.  Explained that she will need to use her phone as the  for the Dexcom in order to use the Omnipod 5.  She feels that it's about time for her to upgrade her phone.  Suggested that she check with the Omnipod website to see which phones would be compatible with the Omnipod 5, which would cut down on the number of devices she needs to carry around with her.      Patient-stated goal written and given to Dali CH Conrad.  Verbalized and demonstrated understanding of instructions.     PLAN:  See patient instructions  AVS printed and given to patient    FOLLOW-UP:      Will order the Omnipod 5 for her.    Will discuss using Tresiba along with the pump with Dr. Mckeon.    Material sent to Dali via email at her request to prepare for the pump.      Time spent with patient at today's visit was 60 minutes.      Any diabetes medication dose changes were made via the CDE Protocol and Collaborative Practice Agreement with [facility] and  [facility].  A copy of this encounter was provided to patient's referring provider.

## 2023-09-06 ENCOUNTER — THERAPY VISIT (OUTPATIENT)
Dept: PHYSICAL THERAPY | Facility: CLINIC | Age: 53
End: 2023-09-06
Payer: COMMERCIAL

## 2023-09-06 ENCOUNTER — VIRTUAL VISIT (OUTPATIENT)
Dept: PSYCHOLOGY | Facility: CLINIC | Age: 53
End: 2023-09-06
Payer: COMMERCIAL

## 2023-09-06 DIAGNOSIS — G89.29 CHRONIC LOW BACK PAIN: ICD-10-CM

## 2023-09-06 DIAGNOSIS — G89.29 CHRONIC PAIN OF LEFT KNEE: Primary | ICD-10-CM

## 2023-09-06 DIAGNOSIS — G89.29 CHRONIC PAIN OF RIGHT ANKLE: ICD-10-CM

## 2023-09-06 DIAGNOSIS — M54.50 CHRONIC BILATERAL LOW BACK PAIN WITHOUT SCIATICA: ICD-10-CM

## 2023-09-06 DIAGNOSIS — M54.50 CHRONIC LOW BACK PAIN: ICD-10-CM

## 2023-09-06 DIAGNOSIS — M25.571 CHRONIC PAIN OF RIGHT ANKLE: ICD-10-CM

## 2023-09-06 DIAGNOSIS — E10.3299 TYPE 1 DIABETES MELLITUS WITH MILD NONPROLIFERATIVE RETINOPATHY, MACULAR EDEMA PRESENCE UNSPECIFIED, UNSPECIFIED LATERALITY (H): ICD-10-CM

## 2023-09-06 DIAGNOSIS — G89.29 CHRONIC BILATERAL LOW BACK PAIN WITHOUT SCIATICA: ICD-10-CM

## 2023-09-06 DIAGNOSIS — G89.29 OTHER CHRONIC PAIN: ICD-10-CM

## 2023-09-06 DIAGNOSIS — F43.22 ADJUSTMENT DISORDER WITH ANXIETY: Primary | ICD-10-CM

## 2023-09-06 DIAGNOSIS — M25.562 CHRONIC PAIN OF LEFT KNEE: Primary | ICD-10-CM

## 2023-09-06 DIAGNOSIS — F33.41 MAJOR DEPRESSIVE DISORDER, RECURRENT, IN PARTIAL REMISSION (H): ICD-10-CM

## 2023-09-06 PROCEDURE — 90834 PSYTX W PT 45 MINUTES: CPT | Mod: VID | Performed by: PSYCHOLOGIST

## 2023-09-06 PROCEDURE — 97112 NEUROMUSCULAR REEDUCATION: CPT | Mod: GP | Performed by: PHYSICAL THERAPIST

## 2023-09-06 PROCEDURE — 97110 THERAPEUTIC EXERCISES: CPT | Mod: GP | Performed by: PHYSICAL THERAPIST

## 2023-09-06 NOTE — PROGRESS NOTES
[facility]                                                                                   OUTPATIENT PHYSICAL THERAPY    PLAN OF TREATMENT FOR OUTPATIENT REHABILITATION   Patient's Last Name, First Name, Dali Donato YOB: 1970   Provider's Name   [facility]   Medical Record No.  [id number]     Onset Date: 12/15/22  Start of Care Date:       Medical Diagnosis:  Chronic bilateral low back pain without sciatica; Balance disorder      PT Treatment Diagnosis:  R ankle pain/lateral instability, L low back pain Plan of Treatment  Frequency/Duration: 2X/Month/ 3 months    Certification date from   to           See note for plan of treatment details and functional goals     Laura Crowe, PT                          09/06/23 0500   Appointment Info   Signing clinician's name / credentials Laura Crowe, PT, OCS   Total/Authorized Visits 15+6 ENT   Visits Used 18   Medical Diagnosis Chronic bilateral low back pain without sciatica; Balance disorder   PT Tx Diagnosis R ankle pain/lateral instability, L low back pain   Progress Note/Certification   Onset of illness/injury or Date of Surgery 12/15/22   Therapy Frequency 2X/Month   Predicted Duration 3 months   Progress Note Completed Date 09/06/23   PT Goal 1   Goal Identifier ambulation   Goal Description able to walk 1 mile <3/10 pain   Rationale to maximize safety and independence with performance of ADLs and functional tasks;to maximize safety and independence within the community;to maximize safety and independence with self cares   Goal Progress able to walk 400 feet 4/10 pain   Target Date 12/06/23   Subjective Report   Subjective Report Pt making good progress with vestibular rehab, has been doing a lot of balance exercises and reaction times, also going to OT for cognitive therapy   Objective Measures   Objective Measures Objective Measure 2;Objective Measure 1;Objective  Measure 3   Objective Measure 1   Objective Measure pain   Details 5/10 L knee, 2/10 R knee, 5/10 R ankle; 5/10 back pain   Objective Measure 2   Objective Measure pelvic tilts   Details painful and tightness present, improved post 10 reps on exercise ball   Objective Measure 3   Objective Measure core strength   Details improved control today   Treatment Interventions (PT)   Interventions Therapeutic Procedure/Exercise;Neuromuscular Re-education   Therapeutic Procedure/Exercise   Therapeutic Procedures: strength, endurance, ROM, flexibillity minutes (12403) 23   PTRx Ther Proc 1 Upper Trapezius Stretch   PTRx Ther Proc 1 - Details reviewed   PTRx Ther Proc 2 Cervical Retraction   PTRx Ther Proc 2 - Details No Notes   PTRx Ther Proc 3 All 4s Cat Cow   PTRx Ther Proc 3 - Details No Notes   PTRx Ther Proc 4 All Fours Thoracic Rotation   PTRx Ther Proc 4 - Details No Notes   PTRx Ther Proc 5 Lumbar Flexion Rotation   PTRx Ther Proc 5 - Details 10x   PTRx Ther Proc 6 Posterior Pelvic Tilt   PTRx Ther Proc 6 - Details on exercise ball in clinic   PTRx Ther Proc 7 Seated Toe Raises/Heel Raises   PTRx Ther Proc 7 - Details on exercise ball 10x, standing 10x   PTRx Ther Proc 8 Standing Gastroc Stretch   PTRx Ther Proc 8 - Details No Notes   PTRx Ther Proc 9 Standing Soleus Stretch   PTRx Ther Proc 9 - Details No Notes   PTRx Ther Proc 10 Ankle Active Range of Motion Dorsiflexion and Plantarflexion   PTRx Ther Proc 10 - Details slow control movement avoid pain    PTRx Ther Proc 11 Ankle Eversion/Inversion ROM   PTRx Ther Proc 11 - Details No Notes   PTRx Ther Proc 12 Ankle Alphabet   PTRx Ther Proc 12 - Details painfree motion    PTRx Ther Proc 13 Supine Abdominal Exercise #3B (Two Leg Marching)   PTRx Ther Proc 13 - Details No Notes   Patient Response/Progress improved back pain post exercises in clnic today   Therapeutic Activity   PTRx Ther Act 1 Foam Roller Thoracic Extension   PTRx Ther Act 1 - Details No Notes   PTRx  Ther Act 2 Foam Roller Shoulder Flexion/Horizontal Abduction   PTRx Ther Act 2 - Details No Notes   PTRx Ther Act 3 Posture Correction with Lumbar Roll   PTRx Ther Act 3 - Details No Notes   PTRx Ther Act 4 Foam Roller Shoulder Flexion/Horizontal Abduction   PTRx Ther Act 4 - Details No Notes   Neuromuscular Re-education   Neuromuscular re-ed of mvmt, balance, coord, kinesthetic sense, posture, proprioception minutes (87780) 15   PTRx Neuro Re-ed 1 Bridging #1   PTRx Neuro Re-ed 1 - Details 10x cues for correct form   PTRx Neuro Re-ed 2 birddog   PTRx Neuro Re-ed 2 - Details 10x   Education   Learner/Method Pictures/Video;Listening   Plan   Home program PTRx print outs   Updates to plan of care modified HEP to concentrate on back exercises   Plan for next session resume hip and core strenghtening   Total Session Time   Timed Code Treatment Minutes 38   Total Treatment Time (sum of timed and untimed services) 38     I CERTIFY THE NEED FOR THESE SERVICES FURNISHED UNDER        THIS PLAN OF TREATMENT AND WHILE UNDER MY CARE     (Physician attestation of this document indicates review and certification of the therapy plan).                Referring Provider:  Marlen Bailey MD       Initial Assessment  See Epic Evaluation-

## 2023-09-06 NOTE — PROGRESS NOTES
Health Psychology          Latanya Mayfield, Ph.D.,  [phone]  Hansa Arzate, Ph.D.,  [phone]  Sarah Beth Jack, Ph.D.,  [phone]  Pushpa Mendez, Ph.D., , [facility] [phone]  Panfilo Cornejo, Ph.D., , [facility] [phone]  Krystle Hannah, Ph.D.,  [phone]  Elise Sutton, Ph.D., , [facility] [phone]    [facility], 3rd Floor  [address] [place] [address]       Health Psychology Progress Note    Patient Name: Dali Martines    Service Type: Individual  Length of Visit: 47 minutes  Start time: 10:06 AM  Stop time: 10:53 AM    Attendees: patient attended alone  Session #: 3    Telemedicine Information:     Telemedicine Visit: The patient's condition can be safely assessed and treated via synchronous audio and visual telemedicine encounter.    Reason for Telemedicine Visit: Patient has requested telehealth visit and Patient convenience (e.g. access to timely appointments / distance to available provider)  Originating Site (Patient Location): Patient's home, [place]  Distant Location (provider location):  On-site: [facility]  Consent:  The patient/guardian has verbally consented to: the potential risks and benefits of telemedicine (video visit) versus in person care; bill my insurance or make self-payment for services provided; and responsibility for payment of non-covered services.   Mode of Communication:  Video Conference via [employer]  As the provider I attest to compliance with applicable laws and regulations related to telemedicine.     Identifying Information and Presenting Problem:  Dali Martines is a 52 year old female adult who was referred to health psychology by her endocrinologist, Dr. Mckeon. Patient presented with concerns of stress in the context of multiple life events, several medical conditions, and chronic management of T1DM.    Topics Discussed/Interventions Provided:      Session Content:  "    Update: Patient reports doing generally well since the last visit. She notes that she's had some care taking challenges with her mother.     Homework Review: Patient reports that she reviewed the materials on ACT. She notes that the framework seems consistent with her goals for treatment.     Treatment Plan: Completed treatment plan. See below:                                               Individual Treatment Plan    Patient's Name: Dali Martines  YOB: 1970    Date of Creation: 09/06/23  Date Treatment Plan Last Reviewed/Revised: 09/06/23    DSM5 Diagnoses:     1. Adjustment disorder with anxiety    2. Major depressive disorder, recurrent, in partial remission (H)      Psychosocial / Contextual Factors: financial hardship, health issues, occupational/vocational stress, relationship stress, caregiver stress (mother's health concerns), and grief and loss in the wake of recent bereavement  PROMIS (reviewed every 90 days):     Referral / Collaboration:  Collaboration with medical team and specialists as needed .    Anticipated number of session for this episode of care:  15-20  Anticipation frequency of session:  2x per month  Anticipated Duration of each session: 38-52 minutes  Treatment plan will be reviewed in 90 days or when goals have been changed.       Measurable Treatment Goal(s) related to diagnosis/functional impairment(s): Overall goals for treatment include improving her physical health (e.g., diabetes, physical activity, and GI discomfort), learning strategies for enhancing coping capacity, and decreasing avoidance-based coping.  Goal 1: Patient will decrease her use of avoidance-based coping   \"I will know I've met my goal when I am not avoiding doing things out of fear (e.g., bathroom sink, processing grief, physical activity, reviewing pieces of writing).\"     Objective #A (Patient Action)    Patient will learn and implement skills for reducing perfectionism.  Status: New - Date: " 09/06/23     Intervention(s)  Therapist will teach anti-perfectionism skills and will assist patient in completing a workbook on perfectionism.    Objective #B  Patient will initiate a healthy grieving process  Status: New - Date: 09/06/23    Intervention(s)  Therapist will assist patient in processing and exploring her grief.    Objective #C  Patient will increase her participation in meaningful and purposeful activities.  Status: New - Date: 09/06/23    Intervention(s)  Therapist will teach values clarification, committed action, and exposure skills.    Objective #D  Patient will learn and implement unhooking skills for decreasing the degree to which fear influences behaviors and decisions in an unhelpful way.  Status: New - Date: 09/06/23    Intervention(s)  Therapist will teach defusion, cognitive restructuring, and mindfulness skills.      Patient has reviewed and agreed to the above plan.      Elise Sutton, PhD  September 6, 2023      Skills/intervention: Assisted patient in processing her thoughts and emotions around her caregiver stress and challenges associated with caring for her mother. Explored how patient's care-giving responsibility may impact her health and has sometimes led to neglecting her own health needs. Discussed ways in which the COVID19 pandemic has increased fear and avoidance-based coping for patient and the changes this has resulted in for her life. Introduced ACT principle of values clarification and CCI workbook on perfectionism.       Treatment Objective(s) Addressed in This Session:  Psychological distress related to Diabetes  Psychological distress related to caregiver stress    Progress on / Status of Treatment Objective(s) / Homework:  In progress    Medication Adherence: Patient did not report medication changes. Current medication list is below:     Current Outpatient Medications   Medication    albuterol (PROAIR HFA/PROVENTIL HFA/VENTOLIN HFA) 108 (90 Base) MCG/ACT  inhaler    aspirin 81 MG tablet    blood glucose (ACCU-CHEK COLIN PLUS) test strip    blood glucose monitoring (ACCU-CHEK FASTCLIX) lancets    blood glucose monitoring (NO BRAND SPECIFIED) meter device kit    Calcium Carbonate-Vitamin D (CALCIUM + D PO)    citalopram (CELEXA) 20 MG tablet    Continuous Blood Gluc  (DEXCOM G6 ) SUZANNE    Continuous Blood Gluc Sensor (DEXCOM G6 SENSOR) MISC    Continuous Blood Gluc Transmit (DEXCOM G6 TRANSMITTER) MISC    diclofenac (VOLTAREN) 1 % topical gel    diclofenac (VOLTAREN) 1 % topical gel    DULERA 100-5 MCG/ACT inhaler    econazole nitrate 1 % cream    FIASP PENFILL 100 UNIT/ML SOCT    fish oil-omega-3 fatty acids 1000 MG capsule    folic acid (FOLVITE) 1 MG tablet    Injection Device for insulin (INPEN 100-BLUE-NOVOLOG-FIASP) SUZANNE    insulin degludec (TRESIBA FLEXTOUCH) 100 UNIT/ML pen    Insulin Disposable Pump (OMNIPOD 5 G6 INTRO, GEN 5,) KIT    Insulin Disposable Pump (OMNIPOD 5 G6 POD, GEN 5,) MISC    insulin pen needle (B-D U/F) 31G X 8 MM miscellaneous    Ketoconazole (NIZORAL PO)    levothyroxine (SYNTHROID/LEVOTHROID) 112 MCG tablet    methotrexate 2.5 MG tablet    methotrexate 2.5 MG tablet    methylphenidate (METADATE CD) 20 MG CR capsule    methylphenidate (RITALIN) 10 MG tablet    Minoxidil (ROGAINE EX)    montelukast (SINGULAIR) 5 MG chewable tablet    Multiple Vitamin (MULTIVITAMIN OR)    NOVOLOG PENFILL 100 UNIT/ML soln    ondansetron (ZOFRAN ODT) 4 MG ODT tab    Semaglutide, 1 MG/DOSE, 4 MG/3ML SOPN    simvastatin (ZOCOR) 20 MG tablet    traZODone (DESYREL) 50 MG tablet     Current Facility-Administered Medications   Medication    dexamethasone (DECADRON) injection 4 mg    lidocaine 1 % injection 1 mL    triamcinolone acetonide (KENALOG-10) injection 10 mg     Assessment: The patient appeared to be active and engaged in today's session and was receptive to feedback.     Mental Status Exam:   Appearance: Appropriate   Eye Contact: Good   "  Orientation: Yes, x4  Behavior/relationship to provider/demeanor: Cooperative, Engaged, and Pleasant  Motor Activity: normal or unremarkable  Mood (subjective report): \"Pretty good\"  Affect (objective appearance): Appropriate/mood congruent  Speech Rate: Normal  Speech Volume: Normal  Speech Articulation: Normal  Speech Coherence: Normal  Speech Spontaneity: Normal  Thought Content: no suicidal/homicidal ideation, plans, or intent  Thought Process (associations): Logical, Linear, and Goal directed  Thought Process (rate): Normal  Abnormal Perception: None  Attention/Concentration: Normal  Memory: Appears grossly intact   Fund of Knowledge: Above average  Abstraction:  Normal  Insight:  Good  Judgment:  good    Does the patient appear to be at imminent risk of harm to self/others at this time? No    The session was necessary to address psychological distress in the context of multiple psychosocial stressors and chronic health concerns.    Diagnoses (provisional):    1. Adjustment disorder with anxiety    2. Major depressive disorder, recurrent, in partial remission (H)    3. 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 Other chronic pain        Plan:    Follow-up video visit with me on 9/20/23 at 3:00pm   Patient to use 911 or other crisis resources in the event of a psychiatric emergency  Primary care needs and medications are managed by Dimitri Alas MD. Referring provider is Dr. Mckeon.  Next visit agenda/goals:   Complete module 1 of perfectionism workbook  Complete quick look at values exercise    Skills taught/interventions used thus far:  Psychoeducation  Values clarification  Perfectionism workbook (module 1)    NOTE: Treatment plan update due 9/6/24; 90 day treatment plan review due 12/5/23    Elise Sutton, Ph.D., , [facility]  Clinical Health Psychologist  Phone: [phone]   Pager: [phone]           "

## 2023-09-07 ENCOUNTER — TELEPHONE (OUTPATIENT)
Dept: ENDOCRINOLOGY | Facility: CLINIC | Age: 53
End: 2023-09-07

## 2023-09-07 ENCOUNTER — HOSPITAL ENCOUNTER (OUTPATIENT)
Dept: CARDIAC REHAB | Facility: CLINIC | Age: 53
Discharge: HOME | End: 2023-09-07
Attending: INTERNAL MEDICINE
Payer: COMMERCIAL

## 2023-09-07 ENCOUNTER — CARE COORDINATION (OUTPATIENT)
Dept: EDUCATION SERVICES | Facility: CLINIC | Age: 53
End: 2023-09-07

## 2023-09-07 DIAGNOSIS — E10.3299 TYPE 1 DIABETES MELLITUS WITH MILD NONPROLIFERATIVE RETINOPATHY, MACULAR EDEMA PRESENCE UNSPECIFIED, UNSPECIFIED LATERALITY (H): Primary | ICD-10-CM

## 2023-09-07 PROCEDURE — G0239 OTH RESP PROC, GROUP: HCPCS | Performed by: REHABILITATION PRACTITIONER

## 2023-09-07 RX ORDER — INSULIN ASPART INJECTION 100 [IU]/ML
50 INJECTION, SOLUTION SUBCUTANEOUS DAILY
Qty: 20 ML | Refills: 11 | Status: SHIPPED | OUTPATIENT
Start: 2023-09-07

## 2023-09-07 NOTE — TELEPHONE ENCOUNTER
Screenshot of Product, Insurance, and Cardholder ID)      Please route determinations to the Pharm Diabetes pool ([id number]).      Thank you!    Diabetes Care Services Team   [facility]  [address] [place] [address]

## 2023-09-07 NOTE — PATIENT INSTRUCTIONS
Stanley Mcnally,     Just wanted to let you know that I sent the order for Fiasp vials to the [facility] pharmacy that you currently get your penfill cartridges from.   Make sure to bring a vial with you to your pump start appointment.    The rest of the instructions follow:     HOW TO PREPARE FOR STARTING YOUR OMNIPOD 5 INSULIN PUMP:    The Website to access training materials is:   [email or web address].  The website for Omnipod has lots of helpful information that will assist you after you start your insulin pump, with inserting the pod, navigating the PDM and  things   like securing the pod and where to place it.  Look under the Tab called Training Resources.  It has some videos that explain how the algorithm in the pump works to adjust the basal rate, etc.  The more you can prepare prior to starting the pump, the smoother the transition will be.         Take the controller out of the box and feel free to explore the menus.  If you want to practice putting pump settings in, feel free.  We can change these when we actually start the pump.   In order for your care team to access your pump data you need to follow the instructions that are included in your controller kit,  and set up both an Omnipod account and a Truli account.  Truli is the  service that Omnipod uses.  In order to share data with our clinic, you need to enter the SameGrain code:  Zootcard where it asks for it.           Dr. Mckeon is fine with you continuing to use Tresiba.  Please take half of the usual dose the day of the pump start (or the evening before if that's when you usually take it),  and you'll get half of your basal delivery from the pump.  The orders for your settings have been sent to Melanie Valera and she should be in contact with you soon.     If your pump start appointment is later in the afternoon, you will need to check and correct your glucose every 3-4 hours until we  start your insulin pump.     Fully charge the PDM prior to your appointment.      Your pump  will be Melanie Valera.  Melanie is an RN, Certified Diabetes Care and  (like me), who does pump training for Omnipod.  She should be in contact with you soon.  If you don't hear from her in the next week, her phone number is [phone].      When you start your insulin pump, please either call the clinic or send a My Chart message letting us know.  We should have a follow up either in person or virtually to assess your pump settings, and to evaluate control.  If you have any questions about the pump operation or are concerned about your control, please reach out to the clinic at any time.      WHAT TO BRING WITH YOU TO YOUR PUMP START APPOINTMENT:    Fully charged PDM  2 Pods  Vial of short acting insulin  Any instructional material that came with the pump  The meter that came with your pump  Your cell phone    Please call the clinic or send a My Chart message when you start your pump so we can schedule you for a virtual or in-person visit to follow up.  We'll see to reassess your settings and generally just see how it's going.      Hope the pump start goes smoothly!  My best, Dali.     Dali Viera, BSN, RN, [facility]  Certified Diabetes Care and   [facility]   [facility]  Clinic [phone]  Phone [phone]

## 2023-09-07 NOTE — PROGRESS NOTES
Encounter created for orders and prep for insulin pump.      Dali Viera, ANGÉLICAN, RN, [facility]  Certified Diabetes Care and   [facility] Diabetes  [facility]  Clinic [id number]  Phone [phone]

## 2023-09-07 NOTE — TELEPHONE ENCOUNTER
PA Initiation    Medication: OMNIPOD 5 G6 POD (GEN 5) MISC  Insurance Company: [employer] - Phone [phone] Fax [phone]  Pharmacy Filling the Rx: [facility] - [place], [place] - [facility] [address]  Filling Pharmacy Phone: [phone]  Filling Pharmacy Fax: [phone]  Start Date: 9/7/2023

## 2023-09-07 NOTE — TELEPHONE ENCOUNTER
Prior Authorization Approval    Medication: OMNIPOD 5 G6 POD (GEN 5) MISC  Authorization Effective Date: 9/7/2023  Authorization Expiration Date: 9/7/2024  Approved Dose/Quantity:   Reference #:     Insurance Company: [employer] - Phone [phone] Fax [phone]  Expected CoPay:       CoPay Card Available:      Financial Assistance Needed:   Which Pharmacy is filling the prescription: [facility] - [place] [address] [address]  Pharmacy Notified: Yes  Patient Notified: Yes **Instructed pharmacy to notify patient when script is ready to /ship.**

## 2023-09-07 NOTE — TELEPHONE ENCOUNTER
{Screenshot of Product, Insurance, and Cardholder ID)      Please route determinations to the Pharm Diabetes pool ([id number]).      Thank you!    Diabetes Care Services Team   [facility]  [address] [place] [address]

## 2023-09-07 NOTE — TELEPHONE ENCOUNTER
Prior Authorization Approval    Medication: OMNIPOD 5 G6 INTRO (GEN 5) KIT  Authorization Effective Date: 9/7/2023  Authorization Expiration Date: 9/7/2024  Approved Dose/Quantity:   Reference #:     Insurance Company: [employer] - Phone [phone] Fax [phone]  Expected CoPay:       CoPay Card Available:      Financial Assistance Needed:   Which Pharmacy is filling the prescription: [facility] - [place] [address]  Pharmacy Notified: Yes  Patient Notified: Yes **Instructed pharmacy to notify patient when script is ready to /ship.**

## 2023-09-07 NOTE — TELEPHONE ENCOUNTER
PA Initiation    Medication: OMNIPOD 5 G6 INTRO (GEN 5) KIT  Insurance Company: [employer] - Phone [phone] Fax [phone]  Pharmacy Filling the Rx: [facility] - [place], [place] - [facility] [address]  Filling Pharmacy Phone: [phone]  Filling Pharmacy Fax: [phone]  Start Date: 9/7/2023

## 2023-09-12 ENCOUNTER — THERAPY VISIT (OUTPATIENT)
Dept: PHYSICAL THERAPY | Facility: CLINIC | Age: 53
End: 2023-09-12
Payer: COMMERCIAL

## 2023-09-12 ENCOUNTER — HOSPITAL ENCOUNTER (OUTPATIENT)
Dept: CARDIAC REHAB | Facility: CLINIC | Age: 53
Discharge: HOME | End: 2023-09-12
Attending: INTERNAL MEDICINE
Payer: COMMERCIAL

## 2023-09-12 DIAGNOSIS — R42 DIZZINESS: Primary | ICD-10-CM

## 2023-09-12 DIAGNOSIS — R26.89 BALANCE PROBLEMS: ICD-10-CM

## 2023-09-12 PROCEDURE — 97112 NEUROMUSCULAR REEDUCATION: CPT | Mod: GP | Performed by: PHYSICAL THERAPIST

## 2023-09-12 PROCEDURE — G0239 OTH RESP PROC, GROUP: HCPCS | Performed by: REHABILITATION PRACTITIONER

## 2023-09-14 ENCOUNTER — HOSPITAL ENCOUNTER (OUTPATIENT)
Dept: CARDIAC REHAB | Facility: CLINIC | Age: 53
Discharge: HOME | End: 2023-09-14
Attending: INTERNAL MEDICINE
Payer: COMMERCIAL

## 2023-09-14 PROCEDURE — G0239 OTH RESP PROC, GROUP: HCPCS

## 2023-09-19 ENCOUNTER — HOSPITAL ENCOUNTER (OUTPATIENT)
Dept: CARDIAC REHAB | Facility: CLINIC | Age: 53
Discharge: HOME | End: 2023-09-19
Attending: INTERNAL MEDICINE
Payer: COMMERCIAL

## 2023-09-19 DIAGNOSIS — E10.3299 TYPE 1 DIABETES MELLITUS WITH MILD NONPROLIFERATIVE RETINOPATHY, MACULAR EDEMA PRESENCE UNSPECIFIED, UNSPECIFIED LATERALITY (H): Primary | ICD-10-CM

## 2023-09-19 PROCEDURE — G0239 OTH RESP PROC, GROUP: HCPCS

## 2023-09-19 RX ORDER — SYRINGE-NEEDLE,INSULIN,0.5 ML 27GX1/2"
SYRINGE, EMPTY DISPOSABLE MISCELLANEOUS
Qty: 100 EACH | Refills: 1 | Status: SHIPPED | OUTPATIENT
Start: 2023-09-19

## 2023-09-20 ENCOUNTER — VIRTUAL VISIT (OUTPATIENT)
Dept: PSYCHOLOGY | Facility: CLINIC | Age: 53
End: 2023-09-20
Payer: COMMERCIAL

## 2023-09-20 DIAGNOSIS — E10.3299 TYPE 1 DIABETES MELLITUS WITH MILD NONPROLIFERATIVE RETINOPATHY, MACULAR EDEMA PRESENCE UNSPECIFIED, UNSPECIFIED LATERALITY (H): ICD-10-CM

## 2023-09-20 DIAGNOSIS — F43.22 ADJUSTMENT DISORDER WITH ANXIETY: Primary | ICD-10-CM

## 2023-09-20 DIAGNOSIS — F33.41 MAJOR DEPRESSIVE DISORDER, RECURRENT, IN PARTIAL REMISSION (H): ICD-10-CM

## 2023-09-20 PROCEDURE — 90834 PSYTX W PT 45 MINUTES: CPT | Mod: VID | Performed by: PSYCHOLOGIST

## 2023-09-20 ASSESSMENT — ANXIETY QUESTIONNAIRES
GAD7 TOTAL SCORE: 7
7. FEELING AFRAID AS IF SOMETHING AWFUL MIGHT HAPPEN: SEVERAL DAYS
GAD7 TOTAL SCORE: 7
5. BEING SO RESTLESS THAT IT IS HARD TO SIT STILL: NOT AT ALL
IF YOU CHECKED OFF ANY PROBLEMS ON THIS QUESTIONNAIRE, HOW DIFFICULT HAVE THESE PROBLEMS MADE IT FOR YOU TO DO YOUR WORK, TAKE CARE OF THINGS AT HOME, OR GET ALONG WITH OTHER PEOPLE: SOMEWHAT DIFFICULT
4. TROUBLE RELAXING: MORE THAN HALF THE DAYS
6. BECOMING EASILY ANNOYED OR IRRITABLE: NOT AT ALL
1. FEELING NERVOUS, ANXIOUS, OR ON EDGE: SEVERAL DAYS
3. WORRYING TOO MUCH ABOUT DIFFERENT THINGS: MORE THAN HALF THE DAYS
2. NOT BEING ABLE TO STOP OR CONTROL WORRYING: SEVERAL DAYS

## 2023-09-20 NOTE — NURSING NOTE
Is the patient currently in the state of [place]? YES    Visit mode:VIDEO    If the visit is dropped, the patient can be reconnected by: VIDEO VISIT:  Send e-mail to at [email or web address] Cred    Will anyone else be joining the visit? No  (If patient encounters technical issues they should call [phone])    How would you like to obtain your AVS? MyChart    Are changes needed to the allergy or medication list? No    Rooming Documentation: Assigned questionnaire(s) completed .    Reason for visit: RECHECK     Deborah Frias, VVF

## 2023-09-20 NOTE — PROGRESS NOTES
Virtual Visit Details    Type of service:  Video Visit     Originating Location (pt. Location): Home    Distant Location (provider location):  On-site  Platform used for Video Visit: Sherie
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
## 2023-09-20 NOTE — PROGRESS NOTES
Health Psychology          Latanya Mayfield, Ph.D.,  [phone]  Hansa Arzate, Ph.D.,  [phone]  Sarah Beth Jack, Ph.D.,  [phone]  Pushpa Mendez, Ph.D., , ABP [phone]  Panfilo Cornejo, Ph.D., , ABPP [phone]  Krystle Hannah, Ph.D.,  [phone]  Elise Sutton, Ph.D., , ABP [phone]    [facility], 3rd Floor  [address] [place] [address]       Health Psychology Progress Note    Patient Name: Dali Martines    Service Type: Individual  Length of Visit: 50 minutes  Start time: 3:07 PM - late start due to technical challenges, had to use Doximity   Stop time: 3:57 PM    Attendees: patient attended alone  Session #: 4    Telemedicine Information:     Telemedicine Visit: The patient's condition can be safely assessed and treated via synchronous audio and visual telemedicine encounter.    Reason for Telemedicine Visit: Patient has requested telehealth visit and Patient convenience (e.g. access to timely appointments / distance to available provider)  Originating Site (Patient Location): Patient's home, [place]  Distant Location (provider location):  On-site: [facility]  Consent:  The patient/guardian has verbally consented to: the potential risks and benefits of telemedicine (video visit) versus in person care; bill my insurance or make self-payment for services provided; and responsibility for payment of non-covered services.   Mode of Communication:  Video Conference via [employer]voyant  As the provider I attest to compliance with applicable laws and regulations related to telemedicine.     Identifying Information and Presenting Problem:  Dali Martines is a 52 year old female adult who was referred to health psychology by her endocrinologist, Dr. Mckeon. Patient presented with concerns of stress in the context of multiple life events, several medical conditions, and chronic management of T1DM.    Topics  "Discussed/Interventions Provided:      Session Content:     Update: Patient reports doing generally well since the last visit. She started on an Omnipod pump since yesterday. Reports that it is going well and that it has been a positive change and she likes the new interface and record that it keeps. She also reports that she will be getting help with her caregiver demands via in-home health aid support.      Homework Review: Patient reports that she completed the values clarification exercise and module 1 of the perfectionism workbook. Top 7 values identified included: spirituality, humor, fairness, creativity, curiosity, contribution, and caring. Discussed insights from the module on perfectionism and ways in which this has manifested with regard to her diabetes self-management. Patient noted that she developed unrelenting standards around taking care of her diabetes on her own and not permitting diabetes \"to be an excuse,\" which would often result in powering through and ignoring illness or other problematic symptoms. Patient identified decision-making, procrastination, hoarding, and avoidance as primary perfectionism behaviors that she exhibits.       Skills/intervention: Provided psychoeducation about the distinction among values, goals, and behaviors. Discussed the ACT perspective of keeping values, goals, and behaviors can be in alignment and provided patient-specific examples. Also discussed addressing perfectionism as an example of an unhooking skill that will enable more values-consistent action. Introduced module 2 of the perfectionism workbook.       Treatment Objective(s) Addressed in This Session:  Psychological distress related to Diabetes  Psychological distress related to caregiver stress    Progress on / Status of Treatment Objective(s) / Homework:  In progress    Medication Adherence: Patient did not report medication changes. Current medication list is below:     Current Outpatient Medications " "  Medication    albuterol (PROAIR HFA/PROVENTIL HFA/VENTOLIN HFA) 108 (90 Base) MCG/ACT inhaler    aspirin 81 MG tablet    blood glucose (ACCU-CHEK COLIN PLUS) test strip    blood glucose monitoring (ACCU-CHEK FASTCLIX) lancets    blood glucose monitoring (NO BRAND SPECIFIED) meter device kit    Calcium Carbonate-Vitamin D (CALCIUM + D PO)    citalopram (CELEXA) 20 MG tablet    Continuous Blood Gluc  (DEXCOM G6 ) SUZANNE    Continuous Blood Gluc Sensor (DEXCOM G6 SENSOR) MISC    Continuous Blood Gluc Transmit (DEXCOM G6 TRANSMITTER) MISC    diclofenac (VOLTAREN) 1 % topical gel    diclofenac (VOLTAREN) 1 % topical gel    DULERA 100-5 MCG/ACT inhaler    econazole nitrate 1 % cream    FIASP PENFILL 100 UNIT/ML SOCT    fish oil-omega-3 fatty acids 1000 MG capsule    folic acid (FOLVITE) 1 MG tablet    Injection Device for insulin (INPEN 100-BLUE-NOVOLOG-FIASP) SUZANNE    Insulin Aspart, w/Niacinamide, (FIASP) 100 UNIT/ML SOLN    insulin degludec (TRESIBA FLEXTOUCH) 100 UNIT/ML pen    Insulin Disposable Pump (OMNIPOD 5 G6 INTRO, GEN 5,) KIT    Insulin Disposable Pump (OMNIPOD 5 G6 POD, GEN 5,) MISC    insulin pen needle (B-D U/F) 31G X 8 MM miscellaneous    insulin syringe-needle U-100 (30G X 1/2\" 1 ML) 30G X 1/2\" 1 ML miscellaneous    Ketoconazole (NIZORAL PO)    levothyroxine (SYNTHROID/LEVOTHROID) 112 MCG tablet    methotrexate 2.5 MG tablet    methotrexate 2.5 MG tablet    methylphenidate (METADATE CD) 20 MG CR capsule    methylphenidate (RITALIN) 10 MG tablet    Minoxidil (ROGAINE EX)    montelukast (SINGULAIR) 5 MG chewable tablet    Multiple Vitamin (MULTIVITAMIN OR)    NOVOLOG PENFILL 100 UNIT/ML soln    ondansetron (ZOFRAN ODT) 4 MG ODT tab    Semaglutide, 1 MG/DOSE, 4 MG/3ML SOPN    simvastatin (ZOCOR) 20 MG tablet    traZODone (DESYREL) 50 MG tablet     Current Facility-Administered Medications   Medication    dexamethasone (DECADRON) injection 4 mg    lidocaine 1 % injection 1 mL    triamcinolone " "acetonide (KENALOG-10) injection 10 mg     Assessment: The patient appeared to be active and engaged in today's session and was receptive to feedback.     Mental Status Exam:   Appearance: Appropriate   Eye Contact: Good    Orientation: Yes, x4  Behavior/relationship to provider/demeanor: Cooperative, Engaged, and Pleasant  Motor Activity: normal or unremarkable  Mood (subjective report): \"Pretty good\"  Affect (objective appearance): Appropriate/mood congruent  Speech Rate: Normal  Speech Volume: Normal  Speech Articulation: Normal  Speech Coherence: Normal  Speech Spontaneity: Normal  Thought Content: no suicidal/homicidal ideation, plans, or intent  Thought Process (associations): Logical, Linear, and Goal directed  Thought Process (rate): Normal  Abnormal Perception: None  Attention/Concentration: Normal  Memory: Appears grossly intact   Fund of Knowledge: Above average  Abstraction:  Normal  Insight:  Good  Judgment:  good    Does the patient appear to be at imminent risk of harm to self/others at this time? No    The session was necessary to address psychological distress in the context of multiple psychosocial stressors and chronic health concerns.    Diagnoses (provisional):    1. Adjustment disorder with anxiety    2. Major depressive disorder, recurrent, in partial remission (H)    3. 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 Other chronic pain        Plan:    Follow-up video visit with me on 10/4/23 at 11:00am   Patient to use 911 or other crisis resources in the event of a psychiatric emergency  Primary care needs and medications are managed by Dimitri Alas MD. Referring provider is Dr. Mckeon.  Next visit agenda/goals:   Complete module 2 of perfectionism workbook  Complete vitality vs suffering diary    Skills taught/interventions used thus far:  Psychoeducation  Values clarification  Committed action " "(vitality vs suffering)  Perfectionism workbook (modules 1-2)    NOTE: Treatment plan update due 9/6/24; 90 day treatment plan review due 12/5/23                                                Individual Treatment Plan    Patient's Name: Dali Martines  YOB: 1970    Date of Creation: 09/06/23  Date Treatment Plan Last Reviewed/Revised: 09/06/23    DSM5 Diagnoses:     1. Adjustment disorder with anxiety    2. Major depressive disorder, recurrent, in partial remission (H)      Psychosocial / Contextual Factors: financial hardship, health issues, occupational/vocational stress, relationship stress, caregiver stress (mother's health concerns), and grief and loss in the wake of recent bereavement  PROMIS (reviewed every 90 days):     Referral / Collaboration:  Collaboration with medical team and specialists as needed.    Anticipated number of session for this episode of care: 15-20  Anticipation frequency of session: 2x per month  Anticipated Duration of each session: 38-52 minutes  Treatment plan will be reviewed in 90 days or when goals have been changed.       Measurable Treatment Goal(s) related to diagnosis/functional impairment(s): Overall goals for treatment include improving her physical health (e.g., diabetes, physical activity, and GI discomfort), learning strategies for enhancing coping capacity, and decreasing avoidance-based coping.  Goal 1: Patient will decrease her use of avoidance-based coping   \"I will know I've met my goal when I am not avoiding doing things out of fear (e.g., bathroom sink, processing grief, physical activity, reviewing pieces of writing).\"     Objective #A (Patient Action)    Patient will learn and implement skills for reducing perfectionism.  Status: New - Date: 09/06/23     Intervention(s)  Therapist will teach anti-perfectionism skills and will assist patient in completing a workbook on perfectionism.    Objective #B  Patient will initiate a healthy grieving " process  Status: New - Date: 09/06/23    Intervention(s)  Therapist will assist patient in processing and exploring her grief.    Objective #C  Patient will increase her participation in meaningful and purposeful activities.  Status: New - Date: 09/06/23    Intervention(s)  Therapist will teach values clarification, committed action, and exposure skills.    Objective #D  Patient will learn and implement unhooking skills for decreasing the degree to which fear influences behaviors and decisions in an unhelpful way.  Status: New - Date: 09/06/23    Intervention(s)  Therapist will teach defusion, cognitive restructuring, and mindfulness skills.      Patient has reviewed and agreed to the above plan.      Elise Sutton, PhD  September 6, 2023    Elise Sutton, Ph.D., , [facility]  Clinical Health Psychologist  Phone: [phone]   Pager: [phone]

## 2023-09-21 ENCOUNTER — OFFICE VISIT (OUTPATIENT)
Dept: AUDIOLOGY | Facility: CLINIC | Age: 53
End: 2023-09-21
Attending: OTOLARYNGOLOGY
Payer: COMMERCIAL

## 2023-09-21 ENCOUNTER — TELEPHONE (OUTPATIENT)
Dept: EDUCATION SERVICES | Facility: CLINIC | Age: 53
End: 2023-09-21

## 2023-09-21 DIAGNOSIS — R42 DIZZINESS: Primary | ICD-10-CM

## 2023-09-21 DIAGNOSIS — Z01.10 EXAMINATION OF EARS AND HEARING: ICD-10-CM

## 2023-09-21 DIAGNOSIS — R42 DIZZINESS AND GIDDINESS: Primary | ICD-10-CM

## 2023-09-21 DIAGNOSIS — R41.0 DISORIENTATION: ICD-10-CM

## 2023-09-21 DIAGNOSIS — R26.81 UNSTEADINESS: ICD-10-CM

## 2023-09-21 PROCEDURE — 92541 SPONTANEOUS NYSTAGMUS TEST: CPT | Performed by: AUDIOLOGIST

## 2023-09-21 PROCEDURE — 92545 OSCILLATING TRACKING TEST: CPT | Mod: 59 | Performed by: AUDIOLOGIST

## 2023-09-21 PROCEDURE — 92557 COMPREHENSIVE HEARING TEST: CPT | Performed by: AUDIOLOGIST

## 2023-09-21 PROCEDURE — 92542 POSITIONAL NYSTAGMUS TEST: CPT | Mod: 59 | Performed by: AUDIOLOGIST

## 2023-09-21 PROCEDURE — 92538 CALORIC VSTBLR TEST W/REC: CPT | Performed by: AUDIOLOGIST

## 2023-09-21 PROCEDURE — 92550 TYMPANOMETRY & REFLEX THRESH: CPT | Performed by: AUDIOLOGIST

## 2023-09-21 NOTE — PROGRESS NOTES
AUDIOLOGY REPORT    SUMMARY: Audiology visit completed. See audiogram for results.      RECOMMENDATIONS: Follow-up with ENT.      DARRELL Bethea.   Audiology Doctoral Student   [place] #[id number]      I was present with the patient for the entire Audiology appointment including all procedures/testing performed by the AuD student, and agree with the assessment and plan as documented.      Edna Murdock  Audiologist  [place] License  #[id number]

## 2023-09-21 NOTE — Clinical Note
No peripheral findings, lots of central stuff. Sees Dr. Nissen in ENT on 10/17, I suggested neurology when I messaged him.  Thanks, Aimee

## 2023-09-21 NOTE — TELEPHONE ENCOUNTER
Patient was trained on OP5 9/19 by Melanie Valera. I received a call from her that patient had taken full dose of Tresiba at midnight; training was at 3pm. Melanie was requesting to put patient in Activity Mode for 24 hours which I approved.     I received a phone call 9/20 in the morning from Melanie saying patient was low overnight. I reviewed Glooko and Dexcom before calling patient around 9:30AM; at the time of call she was in the 120's. I strongly recommended she discontinue low dose of Tresiba, she is supposed to take 8 units in addition to pump therapy. Patient declined stating she is comfortable continuing with Tresiba. We did change active insulin time to 3.5 hours as patient is using Fiasp in pump.     I will follow up with patient every couple of days. Patient agreeable with plan.    Dr. Mckeon was updated via staff message.    Bhavya Oropeza, RN, Diabetes Educator  Diabetes Education Department  [facility], [place]  [phone]

## 2023-09-22 ENCOUNTER — TELEPHONE (OUTPATIENT)
Dept: ENDOCRINOLOGY | Facility: CLINIC | Age: 53
End: 2023-09-22
Payer: COMMERCIAL

## 2023-09-22 DIAGNOSIS — R42 DIZZINESS: Primary | ICD-10-CM

## 2023-09-22 NOTE — TELEPHONE ENCOUNTER
Overall things look good. ISF might need to be weakened. Patient agreeable no changes needed at this time. She will follow up with Dali 10/12 and will reach out sooner if needed.    Bhavya Oropeza RN, Diabetes Educator  Diabetes Education Department  [facility], [place]  [phone]

## 2023-09-26 ENCOUNTER — HOSPITAL ENCOUNTER (OUTPATIENT)
Dept: CARDIAC REHAB | Facility: CLINIC | Age: 53
Discharge: HOME | End: 2023-09-26
Attending: INTERNAL MEDICINE
Payer: COMMERCIAL

## 2023-09-26 PROCEDURE — G0239 OTH RESP PROC, GROUP: HCPCS | Performed by: REHABILITATION PRACTITIONER

## 2023-09-27 ENCOUNTER — THERAPY VISIT (OUTPATIENT)
Dept: PHYSICAL THERAPY | Facility: CLINIC | Age: 53
End: 2023-09-27
Payer: COMMERCIAL

## 2023-09-27 DIAGNOSIS — M54.50 CHRONIC BILATERAL LOW BACK PAIN WITHOUT SCIATICA: ICD-10-CM

## 2023-09-27 DIAGNOSIS — M25.571 CHRONIC PAIN OF RIGHT ANKLE: ICD-10-CM

## 2023-09-27 DIAGNOSIS — G89.29 CHRONIC LOW BACK PAIN: ICD-10-CM

## 2023-09-27 DIAGNOSIS — M54.50 CHRONIC LOW BACK PAIN: ICD-10-CM

## 2023-09-27 DIAGNOSIS — R42 DIZZINESS: Primary | ICD-10-CM

## 2023-09-27 DIAGNOSIS — R26.89 BALANCE PROBLEMS: ICD-10-CM

## 2023-09-27 DIAGNOSIS — G89.29 CHRONIC PAIN OF RIGHT ANKLE: ICD-10-CM

## 2023-09-27 DIAGNOSIS — G89.29 CHRONIC PAIN OF LEFT KNEE: Primary | ICD-10-CM

## 2023-09-27 DIAGNOSIS — M25.562 CHRONIC PAIN OF LEFT KNEE: Primary | ICD-10-CM

## 2023-09-27 DIAGNOSIS — G89.29 CHRONIC BILATERAL LOW BACK PAIN WITHOUT SCIATICA: ICD-10-CM

## 2023-09-27 PROCEDURE — 97112 NEUROMUSCULAR REEDUCATION: CPT | Mod: GP | Performed by: PHYSICAL THERAPIST

## 2023-09-27 PROCEDURE — 97110 THERAPEUTIC EXERCISES: CPT | Mod: GP | Performed by: PHYSICAL THERAPIST

## 2023-09-28 ENCOUNTER — HOSPITAL ENCOUNTER (OUTPATIENT)
Dept: CARDIAC REHAB | Facility: CLINIC | Age: 53
Discharge: HOME | End: 2023-09-28
Attending: INTERNAL MEDICINE
Payer: COMMERCIAL

## 2023-09-28 PROCEDURE — G0239 OTH RESP PROC, GROUP: HCPCS | Performed by: CLINICAL EXERCISE PHYSIOLOGIST

## 2023-09-28 NOTE — PROGRESS NOTES
GI CLINIC VISIT    CC/REFERRING PROVIDER: [facility]    HPI: 52 year old female with PMH of psoriatic arthritis on methotrexate, asthma, type I diabetes, endometriosis presenting to GI clinic for hiatal hernia and delayed gastric emptying    Dali presented with history of a myriad of GI issues, ongoing off and on since the mid-90s. She previously followed with Dr. Shelton in [id number]. She underwent evaluations as noted below and ultimately was felt to have functional dyspepsia.  - NM GES in 2014 with normal emptying, study was limited to 180 minutes  - HBT 2014 positive, report indicates she was constipated at time of study  - EGD 1/2014- small hiatal hernia. Normal esophagus. Normal duodenum.  Colonoscopy 2020 - normal, repeat 5 years    At time of our initial consultation, she elicited several GI concerns, briefly summarized as follows:    1. She is concerned about the hiatal hernia and anti-reflux valve. If she has any liquid or food in her stomach, and laying down at all, it will reflux up into her esophagus. She feels this is very positional, with the reclined position being the worst. The regurgitant is bland. If there is regurgitation of food, it is partially digested. There does not seem to be any relation to types of food that trigger symptoms, but rather just the physical quantity. She does note that high glucose does exacerbate this. No dysphagia, odynophagia. Not experiencing heartburn or acid reflux symptoms on any regularly basis. Currently not on any anti-acids. She previously did try famotidine in the past without clear improvement.    2. She is also having what she describes as extreme bouts of nausea, occurring episodically. During these episodes, she cannot keep anything down, including water. This last occurred several weeks ago and has occurred several times in total. During this most recent episode, she had a stressful day prior to that. The next morning, she had coffee, breakfast, and an  hour later, threw everything up. This can be more consistent with regurgitation at times as there is no retching present. The emesis mostly is bland. She wasn't able to tolerate any PO intake for a full day. She felt like there was a physical overflow, describing a fullness in her stomach. She notes some extreme hypoglycemia and feeling like she has to sneeze and nausea. She notes she can get the most extreme nausea after a low blood glucose episode, which she relates to the high sugar. She has lost some weight on ozempic.These symptoms tend to be present within an hour of eating. There is often an epigastric pain in relation to this. She notes periodic RUQ pain.    In between episodes, she does have to pay attention to how full her stomach is, as this can contribute to nausea and/or vomiting. She does endorse early satiety and postprandial fullness. Notably, she is on ozempic. She has been on this for one to two years, and on an increased dose since last Fall or so she thinks. She does have some intermittent postprandial nausea to a more mild degree compared to the more extreme episodes, above. She recalls having a full glass of water the other night and went to go lay down (using elevated pillows) and then proceeded to throw up.    3. Bowel pattern tends towards constipation per her account. SHe has a BM daily to every other day, stools can be marble-like (once per week) to hard or dry and difficult to pass (estimates once every other week). Rare sense of incomplete evacuation. She does occasionally have BRBPR. No melena. She notes that when she was a child, she had a severe case of mono, out of school for months. She developed constipation, remembers mineral oil, enemas. She feels like after recovering from this, she feels like this changed her metabolism, contributed to new endocrine problems.    Interval history:  After initial consultation  Since out last visit, she has had two changes:  1. She states she has  taken holidays from Ozempic - ie doing Ozempic every other week, which has helped reduce the severity of symptoms.  2. She met with her endocrinologist and was recommended an insulin pump due to episodes of hypoglycemia, which seemed to be related to the sustained nausea symptoms. She has been on the insulin pump for a few weeks, she states too early to tell if she has had improvement yet. She has still had some episodes of nausea and vomiting, but not lasting as long as symptoms previously were.    Otherwise, symptoms remain as above, noted in HPI. She questions the possibility of hiatal hernia today.  No known FHx of GI malignancy.    ROS: 10pt ROS performed and otherwise negative.    PAST MEDICAL HISTORY:  Past Medical History:   Diagnosis Date     Anemia      Anxiety      Arthritis 2014    Psoriatic arthritis     Back injury 1988     Dry eye syndrome      Dyslipidemia      Endometriosis 2002    adehsions seen at laparoscopy     GERD (gastroesophageal reflux disease)      Hypothyroidism     8 yoa     SOB (shortness of breath) 3/11/2014     Type I (juvenile type) diabetes mellitus without mention of complication, not stated as uncontrolled     when 15      Uncomplicated asthma Fall 2013       PREVIOUS ABDOMINAL/GYNECOLOGIC SURGERIES:  Past Surgical History:   Procedure Laterality Date     COLONOSCOPY  2002     COLONOSCOPY N/A 6/23/2020    Procedure: COLONOSCOPY;  Surgeon: Lauryn Rivera MD;  Location:  GI     ESOPHAGOSCOPY, GASTROSCOPY, DUODENOSCOPY (EGD), COMBINED  1/7/2014    Procedure: COMBINED ESOPHAGOSCOPY, GASTROSCOPY, DUODENOSCOPY (EGD), BIOPSY SINGLE OR MULTIPLE;;  Surgeon: Kraig Nicole MD;  Location:  GI     GYN SURGERY      Laparotomy to remove endometrial tissue     HC BREATH HYDROGEN TEST N/A 6/17/2014    Procedure: HYDROGEN BREATH TEST;  Surgeon: Deacon Alberts MD;  Location:  GI     HYDROGEN BREATH TEST  6/17/14     LAPAROSCOPIC APPENDECTOMY  2002     LAPAROTOMY, LYSIS  ADHESIONS, COMBINED  2002    endometriosis     SOFT TISSUE SURGERY  December 2014    right ankle tendon injury     ZZC REPAIR CRUCIATE LIGAMENT,KNEE       ZZC STOMACH SURGERY PROCEDURE UNLISTED  December 2002         PERTINENT MEDICATIONS:  Current Outpatient Medications   Medication Sig Dispense Refill     albuterol (PROAIR HFA/PROVENTIL HFA/VENTOLIN HFA) 108 (90 Base) MCG/ACT inhaler Inhale 2 puffs into the lungs every 4 hours as needed for shortness of breath / dyspnea or wheezing 1 Inhaler 11     aspirin 81 MG tablet Take 1 tablet by mouth daily.       blood glucose (ACCU-CHEK COLIN PLUS) test strip Test Blood Sugar 8 times daily or as directed 800 strip 3     blood glucose monitoring (ACCU-CHEK FASTCLIX) lancets Use to test blood sugar 8 times daily or as directed. 4 Box 3     blood glucose monitoring (NO BRAND SPECIFIED) meter device kit Use to test blood sugar 8 times daily or as directed. Any covered brand, 1 kit 0     Calcium Carbonate-Vitamin D (CALCIUM + D PO) Take one daily       citalopram (CELEXA) 20 MG tablet Take 1.5 tablets (30 mg) by mouth daily 135 tablet 1     Continuous Blood Gluc  (DEXCOM G6 ) SUZANNE Use to read blood sugars as per 's instructions. 1 each 0     Continuous Blood Gluc Sensor (DEXCOM G6 SENSOR) MISC Change every 10 days. 9 each 3     Continuous Blood Gluc Transmit (DEXCOM G6 TRANSMITTER) MISC Change every 3 months. 1 each 3     diclofenac (VOLTAREN) 1 % topical gel Apply 2 g topically 4 times daily 100 g 4     diclofenac (VOLTAREN) 1 % topical gel APPLY 2 GRAMS ONTO THE SKIN FOUR TIMES DAILY AS NEEDED FOR MODERATE PAIN 100 g 5     DULERA 100-5 MCG/ACT inhaler INHALE 2 PUFFS INTO THE LUNGS TWICE DAILY 13 g 5     econazole nitrate 1 % cream Apply 0.5 inches topically daily.       FIASP PENFILL 100 UNIT/ML SOCT INJECT UP TO 45 UNITS DAILY AS DIRECTED ACCORDING TO CORRECTION FACTOR. 45 mL 3     fish oil-omega-3 fatty acids 1000 MG capsule Take one daily        "folic acid (FOLVITE) 1 MG tablet Take 1 tablet (1 mg) by mouth daily 90 tablet 3     Injection Device for insulin (INPEN 100-BLUE-NOVOLOG-FIASP) SUZANNE 1 each once for 1 dose 1 each 0     Insulin Aspart, w/Niacinamide, (FIASP) 100 UNIT/ML SOLN Inject 50 Units as directed daily For use in ambulatory insulin pump as directed.  Approximate daily dose is 50 units. 20 mL 11     insulin degludec (TRESIBA FLEXTOUCH) 100 UNIT/ML pen ADMINISTER 17 UNITS UNDER THE SKIN DAILY 30 mL 1     Insulin Disposable Pump (OMNIPOD 5 G6 INTRO, GEN 5,) KIT 1 kit continuous 1 kit 0     Insulin Disposable Pump (OMNIPOD 5 G6 POD, GEN 5,) MISC 1 each every 3 days 30 each 3     insulin pen needle (B-D U/F) 31G X 8 MM miscellaneous Use 5 pen needles daily 450 each 3     insulin syringe-needle U-100 (30G X 1/2\" 1 ML) 30G X 1/2\" 1 ML miscellaneous Use 1 syringes as directed to draw up insulin for pump. Per pharmacy request. . 100 each 1     Ketoconazole (NIZORAL PO) Shampoo daily       levothyroxine (SYNTHROID/LEVOTHROID) 112 MCG tablet TAKE 1 TABLET(112 MCG) BY MOUTH DAILY 90 tablet 4     methotrexate 2.5 MG tablet Take 6 tablets (15 mg) by mouth every 7 days Labs every 8 - 12 weeks for refills. 72 tablet 0     methotrexate 2.5 MG tablet Take 6 tablets (15 mg) by mouth every 7 days Labs required every 8-12 weeks while taking this medication 72 tablet 0     methylphenidate (METADATE CD) 20 MG CR capsule Take 20 mg by mouth daily       methylphenidate (RITALIN) 10 MG tablet TAKE UP TO 2 TABLETS BY MOUTH IN THE LATE AFTERNOON.       Minoxidil (ROGAINE EX) daily       montelukast (SINGULAIR) 5 MG chewable tablet CHEW AND SWALLOW 1 TABLET(5 MG) BY MOUTH AT BEDTIME 90 tablet 4     Multiple Vitamin (MULTIVITAMIN OR) Take one daily tab       NOVOLOG PENFILL 100 UNIT/ML soln INJECT 1 UNIT PER 15 GRAMS CHO AT ALL MEALS AND SNACKS WITH CORRECTION SCALE OF 1 UNIT PER 50 MG/DL OVER 150, AVERAGE DAILY USE OF 30 UNITS 30 mL 4     ondansetron (ZOFRAN ODT) 4 MG ODT " tab Take 1 tablet (4 mg) by mouth every 8 hours as needed for vomiting 30 tablet 1     Semaglutide, 1 MG/DOSE, 4 MG/3ML SOPN Inject 1 mg Subcutaneous every 7 days 12 mL 3     simvastatin (ZOCOR) 20 MG tablet Take 1 tablet (20 mg) by mouth At Bedtime 90 tablet 3     traZODone (DESYREL) 50 MG tablet Take 1-2 tablets by mouth nightly as needed for sleep. 180 tablet 0     .    SOCIAL HISTORY:  Social History     Socioeconomic History     Marital status: Single     Spouse name: Not on file     Number of children: 0     Years of education: Not on file     Highest education level: Not on file   Occupational History     Occupation: research     Employer: [employer]   Tobacco Use     Smoking status: Never     Smokeless tobacco: Never   Vaping Use     Vaping Use: Never used   Substance and Sexual Activity     Alcohol use: Yes     Comment: moderately     Drug use: No     Sexual activity: Not on file   Other Topics Concern     Parent/sibling w/ CABG, MI or angioplasty before 65F 55M? Yes   Social History Narrative     Not on file     Social Determinants of Health     Financial Resource Strain: Not on file   Food Insecurity: Not on file   Transportation Needs: Not on file   Physical Activity: Not on file   Stress: Not on file   Social Connections: Not on file   Interpersonal Safety: Not on file   Housing Stability: Not on file       FAMILY HISTORY:    Family History   Problem Relation Age of Onset     Breast Cancer Mother      Heart Disease Father      C.A.D. Father      Arthritis Father      Circulatory Father      Eye Disorder Father      Lipids Father      Thyroid Disease Father      Macular Degeneration Father      Coronary Artery Disease Father      Anxiety Disorder Father      Alcoholism Father      Rheumatoid Arthritis Father      Hypothyroidism Father      Diabetes Father      Cerebrovascular Disease Paternal Grandmother         ,     Cancer Paternal Grandfather         Pancreatic     Heart Disease Paternal  Grandfather      Diabetes Maternal Grandmother         Type 2     C.A.D. Maternal Grandmother      Heart Disease Maternal Grandmother      Coronary Artery Disease Maternal Grandmother      Heart Disease Maternal Grandfather      Cardiac Sudden Death Maternal Grandfather      Gynecology Other         self     Thyroid Disease Other         self     Macular Degeneration Maternal Aunt      Diabetes Other         Type 1     Glaucoma No family hx of        PHYSICAL EXAMINATION:  Vitals reviewed  There were no vitals taken for this visit.  Video physical exam  Wt Readings from Last 10 Encounters:   06/21/23 83.9 kg (185 lb)   05/30/23 83.9 kg (185 lb)   05/18/23 81.7 kg (180 lb 3.2 oz)   03/10/23 86.2 kg (190 lb)   12/16/22 88.5 kg (195 lb)   10/07/22 90.7 kg (200 lb)   09/02/22 90.3 kg (199 lb)   07/07/22 90.3 kg (199 lb)   05/12/22 86.2 kg (190 lb)   05/10/22 90.4 kg (199 lb 3.2 oz)   General: Patient appears well in no acute distress.   Skin: No visualized rash or lesions on visualized skin  Eyes: EOMI, no erythema, sclera icterus or discharge noted  Resp: Appears to be breathing comfortably without accessory muscle usage, speaking in full sentences, no cough  MSK: Appears to have normal range of motion based on visualized movements  Neurologic: No apparent tremors, facial movements symmetric  Psych: affect normal, alert and oriented    The rest of a comprehensive physical examination is deferred due to PHE (public health emergency) video restrictions      PERTINENT STUDIES Reviewed in EMR    ASSESSMENT/PLAN:    # Nausea with vomiting, episodic  # Postprandial fullness  # Regurgitation  Dali has a long history of GI concerns, with evaluation in 2014 notable for positive breath test, and otherwise unremarkable NM GES and EGD, outside of small hiatal hernia. She was felt to possibly have intrmittent delayed emptying and functional dyspepsia. She is now presenting with progressive symptoms, particularly with frequent  regurgitation of predominantly bland liquid and/or partially digested bolus. She is also experiencing episodic nausea, vomiting, and RUQ pain, however the vomiting, too, does seem possibly more consistent with regurgitation in these settings as well.    We discussed a wide differential for her symptoms. Delayed gastric emptying is possible given underlying type I diabetes, semaglutide use. She does note that brief holidays in semaglutide do improve the severity of her symptoms. We discussed that delayed gastric emptying can persist following discontinuation of semaglutide, though typically would expect symptoms to be improving after 4-6 weeks after discontinuation as per the trials that I am familiar with. She also illicit hypoglycemia as a trigger for the nausea, for which she is now on an insulin pump.    It is certainly possible that underlying semaglutide use is contributing to these symptoms. A biliary etiology is also possible, which can also be associated with semaglutide. Delayed gastric emptying in the setting of diabetes is also possible. We discussed that even transient hyperglycemia can delay gastric emptying. She does have a history of SIBO with possible benefit with previous treatment, so this could be considered as well. We discussed several options and mutually developed the following plan:    1) EGD as scheduled  2) RUQ US has been ordered  3) We have previously discussed possible empiric trial of gastroparesis diet versus NM GES, knowing that ozempic could conribute to a positive finding. She wished to hold off for now. She wishes to continue ozempic.  4) If EGD is negative, plan for trial of FDGard as per discussion with patient  6) Future considerations pending the above, and may include trial of discontinuatio of semaglutide if endocrinology is on board with this, HIDA scan for further evaluation of biliary etiology, trial of alternative medication for functional dyspepsia (PPI or  neuromodulation)    # Intermittent constipation  We discussed that constipation can contribute to delayed gastric emptying, nausea, etc. Recommended to initiate osmotic laxative in the form of magnesium or miraLAX.    RTC 3 months    Thank you for this consultation. It was a pleasure to participate in the care of this patient; please contact us with any further questions.    Gloria House PA-C    48 minutes spent on the date of the encounter doing chart review, review of outside records, review of test results, patient visit and documentation

## 2023-09-28 NOTE — PROGRESS NOTES
09/27/23 1546   Appointment Info   Signing clinician's name / credentials JA Cornell DPT   Visits Used 6   Medical Diagnosis Dizziness   PT Tx Diagnosis Dizziness and imbalance   Progress Note/Certification   Onset of illness/injury or Date of Surgery 07/18/23  (date of order, began in June 2022)   Therapy Frequency 1x/week   Predicted Duration 60 days   PT Goal 1   Goal Identifier DHI   Goal Description The pt will score <25/100 on the DHI indicating a reduction in subjective dizziness   Goal Progress In progress, see below   Target Date 10/16/23   PT Goal 2   Goal Identifier FGA   Goal Description The pt will score >24/30 on the FGA indicating improvement in dynamic balance   Target Date 10/16/23   Date Met 08/08/23   PT Goal 3   Goal Identifier HEP   Goal Description The pt will be independent with a HEP in order to improve self management of symptoms   Target Date 10/16/23   Goal Progress continuing to modify based on pt status/progression   Subjective Report   Subjective Report The pt had a VNG that showed central signs. Did vomit during calorics.   Objective Measure 1   Objective Measure DHI   Details 34/100   Neuromuscular Re-education   Neuromuscular re-ed of mvmt, balance, coord, kinesthetic sense, posture, proprioception minutes (85390) 40   Neuro Re-ed 1 ex review adn progression, monitoring symptoms   Neuro Re-ed 1 - Details Discussed VNG results indicating central signs and repeated DHI. Walking with horzintal and vertical head turns, also with just eye turns, 50' x 2 each. Blaze pods to challenge reactive balance and turning. Initially random with targets around pt, then focus x 1 min with targets around pt, focus x 30 sec with bending to hit targets, then shuttles x 40 seconds. Standing on airex mat with eye turs to targets on wall. Initailly letters, then searching for words. Progressed to doing the same on the TM at 1.5 mph. Earl well oeverall, x 4 min.   Patient Response/Progress Able to  progress speed of exercises   Plan   Plan for next session Continue to work on increasing speeds with head movements, eye movements   Total Session Time   Timed Code Treatment Minutes 40   Total Treatment Time (sum of timed and untimed services) 40         PLAN  Continue therapy per current plan of care.    Beginning/End Dates of Progress Note Reporting Period:    7/19/23 to 09/27/2023    Referring Provider:  Rick L Nissen

## 2023-09-29 ENCOUNTER — VIRTUAL VISIT (OUTPATIENT)
Dept: GASTROENTEROLOGY | Facility: CLINIC | Age: 53
End: 2023-09-29
Attending: PHYSICIAN ASSISTANT
Payer: COMMERCIAL

## 2023-09-29 VITALS — WEIGHT: 175 LBS | HEIGHT: 70 IN | BODY MASS INDEX: 25.05 KG/M2

## 2023-09-29 DIAGNOSIS — R11.2 NAUSEA AND VOMITING, UNSPECIFIED VOMITING TYPE: Primary | ICD-10-CM

## 2023-09-29 PROCEDURE — 99215 OFFICE O/P EST HI 40 MIN: CPT | Mod: VID | Performed by: PHYSICIAN ASSISTANT

## 2023-09-29 ASSESSMENT — PAIN SCALES - GENERAL: PAINLEVEL: MILD PAIN (3)

## 2023-09-29 NOTE — PATIENT INSTRUCTIONS
It was a pleasure taking care of you today.  I've included a brief summary of our discussion and care plan from today's visit below.  Please review this information with your primary care provider.  _______________________________________________________________________    My recommendations are summarized as follows:    1) Upper endoscopy as scheduled  2) Abdominal ultrasound at your convenience. To schedule imaging, please call [phone]   3) if upper endoscopy is unrevealing, let's try FDGard for 8 weeks. This is over-the-counter  4) Next steps could include gastric emptying study, longer break from Ozempic (6 weeks), trial of neuromodulation (medication targeting the gut nerves)    Return to GI Clinic in 3 months to review your progress.    ______________________________________________________________________    How do I schedule labs, imaging studies, or procedures that were ordered in clinic today?     Labs: To schedule lab appointment at the [facility], use my chart or call [phone]. If you have a [facility] lab closer to home where you are regularly seen you can give them a call.     Procedures: If a colonoscopy, upper endoscopy, breath test, esophageal manometry, or pH impedence was ordered today, our endoscopy team will call you to schedule this. If you have not heard from our endoscopy team within a week, please call [phone] option 2 to schedule.     Imaging Studies: If you were scheduled for a CT scan, X-ray, MRI, ultrasound, HIDA scan or other imaging study, please call [phone] to have this scheduled.     Referral: If a referral to another specialty was ordered, expect a phone call or follow instructions above. If you have not heard from anyone regarding your referral in a week, please call our clinic to check the status.     Who do I call with any questions after my visit?  Please be in touch if there are any further questions that arise following today's visit.  There  are multiple ways to contact your gastroenterology care team.      During business hours, you may reach a Gastroenterology nurse at [phone]    To schedule or reschedule an appointment, please call [phone].     You can always send a secure message through [employer].  [employer] messages are answered by your nurse or doctor typically within 24 hours.  Please allow extra time on weekends and holidays.      For urgent/emergent questions after business hours, you may reach the on-call GI Fellow by contacting the [facility] at [phone].     How will I get the results of any tests ordered?    You will receive all of your results.  If you have signed up for [employer], any tests ordered at your visit will be available to you after your physician reviews them.  Typically this takes 1-2 weeks.  If there are urgent results that require a change in your care plan, your physician or nurse will call you to discuss the next steps.      What is [employer]?  [employer] is a secure way for you to access all of your healthcare records from the [facility].  It is a web based computer program, so you can sign on to it from any location.  It also allows you to send secure messages to your care team.  I recommend signing up for [employer] access if you have not already done so and are comfortable with using a computer.      How to I schedule a follow-up visit?  If you did not schedule a follow-up visit today, please call [phone] to schedule a follow-up office visit.      Sincerely,    Gloria House PA-C  Division of Gastroenterology, Hepatology & Nutrition  [facility]

## 2023-09-29 NOTE — PROGRESS NOTES
Virtual Visit Details    Type of service:  Video Visit     Originating Location (pt. Location): Home    Distant Location (provider location):  Off-site  Platform used for Video Visit: [employer]

## 2023-09-29 NOTE — NURSING NOTE
Is the patient currently in the state of [place]? YES    Visit mode:VIDEO    If the visit is dropped, the patient can be reconnected by: VIDEO VISIT: Send to e-mail at: [email or web address]    Will anyone else be joining the visit? NO  (If patient encounters technical issues they should call [phone])    How would you like to obtain your AVS? MyChart    Are changes needed to the allergy or medication list? No    Reason for visit: EWA CORRIGAN

## 2023-10-03 ENCOUNTER — HOSPITAL ENCOUNTER (OUTPATIENT)
Dept: CARDIAC REHAB | Facility: CLINIC | Age: 53
Discharge: HOME | End: 2023-10-03
Attending: INTERNAL MEDICINE
Payer: COMMERCIAL

## 2023-10-03 ENCOUNTER — VIRTUAL VISIT (OUTPATIENT)
Dept: SLEEP MEDICINE | Facility: CLINIC | Age: 53
End: 2023-10-03
Payer: COMMERCIAL

## 2023-10-03 VITALS — HEIGHT: 70 IN | BODY MASS INDEX: 25.05 KG/M2 | WEIGHT: 175 LBS

## 2023-10-03 DIAGNOSIS — F43.9 STRESS: ICD-10-CM

## 2023-10-03 DIAGNOSIS — F51.04 CHRONIC INSOMNIA: Primary | ICD-10-CM

## 2023-10-03 PROCEDURE — G0239 OTH RESP PROC, GROUP: HCPCS

## 2023-10-03 PROCEDURE — 90834 PSYTX W PT 45 MINUTES: CPT | Mod: VID | Performed by: PSYCHOLOGIST

## 2023-10-03 ASSESSMENT — SLEEP AND FATIGUE QUESTIONNAIRES
HOW LIKELY ARE YOU TO NOD OFF OR FALL ASLEEP WHEN YOU ARE A PASSENGER IN A CAR FOR AN HOUR WITHOUT A BREAK: SLIGHT CHANCE OF DOZING
HOW LIKELY ARE YOU TO NOD OFF OR FALL ASLEEP WHILE WATCHING TV: HIGH CHANCE OF DOZING
HOW LIKELY ARE YOU TO NOD OFF OR FALL ASLEEP WHILE LYING DOWN TO REST IN THE AFTERNOON WHEN CIRCUMSTANCES PERMIT: MODERATE CHANCE OF DOZING
HOW LIKELY ARE YOU TO NOD OFF OR FALL ASLEEP WHILE SITTING INACTIVE IN A PUBLIC PLACE: SLIGHT CHANCE OF DOZING
HOW LIKELY ARE YOU TO NOD OFF OR FALL ASLEEP WHILE SITTING AND TALKING TO SOMEONE: WOULD NEVER DOZE
HOW LIKELY ARE YOU TO NOD OFF OR FALL ASLEEP WHILE SITTING QUIETLY AFTER LUNCH WITHOUT ALCOHOL: MODERATE CHANCE OF DOZING
HOW LIKELY ARE YOU TO NOD OFF OR FALL ASLEEP IN A CAR, WHILE STOPPED FOR A FEW MINUTES IN TRAFFIC: WOULD NEVER DOZE
HOW LIKELY ARE YOU TO NOD OFF OR FALL ASLEEP WHILE SITTING AND READING: HIGH CHANCE OF DOZING

## 2023-10-03 ASSESSMENT — PAIN SCALES - GENERAL: PAINLEVEL: NO PAIN (0)

## 2023-10-03 NOTE — NURSING NOTE
Has patient had flu shot for current/most recent flu season? If so, when? No    Is the patient currently in the state of [place]? YES    Visit mode:VIDEO    If the visit is dropped, the patient can be reconnected by: VIDEO VISIT: Send to e-mail at: [email or web address]    Will anyone else be joining the visit? NO  (If patient encounters technical issues they should call [phone])    How would you like to obtain your AVS? MyChart    Are changes needed to the allergy or medication list? Pt stated no med changes    Reason for visit: RECHECK    No other vitals to report per pt    Lindy TEJEDAF

## 2023-10-03 NOTE — PROGRESS NOTES
Virtual Visit Details    Type of service:  Video Visit     Originating Location (pt. Location): Home    Distant Location (provider location):  Off-site  Platform used for Video Visit: [facility]    SLEEP [facility] VIRTUAL VIDEO FOLLOW-UP VISIT  Sleep Psychology    Patient Name: Dali Martines  MRN:  [id number]  Date of Service: Oct 3, 2023       Subjective Report     Dali Martines  returns for a telehealth video visit to discuss progress in implementing behavioral strategies for the management of insomnia.  Patient consent for initiation of video visit was obtained and documented prior to initiation of visit.     Dali reports unfortunate circumstances surrounding her contract employer not renewing her temporary position.  .    She discussed the stress related to managing a temporary work status and no current proscpects for a new job at present. She has filed for unemployment insurance.    She notices an improvement in overall sleep quality with reduction in stress.  Her focuses is to reconnect with interest in activities that she finds enjoyable and also balancing caregiving responsibilities         .     Sleep Data:     Source of Sleep Estimates:  Verbal Self-report    Average Time in Bed: 7-8 hrs.  Average Total Sleep Time: 6-7 hrs  Sleep Efficiency estimate: Estimated to be greater than 80%    EPWORTH SLEEPINESS SCALE    Sitting and reading 3   Watching TV 3   Sitting, inactive in a public place (theatre or mtg.) 1    As a passenger in a car 1   Lying down to rest in the afternoon when circumstance permit 2   Sitting and talking to someone 0   Sitting quietly after lunch without alcohol 2   In a car, while stopped for a few minutes in traffic 0   TOTAL SCORE (nl <11) 12     INSOMNIA SEVERITY INDEX     Difficulty falling asleep 2   Difficult staying asleep 3   Problems waking up to early 3   How SATISFIED/DISSATISFIED are you with your CURRENT sleep pattern? 3   How NOTICEABLE to others do you think your sleep  "pattern is in terms of your quality of life? 1   How WORRIED/DISTRESSED are you about your current sleep pattern? 2   To what extent do you consider your sleep problem to INTERFERE with your daily fuctioning(e.g. daytime fatigue, mood, ability to function at work/daily chores, concentration, mood,etc.) CURRENTLY? 3   INSOMNIA SEVERITY INDEX TOTAL SCORE 17    Absence of insomnia (0-7); sub-threshold insomnia (8-14); moderate insomnia (15-21); and severe insomnia (22-28)       Interventions     Strategies and recommendations including  stress and sleep and Relaxation strategies for insomnia were reviewed and discussed today.     Services provided are compliant with the requirements of [place] Statute SS 256B.0625 Subd. 3b and paragraph (b)       Vital Signs     Height 1.778 m (5' 10\")   Weight 79.4 kg (175 lb)   Body Mass Index 25.11 kg/m       Mental Status     Orientation:  X3  Mood:  normal  Affect:  Congruent with mood  Speech/Language:  Normal  Thought Process: Intact  Associations:  Normal  Thought Content: Normal  Patient does not report any suicidal ideation, intention or plan.    Diagnostic Impressions and Plan        Chronic insomnia  Stress    Patient reports that she recently left her job and notices a reduction in stress and correspondingly improvement in overall sleep quality.    Plan:  Continue current sleep schedule and plan    Follow-up: 3 months      Philip Quiroz PsyD, GARY, [facility]  Diplomate, Behavioral Sleep Medicine  [facility]      Note: This dictation was created using voice recognition software. This document may contain an error not identified before finalizing the document. If the error changes the accuracy of the document, I would appreciate it being brought to my attention.                           "

## 2023-10-04 ENCOUNTER — VIRTUAL VISIT (OUTPATIENT)
Dept: PSYCHOLOGY | Facility: CLINIC | Age: 53
End: 2023-10-04
Payer: COMMERCIAL

## 2023-10-04 DIAGNOSIS — M77.9 TENDINITIS: ICD-10-CM

## 2023-10-04 DIAGNOSIS — F43.22 ADJUSTMENT DISORDER WITH ANXIETY: Primary | ICD-10-CM

## 2023-10-04 DIAGNOSIS — F33.41 MAJOR DEPRESSIVE DISORDER, RECURRENT, IN PARTIAL REMISSION (H): ICD-10-CM

## 2023-10-04 DIAGNOSIS — E10.3299 TYPE 1 DIABETES MELLITUS WITH MILD NONPROLIFERATIVE RETINOPATHY, MACULAR EDEMA PRESENCE UNSPECIFIED, UNSPECIFIED LATERALITY (H): ICD-10-CM

## 2023-10-04 DIAGNOSIS — Z51.81 ENCOUNTER FOR THERAPEUTIC DRUG MONITORING: ICD-10-CM

## 2023-10-04 DIAGNOSIS — G89.29 OTHER CHRONIC PAIN: ICD-10-CM

## 2023-10-04 DIAGNOSIS — M25.50 PAIN IN JOINT, MULTIPLE SITES: ICD-10-CM

## 2023-10-04 PROCEDURE — 90837 PSYTX W PT 60 MINUTES: CPT | Mod: VID | Performed by: PSYCHOLOGIST

## 2023-10-04 RX ORDER — FOLIC ACID 1 MG/1
1 TABLET ORAL DAILY
Qty: 90 TABLET | Refills: 3 | Status: SHIPPED | OUTPATIENT
Start: 2023-10-04 | End: 2024-08-09

## 2023-10-04 ASSESSMENT — PAIN SCALES - GENERAL: PAINLEVEL: NO PAIN (0)

## 2023-10-04 ASSESSMENT — ANXIETY QUESTIONNAIRES
1. FEELING NERVOUS, ANXIOUS, OR ON EDGE: MORE THAN HALF THE DAYS
2. NOT BEING ABLE TO STOP OR CONTROL WORRYING: SEVERAL DAYS
GAD7 TOTAL SCORE: 7
IF YOU CHECKED OFF ANY PROBLEMS ON THIS QUESTIONNAIRE, HOW DIFFICULT HAVE THESE PROBLEMS MADE IT FOR YOU TO DO YOUR WORK, TAKE CARE OF THINGS AT HOME, OR GET ALONG WITH OTHER PEOPLE: SOMEWHAT DIFFICULT
5. BEING SO RESTLESS THAT IT IS HARD TO SIT STILL: NOT AT ALL
GAD7 TOTAL SCORE: 7
4. TROUBLE RELAXING: MORE THAN HALF THE DAYS
7. FEELING AFRAID AS IF SOMETHING AWFUL MIGHT HAPPEN: SEVERAL DAYS
6. BECOMING EASILY ANNOYED OR IRRITABLE: NOT AT ALL
3. WORRYING TOO MUCH ABOUT DIFFERENT THINGS: SEVERAL DAYS

## 2023-10-04 NOTE — NURSING NOTE
Is the patient currently in the state of [place]? YES    Visit mode:VIDEO    If the visit is dropped, the patient can be reconnected by: VIDEO VISIT:  Send e-mail to at [email or web address]    Will anyone else be joining the visit? No  (If patient encounters technical issues they should call [phone])    How would you like to obtain your AVS? MyChart    Are changes needed to the allergy or medication list? No    Rooming Documentation: Assigned questionnaire(s) completed .    Reason for visit: RECHECK     Tori Cristobal F      Care team has reviewed attendance agreement with patient. Patient advised that two failed appointments within 6 months may lead to termination of current episode of care.

## 2023-10-04 NOTE — PROGRESS NOTES
Virtual Visit Details    Type of service:  Video Visit     Originating Location (pt. Location): Home    Distant Location (provider location):  On-site  Platform used for Video Visit: [employer]

## 2023-10-04 NOTE — PROGRESS NOTES
Health Psychology          Latanya Mayfield, Ph.D.,  [phone]  Hansa Arzate, Ph.D.,  [phone]  Sarah Beth Jack, Ph.D.,  [phone]  Pushpa Mendez, Ph.D., , [facility] [phone]  Panfilo Cornejo, Ph.D., , [facility] [phone]  Krystle Hannah, Ph.D.,  [phone]  Elise Sutton, Ph.D., , [facility] [phone]    [facility], 3rd Floor  [address] [place] [address]       Health Psychology Progress Note    Patient Name: Dali Martines    Service Type: Individual  Length of Visit: 56 minutes - extended session due to complexity of content  Start time: 11:04 PM  Stop time: 12:00 PM   Attendees: patient attended alone  Session #: 5    Telemedicine Information:     Telemedicine Visit: The patient's condition can be safely assessed and treated via synchronous audio and visual telemedicine encounter.    Reason for Telemedicine Visit: Patient has requested telehealth visit and Patient convenience (e.g. access to timely appointments / distance to available provider)  Originating Site (Patient Location): Patient's [place], [place]  Distant Location (provider location):  On-site: [facility]  Consent:  The patient/guardian has verbally consented to: the potential risks and benefits of telemedicine (video visit) versus in person care; bill my insurance or make self-payment for services provided; and responsibility for payment of non-covered services.   Mode of Communication:  Video Conference via [employer]  As the provider I attest to compliance with applicable laws and regulations related to telemedicine.     Identifying Information and Presenting Problem:  Dali Martines is a 52 year old female adult who was referred to health psychology by her endocrinologist, Dr. Mckeon. Patient presented with concerns of stress in the context of multiple life events, several medical conditions, and chronic management of T1DM.    Topics  "Discussed/Interventions Provided:      Session Content:     Update: Patient describes having \"a mix\" of experiences since our last visit. From a positive perspective, she states that using her Omnipod pump continues to go very well. States that it has helped her BG remain more stable and she's had fewer sharper peaks and valleys with regard to her BG levels. States that this has corresponded to an improvement in her gastric symptoms as well. Patient notes that she expects that this pump will result in overall systemic improvement. Also reports ongoing daily dizziness, which is worse at the end of the day. Had an evaluation and ruled out vestibular contributions to her dizziness. No falling episodes since starting with vestibular PT. Will be having a consult with neurology to evaluate whether there is a central nervious system etiology. She also reports some ongoing challenges with regard to her caregiver demands. Patient reports some stress and frustration with in-home health aid support agency. She reports that the in-home health aid support has been inconsistent and has not provided the type of care that her mother needs, which has been disappointing and has added to her caregiver burden.      Homework Review: Patient reports that she completed module 2 of the perfectionism workbook and the vitality vs suffering diary. Discussed insights. She was able to identify how her perfectionism contributes to the \"suffering\" category. She identified fear of failure, all-or-nothing thinking, and simplicity, control, and structure as unhelpful rules and assumptions that contribute to her perfectionism.      Skills/intervention: Assisted patient in processing her thoughts and emotions around some of the stressors she has experienced over the past several weeks. Explored how patient's perfectionism manifests in different ways. She notes that when her perfectionism-driven anxiety escalates to a certain point of urgency and " "pressure, she has a very difficult time mentally disengaging from the perseverative cognition. Briefly introduced module 3 of the perfectionism workbook. Discussed strategies for decreasing reactivity during times when she feels time urgency. Introduced ACT principle of cognitive defusion and taught 1 defusion strategy as well as the \"acting opposite\" principle.     Top values identified included: spirituality, humor, fairness, creativity, curiosity, contribution, and caring.    Treatment Objective(s) Addressed in This Session:  Psychological distress related to Diabetes  Psychological distress related to caregiver stress    Progress on / Status of Treatment Objective(s) / Homework:  In progress    Medication Adherence: Patient did not report medication changes. Current medication list is below:     Current Outpatient Medications   Medication    albuterol (PROAIR HFA/PROVENTIL HFA/VENTOLIN HFA) 108 (90 Base) MCG/ACT inhaler    aspirin 81 MG tablet    blood glucose (ACCU-CHEK COLIN PLUS) test strip    blood glucose monitoring (ACCU-CHEK FASTCLIX) lancets    blood glucose monitoring (NO BRAND SPECIFIED) meter device kit    Calcium Carbonate-Vitamin D (CALCIUM + D PO)    citalopram (CELEXA) 20 MG tablet    Continuous Blood Gluc  (DEXCOM G6 ) SUZANNE    Continuous Blood Gluc Sensor (DEXCOM G6 SENSOR) MISC    Continuous Blood Gluc Transmit (DEXCOM G6 TRANSMITTER) MISC    diclofenac (VOLTAREN) 1 % topical gel    diclofenac (VOLTAREN) 1 % topical gel    DULERA 100-5 MCG/ACT inhaler    econazole nitrate 1 % cream    FIASP PENFILL 100 UNIT/ML SOCT    fish oil-omega-3 fatty acids 1000 MG capsule    folic acid (FOLVITE) 1 MG tablet    Insulin Aspart, w/Niacinamide, (FIASP) 100 UNIT/ML SOLN    insulin degludec (TRESIBA FLEXTOUCH) 100 UNIT/ML pen    Insulin Disposable Pump (OMNIPOD 5 G6 INTRO, GEN 5,) KIT    Insulin Disposable Pump (OMNIPOD 5 G6 POD, GEN 5,) MISC    insulin pen needle (B-D U/F) 31G X 8 MM " "miscellaneous    insulin syringe-needle U-100 (30G X 1/2\" 1 ML) 30G X 1/2\" 1 ML miscellaneous    Ketoconazole (NIZORAL PO)    levothyroxine (SYNTHROID/LEVOTHROID) 112 MCG tablet    methotrexate 2.5 MG tablet    methotrexate 2.5 MG tablet    methylphenidate (METADATE CD) 20 MG CR capsule    methylphenidate (RITALIN) 10 MG tablet    Minoxidil (ROGAINE EX)    montelukast (SINGULAIR) 5 MG chewable tablet    Multiple Vitamin (MULTIVITAMIN OR)    NOVOLOG PENFILL 100 UNIT/ML soln    ondansetron (ZOFRAN ODT) 4 MG ODT tab    Semaglutide, 1 MG/DOSE, 4 MG/3ML SOPN    simvastatin (ZOCOR) 20 MG tablet    traZODone (DESYREL) 50 MG tablet    Injection Device for insulin (INPEN 100-BLUE-NOVOLOG-FIASP) SUZANNE     Current Facility-Administered Medications   Medication    dexamethasone (DECADRON) injection 4 mg    lidocaine 1 % injection 1 mL    triamcinolone acetonide (KENALOG-10) injection 10 mg     Assessment: The patient appeared to be active and engaged in today's session and was receptive to feedback.     Mental Status Exam:   Appearance: Appropriate   Eye Contact: Good    Orientation: Yes, x4  Behavior/relationship to provider/demeanor: Cooperative, Engaged, and Pleasant  Motor Activity: normal or unremarkable  Mood (subjective report): \"A mix\"  Affect (objective appearance): Appropriate/mood congruent  Speech Rate: Normal  Speech Volume: Normal  Speech Articulation: Normal  Speech Coherence: Normal  Speech Spontaneity: Normal  Thought Content: no suicidal/homicidal ideation, plans, or intent  Thought Process (associations): Logical, Linear, and Goal directed  Thought Process (rate): Normal  Abnormal Perception: None  Attention/Concentration: Normal  Memory: Appears grossly intact   Fund of Knowledge: Above average  Abstraction:  Normal  Insight:  Good  Judgment:  good    Does the patient appear to be at imminent risk of harm to self/others at this time? No    The session was necessary to address psychological distress in the " context of multiple psychosocial stressors and chronic health concerns.    Diagnoses (provisional):    1. Adjustment disorder with anxiety    2. Major depressive disorder, recurrent, in partial remission (H)    3. 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 Other chronic pain        Plan:    Follow-up video visit with me on 10/31/23 at 10:00am   Patient to use 911 or other crisis resources in the event of a psychiatric emergency  Primary care needs and medications are managed by Dimitri Alas MD. Referring provider is Dr. Mckeon.  Next visit agenda/goals:   Complete module 3 of perfectionism workbook (can start module 4 if desired/able)  Try defusion and acting opposite to mitigate anxiety associated with time urgency    Skills taught/interventions used thus far:  Psychoeducation  Values clarification  Committed action (vitality vs suffering)  Perfectionism workbook (modules 1-3)  Defusion  Acting opposite    NOTE: Treatment plan update due 9/6/24; 90 day treatment plan review due 12/5/23                                                Individual Treatment Plan    Patient's Name: Dali Martines  YOB: 1970    Date of Creation: 09/06/23  Date Treatment Plan Last Reviewed/Revised: 09/06/23    DSM5 Diagnoses:     1. Adjustment disorder with anxiety    2. Major depressive disorder, recurrent, in partial remission (H)      Psychosocial / Contextual Factors: financial hardship, health issues, occupational/vocational stress, relationship stress, caregiver stress (mother's health concerns), and grief and loss in the wake of recent bereavement  PROMIS (reviewed every 90 days):     Referral / Collaboration:  Collaboration with medical team and specialists as needed.    Anticipated number of session for this episode of care: 15-20  Anticipation frequency of session: 2x per month  Anticipated Duration of each session:  "38-52 minutes  Treatment plan will be reviewed in 90 days or when goals have been changed.       Measurable Treatment Goal(s) related to diagnosis/functional impairment(s): Overall goals for treatment include improving her physical health (e.g., diabetes, physical activity, and GI discomfort), learning strategies for enhancing coping capacity, and decreasing avoidance-based coping.  Goal 1: Patient will decrease her use of avoidance-based coping   \"I will know I've met my goal when I am not avoiding doing things out of fear (e.g., bathroom sink, processing grief, physical activity, reviewing pieces of writing).\"     Objective #A (Patient Action)    Patient will learn and implement skills for reducing perfectionism.  Status: New - Date: 09/06/23     Intervention(s)  Therapist will teach anti-perfectionism skills and will assist patient in completing a workbook on perfectionism.    Objective #B  Patient will initiate a healthy grieving process  Status: New - Date: 09/06/23    Intervention(s)  Therapist will assist patient in processing and exploring her grief.    Objective #C  Patient will increase her participation in meaningful and purposeful activities.  Status: New - Date: 09/06/23    Intervention(s)  Therapist will teach values clarification, committed action, and exposure skills.    Objective #D  Patient will learn and implement unhooking skills for decreasing the degree to which fear influences behaviors and decisions in an unhelpful way.  Status: New - Date: 09/06/23    Intervention(s)  Therapist will teach defusion, cognitive restructuring, and mindfulness skills.      Patient has reviewed and agreed to the above plan.      Elise Sutton, PhD  September 6, 2023    Elise Sutton, Ph.D., , [facility]  Clinical Health Psychologist  Phone: [phone]   Pager: [phone]           "

## 2023-10-04 NOTE — TELEPHONE ENCOUNTER
folic acid (FOLVITE) 1 MG tablet   Last Written Prescription Date:   4/6/2022  Last Fill Quantity: 90,   # refills: 3  Last Office Visit :  5/25/2023  Future Office visit:  12/1/2023  90 Tabs, 3 Refills sent to calvin Espinoza RN  Central Triage Red Flags/Med Refills  Vitamin Supplements (Adult) Protocol Vuasli93/04/2023 06:48 AM   Protocol Details High dose Vitamin D not ordered    Recent (12 mo) or future (30 days) visit within the authorizing provider's specialty    Medication is active on med list       To be filled at: [id number] - [place], [place] - [address] AT [address]

## 2023-10-05 ENCOUNTER — HOSPITAL ENCOUNTER (OUTPATIENT)
Facility: CLINIC | Age: 53
Discharge: HOME | End: 2023-10-05
Attending: SPECIALIST | Admitting: SPECIALIST
Payer: COMMERCIAL

## 2023-10-05 VITALS — HEART RATE: 51 BPM | SYSTOLIC BLOOD PRESSURE: 103 MMHG | OXYGEN SATURATION: 94 % | DIASTOLIC BLOOD PRESSURE: 66 MMHG

## 2023-10-05 LAB — UPPER GI ENDOSCOPY: NORMAL

## 2023-10-05 PROCEDURE — 43239 EGD BIOPSY SINGLE/MULTIPLE: CPT | Performed by: SPECIALIST

## 2023-10-05 PROCEDURE — 88305 TISSUE EXAM BY PATHOLOGIST: CPT | Mod: 26

## 2023-10-05 PROCEDURE — 250N000009 HC RX 250: Performed by: SPECIALIST

## 2023-10-05 PROCEDURE — 250N000011 HC RX IP 250 OP 636: Performed by: SPECIALIST

## 2023-10-05 PROCEDURE — 88342 IMHCHEM/IMCYTCHM 1ST ANTB: CPT | Mod: TC | Performed by: SPECIALIST

## 2023-10-05 PROCEDURE — 88342 IMHCHEM/IMCYTCHM 1ST ANTB: CPT | Mod: 26

## 2023-10-05 PROCEDURE — G0500 MOD SEDAT ENDO SERVICE >5YRS: HCPCS | Performed by: SPECIALIST

## 2023-10-05 RX ORDER — FENTANYL CITRATE 50 UG/ML
INJECTION, SOLUTION INTRAMUSCULAR; INTRAVENOUS PRN
Status: DISCONTINUED | OUTPATIENT
Start: 2023-10-05 | End: 2023-10-05 | Stop reason: HOSPADM

## 2023-10-05 RX ORDER — LIDOCAINE 40 MG/G
CREAM TOPICAL
Status: DISCONTINUED | OUTPATIENT
Start: 2023-10-05 | End: 2023-10-05 | Stop reason: HOSPADM

## 2023-10-05 RX ORDER — ONDANSETRON 2 MG/ML
4 INJECTION INTRAMUSCULAR; INTRAVENOUS
Status: DISCONTINUED | OUTPATIENT
Start: 2023-10-05 | End: 2023-10-05 | Stop reason: HOSPADM

## 2023-10-05 ASSESSMENT — ACTIVITIES OF DAILY LIVING (ADL)
ADLS_ACUITY_SCORE: 35
ADLS_ACUITY_SCORE: 33

## 2023-10-05 NOTE — H&P
Pre-Endoscopy History and Physical     Dali Martines MRN# [id number]   YOB: 1970 Age: 52 year old     Date of Procedure: 10/5/2023  Primary care provider: Dimitri Alas  Type of Endoscopy: esophagogastroduodenoscopy (upper GI endoscopy)  Reason for Procedure: abdominal distress, nausea vomiting  Type of Anesthesia Anticipated: Moderate sedation    HPI:    Dali is a 52 year old female who will be undergoing the above procedure.      A history and physical has been performed. The patient's medications and allergies have been reviewed. The risks and benefits of the procedure and the sedation options and risks were discussed with the patient.  All questions were answered and informed consent was obtained.      She denies a personal or family history of anesthesia complications or bleeding disorders.     Allergies   Allergen Reactions    Monosodium Glutamate Palpitations and Shortness Of Breath    Sulfasalazine      Developed rash, HA, dizziness        Current Facility-Administered Medications   Medication    lidocaine (LMX4) cream    lidocaine 1 % 0.1-1 mL    ondansetron (ZOFRAN) injection 4 mg    sodium chloride (PF) 0.9% PF flush 3 mL    sodium chloride (PF) 0.9% PF flush 3 mL    FENTANYL CITRATE (PF) 100 MCG/2ML IJ SOLN       Patient Active Problem List   Diagnosis    GERD (gastroesophageal reflux disease)    CARDIOVASCULAR SCREENING; LDL GOAL LESS THAN 100    Type 1 diabetes mellitus with retinopathy (H)    Anxiety    NLD (necrobiosis lipoidica diabeticorum) (H)    Cutaneous skin tags    Cervicalgia    Chronic low back pain    SOB (shortness of breath)    Encounter for other specified aftercare    PAIN FOOT    Diabetes (H)    Screening for other eye conditions    PAIN KNEE JOINT    Enthesopathy    Pain in joint, multiple sites    Chronic pain of right ankle    Right foot pain    ESR raised    Swelling of limb    Other postprocedural status(V45.89)    Left knee pain    Type 1 diabetes mellitus  without complication (H)    Psoriatic arthritis (H)    Psoriasis with arthropathy (H)    History of adenomatous polyp of colon    Glaucoma suspect of both eyes    Long-term current use of stimulant    Insomnia, unspecified type    Long term methotrexate user    Chronic bilateral low back pain without sciatica        Past Medical History:   Diagnosis Date    Anemia     Anxiety     Arthritis 2014    Psoriatic arthritis    Back injury 1988    Dry eye syndrome     Dyslipidemia     Endometriosis 2002    adehsions seen at laparoscopy    GERD (gastroesophageal reflux disease)     Hypothyroidism     8 yoa    SOB (shortness of breath) 3/11/2014    Type I (juvenile type) diabetes mellitus without mention of complication, not stated as uncontrolled     when 15     Uncomplicated asthma Fall 2013        Past Surgical History:   Procedure Laterality Date    COLONOSCOPY  2002    COLONOSCOPY N/A 6/23/2020    Procedure: COLONOSCOPY;  Surgeon: Lauryn Rivera MD;  Location:  GI    ESOPHAGOSCOPY, GASTROSCOPY, DUODENOSCOPY (EGD), COMBINED  1/7/2014    Procedure: COMBINED ESOPHAGOSCOPY, GASTROSCOPY, DUODENOSCOPY (EGD), BIOPSY SINGLE OR MULTIPLE;;  Surgeon: Kraig Nicole MD;  Location:  GI    GYN SURGERY      Laparotomy to remove endometrial tissue    HC BREATH HYDROGEN TEST N/A 6/17/2014    Procedure: HYDROGEN BREATH TEST;  Surgeon: Deacon Alberts MD;  Location:  GI    HYDROGEN BREATH TEST  6/17/14    LAPAROSCOPIC APPENDECTOMY  2002    LAPAROTOMY, LYSIS ADHESIONS, COMBINED  2002    endometriosis    SOFT TISSUE SURGERY  December 2014    right ankle tendon injury    ZZC REPAIR CRUCIATE LIGAMENT,KNEE      ZZC STOMACH SURGERY PROCEDURE UNLISTED  December 2002       Social History     Tobacco Use    Smoking status: Never    Smokeless tobacco: Never   Substance Use Topics    Alcohol use: Yes     Comment: moderately       Family History   Problem Relation Age of Onset    Breast Cancer Mother     Heart Disease Father      "C.A.D. Father     Arthritis Father     Circulatory Father     Eye Disorder Father     Lipids Father     Thyroid Disease Father     Macular Degeneration Father     Coronary Artery Disease Father     Anxiety Disorder Father     Alcoholism Father     Rheumatoid Arthritis Father     Hypothyroidism Father     Diabetes Father     Cerebrovascular Disease Paternal Grandmother         ,    Cancer Paternal Grandfather         Pancreatic    Heart Disease Paternal Grandfather     Diabetes Maternal Grandmother         Type 2    C.A.D. Maternal Grandmother     Heart Disease Maternal Grandmother     Coronary Artery Disease Maternal Grandmother     Heart Disease Maternal Grandfather     Cardiac Sudden Death Maternal Grandfather     Gynecology Other         self    Thyroid Disease Other         self    Macular Degeneration Maternal Aunt     Diabetes Other         Type 1    Glaucoma No family hx of        REVIEW OF SYSTEMS:   5 point ROS negative except as noted above in HPI, including Gen., Resp., CV, GI &  system review.    PHYSICAL EXAM:   /66   Pulse 61   LMP  (LMP Unknown)  Estimated body mass index is 25.11 kg/m  as calculated from the following:    Height as of 10/3/23: 1.778 m (5' 10\").    Weight as of 10/3/23: 79.4 kg (175 lb).   GENERAL APPEARANCE: pale  MENTAL STATUS: alert  AIRWAY EXAM: Mallampatti Class II (visualization of the soft palate, fauces, and uvula)  RESP: lungs clear to auscultation - no rales, rhonchi or wheezes  CV: regular rates and rhythm, normal S1 S2, no S3 or S4, and no murmur, click or rub    DIAGNOSTICS:    Not indicated    IMPRESSION   ASA Class 2 - Mild systemic disease    PLAN:   Endoscopy      The above has been forwarded to the consulting provider.      Signed Electronically by: Raheem Hubbard MD  October 5, 2023        "

## 2023-10-09 LAB
PATH REPORT.COMMENTS IMP SPEC: NORMAL
PATH REPORT.FINAL DX SPEC: NORMAL
PATH REPORT.GROSS SPEC: NORMAL
PATH REPORT.MICROSCOPIC SPEC OTHER STN: NORMAL
PATH REPORT.RELEVANT HX SPEC: NORMAL
PHOTO IMAGE: NORMAL

## 2023-10-09 NOTE — PROGRESS NOTES
Outcome for 10/09/23 1:46 PM: Data uploaded on Dexcom and [employer]  Sara Santoyo MA  Outcome for 10/16/23 8:30 AM: Data obtained via Dexcom and [employer] website  Tanesha Garcia LPN

## 2023-10-10 ENCOUNTER — HOSPITAL ENCOUNTER (OUTPATIENT)
Dept: CARDIAC REHAB | Facility: CLINIC | Age: 53
Discharge: HOME | End: 2023-10-10
Attending: INTERNAL MEDICINE
Payer: COMMERCIAL

## 2023-10-10 PROCEDURE — G0238 OTH RESP PROC, INDIV: HCPCS | Performed by: REHABILITATION PRACTITIONER

## 2023-10-11 ENCOUNTER — THERAPY VISIT (OUTPATIENT)
Dept: PHYSICAL THERAPY | Facility: CLINIC | Age: 53
End: 2023-10-11
Payer: COMMERCIAL

## 2023-10-11 DIAGNOSIS — M25.571 CHRONIC PAIN OF RIGHT ANKLE: ICD-10-CM

## 2023-10-11 DIAGNOSIS — M54.50 CHRONIC BILATERAL LOW BACK PAIN WITHOUT SCIATICA: ICD-10-CM

## 2023-10-11 DIAGNOSIS — G89.29 CHRONIC LOW BACK PAIN: ICD-10-CM

## 2023-10-11 DIAGNOSIS — M54.50 CHRONIC LOW BACK PAIN: ICD-10-CM

## 2023-10-11 DIAGNOSIS — G89.29 CHRONIC BILATERAL LOW BACK PAIN WITHOUT SCIATICA: ICD-10-CM

## 2023-10-11 DIAGNOSIS — M25.562 CHRONIC PAIN OF LEFT KNEE: Primary | ICD-10-CM

## 2023-10-11 DIAGNOSIS — G89.29 CHRONIC PAIN OF LEFT KNEE: Primary | ICD-10-CM

## 2023-10-11 DIAGNOSIS — G89.29 CHRONIC PAIN OF RIGHT ANKLE: ICD-10-CM

## 2023-10-11 PROCEDURE — 97530 THERAPEUTIC ACTIVITIES: CPT | Mod: GP | Performed by: PHYSICAL THERAPIST

## 2023-10-11 PROCEDURE — 97110 THERAPEUTIC EXERCISES: CPT | Mod: 59 | Performed by: PHYSICAL THERAPIST

## 2023-10-12 ENCOUNTER — VIRTUAL VISIT (OUTPATIENT)
Dept: EDUCATION SERVICES | Facility: CLINIC | Age: 53
End: 2023-10-12
Payer: COMMERCIAL

## 2023-10-12 DIAGNOSIS — E10.3299 TYPE 1 DIABETES MELLITUS WITH MILD NONPROLIFERATIVE RETINOPATHY, MACULAR EDEMA PRESENCE UNSPECIFIED, UNSPECIFIED LATERALITY (H): Primary | ICD-10-CM

## 2023-10-12 PROCEDURE — G0108 DIAB MANAGE TRN  PER INDIV: HCPCS | Mod: VID | Performed by: REGISTERED NURSE

## 2023-10-12 ASSESSMENT — PAIN SCALES - GENERAL: PAINLEVEL: NO PAIN (0)

## 2023-10-12 NOTE — NURSING NOTE
Is the patient currently in the state of [place]? YES    Visit mode:VIDEO    If the visit is dropped, the patient can be reconnected by: VIDEO VISIT: Text to cell phone:   Telephone Information:   Mobile [phone]    and VIDEO VISIT: Send to e-mail at: [email or web address]    Will anyone else be joining the visit? NO  (If patient encounters technical issues they should call [phone]    How would you like to obtain your AVS? MyChart    Are changes needed to the allergy or medication list? No    Reason for visit: EWA CORRIGAN

## 2023-10-14 NOTE — PROGRESS NOTES
Video-Visit Details    Type of service:  Video Visit  Patient consented to video visit  Video Start Time:  1034  Video End Time:   1105  Originating Location (pt. Location): Home  Distant Location (provider location):   [facility] [place]   Platform used for Video Visit: [employer]    Diabetes Self-Management Education & Support Virtual Visit---Short    Dali Martines contacted today for education related to Type 1 diabetes    Patient is being treated with:  insulin pump  Omnipod 5 with a Dexcom G6 continuous glucose monitor (CGM).      Year of diagnosis: 1988  Referring provider:  Felicia Mckeon MD  Living Situation: Lives alone and has a dog.  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  Her job was eliminated and because of many other stressors in her life, she opted to not work for a time.     Purpose of today's visit:  Started her insulin pump about a month ago.  Here for follow up.      Subjective/Objective:  States that she was a little unprepared for how much having an insulin pump would change how she manages her diabetes, but in a positive way.  She finds it much easier to cover all food eaten.  She finds that getting the sensor and the pod to communicate has been a challenge, and she states that so far she hasn't had any of them connect without repeated attempts.  She has been in contact with [facility] without much success.  This problem has been identified by others but I am not sure what causes this or how to fix it.  She states that she has been quite careful to make sure that the pod and the sensor are within site line of each other.      Pump Report appears below:             Assessment/Plan:  TITR is excellent at 80%  Total Daily Dose is 30 units and includes the 8 units of Tresiba she is taking in addition to the insulin she's receiving from the pump.    Basal insulin from the pump is 9.7 units.  Total Basal is approximately 18  units or 60% of the total daily dose.   Bolus insulin comprises 40% of the total daily dose.    Her bolus calculator setttings appear to be appropriate.      Her overnight glucoses are in target range most of the time, and quite steady.   Her post-prandial glucoses are returning to target within 2-3 hours.    Her episodes of hypoglycemia generally occur between meals, but she states that they are much milder than prior to starting the insulin pump.    Overall feels that things are going well.      No changes in pump settings today other than to turn off reverse correction and lowered his minimum BG for bolus calculations from 70 to 60.      Suggest that we re-visit in about a month.  Appointment made.     Any diabetes medication dose changes were made via the CDE Protocol and Collaborative Practice Agreement. A copy of this encounter was provided to the patient's referring provider.      Time spent in this visit:   30 minutes.

## 2023-10-17 ENCOUNTER — PRE VISIT (OUTPATIENT)
Dept: OTOLARYNGOLOGY | Facility: CLINIC | Age: 53
End: 2023-10-17

## 2023-10-17 ENCOUNTER — OFFICE VISIT (OUTPATIENT)
Dept: OTOLARYNGOLOGY | Facility: CLINIC | Age: 53
End: 2023-10-17
Attending: PHYSICAL MEDICINE & REHABILITATION
Payer: COMMERCIAL

## 2023-10-17 VITALS
SYSTOLIC BLOOD PRESSURE: 112 MMHG | HEIGHT: 70 IN | DIASTOLIC BLOOD PRESSURE: 73 MMHG | TEMPERATURE: 99.1 F | BODY MASS INDEX: 24.77 KG/M2 | OXYGEN SATURATION: 96 % | HEART RATE: 79 BPM | WEIGHT: 173 LBS

## 2023-10-17 DIAGNOSIS — H61.23 EXCESSIVE CERUMEN IN BOTH EAR CANALS: ICD-10-CM

## 2023-10-17 DIAGNOSIS — H92.09 OTALGIA, UNSPECIFIED LATERALITY: ICD-10-CM

## 2023-10-17 DIAGNOSIS — H81.10 BENIGN PAROXYSMAL POSITIONAL VERTIGO, UNSPECIFIED LATERALITY: ICD-10-CM

## 2023-10-17 DIAGNOSIS — H93.13 TINNITUS, BILATERAL: ICD-10-CM

## 2023-10-17 DIAGNOSIS — R42 DIZZINESS: Primary | ICD-10-CM

## 2023-10-17 PROCEDURE — 92504 EAR MICROSCOPY EXAMINATION: CPT | Performed by: OTOLARYNGOLOGY

## 2023-10-17 PROCEDURE — 99214 OFFICE O/P EST MOD 30 MIN: CPT | Mod: 25 | Performed by: OTOLARYNGOLOGY

## 2023-10-17 ASSESSMENT — PAIN SCALES - GENERAL: PAINLEVEL: MODERATE PAIN (4)

## 2023-10-17 NOTE — LETTER
10/17/2023       RE: Dali Martines  [address]  [facility] [address]     Dear Colleague,    Thank you for referring your patient, Dali Martines, to the [facility] [place] [facility]. Please see a copy of my visit note below.    Dear Dimitri Springer:    I had the pleasure of meeting Dali Martines in consultation today at the [facility] at your request.    CHIEF COMPLAINT: Dizziness    HISTORY OF PRESENT ILLNESS: Patient is a 52-year-old in today for consultation on your ears and dizziness.  She was on a flight to [place] in June 2022.  They had a very turbulent episode that lasted for 20 minutes.  She was nauseated with and once that landed, had some persistent.  She is able to walk and function but has fallen twice when she turns.  Physical therapy has helped recently.  Also has a lot of fatigue.  She works from home and so was able to walk.  When she gets fatigued sometimes she will lay down.  From an ear standpoint she feels her hearing is equal and symmetrical.  Does not notice hearing issues.  Does have occasional tinnitus, again symmetrical.  She denies any dysphagia, hoarseness, facial paresthesias.  She has recent vestibular testing which brings her in today for assessment of her dizziness.    ALLERGIES:    Allergies   Allergen Reactions     Monosodium Glutamate Palpitations and Shortness Of Breath     Sulfasalazine      Developed rash, HA, dizziness       HABITS: Social History    Substance and Sexual Activity      Alcohol use: Yes        Comment: moderately     History   Smoking Status     Never   Smokeless Tobacco     Never         PAST MEDICAL HISTORY: Please see today's intake form (for the remainder of the PMH) which I reviewed and signed.  Past Medical History:   Diagnosis Date     Anemia      Anxiety      Arthritis 2014    Psoriatic arthritis     Back injury  1988     Dry eye syndrome      Dyslipidemia      Endometriosis 2002    adehsions seen at laparoscopy     GERD (gastroesophageal reflux disease)      Hypothyroidism     8 yoa     SOB (shortness of breath) 3/11/2014     Type I (juvenile type) diabetes mellitus without mention of complication, not stated as uncontrolled     when 15      Uncomplicated asthma Fall 2013       FAMILY HISTORY/SOCIAL HISTORY:   Family History   Problem Relation Age of Onset     Breast Cancer Mother      Heart Disease Father      C.A.D. Father      Arthritis Father      Circulatory Father      Eye Disorder Father      Lipids Father      Thyroid Disease Father      Macular Degeneration Father      Coronary Artery Disease Father      Anxiety Disorder Father      Alcoholism Father      Rheumatoid Arthritis Father      Hypothyroidism Father      Diabetes Father      Cerebrovascular Disease Paternal Grandmother         ,     Cancer Paternal Grandfather         Pancreatic     Heart Disease Paternal Grandfather      Diabetes Maternal Grandmother         Type 2     C.A.D. Maternal Grandmother      Heart Disease Maternal Grandmother      Coronary Artery Disease Maternal Grandmother      Heart Disease Maternal Grandfather      Cardiac Sudden Death Maternal Grandfather      Gynecology Other         self     Thyroid Disease Other         self     Macular Degeneration Maternal Aunt      Diabetes Other         Type 1     Glaucoma No family hx of       Social History     Socioeconomic History     Marital status: Single     Spouse name: Not on file     Number of children: 0     Years of education: Not on file     Highest education level: Not on file   Occupational History     Occupation: research     Employer: [employer]   Tobacco Use     Smoking status: Never     Smokeless tobacco: Never   Vaping Use     Vaping Use: Never used   Substance and Sexual Activity     Alcohol use: Yes     Comment: moderately     Drug use: No     Sexual activity: Not on  file   Other Topics Concern     Parent/sibling w/ CABG, MI or angioplasty before 65F 55M? Yes   Social History Narrative     Not on file     Social Determinants of Health     Financial Resource Strain: Not on file   Food Insecurity: Not on file   Transportation Needs: Not on file   Physical Activity: Not on file   Stress: Not on file   Social Connections: Not on file   Interpersonal Safety: Not on file   Housing Stability: Not on file       REVIEW OF SYSTEMS: Patient Supplied Answers to Review of Systems      10/17/2023     3:07 PM    ENT ROS   Constitutional Problems with sleep   Neurology Dizzy spells   Psychology Frequently feeling anxious   Ears, Nose, Throat Sore throat   Musculoskeletal Sore or stiff joints    Swollen joints    Back pain   Hematologic Lymph node swelling   Skin Change in moles            The remainder of the 10 point ROS is negative    PHYSICIAL EXAMINATION:  Constitutional: The patient was well-groomed and in no acute distress.   Skin: Warm and pink.  Psychiatric: The patient's affect was calm, cooperative, and appropriate.   Respiratory: Breathing comfortably without stridor or exertion of accessory muscles.  Eyes: Pupils were equal and reactive. Extraocular movement intact.   Head: Normocephalic and atraumatic. No lesions or scars.  Ears: Patient placed under the microscope for microscopic evaluation and cleaning of cerumen which was obscuring full visualization and complete assessment of both TMs. Under high power magnification, the right ear was examined and cleaned of cerumen using instruments.  After cleaning, TM is fully visualized and has normal position with normal middle ear aeration. The left ear was then cleaned and inspected using microscope, instruments and similar techniques. After cleaning of cerumen, the TM has normal position with normal aeration to middle ear.  Nose: Sinuses were nontender. Anterior rhinoscopy revealed midline septum and absence of purulence or  polyps.  Oral Cavity: Normal tongue, floor of mouth, buccal mucosa, and palate. No lesions or masses on inspection or palpation. No abnormal lymph tissue in the oropharynx.   Neck: The parotid is soft without masses. Supple with normal laryngeal and tracheal landmarks.   Lymphatic: There is no palpable lymphadenopathy or other masses in the neck.   Neurologic: Alert and oriented x 3. Cranial nerves III-XI within normal limits. Voice quality normal.  Cerebellar Function Tests:  Grossly normal    Audiogram: Audiogram performed shows completely normal hearing in both ears through all frequencies.  She has 100% discrimination levels bilaterally.    VNG: Caloric testing shows no unilateral weakness.  There were no significant indications peripheral vestibular involvement noted on VNG.  Some minor indication for central vestibular nervous system involvement.      IMPRESSION AND PLAN:   Dizziness: Discussed with her symptoms not characteristic for inner ear abnormality, no associated auditory symptoms, vestibular testing all normal and constant sensation mainly lightheadedness not consistent with otologic cause.  Discussed her normal findings today all testing and exam.  She did do physical therapy and has helped she will continue with that and with home exercises.  If symptoms worsen she will follow-up with neurology and her family physician.  Bilateral tinnitus: Not problematic, no treatment needed, monitor.  Excessive cerumen: Cleaned today with instruments microscope as above.  No further treatment needed, monitor.    Thank you very much for the opportunity to participate in the care of your patient.    Rick L Nissen MD

## 2023-10-17 NOTE — PATIENT INSTRUCTIONS
You were seen in the ENT Clinic today by Dr. Nissen. If you have any questions or concerns after your appointment, please contact us (see below)      2.   Please return to clinic as needed        How to Contact Us:  Send a [employer] message to your provider. Our team will respond to you via [employer]. Occasionally, we will need to call you to get further information.  For urgent matters (Monday-Friday), call the ENT Clinic: [phone] and speak with a call center team member - they will route your call appropriately.   If you'd like to speak directly with a nurse, please find our contact information below. We do our best to check voicemail frequently throughout the day, and will work to call you back within 1-2 days. For urgent matters, please use the general clinic phone numbers listed above.      Georgina TORRES RN  ENT RN Care Coordinator  Direct: [phone]    Laurel SALDANA LPN  Direct: [phone]

## 2023-10-17 NOTE — PROGRESS NOTES
Dear Dimitri Springer:    I had the pleasure of meeting Dali Martines in consultation today at the [facility] at your request.    CHIEF COMPLAINT: Dizziness    HISTORY OF PRESENT ILLNESS: Patient is a 52-year-old in today for consultation on your ears and dizziness.  She was on a flight to [place] in June 2022.  They had a very turbulent episode that lasted for 20 minutes.  She was nauseated with and once that landed, had some persistent.  She is able to walk and function but has fallen twice when she turns.  Physical therapy has helped recently.  Also has a lot of fatigue.  She works from home and so was able to walk.  When she gets fatigued sometimes she will lay down.  From an ear standpoint she feels her hearing is equal and symmetrical.  Does not notice hearing issues.  Does have occasional tinnitus, again symmetrical.  She denies any dysphagia, hoarseness, facial paresthesias.  She has recent vestibular testing which brings her in today for assessment of her dizziness.    ALLERGIES:    Allergies   Allergen Reactions    Monosodium Glutamate Palpitations and Shortness Of Breath    Sulfasalazine      Developed rash, HA, dizziness       HABITS: Social History    Substance and Sexual Activity      Alcohol use: Yes        Comment: moderately     History   Smoking Status    Never   Smokeless Tobacco    Never         PAST MEDICAL HISTORY: Please see today's intake form (for the remainder of the PMH) which I reviewed and signed.  Past Medical History:   Diagnosis Date    Anemia     Anxiety     Arthritis 2014    Psoriatic arthritis    Back injury 1988    Dry eye syndrome     Dyslipidemia     Endometriosis 2002    adehsions seen at laparoscopy    GERD (gastroesophageal reflux disease)     Hypothyroidism     8 yoa    SOB (shortness of breath) 3/11/2014    Type I (juvenile type) diabetes mellitus without mention of complication, not stated as uncontrolled     when 15      Uncomplicated asthma Fall 2013       FAMILY HISTORY/SOCIAL HISTORY:   Family History   Problem Relation Age of Onset    Breast Cancer Mother     Heart Disease Father     C.A.D. Father     Arthritis Father     Circulatory Father     Eye Disorder Father     Lipids Father     Thyroid Disease Father     Macular Degeneration Father     Coronary Artery Disease Father     Anxiety Disorder Father     Alcoholism Father     Rheumatoid Arthritis Father     Hypothyroidism Father     Diabetes Father     Cerebrovascular Disease Paternal Grandmother         ,    Cancer Paternal Grandfather         Pancreatic    Heart Disease Paternal Grandfather     Diabetes Maternal Grandmother         Type 2    C.A.D. Maternal Grandmother     Heart Disease Maternal Grandmother     Coronary Artery Disease Maternal Grandmother     Heart Disease Maternal Grandfather     Cardiac Sudden Death Maternal Grandfather     Gynecology Other         self    Thyroid Disease Other         self    Macular Degeneration Maternal Aunt     Diabetes Other         Type 1    Glaucoma No family hx of       Social History     Socioeconomic History    Marital status: Single     Spouse name: Not on file    Number of children: 0    Years of education: Not on file    Highest education level: Not on file   Occupational History    Occupation: research     Employer: [employer]   Tobacco Use    Smoking status: Never    Smokeless tobacco: Never   Vaping Use    Vaping Use: Never used   Substance and Sexual Activity    Alcohol use: Yes     Comment: moderately    Drug use: No    Sexual activity: Not on file   Other Topics Concern    Parent/sibling w/ CABG, MI or angioplasty before 65F 55M? Yes   Social History Narrative    Not on file     Social Determinants of Health     Financial Resource Strain: Not on file   Food Insecurity: Not on file   Transportation Needs: Not on file   Physical Activity: Not on file   Stress: Not on file   Social Connections: Not on file    Interpersonal Safety: Not on file   Housing Stability: Not on file       REVIEW OF SYSTEMS: Patient Supplied Answers to Review of Systems      10/17/2023     3:07 PM    ENT ROS   Constitutional Problems with sleep   Neurology Dizzy spells   Psychology Frequently feeling anxious   Ears, Nose, Throat Sore throat   Musculoskeletal Sore or stiff joints    Swollen joints    Back pain   Hematologic Lymph node swelling   Skin Change in moles            The remainder of the 10 point ROS is negative    PHYSICIAL EXAMINATION:  Constitutional: The patient was well-groomed and in no acute distress.   Skin: Warm and pink.  Psychiatric: The patient's affect was calm, cooperative, and appropriate.   Respiratory: Breathing comfortably without stridor or exertion of accessory muscles.  Eyes: Pupils were equal and reactive. Extraocular movement intact.   Head: Normocephalic and atraumatic. No lesions or scars.  Ears: Patient placed under the microscope for microscopic evaluation and cleaning of cerumen which was obscuring full visualization and complete assessment of both TMs. Under high power magnification, the right ear was examined and cleaned of cerumen using instruments.  After cleaning, TM is fully visualized and has normal position with normal middle ear aeration. The left ear was then cleaned and inspected using microscope, instruments and similar techniques. After cleaning of cerumen, the TM has normal position with normal aeration to middle ear.  Nose: Sinuses were nontender. Anterior rhinoscopy revealed midline septum and absence of purulence or polyps.  Oral Cavity: Normal tongue, floor of mouth, buccal mucosa, and palate. No lesions or masses on inspection or palpation. No abnormal lymph tissue in the oropharynx.   Neck: The parotid is soft without masses. Supple with normal laryngeal and tracheal landmarks.   Lymphatic: There is no palpable lymphadenopathy or other masses in the neck.   Neurologic: Alert and  oriented x 3. Cranial nerves III-XI within normal limits. Voice quality normal.  Cerebellar Function Tests:  Grossly normal    Audiogram: Audiogram performed shows completely normal hearing in both ears through all frequencies.  She has 100% discrimination levels bilaterally.    VNG: Caloric testing shows no unilateral weakness.  There were no significant indications peripheral vestibular involvement noted on VNG.  Some minor indication for central vestibular nervous system involvement.      IMPRESSION AND PLAN:   Dizziness: Discussed with her symptoms not characteristic for inner ear abnormality, no associated auditory symptoms, vestibular testing all normal and constant sensation mainly lightheadedness not consistent with otologic cause.  Discussed her normal findings today all testing and exam.  She did do physical therapy and has helped she will continue with that and with home exercises.  If symptoms worsen she will follow-up with neurology and her family physician.  Bilateral tinnitus: Not problematic, no treatment needed, monitor.  Excessive cerumen: Cleaned today with instruments microscope as above.  No further treatment needed, monitor.    Thank you very much for the opportunity to participate in the care of your patient.    Rick L Nissen MD

## 2023-10-17 NOTE — NURSING NOTE
"Chief Complaint   Patient presents with    Consult     vertigo     Blood pressure 112/73, pulse 79, temperature 99.1  F (37.3  C), height 1.778 m (5' 10\"), weight 78.5 kg (173 lb), SpO2 96%, not currently breastfeeding.  Soto Payne LPN    "

## 2023-10-18 ENCOUNTER — TELEPHONE (OUTPATIENT)
Dept: RHEUMATOLOGY | Facility: CLINIC | Age: 53
End: 2023-10-18

## 2023-10-18 ENCOUNTER — VIRTUAL VISIT (OUTPATIENT)
Dept: ENDOCRINOLOGY | Facility: CLINIC | Age: 53
End: 2023-10-18
Payer: COMMERCIAL

## 2023-10-18 DIAGNOSIS — E10.9 TYPE 1 DIABETES MELLITUS WITHOUT COMPLICATION (H): ICD-10-CM

## 2023-10-18 DIAGNOSIS — E10.3291 TYPE 1 DIABETES MELLITUS WITH MILD NONPROLIFERATIVE RETINOPATHY OF RIGHT EYE, MACULAR EDEMA PRESENCE UNSPECIFIED (H): Primary | ICD-10-CM

## 2023-10-18 PROCEDURE — 99215 OFFICE O/P EST HI 40 MIN: CPT | Mod: VID | Performed by: PHYSICIAN ASSISTANT

## 2023-10-18 RX ORDER — INSULIN DEGLUDEC 100 U/ML
INJECTION, SOLUTION SUBCUTANEOUS
Qty: 30 ML | Refills: 1 | Status: SHIPPED | OUTPATIENT
Start: 2023-10-18

## 2023-10-18 ASSESSMENT — PAIN SCALES - GENERAL: PAINLEVEL: NO PAIN (0)

## 2023-10-18 NOTE — NURSING NOTE
Is the patient currently in the state of [place]? YES    Visit mode:VIDEO    If the visit is dropped, the patient can be reconnected by: VIDEO VISIT: Text to cell phone:   Telephone Information:   Mobile [phone]       Will anyone else be joining the visit? NO  (If patient encounters technical issues they should call [phone]    How would you like to obtain your AVS? MyChart    Are changes needed to the allergy or medication list? No    Reason for visit: RECHECK Shelby Kocher VVF

## 2023-10-18 NOTE — TELEPHONE ENCOUNTER
Patient Contacted for the patient to call back and schedule the following:    Appointment type: Video return  Provider: Dr. Arshad  Return date: December 29th 2023  Specialty phone number: [phone]  Additional appointment(s) needed: Labs (will schedule on her own)  Additonal Notes: Reschedule from December 1st 2023 as Dr. Arshad will be out of office that day.

## 2023-10-18 NOTE — PROGRESS NOTES
Virtual Visit Details    Type of service:  Video Visit     Originating Location (pt. Location):     Distant Location (provider location):    Platform used for Video Visit:

## 2023-10-18 NOTE — PROGRESS NOTES
Time of start: 1:07 pm   Time of end:  1:31 pm   Total duration of video visit: 24 minutes.  Providers location: offsite.  Patients location: [place].    [facility]  Dali Martines is a 52 year old female with type 1 diabetes mellitus and hypothyroidism.  Video visit today for diabetes and hypothyroid follow up.  Since her last visit, Dali is now using an OmniPod5 insulin pump. She likes the insulin pump.  Pt was dx having type 1 diabetes at age 15.  Her hx is also significant for mild NPDR right eye, psoriatic arthritis, hypothyroidism, anxiety, hyperlipidemia and GERD.  Dali also had COVID infection late Aug 2022.  For her diabetes, she is currently using an OmniPod5 insulin pump and DexcomG6 sensor and also takes low dose Tresiba 8 units at hs and is also taking Ozempic 1 mg subcutaneous once a week.  Her basal rate is set at 0.3 units/hr x 24 hrs and I/C ratio is 1:15 with CF 55.  Most recent A1C was 7.1 % on 7/7/2023.  I reviewed her  insulin pump and sensor download data today and scanned the data in her note below.  She tells me she was recent ill following a COVID vaccine and her blood sugars were higher for a few days.  Her average glucose is 143 with SD 50. Automated mode 100 %.    Less hypoglycemia.  Her estimated A1C is 6.7 % for the past 2 weeks.  On ROS today, she reports less GI upset since using the insulin pump.  Feeling better overall.  Denies blurred vision, n/v at this time, SOB at rest or cough. No fever.  Denies chest pain, abd pain or diarrhea.  No dysuria or hematuria.  Denies sx of diabetic neuropathy or foot ulcers.    Diabetes Care  Retinopathy:yes in right eye; pt seen by [facility] here in Jan 2023.  Nephropathy:none; urine microalbuminuria negative in  4/2023.  Neuropathy:none.  Foot Exam: no exam today.  Taking aspirin: yes.  Lipids:  in 3/2023.  Pt is taking Simvastatin and Fish Oil.  CAD: no hx of CAD.  Mental health: hx of anxiety.  Insulin: OmniPod5 insulin pump.  Tresbia insulin once a  day.   DM meds: Ozempic.  Testing: DexcomG6 sensor.                      ROS  Please see under HPI.    Allergies  Allergies   Allergen Reactions    Monosodium Glutamate Palpitations and Shortness Of Breath    Sulfasalazine      Developed rash, HA, dizziness       Medications  Current Outpatient Medications   Medication Sig Dispense Refill    albuterol (PROAIR HFA/PROVENTIL HFA/VENTOLIN HFA) 108 (90 Base) MCG/ACT inhaler Inhale 2 puffs into the lungs every 4 hours as needed for shortness of breath / dyspnea or wheezing 1 Inhaler 11    aspirin 81 MG tablet Take 1 tablet by mouth daily.      blood glucose (ACCU-CHEK COLIN PLUS) test strip Test Blood Sugar 8 times daily or as directed 800 strip 3    blood glucose monitoring (ACCU-CHEK FASTCLIX) lancets Use to test blood sugar 8 times daily or as directed. 4 Box 3    blood glucose monitoring (NO BRAND SPECIFIED) meter device kit Use to test blood sugar 8 times daily or as directed. Any covered brand, 1 kit 0    Calcium Carbonate-Vitamin D (CALCIUM + D PO) Take one daily      citalopram (CELEXA) 20 MG tablet Take 1.5 tablets (30 mg) by mouth daily 135 tablet 1    Continuous Blood Gluc  (DEXCOM G6 ) SUZANNE Use to read blood sugars as per 's instructions. 1 each 0    Continuous Blood Gluc Sensor (DEXCOM G6 SENSOR) MISC Change every 10 days. 9 each 3    Continuous Blood Gluc Transmit (DEXCOM G6 TRANSMITTER) MISC Change every 3 months. 1 each 3    diclofenac (VOLTAREN) 1 % topical gel Apply 2 g topically 4 times daily 100 g 4    diclofenac (VOLTAREN) 1 % topical gel APPLY 2 GRAMS ONTO THE SKIN FOUR TIMES DAILY AS NEEDED FOR MODERATE PAIN 100 g 5    DULERA 100-5 MCG/ACT inhaler INHALE 2 PUFFS INTO THE LUNGS TWICE DAILY 13 g 5    econazole nitrate 1 % cream Apply 0.5 inches topically daily.      FIASP PENFILL 100 UNIT/ML SOCT INJECT UP TO 45 UNITS DAILY AS DIRECTED ACCORDING TO CORRECTION FACTOR. 45 mL 3    fish oil-omega-3 fatty acids 1000 MG capsule  "Take one daily      folic acid (FOLVITE) 1 MG tablet Take 1 tablet (1 mg) by mouth daily 90 tablet 3    Insulin Aspart, w/Niacinamide, (FIASP) 100 UNIT/ML SOLN Inject 50 Units as directed daily For use in ambulatory insulin pump as directed.  Approximate daily dose is 50 units. 20 mL 11    insulin degludec (TRESIBA FLEXTOUCH) 100 UNIT/ML pen ADMINISTER 17 UNITS UNDER THE SKIN DAILY 30 mL 1    Insulin Disposable Pump (OMNIPOD 5 G6 INTRO, GEN 5,) KIT 1 kit continuous 1 kit 0    Insulin Disposable Pump (OMNIPOD 5 G6 POD, GEN 5,) MISC 1 each every 3 days 30 each 3    insulin pen needle (B-D U/F) 31G X 8 MM miscellaneous Use 5 pen needles daily 450 each 3    insulin syringe-needle U-100 (30G X 1/2\" 1 ML) 30G X 1/2\" 1 ML miscellaneous Use 1 syringes as directed to draw up insulin for pump. Per pharmacy request. . 100 each 1    Ketoconazole (NIZORAL PO) Shampoo daily      levothyroxine (SYNTHROID/LEVOTHROID) 112 MCG tablet TAKE 1 TABLET(112 MCG) BY MOUTH DAILY 90 tablet 4    methotrexate 2.5 MG tablet Take 6 tablets (15 mg) by mouth every 7 days Labs every 8 - 12 weeks for refills. 72 tablet 0    methotrexate 2.5 MG tablet Take 6 tablets (15 mg) by mouth every 7 days Labs required every 8-12 weeks while taking this medication 72 tablet 0    methylphenidate (METADATE CD) 20 MG CR capsule Take 20 mg by mouth daily      methylphenidate (RITALIN) 10 MG tablet TAKE UP TO 2 TABLETS BY MOUTH IN THE LATE AFTERNOON.      Minoxidil (ROGAINE EX) daily      montelukast (SINGULAIR) 5 MG chewable tablet CHEW AND SWALLOW 1 TABLET(5 MG) BY MOUTH AT BEDTIME 90 tablet 4    Multiple Vitamin (MULTIVITAMIN OR) Take one daily tab      NOVOLOG PENFILL 100 UNIT/ML soln INJECT 1 UNIT PER 15 GRAMS CHO AT ALL MEALS AND SNACKS WITH CORRECTION SCALE OF 1 UNIT PER 50 MG/DL OVER 150, AVERAGE DAILY USE OF 30 UNITS 30 mL 4    ondansetron (ZOFRAN ODT) 4 MG ODT tab Take 1 tablet (4 mg) by mouth every 8 hours as needed for vomiting 30 tablet 1    " Semaglutide, 1 MG/DOSE, 4 MG/3ML SOPN Inject 1 mg Subcutaneous every 7 days 12 mL 3    simvastatin (ZOCOR) 20 MG tablet Take 1 tablet (20 mg) by mouth At Bedtime 90 tablet 3    traZODone (DESYREL) 50 MG tablet Take 1-2 tablets by mouth nightly as needed for sleep. 180 tablet 0    Injection Device for insulin (INPEN 100-BLUE-NOVOLOG-FIASP) SUZANNE 1 each once for 1 dose 1 each 0       Family History  family history includes Alcoholism in her father; Anxiety Disorder in her father; Arthritis in her father; Breast Cancer in her mother; C.A.D. in her father and maternal grandmother; Cancer in her paternal grandfather; Cardiac Sudden Death in her maternal grandfather; Cerebrovascular Disease in her paternal grandmother; Circulatory in her father; Coronary Artery Disease in her father and maternal grandmother; Diabetes in her father, maternal grandmother, and another family member; Eye Disorder in her father; Gynecology in an other family member; Heart Disease in her father, maternal grandfather, maternal grandmother, and paternal grandfather; Hypothyroidism in her father; Lipids in her father; Macular Degeneration in her father and maternal aunt; Rheumatoid Arthritis in her father; Thyroid Disease in her father and another family member.    Social History   reports that she has never smoked. She has never used smokeless tobacco. She reports current alcohol use. She reports that she does not use drugs.     Past Medical History  Past Medical History:   Diagnosis Date    Anemia     Anxiety     Arthritis 2014    Psoriatic arthritis    Back injury 1988    Dry eye syndrome     Dyslipidemia     Endometriosis 2002    adehsions seen at laparoscopy    GERD (gastroesophageal reflux disease)     Hypothyroidism     8 yoa    SOB (shortness of breath) 3/11/2014    Type I (juvenile type) diabetes mellitus without mention of complication, not stated as uncontrolled     when 15     Uncomplicated asthma Fall 2013       Past Surgical History:    Procedure Laterality Date    COLONOSCOPY  2002    COLONOSCOPY N/A 6/23/2020    Procedure: COLONOSCOPY;  Surgeon: Lauryn Rivera MD;  Location:  GI    ESOPHAGOSCOPY, GASTROSCOPY, DUODENOSCOPY (EGD), COMBINED  1/7/2014    Procedure: COMBINED ESOPHAGOSCOPY, GASTROSCOPY, DUODENOSCOPY (EGD), BIOPSY SINGLE OR MULTIPLE;;  Surgeon: Kraig Nicole MD;  Location:  GI    ESOPHAGOSCOPY, GASTROSCOPY, DUODENOSCOPY (EGD), COMBINED N/A 10/5/2023    Procedure: Esophagoscopy, gastroscopy, duodenoscopy (EGD), combined;  Surgeon: Raheem Hubbard MD;  Location:  GI    GYN SURGERY      Laparotomy to remove endometrial tissue    HC BREATH HYDROGEN TEST N/A 6/17/2014    Procedure: HYDROGEN BREATH TEST;  Surgeon: Deacon Alberts MD;  Location:  GI    HYDROGEN BREATH TEST  6/17/14    LAPAROSCOPIC APPENDECTOMY  2002    LAPAROTOMY, LYSIS ADHESIONS, COMBINED  2002    endometriosis    SOFT TISSUE SURGERY  December 2014    right ankle tendon injury    ZZC REPAIR CRUCIATE LIGAMENT,KNEE      ZZC STOMACH SURGERY PROCEDURE UNLISTED  December 2002       Physical Exam    No exam.    RESULTS  Creatinine   Date Value Ref Range Status   07/07/2023 0.56 0.51 - 0.95 mg/dL Final   01/11/2021 0.56 0.52 - 1.04 mg/dL Final     GFR Estimate   Date Value Ref Range Status   07/07/2023 >90 >60 mL/min/1.73m2 Final   01/11/2021 >90 >60 mL/min/[1.73_m2] Final     Comment:     Non  GFR Calc  Starting 12/18/2018, serum creatinine based estimated GFR (eGFR) will be   calculated using the Chronic Kidney Disease Epidemiology Collaboration   (CKD-EPI) equation.       Microalbumin Urine   Date Value Ref Range Status   06/26/2009 5@ MG/L      Hemoglobin A1C   Date Value Ref Range Status   07/07/2023 7.1 (H) 0.0 - 5.6 % Final     Comment:     Normal <5.7%   Prediabetes 5.7-6.4%    Diabetes 6.5% or higher     Note: Adopted from ADA consensus guidelines.   01/11/2021 7.1 (H) 0 - 5.6 % Final     Comment:     Normal <5.7% Prediabetes  5.7-6.4%  Diabetes 6.5% or higher - adopted from ADA   consensus guidelines.       Potassium   Date Value Ref Range Status   07/07/2023 4.1 3.4 - 5.3 mmol/L Final   11/18/2021 3.5 3.4 - 5.3 mmol/L Final   04/27/2016 4.1 3.4 - 5.3 mmol/L Final     ALT   Date Value Ref Range Status   07/07/2023 21 0 - 50 U/L Final     Comment:     Reference intervals for this test were updated on 6/12/2023 to more accurately reflect our healthy population. There may be differences in the flagging of prior results with similar values performed with this method. Interpretation of those prior results can be made in the context of the updated reference intervals.     01/11/2021 37 0 - 50 U/L Final     AST   Date Value Ref Range Status   07/07/2023 22 0 - 45 U/L Final     Comment:     Reference intervals for this test were updated on 6/12/2023 to more accurately reflect our healthy population. There may be differences in the flagging of prior results with similar values performed with this method. Interpretation of those prior results can be made in the context of the updated reference intervals.   01/11/2021 31 0 - 45 U/L Final     TSH   Date Value Ref Range Status   07/07/2023 1.53 0.30 - 4.20 uIU/mL Final   02/14/2022 3.34 0.40 - 4.00 mU/L Final   01/11/2021 2.01 0.40 - 4.00 mU/L Final     T4 Total   Date Value Ref Range Status   11/29/2010 8.5 5.0 - 11.0 ug/dL Final     T4 Free   Date Value Ref Range Status   03/21/2019 1.09 0.76 - 1.46 ng/dL Final       Cholesterol   Date Value Ref Range Status   03/31/2023 194 <200 mg/dL Final   02/14/2022 218 (H) <200 mg/dL Final   01/11/2021 223 (H) <200 mg/dL Final     Comment:     Desirable:       <200 mg/dl   12/18/2018 194 <200 mg/dL Final     HDL Cholesterol   Date Value Ref Range Status   01/11/2021 92 >49 mg/dL Final   12/18/2018 91 >49 mg/dL Final     Direct Measure HDL   Date Value Ref Range Status   03/31/2023 78 >=50 mg/dL Final   02/14/2022 94 >=50 mg/dL Final     LDL Cholesterol  Calculated   Date Value Ref Range Status   03/31/2023 107 (H) <=100 mg/dL Final   02/14/2022 111 (H) <=100 mg/dL Final   01/11/2021 122 (H) <100 mg/dL Final     Comment:     Above desirable:  100-129 mg/dl  Borderline High:  130-159 mg/dL  High:             160-189 mg/dL  Very high:       >189 mg/dl     12/18/2018 92 <100 mg/dL Final     Comment:     Desirable:       <100 mg/dl     Triglycerides   Date Value Ref Range Status   03/31/2023 47 <150 mg/dL Final   02/14/2022 66 <150 mg/dL Final   01/11/2021 44 <150 mg/dL Final     Comment:     Non Fasting   12/18/2018 54 <150 mg/dL Final     Cholesterol/HDL Ratio   Date Value Ref Range Status   09/27/2013 2.7 0.0 - 5.0 Final   02/15/2013 2.9 0.0 - 5.0 Final       ASSESSMENT/PLAN:    1. TYPE 1 DIABETES MELLITUS: Dali is doing well and feeling better since she started using an insulin pump. Most less GI distress.  Her glycemic control has improved.  Continue current pump settings, Tresiba 8 units subcutaneous daily and Ozempic 1 mg subcutaneous once a week.  Her urine microalbuminuria has been negative in 4/2023 with normal creat/GFR in July 2023.  No sx of diabetic neuropathy or foot ulcers.  /73 yesterday.    2.  RETINOPATHY: Hx of mild NPDR right eye only.  She was seen here by [facility]ph in Jan 2023.    3.  HYPOTHYROIDISM:  TSH normal in July 2023.  Continue Levothyroxine 112 mcg po daily.    4. HYPERLIPIDEMIA:  in 3/2023.  She remains on Simvastatin.    5.  MENTAL HEALTH: Provided pt with contact number for Behavior Health services.  She has been seeing Elise Sutton.    6.   FOLLOW UP: with Dali BEASLEY in 1 month and Dr. Mckeon in March 2024.  A1C ordered today.     Time spent reviewing chart notes, labs, insulin pump and sensor download data today =   6 minutes.  Time video visit today = 24 minutes.  Time for documentation today = 15 minutes.     TOTAL TIME FOR VISIT TODAY = 45 minutes.    Marissa Sebastian PA-C

## 2023-10-18 NOTE — LETTER
10/18/2023       RE: Dali Martines  [address]  [facility] [address]     Dear Colleague,    Thank you for referring your patient, Dali Martines, to the [facility] [place] [facility]. Please see a copy of my visit note below.    Outcome for 10/09/23 1:46 PM: Data uploaded on Dex[employer] and [employer]  Sara Santoyo MA  Outcome for 10/16/23 8:30 AM: Data obtained via [employer] and [employer] website  Tanesha Garcia LPN                     Virtual Visit Details    Type of service:  Video Visit     Originating Location (pt. Location):     Distant Location (provider location):    Platform used for Video Visit:     Time of start: 1:07 pm   Time of end:  1:31 pm   Total duration of video visit: 24 minutes.  Providers location: offsite.  Patients location: [place].    [facility]  Dali Martines is a 52 year old female with type 1 diabetes mellitus and hypothyroidism.  Video visit today for diabetes and hypothyroid follow up.  Since her last visit, Dali is now using an OmniPod5 insulin pump. She likes the insulin pump.  Pt was dx having type 1 diabetes at age 15.  Her hx is also significant for mild NPDR right eye, psoriatic arthritis, hypothyroidism, anxiety, hyperlipidemia and GERD.  Dali also had COVID infection late Aug 2022.  For her diabetes, she is currently using an OmniPod5 insulin pump and DexcomG6 sensor and also takes low dose Tresiba 8 units at hs and is also taking Ozempic 1 mg subcutaneous once a week.  Her basal rate is set at 0.3 units/hr x 24 hrs and I/C ratio is 1:15 with CF 55.  Most recent A1C was 7.1 % on 7/7/2023.  I reviewed her  insulin pump and sensor download data today and scanned the data in her note below.  She tells me she was recent ill following a COVID vaccine and her blood sugars were higher for a few days.  Her average glucose is 143 with SD 50. Automated mode 100 %.    Less hypoglycemia.  Her estimated A1C is 6.7 %  for the past 2 weeks.  On ROS today, she reports less GI upset since using the insulin pump.  Feeling better overall.  Denies blurred vision, n/v at this time, SOB at rest or cough. No fever.  Denies chest pain, abd pain or diarrhea.  No dysuria or hematuria.  Denies sx of diabetic neuropathy or foot ulcers.    Diabetes Care  Retinopathy:yes in right eye; pt seen by Oph here in Jan 2023.  Nephropathy:none; urine microalbuminuria negative in  4/2023.  Neuropathy:none.  Foot Exam: no exam today.  Taking aspirin: yes.  Lipids:  in 3/2023.  Pt is taking Simvastatin and Fish Oil.  CAD: no hx of CAD.  Mental health: hx of anxiety.  Insulin: OmniPod5 insulin pump.  Tresbia insulin once a day.   DM meds: Ozempic.  Testing: DexcomG6 sensor.                      ROS  Please see under HPI.    Allergies  Allergies   Allergen Reactions     Monosodium Glutamate Palpitations and Shortness Of Breath     Sulfasalazine      Developed rash, HA, dizziness       Medications  Current Outpatient Medications   Medication Sig Dispense Refill     albuterol (PROAIR HFA/PROVENTIL HFA/VENTOLIN HFA) 108 (90 Base) MCG/ACT inhaler Inhale 2 puffs into the lungs every 4 hours as needed for shortness of breath / dyspnea or wheezing 1 Inhaler 11     aspirin 81 MG tablet Take 1 tablet by mouth daily.       blood glucose (ACCU-CHEK COLIN PLUS) test strip Test Blood Sugar 8 times daily or as directed 800 strip 3     blood glucose monitoring (ACCU-CHEK FASTCLIX) lancets Use to test blood sugar 8 times daily or as directed. 4 Box 3     blood glucose monitoring (NO BRAND SPECIFIED) meter device kit Use to test blood sugar 8 times daily or as directed. Any covered brand, 1 kit 0     Calcium Carbonate-Vitamin D (CALCIUM + D PO) Take one daily       citalopram (CELEXA) 20 MG tablet Take 1.5 tablets (30 mg) by mouth daily 135 tablet 1     Continuous Blood Gluc  (DEXCOM G6 ) SUZANNE Use to read blood sugars as per 's instructions.  "1 each 0     Continuous Blood Gluc Sensor (DEXCOM G6 SENSOR) MISC Change every 10 days. 9 each 3     Continuous Blood Gluc Transmit (DEXCOM G6 TRANSMITTER) MISC Change every 3 months. 1 each 3     diclofenac (VOLTAREN) 1 % topical gel Apply 2 g topically 4 times daily 100 g 4     diclofenac (VOLTAREN) 1 % topical gel APPLY 2 GRAMS ONTO THE SKIN FOUR TIMES DAILY AS NEEDED FOR MODERATE PAIN 100 g 5     DULERA 100-5 MCG/ACT inhaler INHALE 2 PUFFS INTO THE LUNGS TWICE DAILY 13 g 5     econazole nitrate 1 % cream Apply 0.5 inches topically daily.       FIASP PENFILL 100 UNIT/ML SOCT INJECT UP TO 45 UNITS DAILY AS DIRECTED ACCORDING TO CORRECTION FACTOR. 45 mL 3     fish oil-omega-3 fatty acids 1000 MG capsule Take one daily       folic acid (FOLVITE) 1 MG tablet Take 1 tablet (1 mg) by mouth daily 90 tablet 3     Insulin Aspart, w/Niacinamide, (FIASP) 100 UNIT/ML SOLN Inject 50 Units as directed daily For use in ambulatory insulin pump as directed.  Approximate daily dose is 50 units. 20 mL 11     insulin degludec (TRESIBA FLEXTOUCH) 100 UNIT/ML pen ADMINISTER 17 UNITS UNDER THE SKIN DAILY 30 mL 1     Insulin Disposable Pump (OMNIPOD 5 G6 INTRO, GEN 5,) KIT 1 kit continuous 1 kit 0     Insulin Disposable Pump (OMNIPOD 5 G6 POD, GEN 5,) MISC 1 each every 3 days 30 each 3     insulin pen needle (B-D U/F) 31G X 8 MM miscellaneous Use 5 pen needles daily 450 each 3     insulin syringe-needle U-100 (30G X 1/2\" 1 ML) 30G X 1/2\" 1 ML miscellaneous Use 1 syringes as directed to draw up insulin for pump. Per pharmacy request. . 100 each 1     Ketoconazole (NIZORAL PO) Shampoo daily       levothyroxine (SYNTHROID/LEVOTHROID) 112 MCG tablet TAKE 1 TABLET(112 MCG) BY MOUTH DAILY 90 tablet 4     methotrexate 2.5 MG tablet Take 6 tablets (15 mg) by mouth every 7 days Labs every 8 - 12 weeks for refills. 72 tablet 0     methotrexate 2.5 MG tablet Take 6 tablets (15 mg) by mouth every 7 days Labs required every 8-12 weeks while taking " this medication 72 tablet 0     methylphenidate (METADATE CD) 20 MG CR capsule Take 20 mg by mouth daily       methylphenidate (RITALIN) 10 MG tablet TAKE UP TO 2 TABLETS BY MOUTH IN THE LATE AFTERNOON.       Minoxidil (ROGAINE EX) daily       montelukast (SINGULAIR) 5 MG chewable tablet CHEW AND SWALLOW 1 TABLET(5 MG) BY MOUTH AT BEDTIME 90 tablet 4     Multiple Vitamin (MULTIVITAMIN OR) Take one daily tab       NOVOLOG PENFILL 100 UNIT/ML soln INJECT 1 UNIT PER 15 GRAMS CHO AT ALL MEALS AND SNACKS WITH CORRECTION SCALE OF 1 UNIT PER 50 MG/DL OVER 150, AVERAGE DAILY USE OF 30 UNITS 30 mL 4     ondansetron (ZOFRAN ODT) 4 MG ODT tab Take 1 tablet (4 mg) by mouth every 8 hours as needed for vomiting 30 tablet 1     Semaglutide, 1 MG/DOSE, 4 MG/3ML SOPN Inject 1 mg Subcutaneous every 7 days 12 mL 3     simvastatin (ZOCOR) 20 MG tablet Take 1 tablet (20 mg) by mouth At Bedtime 90 tablet 3     traZODone (DESYREL) 50 MG tablet Take 1-2 tablets by mouth nightly as needed for sleep. 180 tablet 0     Injection Device for insulin (INPEN 100-BLUE-NOVOLOG-FIASP) SUZANNE 1 each once for 1 dose 1 each 0       Family History  family history includes Alcoholism in her father; Anxiety Disorder in her father; Arthritis in her father; Breast Cancer in her mother; C.A.D. in her father and maternal grandmother; Cancer in her paternal grandfather; Cardiac Sudden Death in her maternal grandfather; Cerebrovascular Disease in her paternal grandmother; Circulatory in her father; Coronary Artery Disease in her father and maternal grandmother; Diabetes in her father, maternal grandmother, and another family member; Eye Disorder in her father; Gynecology in an other family member; Heart Disease in her father, maternal grandfather, maternal grandmother, and paternal grandfather; Hypothyroidism in her father; Lipids in her father; Macular Degeneration in her father and maternal aunt; Rheumatoid Arthritis in her father; Thyroid Disease in her father  and another family member.    Social History   reports that she has never smoked. She has never used smokeless tobacco. She reports current alcohol use. She reports that she does not use drugs.     Past Medical History  Past Medical History:   Diagnosis Date     Anemia      Anxiety      Arthritis 2014    Psoriatic arthritis     Back injury 1988     Dry eye syndrome      Dyslipidemia      Endometriosis 2002    adehsions seen at laparoscopy     GERD (gastroesophageal reflux disease)      Hypothyroidism     8 yoa     SOB (shortness of breath) 3/11/2014     Type I (juvenile type) diabetes mellitus without mention of complication, not stated as uncontrolled     when 15      Uncomplicated asthma Fall 2013       Past Surgical History:   Procedure Laterality Date     COLONOSCOPY  2002     COLONOSCOPY N/A 6/23/2020    Procedure: COLONOSCOPY;  Surgeon: Lauryn Rivera MD;  Location:  GI     ESOPHAGOSCOPY, GASTROSCOPY, DUODENOSCOPY (EGD), COMBINED  1/7/2014    Procedure: COMBINED ESOPHAGOSCOPY, GASTROSCOPY, DUODENOSCOPY (EGD), BIOPSY SINGLE OR MULTIPLE;;  Surgeon: Kraig Nicole MD;  Location:  GI     ESOPHAGOSCOPY, GASTROSCOPY, DUODENOSCOPY (EGD), COMBINED N/A 10/5/2023    Procedure: Esophagoscopy, gastroscopy, duodenoscopy (EGD), combined;  Surgeon: Raheem Hubbard MD;  Location:  GI     GYN SURGERY      Laparotomy to remove endometrial tissue     HC BREATH HYDROGEN TEST N/A 6/17/2014    Procedure: HYDROGEN BREATH TEST;  Surgeon: Deacon Alberts MD;  Location:  GI     HYDROGEN BREATH TEST  6/17/14     LAPAROSCOPIC APPENDECTOMY  2002     LAPAROTOMY, LYSIS ADHESIONS, COMBINED  2002    endometriosis     SOFT TISSUE SURGERY  December 2014    right ankle tendon injury     ZZC REPAIR CRUCIATE LIGAMENT,KNEE       ZZC STOMACH SURGERY PROCEDURE UNLISTED  December 2002       Physical Exam    No exam.    RESULTS  Creatinine   Date Value Ref Range Status   07/07/2023 0.56 0.51 - 0.95 mg/dL Final   01/11/2021 0.56  0.52 - 1.04 mg/dL Final     GFR Estimate   Date Value Ref Range Status   07/07/2023 >90 >60 mL/min/1.73m2 Final   01/11/2021 >90 >60 mL/min/[1.73_m2] Final     Comment:     Non  GFR Calc  Starting 12/18/2018, serum creatinine based estimated GFR (eGFR) will be   calculated using the Chronic Kidney Disease Epidemiology Collaboration   (CKD-EPI) equation.       Microalbumin Urine   Date Value Ref Range Status   06/26/2009 5@ MG/L      Hemoglobin A1C   Date Value Ref Range Status   07/07/2023 7.1 (H) 0.0 - 5.6 % Final     Comment:     Normal <5.7%   Prediabetes 5.7-6.4%    Diabetes 6.5% or higher     Note: Adopted from ADA consensus guidelines.   01/11/2021 7.1 (H) 0 - 5.6 % Final     Comment:     Normal <5.7% Prediabetes 5.7-6.4%  Diabetes 6.5% or higher - adopted from ADA   consensus guidelines.       Potassium   Date Value Ref Range Status   07/07/2023 4.1 3.4 - 5.3 mmol/L Final   11/18/2021 3.5 3.4 - 5.3 mmol/L Final   04/27/2016 4.1 3.4 - 5.3 mmol/L Final     ALT   Date Value Ref Range Status   07/07/2023 21 0 - 50 U/L Final     Comment:     Reference intervals for this test were updated on 6/12/2023 to more accurately reflect our healthy population. There may be differences in the flagging of prior results with similar values performed with this method. Interpretation of those prior results can be made in the context of the updated reference intervals.     01/11/2021 37 0 - 50 U/L Final     AST   Date Value Ref Range Status   07/07/2023 22 0 - 45 U/L Final     Comment:     Reference intervals for this test were updated on 6/12/2023 to more accurately reflect our healthy population. There may be differences in the flagging of prior results with similar values performed with this method. Interpretation of those prior results can be made in the context of the updated reference intervals.   01/11/2021 31 0 - 45 U/L Final     TSH   Date Value Ref Range Status   07/07/2023 1.53 0.30 - 4.20 uIU/mL Final    02/14/2022 3.34 0.40 - 4.00 mU/L Final   01/11/2021 2.01 0.40 - 4.00 mU/L Final     T4 Total   Date Value Ref Range Status   11/29/2010 8.5 5.0 - 11.0 ug/dL Final     T4 Free   Date Value Ref Range Status   03/21/2019 1.09 0.76 - 1.46 ng/dL Final       Cholesterol   Date Value Ref Range Status   03/31/2023 194 <200 mg/dL Final   02/14/2022 218 (H) <200 mg/dL Final   01/11/2021 223 (H) <200 mg/dL Final     Comment:     Desirable:       <200 mg/dl   12/18/2018 194 <200 mg/dL Final     HDL Cholesterol   Date Value Ref Range Status   01/11/2021 92 >49 mg/dL Final   12/18/2018 91 >49 mg/dL Final     Direct Measure HDL   Date Value Ref Range Status   03/31/2023 78 >=50 mg/dL Final   02/14/2022 94 >=50 mg/dL Final     LDL Cholesterol Calculated   Date Value Ref Range Status   03/31/2023 107 (H) <=100 mg/dL Final   02/14/2022 111 (H) <=100 mg/dL Final   01/11/2021 122 (H) <100 mg/dL Final     Comment:     Above desirable:  100-129 mg/dl  Borderline High:  130-159 mg/dL  High:             160-189 mg/dL  Very high:       >189 mg/dl     12/18/2018 92 <100 mg/dL Final     Comment:     Desirable:       <100 mg/dl     Triglycerides   Date Value Ref Range Status   03/31/2023 47 <150 mg/dL Final   02/14/2022 66 <150 mg/dL Final   01/11/2021 44 <150 mg/dL Final     Comment:     Non Fasting   12/18/2018 54 <150 mg/dL Final     Cholesterol/HDL Ratio   Date Value Ref Range Status   09/27/2013 2.7 0.0 - 5.0 Final   02/15/2013 2.9 0.0 - 5.0 Final       ASSESSMENT/PLAN:    1. TYPE 1 DIABETES MELLITUS: Dali is doing well and feeling better since she started using an insulin pump. Most less GI distress.  Her glycemic control has improved.  Continue current pump settings, Tresiba 8 units subcutaneous daily and Ozempic 1 mg subcutaneous once a week.  Her urine microalbuminuria has been negative in 4/2023 with normal creat/GFR in July 2023.  No sx of diabetic neuropathy or foot ulcers.  /73 yesterday.    2.  RETINOPATHY: Hx of mild  NPDR right eye only.  She was seen here by Oph in Jan 2023.    3.  HYPOTHYROIDISM:  TSH normal in July 2023.  Continue Levothyroxine 112 mcg po daily.    4. HYPERLIPIDEMIA:  in 3/2023.  She remains on Simvastatin.    5.  MENTAL HEALTH: Provided pt with contact number for Behavior Health services.  She has been seeing Elise Sutton.    6.   FOLLOW UP: with Dali BEASLEY in 1 month and Dr. Mckeon in March 2024.  A1C ordered today.     Time spent reviewing chart notes, labs, insulin pump and sensor download data today =   6 minutes.  Time video visit today = 24 minutes.  Time for documentation today = 15 minutes.     TOTAL TIME FOR VISIT TODAY = 45 minutes.    Marissa Sebastian PA-C

## 2023-10-19 ENCOUNTER — HOSPITAL ENCOUNTER (OUTPATIENT)
Dept: CARDIAC REHAB | Facility: CLINIC | Age: 53
Discharge: HOME | End: 2023-10-19
Attending: INTERNAL MEDICINE
Payer: COMMERCIAL

## 2023-10-19 ENCOUNTER — TRANSFERRED RECORDS (OUTPATIENT)
Dept: HEALTH INFORMATION MANAGEMENT | Facility: CLINIC | Age: 53
End: 2023-10-19

## 2023-10-19 PROCEDURE — G0238 OTH RESP PROC, INDIV: HCPCS

## 2023-10-25 ENCOUNTER — ANCILLARY PROCEDURE (OUTPATIENT)
Dept: ULTRASOUND IMAGING | Facility: CLINIC | Age: 53
End: 2023-10-25
Attending: PHYSICIAN ASSISTANT
Payer: COMMERCIAL

## 2023-10-25 ENCOUNTER — THERAPY VISIT (OUTPATIENT)
Dept: PHYSICAL THERAPY | Facility: CLINIC | Age: 53
End: 2023-10-25
Payer: COMMERCIAL

## 2023-10-25 DIAGNOSIS — G93.39 OTHER POST INFECTION AND RELATED FATIGUE SYNDROMES: ICD-10-CM

## 2023-10-25 DIAGNOSIS — R10.11 RUQ ABDOMINAL PAIN: ICD-10-CM

## 2023-10-25 DIAGNOSIS — R11.2 NAUSEA AND VOMITING, UNSPECIFIED VOMITING TYPE: ICD-10-CM

## 2023-10-25 DIAGNOSIS — R26.89 BALANCE DISORDER: Primary | ICD-10-CM

## 2023-10-25 DIAGNOSIS — R42 DIZZINESS: ICD-10-CM

## 2023-10-25 PROCEDURE — 76705 ECHO EXAM OF ABDOMEN: CPT

## 2023-10-25 PROCEDURE — 97112 NEUROMUSCULAR REEDUCATION: CPT | Mod: GP | Performed by: PHYSICAL THERAPIST

## 2023-10-31 ENCOUNTER — VIRTUAL VISIT (OUTPATIENT)
Dept: PSYCHOLOGY | Facility: CLINIC | Age: 53
End: 2023-10-31
Payer: COMMERCIAL

## 2023-10-31 DIAGNOSIS — F43.22 ADJUSTMENT DISORDER WITH ANXIETY: Primary | ICD-10-CM

## 2023-10-31 DIAGNOSIS — F33.41 MAJOR DEPRESSIVE DISORDER, RECURRENT, IN PARTIAL REMISSION (H): ICD-10-CM

## 2023-10-31 DIAGNOSIS — E10.3299 TYPE 1 DIABETES MELLITUS WITH MILD NONPROLIFERATIVE RETINOPATHY, MACULAR EDEMA PRESENCE UNSPECIFIED, UNSPECIFIED LATERALITY (H): ICD-10-CM

## 2023-10-31 DIAGNOSIS — K29.40 ATROPHIC GASTRITIS WITHOUT HEMORRHAGE: ICD-10-CM

## 2023-10-31 DIAGNOSIS — G89.29 OTHER CHRONIC PAIN: ICD-10-CM

## 2023-10-31 DIAGNOSIS — K29.40 AUTOIMMUNE GASTRITIS: Primary | ICD-10-CM

## 2023-10-31 PROCEDURE — 90834 PSYTX W PT 45 MINUTES: CPT | Mod: VID | Performed by: PSYCHOLOGIST

## 2023-10-31 ASSESSMENT — ANXIETY QUESTIONNAIRES
4. TROUBLE RELAXING: SEVERAL DAYS
3. WORRYING TOO MUCH ABOUT DIFFERENT THINGS: SEVERAL DAYS
2. NOT BEING ABLE TO STOP OR CONTROL WORRYING: SEVERAL DAYS
GAD7 TOTAL SCORE: 6
7. FEELING AFRAID AS IF SOMETHING AWFUL MIGHT HAPPEN: MORE THAN HALF THE DAYS
8. IF YOU CHECKED OFF ANY PROBLEMS, HOW DIFFICULT HAVE THESE MADE IT FOR YOU TO DO YOUR WORK, TAKE CARE OF THINGS AT HOME, OR GET ALONG WITH OTHER PEOPLE?: SOMEWHAT DIFFICULT
7. FEELING AFRAID AS IF SOMETHING AWFUL MIGHT HAPPEN: MORE THAN HALF THE DAYS
GAD7 TOTAL SCORE: 6
IF YOU CHECKED OFF ANY PROBLEMS ON THIS QUESTIONNAIRE, HOW DIFFICULT HAVE THESE PROBLEMS MADE IT FOR YOU TO DO YOUR WORK, TAKE CARE OF THINGS AT HOME, OR GET ALONG WITH OTHER PEOPLE: SOMEWHAT DIFFICULT
5. BEING SO RESTLESS THAT IT IS HARD TO SIT STILL: NOT AT ALL
1. FEELING NERVOUS, ANXIOUS, OR ON EDGE: SEVERAL DAYS
6. BECOMING EASILY ANNOYED OR IRRITABLE: NOT AT ALL

## 2023-10-31 NOTE — PROGRESS NOTES
Health Psychology          Latanya Mayfield, Ph.D.,  [phone]  Hansa Arzate, Ph.D.,  [phone]  Sarah Beth Jack, Ph.D.,  [phone]  Pushpa Mendez, Ph.D., , [facility] [phone]  Panfilo Cornejo, Ph.D., , [facility] [phone]  Krystle Hannah, Ph.D.,  [phone]  Elise Sutton, Ph.D., , [facility] [phone]    [facility], 3rd Floor  [address] [place] [address]       Health Psychology Progress Note    Patient Name: Dali Martines    Service Type: Individual  Length of Visit: 51 minutes  Start time: 10:04 AM  Stop time: 10:55 AM    Attendees: patient attended alone  Session #: 6    Telemedicine Information:     Telemedicine Visit: The patient's condition can be safely assessed and treated via synchronous audio and visual telemedicine encounter.    Reason for Telemedicine Visit: Patient has requested telehealth visit and Patient convenience (e.g. access to timely appointments / distance to available provider)  Originating Site (Patient Location): Patient's home, [place]  Distant Location (provider location):  On-site: [facility]  Consent:  The patient/guardian has verbally consented to: the potential risks and benefits of telemedicine (video visit) versus in person care; bill my insurance or make self-payment for services provided; and responsibility for payment of non-covered services.   Mode of Communication:  Video Conference via [employer]  As the provider I attest to compliance with applicable laws and regulations related to telemedicine.     Identifying Information and Presenting Problem:  Dali Martines is a 52 year old female adult who was referred to health psychology by her endocrinologist, Dr. Mckeon. Patient presented with concerns of stress in the context of multiple life events, several medical conditions, and chronic management of T1DM.    Topics Discussed/Interventions Provided:      Session Content:     Update:  Patient reports doing generally well. Just returned from a trip to [place] to visit friends and notes that she had a good time. She also noted that some of her caregiver demands carried over into her trip.        Homework Review: Patient reports that she started module 3 of the perfectionism workbook. Will complete it for the next visit.        Skills/intervention: Assisted patient in exploring insights into her stress reactivity. Revisited acting opposite and defusion strategies. Provided psychoeducation about physiological reactivity and progression of awareness and skills acquisition and how this relates to changing the reactivity-behavior connection. Introduced mindfulness and provided psychoeducation about the cognitive, emotional, and physiological benefits of mindfulness practice. Discussed how to adapt mindfulness to patient's specific circumstance and environment.         Top values identified included: spirituality, humor, fairness, creativity, curiosity, contribution, and caring.  Unhelpful rules/assumptions: fear of failure, all-or-nothing thinking, and simplicity, control, and structure    Treatment Objective(s) Addressed in This Session:  Psychological distress related to Diabetes  Psychological distress related to caregiver stress    Progress on / Status of Treatment Objective(s) / Homework:  In progress    Medication Adherence: Patient did not report medication changes. Current medication list is below:     Current Outpatient Medications   Medication    albuterol (PROAIR HFA/PROVENTIL HFA/VENTOLIN HFA) 108 (90 Base) MCG/ACT inhaler    aspirin 81 MG tablet    blood glucose (ACCU-CHEK COLIN PLUS) test strip    blood glucose monitoring (ACCU-CHEK FASTCLIX) lancets    blood glucose monitoring (NO BRAND SPECIFIED) meter device kit    Calcium Carbonate-Vitamin D (CALCIUM + D PO)    citalopram (CELEXA) 20 MG tablet    Continuous Blood Gluc  (DEXCOM G6 ) SUZANNE    Continuous Blood Gluc Sensor  "(DEXCOM G6 SENSOR) MISC    Continuous Blood Gluc Transmit (DEXCOM G6 TRANSMITTER) MISC    diclofenac (VOLTAREN) 1 % topical gel    diclofenac (VOLTAREN) 1 % topical gel    DULERA 100-5 MCG/ACT inhaler    econazole nitrate 1 % cream    FIASP PENFILL 100 UNIT/ML SOCT    fish oil-omega-3 fatty acids 1000 MG capsule    folic acid (FOLVITE) 1 MG tablet    Injection Device for insulin (INPEN 100-BLUE-NOVOLOG-FIASP) SUZANNE    Insulin Aspart, w/Niacinamide, (FIASP) 100 UNIT/ML SOLN    insulin degludec (TRESIBA FLEXTOUCH) 100 UNIT/ML pen    Insulin Disposable Pump (OMNIPOD 5 G6 INTRO, GEN 5,) KIT    Insulin Disposable Pump (OMNIPOD 5 G6 POD, GEN 5,) MISC    insulin pen needle (B-D U/F) 31G X 8 MM miscellaneous    insulin syringe-needle U-100 (30G X 1/2\" 1 ML) 30G X 1/2\" 1 ML miscellaneous    Ketoconazole (NIZORAL PO)    levothyroxine (SYNTHROID/LEVOTHROID) 112 MCG tablet    methotrexate 2.5 MG tablet    methotrexate 2.5 MG tablet    methylphenidate (METADATE CD) 20 MG CR capsule    methylphenidate (RITALIN) 10 MG tablet    Minoxidil (ROGAINE EX)    montelukast (SINGULAIR) 5 MG chewable tablet    Multiple Vitamin (MULTIVITAMIN OR)    NOVOLOG PENFILL 100 UNIT/ML soln    ondansetron (ZOFRAN ODT) 4 MG ODT tab    Semaglutide, 1 MG/DOSE, 4 MG/3ML SOPN    simvastatin (ZOCOR) 20 MG tablet    traZODone (DESYREL) 50 MG tablet     Current Facility-Administered Medications   Medication    lidocaine 1 % injection 1 mL    triamcinolone acetonide (KENALOG-10) injection 10 mg     Assessment: The patient appeared to be active and engaged in today's session and was receptive to feedback.     Mental Status Exam:   Appearance: Appropriate   Eye Contact: Good    Orientation: Yes, x4  Behavior/relationship to provider/demeanor: Cooperative, Engaged, and Pleasant  Motor Activity: normal or unremarkable  Mood (subjective report): Stable  Affect (objective appearance): Appropriate/mood congruent  Speech Rate: Normal  Speech Volume: Normal  Speech " Articulation: Normal  Speech Coherence: Normal  Speech Spontaneity: Normal  Thought Content: no suicidal/homicidal ideation, plans, or intent  Thought Process (associations): Logical, Linear, and Goal directed  Thought Process (rate): Normal  Abnormal Perception: None  Attention/Concentration: Normal  Memory: Appears grossly intact   Fund of Knowledge: Above average  Abstraction:  Normal  Insight:  Good  Judgment:  good    Does the patient appear to be at imminent risk of harm to self/others at this time? No    The session was necessary to address psychological distress in the context of multiple psychosocial stressors and chronic health concerns.    Diagnoses (provisional):    1. Adjustment disorder with anxiety    2. Major depressive disorder, recurrent, in partial remission (H)    3. 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 Other chronic pain        Plan:    Follow-up video visit with me on 11/14/23 at 10:00am   Patient to use 911 or other crisis resources in the event of a psychiatric emergency  Primary care needs and medications are managed by Dimitri Alas MD. Referring provider is Dr. Mckeon.  Next visit agenda/goals:   Complete module 3 of perfectionism workbook (can start module 4 if desired/able)  Try each of the introductory meditations at least 3x    Skills taught/interventions used thus far:  Psychoeducation  Values clarification  Committed action (vitality vs suffering)  Perfectionism workbook (modules 1-3)  Defusion  Acting opposite  Mindfulness (breath, body scan)    NOTE: Treatment plan update due 9/6/24; 90 day treatment plan review due 12/5/23                                                Individual Treatment Plan    Patient's Name: Dali Martines  YOB: 1970    Date of Creation: 09/06/23  Date Treatment Plan Last Reviewed/Revised: 09/06/23    DSM5 Diagnoses:     1. Adjustment  "disorder with anxiety    2. Major depressive disorder, recurrent, in partial remission (H)      Psychosocial / Contextual Factors: financial hardship, health issues, occupational/vocational stress, relationship stress, caregiver stress (mother's health concerns), and grief and loss in the wake of recent bereavement  PROMIS (reviewed every 90 days):     Referral / Collaboration:  Collaboration with medical team and specialists as needed.    Anticipated number of session for this episode of care: 15-20  Anticipation frequency of session: 2x per month  Anticipated Duration of each session: 38-52 minutes  Treatment plan will be reviewed in 90 days or when goals have been changed.       Measurable Treatment Goal(s) related to diagnosis/functional impairment(s): Overall goals for treatment include improving her physical health (e.g., diabetes, physical activity, and GI discomfort), learning strategies for enhancing coping capacity, and decreasing avoidance-based coping.  Goal 1: Patient will decrease her use of avoidance-based coping   \"I will know I've met my goal when I am not avoiding doing things out of fear (e.g., bathroom sink, processing grief, physical activity, reviewing pieces of writing).\"     Objective #A (Patient Action)    Patient will learn and implement skills for reducing perfectionism.  Status: New - Date: 09/06/23     Intervention(s)  Therapist will teach anti-perfectionism skills and will assist patient in completing a workbook on perfectionism.    Objective #B  Patient will initiate a healthy grieving process  Status: New - Date: 09/06/23    Intervention(s)  Therapist will assist patient in processing and exploring her grief.    Objective #C  Patient will increase her participation in meaningful and purposeful activities.  Status: New - Date: 09/06/23    Intervention(s)  Therapist will teach values clarification, committed action, and exposure skills.    Objective #D  Patient will learn and implement " unhooking skills for decreasing the degree to which fear influences behaviors and decisions in an unhelpful way.  Status: New - Date: 09/06/23    Intervention(s)  Therapist will teach defusion, cognitive restructuring, and mindfulness skills.      Patient has reviewed and agreed to the above plan.      Elise Sutton, PhD  September 6, 2023    Elise Sutton, Ph.D., , [facility]  Clinical Health Psychologist  Phone: [phone]   Pager: [phone]

## 2023-11-01 ENCOUNTER — THERAPY VISIT (OUTPATIENT)
Dept: PHYSICAL THERAPY | Facility: CLINIC | Age: 53
End: 2023-11-01
Payer: COMMERCIAL

## 2023-11-01 DIAGNOSIS — G89.29 CHRONIC PAIN OF LEFT KNEE: Primary | ICD-10-CM

## 2023-11-01 DIAGNOSIS — M54.50 CHRONIC BILATERAL LOW BACK PAIN WITHOUT SCIATICA: ICD-10-CM

## 2023-11-01 DIAGNOSIS — G89.29 CHRONIC LOW BACK PAIN: ICD-10-CM

## 2023-11-01 DIAGNOSIS — M54.50 CHRONIC LOW BACK PAIN: ICD-10-CM

## 2023-11-01 DIAGNOSIS — G89.29 CHRONIC PAIN OF RIGHT ANKLE: ICD-10-CM

## 2023-11-01 DIAGNOSIS — G89.29 CHRONIC BILATERAL LOW BACK PAIN WITHOUT SCIATICA: ICD-10-CM

## 2023-11-01 DIAGNOSIS — M25.562 CHRONIC PAIN OF LEFT KNEE: Primary | ICD-10-CM

## 2023-11-01 DIAGNOSIS — M25.571 CHRONIC PAIN OF RIGHT ANKLE: ICD-10-CM

## 2023-11-01 PROCEDURE — 97110 THERAPEUTIC EXERCISES: CPT | Mod: GP | Performed by: PHYSICAL THERAPIST

## 2023-11-01 PROCEDURE — 97140 MANUAL THERAPY 1/> REGIONS: CPT | Mod: GP | Performed by: PHYSICAL THERAPIST

## 2023-11-06 ENCOUNTER — THERAPY VISIT (OUTPATIENT)
Dept: OCCUPATIONAL THERAPY | Facility: CLINIC | Age: 53
End: 2023-11-06
Attending: OTOLARYNGOLOGY
Payer: COMMERCIAL

## 2023-11-06 DIAGNOSIS — Z78.9 ALTERATION IN INSTRUMENTAL ACTIVITIES OF DAILY LIVING (IADL): ICD-10-CM

## 2023-11-06 DIAGNOSIS — G93.39 OTHER POST INFECTION AND RELATED FATIGUE SYNDROMES: Primary | ICD-10-CM

## 2023-11-06 PROCEDURE — 97535 SELF CARE MNGMENT TRAINING: CPT | Mod: GO

## 2023-11-06 PROCEDURE — 97165 OT EVAL LOW COMPLEX 30 MIN: CPT | Mod: GO
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
## 2023-11-07 ENCOUNTER — LAB (OUTPATIENT)
Dept: LAB | Facility: CLINIC | Age: 53
End: 2023-11-07
Payer: COMMERCIAL

## 2023-11-07 DIAGNOSIS — Z79.899 HIGH RISK MEDICATION USE: ICD-10-CM

## 2023-11-07 DIAGNOSIS — L40.50 PSORIATIC ARTHRITIS (H): ICD-10-CM

## 2023-11-07 DIAGNOSIS — K29.40 AUTOIMMUNE GASTRITIS: ICD-10-CM

## 2023-11-07 DIAGNOSIS — E10.3291 TYPE 1 DIABETES MELLITUS WITH MILD NONPROLIFERATIVE RETINOPATHY OF RIGHT EYE, MACULAR EDEMA PRESENCE UNSPECIFIED (H): ICD-10-CM

## 2023-11-07 LAB
ALBUMIN SERPL BCG-MCNC: 4.5 G/DL (ref 3.5–5.2)
ALP SERPL-CCNC: 93 U/L (ref 35–104)
ALT SERPL W P-5'-P-CCNC: 30 U/L (ref 0–50)
ANION GAP SERPL CALCULATED.3IONS-SCNC: 13 MMOL/L (ref 7–15)
AST SERPL W P-5'-P-CCNC: 37 U/L (ref 0–45)
BASOPHILS # BLD AUTO: 0 10E3/UL (ref 0–0.2)
BASOPHILS NFR BLD AUTO: 0 %
BILIRUB SERPL-MCNC: 0.3 MG/DL
BUN SERPL-MCNC: 5.2 MG/DL (ref 6–20)
CALCIUM SERPL-MCNC: 9.5 MG/DL (ref 8.6–10)
CHLORIDE SERPL-SCNC: 103 MMOL/L (ref 98–107)
CREAT SERPL-MCNC: 0.62 MG/DL (ref 0.51–0.95)
CRP SERPL-MCNC: <3 MG/L
DEPRECATED HCO3 PLAS-SCNC: 24 MMOL/L (ref 22–29)
EGFRCR SERPLBLD CKD-EPI 2021: >90 ML/MIN/1.73M2
EOSINOPHIL # BLD AUTO: 0.1 10E3/UL (ref 0–0.7)
EOSINOPHIL NFR BLD AUTO: 1 %
ERYTHROCYTE [DISTWIDTH] IN BLOOD BY AUTOMATED COUNT: 13.1 % (ref 10–15)
ERYTHROCYTE [SEDIMENTATION RATE] IN BLOOD BY WESTERGREN METHOD: 27 MM/HR (ref 0–30)
GLUCOSE SERPL-MCNC: 93 MG/DL (ref 70–99)
HBA1C MFR BLD: 6.6 % (ref 0–5.6)
HCT VFR BLD AUTO: 36.2 % (ref 35–47)
HGB BLD-MCNC: 11.7 G/DL (ref 11.7–15.7)
IMM GRANULOCYTES # BLD: 0 10E3/UL
IMM GRANULOCYTES NFR BLD: 0 %
LYMPHOCYTES # BLD AUTO: 2 10E3/UL (ref 0.8–5.3)
LYMPHOCYTES NFR BLD AUTO: 40 %
MCH RBC QN AUTO: 31.4 PG (ref 26.5–33)
MCHC RBC AUTO-ENTMCNC: 32.3 G/DL (ref 31.5–36.5)
MCV RBC AUTO: 97 FL (ref 78–100)
MONOCYTES # BLD AUTO: 0.3 10E3/UL (ref 0–1.3)
MONOCYTES NFR BLD AUTO: 7 %
NEUTROPHILS # BLD AUTO: 2.6 10E3/UL (ref 1.6–8.3)
NEUTROPHILS NFR BLD AUTO: 52 %
PLATELET # BLD AUTO: 280 10E3/UL (ref 150–450)
POTASSIUM SERPL-SCNC: 3.8 MMOL/L (ref 3.4–5.3)
PROT SERPL-MCNC: 7.5 G/DL (ref 6.4–8.3)
RBC # BLD AUTO: 3.73 10E6/UL (ref 3.8–5.2)
SODIUM SERPL-SCNC: 140 MMOL/L (ref 135–145)
VIT B12 SERPL-MCNC: 670 PG/ML (ref 232–1245)
WBC # BLD AUTO: 4.9 10E3/UL (ref 4–11)

## 2023-11-07 PROCEDURE — 85025 COMPLETE CBC W/AUTO DIFF WBC: CPT

## 2023-11-07 PROCEDURE — 80053 COMPREHEN METABOLIC PANEL: CPT

## 2023-11-07 PROCEDURE — 99000 SPECIMEN HANDLING OFFICE-LAB: CPT

## 2023-11-07 PROCEDURE — 86140 C-REACTIVE PROTEIN: CPT

## 2023-11-07 PROCEDURE — 83036 HEMOGLOBIN GLYCOSYLATED A1C: CPT

## 2023-11-07 PROCEDURE — 85652 RBC SED RATE AUTOMATED: CPT

## 2023-11-07 PROCEDURE — 83516 IMMUNOASSAY NONANTIBODY: CPT | Mod: 90

## 2023-11-07 PROCEDURE — 36415 COLL VENOUS BLD VENIPUNCTURE: CPT

## 2023-11-07 PROCEDURE — 82607 VITAMIN B-12: CPT

## 2023-11-07 PROCEDURE — 86340 INTRINSIC FACTOR ANTIBODY: CPT | Mod: 90

## 2023-11-08 ENCOUNTER — THERAPY VISIT (OUTPATIENT)
Dept: PHYSICAL THERAPY | Facility: CLINIC | Age: 53
End: 2023-11-08
Payer: COMMERCIAL

## 2023-11-08 DIAGNOSIS — R42 DIZZINESS: Primary | ICD-10-CM

## 2023-11-08 DIAGNOSIS — R26.89 BALANCE PROBLEMS: ICD-10-CM

## 2023-11-08 PROCEDURE — 97112 NEUROMUSCULAR REEDUCATION: CPT | Mod: GP | Performed by: PHYSICAL THERAPIST

## 2023-11-09 LAB — IF BLOCK AB SER QL RIA: NEGATIVE

## 2023-11-09 NOTE — NURSING NOTE
Patient denies any changes since echeck-in regarding medication and allergies and states all information entered during echeck-in remains accurate.  Taylor Myhre,VF   Complex Repair And Z Plasty Text: The defect edges were debeveled with a #15 scalpel blade.  The primary defect was closed partially with a complex linear closure.  Given the location of the remaining defect, shape of the defect and the proximity to free margins a Z plasty was deemed most appropriate for complete closure of the defect.  Using a sterile surgical marker, an appropriate advancement flap was drawn incorporating the defect and placing the expected incisions within the relaxed skin tension lines where possible. The area thus outlined was incised deep to adipose tissue with a #15 scalpel blade. The skin margins were undermined to an appropriate distance in all directions utilizing iris scissors and carried over to close the primary defect.

## 2023-11-10 LAB — PCA IGG SER-ACNC: 129.9 UNITS

## 2023-11-14 ENCOUNTER — VIRTUAL VISIT (OUTPATIENT)
Dept: PSYCHOLOGY | Facility: CLINIC | Age: 53
End: 2023-11-14
Payer: COMMERCIAL

## 2023-11-14 DIAGNOSIS — G89.29 OTHER CHRONIC PAIN: ICD-10-CM

## 2023-11-14 DIAGNOSIS — E10.3299 TYPE 1 DIABETES MELLITUS WITH MILD NONPROLIFERATIVE RETINOPATHY, MACULAR EDEMA PRESENCE UNSPECIFIED, UNSPECIFIED LATERALITY (H): ICD-10-CM

## 2023-11-14 DIAGNOSIS — F33.41 MAJOR DEPRESSIVE DISORDER, RECURRENT, IN PARTIAL REMISSION (H): ICD-10-CM

## 2023-11-14 DIAGNOSIS — F43.22 ADJUSTMENT DISORDER WITH ANXIETY: Primary | ICD-10-CM

## 2023-11-14 PROCEDURE — 90834 PSYTX W PT 45 MINUTES: CPT | Mod: VID | Performed by: PSYCHOLOGIST

## 2023-11-14 ASSESSMENT — ANXIETY QUESTIONNAIRES
3. WORRYING TOO MUCH ABOUT DIFFERENT THINGS: SEVERAL DAYS
GAD7 TOTAL SCORE: 8
5. BEING SO RESTLESS THAT IT IS HARD TO SIT STILL: NOT AT ALL
1. FEELING NERVOUS, ANXIOUS, OR ON EDGE: SEVERAL DAYS
IF YOU CHECKED OFF ANY PROBLEMS ON THIS QUESTIONNAIRE, HOW DIFFICULT HAVE THESE PROBLEMS MADE IT FOR YOU TO DO YOUR WORK, TAKE CARE OF THINGS AT HOME, OR GET ALONG WITH OTHER PEOPLE: SOMEWHAT DIFFICULT
7. FEELING AFRAID AS IF SOMETHING AWFUL MIGHT HAPPEN: SEVERAL DAYS
2. NOT BEING ABLE TO STOP OR CONTROL WORRYING: MORE THAN HALF THE DAYS
6. BECOMING EASILY ANNOYED OR IRRITABLE: SEVERAL DAYS
GAD7 TOTAL SCORE: 8
4. TROUBLE RELAXING: MORE THAN HALF THE DAYS

## 2023-11-14 NOTE — PROGRESS NOTES
Health Psychology          Latanya Mayfield, Ph.D.,  [phone]  Hansa Arzate, Ph.D.,  [phone]  Sarah Beth Jack, Ph.D.,  [phone]  Pushpa Mendez, Ph.D., , [facility] [phone]  Panfilo Cornejo, Ph.D., , [facility] [phone]  Krystle Hannah, Ph.D.,  [phone]  Elise Sutton, Ph.D., , ABP [phone]    [facility], 3rd Floor  [address] [place] [address]       Health Psychology Progress Note    Patient Name: Dali aMrtines    Service Type: Individual  Length of Visit: 48 minutes total (disruption due to technology failures)  Start time: 10:02 AM; 10:22 AM  Stop time: 10:13 AM; 10:59 AM  Attendees: patient attended alone  Session #: 7    Telemedicine Information:     Telemedicine Visit: The patient's condition can be safely assessed and treated via synchronous audio and visual telemedicine encounter.    Reason for Telemedicine Visit: Patient has requested telehealth visit and Patient convenience (e.g. access to timely appointments / distance to available provider)  Originating Site (Patient Location): Patient's [place], [place]  Distant Location (provider location):  On-site: [facility]  Consent:  The patient/guardian has verbally consented to: the potential risks and benefits of telemedicine (video visit) versus in person care; bill my insurance or make self-payment for services provided; and responsibility for payment of non-covered services.   Mode of Communication:  Video Conference via [employer]  As the provider I attest to compliance with applicable laws and regulations related to telemedicine.     Identifying Information and Presenting Problem:  Dali Martines is a 53 year old female adult who was referred to health psychology by her endocrinologist, Dr. Mckeon. Patient presented with concerns of stress in the context of multiple life events, several medical conditions, and chronic management of T1DM.    Topics  Discussed/Interventions Provided:      Session Content:     Update: Patient reports doing generally well. Today is her birthday and she notes that she is looking forward to celebrating today. She reports that she got her pathology back from her endoscopy and was notified that there are some abnormalities. Has a follow-up appointment with Dr. Arevalo in gastroenterology on 12/4/23 for additional consultation and treatment planning.       Homework Review: Patient reports that she completed module 3 of the perfectionism workbook. Discussed insights.          Skills/intervention: Supported patient in processing her thoughts and emotions around her GI symptoms and evaluation. She shared some of her concerns and worries and expressed that she has some feelings of sadness and loss. Discussed patient's challenges with describing and discussing her emotional experience. Explored some methods for expanding patient's skill with exploring her emotional experience. Provided psychoeducation about emotional intelligence and discussed therapeutic journaling as a mechanism for deepening patient's emotional awareness and skill with emotional expression.         Top values identified included: spirituality, humor, fairness, creativity, curiosity, contribution, and caring.  Unhelpful rules/assumptions: fear of failure, all-or-nothing thinking, and simplicity, control, and structure    Treatment Objective(s) Addressed in This Session:  Psychological distress related to Diabetes  Psychological distress related to caregiver stress    Progress on / Status of Treatment Objective(s) / Homework:  In progress    Medication Adherence: Patient did not report medication changes. Current medication list is below:     Current Outpatient Medications   Medication    albuterol (PROAIR HFA/PROVENTIL HFA/VENTOLIN HFA) 108 (90 Base) MCG/ACT inhaler    aspirin 81 MG tablet    blood glucose (ACCU-CHEK COLIN PLUS) test strip    blood glucose monitoring  "(ACCU-CHEK FASTCLIX) lancets    blood glucose monitoring (NO BRAND SPECIFIED) meter device kit    Calcium Carbonate-Vitamin D (CALCIUM + D PO)    citalopram (CELEXA) 20 MG tablet    Continuous Blood Gluc  (DEXCOM G6 ) SUZANNE    Continuous Blood Gluc Sensor (DEXCOM G6 SENSOR) MISC    Continuous Blood Gluc Transmit (DEXCOM G6 TRANSMITTER) MISC    diclofenac (VOLTAREN) 1 % topical gel    diclofenac (VOLTAREN) 1 % topical gel    DULERA 100-5 MCG/ACT inhaler    econazole nitrate 1 % cream    FIASP PENFILL 100 UNIT/ML SOCT    fish oil-omega-3 fatty acids 1000 MG capsule    folic acid (FOLVITE) 1 MG tablet    Injection Device for insulin (INPEN 100-BLUE-NOVOLOG-FIASP) SUZANNE    Insulin Aspart, w/Niacinamide, (FIASP) 100 UNIT/ML SOLN    insulin degludec (TRESIBA FLEXTOUCH) 100 UNIT/ML pen    Insulin Disposable Pump (OMNIPOD 5 G6 INTRO, GEN 5,) KIT    Insulin Disposable Pump (OMNIPOD 5 G6 POD, GEN 5,) MISC    insulin pen needle (B-D U/F) 31G X 8 MM miscellaneous    insulin syringe-needle U-100 (30G X 1/2\" 1 ML) 30G X 1/2\" 1 ML miscellaneous    Ketoconazole (NIZORAL PO)    levothyroxine (SYNTHROID/LEVOTHROID) 112 MCG tablet    methotrexate 2.5 MG tablet    methotrexate 2.5 MG tablet    methylphenidate (METADATE CD) 20 MG CR capsule    methylphenidate (RITALIN) 10 MG tablet    Minoxidil (ROGAINE EX)    montelukast (SINGULAIR) 5 MG chewable tablet    Multiple Vitamin (MULTIVITAMIN OR)    NOVOLOG PENFILL 100 UNIT/ML soln    ondansetron (ZOFRAN ODT) 4 MG ODT tab    Semaglutide, 1 MG/DOSE, 4 MG/3ML SOPN    simvastatin (ZOCOR) 20 MG tablet    traZODone (DESYREL) 50 MG tablet     Current Facility-Administered Medications   Medication    lidocaine 1 % injection 1 mL    triamcinolone acetonide (KENALOG-10) injection 10 mg     Assessment: The patient appeared to be active and engaged in today's session and was receptive to feedback.     Mental Status Exam:   Appearance: Appropriate   Eye Contact: Good    Orientation: Yes, " x4  Behavior/relationship to provider/demeanor: Cooperative, Engaged, and Pleasant  Motor Activity: normal or unremarkable  Mood (subjective report): Stable  Affect (objective appearance): Appropriate/mood congruent  Speech Rate: Normal  Speech Volume: Normal  Speech Articulation: Normal  Speech Coherence: Normal  Speech Spontaneity: Normal  Thought Content: no suicidal/homicidal ideation, plans, or intent  Thought Process (associations): Logical, Linear, and Goal directed  Thought Process (rate): Normal  Abnormal Perception: None  Attention/Concentration: Normal  Memory: Appears grossly intact   Fund of Knowledge: Above average  Abstraction:  Normal  Insight:  Good  Judgment:  good    Does the patient appear to be at imminent risk of harm to self/others at this time? No    The session was necessary to address psychological distress in the context of multiple psychosocial stressors and chronic health concerns.    Diagnoses (provisional):    1. Adjustment disorder with anxiety    2. Major depressive disorder, recurrent, in partial remission (H)    3. 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 Other chronic pain        Plan:    Follow-up video visit with me on 11/27/23 at 1:00pm    Patient to use 911 or other crisis resources in the event of a psychiatric emergency  Primary care needs and medications are managed by Dimitri Alas MD. Referring provider is Dr. Mckeon.  Next visit agenda/goals:   First agenda item: Discuss introductory meditations   Complete module 4 of perfectionism workbook  Try therapeutic journaling for getting practice with emotional expression    Skills taught/interventions used thus far:  Psychoeducation  Values clarification  Committed action (vitality vs suffering)  Perfectionism workbook (modules 1-3)  Defusion  Acting opposite  Mindfulness (breath, body scan)  Therapeutic journaling    NOTE:  "Treatment plan update due 9/6/24; 90 day treatment plan review due 12/5/23                                                Individual Treatment Plan    Patient's Name: Dali Martines  YOB: 1970    Date of Creation: 09/06/23  Date Treatment Plan Last Reviewed/Revised: 09/06/23    DSM5 Diagnoses:     1. Adjustment disorder with anxiety    2. Major depressive disorder, recurrent, in partial remission (H)      Psychosocial / Contextual Factors: financial hardship, health issues, occupational/vocational stress, relationship stress, caregiver stress (mother's health concerns), and grief and loss in the wake of recent bereavement  PROMIS (reviewed every 90 days):     Referral / Collaboration:  Collaboration with medical team and specialists as needed.    Anticipated number of session for this episode of care: 15-20  Anticipation frequency of session: 2x per month  Anticipated Duration of each session: 38-52 minutes  Treatment plan will be reviewed in 90 days or when goals have been changed.       Measurable Treatment Goal(s) related to diagnosis/functional impairment(s): Overall goals for treatment include improving her physical health (e.g., diabetes, physical activity, and GI discomfort), learning strategies for enhancing coping capacity, and decreasing avoidance-based coping.  Goal 1: Patient will decrease her use of avoidance-based coping   \"I will know I've met my goal when I am not avoiding doing things out of fear (e.g., bathroom sink, processing grief, physical activity, reviewing pieces of writing).\"     Objective #A (Patient Action)    Patient will learn and implement skills for reducing perfectionism.  Status: New - Date: 09/06/23     Intervention(s)  Therapist will teach anti-perfectionism skills and will assist patient in completing a workbook on perfectionism.    Objective #B  Patient will initiate a healthy grieving process  Status: New - Date: 09/06/23    Intervention(s)  Therapist will " assist patient in processing and exploring her grief.    Objective #C  Patient will increase her participation in meaningful and purposeful activities.  Status: New - Date: 09/06/23    Intervention(s)  Therapist will teach values clarification, committed action, and exposure skills.    Objective #D  Patient will learn and implement unhooking skills for decreasing the degree to which fear influences behaviors and decisions in an unhelpful way.  Status: New - Date: 09/06/23    Intervention(s)  Therapist will teach defusion, cognitive restructuring, and mindfulness skills.      Patient has reviewed and agreed to the above plan.      Elise Sutton, PhD  September 6, 2023    Elise Sutton, Ph.D., , [facility]  Clinical Health Psychologist  Phone: [phone]   Pager: [phone]

## 2023-11-15 NOTE — TELEPHONE ENCOUNTER
Action Patti Valencia on 11/15/2023 at 2:25 PM   Action Taken Called Pt to ask if they have had any recent imaging of their head/brain. Pt stated they they have not had any imaging done recently, but had CT scan [facility] about 15 years ago as part of an allergy study.   -JA     RECORDS RECEIVED FROM: Internal    REASON FOR VISIT: Dizziness [R42]   Date of Appt: 1/10/24 @ 1:00 pm    NOTES (FOR ALL VISITS) STATUS DETAILS   OFFICE NOTE from referring provider Internal 10/17/23, 9/22/23 (Orders Only) Nissen, Rick L, MD @[facility]-ENT     OFFICE NOTE from other specialist Internal 9/21/23 Aimee Briseno, Edna @[facility]-Audio    9/21/23 Hoa Flores AuD @[facility]    8/17/22 Robbin Castano MD @[facility]     MEDICATION LIST Internal    IMAGING  (FOR ALL VISITS)     CT (HEAD, NECK, SPINE) Internal [facility]  8/8/05 CT Maxillofacial

## 2023-11-20 ENCOUNTER — THERAPY VISIT (OUTPATIENT)
Dept: OCCUPATIONAL THERAPY | Facility: CLINIC | Age: 53
End: 2023-11-20
Payer: COMMERCIAL

## 2023-11-20 DIAGNOSIS — Z78.9 ALTERATION IN INSTRUMENTAL ACTIVITIES OF DAILY LIVING (IADL): Primary | ICD-10-CM

## 2023-11-20 DIAGNOSIS — G93.39 OTHER POST INFECTION AND RELATED FATIGUE SYNDROMES: ICD-10-CM

## 2023-11-20 PROCEDURE — 97535 SELF CARE MNGMENT TRAINING: CPT | Mod: GO

## 2023-11-27 ENCOUNTER — VIRTUAL VISIT (OUTPATIENT)
Dept: PSYCHOLOGY | Facility: CLINIC | Age: 53
End: 2023-11-27
Payer: COMMERCIAL

## 2023-11-27 DIAGNOSIS — F43.22 ADJUSTMENT DISORDER WITH ANXIETY: Primary | ICD-10-CM

## 2023-11-27 DIAGNOSIS — E10.3299 TYPE 1 DIABETES MELLITUS WITH MILD NONPROLIFERATIVE RETINOPATHY, MACULAR EDEMA PRESENCE UNSPECIFIED, UNSPECIFIED LATERALITY (H): ICD-10-CM

## 2023-11-27 DIAGNOSIS — F33.41 MAJOR DEPRESSIVE DISORDER, RECURRENT, IN PARTIAL REMISSION (H): ICD-10-CM

## 2023-11-27 DIAGNOSIS — G89.29 OTHER CHRONIC PAIN: ICD-10-CM

## 2023-11-27 PROCEDURE — 90834 PSYTX W PT 45 MINUTES: CPT | Mod: VID | Performed by: PSYCHOLOGIST

## 2023-11-27 NOTE — PROGRESS NOTES
Health Psychology          Latanya Mayfield, Ph.D.,  [phone]  Hansa Arzate, Ph.D.,  [phone]  Sarah Beth Jack, Ph.D.,  [phone]  Pushpa Mendez, Ph.D., , [facility] [phone]  Panfilo Cornejo, Ph.D., , [facility] [phone]  Krystle Hannah, Ph.D.,  [phone]  Elise Sutton, Ph.D., , ABP [phone]    [facility], 3rd Floor  [address] [place] [address]       Health Psychology Progress Note    Patient Name: Dali Martines    Service Type: Individual  Length of Visit: 50 minutes   Start time: 1:03 PM  Stop time: 1:53 PM   Attendees: patient attended alone  Session #: 8    Telemedicine Information:     Telemedicine Visit: The patient's condition can be safely assessed and treated via synchronous audio and visual telemedicine encounter.    Reason for Telemedicine Visit: Patient has requested telehealth visit and Patient convenience (e.g. access to timely appointments / distance to available provider)  Originating Site (Patient Location): Patient's home, [place]  Distant Location (provider location):  Off-site: Provider's Home Office   Consent:  The patient/guardian has verbally consented to: the potential risks and benefits of telemedicine (video visit) versus in person care; bill my insurance or make self-payment for services provided; and responsibility for payment of non-covered services.   Mode of Communication:  Video Conference via [employer]  As the provider I attest to compliance with applicable laws and regulations related to telemedicine.     Identifying Information and Presenting Problem:  Dali Martines is a 53 year old female adult who was referred to health psychology by her endocrinologist, Dr. Mckeon. Patient presented with concerns of stress in the context of multiple life events, several medical conditions, and chronic management of T1DM.    Topics Discussed/Interventions Provided:      Session Content:     Update:  Patient reports doing generally well. States that she had an enjoyable Thanksgiving holiday. Notes that several people in her family have now tested positive for COVID, so she is monitoring herself and her mother for symptoms. She also reports that she has been working with OT and has gained some awareness around how different activities are emotionally, cognitively, and physically depleting.         Homework Review: Patient reports that she completed the introductory meditations and module 4 of the perfectionism workbook. Discussed insights. She notes some familiarity from her previous course on mindfulness. Discussed the benefits of applying the attitudinal foundations of mindfulness on perfectionism, reducing a sense of urgency, and decreasing physiological arousal. Did not complete module 4 of the perfectionism workbook. Will carry over to next visit. States that she completed 1 therapeutic journaling exercise, but found it difficult to transition because it elicited difficult emotions. Recommended trying a meditation afterward to help facilitate her transition.       Skills/intervention: Supported patient in processing her thoughts and emotions around her experience with Thanksgiving. She notes feeling a sense of accomplishment and satisfaction with how things turned out with the holiday. She also notes that she gained some insights into how she will approach holidays differently moving forward. She notes how her mindfulness activities have helped her realize how she experiences urgency differently than she used to. For example, condensing activities into a shorter period of time activates more of a threat response versus a challenge response that she used to experience when she was younger. Also assisted patient in generating a goal around creating more of a routine for her mindfulness practice. She notes that she would like to continue to experiment with time of day for her practice. She notes that she would  like to start with using meditation as part of her wind-down routine in the evenings. Introduced emotional agility and 2 new meditations to try.          Top values identified included: spirituality, humor, fairness, creativity, curiosity, contribution, and caring.  Unhelpful rules/assumptions: fear of failure, all-or-nothing thinking, and simplicity, control, and structure    Treatment Objective(s) Addressed in This Session:  Psychological distress related to Diabetes  Psychological distress related to caregiver stress    Progress on / Status of Treatment Objective(s) / Homework:  In progress    Medication Adherence: Patient did not report medication changes. Current medication list is below:     Current Outpatient Medications   Medication    albuterol (PROAIR HFA/PROVENTIL HFA/VENTOLIN HFA) 108 (90 Base) MCG/ACT inhaler    aspirin 81 MG tablet    blood glucose (ACCU-CHEK COLIN PLUS) test strip    blood glucose monitoring (ACCU-CHEK FASTCLIX) lancets    blood glucose monitoring (NO BRAND SPECIFIED) meter device kit    Calcium Carbonate-Vitamin D (CALCIUM + D PO)    citalopram (CELEXA) 20 MG tablet    Continuous Blood Gluc  (DEXCOM G6 ) SUZANNE    Continuous Blood Gluc Sensor (DEXCOM G6 SENSOR) MISC    Continuous Blood Gluc Transmit (DEXCOM G6 TRANSMITTER) MISC    diclofenac (VOLTAREN) 1 % topical gel    diclofenac (VOLTAREN) 1 % topical gel    DULERA 100-5 MCG/ACT inhaler    econazole nitrate 1 % cream    FIASP PENFILL 100 UNIT/ML SOCT    fish oil-omega-3 fatty acids 1000 MG capsule    folic acid (FOLVITE) 1 MG tablet    Injection Device for insulin (INPEN 100-BLUE-NOVOLOG-FIASP) SUZANNE    Insulin Aspart, w/Niacinamide, (FIASP) 100 UNIT/ML SOLN    insulin degludec (TRESIBA FLEXTOUCH) 100 UNIT/ML pen    Insulin Disposable Pump (OMNIPOD 5 G6 INTRO, GEN 5,) KIT    Insulin Disposable Pump (OMNIPOD 5 G6 POD, GEN 5,) MISC    insulin pen needle (B-D U/F) 31G X 8 MM miscellaneous    insulin syringe-needle U-100  "(30G X 1/2\" 1 ML) 30G X 1/2\" 1 ML miscellaneous    Ketoconazole (NIZORAL PO)    levothyroxine (SYNTHROID/LEVOTHROID) 112 MCG tablet    methotrexate 2.5 MG tablet    methotrexate 2.5 MG tablet    methylphenidate (METADATE CD) 20 MG CR capsule    methylphenidate (RITALIN) 10 MG tablet    Minoxidil (ROGAINE EX)    montelukast (SINGULAIR) 5 MG chewable tablet    Multiple Vitamin (MULTIVITAMIN OR)    NOVOLOG PENFILL 100 UNIT/ML soln    ondansetron (ZOFRAN ODT) 4 MG ODT tab    Semaglutide, 1 MG/DOSE, 4 MG/3ML SOPN    simvastatin (ZOCOR) 20 MG tablet    traZODone (DESYREL) 50 MG tablet     Current Facility-Administered Medications   Medication    lidocaine 1 % injection 1 mL    triamcinolone acetonide (KENALOG-10) injection 10 mg     Assessment: The patient appeared to be active and engaged in today's session and was receptive to feedback.     Mental Status Exam:   Appearance: Appropriate   Eye Contact: Good    Orientation: Yes, x4  Behavior/relationship to provider/demeanor: Cooperative, Engaged, and Pleasant  Motor Activity: normal or unremarkable  Mood (subjective report): Stable  Affect (objective appearance): Appropriate/mood congruent  Speech Rate: Normal  Speech Volume: Normal  Speech Articulation: Normal  Speech Coherence: Normal  Speech Spontaneity: Normal  Thought Content: no suicidal/homicidal ideation, plans, or intent  Thought Process (associations): Logical, Linear, and Goal directed  Thought Process (rate): Normal  Abnormal Perception: None  Attention/Concentration: Normal  Memory: Appears grossly intact   Fund of Knowledge: Above average  Abstraction:  Normal  Insight:  Good  Judgment:  good    Does the patient appear to be at imminent risk of harm to self/others at this time? No    The session was necessary to address psychological distress in the context of multiple psychosocial stressors and chronic health concerns.    Diagnoses (provisional):    1. Adjustment disorder with anxiety    2. Major depressive " disorder, recurrent, in partial remission (H)    3. 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 Other chronic pain        Plan:    Follow-up video visit with me on 12/11/23 at 2:00pm    Patient to use 911 or other crisis resources in the event of a psychiatric emergency  Primary care needs and medications are managed by Dimitri Alas MD. Referring provider is Dr. Mckeon.  Next visit agenda/goals:   Treatment plan review  Continue to practice meditations. Add in choiceless awareness and watching thoughts  Complete module 4 of perfectionism workbook  Try therapeutic journaling for getting practice with emotional expression    Skills taught/interventions used thus far:  Psychoeducation  Values clarification  Committed action (vitality vs suffering)  Perfectionism workbook (modules 1-3)  Defusion  Acting opposite  Mindfulness (breath, body scan)  Therapeutic journaling    NOTE: Treatment plan update due 9/6/24; 90 day treatment plan review due 12/5/23                                                Individual Treatment Plan    Patient's Name: Dali Martines  YOB: 1970    Date of Creation: 09/06/23  Date Treatment Plan Last Reviewed/Revised: 09/06/23    DSM5 Diagnoses:     1. Adjustment disorder with anxiety    2. Major depressive disorder, recurrent, in partial remission (H)      Psychosocial / Contextual Factors: financial hardship, health issues, occupational/vocational stress, relationship stress, caregiver stress (mother's health concerns), and grief and loss in the wake of recent bereavement  PROMIS (reviewed every 90 days):     Referral / Collaboration:  Collaboration with medical team and specialists as needed.    Anticipated number of session for this episode of care: 15-20  Anticipation frequency of session: 2x per month  Anticipated Duration of each session: 38-52 minutes  Treatment plan will be  "reviewed in 90 days or when goals have been changed.       Measurable Treatment Goal(s) related to diagnosis/functional impairment(s): Overall goals for treatment include improving her physical health (e.g., diabetes, physical activity, and GI discomfort), learning strategies for enhancing coping capacity, and decreasing avoidance-based coping.  Goal 1: Patient will decrease her use of avoidance-based coping   \"I will know I've met my goal when I am not avoiding doing things out of fear (e.g., bathroom sink, processing grief, physical activity, reviewing pieces of writing).\"     Objective #A (Patient Action)    Patient will learn and implement skills for reducing perfectionism.  Status: New - Date: 09/06/23     Intervention(s)  Therapist will teach anti-perfectionism skills and will assist patient in completing a workbook on perfectionism.    Objective #B  Patient will initiate a healthy grieving process  Status: New - Date: 09/06/23    Intervention(s)  Therapist will assist patient in processing and exploring her grief.    Objective #C  Patient will increase her participation in meaningful and purposeful activities.  Status: New - Date: 09/06/23    Intervention(s)  Therapist will teach values clarification, committed action, and exposure skills.    Objective #D  Patient will learn and implement unhooking skills for decreasing the degree to which fear influences behaviors and decisions in an unhelpful way.  Status: New - Date: 09/06/23    Intervention(s)  Therapist will teach defusion, cognitive restructuring, and mindfulness skills.      Patient has reviewed and agreed to the above plan.      Elise Sutton, PhD  September 6, 2023    Elise Sutton, Ph.D., , [facility]  Clinical Health Psychologist  Phone: [phone]   Pager: [phone]           "

## 2023-11-29 ENCOUNTER — THERAPY VISIT (OUTPATIENT)
Dept: PHYSICAL THERAPY | Facility: CLINIC | Age: 53
End: 2023-11-29
Payer: COMMERCIAL

## 2023-11-29 DIAGNOSIS — M25.562 CHRONIC PAIN OF LEFT KNEE: Primary | ICD-10-CM

## 2023-11-29 DIAGNOSIS — M25.571 CHRONIC PAIN OF RIGHT ANKLE: ICD-10-CM

## 2023-11-29 DIAGNOSIS — M54.50 CHRONIC BILATERAL LOW BACK PAIN WITHOUT SCIATICA: ICD-10-CM

## 2023-11-29 DIAGNOSIS — G89.29 CHRONIC PAIN OF RIGHT ANKLE: ICD-10-CM

## 2023-11-29 DIAGNOSIS — G89.29 CHRONIC BILATERAL LOW BACK PAIN WITHOUT SCIATICA: ICD-10-CM

## 2023-11-29 DIAGNOSIS — G89.29 CHRONIC PAIN OF LEFT KNEE: Primary | ICD-10-CM

## 2023-11-29 DIAGNOSIS — R42 DIZZINESS: Primary | ICD-10-CM

## 2023-11-29 DIAGNOSIS — R26.89 BALANCE PROBLEMS: ICD-10-CM

## 2023-11-29 PROCEDURE — 97112 NEUROMUSCULAR REEDUCATION: CPT | Mod: GP | Performed by: PHYSICAL THERAPIST

## 2023-11-29 PROCEDURE — 97140 MANUAL THERAPY 1/> REGIONS: CPT | Mod: GP | Performed by: PHYSICAL THERAPIST

## 2023-11-29 PROCEDURE — 97110 THERAPEUTIC EXERCISES: CPT | Mod: GP | Performed by: PHYSICAL THERAPIST

## 2023-12-04 ENCOUNTER — THERAPY VISIT (OUTPATIENT)
Dept: OCCUPATIONAL THERAPY | Facility: CLINIC | Age: 53
End: 2023-12-04
Payer: COMMERCIAL

## 2023-12-04 ENCOUNTER — VIRTUAL VISIT (OUTPATIENT)
Dept: GASTROENTEROLOGY | Facility: CLINIC | Age: 53
End: 2023-12-04
Attending: PHYSICIAN ASSISTANT
Payer: COMMERCIAL

## 2023-12-04 VITALS — HEIGHT: 70 IN | BODY MASS INDEX: 26.05 KG/M2 | WEIGHT: 182 LBS

## 2023-12-04 DIAGNOSIS — R13.12 OROPHARYNGEAL DYSPHAGIA: ICD-10-CM

## 2023-12-04 DIAGNOSIS — G93.39 OTHER POST INFECTION AND RELATED FATIGUE SYNDROMES: Primary | ICD-10-CM

## 2023-12-04 DIAGNOSIS — K30 DELAYED GASTRIC EMPTYING: ICD-10-CM

## 2023-12-04 DIAGNOSIS — R11.2 NAUSEA AND VOMITING, UNSPECIFIED VOMITING TYPE: ICD-10-CM

## 2023-12-04 DIAGNOSIS — R14.0 ABDOMINAL BLOATING: Primary | ICD-10-CM

## 2023-12-04 DIAGNOSIS — Z78.9 ALTERATION IN INSTRUMENTAL ACTIVITIES OF DAILY LIVING (IADL): ICD-10-CM

## 2023-12-04 PROCEDURE — 99215 OFFICE O/P EST HI 40 MIN: CPT | Mod: VID | Performed by: PHYSICIAN ASSISTANT

## 2023-12-04 PROCEDURE — 97535 SELF CARE MNGMENT TRAINING: CPT | Mod: GO

## 2023-12-04 PROCEDURE — 99417 PROLNG OP E/M EACH 15 MIN: CPT | Mod: VID | Performed by: PHYSICIAN ASSISTANT

## 2023-12-04 ASSESSMENT — PAIN SCALES - GENERAL: PAINLEVEL: NO PAIN (0)

## 2023-12-04 NOTE — PATIENT INSTRUCTIONS
It was a pleasure taking care of you today.  I've included a brief summary of our discussion and care plan from today's visit below.  Please review this information with your primary care provider.  _______________________________________________________________________     My recommendations are summarized as follows:     - schedule your hydrogen breath test to check for bacterial overgrowth   - schedule an upper endoscopy to re-check for atrophic gastritis   - schedule a video swallow study to check for aspiration     - try following a gastroparesis diet as below:     Gastroparesis Diet Goals:   1. Eat 4-6 smaller meals per day that are lower in fat and lower in fiber.  For low fiber diet:  -Remove skins and peels from fruits and vegetables and cook vegetables very well before eating.  -Canned fruit in its own juice is lower in fiber, as well as ripe bananas, ripe melon, unsweetened applesauce.  -Avoid whole grain breads/crackers (choose white bread, low fiber crackers, white rice).  -Avoid whole nuts and seeds.  -Okay for creamy nut butters. Avoid chunky nut butters.    For low fat diet:  -Choose lean proteins/lean cuts of meats. Remove skins from chicken and turkey breast, fish (not breaded and fried), lean cuts of pork, limit red meat and if having choose lean cut or 93% lean ground beef. Can substitute with ground turkey or ground chicken for beef.  -Use lower fat cooking methods such as baking, broiling, grilling.  -Limit using a lot of fats/oil/high fat sauces/dressing/butter when preparing foods and limit the amount used on foods.  -Choose skim or 1% based dairy products (such as lowfat yogurts, skim/1% milk, non fat/1% cottage cheese, lowfat pudding).  -Do not eat large amounts of nuts, seeds, nut butters, avocado.    2. Can have protein drink as a small meal/snack if better tolerated and/or instead of skipping a meal. These drinks count towards those 4-6 smaller meals.  -some examples include using a  protein powder of your choice and mixing with water/skim or lowfat milk or lowfat milk substitute, or premier protein, ensure max, ensure high protein, boost high protein, lowfat lower sugar carnation instant breakfast okay as well (if you tolerate dairy products).    If you are having a lot of nausea a clear liquid protein drink/protein water may be better tolerated such as Premier clear, Protein 2o, Isopure. (Boost Breeze and Ensure clear are clear liquid drinks but are higher in added sugars so may not be as well tolerated with issues of loose stools).    3. Chew food very well before swallowing.  4. Limit fluids with meals (only take small sips as needed) and drink the rest of your fluids in between meals.  5. Drink at least 6-8 cups (48 oz-64 oz) per day (in between meals).     ______________________________________________________________________     How do I schedule labs, imaging studies, or procedures that were ordered in clinic today?      Labs: To schedule lab appointment you can contact your local [facility] or call [phone] to schedule at any convenient [facility] location.     Procedures: If a colonoscopy, upper endoscopy, breath test, esophageal manometry, or pH impedence was ordered today, our endoscopy team will call you to schedule this. If you have not heard from our endoscopy team within a week, please call [phone] to schedule.      Imaging Studies: If you were scheduled for a CT scan, X-ray, MRI, ultrasound, HIDA scan or other imaging study, please call [phone] to have this scheduled.      Referral: If a referral to another specialty was ordered, expect a phone call or follow instructions above. If you have not heard from anyone regarding your referral in a week, please call our clinic to check the status.      Who do I call with any questions after my visit?  Please be in touch if there are any further questions that arise following today's visit.  There are  multiple ways to contact your gastroenterology care team.       During business hours, you may reach a Gastroenterology nurse at [phone]     To schedule or reschedule an appointment, please call [phone].      You can always send a secure message through 36Kr.  36Kr messages are answered by your nurse or doctor typically within 24 hours.  Please allow extra time on weekends and holidays.       For urgent/emergent questions after business hours, you may reach the on-call GI Fellow by contacting the [facility] at [phone].     How will I get the results of any tests ordered?    You will receive all of your results.  If you have signed up for [email or web address], any tests ordered at your visit will be available to you after your physician reviews them.  Typically this takes 1-2 weeks.  If there are urgent results that require a change in your care plan, your physician or nurse will call you to discuss the next steps.       What is 36Kr?  36Kr is a secure way for you to access all of your healthcare records from the [facility].  It is a web based computer program, so you can sign on to it from any location.  It also allows you to send secure messages to your care team.  I recommend signing up for 36Kr access if you have not already done so and are comfortable with using a computer.       How to I schedule a follow-up visit?  If you did not schedule a follow-up visit today, please call [phone] to schedule a follow-up office visit.      Khanh Arevalo PA-C  Division of Gastroenterology, Hepatology and Nutrition   [facility]

## 2023-12-04 NOTE — PROGRESS NOTES
GI FOLLOW UP VISIT     CC/REFERRING MD:    Dimitri Alas MD     REASON FOR VISIT: FOLLOW UP     HISTORY OF PRESENT ILLNESS:    Dali Martines is 53 year old female with pmhx of psoriatic arthritis on methotrexate, asthma, type I diabetes, endometriosis  who presents for follow up.     She was recently seen in our GI clinic by my colleague Gloria House PA-C. Please see prior documentation, some of which has been copied below for reference:     HPI taken from September 2023 visit with Gloria House   Dali presented with history of a myriad of GI issues, ongoing off and on since the mid-90s. She previously followed with Dr. Shelton in [id number]. She underwent evaluations as noted below and ultimately was felt to have functional dyspepsia.  - NM GES in 2014 with normal emptying, study was limited to 180 minutes  - HBT 2014 positive, report indicates she was constipated at time of study  - EGD 1/2014- small hiatal hernia. Normal esophagus. Normal duodenum.  Colonoscopy 2020 - normal, repeat 5 years     At time of our initial consultation, she elicited several GI concerns, briefly summarized as follows:     1. She is concerned about the hiatal hernia and anti-reflux valve. If she has any liquid or food in her stomach, and laying down at all, it will reflux up into her esophagus. She feels this is very positional, with the reclined position being the worst. The regurgitant is bland. If there is regurgitation of food, it is partially digested. There does not seem to be any relation to types of food that trigger symptoms, but rather just the physical quantity. She does note that high glucose does exacerbate this. No dysphagia, odynophagia. Not experiencing heartburn or acid reflux symptoms on any regularly basis. Currently not on any anti-acids. She previously did try famotidine in the past without clear improvement.     2. She is also having what she describes as extreme bouts of nausea, occurring episodically.  During these episodes, she cannot keep anything down, including water. This last occurred several weeks ago and has occurred several times in total. During this most recent episode, she had a stressful day prior to that. The next morning, she had coffee, breakfast, and an hour later, threw everything up. This can be more consistent with regurgitation at times as there is no retching present. The emesis mostly is bland. She wasn't able to tolerate any PO intake for a full day. She felt like there was a physical overflow, describing a fullness in her stomach. She notes some extreme hypoglycemia and feeling like she has to sneeze and nausea. She notes she can get the most extreme nausea after a low blood glucose episode, which she relates to the high sugar. She has lost some weight on ozempic.These symptoms tend to be present within an hour of eating. There is often an epigastric pain in relation to this. She notes periodic RUQ pain.     In between episodes, she does have to pay attention to how full her stomach is, as this can contribute to nausea and/or vomiting. She does endorse early satiety and postprandial fullness. Notably, she is on ozempic. She has been on this for one to two years, and on an increased dose since last Fall or so she thinks. She does have some intermittent postprandial nausea to a more mild degree compared to the more extreme episodes, above. She recalls having a full glass of water the other night and went to go lay down (using elevated pillows) and then proceeded to throw up.     3. Bowel pattern tends towards constipation per her account. SHe has a BM daily to every other day, stools can be marble-like (once per week) to hard or dry and difficult to pass (estimates once every other week). Rare sense of incomplete evacuation. She does occasionally have BRBPR. No melena. She notes that when she was a child, she had a severe case of mono, out of school for months. She developed constipation,  remembers mineral oil, enemas. She feels like after recovering from this, she feels like this changed her metabolism, contributed to new endocrine problems.     Interval history:  After initial consultation  Since out last visit, she has had two changes:  1. She states she has taken holidays from Ozempic - ie doing Ozempic every other week, which has helped reduce the severity of symptoms.  2. She met with her endocrinologist and was recommended an insulin pump due to episodes of hypoglycemia, which seemed to be related to the sustained nausea symptoms. She has been on the insulin pump for a few weeks, she states too early to tell if she has had improvement yet. She has still had some episodes of nausea and vomiting, but not lasting as long as symptoms previously were.     Otherwise, symptoms remain as above, noted in HPI. She questions the possibility of hiatal hernia today. No known FHx of GI malignancy.       Today December 4, 2023   She returns to GI clinic for follow up.  Recent ultrasound negative. She had an upper endoscopy since her last visit which noted some erythematous mucosa in the stomach.  Biopsies noted moderate to severe chronic gastritis with focal activity and patchy gland loss. There is concern for atrophic gastritis. B12 was wnl. Intrinsic factor negative however parietal cell ab elevated. She was advised to have a repeat EGD with gastric mapping. Orders previously placed but have not been scheduled yet.     She reports having some improvement since using a continuous glucose monitor.  She is having less hypoglycemic episodes and therefore less nausea symptoms. Still has early fullness and satiety. Reports occ aspiration concerns. Denies esophageal dysphagia. Denies reflux symptoms.     She denies constipation. She does take flax seed regularly which helps keep her regular.         UPPER ENDOSCOPY 10/5/2023  Impression:         - Normal esophagus.   - Z-line regular, 42 cm from the incisors.   -  Gastroesophageal flap valve classified as Hill Grade I (prominent fold, tight to endoscope).   - Erythematous mucosa in the gastric body. Biopsied.   - Erythematous mucosa in the antrum. Biopsied.   - Normal examined duodenum.   - No retained food in the stomach lumn, nearly normal foregut with only minor erythema as noted above.     Final Diagnosis   A.  Stomach, antrum, biopsy-  - Chronic inactive gastritis.  - Negative for intestinal metaplasia, gastric epithelial dysplasia, or malignancy.  - Negative for H. pylori forms.  - Sampling includes: Gastric antral mucosa.     B.  Stomach, body, biopsy-     - Moderate-severe chronic gastritis with focal activity and patchy gland loss.  See comment.  - Negative for intestinal metaplasia, gastric epithelial dysplasia, or malignancy.  - Negative for H. pylori forms.  - Sampling includes: Gastric antrum and fundus/body-type mucosa.     ALLERGIES:  Monosodium glutamate and Sulfasalazine      PERTINENT MEDICATIONS:  Current Outpatient Medications   Medication    albuterol (PROAIR HFA/PROVENTIL HFA/VENTOLIN HFA) 108 (90 Base) MCG/ACT inhaler    aspirin 81 MG tablet    blood glucose (ACCU-CHEK COLIN PLUS) test strip    blood glucose monitoring (ACCU-CHEK FASTCLIX) lancets    blood glucose monitoring (NO BRAND SPECIFIED) meter device kit    Calcium Carbonate-Vitamin D (CALCIUM + D PO)    citalopram (CELEXA) 20 MG tablet    Continuous Blood Gluc  (DEXCOM G6 ) SUZANNE    Continuous Blood Gluc Sensor (DEXCOM G6 SENSOR) MISC    Continuous Blood Gluc Transmit (DEXCOM G6 TRANSMITTER) MISC    diclofenac (VOLTAREN) 1 % topical gel    diclofenac (VOLTAREN) 1 % topical gel    DULERA 100-5 MCG/ACT inhaler    econazole nitrate 1 % cream    FIASP PENFILL 100 UNIT/ML SOCT    fish oil-omega-3 fatty acids 1000 MG capsule    folic acid (FOLVITE) 1 MG tablet    Insulin Aspart, w/Niacinamide, (FIASP) 100 UNIT/ML SOLN    insulin degludec (TRESIBA FLEXTOUCH) 100 UNIT/ML pen    Insulin  "Disposable Pump (OMNIPOD 5 G6 INTRO, GEN 5,) KIT    Insulin Disposable Pump (OMNIPOD 5 G6 POD, GEN 5,) MISC    insulin pen needle (B-D U/F) 31G X 8 MM miscellaneous    insulin syringe-needle U-100 (30G X 1/2\" 1 ML) 30G X 1/2\" 1 ML miscellaneous    Ketoconazole (NIZORAL PO)    levothyroxine (SYNTHROID/LEVOTHROID) 112 MCG tablet    methotrexate 2.5 MG tablet    methotrexate 2.5 MG tablet    methylphenidate (METADATE CD) 20 MG CR capsule    methylphenidate (RITALIN) 10 MG tablet    Minoxidil (ROGAINE EX)    montelukast (SINGULAIR) 5 MG chewable tablet    Multiple Vitamin (MULTIVITAMIN OR)    NOVOLOG PENFILL 100 UNIT/ML soln    ondansetron (ZOFRAN ODT) 4 MG ODT tab    Semaglutide, 1 MG/DOSE, 4 MG/3ML SOPN    simvastatin (ZOCOR) 20 MG tablet    traZODone (DESYREL) 50 MG tablet    Injection Device for insulin (INPEN 100-BLUE-NOVOLOG-FIASP) SUZANNE         PHYSICAL EXAMINATION:  Constitutional: aaox3, cooperative, pleasant, not dyspneic/diaphoretic, no acute distress      GENERAL: Healthy, alert and no distress  EYES: Eyes grossly normal to inspection.  No discharge or erythema, or obvious scleral/conjunctival abnormalities.  RESP: No audible wheeze, cough, or visible cyanosis.  No visible retractions or increased work of breathing.    SKIN: Visible skin clear. No significant rash, abnormal pigmentation or lesions.  NEURO: Cranial nerves grossly intact.  Mentation and speech appropriate for age.  PSYCH: Mentation appears normal, affect normal/bright, judgement and insight intact, normal speech and appearance well-groomed.    ASSESSMENT/PLAN:      Nausea and vomiting, unspecified vomiting type  Abdominal bloating  Delayed gastric emptying  Oropharyngeal dysphagia    54 y/o F with long history of GI symptoms, dating back to mid 90's. Prior eval in 2014 was notable for positive hydrogen breath test consistent with SIBO. There was also evidence of hiatal hernia but work up otherwise was unremarkable. Symptoms were attributed to " intermittent delayed emptying and functional dyspepsia. She recently reestablished care with our group due to progressively worsening symptoms. Frequently exhibiting regurgitation of bland liquid and partially digested foods. Also continues to have intermittent nausea vomiting although this has improved since utilizing continuous glucose monitoring for hypoglycemic events.     We reviewed her recent work up which includes EGD and sonogram. There is some question of atrophic gastritis for which a repeat EGD with gastric mapping has been ordered. RUQ US normal. We reviewed her continued symptoms and potential etiologies. Suspect delayed gastric emptying is playing a significant role. She is a type 1 diabetic. She also continues ozempic however takes this less frequently which has helped reduce symptoms. She wishes to continue with the ozempic at this time, however should consider holding as this may be contributing to her symptoms. Can trial gastroparesis diet. Also suspect functional dyspepsia is playing a role here. Some benefit with FD Deion, may continue.     She does have hx of SIBO in the past and benefited from treatment. Discussed retesting. Orders placed. I am also ordering a video swallow study for her oropharyngeal dysphagia.       PLAN:   - repeat EGD with gastric mapping   - consider holding ozempic due to delayed emptying symptoms   - hydrogen breath test for SIBO   - video swallow study for oropharyngeal dysphagia     - Follow up in 3 months, sooner if needed    Thank you for this consultation.  It was a pleasure to participate in the care of this patient; please contact us with any further questions.        This note was created with voice recognition software, and while reviewed for accuracy, typos may remain.       I spent a total of 88 minutes on the day of the visit.   Time spent by me doing chart review, history and exam, documentation and further activities per the note      Khanh Arevalo  ROOPA  Division of Gastroenterology, Hepatology and Nutrition   [facility]            Video-Visit Details    Type of service:  Video Visit    Joined the call at 12/4/2023, 12:37:04 pm.    Left the call at 12/4/2023, 1:29:37 pm.    You were on the call for 52 minutes 33 seconds .    Originating Location (pt. Location): Home    Distant Location (provider location):  On-site    Platform used for Video Visit: Mark Anthony

## 2023-12-04 NOTE — NURSING NOTE
Is the patient currently in the state of [place]? YES    Visit mode:VIDEO    If the visit is dropped, the patient can be reconnected by: VIDEO VISIT: Text to cell phone:   Telephone Information:   Mobile [phone]       Will anyone else be joining the visit? NO  (If patient encounters technical issues they should call [phone]    How would you like to obtain your AVS? MyChart    Are changes needed to the allergy or medication list? No    Reason for visit: Video Visit (Recheck)    Keara CORRIGAN

## 2023-12-05 ENCOUNTER — VIRTUAL VISIT (OUTPATIENT)
Dept: EDUCATION SERVICES | Facility: CLINIC | Age: 53
End: 2023-12-05
Payer: COMMERCIAL

## 2023-12-05 DIAGNOSIS — E10.3299 TYPE 1 DIABETES MELLITUS WITH MILD NONPROLIFERATIVE RETINOPATHY, MACULAR EDEMA PRESENCE UNSPECIFIED, UNSPECIFIED LATERALITY (H): Primary | ICD-10-CM

## 2023-12-05 PROCEDURE — G0108 DIAB MANAGE TRN  PER INDIV: HCPCS | Mod: VID | Performed by: REGISTERED NURSE

## 2023-12-05 ASSESSMENT — PAIN SCALES - GENERAL: PAINLEVEL: NO PAIN (0)

## 2023-12-05 NOTE — NURSING NOTE
Is the patient currently in the state of [place]? YES    Visit mode:VIDEO    If the visit is dropped, the patient can be reconnected by: VIDEO VISIT:  Send e-mail to at [email or web address]    Will anyone else be joining the visit? No  (If patient encounters technical issues they should call [phone])    How would you like to obtain your AVS? MyChart    Are changes needed to the allergy or medication list? No    Rooming Documentation: Assigned questionnaire(s) completed .    Reason for visit: RECHECK     SHEKHAR Elias

## 2023-12-06 ENCOUNTER — THERAPY VISIT (OUTPATIENT)
Dept: PHYSICAL THERAPY | Facility: CLINIC | Age: 53
End: 2023-12-06
Payer: COMMERCIAL

## 2023-12-06 DIAGNOSIS — M25.562 CHRONIC PAIN OF LEFT KNEE: Primary | ICD-10-CM

## 2023-12-06 DIAGNOSIS — M25.571 CHRONIC PAIN OF RIGHT ANKLE: ICD-10-CM

## 2023-12-06 DIAGNOSIS — G89.29 CHRONIC PAIN OF LEFT KNEE: Primary | ICD-10-CM

## 2023-12-06 DIAGNOSIS — G89.29 CHRONIC PAIN OF RIGHT ANKLE: ICD-10-CM

## 2023-12-06 DIAGNOSIS — G89.29 CHRONIC BILATERAL LOW BACK PAIN WITHOUT SCIATICA: ICD-10-CM

## 2023-12-06 DIAGNOSIS — M54.50 CHRONIC BILATERAL LOW BACK PAIN WITHOUT SCIATICA: ICD-10-CM

## 2023-12-06 PROCEDURE — 97140 MANUAL THERAPY 1/> REGIONS: CPT | Mod: GP | Performed by: PHYSICAL THERAPIST

## 2023-12-06 PROCEDURE — 97110 THERAPEUTIC EXERCISES: CPT | Mod: GP | Performed by: PHYSICAL THERAPIST

## 2023-12-06 NOTE — PROGRESS NOTES
Diabetes Self-Management Education & Support Virtual Visit---Short    Dali Martines contacted today for education related to Type 1 diabetes    Patient is being treated with:  insulin pump:  Omnipod 5 with Dexcom G6 continuous glucose monitor (CGM)    Year of diagnosis: 1988  Referring provider:  Felicia Mckeon MD  Living Situation: Lives alone and has a dog.  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  Her job was eliminated and because of many other stressors in her life, she opted to not work for a time.     Purpose of today's visit:  Follow Up pump management     Subjective/Objective:  Dali started using the Omnipod 5 on September 19, 2023.  So far she has been really pleased with the results, and although she has mostly always been well controlled, she finds that she is spending less time focusing on the minutia of diabetes management.  She feels that she is really beginning to trust the system.      She has been dealing with some very troubling GI issues--nausea, vomiting, bloating.  She had an endoscopy done recently which revealed some abnormalities that require further follow up.  She has found that it's difficult to predict how much she is going to eat, so she is bolusing often after she starts eating.  Discussed that if she is unable to deliver her bolus 10-15 minutes before eating, she should only enter a portion of the carbs she is eating if her glucose has started to rise, as entering correction post-prandially will often result in hypoglycemia.      In reviewing her settings, it looks as if the basal rate we have programmed into the pump is higher than the amount she is receiving in automated mode, so we changed her basal delivery to match automated mode basal delivery (reduced from 0.3 units/hour to 0.4 units/hour).   In addition she is taking Tresiba 8 units daily.       Pump report appears below:                Assessment/Plan:    Discussed how to  manage late bolusing (enter a portion of carbs only, don't correct).    Although she is not always certain about how much she is going to eat, she agrees to bolus for a portion of the carbs on her plate which should help to reduce her post prandial glucoses.      Discussed some of the problems she is having with getting her sensor to pair with her pod.  Made a couple of suggestions, namely making sure that once she removes her pod, that she place it in a place far away from her controller and herself (suggested the freezer), to make sure that the wireless connection between her old pod and her transmitter is completely broken.  This may help.      She has a follow up appointment with Dr. Mckeon in March.  Scheduled an appointment sometime in January to recheck.  She will reach out before then if she is having difficulty.     Any diabetes medication dose changes were made via the [facility] Protocol and Collaborative Practice Agreement. A copy of this encounter was provided to the patient's referring provider.      Time spent in this visit:   30 minutes.

## 2023-12-11 ENCOUNTER — VIRTUAL VISIT (OUTPATIENT)
Dept: PSYCHOLOGY | Facility: CLINIC | Age: 53
End: 2023-12-11
Payer: COMMERCIAL

## 2023-12-11 DIAGNOSIS — G89.29 OTHER CHRONIC PAIN: ICD-10-CM

## 2023-12-11 DIAGNOSIS — F43.22 ADJUSTMENT DISORDER WITH ANXIETY: Primary | ICD-10-CM

## 2023-12-11 DIAGNOSIS — F33.41 MAJOR DEPRESSIVE DISORDER, RECURRENT, IN PARTIAL REMISSION (H): ICD-10-CM

## 2023-12-11 DIAGNOSIS — E10.3299 TYPE 1 DIABETES MELLITUS WITH MILD NONPROLIFERATIVE RETINOPATHY, MACULAR EDEMA PRESENCE UNSPECIFIED, UNSPECIFIED LATERALITY (H): ICD-10-CM

## 2023-12-11 PROCEDURE — 90837 PSYTX W PT 60 MINUTES: CPT | Mod: VID | Performed by: PSYCHOLOGIST

## 2023-12-11 ASSESSMENT — ANXIETY QUESTIONNAIRES
2. NOT BEING ABLE TO STOP OR CONTROL WORRYING: MORE THAN HALF THE DAYS
6. BECOMING EASILY ANNOYED OR IRRITABLE: SEVERAL DAYS
4. TROUBLE RELAXING: SEVERAL DAYS
3. WORRYING TOO MUCH ABOUT DIFFERENT THINGS: MORE THAN HALF THE DAYS
GAD7 TOTAL SCORE: 10
1. FEELING NERVOUS, ANXIOUS, OR ON EDGE: MORE THAN HALF THE DAYS
IF YOU CHECKED OFF ANY PROBLEMS ON THIS QUESTIONNAIRE, HOW DIFFICULT HAVE THESE PROBLEMS MADE IT FOR YOU TO DO YOUR WORK, TAKE CARE OF THINGS AT HOME, OR GET ALONG WITH OTHER PEOPLE: SOMEWHAT DIFFICULT
GAD7 TOTAL SCORE: 10
7. FEELING AFRAID AS IF SOMETHING AWFUL MIGHT HAPPEN: MORE THAN HALF THE DAYS
5. BEING SO RESTLESS THAT IT IS HARD TO SIT STILL: NOT AT ALL

## 2023-12-11 NOTE — PROGRESS NOTES
Health Psychology          Latanya Mayfield, Ph.D.,  [phone]  Hansa Arzate, Ph.D.,  [phone]  Sarah Beth Jack, Ph.D.,  [phone]  Pushpa Mendez, Ph.D., , [facility] [phone]  Panfilo Cornejo, Ph.D., , [facility] [phone]  Krystle Hannah, Ph.D.,  [phone]  Elise Sutton, Ph.D., , [facility] [phone]    [facility], 3rd Floor  [address] [place] [address]       Health Psychology Progress Note    Patient Name: Dali Martines    Service Type: Individual  Length of Visit: 56 minutes - extended visit due to complexity of content and treatment plan update  Start time: 2:01 PM  Stop time: 2:57 PM    Attendees: patient attended alone  Session #: 9    Telemedicine Information:     Telemedicine Visit: The patient's condition can be safely assessed and treated via synchronous audio and visual telemedicine encounter.    Reason for Telemedicine Visit: Patient has requested telehealth visit and Patient convenience (e.g. access to timely appointments / distance to available provider)  Originating Site (Patient Location): Patient's home, [place]  Distant Location (provider location):  Off-site: Provider's Home Office   Consent:  The patient/guardian has verbally consented to: the potential risks and benefits of telemedicine (video visit) versus in person care; bill my insurance or make self-payment for services provided; and responsibility for payment of non-covered services.   Mode of Communication:  Video Conference via [employer]  As the provider I attest to compliance with applicable laws and regulations related to telemedicine.     Identifying Information and Presenting Problem:  Dali Martines is a 53 year old female adult who was referred to health psychology by her endocrinologist, Dr. Mckeon. Patient presented with concerns of stress in the context of multiple life events, several medical conditions, and chronic management of  T1DM.    Topics Discussed/Interventions Provided:      Session Content:     Update: Patient reports having some positive and challenging experiences. Reports receiving some difficult news regarding her GI health and that there is a bit of uncertainty while she awaits further testing in the spring. States that her insulin pump continues to be helpful and that many of her symptoms have improved with its use. She reports that her improved glycemic control has enabled her to better detect stress-induced GI symptoms. Reports that her experience with OT has been revealing regarding her cognitive fatigue and she notes that it is going well and continues to be helpful.          Homework Review: Patient reports that she continued to practice meditations, finished module 4 of the perfectionism workbook, and engaged in therapeutic journaling. Discussed insights. Recommended patient consider time vs accomplishment goals to facilitate her progress.       Skills/intervention: Supported patient in processing her thoughts and emotions around her GI concerns. She notes feeling sad, frustrated, distressed, and a sense of grief about the future related to her GI symptoms and findings from her testing. Explored patient's observations about acknowledging and naming her affective experience. Patient notes that sometimes this experience is helpful and eases the intensity whereas other times it feels as though the intensity amplifies. Discussed strategies for managing these responses and the benefits of becoming more attuned to her emotional experience. Recommended patient continue with therapeutic journaling but focus on also including positive and pleasant emotional experiences (e.g., gratitude, awe, fascination, happiness, contentment, etc). Completed 90 day treatment plan review. Discussed progress and improvements made over time and identified priorities for the next phase of treatment.             Top values identified included:  "spirituality, humor, fairness, creativity, curiosity, contribution, and caring.  Unhelpful rules/assumptions: fear of failure, all-or-nothing thinking, and simplicity, control, and structure    Treatment Objective(s) Addressed in This Session:  Psychological distress related to Diabetes  Psychological distress related to caregiver stress    Progress on / Status of Treatment Objective(s) / Homework:  In progress    Medication Adherence: Patient did not report medication changes. Current medication list is below:     Current Outpatient Medications   Medication    albuterol (PROAIR HFA/PROVENTIL HFA/VENTOLIN HFA) 108 (90 Base) MCG/ACT inhaler    aspirin 81 MG tablet    blood glucose (ACCU-CHEK COLIN PLUS) test strip    blood glucose monitoring (ACCU-CHEK FASTCLIX) lancets    blood glucose monitoring (NO BRAND SPECIFIED) meter device kit    Calcium Carbonate-Vitamin D (CALCIUM + D PO)    citalopram (CELEXA) 20 MG tablet    Continuous Blood Gluc  (DEXCOM G6 ) SUZANNE    Continuous Blood Gluc Sensor (DEXCOM G6 SENSOR) MISC    Continuous Blood Gluc Transmit (DEXCOM G6 TRANSMITTER) MISC    diclofenac (VOLTAREN) 1 % topical gel    diclofenac (VOLTAREN) 1 % topical gel    DULERA 100-5 MCG/ACT inhaler    econazole nitrate 1 % cream    FIASP PENFILL 100 UNIT/ML SOCT    fish oil-omega-3 fatty acids 1000 MG capsule    folic acid (FOLVITE) 1 MG tablet    Injection Device for insulin (INPEN 100-BLUE-NOVOLOG-FIASP) SUZANNE    Insulin Aspart, w/Niacinamide, (FIASP) 100 UNIT/ML SOLN    insulin degludec (TRESIBA FLEXTOUCH) 100 UNIT/ML pen    Insulin Disposable Pump (OMNIPOD 5 G6 INTRO, GEN 5,) KIT    Insulin Disposable Pump (OMNIPOD 5 G6 POD, GEN 5,) MISC    insulin pen needle (B-D U/F) 31G X 8 MM miscellaneous    insulin syringe-needle U-100 (30G X 1/2\" 1 ML) 30G X 1/2\" 1 ML miscellaneous    Ketoconazole (NIZORAL PO)    levothyroxine (SYNTHROID/LEVOTHROID) 112 MCG tablet    methotrexate 2.5 MG tablet    methotrexate 2.5 MG " tablet    methylphenidate (METADATE CD) 20 MG CR capsule    methylphenidate (RITALIN) 10 MG tablet    Minoxidil (ROGAINE EX)    montelukast (SINGULAIR) 5 MG chewable tablet    Multiple Vitamin (MULTIVITAMIN OR)    NOVOLOG PENFILL 100 UNIT/ML soln    ondansetron (ZOFRAN ODT) 4 MG ODT tab    Semaglutide, 1 MG/DOSE, 4 MG/3ML SOPN    simvastatin (ZOCOR) 20 MG tablet    traZODone (DESYREL) 50 MG tablet     Current Facility-Administered Medications   Medication    lidocaine 1 % injection 1 mL    triamcinolone acetonide (KENALOG-10) injection 10 mg     Assessment: The patient appeared to be active and engaged in today's session and was receptive to feedback.     Mental Status Exam:   Appearance: Appropriate   Eye Contact: Good    Orientation: Yes, x4  Behavior/relationship to provider/demeanor: Cooperative, Engaged, and Pleasant  Motor Activity: normal or unremarkable  Mood (subjective report): Stable  Affect (objective appearance): Appropriate/mood congruent  Speech Rate: Normal  Speech Volume: Normal  Speech Articulation: Normal  Speech Coherence: Normal  Speech Spontaneity: Normal  Thought Content: no suicidal/homicidal ideation, plans, or intent  Thought Process (associations): Logical, Linear, and Goal directed  Thought Process (rate): Normal  Abnormal Perception: None  Attention/Concentration: Normal  Memory: Appears grossly intact   Fund of Knowledge: Above average  Abstraction:  Normal  Insight:  Good  Judgment:  good    Does the patient appear to be at imminent risk of harm to self/others at this time? No    The session was necessary to address psychological distress in the context of multiple psychosocial stressors and chronic health concerns.    Diagnoses (provisional):    1. Adjustment disorder with anxiety    2. Major depressive disorder, recurrent, in partial remission (H)    3. Type 1 diabetes mellitus with mild nonproliferative retinopathy, macular edema presence unspecified, unspecified laterality (H)     4. Other chronic pain        Plan:    Follow-up video visit with me on 12/20/23 at 9:00am    Patient to use 911 or other crisis resources in the event of a psychiatric emergency  Primary care needs and medications are managed by Dimitri Alas MD. Referring provider is Dr. Mckeon.  Next visit agenda/goals:   Continue to practice meditations  Complete module 5 of perfectionism workbook  Try therapeutic journaling for getting practice with emotional expression, with a focus on pleasant and positive emotions    Skills taught/interventions used thus far:  Psychoeducation  Values clarification  Committed action (vitality vs suffering)  Perfectionism workbook (modules 1-4)  Defusion  Acting opposite  Mindfulness (breath, body scan, watching thoughts, choiceless awareness)  Therapeutic journaling    NOTE: Treatment plan update due 9/6/24; 90 day treatment plan review due 3/10/24.                                                Individual Treatment Plan    Patient's Name: Dali Martines  YOB: 1970    Date of Creation: 12/11/23  Date Treatment Plan Last Reviewed/Revised: 12/11/23    DSM5 Diagnoses:     1. Adjustment disorder with anxiety    2. Major depressive disorder, recurrent, in partial remission (H)      Psychosocial / Contextual Factors: financial hardship, health issues, occupational/vocational stress, relationship stress, caregiver stress (mother's health concerns), and grief and loss in the wake of recent bereavement  PROMIS (reviewed every 90 days):      10/31/2023  9:55 AM   PROMIS 10    PROMIS TOTAL - SUBSCORES 21          Referral / Collaboration:  Collaboration with medical team and specialists as needed.    Anticipated number of session for this episode of care: 15-20  Anticipation frequency of session: 2x per month  Anticipated Duration of each session: 38-52 minutes  Treatment plan will be reviewed in 90 days or when goals have been changed.       Measurable Treatment Goal(s) related  "to diagnosis/functional impairment(s): Overall goals for treatment include improving her physical health (e.g., diabetes, physical activity, and GI discomfort), learning strategies for enhancing coping capacity, and decreasing avoidance-based coping.  Goal 1: Patient will decrease her use of avoidance-based coping   \"I will know I've met my goal when I am not avoiding doing things out of fear (e.g., bathroom sink, processing grief, physical activity, reviewing pieces of writing).\"     Objective #A (Patient Action)    Patient will learn and implement skills for reducing perfectionism.  Status: In progress - Date: 12/11/23 (patient is in the process of completing the perfectionism workbook)     Intervention(s)  Therapist will teach anti-perfectionism skills and will assist patient in completing a workbook on perfectionism.    Objective #B  Patient will initiate a healthy grieving process  Status: In progress - Date: 12/11/23 (patient is learning emotional expression skills)    Intervention(s)  Therapist will assist patient in processing and exploring her grief.    Objective #C  Patient will increase her participation in meaningful and purposeful activities.  Status: In progress - Date: 12/11/23 (patient reports some progress in increasing her participation in meaningful activities, will focus on generating a broader list of activities)    Intervention(s)  Therapist will teach values clarification, committed action, and exposure skills.    Objective #D  Patient will learn and implement unhooking skills for decreasing the degree to which fear influences behaviors and decisions in an unhelpful way.  Status: In progress - Date: 12/11/23 (patient has been learning and implementing mindfulness and cognitive restructuring skills)     Intervention(s)  Therapist will teach defusion, cognitive restructuring, and mindfulness skills.      Patient has reviewed and agreed to the above plan.      Elise Sutton, " PhD  September 6, 2023    Elise Sutton, Ph.D., , [facility]  Clinical Health Psychologist  Phone: [phone]   Pager: [phone]

## 2023-12-12 ENCOUNTER — TELEPHONE (OUTPATIENT)
Dept: GASTROENTEROLOGY | Facility: CLINIC | Age: 53
End: 2023-12-12
Payer: COMMERCIAL

## 2023-12-12 NOTE — TELEPHONE ENCOUNTER
"Endoscopy Scheduling Screen    Have you had a positive Covid test in the last 14 days?  No    Are you active on MyChart?   Yes    What insurance is in the chart?  Other:  [employer]    Ordering/Referring Provider:     LOREN PEARCE      (If ordering provider performs procedure, schedule with ordering provider unless otherwise instructed. )    BMI: Estimated body mass index is 26.11 kg/m  as calculated from the following:    Height as of 12/4/23: 1.778 m (5' 10\").    Weight as of 12/4/23: 82.6 kg (182 lb).     Sedation Ordered  moderate sedation.   If patient BMI > 50 do not schedule in [facility].    If patient BMI > 45 do not schedule at [facility].    Are you taking methadone or Suboxone?  No    Are you taking any prescription medications for pain 3 or more times per week?   NO - No RN review required.    Do you have a history of malignant hyperthermia or adverse reaction to anesthesia?  No    (Females) Are you currently pregnant?        Have you been diagnosed or told you have pulmonary hypertension?   No    Do you have an LVAD?  No    Have you been told you have moderate to severe sleep apnea?  No    Have you been told you have COPD, asthma, or any other lung disease?  Yes     What breathing problems do you have?  Asthma     Do you use home oxygen?  No    Have your breathing problems required an ED visit or hospitalization in the last year?  No    Do you have any heart conditions?  No     Have you ever had an organ transplant?   No    Have you ever had or are you awaiting a heart or lung transplant?   No    Have you had a stroke or transient ischemic attack (TIA aka \"mini stroke\" in the last 6 months?   No    Have you been diagnosed with or been told you have cirrhosis of the liver?   No    Are you currently on dialysis?   No    Do you need assistance transferring?   No    BMI: Estimated body mass index is 26.11 kg/m  as calculated from the following:    Height as of 12/4/23: 1.778 m (5' 10\").    Weight as of 12/4/23: " 82.6 kg (182 lb).     Is patients BMI > 40 and scheduling location [facility]?  No    Do you take an injectable medication for weight loss or diabetes (excluding insulin)?  Yes, hold time can be up to 7 days. Please check with you prescribing provider for recommendation.     Do you take the medication Naltrexone?  No    Do you take blood thinners?  No       Prep   Are you currently on dialysis or do you have chronic kidney disease?  No    Do you have a diagnosis of diabetes?  Yes (Golytely Prep)    Do you have a diagnosis of cystic fibrosis (CF)?  No    On a regular basis do you go 3 -5 days between bowel movements?      BMI > 40?      Preferred Pharmacy:    [id number] - [place], [place] - [address]  [address]  [place] [address]  Phone: [phone] Fax: [phone]      Final Scheduling Details   Colonoscopy prep sent?      Procedure scheduled  Upper endoscopy (EGD)    Surgeon:  CHIDI     Date of procedure:  4/29     Pre-OP / PAC:   No - Not required for this site.    Location  [facility] - [facility] - Per order.    Sedation   Moderate Sedation - Per order.      Patient Reminders:   You will receive a call from a Nurse to review instructions and health history.  This assessment must be completed prior to your procedure.  Failure to complete the Nurse assessment may result in the procedure being cancelled.      On the day of your procedure, please designate an adult(s) who can drive you home stay with you for the next 24 hours. The medicines used in the exam will make you sleepy. You will not be able to drive.      You cannot take public transportation, ride share services, or non-medical taxi service without a responsible caregiver.  Medical transport services are allowed with the requirement that a responsible caregiver will receive you at your destination.  We require that drivers and caregivers are confirmed prior to your procedure.

## 2023-12-18 ENCOUNTER — TELEPHONE (OUTPATIENT)
Dept: GASTROENTEROLOGY | Facility: CLINIC | Age: 53
End: 2023-12-18
Payer: COMMERCIAL

## 2023-12-18 NOTE — TELEPHONE ENCOUNTER
Non-Invasive GI Procedure Scheduling Questionnaire    Have you had a positive Covid test in the last 14 days?  No    Are you active on [employer]?   Yes    What insurance is in the chart?  Other:  medica    Ordering/Referring Provider: JAZMYN HERBERT   (If ordering provider performs procedure, schedule with ordering provider unless otherwise instructed. )    (Females) Are you currently pregnant? No - Okay to continue scheduling.    Have you ever had or are you awaiting a heart or lung transplant? No - Schedule next available.    Do you need assistance transferring? No - Continue scheduling.    Do you take acid reflux medication or proton-pump inhibitors (PPI)? No - Continue scheduling.        Patient Reminders:  Please inform patients to review instructions sent via [employer] or letter two weeks before procedure.  The Manometry exam may take up to 90 minutes.  The Breath Test exam may take up to 4 hours.

## 2023-12-20 ENCOUNTER — THERAPY VISIT (OUTPATIENT)
Dept: OCCUPATIONAL THERAPY | Facility: CLINIC | Age: 53
End: 2023-12-20
Payer: COMMERCIAL

## 2023-12-20 ENCOUNTER — VIRTUAL VISIT (OUTPATIENT)
Dept: PSYCHOLOGY | Facility: CLINIC | Age: 53
End: 2023-12-20
Payer: COMMERCIAL

## 2023-12-20 DIAGNOSIS — F33.41 MAJOR DEPRESSIVE DISORDER, RECURRENT, IN PARTIAL REMISSION (H): ICD-10-CM

## 2023-12-20 DIAGNOSIS — E10.3299 TYPE 1 DIABETES MELLITUS WITH MILD NONPROLIFERATIVE RETINOPATHY, MACULAR EDEMA PRESENCE UNSPECIFIED, UNSPECIFIED LATERALITY (H): ICD-10-CM

## 2023-12-20 DIAGNOSIS — G89.29 OTHER CHRONIC PAIN: ICD-10-CM

## 2023-12-20 DIAGNOSIS — G93.39 OTHER POST INFECTION AND RELATED FATIGUE SYNDROMES: Primary | ICD-10-CM

## 2023-12-20 DIAGNOSIS — F43.22 ADJUSTMENT DISORDER WITH ANXIETY: Primary | ICD-10-CM

## 2023-12-20 DIAGNOSIS — Z78.9 ALTERATION IN INSTRUMENTAL ACTIVITIES OF DAILY LIVING (IADL): ICD-10-CM

## 2023-12-20 PROCEDURE — 90837 PSYTX W PT 60 MINUTES: CPT | Mod: VID | Performed by: PSYCHOLOGIST

## 2023-12-20 PROCEDURE — 97535 SELF CARE MNGMENT TRAINING: CPT | Mod: GO

## 2023-12-20 NOTE — PROGRESS NOTES
Health Psychology          Latanya Mayfield, Ph.D.,  [phone]  Hansa Arzate, Ph.D.,  [phone]  Sarah Beth Jack, Ph.D.,  [phone]  Pushpa Mendez, Ph.D., , [facility] [phone]  Panfilo Cornejo, Ph.D., , [facility] [phone]  Krystle Hannah, Ph.D.,  [phone]  Elise Sutton, Ph.D., , [facility] [phone]    [facility], 3rd Floor  [address] [place] [address]       Health Psychology Progress Note    Patient Name: Dali Martines    Service Type: Individual  Length of Visit: 55 minutes - extended visit due to complexity of content and treatment plan update  Start time: 9:02 AM   Stop time: 9:57 AM    Attendees: patient attended alone  Session #: 10    Telemedicine Information:     Telemedicine Visit: The patient's condition can be safely assessed and treated via synchronous audio and visual telemedicine encounter.    Reason for Telemedicine Visit: Patient has requested telehealth visit and Patient convenience (e.g. access to timely appointments / distance to available provider)  Originating Site (Patient Location): Patient's home, [place]  Distant Location (provider location):  On-site: [facility]   Consent:  The patient/guardian has verbally consented to: the potential risks and benefits of telemedicine (video visit) versus in person care; bill my insurance or make self-payment for services provided; and responsibility for payment of non-covered services.   Mode of Communication:  Video Conference via [employer]  As the provider I attest to compliance with applicable laws and regulations related to telemedicine.     Identifying Information and Presenting Problem:  Dali Martines is a 53 year old female adult who was referred to health psychology by her endocrinologist, Dr. Mckeon. Patient presented with concerns of stress in the context of multiple life events, several medical conditions, and chronic management of  T1DM.    Topics Discussed/Interventions Provided:      Session Content:     Update and Homework Review: Patient reports doing generally well. Did not end up queenie COVID after her recent exposure at a family gathering. Reports that she has been trying to implement her plan from module 4 of the perfectionism workbook and is having difficulty. She cites fear of making the wrong decision as a significant barrier and notes that it was very anxiety provoking. Did not complete the therapeutic journaling exercise or module 5.       Skills/intervention: Provided psychoeducation about the exacerbating effects of avoidance on anxiety. Discussed taking an exposure-based approach to accomplishing her goal around reducing perfectionism behaviors. Also discussed strategies for decreasing the pressure and sense of how high stakes decision-making can be for her. Explored past experiences that have contributed to her sense of feeling like decisions are high-stakes. Reviewed module 5 of the perfectionism workbook and discussed the stepwise process for approaching goal attainment. Also supported patient in processing additional thoughts and emotions around her GI concerns and her caregiver stress. She notes connecting with a close confidant who is a physician and had a reassuring conversation about her prognosis. She also reports that she is taking turmeric gummies, which has been associated with a decrease in her GI symptoms. Provided patient with informational resources regarding communication with people who have dementia. Revisited purpose of therapeutic journaling and including positive emotions.          Top values identified included: spirituality, humor, fairness, creativity, curiosity, contribution, and caring.  Unhelpful rules/assumptions: fear of failure, all-or-nothing thinking, and simplicity, control, and structure    Treatment Objective(s) Addressed in This Session:  Psychological distress related to  "Diabetes  Psychological distress related to caregiver stress    Progress on / Status of Treatment Objective(s) / Homework:  In progress    Medication Adherence: Patient did not report medication changes. Current medication list is below:     Current Outpatient Medications   Medication    albuterol (PROAIR HFA/PROVENTIL HFA/VENTOLIN HFA) 108 (90 Base) MCG/ACT inhaler    aspirin 81 MG tablet    blood glucose (ACCU-CHEK COLIN PLUS) test strip    blood glucose monitoring (ACCU-CHEK FASTCLIX) lancets    blood glucose monitoring (NO BRAND SPECIFIED) meter device kit    Calcium Carbonate-Vitamin D (CALCIUM + D PO)    citalopram (CELEXA) 20 MG tablet    Continuous Blood Gluc  (DEXCOM G6 ) SUZANNE    Continuous Blood Gluc Sensor (DEXCOM G6 SENSOR) MISC    Continuous Blood Gluc Transmit (DEXCOM G6 TRANSMITTER) MISC    diclofenac (VOLTAREN) 1 % topical gel    diclofenac (VOLTAREN) 1 % topical gel    DULERA 100-5 MCG/ACT inhaler    econazole nitrate 1 % cream    FIASP PENFILL 100 UNIT/ML SOCT    fish oil-omega-3 fatty acids 1000 MG capsule    folic acid (FOLVITE) 1 MG tablet    Injection Device for insulin (INPEN 100-BLUE-NOVOLOG-FIASP) SUZANNE    Insulin Aspart, w/Niacinamide, (FIASP) 100 UNIT/ML SOLN    insulin degludec (TRESIBA FLEXTOUCH) 100 UNIT/ML pen    Insulin Disposable Pump (OMNIPOD 5 G6 INTRO, GEN 5,) KIT    Insulin Disposable Pump (OMNIPOD 5 G6 POD, GEN 5,) MISC    insulin pen needle (B-D U/F) 31G X 8 MM miscellaneous    insulin syringe-needle U-100 (30G X 1/2\" 1 ML) 30G X 1/2\" 1 ML miscellaneous    Ketoconazole (NIZORAL PO)    levothyroxine (SYNTHROID/LEVOTHROID) 112 MCG tablet    methotrexate 2.5 MG tablet    methotrexate 2.5 MG tablet    methylphenidate (METADATE CD) 20 MG CR capsule    methylphenidate (RITALIN) 10 MG tablet    Minoxidil (ROGAINE EX)    montelukast (SINGULAIR) 5 MG chewable tablet    Multiple Vitamin (MULTIVITAMIN OR)    NOVOLOG PENFILL 100 UNIT/ML soln    ondansetron (ZOFRAN ODT) 4 MG " ODT tab    Semaglutide, 1 MG/DOSE, 4 MG/3ML SOPN    simvastatin (ZOCOR) 20 MG tablet    traZODone (DESYREL) 50 MG tablet     Current Facility-Administered Medications   Medication    lidocaine 1 % injection 1 mL    triamcinolone acetonide (KENALOG-10) injection 10 mg     Assessment: The patient appeared to be active and engaged in today's session and was receptive to feedback.     Mental Status Exam:   Appearance: Appropriate   Eye Contact: Good    Orientation: Yes, x4  Behavior/relationship to provider/demeanor: Cooperative, Engaged, and Pleasant  Motor Activity: normal or unremarkable  Mood (subjective report): Stressed, anxious  Affect (objective appearance): Appropriate/mood congruent  Speech Rate: Normal  Speech Volume: Normal  Speech Articulation: Normal  Speech Coherence: Normal  Speech Spontaneity: Normal  Thought Content: no suicidal/homicidal ideation, plans, or intent  Thought Process (associations): Logical, Linear, and Goal directed  Thought Process (rate): Normal  Abnormal Perception: None  Attention/Concentration: Normal  Memory: Appears grossly intact   Fund of Knowledge: Above average  Abstraction:  Normal  Insight:  Good  Judgment:  good    Does the patient appear to be at imminent risk of harm to self/others at this time? No    The session was necessary to address psychological distress in the context of multiple psychosocial stressors and chronic health concerns.    Diagnoses (provisional):    1. Adjustment disorder with anxiety    2. Major depressive disorder, recurrent, in partial remission (H)    3. 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 Other chronic pain        Plan:    Follow-up video visit with me on 1/11/24 at 10:00am    Patient to use 911 or other crisis resources in the event of a psychiatric emergency  Primary care needs and medications are managed by Dimitri Alas MD. Referring provider  is Dr. Mckeon.  Next visit agenda/goals:   Continue to practice meditations  Complete module 5 of perfectionism workbook  Try therapeutic journaling for getting practice with emotional expression, with a focus on pleasant and positive emotions    Skills taught/interventions used thus far:  Psychoeducation  Values clarification  Committed action (vitality vs suffering)  Perfectionism workbook (modules 1-4)  Defusion  Acting opposite  Mindfulness (breath, body scan, watching thoughts, choiceless awareness)  Therapeutic journaling    NOTE: Treatment plan update due 9/6/24; 90 day treatment plan review due 3/10/24.                                                Individual Treatment Plan    Patient's Name: Dali Martines  YOB: 1970    Date of Creation: 12/11/23  Date Treatment Plan Last Reviewed/Revised: 12/11/23    DSM5 Diagnoses:     1. Adjustment disorder with anxiety    2. Major depressive disorder, recurrent, in partial remission (H)      Psychosocial / Contextual Factors: financial hardship, health issues, occupational/vocational stress, relationship stress, caregiver stress (mother's health concerns), and grief and loss in the wake of recent bereavement  PROMIS (reviewed every 90 days):      10/31/2023  9:55 AM   PROMIS 10    PROMIS TOTAL - SUBSCORES 21          Referral / Collaboration:  Collaboration with medical team and specialists as needed.    Anticipated number of session for this episode of care: 15-20  Anticipation frequency of session: 2x per month  Anticipated Duration of each session: 38-52 minutes  Treatment plan will be reviewed in 90 days or when goals have been changed.       Measurable Treatment Goal(s) related to diagnosis/functional impairment(s): Overall goals for treatment include improving her physical health (e.g., diabetes, physical activity, and GI discomfort), learning strategies for enhancing coping capacity, and decreasing avoidance-based coping.  Goal 1: Patient will  "decrease her use of avoidance-based coping   \"I will know I've met my goal when I am not avoiding doing things out of fear (e.g., bathroom sink, processing grief, physical activity, reviewing pieces of writing).\"     Objective #A (Patient Action)    Patient will learn and implement skills for reducing perfectionism.  Status: In progress - Date: 12/11/23 (patient is in the process of completing the perfectionism workbook)     Intervention(s)  Therapist will teach anti-perfectionism skills and will assist patient in completing a workbook on perfectionism.    Objective #B  Patient will initiate a healthy grieving process  Status: In progress - Date: 12/11/23 (patient is learning emotional expression skills)    Intervention(s)  Therapist will assist patient in processing and exploring her grief.    Objective #C  Patient will increase her participation in meaningful and purposeful activities.  Status: In progress - Date: 12/11/23 (patient reports some progress in increasing her participation in meaningful activities, will focus on generating a broader list of activities)    Intervention(s)  Therapist will teach values clarification, committed action, and exposure skills.    Objective #D  Patient will learn and implement unhooking skills for decreasing the degree to which fear influences behaviors and decisions in an unhelpful way.  Status: In progress - Date: 12/11/23 (patient has been learning and implementing mindfulness and cognitive restructuring skills)     Intervention(s)  Therapist will teach defusion, cognitive restructuring, and mindfulness skills.      Patient has reviewed and agreed to the above plan.      Elise Sutton, PhD  September 6, 2023    Elise Sutton, Ph.D., [facility]  Clinical Health Psychologist  Phone: [phone]   Pager: [phone]           "

## 2023-12-28 ENCOUNTER — VIRTUAL VISIT (OUTPATIENT)
Dept: SLEEP MEDICINE | Facility: CLINIC | Age: 53
End: 2023-12-28
Payer: COMMERCIAL

## 2023-12-28 VITALS — BODY MASS INDEX: 25.48 KG/M2 | WEIGHT: 178 LBS | HEIGHT: 70 IN

## 2023-12-28 DIAGNOSIS — F51.04 CHRONIC INSOMNIA: Primary | ICD-10-CM

## 2023-12-28 DIAGNOSIS — F43.9 STRESS: ICD-10-CM

## 2023-12-28 PROCEDURE — 90834 PSYTX W PT 45 MINUTES: CPT | Mod: VID | Performed by: PSYCHOLOGIST

## 2023-12-28 ASSESSMENT — SLEEP AND FATIGUE QUESTIONNAIRES
HOW LIKELY ARE YOU TO NOD OFF OR FALL ASLEEP WHEN YOU ARE A PASSENGER IN A CAR FOR AN HOUR WITHOUT A BREAK: SLIGHT CHANCE OF DOZING
HOW LIKELY ARE YOU TO NOD OFF OR FALL ASLEEP WHILE SITTING INACTIVE IN A PUBLIC PLACE: SLIGHT CHANCE OF DOZING
HOW LIKELY ARE YOU TO NOD OFF OR FALL ASLEEP WHILE SITTING AND TALKING TO SOMEONE: WOULD NEVER DOZE
HOW LIKELY ARE YOU TO NOD OFF OR FALL ASLEEP WHILE SITTING AND READING: MODERATE CHANCE OF DOZING
HOW LIKELY ARE YOU TO NOD OFF OR FALL ASLEEP WHILE LYING DOWN TO REST IN THE AFTERNOON WHEN CIRCUMSTANCES PERMIT: HIGH CHANCE OF DOZING
HOW LIKELY ARE YOU TO NOD OFF OR FALL ASLEEP IN A CAR, WHILE STOPPED FOR A FEW MINUTES IN TRAFFIC: WOULD NEVER DOZE
HOW LIKELY ARE YOU TO NOD OFF OR FALL ASLEEP WHILE WATCHING TV: HIGH CHANCE OF DOZING
HOW LIKELY ARE YOU TO NOD OFF OR FALL ASLEEP WHILE SITTING QUIETLY AFTER LUNCH WITHOUT ALCOHOL: SLIGHT CHANCE OF DOZING

## 2023-12-28 ASSESSMENT — PATIENT HEALTH QUESTIONNAIRE - PHQ9: SUM OF ALL RESPONSES TO PHQ QUESTIONS 1-9: 8

## 2023-12-28 ASSESSMENT — PAIN SCALES - GENERAL: PAINLEVEL: NO PAIN (0)

## 2023-12-28 NOTE — PROGRESS NOTES
Virtual Visit Details    Type of service:  Video Visit     Originating Location (pt. Location): Home    Distant Location (provider location):  Off-site  Platform used for Video Visit: AmWell    Visit Start Time:  4:10 PM  Visit End Time: 5:04 PM        SLEEP MEDICINE VIRTUAL FOLLOW-UP VISIT  Sleep Psychology    Patient Name: Dali Martines  MRN:  [id number]  Date of Service: Dec 28, 2023       Subjective Report     Dali Martines  returns for a telehealth video visit to discuss progress in implementing behavioral strategies for the management of insomnia and associated caregiver stress .  Patient consent for initiation of video visit was obtained and documented prior to initiation of visit.     Dali reports continued ongoing stressors related to caregiving responsibilities for her mother with dementia. .       Sgtress continues to impact sleep somewhat with respect to initiating and maintaining sleep.    .  She discussed emotional regulation and stress management strategies in the midst of family and caregiving stressors       Sleep Data:       EPWORTH SLEEPINESS SCALE    Sitting and reading 2   Watching TV 3   Sitting, inactive in a public place (theatre or mtg.) 1    As a passenger in a car 1   Lying down to rest in the afternoon when circumstance permit 3   Sitting and talking to someone 0   Sitting quietly after lunch without alcohol 1   In a car, while stopped for a few minutes in traffic 0   TOTAL SCORE (nl <11) 11     INSOMNIA SEVERITY INDEX     Difficulty falling asleep 2   Difficult staying asleep 2   Problems waking up to early 3   How SATISFIED/DISSATISFIED are you with your CURRENT sleep pattern? 3   How NOTICEABLE to others do you think your sleep pattern is in terms of your quality of life? 2   How WORRIED/DISTRESSED are you about your current sleep pattern? 2   To what extent do you consider your sleep problem to INTERFERE with your daily fuctioning(e.g. daytime fatigue, mood, ability to function  "at work/daily chores, concentration, mood,etc.) CURRENTLY? 3   INSOMNIA SEVERITY INDEX TOTAL SCORE 17    Absence of insomnia (0-7); sub-threshold insomnia (8-14); moderate insomnia (15-21); and severe insomnia (22-28)       Interventions     Strategies and recommendations including Stress management and Anxiety and sleep were reviewed and discussed today.     Services provided are compliant with the requirements of [place] Statute SS 256B.0625 Subd. 3b and paragraph (b)       Vital Signs     Ht 1.778 m (5' 10\")   Wt 80.7 kg (178 lb)   BMI 25.54 kg/m       Mental Status     Orientation:  X3  Mood:  normal  Affect:  Congruent with mood  Speech/Language:  Normal  Thought Process: Intact  Associations:  Normal  Thought Content: Normal  Patient does not report any suicidal ideation, intention or plan.    Diagnostic Impressions and Plan        Chronic insomnia  Stress    At 3-month follow-up, stressors continue to be at approximately the same level.  Insomnia symptoms continue low moderate range.  I reintroduced the recommendation for patient to complete an 8-week mindfulness based stress reduction course.    Plan:   Patient referred for [facility] mindfulness-based stress reduction course.    Follow-up: 3 months      Philip Quiroz PsyD, GARY, DBSM  Diplomate, Behavioral Sleep Medicine  [facility]      Note: This dictation was created using voice recognition software. This document may contain an error not identified before finalizing the document. If the error changes the accuracy of the document, I would appreciate it being brought to my attention.                           "

## 2023-12-28 NOTE — NURSING NOTE
PHQ9 = 8    Patient declined individual medication review by support staff because - state all was reviewed during eCheck in.       Has patient had flu shot for current/most recent flu season? If so, when? Yes: 11/2/23      Is the patient currently in the state of [place]? YES    Visit mode:VIDEO    If the visit is dropped, the patient can be reconnected by: VIDEO VISIT: Send to e-mail at: [email or web address]    Will anyone else be joining the visit? NO  (If patient encounters technical issues they should call [phone]    How would you like to obtain your AVS? MyChart    Are changes needed to the allergy or medication list? No    Reason for visit: RECHECK    Krista CORRIGAN

## 2023-12-29 ENCOUNTER — VIRTUAL VISIT (OUTPATIENT)
Dept: RHEUMATOLOGY | Facility: CLINIC | Age: 53
End: 2023-12-29
Attending: INTERNAL MEDICINE
Payer: COMMERCIAL

## 2023-12-29 DIAGNOSIS — Z79.899 HIGH RISK MEDICATION USE: ICD-10-CM

## 2023-12-29 DIAGNOSIS — L40.9 SCALP PSORIASIS: ICD-10-CM

## 2023-12-29 DIAGNOSIS — L40.50 PSORIATIC ARTHRITIS (H): Primary | ICD-10-CM

## 2023-12-29 DIAGNOSIS — L40.9 PSORIASIS: ICD-10-CM

## 2023-12-29 PROCEDURE — 99215 OFFICE O/P EST HI 40 MIN: CPT | Mod: VID | Performed by: INTERNAL MEDICINE

## 2023-12-29 RX ORDER — FLUOCINONIDE TOPICAL SOLUTION USP, 0.05% 0.5 MG/ML
SOLUTION TOPICAL 2 TIMES DAILY
Qty: 60 ML | Refills: 4 | Status: SHIPPED | OUTPATIENT
Start: 2023-12-29

## 2023-12-29 RX ORDER — METHOTREXATE 2.5 MG/1
15 TABLET ORAL
Qty: 72 TABLET | Refills: 0 | Status: SHIPPED | OUTPATIENT
Start: 2023-12-29 | End: 2024-05-03

## 2023-12-29 ASSESSMENT — PAIN SCALES - GENERAL: PAINLEVEL: NO PAIN (0)

## 2023-12-29 NOTE — LETTER
2023       RE: Dali Martines  [address]  [facility] [id number]     Dear Colleague,    Thank you for referring your patient, Dali Martines, to the [facility] RHEUMATOLOG[facility] [place] [facility]. Please see a copy of my visit note below.    Virtual Visit Details    Type of service:  Video Visit   Joined the call at 2023, 5:00:13 pm.  Left the call at 2023, 5:35:47 pm.  You were on the call for 35 minutes 33 seconds .  Originating Location (pt. Location): Home    Distant Location (provider location):  Off-site  Platform used for Video Visit: [facility]  Rheumatology Clinic  Byron Xavierudo  2023     Name: Dali Martines  MRN: [id number]  Age: 53 year old  : 1970    Problem List:  1. Psoriatic arthritis   2. Psoriasis with arthropathy  3. Pain in joint, multiple sites  4. Chronic pain of right ankle  5. Right foot pain  6. Pain in joint involving ankle and foot, right    Subjective:  2023  -Continues on methotrexate 15mg once weekly and folic acid 1mg once daily  Dali Martines presents for a follow-up visit, reporting significant improvement in glucose control since starting the insulin pump three months ago. The patient has experienced fewer episodes of low glucose and reduced gastric symptoms, including intense nausea. However, a recent upper endoscopy revealed glandular changes, and the patient is scheduled for a mapping endoscopy next spring, along with other tests with GI.     The patient reports an odd growth on the forearm following a vaccination, which grew until she resumed methotrexate. The growth has since decreased in size, leaving a small red saji. She has elected not to follow-up with dermatology re: this skin lesion. The patient also experienced a hard and painful swelling on the wrist, which has since resolved, leaving a tiny red saji. Joint and skin conditions have been  stable while on methotrexate, with the patient taking 2000+ units of vitamin D daily. Small plaques on the legs have been stable and tend to occur more in the wintertime. The patient has experienced general aches in the hands but nothing targeting a joint in particular.    After queenie a respiratory illness post-Thanksgiving, the patient experienced achiness in every joint, with slight swelling in the hands. Two or three dime-sized psoriatic lesions on the legs have remained stable and resistant to steroid creams. The patient experienced a few red psoriasis patches when off methotrexate, but they resolved once back on the medication. No ulcers or sores in the nose or mouth were reported. The patient experienced intermittent dry eyes lasting for a day or two, nothing more significant.     The patient has been managing their spine with physical therapy exercises, which has been helpful. They reported stiffness in the morning, particularly in the hands, but this has improved. The patient has been dealing with a vestibular issue since a turbulent plane ride in June 2022. Auditory testing was normal, and it is now believed to be neurological. The patient has been attending vestibular physical therapy, which has helped. They are scheduled to see a neurologist in January.    Psoriatic arthritis has maintained a fairly stable status quo, with periodic flare-ups in hands. Labs done in November showed normal inflammatory markers for the first time. The patient believes the improvement is due to lesser inflammation with the insulin pump and consuming less caffeine and alcohol. The patient inquired about using diclofenac for flare-ups in thumb and index finger joints.    The patient reported having psoriasis in the scalp, especially at the temples, which comes and goes.     No new/progressive fevers/chills/night sweats. No new/progressive unintentional weight loss.     No symptoms consistent with dactylitis.     14 point  ROS collected and negative if not documented above.       May 25, 2023   -still with a few psoriatic skin lesions on her thighs. Has not spread beyond that. Smaller than a dime in size. Will get bigger in the winter months. Thinks she probably has about 3 of these skin lesions.   -Still on MVI with Vit D and also 2000 international unit(s) of vit  -No interval infections since her last visit in January 2023  -Has had times where she will have sensation where she will be developing a fever, getting flushed, getting chills and then it will improve.   -Did have severe asthma flare. Met with pulmonologist.  -Did go down to 5 tabs of methotrexate for some time. Then went back up to 6 tabs.   -Has been having left hand thumb/middle finger. Pain/stiffness.   -With warmer weather doing more gardening, so questions whether this was a .   -During her initial presentation, her left hand was more affected.   -She went back up from 5 tabs to 6 tabs (15mg) in feb. Noticed improvement with this change.  -Has tenderness to self palpation MCPs and PIPs in left hand. Stiffness and soreness in left hand CMC.   -No right hand symptoms.   -has prior fractures to right foot 2nd/3rd/4th toes from prior fracture from 25 pound patio umbrella weight falling on this foot  -Last time she had covid (end of august 2022), few weeks later had it again. Same infection.   -other than covid, no other interval infections  -no new/progressive fevers/chills/night sweats/unintentional weight loss.  -No new/progressive rash other than noted above  -Tolerating methotrexate without noticeable side effects, GI or other  14 point ROS collected and negative if not documented above.     Interval history January 13, 2023  -has continued on methotrexate 15mg once weekly and folic acid 1mg once daily. Has been tolerating this without noticeable side effects, GI or other.   -Overall her joint symptoms have been relatively quiet without new/progressive  red/hot/swollen joints   -No symptoms consistent with active dactylitis  -Has intermittent psoriatic lesions on thighs. Her initial presenting site of plaque psoriasis was her upper back.  -No recurrent oral ulcers/ no GI complaints or blood in the stool/recurrent or persistent infection  14 point ROS collected and negative if not documented above    HPI from last encounter with jim vides in August 2022  Since we last seen the patient she is generally done pretty well.  She went back up to 15 mg once weekly of methotrexate and feels like she is definitely done better on that dose with relatively minimal morning stiffness.  She has not done her labs however since February so we do not have an actual measure of inflammation.    She did feel like she flared a little bit in June but in retrospect that may simply been overuse.  She traveled for the first time in 2-1/2 years and felt quite achy after that.  She actually was concerned that she may contracted COVID, but tested negative x2.    She also had a recent bug bite that concern her.  She however felt poorly only transiently and did not have any prolonged symptoms.    She is noticing some increasing hand pain with use.  That particular pain however is definitely not associated with morning stiffness.    Notably, she did increase her vitamin D down to 3000 units/day at the beginning of the summer thinking she would not need as much because of sun exposure.  However, she definitely started noticing some new psoriasis lesions.  That has not been an issue for her for some time.  For the time being she remains on the 3000 units rather than higher dose but intends to go back up when she does not have any way to get sun exposure.    Physical examination by video shows her to be in no acute distress HEENT examination is normal.  She is speaking in full sentences with no shortness of breath.  Her upper extremity examination shows no evidence of any swollen joints joint  deformity or decrease in range of motion anywhere in her hands or arms.    Impression:    Per the problem list with well-controlled psoriatic arthritis on methotrexate monotherapy.    Plan/recommendation:    She is 6 months out from her last labs and I reiterated for her today that 3 months is pretty much the maximum we want her going without labs.  She is not experiencing any symptomatic toxicity however so I suspect she is fine but she will get them done soon.    Father, MI early 52's. He had hx of rheumatic fever. He had multiple after that. He ultimately had bypass.     Maternal grandfather 59 cardiac   Uncle 59 cardiac  Background history:  Symptoms first occurred in August 2008, when she began having stiffness and pain in the left hand, particularly in the PIPs and MCPs. Symptoms spontaneously resolved after about 6-8 weeks. She was previously seen by Rheumatology here around that time (see note from Dr. Betancourt on 12/9/2008). Briefly, assessment was that she had a self-limited episode of inflammatory arthralgias, possibly viral or early psoriatic arthritis. Negative NOLVIA, RF, Lyme serologies and normal CRP and ESR. Given her family history of RA, there was a thought to start her on HCQ if anti-CCP antibodies were positive, but they were found to be negative. No imaging of the hand was performed. September 2014, she experienced recurrent acute onset pain, stiffness, and swelling in the L hand very similar to her symptoms in 2008. Specifically, had swelling across MCPs and in 3rd PIP. She could hardly bend the hand sometimes due to the welling. Had morning stiffness lasting 30-45 minutes. No other joints were involved. Issues with her left hand lingered for several months but eventually self-resolved that January and her only notable symptom is heel pain in the mornings. No fevers, chills, or weight loss. No back pain, vision changes, nausea, vomiting, diarrhea, abdominal pain, or blood in stool or urine. No  rash, sicca symptoms, or oral ulcers. It seems she has a history of hypermobility, e.g. could bend wrist back to forearm, bend down and touch palms flat on floor, when she was younger. Used to get frequent ankle sprains, and has had multiple tendon and ligament tears over the years.    Allergies:   Sulfasalazine      Past Medical History:  Anemia   Anxiety   Psoriatic arthritis  Dry eye syndrome   Endometriosis   Gastroesophageal reflux disease   Hypothyroidism   Type 1 diabetes mellitus   Asthma   Necrobiosis lipoidica diabeticorum  Chronic low back pain   Enthesopathy   Chronic right ankle pain      Past Surgical History:  Knee cruciate ligament repair   Stomach surgery 12/2002  Hydrogen breath test 6/17/2014   Appendectomy 2002   Lysis adhesions 2002   Right ankle surgery 12/2014      Family History:   Mother: Breast cancer   Father: Heart disease, eye disorder, hyperlipidemia, macular degeneration, anxiety, alcoholism, rheumatoid arthritis, hypothyroidism, diabetes mellitus   Paternal grandmother: Cerebrovascular disease  Paternal grandfather: Pancreatic cancer, heart disease   Maternal grandmother: Type 2 diabetes mellitus, coronary artery disease  Maternal aunt: Type 1 diabetes mellitus     Social History:  The patient reports that she has never smoked. She has never used smokeless tobacco. She reports current alcohol use. She reports that she does not use drugs.     Objective:  No vitals for this video visit.  Sitting up at desk unassisted NAD pleasant and interactive  Sclera clear  No facial rash  Breathing comfortably on room air, no cough, no audible wheeze, no use of accessory muscles, speaking in full sentences without difficulty  Fluid movement of bilateral shoulders, elbows, wrists, MCPs, PIPs, DIPs throughout the encounter  Reports 2-3 dime sized lesions on lower extremities consistent with prior psoriatic patches, unable to examine today given nature of video examination      WBC   Date Value Ref  Range Status   01/11/2021 4.7 4.0 - 11.0 10e9/L Final     WBC Count   Date Value Ref Range Status   11/07/2023 4.9 4.0 - 11.0 10e3/uL Final     Hemoglobin   Date Value Ref Range Status   11/07/2023 11.7 11.7 - 15.7 g/dL Final   01/11/2021 11.4 (L) 11.7 - 15.7 g/dL Final     Platelet Count   Date Value Ref Range Status   11/07/2023 280 150 - 450 10e3/uL Final   01/11/2021 288 150 - 450 10e9/L Final     Creatinine   Date Value Ref Range Status   11/07/2023 0.62 0.51 - 0.95 mg/dL Final   01/11/2021 0.56 0.52 - 1.04 mg/dL Final     Lab Results   Component Value Date    ALKPHOS 88 11/18/2021    ALKPHOS 108 04/27/2016     AST   Date Value Ref Range Status   11/07/2023 37 0 - 45 U/L Final     Comment:     Reference intervals for this test were updated on 6/12/2023 to more accurately reflect our healthy population. There may be differences in the flagging of prior results with similar values performed with this method. Interpretation of those prior results can be made in the context of the updated reference intervals.   01/11/2021 31 0 - 45 U/L Final     Lab Results   Component Value Date    ALT 28 12/21/2022    ALT 37 01/11/2021     Sed Rate   Date Value Ref Range Status   01/11/2021 27 0 - 30 mm/h Final     Erythrocyte Sedimentation Rate   Date Value Ref Range Status   11/07/2023 27 0 - 30 mm/hr Final     CRP Inflammation   Date Value Ref Range Status   02/14/2022 <2.9 0.0 - 8.0 mg/L Final   01/11/2021 <2.9 0.0 - 8.0 mg/L Final     UA RESULTS:  Recent Labs   Lab Test 02/14/19  1642 07/28/17  1452   COLOR Yellow Yellow   APPEARANCE Clear Clear   URINEGLC Negative Negative   URINEBILI Negative Negative   URINEKETONE Negative Negative   SG 1.013 <=1.005   UBLD Negative Trace*   URINEPH 6.0 6.0   PROTEIN Negative Negative   UROBILINOGEN  --  0.2   NITRITE Negative Negative   LEUKEST Negative Negative   RBCU <1 O - 2   WBCU 1 O - 2      NOLVIA Screen by EIA   Date Value Ref Range Status   12/22/2014 1.6 (H) <1.0 Final      Comment:     Interpretation:  Positive     RNP Antibody IgG   Date Value Ref Range Status   03/17/2015  0.0 - 0.9 AI Final    <0.2  Negative   Antibody index (AI) values reflect qualitative changes in antibody   concentration that cannot be directly associated with clinical condition or   disease state.       Rheumatoid Factor   Date Value Ref Range Status   12/22/2014 <20 <20 IU/mL Final     A/P  #Psoriatic arthritis  #Plaque psoriasis  Very pleasant 53 year old female presents to rheumatology clinic today for follow-up of Psoriatic arthritis and plaque psoriasis. Inflammatory arthritis is currently quiet. Still with 2-3 dime sized lesions on lower extremities, which are worse in the winter months which we are in the midst of, along with waxing and waning scalp psoriasis localized predominantly to her temples. Her cutaneous disease is mildly active as a result of the above. Her inflammatory markers from 11/7/23, to include both ESR and CRP, were normal. This is the first time they have both been normal in once year, during which time her ESR has been mildly elevated. Overall her disease is stable and well controlled despite this mild cutaneous disease activity. Will not be making changes to her systemic DMARD therapy at this time: will continue on methotrexate 15mg once weekly and folic acid 1mg once daily. Will prescribe topical lidex solution today for her scalp psoriasis.   PLAN December 29, 2023   -continue methotrexate 15mg once weekly  -continue folic acid 1mg once daily  -lidex solution for scalp psoriasis  -follow-up in 6 months    #high risk medication use: methotrexate  --Risks and benefits of methotrexate discussed to include infection, BM suppression, GI/hepatoxicity, malignancy among others. Patient knows not to consume ETOH. Patient knows the drug is not compatible with conception/pregnancy. Patient  is agreeable.  -Most recent routine screening drug toxicity monitoring labs obtained on 11/7/2023 to  include CBC, CMP, ESR, CRP. Her ESR is now normal for the first time in 1 year, it was 27. Her CRP was <3. CMP showed normal creatinine, albumin, AST, ALT. Her CBC showed normal HGB, PLT, WBC. No evidence of acute drug toxicity identified on her labs. Will continue to monitor with q3 month routine screening drug toxicity monitoring labs.   -labs again early feb  -then labs early may    Byron Arshad MD  Rheumatology    I spent a total of 40 minutes on the date of service on chart review, patient video encounter, , documentation.

## 2023-12-29 NOTE — PROGRESS NOTES
Virtual Visit Details    Type of service:  Video Visit   Joined the call at 2023, 5:00:13 pm.  Left the call at 2023, 5:35:47 pm.  You were on the call for 35 minutes 33 seconds .  Originating Location (pt. Location): Home    Distant Location (provider location):  Off-site  Platform used for Video Visit: [facility]  Rheumatology Clinic  Byron Nolando  2023     Name: Dali Martines  MRN: [id number]  Age: 53 year old  : 1970    Problem List:  1. Psoriatic arthritis   2. Psoriasis with arthropathy  3. Pain in joint, multiple sites  4. Chronic pain of right ankle  5. Right foot pain  6. Pain in joint involving ankle and foot, right    Subjective:  2023  -Continues on methotrexate 15mg once weekly and folic acid 1mg once daily  Dali Martines presents for a follow-up visit, reporting significant improvement in glucose control since starting the insulin pump three months ago. The patient has experienced fewer episodes of low glucose and reduced gastric symptoms, including intense nausea. However, a recent upper endoscopy revealed glandular changes, and the patient is scheduled for a mapping endoscopy next spring, along with other tests with GI.     The patient reports an odd growth on the forearm following a vaccination, which grew until she resumed methotrexate. The growth has since decreased in size, leaving a small red saji. She has elected not to follow-up with dermatology re: this skin lesion. The patient also experienced a hard and painful swelling on the wrist, which has since resolved, leaving a tiny red saji. Joint and skin conditions have been stable while on methotrexate, with the patient taking 2000+ units of vitamin D daily. Small plaques on the legs have been stable and tend to occur more in the wintertime. The patient has experienced general aches in the hands but nothing targeting a joint in particular.    After queenie a respiratory illness  post-Thanksgiving, the patient experienced achiness in every joint, with slight swelling in the hands. Two or three dime-sized psoriatic lesions on the legs have remained stable and resistant to steroid creams. The patient experienced a few red psoriasis patches when off methotrexate, but they resolved once back on the medication. No ulcers or sores in the nose or mouth were reported. The patient experienced intermittent dry eyes lasting for a day or two, nothing more significant.     The patient has been managing their spine with physical therapy exercises, which has been helpful. They reported stiffness in the morning, particularly in the hands, but this has improved. The patient has been dealing with a vestibular issue since a turbulent plane ride in June 2022. Auditory testing was normal, and it is now believed to be neurological. The patient has been attending vestibular physical therapy, which has helped. They are scheduled to see a neurologist in January.    Psoriatic arthritis has maintained a fairly stable status quo, with periodic flare-ups in hands. Labs done in November showed normal inflammatory markers for the first time. The patient believes the improvement is due to lesser inflammation with the insulin pump and consuming less caffeine and alcohol. The patient inquired about using diclofenac for flare-ups in thumb and index finger joints.    The patient reported having psoriasis in the scalp, especially at the temples, which comes and goes.     No new/progressive fevers/chills/night sweats. No new/progressive unintentional weight loss.     No symptoms consistent with dactylitis.     14 point ROS collected and negative if not documented above.       May 25, 2023   -still with a few psoriatic skin lesions on her thighs. Has not spread beyond that. Smaller than a dime in size. Will get bigger in the winter months. Thinks she probably has about 3 of these skin lesions.   -Still on MVI with Vit D and also  2000 international unit(s) of vit  -No interval infections since her last visit in January 2023  -Has had times where she will have sensation where she will be developing a fever, getting flushed, getting chills and then it will improve.   -Did have severe asthma flare. Met with pulmonologist.  -Did go down to 5 tabs of methotrexate for some time. Then went back up to 6 tabs.   -Has been having left hand thumb/middle finger. Pain/stiffness.   -With warmer weather doing more gardening, so questions whether this was a .   -During her initial presentation, her left hand was more affected.   -She went back up from 5 tabs to 6 tabs (15mg) in feb. Noticed improvement with this change.  -Has tenderness to self palpation MCPs and PIPs in left hand. Stiffness and soreness in left hand CMC.   -No right hand symptoms.   -has prior fractures to right foot 2nd/3rd/4th toes from prior fracture from 25 pound patio umbrella weight falling on this foot  -Last time she had covid (end of august 2022), few weeks later had it again. Same infection.   -other than covid, no other interval infections  -no new/progressive fevers/chills/night sweats/unintentional weight loss.  -No new/progressive rash other than noted above  -Tolerating methotrexate without noticeable side effects, GI or other  14 point ROS collected and negative if not documented above.     Interval history January 13, 2023  -has continued on methotrexate 15mg once weekly and folic acid 1mg once daily. Has been tolerating this without noticeable side effects, GI or other.   -Overall her joint symptoms have been relatively quiet without new/progressive red/hot/swollen joints   -No symptoms consistent with active dactylitis  -Has intermittent psoriatic lesions on thighs. Her initial presenting site of plaque psoriasis was her upper back.  -No recurrent oral ulcers/ no GI complaints or blood in the stool/recurrent or persistent infection  14 point ROS collected and  negative if not documented above    HPI from last encounter with jim vides in August 2022  Since we last seen the patient she is generally done pretty well.  She went back up to 15 mg once weekly of methotrexate and feels like she is definitely done better on that dose with relatively minimal morning stiffness.  She has not done her labs however since February so we do not have an actual measure of inflammation.    She did feel like she flared a little bit in June but in retrospect that may simply been overuse.  She traveled for the first time in 2-1/2 years and felt quite achy after that.  She actually was concerned that she may contracted COVID, but tested negative x2.    She also had a recent bug bite that concern her.  She however felt poorly only transiently and did not have any prolonged symptoms.    She is noticing some increasing hand pain with use.  That particular pain however is definitely not associated with morning stiffness.    Notably, she did increase her vitamin D down to 3000 units/day at the beginning of the summer thinking she would not need as much because of sun exposure.  However, she definitely started noticing some new psoriasis lesions.  That has not been an issue for her for some time.  For the time being she remains on the 3000 units rather than higher dose but intends to go back up when she does not have any way to get sun exposure.    Physical examination by video shows her to be in no acute distress HEENT examination is normal.  She is speaking in full sentences with no shortness of breath.  Her upper extremity examination shows no evidence of any swollen joints joint deformity or decrease in range of motion anywhere in her hands or arms.    Impression:    Per the problem list with well-controlled psoriatic arthritis on methotrexate monotherapy.    Plan/recommendation:    She is 6 months out from her last labs and I reiterated for her today that 3 months is pretty much the maximum  we want her going without labs.  She is not experiencing any symptomatic toxicity however so I suspect she is fine but she will get them done soon.    Father, MI early 52's. He had hx of rheumatic fever. He had multiple after that. He ultimately had bypass.     Maternal grandfather 59 cardiac   Uncle 59 cardiac  Background history:  Symptoms first occurred in August 2008, when she began having stiffness and pain in the left hand, particularly in the PIPs and MCPs. Symptoms spontaneously resolved after about 6-8 weeks. She was previously seen by Rheumatology here around that time (see note from Dr. Betancourt on 12/9/2008). Briefly, assessment was that she had a self-limited episode of inflammatory arthralgias, possibly viral or early psoriatic arthritis. Negative NOLVIA, RF, Lyme serologies and normal CRP and ESR. Given her family history of RA, there was a thought to start her on HCQ if anti-CCP antibodies were positive, but they were found to be negative. No imaging of the hand was performed. September 2014, she experienced recurrent acute onset pain, stiffness, and swelling in the L hand very similar to her symptoms in 2008. Specifically, had swelling across MCPs and in 3rd PIP. She could hardly bend the hand sometimes due to the welling. Had morning stiffness lasting 30-45 minutes. No other joints were involved. Issues with her left hand lingered for several months but eventually self-resolved that January and her only notable symptom is heel pain in the mornings. No fevers, chills, or weight loss. No back pain, vision changes, nausea, vomiting, diarrhea, abdominal pain, or blood in stool or urine. No rash, sicca symptoms, or oral ulcers. It seems she has a history of hypermobility, e.g. could bend wrist back to forearm, bend down and touch palms flat on floor, when she was younger. Used to get frequent ankle sprains, and has had multiple tendon and ligament tears over the years.    Allergies:   Sulfasalazine       Past Medical History:  Anemia   Anxiety   Psoriatic arthritis  Dry eye syndrome   Endometriosis   Gastroesophageal reflux disease   Hypothyroidism   Type 1 diabetes mellitus   Asthma   Necrobiosis lipoidica diabeticorum  Chronic low back pain   Enthesopathy   Chronic right ankle pain      Past Surgical History:  Knee cruciate ligament repair   Stomach surgery 12/2002  Hydrogen breath test 6/17/2014   Appendectomy 2002   Lysis adhesions 2002   Right ankle surgery 12/2014      Family History:   Mother: Breast cancer   Father: Heart disease, eye disorder, hyperlipidemia, macular degeneration, anxiety, alcoholism, rheumatoid arthritis, hypothyroidism, diabetes mellitus   Paternal grandmother: Cerebrovascular disease  Paternal grandfather: Pancreatic cancer, heart disease   Maternal grandmother: Type 2 diabetes mellitus, coronary artery disease  Maternal aunt: Type 1 diabetes mellitus     Social History:  The patient reports that she has never smoked. She has never used smokeless tobacco. She reports current alcohol use. She reports that she does not use drugs.     Objective:  No vitals for this video visit.  Sitting up at desk unassisted NAD pleasant and interactive  Sclera clear  No facial rash  Breathing comfortably on room air, no cough, no audible wheeze, no use of accessory muscles, speaking in full sentences without difficulty  Fluid movement of bilateral shoulders, elbows, wrists, MCPs, PIPs, DIPs throughout the encounter  Reports 2-3 dime sized lesions on lower extremities consistent with prior psoriatic patches, unable to examine today given nature of video examination      WBC   Date Value Ref Range Status   01/11/2021 4.7 4.0 - 11.0 10e9/L Final     WBC Count   Date Value Ref Range Status   11/07/2023 4.9 4.0 - 11.0 10e3/uL Final     Hemoglobin   Date Value Ref Range Status   11/07/2023 11.7 11.7 - 15.7 g/dL Final   01/11/2021 11.4 (L) 11.7 - 15.7 g/dL Final     Platelet Count   Date Value Ref Range Status    11/07/2023 280 150 - 450 10e3/uL Final   01/11/2021 288 150 - 450 10e9/L Final     Creatinine   Date Value Ref Range Status   11/07/2023 0.62 0.51 - 0.95 mg/dL Final   01/11/2021 0.56 0.52 - 1.04 mg/dL Final     Lab Results   Component Value Date    ALKPHOS 88 11/18/2021    ALKPHOS 108 04/27/2016     AST   Date Value Ref Range Status   11/07/2023 37 0 - 45 U/L Final     Comment:     Reference intervals for this test were updated on 6/12/2023 to more accurately reflect our healthy population. There may be differences in the flagging of prior results with similar values performed with this method. Interpretation of those prior results can be made in the context of the updated reference intervals.   01/11/2021 31 0 - 45 U/L Final     Lab Results   Component Value Date    ALT 28 12/21/2022    ALT 37 01/11/2021     Sed Rate   Date Value Ref Range Status   01/11/2021 27 0 - 30 mm/h Final     Erythrocyte Sedimentation Rate   Date Value Ref Range Status   11/07/2023 27 0 - 30 mm/hr Final     CRP Inflammation   Date Value Ref Range Status   02/14/2022 <2.9 0.0 - 8.0 mg/L Final   01/11/2021 <2.9 0.0 - 8.0 mg/L Final     UA RESULTS:  Recent Labs   Lab Test 02/14/19  1642 07/28/17  1452   COLOR Yellow Yellow   APPEARANCE Clear Clear   URINEGLC Negative Negative   URINEBILI Negative Negative   URINEKETONE Negative Negative   SG 1.013 <=1.005   UBLD Negative Trace*   URINEPH 6.0 6.0   PROTEIN Negative Negative   UROBILINOGEN  --  0.2   NITRITE Negative Negative   LEUKEST Negative Negative   RBCU <1 O - 2   WBCU 1 O - 2      NOLVIA Screen by EIA   Date Value Ref Range Status   12/22/2014 1.6 (H) <1.0 Final     Comment:     Interpretation:  Positive     RNP Antibody IgG   Date Value Ref Range Status   03/17/2015  0.0 - 0.9 AI Final    <0.2  Negative   Antibody index (AI) values reflect qualitative changes in antibody   concentration that cannot be directly associated with clinical condition or   disease state.       Rheumatoid  Factor   Date Value Ref Range Status   12/22/2014 <20 <20 IU/mL Final     A/P  #Psoriatic arthritis  #Plaque psoriasis  Very pleasant 53 year old female presents to rheumatology clinic today for follow-up of Psoriatic arthritis and plaque psoriasis. Inflammatory arthritis is currently quiet. Still with 2-3 dime sized lesions on lower extremities, which are worse in the winter months which we are in the midst of, along with waxing and waning scalp psoriasis localized predominantly to her temples. Her cutaneous disease is mildly active as a result of the above. Her inflammatory markers from 11/7/23, to include both ESR and CRP, were normal. This is the first time they have both been normal in once year, during which time her ESR has been mildly elevated. Overall her disease is stable and well controlled despite this mild cutaneous disease activity. Will not be making changes to her systemic DMARD therapy at this time: will continue on methotrexate 15mg once weekly and folic acid 1mg once daily. Will prescribe topical lidex solution today for her scalp psoriasis.   PLAN December 29, 2023   -continue methotrexate 15mg once weekly  -continue folic acid 1mg once daily  -lidex solution for scalp psoriasis  -follow-up in 6 months    #high risk medication use: methotrexate  --Risks and benefits of methotrexate discussed to include infection, BM suppression, GI/hepatoxicity, malignancy among others. Patient knows not to consume ETOH. Patient knows the drug is not compatible with conception/pregnancy. Patient  is agreeable.  -Most recent routine screening drug toxicity monitoring labs obtained on 11/7/2023 to include CBC, CMP, ESR, CRP. Her ESR is now normal for the first time in 1 year, it was 27. Her CRP was <3. CMP showed normal creatinine, albumin, AST, ALT. Her CBC showed normal HGB, PLT, WBC. No evidence of acute drug toxicity identified on her labs. Will continue to monitor with q3 month routine screening drug toxicity  monitoring labs.   -labs again early feb  -then labs early may    Byron Arshad MD  Rheumatology    I spent a total of 40 minutes on the date of service on chart review, patient video encounter, , documentation.

## 2023-12-29 NOTE — NURSING NOTE
Is the patient currently in the state of [place]? YES    Visit mode:VIDEO    If the visit is dropped, the patient can be reconnected by: VIDEO VISIT: Send to e-mail at: [email or web address]    Will anyone else be joining the visit? NO  (If patient encounters technical issues they should call [phone]    How would you like to obtain your AVS? MyChart    Are changes needed to the allergy or medication list? No    Reason for visit: No chief complaint on file.    Nadira CORRIGAN

## 2024-01-02 ENCOUNTER — TELEPHONE (OUTPATIENT)
Dept: ENDOCRINOLOGY | Facility: CLINIC | Age: 54
End: 2024-01-02
Payer: COMMERCIAL

## 2024-01-02 NOTE — TELEPHONE ENCOUNTER
PA Initiation    Medication: OZEMPIC (1 MG/DOSE) 4 MG/3ML SC SOPN  Insurance Company: [employer] - Phone [phone] Fax [phone]  Pharmacy Filling the Rx: [facility] - [place], [place] - [facility] [address]  Filling Pharmacy Phone: [phone]  Filling Pharmacy Fax: [phone]  Start Date: 1/2/2024

## 2024-01-02 NOTE — TELEPHONE ENCOUNTER
Prior Authorization Approval    Medication: OZEMPIC (1 MG/DOSE) 4 MG/3ML SC SOPN  Authorization Effective Date: 1/13/2024  Authorization Expiration Date: 1/13/2025  Approved Dose/Quantity: 1 month  Reference #: CMM KEY [id number]   Insurance Company: [employer] - Phone [phone] Fax [phone]  Expected CoPay: $    CoPay Card Available:      Financial Assistance Needed: n/a  Which Pharmacy is filling the prescription: [facility] - [place], [place] - [address]   Pharmacy Notified: pa renewal  Patient Notified: pa renewal

## 2024-01-03 ENCOUNTER — THERAPY VISIT (OUTPATIENT)
Dept: OCCUPATIONAL THERAPY | Facility: CLINIC | Age: 54
End: 2024-01-03
Payer: COMMERCIAL

## 2024-01-03 ENCOUNTER — THERAPY VISIT (OUTPATIENT)
Dept: PHYSICAL THERAPY | Facility: CLINIC | Age: 54
End: 2024-01-03
Payer: COMMERCIAL

## 2024-01-03 DIAGNOSIS — Z78.9 ALTERATION IN INSTRUMENTAL ACTIVITIES OF DAILY LIVING (IADL): ICD-10-CM

## 2024-01-03 DIAGNOSIS — R42 DIZZINESS: ICD-10-CM

## 2024-01-03 DIAGNOSIS — R26.89 BALANCE DISORDER: Primary | ICD-10-CM

## 2024-01-03 DIAGNOSIS — G93.39 OTHER POST INFECTION AND RELATED FATIGUE SYNDROMES: Primary | ICD-10-CM

## 2024-01-03 PROCEDURE — 97535 SELF CARE MNGMENT TRAINING: CPT | Mod: GO

## 2024-01-03 PROCEDURE — 97112 NEUROMUSCULAR REEDUCATION: CPT | Mod: GP | Performed by: PHYSICAL THERAPIST

## 2024-01-09 ENCOUNTER — HOSPITAL ENCOUNTER (OUTPATIENT)
Dept: SPEECH THERAPY | Facility: CLINIC | Age: 54
Discharge: HOME | End: 2024-01-09
Attending: PHYSICIAN ASSISTANT | Admitting: PHYSICIAN ASSISTANT
Payer: COMMERCIAL

## 2024-01-09 ENCOUNTER — HOSPITAL ENCOUNTER (OUTPATIENT)
Dept: GENERAL RADIOLOGY | Facility: CLINIC | Age: 54
Discharge: HOME | End: 2024-01-09
Attending: PHYSICIAN ASSISTANT
Payer: COMMERCIAL

## 2024-01-09 DIAGNOSIS — R13.12 OROPHARYNGEAL DYSPHAGIA: ICD-10-CM

## 2024-01-09 DIAGNOSIS — R13.12 DYSPHAGIA, OROPHARYNGEAL PHASE: ICD-10-CM

## 2024-01-09 PROCEDURE — 92611 MOTION FLUOROSCOPY/SWALLOW: CPT | Mod: GN | Performed by: SPEECH-LANGUAGE PATHOLOGIST

## 2024-01-09 PROCEDURE — 74230 X-RAY XM SWLNG FUNCJ C+: CPT

## 2024-01-09 NOTE — PROGRESS NOTES
SPEECH LANGUAGE PATHOLOGY EVALUATION    See electronic medical record for Abuse and Falls Screening details.    Subjective      Presenting condition or subjective complaint: Patient with a history of gastric symptoms including intense nausea. She reports some regurgitation due to gastric symptoms.    Date of onset: 12/29/23 (MD visit.)    Relevant medical history: Psoriatic arthritis, Anxiety, GERDS, and Asthma.    Dates & types of surgery: Stomach surgery 12/2002      Prior diagnostic imaging/testing results: Recent EGD revealed;  glandular changes, and the patient is scheduled for a mapping endoscopy next spring, along with other tests with GI.     Prior therapy history for the same diagnosis, illness or injury: No      Objective       VIDEOFLUOROSCOPIC SWALLOW STUDY  Radiologist: Dr. Malloy  Views Taken: left lateral, A/P   Physical location of procedure: [facility]   VFSS textures trialed: Thin, slightly thick liquids, puree soft/bite size and solids    VFSS Eval: Thin Liquids  Mode of Presentation: cup, straw, self-fed   Order of Presentation: 1 2 3 4 8  Preparatory Phase: WFL  Oral Phase: premature pharyngeal entry  Bolus Location When Swallow Initiated: posterior laryngeal surface of epiglottis  Pharyngeal Phase: impaired epiglottic movement  Rosenbeck's Penetration Aspiration Scale: 2 - contrast enters airway, remains above the vocal cords, no residue remains (penetration)  Diagnostic Statement: One episode of flash penetration with a trace amount above the top of the vocal cords with a throat clear in response. During a consecutive swallow via the cup. No residue remained.     VFSS Eval: Slightly Thick Liquids   Mode of Presentation: cup, self-fed   Order of Presentation: 5  Preparatory Phase: WFL  Oral Phase: WFL  Bolus Location When Swallow Initiated: valleculae  Pharyngeal Phase: WFL  Rosenbeck's Penetration Aspiration Scale: 1 - no aspiration, contrast does not enter airway  Diagnostic Statement: WFL    VFSS  Eval: Purees  Mode of Presentation: spoon, self-fed   Order of Presentation: 6  Preparatory Phase: WFL  Oral Phase: WFL  Bolus Location When Swallow Initiated: valleculae  Pharyngeal Phase: WFL  Rosenbeck s Penetration Aspiration Scale: 1 - no aspiration, contrast does not enter airway  Diagnostic Statement: WFL    VFSS Eval: Soft & Bite Sized  Mode of Presentation: spoon, self-fed   Order of Presentation: 7  Preparatory Phase: WFL  Oral Phase: WFL  Bolus Location When Swallow Initiated: valleculae  Pharyngeal Phase: WFL  Rosenbeck s Penetration Aspiration Scale: 1 - no aspiration, contrast does not enter airway  Diagnostic Statement: L    VFSS Eval: Solids  Mode of Presentation: spoon, self-fed   Order of Presentation: 9  Preparatory Phase: WFL  Oral Phase: WFL  Bolus Location When Swallow Initiated: valleculae  Pharyngeal Phase: WFL   Rosenbeck s Penetration Aspiration Scale: 1 - no aspiration, contrast does not enter airway  Diagnostic Statement: WFL     ESOPHAGEAL PHASE OF SWALLOW  patient reports symptoms of esophageal dysphagia  patient presents with symptoms of esophageal dysphagia  AP view completed without contrast remaining in the upper 1/3 of her esophagus.      SWALLOW ASSESSMENT CLINICAL IMPRESSIONS AND RATIONALE    CLINICAL IMPRESSIONS   Medical Diagnosis: Minimal oral and pharyngeal dysphagia    Treatment Diagnosis: Minimal oral and pharyngeal dysphagia   Impression/Assessment: Patient presents with minimal oral and pharyngeal dysphagia characterized by premature entry of thin liquids to the tip of the epiglottis with incomplete laryngeal closure resulting in one episode of flash penetration with a trace amount slightly above the vocal cords during consecutive swallows. She responded with a throat clear, and no residual remained. No penetration/aspiration on any further consistency. Puree, soft solids and solids all were WFL. Noted to have a bone spur at the level of C-5 that was non-obstructive.    Recommend: 1. May continue on a regular diet and thin liquids. 2. Upright, small bites/sips, and alternate liquids/solids. Follow standard GERD precautions. 3. No further skilled SLP intervention is indicated at this time. 4. Follow up with GI as planned     Risks and benefits of evaluation/treatment have been explained.   Patient/Family/caregiver agrees with Plan of Care.     Evaluation Time:    SLP Eval: VideoFluoroscopic Swallow function Minutes (13524): 30  Signing Clinician: Lindy Cuba MS CCC SLP

## 2024-01-10 ENCOUNTER — PRE VISIT (OUTPATIENT)
Dept: NEUROLOGY | Facility: CLINIC | Age: 54
End: 2024-01-10

## 2024-01-10 ENCOUNTER — OFFICE VISIT (OUTPATIENT)
Dept: NEUROLOGY | Facility: CLINIC | Age: 54
End: 2024-01-10
Attending: OTOLARYNGOLOGY
Payer: COMMERCIAL

## 2024-01-10 VITALS
BODY MASS INDEX: 24.35 KG/M2 | SYSTOLIC BLOOD PRESSURE: 118 MMHG | HEIGHT: 70 IN | OXYGEN SATURATION: 99 % | WEIGHT: 170.1 LBS | HEART RATE: 71 BPM | DIASTOLIC BLOOD PRESSURE: 79 MMHG

## 2024-01-10 DIAGNOSIS — R42 DIZZINESS: ICD-10-CM

## 2024-01-10 PROCEDURE — 99204 OFFICE O/P NEW MOD 45 MIN: CPT

## 2024-01-10 ASSESSMENT — PAIN SCALES - GENERAL: PAINLEVEL: NO PAIN (0)

## 2024-01-10 NOTE — PROGRESS NOTES
DEPARTMENT OF NEUROLOGY  Referral for: dizziness  Patient Name:  Dali Martines  MRN:  [id number]    :  1970  Date of Clinic Visit:  January 10, 2024  Primary Care Provider:  Dimitri Alas  Referring Provider: Rick Nissen, MD    ASSESSMENT AND PLAN:  Dali Martines is a  53 year old female with a history of anxiety, psoriatic arthritis, endometriosis, GERD, hypothyroidism, type I DM, asthma, chronic low back pain presenting with dizziness. Dali describes an acute onset of dizziness in  then has had persistent, episodic symptoms since that time. Given acute onset and symptoms of incoordination when symptoms first started, will proceed with MRI Brain with and without contrast to rule out structural lesion such as an infarct. However, stroke is less likely. [place]-Hallpike has been negative in the past (checked 23). It is possible that Dali has persistent postural-perceptual dizziness given that she has had symptoms of dizziness and unsteadiness on most days for more than 3 months. Her symptoms are exacerbated by active and passive motion without regard to direction and with moving visual stimuli/complex visual patterns. Her symptoms may have been triggered by extreme turbulence on plane and patient reports significant stressors in life around that time. Her symptoms have affected her functioning. We do need to rule out central lesion but her symptoms are consistent with persistent postural-perceptual dizziness. She is already working with vestibular PT and has found some improvement. Additionally, if her symptoms persist, could consider low dose selective serotonin reuptake inhibitor/SNRI as there has been some evidence that low doses can help with symptoms.     Plan:  - continue with vestibular PT  - MRI Brain with and without contrast  - could consider low dose selective serotonin reuptake inhibitor/SNRI in the future as well.     Follow up with me following MRI Brain in 3 months.      Patient was seen and discussed with Dr. Balderrama.    DILIP OLGUIN MD  Neurology Resident, PGY1  [facility] Department of Neurology    ___________________________________________    CHIEF COMPLAINT: dizziness    HISTORY OF PRESENT ILLNESS:  Dali Martines is a  53 year old female with a history of anxiety, psoriatic arthritis, endometriosis, GERD, hypothyroidism, type I DM, asthma, chronic low back pain presenting with dizziness.    In June of 2022, Dali was on a plane for a conference and had severe turbulance for an extended period of time during the flight. She was reading a book and then started to feel ill. She did not throw up but felt poorly. When she landed, she was staying with family and during that time, she felt dizzy when walking, felt out of it, very fatigued, and required frequent naps. She noticed when she had alcohol with dinner, she would feel better. She described her symptoms with one of her colleagues who has experienced vertigo and thought Dali's symptoms sounded similar. She felt like her balance was off as well.     She saw ENT and was given diagnosis of positional vertigo and was given therapies but it did not seem to help.     In fall of 2022, she went to go sit on a chair and she missed the chair and fell on the floor. She did not realize she was falling until she had already fallen. In June of 2023, she fell and injured herself. She tripped on a rug on the floor and again did not realize she was falling until she had already fallen.    She was referred for dizziness and balance testing and started working with vestibular PT. She has noticed improvement with PT. Her hearing was tested and it was normal. She continues to work with vestibular PT and has been slow and steady but has improved. She has not fallen recently. She continues to have some symptoms of imbalance and feeling.    Her dizziness symptoms including feeling light headed and she does have a room spinning  "sensation occasionally. Her dizziness worsens with head movements, specifically when going from looking down to looking up high, and eyes movements.  When  her symptoms first started, she would have frequent episodes each day. Now she has episodes once a day or less. She has worsening symptoms especially when going from sitting to standing and symptoms improve after standing for longer periods of time. She gave an example that, she would come home from grocery shopping (with frequent scanning side to side) and feel overtaxed. She had an upper respiratory tract infection and then started having worsening symptoms. Recently, she has only had symptoms when standing. When she first had symptoms, it was in any position.     At the initial onset, she felt like she had difficulty handling objects (was not sure where the object was in space) but that has resolved. Dali also noted that she had significant stressors at the onset of her symptoms including losing her job, losing a parent and her other parent was diagnosed with dementia. She shared that she has ongoing stressors as well.     She has not had vision changes, headaches, weakness or sensation changes. She tries to drink at least 6 glasses of water/juice a day.     Dali works as a research and . She lives in [place] in a home with her dog. Sheenjoys reading, going for walks with her dog, and getting together with friends. No tobacco use, moderate alcohol intake, one drink with dinner couple times a week, no other drug use. She is right handed.     PHYSICAL EXAMINATION:  Vitals: /79 (BP Location: Right arm, Patient Position: Sitting, Cuff Size: Adult Regular)   Pulse 71   Ht 1.778 m (5' 10\")   Wt 77.2 kg (170 lb 1.6 oz)   SpO2 99%   BMI 24.41 kg/m      General: Sitting up in chair, in no acute distress    Neurologic Exam:  Mental status: Patient is oriented to person, place and time and able to provide a detailed account of history of " "illness. Attention and fund of knowledge are normal. Minicog is 5/5    Language: Speech is fluent; comprehension, repetition and naming are normal.    Cranial nerves: Pupils are round and react normally to light and accommodation. Optic discs are sharp and flat and without pallor; retina appears normal without hemorrhages or exudates. Visual fields are full and extraocular movements are normal. Facial strength and sensation are normal. Hearing is normal to conversation. Palate rises symmetrically and there is no dysarthria. Tongue protrusion is normal.    Motor/Strength: Strength is 5/5 in shoulder abduction, elbow flexion/extension, wrist flexion/extension, finger flexion/extension and thumb abduction bilaterally. 5/5 in hip flexion, knee flexion/extension, dorsiflexion, plantarflexion, foot eversion and inversion bilaterally.  No tremors, myoclonus, or other abnormal movements. Muscle bulk and tone are normal.     Sensation: Sensation is intact to pin-prick and vibration in all four extremities.     Reflexes: Biceps, brachioradialis, triceps, patellae, and ankle jerks 2+ bilaterally. Plantar responses are flexor bilaterally.    Coordination: Finger-to-nose and heel-to-shin maneuvers are symmetric and normal bilaterally. Rapid alternating movements are symmetric in the extremities. No pronator drift. Rapid alternating movements with finger tapping slightly slower on the left.     Gait: Gait is narrow based and steady and the patient is able to walk on heels, toes and in tandem. Romberg is negative.     INVESTIGATIONS:   Audiology Report-Balance Assessment 9/21/23  \"Videonystagmography (VNG) testing:  Prescreening:  Tympanograms: Normal eardrum mobility bilaterally. Note: this test is completed to determine the status of the middle ear before irrigations are completed. *Please see audiogram for tympanograms.  Ocular range of motion and ocular counter roll: Normal  Cross/cover: Normal  Head Thrust: Saccadic " bilaterally.      Nystagmus Tests:  Gaze-Horizontal with Fixation:  *Mild saccadic intrusions throughout.              Center: Normal              Right: Normal              Left: Normal  Gaze-Vertical with Fixation:  *Mild saccadic intrusions throughout.              Up: Normal              Down: Normal  Gaze with Fixation Denied              Center: Normal, mild saccadic intrusions throughout.              Right: 1 degree/s right beating nystagmus, likely endpoint\nonsignificant.                Left: 2-3 degrees/s left beating nystagmus, likely endpoint\nonsignificant.              Up: Normal, mild saccadic intrusions throughout.  High Frequency Headshake:              Horizontal: Positive. Provoked rapid 5-6 degrees/s right beating nystagmus, patient reports disorientation post head shake.              Vertical: Abnormal; provoked mild right and down beating nystagmus mixed with saccadic intrusions, no symptoms.     Huong-Hallpike Head Right: Negative for PC BPPV. No consistent nystagmus, mild saccadic intrusions throughout, no symptoms.  Ru[place]-Hallpike Head Left: Negative for PC BPPV. No consistent nystagmus, mild saccadic intrusions throughout, no symptoms.  Roll Test Head Right: Negative for HC BPPV. No consistent nystagmus, mild saccadic intrusions throughout, no symptoms.  Roll Test Head Left: Negative for HC BPPV. No consistent nystagmus, mild saccadic intrusions throughout, no symptoms.     Positional Testing:  Positionals: Supine: Few slight 1 degree/s right beats mixed with saccadic intrusions, suppresses with fixation, no symptoms.  Positionals: Body Right: 2 degrees/s left and down beating nystagmus, unable to fully suppress with fixation, no symptoms.   Positionals: Body Left: 2-3 degrees/s right and down beating nystagmus, suppresses with fixation, saccadic intrusions only with fixation, no symptoms.  Positionals: Pre-Caloric: No consistent nystagmus, no symptoms.     Oculomotor Tests:  Saccades  "(repeatable): Borderline abnormal, saccadic in nature.  Pursuit(repeatable): Borderline abnormal, saccadic in nature.     Calorics:  (Tested at 44 degrees Celsius for 30 seconds for warm water. *Monothermal only; provoked significant symptoms (nausea and vomiting).)  Right Warm Eye Speed: 32 degrees per second right beating  Left Warm Eye Speed: 42 degrees per second left beating  Difference between ear: 14% right hypofunction. (Greater than 25% considered clinically significant.)  Fixation Index: Normal  Overall caloric test: Normal     Post Irrigations Otoscopy: Normal     ASSESSMENT:     1. Indications of central vestibular system involvement noted on today's exam were as follows:   -Mild saccadic intrusions noted throughout all testing.  -Borderline abnormal Pursuit and Saccade testing (repeatable); saccadic in nature.  -Ageotropic and down beating nystagmus evident in Body Right\Left Positionals.  -Abnormal Vertical High Frequency Headshake; provoked mild right and down beating nystagmus in the absence of symptoms.  -Positive Horizontal High Frequency Headshake; in the absence of a significant hypofunction, this finding may suggest central influences.     2. There were no significant indications of peripheral vestibular system involvement noted on today's exam. \"    ALLERGIES:  Allergies   Allergen Reactions    Monosodium Glutamate Palpitations and Shortness Of Breath    Sulfasalazine      Developed rash, HA, dizziness     MEDICATIONS:  Current Outpatient Medications   Medication Sig Dispense Refill    albuterol (PROAIR HFA/PROVENTIL HFA/VENTOLIN HFA) 108 (90 Base) MCG/ACT inhaler Inhale 2 puffs into the lungs every 4 hours as needed for shortness of breath / dyspnea or wheezing 1 Inhaler 11    aspirin 81 MG tablet Take 1 tablet by mouth daily.      blood glucose (ACCU-CHEK COLIN PLUS) test strip Test Blood Sugar 8 times daily or as directed 800 strip 3    blood glucose monitoring (ACCU-CHEK FASTCLIX) lancets " Use to test blood sugar 8 times daily or as directed. 4 Box 3    blood glucose monitoring (NO BRAND SPECIFIED) meter device kit Use to test blood sugar 8 times daily or as directed. Any covered brand, 1 kit 0    Calcium Carbonate-Vitamin D (CALCIUM + D PO) Take one daily      citalopram (CELEXA) 20 MG tablet Take 1.5 tablets (30 mg) by mouth daily 135 tablet 1    Continuous Blood Gluc  (DEXCOM G6 ) SUZANNE Use to read blood sugars as per 's instructions. 1 each 0    Continuous Blood Gluc Sensor (DEXCOM G6 SENSOR) MISC Change every 10 days. 9 each 3    Continuous Blood Gluc Transmit (DEXCOM G6 TRANSMITTER) MISC Change every 3 months. 1 each 3    diclofenac (VOLTAREN) 1 % topical gel Apply 2 g topically 4 times daily 100 g 4    diclofenac (VOLTAREN) 1 % topical gel APPLY 2 GRAMS ONTO THE SKIN FOUR TIMES DAILY AS NEEDED FOR MODERATE PAIN 100 g 5    DULERA 100-5 MCG/ACT inhaler INHALE 2 PUFFS INTO THE LUNGS TWICE DAILY 13 g 5    econazole nitrate 1 % cream Apply 0.5 inches topically daily.      FIASP PENFILL 100 UNIT/ML SOCT INJECT UP TO 45 UNITS DAILY AS DIRECTED ACCORDING TO CORRECTION FACTOR. 45 mL 3    fish oil-omega-3 fatty acids 1000 MG capsule Take one daily      fluocinonide (LIDEX) 0.05 % external solution Apply topically 2 times daily 60 mL 4    folic acid (FOLVITE) 1 MG tablet Take 1 tablet (1 mg) by mouth daily 90 tablet 3    Insulin Aspart, w/Niacinamide, (FIASP) 100 UNIT/ML SOLN Inject 50 Units as directed daily For use in ambulatory insulin pump as directed.  Approximate daily dose is 50 units. 20 mL 11    insulin degludec (TRESIBA FLEXTOUCH) 100 UNIT/ML pen ADMINISTER 8 UNITS UNDER THE SKIN DAILY. PLEASE DO NOT FILL TODAY ( 10/18/2023 ). 30 mL 1    Insulin Disposable Pump (OMNIPOD 5 G6 INTRO, GEN 5,) KIT 1 kit continuous 1 kit 0    Insulin Disposable Pump (OMNIPOD 5 G6 POD, GEN 5,) MISC 1 each every 3 days 30 each 3    insulin pen needle (B-D U/F) 31G X 8 MM miscellaneous Use 5  "pen needles daily 450 each 3    insulin syringe-needle U-100 (30G X 1/2\" 1 ML) 30G X 1/2\" 1 ML miscellaneous Use 1 syringes as directed to draw up insulin for pump. Per pharmacy request. . 100 each 1    Ketoconazole (NIZORAL PO) Shampoo daily      levothyroxine (SYNTHROID/LEVOTHROID) 112 MCG tablet TAKE 1 TABLET(112 MCG) BY MOUTH DAILY 90 tablet 4    methotrexate 2.5 MG tablet Take 6 tablets (15 mg) by mouth every 7 days Labs every 8 - 12 weeks for refills. 72 tablet 0    methotrexate 2.5 MG tablet Take 6 tablets (15 mg) by mouth every 7 days Labs every 8 - 12 weeks for refills. 72 tablet 0    methotrexate 2.5 MG tablet Take 6 tablets (15 mg) by mouth every 7 days Labs required every 8-12 weeks while taking this medication 72 tablet 0    methylphenidate (METADATE CD) 20 MG CR capsule Take 20 mg by mouth daily      methylphenidate (RITALIN) 10 MG tablet TAKE UP TO 2 TABLETS BY MOUTH IN THE LATE AFTERNOON.      Minoxidil (ROGAINE EX) daily      montelukast (SINGULAIR) 5 MG chewable tablet CHEW AND SWALLOW 1 TABLET(5 MG) BY MOUTH AT BEDTIME 90 tablet 4    Multiple Vitamin (MULTIVITAMIN OR) Take one daily tab      NOVOLOG PENFILL 100 UNIT/ML soln INJECT 1 UNIT PER 15 GRAMS CHO AT ALL MEALS AND SNACKS WITH CORRECTION SCALE OF 1 UNIT PER 50 MG/DL OVER 150, AVERAGE DAILY USE OF 30 UNITS 30 mL 4    ondansetron (ZOFRAN ODT) 4 MG ODT tab Take 1 tablet (4 mg) by mouth every 8 hours as needed for vomiting 30 tablet 1    Semaglutide, 1 MG/DOSE, 4 MG/3ML SOPN Inject 1 mg Subcutaneous every 7 days 12 mL 3    simvastatin (ZOCOR) 20 MG tablet Take 1 tablet (20 mg) by mouth At Bedtime 90 tablet 3    traZODone (DESYREL) 50 MG tablet Take 1-2 tablets by mouth nightly as needed for sleep. 180 tablet 0    Injection Device for insulin (INPEN 100-BLUE-NOVOLOG-FIASP) SUZANNE 1 each once for 1 dose 1 each 0     PAST MEDICAL HISTORY:  Past Medical History:   Diagnosis Date    Anemia     Anxiety     Arthritis 2014    Psoriatic arthritis    Back " injury 1988    Dry eye syndrome     Dyslipidemia     Endometriosis 2002    adehsions seen at laparoscopy    GERD (gastroesophageal reflux disease)     Hypothyroidism     8 yoa    SOB (shortness of breath) 3/11/2014    Type I (juvenile type) diabetes mellitus without mention of complication, not stated as uncontrolled     when 15     Uncomplicated asthma Fall 2013     PAST SURGICAL HISTORY:  Past Surgical History:   Procedure Laterality Date    COLONOSCOPY  2002    COLONOSCOPY N/A 6/23/2020    Procedure: COLONOSCOPY;  Surgeon: Lauryn Rivera MD;  Location:  GI    ESOPHAGOSCOPY, GASTROSCOPY, DUODENOSCOPY (EGD), COMBINED  1/7/2014    Procedure: COMBINED ESOPHAGOSCOPY, GASTROSCOPY, DUODENOSCOPY (EGD), BIOPSY SINGLE OR MULTIPLE;;  Surgeon: Kraig Nicole MD;  Location:  GI    ESOPHAGOSCOPY, GASTROSCOPY, DUODENOSCOPY (EGD), COMBINED N/A 10/5/2023    Procedure: Esophagoscopy, gastroscopy, duodenoscopy (EGD), combined;  Surgeon: Raheem Hubbard MD;  Location:  GI    GYN SURGERY      Laparotomy to remove endometrial tissue    HC BREATH HYDROGEN TEST N/A 6/17/2014    Procedure: HYDROGEN BREATH TEST;  Surgeon: Deacon Alberts MD;  Location:  GI    HYDROGEN BREATH TEST  6/17/14    LAPAROSCOPIC APPENDECTOMY  2002    LAPAROTOMY, LYSIS ADHESIONS, COMBINED  2002    endometriosis    SOFT TISSUE SURGERY  December 2014    right ankle tendon injury    ZZC REPAIR CRUCIATE LIGAMENT,KNEE      ZZC STOMACH SURGERY PROCEDURE UNLISTED  December 2002     SOCIAL HISTORY:  Social History     Socioeconomic History    Marital status: Single     Spouse name: Not on file    Number of children: 0    Years of education: Not on file    Highest education level: Not on file   Occupational History    Occupation: research     Employer: [employer]   Tobacco Use    Smoking status: Never     Passive exposure: Never    Smokeless tobacco: Never   Vaping Use    Vaping Use: Never used   Substance and Sexual Activity    Alcohol use:  Yes     Comment: moderately    Drug use: No    Sexual activity: Not on file   Other Topics Concern    Parent/sibling w/ CABG, MI or angioplasty before 65F 55M? Yes   Social History Narrative    Not on file     Social Determinants of Health     Financial Resource Strain: Not on file   Food Insecurity: Not on file   Transportation Needs: Not on file   Physical Activity: Not on file   Stress: Not on file   Social Connections: Not on file   Interpersonal Safety: Not on file   Housing Stability: Not on file     FAMILY HISTORY:  Family History   Problem Relation Age of Onset    Breast Cancer Mother     Heart Disease Father     C.A.D. Father     Arthritis Father     Circulatory Father     Eye Disorder Father     Lipids Father     Thyroid Disease Father     Macular Degeneration Father     Coronary Artery Disease Father     Anxiety Disorder Father     Alcoholism Father     Rheumatoid Arthritis Father     Hypothyroidism Father     Diabetes Father     Cerebrovascular Disease Paternal Grandmother         ,    Cancer Paternal Grandfather         Pancreatic    Heart Disease Paternal Grandfather     Diabetes Maternal Grandmother         Type 2    C.A.D. Maternal Grandmother     Heart Disease Maternal Grandmother     Coronary Artery Disease Maternal Grandmother     Heart Disease Maternal Grandfather     Cardiac Sudden Death Maternal Grandfather     Gynecology Other         self    Thyroid Disease Other         self    Macular Degeneration Maternal Aunt     Diabetes Other         Type 1    Glaucoma No family hx of

## 2024-01-10 NOTE — NURSING NOTE
Chief Complaint   Patient presents with    New Patient       Vitals were taken and medications were reconciled.    Walter Arreola, Technician  1:10 PM  January 10, 2024

## 2024-01-10 NOTE — PATIENT INSTRUCTIONS
Today we discussed your episodes dizziness and associated symptoms. We recommend obtaining an MRI of your brain to rule out any structural causes such as a stroke. We think this is less likely but it will be helpful to rule it out.     It is possible that your symptoms are consistent with persistent postural-perceptual dizziness, however we will evaluate further.     It is great that you are already working with vestibular PT and we recommend continuing doing these therapies.     There is some evidence that low dose selective serotonin reuptake inhibitor (ssri) and SNRIs can help with the symptoms as well. This can be something you can consider if the MRI does not show a stroke. You can talk with your primary provider further as well.

## 2024-01-10 NOTE — LETTER
1/10/2024       RE: Dali Martines  [address]  [facility] [address]       Dear Colleague,    Thank you for referring your patient, Dali Martines, to the [facility] [place] at [facility]. Please see a copy of my visit note below.      DEPARTMENT OF NEUROLOGY  Referral for: dizziness  Patient Name:  Dali Martines  MRN:  [id number]    :  1970  Date of Clinic Visit:  January 10, 2024  Primary Care Provider:  Dimitri Alas  Referring Provider: Rick Nissen, MD    ASSESSMENT AND PLAN:  Dali Martines is a  53 year old female with a history of anxiety, psoriatic arthritis, endometriosis, GERD, hypothyroidism, type I DM, asthma, chronic low back pain presenting with dizziness. Dali describes an acute onset of dizziness in  then has had persistent, episodic symptoms since that time. Given acute onset and symptoms of incoordination when symptoms first started, will proceed with MRI Brain with and without contrast to rule out structural lesion such as an infarct. However, stroke is less likely. Huong-Hallpike has been negative in the past (checked 23). It is possible that Dali has persistent postural-perceptual dizziness given that she has had symptoms of dizziness and unsteadiness on most days for more than 3 months. Her symptoms are exacerbated by active and passive motion without regard to direction and with moving visual stimuli/complex visual patterns. Her symptoms may have been triggered by extreme turbulence on plane and patient reports significant stressors in life around that time. Her symptoms have affected her functioning. We do need to rule out central lesion but her symptoms are consistent with persistent postural-perceptual dizziness. She is already working with vestibular PT and has found some improvement. Additionally, if her symptoms persist, could consider low dose selective serotonin reuptake  inhibitor/SNRI as there has been some evidence that low doses can help with symptoms.     Plan:  - continue with vestibular PT  - MRI Brain with and without contrast  - could consider low dose selective serotonin reuptake inhibitor/SNRI in the future as well.     Follow up with me following MRI Brain in 3 months.     Patient was seen and discussed with Dr. Balderrama.    DILIP OLGUIN MD  Neurology Resident, PGY1  [facility] Department of Neurology    ___________________________________________    CHIEF COMPLAINT: dizziness    HISTORY OF PRESENT ILLNESS:  Dali Martines is a  53 year old female with a history of anxiety, psoriatic arthritis, endometriosis, GERD, hypothyroidism, type I DM, asthma, chronic low back pain presenting with dizziness.    In June of 2022, Dali was on a plane for a conference and had severe turbulance for an extended period of time during the flight. She was reading a book and then started to feel ill. She did not throw up but felt poorly. When she landed, she was staying with family and during that time, she felt dizzy when walking, felt out of it, very fatigued, and required frequent naps. She noticed when she had alcohol with dinner, she would feel better. She described her symptoms with one of her colleagues who has experienced vertigo and thought Dali's symptoms sounded similar. She felt like her balance was off as well.     She saw ENT and was given diagnosis of positional vertigo and was given therapies but it did not seem to help.     In fall of 2022, she went to go sit on a chair and she missed the chair and fell on the floor. She did not realize she was falling until she had already fallen. In June of 2023, she fell and injured herself. She tripped on a rug on the floor and again did not realize she was falling until she had already fallen.    She was referred for dizziness and balance testing and started working with vestibular PT. She has noticed improvement with  PT. Her hearing was tested and it was normal. She continues to work with vestibular PT and has been slow and steady but has improved. She has not fallen recently. She continues to have some symptoms of imbalance and feeling.    Her dizziness symptoms including feeling light headed and she does have a room spinning sensation occasionally. Her dizziness worsens with head movements, specifically when going from looking down to looking up high, and eyes movements.  When  her symptoms first started, she would have frequent episodes each day. Now she has episodes once a day or less. She has worsening symptoms especially when going from sitting to standing and symptoms improve after standing for longer periods of time. She gave an example that, she would come home from grocery shopping (with frequent scanning side to side) and feel overtaxed. She had an upper respiratory tract infection and then started having worsening symptoms. Recently, she has only had symptoms when standing. When she first had symptoms, it was in any position.     At the initial onset, she felt like she had difficulty handling objects (was not sure where the object was in space) but that has resolved. Dali also noted that she had significant stressors at the onset of her symptoms including losing her job, losing a parent and her other parent was diagnosed with dementia. She shared that she has ongoing stressors as well.     She has not had vision changes, headaches, weakness or sensation changes. She tries to drink at least 6 glasses of water/juice a day.     Dali works as a research and . She lives in [place] in a home with her dog. Sheenjoys reading, going for walks with her dog, and getting together with friends. No tobacco use, moderate alcohol intake, one drink with dinner couple times a week, no other drug use. She is right handed.     PHYSICAL EXAMINATION:  Vitals: /79 (BP Location: Right arm, Patient Position:  "Sitting, Cuff Size: Adult Regular)   Pulse 71   Ht 1.778 m (5' 10\")   Wt 77.2 kg (170 lb 1.6 oz)   SpO2 99%   BMI 24.41 kg/m      General: Sitting up in chair, in no acute distress    Neurologic Exam:  Mental status: Patient is oriented to person, place and time and able to provide a detailed account of history of illness. Attention and fund of knowledge are normal. Minicog is 5/5    Language: Speech is fluent; comprehension, repetition and naming are normal.    Cranial nerves: Pupils are round and react normally to light and accommodation. Optic discs are sharp and flat and without pallor; retina appears normal without hemorrhages or exudates. Visual fields are full and extraocular movements are normal. Facial strength and sensation are normal. Hearing is normal to conversation. Palate rises symmetrically and there is no dysarthria. Tongue protrusion is normal.    Motor/Strength: Strength is 5/5 in shoulder abduction, elbow flexion/extension, wrist flexion/extension, finger flexion/extension and thumb abduction bilaterally. 5/5 in hip flexion, knee flexion/extension, dorsiflexion, plantarflexion, foot eversion and inversion bilaterally.  No tremors, myoclonus, or other abnormal movements. Muscle bulk and tone are normal.     Sensation: Sensation is intact to pin-prick and vibration in all four extremities.     Reflexes: Biceps, brachioradialis, triceps, patellae, and ankle jerks 2+ bilaterally. Plantar responses are flexor bilaterally.    Coordination: Finger-to-nose and heel-to-shin maneuvers are symmetric and normal bilaterally. Rapid alternating movements are symmetric in the extremities. No pronator drift. Rapid alternating movements with finger tapping slightly slower on the left.     Gait: Gait is narrow based and steady and the patient is able to walk on heels, toes and in tandem. Romberg is negative.     INVESTIGATIONS:   Audiology Report-Balance Assessment 9/21/23  \"Videonystagmography (VNG) " testing:  Prescreening:  Tympanograms: Normal eardrum mobility bilaterally. Note: this test is completed to determine the status of the middle ear before irrigations are completed. *Please see audiogram for tympanograms.  Ocular range of motion and ocular counter roll: Normal  Cross/cover: Normal  Head Thrust: Saccadic bilaterally.      Nystagmus Tests:  Gaze-Horizontal with Fixation:  *Mild saccadic intrusions throughout.              Center: Normal              Right: Normal              Left: Normal  Gaze-Vertical with Fixation:  *Mild saccadic intrusions throughout.              Up: Normal              Down: Normal  Gaze with Fixation Denied              Center: Normal, mild saccadic intrusions throughout.              Right: 1 degree/s right beating nystagmus, likely endpoint\nonsignificant.                Left: 2-3 degrees/s left beating nystagmus, likely endpoint\nonsignificant.              Up: Normal, mild saccadic intrusions throughout.  High Frequency Headshake:              Horizontal: Positive. Provoked rapid 5-6 degrees/s right beating nystagmus, patient reports disorientation post head shake.              Vertical: Abnormal; provoked mild right and down beating nystagmus mixed with saccadic intrusions, no symptoms.     [place]-Hallpike Head Right: Negative for PC BPPV. No consistent nystagmus, mild saccadic intrusions throughout, no symptoms.  [place]-Hallpike Head Left: Negative for PC BPPV. No consistent nystagmus, mild saccadic intrusions throughout, no symptoms.  Roll Test Head Right: Negative for HC BPPV. No consistent nystagmus, mild saccadic intrusions throughout, no symptoms.  Roll Test Head Left: Negative for HC BPPV. No consistent nystagmus, mild saccadic intrusions throughout, no symptoms.     Positional Testing:  Positionals: Supine: Few slight 1 degree/s right beats mixed with saccadic intrusions, suppresses with fixation, no symptoms.  Positionals: Body Right: 2 degrees/s left and down beating  "nystagmus, unable to fully suppress with fixation, no symptoms.   Positionals: Body Left: 2-3 degrees/s right and down beating nystagmus, suppresses with fixation, saccadic intrusions only with fixation, no symptoms.  Positionals: Pre-Caloric: No consistent nystagmus, no symptoms.     Oculomotor Tests:  Saccades (repeatable): Borderline abnormal, saccadic in nature.  Pursuit(repeatable): Borderline abnormal, saccadic in nature.     Calorics:  (Tested at 44 degrees Celsius for 30 seconds for warm water. *Monothermal only; provoked significant symptoms (nausea and vomiting).)  Right Warm Eye Speed: 32 degrees per second right beating  Left Warm Eye Speed: 42 degrees per second left beating  Difference between ear: 14% right hypofunction. (Greater than 25% considered clinically significant.)  Fixation Index: Normal  Overall caloric test: Normal     Post Irrigations Otoscopy: Normal     ASSESSMENT:     1. Indications of central vestibular system involvement noted on today's exam were as follows:   -Mild saccadic intrusions noted throughout all testing.  -Borderline abnormal Pursuit and Saccade testing (repeatable); saccadic in nature.  -Ageotropic and down beating nystagmus evident in Body Right\Left Positionals.  -Abnormal Vertical High Frequency Headshake; provoked mild right and down beating nystagmus in the absence of symptoms.  -Positive Horizontal High Frequency Headshake; in the absence of a significant hypofunction, this finding may suggest central influences.     2. There were no significant indications of peripheral vestibular system involvement noted on today's exam. \"    ALLERGIES:  Allergies   Allergen Reactions    Monosodium Glutamate Palpitations and Shortness Of Breath    Sulfasalazine      Developed rash, HA, dizziness     MEDICATIONS:  Current Outpatient Medications   Medication Sig Dispense Refill    albuterol (PROAIR HFA/PROVENTIL HFA/VENTOLIN HFA) 108 (90 Base) MCG/ACT inhaler Inhale 2 puffs into " the lungs every 4 hours as needed for shortness of breath / dyspnea or wheezing 1 Inhaler 11    aspirin 81 MG tablet Take 1 tablet by mouth daily.      blood glucose (ACCU-CHEK COLIN PLUS) test strip Test Blood Sugar 8 times daily or as directed 800 strip 3    blood glucose monitoring (ACCU-CHEK FASTCLIX) lancets Use to test blood sugar 8 times daily or as directed. 4 Box 3    blood glucose monitoring (NO BRAND SPECIFIED) meter device kit Use to test blood sugar 8 times daily or as directed. Any covered brand, 1 kit 0    Calcium Carbonate-Vitamin D (CALCIUM + D PO) Take one daily      citalopram (CELEXA) 20 MG tablet Take 1.5 tablets (30 mg) by mouth daily 135 tablet 1    Continuous Blood Gluc  (DEXCOM G6 ) SUZANNE Use to read blood sugars as per 's instructions. 1 each 0    Continuous Blood Gluc Sensor (DEXCOM G6 SENSOR) MISC Change every 10 days. 9 each 3    Continuous Blood Gluc Transmit (DEXCOM G6 TRANSMITTER) MISC Change every 3 months. 1 each 3    diclofenac (VOLTAREN) 1 % topical gel Apply 2 g topically 4 times daily 100 g 4    diclofenac (VOLTAREN) 1 % topical gel APPLY 2 GRAMS ONTO THE SKIN FOUR TIMES DAILY AS NEEDED FOR MODERATE PAIN 100 g 5    DULERA 100-5 MCG/ACT inhaler INHALE 2 PUFFS INTO THE LUNGS TWICE DAILY 13 g 5    econazole nitrate 1 % cream Apply 0.5 inches topically daily.      FIASP PENFILL 100 UNIT/ML SOCT INJECT UP TO 45 UNITS DAILY AS DIRECTED ACCORDING TO CORRECTION FACTOR. 45 mL 3    fish oil-omega-3 fatty acids 1000 MG capsule Take one daily      fluocinonide (LIDEX) 0.05 % external solution Apply topically 2 times daily 60 mL 4    folic acid (FOLVITE) 1 MG tablet Take 1 tablet (1 mg) by mouth daily 90 tablet 3    Insulin Aspart, w/Niacinamide, (FIASP) 100 UNIT/ML SOLN Inject 50 Units as directed daily For use in ambulatory insulin pump as directed.  Approximate daily dose is 50 units. 20 mL 11    insulin degludec (TRESIBA FLEXTOUCH) 100 UNIT/ML pen ADMINISTER 8  "UNITS UNDER THE SKIN DAILY. PLEASE DO NOT FILL TODAY ( 10/18/2023 ). 30 mL 1    Insulin Disposable Pump (OMNIPOD 5 G6 INTRO, GEN 5,) KIT 1 kit continuous 1 kit 0    Insulin Disposable Pump (OMNIPOD 5 G6 POD, GEN 5,) MISC 1 each every 3 days 30 each 3    insulin pen needle (B-D U/F) 31G X 8 MM miscellaneous Use 5 pen needles daily 450 each 3    insulin syringe-needle U-100 (30G X 1/2\" 1 ML) 30G X 1/2\" 1 ML miscellaneous Use 1 syringes as directed to draw up insulin for pump. Per pharmacy request. . 100 each 1    Ketoconazole (NIZORAL PO) Shampoo daily      levothyroxine (SYNTHROID/LEVOTHROID) 112 MCG tablet TAKE 1 TABLET(112 MCG) BY MOUTH DAILY 90 tablet 4    methotrexate 2.5 MG tablet Take 6 tablets (15 mg) by mouth every 7 days Labs every 8 - 12 weeks for refills. 72 tablet 0    methotrexate 2.5 MG tablet Take 6 tablets (15 mg) by mouth every 7 days Labs every 8 - 12 weeks for refills. 72 tablet 0    methotrexate 2.5 MG tablet Take 6 tablets (15 mg) by mouth every 7 days Labs required every 8-12 weeks while taking this medication 72 tablet 0    methylphenidate (METADATE CD) 20 MG CR capsule Take 20 mg by mouth daily      methylphenidate (RITALIN) 10 MG tablet TAKE UP TO 2 TABLETS BY MOUTH IN THE LATE AFTERNOON.      Minoxidil (ROGAINE EX) daily      montelukast (SINGULAIR) 5 MG chewable tablet CHEW AND SWALLOW 1 TABLET(5 MG) BY MOUTH AT BEDTIME 90 tablet 4    Multiple Vitamin (MULTIVITAMIN OR) Take one daily tab      NOVOLOG PENFILL 100 UNIT/ML soln INJECT 1 UNIT PER 15 GRAMS CHO AT ALL MEALS AND SNACKS WITH CORRECTION SCALE OF 1 UNIT PER 50 MG/DL OVER 150, AVERAGE DAILY USE OF 30 UNITS 30 mL 4    ondansetron (ZOFRAN ODT) 4 MG ODT tab Take 1 tablet (4 mg) by mouth every 8 hours as needed for vomiting 30 tablet 1    Semaglutide, 1 MG/DOSE, 4 MG/3ML SOPN Inject 1 mg Subcutaneous every 7 days 12 mL 3    simvastatin (ZOCOR) 20 MG tablet Take 1 tablet (20 mg) by mouth At Bedtime 90 tablet 3    traZODone (DESYREL) 50 MG " tablet Take 1-2 tablets by mouth nightly as needed for sleep. 180 tablet 0    Injection Device for insulin (INPEN 100-BLUE-NOVOLOG-FIASP) SUZANNE 1 each once for 1 dose 1 each 0     PAST MEDICAL HISTORY:  Past Medical History:   Diagnosis Date    Anemia     Anxiety     Arthritis 2014    Psoriatic arthritis    Back injury 1988    Dry eye syndrome     Dyslipidemia     Endometriosis 2002    adehsions seen at laparoscopy    GERD (gastroesophageal reflux disease)     Hypothyroidism     8 yoa    SOB (shortness of breath) 3/11/2014    Type I (juvenile type) diabetes mellitus without mention of complication, not stated as uncontrolled     when 15     Uncomplicated asthma Fall 2013     PAST SURGICAL HISTORY:  Past Surgical History:   Procedure Laterality Date    COLONOSCOPY  2002    COLONOSCOPY N/A 6/23/2020    Procedure: COLONOSCOPY;  Surgeon: Lauryn Rivera MD;  Location:  GI    ESOPHAGOSCOPY, GASTROSCOPY, DUODENOSCOPY (EGD), COMBINED  1/7/2014    Procedure: COMBINED ESOPHAGOSCOPY, GASTROSCOPY, DUODENOSCOPY (EGD), BIOPSY SINGLE OR MULTIPLE;;  Surgeon: Kraig Nicole MD;  Location:  GI    ESOPHAGOSCOPY, GASTROSCOPY, DUODENOSCOPY (EGD), COMBINED N/A 10/5/2023    Procedure: Esophagoscopy, gastroscopy, duodenoscopy (EGD), combined;  Surgeon: Raheem Hubbard MD;  Location:  GI    GYN SURGERY      Laparotomy to remove endometrial tissue    HC BREATH HYDROGEN TEST N/A 6/17/2014    Procedure: HYDROGEN BREATH TEST;  Surgeon: Deacon Alberts MD;  Location:  GI    HYDROGEN BREATH TEST  6/17/14    LAPAROSCOPIC APPENDECTOMY  2002    LAPAROTOMY, LYSIS ADHESIONS, COMBINED  2002    endometriosis    SOFT TISSUE SURGERY  December 2014    right ankle tendon injury    ZZC REPAIR CRUCIATE LIGAMENT,KNEE      ZZC STOMACH SURGERY PROCEDURE UNLISTED  December 2002     SOCIAL HISTORY:  Social History     Socioeconomic History    Marital status: Single     Spouse name: Not on file    Number of children: 0    Years of education:  Not on file    Highest education level: Not on file   Occupational History    Occupation: research     Employer: [employer]   Tobacco Use    Smoking status: Never     Passive exposure: Never    Smokeless tobacco: Never   Vaping Use    Vaping Use: Never used   Substance and Sexual Activity    Alcohol use: Yes     Comment: moderately    Drug use: No    Sexual activity: Not on file   Other Topics Concern    Parent/sibling w/ CABG, MI or angioplasty before 65F 55M? Yes   Social History Narrative    Not on file     Social Determinants of Health     Financial Resource Strain: Not on file   Food Insecurity: Not on file   Transportation Needs: Not on file   Physical Activity: Not on file   Stress: Not on file   Social Connections: Not on file   Interpersonal Safety: Not on file   Housing Stability: Not on file     FAMILY HISTORY:  Family History   Problem Relation Age of Onset    Breast Cancer Mother     Heart Disease Father     C.A.D. Father     Arthritis Father     Circulatory Father     Eye Disorder Father     Lipids Father     Thyroid Disease Father     Macular Degeneration Father     Coronary Artery Disease Father     Anxiety Disorder Father     Alcoholism Father     Rheumatoid Arthritis Father     Hypothyroidism Father     Diabetes Father     Cerebrovascular Disease Paternal Grandmother         ,    Cancer Paternal Grandfather         Pancreatic    Heart Disease Paternal Grandfather     Diabetes Maternal Grandmother         Type 2    C.A.D. Maternal Grandmother     Heart Disease Maternal Grandmother     Coronary Artery Disease Maternal Grandmother     Heart Disease Maternal Grandfather     Cardiac Sudden Death Maternal Grandfather     Gynecology Other         self    Thyroid Disease Other         self    Macular Degeneration Maternal Aunt     Diabetes Other         Type 1    Glaucoma No family hx of           Attestation signed by Tamika Balderrama MD at 1/19/2024  7:46 AM:  Physician Attestation  Tamika ABDALLA  MYESHA Balderrama MD, saw this patient and agree with the findings and plan of care as documented in the note.      53F with acute onset dizziness in 2022, now with persistent symptoms that wax and wane. Will evaluate for structural cause with MRI brain, although without other deficits, this is less likely. Brought up PPPD as a potential diagnosis. She will continue vestibular PT and consider additional symptomatic treatment if needed, depending on MRI results.        Again, thank you for allowing me to participate in the care of your patient.      Sincerely,    DILIP OLGUIN MD

## 2024-01-11 ENCOUNTER — VIRTUAL VISIT (OUTPATIENT)
Dept: PSYCHOLOGY | Facility: CLINIC | Age: 54
End: 2024-01-11
Payer: COMMERCIAL

## 2024-01-11 DIAGNOSIS — E10.3299 TYPE 1 DIABETES MELLITUS WITH MILD NONPROLIFERATIVE RETINOPATHY, MACULAR EDEMA PRESENCE UNSPECIFIED, UNSPECIFIED LATERALITY (H): ICD-10-CM

## 2024-01-11 DIAGNOSIS — G89.29 OTHER CHRONIC PAIN: ICD-10-CM

## 2024-01-11 DIAGNOSIS — F33.41 MAJOR DEPRESSIVE DISORDER, RECURRENT, IN PARTIAL REMISSION (H): ICD-10-CM

## 2024-01-11 DIAGNOSIS — F43.22 ADJUSTMENT DISORDER WITH ANXIETY: Primary | ICD-10-CM

## 2024-01-11 PROCEDURE — 90834 PSYTX W PT 45 MINUTES: CPT | Mod: 95 | Performed by: PSYCHOLOGIST

## 2024-01-11 ASSESSMENT — ANXIETY QUESTIONNAIRES
GAD7 TOTAL SCORE: 6
2. NOT BEING ABLE TO STOP OR CONTROL WORRYING: SEVERAL DAYS
IF YOU CHECKED OFF ANY PROBLEMS ON THIS QUESTIONNAIRE, HOW DIFFICULT HAVE THESE PROBLEMS MADE IT FOR YOU TO DO YOUR WORK, TAKE CARE OF THINGS AT HOME, OR GET ALONG WITH OTHER PEOPLE: SOMEWHAT DIFFICULT
7. FEELING AFRAID AS IF SOMETHING AWFUL MIGHT HAPPEN: SEVERAL DAYS
GAD7 TOTAL SCORE: 6
6. BECOMING EASILY ANNOYED OR IRRITABLE: SEVERAL DAYS
3. WORRYING TOO MUCH ABOUT DIFFERENT THINGS: SEVERAL DAYS
4. TROUBLE RELAXING: SEVERAL DAYS
7. FEELING AFRAID AS IF SOMETHING AWFUL MIGHT HAPPEN: SEVERAL DAYS
8. IF YOU CHECKED OFF ANY PROBLEMS, HOW DIFFICULT HAVE THESE MADE IT FOR YOU TO DO YOUR WORK, TAKE CARE OF THINGS AT HOME, OR GET ALONG WITH OTHER PEOPLE?: SOMEWHAT DIFFICULT
1. FEELING NERVOUS, ANXIOUS, OR ON EDGE: SEVERAL DAYS
5. BEING SO RESTLESS THAT IT IS HARD TO SIT STILL: NOT AT ALL

## 2024-01-11 NOTE — PROGRESS NOTES
Health Psychology          Latanya Mayfield, Ph.D.,  [phone]  Hansa Arzate, Ph.D.,  [phone]  Sarah Beth Jack, Ph.D.,  [phone]  Pushpa Mendez, Ph.D., , [facility] [phone]  Panfilo Cornejo, Ph.D., , [facility] [phone]  Krystle Hannah, Ph.D.,  [phone]  Elise Sutton, Ph.D., , [facility] [phone]    [facility], 3rd Floor  [address] [place] [address]       Health Psychology Progress Note    Patient Name: Dali Martines    Service Type: Individual  Length of Visit: 50 minutes   Start time: 10:02 AM   Stop time: 10:52 AM    Attendees: patient attended alone  Session #: 11    Telemedicine Information:     Telemedicine Visit: The patient's condition can be safely assessed and treated via synchronous audio and visual telemedicine encounter.    Reason for Telemedicine Visit: Patient has requested telehealth visit and Patient convenience (e.g. access to timely appointments / distance to available provider)  Originating Site (Patient Location): Patient's home, [place]  Distant Location (provider location):  Off-site: Provider's Academic Office   Consent:  The patient/guardian has verbally consented to: the potential risks and benefits of telemedicine (video visit) versus in person care; bill my insurance or make self-payment for services provided; and responsibility for payment of non-covered services.   Mode of Communication:  Video Conference via [employer]  As the provider I attest to compliance with applicable laws and regulations related to telemedicine.     Identifying Information and Presenting Problem:  Dali Martines is a 53 year old female adult who was referred to health psychology by her endocrinologist, Dr. Mckeon. Patient presented with concerns of stress in the context of multiple life events, several medical conditions, and chronic management of T1DM.    Topics Discussed/Interventions Provided:      Session Content:      Update: Patient reports doing better. States that she had some challenges over the holidays related to the progression of her mother's disease. Had a consult with a neurologist regarding persistent vertigo, which she states went well. Will be having an MRI to rule out structural and anatomical etiology. Will continue with PT for vestibular therapy and with OT for cognitive therapy. Some suspicion of PPPV and may initiate an SSRI.      Homework Review: Patient reports that she did some therapeutic journaling with limited success. Reports that she has proceeded to part of module 5 of the perfectionism workbook. Will continue to carry this forward to future visits.       Skills/intervention: Supported patient in processing her thoughts and emotions around her caregiver stress and witnessing the progression of her mother's disease. She described experiencing feelings of sadness, anxiety, and grief. Discussed the impact of chronic grief on patient's emotional and overall well-being. Explored the possibility of connecting patient to caregiver support and provided patient with resources for potential options. Also discussed patient's longer-term goals and patient notes that she would like to explore returning to the workforce at some point in the future. She also noted that she has come to the realization that she has some ongoing unaddressed grief. Mutually agreed to begin focusing on addressing grief in our sessions together.           Top values identified included: spirituality, humor, fairness, creativity, curiosity, contribution, and caring.  Unhelpful rules/assumptions: fear of failure, all-or-nothing thinking, and simplicity, control, and structure    Treatment Objective(s) Addressed in This Session:  Psychological distress related to Diabetes  Psychological distress related to caregiver stress    Progress on / Status of Treatment Objective(s) / Homework:  In progress    Medication Adherence: Patient did not  "report medication changes. Current medication list is below:     Current Outpatient Medications   Medication    albuterol (PROAIR HFA/PROVENTIL HFA/VENTOLIN HFA) 108 (90 Base) MCG/ACT inhaler    aspirin 81 MG tablet    blood glucose (ACCU-CHEK COLIN PLUS) test strip    blood glucose monitoring (ACCU-CHEK FASTCLIX) lancets    blood glucose monitoring (NO BRAND SPECIFIED) meter device kit    Calcium Carbonate-Vitamin D (CALCIUM + D PO)    citalopram (CELEXA) 20 MG tablet    Continuous Blood Gluc  (DEXCOM G6 ) SUZANNE    Continuous Blood Gluc Sensor (DEXCOM G6 SENSOR) MISC    Continuous Blood Gluc Transmit (DEXCOM G6 TRANSMITTER) MISC    diclofenac (VOLTAREN) 1 % topical gel    diclofenac (VOLTAREN) 1 % topical gel    DULERA 100-5 MCG/ACT inhaler    econazole nitrate 1 % cream    FIASP PENFILL 100 UNIT/ML SOCT    fish oil-omega-3 fatty acids 1000 MG capsule    fluocinonide (LIDEX) 0.05 % external solution    folic acid (FOLVITE) 1 MG tablet    Injection Device for insulin (INPEN 100-BLUE-NOVOLOG-FIASP) SUZANNE    Insulin Aspart, w/Niacinamide, (FIASP) 100 UNIT/ML SOLN    insulin degludec (TRESIBA FLEXTOUCH) 100 UNIT/ML pen    Insulin Disposable Pump (OMNIPOD 5 G6 INTRO, GEN 5,) KIT    Insulin Disposable Pump (OMNIPOD 5 G6 POD, GEN 5,) MISC    insulin pen needle (B-D U/F) 31G X 8 MM miscellaneous    insulin syringe-needle U-100 (30G X 1/2\" 1 ML) 30G X 1/2\" 1 ML miscellaneous    Ketoconazole (NIZORAL PO)    levothyroxine (SYNTHROID/LEVOTHROID) 112 MCG tablet    methotrexate 2.5 MG tablet    methotrexate 2.5 MG tablet    methotrexate 2.5 MG tablet    methylphenidate (METADATE CD) 20 MG CR capsule    methylphenidate (RITALIN) 10 MG tablet    Minoxidil (ROGAINE EX)    montelukast (SINGULAIR) 5 MG chewable tablet    Multiple Vitamin (MULTIVITAMIN OR)    NOVOLOG PENFILL 100 UNIT/ML soln    ondansetron (ZOFRAN ODT) 4 MG ODT tab    Semaglutide, 1 MG/DOSE, 4 MG/3ML SOPN    simvastatin (ZOCOR) 20 MG tablet    traZODone " (DESYREL) 50 MG tablet     Current Facility-Administered Medications   Medication    lidocaine 1 % injection 1 mL    triamcinolone acetonide (KENALOG-10) injection 10 mg     Assessment: The patient appeared to be active and engaged in today's session and was receptive to feedback.     Mental Status Exam:   Appearance: Appropriate   Eye Contact: Good    Orientation: Yes, x4  Behavior/relationship to provider/demeanor: Cooperative, Engaged, and Pleasant  Motor Activity: normal or unremarkable  Mood (subjective report): Stressed, anxious  Affect (objective appearance): Appropriate/mood congruent  Speech Rate: Normal  Speech Volume: Normal  Speech Articulation: Normal  Speech Coherence: Normal  Speech Spontaneity: Normal  Thought Content: no suicidal/homicidal ideation, plans, or intent  Thought Process (associations): Logical, Linear, and Goal directed  Thought Process (rate): Normal  Abnormal Perception: None  Attention/Concentration: Normal  Memory: Appears grossly intact   Fund of Knowledge: Above average  Abstraction:  Normal  Insight:  Good  Judgment:  good    Does the patient appear to be at imminent risk of harm to self/others at this time? No    The session was necessary to address psychological distress in the context of multiple psychosocial stressors and chronic health concerns.    Diagnoses (provisional):    1. Adjustment disorder with anxiety    2. Major depressive disorder, recurrent, in partial remission (H)    3. 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 Other chronic pain        Plan:    Follow-up video visit with me on 1/24/24 at 11:00am    Patient to use 911 or other crisis resources in the event of a psychiatric emergency  Primary care needs and medications are managed by Dimitri Alas MD. Referring provider is Dr. Mckeon.  Next visit agenda/goals:   Continue to practice meditations  Continue to work  "through modules 4-5 of perfectionism workbook  Next session: explore and process grief, use working with difficulty meditation to initiate.    Skills taught/interventions used thus far:  Psychoeducation  Values clarification  Committed action (vitality vs suffering)  Perfectionism workbook (modules 1-5)  Defusion  Acting opposite  Mindfulness (breath, body scan, watching thoughts, choiceless awareness)  Therapeutic journaling    NOTE: Treatment plan update due 9/6/24; 90 day treatment plan review due 3/10/24.                                                Individual Treatment Plan    Patient's Name: Dali Martines  YOB: 1970    Date of Creation: 12/11/23  Date Treatment Plan Last Reviewed/Revised: 12/11/23    DSM5 Diagnoses:     1. Adjustment disorder with anxiety    2. Major depressive disorder, recurrent, in partial remission (H)      Psychosocial / Contextual Factors: financial hardship, health issues, occupational/vocational stress, relationship stress, caregiver stress (mother's health concerns), and grief and loss in the wake of recent bereavement  PROMIS (reviewed every 90 days):      10/31/2023  9:55 AM   PROMIS 10    PROMIS TOTAL - SUBSCORES 21          Referral / Collaboration:  Collaboration with medical team and specialists as needed.    Anticipated number of session for this episode of care: 15-20  Anticipation frequency of session: 2x per month  Anticipated Duration of each session: 38-52 minutes  Treatment plan will be reviewed in 90 days or when goals have been changed.       Measurable Treatment Goal(s) related to diagnosis/functional impairment(s): Overall goals for treatment include improving her physical health (e.g., diabetes, physical activity, and GI discomfort), learning strategies for enhancing coping capacity, and decreasing avoidance-based coping.  Goal 1: Patient will decrease her use of avoidance-based coping   \"I will know I've met my goal when I am not avoiding doing " "things out of fear (e.g., bathroom sink, processing grief, physical activity, reviewing pieces of writing).\"     Objective #A (Patient Action)    Patient will learn and implement skills for reducing perfectionism.  Status: In progress - Date: 12/11/23 (patient is in the process of completing the perfectionism workbook)     Intervention(s)  Therapist will teach anti-perfectionism skills and will assist patient in completing a workbook on perfectionism.    Objective #B  Patient will initiate a healthy grieving process  Status: In progress - Date: 12/11/23 (patient is learning emotional expression skills)    Intervention(s)  Therapist will assist patient in processing and exploring her grief.    Objective #C  Patient will increase her participation in meaningful and purposeful activities.  Status: In progress - Date: 12/11/23 (patient reports some progress in increasing her participation in meaningful activities, will focus on generating a broader list of activities)    Intervention(s)  Therapist will teach values clarification, committed action, and exposure skills.    Objective #D  Patient will learn and implement unhooking skills for decreasing the degree to which fear influences behaviors and decisions in an unhelpful way.  Status: In progress - Date: 12/11/23 (patient has been learning and implementing mindfulness and cognitive restructuring skills)     Intervention(s)  Therapist will teach defusion, cognitive restructuring, and mindfulness skills.      Patient has reviewed and agreed to the above plan.      Elise Sutton, PhD  September 6, 2023    Elise Sutton, Ph.D., , [facility]  Clinical Health Psychologist  Phone: [phone]   Pager: [phone]           "

## 2024-01-18 ENCOUNTER — VIRTUAL VISIT (OUTPATIENT)
Dept: EDUCATION SERVICES | Facility: CLINIC | Age: 54
End: 2024-01-18
Payer: COMMERCIAL

## 2024-01-18 DIAGNOSIS — E10.9 TYPE 1 DIABETES MELLITUS (H): Primary | ICD-10-CM

## 2024-01-18 PROCEDURE — G0108 DIAB MANAGE TRN  PER INDIV: HCPCS | Mod: 95 | Performed by: REGISTERED NURSE

## 2024-01-18 NOTE — NURSING NOTE
Is the patient currently in the state of [place]? YES    Visit mode:VIDEO    If the visit is dropped, the patient can be reconnected by: VIDEO VISIT: Send to e-mail at: [email or web address]    Will anyone else be joining the visit? NO  (If patient encounters technical issues they should call [phone]    How would you like to obtain your AVS? MyChart    Are changes needed to the allergy or medication list? Patient reported making no changes to her e-check in information for visit. VF did not review this information again with patient due to this.    Reason for visit: RECHECK (Follow up- wants to go over pump data and adjustments )    Latanya CORRIGAN

## 2024-01-19 NOTE — PROGRESS NOTES
Video-Visit Details    Type of service:  Video Visit  Patient consented to video visit  Video Start Time:  1600  Video End Time:   1630  Originating Location (pt. Location): Home  Distant Location (provider location):  [facility] DIABETES EDUCATION [place]   Platform used for Video Visit: [employer]    Diabetes Self-Management Education & Support Virtual Visit---Short    Dali Martines contacted today for education related to Type 1 diabetes    Patient is being treated with:  Omnipod 5 Insulin Pump    Year of diagnosis: 1986  Referring provider:  Felicia Mckeon MD  Living Situation: Lives alone and has a dog.  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  Her job was eliminated and because of many other stressors in her life, she opted to not work for a time.     Purpose of today's visit:  Started the Omnipod 5 insulin pump about       Subjective/Objective:        Assessment/Plan:    She has experienced several episodes, happening late at night into the early overnight when apparently the pump had reached max basal delivery in automated mode and put the pump into Limited mode. She was awakened from sleep and had to check her glucose and put the pump back into automated mode.  I reviewed the pump report from the days this happened (Dec. 29, 30 and 31).  It appears that she had a late rise in her glucose following dinner, related to delayed gastric emptying and perhaps a meal that is higher in protein and fat, and a feature of the Omnipod is to alert and then put the pump in Limited mode if BG remains high despite two hours of the pump delivering at MAX, which is roughly four times the normal basal rate.  When this happened, she did check a finger stick glucose, but did not deliver a correction bolus.  From our conversation it sounded like she was unaware that she could do this.  She has just been waiting for the pump to take care of it with basal delivery only.   This whole experience really shook her confidence in the pump and she did some overrides to deliver more insulin with her meals.      Long discussion about the algorithm in the pump.  Explained the reason that the pump alerts and puts the pump in Limited mode when max delivery has been reached for two hours.  We have no control over this feature.  The Max Basal setting in the pump is only in effect when the pump is in Manual Mode.  Discussed some strategies for preventing her glucose from rising so much after a complex meal.  Suggested several strategies:    Deliver correction bolus 15 to 20 minutes prior to eating.  Deliver the food bolus after the beginning of the meal but reduce how many carbs are entered by about 25%.  If glucose is still high a couple of hours after eating, deliver a correction bolus without adding any additional carbohydrates.  Alternatively she could put the pump in Manual Mode prior to eating, and use the Extended bolus feature to prolong the delivery of the bolus.    Encouraged her to go ahead and deliver a correction bolus when she notices that her glucose is starting to climb.      Suggest a follow up appointment in about a month to re-evaluate.  Appointment made.        Any diabetes medication dose changes were made via the CDE Protocol and Collaborative Practice Agreement. A copy of this encounter was provided to the patient's referring provider.      Time spent in this visit: 30 minutes

## 2024-01-23 DIAGNOSIS — H40.003 GLAUCOMA SUSPECT OF BOTH EYES: ICD-10-CM

## 2024-01-23 DIAGNOSIS — E10.3291 TYPE 1 DIABETES MELLITUS WITH MILD NONPROLIFERATIVE RETINOPATHY OF RIGHT EYE WITHOUT MACULAR EDEMA (H): Primary | ICD-10-CM

## 2024-01-24 ENCOUNTER — VIRTUAL VISIT (OUTPATIENT)
Dept: PSYCHOLOGY | Facility: CLINIC | Age: 54
End: 2024-01-24
Payer: COMMERCIAL

## 2024-01-24 ENCOUNTER — THERAPY VISIT (OUTPATIENT)
Dept: OCCUPATIONAL THERAPY | Facility: CLINIC | Age: 54
End: 2024-01-24
Payer: COMMERCIAL

## 2024-01-24 ENCOUNTER — THERAPY VISIT (OUTPATIENT)
Dept: PHYSICAL THERAPY | Facility: CLINIC | Age: 54
End: 2024-01-24
Payer: COMMERCIAL

## 2024-01-24 DIAGNOSIS — G93.39 OTHER POST INFECTION AND RELATED FATIGUE SYNDROMES: Primary | ICD-10-CM

## 2024-01-24 DIAGNOSIS — G89.29 OTHER CHRONIC PAIN: ICD-10-CM

## 2024-01-24 DIAGNOSIS — Z78.9 ALTERATION IN INSTRUMENTAL ACTIVITIES OF DAILY LIVING (IADL): ICD-10-CM

## 2024-01-24 DIAGNOSIS — R26.89 BALANCE DISORDER: Primary | ICD-10-CM

## 2024-01-24 DIAGNOSIS — R68.89 DECREASED FUNCTIONAL ACTIVITY TOLERANCE: ICD-10-CM

## 2024-01-24 DIAGNOSIS — F43.22 ADJUSTMENT DISORDER WITH ANXIETY: Primary | ICD-10-CM

## 2024-01-24 DIAGNOSIS — F33.41 MAJOR DEPRESSIVE DISORDER, RECURRENT, IN PARTIAL REMISSION (H): ICD-10-CM

## 2024-01-24 DIAGNOSIS — R42 DIZZINESS: ICD-10-CM

## 2024-01-24 DIAGNOSIS — E10.3299 TYPE 1 DIABETES MELLITUS WITH MILD NONPROLIFERATIVE RETINOPATHY, MACULAR EDEMA PRESENCE UNSPECIFIED, UNSPECIFIED LATERALITY (H): ICD-10-CM

## 2024-01-24 PROCEDURE — 97112 NEUROMUSCULAR REEDUCATION: CPT | Mod: GP | Performed by: PHYSICAL THERAPIST

## 2024-01-24 PROCEDURE — 90834 PSYTX W PT 45 MINUTES: CPT | Mod: 95 | Performed by: PSYCHOLOGIST

## 2024-01-24 PROCEDURE — 97535 SELF CARE MNGMENT TRAINING: CPT | Mod: GO

## 2024-01-24 NOTE — PROGRESS NOTES
Health Psychology          Latanya Mayfield, Ph.D.,  [phone]  Hansa Arzate, Ph.D.,  [phone]  Sarah Beth Jack, Ph.D.,  [phone]  Pushpa Mendez, Ph.D., , [facility] [phone]  Panfilo Corenjo, Ph.D., , [facility] [phone]  Krystle Hannah, Ph.D.,  [phone]  Elise Sutton, Ph.D., , [facility] [phone]    [facility], 3rd Floor  [address] [place] [address]       Health Psychology Progress Note    Patient Name: Dali Martines    Service Type: Individual  Length of Visit: 48 minutes   Start time: 11:01 AM   Stop time: 11:49 AM     Attendees: patient attended alone  Session #: 12    Telemedicine Information:     Telemedicine Visit: The patient's condition can be safely assessed and treated via synchronous audio and visual telemedicine encounter.    Reason for Telemedicine Visit: Patient has requested telehealth visit and Patient convenience (e.g. access to timely appointments / distance to available provider)  Originating Site (Patient Location): Patient's home, [place]  Distant Location (provider location):  Off-site: Provider's Home Office   Consent:  The patient/guardian has verbally consented to: the potential risks and benefits of telemedicine (video visit) versus in person care; bill my insurance or make self-payment for services provided; and responsibility for payment of non-covered services.   Mode of Communication:  Video Conference via [employer]  As the provider I attest to compliance with applicable laws and regulations related to telemedicine.     Identifying Information and Presenting Problem:  Dali Martines is a 53 year old female adult who was referred to health psychology by her endocrinologist, Dr. Mckeon. Patient presented with concerns of stress in the context of multiple life events, several medical conditions, and chronic management of T1DM.    Topics Discussed/Interventions Provided:      Session Content:  "    Update: Patient reports doing better since the last visit. States that she has been focusing on managing several caretaking tasks for her mother. Notes that she took some time to connect with friends and reports that she has a good friend who provides her with instrumental social support, which has been helpful. MRI to rule out structural and anatomical etiology for her vertigo is scheduled for early February. Will continue with PT for vestibular therapy and with OT for cognitive therapy. Some suspicion of PPPV and may initiate an SSRI. Plan is to await results of MRI before deciding whether to initiate medication. Also reports that she is going to initiate a new caregiver support group specifically for people who are caring for someone with dementia. First meeting will be February 12th.      Homework Review: Patient reports that she continues to practice meditation and is continuing to work through the perfectionism workbook. She notes working toward overcoming fear.       Skills/intervention: Supported patient in processing her thoughts and emotions around her caregiver stress and witnessing the progression of her mother's disease. She reports that she has come to a place of acceptance around decision-making on her mother's behalf. Discussed next steps for beginning to focus on addressing grief in our sessions together. Explored the principle of \"energy management\" that she is learning in OT and how to apply this principle to her commitments and implementing her therapy skills and processes. Assisted patient in identifying priorities for addressing the impact of grief on her health and well-being. Top priorities include increasing tolerance of discomfort associated with uncertainty as well as identifying and processing emotions associated with the losses she has experienced.             Top values identified included: spirituality, humor, fairness, creativity, curiosity, contribution, and caring.  Unhelpful " "rules/assumptions: fear of failure, all-or-nothing thinking, and simplicity, control, and structure    Treatment Objective(s) Addressed in This Session:  Psychological distress related to Diabetes  Psychological distress related to caregiver stress    Progress on / Status of Treatment Objective(s) / Homework:  In progress    Medication Adherence: Patient did not report medication changes. Current medication list is below:     Current Outpatient Medications   Medication    albuterol (PROAIR HFA/PROVENTIL HFA/VENTOLIN HFA) 108 (90 Base) MCG/ACT inhaler    aspirin 81 MG tablet    blood glucose (ACCU-CHEK COLIN PLUS) test strip    blood glucose monitoring (ACCU-CHEK FASTCLIX) lancets    blood glucose monitoring (NO BRAND SPECIFIED) meter device kit    Calcium Carbonate-Vitamin D (CALCIUM + D PO)    citalopram (CELEXA) 20 MG tablet    Continuous Blood Gluc  (DEXCOM G6 ) SUZANNE    Continuous Blood Gluc Sensor (DEXCOM G6 SENSOR) MISC    Continuous Blood Gluc Transmit (DEXCOM G6 TRANSMITTER) MISC    diclofenac (VOLTAREN) 1 % topical gel    diclofenac (VOLTAREN) 1 % topical gel    DULERA 100-5 MCG/ACT inhaler    econazole nitrate 1 % cream    FIASP PENFILL 100 UNIT/ML SOCT    fish oil-omega-3 fatty acids 1000 MG capsule    fluocinonide (LIDEX) 0.05 % external solution    folic acid (FOLVITE) 1 MG tablet    Injection Device for insulin (INPEN 100-BLUE-NOVOLOG-FIASP) SUZANNE    Insulin Aspart, w/Niacinamide, (FIASP) 100 UNIT/ML SOLN    insulin degludec (TRESIBA FLEXTOUCH) 100 UNIT/ML pen    Insulin Disposable Pump (OMNIPOD 5 G6 INTRO, GEN 5,) KIT    Insulin Disposable Pump (OMNIPOD 5 G6 POD, GEN 5,) MISC    insulin pen needle (B-D U/F) 31G X 8 MM miscellaneous    insulin syringe-needle U-100 (30G X 1/2\" 1 ML) 30G X 1/2\" 1 ML miscellaneous    Ketoconazole (NIZORAL PO)    levothyroxine (SYNTHROID/LEVOTHROID) 112 MCG tablet    methotrexate 2.5 MG tablet    methotrexate 2.5 MG tablet    methotrexate 2.5 MG tablet    " "methylphenidate (METADATE CD) 20 MG CR capsule    methylphenidate (RITALIN) 10 MG tablet    Minoxidil (ROGAINE EX)    montelukast (SINGULAIR) 5 MG chewable tablet    Multiple Vitamin (MULTIVITAMIN OR)    ondansetron (ZOFRAN ODT) 4 MG ODT tab    Semaglutide, 1 MG/DOSE, 4 MG/3ML SOPN    simvastatin (ZOCOR) 20 MG tablet    traZODone (DESYREL) 50 MG tablet     Current Facility-Administered Medications   Medication    lidocaine 1 % injection 1 mL    triamcinolone acetonide (KENALOG-10) injection 10 mg     Assessment: The patient appeared to be active and engaged in today's session and was receptive to feedback.     Mental Status Exam:   Appearance: Appropriate   Eye Contact: Good    Orientation: Yes, x4  Behavior/relationship to provider/demeanor: Cooperative, Engaged, and Pleasant  Motor Activity: normal or unremarkable  Mood (subjective report): Stressed, anxious, and \"better\"  Affect (objective appearance): Appropriate/mood congruent  Speech Rate: Normal  Speech Volume: Normal  Speech Articulation: Normal  Speech Coherence: Normal  Speech Spontaneity: Normal  Thought Content: no suicidal/homicidal ideation, plans, or intent  Thought Process (associations): Logical, Linear, and Goal directed  Thought Process (rate): Normal  Abnormal Perception: None  Attention/Concentration: Normal  Memory: Appears grossly intact   Fund of Knowledge: Above average  Abstraction:  Normal  Insight:  Good  Judgment:  good    Does the patient appear to be at imminent risk of harm to self/others at this time? No    The session was necessary to address psychological distress in the context of multiple psychosocial stressors and chronic health concerns.    Diagnoses (provisional):    1. Adjustment disorder with anxiety    2. Major depressive disorder, recurrent, in partial remission (H)    3. 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 Other chronic pain  "       Plan:    Follow-up video visit with me on 2/7/24 at 10:00am    Patient to use 911 or other crisis resources in the event of a psychiatric emergency  Primary care needs and medications are managed by Dimitri Alas MD. Referring provider is Dr. Mckeon.  Next visit agenda/goals:   Begin addressing grief. Introduce working with difficulty meditation to initiate.    Skills taught/interventions used thus far:  Psychoeducation  Values clarification  Committed action (vitality vs suffering)  Perfectionism workbook (modules 1-5)  Defusion  Acting opposite  Mindfulness (breath, body scan, watching thoughts, choiceless awareness)  Therapeutic journaling    NOTE: Treatment plan update due 9/6/24; 90 day treatment plan review due 3/10/24.                                                Individual Treatment Plan    Patient's Name: Dali Martines  YOB: 1970    Date of Creation: 12/11/23  Date Treatment Plan Last Reviewed/Revised: 12/11/23    DSM5 Diagnoses:     1. Adjustment disorder with anxiety    2. Major depressive disorder, recurrent, in partial remission (H)      Psychosocial / Contextual Factors: financial hardship, health issues, occupational/vocational stress, relationship stress, caregiver stress (mother's health concerns), and grief and loss in the wake of recent bereavement  PROMIS (reviewed every 90 days):      10/31/2023  9:55 AM   PROMIS 10    PROMIS TOTAL - SUBSCORES 21          Referral / Collaboration:  Collaboration with medical team and specialists as needed.    Anticipated number of session for this episode of care: 15-20  Anticipation frequency of session: 2x per month  Anticipated Duration of each session: 38-52 minutes  Treatment plan will be reviewed in 90 days or when goals have been changed.       Measurable Treatment Goal(s) related to diagnosis/functional impairment(s): Overall goals for treatment include improving her physical health (e.g., diabetes, physical activity, and  "GI discomfort), learning strategies for enhancing coping capacity, and decreasing avoidance-based coping.  Goal 1: Patient will decrease her use of avoidance-based coping   \"I will know I've met my goal when I am not avoiding doing things out of fear (e.g., bathroom sink, processing grief, physical activity, reviewing pieces of writing).\"     Objective #A (Patient Action)    Patient will learn and implement skills for reducing perfectionism.  Status: In progress - Date: 12/11/23 (patient is in the process of completing the perfectionism workbook)     Intervention(s)  Therapist will teach anti-perfectionism skills and will assist patient in completing a workbook on perfectionism.    Objective #B  Patient will initiate a healthy grieving process  Status: In progress - Date: 12/11/23 (patient is learning emotional expression skills)    Intervention(s)  Therapist will assist patient in processing and exploring her grief.    Objective #C  Patient will increase her participation in meaningful and purposeful activities.  Status: In progress - Date: 12/11/23 (patient reports some progress in increasing her participation in meaningful activities, will focus on generating a broader list of activities)    Intervention(s)  Therapist will teach values clarification, committed action, and exposure skills.    Objective #D  Patient will learn and implement unhooking skills for decreasing the degree to which fear influences behaviors and decisions in an unhelpful way.  Status: In progress - Date: 12/11/23 (patient has been learning and implementing mindfulness and cognitive restructuring skills)     Intervention(s)  Therapist will teach defusion, cognitive restructuring, and mindfulness skills.      Patient has reviewed and agreed to the above plan.      Elise Sutton, PhD  September 6, 2023    Elise Sutton, Ph.D., , [facility]  Clinical Health Psychologist  Phone: [phone]   Pager: [phone]           "

## 2024-02-01 ENCOUNTER — OFFICE VISIT (OUTPATIENT)
Dept: OPHTHALMOLOGY | Facility: CLINIC | Age: 54
End: 2024-02-01
Attending: OPHTHALMOLOGY
Payer: COMMERCIAL

## 2024-02-01 ENCOUNTER — TELEPHONE (OUTPATIENT)
Dept: INTERNAL MEDICINE | Facility: CLINIC | Age: 54
End: 2024-02-01
Payer: COMMERCIAL

## 2024-02-01 DIAGNOSIS — H40.003 GLAUCOMA SUSPECT OF BOTH EYES: ICD-10-CM

## 2024-02-01 DIAGNOSIS — E10.3291 TYPE 1 DIABETES MELLITUS WITH MILD NONPROLIFERATIVE RETINOPATHY OF RIGHT EYE WITHOUT MACULAR EDEMA (H): ICD-10-CM

## 2024-02-01 PROCEDURE — 92134 CPTRZ OPH DX IMG PST SGM RTA: CPT | Performed by: OPHTHALMOLOGY

## 2024-02-01 PROCEDURE — 92133 CPTRZD OPH DX IMG PST SGM ON: CPT | Performed by: OPHTHALMOLOGY

## 2024-02-01 PROCEDURE — 99207 FUNDUS PHOTOS OU (BOTH EYES): CPT | Mod: 26 | Performed by: OPHTHALMOLOGY

## 2024-02-01 PROCEDURE — 99207 OCT OPTIC NERVE RNFL SPECTRALIS OU (BOTH EYES): CPT | Mod: 26 | Performed by: OPHTHALMOLOGY

## 2024-02-01 PROCEDURE — 99214 OFFICE O/P EST MOD 30 MIN: CPT | Mod: GC | Performed by: OPHTHALMOLOGY

## 2024-02-01 PROCEDURE — 99213 OFFICE O/P EST LOW 20 MIN: CPT | Performed by: OPHTHALMOLOGY

## 2024-02-01 PROCEDURE — 92250 FUNDUS PHOTOGRAPHY W/I&R: CPT | Mod: XU | Performed by: OPHTHALMOLOGY

## 2024-02-01 ASSESSMENT — CUP TO DISC RATIO
OS_RATIO: 0.5
OD_RATIO: 0.4

## 2024-02-01 ASSESSMENT — SLIT LAMP EXAM - LIDS
COMMENTS: NORMAL
COMMENTS: NORMAL

## 2024-02-01 ASSESSMENT — EXTERNAL EXAM - LEFT EYE: OS_EXAM: NORMAL

## 2024-02-01 ASSESSMENT — TONOMETRY
OD_IOP_MMHG: 19
OS_IOP_MMHG: 19
IOP_METHOD: TONOPEN

## 2024-02-01 ASSESSMENT — VISUAL ACUITY
OD_SC: 20/20
OS_SC: 20/20
OD_SC+: -1
METHOD: SNELLEN - LINEAR
OS_SC+: -1

## 2024-02-01 ASSESSMENT — CONF VISUAL FIELD
OS_INFERIOR_NASAL_RESTRICTION: 0
METHOD: COUNTING FINGERS
OS_NORMAL: 1
OD_INFERIOR_TEMPORAL_RESTRICTION: 0
OS_SUPERIOR_NASAL_RESTRICTION: 0
OS_INFERIOR_TEMPORAL_RESTRICTION: 0
OD_NORMAL: 1
OD_INFERIOR_NASAL_RESTRICTION: 0
OS_SUPERIOR_TEMPORAL_RESTRICTION: 0
OD_SUPERIOR_TEMPORAL_RESTRICTION: 0
OD_SUPERIOR_NASAL_RESTRICTION: 0

## 2024-02-01 ASSESSMENT — EXTERNAL EXAM - RIGHT EYE: OD_EXAM: NORMAL

## 2024-02-01 NOTE — TELEPHONE ENCOUNTER
Patient has new symptoms of breast pain. She will need to be evaluated in clinic by a primary care physician for a breast examination to determine whether a diagnostic mammogram and/or US is needed.   Recommend pt to see any P[facility] providers for breast pain. May use acute or return template for this.      Niko Gramajo CMA ([facility]) at 1:35 PM on 2/1/2024

## 2024-02-01 NOTE — TELEPHONE ENCOUNTER
Pt currently at another visit as of 2:20pm. Per alba Pope to schedule a physical with Dr. Alas or visit with female resident

## 2024-02-01 NOTE — PROGRESS NOTES
CC Diabetes follow up , glaucoma suspect    Interval: Here for diabetic eye exam. States she has had some new floaters in the left eye for the past month or so. They are mostly in the bottom half of left eye and notices it more in bright lighting.   Recently diagnosed with PPPV vertigo. Has upcoming brain MRI tomorrow just to rule out anything structural.   Has also started using an insulin pump 09/2023.     HPI: Dali Martines is a 53 year old female with the following ophthalmologic problems:  Here for diabetic retinopathy check and glaucoma suspect evaluation. No major changes in vision in past year. Denies flashes or floaters.     OCT macula 2/1/24  Right eye - normal foveal contour, no fluid, PHF attached - stable  Left eye - normal foveal contour, no fluid, PHF attached - stable    Optical Coherence Tomography retinal nerve fiber layer 2/1/24  Both eyes normal thickness and stable    VF 1/25/23  Both eyes reliable and normal      ASSESSMENT/PLAN    # DM-1 with history of mild nonproliferative diabetic retinopathy right eye   No Diabetic retinopathy left eye   - Last HbA1c 6.6% on 11/7/23  - Discussed importance of good diabetic control    # glaucoma suspect  C:D asymmetry  - no family hx of glaucoma  - IOP 19/19  - gonio exam 1/25/23 open to CB bilaterally  - intraocular pressure within normal limits, Optical Coherence Tomography retinal nerve fiber layer without thinning, visual field 24-2 within normal limits   Plan for optic nerve Optical Coherence Tomography nerve fiber layer with OVF 24-2 every other year or as needed     # Floaters, left eye  - No evidence of PVD, tears or detachments on exam  - Likely related to syneresis of vitreous  - Discussed RD return precautions    # H/o TIA-like vision loss RE in ~2008  - eval by Dr Schaffer felt to be migraine related rather than embolic or inflammatory    # Family hx of of macular degeneration  - father and paternal aunt  - pt doesn't smoke    return to retina  clinic: 1 year DFE; 24-2 OVF and macula Optical Coherence Tomography     Yaneli Ponce MD  Resident Physician, PGY-3  Department of Ophthalmology      ~~~~~~~~~~~~~~~~~~~~~~~~~~~~~~~~~~   Complete documentation of historical and exam elements from today's encounter can be found in the full encounter summary report (not reduplicated in this progress note).  I personally obtained the chief complaint(s) and history of present illness.  I confirmed and edited as necessary the review of systems, past medical/surgical history, family history, social history, and examination findings as documented by others; and I examined the patient myself.  I personally reviewed the relevant tests, images, and reports as documented above.  I personally reviewed the ophthalmic test(s) associated with this encounter, agree with the interpretation(s) as documented by the resident/fellow, and have edited the corresponding report(s) as necessary.   I formulated and edited as necessary the assessment and plan and discussed the findings and management plan with the patient and family    Naz Lira MD   of Ophthalmology.  Retina Service   Department of Ophthalmology and Visual Neurosciences   [facility]  Phone: [phone]   Fax: [phone]

## 2024-02-01 NOTE — NURSING NOTE
Chief Complaints and History of Present Illnesses   Patient presents with    Diabetic Retinopathy Follow Up     Chief Complaint(s) and History of Present Illness(es)       Diabetic Retinopathy Follow Up              Laterality: right eye    Associated symptoms: floaters.  Negative for dryness, eye pain, tearing, flashes, itching and burning    Pain scale: 0/10              Comments    Dali is here for her annual follow up of type 1 diabetes mellitis of right eye. Also a history of being a glaucoma suspect. She says she has some new floaters lower part of left eye. She has a recent diagnosed with PPPV. (A TYPE OF VERTIGO).     Toy Blue COT 2:43 PM February 1, 2024

## 2024-02-02 ENCOUNTER — ANCILLARY PROCEDURE (OUTPATIENT)
Dept: MRI IMAGING | Facility: CLINIC | Age: 54
End: 2024-02-02
Payer: COMMERCIAL

## 2024-02-02 DIAGNOSIS — R42 DIZZINESS: ICD-10-CM

## 2024-02-02 PROCEDURE — 70551 MRI BRAIN STEM W/O DYE: CPT

## 2024-02-07 ENCOUNTER — MYC MEDICAL ADVICE (OUTPATIENT)
Dept: INTERNAL MEDICINE | Facility: CLINIC | Age: 54
End: 2024-02-07

## 2024-02-07 ENCOUNTER — TELEPHONE (OUTPATIENT)
Dept: PSYCHOLOGY | Facility: CLINIC | Age: 54
End: 2024-02-07

## 2024-02-07 ENCOUNTER — THERAPY VISIT (OUTPATIENT)
Dept: OCCUPATIONAL THERAPY | Facility: CLINIC | Age: 54
End: 2024-02-07
Payer: COMMERCIAL

## 2024-02-07 ENCOUNTER — THERAPY VISIT (OUTPATIENT)
Dept: PHYSICAL THERAPY | Facility: CLINIC | Age: 54
End: 2024-02-07
Payer: COMMERCIAL

## 2024-02-07 DIAGNOSIS — R42 DIZZINESS: ICD-10-CM

## 2024-02-07 DIAGNOSIS — R68.89 DECREASED FUNCTIONAL ACTIVITY TOLERANCE: ICD-10-CM

## 2024-02-07 DIAGNOSIS — R26.89 BALANCE DISORDER: Primary | ICD-10-CM

## 2024-02-07 DIAGNOSIS — G93.39 OTHER POST INFECTION AND RELATED FATIGUE SYNDROMES: Primary | ICD-10-CM

## 2024-02-07 PROCEDURE — 97112 NEUROMUSCULAR REEDUCATION: CPT | Mod: GP | Performed by: PHYSICAL THERAPIST

## 2024-02-07 PROCEDURE — 97535 SELF CARE MNGMENT TRAINING: CPT | Mod: GO

## 2024-02-08 ENCOUNTER — OFFICE VISIT (OUTPATIENT)
Dept: INTERNAL MEDICINE | Facility: CLINIC | Age: 54
End: 2024-02-08
Payer: COMMERCIAL

## 2024-02-08 VITALS
DIASTOLIC BLOOD PRESSURE: 74 MMHG | WEIGHT: 172 LBS | BODY MASS INDEX: 24.62 KG/M2 | OXYGEN SATURATION: 96 % | TEMPERATURE: 98 F | SYSTOLIC BLOOD PRESSURE: 111 MMHG | HEART RATE: 72 BPM | HEIGHT: 70 IN

## 2024-02-08 DIAGNOSIS — N64.4 BREAST PAIN, RIGHT: Primary | ICD-10-CM

## 2024-02-08 PROCEDURE — 99213 OFFICE O/P EST LOW 20 MIN: CPT | Mod: GC

## 2024-02-08 ASSESSMENT — ANXIETY QUESTIONNAIRES
3. WORRYING TOO MUCH ABOUT DIFFERENT THINGS: MORE THAN HALF THE DAYS
5. BEING SO RESTLESS THAT IT IS HARD TO SIT STILL: NOT AT ALL
2. NOT BEING ABLE TO STOP OR CONTROL WORRYING: MORE THAN HALF THE DAYS
GAD7 TOTAL SCORE: 11
1. FEELING NERVOUS, ANXIOUS, OR ON EDGE: NEARLY EVERY DAY
6. BECOMING EASILY ANNOYED OR IRRITABLE: SEVERAL DAYS
7. FEELING AFRAID AS IF SOMETHING AWFUL MIGHT HAPPEN: SEVERAL DAYS
GAD7 TOTAL SCORE: 11

## 2024-02-08 ASSESSMENT — PATIENT HEALTH QUESTIONNAIRE - PHQ9
SUM OF ALL RESPONSES TO PHQ QUESTIONS 1-9: 13
5. POOR APPETITE OR OVEREATING: MORE THAN HALF THE DAYS

## 2024-02-08 ASSESSMENT — ASTHMA QUESTIONNAIRES: ACT_TOTALSCORE: 20

## 2024-02-08 NOTE — PROGRESS NOTES
"  PRIMARY CARE CENTER      Dali Martines is a 53 year old  female , with concerns including:  Chief Complaint   Patient presents with    Breast Problem     Pt reports occasional pain in breasts; last month she experienced intense/sharp pain in R breast, family history of breast cancer     PCP: Diimtri Alas     Subjective     About 1-2 months ago, Dali had sharp pain behind the right nipple that lasted for a few days, coming and going. Using her right arm would make the pain worse. No discharge from the nipple recently, but has had white discharge from the nipple, cannot recall if it was at the same time as the pain. No trauma to the breast. No fevers, chills. She gets breast tenderness and skin breakouts monthly, but last period was over one year ago, rarely has spotting. No medication changes.    Her mom has a history of breast cancer, was on hormonal therapy. No other family with breast cancer.    Review of Systems  As noted above.  Objective     /74 (BP Location: Right arm, Patient Position: Sitting, Cuff Size: Adult Regular)   Pulse 72   Temp 98  F (36.7  C) (Oral)   Ht 1.776 m (5' 9.92\")   Wt 78 kg (172 lb)   SpO2 96%   BMI 24.74 kg/m      Physical Exam  Constitutional:       Appearance: Normal appearance.   Pulmonary:      Effort: Pulmonary effort is normal.   Chest:   Breasts:     Breasts are symmetrical.      Right: Tenderness present. No swelling, bleeding, inverted nipple, mass, nipple discharge or skin change.      Left: No swelling, bleeding, inverted nipple, mass, nipple discharge, skin change or tenderness.   Neurological:      Mental Status: She is alert.        Assessment & Plan     Right breast pain  Patient had two days of intermittent sharp pain behind her right nipple. No associated discharge that she recalls. Pain was worse with movement of her right arm. Exam reassuring with no hard/sharp masses, bleeding, or discharge, but pt had pain with deep palpation of the right " breast. Differential includes musculoskeletal pain as pain was unilateral and triggered by arm movement although pt does not recall any trauma to the breast, pain associated with perimenopause/menopause, inflammatory pain, breast cancer, although exam was reassuring. Last screening mammogram done on 2/20/23 was negative, recommended annual routine screening mammogram.   - Diagnostic mammogram, R breast  - US R breast    No specialty comments available.      Updated Historical Database: Updated historical database      Patient was seen and plan of care was discussed with Dr. Donato RAYGOZA MD   Internal Medicine  [facility]   Clinic phone [phone]

## 2024-02-09 ENCOUNTER — TELEPHONE (OUTPATIENT)
Dept: NEUROLOGY | Facility: CLINIC | Age: 54
End: 2024-02-09

## 2024-02-09 DIAGNOSIS — E23.6 PITUITARY CYST (H): Primary | ICD-10-CM

## 2024-02-09 NOTE — TELEPHONE ENCOUNTER
I spoke with Dali regarding her MRI results. We discussed the findings of a cystic appearing sellar lesion and that further imaging is needed to further characterize this lesion. We discussed that I do not think this lesion is causing her symptoms of dizziness. We also discussed that based on these findings, we recommend a referral to neurosurgery for further evaluation. We discussed that ideally, the MRI will be completed prior to seeing the neurosurgery team. The patient's questions were answered, she expressed understanding and is in agreement with the plan.     - MRI Brain pituitary/sella protocol with and without IV contrast  - neurosurgery referral    Case discussed with Dr. Balderrama, who agrees with assessment and plan.    Diana Bowman MD  Neurology Resident

## 2024-02-15 ENCOUNTER — HOSPITAL ENCOUNTER (OUTPATIENT)
Dept: MAMMOGRAPHY | Facility: CLINIC | Age: 54
Discharge: HOME | End: 2024-02-15
Payer: COMMERCIAL

## 2024-02-15 DIAGNOSIS — N64.4 BREAST PAIN, RIGHT: ICD-10-CM

## 2024-02-15 PROCEDURE — 77062 BREAST TOMOSYNTHESIS BI: CPT

## 2024-02-20 ASSESSMENT — ANXIETY QUESTIONNAIRES
1. FEELING NERVOUS, ANXIOUS, OR ON EDGE: MORE THAN HALF THE DAYS
3. WORRYING TOO MUCH ABOUT DIFFERENT THINGS: MORE THAN HALF THE DAYS
4. TROUBLE RELAXING: MORE THAN HALF THE DAYS
7. FEELING AFRAID AS IF SOMETHING AWFUL MIGHT HAPPEN: SEVERAL DAYS
IF YOU CHECKED OFF ANY PROBLEMS ON THIS QUESTIONNAIRE, HOW DIFFICULT HAVE THESE PROBLEMS MADE IT FOR YOU TO DO YOUR WORK, TAKE CARE OF THINGS AT HOME, OR GET ALONG WITH OTHER PEOPLE: SOMEWHAT DIFFICULT
2. NOT BEING ABLE TO STOP OR CONTROL WORRYING: SEVERAL DAYS
5. BEING SO RESTLESS THAT IT IS HARD TO SIT STILL: NOT AT ALL
6. BECOMING EASILY ANNOYED OR IRRITABLE: SEVERAL DAYS
8. IF YOU CHECKED OFF ANY PROBLEMS, HOW DIFFICULT HAVE THESE MADE IT FOR YOU TO DO YOUR WORK, TAKE CARE OF THINGS AT HOME, OR GET ALONG WITH OTHER PEOPLE?: SOMEWHAT DIFFICULT
GAD7 TOTAL SCORE: 9
GAD7 TOTAL SCORE: 9
7. FEELING AFRAID AS IF SOMETHING AWFUL MIGHT HAPPEN: SEVERAL DAYS

## 2024-02-21 ENCOUNTER — THERAPY VISIT (OUTPATIENT)
Dept: PHYSICAL THERAPY | Facility: CLINIC | Age: 54
End: 2024-02-21
Payer: COMMERCIAL

## 2024-02-21 ENCOUNTER — VIRTUAL VISIT (OUTPATIENT)
Dept: PSYCHOLOGY | Facility: CLINIC | Age: 54
End: 2024-02-21
Payer: COMMERCIAL

## 2024-02-21 ENCOUNTER — THERAPY VISIT (OUTPATIENT)
Dept: OCCUPATIONAL THERAPY | Facility: CLINIC | Age: 54
End: 2024-02-21
Payer: COMMERCIAL

## 2024-02-21 DIAGNOSIS — E10.3299 TYPE 1 DIABETES MELLITUS WITH MILD NONPROLIFERATIVE RETINOPATHY, MACULAR EDEMA PRESENCE UNSPECIFIED, UNSPECIFIED LATERALITY (H): ICD-10-CM

## 2024-02-21 DIAGNOSIS — F33.41 MAJOR DEPRESSIVE DISORDER, RECURRENT, IN PARTIAL REMISSION (H): ICD-10-CM

## 2024-02-21 DIAGNOSIS — R68.89 DECREASED FUNCTIONAL ACTIVITY TOLERANCE: ICD-10-CM

## 2024-02-21 DIAGNOSIS — R26.89 BALANCE DISORDER: Primary | ICD-10-CM

## 2024-02-21 DIAGNOSIS — F43.22 ADJUSTMENT DISORDER WITH ANXIETY: Primary | ICD-10-CM

## 2024-02-21 DIAGNOSIS — Z78.9 ALTERATION IN INSTRUMENTAL ACTIVITIES OF DAILY LIVING (IADL): ICD-10-CM

## 2024-02-21 DIAGNOSIS — R42 DIZZINESS: ICD-10-CM

## 2024-02-21 DIAGNOSIS — G93.39 OTHER POST INFECTION AND RELATED FATIGUE SYNDROMES: Primary | ICD-10-CM

## 2024-02-21 DIAGNOSIS — G89.29 OTHER CHRONIC PAIN: ICD-10-CM

## 2024-02-21 PROCEDURE — 97535 SELF CARE MNGMENT TRAINING: CPT | Mod: GO

## 2024-02-21 PROCEDURE — 97112 NEUROMUSCULAR REEDUCATION: CPT | Mod: GP | Performed by: PHYSICAL THERAPIST

## 2024-02-21 PROCEDURE — 90834 PSYTX W PT 45 MINUTES: CPT | Mod: 95 | Performed by: PSYCHOLOGIST

## 2024-02-21 PROCEDURE — 97530 THERAPEUTIC ACTIVITIES: CPT | Mod: GO

## 2024-02-21 NOTE — PROGRESS NOTES
Health Psychology          Latanya Mayfield, Ph.D.,  [phone]  Hansa Arzate, Ph.D.,  [phone]  Sarah Beth Jack, Ph.D.,  [phone]  Pushpa Mendez, Ph.D., , [facility] [phone]  Panfilo Cornejo, Ph.D., , [facility] [phone]  Krystle Hannah, Ph.D.,  [phone]  Elise Sutton, Ph.D., , [facility] [phone]    [facility], 3rd Floor  [address] [place] [address]       Health Psychology Progress Note    Patient Name: Dali Martines    Service Type: Individual  Length of Visit: 43 minutes   Start time: 9:05 AM   Stop time: 9:48 AM      Attendees: patient attended alone  Session #: 13    Telemedicine Information:     Telemedicine Visit: The patient's condition can be safely assessed and treated via synchronous audio and visual telemedicine encounter.    Reason for Telemedicine Visit: Patient has requested telehealth visit and Patient convenience (e.g. access to timely appointments / distance to available provider)  Originating Site (Patient Location): Patient's home, [place]  Distant Location (provider location):  On-site: [facility]   Consent:  The patient/guardian has verbally consented to: the potential risks and benefits of telemedicine (video visit) versus in person care; bill my insurance or make self-payment for services provided; and responsibility for payment of non-covered services.   Mode of Communication:  Video Conference via [employer]  As the provider I attest to compliance with applicable laws and regulations related to telemedicine.     Identifying Information and Presenting Problem:  Dali Martines is a 53 year old female adult who was referred to health psychology by her endocrinologist, Dr. Mckeon. Patient presented with concerns of stress in the context of multiple life events, several medical conditions, and chronic management of T1DM.    Topics Discussed/Interventions Provided:    Session Content:     Update:  "Patient reports navigating through some significant difficulties since the last visit including her own medical concerns, her mother's care, and her dog's health. Had an MRI to rule out structural and anatomical etiology for her PPPV. 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 Has a follow-up MRI scheduled for tomorrow for further evaluation. Continues with PT for vestibular therapy and with OT for cognitive therapy.         Skills/intervention: Supported patient in processing her thoughts and emotions around her caregiver stress and her own health concerns and the health concerns of her dog. She notes that the results of her MRI were unexpected and she has been thinking about the practicalities and logistics of what she will need to do depending on the results of her follow-up MRI. Describes herself as \"responding pretty stoically.\" Notes that she has not had the time or capacity to process the emotional impact of this news. Also notes that emotional processing is more cognitively taxing for her as well. Patient states that the stress over the past several weeks have also been associated with increased PPPV symptoms.     Discussed next steps for therapy to support patient through this time of difficulty. Mutually agreed to focus on restorative activities. She notes that active activities feel more replenishing and restorative than inert activities. Provided psychoeducation about the [facility] [place] and assisted patient in identifying activities that she would like to add in to make her plate more balanced. Patient identified playtime and time-in. Discussed different activities that can help her cultivate a sense of carol. Introduced pleasant events calendar from [employer] framework.     Top values identified included: spirituality, humor, fairness, creativity, curiosity, contribution, and caring.  Unhelpful rules/assumptions: " "fear of failure, all-or-nothing thinking, and simplicity, control, and structure  Top priorities for grief: increasing tolerance of discomfort associated with uncertainty as well as identifying and processing emotions associated with the losses she has experienced.    Treatment Objective(s) Addressed in This Session:  Psychological distress related to Diabetes  Psychological distress related to caregiver stress    Progress on / Status of Treatment Objective(s) / Homework:  In progress    Medication Adherence: Patient did not report medication changes. Current medication list is below:     Current Outpatient Medications   Medication    albuterol (PROAIR HFA/PROVENTIL HFA/VENTOLIN HFA) 108 (90 Base) MCG/ACT inhaler    aspirin 81 MG tablet    blood glucose (ACCU-CHEK COLIN PLUS) test strip    blood glucose monitoring (ACCU-CHEK FASTCLIX) lancets    blood glucose monitoring (NO BRAND SPECIFIED) meter device kit    Calcium Carbonate-Vitamin D (CALCIUM + D PO)    citalopram (CELEXA) 20 MG tablet    Continuous Blood Gluc  (DEXCOM G6 ) SUZANNE    Continuous Blood Gluc Sensor (DEXCOM G6 SENSOR) MISC    Continuous Blood Gluc Transmit (DEXCOM G6 TRANSMITTER) MISC    diclofenac (VOLTAREN) 1 % topical gel    diclofenac (VOLTAREN) 1 % topical gel    DULERA 100-5 MCG/ACT inhaler    econazole nitrate 1 % cream    FIASP PENFILL 100 UNIT/ML SOCT    fish oil-omega-3 fatty acids 1000 MG capsule    fluocinonide (LIDEX) 0.05 % external solution    folic acid (FOLVITE) 1 MG tablet    Insulin Aspart, w/Niacinamide, (FIASP) 100 UNIT/ML SOLN    insulin degludec (TRESIBA FLEXTOUCH) 100 UNIT/ML pen    Insulin Disposable Pump (OMNIPOD 5 G6 INTRO, GEN 5,) KIT    Insulin Disposable Pump (OMNIPOD 5 G6 POD, GEN 5,) MISC    insulin pen needle (B-D U/F) 31G X 8 MM miscellaneous    insulin syringe-needle U-100 (30G X 1/2\" 1 ML) 30G X 1/2\" 1 ML miscellaneous    Ketoconazole (NIZORAL PO)    levothyroxine (SYNTHROID/LEVOTHROID) 112 MCG " tablet    methotrexate 2.5 MG tablet    methotrexate 2.5 MG tablet    methotrexate 2.5 MG tablet    methylphenidate (METADATE CD) 20 MG CR capsule    methylphenidate (RITALIN) 10 MG tablet    Minoxidil (ROGAINE EX)    montelukast (SINGULAIR) 5 MG chewable tablet    Multiple Vitamin (MULTIVITAMIN OR)    ondansetron (ZOFRAN ODT) 4 MG ODT tab    Semaglutide, 1 MG/DOSE, 4 MG/3ML SOPN    simvastatin (ZOCOR) 20 MG tablet    traZODone (DESYREL) 50 MG tablet     Current Facility-Administered Medications   Medication    lidocaine 1 % injection 1 mL    triamcinolone acetonide (KENALOG-10) injection 10 mg     Assessment: The patient appeared to be active and engaged in today's session and was receptive to feedback.     Mental Status Exam:   Appearance: Appropriate   Eye Contact: Good    Orientation: Yes, x4  Behavior/relationship to provider/demeanor: Cooperative, Engaged, and Pleasant  Motor Activity: normal or unremarkable  Mood (subjective report): Stressed, anxious  Affect (objective appearance): Appropriate/mood congruent  Speech Rate: Normal  Speech Volume: Normal  Speech Articulation: Normal  Speech Coherence: Normal  Speech Spontaneity: Normal  Thought Content: no suicidal/homicidal ideation, plans, or intent  Thought Process (associations): Logical, Linear, and Goal directed  Thought Process (rate): Normal  Abnormal Perception: None  Attention/Concentration: Normal  Memory: Appears grossly intact   Fund of Knowledge: Above average  Abstraction:  Normal  Insight:  Good  Judgment:  good    Does the patient appear to be at imminent risk of harm to self/others at this time? No    The session was necessary to address psychological distress in the context of multiple psychosocial stressors and chronic health concerns.    Diagnoses (provisional):    1. Adjustment disorder with anxiety    2. Major depressive disorder, recurrent, in partial remission (H)    3. Type 1 diabetes mellitus with mild nonproliferative retinopathy,  "macular edema presence unspecified, unspecified laterality (H)    4. Other chronic pain        Plan:    Follow-up video visit with me on 3/8/24 at 10:00am    Patient to use 911 or other crisis resources in the event of a psychiatric emergency  Primary care needs and medications are managed by Dimitri Alas MD. Referring provider is Dr. Mckeon.  Next visit agenda/goals:   Build-in \"playtime\" activities  Use the pleasant events calendar to expand mindfulness practice    Skills taught/interventions used thus far:  Psychoeducation  Values clarification  Committed action (vitality vs suffering)  Perfectionism workbook (modules 1-5)  Defusion  Acting opposite  Mindfulness (breath, body scan, watching thoughts, choiceless awareness)  Therapeutic journaling  [facility] [place]    NOTE: Treatment plan update due 9/6/24; 90 day treatment plan review due 3/10/24.                                                Individual Treatment Plan    Patient's Name: Dali Martines  YOB: 1970    Date of Creation: 12/11/23  Date Treatment Plan Last Reviewed/Revised: 12/11/23    DSM5 Diagnoses:     1. Adjustment disorder with anxiety    2. Major depressive disorder, recurrent, in partial remission (H)      Psychosocial / Contextual Factors: financial hardship, health issues, occupational/vocational stress, relationship stress, caregiver stress (mother's health concerns), and grief and loss in the wake of recent bereavement  PROMIS (reviewed every 90 days):      10/31/2023  9:55 AM   PROMIS 10    PROMIS TOTAL - SUBSCORES 21          Referral / Collaboration:  Collaboration with medical team and specialists as needed.    Anticipated number of session for this episode of care: 15-20  Anticipation frequency of session: 2x per month  Anticipated Duration of each session: 38-52 minutes  Treatment plan will be reviewed in 90 days or when goals have been changed.       Measurable Treatment Goal(s) related to " "diagnosis/functional impairment(s): Overall goals for treatment include improving her physical health (e.g., diabetes, physical activity, and GI discomfort), learning strategies for enhancing coping capacity, and decreasing avoidance-based coping.  Goal 1: Patient will decrease her use of avoidance-based coping   \"I will know I've met my goal when I am not avoiding doing things out of fear (e.g., bathroom sink, processing grief, physical activity, reviewing pieces of writing).\"     Objective #A (Patient Action)    Patient will learn and implement skills for reducing perfectionism.  Status: In progress - Date: 12/11/23 (patient is in the process of completing the perfectionism workbook)     Intervention(s)  Therapist will teach anti-perfectionism skills and will assist patient in completing a workbook on perfectionism.    Objective #B  Patient will initiate a healthy grieving process  Status: In progress - Date: 12/11/23 (patient is learning emotional expression skills)    Intervention(s)  Therapist will assist patient in processing and exploring her grief.    Objective #C  Patient will increase her participation in meaningful and purposeful activities.  Status: In progress - Date: 12/11/23 (patient reports some progress in increasing her participation in meaningful activities, will focus on generating a broader list of activities)    Intervention(s)  Therapist will teach values clarification, committed action, and exposure skills.    Objective #D  Patient will learn and implement unhooking skills for decreasing the degree to which fear influences behaviors and decisions in an unhelpful way.  Status: In progress - Date: 12/11/23 (patient has been learning and implementing mindfulness and cognitive restructuring skills)     Intervention(s)  Therapist will teach defusion, cognitive restructuring, and mindfulness skills.      Patient has reviewed and agreed to the above plan.      Elise Sutton, PhD  September " 6, 2023    Elise Sutton, Ph.D., , [facility]  Clinical Health Psychologist  Phone: [phone]   Pager: [phone]

## 2024-02-22 ENCOUNTER — ANCILLARY PROCEDURE (OUTPATIENT)
Dept: MRI IMAGING | Facility: CLINIC | Age: 54
End: 2024-02-22
Payer: COMMERCIAL

## 2024-02-22 DIAGNOSIS — E23.6 PITUITARY CYST (H): ICD-10-CM

## 2024-02-22 PROCEDURE — 70553 MRI BRAIN STEM W/O & W/DYE: CPT

## 2024-02-22 PROCEDURE — A9585 GADOBUTROL INJECTION: HCPCS

## 2024-02-22 PROCEDURE — 255N000002 HC RX 255 OP 636

## 2024-02-22 RX ORDER — GADOBUTROL 604.72 MG/ML
8 INJECTION INTRAVENOUS ONCE
Status: COMPLETED | OUTPATIENT
Start: 2024-02-22 | End: 2024-02-22

## 2024-02-22 RX ADMIN — GADOBUTROL 8 ML: 604.72 INJECTION INTRAVENOUS at 15:59

## 2024-02-22 NOTE — PROGRESS NOTES
02/21/24 0500   Appointment Info   Signing clinician's name / credentials JA Cornell DPT   Visits Used 12   Medical Diagnosis Dizziness   PT Tx Diagnosis Dizziness and imbalance   Progress Note/Certification   Onset of illness/injury or Date of Surgery 07/18/23  (date of order, began in June 2022)   Therapy Frequency 1x/week   Predicted Duration 60 days   PT Goal 1   Goal Identifier DHI   Goal Description The pt will score <25/100 on the DHI indicating a reduction in subjective dizziness   Goal Progress 24/100 on 2/21/24   Target Date 01/07/24   Date Met 01/03/24   PT Goal 2   Goal Identifier FGA   Goal Description The pt will score >24/30 on the FGA indicating improvement in dynamic balance   Date Met 08/08/23   PT Goal 3   Goal Identifier HEP   Goal Description The pt will be independent with a HEP in order to improve self management of symptoms   Goal Progress continuing to modify based on pt status/progression   Target Date 02/07/24   PT Goal 4   Goal Identifier SOT   Goal Description The pt will improve composite score on SOT to >70, within age group norms in order to improve sensory integration and reducing overall dizziness   Goal Progress baseline: composit 64   Subjective Report   Subjective Report Had a follow up from neurologist. Recommending a new MRI tomorrow and a meeting with neurosurgery next week. She is feeling very stressed. Has been more aware of symptoms throughout the day. Noticing how stress plays a roll. Also feels a difference with her visual/spacial systems. Balance EC is hard initially but does feel less unsteady/dizzy with short walks   Objective Measure 2   Objective Measure SOT   Details Composite: 64, visual 43   Neuromuscular Re-education   Neuromuscular re-ed of mvmt, balance, coord, kinesthetic sense, posture, proprioception minutes (23662) 45   Neuro Re-ed 1 - Details Completed SOT on [employer] balance machine: Increased symptoms after condition 3 and completed 2 min rest.  Completed final 3 measures. Pt had difficulty with conditon 4 and 6 as well, very symptomatic. Overall composit score was 64 (70 is agre group norm). Reviewe DHI, pt's score remianing in mild category and triggering factors are now more visual spacial and motion sensitivity.   Plan   Plan for next session EC, path finding, head turns, tilt board   Total Session Time   Timed Code Treatment Minutes 45   Total Treatment Time (sum of timed and untimed services) 45         PLAN  Continue therapy per current plan of care. Added SOT goal    Beginning/End Dates of Progress Note Reporting Period:   9/27/24 to 02/21/2024    Referring Provider:  Rick L Nissen

## 2024-02-22 NOTE — PROGRESS NOTES
PLAN  Continue therapy per current plan of care.    Beginning/End Dates of Progress Note Reporting Period:   11/06/24 to 02/21/2024    Referring Provider:  Rick L Nissen     02/21/24 0500   Appointment Info   Treating Provider Shannan Can OTR/L   Visits Used 8   Medical Diagnosis Other post infection and related fatigue syndromes   OT Tx Diagnosis Alteration in IADLs   Precautions/Limitations No sensory; no cognition   Progress Note/Certification   Start Of Care Date 11/06/23   Onset of Illness/Injury or Date of Surgery 10/25/23   Therapy Frequency 2x/month   Predicted Duration Up to 90 days   Goals   OT Goals 1;2;3   OT Goal 1   Goal Identifier Fatigue   Goal Description Patient to verbalize 3 strategies for energy conservation/work simplification and fatigue management for improved independence with IADL/leisure activities and increase their FACIT - Fatigue score by 4 points for increased activity level.   Goal Progress As of 2/22/24: FACIT scores have improved from 21 to 30, indicating improved energy levels. Patient has been receptive to training and implements learns strategies related to energy management. She is more aware of symptoms and fatigue levels and setting boundaries to take breaks. She implements energy management strategies including planning, prioritizing, and pacing activites in relation to symptoms/energy levels.  She is becoming more aware she needs to plan her day in relation to her physical/cognitive/emotional capacity and symptoms. She reports she is working on blocking time to complete activities coupled with rest. She has identified energy boosting activities include walking her dog.   Target Date 03/21/24   OT Goal 2   Goal Identifier Cognitive Fatigue and Executive Functioning   Goal Description Client will successfully report at least two strategies for cognitive fatigue and demonstrate the ability to complete complex problem-solving tasks (e.g. errands, multi-step  planning/scheduling, etc.) at 95%+ accuracy to promote organization, planning, and sequencing skills for home, work, and community tasks (e.g. financial management, cooking, work) using strategies PRN.   Goal Progress Goal progressing; pt is learning to implement strategies to improve attention/concentration and minimize impact of cognitive fatigue including minizing distractions, time management principles including setting a timer for concentrated work coupled with rest, performing when she is performing optimally, and alternating physical and cognitive tasks   Target Date 03/21/24   OT Goal 3   Goal Identifier Memory and Attention   Goal Description Pt will verbalize understanding about strategies to help with improving attention (concentration) and internal and external strategies to help with memory and recall into daily routine for improved ADL/IADL performance   Goal Progress Goal progressing. She has noticed difficulties with paying attention to details dueing IADL's.   Target Date 03/21/24   Subjective Report   Subjective Report Pt reports she is more aware of symptosm when they occur and plans activiites in relation to symptoms. She reprts concerns with MRI finding cyst and meeting with a neurosurgeon which is added stress.  She is becoming more aware she needs to plan her day in relation to her physical/cognitive/emotional capacity and symptoms. She reports she is working on blocking time to complete activities coupled with rest. She has identified energy boosting activities include walking her dog. She has noticed difficulties with paying attention to details   Objective Measure 1   Objective Measure FACIT   Details 30 (improved from 11/6/2023)   Self Care/Home Management   Self-Care/Home Mgmt/ADL, Compensatory, Meal Prep Minutes (83404) 30 Minutes   Self Care 1 - Details Skilled follow up and progression of energy management strategies to maximize performance in IADL's. Skilled reinforcement on  importance of  planning her day in relation to her physical/cognitive/emotional capacity and symptoms. Skilled education and training on use of pomodor technique as an energy and time management strategy to reduce anxiety with overwhelming/taxing tasks, improve effectiveness, and prevent fatigue. Pt educated on trialling 20-25 minutes of focused time followed by 5-10 minutes of rest between each interval.Discussed planning activities she would like to complete during the day (higher priority and tasks that could be deferred based on energy levels). Pt verbalized understanding and will implement into daily routine   Skilled Intervention Skilled follow up and progression of energy management strategies to maximize success in daily activities.   Patient Response/Progress Pt engaged, receptive to training, and gaining insight on physical and cognitive energy management strategies   Therapeutic Activity   Therapeutic Activity Minutes (08564) 10   Ther Act 1 - Details To determine improvement of fatigue affecting ADL/IADLs and effectiveness of using energy management strategies, patient completed the FACIT-see scores above.   Skilled progression of attention and planning skills through completion of compass saira task with provision of strategy education throughout for increased attention, planning and pacing. She identifies error and able to self-correct   Education   Learner/Method Patient;Listening;No Barriers to Learning   Plan   Plan for next session menu task; high level calendar activity; med error task, errands task   Comments   Comments Progress Note: Patient has participated in 8 outpatient occupational therapy sessions since start of care focused on energy management. She is compliant with therapy and demonstrates carry over at home. She demonstrates improved energy levels and participation in daily activities as evidenced my improved scores on FACIT from 21 to 30. Continue OT POC   Total Session Time   Timed  Code Treatment Minutes 40   Total Treatment Time (sum of timed and untimed services) 40

## 2024-02-23 NOTE — PROGRESS NOTES
Outcome for 02/23/24 7:42 AM: Data uploaded on Dexcom and [employer]  Skylar Benoit MA  Outcome for 03/01/24 7:57 AM: Data obtained via Dexcom and [employer] website  Tanesha Garcia LPN     This 53 year old woman was returns for f/u of her type 1 diabetes. She also has hypothyroidism and psoriatic arthritis.  Pt was dx having type 1 diabetes at age 15. Her hx is also significant for mild NPDR right eye only,anxiety, hyperlipidemia, psoriatic arthritis,  and GERD.  She started using an omnipod 5 a few months ago. Data are below.  She continues to take tresiba 8 units daily and Ozempic 1 mg each week.  Overall she thinks things are going very well with her pump.  It has been life-changing for her to avoid the extremes of glycemia, particularly the hypoglycemia.  This is had a huge impact on her GI tract.  She no longer has such severe abdominal pain, reflux, and nausea but the symptoms do persist.  She does tend to override the pump frequently because she is does not think it gives her enough insulin.  She is not having problems with hypoglycemia.  She is using Fiasp in her pump and tries to bolus before she eats.    She has been seeing GI for the evaluation and treatment of her GI symptoms.  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  They told her this can be a premalignant condition.  They have also suggested she stop the Ozempic but she is very concerned that it will worsen her glucose control and lead to weight gain.    She is also been evaluated by neurology for her lightheadedness and visual symptoms.  She has been diagnosed with PPPD and is undergoing physical therapy.  This is really helped her symptoms.  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  She has been told her pituitary function is normal.      She saw her eye MD in Feb 2024 and was noted to have mild non proliferative retinopathy.    She has no foot concerns today.  She denies chest pain    Past  Medical History:   Diagnosis Date    Anemia     Anxiety     Arthritis 2014    Psoriatic arthritis    Back injury 1988    Dry eye syndrome     Dyslipidemia     Endometriosis 2002    adehsions seen at laparoscopy    GERD (gastroesophageal reflux disease)     Hypothyroidism     8 yoa    SOB (shortness of breath) 3/11/2014    Type I (juvenile type) diabetes mellitus without mention of complication, not stated as uncontrolled     when 15     Uncomplicated asthma Fall 2013     Past Surgical History:   Procedure Laterality Date    COLONOSCOPY  01/01/2002    COLONOSCOPY N/A 06/23/2020    Procedure: COLONOSCOPY;  Surgeon: Lauryn Rivera MD;  Location:  GI    ESOPHAGOSCOPY, GASTROSCOPY, DUODENOSCOPY (EGD), COMBINED  01/07/2014    Procedure: COMBINED ESOPHAGOSCOPY, GASTROSCOPY, DUODENOSCOPY (EGD), BIOPSY SINGLE OR MULTIPLE;;  Surgeon: Kraig Nicole MD;  Location:  GI    ESOPHAGOSCOPY, GASTROSCOPY, DUODENOSCOPY (EGD), COMBINED N/A 10/05/2023    Procedure: Esophagoscopy, gastroscopy, duodenoscopy (EGD), combined;  Surgeon: Raheem Hubbard MD;  Location:  GI    GYN SURGERY      Laparotomy to remove endometrial tissue    HC BREATH HYDROGEN TEST N/A 06/17/2014    Procedure: HYDROGEN BREATH TEST;  Surgeon: Deacon Alberts MD;  Location:  GI    HYDROGEN BREATH TEST  06/17/2014    LAPAROSCOPIC APPENDECTOMY  01/01/2002    LAPAROTOMY, LYSIS ADHESIONS, COMBINED  01/01/2002    endometriosis    SOFT TISSUE SURGERY  12/01/2014    right ankle tendon injury    ZZC REPAIR CRUCIATE LIGAMENT,KNEE      ZZC STOMACH SURGERY PROCEDURE UNLISTED  12/01/2002                                     Current Outpatient Medications   Medication    albuterol (PROAIR HFA/PROVENTIL HFA/VENTOLIN HFA) 108 (90 Base) MCG/ACT inhaler    aspirin 81 MG tablet    blood glucose (ACCU-CHEK COLIN PLUS) test strip    blood glucose monitoring (ACCU-CHEK FASTCLIX) lancets    blood glucose monitoring (NO BRAND SPECIFIED) meter device kit    Calcium  "Carbonate-Vitamin D (CALCIUM + D PO)    citalopram (CELEXA) 20 MG tablet    Continuous Blood Gluc  (DEXCOM G6 ) SUZANNE    Continuous Blood Gluc Sensor (DEXCOM G6 SENSOR) MISC    Continuous Blood Gluc Transmit (DEXCOM G6 TRANSMITTER) MISC    diclofenac (VOLTAREN) 1 % topical gel    diclofenac (VOLTAREN) 1 % topical gel    DULERA 100-5 MCG/ACT inhaler    econazole nitrate 1 % cream    FIASP PENFILL 100 UNIT/ML SOCT    fish oil-omega-3 fatty acids 1000 MG capsule    fluocinonide (LIDEX) 0.05 % external solution    folic acid (FOLVITE) 1 MG tablet    Insulin Aspart, w/Niacinamide, (FIASP) 100 UNIT/ML SOLN    insulin degludec (TRESIBA FLEXTOUCH) 100 UNIT/ML pen    Insulin Disposable Pump (OMNIPOD 5 G6 INTRO, GEN 5,) KIT    Insulin Disposable Pump (OMNIPOD 5 G6 POD, GEN 5,) MISC    insulin pen needle (B-D U/F) 31G X 8 MM miscellaneous    insulin syringe-needle U-100 (30G X 1/2\" 1 ML) 30G X 1/2\" 1 ML miscellaneous    Ketoconazole (NIZORAL PO)    levothyroxine (SYNTHROID/LEVOTHROID) 112 MCG tablet    methotrexate 2.5 MG tablet    methotrexate 2.5 MG tablet    methotrexate 2.5 MG tablet    methylphenidate (METADATE CD) 20 MG CR capsule    methylphenidate (RITALIN) 10 MG tablet    Minoxidil (ROGAINE EX)    montelukast (SINGULAIR) 5 MG chewable tablet    Multiple Vitamin (MULTIVITAMIN OR)    ondansetron (ZOFRAN ODT) 4 MG ODT tab    Semaglutide, 1 MG/DOSE, 4 MG/3ML SOPN    simvastatin (ZOCOR) 20 MG tablet    traZODone (DESYREL) 50 MG tablet     Current Facility-Administered Medications   Medication    lidocaine 1 % injection 1 mL    triamcinolone acetonide (KENALOG-10) injection 10 mg       8  F (36.7  C)  as of 2/8/2024       Pulse: 70 70  as of 2/26/2024     BP: 121/76 121/76  as of 2/26/2024     Resp: -- 0 Abnormal   as of 10/5/2023     SpO2: 96 % 96%  as of 2/26/2024     Body Mass Index:  None     Systolic (Patient Repo...: -- Not taken     Diastolic (Patient Rep...: -- Not taken     Height: -- 1.776 m (5' " "9.92\")  as of 2/8/2024     Weight: -- 78 kg (172 lb)  as of 2/8/2024     Body Surface Area:  1.96 m   1.776 m (5' 9.92\")  as of 2/8/2024  78 kg (172 lb)  as of 2/8/2024       On exam she is in no acute distress.  Mood appears to be upbeat.  Recent Labs   Lab Test 02/26/24  1149 11/07/23  1143 07/07/23  1422 04/10/23  1406 03/31/23  1033 08/11/22  1129 05/10/22  1523 02/14/22  1004 11/18/21  1659 10/26/21  1556 06/18/19  0000 03/21/19  1359   A1C 6.7* 6.6* 7.1*  --  6.5*   < >  --  8.1*  --   --    < >  --    HEMOGLOBINA1  --   --   --   --   --   --  7.5  --   --  7.3   < >  --    TSH 3.78  --  1.53  --  6.94*   < >  --  3.34  --   --    < > 0.19*   T4 1.10  --   --   --   --   --   --   --   --   --   --  1.09   LDL  --   --   --   --  107*  --   --  111*  --   --    < >  --    HDL  --   --   --   --  78  --   --  94  --   --    < >  --    TRIG  --   --   --   --  47  --   --  66  --   --    < >  --    CR 0.60 0.62 0.56  --  0.61   < >  --  0.60   < >  --    < >  --    MICROL  --   --   --  <12.0  --   --   --  5  --   --    < >  --     < > = values in this interval not displayed.          Latest Ref Rng 2/26/2024  11:49 AM   [facility]     Adrenal Corticotropin <47 pg/mL 10    Cortisol Serum ug/dL 10.3    FSH mIU/mL 78.5    Glucose 70 - 99 mg/dL    Glucose 70 - 99 mg/dL 112 (H)    Growth Hormone <7.0 ug/L 2.7    Ins Growth Factor 1 49 - 234 ng/mL 156    Lutropin mIU/mL 31.6    Potassium 3.4 - 5.3 mmol/L 4.6    Sodium 135 - 145 mmol/L 139    Testosterone Total 8 - 60 ng/dL 8    TSH 0.30 - 4.20 uIU/mL 3.78    TSH 0.40 - 4.00 mU/L    T4 Free 0.90 - 1.70 ng/dL 1.10             Component  Ref Range & Units 7 d ago    Prolactin  5 - 23 ng/mL 22   Resulting Agency [facility]              Specimen Collected: 02/26/24 11:49 AM           Assessment and plan:    1.  Diabetes control.  Her A1c is at target and she is having significantly less hypoglycemia than in the past.  She is extremely happy with the change in " the pump and now wishes she had made the change sooner.  She is overriding the pump suggestions and I think that is contributing to some of her unexpected highs.  I encouraged her to refrain from overriding the pump so the algorithm can learn more about her.  She will do her best.    2.  Diabetes complications.  She is up-to-date with her eye visits and has background retinopathy.  She has no foot concerns today.  Her GFR is normal but I will order a urine albumin.    3.hypothyroidism.  Her current TSH is in target.  No changes in medication.    4.CVD risk.  Her blood pressure has been well-controlled.  She is on a statin.    5.pituitary cyst.  Neurosurgery agrees that this is of no significance.  Her pituitary function is normal.      Follow-up with me in 6 months.    I spent 20 minutes with the patient on the video visit today (start time 3: 00 and end time 3: 2 0).  The visit was done from my office away from clinic.  On the same day of visit I spent an additional 20 minutes reviewing and interpreting her pump and CGM data, her lab data, ordering tests, and doing documentation.

## 2024-02-26 ENCOUNTER — OFFICE VISIT (OUTPATIENT)
Dept: NEUROSURGERY | Facility: CLINIC | Age: 54
End: 2024-02-26
Payer: COMMERCIAL

## 2024-02-26 ENCOUNTER — LAB (OUTPATIENT)
Dept: LAB | Facility: CLINIC | Age: 54
End: 2024-02-26
Payer: COMMERCIAL

## 2024-02-26 ENCOUNTER — PRE VISIT (OUTPATIENT)
Dept: NEUROSURGERY | Facility: CLINIC | Age: 54
End: 2024-02-26

## 2024-02-26 VITALS — OXYGEN SATURATION: 96 % | DIASTOLIC BLOOD PRESSURE: 76 MMHG | HEART RATE: 70 BPM | SYSTOLIC BLOOD PRESSURE: 121 MMHG

## 2024-02-26 DIAGNOSIS — E23.6 PITUITARY CYST (H): ICD-10-CM

## 2024-02-26 DIAGNOSIS — E10.3291 TYPE 1 DIABETES MELLITUS WITH MILD NONPROLIFERATIVE RETINOPATHY OF RIGHT EYE, MACULAR EDEMA PRESENCE UNSPECIFIED (H): ICD-10-CM

## 2024-02-26 LAB
ANION GAP SERPL CALCULATED.3IONS-SCNC: 12 MMOL/L (ref 7–15)
BUN SERPL-MCNC: 6.3 MG/DL (ref 6–20)
CALCIUM SERPL-MCNC: 9.5 MG/DL (ref 8.6–10)
CHLORIDE SERPL-SCNC: 102 MMOL/L (ref 98–107)
CORTIS SERPL-MCNC: 10.3 UG/DL
CREAT SERPL-MCNC: 0.6 MG/DL (ref 0.51–0.95)
DEPRECATED HCO3 PLAS-SCNC: 25 MMOL/L (ref 22–29)
EGFRCR SERPLBLD CKD-EPI 2021: >90 ML/MIN/1.73M2
FSH SERPL IRP2-ACNC: 78.5 MIU/ML
GLUCOSE SERPL-MCNC: 112 MG/DL (ref 70–99)
HBA1C MFR BLD: 6.7 % (ref 0–5.6)
LH SERPL-ACNC: 31.6 MIU/ML
POTASSIUM SERPL-SCNC: 4.6 MMOL/L (ref 3.4–5.3)
PROLACTIN SERPL 3RD IS-MCNC: 22 NG/ML (ref 5–23)
SODIUM SERPL-SCNC: 139 MMOL/L (ref 135–145)
T4 FREE SERPL-MCNC: 1.1 NG/DL (ref 0.9–1.7)
TSH SERPL DL<=0.005 MIU/L-ACNC: 3.78 UIU/ML (ref 0.3–4.2)

## 2024-02-26 PROCEDURE — 84305 ASSAY OF SOMATOMEDIN: CPT

## 2024-02-26 PROCEDURE — 84439 ASSAY OF FREE THYROXINE: CPT

## 2024-02-26 PROCEDURE — 99204 OFFICE O/P NEW MOD 45 MIN: CPT | Performed by: NEUROLOGICAL SURGERY

## 2024-02-26 PROCEDURE — 83002 ASSAY OF GONADOTROPIN (LH): CPT

## 2024-02-26 PROCEDURE — 82024 ASSAY OF ACTH: CPT

## 2024-02-26 PROCEDURE — 84403 ASSAY OF TOTAL TESTOSTERONE: CPT

## 2024-02-26 PROCEDURE — 83003 ASSAY GROWTH HORMONE (HGH): CPT

## 2024-02-26 PROCEDURE — 83001 ASSAY OF GONADOTROPIN (FSH): CPT

## 2024-02-26 PROCEDURE — 36415 COLL VENOUS BLD VENIPUNCTURE: CPT

## 2024-02-26 PROCEDURE — 83036 HEMOGLOBIN GLYCOSYLATED A1C: CPT

## 2024-02-26 PROCEDURE — 84443 ASSAY THYROID STIM HORMONE: CPT

## 2024-02-26 PROCEDURE — 99000 SPECIMEN HANDLING OFFICE-LAB: CPT

## 2024-02-26 PROCEDURE — 82533 TOTAL CORTISOL: CPT

## 2024-02-26 PROCEDURE — 84146 ASSAY OF PROLACTIN: CPT

## 2024-02-26 PROCEDURE — 82397 CHEMILUMINESCENT ASSAY: CPT | Mod: 90

## 2024-02-26 PROCEDURE — 80048 BASIC METABOLIC PNL TOTAL CA: CPT

## 2024-02-26 ASSESSMENT — PAIN SCALES - GENERAL: PAINLEVEL: NO PAIN (0)

## 2024-02-26 NOTE — NURSING NOTE
"Dali Martines is a 53 year old female who presents for:  Chief Complaint   Patient presents with    Consult     MRI review, pituitary cyst.        Initial Vitals:  /76   Pulse 70   SpO2 96%  Estimated body mass index is 24.74 kg/m  as calculated from the following:    Height as of 2/8/24: 5' 9.92\" (1.776 m).    Weight as of 2/8/24: 172 lb (78 kg).. There is no height or weight on file to calculate BSA. BP completed using cuff size: regular  No Pain (0)    Nursing Comments:     Melodie Durant    "

## 2024-02-26 NOTE — LETTER
2/26/2024         RE: Dali Martines  [address]  [facility] [address]        Dear Colleague,    Thank you for referring your patient, Dali Martines, to the [facility]. Please see a copy of my visit note below.    I was asked by Dr. Bowman to see this patient in consultation    53 year old female with dizziness, 8 mm suprasellar cyst.  Has been worked up by Neurology for dizziness and diagnosed with PPPD.  Underwent MRI to eval for structural lesion.  No headaches or vision changes.  History of hypothyroidism and DMT1.  MR Brain, personally reviewed, with possible 8 mm suprasellar cyst vs normal suprasellar subarachnoid space       Past Medical History:   Diagnosis Date     Anemia      Anxiety      Arthritis 2014    Psoriatic arthritis     Back injury 1988     Dry eye syndrome      Dyslipidemia      Endometriosis 2002    adehsions seen at laparoscopy     GERD (gastroesophageal reflux disease)      Hypothyroidism     8 yoa     SOB (shortness of breath) 3/11/2014     Type I (juvenile type) diabetes mellitus without mention of complication, not stated as uncontrolled     when 15      Uncomplicated asthma Fall 2013     Past Surgical History:   Procedure Laterality Date     COLONOSCOPY  01/01/2002     COLONOSCOPY N/A 06/23/2020    Procedure: COLONOSCOPY;  Surgeon: Lauryn Rivera MD;  Location:  GI     ESOPHAGOSCOPY, GASTROSCOPY, DUODENOSCOPY (EGD), COMBINED  01/07/2014    Procedure: COMBINED ESOPHAGOSCOPY, GASTROSCOPY, DUODENOSCOPY (EGD), BIOPSY SINGLE OR MULTIPLE;;  Surgeon: Kraig Nicole MD;  Location:  GI     ESOPHAGOSCOPY, GASTROSCOPY, DUODENOSCOPY (EGD), COMBINED N/A 10/05/2023    Procedure: Esophagoscopy, gastroscopy, duodenoscopy (EGD), combined;  Surgeon: Raheem Hubbard MD;  Location:  GI     GYN SURGERY      Laparotomy to remove endometrial tissue     HC BREATH HYDROGEN TEST N/A 06/17/2014    Procedure: HYDROGEN BREATH TEST;  Surgeon: Jose  Deacon Carpenter MD;  Location:  GI     HYDROGEN BREATH TEST  06/17/2014     LAPAROSCOPIC APPENDECTOMY  01/01/2002     LAPAROTOMY, LYSIS ADHESIONS, COMBINED  01/01/2002    endometriosis     SOFT TISSUE SURGERY  12/01/2014    right ankle tendon injury     ZZC REPAIR CRUCIATE LIGAMENT,KNEE       ZZC STOMACH SURGERY PROCEDURE UNLISTED  12/01/2002     Social History     Socioeconomic History     Marital status: Single     Spouse name: Not on file     Number of children: 0     Years of education: Not on file     Highest education level: Not on file   Occupational History     Occupation: research     Employer: [employer]   Tobacco Use     Smoking status: Never     Passive exposure: Never     Smokeless tobacco: Never   Vaping Use     Vaping Use: Never used   Substance and Sexual Activity     Alcohol use: Yes     Comment: moderately     Drug use: No     Sexual activity: Not on file   Other Topics Concern     Parent/sibling w/ CABG, MI or angioplasty before 65F 55M? Yes   Social History Narrative     Not on file     Social Determinants of Health     Financial Resource Strain: Low Risk  (2/8/2024)    Financial Resource Strain      Within the past 12 months, have you or your family members you live with been unable to get utilities (heat, electricity) when it was really needed?: No   Food Insecurity: Low Risk  (2/8/2024)    Food Insecurity      Within the past 12 months, did you worry that your food would run out before you got money to buy more?: No      Within the past 12 months, did the food you bought just not last and you didn t have money to get more?: No   Transportation Needs: Low Risk  (2/8/2024)    Transportation Needs      Within the past 12 months, has lack of transportation kept you from medical appointments, getting your medicines, non-medical meetings or appointments, work, or from getting things that you need?: No   Physical Activity: Not on file   Stress: Not on file   Social Connections: Not on file    Interpersonal Safety: Not on file   Housing Stability: Low Risk  (2/8/2024)    Housing Stability      Do you have housing? : Yes      Are you worried about losing your housing?: No     Family History   Problem Relation Age of Onset     Breast Cancer Mother      Heart Disease Father      C.A.D. Father      Arthritis Father      Circulatory Father      Eye Disorder Father      Lipids Father      Thyroid Disease Father      Macular Degeneration Father      Coronary Artery Disease Father      Anxiety Disorder Father      Alcoholism Father      Rheumatoid Arthritis Father      Hypothyroidism Father      Diabetes Father      Cerebrovascular Disease Paternal Grandmother         ,     Cancer Paternal Grandfather         Pancreatic     Heart Disease Paternal Grandfather      Diabetes Maternal Grandmother         Type 2     C.A.D. Maternal Grandmother      Heart Disease Maternal Grandmother      Coronary Artery Disease Maternal Grandmother      Heart Disease Maternal Grandfather      Cardiac Sudden Death Maternal Grandfather      Gynecology Other         self     Thyroid Disease Other         self     Macular Degeneration Maternal Aunt      Diabetes Other         Type 1     Glaucoma No family hx of         ROS: 10 point ROS neg other than the symptoms noted above in the HPI.    Physical Exam  /76   Pulse 70   SpO2 96%   HEENT:  Normocephalic, atraumatic.  PERRLA.  EOM s intact.  Visual fields full to gross exam  Neck:  Supple, non-tender, without lymphadenopathy.  Heart:  No peripheral edema  Lungs:  No SOB  Abdomen:  Non-distended.   Skin:  Warm and dry.  Extremities:  No edema, cyanosis or clubbing.  Psychiatric:  No apparent distress  Musculoskeletal:  Normal bulk and tone    NEUROLOGICAL EXAMINATION:     Mental status:  Alert and Oriented x 3, speech is fluent.  Cranial nerves:  II-XII intact.   Motor:    Shoulder Abduction:  Right:  5/5   Left:  5/5  Biceps:                      Right:  5/5   Left:   5/5  Triceps:                     Right:  5/5   Left:  5/5  Wrist Extensors:       Right:  5/5   Left:  5/5  Wrist Flexors:           Right:  5/5   Left:  5/5  interosseus :            Right:  5/5   Left:  5/5  Hip Flexion:                Right: 5/5  Left:  5/5  Quadriceps:             Right:  5/5  Left:  5/5  Hamstrings:             Right:  5/5  Left:  5/5  Gastroc Soleus:        Right:  5/5  Left:  5/5  Tib/Ant:                      Right:  5/5  Left:  5/5  EHL:                     Right:  5/5  Left:  5/5  Sensation:  Intact  Reflexes:  Negative Babinski.  Negative Clonus.  Negative Motta's.  Coordination:  Smooth finger to nose testing.   Negative pronator drift.  Smooth tandem walking.    A/P:  53 year old female with dizziness, 8 mm suprasellar cyst    I had a discussion with the patient, reviewing the history, symptoms, and imaging  Will obtain Endocrine panel  Repeat MRI in 6 months          Again, thank you for allowing me to participate in the care of your patient.        Sincerely,        Adal Nickerson MD

## 2024-02-26 NOTE — PATIENT INSTRUCTIONS
Patient Next Steps:    Order placed for Endocrine Panel  Okay to get today or get done at any [facility] lab site    Order placed for repeat MRI imaging to be done in 6 months. You can schedule at our  today([place] location only), or Central Scheduling will call you to make the appointment. If you do not hear from them within 1-2 business days you can call the numbers below. We will call you with the results and next steps once imaging is completed.  [phone]     Please call us if you have any further questions or concerns.    [facility]   Phone: [phone]  Fax: [phone]

## 2024-02-26 NOTE — PROGRESS NOTES
I was asked by Dr. Bowman to see this patient in consultation    53 year old female with dizziness, 8 mm suprasellar cyst.  Has been worked up by Neurology for dizziness and diagnosed with PPPD.  Underwent MRI to eval for structural lesion.  No headaches or vision changes.  History of hypothyroidism and DMT1.  MR Brain, personally reviewed, with possible 8 mm suprasellar cyst vs normal suprasellar subarachnoid space       Past Medical History:   Diagnosis Date    Anemia     Anxiety     Arthritis 2014    Psoriatic arthritis    Back injury 1988    Dry eye syndrome     Dyslipidemia     Endometriosis 2002    adehsions seen at laparoscopy    GERD (gastroesophageal reflux disease)     Hypothyroidism     8 yoa    SOB (shortness of breath) 3/11/2014    Type I (juvenile type) diabetes mellitus without mention of complication, not stated as uncontrolled     when 15     Uncomplicated asthma Fall 2013     Past Surgical History:   Procedure Laterality Date    COLONOSCOPY  01/01/2002    COLONOSCOPY N/A 06/23/2020    Procedure: COLONOSCOPY;  Surgeon: Lauryn Rivera MD;  Location:  GI    ESOPHAGOSCOPY, GASTROSCOPY, DUODENOSCOPY (EGD), COMBINED  01/07/2014    Procedure: COMBINED ESOPHAGOSCOPY, GASTROSCOPY, DUODENOSCOPY (EGD), BIOPSY SINGLE OR MULTIPLE;;  Surgeon: Kraig Nicole MD;  Location:  GI    ESOPHAGOSCOPY, GASTROSCOPY, DUODENOSCOPY (EGD), COMBINED N/A 10/05/2023    Procedure: Esophagoscopy, gastroscopy, duodenoscopy (EGD), combined;  Surgeon: Raheem Hubbard MD;  Location:  GI    GYN SURGERY      Laparotomy to remove endometrial tissue    HC BREATH HYDROGEN TEST N/A 06/17/2014    Procedure: HYDROGEN BREATH TEST;  Surgeon: Deacon Alberts MD;  Location:  GI    HYDROGEN BREATH TEST  06/17/2014    LAPAROSCOPIC APPENDECTOMY  01/01/2002    LAPAROTOMY, LYSIS ADHESIONS, COMBINED  01/01/2002    endometriosis    SOFT TISSUE SURGERY  12/01/2014    right ankle tendon injury    ZZC REPAIR CRUCIATE  LIGAMENT,KNEE      ZZC STOMACH SURGERY PROCEDURE UNLISTED  12/01/2002     Social History     Socioeconomic History    Marital status: Single     Spouse name: Not on file    Number of children: 0    Years of education: Not on file    Highest education level: Not on file   Occupational History    Occupation: research     Employer: [employer]   Tobacco Use    Smoking status: Never     Passive exposure: Never    Smokeless tobacco: Never   Vaping Use    Vaping Use: Never used   Substance and Sexual Activity    Alcohol use: Yes     Comment: moderately    Drug use: No    Sexual activity: Not on file   Other Topics Concern    Parent/sibling w/ CABG, MI or angioplasty before 65F 55M? Yes   Social History Narrative    Not on file     Social Determinants of Health     Financial Resource Strain: Low Risk  (2/8/2024)    Financial Resource Strain     Within the past 12 months, have you or your family members you live with been unable to get utilities (heat, electricity) when it was really needed?: No   Food Insecurity: Low Risk  (2/8/2024)    Food Insecurity     Within the past 12 months, did you worry that your food would run out before you got money to buy more?: No     Within the past 12 months, did the food you bought just not last and you didn t have money to get more?: No   Transportation Needs: Low Risk  (2/8/2024)    Transportation Needs     Within the past 12 months, has lack of transportation kept you from medical appointments, getting your medicines, non-medical meetings or appointments, work, or from getting things that you need?: No   Physical Activity: Not on file   Stress: Not on file   Social Connections: Not on file   Interpersonal Safety: Not on file   Housing Stability: Low Risk  (2/8/2024)    Housing Stability     Do you have housing? : Yes     Are you worried about losing your housing?: No     Family History   Problem Relation Age of Onset    Breast Cancer Mother     Heart Disease Father     C.A.D.  Father     Arthritis Father     Circulatory Father     Eye Disorder Father     Lipids Father     Thyroid Disease Father     Macular Degeneration Father     Coronary Artery Disease Father     Anxiety Disorder Father     Alcoholism Father     Rheumatoid Arthritis Father     Hypothyroidism Father     Diabetes Father     Cerebrovascular Disease Paternal Grandmother         ,    Cancer Paternal Grandfather         Pancreatic    Heart Disease Paternal Grandfather     Diabetes Maternal Grandmother         Type 2    C.A.D. Maternal Grandmother     Heart Disease Maternal Grandmother     Coronary Artery Disease Maternal Grandmother     Heart Disease Maternal Grandfather     Cardiac Sudden Death Maternal Grandfather     Gynecology Other         self    Thyroid Disease Other         self    Macular Degeneration Maternal Aunt     Diabetes Other         Type 1    Glaucoma No family hx of         ROS: 10 point ROS neg other than the symptoms noted above in the HPI.    Physical Exam  /76   Pulse 70   SpO2 96%   HEENT:  Normocephalic, atraumatic.  PERRLA.  EOM s intact.  Visual fields full to gross exam  Neck:  Supple, non-tender, without lymphadenopathy.  Heart:  No peripheral edema  Lungs:  No SOB  Abdomen:  Non-distended.   Skin:  Warm and dry.  Extremities:  No edema, cyanosis or clubbing.  Psychiatric:  No apparent distress  Musculoskeletal:  Normal bulk and tone    NEUROLOGICAL EXAMINATION:     Mental status:  Alert and Oriented x 3, speech is fluent.  Cranial nerves:  II-XII intact.   Motor:    Shoulder Abduction:  Right:  5/5   Left:  5/5  Biceps:                      Right:  5/5   Left:  5/5  Triceps:                     Right:  5/5   Left:  5/5  Wrist Extensors:       Right:  5/5   Left:  5/5  Wrist Flexors:           Right:  5/5   Left:  5/5  interosseus :            Right:  5/5   Left:  5/5  Hip Flexion:                Right: 5/5  Left:  5/5  Quadriceps:             Right:  5/5  Left:  5/5  Hamstrings:              Right:  5/5  Left:  5/5  Gastroc Soleus:        Right:  5/5  Left:  5/5  Tib/Ant:                      Right:  5/5  Left:  5/5  EHL:                     Right:  5/5  Left:  5/5  Sensation:  Intact  Reflexes:  Negative Babinski.  Negative Clonus.  Negative Motta's.  Coordination:  Smooth finger to nose testing.   Negative pronator drift.  Smooth tandem walking.    A/P:  53 year old female with dizziness, 8 mm suprasellar cyst    I had a discussion with the patient, reviewing the history, symptoms, and imaging  Will obtain Endocrine panel  Repeat MRI in 6 months

## 2024-02-28 ENCOUNTER — DOCUMENTATION ONLY (OUTPATIENT)
Dept: NEUROSURGERY | Facility: CLINIC | Age: 54
End: 2024-02-28
Payer: COMMERCIAL

## 2024-02-28 LAB
ACTH PLAS-MCNC: 10 PG/ML
GH SERPL-MCNC: 2.7 UG/L
TESTOST SERPL-MCNC: 8 NG/DL (ref 8–60)

## 2024-02-28 NOTE — PROGRESS NOTES
Dr. Nickerson is requesting we update patient that her endocrine panel was normal.     Updated patient via [employer].

## 2024-03-01 LAB — IGF-I BLD-MCNC: 156 NG/ML (ref 49–234)

## 2024-03-03 DIAGNOSIS — E10.3291 TYPE 1 DIABETES MELLITUS WITH MILD NONPROLIFERATIVE RETINOPATHY OF RIGHT EYE, MACULAR EDEMA PRESENCE UNSPECIFIED (H): ICD-10-CM

## 2024-03-04 ENCOUNTER — VIRTUAL VISIT (OUTPATIENT)
Dept: ENDOCRINOLOGY | Facility: CLINIC | Age: 54
End: 2024-03-04
Payer: COMMERCIAL

## 2024-03-04 DIAGNOSIS — E10.3291 TYPE 1 DIABETES MELLITUS WITH MILD NONPROLIFERATIVE RETINOPATHY OF RIGHT EYE, MACULAR EDEMA PRESENCE UNSPECIFIED (H): Primary | ICD-10-CM

## 2024-03-04 PROCEDURE — G2211 COMPLEX E/M VISIT ADD ON: HCPCS | Mod: 95 | Performed by: INTERNAL MEDICINE

## 2024-03-04 PROCEDURE — 99215 OFFICE O/P EST HI 40 MIN: CPT | Mod: 95 | Performed by: INTERNAL MEDICINE

## 2024-03-04 NOTE — LETTER
3/4/2024       RE: Dali Martines  [address]  [facility] [address]     Dear Colleague,    Thank you for referring your patient, Dali Martines, to the [facility] ENDOCR[facility]OLOG[facility] [place] at [facility]. Please see a copy of my visit note below.    Outcome for 02/23/24 7:42 AM: Data uploaded on Dexcom and Dragonfly List  Skylar Benoit MA  Outcome for 03/01/24 7:57 AM: Data obtained via Dexcom and Dragonfly List website  Tanesha Garcia LPN     This 53 year old woman was returns for f/u of her type 1 diabetes. She also has hypothyroidism and psoriatic arthritis.  Pt was dx having type 1 diabetes at age 15. Her hx is also significant for mild NPDR right eye only,anxiety, hyperlipidemia, psoriatic arthritis,  and GERD.  She started using an omnipod 5 a few months ago. Data are below.  She continues to take tresiba 8 units daily and Ozempic 1 mg each week.  Overall she thinks things are going very well with her pump.  It has been life-changing for her to avoid the extremes of glycemia, particularly the hypoglycemia.  This is had a huge impact on her GI tract.  She no longer has such severe abdominal pain, reflux, and nausea but the symptoms do persist.  She does tend to override the pump frequently because she is does not think it gives her enough insulin.  She is not having problems with hypoglycemia.  She is using Fiasp in her pump and tries to bolus before she eats.    She has been seeing GI for the evaluation and treatment of her GI symptoms.  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  They told her this can be a premalignant condition.  They have also suggested she stop the Ozempic but she is very concerned that it will worsen her glucose control and lead to weight gain.    She is also been evaluated by neurology for her lightheadedness and visual symptoms.  She has been diagnosed with PPPD and is undergoing  physical therapy.  This is really helped her symptoms.  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  She has been told her pituitary function is normal.      She saw her eye MD in Feb 2024 and was noted to have mild non proliferative retinopathy.    She has no foot concerns today.  She denies chest pain    Past Medical History:   Diagnosis Date    Anemia     Anxiety     Arthritis 2014    Psoriatic arthritis    Back injury 1988    Dry eye syndrome     Dyslipidemia     Endometriosis 2002    adehsions seen at laparoscopy    GERD (gastroesophageal reflux disease)     Hypothyroidism     8 yoa    SOB (shortness of breath) 3/11/2014    Type I (juvenile type) diabetes mellitus without mention of complication, not stated as uncontrolled     when 15     Uncomplicated asthma Fall 2013     Past Surgical History:   Procedure Laterality Date    COLONOSCOPY  01/01/2002    COLONOSCOPY N/A 06/23/2020    Procedure: COLONOSCOPY;  Surgeon: Lauryn Rivera MD;  Location:  GI    ESOPHAGOSCOPY, GASTROSCOPY, DUODENOSCOPY (EGD), COMBINED  01/07/2014    Procedure: COMBINED ESOPHAGOSCOPY, GASTROSCOPY, DUODENOSCOPY (EGD), BIOPSY SINGLE OR MULTIPLE;;  Surgeon: Kraig Nicole MD;  Location:  GI    ESOPHAGOSCOPY, GASTROSCOPY, DUODENOSCOPY (EGD), COMBINED N/A 10/05/2023    Procedure: Esophagoscopy, gastroscopy, duodenoscopy (EGD), combined;  Surgeon: Raheem Hubbard MD;  Location:  GI    GYN SURGERY      Laparotomy to remove endometrial tissue    HC BREATH HYDROGEN TEST N/A 06/17/2014    Procedure: HYDROGEN BREATH TEST;  Surgeon: Deacon Alberts MD;  Location:  GI    HYDROGEN BREATH TEST  06/17/2014    LAPAROSCOPIC APPENDECTOMY  01/01/2002    LAPAROTOMY, LYSIS ADHESIONS, COMBINED  01/01/2002    endometriosis    SOFT TISSUE SURGERY  12/01/2014    right ankle tendon injury    ZZC REPAIR CRUCIATE LIGAMENT,KNEE      ZZC STOMACH SURGERY PROCEDURE UNLISTED  12/01/2002                                     Current Outpatient  "Medications   Medication    albuterol (PROAIR HFA/PROVENTIL HFA/VENTOLIN HFA) 108 (90 Base) MCG/ACT inhaler    aspirin 81 MG tablet    blood glucose (ACCU-CHEK COLIN PLUS) test strip    blood glucose monitoring (ACCU-CHEK FASTCLIX) lancets    blood glucose monitoring (NO BRAND SPECIFIED) meter device kit    Calcium Carbonate-Vitamin D (CALCIUM + D PO)    citalopram (CELEXA) 20 MG tablet    Continuous Blood Gluc  (DEXCOM G6 ) SUZANNE    Continuous Blood Gluc Sensor (DEXCOM G6 SENSOR) MISC    Continuous Blood Gluc Transmit (DEXCOM G6 TRANSMITTER) MISC    diclofenac (VOLTAREN) 1 % topical gel    diclofenac (VOLTAREN) 1 % topical gel    DULERA 100-5 MCG/ACT inhaler    econazole nitrate 1 % cream    FIASP PENFILL 100 UNIT/ML SOCT    fish oil-omega-3 fatty acids 1000 MG capsule    fluocinonide (LIDEX) 0.05 % external solution    folic acid (FOLVITE) 1 MG tablet    Insulin Aspart, w/Niacinamide, (FIASP) 100 UNIT/ML SOLN    insulin degludec (TRESIBA FLEXTOUCH) 100 UNIT/ML pen    Insulin Disposable Pump (OMNIPOD 5 G6 INTRO, GEN 5,) KIT    Insulin Disposable Pump (OMNIPOD 5 G6 POD, GEN 5,) MISC    insulin pen needle (B-D U/F) 31G X 8 MM miscellaneous    insulin syringe-needle U-100 (30G X 1/2\" 1 ML) 30G X 1/2\" 1 ML miscellaneous    Ketoconazole (NIZORAL PO)    levothyroxine (SYNTHROID/LEVOTHROID) 112 MCG tablet    methotrexate 2.5 MG tablet    methotrexate 2.5 MG tablet    methotrexate 2.5 MG tablet    methylphenidate (METADATE CD) 20 MG CR capsule    methylphenidate (RITALIN) 10 MG tablet    Minoxidil (ROGAINE EX)    montelukast (SINGULAIR) 5 MG chewable tablet    Multiple Vitamin (MULTIVITAMIN OR)    ondansetron (ZOFRAN ODT) 4 MG ODT tab    Semaglutide, 1 MG/DOSE, 4 MG/3ML SOPN    simvastatin (ZOCOR) 20 MG tablet    traZODone (DESYREL) 50 MG tablet     Current Facility-Administered Medications   Medication    lidocaine 1 % injection 1 mL    triamcinolone acetonide (KENALOG-10) injection 10 mg       8  F (36.7 " " C)  as of 2/8/2024       Pulse: 70 70  as of 2/26/2024     BP: 121/76 121/76  as of 2/26/2024     Resp: -- 0 Abnormal   as of 10/5/2023     SpO2: 96 % 96%  as of 2/26/2024     Body Mass Index:  None     Systolic (Patient Repo...: -- Not taken     Diastolic (Patient Rep...: -- Not taken     Height: -- 1.776 m (5' 9.92\")  as of 2/8/2024     Weight: -- 78 kg (172 lb)  as of 2/8/2024     Body Surface Area:  1.96 m   1.776 m (5' 9.92\")  as of 2/8/2024  78 kg (172 lb)  as of 2/8/2024       On exam she is in no acute distress.  Mood appears to be upbeat.  Recent Labs   Lab Test 02/26/24  1149 11/07/23  1143 07/07/23  1422 04/10/23  1406 03/31/23  1033 08/11/22  1129 05/10/22  1523 02/14/22  1004 11/18/21  1659 10/26/21  1556 06/18/19  0000 03/21/19  1359   A1C 6.7* 6.6* 7.1*  --  6.5*   < >  --  8.1*  --   --    < >  --    HEMOGLOBINA1  --   --   --   --   --   --  7.5  --   --  7.3   < >  --    TSH 3.78  --  1.53  --  6.94*   < >  --  3.34  --   --    < > 0.19*   T4 1.10  --   --   --   --   --   --   --   --   --   --  1.09   LDL  --   --   --   --  107*  --   --  111*  --   --    < >  --    HDL  --   --   --   --  78  --   --  94  --   --    < >  --    TRIG  --   --   --   --  47  --   --  66  --   --    < >  --    CR 0.60 0.62 0.56  --  0.61   < >  --  0.60   < >  --    < >  --    MICROL  --   --   --  <12.0  --   --   --  5  --   --    < >  --     < > = values in this interval not displayed.          Latest Ref Rn 2/26/2024  11:49 AM   ENDO [facility]     Adrenal Corticotropin <47 pg/mL 10    Cortisol Serum ug/dL 10.3    FSH mIU/mL 78.5    Glucose 70 - 99 mg/dL    Glucose 70 - 99 mg/dL 112 (H)    Growth Hormone <7.0 ug/L 2.7    Ins Growth Factor 1 49 - 234 ng/mL 156    Lutropin mIU/mL 31.6    Potassium 3.4 - 5.3 mmol/L 4.6    Sodium 135 - 145 mmol/L 139    Testosterone Total 8 - 60 ng/dL 8    TSH 0.30 - 4.20 uIU/mL 3.78    TSH 0.40 - 4.00 mU/L    T4 Free 0.90 - 1.70 ng/dL 1.10             Component  Ref " Range & Units 7 d ago    Prolactin  5 - 23 ng/mL 22   Resulting Agency [facility]              Specimen Collected: 02/26/24 11:49 AM           Assessment and plan:    1.  Diabetes control.  Her A1c is at target and she is having significantly less hypoglycemia than in the past.  She is extremely happy with the change in the pump and now wishes she had made the change sooner.  She is overriding the pump suggestions and I think that is contributing to some of her unexpected highs.  I encouraged her to refrain from overriding the pump so the algorithm can learn more about her.  She will do her best.    2.  Diabetes complications.  She is up-to-date with her eye visits and has background retinopathy.  She has no foot concerns today.  Her GFR is normal but I will order a urine albumin.    3.hypothyroidism.  Her current TSH is in target.  No changes in medication.    4.CVD risk.  Her blood pressure has been well-controlled.  She is on a statin.    5.pituitary cyst.  Neurosurgery agrees that this is of no significance.  Her pituitary function is normal.      Follow-up with me in 6 months.    I spent 20 minutes with the patient on the video visit today (start time 3: 00 and end time 3: 2 0).  The visit was done from my office away from clinic.  On the same day of visit I spent an additional 20 minutes reviewing and interpreting her pump and CGM data, her lab data, ordering tests, and doing documentation.      Sincerely,    Felicia Mckeon MD

## 2024-03-04 NOTE — NURSING NOTE
Is the patient currently in the state of [place]? YES    Visit mode:VIDEO    If the visit is dropped, the patient can be reconnected by: VIDEO VISIT: Send to e-mail at: [email or web address]    Will anyone else be joining the visit? NO  (If patient encounters technical issues they should call [phone]    How would you like to obtain your AVS? MyChart    Are changes needed to the allergy or medication list? No, Pt stated no changes to allergies, and Pt stated no med changes.so VF did not review this information again with patient due to this.    Reason for visit: RECHECK    Latanya CORRIGAN

## 2024-03-05 NOTE — PATIENT INSTRUCTIONS
Please try to refrain from overriding the recommendations of your pump for at least 3 days in a row.  It is important that the OmniPod algorithm learn about your response to with suggestions so it can become more accurate over time.  After you have given it a few days to learn more about you, you can do some overrides again for a couple of days in a row if you think that is necessary but I expect over time, the algorithm will become much more effective at controlling your blood sugars.

## 2024-03-06 ENCOUNTER — THERAPY VISIT (OUTPATIENT)
Dept: OCCUPATIONAL THERAPY | Facility: CLINIC | Age: 54
End: 2024-03-06
Payer: COMMERCIAL

## 2024-03-06 ENCOUNTER — THERAPY VISIT (OUTPATIENT)
Dept: PHYSICAL THERAPY | Facility: CLINIC | Age: 54
End: 2024-03-06
Payer: COMMERCIAL

## 2024-03-06 DIAGNOSIS — R68.89 DECREASED FUNCTIONAL ACTIVITY TOLERANCE: ICD-10-CM

## 2024-03-06 DIAGNOSIS — R26.89 BALANCE DISORDER: Primary | ICD-10-CM

## 2024-03-06 DIAGNOSIS — G93.39 OTHER POST INFECTION AND RELATED FATIGUE SYNDROMES: Primary | ICD-10-CM

## 2024-03-06 DIAGNOSIS — Z78.9 ALTERATION IN INSTRUMENTAL ACTIVITIES OF DAILY LIVING (IADL): ICD-10-CM

## 2024-03-06 DIAGNOSIS — R42 DIZZINESS: ICD-10-CM

## 2024-03-06 PROCEDURE — 97112 NEUROMUSCULAR REEDUCATION: CPT | Mod: GP | Performed by: PHYSICAL THERAPIST

## 2024-03-06 PROCEDURE — 97530 THERAPEUTIC ACTIVITIES: CPT | Mod: GO

## 2024-03-06 RX ORDER — SIMVASTATIN 20 MG
20 TABLET ORAL AT BEDTIME
Qty: 90 TABLET | Refills: 3 | Status: SHIPPED | OUTPATIENT
Start: 2024-03-06

## 2024-03-06 NOTE — TELEPHONE ENCOUNTER
3/4/2024  [facility] [place]  Felicia Mckeon MD  Endocrinology, Diabetes, and Metabolism    Antihyperlipidemic agents Passed   simvastatin (ZOCOR) 20 MG tablet   90 tablet 3 3/6/2023

## 2024-03-08 ENCOUNTER — TELEPHONE (OUTPATIENT)
Dept: ENDOCRINOLOGY | Facility: CLINIC | Age: 54
End: 2024-03-08
Payer: COMMERCIAL

## 2024-03-08 ENCOUNTER — VIRTUAL VISIT (OUTPATIENT)
Dept: PSYCHOLOGY | Facility: CLINIC | Age: 54
End: 2024-03-08
Payer: COMMERCIAL

## 2024-03-08 DIAGNOSIS — G89.29 OTHER CHRONIC PAIN: ICD-10-CM

## 2024-03-08 DIAGNOSIS — F33.41 MAJOR DEPRESSIVE DISORDER, RECURRENT, IN PARTIAL REMISSION (H): ICD-10-CM

## 2024-03-08 DIAGNOSIS — E10.3299 TYPE 1 DIABETES MELLITUS WITH MILD NONPROLIFERATIVE RETINOPATHY, MACULAR EDEMA PRESENCE UNSPECIFIED, UNSPECIFIED LATERALITY (H): ICD-10-CM

## 2024-03-08 DIAGNOSIS — F43.22 ADJUSTMENT DISORDER WITH ANXIETY: Primary | ICD-10-CM

## 2024-03-08 PROCEDURE — 90834 PSYTX W PT 45 MINUTES: CPT | Mod: 95 | Performed by: PSYCHOLOGIST

## 2024-03-08 ASSESSMENT — ANXIETY QUESTIONNAIRES
3. WORRYING TOO MUCH ABOUT DIFFERENT THINGS: SEVERAL DAYS
5. BEING SO RESTLESS THAT IT IS HARD TO SIT STILL: NOT AT ALL
6. BECOMING EASILY ANNOYED OR IRRITABLE: SEVERAL DAYS
GAD7 TOTAL SCORE: 7
GAD7 TOTAL SCORE: 7
7. FEELING AFRAID AS IF SOMETHING AWFUL MIGHT HAPPEN: SEVERAL DAYS
8. IF YOU CHECKED OFF ANY PROBLEMS, HOW DIFFICULT HAVE THESE MADE IT FOR YOU TO DO YOUR WORK, TAKE CARE OF THINGS AT HOME, OR GET ALONG WITH OTHER PEOPLE?: SOMEWHAT DIFFICULT
4. TROUBLE RELAXING: MORE THAN HALF THE DAYS
2. NOT BEING ABLE TO STOP OR CONTROL WORRYING: SEVERAL DAYS
7. FEELING AFRAID AS IF SOMETHING AWFUL MIGHT HAPPEN: SEVERAL DAYS
1. FEELING NERVOUS, ANXIOUS, OR ON EDGE: SEVERAL DAYS
IF YOU CHECKED OFF ANY PROBLEMS ON THIS QUESTIONNAIRE, HOW DIFFICULT HAVE THESE PROBLEMS MADE IT FOR YOU TO DO YOUR WORK, TAKE CARE OF THINGS AT HOME, OR GET ALONG WITH OTHER PEOPLE: SOMEWHAT DIFFICULT

## 2024-03-08 NOTE — TELEPHONE ENCOUNTER
Spoke with patient and scheduled 6 mo follow-up appointment with Dr. Lenard willis per checkout order.

## 2024-03-08 NOTE — PROGRESS NOTES
Health Psychology          Latanya Mayfield, Ph.D.,  [phone]  Hansa Arzate, Ph.D.,  [phone]  Sarah Beth Jack, Ph.D.,  [phone]  Pushpa Mendez, Ph.D., , [facility] [phone]  Panfilo Cornejo, Ph.D., , [facility] [phone]  Krystle Hannah, Ph.D.,  [phone]  Elise Sutton, Ph.D., , [facility] [phone]    [facility], 3rd Floor  [address] [place] [address]       Health Psychology Progress Note    Patient Name: Dali Martines    Service Type: Individual  Length of Visit: 49 minutes   Start time: 10:03 AM    Stop time: 10:52 AM      Attendees: patient attended alone  Session #: 14    Telemedicine Information:     Telemedicine Visit: The patient's condition can be safely assessed and treated via synchronous audio and visual telemedicine encounter.    Reason for Telemedicine Visit: Patient has requested telehealth visit and Patient convenience (e.g. access to timely appointments / distance to available provider)  Originating Site (Patient Location): Patient's home, [place]  Distant Location (provider location):  Off-site: Provider's Home Office   Consent:  The patient/guardian has verbally consented to: the potential risks and benefits of telemedicine (video visit) versus in person care; bill my insurance or make self-payment for services provided; and responsibility for payment of non-covered services.   Mode of Communication:  Video Conference via [email or web address]  As the provider I attest to compliance with applicable laws and regulations related to telemedicine.     Identifying Information and Presenting Problem:  Dali Martines is a 53 year old female adult who was referred to health psychology by her endocrinologist, Dr. Mckeon. Patient presented with concerns of stress in the context of multiple life events, several medical conditions, and chronic management of T1DM.    Topics Discussed/Interventions Provided:    Session Content:      Update: Patient reports feeling better. Reports that her follow-up MRI was reassuring. Reports that recent PPPD (persistent postural-perceptual dizziness - diagnosis recently changed from PPPV) treatment has been successful. Continues with PT for vestibular therapy and with OT for cognitive therapy. Does still have some ongoing worries about upcoming endoscopy in April.      Homework Review: Patient states that she attempted to build in restorative and enjoyable activities. Completed her pleasant events activity. She notes that getting out into nature is an important active restorative activity. Explored barriers to building in restorative and joyful activities. She cites caregiver responsibilities and obligations as significant barriers.     Skills/intervention: Supported patient in processing her thoughts and emotions around her health concerns. Explored ways in which she can overcome barriers and build in more carol and restoration. She identified reading and nature as good resources for infusing restoration. Recommended a book to help facilitate awe and wonder. Introduced formal gratitude practice.      Top values identified included: spirituality, humor, fairness, creativity, curiosity, contribution, and caring.  Unhelpful rules/assumptions: fear of failure, all-or-nothing thinking, and simplicity, control, and structure  Top priorities for grief: increasing tolerance of discomfort associated with uncertainty as well as identifying and processing emotions associated with the losses she has experienced.    Treatment Objective(s) Addressed in This Session:  Psychological distress related to Diabetes  Psychological distress related to caregiver stress    Progress on / Status of Treatment Objective(s) / Homework:  In progress    Medication Adherence: Patient did not report medication changes. Current medication list is below:     Current Outpatient Medications   Medication    albuterol (PROAIR HFA/PROVENTIL  "HFA/VENTOLIN HFA) 108 (90 Base) MCG/ACT inhaler    aspirin 81 MG tablet    blood glucose (ACCU-CHEK COLIN PLUS) test strip    blood glucose monitoring (ACCU-CHEK FASTCLIX) lancets    blood glucose monitoring (NO BRAND SPECIFIED) meter device kit    Calcium Carbonate-Vitamin D (CALCIUM + D PO)    citalopram (CELEXA) 20 MG tablet    Continuous Blood Gluc  (DEXCOM G6 ) SUZANNE    Continuous Blood Gluc Sensor (DEXCOM G6 SENSOR) MISC    Continuous Blood Gluc Transmit (DEXCOM G6 TRANSMITTER) MISC    diclofenac (VOLTAREN) 1 % topical gel    diclofenac (VOLTAREN) 1 % topical gel    DULERA 100-5 MCG/ACT inhaler    econazole nitrate 1 % cream    FIASP PENFILL 100 UNIT/ML SOCT    fish oil-omega-3 fatty acids 1000 MG capsule    fluocinonide (LIDEX) 0.05 % external solution    folic acid (FOLVITE) 1 MG tablet    Insulin Aspart, w/Niacinamide, (FIASP) 100 UNIT/ML SOLN    insulin degludec (TRESIBA FLEXTOUCH) 100 UNIT/ML pen    Insulin Disposable Pump (OMNIPOD 5 G6 INTRO, GEN 5,) KIT    Insulin Disposable Pump (OMNIPOD 5 G6 POD, GEN 5,) MISC    insulin pen needle (B-D U/F) 31G X 8 MM miscellaneous    insulin syringe-needle U-100 (30G X 1/2\" 1 ML) 30G X 1/2\" 1 ML miscellaneous    Ketoconazole (NIZORAL PO)    levothyroxine (SYNTHROID/LEVOTHROID) 112 MCG tablet    methotrexate 2.5 MG tablet    methotrexate 2.5 MG tablet    methotrexate 2.5 MG tablet    methylphenidate (METADATE CD) 20 MG CR capsule    methylphenidate (RITALIN) 10 MG tablet    Minoxidil (ROGAINE EX)    montelukast (SINGULAIR) 5 MG chewable tablet    Multiple Vitamin (MULTIVITAMIN OR)    ondansetron (ZOFRAN ODT) 4 MG ODT tab    Semaglutide, 1 MG/DOSE, 4 MG/3ML SOPN    simvastatin (ZOCOR) 20 MG tablet    traZODone (DESYREL) 50 MG tablet     Current Facility-Administered Medications   Medication    lidocaine 1 % injection 1 mL    triamcinolone acetonide (KENALOG-10) injection 10 mg     Assessment: The patient appeared to be active and engaged in today's " "session and was receptive to feedback.     Mental Status Exam:   Appearance: Appropriate   Eye Contact: Good    Orientation: Yes, x4  Behavior/relationship to provider/demeanor: Cooperative, Engaged, and Pleasant  Motor Activity: normal or unremarkable  Mood (subjective report): \"better\"  Affect (objective appearance): Appropriate/mood congruent  Speech Rate: Normal  Speech Volume: Normal  Speech Articulation: Normal  Speech Coherence: Normal  Speech Spontaneity: Normal  Thought Content: no suicidal/homicidal ideation, plans, or intent  Thought Process (associations): Logical, Linear, and Goal directed  Thought Process (rate): Normal  Abnormal Perception: None  Attention/Concentration: Normal  Memory: Appears grossly intact   Fund of Knowledge: Above average  Abstraction:  Normal  Insight:  Good  Judgment:  good    Does the patient appear to be at imminent risk of harm to self/others at this time? No    The session was necessary to address psychological distress in the context of multiple psychosocial stressors and chronic health concerns.    Diagnoses (provisional):    1. Adjustment disorder with anxiety    2. Major depressive disorder, recurrent, in partial remission (H)    3. 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 Other chronic pain        Plan:    Follow-up video visit with me on 4/16/24 at 2:00pm    Patient to use 911 or other crisis resources in the event of a psychiatric emergency  Primary care needs and medications are managed by Dimitri Alas MD. Referring provider is Dr. Mckeon.  Next visit agenda/goals:   Expand \"playtime\" and restorative/joyful activities  Write down 3 good things (appreciative or grateful for) each day  Treatment plan review.     Skills taught/interventions used thus far:  Psychoeducation  Values clarification  Committed action (vitality vs suffering)  Perfectionism workbook (modules " "1-5)  Defusion  Acting opposite  Mindfulness (breath, body scan, watching thoughts, choiceless awareness)  Therapeutic journaling  [employer]    NOTE: Treatment plan update due 9/6/24; 90 day treatment plan review due 3/10/24.                                                Individual Treatment Plan    Patient's Name: Dali Martines  YOB: 1970    Date of Creation: 12/11/23  Date Treatment Plan Last Reviewed/Revised: 12/11/23    DSM5 Diagnoses:     1. Adjustment disorder with anxiety    2. Major depressive disorder, recurrent, in partial remission (H)      Psychosocial / Contextual Factors: financial hardship, health issues, occupational/vocational stress, relationship stress, caregiver stress (mother's health concerns), and grief and loss in the wake of recent bereavement  PROMIS (reviewed every 90 days):      10/31/2023  9:55 AM   PROMIS 10    PROMIS TOTAL - SUBSCORES 21          Referral / Collaboration:  Collaboration with medical team and specialists as needed.    Anticipated number of session for this episode of care: 15-20  Anticipation frequency of session: 2x per month  Anticipated Duration of each session: 38-52 minutes  Treatment plan will be reviewed in 90 days or when goals have been changed.       Measurable Treatment Goal(s) related to diagnosis/functional impairment(s): Overall goals for treatment include improving her physical health (e.g., diabetes, physical activity, and GI discomfort), learning strategies for enhancing coping capacity, and decreasing avoidance-based coping.  Goal 1: Patient will decrease her use of avoidance-based coping   \"I will know I've met my goal when I am not avoiding doing things out of fear (e.g., bathroom sink, processing grief, physical activity, reviewing pieces of writing).\"     Objective #A (Patient Action)    Patient will learn and implement skills for reducing perfectionism.  Status: In progress - Date: 12/11/23 (patient is in the process " of completing the perfectionism workbook)     Intervention(s)  Therapist will teach anti-perfectionism skills and will assist patient in completing a workbook on perfectionism.    Objective #B  Patient will initiate a healthy grieving process  Status: In progress - Date: 12/11/23 (patient is learning emotional expression skills)    Intervention(s)  Therapist will assist patient in processing and exploring her grief.    Objective #C  Patient will increase her participation in meaningful and purposeful activities.  Status: In progress - Date: 12/11/23 (patient reports some progress in increasing her participation in meaningful activities, will focus on generating a broader list of activities)    Intervention(s)  Therapist will teach values clarification, committed action, and exposure skills.    Objective #D  Patient will learn and implement unhooking skills for decreasing the degree to which fear influences behaviors and decisions in an unhelpful way.  Status: In progress - Date: 12/11/23 (patient has been learning and implementing mindfulness and cognitive restructuring skills)     Intervention(s)  Therapist will teach defusion, cognitive restructuring, and mindfulness skills.      Patient has reviewed and agreed to the above plan.      Elise Sutton, PhD  September 6, 2023    Elise Sutton, Ph.D., , [facility]  Clinical Health Psychologist  Phone: [phone]   Pager: [phone]

## 2024-03-11 LAB — A-PGH SER-MCNC: 0.5 NG/ML

## 2024-03-12 ENCOUNTER — TELEPHONE (OUTPATIENT)
Dept: GASTROENTEROLOGY | Facility: CLINIC | Age: 54
End: 2024-03-12
Payer: COMMERCIAL

## 2024-03-12 NOTE — TELEPHONE ENCOUNTER
Attempted to call patient, no answer, mailbox full.    Spoke with patient and went over instructions for Hydrogen Breath Test.  Declines taking any antibiotics, laxatives, probiotics, ect. Sent instructions through [employer] as well. Will follow up with us if  has any further questions.     She does have a question about a glucose tablet if she needed to take at night. Sometimes she needs to take at night. Wondering if that would be ok. Informed that will route to nurse and will get a call back. She was ok with this plan.    Yisel Daniel LPN

## 2024-03-13 ASSESSMENT — SLEEP AND FATIGUE QUESTIONNAIRES
HOW LIKELY ARE YOU TO NOD OFF OR FALL ASLEEP WHILE SITTING INACTIVE IN A PUBLIC PLACE: SLIGHT CHANCE OF DOZING
HOW LIKELY ARE YOU TO NOD OFF OR FALL ASLEEP WHILE SITTING AND READING: HIGH CHANCE OF DOZING
HOW LIKELY ARE YOU TO NOD OFF OR FALL ASLEEP WHILE WATCHING TV: HIGH CHANCE OF DOZING
HOW LIKELY ARE YOU TO NOD OFF OR FALL ASLEEP WHILE SITTING QUIETLY AFTER LUNCH WITHOUT ALCOHOL: SLIGHT CHANCE OF DOZING
HOW LIKELY ARE YOU TO NOD OFF OR FALL ASLEEP WHILE LYING DOWN TO REST IN THE AFTERNOON WHEN CIRCUMSTANCES PERMIT: HIGH CHANCE OF DOZING
HOW LIKELY ARE YOU TO NOD OFF OR FALL ASLEEP WHILE SITTING AND TALKING TO SOMEONE: WOULD NEVER DOZE
HOW LIKELY ARE YOU TO NOD OFF OR FALL ASLEEP WHEN YOU ARE A PASSENGER IN A CAR FOR AN HOUR WITHOUT A BREAK: SLIGHT CHANCE OF DOZING
HOW LIKELY ARE YOU TO NOD OFF OR FALL ASLEEP IN A CAR, WHILE STOPPED FOR A FEW MINUTES IN TRAFFIC: WOULD NEVER DOZE

## 2024-03-14 ENCOUNTER — VIRTUAL VISIT (OUTPATIENT)
Dept: SLEEP MEDICINE | Facility: CLINIC | Age: 54
End: 2024-03-14
Payer: COMMERCIAL

## 2024-03-14 VITALS — HEIGHT: 70 IN | WEIGHT: 175 LBS | BODY MASS INDEX: 25.05 KG/M2

## 2024-03-14 DIAGNOSIS — F51.04 CHRONIC INSOMNIA: Primary | ICD-10-CM

## 2024-03-14 PROCEDURE — 90834 PSYTX W PT 45 MINUTES: CPT | Mod: 95 | Performed by: PSYCHOLOGIST

## 2024-03-14 ASSESSMENT — PAIN SCALES - GENERAL: PAINLEVEL: NO PAIN (0)

## 2024-03-14 NOTE — NURSING NOTE
Is the patient currently in the state of [place]? YES    Visit mode:VIDEO    If the visit is dropped, the patient can be reconnected by: VIDEO VISIT: Send to e-mail at: [email or web address]    Will anyone else be joining the visit? NO  (If patient encounters technical issues they should call [phone]    How would you like to obtain your AVS? MyChart    Are changes needed to the allergy or medication list? No    Reason for visit: EWA CORRIGAN

## 2024-03-14 NOTE — PROGRESS NOTES
Virtual Visit Details    Type of service:  Video Visit     Originating Location (pt. Location): Home    Distant Location (provider location):  Off-site  Platform used for Video Visit: Evod[employer]    .Visit Start Time: 3:35 PM  Visit End Time: 4:14 PM        SLEEP MEDICINE VIRTUAL FOLLOW-UP VISIT  Sleep Psychology    Patient Name: Dali Martines  MRN:  [id number]  Date of Service: Mar 14, 2024       Subjective Report     Dali Martines  returns for a telehealth video visit to discuss progress in implementing behavioral strategies for the management of insomnia.  Patient consent for initiation of video visit was obtained and documented prior to initiation of visit.     Dali reports that in the last few months she has been seen to further asses her vertigo..  She was diagnosed with benign positional vertigo.  She was heartened by consultation with a neurologist who explained symptoms and also completed an MRI.  She was found to have a 10 mm cyst and was refeerred on to a neurosurgeon.       .  In the midst of the stressors, sleep remains overall improved with generally good sleep consolidation for about 6 hours.  There continues to be some early morning awakenings.  We revisited strategies to manage stress.  Patient has engaged with pathways to alternative care.  She was also encouraged to reach out for mindfulness based psychotherapy.  She is also engaged with the health .     Sleep Data:     Source of Sleep Estimates:  Verbal Self-report    Average Time in Bed: 7.5 hrs.  Average Total Sleep Time:  6.5 hrs  Sleep Efficiency estimate: Greater than 85%%    EPWORTH SLEEPINESS SCALE    Sitting and reading 3   Watching TV 3   Sitting, inactive in a public place (theatre or mtg.) 1    As a passenger in a car 1   Lying down to rest in the afternoon when circumstance permit 3   Sitting and talking to someone 0   Sitting quietly after lunch without alcohol 1   In a car, while stopped for a few minutes in traffic 0   TOTAL  "SCORE (nl <11) 12     INSOMNIA SEVERITY INDEX     Difficulty falling asleep 1   Difficult staying asleep 2   Problems waking up to early 3   How SATISFIED/DISSATISFIED are you with your CURRENT sleep pattern? 3   How NOTICEABLE to others do you think your sleep pattern is in terms of your quality of life? 2   How WORRIED/DISTRESSED are you about your current sleep pattern? 3   To what extent do you consider your sleep problem to INTERFERE with your daily fuctioning(e.g. daytime fatigue, mood, ability to function at work/daily chores, concentration, mood,etc.) CURRENTLY? 3   INSOMNIA SEVERITY INDEX TOTAL SCORE 17    Absence of insomnia (0-7); sub-threshold insomnia (8-14); moderate insomnia (15-21); and severe insomnia (22-28)       Interventions     Strategies and recommendations including Stimulus control therapy and Stress management were reviewed and discussed today.     Services provided are compliant with the requirements of [place] Statute SS 256B.0625 Subd. 3b and paragraph (b)       Vital Signs     Ht 1.778 m (5' 10\")   Wt 79.4 kg (175 lb)   BMI 25.11 kg/m       Mental Status     Orientation:  X3  Mood:  normal  Affect:  Congruent with mood  Speech/Language:  Normal  Thought Process: Intact  Associations:  Normal  Thought Content: Normal  Patient does not report any suicidal ideation, intention or plan.    Diagnostic Impressions and Plan     Chronic insomnia  Stress    Sleep continues to remain improved with the exception of moderate early morning awakenings.  Total sleep time overall appears to be sufficient.  Stress levels continue to be relatively high the patient has taken steps to reach out for assistance with health  and to pathways.    Plan:  Continue current sleep schedule and plan    Follow-up: 3 months      Philip Quiroz PsyD, GARY, DBSM  Diplomate, Behavioral Sleep Medicine  [facility]      Note: This dictation was created using voice recognition software. " This document may contain an error not identified before finalizing the document. If the error changes the accuracy of the document, I would appreciate it being brought to my attention.

## 2024-03-15 NOTE — TELEPHONE ENCOUNTER
Spoke with pt and confirmed if needed she can take glucose tablet overnight if her blood sugar drops too low. Encouraged pt to only fast for 8 hours vs the full 12. Patient will bring a way to check her blood sugar during test as well to ensure her sugar does not drop too low during the test as well as glucose tablets.

## 2024-03-16 ENCOUNTER — HEALTH MAINTENANCE LETTER (OUTPATIENT)
Age: 54
End: 2024-03-16

## 2024-03-19 ENCOUNTER — OFFICE VISIT (OUTPATIENT)
Dept: GASTROENTEROLOGY | Facility: CLINIC | Age: 54
End: 2024-03-19
Attending: PHYSICIAN ASSISTANT
Payer: COMMERCIAL

## 2024-03-19 VITALS
OXYGEN SATURATION: 95 % | RESPIRATION RATE: 15 BRPM | SYSTOLIC BLOOD PRESSURE: 109 MMHG | DIASTOLIC BLOOD PRESSURE: 69 MMHG | HEART RATE: 84 BPM

## 2024-03-19 DIAGNOSIS — K63.8219 SMALL INTESTINAL BACTERIAL OVERGROWTH (SIBO): Primary | ICD-10-CM

## 2024-03-19 DIAGNOSIS — K58.8 OTHER IRRITABLE BOWEL SYNDROME: ICD-10-CM

## 2024-03-19 DIAGNOSIS — R14.0 ABDOMINAL BLOATING: ICD-10-CM

## 2024-03-19 PROCEDURE — 91065 BREATH HYDROGEN/METHANE TEST: CPT | Performed by: INTERNAL MEDICINE

## 2024-03-19 ASSESSMENT — PAIN SCALES - GENERAL: PAINLEVEL: NO PAIN (0)

## 2024-03-19 NOTE — LETTER
"    3/19/2024         RE: Dali Martines  [address] [facility] [id number]        Dear Colleague,    Thank you for referring your patient, Dali Martines, to the [facility] GASTRO PROCEDURE [place]. Please see a copy of my visit note below.    Non-Invasive GI Procedure Visit    Dali Martines is a 53 year old female with history of Abdominal bloating.   Patient stated reason for procedure:  \"irritable stomach\", nausea      COVID-19 PCR Results           No data to display              COVID-19 Antibody Results, Testing for Immunity           No data to display                Pre-Procedure Assessment  Patient presents to clinic today for Hydrogen Breath Test    Referring Provider: Khanh Arevalo PA-C    Previous HBT: Yes - over 15 years ago  Is patient a smoker: No    Does patient report having a colonoscopy, barium study, barium enema or taking antibiotics within the last 2 weeks? Yes / No: No.  History of Gastroparesis: No  Does patient report taking a stool softener, fiber supplement, laxative or anti-diarrheal in the last 3 - 4 days? Yes / No: No.  Does the patient report taking a probiotic in the last 1 - 2 days? Yes / No: No.  Does the patient report taking any medications today? No    Does the patient report following the pre-procedure bland diet? Yes  Does patient report being NPO for a minimum of 12 hours before the test? Yes.     Patient reported symptoms:Nausea and \"stomach irritability\"  How long has patient had these symptoms? Years      Patient Hx  Patient's history, medications and allergies were reviewed.     Height: Data Unavailable   Weight: 0 lbs 0 oz    Patient Active Problem List    Diagnosis Date Noted    Chronic bilateral low back pain without sciatica 05/25/2023     Priority: Medium    Long term methotrexate user 04/05/2022     Priority: Medium    Long-term current use of stimulant 05/19/2021     Priority: Medium    Insomnia, unspecified type 05/19/2021     Priority: " Medium    Glaucoma suspect of both eyes 10/16/2019     Priority: Medium    History of adenomatous polyp of colon 05/22/2019     Priority: Medium     Tubular adenoma, 3/2019.      Psoriatic arthritis (H) 11/20/2018     Priority: Medium    Psoriasis with arthropathy (H) 11/20/2018     Priority: Medium    Type 1 diabetes mellitus without complication (H) 08/08/2017     Priority: Medium    Left knee pain 07/07/2016     Priority: Medium    Swelling of limb 03/15/2016     Priority: Medium    Other postprocedural status(V45.89) 03/15/2016     Priority: Medium    ESR raised 01/03/2016     Priority: Medium    Right foot pain 10/14/2015     Priority: Medium    Chronic pain of right ankle 07/30/2015     Priority: Medium    Enthesopathy 07/20/2015     Priority: Medium    Pain in joint, multiple sites 07/20/2015     Priority: Medium    PAIN KNEE JOINT 06/17/2015     Priority: Medium    Diabetes (H) 02/26/2015     Priority: Medium    Screening for other eye conditions 02/26/2015     Priority: Medium    Encounter for other specified aftercare 02/24/2015     Priority: Medium    PAIN FOOT 02/24/2015     Priority: Medium    SOB (shortness of breath) 03/11/2014     Priority: Medium    Cervicalgia 06/25/2012     Priority: Medium    Chronic low back pain 06/25/2012     Priority: Medium    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/2012     Priority: Medium    Cutaneous skin tags 06/12/2012     Priority: Medium    Type 1 diabetes mellitus with retinopathy (H) 04/05/2011     Priority: Medium    Anxiety 04/05/2011     Priority: Medium    CARDIOVASCULAR SCREENING; LDL GOAL LESS THAN 100 10/31/2010     Priority: Medium    GERD (gastroesophageal reflux disease) 02/24/2009     Priority: Medium      Prior to Admission medications    Medication Sig Start Date End Date Taking? Authorizing Provider   albuterol (PROAIR HFA/PROVENTIL HFA/VENTOLIN HFA) 108 (90 Base) MCG/ACT inhaler Inhale 2 puffs into the lungs every 4 hours as needed for  shortness of breath / dyspnea or wheezing 12/28/19   Fantasma Wagner PA   aspirin 81 MG tablet Take 1 tablet by mouth daily.    Reported, Patient   blood glucose (ACCU-CHEK COLIN PLUS) test strip Test Blood Sugar 8 times daily or as directed 12/9/20   Marissa Sebasitan PA-C   blood glucose monitoring (ACCU-CHEK FASTCLIX) lancets Use to test blood sugar 8 times daily or as directed. 12/9/15   Felicia Mckeon MD   blood glucose monitoring (NO BRAND SPECIFIED) meter device kit Use to test blood sugar 8 times daily or as directed. Any covered brand, 12/31/20   Marissa Sebastian PA-C   Calcium Carbonate-Vitamin D (CALCIUM + D PO) Take one daily    Reported, Patient   citalopram (CELEXA) 20 MG tablet Take 1.5 tablets (30 mg) by mouth daily 6/8/15   Terence Orozco MD   Continuous Blood Gluc  (DEXCOM G6 ) SUZANNE Use to read blood sugars as per 's instructions. 11/17/22   Marissa Sebastian PA-C   Continuous Blood Gluc Sensor (DEXCOM G6 SENSOR) MISC Change every 10 days. 4/18/23   Felicia Mckeon MD   Continuous Blood Gluc Transmit (DEXCOM G6 TRANSMITTER) MISC Change every 3 months. 4/18/23   Felicia Mckeon MD   diclofenac (VOLTAREN) 1 % topical gel Apply 2 g topically 4 times daily 12/2/22   Tolu Fernández DPM   diclofenac (VOLTAREN) 1 % topical gel APPLY 2 GRAMS ONTO THE SKIN FOUR TIMES DAILY AS NEEDED FOR MODERATE PAIN 1/7/20   Yoel Betancourt MD   DULERA 100-5 MCG/ACT inhaler INHALE 2 PUFFS INTO THE LUNGS TWICE DAILY 6/19/23   Frieda Dunbar MD   econazole nitrate 1 % cream Apply 0.5 inches topically daily.    Reported, Patient   FIASP PENFILL 100 UNIT/ML SOCT INJECT UP TO 45 UNITS DAILY AS DIRECTED ACCORDING TO CORRECTION FACTOR. 11/16/22   Felicia Mckeon MD   fish oil-omega-3 fatty acids 1000 MG capsule Take one daily    Reported, Patient   fluocinonide (LIDEX) 0.05 % external solution Apply topically 2 times daily  "12/29/23   Byron Arshad MD   folic acid (FOLVITE) 1 MG tablet Take 1 tablet (1 mg) by mouth daily 10/4/23   Byron Arshad MD   Insulin Aspart, w/Niacinamide, (FIASP) 100 UNIT/ML SOLN Inject 50 Units as directed daily For use in ambulatory insulin pump as directed.  Approximate daily dose is 50 units. 9/7/23   Felicia Mckeon MD   insulin degludec (TRESIBA FLEXTOUCH) 100 UNIT/ML pen ADMINISTER 8 UNITS UNDER THE SKIN DAILY. PLEASE DO NOT FILL TODAY ( 10/18/2023 ). 10/18/23   Marissa Sebastian PA-C   Insulin Disposable Pump (OMNIPOD 5 G6 INTRO, GEN 5,) KIT 1 kit continuous 9/5/23   Felicia Mckeon MD   Insulin Disposable Pump (OMNIPOD 5 G6 POD, GEN 5,) MISC 1 each every 3 days 9/5/23   Felicia Mckeon MD   insulin pen needle (B-D U/F) 31G X 8 MM miscellaneous Use 5 pen needles daily 1/26/19   SoniaAttila reynoso MD   insulin syringe-needle U-100 (30G X 1/2\" 1 ML) 30G X 1/2\" 1 ML miscellaneous Use 1 syringes as directed to draw up insulin for pump. Per pharmacy request. . 9/19/23   Felicia Mckeon MD   Ketoconazole (NIZORAL PO) Shampoo daily    Reported, Patient   levothyroxine (SYNTHROID/LEVOTHROID) 112 MCG tablet TAKE 1 TABLET(112 MCG) BY MOUTH DAILY 3/6/23   Felicia Mckeon MD   methotrexate 2.5 MG tablet Take 6 tablets (15 mg) by mouth every 7 days Labs every 8 - 12 weeks for refills. 12/29/23   Byron Arshad MD   methotrexate 2.5 MG tablet Take 6 tablets (15 mg) by mouth every 7 days Labs every 8 - 12 weeks for refills. 8/18/23   Byron Arshad MD   methotrexate 2.5 MG tablet Take 6 tablets (15 mg) by mouth every 7 days Labs required every 8-12 weeks while taking this medication 2/9/23   Byron Arshad MD   methylphenidate (METADATE CD) 20 MG CR capsule Take 20 mg by mouth daily    Reported, Patient   methylphenidate (RITALIN) 10 MG tablet TAKE UP TO 2 TABLETS BY MOUTH IN THE LATE AFTERNOON. 12/16/20   Reported, Patient   Minoxidil " (ROGAINE EX) daily    Reported, Patient   montelukast (SINGULAIR) 5 MG chewable tablet CHEW AND SWALLOW 1 TABLET(5 MG) BY MOUTH AT BEDTIME 4/18/23   Frieda Dunbar MD   Multiple Vitamin (MULTIVITAMIN OR) Take one daily tab    Reported, Patient   ondansetron (ZOFRAN ODT) 4 MG ODT tab Take 1 tablet (4 mg) by mouth every 8 hours as needed for vomiting 6/21/23   Gloria House PA-C   Semaglutide, 1 MG/DOSE, 4 MG/3ML SOPN Inject 1 mg Subcutaneous every 7 days 1/11/23   Felicia Mckeon MD   simvastatin (ZOCOR) 20 MG tablet Take 1 tablet (20 mg) by mouth at bedtime 3/6/24   Marissa Sebastian PA-C   traZODone (DESYREL) 50 MG tablet Take 1-2 tablets by mouth nightly as needed for sleep. 3/12/13   Dimitri Alas MD     Allergies   Allergen Reactions    Monosodium Glutamate Palpitations and Shortness Of Breath    Sulfasalazine      Developed rash, HA, dizziness     Past Medical History:   Diagnosis Date    Anemia     Anxiety     Arthritis 2014    Psoriatic arthritis    Back injury 1988    Dry eye syndrome     Dyslipidemia     Endometriosis 2002    adehsions seen at laparoscopy    GERD (gastroesophageal reflux disease)     Hypothyroidism     8 yoa    SOB (shortness of breath) 3/11/2014    Type I (juvenile type) diabetes mellitus without mention of complication, not stated as uncontrolled     when 15     Uncomplicated asthma Fall 2013     Past Surgical History:   Procedure Laterality Date    COLONOSCOPY  01/01/2002    COLONOSCOPY N/A 06/23/2020    Procedure: COLONOSCOPY;  Surgeon: Lauryn Rivera MD;  Location:  GI    ESOPHAGOSCOPY, GASTROSCOPY, DUODENOSCOPY (EGD), COMBINED  01/07/2014    Procedure: COMBINED ESOPHAGOSCOPY, GASTROSCOPY, DUODENOSCOPY (EGD), BIOPSY SINGLE OR MULTIPLE;;  Surgeon: Kraig Nicole MD;  Location:  GI    ESOPHAGOSCOPY, GASTROSCOPY, DUODENOSCOPY (EGD), COMBINED N/A 10/05/2023    Procedure: Esophagoscopy, gastroscopy, duodenoscopy (EGD), combined;  Surgeon: Steive  Raheem BROWN MD;  Location:  GI    GYN SURGERY      Laparotomy to remove endometrial tissue    HC BREATH HYDROGEN TEST N/A 06/17/2014    Procedure: HYDROGEN BREATH TEST;  Surgeon: Deacon Alberts MD;  Location:  GI    HYDROGEN BREATH TEST  06/17/2014    LAPAROSCOPIC APPENDECTOMY  01/01/2002    LAPAROTOMY, LYSIS ADHESIONS, COMBINED  01/01/2002    endometriosis    SOFT TISSUE SURGERY  12/01/2014    right ankle tendon injury    ZZC REPAIR CRUCIATE LIGAMENT,KNEE      ZZC STOMACH SURGERY PROCEDURE UNLISTED  12/01/2002     Family History   Problem Relation Age of Onset    Breast Cancer Mother     Heart Disease Father     C.A.D. Father     Arthritis Father     Circulatory Father     Eye Disorder Father     Lipids Father     Thyroid Disease Father     Macular Degeneration Father     Coronary Artery Disease Father     Anxiety Disorder Father     Alcoholism Father     Rheumatoid Arthritis Father     Hypothyroidism Father     Diabetes Father     Cerebrovascular Disease Paternal Grandmother         ,    Cancer Paternal Grandfather         Pancreatic    Heart Disease Paternal Grandfather     Diabetes Maternal Grandmother         Type 2    C.A.D. Maternal Grandmother     Heart Disease Maternal Grandmother     Coronary Artery Disease Maternal Grandmother     Heart Disease Maternal Grandfather     Cardiac Sudden Death Maternal Grandfather     Gynecology Other         self    Thyroid Disease Other         self    Macular Degeneration Maternal Aunt     Diabetes Other         Type 1    Glaucoma No family hx of      Social History     Tobacco Use    Smoking status: Never     Passive exposure: Never    Smokeless tobacco: Never   Substance Use Topics    Alcohol use: Yes     Comment: moderately        Pre-Procedure Education & Consent  Procedure education was provided to: Patient  Teaching method: Explanation  Barriers to learning: No Barrier    Patient indicated understanding of pre-procedure instruction and appropriate consent was  obtained and documented.    ____________________________________________________________________    Post-Procedure Documentation: Hydrogen Breath Test     Baseline breath obtained prior to patient drinking Lactulose.     Patient tolerated test with  strange taste in mouth, gas sensation built up in mouth .    HBT Results    Sample Clock Times Ppm H2 Ppm CH4 CO2% Comments   Baseline 0940 13 4 4.7     Challenge Dose Given 0942 - - - -   #1 0957 2 5 5.3     #2 1012 23 5 4.6     #3 1027 23 5 4.8     #4 1042 25 6 4.7     #5 1057 19 4 4.9 Other (strange taste in mouth, gas sensation built up in throat)   #6 1112 34 6 4.7     #7 1127 60 7 4.7     #8 1142 57 7 4.5     #9 1157 43 6 4.5 Other (sensation of gas built up in throat)     #  B   Patient given discharge instructions.    Notification of pending test results sent to provider for interpretation. Please reference scanned document for final interpretation of results. Patient will follow up with referring provider for test results.    Swetha Lowry RN on 3/19/2024 at 9:34 AM          Again, thank you for allowing me to participate in the care of your patient.      Sincerely,    Non-Invasive GI Nurse

## 2024-03-19 NOTE — PATIENT INSTRUCTIONS
Hydrogen Breath Test  1. You may experience diarrhea for the next 12-24 hours.  2. Resume a regular diet.  3. Follow-up with your referring doctor in clinic.  4. If you have questions call [phone] from 7:00am-5:00pm.

## 2024-03-19 NOTE — PROGRESS NOTES
"Non-Invasive GI Procedure Visit    Dali Martines is a 53 year old female with history of Abdominal bloating.   Patient stated reason for procedure:  \"irritable stomach\", nausea      COVID-19 PCR Results           No data to display              COVID-19 Antibody Results, Testing for Immunity           No data to display                Pre-Procedure Assessment  Patient presents to clinic today for Hydrogen Breath Test    Referring Provider: Khanh Arevalo PA-C    Previous HBT: Yes - over 15 years ago  Is patient a smoker: No    Does patient report having a colonoscopy, barium study, barium enema or taking antibiotics within the last 2 weeks? Yes / No: No.  History of Gastroparesis: No  Does patient report taking a stool softener, fiber supplement, laxative or anti-diarrheal in the last 3 - 4 days? Yes / No: No.  Does the patient report taking a probiotic in the last 1 - 2 days? Yes / No: No.  Does the patient report taking any medications today? No    Does the patient report following the pre-procedure bland diet? Yes  Does patient report being NPO for a minimum of 12 hours before the test? Yes.     Patient reported symptoms:Nausea and \"stomach irritability\"  How long has patient had these symptoms? Years      Patient Hx  Patient's history, medications and allergies were reviewed.     Height: Data Unavailable   Weight: 0 lbs 0 oz    Patient Active Problem List    Diagnosis Date Noted    Chronic bilateral low back pain without sciatica 05/25/2023     Priority: Medium    Long term methotrexate user 04/05/2022     Priority: Medium    Long-term current use of stimulant 05/19/2021     Priority: Medium    Insomnia, unspecified type 05/19/2021     Priority: Medium    Glaucoma suspect of both eyes 10/16/2019     Priority: Medium    History of adenomatous polyp of colon 05/22/2019     Priority: Medium     Tubular adenoma, 3/2019.      Psoriatic arthritis (H) 11/20/2018     Priority: Medium    Psoriasis with arthropathy (H) " 11/20/2018     Priority: Medium    Type 1 diabetes mellitus without complication (H) 08/08/2017     Priority: Medium    Left knee pain 07/07/2016     Priority: Medium    Swelling of limb 03/15/2016     Priority: Medium    Other postprocedural status(V45.89) 03/15/2016     Priority: Medium    ESR raised 01/03/2016     Priority: Medium    Right foot pain 10/14/2015     Priority: Medium    Chronic pain of right ankle 07/30/2015     Priority: Medium    Enthesopathy 07/20/2015     Priority: Medium    Pain in joint, multiple sites 07/20/2015     Priority: Medium    PAIN KNEE JOINT 06/17/2015     Priority: Medium    Diabetes (H) 02/26/2015     Priority: Medium    Screening for other eye conditions 02/26/2015     Priority: Medium    Encounter for other specified aftercare 02/24/2015     Priority: Medium    PAIN FOOT 02/24/2015     Priority: Medium    SOB (shortness of breath) 03/11/2014     Priority: Medium    Cervicalgia 06/25/2012     Priority: Medium    Chronic low back pain 06/25/2012     Priority: Medium    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/2012     Priority: Medium    Cutaneous skin tags 06/12/2012     Priority: Medium    Type 1 diabetes mellitus with retinopathy (H) 04/05/2011     Priority: Medium    Anxiety 04/05/2011     Priority: Medium    CARDIOVASCULAR SCREENING; LDL GOAL LESS THAN 100 10/31/2010     Priority: Medium    GERD (gastroesophageal reflux disease) 02/24/2009     Priority: Medium      Prior to Admission medications    Medication Sig Start Date End Date Taking? Authorizing Provider   albuterol (PROAIR HFA/PROVENTIL HFA/VENTOLIN HFA) 108 (90 Base) MCG/ACT inhaler Inhale 2 puffs into the lungs every 4 hours as needed for shortness of breath / dyspnea or wheezing 12/28/19   Fantasma Wagner PA   aspirin 81 MG tablet Take 1 tablet by mouth daily.    Reported, Patient   blood glucose (ACCU-CHEK COLIN PLUS) test strip Test Blood Sugar 8 times daily or as directed 12/9/20   Marissa Sebastian  ROOPA Biswas   blood glucose monitoring (ACCU-CHEK FASTCLIX) lancets Use to test blood sugar 8 times daily or as directed. 12/9/15   Felicia Mckeon MD   blood glucose monitoring (NO BRAND SPECIFIED) meter device kit Use to test blood sugar 8 times daily or as directed. Any covered brand, 12/31/20   Marissa Sebastian PA-C   Calcium Carbonate-Vitamin D (CALCIUM + D PO) Take one daily    Reported, Patient   citalopram (CELEXA) 20 MG tablet Take 1.5 tablets (30 mg) by mouth daily 6/8/15   Terence Orozco MD   Continuous Blood Gluc  (DEXCOM G6 ) SUZANNE Use to read blood sugars as per 's instructions. 11/17/22   Marissa Sebastian PA-C   Continuous Blood Gluc Sensor (DEXCOM G6 SENSOR) MISC Change every 10 days. 4/18/23   Felicia Mckeon MD   Continuous Blood Gluc Transmit (DEXCOM G6 TRANSMITTER) MISC Change every 3 months. 4/18/23   Felicia Mckeon MD   diclofenac (VOLTAREN) 1 % topical gel Apply 2 g topically 4 times daily 12/2/22   Tolu Fernández DPM   diclofenac (VOLTAREN) 1 % topical gel APPLY 2 GRAMS ONTO THE SKIN FOUR TIMES DAILY AS NEEDED FOR MODERATE PAIN 1/7/20   Yoel Betancourt MD   DULERA 100-5 MCG/ACT inhaler INHALE 2 PUFFS INTO THE LUNGS TWICE DAILY 6/19/23   Frieda Dunbar MD   econazole nitrate 1 % cream Apply 0.5 inches topically daily.    Reported, Patient   FIASP PENFILL 100 UNIT/ML SOCT INJECT UP TO 45 UNITS DAILY AS DIRECTED ACCORDING TO CORRECTION FACTOR. 11/16/22   Felicia Mckeon MD   fish oil-omega-3 fatty acids 1000 MG capsule Take one daily    Reported, Patient   fluocinonide (LIDEX) 0.05 % external solution Apply topically 2 times daily 12/29/23   Byron Arshad MD   folic acid (FOLVITE) 1 MG tablet Take 1 tablet (1 mg) by mouth daily 10/4/23   Byron Arshad MD   Insulin Aspart, w/Niacinamide, (FIASP) 100 UNIT/ML SOLN Inject 50 Units as directed daily For use in ambulatory insulin pump as  "directed.  Approximate daily dose is 50 units. 9/7/23   Felicia Mckeon MD   insulin degludec (TRESIBA FLEXTOUCH) 100 UNIT/ML pen ADMINISTER 8 UNITS UNDER THE SKIN DAILY. PLEASE DO NOT FILL TODAY ( 10/18/2023 ). 10/18/23   Marissa Sebastian PA-C   Insulin Disposable Pump (OMNIPOD 5 G6 INTRO, GEN 5,) KIT 1 kit continuous 9/5/23   Felicia Mckeon MD   Insulin Disposable Pump (OMNIPOD 5 G6 POD, GEN 5,) MISC 1 each every 3 days 9/5/23   Felicia Mckeon MD   insulin pen needle (B-D U/F) 31G X 8 MM miscellaneous Use 5 pen needles daily 1/26/19   SoniaAttila reynoso MD   insulin syringe-needle U-100 (30G X 1/2\" 1 ML) 30G X 1/2\" 1 ML miscellaneous Use 1 syringes as directed to draw up insulin for pump. Per pharmacy request. . 9/19/23   Felicia Mckeon MD   Ketoconazole (NIZORAL PO) Shampoo daily    Reported, Patient   levothyroxine (SYNTHROID/LEVOTHROID) 112 MCG tablet TAKE 1 TABLET(112 MCG) BY MOUTH DAILY 3/6/23   Felicia Mckeon MD   methotrexate 2.5 MG tablet Take 6 tablets (15 mg) by mouth every 7 days Labs every 8 - 12 weeks for refills. 12/29/23   Byron Arshad MD   methotrexate 2.5 MG tablet Take 6 tablets (15 mg) by mouth every 7 days Labs every 8 - 12 weeks for refills. 8/18/23   Byron Arshad MD   methotrexate 2.5 MG tablet Take 6 tablets (15 mg) by mouth every 7 days Labs required every 8-12 weeks while taking this medication 2/9/23   Byron Arshad MD   methylphenidate (METADATE CD) 20 MG CR capsule Take 20 mg by mouth daily    Reported, Patient   methylphenidate (RITALIN) 10 MG tablet TAKE UP TO 2 TABLETS BY MOUTH IN THE LATE AFTERNOON. 12/16/20   Reported, Patient   Minoxidil (ROGAINE EX) daily    Reported, Patient   montelukast (SINGULAIR) 5 MG chewable tablet CHEW AND SWALLOW 1 TABLET(5 MG) BY MOUTH AT BEDTIME 4/18/23   Frieda Dunbar MD   Multiple Vitamin (MULTIVITAMIN OR) Take one daily tab    Reported, Patient   ondansetron (ZOFRAN ODT) 4 " MG ODT tab Take 1 tablet (4 mg) by mouth every 8 hours as needed for vomiting 6/21/23   Gloria House PA-C   Semaglutide, 1 MG/DOSE, 4 MG/3ML SOPN Inject 1 mg Subcutaneous every 7 days 1/11/23   Felicia Mckeon MD   simvastatin (ZOCOR) 20 MG tablet Take 1 tablet (20 mg) by mouth at bedtime 3/6/24   Marissa Sebastian PA-C   traZODone (DESYREL) 50 MG tablet Take 1-2 tablets by mouth nightly as needed for sleep. 3/12/13   Dimitri Alas MD     Allergies   Allergen Reactions    Monosodium Glutamate Palpitations and Shortness Of Breath    Sulfasalazine      Developed rash, HA, dizziness     Past Medical History:   Diagnosis Date    Anemia     Anxiety     Arthritis 2014    Psoriatic arthritis    Back injury 1988    Dry eye syndrome     Dyslipidemia     Endometriosis 2002    adehsions seen at laparoscopy    GERD (gastroesophageal reflux disease)     Hypothyroidism     8 yoa    SOB (shortness of breath) 3/11/2014    Type I (juvenile type) diabetes mellitus without mention of complication, not stated as uncontrolled     when 15     Uncomplicated asthma Fall 2013     Past Surgical History:   Procedure Laterality Date    COLONOSCOPY  01/01/2002    COLONOSCOPY N/A 06/23/2020    Procedure: COLONOSCOPY;  Surgeon: Lauryn Rivera MD;  Location:  GI    ESOPHAGOSCOPY, GASTROSCOPY, DUODENOSCOPY (EGD), COMBINED  01/07/2014    Procedure: COMBINED ESOPHAGOSCOPY, GASTROSCOPY, DUODENOSCOPY (EGD), BIOPSY SINGLE OR MULTIPLE;;  Surgeon: Kraig Nicole MD;  Location:  GI    ESOPHAGOSCOPY, GASTROSCOPY, DUODENOSCOPY (EGD), COMBINED N/A 10/05/2023    Procedure: Esophagoscopy, gastroscopy, duodenoscopy (EGD), combined;  Surgeon: Raheem Hubbard MD;  Location:  GI    GYN SURGERY      Laparotomy to remove endometrial tissue    HC BREATH HYDROGEN TEST N/A 06/17/2014    Procedure: HYDROGEN BREATH TEST;  Surgeon: Deacon Alberts MD;  Location:  GI    HYDROGEN BREATH TEST  06/17/2014    LAPAROSCOPIC  APPENDECTOMY  01/01/2002    LAPAROTOMY, LYSIS ADHESIONS, COMBINED  01/01/2002    endometriosis    SOFT TISSUE SURGERY  12/01/2014    right ankle tendon injury    ZZC REPAIR CRUCIATE LIGAMENT,KNEE      ZZC STOMACH SURGERY PROCEDURE UNLISTED  12/01/2002     Family History   Problem Relation Age of Onset    Breast Cancer Mother     Heart Disease Father     C.A.D. Father     Arthritis Father     Circulatory Father     Eye Disorder Father     Lipids Father     Thyroid Disease Father     Macular Degeneration Father     Coronary Artery Disease Father     Anxiety Disorder Father     Alcoholism Father     Rheumatoid Arthritis Father     Hypothyroidism Father     Diabetes Father     Cerebrovascular Disease Paternal Grandmother         ,    Cancer Paternal Grandfather         Pancreatic    Heart Disease Paternal Grandfather     Diabetes Maternal Grandmother         Type 2    C.A.D. Maternal Grandmother     Heart Disease Maternal Grandmother     Coronary Artery Disease Maternal Grandmother     Heart Disease Maternal Grandfather     Cardiac Sudden Death Maternal Grandfather     Gynecology Other         self    Thyroid Disease Other         self    Macular Degeneration Maternal Aunt     Diabetes Other         Type 1    Glaucoma No family hx of      Social History     Tobacco Use    Smoking status: Never     Passive exposure: Never    Smokeless tobacco: Never   Substance Use Topics    Alcohol use: Yes     Comment: moderately        Pre-Procedure Education & Consent  Procedure education was provided to: Patient  Teaching method: Explanation  Barriers to learning: No Barrier    Patient indicated understanding of pre-procedure instruction and appropriate consent was obtained and documented.    ____________________________________________________________________    Post-Procedure Documentation: Hydrogen Breath Test     Baseline breath obtained prior to patient drinking Lactulose.     Patient tolerated test with  strange taste in  mouth, gas sensation built up in mouth .    HBT Results    Sample Clock Times Ppm H2 Ppm CH4 CO2% Comments   Baseline 0940 13 4 4.7     Challenge Dose Given 0942 - - - -   #1 0957 2 5 5.3     #2 1012 23 5 4.6     #3 1027 23 5 4.8     #4 1042 25 6 4.7     #5 1057 19 4 4.9 Other (strange taste in mouth, gas sensation built up in throat)   #6 1112 34 6 4.7     #7 1127 60 7 4.7     #8 1142 57 7 4.5     #9 1157 43 6 4.5 Other (sensation of gas built up in throat)     #  B   Patient given discharge instructions.    Notification of pending test results sent to provider for interpretation. Please reference scanned document for final interpretation of results. Patient will follow up with referring provider for test results.    Swetha Lowry RN on 3/19/2024 at 9:34 AM

## 2024-03-20 ENCOUNTER — THERAPY VISIT (OUTPATIENT)
Dept: PHYSICAL THERAPY | Facility: CLINIC | Age: 54
End: 2024-03-20
Payer: COMMERCIAL

## 2024-03-20 ENCOUNTER — THERAPY VISIT (OUTPATIENT)
Dept: OCCUPATIONAL THERAPY | Facility: CLINIC | Age: 54
End: 2024-03-20
Payer: COMMERCIAL

## 2024-03-20 DIAGNOSIS — R26.89 BALANCE DISORDER: Primary | ICD-10-CM

## 2024-03-20 DIAGNOSIS — R68.89 DECREASED FUNCTIONAL ACTIVITY TOLERANCE: ICD-10-CM

## 2024-03-20 DIAGNOSIS — G93.39 OTHER POST INFECTION AND RELATED FATIGUE SYNDROMES: Primary | ICD-10-CM

## 2024-03-20 DIAGNOSIS — R42 DIZZINESS: ICD-10-CM

## 2024-03-20 PROCEDURE — 97112 NEUROMUSCULAR REEDUCATION: CPT | Mod: GP | Performed by: PHYSICAL THERAPIST

## 2024-03-20 PROCEDURE — 97535 SELF CARE MNGMENT TRAINING: CPT | Mod: GO

## 2024-03-21 ENCOUNTER — VIRTUAL VISIT (OUTPATIENT)
Dept: EDUCATION SERVICES | Facility: CLINIC | Age: 54
End: 2024-03-21
Payer: COMMERCIAL

## 2024-03-21 DIAGNOSIS — E10.9 TYPE 1 DIABETES MELLITUS (H): Primary | ICD-10-CM

## 2024-03-21 PROCEDURE — G0108 DIAB MANAGE TRN  PER INDIV: HCPCS | Mod: 95 | Performed by: REGISTERED NURSE

## 2024-03-21 ASSESSMENT — PAIN SCALES - GENERAL: PAINLEVEL: NO PAIN (0)

## 2024-03-21 NOTE — NURSING NOTE
Is the patient currently in the state of [place]? YES    Visit mode:VIDEO    If the visit is dropped, the patient can be reconnected by: VIDEO VISIT: Text to cell phone:   Telephone Information:   Mobile [phone]       Will anyone else be joining the visit? NO  (If patient encounters technical issues they should call [phone]    How would you like to obtain your AVS? MyChart    Are changes needed to the allergy or medication list? No    Reason for visit: RECHECK Shelby Kocher VVF

## 2024-03-21 NOTE — PROGRESS NOTES
Virtual Visit Details    Type of service:  Video Visit     Originating Location (pt. Location): Home    Distant Location (provider location):  Off-site  Platform used for Video Visit: AmWell    Time Video Started:  1032  Time Video Ended :   1101    Diabetes Self-Management Education & Support Virtual Visit- Short    Dali Martines contacted today for education related to Type 1 diabetes    Patient is being treated with:  Omnipod 5 Insulin Pump    Year of diagnosis: 1988  Referring provider:  Felicia Mckeon MD  Living Situation: Lives alone and has a dog.  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  Currently not employed.    Purpose of today's visit:        Subjective/Objective:                Assessment/Plan:    Since our last visit, her Time in Target Range has improved significantly to 85%, and she is having hypoglycemia 1% of the time.  She is having some post-prandial drops which appear to be related to late bolusing in one incidence, and otherwise happening later in the evening.  She states that this is happening spontaneously and she expresses feeling that the pump is inappropriately delivering insulin.  In looking at the report, I can see that glucose drops in the late evening between 9pm and 12am.  The pump is suspending at this time, but apparently this is not completely effective in preventing hypoglycemia.      To help prevent this late evening hypoglycemia, we changed her bg target starting at about 8pm to 130 (from 120), with a correction threshold of 140.    We left her correction factor at 50 as she feels that this is working well for her.  We backed off on her insulin to carb ratio from 1:15 to 1:13.  We also adjusted the basal delivery in manual mode to match what she is receiving in automated mode.      She doesn't have another followup appointment with Dr. Mckeon until September, so we set up a follow up appointment to review in one month.       Follow-up:    Follow up appointment 4/25 at 10:30 AM.       Time spent in this visit: 30 minutes     Any diabetes medication dose changes were made via the [facility] Protocol and Collaborative Practice Agreement. A copy of this encounter was provided to the patient's referring provider.

## 2024-03-27 ENCOUNTER — VIRTUAL VISIT (OUTPATIENT)
Dept: GASTROENTEROLOGY | Facility: CLINIC | Age: 54
End: 2024-03-27
Attending: PHYSICIAN ASSISTANT
Payer: COMMERCIAL

## 2024-03-27 DIAGNOSIS — R14.0 ABDOMINAL BLOATING: ICD-10-CM

## 2024-03-27 DIAGNOSIS — R11.2 NAUSEA AND VOMITING, UNSPECIFIED VOMITING TYPE: Primary | ICD-10-CM

## 2024-03-27 DIAGNOSIS — K63.8219 SMALL INTESTINAL BACTERIAL OVERGROWTH (SIBO): ICD-10-CM

## 2024-03-27 DIAGNOSIS — K30 DELAYED GASTRIC EMPTYING: ICD-10-CM

## 2024-03-27 PROCEDURE — 99215 OFFICE O/P EST HI 40 MIN: CPT | Mod: 95 | Performed by: PHYSICIAN ASSISTANT

## 2024-03-27 NOTE — PROGRESS NOTES
GI FOLLOW UP VISIT       HPI: Dali Martines is 53 year old female with pmhx of psoriatic arthritis on methotrexate, asthma, type I diabetes, endometriosis  who presents for follow up.      She was recently seen in our GI clinic by my colleague Gloria House PA-C. Please see prior documentation, some of which has been copied below for reference:      HPI taken from September 2023 visit with Gloria House   Dali presented with history of a myriad of GI issues, ongoing off and on since the mid-90s. She previously followed with Dr. Shelton in [id number]. She underwent evaluations as noted below and ultimately was felt to have functional dyspepsia.  - NM GES in 2014 with normal emptying, study was limited to 180 minutes  - HBT 2014 positive, report indicates she was constipated at time of study  - EGD 1/2014- small hiatal hernia. Normal esophagus. Normal duodenum.  Colonoscopy 2020 - normal, repeat 5 years     At time of our initial consultation, she elicited several GI concerns, briefly summarized as follows:     1. She is concerned about the hiatal hernia and anti-reflux valve. If she has any liquid or food in her stomach, and laying down at all, it will reflux up into her esophagus. She feels this is very positional, with the reclined position being the worst. The regurgitant is bland. If there is regurgitation of food, it is partially digested. There does not seem to be any relation to types of food that trigger symptoms, but rather just the physical quantity. She does note that high glucose does exacerbate this. No dysphagia, odynophagia. Not experiencing heartburn or acid reflux symptoms on any regularly basis. Currently not on any anti-acids. She previously did try famotidine in the past without clear improvement.     2. She is also having what she describes as extreme bouts of nausea, occurring episodically. During these episodes, she cannot keep anything down, including water. This last occurred several  weeks ago and has occurred several times in total. During this most recent episode, she had a stressful day prior to that. The next morning, she had coffee, breakfast, and an hour later, threw everything up. This can be more consistent with regurgitation at times as there is no retching present. The emesis mostly is bland. She wasn't able to tolerate any PO intake for a full day. She felt like there was a physical overflow, describing a fullness in her stomach. She notes some extreme hypoglycemia and feeling like she has to sneeze and nausea. She notes she can get the most extreme nausea after a low blood glucose episode, which she relates to the high sugar. She has lost some weight on ozempic.These symptoms tend to be present within an hour of eating. There is often an epigastric pain in relation to this. She notes periodic RUQ pain.     In between episodes, she does have to pay attention to how full her stomach is, as this can contribute to nausea and/or vomiting. She does endorse early satiety and postprandial fullness. Notably, she is on ozempic. She has been on this for one to two years, and on an increased dose since last Fall or so she thinks. She does have some intermittent postprandial nausea to a more mild degree compared to the more extreme episodes, above. She recalls having a full glass of water the other night and went to go lay down (using elevated pillows) and then proceeded to throw up.     3. Bowel pattern tends towards constipation per her account. SHe has a BM daily to every other day, stools can be marble-like (once per week) to hard or dry and difficult to pass (estimates once every other week). Rare sense of incomplete evacuation. She does occasionally have BRBPR. No melena. She notes that when she was a child, she had a severe case of mono, out of school for months. She developed constipation, remembers mineral oil, enemas. She feels like after recovering from this, she feels like this  changed her metabolism, contributed to new endocrine problems.     Interval history:  After initial consultation  Since out last visit, she has had two changes:  1. She states she has taken holidays from Ozempic - ie doing Ozempic every other week, which has helped reduce the severity of symptoms.  2. She met with her endocrinologist and was recommended an insulin pump due to episodes of hypoglycemia, which seemed to be related to the sustained nausea symptoms. She has been on the insulin pump for a few weeks, she states too early to tell if she has had improvement yet. She has still had some episodes of nausea and vomiting, but not lasting as long as symptoms previously were.     Otherwise, symptoms remain as above, noted in HPI. She questions the possibility of hiatal hernia today. No known FHx of GI malignancy.        Interval hx December 4, 2023   She returns to GI clinic for follow up.  Recent ultrasound negative. She had an upper endoscopy since her last visit which noted some erythematous mucosa in the stomach.  Biopsies noted moderate to severe chronic gastritis with focal activity and patchy gland loss. There is concern for atrophic gastritis. B12 was wnl. Intrinsic factor negative however parietal cell ab elevated. She was advised to have a repeat EGD with gastric mapping. Orders previously placed but have not been scheduled yet.      She reports having some improvement since using a continuous glucose monitor.  She is having less hypoglycemic episodes and therefore less nausea symptoms. Still has early fullness and satiety. Reports occ aspiration concerns. Denies esophageal dysphagia. Denies reflux symptoms.      She denies constipation. She does take flax seed regularly which helps keep her regular.         Interval hx March 27, 2024   Here for follow up today. Since last visit she completed a video swallow study and hydrogen breath test.     Video swallow study  with SLP noted minimal oral and  pharyngeal dysphagia. SLP advised to stay upright, small bites/sips and alternate liquids/solids. Follow standard GERD precautions. Hydrogen breath test is consistent with SIBO. We sent xifaxan prescription however she has not started it as of yet. She is concerned about it wiping out some good bacteria. We discussed taking natural probiotics like yogurt or kombucha. She is on ozempic which she is aware can cause GI side effects but reports having symptoms for over 2 decades.     She still has sporadic and occ vomiting episodes. In the past has thought it could be related to stress. It doesn't seem related to a particular kind of food. She also has found it to be related to hypoglycemic events which has decreased since use of continuous glucose monitor.         ALLERGIES:Monosodium glutamate and Sulfasalazine       PERTINENT MEDICATIONS:  Current Outpatient Medications   Medication    albuterol (PROAIR HFA/PROVENTIL HFA/VENTOLIN HFA) 108 (90 Base) MCG/ACT inhaler    aspirin 81 MG tablet    blood glucose (ACCU-CHEK COLIN PLUS) test strip    blood glucose monitoring (ACCU-CHEK FASTCLIX) lancets    blood glucose monitoring (NO BRAND SPECIFIED) meter device kit    Calcium Carbonate-Vitamin D (CALCIUM + D PO)    citalopram (CELEXA) 20 MG tablet    Continuous Blood Gluc  (DEXCOM G6 ) SUZANNE    Continuous Blood Gluc Sensor (DEXCOM G6 SENSOR) MISC    Continuous Blood Gluc Transmit (DEXCOM G6 TRANSMITTER) MISC    diclofenac (VOLTAREN) 1 % topical gel    diclofenac (VOLTAREN) 1 % topical gel    DULERA 100-5 MCG/ACT inhaler    econazole nitrate 1 % cream    FIASP PENFILL 100 UNIT/ML SOCT    fish oil-omega-3 fatty acids 1000 MG capsule    fluocinonide (LIDEX) 0.05 % external solution    folic acid (FOLVITE) 1 MG tablet    Insulin Aspart, w/Niacinamide, (FIASP) 100 UNIT/ML SOLN    insulin degludec (TRESIBA FLEXTOUCH) 100 UNIT/ML pen    Insulin Disposable Pump (OMNIPOD 5 G6 INTRO, GEN 5,) KIT    Insulin Disposable  "Pump (OMNIPOD 5 G6 POD, GEN 5,) MISC    insulin pen needle (B-D U/F) 31G X 8 MM miscellaneous    insulin syringe-needle U-100 (30G X 1/2\" 1 ML) 30G X 1/2\" 1 ML miscellaneous    Ketoconazole (NIZORAL PO)    levothyroxine (SYNTHROID/LEVOTHROID) 112 MCG tablet    methotrexate 2.5 MG tablet    methotrexate 2.5 MG tablet    methotrexate 2.5 MG tablet    methylphenidate (METADATE CD) 20 MG CR capsule    methylphenidate (RITALIN) 10 MG tablet    Minoxidil (ROGAINE EX)    montelukast (SINGULAIR) 5 MG chewable tablet    Multiple Vitamin (MULTIVITAMIN OR)    ondansetron (ZOFRAN ODT) 4 MG ODT tab    rifaximin (XIFAXAN) 550 MG TABS tablet    Semaglutide, 1 MG/DOSE, 4 MG/3ML SOPN    simvastatin (ZOCOR) 20 MG tablet    traZODone (DESYREL) 50 MG tablet     Current Facility-Administered Medications   Medication    lidocaine 1 % injection 1 mL    triamcinolone acetonide (KENALOG-10) injection 10 mg            PHYSICAL EXAMINATION:  Constitutional: aaox3, cooperative, pleasant, not dyspneic/diaphoretic, no acute distress  GENERAL: alert and no distress  EYES: Eyes grossly normal to inspection.  No discharge or erythema, or obvious scleral/conjunctival abnormalities.  RESP: No audible wheeze, cough, or visible cyanosis.    SKIN: Visible skin clear. No significant rash, abnormal pigmentation or lesions.  NEURO: Cranial nerves grossly intact.  Mentation and speech appropriate for age.  PSYCH: Appropriate affect, tone, and pace of words          ASSESSMENT/PLAN:    # Nausea and vomiting, unspecified vomiting type  # Abdominal bloating  # SIBO  # Delayed gastric emptying       52 y/o F with long history of GI symptoms, dating back to mid 90's. Prior eval in 2014 was notable for positive hydrogen breath test consistent with SIBO. There was also evidence of hiatal hernia but work up including NM GES and EGD otherwise was unremarkable. Symptoms were attributed to intermittent delayed emptying and functional dyspepsia. She recently " reestablished care with our group due to progressively worsening symptoms. She was frequently exhibiting regurgitation of bland liquid and partially digested foods and intermittent nausea and vomiting episodes.     She was more recently evaluated with an EGD and sonogram. RUQ US was normal. There is some question of atrophic gastritis and she therefore has a repeat EGD with gastric mapping coming up.     Symptoms are likely multifactorial.  Gastroparesis vs hypoglycemia in pt with hx of diabetes on ozempic. Also on differential is functional dyspepsia vs CVS vs medication side effect. Trial a gastroparesis diet. Again, advised to consider holding ozempic. Hypoglycemic episodes were also a trigger and this has improved since use of continuous glucose monitoring system. Can continue using FD slime as needed, as this was helpful in the past. Can use anti-nausea meds as needed. Stress can play a role. Consider GI psychology.      She does have hx of SIBO in the past and benefited from treatment. We retested for his recently and was found to be positive consistent with SIBO. We did send in a prescription of xifaxan, however she has not started as of yet.           PLAN:   -- repeat EGD with gastric mapping   -- trial a gastroparesis diet   -- consider holding ozempic due to delayed emptying symptoms   -- start xifaxan for SIBO       Follow up in GI clinic in ~ 3 months.     Thank you for this consultation.  It was a pleasure to participate in the care of this patient; please contact us with any further questions.        This note was created with voice recognition software, and while reviewed for accuracy, typos may remain.       I spent a total of 45 minutes on the day of the visit.   Time spent by me doing chart review, history and exam, documentation and further activities per the note      Khanh Arevalo PA-C  Division of Gastroenterology, Hepatology and Nutrition   [facility]  [place]            Video-Visit Details    Video Start Time: 10:04 AM    Type of service:  Video Visit    Video End Time:10:42 AM    Originating Location (pt. Location): Home    Distant Location (provider location):  Off-site    Platform used for Video Visit: [employer]

## 2024-03-27 NOTE — NURSING NOTE
Is the patient currently in the state of [place]? YES    Visit mode:VIDEO    If the visit is dropped, the patient can be reconnected by: VIDEO VISIT: Send to e-mail at: [email or web address]    Will anyone else be joining the visit? NO  (If patient encounters technical issues they should call [phone])    How would you like to obtain your AVS? MyChart    Are changes needed to the allergy or medication list? No    Reason for visit: EWA CORRIGAN

## 2024-03-31 NOTE — PATIENT INSTRUCTIONS
It was a pleasure taking care of you today.  I've included a brief summary of our discussion and care plan from today's visit below.  Please review this information with your primary care provider.  _______________________________________________________________________     My recommendations are summarized as follows:     -- start xifaxan antibiotic for small intestinal bacterial overgrowth (SIBO)    -- continue with upper endoscopy for gastric mapping     -- consider holding ozempic as this can cause stomach delay (gastroparesis symptoms)    --  Follow a gastroparesis diet as below:      Gastroparesis Diet Goals:   1. Eat 4-6 smaller meals per day that are lower in fat and lower in fiber.  For low fiber diet:  -Remove skins and peels from fruits and vegetables and cook vegetables very well before eating.  -Canned fruit in its own juice is lower in fiber, as well as ripe bananas, ripe melon, unsweetened applesauce.  -Avoid whole grain breads/crackers (choose white bread, low fiber crackers, white rice).  -Avoid whole nuts and seeds.  -Okay for creamy nut butters. Avoid chunky nut butters.     For low fat diet:  -Choose lean proteins/lean cuts of meats. Remove skins from chicken and turkey breast, fish (not breaded and fried), lean cuts of pork, limit red meat and if having choose lean cut or 93% lean ground beef. Can substitute with ground turkey or ground chicken for beef.  -Use lower fat cooking methods such as baking, broiling, grilling.  -Limit using a lot of fats/oil/high fat sauces/dressing/butter when preparing foods and limit the amount used on foods.  -Choose skim or 1% based dairy products (such as lowfat yogurts, skim/1% milk, non fat/1% cottage cheese, lowfat pudding).  -Do not eat large amounts of nuts, seeds, nut butters, avocado.     2. Can have protein drink as a small meal/snack if better tolerated and/or instead of skipping a meal. These drinks count towards those 4-6 smaller meals.  -some  examples include using a protein powder of your choice and mixing with water/skim or lowfat milk or lowfat milk substitute, or premier protein, ensure max, ensure high protein, boost high protein, lowfat lower sugar carnation instant breakfast okay as well (if you tolerate dairy products).     If you are having a lot of nausea a clear liquid protein drink/protein water may be better tolerated such as Premier clear, Protein 2o, Isopure. (Boost Breeze and Ensure clear are clear liquid drinks but are higher in added sugars so may not be as well tolerated with issues of loose stools).     3. Chew food very well before swallowing.  4. Limit fluids with meals (only take small sips as needed) and drink the rest of your fluids in between meals.  5. Drink at least 6-8 cups (48 oz-64 oz) per day (in between meals).      ______________________________________________________________________     How do I schedule labs, imaging studies, or procedures that were ordered in clinic today?      Labs: To schedule lab appointment you can contact your local [facility] or call [phone] to schedule at any convenient [facility] location.     Procedures: If a colonoscopy, upper endoscopy, breath test, esophageal manometry, or pH impedence was ordered today, our endoscopy team will call you to schedule this. If you have not heard from our endoscopy team within a week, please call [phone] to schedule.      Imaging Studies: If you were scheduled for a CT scan, X-ray, MRI, ultrasound, HIDA scan or other imaging study, please call [phone] to have this scheduled.      Referral: If a referral to another specialty was ordered, expect a phone call or follow instructions above. If you have not heard from anyone regarding your referral in a week, please call our clinic to check the status.      Who do I call with any questions after my visit?  Please be in touch if there are any further questions that arise following  today's visit.  There are multiple ways to contact your gastroenterology care team.       During business hours, you may reach a Gastroenterology nurse at [phone]     To schedule or reschedule an appointment, please call [phone].      You can always send a secure message through [employer].  [employer] messages are answered by your nurse or doctor typically within 24 hours.  Please allow extra time on weekends and holidays.       For urgent/emergent questions after business hours, you may reach the on-call GI Fellow by contacting the [facility]  at [phone].     How will I get the results of any tests ordered?    You will receive all of your results.  If you have signed up for [employer], any tests ordered at your visit will be available to you after your physician reviews them.  Typically this takes 1-2 weeks.  If there are urgent results that require a change in your care plan, your physician or nurse will call you to discuss the next steps.       What is [employer]?  [employer] is a secure way for you to access all of your healthcare records from the [facility].  It is a web based computer program, so you can sign on to it from any location.  It also allows you to send secure messages to your care team.  I recommend signing up for [employer] access if you have not already done so and are comfortable with using a computer.       How to I schedule a follow-up visit?  If you did not schedule a follow-up visit today, please call [phone] to schedule a follow-up office visit.      Khanh Arevalo PA-C  Division of Gastroenterology, Hepatology and Nutrition   [facility]

## 2024-04-03 ENCOUNTER — THERAPY VISIT (OUTPATIENT)
Dept: PHYSICAL THERAPY | Facility: CLINIC | Age: 54
End: 2024-04-03
Payer: COMMERCIAL

## 2024-04-03 ENCOUNTER — THERAPY VISIT (OUTPATIENT)
Dept: OCCUPATIONAL THERAPY | Facility: CLINIC | Age: 54
End: 2024-04-03
Payer: COMMERCIAL

## 2024-04-03 DIAGNOSIS — R42 DIZZINESS: ICD-10-CM

## 2024-04-03 DIAGNOSIS — R68.89 DECREASED FUNCTIONAL ACTIVITY TOLERANCE: ICD-10-CM

## 2024-04-03 DIAGNOSIS — G93.39 OTHER POST INFECTION AND RELATED FATIGUE SYNDROMES: Primary | ICD-10-CM

## 2024-04-03 DIAGNOSIS — Z78.9 ALTERATION IN INSTRUMENTAL ACTIVITIES OF DAILY LIVING (IADL): ICD-10-CM

## 2024-04-03 DIAGNOSIS — R26.89 BALANCE DISORDER: Primary | ICD-10-CM

## 2024-04-03 PROCEDURE — 97530 THERAPEUTIC ACTIVITIES: CPT | Mod: GO

## 2024-04-03 PROCEDURE — 97112 NEUROMUSCULAR REEDUCATION: CPT | Mod: GP | Performed by: PHYSICAL THERAPIST

## 2024-04-12 ENCOUNTER — TELEPHONE (OUTPATIENT)
Dept: GASTROENTEROLOGY | Facility: CLINIC | Age: 54
End: 2024-04-12
Payer: COMMERCIAL

## 2024-04-12 NOTE — TELEPHONE ENCOUNTER
Pre visit planning completed.      Procedure details:    Patient scheduled for Upper endoscopy (EGD) on 4/29/24.     Arrival time: 0930. Procedure time 1030    Facility location: [facility]; [address] [place] [address], [address] Floor, [place] [address]. Check in location: 5th Floor.    Sedation type: Conscious sedation     Pre op exam needed? N/A    Indication for procedure:   Atrophic gastritis without hemorrhage             Chart review:     Electronic implanted devices? No    Recent diagnosis of diverticulitis within the last 6 weeks? No    Diabetic? Yes. Diabetic medication HOLDING recommendations: Diabetic injectables: Yes- Ozempic (Semaglutide). Weekly dosing of medication.  Hold 7 days before procedure.  Follow up with managing provider.   Insulin: Yes. Consult with managing provider       Medication review:    Anticoagulants? No    NSAIDS? No    Other medication HOLDING recommendations:  N/A      Prep for procedure:     Prep instructions sent via [facility]         Jana Banegas RN  Endoscopy Procedure Pre Assessment RN  [phone] option 4

## 2024-04-15 NOTE — TELEPHONE ENCOUNTER
Attempted to contact patient in order to complete pre assessment questions.     Patient scheduled for Upper endoscopy (EGD) on 4/29/24     No answer. Left message to return call to [phone] option 4    Callback required communication sent via [employer].        Jana Banegas RN  Endoscopy Procedure Pre Assessment RN

## 2024-04-16 ENCOUNTER — MYC MEDICAL ADVICE (OUTPATIENT)
Dept: RHEUMATOLOGY | Facility: CLINIC | Age: 54
End: 2024-04-16
Payer: COMMERCIAL

## 2024-04-16 ENCOUNTER — VIRTUAL VISIT (OUTPATIENT)
Dept: PSYCHOLOGY | Facility: CLINIC | Age: 54
End: 2024-04-16
Payer: COMMERCIAL

## 2024-04-16 DIAGNOSIS — F43.22 ADJUSTMENT DISORDER WITH ANXIETY: Primary | ICD-10-CM

## 2024-04-16 DIAGNOSIS — G89.29 OTHER CHRONIC PAIN: ICD-10-CM

## 2024-04-16 DIAGNOSIS — E03.8 OTHER SPECIFIED HYPOTHYROIDISM: ICD-10-CM

## 2024-04-16 DIAGNOSIS — E10.3299 TYPE 1 DIABETES MELLITUS WITH MILD NONPROLIFERATIVE RETINOPATHY, MACULAR EDEMA PRESENCE UNSPECIFIED, UNSPECIFIED LATERALITY (H): ICD-10-CM

## 2024-04-16 DIAGNOSIS — F33.41 MAJOR DEPRESSIVE DISORDER, RECURRENT, IN PARTIAL REMISSION (H): ICD-10-CM

## 2024-04-16 PROCEDURE — 90837 PSYTX W PT 60 MINUTES: CPT | Mod: 95 | Performed by: PSYCHOLOGIST

## 2024-04-16 RX ORDER — LEVOTHYROXINE SODIUM 112 UG/1
112 TABLET ORAL DAILY
Qty: 90 TABLET | Refills: 4 | Status: SHIPPED | OUTPATIENT
Start: 2024-04-16

## 2024-04-16 ASSESSMENT — ANXIETY QUESTIONNAIRES
4. TROUBLE RELAXING: MORE THAN HALF THE DAYS
3. WORRYING TOO MUCH ABOUT DIFFERENT THINGS: MORE THAN HALF THE DAYS
7. FEELING AFRAID AS IF SOMETHING AWFUL MIGHT HAPPEN: SEVERAL DAYS
IF YOU CHECKED OFF ANY PROBLEMS ON THIS QUESTIONNAIRE, HOW DIFFICULT HAVE THESE PROBLEMS MADE IT FOR YOU TO DO YOUR WORK, TAKE CARE OF THINGS AT HOME, OR GET ALONG WITH OTHER PEOPLE: SOMEWHAT DIFFICULT
GAD7 TOTAL SCORE: 9
8. IF YOU CHECKED OFF ANY PROBLEMS, HOW DIFFICULT HAVE THESE MADE IT FOR YOU TO DO YOUR WORK, TAKE CARE OF THINGS AT HOME, OR GET ALONG WITH OTHER PEOPLE?: SOMEWHAT DIFFICULT
GAD7 TOTAL SCORE: 9
1. FEELING NERVOUS, ANXIOUS, OR ON EDGE: MORE THAN HALF THE DAYS
2. NOT BEING ABLE TO STOP OR CONTROL WORRYING: MORE THAN HALF THE DAYS
7. FEELING AFRAID AS IF SOMETHING AWFUL MIGHT HAPPEN: SEVERAL DAYS
5. BEING SO RESTLESS THAT IT IS HARD TO SIT STILL: NOT AT ALL
6. BECOMING EASILY ANNOYED OR IRRITABLE: NOT AT ALL

## 2024-04-16 NOTE — PROGRESS NOTES
Health Psychology          Latanya Mayfield, Ph.D.,  [phone]  Hansa Arzate, Ph.D.,  [phone]  Sarah Beth Jack, Ph.D.,  [phone]  Pushpa Mendez, Ph.D., , [facility] [phone]  Panfilo Cornejo, Ph.D., , [facility] [phone]  Krystle Hannah, Ph.D.,  [phone]  Elise Sutton, Ph.D., , [facility] [phone]    [facility], 3rd Floor  [address] [place] [address]       Health Psychology Progress Note    Patient Name: Dali Martines    Service Type: Individual  Length of Visit: 55 minutes - extended session due to complexity of case and content  Start time: 2:05 PM     Stop time: 3:00 PM       Attendees: patient attended alone  Session #: 15    Telemedicine Information:     Telemedicine Visit: The patient's condition can be safely assessed and treated via synchronous audio and visual telemedicine encounter.    Reason for Telemedicine Visit: Patient has requested telehealth visit and Patient convenience (e.g. access to timely appointments / distance to available provider)  Originating Site (Patient Location): Patient's home, [place]  Distant Location (provider location):  On-site: [facility]   Consent:  The patient/guardian has verbally consented to: the potential risks and benefits of telemedicine (video visit) versus in person care; bill my insurance or make self-payment for services provided; and responsibility for payment of non-covered services.   Mode of Communication:  Video Conference via [employer]  As the provider I attest to compliance with applicable laws and regulations related to telemedicine.     Identifying Information and Presenting Problem:  Dali Martines is a 53 year old female adult who was referred to health psychology by her endocrinologist, Dr. Mckeon. Patient presented with concerns of stress in the context of multiple life events, several medical conditions, and chronic management of T1DM.    Topics  Discussed/Interventions Provided:    Session Content:     Update: Patient returning for follow-up today. Had her follow-up with GI and will be having an EGD in April for further evaluation of SIBO and to follow-up on precancerous glandular changes in her stomach. Continues with PT for vestibular therapy and with OT for cognitive therapy to treat PPPD. Also reports that her caregiver demands and stress related to her mother's needs have increased.       Homework Review: Patient reports that she has focused on getting out in nature as a enjoyable activity and has engaged in a gratitude practice, which have both been helpful.      Skills/intervention: Supported patient in processing her thoughts and emotions around her health and caregiver concerns. Explored ways in which the stress is impacting her health and well-being. Patient notes that her caregiver stress is having a significant impact on her cognition and has been emotionally and cognitively taxing. Assisted patient in identifying ways in which she can manage caregiver burnout. She identified the importance of setting and keeping more firm boundaries will be helpful. Patient also notes that some of her anti-perfectionism skills are helping reduce the pressure and stress she experiences.    Treatment plan review: Completed 90 day treatment plan review. Discussed progress and improvements made over time and priorities for next phase of treatment. Mutually agreed to focus on building in more enjoyable and enriching activities, cognitive reappraisal exercises, and reducing physiological reactivity to stress.        Top values identified included: spirituality, humor, fairness, creativity, curiosity, contribution, and caring.  Unhelpful rules/assumptions: fear of failure, all-or-nothing thinking, and simplicity, control, and structure  Top priorities for grief: increasing tolerance of discomfort associated with uncertainty as well as identifying and processing  emotions associated with the losses she has experienced.    Treatment Objective(s) Addressed in This Session:  Psychological distress related to Diabetes  Psychological distress related to caregiver stress    Progress on / Status of Treatment Objective(s) / Homework:  In progress    Medication Adherence: Patient did not report medication changes. Current medication list is below:     Current Outpatient Medications   Medication Sig Dispense Refill    albuterol (PROAIR HFA/PROVENTIL HFA/VENTOLIN HFA) 108 (90 Base) MCG/ACT inhaler Inhale 2 puffs into the lungs every 4 hours as needed for shortness of breath / dyspnea or wheezing 1 Inhaler 11    aspirin 81 MG tablet Take 1 tablet by mouth daily.      blood glucose (ACCU-CHEK COLIN PLUS) test strip Test Blood Sugar 8 times daily or as directed 800 strip 3    blood glucose monitoring (ACCU-CHEK FASTCLIX) lancets Use to test blood sugar 8 times daily or as directed. 4 Box 3    blood glucose monitoring (NO BRAND SPECIFIED) meter device kit Use to test blood sugar 8 times daily or as directed. Any covered brand, 1 kit 0    Calcium Carbonate-Vitamin D (CALCIUM + D PO) Take one daily      citalopram (CELEXA) 20 MG tablet Take 1.5 tablets (30 mg) by mouth daily 135 tablet 1    Continuous Blood Gluc  (DEXCOM G6 ) SUZANNE Use to read blood sugars as per 's instructions. 1 each 0    Continuous Blood Gluc Sensor (DEXCOM G6 SENSOR) MISC Change every 10 days. 9 each 3    Continuous Blood Gluc Transmit (DEXCOM G6 TRANSMITTER) MISC Change every 3 months. 1 each 3    diclofenac (VOLTAREN) 1 % topical gel Apply 2 g topically 4 times daily 100 g 4    diclofenac (VOLTAREN) 1 % topical gel APPLY 2 GRAMS ONTO THE SKIN FOUR TIMES DAILY AS NEEDED FOR MODERATE PAIN 100 g 5    DULERA 100-5 MCG/ACT inhaler INHALE 2 PUFFS INTO THE LUNGS TWICE DAILY 13 g 5    econazole nitrate 1 % cream Apply 0.5 inches topically daily.      FIASP PENFILL 100 UNIT/ML SOCT INJECT UP TO 45  "UNITS DAILY AS DIRECTED ACCORDING TO CORRECTION FACTOR. 45 mL 3    fish oil-omega-3 fatty acids 1000 MG capsule Take one daily      fluocinonide (LIDEX) 0.05 % external solution Apply topically 2 times daily 60 mL 4    folic acid (FOLVITE) 1 MG tablet Take 1 tablet (1 mg) by mouth daily 90 tablet 3    Insulin Aspart, w/Niacinamide, (FIASP) 100 UNIT/ML SOLN Inject 50 Units as directed daily For use in ambulatory insulin pump as directed.  Approximate daily dose is 50 units. 20 mL 11    insulin degludec (TRESIBA FLEXTOUCH) 100 UNIT/ML pen ADMINISTER 8 UNITS UNDER THE SKIN DAILY. PLEASE DO NOT FILL TODAY ( 10/18/2023 ). 30 mL 1    Insulin Disposable Pump (OMNIPOD 5 G6 INTRO, GEN 5,) KIT 1 kit continuous 1 kit 0    Insulin Disposable Pump (OMNIPOD 5 G6 POD, GEN 5,) MISC 1 each every 3 days 30 each 3    insulin pen needle (B-D U/F) 31G X 8 MM miscellaneous Use 5 pen needles daily 450 each 3    insulin syringe-needle U-100 (30G X 1/2\" 1 ML) 30G X 1/2\" 1 ML miscellaneous Use 1 syringes as directed to draw up insulin for pump. Per pharmacy request. . 100 each 1    Ketoconazole (NIZORAL PO) Shampoo daily      levothyroxine (SYNTHROID/LEVOTHROID) 112 MCG tablet TAKE 1 TABLET(112 MCG) BY MOUTH DAILY 90 tablet 4    methotrexate 2.5 MG tablet Take 6 tablets (15 mg) by mouth every 7 days Labs every 8 - 12 weeks for refills. 72 tablet 0    methotrexate 2.5 MG tablet Take 6 tablets (15 mg) by mouth every 7 days Labs every 8 - 12 weeks for refills. 72 tablet 0    methotrexate 2.5 MG tablet Take 6 tablets (15 mg) by mouth every 7 days Labs required every 8-12 weeks while taking this medication 72 tablet 0    methylphenidate (METADATE CD) 20 MG CR capsule Take 20 mg by mouth daily      methylphenidate (RITALIN) 10 MG tablet TAKE UP TO 2 TABLETS BY MOUTH IN THE LATE AFTERNOON.      Minoxidil (ROGAINE EX) daily      montelukast (SINGULAIR) 5 MG chewable tablet CHEW AND SWALLOW 1 TABLET(5 MG) BY MOUTH AT BEDTIME 90 tablet 4    Multiple " Vitamin (MULTIVITAMIN OR) Take one daily tab      ondansetron (ZOFRAN ODT) 4 MG ODT tab Take 1 tablet (4 mg) by mouth every 8 hours as needed for vomiting 30 tablet 1    Semaglutide, 1 MG/DOSE, 4 MG/3ML SOPN Inject 1 mg Subcutaneous every 7 days 12 mL 3    simvastatin (ZOCOR) 20 MG tablet Take 1 tablet (20 mg) by mouth at bedtime 90 tablet 3    traZODone (DESYREL) 50 MG tablet Take 1-2 tablets by mouth nightly as needed for sleep. 180 tablet 0     Current Facility-Administered Medications   Medication Dose Route Frequency Provider Last Rate Last Admin    lidocaine 1 % injection 1 mL  1 mL   Tolu Fernández, DPM   1 mL at 11/09/22 1429    triamcinolone acetonide (KENALOG-10) injection 10 mg  10 mg   Tolu Fernández, DPM   10 mg at 11/09/22 1429     Assessment: The patient appeared to be active and engaged in today's session and was receptive to feedback.     Mental Status Exam:   Appearance: Appropriate   Eye Contact: Good    Orientation: Yes, x4  Behavior/relationship to provider/demeanor: Cooperative, Engaged, and Pleasant  Motor Activity: normal or unremarkable  Mood (subjective report): Stressed  Affect (objective appearance): Appropriate/mood congruent  Speech Rate: Normal  Speech Volume: Normal  Speech Articulation: Normal  Speech Coherence: Normal  Speech Spontaneity: Normal  Thought Content: no suicidal/homicidal ideation, plans, or intent  Thought Process (associations): Logical, Linear, and Goal directed  Thought Process (rate): Normal  Abnormal Perception: None  Attention/Concentration: Normal  Memory: Appears grossly intact   Fund of Knowledge: Above average  Abstraction:  Normal  Insight:  Good  Judgment:  good    Does the patient appear to be at imminent risk of harm to self/others at this time? No    The session was necessary to address psychological distress in the context of multiple psychosocial stressors and chronic health concerns.    Diagnoses (provisional):    1. Adjustment  "disorder with anxiety    2. Major depressive disorder, recurrent, in partial remission (H)    3. 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 Other chronic pain        Plan:    Follow-up video visit with me on 5/8/24 at 9:00am    Patient to use 911 or other crisis resources in the event of a psychiatric emergency  Primary care needs and medications are managed by Dimitri Alas MD. Referring provider is Dr. Mckeon.  Next visit agenda/goals:   Expand \"playtime\" and restorative/joyful activities  Continue to write down 3 good things (appreciative or grateful for) each day  Focus on increasing awareness of fight or flight activation    Skills taught/interventions used thus far:  Psychoeducation  Values clarification  Committed action (vitality vs suffering)  Perfectionism workbook (modules 1-5)  Defusion  Acting opposite  Mindfulness (breath, body scan, watching thoughts, choiceless awareness)  Therapeutic journaling  [facility] [place]    NOTE: Treatment plan update due 9/6/24; 90 day treatment plan review due 7/15/24.                                                Individual Treatment Plan    Patient's Name: Dali Martines  YOB: 1970    Date of Creation: 9/6/23  Date Treatment Plan Last Reviewed/Revised: 04/16/24    DSM5 Diagnoses:     1. Adjustment disorder with anxiety    2. Major depressive disorder, recurrent, in partial remission (H)      Psychosocial / Contextual Factors: financial hardship, health issues, occupational/vocational stress, relationship stress, caregiver stress (mother's health concerns), and grief and loss in the wake of recent bereavement  PROMIS (reviewed every 90 days):      2/6/2024  11:23 AM   PROMIS 10    PROMIS TOTAL - SUBSCORES 22        Referral / Collaboration:  Collaboration with medical team and specialists as needed.    Anticipated number of session for this episode of " "care: 15-20  Anticipation frequency of session: 2x per month  Anticipated Duration of each session: 38-52 minutes  Treatment plan will be reviewed in 90 days or when goals have been changed.       Measurable Treatment Goal(s) related to diagnosis/functional impairment(s): Overall goals for treatment include improving her physical health (e.g., diabetes, physical activity, and GI discomfort), learning strategies for enhancing coping capacity, and decreasing avoidance-based coping.  Goal 1: Patient will decrease her use of avoidance-based coping   \"I will know I've met my goal when I am not avoiding doing things out of fear (e.g., bathroom sink, processing grief, physical activity, reviewing pieces of writing).\"     Objective #A (Patient Action)    Patient will learn and implement skills for reducing perfectionism.  Status: In progress - Date: 04/16/24 (patient is in the process of completing the perfectionism workbook)     Intervention(s)  Therapist will teach anti-perfectionism skills and will assist patient in completing a workbook on perfectionism.    Objective #B  Patient will initiate a healthy grieving process  Status: In progress - Date: 04/16/24 (patient is learning emotional expression skills)    Intervention(s)  Therapist will assist patient in processing and exploring her grief.    Objective #C  Patient will increase her participation in meaningful and purposeful activities.  Status: In progress - Date: 04/16/24 (patient reports some progress in increasing her participation in meaningful activities, will focus on generating a broader list of activities)    Intervention(s)  Therapist will teach values clarification, committed action, and exposure skills.    Objective #D  Patient will learn and implement unhooking skills for decreasing the degree to which fear influences behaviors and decisions in an unhelpful way.  Status: In progress - Date: 04/16/24 (patient has been learning and implementing mindfulness " and cognitive restructuring skills)     Intervention(s)  Therapist will teach defusion, cognitive restructuring, and mindfulness skills.      Patient has reviewed and agreed to the above plan.      Elise Sutton, PhD  September 6, 2023    Elise Sutton, Ph.D., , [facility]  Clinical Health Psychologist  Phone: [phone]   Pager: [phone]

## 2024-04-17 ENCOUNTER — OFFICE VISIT (OUTPATIENT)
Dept: NEUROLOGY | Facility: CLINIC | Age: 54
End: 2024-04-17
Payer: COMMERCIAL

## 2024-04-17 ENCOUNTER — THERAPY VISIT (OUTPATIENT)
Dept: PHYSICAL THERAPY | Facility: CLINIC | Age: 54
End: 2024-04-17
Payer: COMMERCIAL

## 2024-04-17 ENCOUNTER — THERAPY VISIT (OUTPATIENT)
Dept: OCCUPATIONAL THERAPY | Facility: CLINIC | Age: 54
End: 2024-04-17
Payer: COMMERCIAL

## 2024-04-17 VITALS
WEIGHT: 170.1 LBS | BODY MASS INDEX: 24.35 KG/M2 | OXYGEN SATURATION: 97 % | SYSTOLIC BLOOD PRESSURE: 110 MMHG | HEART RATE: 71 BPM | HEIGHT: 70 IN | DIASTOLIC BLOOD PRESSURE: 68 MMHG

## 2024-04-17 DIAGNOSIS — G93.39 OTHER POST INFECTION AND RELATED FATIGUE SYNDROMES: Primary | ICD-10-CM

## 2024-04-17 DIAGNOSIS — R26.89 BALANCE DISORDER: Primary | ICD-10-CM

## 2024-04-17 DIAGNOSIS — R68.89 DECREASED FUNCTIONAL ACTIVITY TOLERANCE: ICD-10-CM

## 2024-04-17 DIAGNOSIS — R42 VERTIGO: Primary | ICD-10-CM

## 2024-04-17 DIAGNOSIS — L40.50 PSORIATIC ARTHRITIS (H): ICD-10-CM

## 2024-04-17 DIAGNOSIS — R42 DIZZINESS: ICD-10-CM

## 2024-04-17 PROCEDURE — 97112 NEUROMUSCULAR REEDUCATION: CPT | Mod: GP | Performed by: PHYSICAL THERAPIST

## 2024-04-17 PROCEDURE — 97530 THERAPEUTIC ACTIVITIES: CPT | Mod: GO

## 2024-04-17 PROCEDURE — 99207 PR NO CHARGE LOS: CPT

## 2024-04-17 ASSESSMENT — PAIN SCALES - GENERAL: PAINLEVEL: MODERATE PAIN (4)

## 2024-04-17 NOTE — TELEPHONE ENCOUNTER
[facility] Call Center    Phone Message    May a detailed message be left on voicemail: yes     Reason for Call: Medication Refill Request    Has the patient contacted the pharmacy for the refill? Yes   Name of medication being requested: Methotrexate  Provider who prescribed the medication: Pavithra  Pharmacy: [facility] ([address]  Date medication is needed: ASAP     Please see 4/16/24 MyChart/rheum encounter for previous documentation: pt called the number provided to her in this message ([phone]), but was ultimately transferred to main scheduling line.   Pt understands she needs to have lab tests done in order to have her medication refilled. She did not want to schedule an appt to have these done, says she will take care of them herself sometime in the next few days-however, she would like to be notified once updated lab orders have been placed for her so she can do that.     Pt also understands we would need to get her scheduled to establish care with a new provider. She did not want to schedule an appt at this time, would like to be able to research the different providers we have available, and will call us back to schedule once she has decided which provider she would like to go with.     Action Taken: Other: Rheum    Travel Screening: Not Applicable

## 2024-04-17 NOTE — TELEPHONE ENCOUNTER
Pre assessment completed for upcoming procedure.   (Please see previous telephone encounter notes for complete details)    Patient  returned call.       Procedure details:    Arrival time and facility location reviewed.    Pre op exam needed? N/A    Designated  policy reviewed. Instructed to have someone stay 6  hours post procedure.       Medication review:    Medications reviewed. Please see supporting documentation below. Holding recommendations discussed (if applicable).       Prep for procedure:     Procedure prep instructions reviewed.        Any additional information needed:  Patient has gastroparesis- informed patient of extended CLD and NPO times.      Patient  verbalized understanding and had no questions or concerns at this time.      Doreen Bobo RN  Endoscopy Procedure Pre Assessment RN  [phone] option 4

## 2024-04-17 NOTE — NURSING NOTE
Chief Complaint   Patient presents with    RECHECK       Vitals were taken and medications were reconciled.    Walter Arreola, Technician  4:04 PM  April 17, 2024

## 2024-04-17 NOTE — LETTER
2024       RE: Dali Martines  [address]  [facility] [address]       Dear Colleague,    Thank you for referring your patient, Dali Martines, to the [facility] [place] at [facility]. Please see a copy of my visit note below.      DEPARTMENT OF NEUROLOGY    Patient Name:  Dali Martines  MRN:  [id number]    :  1970  Date of Clinic Visit:  2024  Primary Care Provider:  Dimitri Alas        FOLLOW UP APPOINTMENT    CC: dizziness    IMPRESSION/RECOMMENDATIONS:   Dali Martines is a  53 year old female with a history of anxiety, psoriatic arthritis, endometriosis, GERD, hypothyroidism, type I DM, asthma, chronic low back pain presenting with dizziness.  MRI was reassuring for no structural cause of dizziness.  Her exam continues to be reassuring.  Her symptoms remain most consistent with persistent postural perceptual dizziness.  It is reassuring that she has found some improvement with vestibular therapy and Occupational Therapy.  We discussed that continuing working with these teams will be important and expect to continue to see improvement with these therapies.  She is already on a SSRI.  She did mention that she has noticed some shakiness in her hands, no evidence of parkinsonism on exam, which is reassuring.  Initial MRI did show question of possible pituitary cyst, however follow-up MRI was reassuring and patient was seen by neurosurgery who noted that it is possible suprasellar cyst versus normal suprasellar subarachnoid space.  She will have a follow-up MRI in 6 months.    At this time discussed that we would recommend continuing with vestibular physical therapy and Occupational Therapy.  I will follow-up with the patient in 6 months.  Patient is in agreement with plan and her questions were answered.    Patient has been discussed with Dr. Wynn, who agrees with my assessment and plan.    Diana  MD Gracie  [facility] Department of Neurology PGY1    Outpatient General Neurology Staff  I was not present in clinic on the date of service as the primary staff neurologist was unavailable and I stepped in to cover by phone. However, I discussed the case with the neurology resident and agreed with the assessment and plan as reported to me.    The patient should not be billed for a physician fee.    Philip Wynn MD        HPI: Dali Martines is a  53 year old female with a history of anxiety, psoriatic arthritis, endometriosis, GERD, hypothyroidism, type I DM, asthma, chronic low back pain presenting with dizziness. Dali describes an acute onset of dizziness in 2022 then has had persistent, episodic symptoms since that time. Given acute onset and symptoms of incoordination when symptoms first started, will proceed with MRI Brain with and without contrast to rule out structural lesion such as an infarct. However, stroke is less likely. Huong-Hallpike has been negative in the past (checked 9/21/23). It is possible that Dali has persistent postural-perceptual dizziness given that she has had symptoms of dizziness and unsteadiness on most days for more than 3 months. Her symptoms are exacerbated by active and passive motion without regard to direction and with moving visual stimuli/complex visual patterns. Her symptoms may have been triggered by extreme turbulence on plane and patient reports significant stressors in life around that time. Her symptoms have affected her functioning.     Please see notes from encounter on 1/10/24 for more information about this patient's initial presentation to this clinic.     INTERVAL HISTORY:     Overall she feels like her dizziness has been improving overall. She feels like there are some areas that are still a struggle.   There are certain movements, looking lower left to upper right always tends to trigger dizzy symptoms. She has continued to work OT and PT.  "    One area that she has continued struggle with is feeling worsening symptoms when she feels overwhelmed cognitively. She feels like she is much more likely to physically lose balance if she is having heavy, burdensome cognitive issues going on. Her symptoms are also worse when she is tired. She does feel like she has more tools to manage it but it is still an issue that is frustrating.     She had a swallow study as a part of her evaluation for gastroparesis; she has noticed coughing up water. She has had more floaters, she had her eyes checked at the beginning of the year. She has noticed more shakiness in her hands. She notices it more in her right hand, she feels like she notices it more when she is holding something but has noticed it as rest as well. She has also felt like her hands have been more weak, she has had trouble opening a jar more than she used to. She is not sure if she has had any changes in her sense of smell. No issues with constipation or abnormal behaviors during sleep.    She continues to care for her mother who is living with dementia.      Vital signs:      BP: 110/68 Pulse: 71     SpO2: 97 %     Height: 177.6 cm (5' 9.92\") Weight: 77.2 kg (170 lb 1.6 oz)  Estimated body mass index is 24.46 kg/m  as calculated from the following:    Height as of this encounter: 1.776 m (5' 9.92\").    Weight as of this encounter: 77.2 kg (170 lb 1.6 oz).  /68 (BP Location: Right arm, Patient Position: Sitting, Cuff Size: Adult Regular)   Pulse 71   Ht 1.776 m (5' 9.92\")   Wt 77.2 kg (170 lb 1.6 oz)   SpO2 97%   BMI 24.46 kg/m      Examination:     -General: Sitting comfortably on the chair. No acute distress    -Neurological:     --MS: Patient is alert, attentive, and oriented to person, age, place, mo/year, and situation. Able to provide a detailed history, follow commands, and answer questions without difficulty. Speech is clear and fluid, no concern for aphasia or neglect    --CNs: PERRL, " conjugate gaze, EOMI w/o nystagmus, visual fields intact in all quadrants, facial sensation intact, no facial asymmetry, hearing intact to conversation, no dysarthria, symmetric palate rise w/ midline uvula, tongue is midline    --Motor: Normal muscle tone and bulk. No abnormal movements noted. Strength is 5/5 in all extremities in both proximal and distal muscle groups    --Reflexes: 2+ and symmetric reflexes in the bilateral biceps, brachioradialis, knees, and ankles.     --Sensory: Intact to light touch in all extremities w/o extinction. Neg Romberg    --Coordination: FNF and HS intact bilaterally    --Gait: Stands with feet normally spaced. Gait is steady.      INVESTIGATIONS:  All available and relevant labs, imaging, and other procedures were reviewed with the patient at this visit. Those of particular significance are listed below:    MR Brain without contrast 2/2/24  IMPRESSION:  1.  Partially characterized cystic appearing sellar lesion measuring 9 x 8 x 9 mm that partially flattens the underlying normal-appearing pituitary tissue. This is indeterminate incompletely evaluated on a technical basis, though appears intraglandular,   possibly representing a microadenoma with cystic degeneration, or potentially representing an arachnoid cyst external to the gland. Recommend further evaluation with pituitary/sella protocol brain MRI without and with IV contrast.  2.  No superimposed acute intracranial abnormality.    MR Brain with and without contrast 2/22/24  IMPRESSION:  1.  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 No convincing pituitary mass.  2.  Small cephaloceles along the floor of the middle cranial fossae on both sides.  3.  Otherwise negative brain MRI. No acute intracranial finding. No evidence for recent ischemia, intracranial hemorrhage,  or mass.      Again, thank you for allowing me to participate in the care of your patient.      Sincerely,    DILIP OLGUIN MD

## 2024-04-17 NOTE — PATIENT INSTRUCTIONS
Today we discussed your dizziness. We think your symptoms are most consistent persistent postural perceptual dizziness. It is reassuring that you are seeing some improvements as you have worked with PT and OT. Continue working with PT and OT.     Follow up with me in 6 months.

## 2024-04-17 NOTE — PROGRESS NOTES
DEPARTMENT OF NEUROLOGY    Patient Name:  Dali Martines  MRN:  [id number]    :  1970  Date of Clinic Visit:  2024  Primary Care Provider:  Dimitri Alas        FOLLOW UP APPOINTMENT    CC: dizziness    IMPRESSION/RECOMMENDATIONS:   Dali Martines is a  53 year old female with a history of anxiety, psoriatic arthritis, endometriosis, GERD, hypothyroidism, type I DM, asthma, chronic low back pain presenting with dizziness.  MRI was reassuring for no structural cause of dizziness.  Her exam continues to be reassuring.  Her symptoms remain most consistent with persistent postural perceptual dizziness.  It is reassuring that she has found some improvement with vestibular therapy and Occupational Therapy.  We discussed that continuing working with these teams will be important and expect to continue to see improvement with these therapies.  She is already on a SSRI.  She did mention that she has noticed some shakiness in her hands, no evidence of parkinsonism on exam, which is reassuring.  Initial MRI did show question of possible pituitary cyst, however follow-up MRI was reassuring and patient was seen by neurosurgery who noted that it is possible suprasellar cyst versus normal suprasellar subarachnoid space.  She will have a follow-up MRI in 6 months.    At this time discussed that we would recommend continuing with vestibular physical therapy and Occupational Therapy.  I will follow-up with the patient in 6 months.  Patient is in agreement with plan and her questions were answered.    Patient has been discussed with Dr. Wynn, who agrees with my assessment and plan.    Diana Bowman MD  [facility] Department of Neurology PGY1    Outpatient General Neurology Staff  I was not present in clinic on the date of service as the primary staff neurologist was unavailable and I stepped in to cover by phone. However, I discussed the case with the neurology resident and agreed with the  assessment and plan as reported to me.    The patient should not be billed for a physician fee.    Philip Wynn MD        HPI: Dali Martines is a  53 year old female with a history of anxiety, psoriatic arthritis, endometriosis, GERD, hypothyroidism, type I DM, asthma, chronic low back pain presenting with dizziness. Dali describes an acute onset of dizziness in 2022 then has had persistent, episodic symptoms since that time. Given acute onset and symptoms of incoordination when symptoms first started, will proceed with MRI Brain with and without contrast to rule out structural lesion such as an infarct. However, stroke is less likely. [place]-Hallpike has been negative in the past (checked 9/21/23). It is possible that Dali has persistent postural-perceptual dizziness given that she has had symptoms of dizziness and unsteadiness on most days for more than 3 months. Her symptoms are exacerbated by active and passive motion without regard to direction and with moving visual stimuli/complex visual patterns. Her symptoms may have been triggered by extreme turbulence on plane and patient reports significant stressors in life around that time. Her symptoms have affected her functioning.     Please see notes from encounter on 1/10/24 for more information about this patient's initial presentation to this clinic.     INTERVAL HISTORY:     Overall she feels like her dizziness has been improving overall. She feels like there are some areas that are still a struggle.   There are certain movements, looking lower left to upper right always tends to trigger dizzy symptoms. She has continued to work OT and PT.     One area that she has continued struggle with is feeling worsening symptoms when she feels overwhelmed cognitively. She feels like she is much more likely to physically lose balance if she is having heavy, burdensome cognitive issues going on. Her symptoms are also worse when she is tired. She does feel like she has  "more tools to manage it but it is still an issue that is frustrating.     She had a swallow study as a part of her evaluation for gastroparesis; she has noticed coughing up water. She has had more floaters, she had her eyes checked at the beginning of the year. She has noticed more shakiness in her hands. She notices it more in her right hand, she feels like she notices it more when she is holding something but has noticed it as rest as well. She has also felt like her hands have been more weak, she has had trouble opening a jar more than she used to. She is not sure if she has had any changes in her sense of smell. No issues with constipation or abnormal behaviors during sleep.    She continues to care for her mother who is living with dementia.      Vital signs:      BP: 110/68 Pulse: 71     SpO2: 97 %     Height: 177.6 cm (5' 9.92\") Weight: 77.2 kg (170 lb 1.6 oz)  Estimated body mass index is 24.46 kg/m  as calculated from the following:    Height as of this encounter: 1.776 m (5' 9.92\").    Weight as of this encounter: 77.2 kg (170 lb 1.6 oz).  /68 (BP Location: Right arm, Patient Position: Sitting, Cuff Size: Adult Regular)   Pulse 71   Ht 1.776 m (5' 9.92\")   Wt 77.2 kg (170 lb 1.6 oz)   SpO2 97%   BMI 24.46 kg/m      Examination:     -General: Sitting comfortably on the chair. No acute distress    -Neurological:     --MS: Patient is alert, attentive, and oriented to person, age, place, mo/year, and situation. Able to provide a detailed history, follow commands, and answer questions without difficulty. Speech is clear and fluid, no concern for aphasia or neglect    --CNs: PERRL, conjugate gaze, EOMI w/o nystagmus, visual fields intact in all quadrants, facial sensation intact, no facial asymmetry, hearing intact to conversation, no dysarthria, symmetric palate rise w/ midline uvula, tongue is midline    --Motor: Normal muscle tone and bulk. No abnormal movements noted. Strength is 5/5 in all " extremities in both proximal and distal muscle groups    --Reflexes: 2+ and symmetric reflexes in the bilateral biceps, brachioradialis, knees, and ankles.     --Sensory: Intact to light touch in all extremities w/o extinction. Neg Romberg    --Coordination: FNF and HS intact bilaterally    --Gait: Stands with feet normally spaced. Gait is steady.      INVESTIGATIONS:  All available and relevant labs, imaging, and other procedures were reviewed with the patient at this visit. Those of particular significance are listed below:    MR Brain without contrast 2/2/24  IMPRESSION:  1.  Partially characterized cystic appearing sellar lesion measuring 9 x 8 x 9 mm that partially flattens the underlying normal-appearing pituitary tissue. This is indeterminate incompletely evaluated on a technical basis, though appears intraglandular,   possibly representing a microadenoma with cystic degeneration, or potentially representing an arachnoid cyst external to the gland. Recommend further evaluation with pituitary/sella protocol brain MRI without and with IV contrast.  2.  No superimposed acute intracranial abnormality.    MR Brain with and without contrast 2/22/24  IMPRESSION:  1.  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 No convincing pituitary mass.  2.  Small cephaloceles along the floor of the middle cranial fossae on both sides.  3.  Otherwise negative brain MRI. No acute intracranial finding. No evidence for recent ischemia, intracranial hemorrhage, or mass.

## 2024-04-18 NOTE — TELEPHONE ENCOUNTER
methotrexate 2.5 MG tablet       Last Written Prescription Date:  12-29-23  Last Fill Quantity: 72,   # refills: 0  Last Office Visit: 12-29-23 ([facility])  Future Office visit:  None    CBC RESULTS:   Recent Labs   Lab Test 11/07/23  1143   WBC 4.9   RBC 3.73*   HGB 11.7   HCT 36.2   MCV 97   MCH 31.4   MCHC 32.3   RDW 13.1          Creatinine   Date Value Ref Range Status   02/26/2024 0.60 0.51 - 0.95 mg/dL Final   01/11/2021 0.56 0.52 - 1.04 mg/dL Final   ]    Liver Function Studies -   Recent Labs   Lab Test 11/07/23  1143   PROTTOTAL 7.5   ALBUMIN 4.5   BILITOTAL 0.3   ALKPHOS 93   AST 37   ALT 30       Routing refill request to provider for review/approval because:  Per protocol  Last rx by Dr. Arshad- no longer at [place]  FYI to clinic, Over due labs- has standing order from [facility]   See 4-17-24 tele note

## 2024-04-19 NOTE — TELEPHONE ENCOUNTER
"Attempt made to contact patient.  Mail box is full and unable to leave a message.  Per Dr Mosquera, \"The patient needs to clarify if she is willing to follow up or not and if she will get labs or not for safe medication prescription.     If she is not willing to get labs or follow up then it would be for her safety not to continue methotrexate since there would be no monitoring for it.\"    Will try again later.     Viola Ferrer RN    "

## 2024-04-24 RX ORDER — METHOTREXATE 2.5 MG/1
15 TABLET ORAL
Qty: 24 TABLET | Refills: 0 | OUTPATIENT
Start: 2024-04-24

## 2024-04-25 ENCOUNTER — VIRTUAL VISIT (OUTPATIENT)
Dept: EDUCATION SERVICES | Facility: CLINIC | Age: 54
End: 2024-04-25
Payer: COMMERCIAL

## 2024-04-25 DIAGNOSIS — E10.69 TYPE 1 DIABETES MELLITUS WITH OTHER SPECIFIED COMPLICATION (H): Primary | ICD-10-CM

## 2024-04-25 PROCEDURE — 99207 PR NO CHARGE LOS: CPT | Mod: 95 | Performed by: REGISTERED NURSE

## 2024-04-25 NOTE — PROGRESS NOTES
Video-Visit Details    Type of service:  Video Visit  Patient consented to video visit  Video Start Time:  1032  Video End Time:   1100  Originating Location (pt. Location): Home  Distant Location (provider location):  [facility] DIABETES EDUCATION [place]   Platform used for Video Visit: [employer] (TMG)    Diabetes Self-Management Education & Support Virtual Visit- Short    Dali Martines contacted today for education related to Type 1 diabetes    Patient is being treated with:  Omnipod 5 Insulin Pump    Year of diagnosis: 1988  Referring provider:  Felicia Mckeon MD  Living Situation: Lives alone and has a dog.  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  Currently not employed.    Purpose of today's visit:  DMSE and insulin pump management.     Subjective/Objective:                    Assessment/Plan:    Since our last visit, her Time in [employer] Range has dropped from 85% to 75% but she is having much less hypoglycemia and feels that the pump is working well for her. She has noticed that over the past 6 months to a year, she is less aware of hypoglycemia than she used to be.      Because of her gastroparesis, she has been putting the pump in manual mode when she eats higher fat-higher carb meals and uses the extended bolus.  She has been using a 50/50 ratio and delivering it over 3 hours.  She finds that this seems to work OK for pizza but finds that with some other foods, her glucose tends to stay elevated from several hours afterward.  Suggested that she try a different ratio, and continue to pre bolus for correction. We also changed her basal rate in manual mode to match what she is receiving in automated mode.  This will also help with the post prandial highs when she is using the extended bolus feature.  New 24 hour basal rate in manual mode is 0.55 units per hour.      She doesn't have another followup appointment with Dr. Mckeon until September, so we set  up a follow up appointment to review in one month.      Time spent in this visit: less than 30 minutes     Any diabetes medication dose changes were made via the CDE Protocol and Collaborative Practice Agreement. A copy of this encounter was provided to the patient's referring provider.

## 2024-04-26 ENCOUNTER — ANESTHESIA EVENT (OUTPATIENT)
Dept: SURGERY | Facility: AMBULATORY SURGERY CENTER | Age: 54
End: 2024-04-26
Payer: COMMERCIAL

## 2024-04-27 DIAGNOSIS — J45.30 MILD PERSISTENT ASTHMA WITHOUT COMPLICATION: ICD-10-CM

## 2024-04-29 ENCOUNTER — ANESTHESIA (OUTPATIENT)
Dept: SURGERY | Facility: AMBULATORY SURGERY CENTER | Age: 54
End: 2024-04-29
Payer: COMMERCIAL

## 2024-04-29 ENCOUNTER — TELEPHONE (OUTPATIENT)
Dept: RHEUMATOLOGY | Facility: CLINIC | Age: 54
End: 2024-04-29

## 2024-04-29 ENCOUNTER — HOSPITAL ENCOUNTER (OUTPATIENT)
Facility: AMBULATORY SURGERY CENTER | Age: 54
Discharge: HOME | End: 2024-04-29
Attending: INTERNAL MEDICINE | Admitting: INTERNAL MEDICINE
Payer: COMMERCIAL

## 2024-04-29 VITALS
TEMPERATURE: 98.5 F | SYSTOLIC BLOOD PRESSURE: 106 MMHG | OXYGEN SATURATION: 98 % | HEART RATE: 63 BPM | DIASTOLIC BLOOD PRESSURE: 63 MMHG | WEIGHT: 170 LBS | HEIGHT: 70 IN | BODY MASS INDEX: 24.34 KG/M2 | RESPIRATION RATE: 96 BRPM

## 2024-04-29 VITALS — HEART RATE: 61 BPM

## 2024-04-29 DIAGNOSIS — L40.50 PSORIATIC ARTHRITIS (H): Primary | ICD-10-CM

## 2024-04-29 LAB
GLUCOSE BLDC GLUCOMTR-MCNC: 83 MG/DL (ref 70–99)
UPPER GI ENDOSCOPY: NORMAL

## 2024-04-29 PROCEDURE — 43235 EGD DIAGNOSTIC BRUSH WASH: CPT | Performed by: NURSE ANESTHETIST, CERTIFIED REGISTERED

## 2024-04-29 PROCEDURE — 82962 GLUCOSE BLOOD TEST: CPT | Performed by: PATHOLOGY

## 2024-04-29 PROCEDURE — 43239 EGD BIOPSY SINGLE/MULTIPLE: CPT | Performed by: INTERNAL MEDICINE

## 2024-04-29 PROCEDURE — 88342 IMHCHEM/IMCYTCHM 1ST ANTB: CPT | Mod: 26 | Performed by: PATHOLOGY

## 2024-04-29 PROCEDURE — 88341 IMHCHEM/IMCYTCHM EA ADD ANTB: CPT | Mod: 26 | Performed by: PATHOLOGY

## 2024-04-29 PROCEDURE — 88305 TISSUE EXAM BY PATHOLOGIST: CPT | Mod: 26 | Performed by: PATHOLOGY

## 2024-04-29 PROCEDURE — 43235 EGD DIAGNOSTIC BRUSH WASH: CPT | Performed by: STUDENT IN AN ORGANIZED HEALTH CARE EDUCATION/TRAINING PROGRAM

## 2024-04-29 PROCEDURE — 88341 IMHCHEM/IMCYTCHM EA ADD ANTB: CPT | Mod: TC | Performed by: INTERNAL MEDICINE

## 2024-04-29 RX ORDER — PROPOFOL 10 MG/ML
INJECTION, EMULSION INTRAVENOUS PRN
Status: DISCONTINUED | OUTPATIENT
Start: 2024-04-29 | End: 2024-04-29

## 2024-04-29 RX ORDER — NALOXONE HYDROCHLORIDE 0.4 MG/ML
0.2 INJECTION, SOLUTION INTRAMUSCULAR; INTRAVENOUS; SUBCUTANEOUS
Status: DISCONTINUED | OUTPATIENT
Start: 2024-04-29 | End: 2024-04-30 | Stop reason: HOSPADM

## 2024-04-29 RX ORDER — LIDOCAINE 40 MG/G
CREAM TOPICAL
Status: DISCONTINUED | OUTPATIENT
Start: 2024-04-29 | End: 2024-04-30 | Stop reason: HOSPADM

## 2024-04-29 RX ORDER — FLUMAZENIL 0.1 MG/ML
0.2 INJECTION, SOLUTION INTRAVENOUS
Status: ACTIVE | OUTPATIENT
Start: 2024-04-29 | End: 2024-04-29

## 2024-04-29 RX ORDER — SODIUM CHLORIDE, SODIUM LACTATE, POTASSIUM CHLORIDE, CALCIUM CHLORIDE 600; 310; 30; 20 MG/100ML; MG/100ML; MG/100ML; MG/100ML
INJECTION, SOLUTION INTRAVENOUS CONTINUOUS
Status: DISCONTINUED | OUTPATIENT
Start: 2024-04-29 | End: 2024-04-30 | Stop reason: HOSPADM

## 2024-04-29 RX ORDER — LIDOCAINE HYDROCHLORIDE 20 MG/ML
INJECTION, SOLUTION INFILTRATION; PERINEURAL PRN
Status: DISCONTINUED | OUTPATIENT
Start: 2024-04-29 | End: 2024-04-29

## 2024-04-29 RX ORDER — PROCHLORPERAZINE MALEATE 10 MG
10 TABLET ORAL EVERY 6 HOURS PRN
Status: DISCONTINUED | OUTPATIENT
Start: 2024-04-29 | End: 2024-04-30 | Stop reason: HOSPADM

## 2024-04-29 RX ORDER — MONTELUKAST SODIUM 5 MG/1
TABLET, CHEWABLE ORAL
Qty: 90 TABLET | Refills: 4 | Status: SHIPPED | OUTPATIENT
Start: 2024-04-29 | End: 2024-05-16

## 2024-04-29 RX ORDER — PROPOFOL 10 MG/ML
INJECTION, EMULSION INTRAVENOUS CONTINUOUS PRN
Status: DISCONTINUED | OUTPATIENT
Start: 2024-04-29 | End: 2024-04-29

## 2024-04-29 RX ORDER — ONDANSETRON 2 MG/ML
4 INJECTION INTRAMUSCULAR; INTRAVENOUS EVERY 6 HOURS PRN
Status: DISCONTINUED | OUTPATIENT
Start: 2024-04-29 | End: 2024-04-30 | Stop reason: HOSPADM

## 2024-04-29 RX ORDER — NALOXONE HYDROCHLORIDE 0.4 MG/ML
0.4 INJECTION, SOLUTION INTRAMUSCULAR; INTRAVENOUS; SUBCUTANEOUS
Status: DISCONTINUED | OUTPATIENT
Start: 2024-04-29 | End: 2024-04-30 | Stop reason: HOSPADM

## 2024-04-29 RX ORDER — ONDANSETRON 2 MG/ML
4 INJECTION INTRAMUSCULAR; INTRAVENOUS
Status: DISCONTINUED | OUTPATIENT
Start: 2024-04-29 | End: 2024-04-30 | Stop reason: HOSPADM

## 2024-04-29 RX ORDER — ONDANSETRON 4 MG/1
4 TABLET, ORALLY DISINTEGRATING ORAL EVERY 6 HOURS PRN
Status: DISCONTINUED | OUTPATIENT
Start: 2024-04-29 | End: 2024-04-30 | Stop reason: HOSPADM

## 2024-04-29 RX ADMIN — LIDOCAINE HYDROCHLORIDE 80 MG: 20 INJECTION, SOLUTION INFILTRATION; PERINEURAL at 10:28

## 2024-04-29 RX ADMIN — SODIUM CHLORIDE, SODIUM LACTATE, POTASSIUM CHLORIDE, CALCIUM CHLORIDE: 600; 310; 30; 20 INJECTION, SOLUTION INTRAVENOUS at 10:15

## 2024-04-29 RX ADMIN — PROPOFOL 70 MG: 10 INJECTION, EMULSION INTRAVENOUS at 10:29

## 2024-04-29 RX ADMIN — PROPOFOL 250 MCG/KG/MIN: 10 INJECTION, EMULSION INTRAVENOUS at 10:29

## 2024-04-29 NOTE — ANESTHESIA PREPROCEDURE EVALUATION
Anesthesia Pre-Procedure Evaluation    Patient: Dali Martines   MRN: [id number] : 1970        Procedure : Procedure(s):  Esophagoscopy, gastroscopy, duodenoscopy (EGD), combined          Past Medical History:   Diagnosis Date    Anemia     Anxiety     Arthritis     Psoriatic arthritis    Back injury     Dry eye syndrome     Dyslipidemia     Endometriosis     adehsions seen at laparoscopy    GERD (gastroesophageal reflux disease)     Hypothyroidism     8 yoa    SOB (shortness of breath) 3/11/2014    Type I (juvenile type) diabetes mellitus without mention of complication, not stated as uncontrolled     when 15     Uncomplicated asthma Fall       Past Surgical History:   Procedure Laterality Date    COLONOSCOPY  2002    COLONOSCOPY N/A 2020    Procedure: COLONOSCOPY;  Surgeon: Lauryn Rivera MD;  Location:  GI    ESOPHAGOSCOPY, GASTROSCOPY, DUODENOSCOPY (EGD), COMBINED  2014    Procedure: COMBINED ESOPHAGOSCOPY, GASTROSCOPY, DUODENOSCOPY (EGD), BIOPSY SINGLE OR MULTIPLE;;  Surgeon: Kraig Nicole MD;  Location:  GI    ESOPHAGOSCOPY, GASTROSCOPY, DUODENOSCOPY (EGD), COMBINED N/A 10/05/2023    Procedure: Esophagoscopy, gastroscopy, duodenoscopy (EGD), combined;  Surgeon: Raheem Hubbard MD;  Location:  GI    GYN SURGERY      Laparotomy to remove endometrial tissue    HC BREATH HYDROGEN TEST N/A 2014    Procedure: HYDROGEN BREATH TEST;  Surgeon: Deacon Alberts MD;  Location:  GI    HYDROGEN BREATH TEST  2014    LAPAROSCOPIC APPENDECTOMY  2002    LAPAROTOMY, LYSIS ADHESIONS, COMBINED  2002    endometriosis    SOFT TISSUE SURGERY  2014    right ankle tendon injury    [facility] REPAIR CRUCIATE LIGAMENT,KNEE      [facility] STOMACH SURGERY PROCEDURE UNLISTED  2002      Allergies   Allergen Reactions    Monosodium Glutamate Palpitations and Shortness Of Breath    Sulfasalazine      Developed rash, HA, dizziness      Social History      Tobacco Use    Smoking status: Never     Passive exposure: Never    Smokeless tobacco: Never   Substance Use Topics    Alcohol use: Yes     Comment: moderately      Wt Readings from Last 1 Encounters:   04/29/24 77.1 kg (170 lb)        Anesthesia Evaluation   Pt has had prior anesthetic.     No history of anesthetic complications       ROS/MED HX  ENT/Pulmonary:  - neg pulmonary ROS   (+)                     Mild Persistent, asthma  Treatment: Inhaler daily,                 Neurologic:  - neg neurologic ROS     Cardiovascular:  - neg cardiovascular ROS     METS/Exercise Tolerance: >4 METS    Hematologic:  - neg hematologic  ROS     Musculoskeletal:  - neg musculoskeletal ROS (+)  arthritis (Psoriatric arthritis),             GI/Hepatic:  - neg GI/hepatic ROS   (+) GERD,                   Renal/Genitourinary:  - neg Renal ROS     Endo:  - neg endo ROS   (+) type I DM,    Using insulin,     thyroid problem, hypothyroidism,           Psychiatric/Substance Use:  - neg psychiatric ROS     Infectious Disease:  - neg infectious disease ROS     Malignancy:  - neg malignancy ROS     Other:  - neg other ROS          Physical Exam    Airway        Mallampati: II   TM distance: > 3 FB   Neck ROM: full   Mouth opening: > 3 cm    Respiratory Devices and Support         Dental       (+) Minor Abnormalities - some fillings, tiny chips      Cardiovascular   cardiovascular exam normal          Pulmonary   pulmonary exam normal                OUTSIDE LABS:  CBC:   Lab Results   Component Value Date    WBC 4.9 11/07/2023    WBC 5.0 07/07/2023    HGB 11.7 11/07/2023    HGB 11.6 (L) 07/07/2023    HCT 36.2 11/07/2023    HCT 35.3 07/07/2023     11/07/2023     07/07/2023     BMP:   Lab Results   Component Value Date     02/26/2024     11/07/2023    POTASSIUM 4.6 02/26/2024    POTASSIUM 3.8 11/07/2023    CHLORIDE 102 02/26/2024    CHLORIDE 103 11/07/2023    CO2 25 02/26/2024    CO2 24 11/07/2023    BUN 6.3  02/26/2024    BUN 5.2 (L) 11/07/2023    CR 0.60 02/26/2024    CR 0.62 11/07/2023    GLC 83 04/29/2024     (H) 02/26/2024     COAGS:   Lab Results   Component Value Date    PTT 28 04/27/2016    INR 0.97 04/27/2016     POC:   Lab Results   Component Value Date    BGM 74 03/15/2019    HCG Negative 03/15/2019     HEPATIC:   Lab Results   Component Value Date    ALBUMIN 4.5 11/07/2023    PROTTOTAL 7.5 11/07/2023    ALT 30 11/07/2023    AST 37 11/07/2023    ALKPHOS 93 11/07/2023    BILITOTAL 0.3 11/07/2023     OTHER:   Lab Results   Component Value Date    A1C 6.7 (H) 02/26/2024    TONIA 9.5 02/26/2024    PHOS 2.5 05/01/2006    TSH 3.78 02/26/2024    T4 1.10 02/26/2024    CRP <2.9 02/14/2022    SED 27 11/07/2023       Anesthesia Plan    ASA Status:  2    NPO Status:  NPO Appropriate    Anesthesia Type: MAC.     - Reason for MAC: immobility needed              Consents    Anesthesia Plan(s) and associated risks, benefits, and realistic alternatives discussed. Questions answered and patient/representative(s) expressed understanding.     - Discussed:     - Discussed with:  Patient            Postoperative Care       PONV prophylaxis: Background Propofol Infusion, Ondansetron (or other 5HT-3)     Comments:               Waqar Oneil MD    I have reviewed the pertinent notes and labs in the chart from the past 30 days and (re)examined the patient.  Any updates or changes from those notes are reflected in this note.             # Drug Induced Platelet Defect: home medication list includes an antiplatelet medication  # DMII: A1C = 6.7 % (Ref range: 0.0 - 5.6 %) within past 6 months

## 2024-04-29 NOTE — TELEPHONE ENCOUNTER
Former [facility] patient. Received message from lab that patient has appointment scheduled on Wednesday 5/1/24 with no lab orders. Writer called patient and was able to schedule an appointment with Dr. Romero on 8/9/24. Lab orders were placed for patient's methotrexate monitoring labs. Waiting for results and then order can be routed to provider for Methotrexate refill.    Rachana Monte RN

## 2024-04-29 NOTE — H&P
Dali Martines  [id number]  female  53 year old      Reason for procedure/surgery: mapping biopsies    Patient Active Problem List   Diagnosis    GERD (gastroesophageal reflux disease)    CARDIOVASCULAR SCREENING; LDL GOAL LESS THAN 100    Type 1 diabetes mellitus with retinopathy (H)    Anxiety    NLD (necrobiosis lipoidica diabeticorum) (H)    Cutaneous skin tags    Cervicalgia    Chronic low back pain    SOB (shortness of breath)    Encounter for other specified aftercare    PAIN FOOT    Diabetes (H)    Screening for other eye conditions    PAIN KNEE JOINT    Enthesopathy    Pain in joint, multiple sites    Chronic pain of right ankle    Right foot pain    ESR raised    Swelling of limb    Other postprocedural status(V45.89)    Left knee pain    Type 1 diabetes mellitus without complication (H)    Psoriatic arthritis (H)    Psoriasis with arthropathy (H)    History of adenomatous polyp of colon    Glaucoma suspect of both eyes    Long-term current use of stimulant    Insomnia, unspecified type    Long term methotrexate user    Chronic bilateral low back pain without sciatica       Past Surgical History:    Past Surgical History:   Procedure Laterality Date    COLONOSCOPY  01/01/2002    COLONOSCOPY N/A 06/23/2020    Procedure: COLONOSCOPY;  Surgeon: Lauryn Rivera MD;  Location:  GI    ESOPHAGOSCOPY, GASTROSCOPY, DUODENOSCOPY (EGD), COMBINED  01/07/2014    Procedure: COMBINED ESOPHAGOSCOPY, GASTROSCOPY, DUODENOSCOPY (EGD), BIOPSY SINGLE OR MULTIPLE;;  Surgeon: Kraig Nicole MD;  Location: [facility]    ESOPHAGOSCOPY, GASTROSCOPY, DUODENOSCOPY (EGD), COMBINED N/A 10/05/2023    Procedure: Esophagoscopy, gastroscopy, duodenoscopy (EGD), combined;  Surgeon: Raheem Hubbard MD;  Location:  GI    GYN SURGERY      Laparotomy to remove endometrial tissue    HC BREATH HYDROGEN TEST N/A 06/17/2014    Procedure: HYDROGEN BREATH TEST;  Surgeon: Deacon Alberts MD;  Location:  GI    HYDROGEN BREATH TEST   06/17/2014    LAPAROSCOPIC APPENDECTOMY  01/01/2002    LAPAROTOMY, LYSIS ADHESIONS, COMBINED  01/01/2002    endometriosis    SOFT TISSUE SURGERY  12/01/2014    right ankle tendon injury    [facility] REPAIR CRUCIATE LIGAMENT,KNEE      [facility] STOMACH SURGERY PROCEDURE UNLISTED  12/01/2002       Past Medical History:   Past Medical History:   Diagnosis Date    Anemia     Anxiety     Arthritis 2014    Psoriatic arthritis    Back injury 1988    Dry eye syndrome     Dyslipidemia     Endometriosis 2002    adehsions seen at laparoscopy    GERD (gastroesophageal reflux disease)     Hypothyroidism     8 yoa    SOB (shortness of breath) 3/11/2014    Type I (juvenile type) diabetes mellitus without mention of complication, not stated as uncontrolled     when 15     Uncomplicated asthma Fall 2013       Social History:   Social History     Tobacco Use    Smoking status: Never     Passive exposure: Never    Smokeless tobacco: Never   Substance Use Topics    Alcohol use: Yes     Comment: moderately       Family History:   Family History   Problem Relation Age of Onset    Breast Cancer Mother     Heart Disease Father     C.A.D. Father     Arthritis Father     Circulatory Father     Eye Disorder Father     Lipids Father     Thyroid Disease Father     Macular Degeneration Father     Coronary Artery Disease Father     Anxiety Disorder Father     Alcoholism Father     Rheumatoid Arthritis Father     Hypothyroidism Father     Diabetes Father     Cerebrovascular Disease Paternal Grandmother         ,    Cancer Paternal Grandfather         Pancreatic    Heart Disease Paternal Grandfather     Diabetes Maternal Grandmother         Type 2    C.A.D. Maternal Grandmother     Heart Disease Maternal Grandmother     Coronary Artery Disease Maternal Grandmother     Heart Disease Maternal Grandfather     Cardiac Sudden Death Maternal Grandfather     Gynecology Other         self    Thyroid Disease Other         self    Macular Degeneration Maternal Aunt      Diabetes Other         Type 1    Glaucoma No family hx of        Allergies:   Allergies   Allergen Reactions    Monosodium Glutamate Palpitations and Shortness Of Breath    Sulfasalazine      Developed rash, HA, dizziness       Active Medications:   Current Outpatient Medications   Medication Sig Dispense Refill    albuterol (PROAIR HFA/PROVENTIL HFA/VENTOLIN HFA) 108 (90 Base) MCG/ACT inhaler Inhale 2 puffs into the lungs every 4 hours as needed for shortness of breath / dyspnea or wheezing 1 Inhaler 11    aspirin 81 MG tablet Take 1 tablet by mouth daily.      blood glucose (ACCU-CHEK COLIN PLUS) test strip Test Blood Sugar 8 times daily or as directed 800 strip 3    blood glucose monitoring (ACCU-CHEK FASTCLIX) lancets Use to test blood sugar 8 times daily or as directed. 4 Box 3    blood glucose monitoring (NO BRAND SPECIFIED) meter device kit Use to test blood sugar 8 times daily or as directed. Any covered brand, 1 kit 0    Calcium Carbonate-Vitamin D (CALCIUM + D PO) Take one daily      citalopram (CELEXA) 20 MG tablet Take 1.5 tablets (30 mg) by mouth daily 135 tablet 1    Continuous Blood Gluc  (DEXCOM G6 ) SUZANNE Use to read blood sugars as per 's instructions. 1 each 0    Continuous Blood Gluc Sensor (DEXCOM G6 SENSOR) MISC Change every 10 days. 9 each 3    Continuous Blood Gluc Transmit (DEXCOM G6 TRANSMITTER) MISC Change every 3 months. 1 each 3    diclofenac (VOLTAREN) 1 % topical gel Apply 2 g topically 4 times daily 100 g 4    diclofenac (VOLTAREN) 1 % topical gel APPLY 2 GRAMS ONTO THE SKIN FOUR TIMES DAILY AS NEEDED FOR MODERATE PAIN 100 g 5    DULERA 100-5 MCG/ACT inhaler INHALE 2 PUFFS INTO THE LUNGS TWICE DAILY 13 g 5    econazole nitrate 1 % cream Apply 0.5 inches topically daily.      FIASP PENFILL 100 UNIT/ML SOCT INJECT UP TO 45 UNITS DAILY AS DIRECTED ACCORDING TO CORRECTION FACTOR. 45 mL 3    fish oil-omega-3 fatty acids 1000 MG capsule Take one daily       "fluocinonide (LIDEX) 0.05 % external solution Apply topically 2 times daily 60 mL 4    folic acid (FOLVITE) 1 MG tablet Take 1 tablet (1 mg) by mouth daily 90 tablet 3    Insulin Aspart, w/Niacinamide, (FIASP) 100 UNIT/ML SOLN Inject 50 Units as directed daily For use in ambulatory insulin pump as directed.  Approximate daily dose is 50 units. 20 mL 11    insulin degludec (TRESIBA FLEXTOUCH) 100 UNIT/ML pen ADMINISTER 8 UNITS UNDER THE SKIN DAILY. PLEASE DO NOT FILL TODAY ( 10/18/2023 ). 30 mL 1    Insulin Disposable Pump (OMNIPOD 5 G6 INTRO, GEN 5,) KIT 1 kit continuous 1 kit 0    Insulin Disposable Pump (OMNIPOD 5 G6 POD, GEN 5,) MISC 1 each every 3 days 30 each 3    insulin pen needle (B-D U/F) 31G X 8 MM miscellaneous Use 5 pen needles daily 450 each 3    insulin syringe-needle U-100 (30G X 1/2\" 1 ML) 30G X 1/2\" 1 ML miscellaneous Use 1 syringes as directed to draw up insulin for pump. Per pharmacy request. . 100 each 1    Ketoconazole (NIZORAL PO) Shampoo daily      levothyroxine (SYNTHROID/LEVOTHROID) 112 MCG tablet Take 1 tablet (112 mcg) by mouth daily 90 tablet 4    methotrexate 2.5 MG tablet Take 6 tablets (15 mg) by mouth every 7 days Labs every 8 - 12 weeks for refills. 72 tablet 0    methotrexate 2.5 MG tablet Take 6 tablets (15 mg) by mouth every 7 days Labs every 8 - 12 weeks for refills. 72 tablet 0    Minoxidil (ROGAINE EX) daily      montelukast (SINGULAIR) 5 MG chewable tablet CHEW AND SWALLOW 1 TABLET(5 MG) BY MOUTH AT BEDTIME 90 tablet 4    Multiple Vitamin (MULTIVITAMIN OR) Take one daily tab      ondansetron (ZOFRAN ODT) 4 MG ODT tab Take 1 tablet (4 mg) by mouth every 8 hours as needed for vomiting 30 tablet 1    simvastatin (ZOCOR) 20 MG tablet Take 1 tablet (20 mg) by mouth at bedtime 90 tablet 3    traZODone (DESYREL) 50 MG tablet Take 1-2 tablets by mouth nightly as needed for sleep. 180 tablet 0    methotrexate 2.5 MG tablet Take 6 tablets (15 mg) by mouth every 7 days Labs required " "every 8-12 weeks while taking this medication 72 tablet 0    methylphenidate (METADATE CD) 20 MG CR capsule Take 20 mg by mouth daily      methylphenidate (RITALIN) 10 MG tablet TAKE UP TO 2 TABLETS BY MOUTH IN THE LATE AFTERNOON.      Semaglutide, 1 MG/DOSE, 4 MG/3ML SOPN Inject 1 mg Subcutaneous every 7 days 12 mL 3       Systemic Review:   CONSTITUTIONAL: NEGATIVE for fever, chills, change in weight  ENT/MOUTH: NEGATIVE for ear, mouth and throat problems  RESP: NEGATIVE for significant cough or SOB  CV: NEGATIVE for chest pain, palpitations or peripheral edema    Physical Examination:   Vital Signs: /64 (BP Location: Right arm)   Pulse 73   Temp 97  F (36.1  C) (Temporal)   Resp 18   Ht 1.778 m (5' 10\")   Wt 77.1 kg (170 lb)   LMP  (LMP Unknown)   SpO2 97%   BMI 24.39 kg/m    GENERAL: healthy, alert and no distress  NECK: no adenopathy, no asymmetry, masses, or scars  RESP: lungs clear to auscultation - no rales, rhonchi or wheezes  CV: regular rate and rhythm, normal S1 S2, no S3 or S4, no murmur, click or rub, no peripheral edema and peripheral pulses strong  ABDOMEN: soft, nontender, no hepatosplenomegaly, no masses and bowel sounds normal  MS: no gross musculoskeletal defects noted, no edema    Plan: Appropriate to proceed as scheduled.      Alexandre Hernandez MD  4/29/2024    PCP:  Dimitri Alas    "

## 2024-04-29 NOTE — ANESTHESIA POSTPROCEDURE EVALUATION
Patient: Dali Martines    Procedure: Procedure(s):  Esophagoscopy, gastroscopy, duodenoscopy (EGD), combined with biopsy       Anesthesia Type:  MAC    Note:  Disposition: Outpatient   Postop Pain Control: Uneventful            Sign Out: Well controlled pain   PONV: No   Neuro/Psych: Uneventful            Sign Out: Acceptable/Baseline neuro status   Airway/Respiratory: Uneventful            Sign Out: Acceptable/Baseline resp. status   CV/Hemodynamics: Uneventful            Sign Out: Acceptable CV status; No obvious hypovolemia; No obvious fluid overload   Other NRE: NONE   DID A NON-ROUTINE EVENT OCCUR? No           Last vitals:  Vitals Value Taken Time   /63 04/29/24 1109   Temp 36.9  C (98.5  F) 04/29/24 1109   Pulse 63 04/29/24 1109   Resp 96 04/29/24 1109   SpO2 98 % 04/29/24 1109       Electronically Signed By: Waqar Oneil MD  April 29, 2024  2:59 PM

## 2024-04-29 NOTE — DISCHARGE INSTRUCTIONS
"[facility]  Home Care Following Anesthesia  For 24 hours after surgery:  Get plenty of rest.  A responsible adult must stay with you for at least 24 hours after you leave the surgery center.  Do not drive or use heavy equipment.  If you have weakness or tingling, don't drive or use heavy equipment until this feeling goes away.   Do not drink alcohol.   Avoid strenuous or risky activities.  Ask for help when climbing stairs.  You may feel lightheaded.  IF so, sit for a few minutes before standing.  Have someone help you get up.   If you have nausea (feel sick to your stomach): Drink only clear liquids such as apple juice, ginger ale, broth or 7-Up.  Rest may also help.  Be sure to drink enough fluids.  Move to a regular diet as you feel able.   You may have a slight fever.  Call the doctor if your fever is over 100 F (37.7 C) (taken under the tongue) or lasts longer than 24 hours.  You may have a dry mouth, a sore throat, muscle aches or trouble sleeping. These should go away after 24 hours.  Do not make important or legal decisions.   It is recommended to avoid smoking.   {Keepsafe Select.:[id number]::\"   \",\"If you use hormonal birth control (such as the pill, patch, ring or implants):  You will need a second form of birth control for 7 days (condoms, a diaphragm or contraceptive foam).  While in the surgery center, you received a medicine called Sugammadex.  Hormonal birth control (such as the pill, patch, ring or implants) will not work as well for a week after taking this medicine.\"}  {WiTricity Single Select.:082809::\"   \",\"Today you received a Marcaine or bupivacaine block to numb the nerves near your surgery site.  This is a block using local anesthetic or \"numbing\" medication injected around the nerves to anesthetize or \"numb\" the area supplied by those nerves.  This block is injected into the muscle layer near your surgical site.  The medication may numb the location " "where you had surgery for 6-18 hours, but may last up to 24 hours.  If your surgical site is an arm or leg you should be careful with your affected limb, since it is possible to injure your limb without being aware of it due to the numbing.  Until full feeling returns, you should guard against bumping or hitting your limb, and avoid extreme hot or cold temperatures on the skin.  As the block wears off, the feeling will return as a tingling or prickly sensation near your surgical site.  You will experience more discomfort from your incision as the feeling returns.  You may want to take a pain pill (a narcotic or Tylenol if this was prescribed by your surgeon) when you start to experience mild pain before the pain beccomes more severe.  If your pain medications do not control your pain you should notifiy your surgeon.\",\"Today you received an Exparel block to numb the nerves near your surgery site.  This is a block using local anesthetic or \"numbing\" medication injected around the nerves to anesthetize or \"numb\" the area supplied by those nerves.  This block is injected into the muscle layer near your surgical site.  This medication may numb the location where you had surgery up to 72 hours.  If your surgical site is an arm or leg you should be careful with your affected limb, since it is possible to injure your limb without being aware of it due to the numbing.  Until full feeling returns, you should guard against bumping or hitting your limb, and avoid extreme hot or cold temperatures on the skin.  As the block wears off, the feeling will return as a tingling or prickly sensation near your surgical site.  You will experince more discomfort from your incision as the feeling returns.  You may want to take a pain pill (a narcotic or Tylenol if this was prescribed by your surgeon) when you start to experience mild pain before the pain beomes more severe.  If your pain medications do not control your pain, you should " "notify your surgeon.\"}    Tips for taking pain medications  To get the best pain relief possible, remember these points:  Take pain medications as directed, before pain becomes severe.  Pain medication can upset your stomach: taking it with food may help.  Constipation is a common side effect of pain medication. Drink plenty of  fluids.  Eat foods high in fiber. Take a stool softener if recommended by your doctor or pharmacist.  Do not drink alcohol, drive or operate machinery while taking pain medications.  Ask about other ways to control pain, such as with heat, ice or relaxation.    Tylenol/Acetaminophen Consumption    If you feel your pain relief is insufficient, you may take Tylenol/Acetaminophen in addition to your narcotic pain medication.   Be careful not to exceed 4,000 mg of Tylenol/Acetaminophen in a 24 hour period from all sources.  If you are taking extra strength Tylenol/acetaminophen (500 mg), the maximum dose is 8 tablets in 24 hours.  If you are taking regular strength acetaminophen (325 mg), the maximum dose is 12 tablets in 24 hours.    Call a doctor for any of the following:  Signs of infection (fever, growing tenderness at the surgery site, a large amount of drainage or bleeding, severe pain, foul-smelling drainage, redness, swelling).  It has been over 8 to 10 hours since surgery and you are still not able to urinate (pass water).  Headache for over 24 hours.  Numbness, tingling or weakness the day after surgery (if you had spinal anesthesia).  Signs of Covid-19 infection (temperature over 100 degrees, shortness of breath, cough, loss of taste/smell, generalized body aches, persistent headache, chills, sore throat, nausea/vomiting/diarrhea)  Your doctor is: MD Alexandre Hernandez             "

## 2024-04-29 NOTE — ANESTHESIA CARE TRANSFER NOTE
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    Anesthesia Type:   MAC     Note:    Oropharynx: oropharynx clear of all foreign objects  Level of Consciousness: awake  Oxygen Supplementation: room air    Independent Airway: airway patency satisfactory and stable  Dentition: dentition unchanged  Vital Signs Stable: post-procedure vital signs reviewed and stable  Report to RN Given: handoff report given  Patient transferred to: Phase II    Handoff Report: Identifed the Patient, Identified the Reponsible Provider, Reviewed the pertinent medical history, Discussed the surgical course, Reviewed Intra-OP anesthesia mangement and issues during anesthesia, Set expectations for post-procedure period and Allowed opportunity for questions and acknowledgement of understanding  Vitals:  Vitals Value Taken Time   BP     Temp     Pulse     Resp     SpO2         Electronically Signed By: LATISHA Murrell CRNA  April 29, 2024  10:54 AM

## 2024-05-01 ENCOUNTER — LAB (OUTPATIENT)
Dept: LAB | Facility: CLINIC | Age: 54
End: 2024-05-01
Payer: COMMERCIAL

## 2024-05-01 ENCOUNTER — THERAPY VISIT (OUTPATIENT)
Dept: OCCUPATIONAL THERAPY | Facility: CLINIC | Age: 54
End: 2024-05-01
Payer: COMMERCIAL

## 2024-05-01 DIAGNOSIS — R68.89 DECREASED FUNCTIONAL ACTIVITY TOLERANCE: ICD-10-CM

## 2024-05-01 DIAGNOSIS — E10.3291 TYPE 1 DIABETES MELLITUS WITH MILD NONPROLIFERATIVE RETINOPATHY OF RIGHT EYE, MACULAR EDEMA PRESENCE UNSPECIFIED (H): ICD-10-CM

## 2024-05-01 DIAGNOSIS — L40.50 PSORIATIC ARTHRITIS (H): ICD-10-CM

## 2024-05-01 DIAGNOSIS — Z78.9 ALTERATION IN INSTRUMENTAL ACTIVITIES OF DAILY LIVING (IADL): ICD-10-CM

## 2024-05-01 DIAGNOSIS — G93.39 OTHER POST INFECTION AND RELATED FATIGUE SYNDROMES: Primary | ICD-10-CM

## 2024-05-01 LAB
BASOPHILS # BLD AUTO: 0 10E3/UL (ref 0–0.2)
BASOPHILS NFR BLD AUTO: 0 %
EOSINOPHIL # BLD AUTO: 0.1 10E3/UL (ref 0–0.7)
EOSINOPHIL NFR BLD AUTO: 3 %
ERYTHROCYTE [DISTWIDTH] IN BLOOD BY AUTOMATED COUNT: 13.2 % (ref 10–15)
ERYTHROCYTE [SEDIMENTATION RATE] IN BLOOD BY WESTERGREN METHOD: 37 MM/HR (ref 0–30)
HCT VFR BLD AUTO: 31.7 % (ref 35–47)
HGB BLD-MCNC: 10.5 G/DL (ref 11.7–15.7)
IMM GRANULOCYTES # BLD: 0 10E3/UL
IMM GRANULOCYTES NFR BLD: 0 %
LYMPHOCYTES # BLD AUTO: 1.7 10E3/UL (ref 0.8–5.3)
LYMPHOCYTES NFR BLD AUTO: 41 %
MCH RBC QN AUTO: 32.1 PG (ref 26.5–33)
MCHC RBC AUTO-ENTMCNC: 33.1 G/DL (ref 31.5–36.5)
MCV RBC AUTO: 97 FL (ref 78–100)
MONOCYTES # BLD AUTO: 0.4 10E3/UL (ref 0–1.3)
MONOCYTES NFR BLD AUTO: 10 %
NEUTROPHILS # BLD AUTO: 1.8 10E3/UL (ref 1.6–8.3)
NEUTROPHILS NFR BLD AUTO: 46 %
PLATELET # BLD AUTO: 264 10E3/UL (ref 150–450)
RBC # BLD AUTO: 3.27 10E6/UL (ref 3.8–5.2)
WBC # BLD AUTO: 4.1 10E3/UL (ref 4–11)

## 2024-05-01 PROCEDURE — 82565 ASSAY OF CREATININE: CPT

## 2024-05-01 PROCEDURE — 82570 ASSAY OF URINE CREATININE: CPT

## 2024-05-01 PROCEDURE — 36415 COLL VENOUS BLD VENIPUNCTURE: CPT

## 2024-05-01 PROCEDURE — 84460 ALANINE AMINO (ALT) (SGPT): CPT

## 2024-05-01 PROCEDURE — 84450 TRANSFERASE (AST) (SGOT): CPT

## 2024-05-01 PROCEDURE — 85652 RBC SED RATE AUTOMATED: CPT

## 2024-05-01 PROCEDURE — 85025 COMPLETE CBC W/AUTO DIFF WBC: CPT

## 2024-05-01 PROCEDURE — 82043 UR ALBUMIN QUANTITATIVE: CPT

## 2024-05-01 PROCEDURE — 97530 THERAPEUTIC ACTIVITIES: CPT | Mod: GO

## 2024-05-01 PROCEDURE — 86140 C-REACTIVE PROTEIN: CPT

## 2024-05-02 LAB
ALT SERPL W P-5'-P-CCNC: 45 U/L (ref 0–50)
AST SERPL W P-5'-P-CCNC: 43 U/L (ref 0–45)
CREAT SERPL-MCNC: 0.61 MG/DL (ref 0.51–0.95)
CREAT UR-MCNC: 126 MG/DL
CRP SERPL-MCNC: <3 MG/L
EGFRCR SERPLBLD CKD-EPI 2021: >90 ML/MIN/1.73M2
MICROALBUMIN UR-MCNC: <12 MG/L
MICROALBUMIN/CREAT UR: NORMAL MG/G{CREAT}

## 2024-05-06 ENCOUNTER — TELEPHONE (OUTPATIENT)
Dept: RHEUMATOLOGY | Facility: CLINIC | Age: 54
End: 2024-05-06
Payer: COMMERCIAL

## 2024-05-06 DIAGNOSIS — D64.9 ANEMIA, UNSPECIFIED TYPE: Primary | ICD-10-CM

## 2024-05-06 RX ORDER — METHOTREXATE 2.5 MG/1
15 TABLET ORAL
Qty: 72 TABLET | Refills: 0 | Status: SHIPPED | OUTPATIENT
Start: 2024-05-06 | End: 2024-08-09

## 2024-05-06 NOTE — RESULT ENCOUNTER NOTE
Covering provider note:    Monitoring labs reviewed showing hemoglobin lower than prior value otherwise stable AST,ALT, creatinine and normal CRP.   Suggest to hold methotrexate for 3 weeks then repeat CBC to check hemoglobin counts and check anemia profile (ordered) and if hemoglobin is stable then can resume lower dose of methotrexate vs continue to hold methotrexate till she is evaluated.     Mouna Mosquera MD

## 2024-05-06 NOTE — TELEPHONE ENCOUNTER
methotrexate 2.5 MG tablet 72 tablet 0 12/29/2023       Last Office Visit: 12/29/23  Future Office visit:  8/9/24    CBC RESULTS:   Recent Labs   Lab Test 05/01/24  1102   WBC 4.1   RBC 3.27*   HGB 10.5*   HCT 31.7*   MCV 97   MCH 32.1   MCHC 33.1   RDW 13.2          Creatinine   Date Value Ref Range Status   05/01/2024 0.61 0.51 - 0.95 mg/dL Final   01/11/2021 0.56 0.52 - 1.04 mg/dL Final   ]    Liver Function Studies -   Recent Labs   Lab Test 05/01/24  1102 11/07/23  1143   PROTTOTAL  --  7.5   ALBUMIN  --  4.5   BILITOTAL  --  0.3   ALKPHOS  --  93   AST 43 37   ALT 45 30       Routing refill request to provider for review/approval because:  Drug not on the [facility] or [facility] refill protocol or controlled substance    Araceli Esquivel RN  [facility] Red Flag Triage/MRT

## 2024-05-06 NOTE — TELEPHONE ENCOUNTER
----- Message from Rachana Monte RN sent at 5/6/2024  8:46 AM CDT -----  Appointment with Dr. Romero 8/9/24. See lab note from Dr. Mosquera.  ----- Message -----  From: Mouna Mosquera MD  Sent: 5/6/2024   2:51 AM CDT  To: Rachana Monte RN;  Adult Rheumatology    Covering provider note:    Monitoring labs reviewed showing hemoglobin lower than prior value otherwise stable AST,ALT, creatinine and normal CRP.   Suggest to hold methotrexate for 3 weeks then repeat CBC to check hemoglobin counts and check anemia profile (ordered) and if hemoglobin is stable then can resume lower dose of methotrexate vs continue to hold methotrexate till she is evaluated.     Mouna Mosquera MD

## 2024-05-07 ENCOUNTER — VIRTUAL VISIT (OUTPATIENT)
Dept: PSYCHOLOGY | Facility: CLINIC | Age: 54
End: 2024-05-07
Payer: COMMERCIAL

## 2024-05-07 DIAGNOSIS — F43.22 ADJUSTMENT DISORDER WITH ANXIETY: Primary | ICD-10-CM

## 2024-05-07 DIAGNOSIS — F33.41 MAJOR DEPRESSIVE DISORDER, RECURRENT, IN PARTIAL REMISSION (H): ICD-10-CM

## 2024-05-07 DIAGNOSIS — L40.50 PSORIATIC ARTHRITIS (H): ICD-10-CM

## 2024-05-07 DIAGNOSIS — E10.3299 TYPE 1 DIABETES MELLITUS WITH MILD NONPROLIFERATIVE RETINOPATHY, MACULAR EDEMA PRESENCE UNSPECIFIED, UNSPECIFIED LATERALITY (H): ICD-10-CM

## 2024-05-07 DIAGNOSIS — R19.8 GI SYMPTOMS: ICD-10-CM

## 2024-05-07 DIAGNOSIS — G89.29 OTHER CHRONIC PAIN: ICD-10-CM

## 2024-05-07 LAB
PATH REPORT.ADDENDUM SPEC: NORMAL
PATH REPORT.COMMENTS IMP SPEC: NORMAL
PATH REPORT.FINAL DX SPEC: NORMAL
PATH REPORT.GROSS SPEC: NORMAL
PATH REPORT.MICROSCOPIC SPEC OTHER STN: NORMAL
PATH REPORT.RELEVANT HX SPEC: NORMAL
PHOTO IMAGE: NORMAL

## 2024-05-07 PROCEDURE — 90837 PSYTX W PT 60 MINUTES: CPT | Mod: 95 | Performed by: PSYCHOLOGIST

## 2024-05-07 ASSESSMENT — ANXIETY QUESTIONNAIRES
6. BECOMING EASILY ANNOYED OR IRRITABLE: SEVERAL DAYS
8. IF YOU CHECKED OFF ANY PROBLEMS, HOW DIFFICULT HAVE THESE MADE IT FOR YOU TO DO YOUR WORK, TAKE CARE OF THINGS AT HOME, OR GET ALONG WITH OTHER PEOPLE?: SOMEWHAT DIFFICULT
IF YOU CHECKED OFF ANY PROBLEMS ON THIS QUESTIONNAIRE, HOW DIFFICULT HAVE THESE PROBLEMS MADE IT FOR YOU TO DO YOUR WORK, TAKE CARE OF THINGS AT HOME, OR GET ALONG WITH OTHER PEOPLE: SOMEWHAT DIFFICULT
7. FEELING AFRAID AS IF SOMETHING AWFUL MIGHT HAPPEN: SEVERAL DAYS
GAD7 TOTAL SCORE: 7
3. WORRYING TOO MUCH ABOUT DIFFERENT THINGS: SEVERAL DAYS
5. BEING SO RESTLESS THAT IT IS HARD TO SIT STILL: NOT AT ALL
7. FEELING AFRAID AS IF SOMETHING AWFUL MIGHT HAPPEN: SEVERAL DAYS
1. FEELING NERVOUS, ANXIOUS, OR ON EDGE: MORE THAN HALF THE DAYS
GAD7 TOTAL SCORE: 7
4. TROUBLE RELAXING: SEVERAL DAYS
2. NOT BEING ABLE TO STOP OR CONTROL WORRYING: SEVERAL DAYS

## 2024-05-07 NOTE — PROGRESS NOTES
Health Psychology          Latanya Mayfield, Ph.D.,  [phone]  Hansa Arzate, Ph.D.,  [phone]  Sarah Beth Jack, Ph.D.,  [phone]  Pushpa Mendez, Ph.D., , [facility] [phone]  Panfilo Cornejo, Ph.D., , [facility] [phone]  Krystle Hannah, Ph.D.,  [phone]  Elise Sutton, Ph.D., , [facility] [phone]    [facility], 3rd Floor  [address] [place] [address]       Health Psychology Progress Note    Patient Name: Dali Martines    Service Type: Individual  Length of Visit: 56 minutes - extended session due to complexity of case and content  Start time: 9:03 AM     Stop time: 9:59 AM       Attendees: patient attended alone  Session #: 16    Telemedicine Information:     Telemedicine Visit: The patient's condition can be safely assessed and treated via synchronous audio and visual telemedicine encounter.    Reason for Telemedicine Visit: Patient has requested telehealth visit and Patient convenience (e.g. access to timely appointments / distance to available provider)  Originating Site (Patient Location): Patient's home, [place]  Distant Location (provider location):  On-site: [facility]   Consent:  The patient/guardian has verbally consented to: the potential risks and benefits of telemedicine (video visit) versus in person care; bill my insurance or make self-payment for services provided; and responsibility for payment of non-covered services.   Mode of Communication:  Video Conference via [employer]  As the provider I attest to compliance with applicable laws and regulations related to telemedicine.     Identifying Information and Presenting Problem:  Dali Martines is a 53 year old female adult who was referred to health psychology by her endocrinologist, Dr. Mckeon. Patient presented with concerns of stress in the context of multiple life events, several medical conditions, and chronic management of T1DM.    Topics  Discussed/Interventions Provided:    Session Content:     Update: Patient returning for follow-up today. 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 Her understanding is that this may be contributing to the precancerous glandular changes in her stomach that were found previously. Is also in the process of care transfer with rheumatology and has labs ordered to evaluate anemia. She notes that she's had multiple different specialty providers within the last year due to high turnover. She notes that her medical care feels somewhat fragmented and that she's lacked some continuity of care. Encouraged patient to consider follow-up with her PCP to discuss some of her concerns and facilitate coordinated care among her specialists and noted that the internal medicine approach may be helpful for increasing integration and care continuity. Continues with PT for vestibular therapy and with OT for cognitive therapy to treat PPPD and reports that her vertigo symptoms have improved but notes that her cognitive symptoms are ongoing. She states that the strategies she has learned for addressing her cognitive symptoms with OT have been helpful for limiting the things that are cognitively taxing for her. Reports that her caregiver demands and stress related to her mother's needs have improved with some adjustments she has made.       Homework Review: Patient reports that she was successful with continuing her gratitude practice and increasing her awareness of her stress reactivity. Discussed insights. She notes that her autonomic reactivity has become more episodic and less frequent over the past year.       Skills/intervention: Supported patient in processing her thoughts and emotions around her health and caregiver concerns. Continued to explore ways in which the stress is impacting her health and well-being. Patient notes that she often feels her stress  symptoms in her stomach. Identified caregiver triggers for patient's stress reactivity. Discussed multiple strategies patient can try to employ to reduce the intensity of stress reactivity and promoting a faster and more complete recovery. Patient will try using validation when her mother is activated (naming and acknowledging her mother's experience), preventing triggers (calling mother first in the afternoon), and diaphragmatic breathing.       Top values identified included: spirituality, humor, fairness, creativity, curiosity, contribution, and caring.  Unhelpful rules/assumptions: fear of failure, all-or-nothing thinking, and simplicity, control, and structure  Top priorities for grief: increasing tolerance of discomfort associated with uncertainty as well as identifying and processing emotions associated with the losses she has experienced.    Treatment Objective(s) Addressed in This Session:  Psychological distress related to Diabetes  Psychological distress related to caregiver stress    Progress on / Status of Treatment Objective(s) / Homework:  In progress    Medication Adherence: Patient did not report medication changes. Current medication list is below:     Current Outpatient Medications   Medication Sig Dispense Refill    albuterol (PROAIR HFA/PROVENTIL HFA/VENTOLIN HFA) 108 (90 Base) MCG/ACT inhaler Inhale 2 puffs into the lungs every 4 hours as needed for shortness of breath / dyspnea or wheezing 1 Inhaler 11    aspirin 81 MG tablet Take 1 tablet by mouth daily.      blood glucose (ACCU-CHEK COLIN PLUS) test strip Test Blood Sugar 8 times daily or as directed 800 strip 3    blood glucose monitoring (ACCU-CHEK FASTCLIX) lancets Use to test blood sugar 8 times daily or as directed. 4 Box 3    blood glucose monitoring (NO BRAND SPECIFIED) meter device kit Use to test blood sugar 8 times daily or as directed. Any covered brand, 1 kit 0    Calcium Carbonate-Vitamin D (CALCIUM + D PO) Take one daily       "citalopram (CELEXA) 20 MG tablet Take 1.5 tablets (30 mg) by mouth daily 135 tablet 1    Continuous Blood Gluc  (DEXCOM G6 ) SUZANNE Use to read blood sugars as per 's instructions. 1 each 0    Continuous Blood Gluc Sensor (DEXCOM G6 SENSOR) MISC Change every 10 days. 9 each 3    Continuous Blood Gluc Transmit (DEXCOM G6 TRANSMITTER) MISC Change every 3 months. 1 each 3    diclofenac (VOLTAREN) 1 % topical gel Apply 2 g topically 4 times daily 100 g 4    diclofenac (VOLTAREN) 1 % topical gel APPLY 2 GRAMS ONTO THE SKIN FOUR TIMES DAILY AS NEEDED FOR MODERATE PAIN 100 g 5    DULERA 100-5 MCG/ACT inhaler INHALE 2 PUFFS INTO THE LUNGS TWICE DAILY 13 g 5    econazole nitrate 1 % cream Apply 0.5 inches topically daily.      FIASP PENFILL 100 UNIT/ML SOCT INJECT UP TO 45 UNITS DAILY AS DIRECTED ACCORDING TO CORRECTION FACTOR. 45 mL 3    fish oil-omega-3 fatty acids 1000 MG capsule Take one daily      fluocinonide (LIDEX) 0.05 % external solution Apply topically 2 times daily 60 mL 4    folic acid (FOLVITE) 1 MG tablet Take 1 tablet (1 mg) by mouth daily 90 tablet 3    Insulin Aspart, w/Niacinamide, (FIASP) 100 UNIT/ML SOLN Inject 50 Units as directed daily For use in ambulatory insulin pump as directed.  Approximate daily dose is 50 units. 20 mL 11    insulin degludec (TRESIBA FLEXTOUCH) 100 UNIT/ML pen ADMINISTER 8 UNITS UNDER THE SKIN DAILY. PLEASE DO NOT FILL TODAY ( 10/18/2023 ). 30 mL 1    Insulin Disposable Pump (OMNIPOD 5 G6 INTRO, GEN 5,) KIT 1 kit continuous 1 kit 0    Insulin Disposable Pump (OMNIPOD 5 G6 POD, GEN 5,) MISC 1 each every 3 days 30 each 3    insulin pen needle (B-D U/F) 31G X 8 MM miscellaneous Use 5 pen needles daily 450 each 3    insulin syringe-needle U-100 (30G X 1/2\" 1 ML) 30G X 1/2\" 1 ML miscellaneous Use 1 syringes as directed to draw up insulin for pump. Per pharmacy request. . 100 each 1    Ketoconazole (NIZORAL PO) Shampoo daily      levothyroxine " (SYNTHROID/LEVOTHROID) 112 MCG tablet Take 1 tablet (112 mcg) by mouth daily 90 tablet 4    methotrexate 2.5 MG tablet Take 6 tablets (15 mg) by mouth every 7 days Labs every 8 - 12 weeks for refills. 72 tablet 0    methotrexate 2.5 MG tablet Take 6 tablets (15 mg) by mouth every 7 days Labs every 8 - 12 weeks for refills. 72 tablet 0    methotrexate 2.5 MG tablet Take 6 tablets (15 mg) by mouth every 7 days Labs required every 8-12 weeks while taking this medication 72 tablet 0    methylphenidate (METADATE CD) 20 MG CR capsule Take 20 mg by mouth daily      methylphenidate (RITALIN) 10 MG tablet TAKE UP TO 2 TABLETS BY MOUTH IN THE LATE AFTERNOON.      Minoxidil (ROGAINE EX) daily      montelukast (SINGULAIR) 5 MG chewable tablet CHEW AND SWALLOW 1 TABLET(5 MG) BY MOUTH AT BEDTIME 90 tablet 4    Multiple Vitamin (MULTIVITAMIN OR) Take one daily tab      ondansetron (ZOFRAN ODT) 4 MG ODT tab Take 1 tablet (4 mg) by mouth every 8 hours as needed for vomiting 30 tablet 1    Semaglutide, 1 MG/DOSE, 4 MG/3ML SOPN Inject 1 mg Subcutaneous every 7 days 12 mL 3    simvastatin (ZOCOR) 20 MG tablet Take 1 tablet (20 mg) by mouth at bedtime 90 tablet 3    traZODone (DESYREL) 50 MG tablet Take 1-2 tablets by mouth nightly as needed for sleep. 180 tablet 0     Current Facility-Administered Medications   Medication Dose Route Frequency Provider Last Rate Last Admin    lidocaine 1 % injection 1 mL  1 mL   Tolu Fernández DPM   1 mL at 11/09/22 1429    triamcinolone acetonide (KENALOG-10) injection 10 mg  10 mg   Tolu Fernández DPM   10 mg at 11/09/22 1429     Assessment: The patient appeared to be active and engaged in today's session and was receptive to feedback.     Mental Status Exam:   Appearance: Appropriate   Eye Contact: Good    Orientation: Yes, x4  Behavior/relationship to provider/demeanor: Cooperative, Engaged, and Pleasant  Motor Activity: normal or unremarkable  Mood (subjective report): Stressed,  stable  Affect (objective appearance): Appropriate/mood congruent  Speech Rate: Normal  Speech Volume: Normal  Speech Articulation: Normal  Speech Coherence: Normal  Speech Spontaneity: Normal  Thought Content: no suicidal/homicidal ideation, plans, or intent  Thought Process (associations): Logical, Linear, and Goal directed  Thought Process (rate): Normal  Abnormal Perception: None  Attention/Concentration: Normal  Memory: Appears grossly intact   Fund of Knowledge: Above average  Abstraction:  Normal  Insight:  Good  Judgment:  good    Does the patient appear to be at imminent risk of harm to self/others at this time? No    The session was necessary to address psychological distress in the context of multiple psychosocial stressors and chronic health concerns.    Diagnoses (provisional):    1. Adjustment disorder with anxiety    2. Major depressive disorder, recurrent, in partial remission (H24)    3. 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 Psoriatic arthritis (H)    5. Other chronic pain    6. GI symptoms        Plan:    Follow-up video visit with me on 5/28/24 at 10:00am  Patient to use 911 or other crisis resources in the event of a psychiatric emergency  Primary care needs and medications are managed by Dimitri Alas MD. Referring provider is Dr. Mckeon.  Next visit agenda/goals:   Continue with positive and pleasant activities   Continue to focus on increasing awareness of fight or flight activation  Read through and practice the diaphragmatic breathing exercise    Skills taught/interventions used thus far:  Psychoeducation  Values clarification  Committed action (vitality vs suffering)  Perfectionism workbook (modules 1-5)  Defusion  Acting opposite  Mindfulness (breath, body scan, watching thoughts, choiceless awareness)  Therapeutic journaling  [employer]    NOTE: Treatment plan update due  "9/6/24; 90 day treatment plan review due 7/15/24.                                                Individual Treatment Plan    Patient's Name: Dali Martines  YOB: 1970    Date of Creation: 9/6/23  Date Treatment Plan Last Reviewed/Revised: 04/16/24    DSM5 Diagnoses:     1. Adjustment disorder with anxiety    2. Major depressive disorder, recurrent, in partial remission (H)      Psychosocial / Contextual Factors: financial hardship, health issues, occupational/vocational stress, relationship stress, caregiver stress (mother's health concerns), and grief and loss in the wake of recent bereavement  PROMIS (reviewed every 90 days):      2/6/2024  11:23 AM   PROMIS 10    PROMIS TOTAL - SUBSCORES 22        Referral / Collaboration:  Collaboration with medical team and specialists as needed.    Anticipated number of session for this episode of care: 15-20  Anticipation frequency of session: 2x per month  Anticipated Duration of each session: 38-52 minutes  Treatment plan will be reviewed in 90 days or when goals have been changed.       Measurable Treatment Goal(s) related to diagnosis/functional impairment(s): Overall goals for treatment include improving her physical health (e.g., diabetes, physical activity, and GI discomfort), learning strategies for enhancing coping capacity, and decreasing avoidance-based coping.  Goal 1: Patient will decrease her use of avoidance-based coping   \"I will know I've met my goal when I am not avoiding doing things out of fear (e.g., bathroom sink, processing grief, physical activity, reviewing pieces of writing).\"     Objective #A (Patient Action)    Patient will learn and implement skills for reducing perfectionism.  Status: In progress - Date: 04/16/24 (patient is in the process of completing the perfectionism workbook)     Intervention(s)  Therapist will teach anti-perfectionism skills and will assist patient in completing a workbook on perfectionism.    Objective " #B  Patient will initiate a healthy grieving process  Status: In progress - Date: 04/16/24 (patient is learning emotional expression skills)    Intervention(s)  Therapist will assist patient in processing and exploring her grief.    Objective #C  Patient will increase her participation in meaningful and purposeful activities.  Status: In progress - Date: 04/16/24 (patient reports some progress in increasing her participation in meaningful activities, will focus on generating a broader list of activities)    Intervention(s)  Therapist will teach values clarification, committed action, and exposure skills.    Objective #D  Patient will learn and implement unhooking skills for decreasing the degree to which fear influences behaviors and decisions in an unhelpful way.  Status: In progress - Date: 04/16/24 (patient has been learning and implementing mindfulness and cognitive restructuring skills)     Intervention(s)  Therapist will teach defusion, cognitive restructuring, and mindfulness skills.      Patient has reviewed and agreed to the above plan.      Elise Sutton, PhD  September 6, 2023    Elise Sutton, Ph.D., , [facility]  Clinical Health Psychologist  Phone: [phone]   Pager: [phone]

## 2024-05-08 DIAGNOSIS — L40.50 PSORIATIC ARTHRITIS (H): ICD-10-CM

## 2024-05-15 ENCOUNTER — THERAPY VISIT (OUTPATIENT)
Dept: OCCUPATIONAL THERAPY | Facility: CLINIC | Age: 54
End: 2024-05-15
Payer: COMMERCIAL

## 2024-05-15 ENCOUNTER — THERAPY VISIT (OUTPATIENT)
Dept: PHYSICAL THERAPY | Facility: CLINIC | Age: 54
End: 2024-05-15
Payer: COMMERCIAL

## 2024-05-15 DIAGNOSIS — G93.39 OTHER POST INFECTION AND RELATED FATIGUE SYNDROMES: Primary | ICD-10-CM

## 2024-05-15 DIAGNOSIS — R26.89 BALANCE DISORDER: Primary | ICD-10-CM

## 2024-05-15 DIAGNOSIS — R42 DIZZINESS: ICD-10-CM

## 2024-05-15 DIAGNOSIS — R68.89 DECREASED FUNCTIONAL ACTIVITY TOLERANCE: ICD-10-CM

## 2024-05-15 PROCEDURE — 97112 NEUROMUSCULAR REEDUCATION: CPT | Mod: GP | Performed by: PHYSICAL THERAPIST

## 2024-05-15 PROCEDURE — 97530 THERAPEUTIC ACTIVITIES: CPT | Mod: GO

## 2024-05-15 RX ORDER — METHOTREXATE 2.5 MG/1
15 TABLET ORAL
Qty: 72 TABLET | Refills: 0 | OUTPATIENT
Start: 2024-05-15

## 2024-05-15 NOTE — TELEPHONE ENCOUNTER
methotrexate 2.5 MG tablet 72 tablet 0 5/6/2024 -- No   Sig - Route: Take 6 tablets (15 mg) by mouth every 7 days Labs every 8 - 12 weeks for refills. - Oral   Sent to pharmacy as: Methotrexate Sodium 2.5 MG Oral Tablet   Class: E-Prescribe   Order: [id number]   E-Prescribing Status: Receipt confirmed by pharmacy (5/6/2024  5:28 PM CDT)     Printout Tracking    External Result Report     Medication Administration Instructions    Labs every 8 - 12 weeks for refills.     Pharmacy    [id number] - [place], [place] - [address] AT  [address] [facility]

## 2024-05-16 ENCOUNTER — OFFICE VISIT (OUTPATIENT)
Dept: PULMONOLOGY | Facility: CLINIC | Age: 54
End: 2024-05-16
Attending: INTERNAL MEDICINE
Payer: COMMERCIAL

## 2024-05-16 VITALS — OXYGEN SATURATION: 95 % | SYSTOLIC BLOOD PRESSURE: 111 MMHG | DIASTOLIC BLOOD PRESSURE: 72 MMHG | HEART RATE: 68 BPM

## 2024-05-16 DIAGNOSIS — F32.0 CURRENT MILD EPISODE OF MAJOR DEPRESSIVE DISORDER WITHOUT PRIOR EPISODE (H): ICD-10-CM

## 2024-05-16 DIAGNOSIS — J45.40 MODERATE PERSISTENT ASTHMA, UNSPECIFIED WHETHER COMPLICATED: Primary | ICD-10-CM

## 2024-05-16 DIAGNOSIS — J45.40 MODERATE PERSISTENT ASTHMA WITHOUT COMPLICATION: ICD-10-CM

## 2024-05-16 DIAGNOSIS — J45.40 MODERATE PERSISTENT ASTHMA, UNSPECIFIED WHETHER COMPLICATED: ICD-10-CM

## 2024-05-16 DIAGNOSIS — J45.41 MODERATE PERSISTENT ASTHMA WITH EXACERBATION: ICD-10-CM

## 2024-05-16 PROBLEM — F32.9 MAJOR DEPRESSION, SINGLE EPISODE: Status: ACTIVE | Noted: 2024-05-16

## 2024-05-16 PROCEDURE — 99213 OFFICE O/P EST LOW 20 MIN: CPT | Performed by: INTERNAL MEDICINE

## 2024-05-16 PROCEDURE — 94375 RESPIRATORY FLOW VOLUME LOOP: CPT | Performed by: INTERNAL MEDICINE

## 2024-05-16 PROCEDURE — 95012 NITRIC OXIDE EXP GAS DETER: CPT | Performed by: INTERNAL MEDICINE

## 2024-05-16 PROCEDURE — 99214 OFFICE O/P EST MOD 30 MIN: CPT | Mod: 25 | Performed by: INTERNAL MEDICINE

## 2024-05-16 RX ORDER — MONTELUKAST SODIUM 10 MG/1
10 TABLET ORAL EVERY EVENING
Qty: 30 TABLET | Refills: 5 | Status: SHIPPED | OUTPATIENT
Start: 2024-05-16 | End: 2024-11-12

## 2024-05-16 RX ORDER — PREDNISONE 20 MG/1
20 TABLET ORAL DAILY
Qty: 5 TABLET | Refills: 0 | Status: SHIPPED | OUTPATIENT
Start: 2024-05-16

## 2024-05-16 NOTE — PATIENT INSTRUCTIONS
HEPA filter for AC unit favored over single room portable unit     Restart a new course of pulmonary rehab     Mask as able     Keep dulera one puff twice a day until completion of prednisone course, if still symptomatic one week after completion of prednisone, then increase dulera to 2 puffs twice a day     Increase montelukast to two tablets nightly (10 mg) then start taking new Rx of 10 mg nightly     Aerobika video link:  How to Use the Aerobika* Oscillating Positive Expiratory Pressure Therapy System ([email or web address])       For clinical questions, please call our nursing line [phone]    For scheduling, please call [phone]

## 2024-05-16 NOTE — NURSING NOTE
Chief Complaint   Patient presents with    General Visit     1yr f/u     Vitals were taken and medications were reconciled.     NICHOLAS Parish

## 2024-05-16 NOTE — PROGRESS NOTES
[facility]      Reason for Visit  Dali Martines is a 51 year old female who is followed for asthma  Pulmonary HPI  Dali returns for follow up. Last seen was over video in 9/2022. Her asthma got worse in winter, when she had to take breaks from carrying laundry in her house, and reduce amount of grocery she is able to carry with dyspnea. She had an associated productive cough, and refrained from using oral prednisone due to concerns of hyperglycemia (being type 1 diabetic and insulin adjustments not very effective with previous steroids use). Her symptoms lasted for weeks, no ED or urgent care visits. Eventually started getting better in 3/2023. Continues to take dulera 200, 1 puff BID and singulair 5 mg nightly. Symptoms are not back to baseline however, and probably at 80% as estimated by her. Concerned about reflux given h/o hiatal hernia but hasn't seen GI since 2014. Lifetime nonsmoker. Takes methotrexate for psoriasis.     Interval updates 5/16/24:  Dali returns for follow up. She has been bothered by recent poor air quality despite wearing mask outdoors, and needing albuterol several times per errand. She is using two puffs when she is getting to leave home but her symptoms are still not under control. She had great benefit from pulmonary rehab which she has completed. She had a large mold exposure two days ago when she was taking out the trash as there was a lot of mold on prairie grass that she got exposed to.           Exam:   /72   Pulse 68   LMP  (LMP Unknown)   SpO2 95%   GENERAL APPEARANCE: Well developed, well nourished, alert, and in no apparent distress.  NEURO: Mentation intact, speech normal.   RESP: Good AE BL with no added sounds  CVS: NL S1+S2 with no murmurs.   PSYCH: mentation appears normal. and affect normal/bright  Results:  Stress echo 12/30/14 normal  PFTs today 5/16/24:      My interpretation: Normal PFTs today and previously.      Assessment and plan: Dali Martines is a 53-year-old female with type 1 diabetes who was clinically diagnosed with asthma as an adult.  She had COVID in 9/2022 and has never felt the same ever since in terms of breathing. Asthma flared in 12/2022 and struggled until 3/2023 with reduced activity level and needing albuterol. Recurrent asthma symptoms with poor quality air recently.   1.  Asthma, moderate persistent  - Increase dulera to two puffs BID indefinitely   - Increase singulair to 10 mg indefinitely   - Prednisone 20 mg po daily for 5 days for mild exacerbation   - New Pulmonary rehab referral   - HEPA filter to be installed in house AC   - Aerobika provided with education to improve sputum expectoration     2. Psoriasis on methotrexate  No clinical suspicion of pulmonary side effects by PFTs, imaging not indicated.     3. Type 1 DM       I will see her back in 6 months

## 2024-05-16 NOTE — LETTER
5/16/2024         RE: Dali Martines  [address]  [facility] [address]        Dear Colleague,    Thank you for referring your patient, Dali Martines, to the [facility] [place]. Please see a copy of my visit note below.    [facility]      Reason for Visit  Dali Martines is a 51 year old female who is followed for asthma  Pulmonary HPI  Dali returns for follow up. Last seen was over video in 9/2022. Her asthma got worse in winter, when she had to take breaks from carrying laundry in her house, and reduce amount of grocery she is able to carry with dyspnea. She had an associated productive cough, and refrained from using oral prednisone due to concerns of hyperglycemia (being type 1 diabetic and insulin adjustments not very effective with previous steroids use). Her symptoms lasted for weeks, no ED or urgent care visits. Eventually started getting better in 3/2023. Continues to take dulera 200, 1 puff BID and singulair 5 mg nightly. Symptoms are not back to baseline however, and probably at 80% as estimated by her. Concerned about reflux given h/o hiatal hernia but hasn't seen GI since 2014. Lifetime nonsmoker. Takes methotrexate for psoriasis.     Interval updates 5/16/24:  Dali returns for follow up. She has been bothered by recent poor air quality despite wearing mask outdoors, and needing albuterol several times per errand. She is using two puffs when she is getting to leave home but her symptoms are still not under control. She had great benefit from pulmonary rehab which she has completed. She had a large mold exposure two days ago when she was taking out the trash as there was a lot of mold on prairie grass that she got exposed to.           Exam:   /72   Pulse 68   LMP  (LMP Unknown)   SpO2 95%   GENERAL APPEARANCE: Well developed, well nourished, alert, and in no apparent distress.  NEURO:  Mentation intact, speech normal.   RESP: Good AE BL with no added sounds  CVS: NL S1+S2 with no murmurs.   PSYCH: mentation appears normal. and affect normal/bright  Results:  Stress echo 12/30/14 normal  PFTs today 5/16/24:      My interpretation: Normal PFTs today and previously.     Assessment and plan: Dali Martines is a 53-year-old female with type 1 diabetes who was clinically diagnosed with asthma as an adult.  She had COVID in 9/2022 and has never felt the same ever since in terms of breathing. Asthma flared in 12/2022 and struggled until 3/2023 with reduced activity level and needing albuterol. Recurrent asthma symptoms with poor quality air recently.   1.  Asthma, moderate persistent  - Increase dulera to two puffs BID indefinitely   - Increase singulair to 10 mg indefinitely   - Prednisone 20 mg po daily for 5 days for mild exacerbation   - New Pulmonary rehab referral   - HEPA filter to be installed in house AC   - Aerobika provided with education to improve sputum expectoration     2. Psoriasis on methotrexate  No clinical suspicion of pulmonary side effects by PFTs, imaging not indicated.     3. Type 1 DM       I will see her back in 6 months           Again, thank you for allowing me to participate in the care of your patient.        Sincerely,        Frieda Dunbar MD

## 2024-05-17 LAB
EXPTIME-PRE: 6.33 SEC
FEF2575-%PRED-PRE: 117 %
FEF2575-PRE: 3.11 L/SEC
FEF2575-PRED: 2.66 L/SEC
FEFMAX-%PRED-PRE: 117 %
FEFMAX-PRE: 8.76 L/SEC
FEFMAX-PRED: 7.48 L/SEC
FEV1-%PRED-PRE: 106 %
FEV1-PRE: 3.14 L
FEV1FEV6-PRE: 82 %
FEV1FEV6-PRED: 81 %
FEV1FVC-PRE: 81 %
FEV1FVC-PRED: 80 %
FIFMAX-PRE: 4.23 L/SEC
FVC-%PRED-PRE: 103 %
FVC-PRE: 3.85 L
FVC-PRED: 3.72 L

## 2024-05-17 RX ORDER — MONTELUKAST SODIUM 10 MG/1
10 TABLET ORAL EVERY EVENING
Qty: 90 TABLET | OUTPATIENT
Start: 2024-05-17

## 2024-05-17 NOTE — TELEPHONE ENCOUNTER
Requested Prescriptions   Pending Prescriptions Disp Refills    montelukast (SINGULAIR) 10 MG tablet [Pharmacy Med Name: MONTELUKAST 10MG TABLETS] 90 tablet      Sig: TAKE 1 TABLET(10 MG) BY MOUTH EVERY EVENING       Leukotriene Inhibitors Protocol Failed - 5/16/2024  5:07 PM        Failed - Recent (6 mo) or future (90 days) visit within the authorizing provider's specialty     The patient must have completed an in-person or virtual visit within the past 6 months or has a future visit scheduled within the next 90 days with the authorizing provider s specialty.  Urgent care and e-visits do not quality as an office visit for this protocol.          Passed - Patient is age 12 or older     If patient is under 16, ok to refill using age based dosing.           Passed - Asthma control assessment score within normal limits in last 6 months     Please review ACT score.           Passed - Medication is active on med list        Passed - Medication indicated for associated diagnosis     Medication is associated with one or more of the following diagnoses:   Allergies   Asthma   Atopic Dermatitis   Nasal Congestion   Nasal discharge   Rhinitis   Urticaria, chronic               Last Written Prescription Date:  5/16/24  Last Fill Quantity: 30 tab,  # refills: 5   Last office visit: 5/16/2024 ; last virtual visit: 9/9/2022 with prescribing provider:  Dr Dunbar   Future Office Visit:  [facility] 11/2024     Duplicate request. Refill declined    Manny Hanley RN

## 2024-05-21 ENCOUNTER — VIRTUAL VISIT (OUTPATIENT)
Dept: SLEEP MEDICINE | Facility: CLINIC | Age: 54
End: 2024-05-21
Payer: COMMERCIAL

## 2024-05-21 VITALS — HEIGHT: 70 IN | BODY MASS INDEX: 24.34 KG/M2 | WEIGHT: 170 LBS

## 2024-05-21 DIAGNOSIS — F51.04 CHRONIC INSOMNIA: Primary | ICD-10-CM

## 2024-05-21 DIAGNOSIS — F43.9 STRESS: ICD-10-CM

## 2024-05-21 PROCEDURE — 90834 PSYTX W PT 45 MINUTES: CPT | Mod: 95 | Performed by: PSYCHOLOGIST

## 2024-05-21 ASSESSMENT — SLEEP AND FATIGUE QUESTIONNAIRES
HOW LIKELY ARE YOU TO NOD OFF OR FALL ASLEEP IN A CAR, WHILE STOPPED FOR A FEW MINUTES IN TRAFFIC: WOULD NEVER DOZE
HOW LIKELY ARE YOU TO NOD OFF OR FALL ASLEEP WHEN YOU ARE A PASSENGER IN A CAR FOR AN HOUR WITHOUT A BREAK: SLIGHT CHANCE OF DOZING
HOW LIKELY ARE YOU TO NOD OFF OR FALL ASLEEP WHILE SITTING INACTIVE IN A PUBLIC PLACE: MODERATE CHANCE OF DOZING
HOW LIKELY ARE YOU TO NOD OFF OR FALL ASLEEP WHILE LYING DOWN TO REST IN THE AFTERNOON WHEN CIRCUMSTANCES PERMIT: HIGH CHANCE OF DOZING
HOW LIKELY ARE YOU TO NOD OFF OR FALL ASLEEP WHILE SITTING QUIETLY AFTER LUNCH WITHOUT ALCOHOL: MODERATE CHANCE OF DOZING
HOW LIKELY ARE YOU TO NOD OFF OR FALL ASLEEP WHILE SITTING AND READING: HIGH CHANCE OF DOZING
HOW LIKELY ARE YOU TO NOD OFF OR FALL ASLEEP WHILE WATCHING TV: HIGH CHANCE OF DOZING
HOW LIKELY ARE YOU TO NOD OFF OR FALL ASLEEP WHILE SITTING AND TALKING TO SOMEONE: SLIGHT CHANCE OF DOZING

## 2024-05-21 ASSESSMENT — PAIN SCALES - GENERAL: PAINLEVEL: MODERATE PAIN (4)

## 2024-05-21 NOTE — PROGRESS NOTES
Virtual Visit Details    Type of service:  Video Visit     Originating Location (pt. Location): Home    Distant Location (provider location):  Off-site  Platform used for Video Visit: AmWell    Visit Start Time:  3:37 PM  Visit End Time: 4:17 PM        SLEEP MEDICINE VIRTUAL FOLLOW-UP VISIT  Behavioral Sleep Medicine    Patient Name: Dali Martines  MRN:  [id number]  Date of Service: May 21, 2024       Subjective Report     Dali Martines  returns for a telehealth video visit to discuss progress in implementing behavioral strategies for the management of insomnia.  Patient consent for initiation of video visit was obtained and documented prior to initiation of visit.     Dali reports she had another endoscopy which indicated no significant improvement in gastritis.          Sleep Data:     Source of Sleep Estimates:  Verbal Self-report      EPWORTH SLEEPINESS SCALE    Sitting and reading 3   Watching TV 3   Sitting, inactive in a public place (theatre or mtg.) 2    As a passenger in a car 1   Lying down to rest in the afternoon when circumstance permit 3   Sitting and talking to someone 1   Sitting quietly after lunch without alcohol 2   In a car, while stopped for a few minutes in traffic 0   TOTAL SCORE (nl <11) 15     INSOMNIA SEVERITY INDEX     Difficulty falling asleep 2   Difficult staying asleep 2   Problems waking up to early 4   How SATISFIED/DISSATISFIED are you with your CURRENT sleep pattern? 3   How NOTICEABLE to others do you think your sleep pattern is in terms of your quality of life? 2   How WORRIED/DISTRESSED are you about your current sleep pattern? 2   To what extent do you consider your sleep problem to INTERFERE with your daily fuctioning(e.g. daytime fatigue, mood, ability to function at work/daily chores, concentration, mood,etc.) CURRENTLY? 3   INSOMNIA SEVERITY INDEX TOTAL SCORE 18    Absence of insomnia (0-7); sub-threshold insomnia (8-14); moderate insomnia (15-21); and severe  "insomnia (22-28)       Interventions     Strategies and recommendations including  health psychology interventions focusing on stress management, interface between type 1 diabetes, gastritis and sleep, discussion of caregiving responsibilities and stress, and Stimulus control therapy were reviewed and discussed today.     Services provided are compliant with the requirements of [place] Statute SS 256B.0625 Subd. 3b and paragraph (b)       Vital Signs     Ht 1.778 m (5' 10\")   Wt 77.1 kg (170 lb)   LMP  (LMP Unknown)   BMI 24.39 kg/m       Mental Status     Orientation:  X3  Mood:  normal  Affect:  Congruent with mood  Speech/Language:  Normal  Thought Process: Intact  Associations:  Normal  Thought Content: Normal  Patient does not report any suicidal ideation, intention or plan.    Diagnostic Impressions and Plan        Chronic insomnia  Stress    Patient reports that she is taking more time for contact with former colleagues.  She also is finding more time to manage stress through pleasant activities.  Caregiver responsibilities from mother and health stressors continue to impact quality of life and to some degree sleep quality.    Plan:  Continue current sleep schedule and plan    Follow-up:  10 weeks      Philip Quiroz PsyD, LP, [facility]  Diplomate, Behavioral Sleep Medicine  [facility]      Note: This dictation was created using voice recognition software. This document may contain an error not identified before finalizing the document. If the error changes the accuracy of the document, I would appreciate it being brought to my attention.                             "

## 2024-05-21 NOTE — NURSING NOTE
Is the patient currently in the state of [place]? YES    Visit mode:VIDEO    If the visit is dropped, the patient can be reconnected by: VIDEO VISIT: Send to e-mail at: [email or web address]    Will anyone else be joining the visit? NO  (If patient encounters technical issues they should call [phone]    How would you like to obtain your AVS? MyChart    Are changes needed to the allergy or medication list? Pt stated no changes to allergies and Pt stated no med changes    Are refills needed on medications prescribed by this physician? NO  Has patient had flu shot for current/most recent flu season? If so, when? Yes: 11/02/2023    Reason for visit: EWA CORRIGAN

## 2024-05-24 ENCOUNTER — TELEPHONE (OUTPATIENT)
Dept: PULMONOLOGY | Facility: CLINIC | Age: 54
End: 2024-05-24
Payer: COMMERCIAL

## 2024-05-24 NOTE — TELEPHONE ENCOUNTER
Mailbox full, unable to LVM to schedule 6 month follow-up     Appointment type: MARCELLE  Provider: JUDE  Return date: 11/2024  Specialty phone number: [phone]  Additional appointment(s) needed: PRE POST TAYO  Additonal Notes: N/A

## 2024-05-25 DIAGNOSIS — E10.319 TYPE 1 DIABETES MELLITUS WITH RETINOPATHY (H): ICD-10-CM

## 2024-05-28 ENCOUNTER — VIRTUAL VISIT (OUTPATIENT)
Dept: PSYCHOLOGY | Facility: CLINIC | Age: 54
End: 2024-05-28
Payer: COMMERCIAL

## 2024-05-28 ENCOUNTER — LAB (OUTPATIENT)
Dept: LAB | Facility: CLINIC | Age: 54
End: 2024-05-28
Payer: COMMERCIAL

## 2024-05-28 DIAGNOSIS — E10.3299 TYPE 1 DIABETES MELLITUS WITH MILD NONPROLIFERATIVE RETINOPATHY, MACULAR EDEMA PRESENCE UNSPECIFIED, UNSPECIFIED LATERALITY (H): ICD-10-CM

## 2024-05-28 DIAGNOSIS — D64.9 ANEMIA, UNSPECIFIED TYPE: ICD-10-CM

## 2024-05-28 DIAGNOSIS — G89.29 OTHER CHRONIC PAIN: ICD-10-CM

## 2024-05-28 DIAGNOSIS — F33.41 MAJOR DEPRESSIVE DISORDER, RECURRENT, IN PARTIAL REMISSION (H): ICD-10-CM

## 2024-05-28 DIAGNOSIS — R19.8 GI SYMPTOMS: ICD-10-CM

## 2024-05-28 DIAGNOSIS — F43.22 ADJUSTMENT DISORDER WITH ANXIETY: Primary | ICD-10-CM

## 2024-05-28 LAB
BASOPHILS # BLD AUTO: 0 10E3/UL (ref 0–0.2)
BASOPHILS NFR BLD AUTO: 1 %
EOSINOPHIL # BLD AUTO: 0 10E3/UL (ref 0–0.7)
EOSINOPHIL NFR BLD AUTO: 1 %
ERYTHROCYTE [DISTWIDTH] IN BLOOD BY AUTOMATED COUNT: 12.6 % (ref 10–15)
FOLATE SERPL-MCNC: 32.7 NG/ML (ref 4.6–34.8)
HCT VFR BLD AUTO: 33.8 % (ref 35–47)
HGB BLD-MCNC: 11.4 G/DL (ref 11.7–15.7)
IMM GRANULOCYTES # BLD: 0 10E3/UL
IMM GRANULOCYTES NFR BLD: 0 %
LYMPHOCYTES # BLD AUTO: 1.6 10E3/UL (ref 0.8–5.3)
LYMPHOCYTES NFR BLD AUTO: 38 %
MCH RBC QN AUTO: 31.9 PG (ref 26.5–33)
MCHC RBC AUTO-ENTMCNC: 33.7 G/DL (ref 31.5–36.5)
MCV RBC AUTO: 95 FL (ref 78–100)
MONOCYTES # BLD AUTO: 0.3 10E3/UL (ref 0–1.3)
MONOCYTES NFR BLD AUTO: 8 %
NEUTROPHILS # BLD AUTO: 2.3 10E3/UL (ref 1.6–8.3)
NEUTROPHILS NFR BLD AUTO: 53 %
PLATELET # BLD AUTO: 247 10E3/UL (ref 150–450)
RBC # BLD AUTO: 3.57 10E6/UL (ref 3.8–5.2)
WBC # BLD AUTO: 4.4 10E3/UL (ref 4–11)

## 2024-05-28 PROCEDURE — 82607 VITAMIN B-12: CPT

## 2024-05-28 PROCEDURE — 36415 COLL VENOUS BLD VENIPUNCTURE: CPT

## 2024-05-28 PROCEDURE — 90834 PSYTX W PT 45 MINUTES: CPT | Mod: 95 | Performed by: PSYCHOLOGIST

## 2024-05-28 PROCEDURE — 82746 ASSAY OF FOLIC ACID SERUM: CPT

## 2024-05-28 PROCEDURE — 83550 IRON BINDING TEST: CPT

## 2024-05-28 PROCEDURE — 83540 ASSAY OF IRON: CPT

## 2024-05-28 PROCEDURE — 82728 ASSAY OF FERRITIN: CPT

## 2024-05-28 PROCEDURE — 85025 COMPLETE CBC W/AUTO DIFF WBC: CPT

## 2024-05-28 ASSESSMENT — ANXIETY QUESTIONNAIRES
8. IF YOU CHECKED OFF ANY PROBLEMS, HOW DIFFICULT HAVE THESE MADE IT FOR YOU TO DO YOUR WORK, TAKE CARE OF THINGS AT HOME, OR GET ALONG WITH OTHER PEOPLE?: SOMEWHAT DIFFICULT
GAD7 TOTAL SCORE: 6
3. WORRYING TOO MUCH ABOUT DIFFERENT THINGS: SEVERAL DAYS
GAD7 TOTAL SCORE: 6
6. BECOMING EASILY ANNOYED OR IRRITABLE: SEVERAL DAYS
2. NOT BEING ABLE TO STOP OR CONTROL WORRYING: SEVERAL DAYS
7. FEELING AFRAID AS IF SOMETHING AWFUL MIGHT HAPPEN: SEVERAL DAYS
7. FEELING AFRAID AS IF SOMETHING AWFUL MIGHT HAPPEN: SEVERAL DAYS
IF YOU CHECKED OFF ANY PROBLEMS ON THIS QUESTIONNAIRE, HOW DIFFICULT HAVE THESE PROBLEMS MADE IT FOR YOU TO DO YOUR WORK, TAKE CARE OF THINGS AT HOME, OR GET ALONG WITH OTHER PEOPLE: SOMEWHAT DIFFICULT
5. BEING SO RESTLESS THAT IT IS HARD TO SIT STILL: NOT AT ALL
4. TROUBLE RELAXING: SEVERAL DAYS
1. FEELING NERVOUS, ANXIOUS, OR ON EDGE: SEVERAL DAYS

## 2024-05-28 NOTE — PROGRESS NOTES
Health Psychology          Latanya Mayfield, Ph.D.,  [phone]  Hansa Arzate, Ph.D.,  [phone]  Sarah Beth Jack, Ph.D.,  [phone]  Panfilo Cornejo, Ph.D., , [facility] [phone]  Krystle Hannah, Ph.D.,  [phone]  Elise Sutton, Ph.D., , [facility] [phone]    [facility], 3rd Floor  [address] [place] [address]       Health Psychology Progress Note    Patient Name: Dali Martines    Service Type: Individual  Length of Visit: 45 minutes  Start time: 10:01 AM     Stop time: 10:46 AM       Attendees: patient attended alone  Session #: 16    Telemedicine Information:     Telemedicine Visit: The patient's condition can be safely assessed and treated via synchronous audio and visual telemedicine encounter.    Reason for Telemedicine Visit: Patient has requested telehealth visit and Patient convenience (e.g. access to timely appointments / distance to available provider)  Originating Site (Patient Location): Patient's [place], [place]  Distant Location (provider location):  On-site: [facility]   Consent:  The patient/guardian has verbally consented to: the potential risks and benefits of telemedicine (video visit) versus in person care; bill my insurance or make self-payment for services provided; and responsibility for payment of non-covered services.   Mode of Communication:  Video Conference via AmWell  As the provider I attest to compliance with applicable laws and regulations related to telemedicine.     Identifying Information and Presenting Problem:  Dali Martines is a 53 year old female adult who was referred to health psychology by her endocrinologist, Dr. Mckeon. Patient presented with concerns of stress in the context of multiple life events, several medical conditions, and chronic management of T1DM.    Topics Discussed/Interventions Provided:    Session Content:     Update: Patient returning for follow-up today. No  notable updates with regard to her health concerns. Still awaiting results from follow-ups and evaluations and is in a holding pattern until she learns more. Also reports ongoing caregiver stress and describes an incident in which her mother called the police to complete a wellness check on her after she did not answer the phone at 3:30am.         Homework Review: Patient reports that her efforts to build in positive and pleasant activities has been going well. Discussed the benefits of engaging in these activities. She particularly notes that restorative benefits of spending time connecting with nature. Patient also notes that she continues to increase her awareness of her stress reactivity. Did not complete the diaphragmatic breathing exercise. Will carry this over to the next visit.      Skills/intervention: Continued to support patient in processing her thoughts and emotions around her health and caregiver concerns. Revisited the multiple strategies patient can try to employ to reduce the intensity of stress reactivity and promoting a faster and more complete recovery. Patient will try using validation when her mother is activated (naming and acknowledging her mother's experience), preventing triggers (calling mother first in the afternoon), and diaphragmatic breathing. Provided psychoeducation about diaphragmatic breathing and the role of breathing in stress reactivity. Taught patient how to complete the diaphragmatic breathing exercise and patient was able to complete it successfully. Assisted patient in identifying a time of day where she can practice this exercise. Session ended with supporting patient in creating goals and identifying priorities for the next month before our next session.        Top values identified included: spirituality, humor, fairness, creativity, curiosity, contribution, and caring.  Unhelpful rules/assumptions: fear of failure, all-or-nothing thinking, and simplicity, control, and  structure  Top priorities for grief: increasing tolerance of discomfort associated with uncertainty as well as identifying and processing emotions associated with the losses she has experienced.    Treatment Objective(s) Addressed in This Session:  Psychological distress related to Diabetes  Psychological distress related to caregiver stress    Progress on / Status of Treatment Objective(s) / Homework:  In progress    Medication Adherence: Patient did not report medication changes. Current medication list is below:     Current Outpatient Medications   Medication Sig Dispense Refill    albuterol (PROAIR HFA/PROVENTIL HFA/VENTOLIN HFA) 108 (90 Base) MCG/ACT inhaler Inhale 2 puffs into the lungs every 4 hours as needed for shortness of breath / dyspnea or wheezing 1 Inhaler 11    aspirin 81 MG tablet Take 1 tablet by mouth daily.      blood glucose (ACCU-CHEK COLIN PLUS) test strip Test Blood Sugar 8 times daily or as directed 800 strip 3    blood glucose monitoring (ACCU-CHEK FASTCLIX) lancets Use to test blood sugar 8 times daily or as directed. 4 Box 3    blood glucose monitoring (NO BRAND SPECIFIED) meter device kit Use to test blood sugar 8 times daily or as directed. Any covered brand, 1 kit 0    Calcium Carbonate-Vitamin D (CALCIUM + D PO) Take one daily      citalopram (CELEXA) 20 MG tablet Take 1.5 tablets (30 mg) by mouth daily 135 tablet 1    Continuous Blood Gluc  (DEXCOM G6 ) SUZANNE Use to read blood sugars as per 's instructions. 1 each 0    Continuous Blood Gluc Sensor (DEXCOM G6 SENSOR) MISC Change every 10 days. 9 each 3    Continuous Blood Gluc Transmit (DEXCOM G6 TRANSMITTER) MISC Change every 3 months. 1 each 3    diclofenac (VOLTAREN) 1 % topical gel Apply 2 g topically 4 times daily 100 g 4    diclofenac (VOLTAREN) 1 % topical gel APPLY 2 GRAMS ONTO THE SKIN FOUR TIMES DAILY AS NEEDED FOR MODERATE PAIN 100 g 5    DULERA 100-5 MCG/ACT inhaler INHALE 2 PUFFS INTO THE LUNGS  "TWICE DAILY 13 g 5    econazole nitrate 1 % cream Apply 0.5 inches topically daily.      FIASP PENFILL 100 UNIT/ML SOCT INJECT UP TO 45 UNITS DAILY AS DIRECTED ACCORDING TO CORRECTION FACTOR. 45 mL 3    fish oil-omega-3 fatty acids 1000 MG capsule Take one daily      fluocinonide (LIDEX) 0.05 % external solution Apply topically 2 times daily 60 mL 4    folic acid (FOLVITE) 1 MG tablet Take 1 tablet (1 mg) by mouth daily 90 tablet 3    Insulin Aspart, w/Niacinamide, (FIASP) 100 UNIT/ML SOLN Inject 50 Units as directed daily For use in ambulatory insulin pump as directed.  Approximate daily dose is 50 units. 20 mL 11    insulin degludec (TRESIBA FLEXTOUCH) 100 UNIT/ML pen ADMINISTER 8 UNITS UNDER THE SKIN DAILY. PLEASE DO NOT FILL TODAY ( 10/18/2023 ). 30 mL 1    Insulin Disposable Pump (OMNIPOD 5 G6 INTRO, GEN 5,) KIT 1 kit continuous 1 kit 0    Insulin Disposable Pump (OMNIPOD 5 G6 POD, GEN 5,) MISC 1 each every 3 days 30 each 3    insulin pen needle (B-D U/F) 31G X 8 MM miscellaneous Use 5 pen needles daily 450 each 3    insulin syringe-needle U-100 (30G X 1/2\" 1 ML) 30G X 1/2\" 1 ML miscellaneous Use 1 syringes as directed to draw up insulin for pump. Per pharmacy request. . 100 each 1    Ketoconazole (NIZORAL PO) Shampoo daily      levothyroxine (SYNTHROID/LEVOTHROID) 112 MCG tablet Take 1 tablet (112 mcg) by mouth daily 90 tablet 4    methotrexate 2.5 MG tablet Take 6 tablets (15 mg) by mouth every 7 days Labs every 8 - 12 weeks for refills. 72 tablet 0    methotrexate 2.5 MG tablet Take 6 tablets (15 mg) by mouth every 7 days Labs every 8 - 12 weeks for refills. 72 tablet 0    methotrexate 2.5 MG tablet Take 6 tablets (15 mg) by mouth every 7 days Labs required every 8-12 weeks while taking this medication 72 tablet 0    methylphenidate (METADATE CD) 20 MG CR capsule Take 20 mg by mouth daily      methylphenidate (RITALIN) 10 MG tablet TAKE UP TO 2 TABLETS BY MOUTH IN THE LATE AFTERNOON.      Minoxidil (ROGAINE " EX) daily      montelukast (SINGULAIR) 10 MG tablet Take 1 tablet (10 mg) by mouth every evening for 180 days 30 tablet 5    Multiple Vitamin (MULTIVITAMIN OR) Take one daily tab      ondansetron (ZOFRAN ODT) 4 MG ODT tab Take 1 tablet (4 mg) by mouth every 8 hours as needed for vomiting 30 tablet 1    predniSONE (DELTASONE) 20 MG tablet Take 1 tablet (20 mg) by mouth daily 5 tablet 0    Semaglutide, 1 MG/DOSE, 4 MG/3ML SOPN Inject 1 mg Subcutaneous every 7 days 12 mL 3    simvastatin (ZOCOR) 20 MG tablet Take 1 tablet (20 mg) by mouth at bedtime 90 tablet 3    traZODone (DESYREL) 50 MG tablet Take 1-2 tablets by mouth nightly as needed for sleep. 180 tablet 0     Current Facility-Administered Medications   Medication Dose Route Frequency Provider Last Rate Last Admin    lidocaine 1 % injection 1 mL  1 mL   Tolu Fernández, DPM   1 mL at 11/09/22 1429    triamcinolone acetonide (KENALOG-10) injection 10 mg  10 mg   Tolu Fernández, DPM   10 mg at 11/09/22 1429     Assessment: The patient appeared to be active and engaged in today's session and was receptive to feedback.     Mental Status Exam:   Appearance: Appropriate   Eye Contact: Good    Orientation: Yes, x4  Behavior/relationship to provider/demeanor: Cooperative, Engaged, and Pleasant  Motor Activity: normal or unremarkable  Mood (subjective report): Stressed, stable  Affect (objective appearance): Appropriate/mood congruent  Speech Rate: Normal  Speech Volume: Normal  Speech Articulation: Normal  Speech Coherence: Normal  Speech Spontaneity: Normal  Thought Content: no suicidal/homicidal ideation, plans, or intent  Thought Process (associations): Logical, Linear, and Goal directed  Thought Process (rate): Normal  Abnormal Perception: None  Attention/Concentration: Normal  Memory: Appears grossly intact   Fund of Knowledge: Above average  Abstraction:  Normal  Insight:  Good  Judgment:  good    Does the patient appear to be at imminent risk of  harm to self/others at this time? No    The session was necessary to address psychological distress in the context of multiple psychosocial stressors and chronic health concerns.    Diagnoses (provisional):    1. Adjustment disorder with anxiety    2. Major depressive disorder, recurrent, in partial remission (H24)    3. 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 Other chronic pain    5. GI symptoms        Plan:    Follow-up video visit with me on 6/25/24 at 10:00am  Patient to use 911 or other crisis resources in the event of a psychiatric emergency  Primary care needs and medications are managed by Dimitri Alas MD. Referring provider is Dr. Mckeon.  Next visit agenda/goals:   Continue with positive and pleasant activities   Practice the diaphragmatic breathing exercise 2-5 minutes per day  Begin to create plans around major house projects (can use the SMART goal setting worksheet if helpful)    Skills taught/interventions used thus far:  Psychoeducation  Values clarification  Committed action (vitality vs suffering)  Perfectionism workbook (modules 1-5)  Defusion  Acting opposite  Mindfulness (breath, body scan, watching thoughts, choiceless awareness)  Therapeutic journaling  [facility] [place]    NOTE: Treatment plan update due 9/6/24; 90 day treatment plan review due 7/15/24.                                                Individual Treatment Plan    Patient's Name: Dali Martines  YOB: 1970    Date of Creation: 9/6/23  Date Treatment Plan Last Reviewed/Revised: 04/16/24    DSM5 Diagnoses:     1. Adjustment disorder with anxiety    2. Major depressive disorder, recurrent, in partial remission (H)      Psychosocial / Contextual Factors: financial hardship, health issues, occupational/vocational stress, relationship stress, caregiver stress (mother's health concerns), and grief and loss in the  "wake of recent bereavement  PROMIS (reviewed every 90 days):      2/6/2024  11:23 AM   PROMIS 10    PROMIS TOTAL - SUBSCORES 22        Referral / Collaboration:  Collaboration with medical team and specialists as needed.    Anticipated number of session for this episode of care: 15-20  Anticipation frequency of session: 2x per month  Anticipated Duration of each session: 38-52 minutes  Treatment plan will be reviewed in 90 days or when goals have been changed.       Measurable Treatment Goal(s) related to diagnosis/functional impairment(s): Overall goals for treatment include improving her physical health (e.g., diabetes, physical activity, and GI discomfort), learning strategies for enhancing coping capacity, and decreasing avoidance-based coping.  Goal 1: Patient will decrease her use of avoidance-based coping   \"I will know I've met my goal when I am not avoiding doing things out of fear (e.g., bathroom sink, processing grief, physical activity, reviewing pieces of writing).\"     Objective #A (Patient Action)    Patient will learn and implement skills for reducing perfectionism.  Status: In progress - Date: 04/16/24 (patient is in the process of completing the perfectionism workbook)     Intervention(s)  Therapist will teach anti-perfectionism skills and will assist patient in completing a workbook on perfectionism.    Objective #B  Patient will initiate a healthy grieving process  Status: In progress - Date: 04/16/24 (patient is learning emotional expression skills)    Intervention(s)  Therapist will assist patient in processing and exploring her grief.    Objective #C  Patient will increase her participation in meaningful and purposeful activities.  Status: In progress - Date: 04/16/24 (patient reports some progress in increasing her participation in meaningful activities, will focus on generating a broader list of activities)    Intervention(s)  Therapist will teach values clarification, committed action, and " exposure skills.    Objective #D  Patient will learn and implement unhooking skills for decreasing the degree to which fear influences behaviors and decisions in an unhelpful way.  Status: In progress - Date: 04/16/24 (patient has been learning and implementing mindfulness and cognitive restructuring skills)     Intervention(s)  Therapist will teach defusion, cognitive restructuring, and mindfulness skills.      Patient has reviewed and agreed to the above plan.      Elise Sutton, PhD  September 6, 2023    Elise Sutton, Ph.D., , [facility]  Clinical Health Psychologist  Phone: [phone]   Pager: [phone]

## 2024-05-29 ENCOUNTER — THERAPY VISIT (OUTPATIENT)
Dept: PHYSICAL THERAPY | Facility: CLINIC | Age: 54
End: 2024-05-29
Payer: COMMERCIAL

## 2024-05-29 ENCOUNTER — THERAPY VISIT (OUTPATIENT)
Dept: OCCUPATIONAL THERAPY | Facility: CLINIC | Age: 54
End: 2024-05-29
Payer: COMMERCIAL

## 2024-05-29 DIAGNOSIS — R68.89 DECREASED FUNCTIONAL ACTIVITY TOLERANCE: Primary | ICD-10-CM

## 2024-05-29 DIAGNOSIS — R26.89 BALANCE DISORDER: Primary | ICD-10-CM

## 2024-05-29 DIAGNOSIS — R42 DIZZINESS: ICD-10-CM

## 2024-05-29 DIAGNOSIS — G93.39 OTHER POST INFECTION AND RELATED FATIGUE SYNDROMES: ICD-10-CM

## 2024-05-29 LAB
FERRITIN SERPL-MCNC: 101 NG/ML (ref 11–328)
IRON BINDING CAPACITY (ROCHE): 225 UG/DL (ref 240–430)
IRON SATN MFR SERPL: 33 % (ref 15–46)
IRON SERPL-MCNC: 74 UG/DL (ref 37–145)
VIT B12 SERPL-MCNC: 528 PG/ML (ref 232–1245)

## 2024-05-29 PROCEDURE — 97530 THERAPEUTIC ACTIVITIES: CPT | Mod: GO

## 2024-05-29 PROCEDURE — 97112 NEUROMUSCULAR REEDUCATION: CPT | Mod: GP | Performed by: PHYSICAL THERAPIST

## 2024-05-29 RX ORDER — PROCHLORPERAZINE 25 MG/1
SUPPOSITORY RECTAL
Qty: 1 EACH | Refills: 3 | Status: SHIPPED | OUTPATIENT
Start: 2024-05-29

## 2024-05-29 RX ORDER — PROCHLORPERAZINE 25 MG/1
SUPPOSITORY RECTAL
Qty: 9 EACH | Refills: 3 | Status: SHIPPED | OUTPATIENT
Start: 2024-05-29

## 2024-05-29 NOTE — TELEPHONE ENCOUNTER
LVD:  3/4/2024  [facility] Felicia Ty MD  Endocrinology, Diabetes,     Refilled per protocol.

## 2024-05-29 NOTE — PROGRESS NOTES
05/29/24 0500   Appointment Info   Signing clinician's name / credentials JA Cornell DPT   Visits Used 18   Medical Diagnosis Dizziness   PT Tx Diagnosis Dizziness and imbalance   Progress Note/Certification   Onset of illness/injury or Date of Surgery 07/18/23  (date of order, began in June 2022)   Therapy Frequency 1x/week   Predicted Duration 60 days   PT Goal 1   Goal Identifier DHI   Goal Description The pt will score <25/100 on the DHI indicating a reduction in subjective dizziness   Goal Progress 24/100 on 2/21/24   Target Date 01/07/24   Date Met 01/03/24   PT Goal 2   Goal Identifier FGA   Goal Description The pt will score >24/30 on the FGA indicating improvement in dynamic balance   Target Date 05/15/24   Date Met 05/04/24   PT Goal 3   Goal Identifier HEP   Goal Description The pt will be independent with a HEP in order to improve self management of symptoms   Goal Progress continuing to modify based on pt status/progression   Target Date 07/28/24   PT Goal 4   Goal Identifier SOT   Goal Description The pt will improve composite score on SOT to >70, within age group norms in order to improve sensory integration and reducing overall dizziness   Goal Progress Improved composite to 69, slightly below age group norms   Target Date 07/28/24   Subjective Report   Subjective Report Had some very mild dizziness with gardening, noticing bending and reaching   Neuromuscular Re-education   Neuromuscular re-ed of mvmt, balance, coord, kinesthetic sense, posture, proprioception minutes (38183) 40   Neuro Re-ed 2 - Details Retested SOT- pt's composite score improved to 69, still slightly below age group norm. Pt did have worsening use of vestibular system today but improvement in use of vision. Completed motor control test as well, WNLs. Reviewed vestibular exercises: practiced NBOS EC adding head turns horizontal and vertical, also backward walking nad head turns. Qaudruped rotations and reaches simulating  gardening followed by half kneeling with trunk rotations. Mild instability but no dizziness noted.   Neuro Re-ed 2 Bertec/balance/dizziness   Plan   Plan for next session continue balance with vestibular component and different reaching positions   Total Session Time   Timed Code Treatment Minutes 40   Total Treatment Time (sum of timed and untimed services) 40         PLAN  Continue therapy per current plan of care.    Beginning/End Dates of Progress Note Reporting Period:   2/21/24 to 05/29/2024    Referring Provider:  Rick L Nissen

## 2024-06-02 NOTE — RESULT ENCOUNTER NOTE
Labs reviewed, improved counts after holding methotrexate.     Team - please contact the patient and inform her to resume methotrexate at 12.5 mg weekly (lower dose than prior) and folic acid and start multivitamin daily till her next follow up    Mouna Mosquera MD

## 2024-06-12 ENCOUNTER — THERAPY VISIT (OUTPATIENT)
Dept: OCCUPATIONAL THERAPY | Facility: CLINIC | Age: 54
End: 2024-06-12
Payer: COMMERCIAL

## 2024-06-12 ENCOUNTER — THERAPY VISIT (OUTPATIENT)
Dept: PHYSICAL THERAPY | Facility: CLINIC | Age: 54
End: 2024-06-12
Payer: COMMERCIAL

## 2024-06-12 DIAGNOSIS — R68.89 DECREASED FUNCTIONAL ACTIVITY TOLERANCE: Primary | ICD-10-CM

## 2024-06-12 DIAGNOSIS — G93.39 OTHER POST INFECTION AND RELATED FATIGUE SYNDROMES: ICD-10-CM

## 2024-06-12 DIAGNOSIS — R26.89 BALANCE DISORDER: Primary | ICD-10-CM

## 2024-06-12 DIAGNOSIS — R42 DIZZINESS: ICD-10-CM

## 2024-06-12 PROCEDURE — 97530 THERAPEUTIC ACTIVITIES: CPT | Mod: GO

## 2024-06-12 PROCEDURE — 97112 NEUROMUSCULAR REEDUCATION: CPT | Mod: GP | Performed by: PHYSICAL THERAPIST

## 2024-06-17 ENCOUNTER — TELEPHONE (OUTPATIENT)
Dept: ENDOCRINOLOGY | Facility: CLINIC | Age: 54
End: 2024-06-17
Payer: COMMERCIAL

## 2024-06-17 DIAGNOSIS — E10.3299 TYPE 1 DIABETES MELLITUS WITH MILD NONPROLIFERATIVE RETINOPATHY, MACULAR EDEMA PRESENCE UNSPECIFIED, UNSPECIFIED LATERALITY (H): ICD-10-CM

## 2024-06-17 NOTE — TELEPHONE ENCOUNTER
Refills for Ozempic 1 mg sent to [facility].Orquidea Waterman RN on 6/17/2024 at 2:25 PM

## 2024-06-17 NOTE — TELEPHONE ENCOUNTER
[facility] Call Center    Phone Message    May a detailed message be left on voicemail: no     Reason for Call: Medication Refill Request    Has the patient contacted the pharmacy for the refill? Yes   Name of medication being requested: Ozempic  Provider who prescribed the medication:   Pharmacy:    [facility] - [place], [place] - [address]     Date medication is needed: ASAP    Pharmacy is calling stating they sent a refill request for this medication via epic and have not heard back. Writer did not see a request. Please review and call pharmacy to discuss.    Action Taken: Other: Endo    Travel Screening: Not Applicable

## 2024-06-25 ENCOUNTER — VIRTUAL VISIT (OUTPATIENT)
Dept: PSYCHOLOGY | Facility: CLINIC | Age: 54
End: 2024-06-25
Payer: COMMERCIAL

## 2024-06-25 DIAGNOSIS — R19.8 GI SYMPTOMS: ICD-10-CM

## 2024-06-25 DIAGNOSIS — E10.3299 TYPE 1 DIABETES MELLITUS WITH MILD NONPROLIFERATIVE RETINOPATHY, MACULAR EDEMA PRESENCE UNSPECIFIED, UNSPECIFIED LATERALITY (H): ICD-10-CM

## 2024-06-25 DIAGNOSIS — G89.29 OTHER CHRONIC PAIN: ICD-10-CM

## 2024-06-25 DIAGNOSIS — F33.41 MAJOR DEPRESSIVE DISORDER, RECURRENT, IN PARTIAL REMISSION (H): ICD-10-CM

## 2024-06-25 DIAGNOSIS — F43.22 ADJUSTMENT DISORDER WITH ANXIETY: Primary | ICD-10-CM

## 2024-06-25 PROCEDURE — 90834 PSYTX W PT 45 MINUTES: CPT | Mod: 95 | Performed by: PSYCHOLOGIST

## 2024-06-25 ASSESSMENT — ANXIETY QUESTIONNAIRES
8. IF YOU CHECKED OFF ANY PROBLEMS, HOW DIFFICULT HAVE THESE MADE IT FOR YOU TO DO YOUR WORK, TAKE CARE OF THINGS AT HOME, OR GET ALONG WITH OTHER PEOPLE?: SOMEWHAT DIFFICULT
1. FEELING NERVOUS, ANXIOUS, OR ON EDGE: MORE THAN HALF THE DAYS
5. BEING SO RESTLESS THAT IT IS HARD TO SIT STILL: NOT AT ALL
2. NOT BEING ABLE TO STOP OR CONTROL WORRYING: SEVERAL DAYS
GAD7 TOTAL SCORE: 8
IF YOU CHECKED OFF ANY PROBLEMS ON THIS QUESTIONNAIRE, HOW DIFFICULT HAVE THESE PROBLEMS MADE IT FOR YOU TO DO YOUR WORK, TAKE CARE OF THINGS AT HOME, OR GET ALONG WITH OTHER PEOPLE: SOMEWHAT DIFFICULT
7. FEELING AFRAID AS IF SOMETHING AWFUL MIGHT HAPPEN: SEVERAL DAYS
3. WORRYING TOO MUCH ABOUT DIFFERENT THINGS: MORE THAN HALF THE DAYS
GAD7 TOTAL SCORE: 8
6. BECOMING EASILY ANNOYED OR IRRITABLE: SEVERAL DAYS
4. TROUBLE RELAXING: SEVERAL DAYS
GAD7 TOTAL SCORE: 8
7. FEELING AFRAID AS IF SOMETHING AWFUL MIGHT HAPPEN: SEVERAL DAYS

## 2024-06-25 ASSESSMENT — PAIN SCALES - GENERAL: PAINLEVEL: SEVERE PAIN (6)

## 2024-06-25 ASSESSMENT — PATIENT HEALTH QUESTIONNAIRE - PHQ9
10. IF YOU CHECKED OFF ANY PROBLEMS, HOW DIFFICULT HAVE THESE PROBLEMS MADE IT FOR YOU TO DO YOUR WORK, TAKE CARE OF THINGS AT HOME, OR GET ALONG WITH OTHER PEOPLE: SOMEWHAT DIFFICULT
SUM OF ALL RESPONSES TO PHQ QUESTIONS 1-9: 11
SUM OF ALL RESPONSES TO PHQ QUESTIONS 1-9: 11

## 2024-06-25 NOTE — PROGRESS NOTES
Health Psychology          Latanya Mayfield, Ph.D.,  [phone]  Hansa Arzate, Ph.D.,  [phone]  Sarah Beth Jack, Ph.D.,  [phone]  Panfilo Cornejo, Ph.D., , [facility] [phone]  Krystle Hannah, Ph.D.,  [phone]  Elise Sutton, Ph.D., , [facility] [phone]    [facility], 3rd Floor  [address] [place] [address]       Health Psychology Progress Note    Patient Name: Dali Martines    Service Type: Individual  Length of Visit: 43 minutes  Start time: 10:05 AM     Stop time: 10:48 AM       Attendees: patient attended alone  Session #: 17    Telemedicine Information:     Telemedicine Visit: The patient's condition can be safely assessed and treated via synchronous audio and visual telemedicine encounter.    Reason for Telemedicine Visit: Patient has requested telehealth visit and Patient convenience (e.g. access to timely appointments / distance to available provider)  Originating Site (Patient Location): Patient's home, [place]  Distant Location (provider location):  Off-site: Provider's Home Office   Consent:  The patient/guardian has verbally consented to: the potential risks and benefits of telemedicine (video visit) versus in person care; bill my insurance or make self-payment for services provided; and responsibility for payment of non-covered services.   Mode of Communication:  Video Conference via [employer]  As the provider I attest to compliance with applicable laws and regulations related to telemedicine.     Identifying Information and Presenting Problem:  Dali Martines is a 53 year old female adult who was referred to health psychology by her endocrinologist, Dr. Mckeon. Patient presented with concerns of stress in the context of multiple life events, several medical conditions, and chronic management of T1DM.    Topics Discussed/Interventions Provided:    Session Content:     Update: Patient returning for follow-up today.  Reports that she had a busy few weeks with travel and caregiver demands. Notes that travel was enjoyable but also came with some stress, as she had to continue some of her caregiving responsibilities and was busy during her travel. Notes that it was very good to see her friends.          Homework Review: Patient reports that she has continued to participate in pleasant and positive activities. She notes that she practiced the diaphragmatic breathing exercise during times of stress. She notes that it helped de-escalate her physiological arousal during times of stress. Patient notes that she would like to work on using this strategy earlier in the stress reactivity process. Discussed different strategies for supporting patient in her goal of engaging in the breathing exercise earlier in the stress process (e.g., regular, routine practice and using classical conditioning). Has not used the SMART goal setting worksheet to create a plan around major house projects.      Skills/intervention: Continued to support patient in processing her thoughts and emotions around her health and caregiver concerns. Discussed reflections from her travels and what changes she would like to make after having some time with her friends and support network in [place]. Explored different strategies for keeping other relationships at the forefront of her life and building in time for expanding connections. Session ended with supporting patient in creating goals and identifying priorities for the next month before our next session.        Top values identified included: spirituality, humor, fairness, creativity, curiosity, contribution, and caring.  Unhelpful rules/assumptions: fear of failure, all-or-nothing thinking, and simplicity, control, and structure  Top priorities for grief: increasing tolerance of discomfort associated with uncertainty as well as identifying and processing emotions associated with the losses she has  experienced.    Treatment Objective(s) Addressed in This Session:  Psychological distress related to Diabetes  Psychological distress related to caregiver stress    Progress on / Status of Treatment Objective(s) / Homework:  In progress    Medication Adherence: Patient did not report medication changes. Current medication list is below:     Current Outpatient Medications   Medication Sig Dispense Refill    albuterol (PROAIR HFA/PROVENTIL HFA/VENTOLIN HFA) 108 (90 Base) MCG/ACT inhaler Inhale 2 puffs into the lungs every 4 hours as needed for shortness of breath / dyspnea or wheezing 1 Inhaler 11    aspirin 81 MG tablet Take 1 tablet by mouth daily.      blood glucose (ACCU-CHEK COLIN PLUS) test strip Test Blood Sugar 8 times daily or as directed 800 strip 3    blood glucose monitoring (ACCU-CHEK FASTCLIX) lancets Use to test blood sugar 8 times daily or as directed. 4 Box 3    blood glucose monitoring (NO BRAND SPECIFIED) meter device kit Use to test blood sugar 8 times daily or as directed. Any covered brand, 1 kit 0    Calcium Carbonate-Vitamin D (CALCIUM + D PO) Take one daily      citalopram (CELEXA) 20 MG tablet Take 1.5 tablets (30 mg) by mouth daily 135 tablet 1    Continuous Blood Gluc  (DEXCOM G6 ) SUZANNE Use to read blood sugars as per 's instructions. 1 each 0    Continuous Glucose Sensor (DEXCOM G6 SENSOR) MISC Change every 10 days. 9 each 3    Continuous Glucose Transmitter (DEXCOM G6 TRANSMITTER) MISC Change every 3 months. 1 each 3    diclofenac (VOLTAREN) 1 % topical gel Apply 2 g topically 4 times daily 100 g 4    diclofenac (VOLTAREN) 1 % topical gel APPLY 2 GRAMS ONTO THE SKIN FOUR TIMES DAILY AS NEEDED FOR MODERATE PAIN 100 g 5    DULERA 100-5 MCG/ACT inhaler INHALE 2 PUFFS INTO THE LUNGS TWICE DAILY 13 g 5    econazole nitrate 1 % cream Apply 0.5 inches topically daily.      FIASP PENFILL 100 UNIT/ML SOCT INJECT UP TO 45 UNITS DAILY AS DIRECTED ACCORDING TO CORRECTION  "FACTOR. 45 mL 3    fish oil-omega-3 fatty acids 1000 MG capsule Take one daily      fluocinonide (LIDEX) 0.05 % external solution Apply topically 2 times daily 60 mL 4    folic acid (FOLVITE) 1 MG tablet Take 1 tablet (1 mg) by mouth daily 90 tablet 3    Insulin Aspart, w/Niacinamide, (FIASP) 100 UNIT/ML SOLN Inject 50 Units as directed daily For use in ambulatory insulin pump as directed.  Approximate daily dose is 50 units. 20 mL 11    insulin degludec (TRESIBA FLEXTOUCH) 100 UNIT/ML pen ADMINISTER 8 UNITS UNDER THE SKIN DAILY. PLEASE DO NOT FILL TODAY ( 10/18/2023 ). 30 mL 1    Insulin Disposable Pump (OMNIPOD 5 G6 INTRO, GEN 5,) KIT 1 kit continuous 1 kit 0    Insulin Disposable Pump (OMNIPOD 5 G6 POD, GEN 5,) MISC 1 each every 3 days 30 each 3    insulin pen needle (B-D U/F) 31G X 8 MM miscellaneous Use 5 pen needles daily 450 each 3    insulin syringe-needle U-100 (30G X 1/2\" 1 ML) 30G X 1/2\" 1 ML miscellaneous Use 1 syringes as directed to draw up insulin for pump. Per pharmacy request. . 100 each 1    Ketoconazole (NIZORAL PO) Shampoo daily      levothyroxine (SYNTHROID/LEVOTHROID) 112 MCG tablet Take 1 tablet (112 mcg) by mouth daily 90 tablet 4    methotrexate 2.5 MG tablet Take 6 tablets (15 mg) by mouth every 7 days Labs every 8 - 12 weeks for refills. 72 tablet 0    methotrexate 2.5 MG tablet Take 6 tablets (15 mg) by mouth every 7 days Labs every 8 - 12 weeks for refills. 72 tablet 0    methotrexate 2.5 MG tablet Take 6 tablets (15 mg) by mouth every 7 days Labs required every 8-12 weeks while taking this medication 72 tablet 0    methylphenidate (METADATE CD) 20 MG CR capsule Take 20 mg by mouth daily      methylphenidate (RITALIN) 10 MG tablet TAKE UP TO 2 TABLETS BY MOUTH IN THE LATE AFTERNOON.      Minoxidil (ROGAINE EX) daily      montelukast (SINGULAIR) 10 MG tablet Take 1 tablet (10 mg) by mouth every evening for 180 days 30 tablet 5    Multiple Vitamin (MULTIVITAMIN OR) Take one daily tab      " ondansetron (ZOFRAN ODT) 4 MG ODT tab Take 1 tablet (4 mg) by mouth every 8 hours as needed for vomiting 30 tablet 1    predniSONE (DELTASONE) 20 MG tablet Take 1 tablet (20 mg) by mouth daily 5 tablet 0    Semaglutide, 1 MG/DOSE, (OZEMPIC) 4 MG/3ML pen Inject 1 mg Subcutaneous every 7 days 12 mL 3    simvastatin (ZOCOR) 20 MG tablet Take 1 tablet (20 mg) by mouth at bedtime 90 tablet 3    traZODone (DESYREL) 50 MG tablet Take 1-2 tablets by mouth nightly as needed for sleep. 180 tablet 0     Current Facility-Administered Medications   Medication Dose Route Frequency Provider Last Rate Last Admin    lidocaine 1 % injection 1 mL  1 mL   Tolu Fernández, DPM   1 mL at 11/09/22 1429    triamcinolone acetonide (KENALOG-10) injection 10 mg  10 mg   Tolu Fernández, DPM   10 mg at 11/09/22 1429     Assessment: The patient appeared to be active and engaged in today's session and was receptive to feedback.     Mental Status Exam:   Appearance: Appropriate   Eye Contact: Good    Orientation: Yes, x4  Behavior/relationship to provider/demeanor: Cooperative, Engaged, and Pleasant  Motor Activity: normal or unremarkable  Mood (subjective report): Stable  Affect (objective appearance): Appropriate/mood congruent  Speech Rate: Normal  Speech Volume: Normal  Speech Articulation: Normal  Speech Coherence: Normal  Speech Spontaneity: Normal  Thought Content: no suicidal/homicidal ideation, plans, or intent  Thought Process (associations): Logical, Linear, and Goal directed  Thought Process (rate): Normal  Abnormal Perception: None  Attention/Concentration: Normal  Memory: Appears grossly intact   Fund of Knowledge: Above average  Abstraction:  Normal  Insight:  Good  Judgment:  good    Does the patient appear to be at imminent risk of harm to self/others at this time? No    The session was necessary to address psychological distress in the context of multiple psychosocial stressors and chronic health  concerns.    Diagnoses:    1. Adjustment disorder with anxiety    2. Major depressive disorder, recurrent, in partial remission (H24)    3. 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 Other chronic pain    5. GI symptoms        Plan:    Follow-up video visit with me on 7/23/24 at 10:00am  Patient to use 911 or other crisis resources in the event of a psychiatric emergency  Primary care needs and medications are managed by Dimitri Alas MD. Referring provider is Dr. Mckeon.  Next visit agenda/goals:   Complete 90 day treatment plan review  Continue with positive and pleasant activities, with a focus on brainstorming ways of connecting with broader social network  Continue to practice the diaphragmatic breathing exercise with a focus on intervening earlier in the stress-reactivity process  Begin to create plans around major house projects (can use the SMART goal setting worksheet if helpful)    Skills taught/interventions used thus far:  Psychoeducation  Values clarification  Committed action (vitality vs suffering)  Perfectionism workbook (modules 1-5)  Defusion  Acting opposite  Mindfulness (breath, body scan, watching thoughts, choiceless awareness)  Therapeutic journaling  [facility] [place]    NOTE: Treatment plan update due 9/6/24; 90 day treatment plan review due 7/15/24.                                                Individual Treatment Plan    Patient's Name: Dali Martines  YOB: 1970    Date of Creation: 9/6/23  Date Treatment Plan Last Reviewed/Revised: 04/16/24    DSM5 Diagnoses:     1. Adjustment disorder with anxiety    2. Major depressive disorder, recurrent, in partial remission (H)      Psychosocial / Contextual Factors: financial hardship, health issues, occupational/vocational stress, relationship stress, caregiver stress (mother's health concerns), and grief and loss in the wake of  "recent bereavement  PROMIS (reviewed every 90 days):      2/6/2024  11:23 AM   PROMIS 10    PROMIS TOTAL - SUBSCORES 22        Referral / Collaboration:  Collaboration with medical team and specialists as needed.    Anticipated number of session for this episode of care: 15-20  Anticipation frequency of session: 2x per month  Anticipated Duration of each session: 38-52 minutes  Treatment plan will be reviewed in 90 days or when goals have been changed.       Measurable Treatment Goal(s) related to diagnosis/functional impairment(s): Overall goals for treatment include improving her physical health (e.g., diabetes, physical activity, and GI discomfort), learning strategies for enhancing coping capacity, and decreasing avoidance-based coping.  Goal 1: Patient will decrease her use of avoidance-based coping   \"I will know I've met my goal when I am not avoiding doing things out of fear (e.g., bathroom sink, processing grief, physical activity, reviewing pieces of writing).\"     Objective #A (Patient Action)    Patient will learn and implement skills for reducing perfectionism.  Status: In progress - Date: 04/16/24 (patient is in the process of completing the perfectionism workbook)     Intervention(s)  Therapist will teach anti-perfectionism skills and will assist patient in completing a workbook on perfectionism.    Objective #B  Patient will initiate a healthy grieving process  Status: In progress - Date: 04/16/24 (patient is learning emotional expression skills)    Intervention(s)  Therapist will assist patient in processing and exploring her grief.    Objective #C  Patient will increase her participation in meaningful and purposeful activities.  Status: In progress - Date: 04/16/24 (patient reports some progress in increasing her participation in meaningful activities, will focus on generating a broader list of activities)    Intervention(s)  Therapist will teach values clarification, committed action, and exposure " skills.    Objective #D  Patient will learn and implement unhooking skills for decreasing the degree to which fear influences behaviors and decisions in an unhelpful way.  Status: In progress - Date: 04/16/24 (patient has been learning and implementing mindfulness and cognitive restructuring skills)     Intervention(s)  Therapist will teach defusion, cognitive restructuring, and mindfulness skills.      Patient has reviewed and agreed to the above plan.      Elise Sutton, PhD  September 6, 2023    Elise Sutton, Ph.D., , [facility]  Clinical Health Psychologist  Phone: [phone]   Pager: [phone]

## 2024-06-25 NOTE — NURSING NOTE
Is the patient currently in the state of [place]? YES    Visit mode:VIDEO    If the visit is dropped, the patient can be reconnected by: VIDEO VISIT: Send to e-mail at: [email or web address]    Will anyone else be joining the visit? NO  (If patient encounters technical issues they should call [phone]    How would you like to obtain your AVS? MyChart    Are changes needed to the allergy or medication list? No and N/A    Are refills needed on medications prescribed by this physician? NO    Reason for visit: EWA CORRIGAN       TELE/STEPDOWN

## 2024-06-26 ENCOUNTER — THERAPY VISIT (OUTPATIENT)
Dept: PHYSICAL THERAPY | Facility: CLINIC | Age: 54
End: 2024-06-26
Payer: COMMERCIAL

## 2024-06-26 ENCOUNTER — THERAPY VISIT (OUTPATIENT)
Dept: OCCUPATIONAL THERAPY | Facility: CLINIC | Age: 54
End: 2024-06-26
Payer: COMMERCIAL

## 2024-06-26 DIAGNOSIS — R26.89 BALANCE DISORDER: Primary | ICD-10-CM

## 2024-06-26 DIAGNOSIS — R42 DIZZINESS: ICD-10-CM

## 2024-06-26 DIAGNOSIS — R68.89 DECREASED FUNCTIONAL ACTIVITY TOLERANCE: Primary | ICD-10-CM

## 2024-06-26 DIAGNOSIS — G93.39 OTHER POST INFECTION AND RELATED FATIGUE SYNDROMES: ICD-10-CM

## 2024-06-26 PROCEDURE — 97112 NEUROMUSCULAR REEDUCATION: CPT | Mod: GP | Performed by: PHYSICAL THERAPIST

## 2024-06-26 PROCEDURE — 97535 SELF CARE MNGMENT TRAINING: CPT | Mod: GO

## 2024-06-27 NOTE — LETTER
2023       RE: Dali Martines  [address]  [facility] [address]     Dear Colleague,    Thank you for referring your patient, Dali Martines, to the [facility] PHYSICAL MEDICINE AND REHABILITATION CLINIC [place] at [facility]. Please see a copy of my visit note below.           PM&R Clinic Note Followup Visit     Patient Name: Dali Martines : 1970 Medical Record: [id number]            History of Present Illness:     Dali Martines is a 52 year old female with chronic pain and gait imbalance. Last visit she restarted PT and TMJ/ear pain orders were added.     FROM old notes by me:   This is a 51 y/o female with gait imbalance. She has multiple risk factors for poor balance, including chronic painful left sided ATFL sprain, generalized deconditioning due to limited activity from COVID pandemic, and painful low back, L knee, R ankle anmd stiffness on the r toes 2,3,4 after healed fractures. Work-up: No further imaging necessary: upper exam not concerning for cervical stenosis as cause of imbalance, will observe. MRI L and EMG results reassuring and r/o PN and lumbar radiculopathy. Therapy/equipment/braces: She has bilateral ankle braces which do improve her stability.     Today she reports a recent fall, with brief sense of not knowing where her body was in space, similar to her fall last spring. She did see Dr. Castano in August (ENT) he dx her with BPPV. She isn't doing her Epleys but has the exercises at home. She is wondring if her recent COVID booster triggered it, although she isn't sure it she fell before or after. Other than the recent fall, she really hasn't had episodes of vertigo since last fall. No med changes, diabetes stable with good sugar control, last hgAiC 6.4 in March. Back pain improved, L ankle flared up in fall.          Past Medical and Surgical History:     Past Medical History:   Diagnosis Date    Anemia      EGD 6/11.   Anxiety     Arthritis 2014    Psoriatic arthritis    Back injury 1988    Dry eye syndrome     Dyslipidemia     Endometriosis 2002    adehsions seen at laparoscopy    GERD (gastroesophageal reflux disease)     Hypothyroidism     8 yoa    SOB (shortness of breath) 3/11/2014    Type I (juvenile type) diabetes mellitus without mention of complication, not stated as uncontrolled     when 15     Uncomplicated asthma Fall 2013     Past Surgical History:   Procedure Laterality Date    COLONOSCOPY  2002    COLONOSCOPY N/A 6/23/2020    Procedure: COLONOSCOPY;  Surgeon: Lauryn Rivera MD;  Location:  GI    ESOPHAGOSCOPY, GASTROSCOPY, DUODENOSCOPY (EGD), COMBINED  1/7/2014    Procedure: COMBINED ESOPHAGOSCOPY, GASTROSCOPY, DUODENOSCOPY (EGD), BIOPSY SINGLE OR MULTIPLE;;  Surgeon: Kraig Nicole MD;  Location:  GI    GYN SURGERY      Laparotomy to remove endometrial tissue    HC BREATH HYDROGEN TEST N/A 6/17/2014    Procedure: HYDROGEN BREATH TEST;  Surgeon: Deacon Alberts MD;  Location:  GI    HYDROGEN BREATH TEST  6/17/14    LAPAROSCOPIC APPENDECTOMY  2002    LAPAROTOMY, LYSIS ADHESIONS, COMBINED  2002    endometriosis    SOFT TISSUE SURGERY  December 2014    right ankle tendon injury    ZZC REPAIR CRUCIATE LIGAMENT,KNEE      ZZC STOMACH SURGERY PROCEDURE UNLISTED  December 2002            Social History:     Social History     Tobacco Use    Smoking status: Never    Smokeless tobacco: Never   Substance Use Topics    Alcohol use: Yes     Comment: moderately            Functional history:     Dali Martines is independent with ADL's             Family History:     Family History   Problem Relation Age of Onset    Breast Cancer Mother     Heart Disease Father     C.A.D. Father     Arthritis Father     Circulatory Father     Eye Disorder Father     Lipids Father     Thyroid Disease Father     Macular Degeneration Father     Coronary Artery Disease Father     Anxiety Disorder Father     Alcoholism Father      Rheumatoid Arthritis Father     Hypothyroidism Father     Diabetes Father     Cerebrovascular Disease Paternal Grandmother         ,    Cancer Paternal Grandfather         Pancreatic    Heart Disease Paternal Grandfather     Diabetes Maternal Grandmother         Type 2    C.A.D. Maternal Grandmother     Heart Disease Maternal Grandmother     Coronary Artery Disease Maternal Grandmother     Heart Disease Maternal Grandfather     Cardiac Sudden Death Maternal Grandfather     Gynecology Other         self    Thyroid Disease Other         self    Macular Degeneration Maternal Aunt     Diabetes Other         Type 1    Glaucoma No family hx of             Medications:     Current Outpatient Medications   Medication Sig Dispense Refill    albuterol (PROAIR HFA/PROVENTIL HFA/VENTOLIN HFA) 108 (90 Base) MCG/ACT inhaler Inhale 2 puffs into the lungs every 4 hours as needed for shortness of breath / dyspnea or wheezing 1 Inhaler 11    aspirin 81 MG tablet Take 1 tablet by mouth daily.      blood glucose (ACCU-CHEK COLIN PLUS) test strip Test Blood Sugar 8 times daily or as directed 800 strip 3    blood glucose monitoring (ACCU-CHEK FASTCLIX) lancets Use to test blood sugar 8 times daily or as directed. 4 Box 3    blood glucose monitoring (NO BRAND SPECIFIED) meter device kit Use to test blood sugar 8 times daily or as directed. Any covered brand, 1 kit 0    Calcium Carbonate-Vitamin D (CALCIUM + D PO) Take one daily      citalopram (CELEXA) 20 MG tablet Take 1.5 tablets (30 mg) by mouth daily 135 tablet 1    Continuous Blood Gluc  (DEXCOM G6 ) SUZANNE Use to read blood sugars as per 's instructions. 1 each 0    Continuous Blood Gluc Sensor (DEXCOM G6 SENSOR) MISC Change every 10 days. 9 each 3    Continuous Blood Gluc Transmit (DEXCOM G6 TRANSMITTER) MISC Change every 3 months. 1 each 3    diclofenac (VOLTAREN) 1 % topical gel Apply 2 g topically 4 times daily 100 g 4    diclofenac (VOLTAREN) 1 %  topical gel APPLY 2 GRAMS ONTO THE SKIN FOUR TIMES DAILY AS NEEDED FOR MODERATE PAIN 100 g 5    DULERA 100-5 MCG/ACT inhaler INHALE 2 PUFFS INTO THE LUNGS TWICE DAILY 13 g 5    econazole nitrate 1 % cream Apply 0.5 inches topically daily.      FIASP PENFILL 100 UNIT/ML SOCT INJECT UP TO 45 UNITS DAILY AS DIRECTED ACCORDING TO CORRECTION FACTOR. 45 mL 3    fish oil-omega-3 fatty acids 1000 MG capsule Take one daily      folic acid (FOLVITE) 1 MG tablet Take 1 tablet (1 mg) by mouth daily 90 tablet 3    Injection Device for insulin (INPEN 100-BLUE-NOVOLOG-FIASP) SUZANNE 1 each once for 1 dose 1 each 0    insulin degludec (TRESIBA FLEXTOUCH) 100 UNIT/ML pen ADMINISTER 17 UNITS UNDER THE SKIN DAILY 30 mL 1    insulin pen needle (B-D U/F) 31G X 8 MM miscellaneous Use 5 pen needles daily 450 each 3    Ketoconazole (NIZORAL PO) Shampoo daily      levothyroxine (SYNTHROID/LEVOTHROID) 112 MCG tablet TAKE 1 TABLET(112 MCG) BY MOUTH DAILY 90 tablet 4    methotrexate 2.5 MG tablet Take 6 tablets (15 mg) by mouth every 7 days Labs every 8 - 12 weeks for refills. 72 tablet 0    methotrexate 2.5 MG tablet Take 6 tablets (15 mg) by mouth every 7 days Labs required every 8-12 weeks while taking this medication 72 tablet 0    methylphenidate (METADATE CD) 20 MG CR capsule Take 20 mg by mouth daily      methylphenidate (RITALIN) 10 MG tablet TAKE UP TO 2 TABLETS BY MOUTH IN THE LATE AFTERNOON.      Minoxidil (ROGAINE EX) daily      montelukast (SINGULAIR) 5 MG chewable tablet CHEW AND SWALLOW 1 TABLET(5 MG) BY MOUTH AT BEDTIME 90 tablet 4    Multiple Vitamin (MULTIVITAMIN OR) Take one daily tab      NOVOLOG PENFILL 100 UNIT/ML soln INJECT 1 UNIT PER 15 GRAMS CHO AT ALL MEALS AND SNACKS WITH CORRECTION SCALE OF 1 UNIT PER 50 MG/DL OVER 150, AVERAGE DAILY USE OF 30 UNITS 30 mL 4    Semaglutide, 1 MG/DOSE, 4 MG/3ML SOPN Inject 1 mg Subcutaneous every 7 days 12 mL 3    simvastatin (ZOCOR) 20 MG tablet Take 1 tablet (20 mg) by mouth At  "Bedtime 90 tablet 3    traZODone (DESYREL) 50 MG tablet Take 1-2 tablets by mouth nightly as needed for sleep. 180 tablet 0            Allergies:     Allergies   Allergen Reactions    Monosodium Glutamate Palpitations and Shortness Of Breath    Sulfasalazine      Developed rash, HA, dizziness              ROS:     A focused ROS is negative other than the symptoms noted above in the HPI.           Physical Examiniation:     VITAL SIGNS: There were no vitals taken for this visit.  BMI: Estimated body mass index is 26.54 kg/m  as calculated from the following:    Height as of an earlier encounter on 6/21/23: 1.778 m (5' 10\").    Weight as of an earlier encounter on 6/21/23: 83.9 kg (185 lb).    Gen: NAD, pleasant and cooperative            Laboratory/Imaging:     NA           Assessment/Plan:     Dali Martines is a 52 year old female with chronic pain and gait imbalance.       Recent fall with vertigo preceding, recommend she resume Epleys, HEP, postural advice, and f/u with Dr. Castano. Referral sent.  Chronic pain: continue PT, she may switch facilities when she begins her 6 session for ear/TMJ.   F/u BASIL Bailey MD  6/21/2023  Physical Medicine & Rehabilitation    I spent a total of 36 minutes face-to-face with Dali Martines during today's office visit. Over 50% of this time was spent counseling the patient and/or coordinating care. See note for details.     18 minutes spent on the date of the encounter doing chart review, history and exam, documentation and further activities as noted above      "

## 2024-07-15 ENCOUNTER — VIRTUAL VISIT (OUTPATIENT)
Dept: EDUCATION SERVICES | Facility: CLINIC | Age: 54
End: 2024-07-15
Payer: COMMERCIAL

## 2024-07-15 DIAGNOSIS — E10.3299 TYPE 1 DIABETES MELLITUS WITH MILD NONPROLIFERATIVE RETINOPATHY, MACULAR EDEMA PRESENCE UNSPECIFIED, UNSPECIFIED LATERALITY (H): Primary | ICD-10-CM

## 2024-07-15 PROCEDURE — G0108 DIAB MANAGE TRN  PER INDIV: HCPCS | Mod: 95 | Performed by: REGISTERED NURSE

## 2024-07-15 NOTE — NURSING NOTE
[facility] [address] [facility] [address]    Is the patient currently in the state of [place]? YES    Visit mode:VIDEO    If the visit is dropped, the patient can be reconnected by: VIDEO VISIT: Text to cell phone:   Telephone Information:   Mobile [phone]       Will anyone else be joining the visit? NO  (If patient encounters technical issues they should call [phone])    How would you like to obtain your AVS? MyChart    Are changes needed to the allergy or medication list? Pt stated no med changes    Are refills needed on medications prescribed by this physician? NO    Reason for visit: EWA CORRIGAN

## 2024-07-15 NOTE — PROGRESS NOTES
Virtual Visit Details    Type of service:  Video Visit     Originating Location (pt. Location): Home    Distant Location (provider location):  On-site  Platform used for Video Visit: Common Sensing    Diabetes Self-Management Education & Support Virtual Visit- Short    Dali Martines contacted today for education related to Type 1 diabetes    Patient is being treated with:  Omnipod 5 Insulin Pump    Year of diagnosis: 1988  Referring provider:  Felicia Mckeon MD  Living Situation: Lives alone and has a dog.  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  Currently not employed      Subjective/Objective:   Here for ongoing DSME and insulin pump management support.                Assessment/Plan:    Dali is just now starting to recover from a fairly severe URI (non-Covid) that started about 10 days ago.  She states that she ran a fever for 3-4 days, and had a very hard time keeping her glucose under control.  She started increasing her dose of Tresiba (usually only takes about 8 units) up to 10 units/day.  She has backed it down to 9, but is having some lows (mostly post-prandial) and is going to drop it down to her usual 8 units.  I suspect that the long half-life of Tresiba is contributing to her hypoglycemia.  She will call if it continues.  She states that during this illness her use of insulin in the pump almost doubled.      She is still not feeling 100%, but getting better.  In addition to being ill herself, she has been under quite a bit of stress looking after her 93 year-old mother.      Some of her post-prandial glucose drops appear to also be due to late bolusing.  Currently she's been cutting back on the number of carbs she is entering into her bolus calculator if she is bolusing after she starts eating, but she is including her sensor value.  Encouraged her to continue working on pre-bolusing, but if she boluses late, simply enter the carbs she is covering but  leave the sensor glucose out of the equation.  If after trying this, she is continuing to drop after meals, she should reduce her carb entry by 25% in addition.      Follow up appointment made for August.      Time spent in this visit: 30 minutes     Any diabetes medication dose changes were made via the CDE Protocol and Collaborative Practice Agreement. A copy of this encounter was provided to the patient's referring provider.

## 2024-07-16 DIAGNOSIS — J45.40 MODERATE PERSISTENT ASTHMA WITHOUT COMPLICATION: ICD-10-CM

## 2024-07-16 RX ORDER — MOMETASONE FUROATE AND FORMOTEROL FUMARATE DIHYDRATE 100; 5 UG/1; UG/1
AEROSOL RESPIRATORY (INHALATION)
Qty: 13 G | Refills: 11 | Status: SHIPPED | OUTPATIENT
Start: 2024-07-16

## 2024-07-19 ENCOUNTER — HOSPITAL ENCOUNTER (EMERGENCY)
Facility: CLINIC | Age: 54
Discharge: HOME | End: 2024-07-19
Attending: FAMILY MEDICINE | Admitting: FAMILY MEDICINE
Payer: COMMERCIAL

## 2024-07-19 ENCOUNTER — MYC MEDICAL ADVICE (OUTPATIENT)
Dept: OPHTHALMOLOGY | Facility: CLINIC | Age: 54
End: 2024-07-19
Payer: COMMERCIAL

## 2024-07-19 VITALS
TEMPERATURE: 98.7 F | BODY MASS INDEX: 25.76 KG/M2 | HEART RATE: 68 BPM | SYSTOLIC BLOOD PRESSURE: 129 MMHG | DIASTOLIC BLOOD PRESSURE: 76 MMHG | WEIGHT: 179.5 LBS | RESPIRATION RATE: 18 BRPM | OXYGEN SATURATION: 98 %

## 2024-07-19 DIAGNOSIS — H43.392 VITREOUS FLOATERS OF LEFT EYE: ICD-10-CM

## 2024-07-19 PROCEDURE — 99282 EMERGENCY DEPT VISIT SF MDM: CPT | Performed by: FAMILY MEDICINE

## 2024-07-19 PROCEDURE — 99283 EMERGENCY DEPT VISIT LOW MDM: CPT | Performed by: FAMILY MEDICINE

## 2024-07-19 ASSESSMENT — VISUAL ACUITY
OD: 20/20
OS: 20/20

## 2024-07-19 ASSESSMENT — ACTIVITIES OF DAILY LIVING (ADL)
ADLS_ACUITY_SCORE: 35
ADLS_ACUITY_SCORE: 35

## 2024-07-19 ASSESSMENT — COLUMBIA-SUICIDE SEVERITY RATING SCALE - C-SSRS
6. HAVE YOU EVER DONE ANYTHING, STARTED TO DO ANYTHING, OR PREPARED TO DO ANYTHING TO END YOUR LIFE?: NO
1. IN THE PAST MONTH, HAVE YOU WISHED YOU WERE DEAD OR WISHED YOU COULD GO TO SLEEP AND NOT WAKE UP?: NO
2. HAVE YOU ACTUALLY HAD ANY THOUGHTS OF KILLING YOURSELF IN THE PAST MONTH?: NO

## 2024-07-19 NOTE — ED PROVIDER NOTES
ED Provider Note  [facility]      History     Chief Complaint   Patient presents with    Eye Problem     Increased in floaters in both eyes. Patches and vertical line in left eye today. Right eye seems like it is swollen.       HPI  Dali Martines is a 53 year old female PMH of DM1, NLD, retinopathy, psoriatic arthritis who presents to the ER for evaluation of an eye problem.  Patient has been having floaters primarily in the left eye and as well as vertical lines and patchy areas of blurriness in the left eye.  Patient was told by the ophthalmology clinic to come here for evaluation she was seen here by the ophthalmology resident with a diagnosis of possible impending vitreous hemorrhage.    Past Medical History  Past Medical History:   Diagnosis Date    Anemia     Anxiety     Arthritis 2014    Psoriatic arthritis    Back injury 1988    Dry eye syndrome     Dyslipidemia     Endometriosis 2002    adehsions seen at laparoscopy    GERD (gastroesophageal reflux disease)     Hypothyroidism     8 yoa    SOB (shortness of breath) 3/11/2014    Type I (juvenile type) diabetes mellitus without mention of complication, not stated as uncontrolled     when 15     Uncomplicated asthma Fall 2013     Past Surgical History:   Procedure Laterality Date    COLONOSCOPY  01/01/2002    COLONOSCOPY N/A 06/23/2020    Procedure: COLONOSCOPY;  Surgeon: Lauryn Rivera MD;  Location:  GI    ESOPHAGOSCOPY, GASTROSCOPY, DUODENOSCOPY (EGD), COMBINED  01/07/2014    Procedure: COMBINED ESOPHAGOSCOPY, GASTROSCOPY, DUODENOSCOPY (EGD), BIOPSY SINGLE OR MULTIPLE;;  Surgeon: Kraig Nicole MD;  Location:  GI    ESOPHAGOSCOPY, GASTROSCOPY, DUODENOSCOPY (EGD), COMBINED N/A 10/05/2023    Procedure: Esophagoscopy, gastroscopy, duodenoscopy (EGD), combined;  Surgeon: Raheem Hubbard MD;  Location:  GI    ESOPHAGOSCOPY, GASTROSCOPY, DUODENOSCOPY (EGD), COMBINED N/A 4/29/2024    Procedure: Esophagoscopy,  gastroscopy, duodenoscopy (EGD), combined with biopsy;  Surgeon: Alexandre Hernandez MD;  Location: [facility] OR    GYN SURGERY      Laparotomy to remove endometrial tissue    HC BREATH HYDROGEN TEST N/A 06/17/2014    Procedure: HYDROGEN BREATH TEST;  Surgeon: Deacon Alberts MD;  Location:  GI    HYDROGEN BREATH TEST  06/17/2014    LAPAROSCOPIC APPENDECTOMY  01/01/2002    LAPAROTOMY, LYSIS ADHESIONS, COMBINED  01/01/2002    endometriosis    SOFT TISSUE SURGERY  12/01/2014    right ankle tendon injury    ZZC REPAIR CRUCIATE LIGAMENT,KNEE      ZZC STOMACH SURGERY PROCEDURE UNLISTED  12/01/2002     albuterol (PROAIR HFA/PROVENTIL HFA/VENTOLIN HFA) 108 (90 Base) MCG/ACT inhaler  aspirin 81 MG tablet  Calcium Carbonate-Vitamin D (CALCIUM + D PO)  citalopram (CELEXA) 20 MG tablet  fish oil-omega-3 fatty acids 1000 MG capsule  folic acid (FOLVITE) 1 MG tablet  Insulin Aspart, w/Niacinamide, (FIASP) 100 UNIT/ML SOLN  insulin degludec (TRESIBA FLEXTOUCH) 100 UNIT/ML pen  Insulin Disposable Pump (OMNIPOD 5 G6 INTRO, GEN 5,) KIT  levothyroxine (SYNTHROID/LEVOTHROID) 112 MCG tablet  methotrexate 2.5 MG tablet  methylphenidate (METADATE CD) 20 MG CR capsule  Minoxidil (ROGAINE EX)  mometasone-formoterol (DULERA) 100-5 MCG/ACT inhaler  montelukast (SINGULAIR) 10 MG tablet  Multiple Vitamin (MULTIVITAMIN OR)  Semaglutide, 1 MG/DOSE, (OZEMPIC) 4 MG/3ML pen  simvastatin (ZOCOR) 20 MG tablet  traZODone (DESYREL) 50 MG tablet  blood glucose (ACCU-CHEK COLIN PLUS) test strip  blood glucose monitoring (ACCU-CHEK FASTCLIX) lancets  blood glucose monitoring (NO BRAND SPECIFIED) meter device kit  Continuous Blood Gluc  (DEXCOM G6 ) SUZANNE  Continuous Glucose Sensor (DEXCOM G6 SENSOR) MISC  Continuous Glucose Transmitter (DEXCOM G6 TRANSMITTER) MISC  diclofenac (VOLTAREN) 1 % topical gel  diclofenac (VOLTAREN) 1 % topical gel  econazole nitrate 1 % cream  FIASP PENFILL 100 UNIT/ML SOCT  fluocinonide (LIDEX) 0.05 %  "external solution  Insulin Disposable Pump (OMNIPOD 5 G6 POD, GEN 5,) MISC  insulin pen needle (B-D U/F) 31G X 8 MM miscellaneous  insulin syringe-needle U-100 (30G X 1/2\" 1 ML) 30G X 1/2\" 1 ML miscellaneous  Ketoconazole (NIZORAL PO)  methotrexate 2.5 MG tablet  methotrexate 2.5 MG tablet  methylphenidate (RITALIN) 10 MG tablet  ondansetron (ZOFRAN ODT) 4 MG ODT tab  predniSONE (DELTASONE) 20 MG tablet      Allergies   Allergen Reactions    Monosodium Glutamate Palpitations and Shortness Of Breath    Sulfasalazine      Developed rash, HA, dizziness     Family History  Family History   Problem Relation Age of Onset    Breast Cancer Mother     Heart Disease Father     C.A.D. Father     Arthritis Father     Circulatory Father     Eye Disorder Father     Lipids Father     Thyroid Disease Father     Macular Degeneration Father     Coronary Artery Disease Father     Anxiety Disorder Father     Alcoholism Father     Rheumatoid Arthritis Father     Hypothyroidism Father     Diabetes Father     Cerebrovascular Disease Paternal Grandmother         ,    Cancer Paternal Grandfather         Pancreatic    Heart Disease Paternal Grandfather     Diabetes Maternal Grandmother         Type 2    C.A.D. Maternal Grandmother     Heart Disease Maternal Grandmother     Coronary Artery Disease Maternal Grandmother     Heart Disease Maternal Grandfather     Cardiac Sudden Death Maternal Grandfather     Gynecology Other         self    Thyroid Disease Other         self    Macular Degeneration Maternal Aunt     Diabetes Other         Type 1    Glaucoma No family hx of      Social History   Social History     Tobacco Use    Smoking status: Never     Passive exposure: Never    Smokeless tobacco: Never   Vaping Use    Vaping status: Never Used   Substance Use Topics    Alcohol use: Yes     Comment: moderately    Drug use: No      A medically appropriate review of systems was performed with pertinent positives and negatives noted in the HPI, " and all other systems negative.    Physical Exam   BP: 120/81  Pulse: 68  Temp: 98.7  F (37.1  C)  Resp: 16  Weight: 81.4 kg (179 lb 8 oz)  SpO2: 97 %  Physical Exam  Constitutional:       General: She is not in acute distress.     Appearance: Normal appearance. She is not diaphoretic.   HENT:      Head: Atraumatic.      Mouth/Throat:      Mouth: Mucous membranes are moist.   Eyes:      Comments: Eye exam deferred to ophthalmology please refer to their documentation.   Cardiovascular:      Rate and Rhythm: Normal rate.      Heart sounds: Normal heart sounds.   Pulmonary:      Effort: No respiratory distress.      Breath sounds: Normal breath sounds.   Abdominal:      General: Abdomen is flat.   Musculoskeletal:      Cervical back: Neck supple.   Skin:     General: Skin is warm.      Findings: No rash.   Neurological:      General: No focal deficit present.      Mental Status: She is alert and oriented to person, place, and time.      Sensory: No sensory deficit.      Motor: No weakness.      Coordination: Coordination normal.           ED Course, Procedures, & Data      Procedures    Patient seen and evaluated by ophthalmology please refer to their documentation.     No results found for any visits on 07/19/24.  Medications - No data to display  Labs Ordered and Resulted from Time of ED Arrival to Time of ED Departure - No data to display  No orders to display          Critical care was not performed.     Medical Decision Making  The patient's presentation was of moderate complexity (an acute illness with systemic symptoms).    The patient's evaluation involved:  discussion of management or test interpretation with another health professional (patient was seen and evaluated by the ophthalmology residents please refer to their documentation.)    The patient's management necessitated only low risk treatment.    Assessment & Plan        I have reviewed the nursing notes. I have reviewed the findings, diagnosis, plan  and need for follow up with the patient.    New Prescriptions    No medications on file       Final diagnoses:   Vitreous floaters of left eye       Grayson Newman MD  [facility] EMERGENCY DEPARTMENT  7/19/2024     Grayson Newman MD  07/19/24 2025

## 2024-07-19 NOTE — TELEPHONE ENCOUNTER
Spoke to pt at 1719 after [employer] message received at 1706    More floaters in both eyes in past couple week.    Vitreous syneresis noted last exam and pt was having floater.    Pt noticing this afternoon new area in upper/side of vision that is constantly blurred/static like.    Reviewed recommendation for emergency department visit this Friday evening for evaluation.    Reviewed [facility] ED.    Pt unsure with current plan if will go tonight vs tomorrow.    Reviewed unsure of implications if waits and need formal exam to determine cause and recommended tonight.    Reviewed needing  and not to eat/drink prior to going tonight-- reviewed 8 hours NPO for surgical purposes if surgery indicated.    Concern of retina detachment.    Note to on call eye provider for review.    Pt undecided if going tonight vs tomorrow AM    Contreras Lorenzo RN 5:32 PM 07/19/24

## 2024-07-20 NOTE — ED TRIAGE NOTES
"Pt presents with vision changes- states she has an increase in \"floaters\" and seeing verticle lines in left eye.     Triage Assessment (Adult)       Row Name 07/19/24 2876          Triage Assessment    Airway WDL WDL        Respiratory WDL    Respiratory WDL WDL        Skin Circulation/Temperature WDL    Skin Circulation/Temperature WDL WDL        Cardiac WDL    Cardiac WDL WDL        Peripheral/Neurovascular WDL    Peripheral Neurovascular WDL WDL        Cognitive/Neuro/Behavioral WDL    Cognitive/Neuro/Behavioral WDL WDL                     "

## 2024-07-20 NOTE — CONSULTS
"  OPHTHALMOLOGY CONSULT NOTE  07/19/24    Patient: Dali Martines  Consulted by: Dr. Grayson Denis  Reason for Consult: Increased flashes/floaters    HISTORY OF PRESENTING ILLNESS:     Dali Martines is a 53 year old female with PMH of T1DM and glaucoma suspect who follows with Dr. Lira. She presents today with CC of  increasing number of floaters over the past two weeks L > R. Denies \"showers of floaters,\" black curtains.  During this time she has been coughing more due to a concurrent viral illness.  She also reports the presence of a new black vertical line in her left eye.     Last seen by Dr. Lira in February 2024 for diabetic eye exam. Note reflects normal OCT Mac and  OCT RNFL with reliable normal visual fields each eye; Mild nonproliferative diabetic retinopathy right eye no diabetic retinopathy left eye, normal pressures, and floaters in the left eye without evidence of PVD/tears/detachment.  Floaters attributed to vitreous syneresis.     Review of systems were otherwise negative except for that which has been stated above.      OCULAR/MEDICAL/SURGICAL HISTORIES:     Past Ocular History:  Last eye exam: 2/1/24  Prior eye surgery/laser: None  Contact lens wear: NO  Glasses: Yes  Eyedrops: ATs    Pertinent Systemic Medications:   Insulin, semaglutide, prednisone, methotrexate, levothyroxine, simvastatin    Past Medical History:  Past Medical History:   Diagnosis Date    Anemia     Anxiety     Arthritis 2014    Psoriatic arthritis    Back injury 1988    Dry eye syndrome     Dyslipidemia     Endometriosis 2002    adehsions seen at laparoscopy    GERD (gastroesophageal reflux disease)     Hypothyroidism     8 yoa    SOB (shortness of breath) 3/11/2014    Type I (juvenile type) diabetes mellitus without mention of complication, not stated as uncontrolled     when 15     Uncomplicated asthma Fall 2013       Past Surgical History:   Past Surgical History:   Procedure Laterality Date    COLONOSCOPY  " 01/01/2002    COLONOSCOPY N/A 06/23/2020    Procedure: COLONOSCOPY;  Surgeon: Lauryn Rivera MD;  Location:  GI    ESOPHAGOSCOPY, GASTROSCOPY, DUODENOSCOPY (EGD), COMBINED  01/07/2014    Procedure: COMBINED ESOPHAGOSCOPY, GASTROSCOPY, DUODENOSCOPY (EGD), BIOPSY SINGLE OR MULTIPLE;;  Surgeon: Kraig Nicole MD;  Location:  GI    ESOPHAGOSCOPY, GASTROSCOPY, DUODENOSCOPY (EGD), COMBINED N/A 10/05/2023    Procedure: Esophagoscopy, gastroscopy, duodenoscopy (EGD), combined;  Surgeon: Raheem Hubbard MD;  Location:  GI    ESOPHAGOSCOPY, GASTROSCOPY, DUODENOSCOPY (EGD), COMBINED N/A 4/29/2024    Procedure: Esophagoscopy, gastroscopy, duodenoscopy (EGD), combined with biopsy;  Surgeon: Alexandre Hernandez MD;  Location: [facility] OR    GYN SURGERY      Laparotomy to remove endometrial tissue    HC BREATH HYDROGEN TEST N/A 06/17/2014    Procedure: HYDROGEN BREATH TEST;  Surgeon: Deacon Alberts MD;  Location:  GI    HYDROGEN BREATH TEST  06/17/2014    LAPAROSCOPIC APPENDECTOMY  01/01/2002    LAPAROTOMY, LYSIS ADHESIONS, COMBINED  01/01/2002    endometriosis    SOFT TISSUE SURGERY  12/01/2014    right ankle tendon injury    ZZC REPAIR CRUCIATE LIGAMENT,KNEE      ZZC STOMACH SURGERY PROCEDURE UNLISTED  12/01/2002       Family History:  Family history of macular degeneration   No Fmhx of glaucom    Social History:  No tobacco use      EXAMINATION:     Base Eye Exam       Visual Acuity (Snellen - Linear)         Right Left    Near cc 20/25 20/20              Tonometry (Tonopen, 7:17 PM)         Right Left    Pressure 11 11              Pupils         Pupils    Right PERRL    Left PERRL              Visual Fields         Left Right     Full Full              Extraocular Movement         Right Left     Full, Ortho Full, Ortho              Neuro/Psych       Oriented x3: Yes    Mood/Affect: Normal              Dilation       Both eyes: 1.0% Mydriacyl, 2.5% Yunier Synephrine @ 7:16 PM                  Slit Lamp  and Fundus Exam       External Exam         Right Left    External Normal Normal              Slit Lamp Exam         Right Left    Lids/Lashes Normal Normal    Conjunctiva/Sclera White and quiet White and quiet    Cornea Clear Clear    Anterior Chamber Deep and quiet Deep and quiet    Iris Dilated Dilated    Lens Tr Ns Tr Ns    Anterior Vitreous Normal, amanda negative Normal, amanda negative              Fundus Exam         Right Left    Disc Normal Normal    C/D Ratio 0.5 0.6    Macula Normal Normal    Vessels Normal Normal    Periphery Normal, no heme, tears or detachments Normal, no heme, tears or detachments; rare microaneurysms                    Labs/Studies/Imaging Performed:  None     ASSESSMENT/PLAN:     Dali Martines is a 53 year old female who presents with CC of new, increased floaters. Reassuring exam with negative amanda each eye, no heme, tears, or detachments. No walls ring each eye. Likely represents impending posterior vitreous detachment.    #  Floaters, left eye  - No evidence of PVD, tears or detachments on exam  - Likely related to syneresis of vitreous v. Impending vitreous detachment  - Discussed RD return precautions  - Will schedule follow up in 4 - 6 weeks for OCT Mac and dilated fundus exam to confirm completion of PVD.    It is our pleasure to participate in this patient's care and treatment.  Please contact us with any further questions or concerns.      Patient seen and discussed with Dr. Ignacio Diaz.    Alexandre Fortune MD  Resident Physician, PGY-2  Department of Ophthalmology

## 2024-07-20 NOTE — DISCHARGE INSTRUCTIONS
Discharged from the emergency room with plans to follow-up with the ophthalmology clinic as discussed

## 2024-07-22 ENCOUNTER — VIRTUAL VISIT (OUTPATIENT)
Dept: GASTROENTEROLOGY | Facility: CLINIC | Age: 54
End: 2024-07-22
Attending: PHYSICIAN ASSISTANT
Payer: COMMERCIAL

## 2024-07-22 DIAGNOSIS — R11.2 NAUSEA AND VOMITING, UNSPECIFIED VOMITING TYPE: Primary | ICD-10-CM

## 2024-07-22 DIAGNOSIS — K29.40 AUTOIMMUNE GASTRITIS: ICD-10-CM

## 2024-07-22 PROCEDURE — 99214 OFFICE O/P EST MOD 30 MIN: CPT | Mod: 95 | Performed by: PHYSICIAN ASSISTANT

## 2024-07-22 NOTE — PROGRESS NOTES
GASTROENTEROLOGY Follow-up VIDEO VISIT    CC/REFERRING MD:    Dimitri Alas    REASON FOR CONSULTATION:   Khanh Arevalo for   Chief Complaint   Patient presents with    RECHECK       HISTORY OF PRESENT ILLNESS:    Dali Martines is a 53 year old female who is being evaluated via a billable video visit for follow-up.  To review, this is my first time speaking with patient.  She previously had been seen by Khanh Arevalo PA-C most recently in March of this year, as well as [facility] prior to that.  She has had a longstanding history of GI symptoms, describing symptoms of nausea, vomiting, bloating, and altered bowel habits.  Workup has included EGD, gastric emptying scan, abdominal ultrasound, and laboratory testing. It was Khanh's impression that her symptoms were multifactorial, suspected that there was intermittent delayed gastric emptying as well as functional dyspepsia present.  Episodic hypoglycemia seems to be contributing as well.  Lastly, the patient was noted to have a positive hydrogen breath test and so rifaximin was to be considered for treatment of SIBO.    Since last visit, she did undergo EGD with gastric mapping, given previous finding of possible atrophic gastritis.  This was again noted on all of her gastric biopsies, negative H. pylori, no dysplasia.  Recent laboratory testing showed normal levels of vitamin B12 and iron.  She has continued using Ozempic.  She has been on insulin pump now for several months, has had less episodes of hypoglycemia and in turn has had less nausea and vomiting.  She has not taken Xifaxan for SIBO, has concerns about affecting the gut microbiome.      I have reviewed and updated the patient's Past Medical History, Social History, Family History and Medication List.    Exam:    General appearance:  Healthy appearing adult, in no acute distress  Eyes:  Sclera anicteric, Pupils round and reactive to light  Ears, nose, mouth and throat:  No obvious  external lesions of ears and nose.  Hearing intact  Neck:  Symmetric, No obvious external lesions  Respiratory:  Normal respiration, no use of accessory muscles   MSK:  No visual upper extremity, neck or facial muscle atrophy  ABD:  No visual abdominal distention, no audible borborygmi  Skin:  No rashes or jaundice   Psychiatric:  Oriented to person, place and time, Appropriate mood and affect.   Neurologic:  Peripheral muscle function and dexterity appear to be intact      PERTINENT STUDIES have been reviewed.    ASSESSMENT/PLAN:    Dali Martines is a 53 year old female who presents for follow up of multiple upper GI symptoms, including intermittent nausea and vomiting, abdominal discomfort, and altered bowel habits.  We discussed her recent EGD, this did confirm the presence of atrophic gastritis.  Thus far, she does not appear to have nutritional deficiency related to this based on recent laboratory testing.  She did have positive parietal cell antibodies, but negative intrinsic blocking factor.  I recommended continued laboratory monitoring every 3 to 6 months to assess for deficiencies.  Will address them as needed.  We did review that a magnet is a risk factor for gastric cancer.  While there is no consensus on gastric cancer screening in this situation, I do think repeating in 3 years is reasonable, certainly sooner if new symptoms develop.    There has been improvement with nausea and vomiting as of late with the addition of insulin pump.  We did review that if we were to reassess for gastroparesis, she would need to hold Ozempic, so we will defer on this for now.  Plan for follow-up in 6 months, sooner as needed.    1. Nausea and vomiting, unspecified vomiting type    2. Autoimmune gastritis      Video-Visit Details    Video Visit Time: 35 minutes    Type of service:  Video Visit    Originating Location (pt. Location): Home    Distant Location (provider location):  Off-site    Platform used for Video  Visit: Mark Anthony    A total of 47 minutes was spent with reviewing the chart, discussing with the patient, documentation and coordination of care.    Larry Treviño PA-C  Division of Gastroenterology, Hepatology, and Nutrition  [facility]  [phone]    RT 6 months

## 2024-07-22 NOTE — NURSING NOTE
[facility] [address] [facility] [facility] [address]    Is the patient currently in the state of [place]? YES    Visit mode:VIDEO    If the visit is dropped, the patient can be reconnected by: VIDEO VISIT: Send to e-mail at: [email or web address]    Will anyone else be joining the visit? NO  (If patient encounters technical issues they should call [phone]    How would you like to obtain your AVS? MyChart    Are changes needed to the allergy or medication list? No    Are refills needed on medications prescribed by this physician? NO    Reason for visit: RECHECK    Joseph CORRIGAN

## 2024-07-23 ENCOUNTER — VIRTUAL VISIT (OUTPATIENT)
Dept: PSYCHOLOGY | Facility: CLINIC | Age: 54
End: 2024-07-23
Payer: COMMERCIAL

## 2024-07-23 DIAGNOSIS — R19.8 GI SYMPTOMS: ICD-10-CM

## 2024-07-23 DIAGNOSIS — F33.41 MAJOR DEPRESSIVE DISORDER, RECURRENT, IN PARTIAL REMISSION (H): ICD-10-CM

## 2024-07-23 DIAGNOSIS — G89.29 OTHER CHRONIC PAIN: ICD-10-CM

## 2024-07-23 DIAGNOSIS — F43.22 ADJUSTMENT DISORDER WITH ANXIETY: Primary | ICD-10-CM

## 2024-07-23 DIAGNOSIS — E10.3299 TYPE 1 DIABETES MELLITUS WITH MILD NONPROLIFERATIVE RETINOPATHY, MACULAR EDEMA PRESENCE UNSPECIFIED, UNSPECIFIED LATERALITY (H): ICD-10-CM

## 2024-07-23 PROCEDURE — 90834 PSYTX W PT 45 MINUTES: CPT | Mod: 95 | Performed by: PSYCHOLOGIST

## 2024-07-23 ASSESSMENT — ANXIETY QUESTIONNAIRES
6. BECOMING EASILY ANNOYED OR IRRITABLE: SEVERAL DAYS
1. FEELING NERVOUS, ANXIOUS, OR ON EDGE: MORE THAN HALF THE DAYS
2. NOT BEING ABLE TO STOP OR CONTROL WORRYING: SEVERAL DAYS
7. FEELING AFRAID AS IF SOMETHING AWFUL MIGHT HAPPEN: SEVERAL DAYS
8. IF YOU CHECKED OFF ANY PROBLEMS, HOW DIFFICULT HAVE THESE MADE IT FOR YOU TO DO YOUR WORK, TAKE CARE OF THINGS AT HOME, OR GET ALONG WITH OTHER PEOPLE?: SOMEWHAT DIFFICULT
GAD7 TOTAL SCORE: 7
5. BEING SO RESTLESS THAT IT IS HARD TO SIT STILL: NOT AT ALL
GAD7 TOTAL SCORE: 7
7. FEELING AFRAID AS IF SOMETHING AWFUL MIGHT HAPPEN: SEVERAL DAYS
3. WORRYING TOO MUCH ABOUT DIFFERENT THINGS: SEVERAL DAYS
IF YOU CHECKED OFF ANY PROBLEMS ON THIS QUESTIONNAIRE, HOW DIFFICULT HAVE THESE PROBLEMS MADE IT FOR YOU TO DO YOUR WORK, TAKE CARE OF THINGS AT HOME, OR GET ALONG WITH OTHER PEOPLE: SOMEWHAT DIFFICULT
4. TROUBLE RELAXING: SEVERAL DAYS

## 2024-07-23 NOTE — PROGRESS NOTES
Health Psychology          Latanya Mayfield, Ph.D.,  [phone]  Hansa Arzate, Ph.D.,  [phone]  Sarah Beth Jack, Ph.D.,  [phone]  Panfilo Cornejo, Ph.D., , [facility] [phone]  Krystle Hannah, Ph.D.,  [phone]  Elise Sutton, Ph.D., , [facility] [phone]    [facility], 3rd Floor  [address] [place] [address]       Health Psychology Progress Note    Patient Name: Dali Martines    Service Type: Individual  Length of Visit: 48 minutes  Start time: 10:19 AM - late start due to check-in process delays  Stop time: 11:07 AM       Attendees: patient attended alone  Session #: 18    Telemedicine Information:     Telemedicine Visit: The patient's condition can be safely assessed and treated via synchronous audio and visual telemedicine encounter.    Reason for Telemedicine Visit: Patient has requested telehealth visit and Patient convenience (e.g. access to timely appointments / distance to available provider)  Originating Site (Patient Location): Patient's [place], [place]  Distant Location (provider location):  On-site: [facility]   Consent:  The patient/guardian has verbally consented to: the potential risks and benefits of telemedicine (video visit) versus in person care; bill my insurance or make self-payment for services provided; and responsibility for payment of non-covered services.   Mode of Communication:  Video Conference via [employer]  As the provider I attest to compliance with applicable laws and regulations related to telemedicine.     Identifying Information and Presenting Problem:  Dali Martines is a 53 year old female adult who was referred to health psychology by her endocrinologist, Dr. Mckeon. Patient presented with concerns of stress in the context of multiple life events, several medical conditions, and chronic management of T1DM.    Topics Discussed/Interventions Provided:    Session Content:     Update:  Patient returning for follow-up today. Reports that she has been sick for the past few weeks and is still continuing to recover. She also notes ongoing stress related to her caregiver role and notes that she needed to take over the care of her mother's dog to help him recover from an infection. States that this has been an experience that gave her useful information about her capacity for caregiving when she is ill herself as well as how her mother would respond in the absence of her pet. Patient also notes that this experience has helped her re-calibrate her stress reactivity based on a reduced level of severity and acuity and also helped her address other needs that came to the forefront and needed to take priority. Patient also reports that she had a follow-up with her GI provider and was diagnosed with autoimmune gastritis.     Treatment plan review: Completed 90 day treatment plan review. Discussed progress and improvements made over time and priorities for next phase of treatment. Mutually agreed to focus on skills for challenging unhelpful thinking patterns that amplify anxiety and perfectionism as well as processing grief.      Homework Review: Patient reports that she accomplished some of her goals from the last visit. Notes that she continues to struggle with addressing house projects.       Skills/intervention: Continued to support patient in processing her thoughts and emotions around her health and caregiver concerns. Introduced CBT model and CBT principle of cognitive distortions.        Top values identified included: spirituality, humor, fairness, creativity, curiosity, contribution, and caring.  Unhelpful rules/assumptions: fear of failure, all-or-nothing thinking, and simplicity, control, and structure  Top priorities for grief: increasing tolerance of discomfort associated with uncertainty as well as identifying and processing emotions associated with the losses she has experienced.    Treatment  Objective(s) Addressed in This Session:  Psychological distress related to Diabetes  Psychological distress related to caregiver stress    Progress on / Status of Treatment Objective(s) / Homework:  In progress    Medication Adherence: Patient did not report medication changes. Current medication list is below:     Current Outpatient Medications   Medication Sig Dispense Refill    albuterol (PROAIR HFA/PROVENTIL HFA/VENTOLIN HFA) 108 (90 Base) MCG/ACT inhaler Inhale 2 puffs into the lungs every 4 hours as needed for shortness of breath / dyspnea or wheezing 1 Inhaler 11    aspirin 81 MG tablet Take 1 tablet by mouth daily.      blood glucose (ACCU-CHEK COLIN PLUS) test strip Test Blood Sugar 8 times daily or as directed 800 strip 3    blood glucose monitoring (ACCU-CHEK FASTCLIX) lancets Use to test blood sugar 8 times daily or as directed. 4 Box 3    blood glucose monitoring (NO BRAND SPECIFIED) meter device kit Use to test blood sugar 8 times daily or as directed. Any covered brand, 1 kit 0    Calcium Carbonate-Vitamin D (CALCIUM + D PO) Take one daily      citalopram (CELEXA) 20 MG tablet Take 1.5 tablets (30 mg) by mouth daily 135 tablet 1    Continuous Blood Gluc  (DEXCOM G6 ) SUZANNE Use to read blood sugars as per 's instructions. 1 each 0    Continuous Glucose Sensor (DEXCOM G6 SENSOR) MISC Change every 10 days. 9 each 3    Continuous Glucose Transmitter (DEXCOM G6 TRANSMITTER) MISC Change every 3 months. 1 each 3    diclofenac (VOLTAREN) 1 % topical gel Apply 2 g topically 4 times daily 100 g 4    diclofenac (VOLTAREN) 1 % topical gel APPLY 2 GRAMS ONTO THE SKIN FOUR TIMES DAILY AS NEEDED FOR MODERATE PAIN 100 g 5    econazole nitrate 1 % cream Apply 0.5 inches topically daily.      FIASP PENFILL 100 UNIT/ML SOCT INJECT UP TO 45 UNITS DAILY AS DIRECTED ACCORDING TO CORRECTION FACTOR. 45 mL 3    fish oil-omega-3 fatty acids 1000 MG capsule Take one daily      fluocinonide (LIDEX)  "0.05 % external solution Apply topically 2 times daily 60 mL 4    folic acid (FOLVITE) 1 MG tablet Take 1 tablet (1 mg) by mouth daily 90 tablet 3    Insulin Aspart, w/Niacinamide, (FIASP) 100 UNIT/ML SOLN Inject 50 Units as directed daily For use in ambulatory insulin pump as directed.  Approximate daily dose is 50 units. 20 mL 11    insulin degludec (TRESIBA FLEXTOUCH) 100 UNIT/ML pen ADMINISTER 8 UNITS UNDER THE SKIN DAILY. PLEASE DO NOT FILL TODAY ( 10/18/2023 ). 30 mL 1    Insulin Disposable Pump (OMNIPOD 5 G6 INTRO, GEN 5,) KIT 1 kit continuous 1 kit 0    Insulin Disposable Pump (OMNIPOD 5 G6 POD, GEN 5,) MISC 1 each every 3 days 30 each 3    insulin pen needle (B-D U/F) 31G X 8 MM miscellaneous Use 5 pen needles daily 450 each 3    insulin syringe-needle U-100 (30G X 1/2\" 1 ML) 30G X 1/2\" 1 ML miscellaneous Use 1 syringes as directed to draw up insulin for pump. Per pharmacy request. . 100 each 1    Ketoconazole (NIZORAL PO) Shampoo daily      levothyroxine (SYNTHROID/LEVOTHROID) 112 MCG tablet Take 1 tablet (112 mcg) by mouth daily 90 tablet 4    methotrexate 2.5 MG tablet Take 6 tablets (15 mg) by mouth every 7 days Labs every 8 - 12 weeks for refills. 72 tablet 0    methotrexate 2.5 MG tablet Take 6 tablets (15 mg) by mouth every 7 days Labs every 8 - 12 weeks for refills. 72 tablet 0    methotrexate 2.5 MG tablet Take 6 tablets (15 mg) by mouth every 7 days Labs required every 8-12 weeks while taking this medication 72 tablet 0    methylphenidate (METADATE CD) 20 MG CR capsule Take 20 mg by mouth daily      methylphenidate (RITALIN) 10 MG tablet TAKE UP TO 2 TABLETS BY MOUTH IN THE LATE AFTERNOON.      Minoxidil (ROGAINE EX) daily      mometasone-formoterol (DULERA) 100-5 MCG/ACT inhaler INHALE 2 PUFFS INTO THE LUNGS TWICE DAILY 13 g 11    montelukast (SINGULAIR) 10 MG tablet Take 1 tablet (10 mg) by mouth every evening for 180 days 30 tablet 5    Multiple Vitamin (MULTIVITAMIN OR) Take one daily tab      " ondansetron (ZOFRAN ODT) 4 MG ODT tab Take 1 tablet (4 mg) by mouth every 8 hours as needed for vomiting 30 tablet 1    predniSONE (DELTASONE) 20 MG tablet Take 1 tablet (20 mg) by mouth daily 5 tablet 0    Semaglutide, 1 MG/DOSE, (OZEMPIC) 4 MG/3ML pen Inject 1 mg Subcutaneous every 7 days 12 mL 3    simvastatin (ZOCOR) 20 MG tablet Take 1 tablet (20 mg) by mouth at bedtime 90 tablet 3    traZODone (DESYREL) 50 MG tablet Take 1-2 tablets by mouth nightly as needed for sleep. 180 tablet 0     Current Facility-Administered Medications   Medication Dose Route Frequency Provider Last Rate Last Admin    lidocaine 1 % injection 1 mL  1 mL   Tolu Fernández, DPM   1 mL at 11/09/22 1429    triamcinolone acetonide (KENALOG-10) injection 10 mg  10 mg   Tolu Fernández, DPM   10 mg at 11/09/22 1429     Assessment: The patient appeared to be active and engaged in today's session and was receptive to feedback.     Mental Status Exam:   Appearance: Appropriate   Eye Contact: Good    Orientation: Yes, x4  Behavior/relationship to provider/demeanor: Cooperative, Engaged, and Pleasant  Motor Activity: normal or unremarkable  Mood (subjective report): Stable  Affect (objective appearance): Appropriate/mood congruent  Speech Rate: Normal  Speech Volume: Normal  Speech Articulation: Normal  Speech Coherence: Normal  Speech Spontaneity: Normal  Thought Content: no suicidal/homicidal ideation, plans, or intent  Thought Process (associations): Logical, Linear, and Goal directed  Thought Process (rate): Normal  Abnormal Perception: None  Attention/Concentration: Normal  Memory: Appears grossly intact   Fund of Knowledge: Above average  Abstraction:  Normal  Insight:  Good  Judgment:  good    Does the patient appear to be at imminent risk of harm to self/others at this time? No    The session was necessary to address psychological distress in the context of multiple psychosocial stressors and chronic health  concerns.    Diagnoses:    1. Adjustment disorder with anxiety    2. Major depressive disorder, recurrent, in partial remission (H24)    3. 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 Other chronic pain    5. GI symptoms        Plan:    Follow-up video visit with me on 8/13/24 at 3:00pm  Patient to use 911 or other crisis resources in the event of a psychiatric emergency  Primary care needs and medications are managed by Dimitri Alas MD. Referring provider is Dr. Mckeon.  Next visit agenda/goals:   Continue with positive and pleasant activities, with a focus on brainstorming ways of connecting with broader social network  Continue to practice the diaphragmatic breathing exercise with a focus on intervening earlier in the stress-reactivity process  Review the handout on CBT model  Review the handout on cognitive distortions and tally them    Skills taught/interventions used thus far:  Psychoeducation  Values clarification  Committed action (vitality vs suffering)  Perfectionism workbook (modules 1-5)  Defusion  Acting opposite  Mindfulness (breath, body scan, watching thoughts, choiceless awareness)  Therapeutic journaling  Healthy Mind [place]    NOTE: Treatment plan update due 9/6/24; 90 day treatment plan review due 10/21/24.                                                Individual Treatment Plan    Patient's Name: Dali Martines  YOB: 1970    Date of Creation: 9/6/23  Date Treatment Plan Last Reviewed/Revised: 07/23/24     DSM5 Diagnoses:     1. Adjustment disorder with anxiety    2. Major depressive disorder, recurrent, in partial remission (H)      Psychosocial / Contextual Factors: financial hardship, health issues, occupational/vocational stress, relationship stress, caregiver stress (mother's health concerns), and grief and loss in the wake of recent bereavement  PROMIS (reviewed every 90  "days):      5/7/2024  8:49 AM   PROMIS 10    PROMIS TOTAL - SUBSCORES 20        Referral / Collaboration:  Collaboration with medical team and specialists as needed.    Anticipated number of session for this episode of care: 15-20  Anticipation frequency of session: 2x per month  Anticipated Duration of each session: 38-52 minutes  Treatment plan will be reviewed in 90 days or when goals have been changed.       Measurable Treatment Goal(s) related to diagnosis/functional impairment(s): Overall goals for treatment include improving her physical health (e.g., diabetes, physical activity, and GI discomfort), learning strategies for enhancing coping capacity, and decreasing avoidance-based coping.  Goal 1: Patient will decrease her use of avoidance-based coping   \"I will know I've met my goal when I am not avoiding doing things out of fear (e.g., bathroom sink, processing grief, physical activity, reviewing pieces of writing).\"     Objective #A (Patient Action)    Patient will learn and implement skills for reducing perfectionism.  Status: In progress - Date: 07/23/24 (patient has completed some of the perfectionism workbook and has learned some defusion strategies)     Intervention(s)  Therapist will teach anti-perfectionism skills and will assist patient in completing a workbook on perfectionism.    Objective #B  Patient will initiate a healthy grieving process  Status: In progress - Date: 07/23/24 (patient is learning emotional expression skills)    Intervention(s)  Therapist will assist patient in processing and exploring her grief.    Objective #C  Patient will increase her participation in meaningful and purposeful activities.  Status: Satisfactory progress - Date: 07/23/24 (patient reports progress in increasing her participation in meaningful activities, socializing, spending time in nature, and traveling)    Intervention(s)  Therapist will teach values clarification, committed action, and exposure " skills.    Objective #D  Patient will learn and implement unhooking skills for decreasing the degree to which fear influences behaviors and decisions in an unhelpful way.  Status: Satisfactory progress - Date: 07/23/24 (patient has been learning and implementing mindfulness and cognitive restructuring skills)     Intervention(s)  Therapist will teach defusion, cognitive restructuring, and mindfulness skills.      Patient has reviewed and agreed to the above plan.      Elise Sutton, PhD  September 6, 2023    Elise Sutton, Ph.D., , [facility]  Clinical Health Psychologist  Phone: [phone]   Pager: [phone]            no pedal edema

## 2024-07-24 ENCOUNTER — THERAPY VISIT (OUTPATIENT)
Dept: OCCUPATIONAL THERAPY | Facility: CLINIC | Age: 54
End: 2024-07-24
Payer: COMMERCIAL

## 2024-07-24 ENCOUNTER — THERAPY VISIT (OUTPATIENT)
Dept: PHYSICAL THERAPY | Facility: CLINIC | Age: 54
End: 2024-07-24
Payer: COMMERCIAL

## 2024-07-24 DIAGNOSIS — R42 DIZZINESS: ICD-10-CM

## 2024-07-24 DIAGNOSIS — R68.89 DECREASED FUNCTIONAL ACTIVITY TOLERANCE: Primary | ICD-10-CM

## 2024-07-24 DIAGNOSIS — G93.39 OTHER POST INFECTION AND RELATED FATIGUE SYNDROMES: ICD-10-CM

## 2024-07-24 DIAGNOSIS — R26.89 BALANCE DISORDER: Primary | ICD-10-CM

## 2024-07-24 PROCEDURE — 97530 THERAPEUTIC ACTIVITIES: CPT | Mod: GO

## 2024-07-24 PROCEDURE — 97112 NEUROMUSCULAR REEDUCATION: CPT | Mod: GP | Performed by: PHYSICAL THERAPIST

## 2024-07-30 DIAGNOSIS — E10.3291 TYPE 1 DIABETES MELLITUS WITH MILD NONPROLIFERATIVE RETINOPATHY OF RIGHT EYE WITHOUT MACULAR EDEMA (H): Primary | ICD-10-CM

## 2024-08-05 ENCOUNTER — TELEPHONE (OUTPATIENT)
Dept: ENDOCRINOLOGY | Facility: CLINIC | Age: 54
End: 2024-08-05
Payer: COMMERCIAL

## 2024-08-05 NOTE — TELEPHONE ENCOUNTER
Proactive pharmacy PA Request for Omnipod 5 pods through [employer]. Current PA expires 9/7/24              Please route determinations to the Pharm Diabetes pool ([id number]).      Thank you!    Diabetes Care Services Team   [facility]  [address] [place] [address]

## 2024-08-06 ENCOUNTER — THERAPY VISIT (OUTPATIENT)
Dept: PHYSICAL THERAPY | Facility: CLINIC | Age: 54
End: 2024-08-06
Payer: COMMERCIAL

## 2024-08-06 DIAGNOSIS — R42 DIZZINESS: ICD-10-CM

## 2024-08-06 DIAGNOSIS — R26.89 BALANCE DISORDER: Primary | ICD-10-CM

## 2024-08-06 PROCEDURE — 97112 NEUROMUSCULAR REEDUCATION: CPT | Mod: GP | Performed by: PHYSICAL THERAPIST

## 2024-08-06 NOTE — TELEPHONE ENCOUNTER
Central Prior Authorization Team   Phone: [phone]    PA Initiation    Medication: Omnipod 5  Insurance Company: [employer] - Phone [phone] Fax [phone]  Pharmacy Filling the Rx: [facility] - [place], [place] - [address]  Filling Pharmacy Phone: [phone]  Filling Pharmacy Fax:    Start Date: 8/6/2024

## 2024-08-07 NOTE — TELEPHONE ENCOUNTER
Prior Authorization Approval    Authorization Effective Date: 7/8/2024  Authorization Expiration Date: 8/7/2025  Medication: Omnipod 5-PA APPROVED   Approved Dose/Quantity: UP TO 30 PODS PER 30 DAYS   Reference #:     Insurance Company: [employer] Phone [phone] Fax [phone]  Expected CoPay:       CoPay Card Available:      Foundation Assistance Needed:    Which Pharmacy is filling the prescription (Not needed for infusion/clinic administered): [facility] - [place], [place] - [address]  Pharmacy Notified:  Yes  Patient Notified:  No

## 2024-08-08 ENCOUNTER — VIRTUAL VISIT (OUTPATIENT)
Dept: SLEEP MEDICINE | Facility: CLINIC | Age: 54
End: 2024-08-08
Payer: COMMERCIAL

## 2024-08-08 VITALS — WEIGHT: 170 LBS | HEIGHT: 70 IN | BODY MASS INDEX: 24.34 KG/M2

## 2024-08-08 DIAGNOSIS — F43.9 STRESS: Primary | ICD-10-CM

## 2024-08-08 DIAGNOSIS — F51.04 CHRONIC INSOMNIA: ICD-10-CM

## 2024-08-08 PROCEDURE — 90834 PSYTX W PT 45 MINUTES: CPT | Mod: 95 | Performed by: PSYCHOLOGIST

## 2024-08-08 ASSESSMENT — PAIN SCALES - GENERAL: PAINLEVEL: SEVERE PAIN (6)

## 2024-08-08 ASSESSMENT — SLEEP AND FATIGUE QUESTIONNAIRES
HOW LIKELY ARE YOU TO NOD OFF OR FALL ASLEEP WHILE SITTING QUIETLY AFTER LUNCH WITHOUT ALCOHOL: MODERATE CHANCE OF DOZING
HOW LIKELY ARE YOU TO NOD OFF OR FALL ASLEEP WHILE SITTING INACTIVE IN A PUBLIC PLACE: SLIGHT CHANCE OF DOZING
HOW LIKELY ARE YOU TO NOD OFF OR FALL ASLEEP WHILE WATCHING TV: HIGH CHANCE OF DOZING
HOW LIKELY ARE YOU TO NOD OFF OR FALL ASLEEP WHILE SITTING AND TALKING TO SOMEONE: SLIGHT CHANCE OF DOZING
HOW LIKELY ARE YOU TO NOD OFF OR FALL ASLEEP WHILE SITTING AND READING: MODERATE CHANCE OF DOZING
HOW LIKELY ARE YOU TO NOD OFF OR FALL ASLEEP WHILE LYING DOWN TO REST IN THE AFTERNOON WHEN CIRCUMSTANCES PERMIT: MODERATE CHANCE OF DOZING
HOW LIKELY ARE YOU TO NOD OFF OR FALL ASLEEP WHEN YOU ARE A PASSENGER IN A CAR FOR AN HOUR WITHOUT A BREAK: SLIGHT CHANCE OF DOZING
HOW LIKELY ARE YOU TO NOD OFF OR FALL ASLEEP IN A CAR, WHILE STOPPED FOR A FEW MINUTES IN TRAFFIC: WOULD NEVER DOZE

## 2024-08-08 NOTE — PROGRESS NOTES
Virtual Visit Details    Type of service:  Video Visit     Originating Location (pt. Location): Home    Distant Location (provider location):  Off-site  Platform used for Video Visit: [employer]    Visit Start Time:  3:35 PM  Visit End Time:4:25 PM       SLEEP MEDICINE VIRTUAL FOLLOW-UP VISIT  Behavioral Sleep Medicine    Patient Name: Dali Martines  MRN:  [id number]  Date of Service: Aug 8, 2024       Subjective Report     Dali Martines  returns for a telehealth video visit to discuss progress in implementing behavioral strategies for the management of insomnia and multiple  stressors affecting sleep and health. .  Patient consent for initiation of video visit was obtained and documented prior to initiation of visit.     Dali reports she has established and seen a PCP with focu on inflammatory issues of GI and impact of Hashimoto's thyroiditis. She now has an ongoing plan to monitor inflammation of esophagus with routine endoscopies. .     Sleep latency improved and on most days less than 30 minutes. Continued middle insomnia with EMA frequently greater than 30 minutes.  This may fluctuate with stress.  Daytime tiredness somewhat reduced.  No EDS.      With introduciton of insulpin pump therapy she has experienced significant reduction on hypoglycemic episodes with positive effect on overall energy and daytiime functioning.     Sleep Data:     Source of Sleep Estimates:  Verbal Self-report    Average Time in Bed: 7-8 hrs.  Average Total Sleep Time:  6-7 hrs  Sleep Efficiency estimate: 8=89%    EPWORTH SLEEPINESS SCALE    Sitting and reading 2   Watching TV 3   Sitting, inactive in a public place (theatre or mtg.) 1    As a passenger in a car 1   Lying down to rest in the afternoon when circumstance permit 2   Sitting and talking to someone 1   Sitting quietly after lunch without alcohol 2   In a car, while stopped for a few minutes in traffic 0   TOTAL SCORE (nl <11) 12     INSOMNIA SEVERITY INDEX     Difficulty  "falling asleep 2   Difficult staying asleep 3   Problems waking up to early 3   How SATISFIED/DISSATISFIED are you with your CURRENT sleep pattern? 3   How NOTICEABLE to others do you think your sleep pattern is in terms of your quality of life? 2   How WORRIED/DISTRESSED are you about your current sleep pattern? 1   To what extent do you consider your sleep problem to INTERFERE with your daily fuctioning(e.g. daytime fatigue, mood, ability to function at work/daily chores, concentration, mood,etc.) CURRENTLY? 3   INSOMNIA SEVERITY INDEX TOTAL SCORE 17    Absence of insomnia (0-7); sub-threshold insomnia (8-14); moderate insomnia (15-21); and severe insomnia (22-28)       Interventions     Strategies and recommendations including Stress management and Anxiety and sleep were reviewed and discussed today.     Services provided are compliant with the requirements of [place] Statute SS 256B.0625 Subd. 3b and paragraph (b)       Vital Signs     Ht 1.778 m (5' 10\")   Wt 77.1 kg (170 lb)   LMP  (LMP Unknown)   BMI 24.39 kg/m       Mental Status     Orientation:  X3  Mood:  normal  Affect:  Congruent with mood  Speech/Language:  Normal  Thought Process: Intact  Associations:  Normal  Thought Content: Normal  Patient does not report any suicidal ideation, intention or plan.    Diagnostic Impressions and Plan        Stress  Chronic insomnia,multi-factored associated with multiple stressors and health issues    At four month follow-up, sleep remains improved and stable with some continued sleep maintenance symptoms.  Focus now is on inpact of chronic multiple stressors including caregiving for mother, managing several health issues.    Plan:  Continue current sleep schedule and plan and continue with stress reduction and self care strategies for her role as caregiver to mother with dementia, her own health.    Follow-up: 4 weeks      Philip Quiroz PsyD, LP, DBSM  Diplomate, Behavioral Sleep Medicine  [facility] " [facility]      Note: This dictation was created using voice recognition software. This document may contain an error not identified before finalizing the document. If the error changes the accuracy of the document, I would appreciate it being brought to my attention.

## 2024-08-09 ENCOUNTER — OFFICE VISIT (OUTPATIENT)
Dept: RHEUMATOLOGY | Facility: CLINIC | Age: 54
End: 2024-08-09
Payer: COMMERCIAL

## 2024-08-09 VITALS
BODY MASS INDEX: 25.83 KG/M2 | OXYGEN SATURATION: 96 % | HEART RATE: 84 BPM | SYSTOLIC BLOOD PRESSURE: 121 MMHG | RESPIRATION RATE: 22 BRPM | DIASTOLIC BLOOD PRESSURE: 85 MMHG | WEIGHT: 180 LBS

## 2024-08-09 DIAGNOSIS — L40.50 PSORIATIC ARTHRITIS (H): ICD-10-CM

## 2024-08-09 DIAGNOSIS — M77.9 TENDINITIS: ICD-10-CM

## 2024-08-09 DIAGNOSIS — Z51.81 ENCOUNTER FOR THERAPEUTIC DRUG MONITORING: ICD-10-CM

## 2024-08-09 DIAGNOSIS — M25.50 PAIN IN JOINT, MULTIPLE SITES: ICD-10-CM

## 2024-08-09 PROCEDURE — 99214 OFFICE O/P EST MOD 30 MIN: CPT | Performed by: INTERNAL MEDICINE

## 2024-08-09 RX ORDER — METHOTREXATE 2.5 MG/1
15 TABLET ORAL
Qty: 72 TABLET | Refills: 1 | Status: SHIPPED | OUTPATIENT
Start: 2024-08-09

## 2024-08-09 RX ORDER — FOLIC ACID 1 MG/1
1 TABLET ORAL DAILY
Qty: 90 TABLET | Refills: 3 | Status: SHIPPED | OUTPATIENT
Start: 2024-08-09

## 2024-08-09 NOTE — PROGRESS NOTES
Follow-up visit for psoriatic arthritis.  Previously she was a patient of Dr. Arshad.    Trena is 53 years old and she reports that she is actually doing well regarding her joints.  Currently she is taking methotrexate 5 tablets once weekly, but previously she was on 6 tablets once weekly, plus folic acid 1 tablet daily.  She needs a refill of her methotrexate as the last refill only lasted for 3 months.    She does have some achy joints but she has not noticed any red, hot, swollen joints.  Her psoriasis is well-controlled for the most part.  She does have some aches and pains and sometimes some tendinitis but it usually only last for a day or 2 seem to affect different joints at different times.    Past medical history  Psoriasis  Psoriatic arthritis  GERD  Type 1 diabetes  Depression    We reviewed her most recent blood tests and essentially they were okay,, with a CBC that was normal except for slightly low hemoglobin 11.8.  Normal white count and normal platelets.  Chemistries, EGFR, LFTs were normal.    Physical examination  Joint examination shows that she has full range of motion of her hands wrist elbows and shoulders with no active synovitis.  Lower extremity exam shows no active synovitis in the knees ankles and feet.  The nails do not show any major pitting or onycholysis.    Impression/plan  1.  Psoriatic arthritis that seems to be well-controlled on methotrexate.  I suggest we continue the dose of 6 tablets = 5 mg once weekly plus folic acid 5 mg daily.  2.  I have reordered her standing orders for methotrexate monitoring to be done every 3 months including CBC, chemistries, ESR and CRP.  3.  Type 1 diabetes that seems to be better controlled with continuous monitoring.  4.  Follow-up in 6 months or earlier if needed.  If she were to develop an active flare of psoriatic arthritis characterized by a red, hot, swollen joint we should be able to evaluate this on short notice.    30 minutes spent on  the date of the encounter doing chart review, history and exam, documentation and further activities per the note.

## 2024-08-13 ENCOUNTER — VIRTUAL VISIT (OUTPATIENT)
Dept: PSYCHOLOGY | Facility: CLINIC | Age: 54
End: 2024-08-13
Payer: COMMERCIAL

## 2024-08-13 ENCOUNTER — VIRTUAL VISIT (OUTPATIENT)
Dept: EDUCATION SERVICES | Facility: CLINIC | Age: 54
End: 2024-08-13
Payer: COMMERCIAL

## 2024-08-13 DIAGNOSIS — E10.9 TYPE 1 DIABETES MELLITUS WITHOUT COMPLICATION (H): Primary | ICD-10-CM

## 2024-08-13 DIAGNOSIS — G89.29 OTHER CHRONIC PAIN: ICD-10-CM

## 2024-08-13 DIAGNOSIS — R19.8 GI SYMPTOMS: ICD-10-CM

## 2024-08-13 DIAGNOSIS — F33.41 MAJOR DEPRESSIVE DISORDER, RECURRENT, IN PARTIAL REMISSION (H): ICD-10-CM

## 2024-08-13 DIAGNOSIS — F43.22 ADJUSTMENT DISORDER WITH ANXIETY: Primary | ICD-10-CM

## 2024-08-13 DIAGNOSIS — E10.3299 TYPE 1 DIABETES MELLITUS WITH MILD NONPROLIFERATIVE RETINOPATHY, MACULAR EDEMA PRESENCE UNSPECIFIED, UNSPECIFIED LATERALITY (H): ICD-10-CM

## 2024-08-13 PROCEDURE — 90834 PSYTX W PT 45 MINUTES: CPT | Mod: 95 | Performed by: PSYCHOLOGIST

## 2024-08-13 PROCEDURE — G0108 DIAB MANAGE TRN  PER INDIV: HCPCS | Mod: 95 | Performed by: REGISTERED NURSE

## 2024-08-13 ASSESSMENT — ANXIETY QUESTIONNAIRES
3. WORRYING TOO MUCH ABOUT DIFFERENT THINGS: SEVERAL DAYS
2. NOT BEING ABLE TO STOP OR CONTROL WORRYING: SEVERAL DAYS
5. BEING SO RESTLESS THAT IT IS HARD TO SIT STILL: NOT AT ALL
7. FEELING AFRAID AS IF SOMETHING AWFUL MIGHT HAPPEN: MORE THAN HALF THE DAYS
GAD7 TOTAL SCORE: 7
7. FEELING AFRAID AS IF SOMETHING AWFUL MIGHT HAPPEN: MORE THAN HALF THE DAYS
4. TROUBLE RELAXING: SEVERAL DAYS
1. FEELING NERVOUS, ANXIOUS, OR ON EDGE: SEVERAL DAYS
IF YOU CHECKED OFF ANY PROBLEMS ON THIS QUESTIONNAIRE, HOW DIFFICULT HAVE THESE PROBLEMS MADE IT FOR YOU TO DO YOUR WORK, TAKE CARE OF THINGS AT HOME, OR GET ALONG WITH OTHER PEOPLE: SOMEWHAT DIFFICULT
8. IF YOU CHECKED OFF ANY PROBLEMS, HOW DIFFICULT HAVE THESE MADE IT FOR YOU TO DO YOUR WORK, TAKE CARE OF THINGS AT HOME, OR GET ALONG WITH OTHER PEOPLE?: SOMEWHAT DIFFICULT
6. BECOMING EASILY ANNOYED OR IRRITABLE: SEVERAL DAYS
GAD7 TOTAL SCORE: 7

## 2024-08-13 NOTE — NURSING NOTE
Is the patient currently in the state of [place]? YES    Current patient location: [address] [place] [facility] [address]    Visit mode:VIDEO    If the visit is dropped, the patient can be reconnected by: VIDEO VISIT: Text to cell phone:   Telephone Information:   Mobile [phone]       Will anyone else be joining the visit? No  (If patient encounters technical issues they should call [phone])    How would you like to obtain your AVS? MyChart    Are changes needed to the allergy or medication list? No    Rooming Documentation: Patient will complete qnrs in mychart.    Reason for visit: SHEKHAR Frank

## 2024-08-13 NOTE — NURSING NOTE
Current patient location: [place]    Is the patient currently in the state of [place]? YES    Visit mode:VIDEO    If the visit is dropped, the patient can be reconnected by: VIDEO VISIT: Send to e-mail at: [email or web address]    Will anyone else be joining the visit? NO  (If patient encounters technical issues they should call [phone]    How would you like to obtain your AVS? MyChart    Are changes needed to the allergy or medication list? No  Carly reported no changes to e-check in information for visit. VF did not review e-check in information again with Carly due to this.       Are refills needed on medications prescribed by this physician? NO    Rooming Documentation:  Patient noted she fell recently and is experiencing some knee pain. Provider informed via secure chat.      Reason for visit: RECHECK    Latanya CORRIGAN

## 2024-08-13 NOTE — PROGRESS NOTES
Health Psychology          Latanya Mayfield, Ph.D.,  [phone]  Hansa Arzate, Ph.D.,  [phone]  Sarah Beth Jack, Ph.D.,  [phone]  Panfilo Cornejo, Ph.D., , [facility] [phone]  Krystle Hannah, Ph.D.,  [phone]  Elise Sutton, Ph.D., , [facility] [phone]    [facility], 3rd Floor  [address] [place] [address]       Health Psychology Progress Note    Patient Name: Dali Martines    Service Type: Individual  Length of Visit: 49 minutes  Start time: 3:03 PM  Stop time: 3:52 PM        Attendees: patient attended alone  Session #: 19    Most Recent DA/update: 8/16/23    Telemedicine Information:     Telemedicine Visit: The patient's condition can be safely assessed and treated via synchronous audio and visual telemedicine encounter.    Reason for Telemedicine Visit: Patient has requested telehealth visit and Patient convenience (e.g. access to timely appointments / distance to available provider)  Originating Site (Patient Location): Patient's home, [place]  Distant Location (provider location):  Off-site: Provider's Home Office   Consent:  The patient/guardian has verbally consented to: the potential risks and benefits of telemedicine (video visit) versus in person care; bill my insurance or make self-payment for services provided; and responsibility for payment of non-covered services.   Mode of Communication:  Video Conference via [employer]  As the provider I attest to compliance with applicable laws and regulations related to telemedicine.     Identifying Information and Presenting Problem:  Dali Martines is a 53 year old female adult who was referred to health psychology by her endocrinologist, Dr. Mckeon. Patient presented with concerns of stress in the context of multiple life events, several medical conditions, and chronic management of T1DM.    Topics Discussed/Interventions Provided:    Session Content:     Update: Patient  returning for follow-up today. Reports that she tripped and fell on Sunday and landed on her left knee. Has some swelling and pain but has not lost too much mobility. Reports that she is going to allow it to heal on it's own but plans to follow-up with her medical team next week if her symptoms persist or don't sufficiently improve. Denies that she was having any vestibular symptoms immediately prior to the fall. States that she tripped over something in her environment. States that she has mostly recovered from her respiratory/viral infection. Still having some fatigue, postnasal drip, and hoarseness and some of her vestibular symptoms have amplified a bit. But is feeling better overall. Also reports that she had a positive and reassuring experience and follow-up with new GI provider recently.      Homework Review: Patient reports that she accomplished some of her goals from the last visit. Reviewed the following goals:     Continue with positive and pleasant activities, with a focus on brainstorming ways of connecting with broader social network - accomplished. Has been connecting with [facility].     Continue to practice the diaphragmatic breathing exercise with a focus on intervening earlier in the stress-reactivity process - accomplished. Has been practicing the diaphragmatic breathing exercise and notes that she is intervening earlier in the stress reactivity process. Discussed early warning signs (chest tightness, shallow breathing, holding breath). Patient noted that she may not be as attuned to some of her physiological responses that are earlier warning signs. Recommended revisiting body scan to improve attunement to physical sensations.     Review the handout on CBT model  - accomplished    Review the handout on cognitive distortions and tally them - reviewed but did not tally. Reviewed in session. Most common cognitive distortions patient identified are magical thinking, personalization, and  "\"should\" statements     Skills/intervention: Continued to support patient in processing her thoughts and emotions around her health, caregiver concerns, and grief. Introduced Socratic Questioning technique as a method of cognitive reappraisal. Discussed reservations about encouraging more thought analysis for patient and mutually agreed to continue to monitor this and whether defusion may be a preferred method.         Top values identified included: spirituality, humor, fairness, creativity, curiosity, contribution, and caring.  Unhelpful rules/assumptions: fear of failure, all-or-nothing thinking, and simplicity, control, and structure  Top priorities for grief: increasing tolerance of discomfort associated with uncertainty as well as identifying and processing emotions associated with the losses she has experienced.    Treatment Objective(s) Addressed in This Session:  Psychological distress related to Diabetes  Psychological distress related to caregiver stress    Progress on / Status of Treatment Objective(s) / Homework:  In progress    Medication Adherence: Patient did not report medication changes. Current medication list is below:     Current Outpatient Medications   Medication Sig Dispense Refill    albuterol (PROAIR HFA/PROVENTIL HFA/VENTOLIN HFA) 108 (90 Base) MCG/ACT inhaler Inhale 2 puffs into the lungs every 4 hours as needed for shortness of breath / dyspnea or wheezing 1 Inhaler 11    aspirin 81 MG tablet Take 1 tablet by mouth daily.      blood glucose (ACCU-CHEK COLIN PLUS) test strip Test Blood Sugar 8 times daily or as directed 800 strip 3    blood glucose monitoring (ACCU-CHEK FASTCLIX) lancets Use to test blood sugar 8 times daily or as directed. 4 Box 3    blood glucose monitoring (NO BRAND SPECIFIED) meter device kit Use to test blood sugar 8 times daily or as directed. Any covered brand, 1 kit 0    Calcium Carbonate-Vitamin D (CALCIUM + D PO) Take one daily      citalopram (CELEXA) 20 MG " "tablet Take 1.5 tablets (30 mg) by mouth daily 135 tablet 1    Continuous Blood Gluc  (DEXCOM G6 ) SUZANNE Use to read blood sugars as per 's instructions. 1 each 0    Continuous Glucose Sensor (DEXCOM G6 SENSOR) MISC Change every 10 days. 9 each 3    Continuous Glucose Transmitter (DEXCOM G6 TRANSMITTER) MISC Change every 3 months. 1 each 3    diclofenac (VOLTAREN) 1 % topical gel Apply 2 g topically 4 times daily 100 g 4    diclofenac (VOLTAREN) 1 % topical gel APPLY 2 GRAMS ONTO THE SKIN FOUR TIMES DAILY AS NEEDED FOR MODERATE PAIN 100 g 5    econazole nitrate 1 % cream Apply 0.5 inches topically daily.      FIASP PENFILL 100 UNIT/ML SOCT INJECT UP TO 45 UNITS DAILY AS DIRECTED ACCORDING TO CORRECTION FACTOR. 45 mL 3    fish oil-omega-3 fatty acids 1000 MG capsule Take one daily      fluocinonide (LIDEX) 0.05 % external solution Apply topically 2 times daily 60 mL 4    folic acid (FOLVITE) 1 MG tablet Take 1 tablet (1 mg) by mouth daily 90 tablet 3    Insulin Aspart, w/Niacinamide, (FIASP) 100 UNIT/ML SOLN Inject 50 Units as directed daily For use in ambulatory insulin pump as directed.  Approximate daily dose is 50 units. 20 mL 11    insulin degludec (TRESIBA FLEXTOUCH) 100 UNIT/ML pen ADMINISTER 8 UNITS UNDER THE SKIN DAILY. PLEASE DO NOT FILL TODAY ( 10/18/2023 ). 30 mL 1    Insulin Disposable Pump (OMNIPOD 5 G6 INTRO, GEN 5,) KIT 1 kit continuous 1 kit 0    Insulin Disposable Pump (OMNIPOD 5 G6 POD, GEN 5,) MISC 1 each every 3 days 30 each 3    insulin pen needle (B-D U/F) 31G X 8 MM miscellaneous Use 5 pen needles daily 450 each 3    insulin syringe-needle U-100 (30G X 1/2\" 1 ML) 30G X 1/2\" 1 ML miscellaneous Use 1 syringes as directed to draw up insulin for pump. Per pharmacy request. . 100 each 1    Ketoconazole (NIZORAL PO) Shampoo daily      levothyroxine (SYNTHROID/LEVOTHROID) 112 MCG tablet Take 1 tablet (112 mcg) by mouth daily 90 tablet 4    methotrexate 2.5 MG tablet Take 6 " tablets (15 mg) by mouth every 7 days Labs every 8 - 12 weeks for refills. 72 tablet 1    methotrexate 2.5 MG tablet Take 6 tablets (15 mg) by mouth every 7 days Labs every 8 - 12 weeks for refills. 72 tablet 0    methotrexate 2.5 MG tablet Take 6 tablets (15 mg) by mouth every 7 days Labs required every 8-12 weeks while taking this medication 72 tablet 0    methylphenidate (METADATE CD) 20 MG CR capsule Take 20 mg by mouth daily      methylphenidate (RITALIN) 10 MG tablet TAKE UP TO 2 TABLETS BY MOUTH IN THE LATE AFTERNOON.      Minoxidil (ROGAINE EX) daily      mometasone-formoterol (DULERA) 100-5 MCG/ACT inhaler INHALE 2 PUFFS INTO THE LUNGS TWICE DAILY 13 g 11    montelukast (SINGULAIR) 10 MG tablet Take 1 tablet (10 mg) by mouth every evening for 180 days 30 tablet 5    Multiple Vitamin (MULTIVITAMIN OR) Take one daily tab      ondansetron (ZOFRAN ODT) 4 MG ODT tab Take 1 tablet (4 mg) by mouth every 8 hours as needed for vomiting 30 tablet 1    predniSONE (DELTASONE) 20 MG tablet Take 1 tablet (20 mg) by mouth daily 5 tablet 0    Semaglutide, 1 MG/DOSE, (OZEMPIC) 4 MG/3ML pen Inject 1 mg Subcutaneous every 7 days 12 mL 3    simvastatin (ZOCOR) 20 MG tablet Take 1 tablet (20 mg) by mouth at bedtime 90 tablet 3    traZODone (DESYREL) 50 MG tablet Take 1-2 tablets by mouth nightly as needed for sleep. 180 tablet 0     Current Facility-Administered Medications   Medication Dose Route Frequency Provider Last Rate Last Admin    lidocaine 1 % injection 1 mL  1 mL   Tolu Fernández DPM   1 mL at 11/09/22 1429    triamcinolone acetonide (KENALOG-10) injection 10 mg  10 mg   Tolu Fernández DPM   10 mg at 11/09/22 1429     Assessment: The patient appeared to be active and engaged in today's session and was receptive to feedback.     Mental Status Exam:   Appearance: Appropriate   Eye Contact: Good    Orientation: Yes, x4  Behavior/relationship to provider/demeanor: Cooperative, Engaged, and  Pleasant  Motor Activity: normal or unremarkable  Mood (subjective report): Stable  Affect (objective appearance): Appropriate/mood congruent  Speech Rate: Normal  Speech Volume: Normal  Speech Articulation: Normal  Speech Coherence: Normal  Speech Spontaneity: Normal  Thought Content: no suicidal/homicidal ideation, plans, or intent  Thought Process (associations): Logical, Linear, and Goal directed  Thought Process (rate): Normal  Abnormal Perception: None  Attention/Concentration: Normal  Memory: Appears grossly intact   Fund of Knowledge: Above average  Abstraction:  Normal  Insight:  Good  Judgment:  good    Does the patient appear to be at imminent risk of harm to self/others at this time? No    The session was necessary to address psychological distress in the context of multiple psychosocial stressors and chronic health concerns.    Diagnoses:    1. Adjustment disorder with anxiety    2. Major depressive disorder, recurrent, in partial remission (H24)    3. 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 Other chronic pain    5. GI symptoms        Plan:    Follow-up video visit with me on 9/16/24 at 3:00pm  Patient to use 911 or other crisis resources in the event of a psychiatric emergency  Primary care needs and medications are managed by Dimitri Alas MD. Referring provider is Dr. Mckeon.  Next visit agenda/goals:   Continue with positive and pleasant activities, with a focus on brainstorming ways of connecting with broader social network  Continue to practice the diaphragmatic breathing exercise with a focus on intervening earlier in the stress-reactivity process  Practice body scan at least 3x per week  Try using the Socratic Questioning technique to challenge unhelpful thinking patterns    Skills taught/interventions used thus far:  Psychoeducation  Values clarification  Committed action (vitality vs  "suffering)  Perfectionism workbook (modules 1-5)  Defusion  Acting opposite  Mindfulness (breath, body scan, watching thoughts, choiceless awareness)  Therapeutic journaling  [employer]    NOTE: Treatment plan update due 9/6/24; 90 day treatment plan review due 10/21/24.                                                Individual Treatment Plan    Patient's Name: Dali Martines  YOB: 1970    Date of Creation: 9/6/23  Date Treatment Plan Last Reviewed/Revised: 07/23/24     DSM5 Diagnoses:     1. Adjustment disorder with anxiety    2. Major depressive disorder, recurrent, in partial remission (H)      Psychosocial / Contextual Factors: financial hardship, health issues, occupational/vocational stress, relationship stress, caregiver stress (mother's health concerns), and grief and loss in the wake of recent bereavement  PROMIS (reviewed every 90 days):      5/7/2024  8:49 AM   PROMIS 10    PROMIS TOTAL - SUBSCORES 20        Referral / Collaboration:  Collaboration with medical team and specialists as needed.    Anticipated number of session for this episode of care: 15-20  Anticipation frequency of session: 2x per month  Anticipated Duration of each session: 38-52 minutes  Treatment plan will be reviewed in 90 days or when goals have been changed.       Measurable Treatment Goal(s) related to diagnosis/functional impairment(s): Overall goals for treatment include improving her physical health (e.g., diabetes, physical activity, and GI discomfort), learning strategies for enhancing coping capacity, and decreasing avoidance-based coping.  Goal 1: Patient will decrease her use of avoidance-based coping   \"I will know I've met my goal when I am not avoiding doing things out of fear (e.g., bathroom sink, processing grief, physical activity, reviewing pieces of writing).\"     Objective #A (Patient Action)    Patient will learn and implement skills for reducing perfectionism.  Status: In progress - " Date: 07/23/24 (patient has completed some of the perfectionism workbook and has learned some defusion strategies)     Intervention(s)  Therapist will teach anti-perfectionism skills and will assist patient in completing a workbook on perfectionism.    Objective #B  Patient will initiate a healthy grieving process  Status: In progress - Date: 07/23/24 (patient is learning emotional expression skills)    Intervention(s)  Therapist will assist patient in processing and exploring her grief.    Objective #C  Patient will increase her participation in meaningful and purposeful activities.  Status: Satisfactory progress - Date: 07/23/24 (patient reports progress in increasing her participation in meaningful activities, socializing, spending time in nature, and traveling)    Intervention(s)  Therapist will teach values clarification, committed action, and exposure skills.    Objective #D  Patient will learn and implement unhooking skills for decreasing the degree to which fear influences behaviors and decisions in an unhelpful way.  Status: Satisfactory progress - Date: 07/23/24 (patient has been learning and implementing mindfulness and cognitive restructuring skills)     Intervention(s)  Therapist will teach defusion, cognitive restructuring, and mindfulness skills.      Patient has reviewed and agreed to the above plan.      Elise Sutton, PhD  September 6, 2023    Elise Sutton, Ph.D., , [facility]  Clinical Health Psychologist  Phone: [phone]   Pager: [phone]

## 2024-08-14 NOTE — PROGRESS NOTES
Video-Visit Details    Type of service:  Video Visit  Patient consented to video visit  Video Start Time:  1230  Video End Time:   1312  Originating Location (pt. Location): Home  Distant Location (provider location):  [facility] DIABETES EDUCATION [place]   Platform used for Video Visit: Glopho    Diabetes Self-Management Education & Support Visit- Short    Dali Martines presented today for education related to Type 1 diabetes    Patient is being treated with:  Omnipod 5 Insulin Pump    Year of diagnosis: 1988  Referring provider:  Felicia Mckeon MD  Living Situation: Lives alone and has a dog.  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  Currently not employed     Purpose of today's visit:  Follow up insulin pump management and ongoing DSME.   Dali has been using an insulin pump, the Omnipod 5 since September 2023.      Subjective/Objective:  Still recovering from a viral illness that has hung on for about a month.  Her perception regarding her diabetes control is that she is getting too much insulin with meals.  Having some post-prandial lows, although it's difficult to see this on her report.              Assessment/Plan:  She is having some high overnight glucoses, which she states are not related to eating at night.    It looks like she is sometimes dipping a few hours after meals, but it's noteworthy that often this is happening when she is bolusing late.  There are a couple of instances of her glucoses rising significantly following meals, but she correlates this with food choices, namely pizza.  She has tried using the extended bolus feature but finds that there is a lot of guesswork in choosing the right proportions and it necessitates taking the pump out of automated mode in order to use the extended bolus feature.      Discussed the importance of pre-bolusing, in order to get the best benefit from the hybrid closed loop system.  I think some  of the post-prandial lowness she is having is related to late bolusing.  Discussed only entering carbs if late in bolusing--leave the correction off.  This should help.  We relaxed her insulin to carb ratio slightly from 13 to 14, left her correction factor as is, and changed all of her bg targets to 110 with a correction threshold of 120.  Also changed the manual basal rate from 0.55 to 0.5 to more closely match what she is receiving in automated mode.      Follow-up:  Suggest follow up in one month.  Appointment made.       Time spent in this visit: 30 minutes     Any diabetes medication dose changes were made via the CDE Protocol and Collaborative Practice Agreement. A copy of this encounter was provided to the patient's referring provider.

## 2024-08-15 ENCOUNTER — OFFICE VISIT (OUTPATIENT)
Dept: OPHTHALMOLOGY | Facility: CLINIC | Age: 54
End: 2024-08-15
Attending: OPHTHALMOLOGY
Payer: COMMERCIAL

## 2024-08-15 DIAGNOSIS — E10.3291 TYPE 1 DIABETES MELLITUS WITH MILD NONPROLIFERATIVE RETINOPATHY OF RIGHT EYE WITHOUT MACULAR EDEMA (H): ICD-10-CM

## 2024-08-15 PROCEDURE — 99214 OFFICE O/P EST MOD 30 MIN: CPT | Performed by: OPHTHALMOLOGY

## 2024-08-15 PROCEDURE — 92083 EXTENDED VISUAL FIELD XM: CPT | Performed by: OPHTHALMOLOGY

## 2024-08-15 PROCEDURE — 92134 CPTRZ OPH DX IMG PST SGM RTA: CPT | Performed by: OPHTHALMOLOGY

## 2024-08-15 ASSESSMENT — TONOMETRY
OS_IOP_MMHG: 17
OD_IOP_MMHG: 17
IOP_METHOD: TONOPEN

## 2024-08-15 ASSESSMENT — EXTERNAL EXAM - LEFT EYE: OS_EXAM: NORMAL

## 2024-08-15 ASSESSMENT — EXTERNAL EXAM - RIGHT EYE: OD_EXAM: NORMAL

## 2024-08-15 ASSESSMENT — VISUAL ACUITY
METHOD: SNELLEN - LINEAR
OD_SC: 20/20
OS_SC: 20/20

## 2024-08-15 ASSESSMENT — SLIT LAMP EXAM - LIDS
COMMENTS: NORMAL
COMMENTS: NORMAL

## 2024-08-15 ASSESSMENT — CUP TO DISC RATIO
OS_RATIO: 0.6
OD_RATIO: 0.5

## 2024-08-15 NOTE — PROGRESS NOTES
CC: Diabetes follow up , glaucoma suspect    Interval:  States she has had some new floaters in the left eye for the past month or so. A month ago noted a long line left eye.  Recently diagnosed with PPPV vertigo.     Has also started using an insulin pump 09/2023.     HPI: Dali Martines is a 53 year old female with the following ophthalmologic problems:   diabetic retinopathy check and glaucoma suspect evaluation. New floaters left eye     Imaging   OCT macula 08/15/24   Right eye - normal foveal contour, no fluid, PHF attached - stable  Left eye - normal foveal contour, no fluid, PHF dettached; trace Epiretinal membrane  - stable    Optical Coherence Tomography retinal nerve fiber layer 2/1/24  Both eyes normal thickness and stable    OVF 08/15/24 Both eyes reliable and normal; right eye MD 1.2; left eye MD 0.1    ASSESSMENT/PLAN    # Posterior vitreous detachment (PVD) , left eye  New floaters over the past month  -  no tears or detachments on scleral depression  exam 360   Retina detachment precautions were discussed with the patient (presence or increased in flashes, floaters or a curtain in the visual field) and was asked to return if any of the those occur     # DM-1 with history of mild nonproliferative diabetic retinopathy right eye   No Diabetic retinopathy left eye   A1c Feb 2024 6.7  - Discussed importance of good diabetic control    # glaucoma suspect  C:D asymmetry  - no family hx of glaucoma  - IOP 19/19  - gonio exam 1/25/23 open to CB bilaterally  - intraocular pressure within normal limits, Optical Coherence Tomography retinal nerve fiber layer without thinning, visual field 24-2 within normal limits   Plan for optic nerve Optical Coherence Tomography nerve fiber layer with OVF 24-2 every other year or as needed     # H/o TIA-like vision loss RE in ~2008  - eval by Dr Schaffer felt to be migraine related rather than embolic or inflammatory    # Family hx of of macular degeneration  - father and  paternal aunt  - pt doesn't smoke    return to retina clinic: 6 months  with macula Optical Coherence Tomography ; optos autofluorescence   Retina detachment precautions were discussed with the patient (presence or increased in flashes, floaters or a curtain in the visual field) and was asked to return if any of the those occur     ~~~~~~~~~~~~~~~~~~~~~~~~~~~~~~~~~~   Complete documentation of historical and exam elements from today's encounter can be found in the full encounter summary report (not reduplicated in this progress note).  I personally obtained the chief complaint(s) and history of present illness.  I confirmed and edited as necessary the review of systems, past medical/surgical history, family history, social history, and examination findings as documented by others; and I examined the patient myself.  I personally reviewed the relevant tests, images, and reports as documented above.  I formulated and edited as necessary the assessment and plan and discussed the findings and management plan with the patient and family    Naz Lira MD   of Ophthalmology.  Retina Service   Department of Ophthalmology and Visual Neurosciences   [facility]  Phone: [phone]   Fax: [phone]

## 2024-08-20 ENCOUNTER — TELEPHONE (OUTPATIENT)
Dept: ENDOCRINOLOGY | Facility: CLINIC | Age: 54
End: 2024-08-20
Payer: COMMERCIAL

## 2024-08-20 NOTE — TELEPHONE ENCOUNTER
[facility] Call Center    Phone Message    May a detailed message be left on voicemail: yes     Reason for Call: Other: Pt requesting call back, pt is wanting to confirm the PA was approved for her omnipod

## 2024-08-21 ENCOUNTER — THERAPY VISIT (OUTPATIENT)
Dept: PHYSICAL THERAPY | Facility: CLINIC | Age: 54
End: 2024-08-21
Payer: COMMERCIAL

## 2024-08-21 ENCOUNTER — THERAPY VISIT (OUTPATIENT)
Dept: OCCUPATIONAL THERAPY | Facility: CLINIC | Age: 54
End: 2024-08-21
Payer: COMMERCIAL

## 2024-08-21 DIAGNOSIS — R68.89 DECREASED FUNCTIONAL ACTIVITY TOLERANCE: Primary | ICD-10-CM

## 2024-08-21 DIAGNOSIS — R42 DIZZINESS: ICD-10-CM

## 2024-08-21 DIAGNOSIS — R26.89 BALANCE DISORDER: Primary | ICD-10-CM

## 2024-08-21 DIAGNOSIS — G93.39 OTHER POST INFECTION AND RELATED FATIGUE SYNDROMES: ICD-10-CM

## 2024-08-21 PROCEDURE — 97530 THERAPEUTIC ACTIVITIES: CPT | Mod: GO

## 2024-08-21 PROCEDURE — 97112 NEUROMUSCULAR REEDUCATION: CPT | Mod: GP | Performed by: PHYSICAL THERAPIST

## 2024-08-21 NOTE — PROGRESS NOTES
08/21/24 0500   Appointment Info   Signing clinician's name / credentials JA Cornell DPT   Visits Used 23   Medical Diagnosis Dizziness   PT Tx Diagnosis Dizziness and imbalance   Progress Note/Certification   Onset of illness/injury or Date of Surgery 07/18/23  (date of order, began in June 2022)   Therapy Frequency 1x/week   Predicted Duration 60 days   PT Goal 1   Goal Identifier DHI   Goal Description The pt will score <25/100 on the DHI indicating a reduction in subjective dizziness   Goal Progress 24/100 on 2/21/24   Target Date 01/07/24   Date Met 01/03/24   PT Goal 2   Goal Identifier FGA   Goal Description The pt will score >24/30 on the FGA indicating improvement in dynamic balance   Target Date 05/15/24   Date Met 05/04/24   PT Goal 3   Goal Identifier HEP   Goal Description The pt will be independent with a HEP in order to improve self management of symptoms   Goal Progress In progress, will plan 1-2 final sessions to finalize HEP   Target Date 08/20/24   PT Goal 4   Goal Identifier SOT   Goal Description The pt will improve composite score on SOT to >70, within age group norms in order to improve sensory integration and reducing overall dizziness   Goal Progress Improved composite to 69, slightly below age group norms   Target Date 08/20/24   Date Met 08/21/24   Subjective Report   Subjective Report Continues to feel better following sinus infection/illness. Had a couple episodes of dizziness wtih rolling. Had a few days last week where she didn't want to move, so sat still and finally found improvement when she tried exercises.   Neuromuscular Re-education   Neuromuscular re-ed of mvmt, balance, coord, kinesthetic sense, posture, proprioception minutes ([id number]) 40   Neuro Re-ed 1 - Details Completed SOT. Composite score improved to 71 which is above age group norm. Continues to have difficulty with visual stimuli. Cmpleted 2 min of driving scene in [facility] with windshield wipers and dynamic  obstacles. Cues for core activation, increased dizziness. Encouarged pt continue exercises but increase the speed. Also watching tennis on TV with weight shifts.   Plan   Plan for next session 1-2 sessions to finalize HEP   Total Session Time   Timed Code Treatment Minutes 40   Total Treatment Time (sum of timed and untimed services) 40         PLAN  Continue therapy per current plan of care.    Beginning/End Dates of Progress Note Reporting Period:    5/15/24 to 08/21/2024    Referring Provider:  Rick L Nissen

## 2024-08-22 NOTE — PROGRESS NOTES
08/21/24 0500   Appointment Info   Treating Provider Shannan Can OTR/L   Visits Used 18 (10/10 PN)   Medical Diagnosis Other post infection and related fatigue syndromes   OT Tx Diagnosis Alteration in IADLs   Other pertinent information no cognition; no sensory   Progress Note/Certification   Start Of Care Date 11/06/23   Onset of Illness/Injury or Date of Surgery 10/25/23   Therapy Frequency 2x/month   Predicted Duration Up to 90 days   Goals   OT Goals 1;2;3   OT Goal 1   Goal Identifier Fatigue   Goal Description Patient to verbalize 3 strategies for energy conservation/work simplification and fatigue management for improved independence with IADL/leisure activities and increase their FACIT - Fatigue score by 4 points for increased activity level.   Goal Progress As of 2/22/24: FACIT scores have improved from 21 to 30, indicating improved energy levels. Patient has been receptive to training and implements learns strategies related to energy management. She is more aware of symptoms and fatigue levels and setting boundaries to take breaks. She implements energy management strategies including planning, prioritizing, and pacing activites in relation to symptoms/energy levels.  She is becoming more aware she needs to plan her day in relation to her physical/cognitive/emotional capacity and symptoms. She reports she is working on blocking time to complete activities coupled with rest. She has identified energy boosting activities include walking her dog.   Target Date 09/05/24   OT Goal 2   Goal Identifier Cognitive Fatigue and Executive Functioning   Goal Description Client will successfully report at least two strategies for cognitive fatigue and demonstrate the ability to complete complex problem-solving tasks (e.g. errands, multi-step planning/scheduling, etc.) at 95%+ accuracy to promote organization, planning, and sequencing skills for home, work, and community tasks (e.g. financial management,  Okay for patient to stop antibiotics if she feels her UTI symptoms have resolved.  Macrobid does not typically cause abdominal pain or hypokalemia.  Those symptoms are more likely related to viral illness.   cooking, work) using strategies PRN.   Goal Progress Goal met pt is learning to implement strategies to improve attention/concentration and minimize impact of cognitive fatigue including minizing distractions, time management principles including setting a timer for concentrated work coupled with rest, performing when she is performing optimally, and alternating physical and cognitive tasks   Target Date 07/26/24   Date Met 07/24/24   OT Goal 3   Goal Identifier Memory and Attention   Goal Description Pt will verbalize understanding about strategies to help with improving attention (concentration) and internal and external strategies to help with memory and recall into daily routine for improved ADL/IADL performance   Goal Progress Goal met; She reflects on how she has gained insight to benefits of implementing memory strategies to aid recall with daily to-do's and help her stay organized. She endoreses she has implemented these strategies into her daily routine and it is becoming a habit.. Discussed the role of OT related to fatigue management importance of implementing into daily life and will review strategies next session and anticipating discharge She has noticed difficulties with paying attention to details dueing IADL's.   Target Date 07/26/24   Date Met 07/24/24   Subjective Report   Subjective Report Pt seen prior to physical therapy. She notes she has noticed a discernable difference in cogntiive functioning since last neuropsych testing ~2020. She is considering returning to work but this is daunting as she has not worked in 4 years   Objective Measure 1   Objective Measure FACIT   Details 2/22/24: 30 (improved from 11/6/2023)   Therapeutic Activity   Therapeutic Activity Minutes (01963) 30   Ther Act 1 - Details Skilled follow up and progression of strategies to manage fatigue and cognitive function to optimize occupational performance. Pt educated on managing cognitive fatigue anticipating work  "including: proritizing \"hard\" or \"important\" cognitively-demanding tasks when you have greater energy, remove uncessary distractions, prepare for the week/the night before,  implementing breaks, incorporating movement/physical breaks  to help sharpens your focus and enhances concentration as well as low level simulation breaks, and dividing projects  into smaller more manageable parts, 100% comprehension. Due to concerns wtih cognition, discussed requesting a neurospych evaluation to compare to previous results and pt in agreement. 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   Education   Learner/Method Patient;Listening;Demonstration   Plan   Plan for next session f/u; d/c   Comments   Comments Progress: Pt has been seen for 18 outpatient occupational therapy sessions since start of care. She is also seeing PT for . She states she her energy levels and cognitive functioning has improved since beginning therapy, however, she recently notes she feels like her cognitive funcitoning has declined since last neuropsych testing ~2020 and will pursue another test. She is gaining insight when to establish boundaries and taking a break and make time for herself as she cares for her mother who has dementia. She states she is implementing cognitive strategies including using calendar, writing reminders/setting alerts, and setting boundaries with improved ease, efficiency and accuracy with daily tasks. She states she is learning to accept the need to use memory strategies and gaining insight on the benefits and how we are not wired to remember everything. She walks the dog and enjoys working in her garden.She has received training on impact of high stress/emotional demanding tasks such as taking care of mother is taxing and takes up cognitive reserve. Pt has been educated on strategies to manage stress and anxiety including " meditation, deep breathing, yoga, guided imager and explored apps if interested. She states she has more tools in her tool box to manage her fatigue.   Total Session Time   Timed Code Treatment Minutes 30   Total Treatment Time (sum of timed and untimed services) 30         PLAN  Continue therapy per current plan of care.    Beginning/End Dates of Progress Note Reporting Period:    2/22/24 to 08/21/2024    Referring Provider:  Rick L Nissen

## 2024-08-23 ENCOUNTER — TELEPHONE (OUTPATIENT)
Dept: ENDOCRINOLOGY | Facility: CLINIC | Age: 54
End: 2024-08-23

## 2024-08-23 ENCOUNTER — LAB (OUTPATIENT)
Dept: LAB | Facility: CLINIC | Age: 54
End: 2024-08-23
Payer: COMMERCIAL

## 2024-08-23 DIAGNOSIS — L40.50 PSORIATIC ARTHRITIS (H): ICD-10-CM

## 2024-08-23 LAB
ALBUMIN SERPL BCG-MCNC: 4.4 G/DL (ref 3.5–5.2)
ALP SERPL-CCNC: 94 U/L (ref 40–150)
ALT SERPL W P-5'-P-CCNC: 25 U/L (ref 0–50)
ANION GAP SERPL CALCULATED.3IONS-SCNC: 11 MMOL/L (ref 7–15)
AST SERPL W P-5'-P-CCNC: 29 U/L (ref 0–45)
BASOPHILS # BLD AUTO: 0 10E3/UL (ref 0–0.2)
BASOPHILS NFR BLD AUTO: 0 %
BILIRUB SERPL-MCNC: 0.2 MG/DL
BUN SERPL-MCNC: 8 MG/DL (ref 6–20)
CALCIUM SERPL-MCNC: 9.3 MG/DL (ref 8.8–10.4)
CHLORIDE SERPL-SCNC: 102 MMOL/L (ref 98–107)
CREAT SERPL-MCNC: 0.6 MG/DL (ref 0.51–0.95)
CRP SERPL-MCNC: <3 MG/L
EGFRCR SERPLBLD CKD-EPI 2021: >90 ML/MIN/1.73M2
EOSINOPHIL # BLD AUTO: 0.1 10E3/UL (ref 0–0.7)
EOSINOPHIL NFR BLD AUTO: 2 %
ERYTHROCYTE [DISTWIDTH] IN BLOOD BY AUTOMATED COUNT: 13.3 % (ref 10–15)
ERYTHROCYTE [SEDIMENTATION RATE] IN BLOOD BY WESTERGREN METHOD: 33 MM/HR (ref 0–30)
GLUCOSE SERPL-MCNC: 159 MG/DL (ref 70–99)
HCO3 SERPL-SCNC: 24 MMOL/L (ref 22–29)
HCT VFR BLD AUTO: 32 % (ref 35–47)
HGB BLD-MCNC: 10.8 G/DL (ref 11.7–15.7)
IMM GRANULOCYTES # BLD: 0 10E3/UL
IMM GRANULOCYTES NFR BLD: 0 %
LYMPHOCYTES # BLD AUTO: 2.4 10E3/UL (ref 0.8–5.3)
LYMPHOCYTES NFR BLD AUTO: 45 %
MCH RBC QN AUTO: 31.4 PG (ref 26.5–33)
MCHC RBC AUTO-ENTMCNC: 33.8 G/DL (ref 31.5–36.5)
MCV RBC AUTO: 93 FL (ref 78–100)
MONOCYTES # BLD AUTO: 0.4 10E3/UL (ref 0–1.3)
MONOCYTES NFR BLD AUTO: 8 %
NEUTROPHILS # BLD AUTO: 2.5 10E3/UL (ref 1.6–8.3)
NEUTROPHILS NFR BLD AUTO: 45 %
PLATELET # BLD AUTO: 305 10E3/UL (ref 150–450)
POTASSIUM SERPL-SCNC: 4.1 MMOL/L (ref 3.4–5.3)
PROT SERPL-MCNC: 7.3 G/DL (ref 6.4–8.3)
RBC # BLD AUTO: 3.44 10E6/UL (ref 3.8–5.2)
SODIUM SERPL-SCNC: 137 MMOL/L (ref 135–145)
WBC # BLD AUTO: 5.4 10E3/UL (ref 4–11)

## 2024-08-23 PROCEDURE — 36415 COLL VENOUS BLD VENIPUNCTURE: CPT

## 2024-08-23 PROCEDURE — 86140 C-REACTIVE PROTEIN: CPT

## 2024-08-23 PROCEDURE — 85652 RBC SED RATE AUTOMATED: CPT

## 2024-08-23 PROCEDURE — 80053 COMPREHEN METABOLIC PANEL: CPT

## 2024-08-23 PROCEDURE — 85025 COMPLETE CBC W/AUTO DIFF WBC: CPT

## 2024-08-23 NOTE — TELEPHONE ENCOUNTER
{Screenshot of Product, Insurance, and Cardholder ID)      Please route determinations to the Pharm Diabetes pool ([id number]).      Thank you!    Diabetes Care Services Team   [facility]  [address] [place] [address]

## 2024-08-23 NOTE — TELEPHONE ENCOUNTER
{Screenshot of Product, Insurance, and Cardholder ID)      Please route determinations to the Pharm Diabetes pool ([id number]).      Thank you!    Diabetes Care Services Team   [facility]  [address] [place] [address]

## 2024-08-26 NOTE — TELEPHONE ENCOUNTER
PA Initiation    Medication: DEXCOM G6 TRANSMITTER MISC  Insurance Company: [employer] - Phone [phone] Fax [phone]  Pharmacy Filling the Rx: [facility] - [place], [place] - [facility] [address]  Filling Pharmacy Phone: [phone]  Filling Pharmacy Fax: [phone]  Start Date: 8/26/2024

## 2024-08-26 NOTE — TELEPHONE ENCOUNTER
PA Initiation    Medication: DEXCOM G6 SENSOR MISC  Insurance Company: [employer] - Phone [phone] Fax [phone]  Pharmacy Filling the Rx: [facility] - [place], [place] - [facility] [address]  Filling Pharmacy Phone: [phone]  Filling Pharmacy Fax: [phone]  Start Date: 8/26/2024

## 2024-08-28 NOTE — TELEPHONE ENCOUNTER
Prior Authorization Approval    Medication: DEXCOM G6 TRANSMITTER MISC  Authorization Effective Date: 7/28/2024  Authorization Expiration Date: 8/27/2025  Approved Dose/Quantity:       Reference #:     Insurance Company: [employer] - Phone [phone] Fax [phone]  Expected CoPay: $    CoPay Card Available:      Financial Assistance Needed:   Which Pharmacy is filling the prescription: [facility] - [place], [place] - [address]  Pharmacy Notified: YES  Patient Notified: Instructed pharmacy to notify patient once order is ready.

## 2024-08-28 NOTE — TELEPHONE ENCOUNTER
Prior Authorization Approval    Medication: DEXCOM G6 SENSOR MISC  Authorization Effective Date: 7/28/2024  Authorization Expiration Date: 8/27/2025  Approved Dose/Quantity:     Reference #:     Insurance Company: [employer] - Phone [phone] Fax [phone]  Expected CoPay: $    CoPay Card Available:      Financial Assistance Needed:   Which Pharmacy is filling the prescription: [facility] - [place], [place] - [address]  Pharmacy Notified: YES  Patient Notified: Instructed pharmacy to notify patient once order is ready.

## 2024-09-03 DIAGNOSIS — E10.3299 TYPE 1 DIABETES MELLITUS WITH MILD NONPROLIFERATIVE RETINOPATHY, MACULAR EDEMA PRESENCE UNSPECIFIED, UNSPECIFIED LATERALITY (H): ICD-10-CM

## 2024-09-04 ENCOUNTER — THERAPY VISIT (OUTPATIENT)
Dept: PHYSICAL THERAPY | Facility: CLINIC | Age: 54
End: 2024-09-04
Payer: COMMERCIAL

## 2024-09-04 DIAGNOSIS — R42 DIZZINESS: ICD-10-CM

## 2024-09-04 DIAGNOSIS — R26.89 BALANCE DISORDER: ICD-10-CM

## 2024-09-04 PROCEDURE — 97112 NEUROMUSCULAR REEDUCATION: CPT | Mod: GP | Performed by: PHYSICAL THERAPIST

## 2024-09-04 RX ORDER — INSULIN ASPART INJECTION 100 [IU]/ML
45 INJECTION, SOLUTION SUBCUTANEOUS SEE ADMIN INSTRUCTIONS
Qty: 45 ML | Refills: 3 | Status: SHIPPED | OUTPATIENT
Start: 2024-09-04

## 2024-09-04 NOTE — TELEPHONE ENCOUNTER
FIASP PENFILL 100 UNIT/ML SOC         Last Written Prescription Date:  11/16/22  Last Fill Quantity: 45 ml,   # refills: 3  Last Office Visit : 3/4/24  Future Office visit:  9/13/24    Routing refill request to provider for review/approval because:  Insulin and insulin pump supplies - refilled per Endocrine clinic.

## 2024-09-12 ENCOUNTER — TELEPHONE (OUTPATIENT)
Dept: ENDOCRINOLOGY | Facility: CLINIC | Age: 54
End: 2024-09-12
Payer: COMMERCIAL

## 2024-09-12 PROBLEM — G89.29 CHRONIC BILATERAL LOW BACK PAIN WITHOUT SCIATICA: Status: RESOLVED | Noted: 2023-05-25 | Resolved: 2024-09-12

## 2024-09-12 PROBLEM — M54.50 CHRONIC BILATERAL LOW BACK PAIN WITHOUT SCIATICA: Status: RESOLVED | Noted: 2023-05-25 | Resolved: 2024-09-12

## 2024-09-12 NOTE — PROGRESS NOTES
Patient did not return for further treatment and no additional progress was noted.  Please refer to the progress note and goal flowsheet completed on 09/06/23 for discharge information.

## 2024-09-12 NOTE — PROGRESS NOTES
Virtual Visit Details    Type of service:  Video Visit     Originating Location (pt. Location): Home    Distant Location (provider location):  On-site  Platform used for Video Visit: Mark Anthony      This 53 year old woman was returns for f/u of her type 1 diabetes. She also has hypothyroidism and psoriatic arthritis.  Pt was dx having type 1 diabetes at age 15. Her hx is also significant for mild NPDR right eye only,anxiety, hyperlipidemia, psoriatic arthritis,  and GERD.  She started using an omnipod 5 a few months ago. Data are below.  She continues to take tresiba 8 units daily and Ozempic 1 mg each 1-2week.  She very much likes her pump and feels that it has not had a dramatic effect on her glucose control.  It has been especially helpful in avoiding hypoglycemia and she now thinks that many of her GI symptoms occur when she becomes hypoglycemic.  If she avoids hypoglycemia, she does not have as much GI upset.  She has been working with Trena Viera on getting her pump settings correct.  She was quite high through much of July because she was sick with a sinus infection but more recently her sugars have come down.  She does recognize her hypoglycemia.  She has not had severe hypoglycemia.  Last week she had trouble getting sensors so she was without the hybrid closed-loop for a time. she is fatigued so does not get as much exercise as she did in the past.  She thinks this might be related to the anemia she has experienced from her methotrexate treatment.  She also really likes the Ozempic.  She thinks it helps with her glucose control.  She does not have GI symptoms when she takes it but she admits she has been taking at least 1 week off a month.  She thinks she feels better overall if she is not taking it every week.  She does not have recurrent GI symptoms when she takes it after a 2-week period between injections.    She is due to see her eye doctor but has no problems today.  She denies paresthesias.  She does  have pain in her feet from arthritis.  She denies chest pain or shortness of breath.  Blood Glucose Monitoring :                         Pump Settings:           Current Outpatient Medications   Medication Sig Dispense Refill    albuterol (PROAIR HFA/PROVENTIL HFA/VENTOLIN HFA) 108 (90 Base) MCG/ACT inhaler Inhale 2 puffs into the lungs every 4 hours as needed for shortness of breath / dyspnea or wheezing 1 Inhaler 11    aspirin 81 MG tablet Take 1 tablet by mouth daily.      blood glucose (ACCU-CHEK COLIN PLUS) test strip Test Blood Sugar 8 times daily or as directed 800 strip 3    blood glucose monitoring (ACCU-CHEK FASTCLIX) lancets Use to test blood sugar 8 times daily or as directed. 4 Box 3    blood glucose monitoring (NO BRAND SPECIFIED) meter device kit Use to test blood sugar 8 times daily or as directed. Any covered brand, 1 kit 0    Calcium Carbonate-Vitamin D (CALCIUM + D PO) Take one daily      citalopram (CELEXA) 20 MG tablet Take 1.5 tablets (30 mg) by mouth daily 135 tablet 1    Continuous Blood Gluc  (DEXCOM G6 ) SUZANNE Use to read blood sugars as per 's instructions. 1 each 0    Continuous Glucose Sensor (DEXCOM G6 SENSOR) MISC Change every 10 days. 9 each 3    Continuous Glucose Transmitter (DEXCOM G6 TRANSMITTER) MISC Change every 3 months. 1 each 3    diclofenac (VOLTAREN) 1 % topical gel Apply 2 g topically 4 times daily 100 g 4    diclofenac (VOLTAREN) 1 % topical gel APPLY 2 GRAMS ONTO THE SKIN FOUR TIMES DAILY AS NEEDED FOR MODERATE PAIN 100 g 5    econazole nitrate 1 % cream Apply 0.5 inches topically daily.      fish oil-omega-3 fatty acids 1000 MG capsule Take one daily      fluocinonide (LIDEX) 0.05 % external solution Apply topically 2 times daily 60 mL 4    folic acid (FOLVITE) 1 MG tablet Take 1 tablet (1 mg) by mouth daily 90 tablet 3    Insulin Aspart, w/Niacinamide, (FIASP PENFILL) 100 UNIT/ML SOCT 45 Units by Other route See Admin Instructions. Via  "insulin device. Approx 45 units daily as directed. 45 mL 3    Insulin Aspart, w/Niacinamide, (FIASP) 100 UNIT/ML SOLN Inject 50 Units as directed daily For use in ambulatory insulin pump as directed.  Approximate daily dose is 50 units. 20 mL 11    insulin degludec (TRESIBA FLEXTOUCH) 100 UNIT/ML pen ADMINISTER 8 UNITS UNDER THE SKIN DAILY. PLEASE DO NOT FILL TODAY ( 10/18/2023 ). 30 mL 1    Insulin Disposable Pump (OMNIPOD 5 G6 INTRO, GEN 5,) KIT 1 kit continuous 1 kit 0    Insulin Disposable Pump (OMNIPOD 5 G6 POD, GEN 5,) MISC 1 each every 3 days 30 each 3    insulin pen needle (B-D U/F) 31G X 8 MM miscellaneous Use 5 pen needles daily 450 each 3    insulin syringe-needle U-100 (30G X 1/2\" 1 ML) 30G X 1/2\" 1 ML miscellaneous Use 1 syringes as directed to draw up insulin for pump. Per pharmacy request. . 100 each 1    Ketoconazole (NIZORAL PO) Shampoo daily      levothyroxine (SYNTHROID/LEVOTHROID) 112 MCG tablet Take 1 tablet (112 mcg) by mouth daily 90 tablet 4    methotrexate 2.5 MG tablet Take 6 tablets (15 mg) by mouth every 7 days Labs every 8 - 12 weeks for refills. 72 tablet 1    methotrexate 2.5 MG tablet Take 6 tablets (15 mg) by mouth every 7 days Labs every 8 - 12 weeks for refills. 72 tablet 0    methotrexate 2.5 MG tablet Take 6 tablets (15 mg) by mouth every 7 days Labs required every 8-12 weeks while taking this medication 72 tablet 0    methylphenidate (METADATE CD) 20 MG CR capsule Take 20 mg by mouth daily      methylphenidate (RITALIN) 10 MG tablet TAKE UP TO 2 TABLETS BY MOUTH IN THE LATE AFTERNOON.      Minoxidil (ROGAINE EX) daily      mometasone-formoterol (DULERA) 100-5 MCG/ACT inhaler INHALE 2 PUFFS INTO THE LUNGS TWICE DAILY 13 g 11    montelukast (SINGULAIR) 10 MG tablet Take 1 tablet (10 mg) by mouth every evening for 180 days 30 tablet 5    Multiple Vitamin (MULTIVITAMIN OR) Take one daily tab      ondansetron (ZOFRAN ODT) 4 MG ODT tab Take 1 tablet (4 mg) by mouth every 8 hours as " needed for vomiting 30 tablet 1    predniSONE (DELTASONE) 20 MG tablet Take 1 tablet (20 mg) by mouth daily 5 tablet 0    Semaglutide, 1 MG/DOSE, (OZEMPIC) 4 MG/3ML pen Inject 1 mg Subcutaneous every 7 days 12 mL 3    simvastatin (ZOCOR) 20 MG tablet Take 1 tablet (20 mg) by mouth at bedtime 90 tablet 3    traZODone (DESYREL) 50 MG tablet Take 1-2 tablets by mouth nightly as needed for sleep. 180 tablet 0     Current Facility-Administered Medications   Medication Dose Route Frequency Provider Last Rate Last Admin    lidocaine 1 % injection 1 mL  1 mL   Tolu Fernández DPM   1 mL at 11/09/22 1429    triamcinolone acetonide (KENALOG-10) injection 10 mg  10 mg   Tolu Fernández DPM   10 mg at 11/09/22 1429                0948 Most Recent Value      Temp: -- 98.7  F (37.1  C)  as of 7/19/2024     Pulse: -- 84  as of 8/9/2024     BP: --  121/85  as of 8/9/2024      no bp      Resp: -- 22  as of 8/9/2024     On exam she is in no acute distress.    Recent Labs   Lab Test 08/23/24  1443 05/01/24  1102 02/26/24  1149 11/07/23  1143 07/07/23  1422 04/10/23  1406 03/31/23  1033 08/11/22  1129 05/10/22  1523 02/14/22  1004 11/18/21  1659 10/26/21  1556 06/18/19  0000 03/21/19  1359   A1C  --   --  6.7* 6.6* 7.1*  --  6.5*   < >  --  8.1*  --   --    < >  --    HEMOGLOBINA1  --   --   --   --   --   --   --   --  7.5  --   --  7.3   < >  --    TSH  --   --  3.78  --  1.53  --  6.94*   < >  --  3.34  --   --    < > 0.19*   T4  --   --  1.10  --   --   --   --   --   --   --   --   --   --  1.09   LDL  --   --   --   --   --   --  107*  --   --  111*  --   --    < >  --    HDL  --   --   --   --   --   --  78  --   --  94  --   --    < >  --    TRIG  --   --   --   --   --   --  47  --   --  66  --   --    < >  --    CR 0.60 0.61 0.60 0.62 0.56  --  0.61   < >  --  0.60   < >  --    < >  --    MICROL  --  <12.0  --   --   --  <12.0  --   --   --  5  --   --    < >  --     < > = values in this interval not  displayed.       Assessment and plan:    1.  Diabetes control.  Overall her glucose values are improved on the current regimen.  I expressed some concern about her taking holidays from the semaglutide.  We did talk about reducing the dose so she could take it once a week but she prefers what she is doing now.  As long as she is not having an exacerbation of GI symptoms when she takes a dose after 2-week break, I guess things are going okay.  She is already scheduled to see Trena Viera to talk about her carb ratio.  Since the most recent data are not very comprehensive (because she was lacking a sensor) I will not make any pump changes today.  I will check an A1c    2.  Diabetes complications.  Her renal function is normal sheet and she is up-to-date with the testing.  She is due to see her eye doctor.  She has some paresthesias in her toes but nothing changed.    3.hypothyroidism.  Her TSH was checked earlier this year and was at target.    4.CVD risk.  She is on a statin.  Will repeat her lipids.  Her blood pressure has been fine in the past.    I spent 25 minutes with the patient on the video visit today (start time 10 00 and end time 10: 2 5).  On the same day of visit I spent an additional 10 minutes reviewing and interpreting her pump and CGM data, her labs, and doing documentation.    Felicia Mckeon MD    Follow-up with me in 6 months.

## 2024-09-12 NOTE — TELEPHONE ENCOUNTER
Spoke with pt per myc request and ok per clinic to change appt on 9/13 to parth Reilly on 9/12/2024 at 2:33 PM'     [email or web address]

## 2024-09-13 ENCOUNTER — VIRTUAL VISIT (OUTPATIENT)
Dept: ENDOCRINOLOGY | Facility: CLINIC | Age: 54
End: 2024-09-13
Payer: COMMERCIAL

## 2024-09-13 VITALS — BODY MASS INDEX: 24.34 KG/M2 | WEIGHT: 170 LBS | HEIGHT: 70 IN

## 2024-09-13 DIAGNOSIS — E10.3299 TYPE 1 DIABETES MELLITUS WITH MILD NONPROLIFERATIVE RETINOPATHY, MACULAR EDEMA PRESENCE UNSPECIFIED, UNSPECIFIED LATERALITY (H): Primary | ICD-10-CM

## 2024-09-13 PROCEDURE — G2211 COMPLEX E/M VISIT ADD ON: HCPCS | Mod: 95 | Performed by: INTERNAL MEDICINE

## 2024-09-13 PROCEDURE — 99214 OFFICE O/P EST MOD 30 MIN: CPT | Mod: 95 | Performed by: INTERNAL MEDICINE

## 2024-09-13 ASSESSMENT — PAIN SCALES - GENERAL: PAINLEVEL: MODERATE PAIN (5)

## 2024-09-13 NOTE — LETTER
9/13/2024       RE: Dali Martines  [address]  [facility] [address]     Dear Colleague,    Thank you for referring your patient, Dali Martines, to the [facility] [place] at [facility]. Please see a copy of my visit note below.    Virtual Visit Details    Type of service:  Video Visit     Originating Location (pt. Location): Home    Distant Location (provider location):  On-site  Platform used for Video Visit: [employer]      This 53 year old woman was returns for f/u of her type 1 diabetes. She also has hypothyroidism and psoriatic arthritis.  Pt was dx having type 1 diabetes at age 15. Her hx is also significant for mild NPDR right eye only,anxiety, hyperlipidemia, psoriatic arthritis,  and GERD.  She started using an omnipod 5 a few months ago. Data are below.  She continues to take tresiba 8 units daily and Ozempic 1 mg each 1-2week.  She very much likes her pump and feels that it has not had a dramatic effect on her glucose control.  It has been especially helpful in avoiding hypoglycemia and she now thinks that many of her GI symptoms occur when she becomes hypoglycemic.  If she avoids hypoglycemia, she does not have as much GI upset.  She has been working with Trena Viera on getting her pump settings correct.  She was quite high through much of July because she was sick with a sinus infection but more recently her sugars have come down.  She does recognize her hypoglycemia.  She has not had severe hypoglycemia.  Last week she had trouble getting sensors so she was without the hybrid closed-loop for a time. she is fatigued so does not get as much exercise as she did in the past.  She thinks this might be related to the anemia she has experienced from her methotrexate treatment.  She also really likes the Ozempic.  She thinks it helps with her glucose control.  She does not have GI symptoms when she takes it but she admits  she has been taking at least 1 week off a month.  She thinks she feels better overall if she is not taking it every week.  She does not have recurrent GI symptoms when she takes it after a 2-week period between injections.    She is due to see her eye doctor but has no problems today.  She denies paresthesias.  She does have pain in her feet from arthritis.  She denies chest pain or shortness of breath.  Blood Glucose Monitoring :                         Pump Settings:           Current Outpatient Medications   Medication Sig Dispense Refill     albuterol (PROAIR HFA/PROVENTIL HFA/VENTOLIN HFA) 108 (90 Base) MCG/ACT inhaler Inhale 2 puffs into the lungs every 4 hours as needed for shortness of breath / dyspnea or wheezing 1 Inhaler 11     aspirin 81 MG tablet Take 1 tablet by mouth daily.       blood glucose (ACCU-CHEK COLIN PLUS) test strip Test Blood Sugar 8 times daily or as directed 800 strip 3     blood glucose monitoring (ACCU-CHEK FASTCLIX) lancets Use to test blood sugar 8 times daily or as directed. 4 Box 3     blood glucose monitoring (NO BRAND SPECIFIED) meter device kit Use to test blood sugar 8 times daily or as directed. Any covered brand, 1 kit 0     Calcium Carbonate-Vitamin D (CALCIUM + D PO) Take one daily       citalopram (CELEXA) 20 MG tablet Take 1.5 tablets (30 mg) by mouth daily 135 tablet 1     Continuous Blood Gluc  (DEXCOM G6 ) SUZANNE Use to read blood sugars as per 's instructions. 1 each 0     Continuous Glucose Sensor (DEXCOM G6 SENSOR) MISC Change every 10 days. 9 each 3     Continuous Glucose Transmitter (DEXCOM G6 TRANSMITTER) MISC Change every 3 months. 1 each 3     diclofenac (VOLTAREN) 1 % topical gel Apply 2 g topically 4 times daily 100 g 4     diclofenac (VOLTAREN) 1 % topical gel APPLY 2 GRAMS ONTO THE SKIN FOUR TIMES DAILY AS NEEDED FOR MODERATE PAIN 100 g 5     econazole nitrate 1 % cream Apply 0.5 inches topically daily.       fish oil-omega-3 fatty  "acids 1000 MG capsule Take one daily       fluocinonide (LIDEX) 0.05 % external solution Apply topically 2 times daily 60 mL 4     folic acid (FOLVITE) 1 MG tablet Take 1 tablet (1 mg) by mouth daily 90 tablet 3     Insulin Aspart, w/Niacinamide, (FIASP PENFILL) 100 UNIT/ML SOCT 45 Units by Other route See Admin Instructions. Via insulin device. Approx 45 units daily as directed. 45 mL 3     Insulin Aspart, w/Niacinamide, (FIASP) 100 UNIT/ML SOLN Inject 50 Units as directed daily For use in ambulatory insulin pump as directed.  Approximate daily dose is 50 units. 20 mL 11     insulin degludec (TRESIBA FLEXTOUCH) 100 UNIT/ML pen ADMINISTER 8 UNITS UNDER THE SKIN DAILY. PLEASE DO NOT FILL TODAY ( 10/18/2023 ). 30 mL 1     Insulin Disposable Pump (OMNIPOD 5 G6 INTRO, GEN 5,) KIT 1 kit continuous 1 kit 0     Insulin Disposable Pump (OMNIPOD 5 G6 POD, GEN 5,) MISC 1 each every 3 days 30 each 3     insulin pen needle (B-D U/F) 31G X 8 MM miscellaneous Use 5 pen needles daily 450 each 3     insulin syringe-needle U-100 (30G X 1/2\" 1 ML) 30G X 1/2\" 1 ML miscellaneous Use 1 syringes as directed to draw up insulin for pump. Per pharmacy request. . 100 each 1     Ketoconazole (NIZORAL PO) Shampoo daily       levothyroxine (SYNTHROID/LEVOTHROID) 112 MCG tablet Take 1 tablet (112 mcg) by mouth daily 90 tablet 4     methotrexate 2.5 MG tablet Take 6 tablets (15 mg) by mouth every 7 days Labs every 8 - 12 weeks for refills. 72 tablet 1     methotrexate 2.5 MG tablet Take 6 tablets (15 mg) by mouth every 7 days Labs every 8 - 12 weeks for refills. 72 tablet 0     methotrexate 2.5 MG tablet Take 6 tablets (15 mg) by mouth every 7 days Labs required every 8-12 weeks while taking this medication 72 tablet 0     methylphenidate (METADATE CD) 20 MG CR capsule Take 20 mg by mouth daily       methylphenidate (RITALIN) 10 MG tablet TAKE UP TO 2 TABLETS BY MOUTH IN THE LATE AFTERNOON.       Minoxidil (ROGAINE EX) daily       " mometasone-formoterol (DULERA) 100-5 MCG/ACT inhaler INHALE 2 PUFFS INTO THE LUNGS TWICE DAILY 13 g 11     montelukast (SINGULAIR) 10 MG tablet Take 1 tablet (10 mg) by mouth every evening for 180 days 30 tablet 5     Multiple Vitamin (MULTIVITAMIN OR) Take one daily tab       ondansetron (ZOFRAN ODT) 4 MG ODT tab Take 1 tablet (4 mg) by mouth every 8 hours as needed for vomiting 30 tablet 1     predniSONE (DELTASONE) 20 MG tablet Take 1 tablet (20 mg) by mouth daily 5 tablet 0     Semaglutide, 1 MG/DOSE, (OZEMPIC) 4 MG/3ML pen Inject 1 mg Subcutaneous every 7 days 12 mL 3     simvastatin (ZOCOR) 20 MG tablet Take 1 tablet (20 mg) by mouth at bedtime 90 tablet 3     traZODone (DESYREL) 50 MG tablet Take 1-2 tablets by mouth nightly as needed for sleep. 180 tablet 0     Current Facility-Administered Medications   Medication Dose Route Frequency Provider Last Rate Last Admin     lidocaine 1 % injection 1 mL  1 mL   Tolu Fernández DPM   1 mL at 11/09/22 1429     triamcinolone acetonide (KENALOG-10) injection 10 mg  10 mg   Tolu Fernández DPM   10 mg at 11/09/22 1429                0948 Most Recent Value      Temp: -- 98.7  F (37.1  C)  as of 7/19/2024     Pulse: -- 84  as of 8/9/2024     BP: --  121/85  as of 8/9/2024      no bp      Resp: -- 22  as of 8/9/2024     On exam she is in no acute distress.    Recent Labs   Lab Test 08/23/24  1443 05/01/24  1102 02/26/24  1149 11/07/23  1143 07/07/23  1422 04/10/23  1406 03/31/23  1033 08/11/22  1129 05/10/22  1523 02/14/22  1004 11/18/21  1659 10/26/21  1556 06/18/19  0000 03/21/19  1359   A1C  --   --  6.7* 6.6* 7.1*  --  6.5*   < >  --  8.1*  --   --    < >  --    HEMOGLOBINA1  --   --   --   --   --   --   --   --  7.5  --   --  7.3   < >  --    TSH  --   --  3.78  --  1.53  --  6.94*   < >  --  3.34  --   --    < > 0.19*   T4  --   --  1.10  --   --   --   --   --   --   --   --   --   --  1.09   LDL  --   --   --   --   --   --  107*  --   --  111*   --   --    < >  --    HDL  --   --   --   --   --   --  78  --   --  94  --   --    < >  --    TRIG  --   --   --   --   --   --  47  --   --  66  --   --    < >  --    CR 0.60 0.61 0.60 0.62 0.56  --  0.61   < >  --  0.60   < >  --    < >  --    MICROL  --  <12.0  --   --   --  <12.0  --   --   --  5  --   --    < >  --     < > = values in this interval not displayed.       Assessment and plan:    1.  Diabetes control.  Overall her glucose values are improved on the current regimen.  I expressed some concern about her taking holidays from the semaglutide.  We did talk about reducing the dose so she could take it once a week but she prefers what she is doing now.  As long as she is not having an exacerbation of GI symptoms when she takes a dose after 2-week break, I guess things are going okay.  She is already scheduled to see Trena Viera to talk about her carb ratio.  Since the most recent data are not very comprehensive (because she was lacking a sensor) I will not make any pump changes today.  I will check an A1c    2.  Diabetes complications.  Her renal function is normal sheet and she is up-to-date with the testing.  She is due to see her eye doctor.  She has some paresthesias in her toes but nothing changed.    3.hypothyroidism.  Her TSH was checked earlier this year and was at target.    4.CVD risk.  She is on a statin.  Will repeat her lipids.  Her blood pressure has been fine in the past.    I spent 25 minutes with the patient on the video visit today (start time 10 00 and end time 10: 2 5).  On the same day of visit I spent an additional 10 minutes reviewing and interpreting her pump and CGM data, her labs, and doing documentation.    Felicia Mckeon MD    Follow-up with me in 6 months.      Again, thank you for allowing me to participate in the care of your patient.      Sincerely,    Felicia Mckeon MD

## 2024-09-13 NOTE — NURSING NOTE
Current patient location: [address] [facility] [address]    Is the patient currently in the state of [place]? YES    Visit mode:VIDEO    If the visit is dropped, the patient can be reconnected by: VIDEO VISIT: Send to e-mail at: [email or web address]    Will anyone else be joining the visit? NO  (If patient encounters technical issues they should call [phone]    How would you like to obtain your AVS? MyChart    Are changes needed to the allergy or medication list? Pt stated no changes to allergies and Pt stated no med changes    Are refills needed on medications prescribed by this physician? NO    Rooming Documentation:  Questionnaire(s) completed      Reason for visit: EWA CORRIGAN

## 2024-09-17 ENCOUNTER — VIRTUAL VISIT (OUTPATIENT)
Dept: EDUCATION SERVICES | Facility: CLINIC | Age: 54
End: 2024-09-17
Payer: COMMERCIAL

## 2024-09-17 DIAGNOSIS — E10.9 TYPE 1 DIABETES MELLITUS (H): Primary | ICD-10-CM

## 2024-09-17 PROCEDURE — 99207 PR DIABETES SELF MANAGEMENT: CPT | Mod: 95 | Performed by: REGISTERED NURSE

## 2024-09-17 ASSESSMENT — ANXIETY QUESTIONNAIRES
8. IF YOU CHECKED OFF ANY PROBLEMS, HOW DIFFICULT HAVE THESE MADE IT FOR YOU TO DO YOUR WORK, TAKE CARE OF THINGS AT HOME, OR GET ALONG WITH OTHER PEOPLE?: SOMEWHAT DIFFICULT
GAD7 TOTAL SCORE: 7
7. FEELING AFRAID AS IF SOMETHING AWFUL MIGHT HAPPEN: SEVERAL DAYS
GAD7 TOTAL SCORE: 7
GAD7 TOTAL SCORE: 7

## 2024-09-17 ASSESSMENT — PAIN SCALES - GENERAL: PAINLEVEL: NO PAIN (0)

## 2024-09-17 NOTE — PROGRESS NOTES
Virtual Visit Details    Type of service:  Video Visit     Originating Location (pt. Location): Home    Distant Location (provider location):  On-site  Platform used for Video Visit: [facility]    Diabetes Self-Management Education & Support Visit- Short    Dali Martines presented today for education related to Type 1 diabetes    Patient is being treated with:  Omnipod 5 Insulin Pump    Year of diagnosis: 1988  Referring provider:  Felicia Mckeon MD  Living Situation: Lives alone and has a dog.  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  Currently not employed     Purpose of today's visit:  Follow up insulin pump management.  She uses Fiasp rapid acting insulin in her insulin pump.   She is also taking Ozempic 1 mg once weekly.      Subjective/Objective:  At our last visit, we reduced the BG targets in her pump and relaxed her ICR from 13 to 14.  Since then she had several episodes of hypoglycemia, and independently raised her ICR to 15.  She states that mostly the hypoglycemia has resolved.  The pattern seen today                 Assessment/Plan:  Dali is having far fewer episodes of hypoglycemia.   Her time in range has dropped from 65% at our last visit to 59% today, based on the past two weeks of data.  Most of this is attributable, I think, to some sustained hyperglycemia, particularly overnight.  She seems to have delayed gastric emptying, although no diagnosis of gastroparesis.  She has been taking Ozempic and is now up to the 1 mg dose, which is most likely causing this delay.    Dali states that it seems that whenever she is in the supine position, her glucoses start to rise.  She also has a tendency to delay the first food of the day until around noon, and then has multiple snacks throughout the day.  She states she can't tolerate a lot of food at one time.  She often will have a snack before bed, and then it looks like her glucose actually starts rising  60 to 90 minutes afterward.      We discussed a couple of strategies she might try.  When eating higher fat, or higher fiber foods combined with carbohydrate, suggest delivering the correction bolus  10-15 minutes before eating, and then deliver the bolus to cover carbs in the middle of the meal, rather than 10-15 minutes beforehand.      Also suggested that she may try to avoid eating anything right before going to bed.  It has been a long standing habit of hers to have a snack before bedtime in order to prevent hypoglycemia.  With hybrid closed loop systems, it is not necessary to do this, and actually causes the pump to start producing more basal insulin.  She will try to modify her eating pattern to avoid eating so close to bedtime.      Follow-up:  Follow up in November.        Time spent in this visit: 25 minutes.  No charge.    Any diabetes medication dose changes were made via the CDE Protocol and Collaborative Practice Agreement. A copy of this encounter was provided to the patient's referring provider.

## 2024-09-17 NOTE — NURSING NOTE
Current patient location: Patient declined to provide     Is the patient currently in the state of [place]? YES    Visit mode:VIDEO    If the visit is dropped, the patient can be reconnected by: VIDEO VISIT: Text to cell phone:   Telephone Information:   Mobile [phone]       Will anyone else be joining the visit? NO  (If patient encounters technical issues they should call [phone])    How would you like to obtain your AVS? MyChart    Are changes needed to the allergy or medication list? No    Are refills needed on medications prescribed by this physician? NO    Rooming Documentation:  Not applicable      Reason for visit: RECHECK (DIABETES ED/)    Ophelia CORRIGAN

## 2024-09-20 ENCOUNTER — VIRTUAL VISIT (OUTPATIENT)
Dept: PSYCHOLOGY | Facility: CLINIC | Age: 54
End: 2024-09-20
Payer: COMMERCIAL

## 2024-09-20 DIAGNOSIS — G89.29 OTHER CHRONIC PAIN: ICD-10-CM

## 2024-09-20 DIAGNOSIS — F33.41 MAJOR DEPRESSIVE DISORDER, RECURRENT, IN PARTIAL REMISSION (H): ICD-10-CM

## 2024-09-20 DIAGNOSIS — E10.3299 TYPE 1 DIABETES MELLITUS WITH MILD NONPROLIFERATIVE RETINOPATHY, MACULAR EDEMA PRESENCE UNSPECIFIED, UNSPECIFIED LATERALITY (H): ICD-10-CM

## 2024-09-20 DIAGNOSIS — R19.8 GI SYMPTOMS: ICD-10-CM

## 2024-09-20 DIAGNOSIS — F43.22 ADJUSTMENT DISORDER WITH ANXIETY: Primary | ICD-10-CM

## 2024-09-20 PROCEDURE — 90837 PSYTX W PT 60 MINUTES: CPT | Mod: 95 | Performed by: PSYCHOLOGIST

## 2024-09-20 NOTE — PROGRESS NOTES
Health Psychology          Latanya Mayfield, Ph.D.,  [phone]  Hansa Arzate, Ph.D.,  [phone]  Sarah Beth Jack, Ph.D.,  [phone]  Panfilo Cornejo, Ph.D., , [facility] [phone]  Krystle Hannah, Ph.D.,  [phone]  Elise Sutton, Ph.D., , [facility] [phone]    [facility], 3rd Floor  [address] [place] [address]       Health Psychology Progress Note    Patient Name: Dali Martines    Service Type: Individual  Length of Visit: 57 minutes - extended session due to complexity of content and duration between visits  Start time: 10:02 AM   Stop time: 10:59 AM        Attendees: patient attended alone  Session #: 20    Most Recent DA/update: 8/16/23    Telemedicine Information:     Telemedicine Visit: The patient's condition can be safely assessed and treated via synchronous audio and visual telemedicine encounter.    Reason for Telemedicine Visit: Patient has requested telehealth visit and Patient convenience (e.g. access to timely appointments / distance to available provider)  Originating Site (Patient Location): Patient's home, [place]  Distant Location (provider location):  Off-site: Provider's Home Office   Consent:  The patient/guardian has verbally consented to: the potential risks and benefits of telemedicine (video visit) versus in person care; bill my insurance or make self-payment for services provided; and responsibility for payment of non-covered services.   Mode of Communication:  Video Conference via [employer]  As the provider I attest to compliance with applicable laws and regulations related to telemedicine.     Identifying Information and Presenting Problem:  Dali Martines is a 53 year old female adult who was referred to health psychology by her endocrinologist, Dr. Mckeon. Patient presented with concerns of stress in the context of multiple life events, several medical conditions, and chronic management of  T1DM.    Topics Discussed/Interventions Provided:    Session Content:     Update: Patient returning for follow-up today. States that she is doing generally well. States that she is anticipating a trip to [place] to address some home ownership and house project concerns on behalf of her mother who owns a house there. States that she is planning to fly and is worried about it amplifying some of her vestibular symptoms. Has experienced a disruption in her sleep as she anticipates her upcoming trip. She notes that she is going to be able to stay with her cousin while she is there, which will help alleviate some of her stress. Reports that her knee is feeling better. Has been getting acupuncture and e-stim from [facility] beds at the [facility], which has been helpful. Notes that she has recovered much of the mobility that she lost. Has also mostly recovered from her recent respiratory infection.       Homework Review: Patient reports that she accomplished some of her goals from the last visit. Reviewed the following goals:     Continue with positive and pleasant activities, with a focus on brainstorming ways of connecting with broader social network - accomplished. Continues participation with [facility] programming and is finding it to be rewarding and enriching. She also notes that she has reconnected with a friend whose mother has dementia and also connected with her cousin.       Continue to practice the diaphragmatic breathing exercise with a focus on intervening earlier in the stress-reactivity process - accomplished. Has been practicing the diaphragmatic breathing exercise and notes that she is continuing to try to focus on intervening earlier in the stress reactivity process by improving attunement to physical sensations.     Practice the body scan 3x per week  - accomplished. Notes that this exercise is helping with in-the-moment awareness of body sensations. Discussed challenges of increasing awareness of discomfort and  how to expand endurance for tolerating this discomfort.     Try using the Socratic Questioning technique to challenge unhelpful thinking patterns - accomplished.      Skills/intervention: Continued to support patient in processing her thoughts and emotions around her health, caregiver concerns, and grief. Assisted patient in problem-solving how to approach her upcoming trip in a way that will enable her to navigate the stress successfully. States that she will be able to enlist the support of her cousin while she is on her trip and has some support for her mother while she will be away. Discussed what skills she can use from therapy that will help her manage her stress successfully. Recommended continuing to focus on diaphragmatic breathing, body scan, and Socratic questioning method. Also briefly introduced progressive muscle relaxation as a stress reduction strategy.        Top values identified included: spirituality, humor, fairness, creativity, curiosity, contribution, and caring.  Unhelpful rules/assumptions: fear of failure, all-or-nothing thinking, and simplicity, control, and structure  Top priorities for grief: increasing tolerance of discomfort associated with uncertainty as well as identifying and processing emotions associated with the losses she has experienced.    Treatment Objective(s) Addressed in This Session:  Psychological distress related to Diabetes  Psychological distress related to caregiver stress    Progress on / Status of Treatment Objective(s) / Homework:  In progress    Medication Adherence: Patient did not report medication changes. Current medication list is below:     Current Outpatient Medications   Medication Sig Dispense Refill    albuterol (PROAIR HFA/PROVENTIL HFA/VENTOLIN HFA) 108 (90 Base) MCG/ACT inhaler Inhale 2 puffs into the lungs every 4 hours as needed for shortness of breath / dyspnea or wheezing 1 Inhaler 11    aspirin 81 MG tablet Take 1 tablet by mouth daily.       blood glucose (ACCU-CHEK COLIN PLUS) test strip Test Blood Sugar 8 times daily or as directed 800 strip 3    blood glucose monitoring (ACCU-CHEK FASTCLIX) lancets Use to test blood sugar 8 times daily or as directed. 4 Box 3    blood glucose monitoring (NO BRAND SPECIFIED) meter device kit Use to test blood sugar 8 times daily or as directed. Any covered brand, 1 kit 0    Calcium Carbonate-Vitamin D (CALCIUM + D PO) Take one daily      citalopram (CELEXA) 20 MG tablet Take 1.5 tablets (30 mg) by mouth daily 135 tablet 1    Continuous Blood Gluc  (DEXCOM G6 ) SUZANNE Use to read blood sugars as per 's instructions. 1 each 0    Continuous Glucose Sensor (DEXCOM G6 SENSOR) MISC Change every 10 days. 9 each 3    Continuous Glucose Transmitter (DEXCOM G6 TRANSMITTER) MISC Change every 3 months. 1 each 3    diclofenac (VOLTAREN) 1 % topical gel Apply 2 g topically 4 times daily 100 g 4    diclofenac (VOLTAREN) 1 % topical gel APPLY 2 GRAMS ONTO THE SKIN FOUR TIMES DAILY AS NEEDED FOR MODERATE PAIN 100 g 5    econazole nitrate 1 % cream Apply 0.5 inches topically daily.      fish oil-omega-3 fatty acids 1000 MG capsule Take one daily      fluocinonide (LIDEX) 0.05 % external solution Apply topically 2 times daily 60 mL 4    folic acid (FOLVITE) 1 MG tablet Take 1 tablet (1 mg) by mouth daily 90 tablet 3    Insulin Aspart, w/Niacinamide, (FIASP PENFILL) 100 UNIT/ML SOCT 45 Units by Other route See Admin Instructions. Via insulin device. Approx 45 units daily as directed. 45 mL 3    Insulin Aspart, w/Niacinamide, (FIASP) 100 UNIT/ML SOLN Inject 50 Units as directed daily For use in ambulatory insulin pump as directed.  Approximate daily dose is 50 units. 20 mL 11    insulin degludec (TRESIBA FLEXTOUCH) 100 UNIT/ML pen ADMINISTER 8 UNITS UNDER THE SKIN DAILY. PLEASE DO NOT FILL TODAY ( 10/18/2023 ). 30 mL 1    Insulin Disposable Pump (OMNIPOD 5 G6 INTRO, GEN 5,) KIT 1 kit continuous 1 kit 0    Insulin  "Disposable Pump (OMNIPOD 5 G6 POD, GEN 5,) MISC 1 each every 3 days 30 each 3    insulin pen needle (B-D U/F) 31G X 8 MM miscellaneous Use 5 pen needles daily 450 each 3    insulin syringe-needle U-100 (30G X 1/2\" 1 ML) 30G X 1/2\" 1 ML miscellaneous Use 1 syringes as directed to draw up insulin for pump. Per pharmacy request. . 100 each 1    Ketoconazole (NIZORAL PO) Shampoo daily      levothyroxine (SYNTHROID/LEVOTHROID) 112 MCG tablet Take 1 tablet (112 mcg) by mouth daily 90 tablet 4    methotrexate 2.5 MG tablet Take 6 tablets (15 mg) by mouth every 7 days Labs every 8 - 12 weeks for refills. 72 tablet 1    methotrexate 2.5 MG tablet Take 6 tablets (15 mg) by mouth every 7 days Labs every 8 - 12 weeks for refills. 72 tablet 0    methotrexate 2.5 MG tablet Take 6 tablets (15 mg) by mouth every 7 days Labs required every 8-12 weeks while taking this medication 72 tablet 0    methylphenidate (METADATE CD) 20 MG CR capsule Take 20 mg by mouth daily      methylphenidate (RITALIN) 10 MG tablet TAKE UP TO 2 TABLETS BY MOUTH IN THE LATE AFTERNOON.      Minoxidil (ROGAINE EX) daily      mometasone-formoterol (DULERA) 100-5 MCG/ACT inhaler INHALE 2 PUFFS INTO THE LUNGS TWICE DAILY 13 g 11    montelukast (SINGULAIR) 10 MG tablet Take 1 tablet (10 mg) by mouth every evening for 180 days 30 tablet 5    Multiple Vitamin (MULTIVITAMIN OR) Take one daily tab      ondansetron (ZOFRAN ODT) 4 MG ODT tab Take 1 tablet (4 mg) by mouth every 8 hours as needed for vomiting 30 tablet 1    predniSONE (DELTASONE) 20 MG tablet Take 1 tablet (20 mg) by mouth daily 5 tablet 0    Semaglutide, 1 MG/DOSE, (OZEMPIC) 4 MG/3ML pen Inject 1 mg Subcutaneous every 7 days 12 mL 3    simvastatin (ZOCOR) 20 MG tablet Take 1 tablet (20 mg) by mouth at bedtime 90 tablet 3    traZODone (DESYREL) 50 MG tablet Take 1-2 tablets by mouth nightly as needed for sleep. 180 tablet 0     Current Facility-Administered Medications   Medication Dose Route Frequency " Provider Last Rate Last Admin    lidocaine 1 % injection 1 mL  1 mL   Tolu Fernádnez, DPM   1 mL at 11/09/22 1429    triamcinolone acetonide (KENALOG-10) injection 10 mg  10 mg   Tolu Fernández, DPM   10 mg at 11/09/22 1429     Assessment: The patient appeared to be active and engaged in today's session and was receptive to feedback.     Mental Status Exam:   Appearance: Appropriate   Eye Contact: Good    Orientation: Yes, x4  Behavior/relationship to provider/demeanor: Cooperative, Engaged, and Pleasant  Motor Activity: normal or unremarkable  Mood (subjective report): Stable  Affect (objective appearance): Appropriate/mood congruent  Speech Rate: Normal  Speech Volume: Normal  Speech Articulation: Normal  Speech Coherence: Normal  Speech Spontaneity: Normal  Thought Content: no suicidal/homicidal ideation, plans, or intent  Thought Process (associations): Logical, Linear, and Goal directed  Thought Process (rate): Normal  Abnormal Perception: None  Attention/Concentration: Normal  Memory: Appears grossly intact   Fund of Knowledge: Above average  Abstraction:  Normal  Insight:  Good  Judgment:  good    Does the patient appear to be at imminent risk of harm to self/others at this time? No    The session was necessary to address psychological distress in the context of multiple psychosocial stressors and chronic health concerns.    Diagnoses:    1. Adjustment disorder with anxiety    2. Major depressive disorder, recurrent, in partial remission (H24)    3. 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 Other chronic pain    5. GI symptoms        Plan:    Follow-up video visit with me on 10/23/24 at 11:00am  Patient to use 911 or other crisis resources in the event of a psychiatric emergency  Primary care needs and medications are managed by Dimitri Alas MD. Referring provider is Dr. Mckeon.  Next visit  agenda/goals:   Continue with positive and pleasant activities, with a focus on brainstorming ways of connecting with broader social network while also increasing intellectual stimulation  Continue to practice the diaphragmatic breathing exercise with a focus on intervening earlier in the stress-reactivity process  Try the progressive muscle relaxation exercise  Try using the Socratic Questioning technique to challenge unhelpful thinking patterns (particularly with regard to upcoming trip)    Skills taught/interventions used thus far:  Psychoeducation  Values clarification  Committed action (vitality vs suffering)  Perfectionism workbook (modules 1-5)  Defusion  Acting opposite  Mindfulness (breath, body scan, watching thoughts, choiceless awareness)  Therapeutic journaling  Healthy [facility] [place]    NOTE: Treatment plan update due 9/6/24; 90 day treatment plan review due 10/21/24.                                                Individual Treatment Plan    Patient's Name: Dali Martines  YOB: 1970    Date of Creation: 9/6/23  Date Treatment Plan Last Reviewed/Revised: 07/23/24     DSM5 Diagnoses:     1. Adjustment disorder with anxiety    2. Major depressive disorder, recurrent, in partial remission (H)      Psychosocial / Contextual Factors: financial hardship, health issues, occupational/vocational stress, relationship stress, caregiver stress (mother's health concerns), and grief and loss in the wake of recent bereavement  PROMIS (reviewed every 90 days):      5/7/2024  8:49 AM   PROMIS 10    PROMIS TOTAL - SUBSCORES 20        Referral / Collaboration:  Collaboration with medical team and specialists as needed.    Anticipated number of session for this episode of care: 15-20  Anticipation frequency of session: 2x per month  Anticipated Duration of each session: 38-52 minutes  Treatment plan will be reviewed in 90 days or when goals have been changed.       Measurable Treatment Goal(s) related to  "diagnosis/functional impairment(s): Overall goals for treatment include improving her physical health (e.g., diabetes, physical activity, and GI discomfort), learning strategies for enhancing coping capacity, and decreasing avoidance-based coping.  Goal 1: Patient will decrease her use of avoidance-based coping   \"I will know I've met my goal when I am not avoiding doing things out of fear (e.g., bathroom sink, processing grief, physical activity, reviewing pieces of writing).\"     Objective #A (Patient Action)    Patient will learn and implement skills for reducing perfectionism.  Status: In progress - Date: 07/23/24 (patient has completed some of the perfectionism workbook and has learned some defusion strategies)     Intervention(s)  Therapist will teach anti-perfectionism skills and will assist patient in completing a workbook on perfectionism.    Objective #B  Patient will initiate a healthy grieving process  Status: In progress - Date: 07/23/24 (patient is learning emotional expression skills)    Intervention(s)  Therapist will assist patient in processing and exploring her grief.    Objective #C  Patient will increase her participation in meaningful and purposeful activities.  Status: Satisfactory progress - Date: 07/23/24 (patient reports progress in increasing her participation in meaningful activities, socializing, spending time in nature, and traveling)    Intervention(s)  Therapist will teach values clarification, committed action, and exposure skills.    Objective #D  Patient will learn and implement unhooking skills for decreasing the degree to which fear influences behaviors and decisions in an unhelpful way.  Status: Satisfactory progress - Date: 07/23/24 (patient has been learning and implementing mindfulness and cognitive restructuring skills)     Intervention(s)  Therapist will teach defusion, cognitive restructuring, and mindfulness skills.      Patient has reviewed and agreed to the above " plan.      Elise Sutton, PhD  September 6, 2023    Elise Sutton, Ph.D., , [facility]  Clinical Health Psychologist  Phone: [phone]   Pager: [phone]

## 2024-09-20 NOTE — NURSING NOTE
Current patient location: [address] [facility] [address]    Is the patient currently in the state of [place]? YES    Visit mode:VIDEO    If the visit is dropped, the patient can be reconnected by: VIDEO VISIT: Text to cell phone:   Telephone Information:   Mobile [phone]       Will anyone else be joining the visit? NO  (If patient encounters technical issues they should call [phone]    How would you like to obtain your AVS? MyChart    Are changes needed to the allergy or medication list? No    Are refills needed on medications prescribed by this physician? NO    Rooming Documentation:  Questionnaire(s) not done per department protocol      Reason for visit: RECHECK    Cyndi CORRIGAN

## 2024-09-29 ENCOUNTER — NURSE TRIAGE (OUTPATIENT)
Dept: NURSING | Facility: CLINIC | Age: 54
End: 2024-09-29
Payer: COMMERCIAL

## 2024-09-30 ENCOUNTER — VIRTUAL VISIT (OUTPATIENT)
Dept: INTERNAL MEDICINE | Facility: CLINIC | Age: 54
End: 2024-09-30
Payer: COMMERCIAL

## 2024-09-30 DIAGNOSIS — E10.9 TYPE 1 DIABETES MELLITUS WITHOUT COMPLICATION (H): ICD-10-CM

## 2024-09-30 DIAGNOSIS — U07.1 INFECTION DUE TO 2019 NOVEL CORONAVIRUS: Primary | ICD-10-CM

## 2024-09-30 DIAGNOSIS — L40.50 PSORIATIC ARTHRITIS (H): ICD-10-CM

## 2024-09-30 PROCEDURE — 99214 OFFICE O/P EST MOD 30 MIN: CPT | Mod: 95

## 2024-09-30 ASSESSMENT — ASTHMA QUESTIONNAIRES
ACT_TOTALSCORE: 18
QUESTION_1 LAST FOUR WEEKS HOW MUCH OF THE TIME DID YOUR ASTHMA KEEP YOU FROM GETTING AS MUCH DONE AT WORK, SCHOOL OR AT HOME: SOME OF THE TIME
QUESTION_2 LAST FOUR WEEKS HOW OFTEN HAVE YOU HAD SHORTNESS OF BREATH: MORE THAN ONCE A DAY
QUESTION_4 LAST FOUR WEEKS HOW OFTEN HAVE YOU USED YOUR RESCUE INHALER OR NEBULIZER MEDICATION (SUCH AS ALBUTEROL): NOT AT ALL
ACT_TOTALSCORE: 18
QUESTION_5 LAST FOUR WEEKS HOW WOULD YOU RATE YOUR ASTHMA CONTROL: WELL CONTROLLED
QUESTION_3 LAST FOUR WEEKS HOW OFTEN DID YOUR ASTHMA SYMPTOMS (WHEEZING, COUGHING, SHORTNESS OF BREATH, CHEST TIGHTNESS OR PAIN) WAKE YOU UP AT NIGHT OR EARLIER THAN USUAL IN THE MORNING: NOT AT ALL

## 2024-09-30 ASSESSMENT — PATIENT HEALTH QUESTIONNAIRE - PHQ9: SUM OF ALL RESPONSES TO PHQ QUESTIONS 1-9: 4

## 2024-09-30 NOTE — PROGRESS NOTES
Dali is a 53 year old who is being evaluated via a billable video visit.    How would you like to obtain your AVS? MyChart  If the video visit is dropped, the invitation should be resent by: Text to cell phone: [phone]  Will anyone else be joining your video visit? No     Postive for covid yesterday ( home test )   Assessment & Plan     (U07.1) Infection due to 2019 novel coronavirus  (primary encounter diagnosis)  Comment:   Tested positive for covid on 9/29. She started feeling ill on 9/27. She recently returned home from a trip to [place]. Tmax of 102 on Saturday. She has been afebrile since yesterday afternoon.   Has history of T1DM, psoriatic arthritis, hyperlipidemia, asthma,   She feels fatigued and has fever/chills, cough, headache, and sinus congestion.   She denies any severe shortness of breath. Has not needed to use her rescue inhaler at all. Did have an episode of chest tightness yesterday afternoon which lasted for a few minutes but no difficulty breathing. She is aware that if her symptoms of chest tightness persist that she needs to be seen in person for further evaluation.   She was treated with paxlovid in August of 2022.   She is aware that she needs to stop her statin during treatment and for 5 days after.  Plan: nirmatrelvir and ritonavir (PAXLOVID) 300         mg/100 mg therapy pack            (E10.9) Type 1 diabetes mellitus without complication (H)  Comment: Stable  Plan:     (L40.50) Psoriatic arthritis (H)  Comment: Stable  Plan:         Subjective   Dali is a 53 year old, presenting for the following health issues:\\  Covid Concern      9/30/2024     9:18 AM   Additional Questions   Roomed by Lexi ROBLEDO                 Objective           Vitals:  No vitals were obtained today due to virtual visit.    Physical Exam   GENERAL: alert and no distress  EYES: Eyes grossly normal to inspection.  No discharge or erythema, or obvious scleral/conjunctival abnormalities.  RESP: No  audible wheeze, cough, or visible cyanosis.    SKIN: Visible skin clear. No significant rash, abnormal pigmentation or lesions.  NEURO: Cranial nerves grossly intact.  Mentation and speech appropriate for age.  PSYCH: Appropriate affect, tone, and pace of words          Video-Visit Details    Type of service:  Video Visit   Originating Location (pt. Location): Home    Distant Location (provider location):  On-site  Platform used for Video Visit: [employer]  Signed Electronically by: LATISHA Mijares CNP

## 2024-09-30 NOTE — TELEPHONE ENCOUNTER
COVID Positive/Requesting COVID treatment    Patient is positive for COVID and requesting treatment options.    Date of positive COVID test (PCR or at home)? 9/29/24  Current COVID symptoms: fever or chills, cough, headache, and congestion or runny nose  Date COVID symptoms began: 9/27/24    Message should be routed to clinic RN pool. Best phone number to use for call back: [phone]    Is scheduled at 9:25 pm  Joy Morales      Nurse Triage SBAR    Is this a 2nd Level Triage? NO    Situation: Patient reports she has been exposed to the flu and tested positive for COVID-19. Symptoms include, fever/chills, cough, nasal congestion, and headache. Patient is requesting Paxlovid    Background: has not seen Dr. lAas for more than 2 years    Assessment: needs VV since she is not established per COVID-19 Tx protocol    Protocol Recommended Disposition:   Call PCP Within 24 Hours -- Patient was scheduled for a VV tomorrow morning.    Esvin Tristan RN, BSN  Triage Nurse Advisor        Reason for Disposition   [1] HIGH RISK patient (e.g., weak immune system, age > 64 years, obesity with BMI 30 or higher, pregnant, chronic lung disease) AND [2] COVID symptoms (e.g., cough, fever)  (Exceptions: Already seen by PCP and no new or worsening symptoms.)    Additional Information   Negative: SEVERE difficulty breathing (e.g., struggling for each breath, speaks in single words)   Negative: Difficult to awaken or acting confused (e.g., disoriented, slurred speech)   Negative: Bluish (or gray) lips or face now   Negative: Shock suspected (e.g., cold/pale/clammy skin, too weak to stand, low BP, rapid pulse)   Negative: Sounds like a life-threatening emergency to the triager   Negative: [1] Diagnosed or suspected COVID-19 AND [2] symptoms lasting 3 or more weeks   Negative: [1] COVID-19 exposure AND [2] no symptoms   Negative: COVID-19 vaccine reaction suspected (e.g., fever, headache, muscle aches) occurring 1 to 3 days after getting  vaccine   Negative: COVID-19 vaccine, questions about   Negative: [1] Exposure to someone known to have influenza (flu test positive) AND [2] flu-like symptoms (e.g., cough, runny nose, sore throat, SOB; with or without fever)   Negative: [1] Possible COVID-19 symptoms AND [2] triager concerned about severity of symptoms or other causes   Negative: COVID-19 and breastfeeding, questions about   Negative: SEVERE or constant chest pain or pressure  (Exception: Mild central chest pain, present only when coughing.)   Negative: MODERATE difficulty breathing (e.g., speaks in phrases, SOB even at rest, pulse 100-120)   Negative: [1] Headache AND [2] stiff neck (can't touch chin to chest)   Negative: Oxygen level (e.g., pulse oximetry) 90% or lower   Negative: Chest pain or pressure  (Exception: MILD central chest pain, present only when coughing.)   Negative: [1] Drinking very little AND [2] dehydration suspected (e.g., no urine > 12 hours, very dry mouth, very lightheaded)   Negative: Patient sounds very sick or weak to the triager   Negative: MILD difficulty breathing (e.g., minimal/no SOB at rest, SOB with walking, pulse <100)   Negative: Fever > 103 F (39.4 C)   Negative: [1] Fever > 100 F (37.8 C) AND [2] bedridden (e.g., CVA, chronic illness, recovering from surgery)   Negative: [1] Fever > 101 F (38.3 C) AND [2] age > 60 years   Negative: Oxygen level (e.g., pulse oximetry) 91 to 94%    Protocols used: COVID-19 - Diagnosed or Sstichlem-G-EY

## 2024-09-30 NOTE — CONFIDENTIAL NOTE
Nurse Triage SBAR    Is this a 2nd Level Triage? {No_Yes 2nd level triage:053348}    Situation: {(Clearly and briefly define the situation):882621}Call from patient    Tested positive for COVID  Symptoms started on Saturday or late  Headache, fever (102. max), chills, cough, congestion, decreased appetite        Background: {(Provide clear, relevant background information that relates to the situation):070720}***    Assessment: {(A statement of your professional conclusion):276374}***    Protocol Recommended Disposition:   [unfilled]    Recommendation: {(What do you need from this individual?):893966}***     {Routed to provider / Paged to provider:316885}    Does the patient meet one of the following criteria for ADS visit consideration? {ADS Consideration Criteria:733821}

## 2024-10-12 ENCOUNTER — HEALTH MAINTENANCE LETTER (OUTPATIENT)
Age: 54
End: 2024-10-12

## 2024-10-15 DIAGNOSIS — E10.3299 TYPE 1 DIABETES MELLITUS WITH MILD NONPROLIFERATIVE RETINOPATHY, MACULAR EDEMA PRESENCE UNSPECIFIED, UNSPECIFIED LATERALITY (H): ICD-10-CM

## 2024-10-15 RX ORDER — INSULIN ASPART INJECTION 100 [IU]/ML
50 INJECTION, SOLUTION SUBCUTANEOUS DAILY
Qty: 20 ML | Refills: 11 | Status: SHIPPED | OUTPATIENT
Start: 2024-10-15

## 2024-10-15 NOTE — TELEPHONE ENCOUNTER
Insulin Aspart, w/Niacinamide, (FIASP) 100 UNIT/ML SOLN         Last Written Prescription Date:  09/07/23  Last Fill Quantity: 20mL,   # refills: 11  Last Office Visit : 09/13/24  Future Office visit:  None scheduled    Routing refill request to provider for review/approval because:  Insulin and insulin pump supplies - refilled per Endocrine clinic.

## 2024-10-21 ASSESSMENT — ANXIETY QUESTIONNAIRES
3. WORRYING TOO MUCH ABOUT DIFFERENT THINGS: MORE THAN HALF THE DAYS
7. FEELING AFRAID AS IF SOMETHING AWFUL MIGHT HAPPEN: SEVERAL DAYS
6. BECOMING EASILY ANNOYED OR IRRITABLE: SEVERAL DAYS
IF YOU CHECKED OFF ANY PROBLEMS ON THIS QUESTIONNAIRE, HOW DIFFICULT HAVE THESE PROBLEMS MADE IT FOR YOU TO DO YOUR WORK, TAKE CARE OF THINGS AT HOME, OR GET ALONG WITH OTHER PEOPLE: SOMEWHAT DIFFICULT
4. TROUBLE RELAXING: MORE THAN HALF THE DAYS
7. FEELING AFRAID AS IF SOMETHING AWFUL MIGHT HAPPEN: SEVERAL DAYS
5. BEING SO RESTLESS THAT IT IS HARD TO SIT STILL: NOT AT ALL
GAD7 TOTAL SCORE: 10
8. IF YOU CHECKED OFF ANY PROBLEMS, HOW DIFFICULT HAVE THESE MADE IT FOR YOU TO DO YOUR WORK, TAKE CARE OF THINGS AT HOME, OR GET ALONG WITH OTHER PEOPLE?: SOMEWHAT DIFFICULT
1. FEELING NERVOUS, ANXIOUS, OR ON EDGE: MORE THAN HALF THE DAYS
2. NOT BEING ABLE TO STOP OR CONTROL WORRYING: MORE THAN HALF THE DAYS

## 2024-10-23 ENCOUNTER — VIRTUAL VISIT (OUTPATIENT)
Dept: PSYCHOLOGY | Facility: CLINIC | Age: 54
End: 2024-10-23
Payer: COMMERCIAL

## 2024-10-23 DIAGNOSIS — R19.8 GI SYMPTOMS: ICD-10-CM

## 2024-10-23 DIAGNOSIS — F33.41 MAJOR DEPRESSIVE DISORDER, RECURRENT, IN PARTIAL REMISSION (H): ICD-10-CM

## 2024-10-23 DIAGNOSIS — E10.3299 TYPE 1 DIABETES MELLITUS WITH MILD NONPROLIFERATIVE RETINOPATHY, MACULAR EDEMA PRESENCE UNSPECIFIED, UNSPECIFIED LATERALITY (H): ICD-10-CM

## 2024-10-23 DIAGNOSIS — F43.22 ADJUSTMENT DISORDER WITH ANXIETY: Primary | ICD-10-CM

## 2024-10-23 DIAGNOSIS — G89.29 OTHER CHRONIC PAIN: ICD-10-CM

## 2024-10-23 PROCEDURE — 90834 PSYTX W PT 45 MINUTES: CPT | Mod: 95 | Performed by: PSYCHOLOGIST

## 2024-10-23 NOTE — PROGRESS NOTES
Health Psychology          Latanya Mayfield, Ph.D.,  [phone]  Hansa Arzate, Ph.D.,  [phone]  Sarah Beth Jack, Ph.D.,  [phone]  Panfilo Cornejo, Ph.D., , [facility] [phone]  Krystle Hannah, Ph.D.,  [phone]  Elise Sutton, Ph.D., , [facility] [phone]    [facility], 3rd Floor  [address] [place] [address]       Health Psychology Progress Note    Patient Name: Dali Martines    Service Type: Individual  Length of Visit: 48 minutes   Start time: 11:06 AM   Stop time: 11:54 AM         Attendees: patient attended alone  Session #: 21    Most Recent DA/update: 8/16/23    Telemedicine Information:     Telemedicine Visit: The patient's condition can be safely assessed and treated via synchronous audio and visual telemedicine encounter.    Reason for Telemedicine Visit: Patient has requested telehealth visit and Patient convenience (e.g. access to timely appointments / distance to available provider)  Originating Site (Patient Location): Patient's home, [place]  Distant Location (provider location):  On-site: [facility]  Consent:  The patient/guardian has verbally consented to: the potential risks and benefits of telemedicine (video visit) versus in person care; bill my insurance or make self-payment for services provided; and responsibility for payment of non-covered services.   Mode of Communication:  Video Conference via [employer]  As the provider I attest to compliance with applicable laws and regulations related to telemedicine.     Identifying Information and Presenting Problem:  Dali Martines is a 53 year old female adult who was referred to health psychology by her endocrinologist, Dr. Mckeon. Patient presented with concerns of stress in the context of multiple life events, several medical conditions, and chronic management of T1DM.    Topics Discussed/Interventions Provided:    Session Content:     Update: Patient  returning for follow-up today. States that she is struggling a bit and is feeling overtaxed. Had a successful trip to [place] to address some concerns with her mother's home. Reports that she contracted COVID on the trip and when she returned home, she discovered that her mother had broken her hip. Has been navigating some of the caretaker demands associated with managing her mother's needs. Health concerns have been stable.      Homework Review: Patient reports that she attempted to incorporate the goals from last time but found it difficult to do this with all her caregiver demands.     Skills/intervention: Assisted patient in processing her thoughts and emotions around her caregiver concerns and the impact the stress has had on her. She notes that her sleep has been negatively impacted and she has been feeling much more on edge over the past several weeks. Reports that she has been trying to use the stress management techniques we have worked on in therapy, but is finding them to have limited utility. Introduced dropping anchor exercises and completed one in session.    Treatment plan review: Completed 90 day treatment plan review. Discussed progress and improvements made over time and priorities for next phase of treatment. Mutually agreed to focus on skills that facilitate working with difficulty.         Top values identified included: spirituality, humor, fairness, creativity, curiosity, contribution, and caring.  Unhelpful rules/assumptions: fear of failure, all-or-nothing thinking, and simplicity, control, and structure  Top priorities for grief: increasing tolerance of discomfort associated with uncertainty as well as identifying and processing emotions associated with the losses she has experienced.    Treatment Objective(s) Addressed in This Session:  Psychological distress related to Diabetes  Psychological distress related to caregiver stress    Progress on / Status of Treatment Objective(s) /  Homework:  In progress    Medication Adherence: Patient did not report medication changes. Current medication list is below:     Current Outpatient Medications   Medication Sig Dispense Refill    albuterol (PROAIR HFA/PROVENTIL HFA/VENTOLIN HFA) 108 (90 Base) MCG/ACT inhaler Inhale 2 puffs into the lungs every 4 hours as needed for shortness of breath / dyspnea or wheezing 1 Inhaler 11    aspirin 81 MG tablet Take 1 tablet by mouth daily.      blood glucose (ACCU-CHEK COLIN PLUS) test strip Test Blood Sugar 8 times daily or as directed 800 strip 3    blood glucose monitoring (ACCU-CHEK FASTCLIX) lancets Use to test blood sugar 8 times daily or as directed. 4 Box 3    blood glucose monitoring (NO BRAND SPECIFIED) meter device kit Use to test blood sugar 8 times daily or as directed. Any covered brand, 1 kit 0    Calcium Carbonate-Vitamin D (CALCIUM + D PO) Take one daily      citalopram (CELEXA) 20 MG tablet Take 1.5 tablets (30 mg) by mouth daily 135 tablet 1    Continuous Blood Gluc  (DEXCOM G6 ) SUZANNE Use to read blood sugars as per 's instructions. 1 each 0    Continuous Glucose Sensor (DEXCOM G6 SENSOR) MISC Change every 10 days. 9 each 3    Continuous Glucose Transmitter (DEXCOM G6 TRANSMITTER) MISC Change every 3 months. 1 each 3    diclofenac (VOLTAREN) 1 % topical gel Apply 2 g topically 4 times daily (Patient not taking: Reported on 9/30/2024) 100 g 4    diclofenac (VOLTAREN) 1 % topical gel APPLY 2 GRAMS ONTO THE SKIN FOUR TIMES DAILY AS NEEDED FOR MODERATE PAIN 100 g 5    econazole nitrate 1 % cream Apply 0.5 inches topically daily.      fish oil-omega-3 fatty acids 1000 MG capsule Take one daily      fluocinonide (LIDEX) 0.05 % external solution Apply topically 2 times daily 60 mL 4    folic acid (FOLVITE) 1 MG tablet Take 1 tablet (1 mg) by mouth daily 90 tablet 3    Insulin Aspart, w/Niacinamide, (FIASP PENFILL) 100 UNIT/ML SOCT 45 Units by Other route See Admin Instructions.  "Via insulin device. Approx 45 units daily as directed. 45 mL 3    Insulin Aspart, w/Niacinamide, (FIASP) 100 UNIT/ML SOLN Inject 50 Units as directed daily. For use in ambulatory insulin pump as directed.  Approximate daily dose is 50 units. 20 mL 11    insulin degludec (TRESIBA FLEXTOUCH) 100 UNIT/ML pen ADMINISTER 8 UNITS UNDER THE SKIN DAILY. PLEASE DO NOT FILL TODAY ( 10/18/2023 ). 30 mL 1    Insulin Disposable Pump (OMNIPOD 5 G6 INTRO, GEN 5,) KIT 1 kit continuous 1 kit 0    Insulin Disposable Pump (OMNIPOD 5 G6 POD, GEN 5,) MISC 1 each every 3 days 30 each 3    insulin pen needle (B-D U/F) 31G X 8 MM miscellaneous Use 5 pen needles daily 450 each 3    insulin syringe-needle U-100 (30G X 1/2\" 1 ML) 30G X 1/2\" 1 ML miscellaneous Use 1 syringes as directed to draw up insulin for pump. Per pharmacy request. . 100 each 1    Ketoconazole (NIZORAL PO) Shampoo daily      levothyroxine (SYNTHROID/LEVOTHROID) 112 MCG tablet Take 1 tablet (112 mcg) by mouth daily 90 tablet 4    methotrexate 2.5 MG tablet Take 6 tablets (15 mg) by mouth every 7 days Labs every 8 - 12 weeks for refills. 72 tablet 1    methotrexate 2.5 MG tablet Take 6 tablets (15 mg) by mouth every 7 days Labs every 8 - 12 weeks for refills. 72 tablet 0    methotrexate 2.5 MG tablet Take 6 tablets (15 mg) by mouth every 7 days Labs required every 8-12 weeks while taking this medication 72 tablet 0    methylphenidate (METADATE CD) 20 MG CR capsule Take 20 mg by mouth daily      methylphenidate (RITALIN) 10 MG tablet TAKE UP TO 2 TABLETS BY MOUTH IN THE LATE AFTERNOON.      Minoxidil (ROGAINE EX) daily      mometasone-formoterol (DULERA) 100-5 MCG/ACT inhaler INHALE 2 PUFFS INTO THE LUNGS TWICE DAILY 13 g 11    montelukast (SINGULAIR) 10 MG tablet Take 1 tablet (10 mg) by mouth every evening for 180 days 30 tablet 5    Multiple Vitamin (MULTIVITAMIN OR) Take one daily tab      ondansetron (ZOFRAN ODT) 4 MG ODT tab Take 1 tablet (4 mg) by mouth every 8 hours " as needed for vomiting 30 tablet 1    predniSONE (DELTASONE) 20 MG tablet Take 1 tablet (20 mg) by mouth daily (Patient not taking: Reported on 9/30/2024) 5 tablet 0    Semaglutide, 1 MG/DOSE, (OZEMPIC) 4 MG/3ML pen Inject 1 mg Subcutaneous every 7 days 12 mL 3    simvastatin (ZOCOR) 20 MG tablet Take 1 tablet (20 mg) by mouth at bedtime 90 tablet 3    traZODone (DESYREL) 50 MG tablet Take 1-2 tablets by mouth nightly as needed for sleep. 180 tablet 0     Current Facility-Administered Medications   Medication Dose Route Frequency Provider Last Rate Last Admin    lidocaine 1 % injection 1 mL  1 mL   Tolu Fernández, DPM   1 mL at 11/09/22 1429    triamcinolone acetonide (KENALOG-10) injection 10 mg  10 mg   Tolu Fernández, DPM   10 mg at 11/09/22 1429     Assessment: The patient appeared to be active and engaged in today's session and was receptive to feedback.     Mental Status Exam:   Appearance: Appropriate   Eye Contact: Good    Orientation: Yes, x4  Behavior/relationship to provider/demeanor: Cooperative, Engaged, and Pleasant  Motor Activity: normal or unremarkable  Mood (subjective report): Stable  Affect (objective appearance): Appropriate/mood congruent  Speech Rate: Normal  Speech Volume: Normal  Speech Articulation: Normal  Speech Coherence: Normal  Speech Spontaneity: Normal  Thought Content: no suicidal/homicidal ideation, plans, or intent  Thought Process (associations): Logical, Linear, and Goal directed  Thought Process (rate): Normal  Abnormal Perception: None  Attention/Concentration: Normal  Memory: Appears grossly intact   Fund of Knowledge: Above average  Abstraction:  Normal  Insight:  Good  Judgment:  good    Does the patient appear to be at imminent risk of harm to self/others at this time? No    The session was necessary to address psychological distress in the context of multiple psychosocial stressors and chronic health concerns.    Diagnoses:    1. Adjustment disorder with  anxiety    2. Major depressive disorder, recurrent, in partial remission (H24)    3. 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 Other chronic pain    5. GI symptoms        Plan:    Follow-up video visit with me on 11/15/24 at 10:00am  Patient to use 911 or other crisis resources in the event of a psychiatric emergency  Primary care needs and medications are managed by Dimitri Alas MD. Referring provider is Dr. Mckeon.  Next visit agenda/goals:   Try the dropping anchor exercises using whichever one fits with the timing  Focus on carol and enrichment where possible   Discuss scheduling DA update    Skills taught/interventions used thus far:  Psychoeducation  Values clarification  Committed action (vitality vs suffering)  Perfectionism workbook (modules 1-5)  Defusion  Acting opposite  Mindfulness (breath, body scan, watching thoughts, choiceless awareness)  Therapeutic journaling  [employer]    NOTE: Treatment plan update due 9/6/24; 90 day treatment plan review due 1/21/25.                                                Individual Treatment Plan    Patient's Name: Dali Martines  YOB: 1970    Date of Creation: 9/6/23  Date Treatment Plan Last Reviewed/Revised: 10/23/24     DSM5 Diagnoses:     1. Adjustment disorder with anxiety    2. Major depressive disorder, recurrent, in partial remission (H)      Psychosocial / Contextual Factors: financial hardship, health issues, occupational/vocational stress, relationship stress, caregiver stress (mother's health concerns), and grief and loss in the wake of recent bereavement  PROMIS (reviewed every 90 days):      8/13/2024  2:51 PM   PROMIS 10    PROMIS TOTAL - SUBSCORES 20        Referral / Collaboration:  Collaboration with medical team and specialists as needed.    Anticipated number of session for this episode of care: 15-20  Anticipation frequency  "of session: 2x per month  Anticipated Duration of each session: 38-52 minutes  Treatment plan will be reviewed in 90 days or when goals have been changed.       Measurable Treatment Goal(s) related to diagnosis/functional impairment(s): Overall goals for treatment include improving her physical health (e.g., diabetes, physical activity, and GI discomfort), learning strategies for enhancing coping capacity, and decreasing avoidance-based coping.  Goal 1: Patient will decrease her use of avoidance-based coping   \"I will know I've met my goal when I am not avoiding doing things out of fear (e.g., bathroom sink, processing grief, physical activity, reviewing pieces of writing).\"     Objective #A (Patient Action)    Patient will learn and implement skills for reducing perfectionism.  Status: In progress - Date: 10/23/24 (patient has learned some anti-perfectionism strategies and is implementing them with some success)     Intervention(s)  Therapist will teach anti-perfectionism skills and will assist patient in completing a workbook on perfectionism.    Objective #B  Patient will initiate a healthy grieving process  Status: In progress - Date: 10/23/24 (patient is learning emotional expression skills)    Intervention(s)  Therapist will assist patient in processing and exploring her grief.    Objective #C  Patient will increase her participation in meaningful and purposeful activities.  Status: Satisfactory progress - Date: 10/23/24 (patient reports progress in increasing her participation in meaningful activities, socializing, spending time in nature, and traveling)    Intervention(s)  Therapist will teach values clarification, committed action, and exposure skills.    Objective #D  Patient will learn and implement unhooking skills for decreasing the degree to which fear influences behaviors and decisions in an unhelpful way.  Status: Satisfactory progress - Date: 10/23/24 (patient has been learning and implementing " mindfulness and cognitive restructuring skills)     Intervention(s)  Therapist will teach defusion, cognitive restructuring, and mindfulness skills.      Patient has reviewed and agreed to the above plan.      Elise Sutton, PhD  September 6, 2023    Elise Sutton, Ph.D., , [facility]  Clinical Health Psychologist  Phone: [phone]   Pager: [phone]

## 2024-10-23 NOTE — NURSING NOTE
Is the patient currently in the state of [place]? YES    Current patient location: [facility] [address] [facility] [address]    Visit mode:VIDEO    If the visit is dropped, the patient can be reconnected by: VIDEO VISIT:  Send e-mail to at [email or web address]    Will anyone else be joining the visit? No  (If patient encounters technical issues they should call [phone])    Are changes needed to the allergy or medication list? N/A    Are refills needed on medications prescribed by this physician? No    Rooming Documentation: Questionnaire(s) not done per department protocol.    Reason for visit: RECHECK     Deborah Frias, VVF

## 2024-10-25 ENCOUNTER — THERAPY VISIT (OUTPATIENT)
Dept: PHYSICAL THERAPY | Facility: CLINIC | Age: 54
End: 2024-10-25
Payer: COMMERCIAL

## 2024-10-25 DIAGNOSIS — R42 DIZZINESS: ICD-10-CM

## 2024-10-25 DIAGNOSIS — R26.89 BALANCE DISORDER: Primary | ICD-10-CM

## 2024-10-25 PROCEDURE — 97750 PHYSICAL PERFORMANCE TEST: CPT | Mod: GP | Performed by: PHYSICAL THERAPIST

## 2024-10-25 PROCEDURE — 97112 NEUROMUSCULAR REEDUCATION: CPT | Mod: GP | Performed by: PHYSICAL THERAPIST

## 2024-10-25 NOTE — PROGRESS NOTES
10/25/24 0500   Appointment Info   Signing clinician's name / credentials JA Castilloarley REANNA   Visits Used 25   Medical Diagnosis Dizziness   PT Tx Diagnosis Dizziness and imbalance   Progress Note/Certification   Onset of illness/injury or Date of Surgery 07/18/23  (date of order, began in June 2022)   Therapy Frequency 1x/week   Predicted Duration 60 days   PT Goal 1   Goal Identifier DHI   Goal Description The pt will score <25/100 on the DHI indicating a reduction in subjective dizziness   Goal Progress 24/100 on 2/21/24   Target Date 01/07/24   Date Met 01/03/24   PT Goal 2   Goal Identifier FGA   Goal Description The pt will score >24/30 on the FGA indicating improvement in dynamic balance   Target Date 05/15/24   Date Met 05/04/24   PT Goal 3   Goal Identifier HEP   Goal Description The pt will be independent with a HEP in order to improve self management of symptoms   Goal Progress In progress, will plan 1-2 final sessions to finalize HEP   Target Date 12/09/24   PT Goal 4   Goal Identifier SOT   Goal Description The pt will improve composite score on SOT to >70, within age group norms in order to improve sensory integration and reducing overall dizziness   Goal Progress Improved composite to 69, slightly below age group norms   Target Date 08/20/24   Date Met 08/21/24   Subjective Report   Subjective Report Since her last visit went to [place]. Had 2 instances of severe vertigo. One episode triggered by looking at phone while in a car with quick turns. Lasted that day and felt a little bad following. Also had an experience at an art exhibit, needed to sit down. Also notices that she will close her eyes when trying to take in new info. Her mom also recently broke her hip, having to deal with a lot of stress regarding her care. Also had COVID while in [place]. Feels less sure on her feet and had difficulty with zoom calls which she is fearful will limit her return to work.   Objective Measure 1   Objective  Measure FGA   Details 27/30   Neuromuscular Re-education   Neuromuscular re-ed of mvmt, balance, coord, kinesthetic sense, posture, proprioception minutes (03740) 29   Neuro Re-ed 1 vestibular management   Neuro Re-ed 1 - Details Discussed up/downs of vestibular rehabilitation and broke down the dizzy flair ups that she experienced. Discussed how new enviornments, increased stress can both cause an increase in symptoms. Reviewed strategies for dealing with these occasional periods of worsening symptoms. Pt expresses undersatnding   Neuro Re-ed 2 HEP update   Neuro Re-ed 2 - Details Updated HEP: Practiced gaze stabilziation/horizontal and vertical VOR with target on computer screen with fake zoom call in background. This increased pt's dizziness. Discussed practicing different speed head turns and head nods. Walking balloon volleyball around gym, turning, obstacles. Pt lee fair, noticing more dizziness and apprehension. Does appear slightly more off balance with reactive balance. Added to HEP.   Physical Performance Test/measures   Physical Performance Test/Measurement, Minutes (23823) 10   Physical Performance Test/Measurement Details Retested FGA, see report   Plan   Plan for next session Planning last session   Total Session Time   Timed Code Treatment Minutes 39   Total Treatment Time (sum of timed and untimed services) 39         PLAN  Extending care plan by 4-6 weeks due to recent increase in symptoms and instability to ensure continued reduction in fall risk    Beginning/End Dates of Progress Note Reporting Period:   8/21/24 to 10/25/2024    Referring Provider:  Rick L Nissen

## 2024-11-04 ENCOUNTER — OFFICE VISIT (OUTPATIENT)
Dept: NEUROLOGY | Facility: CLINIC | Age: 54
End: 2024-11-04
Payer: COMMERCIAL

## 2024-11-04 VITALS — HEART RATE: 72 BPM | DIASTOLIC BLOOD PRESSURE: 68 MMHG | SYSTOLIC BLOOD PRESSURE: 102 MMHG

## 2024-11-04 DIAGNOSIS — E10.3299 TYPE 1 DIABETES MELLITUS WITH MILD NONPROLIFERATIVE RETINOPATHY, MACULAR EDEMA PRESENCE UNSPECIFIED, UNSPECIFIED LATERALITY (H): ICD-10-CM

## 2024-11-04 DIAGNOSIS — H81.8X9 PERSISTENT POSTURAL-PERCEPTUAL DIZZINESS: ICD-10-CM

## 2024-11-04 DIAGNOSIS — R41.89 COGNITIVE CHANGES: Primary | ICD-10-CM

## 2024-11-04 PROCEDURE — 99212 OFFICE O/P EST SF 10 MIN: CPT | Mod: GC | Performed by: PSYCHIATRY & NEUROLOGY

## 2024-11-04 NOTE — PATIENT INSTRUCTIONS
Today we discussed the cognitive changes you have been having, given that you have noticed a change and decline since your last neuropsych testing, we will place a referral for neuropsychological testing.     For optimization of your cognition, we recommend continue to manage anxiety and improving sleep.     We will have you follow up with me in clinic in 6 months.

## 2024-11-04 NOTE — PROGRESS NOTES
DEPARTMENT OF NEUROLOGY  Return visit  Patient Name:  Dali Martines  MRN:  [id number]    :  1970  Date of Clinic Visit:  2024  Primary Care Provider:  Dimitri Alas        ASSESSMENT AND PLAN:  Dali Martines is a  53 year old female who initially presented with dizziness, most consistent with persistent postural perceptual dizziness. From dizziness perspective, she has had improvement. She shared that she has had a gradual decline in her cognition, with first symptom being decline in spatial perception. She has also noticed changes in word finding difficulty and short term memory. She does share that she has persistent symptoms of anxiety and poor sleep, which could be contributing to her cognitive changes. Her MoCA was reassuring during her visit today and her symptoms do not impact iADLs, however, given continuing changes in cognition compared to her baseline, it would be reasonable to repeat neuropsychological testing. We also reviewed that managing anxiety and working to improve sleep may also help to improve cognition.     Ms. Martines also reported symptoms of tremor that sound most consistent with postural tremor. No evidence of parkinsonism on exam. No prodromal symptoms of Parkinson Disease. We will plan to continue to monitor these symptoms at follow up visits as well.     Plan:  - Neuropsychological testing referral  - Continue to work with mental health providers to manage anxiety  - Continue working with PT and OT for persistent postural perceptual dizziness    Follow up with me in clinic in 6 months.     Patient was seen and discussed with Dr. Schuler.    DILIP OLGUIN MD  Neurology Resident, PGY-2  [facility]    _________________________________________    HPI:  Dali Martines is a  53 year old female who initially presented with dizziness, most consistent with persistent postural perceptual dizziness. MR Brain reassuring for no  structural causes of dizziness. MR Brain did have question of possible pituitary cyst, she was seen by neurosurgery and it was thought more likely to be possible suprasellar cyst vs normal suprasellar subarachnoid space and has follow up imaging planned with neurosurgery.     Today, she shared that overall, her dizziness has improved. She has had significant flares with exposure to increased visual stimuli. She shared that examples of when she had COVID which has led her to feel more unsteady, when she was in an art exhibit she felt quite dizzy and exhausted and when driving, she was looking at her phone to look at directions and she had a significant flare that took a while to improve.     Over the last year, she occasionally will have a tremor in her hands, it occurs when she is holding something like a cup or her phone, it has been happening latter in the day. She has not noticed that it is worse when it is on one side versus the other. It has become more prominent over the last year.     When she working with vestibular therapy, when she is doing an exercise and is asked cognitively challenging questions or tasks, she quickly looses her balance. So she was referred to an OT who recommended that she consider getting further cognitive testing.     With her cognition, she has noticed that she has some difficulty finding the right name and occasionally places. She will occasionally say a word incorrectly but will catch herself. She has noticed that her spatial sense was strong years back and now it has severely diminished. She used to be able to estimate distance or how many jelly beans are in a jar. That ability has been compromised. She has has noticed changes gradually over years. She having trouble following tasks in OT.     She is now more reliant on lists, feels like she has trouble with her short term memory. Her spatial symptoms were the most prominent initially. She has trouble organizing items. She has  some increased difficulty following the recipe.     She has not sleeping well. She has been under increased stress with care giving. Her anxiety has worsened.     No recent head trauma. Had a concussion. No history of seizures. No stroke.     She was high performing in spatial awareness and verbally, so the changes she has noticed have been significant to her.      No hallucinations. No acting out of dreams. No issues with constipation or incontinence. She still has her sense of smell. No changes in gait.     She manages her own medications, she is driving, no near misses or drifting into lanes, she is grocery shopping and cooking, no errors at works, she continues to manage her own finances.     Her mother has vascular dementia. Her mother recently broke her hip, so she has been her primary caregiver, living with her. Her uncle has a history of Parkinson Disease, he was diagnosed in his 60s.    NEUROLOGIC EXAMINATION:  Vitals: /68 (BP Location: Right arm, Patient Position: Sitting, Cuff Size: Adult Regular)   Pulse 72   LMP  (LMP Unknown)     General: Sitting in chair, in no acute distress    Neurologic Exam:    Mental status: Patient is oriented to person, place and time and able to provide a detailed account of history of illness. Attention and fund of knowledge are normal. See MoCA below.     Language: Speech is fluent; comprehension, repetition and naming are normal.    Cranial nerves: Visual fields are full and extraocular movements are normal. Facial strength is normal. Hearing is normal to conversation. No dysarthria.     Motor/Strength: Strength is 5/5 in proximal and distal portions of all extremities. No tremors, myoclonus, or other abnormal movements. Muscle bulk and tone are normal.     Sensation: Sensation is intact to light touch in all extremities, no extinction on bilateral double simultaneous stimulation.     Coordination: Finger-to-nose and heel-to-shin maneuvers are symmetric and normal  "bilaterally. Rapid alternating movements are symmetric in the extremities. No bradykinesia on exam.     Gait: Gait is narrow based and steady.    INVESTIGATIONS:   MoCA version 8.1 11/4/24  Visuospatial 5/5  Naming 3/3  Memory    1st trial 5/5   2nd trial 5/5  Attention   Digits 2/2  Vigilance 1/1  Serial 7s 3/3  Language   Repetition 2/2   Fluency 1/1  Abstraction 2/2  Delayed Recall 5/5   MIS 15/15  Orientation 5/6    Total: 26  Level of education: Master's; was taking courses for PhD    MR Brain without contrast 2/2/24  \"IMPRESSION:  1.  Partially characterized cystic appearing sellar lesion measuring 9 x 8 x 9 mm that partially flattens the underlying normal-appearing pituitary tissue. This is indeterminate incompletely evaluated on a technical basis, though appears intraglandular,   possibly representing a microadenoma with cystic degeneration, or potentially representing an arachnoid cyst external to the gland. Recommend further evaluation with pituitary/sella protocol brain MRI without and with IV contrast.  2.  No superimposed acute intracranial abnormality.\"     MR Brain with and without contrast 2/22/24  \"IMPRESSION:  1.  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 No convincing pituitary mass.  2.  Small cephaloceles along the floor of the middle cranial fossae on both sides.  3.  Otherwise negative brain MRI. No acute intracranial finding. No evidence for recent ischemia, intracranial hemorrhage, or mass.\"     "

## 2024-11-04 NOTE — LETTER
2024      Dali Martines  [address]  [facility] [address]      Dear Colleague,    Thank you for referring your patient, Dali Martines, to the [facility]. Please see a copy of my visit note below.      DEPARTMENT OF NEUROLOGY  Return visit  Patient Name:  Dali Martines  MRN:  [id number]    :  1970  Date of Clinic Visit:  2024  Primary Care Provider:  Dimitri Alas        ASSESSMENT AND PLAN:  Dali Martines is a  53 year old female who initially presented with dizziness, most consistent with persistent postural perceptual dizziness. From dizziness perspective, she has had improvement. She shared that she has had a gradual decline in her cognition, with first symptom being decline in spatial perception. She has also noticed changes in word finding difficulty and short term memory. She does share that she has persistent symptoms of anxiety and poor sleep, which could be contributing to her cognitive changes. Her MoCA was reassuring during her visit today and her symptoms do not impact iADLs, however, given continuing changes in cognition compared to her baseline, it would be reasonable to repeat neuropsychological testing. We also reviewed that managing anxiety and working to improve sleep may also help to improve cognition.     Ms. Martines also reported symptoms of tremor that sound most consistent with postural tremor. No evidence of parkinsonism on exam. No prodromal symptoms of Parkinson Disease. We will plan to continue to monitor these symptoms at follow up visits as well.     Plan:  - Neuropsychological testing referral  - Continue to work with mental health providers to manage anxiety  - Continue working with PT and OT for persistent postural perceptual dizziness    Follow up with me in clinic in 6 months.     Patient was seen and discussed with Dr. Schuler.    DILIP OLGUIN MD  Neurology Resident, PGY-2  [facility]  [place] Department of Neurology    _________________________________________    HPI:  Dali Martines is a  53 year old female who initially presented with dizziness, most consistent with persistent postural perceptual dizziness. MR Brain reassuring for no structural causes of dizziness. MR Brain did have question of possible pituitary cyst, she was seen by neurosurgery and it was thought more likely to be possible suprasellar cyst vs normal suprasellar subarachnoid space and has follow up imaging planned with neurosurgery.     Today, she shared that overall, her dizziness has improved. She has had significant flares with exposure to increased visual stimuli. She shared that examples of when she had COVID which has led her to feel more unsteady, when she was in an art exhibit she felt quite dizzy and exhausted and when driving, she was looking at her phone to look at directions and she had a significant flare that took a while to improve.     Over the last year, she occasionally will have a tremor in her hands, it occurs when she is holding something like a cup or her phone, it has been happening latter in the day. She has not noticed that it is worse when it is on one side versus the other. It has become more prominent over the last year.     When she working with vestibular therapy, when she is doing an exercise and is asked cognitively challenging questions or tasks, she quickly looses her balance. So she was referred to an OT who recommended that she consider getting further cognitive testing.     With her cognition, she has noticed that she has some difficulty finding the right name and occasionally places. She will occasionally say a word incorrectly but will catch herself. She has noticed that her spatial sense was strong years back and now it has severely diminished. She used to be able to estimate distance or how many jelly beans are in a jar. That ability has been compromised. She has has noticed changes  gradually over years. She having trouble following tasks in OT.     She is now more reliant on lists, feels like she has trouble with her short term memory. Her spatial symptoms were the most prominent initially. She has trouble organizing items. She has some increased difficulty following the recipe.     She has not sleeping well. She has been under increased stress with care giving. Her anxiety has worsened.     No recent head trauma. Had a concussion. No history of seizures. No stroke.     She was high performing in spatial awareness and verbally, so the changes she has noticed have been significant to her.      No hallucinations. No acting out of dreams. No issues with constipation or incontinence. She still has her sense of smell. No changes in gait.     She manages her own medications, she is driving, no near misses or drifting into lanes, she is grocery shopping and cooking, no errors at works, she continues to manage her own finances.     Her mother has vascular dementia. Her mother recently broke her hip, so she has been her primary caregiver, living with her. Her uncle has a history of Parkinson Disease, he was diagnosed in his 60s.    NEUROLOGIC EXAMINATION:  Vitals: /68 (BP Location: Right arm, Patient Position: Sitting, Cuff Size: Adult Regular)   Pulse 72   LMP  (LMP Unknown)     General: Sitting in chair, in no acute distress    Neurologic Exam:    Mental status: Patient is oriented to person, place and time and able to provide a detailed account of history of illness. Attention and fund of knowledge are normal. See MoCA below.     Language: Speech is fluent; comprehension, repetition and naming are normal.    Cranial nerves: Visual fields are full and extraocular movements are normal. Facial strength is normal. Hearing is normal to conversation. No dysarthria.     Motor/Strength: Strength is 5/5 in proximal and distal portions of all extremities. No tremors, myoclonus, or other abnormal  "movements. Muscle bulk and tone are normal.     Sensation: Sensation is intact to light touch in all extremities, no extinction on bilateral double simultaneous stimulation.     Coordination: Finger-to-nose and heel-to-shin maneuvers are symmetric and normal bilaterally. Rapid alternating movements are symmetric in the extremities. No bradykinesia on exam.     Gait: Gait is narrow based and steady.    INVESTIGATIONS:   MoCA version 8.1 11/4/24  Visuospatial 5/5  Naming 3/3  Memory    1st trial 5/5   2nd trial 5/5  Attention   Digits 2/2  Vigilance 1/1  Serial 7s 3/3  Language   Repetition 2/2   Fluency 1/1  Abstraction 2/2  Delayed Recall 5/5   MIS 15/15  Orientation 5/6    Total: 26  Level of education: Master's; was taking courses for PhD    MR Brain without contrast 2/2/24  \"IMPRESSION:  1.  Partially characterized cystic appearing sellar lesion measuring 9 x 8 x 9 mm that partially flattens the underlying normal-appearing pituitary tissue. This is indeterminate incompletely evaluated on a technical basis, though appears intraglandular,   possibly representing a microadenoma with cystic degeneration, or potentially representing an arachnoid cyst external to the gland. Recommend further evaluation with pituitary/sella protocol brain MRI without and with IV contrast.  2.  No superimposed acute intracranial abnormality.\"     MR Brain with and without contrast 2/22/24  \"IMPRESSION:  1.  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 No convincing pituitary mass.  2.  Small cephaloceles along the floor of the middle cranial fossae on both sides.  3.  Otherwise negative brain MRI. No acute intracranial finding. No evidence for recent ischemia, intracranial hemorrhage, or mass.\"       Attestation signed by Tiffanie Schuler MD at 11/6/2024  3:57 " PM:  Physician Attestation  I, Tiffanie Schuler MD, saw this patient and agree with the findings and plan of care as documented in the note.      Items personally reviewed/procedural attestation: vitals and labs.    Dali Martines is a 53 year old female who presents for cognitive concerns today.  She has a prior diagnosis of PPPD.    Regarding neuro-cognition, she has had difficulty with spatial awareness and word finding issues. Spatial awareness issues came first. She feels that she has had decline in her functional baseline. She has difficulty with nouns. She will say the wrong word and be unable to catch herself. She was working with PT, and then was falling off balance.   She has been more reliant on lists recently. She has had difficulty caring for her mother. No hallucinations, anosmia, near misses in driving, performs own grocery shopping, finances or work mistakes.    MOCA:29/30 - did not get the date correct    Plan: Repeat neuro-psych testing. Rule out anxiety and sleep, which may both be contributory to difficulties with word finding and spatial awareness, and would be confounding elements to the neuropsychologic testing if present. Rest per Dr. Olguin.    Tiffanie Schuler MD       Again, thank you for allowing me to participate in the care of your patient.        Sincerely,        DILIP OLGUIN MD

## 2024-11-05 ENCOUNTER — VIRTUAL VISIT (OUTPATIENT)
Dept: EDUCATION SERVICES | Facility: CLINIC | Age: 54
End: 2024-11-05
Payer: COMMERCIAL

## 2024-11-05 DIAGNOSIS — E10.9 TYPE 1 DIABETES, HBA1C GOAL < 7% (H): ICD-10-CM

## 2024-11-05 DIAGNOSIS — E10.9 TYPE 1 DIABETES MELLITUS WITHOUT COMPLICATION (H): Primary | ICD-10-CM

## 2024-11-05 PROCEDURE — G0108 DIAB MANAGE TRN  PER INDIV: HCPCS | Mod: 95 | Performed by: REGISTERED NURSE

## 2024-11-05 RX ORDER — SYRINGE-NEEDLE,INSULIN,0.5 ML 27GX1/2"
SYRINGE, EMPTY DISPOSABLE MISCELLANEOUS
Qty: 100 EACH | Refills: 4 | Status: SHIPPED | OUTPATIENT
Start: 2024-11-05

## 2024-11-05 ASSESSMENT — PAIN SCALES - GENERAL: PAINLEVEL_OUTOF10: NO PAIN (0)

## 2024-11-05 NOTE — TELEPHONE ENCOUNTER
LVD:  9/13/2024  [facility] Felicia Ty MD     Refilled per protocol.

## 2024-11-05 NOTE — NURSING NOTE
Current patient location: Patient declined to provide     Is the patient currently in the state of [place]? YES    Visit mode:VIDEO    If the visit is dropped, the patient can be reconnected by: VIDEO VISIT: Text to cell phone:   Telephone Information:   Mobile [phone]       Will anyone else be joining the visit? NO  (If patient encounters technical issues they should call [phone]    Are changes needed to the allergy or medication list? No    Are refills needed on medications prescribed by this physician? NO    Rooming Documentation:  Questionnaire(s) not done per department protocol    Reason for visit: Diabetes Education    Shelby Kocher VVF

## 2024-11-06 RX ORDER — BLOOD SUGAR DIAGNOSTIC
STRIP MISCELLANEOUS
Qty: 800 STRIP | Refills: 3 | Status: SHIPPED | OUTPATIENT
Start: 2024-11-06

## 2024-11-06 RX ORDER — LANCETS
EACH MISCELLANEOUS
Qty: 100 EACH | Refills: 3 | Status: SHIPPED | OUTPATIENT
Start: 2024-11-06

## 2024-11-08 RX ORDER — INSULIN PMP CART,AUT,G6/7,CNTR
1 EACH SUBCUTANEOUS CONTINUOUS
Qty: 1 KIT | Refills: 0 | Status: SHIPPED | OUTPATIENT
Start: 2024-11-08

## 2024-11-09 ENCOUNTER — HOSPITAL ENCOUNTER (OUTPATIENT)
Dept: MRI IMAGING | Facility: CLINIC | Age: 54
Discharge: HOME | End: 2024-11-09
Attending: NEUROLOGICAL SURGERY | Admitting: NEUROLOGICAL SURGERY
Payer: COMMERCIAL

## 2024-11-09 DIAGNOSIS — E23.6 PITUITARY CYST (H): ICD-10-CM

## 2024-11-09 PROCEDURE — 255N000002 HC RX 255 OP 636: Performed by: NEUROLOGICAL SURGERY

## 2024-11-09 PROCEDURE — A9585 GADOBUTROL INJECTION: HCPCS | Performed by: NEUROLOGICAL SURGERY

## 2024-11-09 PROCEDURE — 70553 MRI BRAIN STEM W/O & W/DYE: CPT

## 2024-11-09 RX ORDER — GADOBUTROL 604.72 MG/ML
7 INJECTION INTRAVENOUS ONCE
Status: COMPLETED | OUTPATIENT
Start: 2024-11-09 | End: 2024-11-09

## 2024-11-09 RX ADMIN — GADOBUTROL 7 ML: 604.72 INJECTION INTRAVENOUS at 17:17

## 2024-11-09 ASSESSMENT — SLEEP AND FATIGUE QUESTIONNAIRES
HOW LIKELY ARE YOU TO NOD OFF OR FALL ASLEEP WHILE SITTING INACTIVE IN A PUBLIC PLACE: MODERATE CHANCE OF DOZING
HOW LIKELY ARE YOU TO NOD OFF OR FALL ASLEEP WHILE SITTING AND TALKING TO SOMEONE: SLIGHT CHANCE OF DOZING
HOW LIKELY ARE YOU TO NOD OFF OR FALL ASLEEP WHILE WATCHING TV: HIGH CHANCE OF DOZING
HOW LIKELY ARE YOU TO NOD OFF OR FALL ASLEEP IN A CAR, WHILE STOPPED FOR A FEW MINUTES IN TRAFFIC: WOULD NEVER DOZE
HOW LIKELY ARE YOU TO NOD OFF OR FALL ASLEEP WHILE LYING DOWN TO REST IN THE AFTERNOON WHEN CIRCUMSTANCES PERMIT: MODERATE CHANCE OF DOZING
HOW LIKELY ARE YOU TO NOD OFF OR FALL ASLEEP WHEN YOU ARE A PASSENGER IN A CAR FOR AN HOUR WITHOUT A BREAK: MODERATE CHANCE OF DOZING
HOW LIKELY ARE YOU TO NOD OFF OR FALL ASLEEP WHILE SITTING AND READING: HIGH CHANCE OF DOZING
HOW LIKELY ARE YOU TO NOD OFF OR FALL ASLEEP WHILE SITTING QUIETLY AFTER LUNCH WITHOUT ALCOHOL: SLIGHT CHANCE OF DOZING

## 2024-11-11 ENCOUNTER — MYC MEDICAL ADVICE (OUTPATIENT)
Dept: NEUROSURGERY | Facility: CLINIC | Age: 54
End: 2024-11-11
Payer: COMMERCIAL

## 2024-11-11 NOTE — TELEPHONE ENCOUNTER
Per Dr. Nickerson patient's MR Brain w/o & w Contrast is stable, repeat in 1 year.    [employer] message sent to patient. Staff message sent to nursing team to place order in 1 year.

## 2024-11-12 ENCOUNTER — VIRTUAL VISIT (OUTPATIENT)
Dept: SLEEP MEDICINE | Facility: CLINIC | Age: 54
End: 2024-11-12
Payer: COMMERCIAL

## 2024-11-12 VITALS
WEIGHT: 178 LBS | DIASTOLIC BLOOD PRESSURE: 68 MMHG | HEIGHT: 70 IN | SYSTOLIC BLOOD PRESSURE: 102 MMHG | BODY MASS INDEX: 25.48 KG/M2

## 2024-11-12 DIAGNOSIS — F43.9 STRESS: ICD-10-CM

## 2024-11-12 DIAGNOSIS — F51.04 CHRONIC INSOMNIA: Primary | ICD-10-CM

## 2024-11-12 PROCEDURE — 90834 PSYTX W PT 45 MINUTES: CPT | Mod: 95 | Performed by: PSYCHOLOGIST

## 2024-11-12 ASSESSMENT — PAIN SCALES - GENERAL: PAINLEVEL_OUTOF10: NO PAIN (0)

## 2024-11-12 NOTE — PROGRESS NOTES
Virtual Visit Details    Type of service:  Video Visit     Originating Location (pt. Location): Home    Distant Location (provider location):  Off-site  Platform used for Video Visit: AmWell                Visit Start Time:  408 PM  Visit End Time: 4:48 PM        SLEEP MEDICINE VIRTUAL FOLLOW-UP VISIT  Behavioral Sleep Medicine    Patient Name: Dali Martines  MRN:  [id number]  Date of Service: Nov 12, 2024       Subjective Report     Dali Martines  returns for a telehealth video visit to discuss progress in implementing behavioral strategies for the management of insomnia and stress affecting sleep .  Patient consent for initiation of video visit was obtained and documented prior to initiation of visit.     Dali reports increased stress due to mother sustaining hip fracture and subsequent surgery and rehabilitation. Since Oct 25 she has been residing at her mother's home to provide ongoing care for her mother who is experiencing some improvement in ADLs and cognition.    She is sending out group texts to family to recruit some assistance to help care for her mother.  There is sixty days of in home care services.    Discussed managing stress and fatigue associated with caregiving. .          Sleep Data:     Source of Sleep Estimates:  Verbal Self-report          EPWORTH SLEEPINESS SCALE    Sitting and reading (Patient-Rptd) 3   Watching TV (Patient-Rptd) 3   Sitting, inactive in a public place (theatre or mtg.) (Patient-Rptd) 2    As a passenger in a car (Patient-Rptd) 2   Lying down to rest in the afternoon when circumstance permit (Patient-Rptd) 2   Sitting and talking to someone (Patient-Rptd) 1   Sitting quietly after lunch without alcohol (Patient-Rptd) 1   In a car, while stopped for a few minutes in traffic (Patient-Rptd) 0   TOTAL SCORE (nl <11) (Patient-Rptd) 14     INSOMNIA SEVERITY INDEX     Difficulty falling asleep (Patient-Rptd) 2   Difficult staying asleep (Patient-Rptd) 3   Problems waking up  to early (Patient-Rptd) 4   How SATISFIED/DISSATISFIED are you with your CURRENT sleep pattern? (Patient-Rptd) 3   How NOTICEABLE to others do you think your sleep pattern is in terms of your quality of life? (Patient-Rptd) 2   How WORRIED/DISTRESSED are you about your current sleep pattern? (Patient-Rptd) 3   To what extent do you consider your sleep problem to INTERFERE with your daily fuctioning(e.g. daytime fatigue, mood, ability to function at work/daily chores, concentration, mood,etc.) CURRENTLY? (Patient-Rptd) 4   INSOMNIA SEVERITY INDEX TOTAL SCORE (Patient-Rptd) 21    Absence of insomnia (0-7); sub-threshold insomnia (8-14); moderate insomnia (15-21); and severe insomnia (22-28)       Interventions     Strategies and recommendations including  stress management and resilience and Stimulus control therapy were reviewed and discussed today.     Services provided are compliant with the requirements of [place] Statute SS 256B.0625 Subd. 3b and paragraph (b)       Vital Signs     LMP  (LMP Unknown)      Mental Status     Orientation:  X3  Mood:  normal  Affect:  Congruent with mood  Speech/Language:  Normal  Thought Process: Intact  Associations:  Normal  Thought Content: Normal  Patient does not report any suicidal ideation, intention or plan.    Diagnostic Impressions and Plan        Chronic insomnia  Stress    The focus of this session was outpatient management of family stress and caregiving stress that impact sleep.  Sleep latency normal but continued frequent awakenings noted.    Plan:  Continue current sleep schedule and plan patient with a laptop to get at least 3 days of respite between now and next follow-up in 3 months.    Follow-up: 3 months      Philip Quiroz PsyD, GARY, DBSM  Diplomate, Behavioral Sleep Medicine  [facility]      Note: This dictation was created using voice recognition software. This document may contain an error not identified before finalizing the  document. If the error changes the accuracy of the document, I would appreciate it being brought to my attention.

## 2024-11-12 NOTE — NURSING NOTE
[facility] [address] [facility] [facility] [address]    Is the patient currently in the state of [place]? YES    Visit mode:VIDEO    If the visit is dropped, the patient can be reconnected by:VIDEO VISIT: Text to cell phone:   Telephone Information:   Mobile [phone]       Will anyone else be joining the visit? NO  (If patient encounters technical issues they should call [phone]    Are changes needed to the allergy or medication list? No    Are refills needed on medications prescribed by this physician? NO    Rooming Documentation:  Not applicable    Reason for visit: EWA Uribe VVF

## 2024-11-15 ENCOUNTER — OFFICE VISIT (OUTPATIENT)
Dept: PULMONOLOGY | Facility: CLINIC | Age: 54
End: 2024-11-15
Attending: INTERNAL MEDICINE
Payer: COMMERCIAL

## 2024-11-15 ENCOUNTER — OFFICE VISIT (OUTPATIENT)
Dept: PULMONOLOGY | Facility: CLINIC | Age: 54
End: 2024-11-15
Payer: COMMERCIAL

## 2024-11-15 ENCOUNTER — VIRTUAL VISIT (OUTPATIENT)
Dept: PSYCHOLOGY | Facility: CLINIC | Age: 54
End: 2024-11-15
Payer: COMMERCIAL

## 2024-11-15 ENCOUNTER — ANCILLARY PROCEDURE (OUTPATIENT)
Dept: GENERAL RADIOLOGY | Facility: CLINIC | Age: 54
End: 2024-11-15
Payer: COMMERCIAL

## 2024-11-15 VITALS
HEART RATE: 64 BPM | RESPIRATION RATE: 18 BRPM | DIASTOLIC BLOOD PRESSURE: 82 MMHG | HEIGHT: 70 IN | OXYGEN SATURATION: 95 % | SYSTOLIC BLOOD PRESSURE: 125 MMHG | BODY MASS INDEX: 26.63 KG/M2 | WEIGHT: 186 LBS

## 2024-11-15 DIAGNOSIS — J45.40 MODERATE PERSISTENT ASTHMA WITHOUT COMPLICATION: ICD-10-CM

## 2024-11-15 DIAGNOSIS — R13.12 OROPHARYNGEAL DYSPHAGIA: ICD-10-CM

## 2024-11-15 DIAGNOSIS — E10.3299 TYPE 1 DIABETES MELLITUS WITH MILD NONPROLIFERATIVE RETINOPATHY, MACULAR EDEMA PRESENCE UNSPECIFIED, UNSPECIFIED LATERALITY (H): ICD-10-CM

## 2024-11-15 DIAGNOSIS — F33.41 RECURRENT MAJOR DEPRESSIVE DISORDER, IN PARTIAL REMISSION (H): ICD-10-CM

## 2024-11-15 DIAGNOSIS — Z63.6 CAREGIVER STRESS: ICD-10-CM

## 2024-11-15 DIAGNOSIS — J45.40 MODERATE PERSISTENT ASTHMA, UNSPECIFIED WHETHER COMPLICATED: ICD-10-CM

## 2024-11-15 DIAGNOSIS — L40.50 PSORIATIC ARTHRITIS (H): ICD-10-CM

## 2024-11-15 DIAGNOSIS — F33.41 MAJOR DEPRESSIVE DISORDER, RECURRENT, IN PARTIAL REMISSION (H): ICD-10-CM

## 2024-11-15 DIAGNOSIS — R19.8 GI SYMPTOMS: ICD-10-CM

## 2024-11-15 DIAGNOSIS — G89.29 OTHER CHRONIC PAIN: ICD-10-CM

## 2024-11-15 DIAGNOSIS — J45.40 MODERATE PERSISTENT ASTHMA WITHOUT COMPLICATION: Primary | ICD-10-CM

## 2024-11-15 DIAGNOSIS — F43.22 ADJUSTMENT DISORDER WITH ANXIETY: Primary | ICD-10-CM

## 2024-11-15 PROBLEM — F33.9 MAJOR DEPRESSION, RECURRENT (H): Status: ACTIVE | Noted: 2024-11-15

## 2024-11-15 PROCEDURE — 71046 X-RAY EXAM CHEST 2 VIEWS: CPT | Mod: GC | Performed by: RADIOLOGY

## 2024-11-15 PROCEDURE — 99213 OFFICE O/P EST LOW 20 MIN: CPT | Performed by: INTERNAL MEDICINE

## 2024-11-15 SDOH — SOCIAL STABILITY - SOCIAL INSECURITY: DEPENDENT RELATIVE NEEDING CARE AT HOME: Z63.6

## 2024-11-15 ASSESSMENT — ASTHMA QUESTIONNAIRES
ACUTE_EXACERBATION_TODAY: NO
ACT_TOTALSCORE: 22
QUESTION_1 LAST FOUR WEEKS HOW MUCH OF THE TIME DID YOUR ASTHMA KEEP YOU FROM GETTING AS MUCH DONE AT WORK, SCHOOL OR AT HOME: NONE OF THE TIME
QUESTION_5 LAST FOUR WEEKS HOW WOULD YOU RATE YOUR ASTHMA CONTROL: WELL CONTROLLED
QUESTION_4 LAST FOUR WEEKS HOW OFTEN HAVE YOU USED YOUR RESCUE INHALER OR NEBULIZER MEDICATION (SUCH AS ALBUTEROL): NOT AT ALL
QUESTION_2 LAST FOUR WEEKS HOW OFTEN HAVE YOU HAD SHORTNESS OF BREATH: ONCE OR TWICE A WEEK
ACT_TOTALSCORE: 22
QUESTION_3 LAST FOUR WEEKS HOW OFTEN DID YOUR ASTHMA SYMPTOMS (WHEEZING, COUGHING, SHORTNESS OF BREATH, CHEST TIGHTNESS OR PAIN) WAKE YOU UP AT NIGHT OR EARLIER THAN USUAL IN THE MORNING: ONCE OR TWICE

## 2024-11-15 ASSESSMENT — PATIENT HEALTH QUESTIONNAIRE - PHQ9: SUM OF ALL RESPONSES TO PHQ QUESTIONS 1-9: 8

## 2024-11-15 ASSESSMENT — PAIN SCALES - GENERAL: PAINLEVEL_OUTOF10: NO PAIN (0)

## 2024-11-15 NOTE — PROGRESS NOTES
Health Psychology          Latanya Mayfield, Ph.D.,  [phone]  Hansa Arzate, Ph.D.,  [phone]  Sarah Beth Jack, Ph.D.,  [phone]  Panfilo Cornejo, Ph.D., , [facility] [phone]  Krystle Hannah, Ph.D.,  [phone]  Elise Sutton, Ph.D., , [facility] [phone]    [facility], 3rd Floor  [address] [place] [address]       Health Psychology Progress Note    Patient Name: Dali Martines    Service Type: Individual  Length of Visit: 59 minutes - extended session due to complexity of content  Start time: 10:03 AM   Stop time: 11:02 AM         Attendees: patient attended alone  Session #: 22    Most Recent DA/update: 8/16/23    Telemedicine Information:     Telemedicine Visit: The patient's condition can be safely assessed and treated via synchronous audio and visual telemedicine encounter.    Reason for Telemedicine Visit: Patient has requested telehealth visit and Patient convenience (e.g. access to timely appointments / distance to available provider)  Originating Site (Patient Location): Patient's home, [place]  Distant Location (provider location):  Off-site: Provider's Home Office   Consent:  The patient/guardian has verbally consented to: the potential risks and benefits of telemedicine (video visit) versus in person care; bill my insurance or make self-payment for services provided; and responsibility for payment of non-covered services.   Mode of Communication:  Video Conference via H[employer]  As the provider I attest to compliance with applicable laws and regulations related to telemedicine.     Identifying Information and Presenting Problem:  Dali Martines is a 54 year old female adult who was referred to health psychology by her endocrinologist, Dr. Mckeon. Patient presented with concerns of stress in the context of multiple life events, several medical conditions, and chronic management of T1DM.    Topics  Discussed/Interventions Provided:    Session Content:     Update: Patient returning for follow-up today. Reports a significant increase in her stress. Has moved in with her mother temporarily to serve as a 24 hour caregiver while her mother recovers from her broken hip. Was discharged from a TCU on October 25th and had not met some of her PT needs. Reports that the TCU administration wanted to transfer her to their [facility]. Patient reports that she disagreed with this plan and then her mother was then discharged home. Mother needs more regular supervision while she regains her strength, so patient is living with her currently. She is struggling a bit and is feeling quite overtaxed with this intense stress. Also had a follow-up MRI, which was stable and showed no changes. Was recommended for scheduled for repeat MRI in 1 year. Will consider reaching out to providers for additional explanation of impressions.      Homework Review: Deferred.     Skills/intervention: Assisted patient in processing her thoughts and emotions around her caregiver concerns and the impact the stress has had on her. Provided psychoeducation about the health impacts of stress and effects to monitor. Discussed harm mitigation strategies including protecting sleep, maintaining healthy eating habits, getting movement, taking breaks, and building in micro-recoveries. Session ended with mountain meditation.         Top values identified included: spirituality, humor, fairness, creativity, curiosity, contribution, and caring.  Unhelpful rules/assumptions: fear of failure, all-or-nothing thinking, and simplicity, control, and structure  Top priorities for grief: increasing tolerance of discomfort associated with uncertainty as well as identifying and processing emotions associated with the losses she has experienced.    Treatment Objective(s) Addressed in This Session:  Psychological distress related to Diabetes  Psychological distress related to  caregiver stress    Progress on / Status of Treatment Objective(s) / Homework:  In progress    Medication Adherence: Patient did not report medication changes. Current medication list is below:     Current Outpatient Medications   Medication Sig Dispense Refill    albuterol (PROAIR HFA/PROVENTIL HFA/VENTOLIN HFA) 108 (90 Base) MCG/ACT inhaler Inhale 2 puffs into the lungs every 4 hours as needed for shortness of breath / dyspnea or wheezing 1 Inhaler 11    aspirin 81 MG tablet Take 1 tablet by mouth daily.      blood glucose (ACCU-CHEK COLIN PLUS) test strip Test Blood Sugar 4 times daily or as directed 800 strip 3    blood glucose monitoring (ACCU-CHEK FASTCLIX) lancets Use to test blood sugar 8 times daily or as directed. 100 each 3    blood glucose monitoring (NO BRAND SPECIFIED) meter device kit Use to test blood sugar 8 times daily or as directed. Any covered brand, 1 kit 0    Calcium Carbonate-Vitamin D (CALCIUM + D PO) Take one daily      citalopram (CELEXA) 20 MG tablet Take 1.5 tablets (30 mg) by mouth daily 135 tablet 1    Continuous Blood Gluc  (DEXCOM G6 ) SUZANNE Use to read blood sugars as per 's instructions. 1 each 0    Continuous Glucose Sensor (DEXCOM G6 SENSOR) MISC Change every 10 days. 9 each 3    Continuous Glucose Transmitter (DEXCOM G6 TRANSMITTER) MISC Change every 3 months. 1 each 3    diclofenac (VOLTAREN) 1 % topical gel Apply 2 g topically 4 times daily (Patient not taking: Reported on 11/4/2024) 100 g 4    diclofenac (VOLTAREN) 1 % topical gel APPLY 2 GRAMS ONTO THE SKIN FOUR TIMES DAILY AS NEEDED FOR MODERATE PAIN 100 g 5    econazole nitrate 1 % cream Apply 0.5 inches topically daily.      fish oil-omega-3 fatty acids 1000 MG capsule Take one daily      fluocinonide (LIDEX) 0.05 % external solution Apply topically 2 times daily 60 mL 4    folic acid (FOLVITE) 1 MG tablet Take 1 tablet (1 mg) by mouth daily 90 tablet 3    Insulin Aspart, w/Niacinamide, (FIASP  "PENFILL) 100 UNIT/ML SOCT 45 Units by Other route See Admin Instructions. Via insulin device. Approx 45 units daily as directed. 45 mL 3    Insulin Aspart, w/Niacinamide, (FIASP) 100 UNIT/ML SOLN Inject 50 Units as directed daily. For use in ambulatory insulin pump as directed.  Approximate daily dose is 50 units. 20 mL 11    insulin degludec (TRESIBA FLEXTOUCH) 100 UNIT/ML pen ADMINISTER 8 UNITS UNDER THE SKIN DAILY. PLEASE DO NOT FILL TODAY ( 10/18/2023 ). 30 mL 1    Insulin Disposable Pump (OMNIPOD 5 G6 INTRO, GEN 5,) KIT 1 kit continuous 1 kit 0    Insulin Disposable Pump (OMNIPOD 5 G6 POD, GEN 5,) MISC 1 each every 3 days 30 each 3    Insulin Disposable Pump (OMNIPOD 5 INTRO, GEN 5,) KIT 1 kit continuously. 1 kit 0    insulin pen needle (B-D U/F) 31G X 8 MM miscellaneous Use 5 pen needles daily 450 each 3    insulin syringe-needle U-100 (30G X 1/2\" 1 ML) 30G X 1/2\" 1 ML miscellaneous Use 1 syringes as directed to draw up insulin for pump. Per pharmacy request. . 100 each 4    Ketoconazole (NIZORAL PO) Shampoo daily      levothyroxine (SYNTHROID/LEVOTHROID) 112 MCG tablet Take 1 tablet (112 mcg) by mouth daily 90 tablet 4    methotrexate 2.5 MG tablet Take 6 tablets (15 mg) by mouth every 7 days Labs every 8 - 12 weeks for refills. 72 tablet 1    methotrexate 2.5 MG tablet Take 6 tablets (15 mg) by mouth every 7 days Labs every 8 - 12 weeks for refills. 72 tablet 0    methotrexate 2.5 MG tablet Take 6 tablets (15 mg) by mouth every 7 days Labs required every 8-12 weeks while taking this medication 72 tablet 0    methylphenidate (METADATE CD) 20 MG CR capsule Take 20 mg by mouth daily      methylphenidate (RITALIN) 10 MG tablet TAKE UP TO 2 TABLETS BY MOUTH IN THE LATE AFTERNOON.      Minoxidil (ROGAINE EX) daily      mometasone-formoterol (DULERA) 100-5 MCG/ACT inhaler INHALE 2 PUFFS INTO THE LUNGS TWICE DAILY 13 g 11    montelukast (SINGULAIR) 10 MG tablet Take 1 tablet (10 mg) by mouth every evening for 180 " days 30 tablet 5    Multiple Vitamin (MULTIVITAMIN OR) Take one daily tab      ondansetron (ZOFRAN ODT) 4 MG ODT tab Take 1 tablet (4 mg) by mouth every 8 hours as needed for vomiting 30 tablet 1    predniSONE (DELTASONE) 20 MG tablet Take 1 tablet (20 mg) by mouth daily (Patient not taking: Reported on 11/4/2024) 5 tablet 0    Semaglutide, 1 MG/DOSE, (OZEMPIC) 4 MG/3ML pen Inject 1 mg Subcutaneous every 7 days 12 mL 3    simvastatin (ZOCOR) 20 MG tablet Take 1 tablet (20 mg) by mouth at bedtime 90 tablet 3    traZODone (DESYREL) 50 MG tablet Take 1-2 tablets by mouth nightly as needed for sleep. 180 tablet 0     Current Facility-Administered Medications   Medication Dose Route Frequency Provider Last Rate Last Admin    lidocaine 1 % injection 1 mL  1 mL   Tolu Fernández DPM   1 mL at 11/09/22 1429    triamcinolone acetonide (KENALOG-10) injection 10 mg  10 mg   Tolu Fernández DPM   10 mg at 11/09/22 1429     Assessment: The patient appeared to be active and engaged in today's session and was receptive to feedback.     Mental Status Exam:   Appearance: Appropriate   Eye Contact: Good    Orientation: Yes, x4  Behavior/relationship to provider/demeanor: Cooperative, Engaged, and Pleasant  Motor Activity: normal or unremarkable  Mood (subjective report): Stressed  Affect (objective appearance): Appropriate/mood congruent  Speech Rate: Normal  Speech Volume: Normal  Speech Articulation: Normal  Speech Coherence: Normal  Speech Spontaneity: Normal  Thought Content: no suicidal/homicidal ideation, plans, or intent  Thought Process (associations): Logical, Linear, and Goal directed  Thought Process (rate): Normal  Abnormal Perception: None  Attention/Concentration: Normal  Memory: Appears grossly intact   Fund of Knowledge: Above average  Abstraction:  Normal  Insight:  Good  Judgment:  good    Does the patient appear to be at imminent risk of harm to self/others at this time? No    The session was  necessary to address psychological distress in the context of multiple psychosocial stressors and chronic health concerns.    Diagnoses:    1. Adjustment disorder with anxiety    2. Major depressive disorder, recurrent, in partial remission (H)    3. Caregiver stress    4. Type 1 diabetes mellitus with mild nonproliferative retinopathy, macular edema presence unspecified, unspecified laterality (H)    5. Other chronic pain    6. GI symptoms    7. Psoriatic arthritis (H)        Plan:    Follow-up video visit with me on 11/22/24 at 10:00am  Patient to use 911 or other crisis resources in the event of a psychiatric emergency  Primary care needs and medications are managed by Dimitri Alas MD. Referring provider is Dr. Mckeon.  Next visit agenda/goals:   Try the dropping anchor exercises using whichever one fits with the timing  Focus on carol and enrichment where possible  Implement harm mitigation strategies   Discuss scheduling DA update    Skills taught/interventions used thus far:  Psychoeducation  Values clarification  Committed action (vitality vs suffering)  Perfectionism workbook (modules 1-5)  Defusion  Acting opposite  Mindfulness (breath, body scan, watching thoughts, choiceless awareness, mountain)  Therapeutic journaling  [employer]    NOTE: Treatment plan update due 9/6/24; 90 day treatment plan review due 1/21/25.                                                Individual Treatment Plan    Patient's Name: Dali Martines  YOB: 1970    Date of Creation: 9/6/23  Date Treatment Plan Last Reviewed/Revised: 10/23/24     DSM5 Diagnoses:     1. Adjustment disorder with anxiety    2. Major depressive disorder, recurrent, in partial remission (H)      Psychosocial / Contextual Factors: financial hardship, health issues, occupational/vocational stress, relationship stress, caregiver stress (mother's health concerns), and grief and loss in the wake of recent bereavement  LORI  "(reviewed every 90 days):      8/13/2024  2:51 PM   PROMIS 10    PROMIS TOTAL - SUBSCORES 20        Referral / Collaboration:  Collaboration with medical team and specialists as needed.    Anticipated number of session for this episode of care: 15-20  Anticipation frequency of session: 2x per month  Anticipated Duration of each session: 38-52 minutes  Treatment plan will be reviewed in 90 days or when goals have been changed.       Measurable Treatment Goal(s) related to diagnosis/functional impairment(s): Overall goals for treatment include improving her physical health (e.g., diabetes, physical activity, and GI discomfort), learning strategies for enhancing coping capacity, and decreasing avoidance-based coping.  Goal 1: Patient will decrease her use of avoidance-based coping   \"I will know I've met my goal when I am not avoiding doing things out of fear (e.g., bathroom sink, processing grief, physical activity, reviewing pieces of writing).\"     Objective #A (Patient Action)    Patient will learn and implement skills for reducing perfectionism.  Status: In progress - Date: 10/23/24 (patient has learned some anti-perfectionism strategies and is implementing them with some success)     Intervention(s)  Therapist will teach anti-perfectionism skills and will assist patient in completing a workbook on perfectionism.    Objective #B  Patient will initiate a healthy grieving process  Status: In progress - Date: 10/23/24 (patient is learning emotional expression skills)    Intervention(s)  Therapist will assist patient in processing and exploring her grief.    Objective #C  Patient will increase her participation in meaningful and purposeful activities.  Status: Satisfactory progress - Date: 10/23/24 (patient reports progress in increasing her participation in meaningful activities, socializing, spending time in nature, and traveling)    Intervention(s)  Therapist will teach values clarification, committed action, and " exposure skills.    Objective #D  Patient will learn and implement unhooking skills for decreasing the degree to which fear influences behaviors and decisions in an unhelpful way.  Status: Satisfactory progress - Date: 10/23/24 (patient has been learning and implementing mindfulness and cognitive restructuring skills)     Intervention(s)  Therapist will teach defusion, cognitive restructuring, and mindfulness skills.      Patient has reviewed and agreed to the above plan.      Elies Sutton, PhD  September 6, 2023    Elise Sutton, Ph.D., , [facility]  Clinical Health Psychologist  Phone: [phone]

## 2024-11-15 NOTE — NURSING NOTE
Chief Complaint   Patient presents with    Asthma     Follow up visit     Contreras Alves CMA CMA at 2:49 PM on 11/15/2024

## 2024-11-15 NOTE — LETTER
11/15/2024      Dali Martines  [address]  [facility] [address]      Dear Colleague,    Thank you for referring your patient, Dali Martines, to the [facility] [place]. Please see a copy of my visit note below.    [facility]      Reason for Visit  Dali Martines is a 51 year old female who is followed for asthma  Pulmonary HPI  Dali returns for follow up. Last seen was over video in 9/2022. Her asthma got worse in winter, when she had to take breaks from carrying laundry in her house, and reduce amount of grocery she is able to carry with dyspnea. She had an associated productive cough, and refrained from using oral prednisone due to concerns of hyperglycemia (being type 1 diabetic and insulin adjustments not very effective with previous steroids use). Her symptoms lasted for weeks, no ED or urgent care visits. Eventually started getting better in 3/2023. Continues to take dulera 200, 1 puff BID and singulair 5 mg nightly. Symptoms are not back to baseline however, and probably at 80% as estimated by her. Concerned about reflux given h/o hiatal hernia but hasn't seen GI since 2014. Lifetime nonsmoker. Takes methotrexate for psoriasis.     Interval updates 5/16/24:  Dali returns for follow up. She has been bothered by recent poor air quality despite wearing mask outdoors, and needing albuterol several times per errand. She is using two puffs when she is getting to leave home but her symptoms are still not under control. She had great benefit from pulmonary rehab which she has completed. She had a large mold exposure two days ago when she was taking out the trash as there was a lot of mold on prairie grass that she got exposed to.     Interval updates 11/15/24:  Dali is back today for follow up. Fever last month, COVID positive home test, received paxlovid, helped her significantly, no lower RT symptoms this  "time. Did not received steroids. Not needing rescue inhaler for several months now. Overall she feels exhausted with mother's hip dislocation and she is helping long hours and sleep deprived. Dulera is currently at 1 puff BID now. She had mild aspiration on video study 1/2024 and she feels it is worse in the recent few months. She did not see speech therapy.     Exam:   /82   Pulse 64   Resp 18   Ht 1.778 m (5' 10\")   Wt 84.4 kg (186 lb)   LMP  (LMP Unknown)   SpO2 95%   BMI 26.69 kg/m    GENERAL APPEARANCE: Well developed, well nourished, alert, and in no apparent distress.  NEURO: Mentation intact, speech normal.   RESP: Good AE BL with no added sounds  CVS: NL S1+S2 with no murmurs.   PSYCH: mentation appears normal. and affect normal/bright  SKIN: No rash on limited exam   Results:  Stress echo 12/30/14 normal  PFTs 11/15/24:      My interpretation: Normal PFTs today and previously.     Assessment and plan: Dali Martines is a 54-year-old female with type 1 diabetes who was clinically diagnosed with asthma as an adult.  She had COVID in 9/2022 and has never felt the same ever since in terms of breathing. Asthma flared in 12/2022 and struggled until 3/2023 with reduced activity level and needing albuterol. Recurrent asthma symptoms with poor quality air in summer but improved with reviewing and improving her inhaler technique last visit, aerobika device for airway clearance and increasing dulera dose. Her dulera is back down to 1 puff BID. She had recent recurrent COVID infection 10/2024 which improved with paxlovid and had no lower respiratory tract symptoms this time. She seems to be having more aspiration symptoms.   1.  Asthma, mild persistent  - Continue dulera one puff BID indefinitely   - Continue singulair to 10 mg indefinitely   - Aerobika PRN for airway congestion   - Spirometry and FeNO in 3 months since February is her worst month     2. Psoriasis on methotrexate  No clinical suspicion of " pulmonary side effects by PFTs, imaging not indicated.     3. Type 1 DM     4. Oropharyngeal aspiration  - Repeat Video swallow study since it has been almost a year since the first one and the patient feels that symptoms have progressed    - Speech therapy referral   - CXR 2 views today r/o aspiration findings     I will see her back in 3 months   Frieda Dunbar MD         Again, thank you for allowing me to participate in the care of your patient.        Sincerely,        Frieda Dunbar MD

## 2024-11-15 NOTE — PROGRESS NOTES
[facility]      Reason for Visit  Dali Martines is a 51 year old female who is followed for asthma  Pulmonary HPI  Dali returns for follow up. Last seen was over video in 9/2022. Her asthma got worse in winter, when she had to take breaks from carrying laundry in her house, and reduce amount of grocery she is able to carry with dyspnea. She had an associated productive cough, and refrained from using oral prednisone due to concerns of hyperglycemia (being type 1 diabetic and insulin adjustments not very effective with previous steroids use). Her symptoms lasted for weeks, no ED or urgent care visits. Eventually started getting better in 3/2023. Continues to take dulera 200, 1 puff BID and singulair 5 mg nightly. Symptoms are not back to baseline however, and probably at 80% as estimated by her. Concerned about reflux given h/o hiatal hernia but hasn't seen GI since 2014. Lifetime nonsmoker. Takes methotrexate for psoriasis.     Interval updates 5/16/24:  Dali returns for follow up. She has been bothered by recent poor air quality despite wearing mask outdoors, and needing albuterol several times per errand. She is using two puffs when she is getting to leave home but her symptoms are still not under control. She had great benefit from pulmonary rehab which she has completed. She had a large mold exposure two days ago when she was taking out the trash as there was a lot of mold on prairie grass that she got exposed to.     Interval updates 11/15/24:  Dali is back today for follow up. Fever last month, COVID positive home test, received paxlovid, helped her significantly, no lower RT symptoms this time. Did not received steroids. Not needing rescue inhaler for several months now. Overall she feels exhausted with mother's hip dislocation and she is helping long hours and sleep deprived. Dulera is currently at 1 puff BID now. She had mild aspiration on video study 1/2024 and  "she feels it is worse in the recent few months. She did not see speech therapy.     Exam:   /82   Pulse 64   Resp 18   Ht 1.778 m (5' 10\")   Wt 84.4 kg (186 lb)   LMP  (LMP Unknown)   SpO2 95%   BMI 26.69 kg/m    GENERAL APPEARANCE: Well developed, well nourished, alert, and in no apparent distress.  NEURO: Mentation intact, speech normal.   RESP: Good AE BL with no added sounds  CVS: NL S1+S2 with no murmurs.   PSYCH: mentation appears normal. and affect normal/bright  SKIN: No rash on limited exam   Results:  Stress echo 12/30/14 normal  PFTs 11/15/24:      My interpretation: Normal PFTs today and previously.     Assessment and plan: Dali Martines is a 54-year-old female with type 1 diabetes who was clinically diagnosed with asthma as an adult.  She had COVID in 9/2022 and has never felt the same ever since in terms of breathing. Asthma flared in 12/2022 and struggled until 3/2023 with reduced activity level and needing albuterol. Recurrent asthma symptoms with poor quality air in summer but improved with reviewing and improving her inhaler technique last visit, aerobika device for airway clearance and increasing dulera dose. Her dulera is back down to 1 puff BID. She had recent recurrent COVID infection 10/2024 which improved with paxlovid and had no lower respiratory tract symptoms this time. She seems to be having more aspiration symptoms.   1.  Asthma, mild persistent  - Continue dulera one puff BID indefinitely   - Continue singulair to 10 mg indefinitely   - Aerobika PRN for airway congestion   - Spirometry and FeNO in 3 months since February is her worst month     2. Psoriasis on methotrexate  No clinical suspicion of pulmonary side effects by PFTs, imaging not indicated.     3. Type 1 DM     4. Oropharyngeal aspiration  - Repeat Video swallow study since it has been almost a year since the first one and the patient feels that symptoms have progressed    - Speech therapy referral   - CXR 2 " views today r/o aspiration findings     I will see her back in 3 months   Frieda Dunbar MD

## 2024-11-15 NOTE — NURSING NOTE
Is the patient currently in the state of [place]? YES    Current patient location: [facility] [address] [facility] [address]    Visit mode:VIDEO    If the visit is dropped, the patient can be reconnected by: VIDEO VISIT:  Send e-mail to at [email or web address]    Will anyone else be joining the visit? No  (If patient encounters technical issues they should call [phone])    Are changes needed to the allergy or medication list? N/A    Are refills needed on medications prescribed by this physician? No    Rooming Documentation: Questionnaire(s) not done per department protocol.    Reason for visit: RECHECK     Deborah Frias, VVF

## 2024-11-15 NOTE — PROGRESS NOTES
Dali Martines comes into clinic today at the request of Dr. Frieda Dunbar Ordering Provider for spirometry     Tolu Renee, RRT

## 2024-11-15 NOTE — PATIENT INSTRUCTIONS
Hold your dulera two days prior to the next breathing test in February then restart it after the test     For clinical questions, please call our nursing line [phone]    For scheduling, please call [phone]

## 2024-11-16 LAB
EXPTIME-PRE: 8.11 SEC
FEF2575-%PRED-PRE: 103 %
FEF2575-PRE: 2.73 L/SEC
FEF2575-PRED: 2.64 L/SEC
FEFMAX-%PRED-PRE: 120 %
FEFMAX-PRE: 9 L/SEC
FEFMAX-PRED: 7.46 L/SEC
FEV1-%PRED-PRE: 103 %
FEV1-PRE: 3.03 L
FEV1FEV6-PRE: 80 %
FEV1FEV6-PRED: 81 %
FEV1FVC-PRE: 78 %
FEV1FVC-PRED: 80 %
FIFMAX-PRE: 4.01 L/SEC
FVC-%PRED-PRE: 105 %
FVC-PRE: 3.89 L
FVC-PRED: 3.71 L

## 2024-11-21 ASSESSMENT — ANXIETY QUESTIONNAIRES
GAD7 TOTAL SCORE: 9
3. WORRYING TOO MUCH ABOUT DIFFERENT THINGS: SEVERAL DAYS
2. NOT BEING ABLE TO STOP OR CONTROL WORRYING: SEVERAL DAYS
5. BEING SO RESTLESS THAT IT IS HARD TO SIT STILL: NOT AT ALL
GAD7 TOTAL SCORE: 9
8. IF YOU CHECKED OFF ANY PROBLEMS, HOW DIFFICULT HAVE THESE MADE IT FOR YOU TO DO YOUR WORK, TAKE CARE OF THINGS AT HOME, OR GET ALONG WITH OTHER PEOPLE?: SOMEWHAT DIFFICULT
7. FEELING AFRAID AS IF SOMETHING AWFUL MIGHT HAPPEN: SEVERAL DAYS
1. FEELING NERVOUS, ANXIOUS, OR ON EDGE: MORE THAN HALF THE DAYS
6. BECOMING EASILY ANNOYED OR IRRITABLE: MORE THAN HALF THE DAYS
4. TROUBLE RELAXING: MORE THAN HALF THE DAYS
7. FEELING AFRAID AS IF SOMETHING AWFUL MIGHT HAPPEN: SEVERAL DAYS
IF YOU CHECKED OFF ANY PROBLEMS ON THIS QUESTIONNAIRE, HOW DIFFICULT HAVE THESE PROBLEMS MADE IT FOR YOU TO DO YOUR WORK, TAKE CARE OF THINGS AT HOME, OR GET ALONG WITH OTHER PEOPLE: SOMEWHAT DIFFICULT
GAD7 TOTAL SCORE: 9

## 2024-11-22 ENCOUNTER — VIRTUAL VISIT (OUTPATIENT)
Dept: PSYCHOLOGY | Facility: CLINIC | Age: 54
End: 2024-11-22
Payer: COMMERCIAL

## 2024-11-22 DIAGNOSIS — G89.29 OTHER CHRONIC PAIN: ICD-10-CM

## 2024-11-22 DIAGNOSIS — E10.3299 TYPE 1 DIABETES MELLITUS WITH MILD NONPROLIFERATIVE RETINOPATHY, MACULAR EDEMA PRESENCE UNSPECIFIED, UNSPECIFIED LATERALITY (H): ICD-10-CM

## 2024-11-22 DIAGNOSIS — Z63.6 CAREGIVER STRESS: ICD-10-CM

## 2024-11-22 DIAGNOSIS — F43.22 ADJUSTMENT DISORDER WITH ANXIETY: Primary | ICD-10-CM

## 2024-11-22 DIAGNOSIS — R19.8 GI SYMPTOMS: ICD-10-CM

## 2024-11-22 DIAGNOSIS — L40.50 PSORIATIC ARTHRITIS (H): ICD-10-CM

## 2024-11-22 DIAGNOSIS — F33.41 MAJOR DEPRESSIVE DISORDER, RECURRENT, IN PARTIAL REMISSION (H): ICD-10-CM

## 2024-11-22 SDOH — SOCIAL STABILITY - SOCIAL INSECURITY: DEPENDENT RELATIVE NEEDING CARE AT HOME: Z63.6

## 2024-11-22 NOTE — NURSING NOTE
Current patient location: [address] [facility] [address]    Is the patient currently in the state of [place]? YES    Visit mode:VIDEO    If the visit is dropped, the patient can be reconnected by:VIDEO VISIT: Text to cell phone:   Telephone Information:   Mobile [phone]    and VIDEO VISIT: Send to e-mail at: [email or web address]    Will anyone else be joining the visit? NO  (If patient encounters technical issues they should call [phone])    Are changes needed to the allergy or medication list? No    Are refills needed on medications prescribed by this physician? NO    Rooming Documentation:  Questionnaire(s) not done per department protocol    Reason for visit: RECHECK    Cyndi CORRIGAN

## 2024-11-22 NOTE — PROGRESS NOTES
Health Psychology          Latanya Mayfield, Ph.D.,  [phone]  Hansa Arzate, Ph.D.,  [phone]  Sarah Beth Jack, Ph.D.,  [phone]  Panfilo Cornejo, Ph.D., , [facility] [phone]  Krystle Hannah, Ph.D.,  [phone]  Elise Sutton, Ph.D., , [facility] [phone]    [facility], 3rd Floor  [address] [place] [address]       Health Psychology Progress Note    Patient Name: Dali Martines    Service Type: Individual  Length of Visit: 57 minutes - extended session due to complexity of content  Start time: 10:03 AM   Stop time: 11:00 AM         Attendees: patient attended alone  Session #: 23    Most Recent DA/update: 8/16/23    Telemedicine Information:     Telemedicine Visit: The patient's condition can be safely assessed and treated via synchronous audio and visual telemedicine encounter.    Reason for Telemedicine Visit: Patient has requested telehealth visit and Patient convenience (e.g. access to timely appointments / distance to available provider)  Originating Site (Patient Location): Patient's home, [place]  Distant Location (provider location):  Off-site: Provider's Home Office   Consent:  The patient/guardian has verbally consented to: the potential risks and benefits of telemedicine (video visit) versus in person care; bill my insurance or make self-payment for services provided; and responsibility for payment of non-covered services.   Mode of Communication:  Video Conference via [employer]  As the provider I attest to compliance with applicable laws and regulations related to telemedicine.     Identifying Information and Presenting Problem:  Dali Martines is a 54 year old female adult who was referred to health psychology by her endocrinologist, Dr. Mckeon. Patient presented with concerns of stress in the context of multiple life events, several medical conditions, and chronic management of T1DM.    Topics  Discussed/Interventions Provided:    Session Content:     Update: Patient returning for close follow-up today due to substantial increases in caregiver stress. Reports some slight improvements over the past week. Notes that she had an appointment with a memory care doctor yesterday, which was very helpful for care planning. Notes that she has made a plan for returning back to her own home hopefully within the next week. She also notes that she continues to explore longer-term care options for her mother in the future.        Homework Review:    Try the dropping anchor exercises using whichever one fits with the timing - Accomplished. Discussed challenges and benefits.  Focus on carol and enrichment where possible - Accomplished. Noted talking to friends and connecting with new puppy as joyful experiences  Implement harm mitigation strategies - Accomplished.    Skills/intervention: Assisted patient in processing her thoughts and emotions around her caregiver concerns and the impact the stress has had on her. Revisited use of harm mitigation strategies including protecting sleep, maintaining healthy eating habits, getting movement, taking breaks, and building in micro-recoveries. She notes that it is much more difficult to implement these strategies when the stress is unrelenting. She notes that on these days, having a physical release for her tension is very helpful. Revisited progressive muscle relaxation as a potentially useful strategy. Patient also noted that on days when the stress was a little less intense, she was able to implement some of her harm mitigation strategies. Patient also shared a difficult interaction she had with her aunt and how it impacted her. Session ended with a dropping anchor exercise.         Top values identified included: spirituality, humor, fairness, creativity, curiosity, contribution, and caring.  Unhelpful rules/assumptions: fear of failure, all-or-nothing thinking, and simplicity,  control, and structure  Top priorities for grief: increasing tolerance of discomfort associated with uncertainty as well as identifying and processing emotions associated with the losses she has experienced.    Treatment Objective(s) Addressed in This Session:  Psychological distress related to Diabetes  Psychological distress related to caregiver stress    Progress on / Status of Treatment Objective(s) / Homework:  In progress    Medication Adherence: Patient did not report medication changes. Current medication list is below:     Current Outpatient Medications   Medication Sig Dispense Refill    albuterol (PROAIR HFA/PROVENTIL HFA/VENTOLIN HFA) 108 (90 Base) MCG/ACT inhaler Inhale 2 puffs into the lungs every 4 hours as needed for shortness of breath / dyspnea or wheezing 1 Inhaler 11    aspirin 81 MG tablet Take 1 tablet by mouth daily.      blood glucose (ACCU-CHEK COLIN PLUS) test strip Test Blood Sugar 4 times daily or as directed 800 strip 3    blood glucose monitoring (ACCU-CHEK FASTCLIX) lancets Use to test blood sugar 8 times daily or as directed. 100 each 3    blood glucose monitoring (NO BRAND SPECIFIED) meter device kit Use to test blood sugar 8 times daily or as directed. Any covered brand, 1 kit 0    Calcium Carbonate-Vitamin D (CALCIUM + D PO) Take one daily      citalopram (CELEXA) 20 MG tablet Take 1.5 tablets (30 mg) by mouth daily 135 tablet 1    Continuous Blood Gluc  (DEXCOM G6 ) SUZANNE Use to read blood sugars as per 's instructions. 1 each 0    Continuous Glucose Sensor (DEXCOM G6 SENSOR) MISC Change every 10 days. 9 each 3    Continuous Glucose Transmitter (DEXCOM G6 TRANSMITTER) MISC Change every 3 months. 1 each 3    diclofenac (VOLTAREN) 1 % topical gel Apply 2 g topically 4 times daily 100 g 4    diclofenac (VOLTAREN) 1 % topical gel APPLY 2 GRAMS ONTO THE SKIN FOUR TIMES DAILY AS NEEDED FOR MODERATE PAIN 100 g 5    econazole nitrate 1 % cream Apply 0.5 inches  "topically daily.      fish oil-omega-3 fatty acids 1000 MG capsule Take one daily      fluocinonide (LIDEX) 0.05 % external solution Apply topically 2 times daily 60 mL 4    folic acid (FOLVITE) 1 MG tablet Take 1 tablet (1 mg) by mouth daily 90 tablet 3    Insulin Aspart, w/Niacinamide, (FIASP PENFILL) 100 UNIT/ML SOCT 45 Units by Other route See Admin Instructions. Via insulin device. Approx 45 units daily as directed. 45 mL 3    Insulin Aspart, w/Niacinamide, (FIASP) 100 UNIT/ML SOLN Inject 50 Units as directed daily. For use in ambulatory insulin pump as directed.  Approximate daily dose is 50 units. 20 mL 11    insulin degludec (TRESIBA FLEXTOUCH) 100 UNIT/ML pen ADMINISTER 8 UNITS UNDER THE SKIN DAILY. PLEASE DO NOT FILL TODAY ( 10/18/2023 ). 30 mL 1    Insulin Disposable Pump (OMNIPOD 5 G6 INTRO, GEN 5,) KIT 1 kit continuous 1 kit 0    Insulin Disposable Pump (OMNIPOD 5 G6 POD, GEN 5,) MISC 1 each every 3 days 30 each 3    Insulin Disposable Pump (OMNIPOD 5 INTRO, GEN 5,) KIT 1 kit continuously. 1 kit 0    insulin pen needle (B-D U/F) 31G X 8 MM miscellaneous Use 5 pen needles daily 450 each 3    insulin syringe-needle U-100 (30G X 1/2\" 1 ML) 30G X 1/2\" 1 ML miscellaneous Use 1 syringes as directed to draw up insulin for pump. Per pharmacy request. . 100 each 4    Ketoconazole (NIZORAL PO) Shampoo daily      levothyroxine (SYNTHROID/LEVOTHROID) 112 MCG tablet Take 1 tablet (112 mcg) by mouth daily 90 tablet 4    methotrexate 2.5 MG tablet Take 6 tablets (15 mg) by mouth every 7 days Labs every 8 - 12 weeks for refills. 72 tablet 1    methotrexate 2.5 MG tablet Take 6 tablets (15 mg) by mouth every 7 days Labs every 8 - 12 weeks for refills. 72 tablet 0    methotrexate 2.5 MG tablet Take 6 tablets (15 mg) by mouth every 7 days Labs required every 8-12 weeks while taking this medication 72 tablet 0    methylphenidate (METADATE CD) 20 MG CR capsule Take 20 mg by mouth daily      methylphenidate (RITALIN) 10 MG " tablet TAKE UP TO 2 TABLETS BY MOUTH IN THE LATE AFTERNOON.      Minoxidil (ROGAINE EX) daily      mometasone-formoterol (DULERA) 100-5 MCG/ACT inhaler INHALE 2 PUFFS INTO THE LUNGS TWICE DAILY 13 g 11    montelukast (SINGULAIR) 10 MG tablet Take 1 tablet (10 mg) by mouth every evening for 180 days 30 tablet 5    Multiple Vitamin (MULTIVITAMIN OR) Take one daily tab      ondansetron (ZOFRAN ODT) 4 MG ODT tab Take 1 tablet (4 mg) by mouth every 8 hours as needed for vomiting 30 tablet 1    predniSONE (DELTASONE) 20 MG tablet Take 1 tablet (20 mg) by mouth daily 5 tablet 0    Semaglutide, 1 MG/DOSE, (OZEMPIC) 4 MG/3ML pen Inject 1 mg Subcutaneous every 7 days 12 mL 3    simvastatin (ZOCOR) 20 MG tablet Take 1 tablet (20 mg) by mouth at bedtime 90 tablet 3    traZODone (DESYREL) 50 MG tablet Take 1-2 tablets by mouth nightly as needed for sleep. 180 tablet 0     Current Facility-Administered Medications   Medication Dose Route Frequency Provider Last Rate Last Admin    lidocaine 1 % injection 1 mL  1 mL   Tolu Fernández, DPM   1 mL at 11/09/22 1429    triamcinolone acetonide (KENALOG-10) injection 10 mg  10 mg   Tolu Fernández DPM   10 mg at 11/09/22 1429     Assessment: The patient appeared to be active and engaged in today's session and was receptive to feedback.     Mental Status Exam:   Appearance: Appropriate   Eye Contact: Good    Orientation: Yes, x4  Behavior/relationship to provider/demeanor: Cooperative, Engaged, and Pleasant  Motor Activity: normal or unremarkable  Mood (subjective report): Stressed  Affect (objective appearance): Appropriate/mood congruent  Speech Rate: Normal  Speech Volume: Normal  Speech Articulation: Normal  Speech Coherence: Normal  Speech Spontaneity: Normal  Thought Content: no suicidal/homicidal ideation, plans, or intent  Thought Process (associations): Logical, Linear, and Goal directed  Thought Process (rate): Normal  Abnormal Perception:  None  Attention/Concentration: Normal  Memory: Appears grossly intact   Fund of Knowledge: Above average  Abstraction:  Normal  Insight:  Good  Judgment:  good    Does the patient appear to be at imminent risk of harm to self/others at this time? No    The session was necessary to address psychological distress in the context of multiple psychosocial stressors and chronic health concerns.    Diagnoses:    1. Adjustment disorder with anxiety    2. Major depressive disorder, recurrent, in partial remission (H)    3. Caregiver stress    4. Type 1 diabetes mellitus with mild nonproliferative retinopathy, macular edema presence unspecified, unspecified laterality (H)    5. Other chronic pain    6. GI symptoms    7. Psoriatic arthritis (H)        Plan:    Follow-up video visit with me on 12/11/24 at 10:00am  Patient to use 911 or other crisis resources in the event of a psychiatric emergency  Primary care needs and medications are managed by Dimitri Alas MD. Referring provider is Dr. Mckeon.  Next visit agenda/goals:   Try the dropping anchor exercises using whichever one fits with the timing  Focus on carol and enrichment where possible  Continue to implement harm mitigation strategies   Discuss scheduling DA update    Skills taught/interventions used thus far:  Psychoeducation  Values clarification  Committed action (vitality vs suffering)  Perfectionism workbook (modules 1-5)  Defusion  Acting opposite  Mindfulness (breath, body scan, watching thoughts, choiceless awareness, mountain)  Therapeutic journaling  [facility] [place]  Dropping anchor    NOTE: Treatment plan update due 9/6/24; 90 day treatment plan review due 1/21/25.                                                Individual Treatment Plan    Patient's Name: Dali Martines  YOB: 1970    Date of Creation: 9/6/23  Date Treatment Plan Last Reviewed/Revised: 10/23/24     DSM5 Diagnoses:     1. Adjustment disorder with anxiety    2. Major  "depressive disorder, recurrent, in partial remission (H)      Psychosocial / Contextual Factors: financial hardship, health issues, occupational/vocational stress, relationship stress, caregiver stress (mother's health concerns), and grief and loss in the wake of recent bereavement  PROMIS (reviewed every 90 days):      8/13/2024  2:51 PM   PROMIS 10    PROMIS TOTAL - SUBSCORES 20        Referral / Collaboration:  Collaboration with medical team and specialists as needed.    Anticipated number of session for this episode of care: 15-20  Anticipation frequency of session: 2x per month  Anticipated Duration of each session: 38-52 minutes  Treatment plan will be reviewed in 90 days or when goals have been changed.       Measurable Treatment Goal(s) related to diagnosis/functional impairment(s): Overall goals for treatment include improving her physical health (e.g., diabetes, physical activity, and GI discomfort), learning strategies for enhancing coping capacity, and decreasing avoidance-based coping.  Goal 1: Patient will decrease her use of avoidance-based coping   \"I will know I've met my goal when I am not avoiding doing things out of fear (e.g., bathroom sink, processing grief, physical activity, reviewing pieces of writing).\"     Objective #A (Patient Action)    Patient will learn and implement skills for reducing perfectionism.  Status: In progress - Date: 10/23/24 (patient has learned some anti-perfectionism strategies and is implementing them with some success)     Intervention(s)  Therapist will teach anti-perfectionism skills and will assist patient in completing a workbook on perfectionism.    Objective #B  Patient will initiate a healthy grieving process  Status: In progress - Date: 10/23/24 (patient is learning emotional expression skills)    Intervention(s)  Therapist will assist patient in processing and exploring her grief.    Objective #C  Patient will increase her participation in meaningful and " purposeful activities.  Status: Satisfactory progress - Date: 10/23/24 (patient reports progress in increasing her participation in meaningful activities, socializing, spending time in nature, and traveling)    Intervention(s)  Therapist will teach values clarification, committed action, and exposure skills.    Objective #D  Patient will learn and implement unhooking skills for decreasing the degree to which fear influences behaviors and decisions in an unhelpful way.  Status: Satisfactory progress - Date: 10/23/24 (patient has been learning and implementing mindfulness and cognitive restructuring skills)     Intervention(s)  Therapist will teach defusion, cognitive restructuring, and mindfulness skills.      Patient has reviewed and agreed to the above plan.      Elise Sutton, PhD  September 6, 2023    Elise Sutton, Ph.D., [facility]  Clinical Health Psychologist  Phone: [phone]

## 2024-11-25 ENCOUNTER — THERAPY VISIT (OUTPATIENT)
Dept: SPEECH THERAPY | Facility: CLINIC | Age: 54
End: 2024-11-25
Attending: INTERNAL MEDICINE
Payer: COMMERCIAL

## 2024-11-25 ENCOUNTER — MYC MEDICAL ADVICE (OUTPATIENT)
Dept: ENDOCRINOLOGY | Facility: CLINIC | Age: 54
End: 2024-11-25

## 2024-11-25 DIAGNOSIS — R13.12 OROPHARYNGEAL DYSPHAGIA: Primary | Chronic | ICD-10-CM

## 2024-11-25 DIAGNOSIS — E10.319 TYPE 1 DIABETES MELLITUS WITH RETINOPATHY (H): ICD-10-CM

## 2024-11-25 DIAGNOSIS — E10.3291 TYPE 1 DIABETES MELLITUS WITH MILD NONPROLIFERATIVE RETINOPATHY OF RIGHT EYE, MACULAR EDEMA PRESENCE UNSPECIFIED (H): ICD-10-CM

## 2024-11-25 PROCEDURE — 92610 EVALUATE SWALLOWING FUNCTION: CPT | Mod: GN

## 2024-11-25 PROCEDURE — 92526 ORAL FUNCTION THERAPY: CPT | Mod: GN

## 2024-11-25 RX ORDER — INSULIN PMP CART,AUT,G6/7,CNTR
1 EACH SUBCUTANEOUS
Qty: 30 EACH | Refills: 3 | Status: SHIPPED | OUTPATIENT
Start: 2024-11-25

## 2024-11-25 RX ORDER — PROCHLORPERAZINE 25 MG/1
SUPPOSITORY RECTAL
Qty: 1 EACH | Refills: 4 | Status: SHIPPED | OUTPATIENT
Start: 2024-11-25

## 2024-11-25 RX ORDER — PROCHLORPERAZINE 25 MG/1
SUPPOSITORY RECTAL
Qty: 9 EACH | Refills: 3 | Status: SHIPPED | OUTPATIENT
Start: 2024-11-25

## 2024-11-25 NOTE — PROGRESS NOTES
SPEECH LANGUAGE PATHOLOGY EVALUATION              Subjective        Presenting condition or subjective complaint: (Patient-Rptd) Increasing difficulty with swallowing and aspirating liquids.  Date of onset:  (order date)    Relevant medical history: (Patient-Rptd) Asthma; Diabetes; Thyroid problems   Active Ambulatory Problems     Diagnosis Date Noted    GERD (gastroesophageal reflux disease) 02/24/2009    CARDIOVASCULAR SCREENING; LDL GOAL LESS THAN 100 10/31/2010    Type 1 diabetes mellitus with retinopathy (H) 04/05/2011    Anxiety 04/05/2011    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/2012    Cutaneous skin tags 06/12/2012    Cervicalgia 06/25/2012    SOB (shortness of breath) 03/11/2014    Encounter for other specified aftercare 02/24/2015    PAIN FOOT 02/24/2015    Diabetes (H) 02/26/2015    Screening for other eye conditions 02/26/2015    PAIN KNEE JOINT 06/17/2015    Enthesopathy 07/20/2015    Pain in joint, multiple sites 07/20/2015    Right foot pain 10/14/2015    ESR raised 01/03/2016    Swelling of limb 03/15/2016    Other postprocedural status(V45.89) 03/15/2016    Type 1 diabetes mellitus without complication (H) 08/08/2017    Psoriatic arthritis (H) 11/20/2018    Psoriasis with arthropathy (H) 11/20/2018    History of adenomatous polyp of colon 05/22/2019    Glaucoma suspect of both eyes 10/16/2019    Long-term current use of stimulant 05/19/2021    Insomnia, unspecified type 05/19/2021    Long term methotrexate user 04/05/2022    Major depression, single episode 05/16/2024    Major depression, recurrent (H) 11/15/2024     Resolved Ambulatory Problems     Diagnosis Date Noted    Chronic low back pain 06/25/2012    Nodule of flexor tendon sheath 07/20/2015    Chronic pain of right ankle 07/30/2015    Left knee pain 07/07/2016    Pain of finger of left hand 08/17/2016    Right ankle instability 01/14/2017    Trigger finger, acquired 09/28/2017    Pain in joint involving ankle and foot, right  10/11/2017    Pain of right thumb 02/12/2018    Edema 05/03/2018    Pain in joint involving ankle and foot, right 02/16/2019    Chronic bilateral low back pain without sciatica 05/25/2023     Past Medical History:   Diagnosis Date    Anemia     Arthritis 2014    Back injury 1988    Dry eye syndrome     Dyslipidemia     Endometriosis 2002    Hypothyroidism     Type I (juvenile type) diabetes mellitus without mention of complication, not stated as uncontrolled     Uncomplicated asthma Fall 2013      Dates & types of surgery: (Patient-Rptd) Acl repair 1996; laparotomy 2002  Past Surgical History:   Procedure Laterality Date    COLONOSCOPY  01/01/2002    COLONOSCOPY N/A 06/23/2020    Procedure: COLONOSCOPY;  Surgeon: Lauryn Rivera MD;  Location:  GI    ESOPHAGOSCOPY, GASTROSCOPY, DUODENOSCOPY (EGD), COMBINED  01/07/2014    Procedure: COMBINED ESOPHAGOSCOPY, GASTROSCOPY, DUODENOSCOPY (EGD), BIOPSY SINGLE OR MULTIPLE;;  Surgeon: Kraig Nicole MD;  Location:  GI    ESOPHAGOSCOPY, GASTROSCOPY, DUODENOSCOPY (EGD), COMBINED N/A 10/05/2023    Procedure: Esophagoscopy, gastroscopy, duodenoscopy (EGD), combined;  Surgeon: Raheem Hubbard MD;  Location:  GI    ESOPHAGOSCOPY, GASTROSCOPY, DUODENOSCOPY (EGD), COMBINED N/A 4/29/2024    Procedure: Esophagoscopy, gastroscopy, duodenoscopy (EGD), combined with biopsy;  Surgeon: Alexandre Hernandez MD;  Location: [facility] OR    GYN SURGERY      Laparotomy to remove endometrial tissue    HC BREATH HYDROGEN TEST N/A 06/17/2014    Procedure: HYDROGEN BREATH TEST;  Surgeon: Deacon Alberts MD;  Location:  GI    HYDROGEN BREATH TEST  06/17/2014    LAPAROSCOPIC APPENDECTOMY  01/01/2002    LAPAROTOMY, LYSIS ADHESIONS, COMBINED  01/01/2002    endometriosis    SOFT TISSUE SURGERY  12/01/2014    right ankle tendon injury    ZZC REPAIR CRUCIATE LIGAMENT,KNEE      ZZC STOMACH SURGERY PROCEDURE UNLISTED  12/01/2002        Prior diagnostic imaging/testing results:  (Patient-Rptd) Video fluoroscopic swallow study   swallowing   Prior therapy history for the same diagnosis, illness or injury: (Patient-Rptd) No      Living Environment  Social support: (Patient-Rptd) Alone   Help at home: (Patient-Rptd) None  Equipment owned: (Patient-Rptd) Crutches     Employment: (Patient-Rptd) Not Applicable    Hobbies/Interests: (Patient-Rptd) Walking, gardening    Patient goals for therapy: (Patient-Rptd) Avoid aspirating liquids, swallow more easily.       Objective     SWALLOW EVALUTION  Dysphagia history: Dali stated dysphagia s/sx started around 15 years ago. Since then, increase aspiration events. Notices swallow difficulty mostly on liquids, but sometimes on dryer solids like rice. Report of xerostomia and voice is frequently hoarse. Reports enlarge lymph node. Denies recent pneumonia. Completed VFSS on 1/09/2024, ' Patient presents with minimal oral and pharyngeal dysphagia characterized by premature entry of thin liquids to the tip of the epiglottis with incomplete laryngeal closure resulting in one episode of flash penetration with a trace amount slightly above the vocal cords during consecutive swallows. She responded with a throat clear, and no residual remained. No penetration/aspiration on any further consistency. Puree, soft solids and solids all were WFL. Noted to have a bone spur at the level of C-5 that was non-obstructive.   Recommend: 1. May continue on a regular diet and thin liquids. 2. Upright, small bites/sips, and alternate liquids/solids. Follow standard GERD precautions. 3. No further skilled SLP intervention is indicated at this time. 4. Follow up with GI as planned  Risks and benefits of evaluation/treatment have been explained.   Patient/Family/caregiver agrees with Plan of Care. '       pt already has VFSS order in from 11/15/2024 from ref provider. This is scheduled for 1/7/2025 to determine aspiration vs ?laryngeal irritation from scarring per pt endorsement.      Current Diet/Method of Nutritional Intake: oral diet, thin liquids (level 0), regular diet        CLINICAL SWALLOW EVALUATION  Oral Motor Function: generally intact  Dentition: natural dentition, adequate dentition  Secretion management: WFL  Mucosal quality: adequate  Mandibular function: intact  Oral labial function: WFL  Lingual function: WFL  Laryngeal function: cough, volitional swallow, voicing WFL, pt stating instances of hoarse voice intermittenly. None observed at this time.       Level of assist required for feeding: no assistance needed   Textures Trialed:   Clinical Swallow Eval: Thin Liquids  Mode of presentation: cup, straw, self-fed   Volume presented: 4 oz h20  Preparatory Phase: WFL  Oral Phase: premature pharyngeal entry  Pharyngeal phase of swallow: feeling of something stuck in throat, repeated swallows, throat clearing   Strategies trialed during procedure: ELEAZAR SLP CLINICAL EVAL STRATEGIES: n/a    Diagnostic statement: x1 immediate initial belch on first sip, double swallows, report of globus sensation, x1 delayed throat clear     Clinical Swallow Eval: Purees  Mode of presentation: spoon, self-fed   Volume presented: 1 applesauce cup   Preparatory Phase: WFL  Oral Phase: WFL  Pharyngeal phase of swallow: feeling of something stuck in throat   Strategies trialed during procedure: ELEAZAR SLP CLINICAL EVAL STRATEGIES: n/a    Diagnostic statement: report of globus sensation     Clinical Swallow Eval: Solids  Mode of presentation: self-fed   Volume presented: 1/2 granola bar   Preparatory Phase: WFL  Oral Phase: WFL  Pharyngeal phase of swallow: throat clearing   Strategies trialed during procedure: ELEAZAR SLP CLINICAL EVAL STRATEGIES: n/a    Diagnostic statement: x 2 delayed throat clear.      ADDITIONAL EVAL COMPLETED TODAY : n/a    ESOPHAGEAL PHASE OF SWALLOW  patient reports symptoms of esophageal dysphagia  patient presents with symptoms of esophageal dysphagia     SWALLOW ASSESSMENT CLINICAL  IMPRESSIONS AND RATIONALE  Diet Consistency Recommendations: oral diet, thin liquids (level 0), regular diet    Recommended Feeding/Eating Techniques: small bolus size, slow rate of intake, alternate food and liquid intake, maintain upright posture during/after eating for 30 minutes   Medication Administration Recommendations: whole as tolerated   Instrumental Assessment Recommendations: reassess via non-instrumental clinical swallow evaluation, pt already has VFSS order in from 11/15/2024 from ref provider. This is scheduled for 1/7/2025      Patient Supplied Answers To EAT Questionnaire      11/25/2024    12:04 PM   Eating Assessment Tool (EAT-10)   My swallowing problem has caused me to lose weight 0    My swallowing problem interferes with my ability to go out for meals 0    Swallowing liquids takes extra effort 3    Swallowing solids takes extra effort 1    Swallowing pills takes extra effort 1    Swallowing is painful 1    The pleasure of eating is affected by my swallowing 0    When I swallow food sticks in my throat 0    I cough when I eat 1    Swallowing is stressful 1    EAT-10 8        Patient-reported        Assessment & Plan   CLINICAL IMPRESSIONS   Medical Diagnosis: Oropharyngeal dysphagia    Treatment Diagnosis: dysphagia   Impression/Assessment: Pt is a 54 year old female with swallow complaints. The following significant findings have been identified: impaired swallowing, characterized by reports of globus sensation, ?premature spillage of thin liquids, double swallows across thin liquids, delayed throat clear across solid and thin liquids. Identified deficits interfere with their ability to consume an oral diet and maintain nutrition as compared to previous level of function.    PLAN OF CARE  Treatment Interventions: Swallowing dysfunction and/or oral function for feeding    Prognosis to achieve stated therapy goals is good   Rehab potential is impacted by: comorbidities, prior level of  function    Long Term Goals:   SLP Goal 1  Goal Identifier: Strategies/HEP  Goal Description: Patient will verbalize and/or demonstrate understanding of compensatory strategies, HEP, and recommendations with at least 90% accuracy given min-no cues  Rationale: To maximize safety, ease and/or independence of oral intake  Target Date: 01/25/25  SLP Goal 2  Goal Identifier: VFSS  Goal Description: Patient will complete video swallow study to objectively assess swallow function, aspiration risk, guide treatment recommendations and diet recommendations within 1-2 months and will demonstrate understanding of study outcomes in order to generate/modify goals as needed  Rationale: To maximize safety, ease and/or independence of oral intake  Target Date: 01/25/25      Frequency of Treatment: x1-2/month  Duration of Treatment: up to 60 days     Recommended Referrals to Other Professionals:  ?GI   Education Assessment:   Learner/Method: Patient;Listening;Reading;Demonstration;Pictures/Video  Education Comments: SLP provided education regarding evaluation findings and proposed POC. Pt stating understanding    Risks and benefits of evaluation/treatment have been explained.   Patient/Family/caregiver agrees with Plan of Care.     Evaluation Time:    SLP Eval: oral/pharyngeal swallow function, clinical minutes (24873): 29     Present: Not applicable     Signing Clinician: Sally Ernst, SLP  Sally Ernst, MS, CCC-SLP  Speech Language Pathologist

## 2024-11-25 NOTE — ADDENDUM NOTE
Addended by: PARVIN GUTIÉRREZ on: 11/25/2024 11:36 AM     Modules accepted: Orders     Quality 47: Advance Care Plan: Advance Care Planning discussed and documented; advance care plan or surrogate decision maker documented in the medical record. Quality 431: Preventive Care And Screening: Unhealthy Alcohol Use - Screening: Patient not identified as an unhealthy alcohol user when screened for unhealthy alcohol use using a systematic screening method Quality 226: Preventive Care And Screening: Tobacco Use: Screening And Cessation Intervention: Patient screened for tobacco use and is an ex/non-smoker Detail Level: Detailed

## 2024-11-27 DIAGNOSIS — J45.40 MODERATE PERSISTENT ASTHMA, UNSPECIFIED WHETHER COMPLICATED: ICD-10-CM

## 2024-11-27 DIAGNOSIS — E10.3291 TYPE 1 DIABETES MELLITUS WITH MILD NONPROLIFERATIVE RETINOPATHY OF RIGHT EYE, MACULAR EDEMA PRESENCE UNSPECIFIED (H): ICD-10-CM

## 2024-11-27 RX ORDER — MONTELUKAST SODIUM 10 MG/1
10 TABLET ORAL EVERY EVENING
Qty: 30 TABLET | Refills: 5 | Status: SHIPPED | OUTPATIENT
Start: 2024-11-27

## 2024-12-03 DIAGNOSIS — R11.2 NAUSEA AND VOMITING, UNSPECIFIED VOMITING TYPE: ICD-10-CM

## 2024-12-03 RX ORDER — ONDANSETRON 4 MG/1
4 TABLET, ORALLY DISINTEGRATING ORAL EVERY 8 HOURS PRN
Qty: 30 TABLET | Refills: 1 | Status: SHIPPED | OUTPATIENT
Start: 2024-12-03

## 2024-12-03 NOTE — TELEPHONE ENCOUNTER
LVD:  7/22/2024  [facility] Larry Vega PA-C  Gastroenterology   RTC 6 months  Refilled per protocol.

## 2024-12-08 ASSESSMENT — ANXIETY QUESTIONNAIRES
5. BEING SO RESTLESS THAT IT IS HARD TO SIT STILL: NOT AT ALL
IF YOU CHECKED OFF ANY PROBLEMS ON THIS QUESTIONNAIRE, HOW DIFFICULT HAVE THESE PROBLEMS MADE IT FOR YOU TO DO YOUR WORK, TAKE CARE OF THINGS AT HOME, OR GET ALONG WITH OTHER PEOPLE: SOMEWHAT DIFFICULT
2. NOT BEING ABLE TO STOP OR CONTROL WORRYING: SEVERAL DAYS
4. TROUBLE RELAXING: MORE THAN HALF THE DAYS
GAD7 TOTAL SCORE: 7
GAD7 TOTAL SCORE: 7
3. WORRYING TOO MUCH ABOUT DIFFERENT THINGS: SEVERAL DAYS
7. FEELING AFRAID AS IF SOMETHING AWFUL MIGHT HAPPEN: NOT AT ALL
GAD7 TOTAL SCORE: 7
8. IF YOU CHECKED OFF ANY PROBLEMS, HOW DIFFICULT HAVE THESE MADE IT FOR YOU TO DO YOUR WORK, TAKE CARE OF THINGS AT HOME, OR GET ALONG WITH OTHER PEOPLE?: SOMEWHAT DIFFICULT
6. BECOMING EASILY ANNOYED OR IRRITABLE: SEVERAL DAYS
1. FEELING NERVOUS, ANXIOUS, OR ON EDGE: MORE THAN HALF THE DAYS
7. FEELING AFRAID AS IF SOMETHING AWFUL MIGHT HAPPEN: NOT AT ALL

## 2024-12-09 ENCOUNTER — DOCUMENTATION ONLY (OUTPATIENT)
Dept: ENDOCRINOLOGY | Facility: CLINIC | Age: 54
End: 2024-12-09

## 2024-12-09 ENCOUNTER — LAB (OUTPATIENT)
Dept: LAB | Facility: CLINIC | Age: 54
End: 2024-12-09
Payer: COMMERCIAL

## 2024-12-09 DIAGNOSIS — E10.3291 TYPE 1 DIABETES MELLITUS WITH MILD NONPROLIFERATIVE RETINOPATHY OF RIGHT EYE, MACULAR EDEMA PRESENCE UNSPECIFIED (H): ICD-10-CM

## 2024-12-09 DIAGNOSIS — E10.3291 TYPE 1 DIABETES MELLITUS WITH MILD NONPROLIFERATIVE RETINOPATHY OF RIGHT EYE, MACULAR EDEMA PRESENCE UNSPECIFIED (H): Primary | ICD-10-CM

## 2024-12-09 DIAGNOSIS — L40.50 PSORIATIC ARTHRITIS (H): ICD-10-CM

## 2024-12-09 LAB
BASOPHILS # BLD AUTO: 0 10E3/UL (ref 0–0.2)
BASOPHILS NFR BLD AUTO: 1 %
EOSINOPHIL # BLD AUTO: 0.1 10E3/UL (ref 0–0.7)
EOSINOPHIL NFR BLD AUTO: 1 %
ERYTHROCYTE [DISTWIDTH] IN BLOOD BY AUTOMATED COUNT: 12.7 % (ref 10–15)
ERYTHROCYTE [SEDIMENTATION RATE] IN BLOOD BY WESTERGREN METHOD: 32 MM/HR (ref 0–30)
EST. AVERAGE GLUCOSE BLD GHB EST-MCNC: 154 MG/DL
HBA1C MFR BLD: 7 % (ref 0–5.6)
HCT VFR BLD AUTO: 34.2 % (ref 35–47)
HGB BLD-MCNC: 11.5 G/DL (ref 11.7–15.7)
IMM GRANULOCYTES # BLD: 0 10E3/UL
IMM GRANULOCYTES NFR BLD: 0 %
LYMPHOCYTES # BLD AUTO: 2.1 10E3/UL (ref 0.8–5.3)
LYMPHOCYTES NFR BLD AUTO: 42 %
MCH RBC QN AUTO: 31.1 PG (ref 26.5–33)
MCHC RBC AUTO-ENTMCNC: 33.6 G/DL (ref 31.5–36.5)
MCV RBC AUTO: 92 FL (ref 78–100)
MONOCYTES # BLD AUTO: 0.4 10E3/UL (ref 0–1.3)
MONOCYTES NFR BLD AUTO: 8 %
NEUTROPHILS # BLD AUTO: 2.4 10E3/UL (ref 1.6–8.3)
NEUTROPHILS NFR BLD AUTO: 48 %
PLATELET # BLD AUTO: 262 10E3/UL (ref 150–450)
RBC # BLD AUTO: 3.7 10E6/UL (ref 3.8–5.2)
WBC # BLD AUTO: 5 10E3/UL (ref 4–11)

## 2024-12-09 PROCEDURE — 85025 COMPLETE CBC W/AUTO DIFF WBC: CPT

## 2024-12-09 PROCEDURE — 85652 RBC SED RATE AUTOMATED: CPT

## 2024-12-09 PROCEDURE — 80053 COMPREHEN METABOLIC PANEL: CPT

## 2024-12-09 PROCEDURE — 86140 C-REACTIVE PROTEIN: CPT

## 2024-12-09 PROCEDURE — 36415 COLL VENOUS BLD VENIPUNCTURE: CPT

## 2024-12-09 PROCEDURE — 83036 HEMOGLOBIN GLYCOSYLATED A1C: CPT

## 2024-12-10 LAB
ALBUMIN SERPL BCG-MCNC: 4.3 G/DL (ref 3.5–5.2)
ALP SERPL-CCNC: 104 U/L (ref 40–150)
ALT SERPL W P-5'-P-CCNC: 20 U/L (ref 0–50)
ANION GAP SERPL CALCULATED.3IONS-SCNC: 9 MMOL/L (ref 7–15)
AST SERPL W P-5'-P-CCNC: 23 U/L (ref 0–45)
BILIRUB SERPL-MCNC: 0.2 MG/DL
BUN SERPL-MCNC: 8.9 MG/DL (ref 6–20)
CALCIUM SERPL-MCNC: 9.4 MG/DL (ref 8.8–10.4)
CHLORIDE SERPL-SCNC: 103 MMOL/L (ref 98–107)
CREAT SERPL-MCNC: 0.62 MG/DL (ref 0.51–0.95)
CRP SERPL-MCNC: <3 MG/L
EGFRCR SERPLBLD CKD-EPI 2021: >90 ML/MIN/1.73M2
GLUCOSE SERPL-MCNC: 175 MG/DL (ref 70–99)
HCO3 SERPL-SCNC: 25 MMOL/L (ref 22–29)
POTASSIUM SERPL-SCNC: 4.1 MMOL/L (ref 3.4–5.3)
PROT SERPL-MCNC: 7.1 G/DL (ref 6.4–8.3)
SODIUM SERPL-SCNC: 137 MMOL/L (ref 135–145)

## 2024-12-11 ENCOUNTER — VIRTUAL VISIT (OUTPATIENT)
Dept: PSYCHOLOGY | Facility: CLINIC | Age: 54
End: 2024-12-11
Payer: COMMERCIAL

## 2024-12-11 DIAGNOSIS — L40.50 PSORIATIC ARTHRITIS (H): ICD-10-CM

## 2024-12-11 DIAGNOSIS — Z63.6 CAREGIVER STRESS: ICD-10-CM

## 2024-12-11 DIAGNOSIS — G89.29 OTHER CHRONIC PAIN: ICD-10-CM

## 2024-12-11 DIAGNOSIS — R19.8 GI SYMPTOMS: ICD-10-CM

## 2024-12-11 DIAGNOSIS — F33.41 MAJOR DEPRESSIVE DISORDER, RECURRENT, IN PARTIAL REMISSION (H): ICD-10-CM

## 2024-12-11 DIAGNOSIS — F43.22 ADJUSTMENT DISORDER WITH ANXIETY: Primary | ICD-10-CM

## 2024-12-11 DIAGNOSIS — E10.3299 TYPE 1 DIABETES MELLITUS WITH MILD NONPROLIFERATIVE RETINOPATHY, MACULAR EDEMA PRESENCE UNSPECIFIED, UNSPECIFIED LATERALITY (H): ICD-10-CM

## 2024-12-11 SDOH — SOCIAL STABILITY - SOCIAL INSECURITY: DEPENDENT RELATIVE NEEDING CARE AT HOME: Z63.6

## 2024-12-11 NOTE — Clinical Note
Follow-up video visit with me on 12/30/24 at 3:00pm and 1/17/25 at 11:00am for 120 minutes please. Thanks!

## 2024-12-11 NOTE — NURSING NOTE
Current patient location: [address] [facility] [address]    Is the patient currently in the state of [place]? YES    Visit mode:VIDEO    If the visit is dropped, the patient can be reconnected by:VIDEO VISIT: Text to cell phone:   Telephone Information:   Mobile [phone]    and VIDEO VISIT: Send to e-mail at: [email or web address]    Will anyone else be joining the visit? NO  (If patient encounters technical issues they should call [phone] :[phone])    Are changes needed to the allergy or medication list? No    Are refills needed on medications prescribed by this physician? NO    Rooming Documentation:  Questionnaire(s) not done per department protocol    Reason for visit: RECHECK    Cyndi CORRIGAN

## 2024-12-11 NOTE — PROGRESS NOTES
Health Psychology          Latanya Mayfield, Ph.D.,  [phone]  Hansa Arzate, Ph.D.,  [phone]  Sarah Beth Jack, Ph.D.,  [phone]  Panfilo Cornejo, Ph.D., , [facility] [phone]  Krystle Hannah, Ph.D.,  [phone]  Elise Sutton, Ph.D., , [facility] [phone]    [facility], 3rd Floor  [address] [place] [address]       Health Psychology Progress Note    Patient Name: Dali Martines    Service Type: Individual  Length of Visit: 50 minutes   Start time: 10:11 AM - late start due to provider delays  Stop time: 11:01 AM          Attendees: patient attended alone  Session #: 24    Most Recent DA/update: 8/16/23    Telemedicine Information:     Telemedicine Visit: The patient's condition can be safely assessed and treated via synchronous audio and visual telemedicine encounter.    Reason for Telemedicine Visit: Patient has requested telehealth visit and Patient convenience (e.g. access to timely appointments / distance to available provider)  Originating Site (Patient Location): Patient's home, [place]  Distant Location (provider location):  On-site: [facility]   Consent:  The patient/guardian has verbally consented to: the potential risks and benefits of telemedicine (video visit) versus in person care; bill my insurance or make self-payment for services provided; and responsibility for payment of non-covered services.   Mode of Communication:  Video Conference via [employer]  As the provider I attest to compliance with applicable laws and regulations related to telemedicine.     Identifying Information and Presenting Problem:  Dali Martines is a 54 year old female adult who was referred to health psychology by her endocrinologist, Dr. Mckeon. Patient presented with concerns of stress in the context of multiple life events, several medical conditions, and chronic management of T1DM.    Topics Discussed/Interventions  Provided:    Session Content:     Update: Patient reports that she has been feeling a bit better. Notes that some of the stress related to her caregiver demands has relented a bit. She notes that her mother has exhibited less distress and that she has experienced some relief from having some of the stressors alleviated. She also notes that having some complementary treatments such as acupuncture and taking supplements has been helpful for reducing the impact of the stress on her overall well-being.           Homework Review:    Try the dropping anchor exercises using whichever one fits with the timing - Accomplished.   Focus on carol and enrichment where possible - Accomplished. Patient has continued to connect with friends and is getting out of the house with her dogs.   Continue to implement harm mitigation strategies - Accomplished.    Skills/intervention: Assisted patient in processing her thoughts and emotions around her caregiver concerns and the impact the stress has had on her. Discussed balancing the increased agency and influence she has experienced with temporarily moving in with her mother while maintaining her own autonomy and independence. Reviewed harm mitigation strategies and challenges with implementing them. Provided psychoeducation about interoception and factors that can diminish interoceptive capacity. Assisted patient in exploring strategies for building in some external prompts to ensure regular check-ins with herself and opportunities for micro recovery. Patient noted that she will try setting an alarm on her phone as an external prompt. Discussed when alarms are most needed. Patient identified setting an alarm for 2:00pm and 4:00pm every day.       Top values identified included: spirituality, humor, fairness, creativity, curiosity, contribution, and caring.  Unhelpful rules/assumptions: fear of failure, all-or-nothing thinking, and simplicity, control, and structure  Top priorities for  grief: increasing tolerance of discomfort associated with uncertainty as well as identifying and processing emotions associated with the losses she has experienced.    Treatment Objective(s) Addressed in This Session:  Psychological distress related to Diabetes  Psychological distress related to caregiver stress    Progress on / Status of Treatment Objective(s) / Homework:  In progress    Medication Adherence: Patient did not report medication changes. Current medication list is below:     Current Outpatient Medications   Medication Sig Dispense Refill    albuterol (PROAIR HFA/PROVENTIL HFA/VENTOLIN HFA) 108 (90 Base) MCG/ACT inhaler Inhale 2 puffs into the lungs every 4 hours as needed for shortness of breath / dyspnea or wheezing 1 Inhaler 11    aspirin 81 MG tablet Take 1 tablet by mouth daily.      blood glucose (ACCU-CHEK COLIN PLUS) test strip Test Blood Sugar 4 times daily or as directed 800 strip 3    blood glucose monitoring (ACCU-CHEK FASTCLIX) lancets Use to test blood sugar 8 times daily or as directed. 100 each 3    blood glucose monitoring (NO BRAND SPECIFIED) meter device kit Use to test blood sugar 8 times daily or as directed. Any covered brand, 1 kit 0    Calcium Carbonate-Vitamin D (CALCIUM + D PO) Take one daily      citalopram (CELEXA) 20 MG tablet Take 1.5 tablets (30 mg) by mouth daily 135 tablet 1    Continuous Blood Gluc  (DEXCOM G6 ) SUZANNE Use to read blood sugars as per 's instructions. 1 each 0    Continuous Glucose Sensor (DEXCOM G6 SENSOR) MISC Change every 10 days. 9 each 3    Continuous Glucose Transmitter (DEXCOM G6 TRANSMITTER) MISC Change every 3 months. 1 each 4    diclofenac (VOLTAREN) 1 % topical gel Apply 2 g topically 4 times daily 100 g 4    diclofenac (VOLTAREN) 1 % topical gel APPLY 2 GRAMS ONTO THE SKIN FOUR TIMES DAILY AS NEEDED FOR MODERATE PAIN 100 g 5    econazole nitrate 1 % cream Apply 0.5 inches topically daily.      fish oil-omega-3 fatty  "acids 1000 MG capsule Take one daily      fluocinonide (LIDEX) 0.05 % external solution Apply topically 2 times daily 60 mL 4    folic acid (FOLVITE) 1 MG tablet Take 1 tablet (1 mg) by mouth daily 90 tablet 3    Insulin Aspart, w/Niacinamide, (FIASP PENFILL) 100 UNIT/ML SOCT 45 Units by Other route See Admin Instructions. Via insulin device. Approx 45 units daily as directed. 45 mL 3    Insulin Aspart, w/Niacinamide, (FIASP) 100 UNIT/ML SOLN Inject 50 Units as directed daily. For use in ambulatory insulin pump as directed.  Approximate daily dose is 50 units. 20 mL 11    insulin degludec (TRESIBA FLEXTOUCH) 100 UNIT/ML pen ADMINISTER 8 UNITS UNDER THE SKIN DAILY. PLEASE DO NOT FILL TODAY ( 10/18/2023 ). 30 mL 1    Insulin Disposable Pump (OMNIPOD 5 G6 INTRO, GEN 5,) KIT 1 kit continuous 1 kit 0    Insulin Disposable Pump (OMNIPOD 5 INTRO, GEN 5,) KIT 1 kit continuously. 1 kit 0    Insulin Disposable Pump (OMNIPOD 5 PODS, GEN 5,) MISC 1 each every 3 days. 30 each 3    insulin pen needle (B-D U/F) 31G X 8 MM miscellaneous Use 5 pen needles daily 450 each 3    insulin syringe-needle U-100 (30G X 1/2\" 1 ML) 30G X 1/2\" 1 ML miscellaneous Use 1 syringes as directed to draw up insulin for pump. Per pharmacy request. . 100 each 4    Ketoconazole (NIZORAL PO) Shampoo daily      levothyroxine (SYNTHROID/LEVOTHROID) 112 MCG tablet Take 1 tablet (112 mcg) by mouth daily 90 tablet 4    methotrexate 2.5 MG tablet Take 6 tablets (15 mg) by mouth every 7 days Labs every 8 - 12 weeks for refills. 72 tablet 1    methotrexate 2.5 MG tablet Take 6 tablets (15 mg) by mouth every 7 days Labs every 8 - 12 weeks for refills. 72 tablet 0    methotrexate 2.5 MG tablet Take 6 tablets (15 mg) by mouth every 7 days Labs required every 8-12 weeks while taking this medication 72 tablet 0    methylphenidate (METADATE CD) 20 MG CR capsule Take 20 mg by mouth daily      methylphenidate (RITALIN) 10 MG tablet TAKE UP TO 2 TABLETS BY MOUTH IN THE " LATE AFTERNOON.      Minoxidil (ROGAINE EX) daily      mometasone-formoterol (DULERA) 100-5 MCG/ACT inhaler INHALE 2 PUFFS INTO THE LUNGS TWICE DAILY 13 g 11    montelukast (SINGULAIR) 10 MG tablet TAKE 1 TABLET(10 MG) BY MOUTH EVERY EVENING 30 tablet 5    Multiple Vitamin (MULTIVITAMIN OR) Take one daily tab      ondansetron (ZOFRAN ODT) 4 MG ODT tab Take 1 tablet (4 mg) by mouth every 8 hours as needed for vomiting. 30 tablet 1    predniSONE (DELTASONE) 20 MG tablet Take 1 tablet (20 mg) by mouth daily 5 tablet 0    Semaglutide, 1 MG/DOSE, (OZEMPIC) 4 MG/3ML pen Inject 1 mg Subcutaneous every 7 days 12 mL 3    simvastatin (ZOCOR) 20 MG tablet Take 1 tablet (20 mg) by mouth at bedtime 90 tablet 3    traZODone (DESYREL) 50 MG tablet Take 1-2 tablets by mouth nightly as needed for sleep. 180 tablet 0     Current Facility-Administered Medications   Medication Dose Route Frequency Provider Last Rate Last Admin    lidocaine 1 % injection 1 mL  1 mL   Tolu Fernández, DPM   1 mL at 11/09/22 1429    triamcinolone acetonide (KENALOG-10) injection 10 mg  10 mg   Tolu Fernández, DPM   10 mg at 11/09/22 1429     Assessment: The patient appeared to be active and engaged in today's session and was receptive to feedback.     Mental Status Exam:   Appearance: Appropriate   Eye Contact: Good    Orientation: Yes, x4  Behavior/relationship to provider/demeanor: Cooperative, Engaged, and Pleasant  Motor Activity: normal or unremarkable  Mood (subjective report): Stressed  Affect (objective appearance): Appropriate/mood congruent  Speech Rate: Normal  Speech Volume: Normal  Speech Articulation: Normal  Speech Coherence: Normal  Speech Spontaneity: Normal  Thought Content: no suicidal/homicidal ideation, plans, or intent  Thought Process (associations): Logical, Linear, and Goal directed  Thought Process (rate): Normal  Abnormal Perception: None  Attention/Concentration: Normal  Memory: Appears grossly intact   Fund of  Knowledge: Above average  Abstraction:  Normal  Insight:  Good  Judgment:  good    Does the patient appear to be at imminent risk of harm to self/others at this time? No    The session was necessary to address psychological distress in the context of multiple psychosocial stressors and chronic health concerns.    Diagnoses:    1. Adjustment disorder with anxiety    2. Major depressive disorder, recurrent, in partial remission (H)    3. Caregiver stress    4. Type 1 diabetes mellitus with mild nonproliferative retinopathy, macular edema presence unspecified, unspecified laterality (H)    5. Other chronic pain    6. GI symptoms    7. Psoriatic arthritis (H)        Plan:    Follow-up video visit with me on 12/30/24 at 3:00pm   Patient to use 911 or other crisis resources in the event of a psychiatric emergency  Primary care needs and medications are managed by Dimitri Alas MD. Referring provider is Dr. Mckeon.  Next visit agenda/goals:   Try the dropping anchor exercises using whichever one fits with the timing  Focus on carol and enrichment where possible  Continue to implement harm mitigation strategies     Skills taught/interventions used thus far:  Psychoeducation  Values clarification  Committed action (vitality vs suffering)  Perfectionism workbook (modules 1-5)  Defusion  Acting opposite  Mindfulness (breath, body scan, watching thoughts, choiceless awareness, mountain)  Therapeutic journaling  [facility] [place]  Dropping anchor    NOTE: Treatment plan update due 9/6/24; 90 day treatment plan review due 1/21/25.                                                Individual Treatment Plan    Patient's Name: Dali Martines  YOB: 1970    Date of Creation: 9/6/23  Date Treatment Plan Last Reviewed/Revised: 10/23/24     DSM5 Diagnoses:     1. Adjustment disorder with anxiety    2. Major depressive disorder, recurrent, in partial remission (H)      Psychosocial / Contextual Factors: financial  "hardship, health issues, occupational/vocational stress, relationship stress, caregiver stress (mother's health concerns), and grief and loss in the wake of recent bereavement  PROMIS (reviewed every 90 days):      8/13/2024  2:51 PM   PROMIS 10    PROMIS TOTAL - SUBSCORES 20        Referral / Collaboration:  Collaboration with medical team and specialists as needed.    Anticipated number of session for this episode of care: 15-20  Anticipation frequency of session: 2x per month  Anticipated Duration of each session: 38-52 minutes  Treatment plan will be reviewed in 90 days or when goals have been changed.       Measurable Treatment Goal(s) related to diagnosis/functional impairment(s): Overall goals for treatment include improving her physical health (e.g., diabetes, physical activity, and GI discomfort), learning strategies for enhancing coping capacity, and decreasing avoidance-based coping.  Goal 1: Patient will decrease her use of avoidance-based coping   \"I will know I've met my goal when I am not avoiding doing things out of fear (e.g., bathroom sink, processing grief, physical activity, reviewing pieces of writing).\"     Objective #A (Patient Action)    Patient will learn and implement skills for reducing perfectionism.  Status: In progress - Date: 10/23/24 (patient has learned some anti-perfectionism strategies and is implementing them with some success)     Intervention(s)  Therapist will teach anti-perfectionism skills and will assist patient in completing a workbook on perfectionism.    Objective #B  Patient will initiate a healthy grieving process  Status: In progress - Date: 10/23/24 (patient is learning emotional expression skills)    Intervention(s)  Therapist will assist patient in processing and exploring her grief.    Objective #C  Patient will increase her participation in meaningful and purposeful activities.  Status: Satisfactory progress - Date: 10/23/24 (patient reports progress in increasing " her participation in meaningful activities, socializing, spending time in nature, and traveling)    Intervention(s)  Therapist will teach values clarification, committed action, and exposure skills.    Objective #D  Patient will learn and implement unhooking skills for decreasing the degree to which fear influences behaviors and decisions in an unhelpful way.  Status: Satisfactory progress - Date: 10/23/24 (patient has been learning and implementing mindfulness and cognitive restructuring skills)     Intervention(s)  Therapist will teach defusion, cognitive restructuring, and mindfulness skills.      Patient has reviewed and agreed to the above plan.      Elise Sutton, PhD  September 6, 2023    Elise Sutton, Ph.D., , [facility]  Clinical Health Psychologist  Phone: [phone]

## 2024-12-30 ENCOUNTER — VIRTUAL VISIT (OUTPATIENT)
Dept: PSYCHOLOGY | Facility: CLINIC | Age: 54
End: 2024-12-30
Payer: COMMERCIAL

## 2024-12-30 DIAGNOSIS — F43.22 ADJUSTMENT DISORDER WITH ANXIETY: Primary | ICD-10-CM

## 2024-12-30 DIAGNOSIS — G89.29 OTHER CHRONIC PAIN: ICD-10-CM

## 2024-12-30 DIAGNOSIS — R19.8 GI SYMPTOMS: ICD-10-CM

## 2024-12-30 DIAGNOSIS — E10.3299 TYPE 1 DIABETES MELLITUS WITH MILD NONPROLIFERATIVE RETINOPATHY, MACULAR EDEMA PRESENCE UNSPECIFIED, UNSPECIFIED LATERALITY (H): ICD-10-CM

## 2024-12-30 DIAGNOSIS — F33.41 MAJOR DEPRESSIVE DISORDER, RECURRENT, IN PARTIAL REMISSION (H): ICD-10-CM

## 2024-12-30 DIAGNOSIS — Z63.6 CAREGIVER STRESS: ICD-10-CM

## 2024-12-30 SDOH — SOCIAL STABILITY - SOCIAL INSECURITY: DEPENDENT RELATIVE NEEDING CARE AT HOME: Z63.6

## 2024-12-30 ASSESSMENT — ANXIETY QUESTIONNAIRES
4. TROUBLE RELAXING: SEVERAL DAYS
6. BECOMING EASILY ANNOYED OR IRRITABLE: SEVERAL DAYS
7. FEELING AFRAID AS IF SOMETHING AWFUL MIGHT HAPPEN: SEVERAL DAYS
GAD7 TOTAL SCORE: 7
2. NOT BEING ABLE TO STOP OR CONTROL WORRYING: SEVERAL DAYS
3. WORRYING TOO MUCH ABOUT DIFFERENT THINGS: SEVERAL DAYS
1. FEELING NERVOUS, ANXIOUS, OR ON EDGE: MORE THAN HALF THE DAYS
IF YOU CHECKED OFF ANY PROBLEMS ON THIS QUESTIONNAIRE, HOW DIFFICULT HAVE THESE PROBLEMS MADE IT FOR YOU TO DO YOUR WORK, TAKE CARE OF THINGS AT HOME, OR GET ALONG WITH OTHER PEOPLE: SOMEWHAT DIFFICULT
8. IF YOU CHECKED OFF ANY PROBLEMS, HOW DIFFICULT HAVE THESE MADE IT FOR YOU TO DO YOUR WORK, TAKE CARE OF THINGS AT HOME, OR GET ALONG WITH OTHER PEOPLE?: SOMEWHAT DIFFICULT
5. BEING SO RESTLESS THAT IT IS HARD TO SIT STILL: NOT AT ALL
GAD7 TOTAL SCORE: 7
7. FEELING AFRAID AS IF SOMETHING AWFUL MIGHT HAPPEN: SEVERAL DAYS
GAD7 TOTAL SCORE: 7

## 2024-12-30 NOTE — PROGRESS NOTES
Health Psychology          Latanya Mayfield, Ph.D.,  [phone]  Hansa Arzate, Ph.D.,  [phone]  Sarah Beth Jack, Ph.D.,  [phone]  Panfilo Cornejo, Ph.D., , [facility] [phone]  Krystle Hannah, Ph.D.,  [phone]  Elise Sutton, Ph.D., , [facility] [phone]    [facility], 3rd Floor  [address] [place] [address]       Health Psychology Progress Note    Patient Name: Dali Martines    Service Type: Individual  Length of Visit: 56 minutes - extended session due to complexity of content   Start time: 3:05 PM  Stop time: 4:01 PM           Attendees: patient attended alone  Session #: 25    Most Recent DA/update: 8/16/23    Telemedicine Information:     Telemedicine Visit: The patient's condition can be safely assessed and treated via synchronous audio and visual telemedicine encounter.    Reason for Telemedicine Visit: Patient has requested telehealth visit and Patient convenience (e.g. access to timely appointments / distance to available provider)  Originating Site (Patient Location): Patient's home, [place]  Distant Location (provider location):  Off-site: Provider's Home Office   Consent:  The patient/guardian has verbally consented to: the potential risks and benefits of telemedicine (video visit) versus in person care; bill my insurance or make self-payment for services provided; and responsibility for payment of non-covered services.   Mode of Communication:  Video Conference via [employer]  As the provider I attest to compliance with applicable laws and regulations related to telemedicine.     Identifying Information and Presenting Problem:  Dali Martines is a 54 year old female adult who was referred to health psychology by her endocrinologist, Dr. Mckeon. Patient presented with concerns of stress in the context of multiple life events, several medical conditions, and chronic management of T1DM.    Topics  Discussed/Interventions Provided:    Session Content:     Update: Patient reports some increased stress today related to her caregiving role and addressing some repairs at her mother's home in [place].           Homework Review:    Try the dropping anchor exercises using whichever one fits with the timing - Accomplished.   Continue to focus on carol and enrichment where possible - limited ability to accomplish. Patient notes that it has been difficult to engage her mother in activities that she enjoys. States that she has been trying to remain connected to her friends and other family despite some of her challenges.    Continue to implement harm mitigation strategies - Accomplished.    Skills/intervention: Continued to engage patient in cognitive and emotional processing around her caregiver concerns and the impact the stress has had on her. She notes that the recent holiday and the associated caregiver burden was very challenging for her. Patient also notes that she has been engaging in some avoidance-based coping. Discussed the importance of ensuring regular check-ins with herself and opportunities for micro recovery. Revisited stress and coping and different ways to manage stress including reducing exposure, decreasing reactivity, promoting recovery, and avoiding exacerbating behaviors. Patient noted that she has been engaging in more stress-related eating and that this has taken a bit of a toll on her BG over the past week. Discussed adjustments to mindsets, rules, and narratives that might help with navigating stress.       Top values identified included: spirituality, humor, fairness, creativity, curiosity, contribution, and caring.  Unhelpful rules/assumptions: fear of failure, all-or-nothing thinking, and simplicity, control, and structure  Top priorities for grief: increasing tolerance of discomfort associated with uncertainty as well as identifying and processing emotions associated with the losses she has  experienced.    Treatment Objective(s) Addressed in This Session:  Psychological distress related to Diabetes  Psychological distress related to caregiver stress    Progress on / Status of Treatment Objective(s) / Homework:  In progress    Medication Adherence: Patient did not report medication changes. Current medication list is below:     Current Outpatient Medications   Medication Sig Dispense Refill    albuterol (PROAIR HFA/PROVENTIL HFA/VENTOLIN HFA) 108 (90 Base) MCG/ACT inhaler Inhale 2 puffs into the lungs every 4 hours as needed for shortness of breath / dyspnea or wheezing 1 Inhaler 11    aspirin 81 MG tablet Take 1 tablet by mouth daily.      blood glucose (ACCU-CHEK COLIN PLUS) test strip Test Blood Sugar 4 times daily or as directed 800 strip 3    blood glucose monitoring (ACCU-CHEK FASTCLIX) lancets Use to test blood sugar 8 times daily or as directed. 100 each 3    blood glucose monitoring (NO BRAND SPECIFIED) meter device kit Use to test blood sugar 8 times daily or as directed. Any covered brand, 1 kit 0    Calcium Carbonate-Vitamin D (CALCIUM + D PO) Take one daily      citalopram (CELEXA) 20 MG tablet Take 1.5 tablets (30 mg) by mouth daily 135 tablet 1    Continuous Blood Gluc  (DEXCOM G6 ) SUZANNE Use to read blood sugars as per 's instructions. 1 each 0    Continuous Glucose Sensor (DEXCOM G6 SENSOR) MISC Change every 10 days. 9 each 3    Continuous Glucose Transmitter (DEXCOM G6 TRANSMITTER) MISC Change every 3 months. 1 each 4    diclofenac (VOLTAREN) 1 % topical gel Apply 2 g topically 4 times daily 100 g 4    diclofenac (VOLTAREN) 1 % topical gel APPLY 2 GRAMS ONTO THE SKIN FOUR TIMES DAILY AS NEEDED FOR MODERATE PAIN 100 g 5    econazole nitrate 1 % cream Apply 0.5 inches topically daily.      fish oil-omega-3 fatty acids 1000 MG capsule Take one daily      fluocinonide (LIDEX) 0.05 % external solution Apply topically 2 times daily 60 mL 4    folic acid (FOLVITE) 1  "MG tablet Take 1 tablet (1 mg) by mouth daily 90 tablet 3    Insulin Aspart, w/Niacinamide, (FIASP PENFILL) 100 UNIT/ML SOCT 45 Units by Other route See Admin Instructions. Via insulin device. Approx 45 units daily as directed. 45 mL 3    Insulin Aspart, w/Niacinamide, (FIASP) 100 UNIT/ML SOLN Inject 50 Units as directed daily. For use in ambulatory insulin pump as directed.  Approximate daily dose is 50 units. 20 mL 11    insulin degludec (TRESIBA FLEXTOUCH) 100 UNIT/ML pen ADMINISTER 8 UNITS UNDER THE SKIN DAILY. PLEASE DO NOT FILL TODAY ( 10/18/2023 ). 30 mL 1    Insulin Disposable Pump (OMNIPOD 5 G6 INTRO, GEN 5,) KIT 1 kit continuous 1 kit 0    Insulin Disposable Pump (OMNIPOD 5 INTRO, GEN 5,) KIT 1 kit continuously. 1 kit 0    Insulin Disposable Pump (OMNIPOD 5 PODS, GEN 5,) MISC 1 each every 3 days. 30 each 3    insulin pen needle (B-D U/F) 31G X 8 MM miscellaneous Use 5 pen needles daily 450 each 3    insulin syringe-needle U-100 (30G X 1/2\" 1 ML) 30G X 1/2\" 1 ML miscellaneous Use 1 syringes as directed to draw up insulin for pump. Per pharmacy request. . 100 each 4    Ketoconazole (NIZORAL PO) Shampoo daily      levothyroxine (SYNTHROID/LEVOTHROID) 112 MCG tablet Take 1 tablet (112 mcg) by mouth daily 90 tablet 4    methotrexate 2.5 MG tablet Take 6 tablets (15 mg) by mouth every 7 days Labs every 8 - 12 weeks for refills. 72 tablet 1    methotrexate 2.5 MG tablet Take 6 tablets (15 mg) by mouth every 7 days Labs every 8 - 12 weeks for refills. 72 tablet 0    methotrexate 2.5 MG tablet Take 6 tablets (15 mg) by mouth every 7 days Labs required every 8-12 weeks while taking this medication 72 tablet 0    methylphenidate (METADATE CD) 20 MG CR capsule Take 20 mg by mouth daily      methylphenidate (RITALIN) 10 MG tablet TAKE UP TO 2 TABLETS BY MOUTH IN THE LATE AFTERNOON.      Minoxidil (ROGAINE EX) daily      mometasone-formoterol (DULERA) 100-5 MCG/ACT inhaler INHALE 2 PUFFS INTO THE LUNGS TWICE DAILY 13 g " 11    montelukast (SINGULAIR) 10 MG tablet TAKE 1 TABLET(10 MG) BY MOUTH EVERY EVENING 30 tablet 5    Multiple Vitamin (MULTIVITAMIN OR) Take one daily tab      ondansetron (ZOFRAN ODT) 4 MG ODT tab Take 1 tablet (4 mg) by mouth every 8 hours as needed for vomiting. 30 tablet 1    predniSONE (DELTASONE) 20 MG tablet Take 1 tablet (20 mg) by mouth daily 5 tablet 0    Semaglutide, 1 MG/DOSE, (OZEMPIC) 4 MG/3ML pen Inject 1 mg subcutaneously every 7 days. 12 mL 3    simvastatin (ZOCOR) 20 MG tablet Take 1 tablet (20 mg) by mouth at bedtime 90 tablet 3    traZODone (DESYREL) 50 MG tablet Take 1-2 tablets by mouth nightly as needed for sleep. 180 tablet 0     Current Facility-Administered Medications   Medication Dose Route Frequency Provider Last Rate Last Admin    lidocaine 1 % injection 1 mL  1 mL   Tolu Fernández, DPM   1 mL at 11/09/22 1429    triamcinolone acetonide (KENALOG-10) injection 10 mg  10 mg   Tolu Fernández, DPM   10 mg at 11/09/22 1429     Assessment: The patient appeared to be active and engaged in today's session and was receptive to feedback.     Mental Status Exam:   Appearance: Appropriate   Eye Contact: Good    Orientation: Yes, x4  Behavior/relationship to provider/demeanor: Cooperative, Engaged, and Pleasant  Motor Activity: normal or unremarkable  Mood (subjective report): Stressed  Affect (objective appearance): Appropriate/mood congruent  Speech Rate: Normal  Speech Volume: Normal  Speech Articulation: Normal  Speech Coherence: Normal  Speech Spontaneity: Normal  Thought Content: no suicidal/homicidal ideation, plans, or intent  Thought Process (associations): Logical, Linear, and Goal directed  Thought Process (rate): Normal  Abnormal Perception: None  Attention/Concentration: Normal  Memory: Appears grossly intact   Fund of Knowledge: Above average  Abstraction:  Normal  Insight:  Good  Judgment:  good    Does the patient appear to be at imminent risk of harm to self/others  at this time? No    The session was necessary to address psychological distress in the context of multiple psychosocial stressors and chronic health concerns.    Diagnoses:    1. Adjustment disorder with anxiety    2. Major depressive disorder, recurrent, in partial remission (H)    3. Caregiver stress    4. Type 1 diabetes mellitus with mild nonproliferative retinopathy, macular edema presence unspecified, unspecified laterality (H)    5. Other chronic pain    6. GI symptoms      Plan:    Follow-up video visit with me on 1/17/25 at 11:00am   Patient to use 911 or other crisis resources in the event of a psychiatric emergency  Primary care needs and medications are managed by Dimitri Alas MD. Referring provider is Dr. Mckeon.  Next visit agenda/goals:   Try the dropping anchor exercises using whichever one fits with the timing  Focus on carol and enrichment where possible  Continue to implement harm mitigation strategies   Future session: explore how to have a conversation about setting limits with other family regarding gatherings outside the house    Skills taught/interventions used thus far:  Psychoeducation  Values clarification  Committed action (vitality vs suffering)  Perfectionism workbook (modules 1-5)  Defusion  Acting opposite  Mindfulness (breath, body scan, watching thoughts, choiceless awareness, mountain)  Therapeutic journaling  [facility] [place]  Dropping anchor    NOTE: Treatment plan update due 9/6/24; 90 day treatment plan review due 1/21/25.                                                Individual Treatment Plan    Patient's Name: Dali Martines  YOB: 1970    Date of Creation: 9/6/23  Date Treatment Plan Last Reviewed/Revised: 10/23/24     DSM5 Diagnoses:     1. Adjustment disorder with anxiety    2. Major depressive disorder, recurrent, in partial remission (H)      Psychosocial / Contextual Factors: financial hardship, health issues, occupational/vocational stress,  "relationship stress, caregiver stress (mother's health concerns), and grief and loss in the wake of recent bereavement  PROMIS (reviewed every 90 days):       8/13/2024  2:51 PM   PROMIS 10     PROMIS TOTAL - SUBSCORES 20        Referral / Collaboration:  Collaboration with medical team and specialists as needed.    Anticipated number of session for this episode of care: 15-20  Anticipation frequency of session: 2x per month  Anticipated Duration of each session: 38-52 minutes  Treatment plan will be reviewed in 90 days or when goals have been changed.       Measurable Treatment Goal(s) related to diagnosis/functional impairment(s): Overall goals for treatment include improving her physical health (e.g., diabetes, physical activity, and GI discomfort), learning strategies for enhancing coping capacity, and decreasing avoidance-based coping.  Goal 1: Patient will decrease her use of avoidance-based coping   \"I will know I've met my goal when I am not avoiding doing things out of fear (e.g., bathroom sink, processing grief, physical activity, reviewing pieces of writing).\"     Objective #A (Patient Action)    Patient will learn and implement skills for reducing perfectionism.  Status: In progress - Date: 10/23/24 (patient has learned some anti-perfectionism strategies and is implementing them with some success)     Intervention(s)  Therapist will teach anti-perfectionism skills and will assist patient in completing a workbook on perfectionism.    Objective #B  Patient will initiate a healthy grieving process  Status: In progress - Date: 10/23/24 (patient is learning emotional expression skills)    Intervention(s)  Therapist will assist patient in processing and exploring her grief.    Objective #C  Patient will increase her participation in meaningful and purposeful activities.  Status: Satisfactory progress - Date: 10/23/24 (patient reports progress in increasing her participation in meaningful activities, " socializing, spending time in nature, and traveling)    Intervention(s)  Therapist will teach values clarification, committed action, and exposure skills.    Objective #D  Patient will learn and implement unhooking skills for decreasing the degree to which fear influences behaviors and decisions in an unhelpful way.  Status: Satisfactory progress - Date: 10/23/24 (patient has been learning and implementing mindfulness and cognitive restructuring skills)     Intervention(s)  Therapist will teach defusion, cognitive restructuring, and mindfulness skills.      Patient has reviewed and agreed to the above plan.      Elise Sutton, PhD  September 6, 2023    Elise Sutton, Ph.D., , [facility]  Clinical Health Psychologist  Phone: [phone]

## 2024-12-30 NOTE — NURSING NOTE
Is the patient currently in the state of [place]? YES    Current patient location: [address] [facility] [address]    Visit mode:VIDEO    If the visit is dropped, the patient can be reconnected by: VIDEO VISIT: Text to cell phone:   Telephone Information:   Mobile [phone]       Will anyone else be joining the visit? No  (If patient encounters technical issues they should call [phone])    Are changes needed to the allergy or medication list? No    Are refills needed on medications prescribed by this physician? Discuss with Provider    Rooming Documentation: [facility]    Reason for visit: SHEKHAR Frank

## 2024-12-30 NOTE — Clinical Note
Two additional follow-up video visits on the following dates and times please:   1/31/25 at 9:00am  2/14/25 at 11:00am

## 2024-12-31 ENCOUNTER — VIRTUAL VISIT (OUTPATIENT)
Dept: EDUCATION SERVICES | Facility: CLINIC | Age: 54
End: 2024-12-31
Payer: COMMERCIAL

## 2024-12-31 DIAGNOSIS — E10.3299 TYPE 1 DIABETES MELLITUS WITH MILD NONPROLIFERATIVE RETINOPATHY, MACULAR EDEMA PRESENCE UNSPECIFIED, UNSPECIFIED LATERALITY (H): Primary | ICD-10-CM

## 2024-12-31 PROCEDURE — 99207 PR NO CHARGE LOS: CPT | Mod: 95 | Performed by: REGISTERED NURSE

## 2024-12-31 NOTE — PROGRESS NOTES
Virtual Visit Details    Type of service:  Video Visit     Originating Location (pt. Location): Home    Distant Location (provider location):  On-site  Platform used for Video Visit: [facility]    Diabetes Self-Management Education & Support Visit- Short    Dali Martines presented today for education related to Type 1 diabetes    Patient is being treated with:  Omnipod 5 Insulin Pump    Year of diagnosis: 1988  Referring provider:  Felicia Mckeon MD  Living Situation: Lives alone and has a dog.  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  Currently not employed     Purpose of today's visit:  Ongoing DSME and insulin pump management.    Subjective/Objective:    She had a nice inge with her family but it was also stressful.  She is living fulltime with her mother who has dementia and had a hip-breaking fall this year.    She is going to start moving back to her own home, at least during the daytime.      Her insulin pump report appears below:         Assessment/Plan:  Having some sporadic lows throughout the day, but she thinks this may be due to her over-estimating her carbohydrates.  She has had a couple of episodes in the past week of hypoglycemia overnight following a correction.   We backed off on her correction factor from 12mn to 7:00am from 50 to 60.  If she continues to have sporadic lows throughout the day, she will call and we should consider either raising her BG target during the day from 110 to 120, or relax her ICR, currently at 1 unit per 15 grams CHO.      Follow-up:  One month.  Sooner if needed.      Time spent in this visit: 30 minutes     Any diabetes medication dose changes were made via the CDE Protocol and Collaborative Practice Agreement. A copy of this encounter was provided to the patient's referring provider.

## 2025-01-02 ENCOUNTER — TELEPHONE (OUTPATIENT)
Dept: ENDOCRINOLOGY | Facility: CLINIC | Age: 55
End: 2025-01-02
Payer: COMMERCIAL

## 2025-01-02 NOTE — TELEPHONE ENCOUNTER
Patient confirmed scheduled appointment:  Date: 02/26 and 07/15  Time: 9:30 and 3:30  Visit type: return diabetes  Provider: Reggie  Location: virtual for both  Testing/imaging:   Additional notes: patient confirmed she will be in [place]. Patient switched insurance to [employer] but did not have card with her. Patient stated she will upload to [employer]. Patient was scheduled for first available appointment with both Dali Stinson, RN  [id number] hours ago (1:25 PM)       Dali sees both Dr. Mckeon and Marissa Sebastian, and currently has no appointments upcoming.  Could you please reach out to her to get her scheduled.  She is generally seen every 3 months.  Thank you so much!  Dali.     Effie Campos on 1/2/2025 at 11:58 AM

## 2025-01-03 ENCOUNTER — TELEPHONE (OUTPATIENT)
Dept: ENDOCRINOLOGY | Facility: CLINIC | Age: 55
End: 2025-01-03

## 2025-01-03 NOTE — TELEPHONE ENCOUNTER
PA Initiation    Medication: OZEMPIC (1 MG/DOSE) 4 MG/3ML SC SOPN  Insurance Company: [employer] - Phone [phone] Fax [phone]  Pharmacy Filling the Rx: [facility] - [place], [place] - [facility] [address]  Filling Pharmacy Phone: [phone]  Filling Pharmacy Fax: [phone]  Start Date: 1/3/2025    Key: [id number]

## 2025-01-14 ENCOUNTER — TELEPHONE (OUTPATIENT)
Dept: SLEEP MEDICINE | Facility: CLINIC | Age: 55
End: 2025-01-14
Payer: COMMERCIAL

## 2025-01-14 NOTE — TELEPHONE ENCOUNTER
Pt was called to reschedule appt on 2/13/25  with Dr Dangelo. VM is full. [employer] message sent informing them of need to r/s     Katheryn Goldstein    [facility]

## 2025-01-27 ENCOUNTER — VIRTUAL VISIT (OUTPATIENT)
Dept: GASTROENTEROLOGY | Facility: CLINIC | Age: 55
End: 2025-01-27
Attending: PHYSICIAN ASSISTANT
Payer: COMMERCIAL

## 2025-01-27 VITALS — WEIGHT: 185 LBS | HEIGHT: 70 IN | BODY MASS INDEX: 26.48 KG/M2

## 2025-01-27 DIAGNOSIS — K29.40 AUTOIMMUNE GASTRITIS: Primary | ICD-10-CM

## 2025-01-27 DIAGNOSIS — R11.2 NAUSEA AND VOMITING, UNSPECIFIED VOMITING TYPE: ICD-10-CM

## 2025-01-27 DIAGNOSIS — R10.11 RUQ ABDOMINAL PAIN: ICD-10-CM

## 2025-01-27 DIAGNOSIS — R14.0 ABDOMINAL BLOATING: ICD-10-CM

## 2025-01-27 PROCEDURE — 98006 SYNCH AUDIO-VIDEO EST MOD 30: CPT | Performed by: PHYSICIAN ASSISTANT

## 2025-01-27 ASSESSMENT — PAIN SCALES - GENERAL: PAINLEVEL_OUTOF10: NO PAIN (0)

## 2025-01-27 NOTE — PROGRESS NOTES
GASTROENTEROLOGY Follow-up VIDEO VISIT    CC/REFERRING MD:    Dimitri Alas    REASON FOR CONSULTATION:   Larry Treviño for   Chief Complaint   Patient presents with    RECHECK     F/U       HISTORY OF PRESENT ILLNESS:    Dali Martines is a 54 year old female who is being evaluated via a billable video visit for follow-up.  Last visit with me was 6 months ago.  Prior to that, she had seen a few of my previous partners, Khanh Arevalo PA-C and Gloria House PA-C.  She has a longstanding history of GI distress, including nausea, vomiting, bloating, upper abdominal discomfort, and altered bowel habits.  Previous workup has included EGD, gastric emptying scan, abdominal ultrasound, and laboratory testing.  It was the impression that her symptoms were probably multifactorial, with intermittent delayed gastric emptying as well as functional dyspepsia present.  Interestingly, episodic hypoglycemia had been a  of symptoms.  Lastly, she did also have positive hydrogen breath testing but rifaximin had not been prescribed, as it was felt that it would probably not be a good long-term solution.    Shortly before seeing me, she did undergo EGD with gastric mapping, given previous finding of a possible atrophic gastritis.  This was again noted on all of her gastric biopsies, negative H. pylori, no dysplasia.  No evidence of B12 or iron deficiency this past summer.  Had continued using Ozempic and had generally seen improvement in symptoms since starting insulin pump.    Since last visit, there has been some increase in symptoms.  This has been mirrored by significant stresses in the patient's life, her mother unexpectedly fell and broke her hip, resulting in the patient moving in with her mother to become a caregiver.  She then developed significant UTI earlier this month and broke the other hip, going to be discharged from the hospital in the next week or so.  The stresses have been significant, have  resulted in lack of sleep, irregular eating patterns, and because of this, there has been some increases in the epigastric discomfort.  Has used FD guard occasionally, also has ondansetron on hand but has not used it.  Not using any antacids.      I have reviewed and updated the patient's Past Medical History, Social History, Family History and Medication List.    Exam:    General appearance:  Healthy appearing adult, in no acute distress  Eyes:  Sclera anicteric, Pupils round and reactive to light  Ears, nose, mouth and throat:  No obvious external lesions of ears and nose.  Hearing intact  Neck:  Symmetric, No obvious external lesions  Respiratory:  Normal respiration, no use of accessory muscles   MSK:  No visual upper extremity, neck or facial muscle atrophy  ABD:  No visual abdominal distention, no audible borborygmi  Skin:  No rashes or jaundice   Psychiatric:  Oriented to person, place and time, Appropriate mood and affect.   Neurologic:  Peripheral muscle function and dexterity appear to be intact      PERTINENT STUDIES have been reviewed.    ASSESSMENT/PLAN:    Dali Martines is a 54 year old female who presents for follow up of chronic upper GI distress symptoms.  We spent some time reviewing her symptoms that have accelerated recently, likely a big stress component.  Will plan for short trial of PPI therapy, I suspect this may be more effective than her current regimen.  If not, she can return to using FDgard and ondansetron as needed.  Reviewed updating nutrition labs given known atrophic gastritis.    1. Autoimmune gastritis (Primary)  - Vitamin B12; Future  - Iron and iron binding capacity; Future  - Ferritin; Future    2. Abdominal bloating    3. Nausea and vomiting, unspecified vomiting type    4. RUQ abdominal pain      Video-Visit Details    Video Visit Time: 25 minutes    Type of service:  Video Visit    Originating Location (pt. Location): Home    Distant Location (provider location):   Off-site    Platform used for Video Visit: [facility]    A total of 35 minutes was spent with reviewing the chart, discussing with the patient, documentation and coordination of care on 1/27/2025.    Larry Treviño PA-C  Division of Gastroenterology, Hepatology, and Nutrition  [facility]  [phone]    RT 6 months

## 2025-01-27 NOTE — NURSING NOTE
[place] [facility] [facility] [address]    Is the patient currently in the state of [place]? YES    Visit mode: VIDEO    If the visit is dropped, the patient can be reconnected by:VIDEO VISIT: Text to cell phone:   Telephone Information:   Mobile [phone]       Will anyone else be joining the visit? NO  (If patient encounters technical issues they should call [phone]    Are changes needed to the allergy or medication list? No    Are refills needed on medications prescribed by this physician? NO    Rooming Documentation:  Questionnaire(s) not pre-assigned    Reason for visit: RECHECK (F/U)    Keara CORRIGAN

## 2025-02-06 ENCOUNTER — OFFICE VISIT (OUTPATIENT)
Dept: RHEUMATOLOGY | Facility: CLINIC | Age: 55
End: 2025-02-06
Payer: COMMERCIAL

## 2025-02-06 VITALS
DIASTOLIC BLOOD PRESSURE: 71 MMHG | SYSTOLIC BLOOD PRESSURE: 115 MMHG | HEART RATE: 75 BPM | WEIGHT: 184 LBS | OXYGEN SATURATION: 97 % | BODY MASS INDEX: 26.4 KG/M2

## 2025-02-06 DIAGNOSIS — M77.9 TENDINITIS: ICD-10-CM

## 2025-02-06 DIAGNOSIS — M25.50 PAIN IN JOINT, MULTIPLE SITES: ICD-10-CM

## 2025-02-06 DIAGNOSIS — Z51.81 ENCOUNTER FOR THERAPEUTIC DRUG MONITORING: ICD-10-CM

## 2025-02-06 DIAGNOSIS — J45.41 MODERATE PERSISTENT ASTHMA WITH EXACERBATION: ICD-10-CM

## 2025-02-06 DIAGNOSIS — L40.50 PSORIATIC ARTHRITIS (H): ICD-10-CM

## 2025-02-06 RX ORDER — FOLIC ACID 1 MG/1
1 TABLET ORAL DAILY
Qty: 90 TABLET | Refills: 3 | Status: SHIPPED | OUTPATIENT
Start: 2025-02-06

## 2025-02-06 RX ORDER — METHOTREXATE 2.5 MG/1
15 TABLET ORAL
Qty: 72 TABLET | Refills: 1 | Status: SHIPPED | OUTPATIENT
Start: 2025-02-06

## 2025-02-06 ASSESSMENT — PAIN SCALES - GENERAL: PAINLEVEL_OUTOF10: MILD PAIN (2)

## 2025-02-06 NOTE — NURSING NOTE
Chief Complaint   Patient presents with    RECHECK     Psoriatic arthritis +3 more       Vitals:    02/06/25 1105   BP: 115/71   BP Location: Left arm   Patient Position: Sitting   Cuff Size: Adult Regular   Pulse: 75   SpO2: 97%   Weight: 83.5 kg (184 lb)       Body mass index is 26.4 kg/m .    Minerva Ochoa, [facility]

## 2025-02-06 NOTE — PROGRESS NOTES
Follow-up visit for psoriatic arthritis    Dali reports that she is doing well overall and she is tolerating the methotrexate well.  However she does have some joint pain especially in the feet at the first MTPs.  In addition, she also has noticed pain and abnormal sensation in her pinky on the left.  She figured that this is due to the fact that she holds her phone in her left hand supporting it primarily but her pinky.  She is change her habit and she feels that there is some improvement but there is still residual abnormal sensation with some numbness and tingling especially on the medial aspect of her pinky, right where the following would be supported by her pinky.    Past medical history  Psoriasis  Psoriatic arthritis  GERD  Type 1 diabetes  Depression    Physical examination  Vital signs are stable her BMI is 26.4  Joint examination shows that she has no active synovitis in the hands wrist elbows shoulders with full range of motion and normal strength.  Lower extremity exam was also normal with no active synovitis.  Especially she has no active synovitis at the MTPs bilaterally.  The nails are normal without onycholysis or pitting.    We reviewed her blood test from December 2024 and they are essentially normal.    Impression/plan  1.  Psoriatic arthritis that seems well-controlled methotrexate 6 tablets = 15 mg, once weekly and folic acid 1 mg daily.  2.  Abnormal sensation with numbness and tingling and pain in the left pinky, presumably triggered by holding her phone.  There seems to be no active synovitis but I wonder if she could have a minor nerve impingement of the superficial branches of the fifth digit.  This is expected to slowly recover over time.  3.  There is no toxicity with the methotrexate.  It is recommended that she does a blood test every 3 months.  I refilled her medication for 6 months.  4.  Follow-up in 6 months with one of my rheumatology colleagues as I will be on a leave of  absence.    30 minutes spent on the date of the encounter doing chart review, history and exam, documentation and further activities per the note.

## 2025-02-11 ASSESSMENT — SLEEP AND FATIGUE QUESTIONNAIRES
HOW LIKELY ARE YOU TO NOD OFF OR FALL ASLEEP WHILE SITTING QUIETLY AFTER LUNCH WITHOUT ALCOHOL: MODERATE CHANCE OF DOZING
HOW LIKELY ARE YOU TO NOD OFF OR FALL ASLEEP WHILE SITTING AND TALKING TO SOMEONE: WOULD NEVER DOZE
HOW LIKELY ARE YOU TO NOD OFF OR FALL ASLEEP IN A CAR, WHILE STOPPED FOR A FEW MINUTES IN TRAFFIC: WOULD NEVER DOZE
HOW LIKELY ARE YOU TO NOD OFF OR FALL ASLEEP WHILE SITTING AND READING: HIGH CHANCE OF DOZING
HOW LIKELY ARE YOU TO NOD OFF OR FALL ASLEEP WHILE SITTING INACTIVE IN A PUBLIC PLACE: SLIGHT CHANCE OF DOZING
HOW LIKELY ARE YOU TO NOD OFF OR FALL ASLEEP WHILE LYING DOWN TO REST IN THE AFTERNOON WHEN CIRCUMSTANCES PERMIT: HIGH CHANCE OF DOZING
HOW LIKELY ARE YOU TO NOD OFF OR FALL ASLEEP WHEN YOU ARE A PASSENGER IN A CAR FOR AN HOUR WITHOUT A BREAK: MODERATE CHANCE OF DOZING
HOW LIKELY ARE YOU TO NOD OFF OR FALL ASLEEP WHILE WATCHING TV: HIGH CHANCE OF DOZING

## 2025-02-12 ENCOUNTER — VIRTUAL VISIT (OUTPATIENT)
Dept: SLEEP MEDICINE | Facility: CLINIC | Age: 55
End: 2025-02-12
Payer: COMMERCIAL

## 2025-02-12 VITALS — HEIGHT: 70 IN | BODY MASS INDEX: 26.05 KG/M2 | WEIGHT: 182 LBS

## 2025-02-12 DIAGNOSIS — F43.9 STRESS: ICD-10-CM

## 2025-02-12 DIAGNOSIS — F51.04 CHRONIC INSOMNIA: Primary | ICD-10-CM

## 2025-02-12 PROCEDURE — 90834 PSYTX W PT 45 MINUTES: CPT | Mod: 95 | Performed by: PSYCHOLOGIST

## 2025-02-12 ASSESSMENT — PAIN SCALES - GENERAL: PAINLEVEL_OUTOF10: MODERATE PAIN (4)

## 2025-02-12 NOTE — PROGRESS NOTES
Virtual Visit Details    Type of service:  Video Visit     Originating Location (pt. Location): Home    Distant Location (provider location):  Off-site  Platform used for Video Visit: Universal Ad Start Time:  402 PM  Visit End Time: 4:55 PM        SLEEP MEDICINE VIRTUAL FOLLOW-UP VISIT  Behavioral Sleep Medicine    Patient Name: Dali Martines  MRN:  [id number]  Date of Service: Feb 12, 2025       Subjective Report     Dali Martines  returns for a telehealth video visit to discuss progress in implementing behavioral strategies for the management of insomnia and stress impacting sleep .  Patient consent for initiation of video visit was obtained and documented prior to initiation of visit.     Dali reports significant stressors since last visit ini November.  Her mother had a UTI resulting in delirium onset in January.  Dali reports her mother also fell from the bed and broke her hip requiring surgery. Significant stressors associated with her mother's health conditions.  Dali reports relief with her mother now in nursing care with additional support from a relative    Discussed strategies to manage stress, particularly within the opportunity of her having more days away from the caregiving role and adding support from other family members to assist in her mother's care possible transition to long-term nursing care.    .          Sleep Data:         EPWORTH SLEEPINESS SCALE    Sitting and reading  3   Watching TV  3   Sitting, inactive in a public place (theatre or mtg.)  1    As a passenger in a car  2   Lying down to rest in the afternoon when circumstance permit  3   Sitting and talking to someone  0   Sitting quietly after lunch without alcohol  2   In a car, while stopped for a few minutes in traffic  0   TOTAL SCORE (nl <11)  14     INSOMNIA SEVERITY INDEX     Difficulty falling asleep  2   Difficult staying asleep  2   Problems waking up to early  3   How SATISFIED/DISSATISFIED are you with your  CURRENT sleep pattern?  3   How NOTICEABLE to others do you think your sleep pattern is in terms of your quality of life?  2   How WORRIED/DISTRESSED are you about your current sleep pattern?  2   To what extent do you consider your sleep problem to INTERFERE with your daily fuctioning(e.g. daytime fatigue, mood, ability to function at work/daily chores, concentration, mood,etc.) CURRENTLY?  3   INSOMNIA SEVERITY INDEX TOTAL SCORE  17    Absence of insomnia (0-7); sub-threshold insomnia (8-14); moderate insomnia (15-21); and severe insomnia (22-28)       Interventions     Strategies and recommendations including Stimulus control therapy, Relaxation strategies for insomnia, and Stress management were reviewed and discussed today.     Services provided are compliant with the requirements of [place] Statute SS 256B.0625 Subd. 3b and paragraph (b)       Vital Signs     LMP  (LMP Unknown)      Mental Status     Orientation:  X3  Mood:  normal  Affect:  Congruent with mood  Speech/Language:  Normal  Thought Process: Intact  Associations:  Normal  Thought Content: Normal  Patient does not report any suicidal ideation, intention or plan.    Diagnostic Impressions and Plan        Chronic insomnia  Stress    Patient reports general stability and sleep.  Focus of this follow-up was on the impact of caregiving stressors related to medical issues and care of her mother with dementia.    Plan:  Continue current sleep schedule and plan    Follow-up: 3 months      Philip Quiroz PsyD, GARY, [facility]  Diplomate, Behavioral Sleep Medicine  [facility]      Note: This dictation was created using voice recognition software. This document may contain an error not identified before finalizing the document. If the error changes the accuracy of the document, I would appreciate it being brought to my attention.

## 2025-02-12 NOTE — NURSING NOTE
[facility] [address] [facility] [address]    Is the patient currently in the state of [place]? YES    Visit mode: VIDEO    If the visit is dropped, the patient can be reconnected by:VIDEO VISIT: Text to cell phone:   Telephone Information:   Mobile [phone]       Will anyone else be joining the visit? NO  (If patient encounters technical issues they should call [phone]    Are changes needed to the allergy or medication list? Pt stated no changes to allergies and Pt stated no med changes    Are refills needed on medications prescribed by this physician? Discuss with provider    Rooming Documentation:  Questionnaire(s) completed    Reason for visit: EWA Fernandez VVF

## 2025-02-17 ENCOUNTER — TELEPHONE (OUTPATIENT)
Dept: ENDOCRINOLOGY | Facility: CLINIC | Age: 55
End: 2025-02-17
Payer: COMMERCIAL

## 2025-02-17 DIAGNOSIS — E10.9 TYPE 1 DIABETES MELLITUS WITHOUT COMPLICATION (H): ICD-10-CM

## 2025-02-17 RX ORDER — INSULIN DEGLUDEC 100 U/ML
INJECTION, SOLUTION SUBCUTANEOUS
Qty: 15 ML | Refills: 1 | Status: SHIPPED | OUTPATIENT
Start: 2025-02-17

## 2025-02-17 NOTE — TELEPHONE ENCOUNTER
Contacted pt to let them know Shakira is on medical leave and will unable to make their scheduled appt. Pt agreed to 3/28 VOV @ 12:00pm WEN Sebastian

## 2025-03-07 ENCOUNTER — HOSPITAL ENCOUNTER (OUTPATIENT)
Dept: MAMMOGRAPHY | Facility: CLINIC | Age: 55
Discharge: HOME | End: 2025-03-07
Attending: INTERNAL MEDICINE | Admitting: INTERNAL MEDICINE
Payer: COMMERCIAL

## 2025-03-07 DIAGNOSIS — Z12.31 VISIT FOR SCREENING MAMMOGRAM: ICD-10-CM

## 2025-03-07 PROCEDURE — 77067 SCR MAMMO BI INCL CAD: CPT

## 2025-03-07 PROCEDURE — 77063 BREAST TOMOSYNTHESIS BI: CPT

## 2025-03-12 ENCOUNTER — LAB (OUTPATIENT)
Dept: LAB | Facility: CLINIC | Age: 55
End: 2025-03-12
Payer: COMMERCIAL

## 2025-03-12 DIAGNOSIS — K29.40 AUTOIMMUNE GASTRITIS: ICD-10-CM

## 2025-03-12 DIAGNOSIS — L40.50 PSORIATIC ARTHRITIS (H): ICD-10-CM

## 2025-03-12 LAB
BASOPHILS # BLD AUTO: 0 10E3/UL (ref 0–0.2)
BASOPHILS NFR BLD AUTO: 0 %
EOSINOPHIL # BLD AUTO: 0.1 10E3/UL (ref 0–0.7)
EOSINOPHIL NFR BLD AUTO: 1 %
ERYTHROCYTE [DISTWIDTH] IN BLOOD BY AUTOMATED COUNT: 12.5 % (ref 10–15)
ERYTHROCYTE [SEDIMENTATION RATE] IN BLOOD BY WESTERGREN METHOD: 24 MM/HR (ref 0–30)
HCT VFR BLD AUTO: 35.1 % (ref 35–47)
HGB BLD-MCNC: 11.8 G/DL (ref 11.7–15.7)
IMM GRANULOCYTES # BLD: 0 10E3/UL
IMM GRANULOCYTES NFR BLD: 0 %
LYMPHOCYTES # BLD AUTO: 2.2 10E3/UL (ref 0.8–5.3)
LYMPHOCYTES NFR BLD AUTO: 41 %
MCH RBC QN AUTO: 31.6 PG (ref 26.5–33)
MCHC RBC AUTO-ENTMCNC: 33.6 G/DL (ref 31.5–36.5)
MCV RBC AUTO: 94 FL (ref 78–100)
MONOCYTES # BLD AUTO: 0.4 10E3/UL (ref 0–1.3)
MONOCYTES NFR BLD AUTO: 7 %
NEUTROPHILS # BLD AUTO: 2.8 10E3/UL (ref 1.6–8.3)
NEUTROPHILS NFR BLD AUTO: 51 %
PLATELET # BLD AUTO: 235 10E3/UL (ref 150–450)
RBC # BLD AUTO: 3.74 10E6/UL (ref 3.8–5.2)
WBC # BLD AUTO: 5.4 10E3/UL (ref 4–11)

## 2025-03-12 PROCEDURE — 83540 ASSAY OF IRON: CPT

## 2025-03-12 PROCEDURE — 83550 IRON BINDING TEST: CPT

## 2025-03-12 PROCEDURE — 82728 ASSAY OF FERRITIN: CPT

## 2025-03-12 PROCEDURE — 86140 C-REACTIVE PROTEIN: CPT

## 2025-03-12 PROCEDURE — 36415 COLL VENOUS BLD VENIPUNCTURE: CPT

## 2025-03-12 PROCEDURE — 85025 COMPLETE CBC W/AUTO DIFF WBC: CPT

## 2025-03-12 PROCEDURE — 82607 VITAMIN B-12: CPT

## 2025-03-12 PROCEDURE — 85652 RBC SED RATE AUTOMATED: CPT

## 2025-03-13 LAB
CRP SERPL-MCNC: <3 MG/L
FERRITIN SERPL-MCNC: 87 NG/ML (ref 11–328)
IRON BINDING CAPACITY (ROCHE): 274 UG/DL (ref 240–430)
IRON SATN MFR SERPL: 29 % (ref 15–46)
IRON SERPL-MCNC: 80 UG/DL (ref 37–145)
VIT B12 SERPL-MCNC: 625 PG/ML (ref 232–1245)

## 2025-03-15 LAB
ALBUMIN SERPL BCG-MCNC: 4.5 G/DL (ref 3.5–5.2)
ALP SERPL-CCNC: 97 U/L (ref 40–150)
ALT SERPL W P-5'-P-CCNC: 22 U/L (ref 0–50)
ANION GAP SERPL CALCULATED.3IONS-SCNC: 12 MMOL/L (ref 7–15)
AST SERPL W P-5'-P-CCNC: 31 U/L (ref 0–45)
BILIRUB SERPL-MCNC: 0.2 MG/DL
BUN SERPL-MCNC: 10.3 MG/DL (ref 6–20)
CALCIUM SERPL-MCNC: 9.7 MG/DL (ref 8.8–10.4)
CHLORIDE SERPL-SCNC: 101 MMOL/L (ref 98–107)
CREAT SERPL-MCNC: 0.63 MG/DL (ref 0.51–0.95)
EGFRCR SERPLBLD CKD-EPI 2021: >90 ML/MIN/1.73M2
GLUCOSE SERPL-MCNC: 106 MG/DL (ref 70–99)
HCO3 SERPL-SCNC: 26 MMOL/L (ref 22–29)
POTASSIUM SERPL-SCNC: 4.2 MMOL/L (ref 3.4–5.3)
PROT SERPL-MCNC: 7.5 G/DL (ref 6.4–8.3)
SODIUM SERPL-SCNC: 139 MMOL/L (ref 135–145)

## 2025-03-18 ENCOUNTER — TELEPHONE (OUTPATIENT)
Dept: ENDOCRINOLOGY | Facility: CLINIC | Age: 55
End: 2025-03-18
Payer: COMMERCIAL

## 2025-03-18 NOTE — TELEPHONE ENCOUNTER
Patient confirmed scheduled appointment:  Date: 4/17/25  Time: 9:30 am  Visit type: RETURN DIABETES  Provider: Felicia Mckeon MD  Location: [facility]  Testing/imaging: N/A  Additional notes:  Spoke to pt to reschedule appt from 3/28 Marissa Sebastian due to changes in the providers schedule to 4/17 with Dr Mckeon.  Pt has seen Dr Mckeon in the past and per Lottie it is OK to schedule with Dr Mckeon for this appt.  Pt will be in [place] for this visit.    Storm Larios on 3/18/2025 at 9:40 AM    
Negative

## 2025-03-22 ENCOUNTER — HEALTH MAINTENANCE LETTER (OUTPATIENT)
Age: 55
End: 2025-03-22

## 2025-04-02 DIAGNOSIS — E10.3291 TYPE 1 DIABETES MELLITUS WITH MILD NONPROLIFERATIVE RETINOPATHY OF RIGHT EYE, MACULAR EDEMA PRESENCE UNSPECIFIED (H): ICD-10-CM

## 2025-04-07 RX ORDER — SIMVASTATIN 20 MG
20 TABLET ORAL AT BEDTIME
Qty: 90 TABLET | Refills: 3 | Status: SHIPPED | OUTPATIENT
Start: 2025-04-07

## 2025-04-07 NOTE — TELEPHONE ENCOUNTER
Last Written Prescription:  simvastatin (ZOCOR) 20 MG tablet 90 tablet 3 3/6/2024 -- No   Sig - Route: Take 1 tablet (20 mg) by mouth at bedtime - Oral     ----------------------  Last Visit Date: 9-13-24  Dr. Mckeon  Future Visit Date: 4-17--25  Dr. Mckeon  ----------------------  LDL Cholesterol Calculated   Date Value Ref Range Status   03/31/2023 107 (H) <=100 mg/dL Final   01/11/2021 122 (H) <100 mg/dL Final     Comment:     Above desirable:  100-129 mg/dl  Borderline High:  130-159 mg/dL  High:             160-189 mg/dL  Very high:       >189 mg/dl          Refill decision: Medication unable to be refilled by RN due to:   Overdue labs/test:  LDL        Request from pharmacy:  Requested Prescriptions   Pending Prescriptions Disp Refills    simvastatin (ZOCOR) 20 MG tablet 90 tablet 3     Sig: Take 1 tablet (20 mg) by mouth at bedtime.       Antihyperlipidemic agents Failed - 4/7/2025  8:15 AM        Failed - LDL on file in the past 12 months        Passed - Medication is active on med list and the sig matches. RN to manually verify dose and sig if red X/fail.     If the protocol passes (green check), you do not need to verify med dose and sig.    A prescription matches if they are the same clinical intention.    For Example: once daily and every morning are the same.    The protocol can not identify upper and lower case letters as matching and will fail.     For Example: Take 1 tablet (50 mg) by mouth daily     TAKE 1 TABLET (50 MG) BY MOUTH DAILY    For all fails (red x), verify dose and sig.    If the refill does match what is on file, the RN can still proceed to approve the refill request.       If they do not match, route to the appropriate provider.             Passed - Recent (12 mo) or future (90 days) visit within the authorizing provider's specialty     The patient must have completed an in-person or virtual visit within the past 12 months or has a future visit scheduled within the next 90 days  with the authorizing provider s specialty.  Urgent care and e-visits do not qualify as an office visit for this protocol.          Passed - Patient is age 18 years or older        Passed - No active pregnancy on record        Passed - No positive pregnancy test in past 12 mos

## 2025-04-08 NOTE — PROGRESS NOTES
Virtual Visit Details    Type of service:  Video Visit     Originating Location (pt. Location): Home    Distant Location (provider location):  Off-site  Platform used for Video Visit: Mark Anthony    Outcome for 04/08/25 12:15 PM: Data uploaded on Dexcom and [employer]  Skylar Benoit MA  Outcome for 04/15/25 10:56 AM: Data obtained via Dexcom and [employer] website  Skylar Benoit MA    Change target to 120?    This 54 year old woman was returns for f/u of her type 1 diabetes. She also has hypothyroidism and psoriatic arthritis.  Pt was dx having type 1 diabetes at age 15. Her hx is also significant for mild NPDR right eye only,anxiety, hyperlipidemia, psoriatic arthritis,  and GERD.  She started using an omnipod 5 in 2024. Data are below.  She continues to take tresiba 8 units daily and Ozempic 1 mg most weeks.  She does occasionally miss a week.  When she goes back on the dose after missing it 1 week, she has no GI problems.  Overall she is tolerating the Ozempic well.  She does not have nausea.  She thinks it really helps with her blood sugars.  Her OmniPod/Dexcom download is below.  She was unaware that she has been having so many periods of time where the pump and CGM are not talking to each other.  She continues to have lows during the day even if she has not bolused.  She has not had any trouble recognizing her low blood sugars.  She has had no episodes of severe hypoglycemia or periods of time that she has lost because of severe hypoglycemia.  Her first meal of the day is between 11 and 1.  She will then have a second meal around 6 PM.  She is not really exercising very much.    She is seen regularly by rheumatology and now takes methotrexate for her psoriatic arthritis.  Their impression is that things are generally well-controlled.  She still has some joint stiffness in the morning that is troubling to her    She is now seeing Elise Sutton for psychotherapy and thinks it has been very helpful.    She last saw her  eye doctor in August 2024.  Her nonproliferative retinopathy was stable at that time.  She has the same paresthesias on one of her toes that she has had for years.  She has no new symptoms on her feet.  She denies having any foot ulcers.    She reports that she has the appearance of a sensation of chest tightness/burning below her collarbone and under her sternum intermittently.  It usually occurs when she is under stress or when her blood sugars are real are high.  It can last as long as 1/2-hour but usually is gone by 5 minutes.  She has no associated symptoms of shortness of breath, sweating, radiation of pain, palpitations.  A friend of hers recently had a heart attack and she is concerned it could be her heart.    She is taking her thyroid hormone and statin as detailed below she is intermittently been taking her aspirin.                                  Current Outpatient Medications   Medication Sig Dispense Refill    albuterol (PROAIR HFA/PROVENTIL HFA/VENTOLIN HFA) 108 (90 Base) MCG/ACT inhaler Inhale 2 puffs into the lungs every 4 hours as needed for shortness of breath / dyspnea or wheezing 1 Inhaler 11    aspirin 81 MG tablet Take 1 tablet by mouth daily.      blood glucose (ACCU-CHEK COLIN PLUS) test strip Test Blood Sugar 4 times daily or as directed 800 strip 3    blood glucose monitoring (ACCU-CHEK FASTCLIX) lancets Use to test blood sugar 8 times daily or as directed. 100 each 3    blood glucose monitoring (NO BRAND SPECIFIED) meter device kit Use to test blood sugar 8 times daily or as directed. Any covered brand, 1 kit 0    Calcium Carbonate-Vitamin D (CALCIUM + D PO) Take one daily      citalopram (CELEXA) 20 MG tablet Take 1.5 tablets (30 mg) by mouth daily 135 tablet 1    Continuous Blood Gluc  (DEXCOM G6 ) SUZANNE Use to read blood sugars as per 's instructions. 1 each 0    Continuous Glucose Sensor (DEXCOM G6 SENSOR) MISC Change every 10 days. 9 each 3    Continuous  "Glucose Transmitter (DEXCOM G6 TRANSMITTER) MISC Change every 3 months. 1 each 4    diclofenac (VOLTAREN) 1 % topical gel Apply 2 g topically 4 times daily 100 g 4    diclofenac (VOLTAREN) 1 % topical gel APPLY 2 GRAMS ONTO THE SKIN FOUR TIMES DAILY AS NEEDED FOR MODERATE PAIN 100 g 5    econazole nitrate 1 % cream Apply 0.5 inches topically daily.      fish oil-omega-3 fatty acids 1000 MG capsule Take one daily      fluocinonide (LIDEX) 0.05 % external solution Apply topically 2 times daily 60 mL 4    folic acid (FOLVITE) 1 MG tablet Take 1 tablet (1 mg) by mouth daily. 90 tablet 3    Insulin Aspart, w/Niacinamide, (FIASP PENFILL) 100 UNIT/ML SOCT 45 Units by Other route See Admin Instructions. Via insulin device. Approx 45 units daily as directed. 45 mL 3    Insulin Aspart, w/Niacinamide, (FIASP) 100 UNIT/ML SOLN Inject 50 Units as directed daily. For use in ambulatory insulin pump as directed.  Approximate daily dose is 50 units. 20 mL 11    insulin degludec (TRESIBA FLEXTOUCH) 100 UNIT/ML pen ADMINISTER 8 UNITS UNDER THE SKIN DAILY. 15 mL 1    Insulin Disposable Pump (OMNIPOD 5 G6 INTRO, GEN 5,) KIT 1 kit continuous 1 kit 0    Insulin Disposable Pump (OMNIPOD 5 INTRO, GEN 5,) KIT 1 kit continuously. 1 kit 0    Insulin Disposable Pump (OMNIPOD 5 PODS, GEN 5,) MISC 1 each every 3 days. 30 each 3    insulin pen needle (B-D U/F) 31G X 8 MM miscellaneous Use 5 pen needles daily 450 each 3    insulin syringe-needle U-100 (30G X 1/2\" 1 ML) 30G X 1/2\" 1 ML miscellaneous Use 1 syringes as directed to draw up insulin for pump. Per pharmacy request. . 100 each 4    Ketoconazole (NIZORAL PO) Shampoo daily      levothyroxine (SYNTHROID/LEVOTHROID) 112 MCG tablet Take 1 tablet (112 mcg) by mouth daily 90 tablet 4    methotrexate 2.5 MG tablet Take 6 tablets (15 mg) by mouth every 7 days. Labs every 8 - 12 weeks for refills. 72 tablet 1    methylphenidate (METADATE CD) 20 MG CR capsule Take 20 mg by mouth daily      " "methylphenidate (RITALIN) 10 MG tablet TAKE UP TO 2 TABLETS BY MOUTH IN THE LATE AFTERNOON.      Minoxidil (ROGAINE EX) daily      mometasone-formoterol (DULERA) 100-5 MCG/ACT inhaler INHALE 2 PUFFS INTO THE LUNGS TWICE DAILY 13 g 11    montelukast (SINGULAIR) 10 MG tablet TAKE 1 TABLET(10 MG) BY MOUTH EVERY EVENING 30 tablet 5    Multiple Vitamin (MULTIVITAMIN OR) Take one daily tab      ondansetron (ZOFRAN ODT) 4 MG ODT tab Take 1 tablet (4 mg) by mouth every 8 hours as needed for vomiting. 30 tablet 1    predniSONE (DELTASONE) 20 MG tablet Take 1 tablet (20 mg) by mouth daily 5 tablet 0    Semaglutide, 1 MG/DOSE, (OZEMPIC) 4 MG/3ML pen Inject 1 mg subcutaneously every 7 days. 12 mL 3    simvastatin (ZOCOR) 20 MG tablet Take 1 tablet (20 mg) by mouth at bedtime. 90 tablet 3    traZODone (DESYREL) 50 MG tablet Take 1-2 tablets by mouth nightly as needed for sleep. 180 tablet 0     No current facility-administered medications for this visit.     Social history is significant for being the sole caretaker of her demented mother.  Her mother is now living in transitional housing but Trena had moved into her home for several weeks earlier this year.  She realizes now she cannot handle the stress of caring for her mother and taking care of herself.  She is sleeping better now that she is at her home house.             75  as of 2/6/2025       BP: 115/71  as of 2/6/2025      Resp: 18  as of 11/15/2024      SpO2: 97%  as of 2/6/2025      Body Mass Index: None      Systolic (Patient Repo...: Not taken      Diastolic (Patient Rep...: Not taken      Height: 1.778 m (5' 10\")  as of 2/12/2025      Weight: 82.6 kg (182 lb)  as of 2/12/2025        On exam she is in no acute distress.    Recent Labs   Lab Test 03/12/25  1541 12/09/24  1422 08/23/24  1443 05/01/24  1102 02/26/24  1149 11/07/23  1143 07/07/23  1422 04/10/23  1406 03/31/23  1033 08/11/22  1129 05/10/22  1523 02/14/22  1004 11/18/21  1659 10/26/21  1556 " 06/18/19  0000 03/21/19  1359   A1C  --  7.0*  --   --  6.7*   < > 7.1*  --  6.5*   < >  --  8.1*  --   --    < >  --    HEMOGLOBINA1  --   --   --   --   --   --   --   --   --   --  7.5  --   --  7.3   < >  --    TSH  --   --   --   --  3.78  --  1.53  --  6.94*   < >  --  3.34  --   --    < > 0.19*   T4  --   --   --   --  1.10  --   --   --   --   --   --   --   --   --   --  1.09   LDL  --   --   --   --   --   --   --   --  107*  --   --  111*  --   --    < >  --    HDL  --   --   --   --   --   --   --   --  78  --   --  94  --   --    < >  --    TRIG  --   --   --   --   --   --   --   --  47  --   --  66  --   --    < >  --    CR 0.63 0.62   < > 0.61 0.60   < > 0.56  --  0.61   < >  --  0.60   < >  --    < >  --    MICROL  --   --   --  <12.0  --   --   --  <12.0  --   --   --  5  --   --    < >  --     < > = values in this interval not displayed.     Assessment and plan:    1.  Diabetes control.  Dali's CGM shows she is having a lot of hypoglycemia, particularly during the day.  Her pump and CGM are not consistently connected.  She was unaware that if the sensor is not being detected by the pump, the machine will go into manual mode and give her the basal rate that is programmed into the device.  When she saw Trena Viera last, they consider changing her target to 120 during the day.  I suggested she do that now and suggested the rate target should be 120 between 9 AM and 10 PM.  I am also concerned that the Tresiba she is taking is contributing to some of the hypoglycemia during the day.  I asked her to reduce it to 4 units.  I told her that doing both of these things may make her sugars go up a bit.  Hopefully the hybrid closed-loop pump will adjust for that.  I will schedule her to see Trena to review how she is doing in a couple of weeks.  She is tolerating the Ozempic very well and we will keep it at its current dose.  I will check her A1c    2.  Diabetes complications.  She is up-to-date with her  eye visits and has background retinopathy that is stable.  Her creatinine is normal.  I will repeat her urine albumin.  Her feet are fine today.    3.hypothyroidism.  She has been taking her T4.  I will check her TSH.    4.chest tightness.  She had similar symptoms in the past that ultimately led to her having a coronary angiogram in 2007.  That was unremarkable.  The symptoms disappeared and have only returned recently.  I recommended we consider doing a CT coronary angiogram.  If that was positive, we might need to go forward with a functional test and then referral to cardiology.  She is reluctant to undergo any of these tests at this time since she is not particularly worried about the symptoms.  We also talked about just having a CT calcification score obtained.  I think the CT angiogram is a better idea because given her longstanding diabetes she likely will have something other than a unremarkable calcification score.  She will think about these options and let me know how she is doing.    5.hyperlipidemia.  I will check her lipid panel.  She is taking her statin.  I note her blood pressure is normal.    Follow-up with me as scheduled.    I spent 25 minutes with the patient on the video visit (start time 9: 3 0 and end time 9: 5 5).  On the same day of visit I spent an additional 5 minutes reviewing her chart, ordering tests, and doing documentation.  This time was exclusive of the time I spent reviewing her continuous glucose monitor.  That interpretation is above.        Felicia Mckeon MD

## 2025-04-17 ENCOUNTER — VIRTUAL VISIT (OUTPATIENT)
Dept: ENDOCRINOLOGY | Facility: CLINIC | Age: 55
End: 2025-04-17
Payer: COMMERCIAL

## 2025-04-17 VITALS — HEIGHT: 70 IN | WEIGHT: 185 LBS | BODY MASS INDEX: 26.48 KG/M2

## 2025-04-17 DIAGNOSIS — E03.4 HYPOTHYROIDISM DUE TO ACQUIRED ATROPHY OF THYROID: ICD-10-CM

## 2025-04-17 DIAGNOSIS — E10.9 TYPE 1 DIABETES MELLITUS WITHOUT COMPLICATION (H): Primary | ICD-10-CM

## 2025-04-17 ASSESSMENT — PAIN SCALES - GENERAL: PAINLEVEL_OUTOF10: NO PAIN (0)

## 2025-04-17 NOTE — LETTER
4/17/2025       RE: Dali Martines  [address]  [facility] [address]     Dear Colleague,    Thank you for referring your patient, Dali Martines, to the [facility] ENDOCRINOLOGY [facility]INIC [place] at [facility]. Please see a copy of my visit note below.    Virtual Visit Details    Type of service:  Video Visit     Originating Location (pt. Location): Home    Distant Location (provider location):  Off-site  Platform used for Video Visit: High Fidelity    Outcome for 04/08/25 12:15 PM: Data uploaded on Dexcom and Glooko  Skylar Benoit MA  Outcome for 04/15/25 10:56 AM: Data obtained via Dexcom and Sensser website  Skylar Benoit MA    Change target to 120?    This 54 year old woman was returns for f/u of her type 1 diabetes. She also has hypothyroidism and psoriatic arthritis.  Pt was dx having type 1 diabetes at age 15. Her hx is also significant for mild NPDR right eye only,anxiety, hyperlipidemia, psoriatic arthritis,  and GERD.  She started using an omnipod 5 in 2024. Data are below.  She continues to take tresiba 8 units daily and Ozempic 1 mg most weeks.  She does occasionally miss a week.  When she goes back on the dose after missing it 1 week, she has no GI problems.  Overall she is tolerating the Ozempic well.  She does not have nausea.  She thinks it really helps with her blood sugars.  Her OmniPod/Dexcom download is below.  She was unaware that she has been having so many periods of time where the pump and CGM are not talking to each other.  She continues to have lows during the day even if she has not bolused.  She has not had any trouble recognizing her low blood sugars.  She has had no episodes of severe hypoglycemia or periods of time that she has lost because of severe hypoglycemia.  Her first meal of the day is between 11 and 1.  She will then have a second meal around 6 PM.  She is not really exercising very much.    She is seen  regularly by rheumatology and now takes methotrexate for her psoriatic arthritis.  Their impression is that things are generally well-controlled.  She still has some joint stiffness in the morning that is troubling to her    She is now seeing Elise Sutton for psychotherapy and thinks it has been very helpful.    She last saw her eye doctor in August 2024.  Her nonproliferative retinopathy was stable at that time.  She has the same paresthesias on one of her toes that she has had for years.  She has no new symptoms on her feet.  She denies having any foot ulcers.    She reports that she has the appearance of a sensation of chest tightness/burning below her collarbone and under her sternum intermittently.  It usually occurs when she is under stress or when her blood sugars are real are high.  It can last as long as 1/2-hour but usually is gone by 5 minutes.  She has no associated symptoms of shortness of breath, sweating, radiation of pain, palpitations.  A friend of hers recently had a heart attack and she is concerned it could be her heart.    She is taking her thyroid hormone and statin as detailed below she is intermittently been taking her aspirin.                                  Current Outpatient Medications   Medication Sig Dispense Refill     albuterol (PROAIR HFA/PROVENTIL HFA/VENTOLIN HFA) 108 (90 Base) MCG/ACT inhaler Inhale 2 puffs into the lungs every 4 hours as needed for shortness of breath / dyspnea or wheezing 1 Inhaler 11     aspirin 81 MG tablet Take 1 tablet by mouth daily.       blood glucose (ACCU-CHEK COLIN PLUS) test strip Test Blood Sugar 4 times daily or as directed 800 strip 3     blood glucose monitoring (ACCU-CHEK FASTCLIX) lancets Use to test blood sugar 8 times daily or as directed. 100 each 3     blood glucose monitoring (NO BRAND SPECIFIED) meter device kit Use to test blood sugar 8 times daily or as directed. Any covered brand, 1 kit 0     Calcium Carbonate-Vitamin D (CALCIUM + D  "PO) Take one daily       citalopram (CELEXA) 20 MG tablet Take 1.5 tablets (30 mg) by mouth daily 135 tablet 1     Continuous Blood Gluc  (DEXCOM G6 ) SUZANNE Use to read blood sugars as per 's instructions. 1 each 0     Continuous Glucose Sensor (DEXCOM G6 SENSOR) MISC Change every 10 days. 9 each 3     Continuous Glucose Transmitter (DEXCOM G6 TRANSMITTER) MISC Change every 3 months. 1 each 4     diclofenac (VOLTAREN) 1 % topical gel Apply 2 g topically 4 times daily 100 g 4     diclofenac (VOLTAREN) 1 % topical gel APPLY 2 GRAMS ONTO THE SKIN FOUR TIMES DAILY AS NEEDED FOR MODERATE PAIN 100 g 5     econazole nitrate 1 % cream Apply 0.5 inches topically daily.       fish oil-omega-3 fatty acids 1000 MG capsule Take one daily       fluocinonide (LIDEX) 0.05 % external solution Apply topically 2 times daily 60 mL 4     folic acid (FOLVITE) 1 MG tablet Take 1 tablet (1 mg) by mouth daily. 90 tablet 3     Insulin Aspart, w/Niacinamide, (FIASP PENFILL) 100 UNIT/ML SOCT 45 Units by Other route See Admin Instructions. Via insulin device. Approx 45 units daily as directed. 45 mL 3     Insulin Aspart, w/Niacinamide, (FIASP) 100 UNIT/ML SOLN Inject 50 Units as directed daily. For use in ambulatory insulin pump as directed.  Approximate daily dose is 50 units. 20 mL 11     insulin degludec (TRESIBA FLEXTOUCH) 100 UNIT/ML pen ADMINISTER 8 UNITS UNDER THE SKIN DAILY. 15 mL 1     Insulin Disposable Pump (OMNIPOD 5 G6 INTRO, GEN 5,) KIT 1 kit continuous 1 kit 0     Insulin Disposable Pump (OMNIPOD 5 INTRO, GEN 5,) KIT 1 kit continuously. 1 kit 0     Insulin Disposable Pump (OMNIPOD 5 PODS, GEN 5,) MISC 1 each every 3 days. 30 each 3     insulin pen needle (B-D U/F) 31G X 8 MM miscellaneous Use 5 pen needles daily 450 each 3     insulin syringe-needle U-100 (30G X 1/2\" 1 ML) 30G X 1/2\" 1 ML miscellaneous Use 1 syringes as directed to draw up insulin for pump. Per pharmacy request. . 100 each 4     " Ketoconazole (NIZORAL PO) Shampoo daily       levothyroxine (SYNTHROID/LEVOTHROID) 112 MCG tablet Take 1 tablet (112 mcg) by mouth daily 90 tablet 4     methotrexate 2.5 MG tablet Take 6 tablets (15 mg) by mouth every 7 days. Labs every 8 - 12 weeks for refills. 72 tablet 1     methylphenidate (METADATE CD) 20 MG CR capsule Take 20 mg by mouth daily       methylphenidate (RITALIN) 10 MG tablet TAKE UP TO 2 TABLETS BY MOUTH IN THE LATE AFTERNOON.       Minoxidil (ROGAINE EX) daily       mometasone-formoterol (DULERA) 100-5 MCG/ACT inhaler INHALE 2 PUFFS INTO THE LUNGS TWICE DAILY 13 g 11     montelukast (SINGULAIR) 10 MG tablet TAKE 1 TABLET(10 MG) BY MOUTH EVERY EVENING 30 tablet 5     Multiple Vitamin (MULTIVITAMIN OR) Take one daily tab       ondansetron (ZOFRAN ODT) 4 MG ODT tab Take 1 tablet (4 mg) by mouth every 8 hours as needed for vomiting. 30 tablet 1     predniSONE (DELTASONE) 20 MG tablet Take 1 tablet (20 mg) by mouth daily 5 tablet 0     Semaglutide, 1 MG/DOSE, (OZEMPIC) 4 MG/3ML pen Inject 1 mg subcutaneously every 7 days. 12 mL 3     simvastatin (ZOCOR) 20 MG tablet Take 1 tablet (20 mg) by mouth at bedtime. 90 tablet 3     traZODone (DESYREL) 50 MG tablet Take 1-2 tablets by mouth nightly as needed for sleep. 180 tablet 0     No current facility-administered medications for this visit.     Social history is significant for being the sole caretaker of her demented mother.  Her mother is now living in transitional housing but Trena had moved into her home for several weeks earlier this year.  She realizes now she cannot handle the stress of caring for her mother and taking care of herself.  She is sleeping better now that she is at her home house.             75  as of 2/6/2025       BP: 115/71  as of 2/6/2025      Resp: 18  as of 11/15/2024      SpO2: 97%  as of 2/6/2025      Body Mass Index: None      Systolic (Patient Repo...: Not taken      Diastolic (Patient Rep...: Not taken      Height: 1.778 m  "(5' 10\")  as of 2/12/2025      Weight: 82.6 kg (182 lb)  as of 2/12/2025        On exam she is in no acute distress.    Recent Labs   Lab Test 03/12/25  1541 12/09/24  1422 08/23/24  1443 05/01/24  1102 02/26/24  1149 11/07/23  1143 07/07/23  1422 04/10/23  1406 03/31/23  1033 08/11/22  1129 05/10/22  1523 02/14/22  1004 11/18/21  1659 10/26/21  1556 06/18/19  0000 03/21/19  1359   A1C  --  7.0*  --   --  6.7*   < > 7.1*  --  6.5*   < >  --  8.1*  --   --    < >  --    HEMOGLOBINA1  --   --   --   --   --   --   --   --   --   --  7.5  --   --  7.3   < >  --    TSH  --   --   --   --  3.78  --  1.53  --  6.94*   < >  --  3.34  --   --    < > 0.19*   T4  --   --   --   --  1.10  --   --   --   --   --   --   --   --   --   --  1.09   LDL  --   --   --   --   --   --   --   --  107*  --   --  111*  --   --    < >  --    HDL  --   --   --   --   --   --   --   --  78  --   --  94  --   --    < >  --    TRIG  --   --   --   --   --   --   --   --  47  --   --  66  --   --    < >  --    CR 0.63 0.62   < > 0.61 0.60   < > 0.56  --  0.61   < >  --  0.60   < >  --    < >  --    MICROL  --   --   --  <12.0  --   --   --  <12.0  --   --   --  5  --   --    < >  --     < > = values in this interval not displayed.     Assessment and plan:    1.  Diabetes control.  Dali's CGM shows she is having a lot of hypoglycemia, particularly during the day.  Her pump and CGM are not consistently connected.  She was unaware that if the sensor is not being detected by the pump, the machine will go into manual mode and give her the basal rate that is programmed into the device.  When she saw Trena Viera last, they consider changing her target to 120 during the day.  I suggested she do that now and suggested the rate target should be 120 between 9 AM and 10 PM.  I am also concerned that the Tresiba she is taking is contributing to some of the hypoglycemia during the day.  I asked her to reduce it to 4 units.  I told her that doing both of " these things may make her sugars go up a bit.  Hopefully the hybrid closed-loop pump will adjust for that.  I will schedule her to see Trena to review how she is doing in a couple of weeks.  She is tolerating the Ozempic very well and we will keep it at its current dose.  I will check her A1c    2.  Diabetes complications.  She is up-to-date with her eye visits and has background retinopathy that is stable.  Her creatinine is normal.  I will repeat her urine albumin.  Her feet are fine today.    3.hypothyroidism.  She has been taking her T4.  I will check her TSH.    4.chest tightness.  She had similar symptoms in the past that ultimately led to her having a coronary angiogram in 2007.  That was unremarkable.  The symptoms disappeared and have only returned recently.  I recommended we consider doing a CT coronary angiogram.  If that was positive, we might need to go forward with a functional test and then referral to cardiology.  She is reluctant to undergo any of these tests at this time since she is not particularly worried about the symptoms.  We also talked about just having a CT calcification score obtained.  I think the CT angiogram is a better idea because given her longstanding diabetes she likely will have something other than a unremarkable calcification score.  She will think about these options and let me know how she is doing.    5.hyperlipidemia.  I will check her lipid panel.  She is taking her statin.  I note her blood pressure is normal.    Follow-up with me as scheduled.    I spent 25 minutes with the patient on the video visit (start time 9: 3 0 and end time 9: 5 5).  On the same day of visit I spent an additional 5 minutes reviewing her chart, ordering tests, and doing documentation.  This time was exclusive of the time I spent reviewing her continuous glucose monitor.  That interpretation is above.        Felicia Mckeon MD          Again, thank you for allowing me to participate in the  care of your patient.      Sincerely,    Felicia Mckeon MD

## 2025-04-17 NOTE — PATIENT INSTRUCTIONS
Change target glucose to 120 between 9 am and 10 pm    Reduce tresiba to 4 units daily.    Have labs done.  Please contact us to schedule at any of our [facility] lab locations  Call [phone]), select option 1

## 2025-04-17 NOTE — NURSING NOTE
[place] [facility] [address] [facility] [address]    Is the patient currently in the state of [place]? YES    Visit mode: VIDEO    If the visit is dropped, the patient can be reconnected by:VIDEO VISIT: Send to e-mail at: [email or web address]    Will anyone else be joining the visit? NO  (If patient encounters technical issues they should call [phone]    Are changes needed to the allergy or medication list? No    Are refills needed on medications prescribed by this physician? NO    Rooming Documentation:  Unable to complete questionnaire(s) due to time    Reason for visit: RECHECK    Nadira TEJEDAF

## 2025-05-03 ENCOUNTER — LAB (OUTPATIENT)
Dept: LAB | Facility: CLINIC | Age: 55
End: 2025-05-03
Payer: COMMERCIAL

## 2025-05-03 DIAGNOSIS — E10.9 TYPE 1 DIABETES MELLITUS WITHOUT COMPLICATION (H): ICD-10-CM

## 2025-05-03 DIAGNOSIS — E03.4 HYPOTHYROIDISM DUE TO ACQUIRED ATROPHY OF THYROID: ICD-10-CM

## 2025-05-03 LAB
CHOLEST SERPL-MCNC: 196 MG/DL
CREAT UR-MCNC: 181 MG/DL
EST. AVERAGE GLUCOSE BLD GHB EST-MCNC: 154 MG/DL
FASTING STATUS PATIENT QL REPORTED: YES
HBA1C MFR BLD: 7 % (ref 0–5.6)
HDLC SERPL-MCNC: 88 MG/DL
LDLC SERPL CALC-MCNC: 99 MG/DL
MICROALBUMIN UR-MCNC: <12 MG/L
MICROALBUMIN/CREAT UR: NORMAL MG/G{CREAT}
NONHDLC SERPL-MCNC: 108 MG/DL
TRIGL SERPL-MCNC: 44 MG/DL
TSH SERPL DL<=0.005 MIU/L-ACNC: 3.24 UIU/ML (ref 0.3–4.2)

## 2025-05-03 PROCEDURE — 80061 LIPID PANEL: CPT

## 2025-05-03 PROCEDURE — 82570 ASSAY OF URINE CREATININE: CPT

## 2025-05-03 PROCEDURE — 83036 HEMOGLOBIN GLYCOSYLATED A1C: CPT

## 2025-05-03 PROCEDURE — 36415 COLL VENOUS BLD VENIPUNCTURE: CPT

## 2025-05-03 PROCEDURE — 84443 ASSAY THYROID STIM HORMONE: CPT

## 2025-05-03 PROCEDURE — 82043 UR ALBUMIN QUANTITATIVE: CPT

## 2025-05-12 ENCOUNTER — OFFICE VISIT (OUTPATIENT)
Dept: NEUROLOGY | Facility: CLINIC | Age: 55
End: 2025-05-12
Payer: COMMERCIAL

## 2025-05-12 VITALS — DIASTOLIC BLOOD PRESSURE: 61 MMHG | HEART RATE: 69 BPM | SYSTOLIC BLOOD PRESSURE: 104 MMHG

## 2025-05-12 DIAGNOSIS — H81.8X9 PERSISTENT POSTURAL-PERCEPTUAL DIZZINESS: Primary | ICD-10-CM

## 2025-05-12 DIAGNOSIS — R41.89 COGNITIVE CHANGES: ICD-10-CM

## 2025-05-12 DIAGNOSIS — E03.8 OTHER SPECIFIED HYPOTHYROIDISM: ICD-10-CM

## 2025-05-12 PROCEDURE — 3074F SYST BP LT 130 MM HG: CPT

## 2025-05-12 PROCEDURE — 99214 OFFICE O/P EST MOD 30 MIN: CPT | Mod: GC

## 2025-05-12 PROCEDURE — 3078F DIAST BP <80 MM HG: CPT
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
## 2025-05-12 NOTE — NURSING NOTE
Chief Complaint   Patient presents with    Follow Up     Return     Luna Carvalho MA on 5/12/2025 at 3:33 PM

## 2025-05-12 NOTE — PATIENT INSTRUCTIONS
For persistent postural perceptual dizziness, continue the exercises you have learned from vestibular therapy.     For cognitive concerns, we will plan for neuropsychological testing in October 2025. With these results, I will reach out with the results, if there are findings concerning for a neurodegenerative process, we will have you follow up in my clinic. If the neuropsychological testing does not show findings consistent with neurodegenerative process, we will have you continue working to manage anxiety and sleep, working with your mental health provider and primary care provider.

## 2025-05-13 RX ORDER — LEVOTHYROXINE SODIUM 112 UG/1
112 TABLET ORAL DAILY
Qty: 90 TABLET | Refills: 2 | Status: SHIPPED | OUTPATIENT
Start: 2025-05-13

## 2025-05-13 ASSESSMENT — SLEEP AND FATIGUE QUESTIONNAIRES
HOW LIKELY ARE YOU TO NOD OFF OR FALL ASLEEP WHILE SITTING INACTIVE IN A PUBLIC PLACE: SLIGHT CHANCE OF DOZING
HOW LIKELY ARE YOU TO NOD OFF OR FALL ASLEEP IN A CAR, WHILE STOPPED FOR A FEW MINUTES IN TRAFFIC: WOULD NEVER DOZE
HOW LIKELY ARE YOU TO NOD OFF OR FALL ASLEEP WHILE LYING DOWN TO REST IN THE AFTERNOON WHEN CIRCUMSTANCES PERMIT: HIGH CHANCE OF DOZING
HOW LIKELY ARE YOU TO NOD OFF OR FALL ASLEEP WHILE SITTING QUIETLY AFTER LUNCH WITHOUT ALCOHOL: SLIGHT CHANCE OF DOZING
HOW LIKELY ARE YOU TO NOD OFF OR FALL ASLEEP WHILE WATCHING TV: HIGH CHANCE OF DOZING
HOW LIKELY ARE YOU TO NOD OFF OR FALL ASLEEP WHILE SITTING AND READING: HIGH CHANCE OF DOZING
HOW LIKELY ARE YOU TO NOD OFF OR FALL ASLEEP WHEN YOU ARE A PASSENGER IN A CAR FOR AN HOUR WITHOUT A BREAK: SLIGHT CHANCE OF DOZING
HOW LIKELY ARE YOU TO NOD OFF OR FALL ASLEEP WHILE SITTING AND TALKING TO SOMEONE: WOULD NEVER DOZE

## 2025-05-13 NOTE — TELEPHONE ENCOUNTER
Last Written Prescription:  levothyroxine (SYNTHROID/LEVOTHROID) 112 MCG tablet 90 tablet 4 4/16/2024     ----------------------  Last Visit Date: 4/17/25  Future Visit Date: 7/15/25  ----------------------    Refill decision: Medication refilled per  Medication Refill in Ambulatory Care  policy.    Request from pharmacy:  Requested Prescriptions   Pending Prescriptions Disp Refills    levothyroxine (SYNTHROID/LEVOTHROID) 112 MCG tablet 90 tablet 4     Sig: Take 1 tablet (112 mcg) by mouth daily.       Thyroid Protocol Passed - 5/13/2025  4:47 PM        Passed - Patient is 12 years or older        Passed - Medication is active on med list and the sig matches. RN to manually verify dose and sig if red X/fail.     If the protocol passes (green check), you do not need to verify med dose and sig.    A prescription matches if they are the same clinical intention.    For Example: once daily and every morning are the same.    The protocol can not identify upper and lower case letters as matching and will fail.     For Example: Take 1 tablet (50 mg) by mouth daily     TAKE 1 TABLET (50 MG) BY MOUTH DAILY    For all fails (red x), verify dose and sig.    If the refill does match what is on file, the RN can still proceed to approve the refill request.       If they do not match, route to the appropriate provider.             Passed - Recent (12 mo) or future (90 days) visit within the authorizing provider's specialty     The patient must have completed an in-person or virtual visit within the past 12 months or has a future visit scheduled within the next 90 days with the authorizing provider s specialty.  Urgent care and e-visits do not qualify as an office visit for this protocol.          Passed - Medication indicated for associated diagnosis     Medication is associated with one or more of the following diagnoses:  Hypothyroidism  Thyroid stimulating hormone suppression therapy  Thyroid cancer  Acquired atrophy of  thyroid          Passed - Normal TSH on file in past 12 months     Recent Labs   Lab Test 05/03/25  1045   TSH 3.24              Passed - No active pregnancy on record     If patient is pregnant or has had a positive pregnancy test, please check TSH.          Passed - No positive pregnancy test in past 12 months     If patient is pregnant or has had a positive pregnancy test, please check TSH.

## 2025-05-16 ENCOUNTER — VIRTUAL VISIT (OUTPATIENT)
Dept: EDUCATION SERVICES | Facility: CLINIC | Age: 55
End: 2025-05-16
Attending: INTERNAL MEDICINE
Payer: COMMERCIAL

## 2025-05-16 ENCOUNTER — VIRTUAL VISIT (OUTPATIENT)
Dept: SLEEP MEDICINE | Facility: CLINIC | Age: 55
End: 2025-05-16
Payer: COMMERCIAL

## 2025-05-16 VITALS — DIASTOLIC BLOOD PRESSURE: 61 MMHG | BODY MASS INDEX: 26.4 KG/M2 | WEIGHT: 184 LBS | SYSTOLIC BLOOD PRESSURE: 104 MMHG

## 2025-05-16 DIAGNOSIS — F51.04 CHRONIC INSOMNIA: Primary | ICD-10-CM

## 2025-05-16 DIAGNOSIS — F43.9 STRESS: ICD-10-CM

## 2025-05-16 DIAGNOSIS — Z63.6 CAREGIVER STRESS: ICD-10-CM

## 2025-05-16 DIAGNOSIS — E10.9 TYPE 1 DIABETES MELLITUS WITHOUT COMPLICATION (H): ICD-10-CM

## 2025-05-16 PROCEDURE — 1126F AMNT PAIN NOTED NONE PRSNT: CPT | Mod: 95 | Performed by: PSYCHOLOGIST

## 2025-05-16 PROCEDURE — G0108 DIAB MANAGE TRN  PER INDIV: HCPCS | Mod: 95 | Performed by: REGISTERED NURSE

## 2025-05-16 PROCEDURE — 90834 PSYTX W PT 45 MINUTES: CPT | Mod: 95 | Performed by: PSYCHOLOGIST

## 2025-05-16 PROCEDURE — 1126F AMNT PAIN NOTED NONE PRSNT: CPT | Mod: 95 | Performed by: REGISTERED NURSE

## 2025-05-16 PROCEDURE — 3078F DIAST BP <80 MM HG: CPT | Mod: 95 | Performed by: PSYCHOLOGIST

## 2025-05-16 PROCEDURE — 3074F SYST BP LT 130 MM HG: CPT | Mod: 95 | Performed by: PSYCHOLOGIST

## 2025-05-16 SDOH — SOCIAL STABILITY - SOCIAL INSECURITY: DEPENDENT RELATIVE NEEDING CARE AT HOME: Z63.6

## 2025-05-16 ASSESSMENT — PAIN SCALES - GENERAL: PAINLEVEL_OUTOF10: NO PAIN (0)

## 2025-05-16 NOTE — NURSING NOTE
Current patient location: [address] [facility] [address]    Is the patient currently in the state of [place]? YES    Visit mode: VIDEO    If the visit is dropped, the patient can be reconnected by:VIDEO VISIT: Send to e-mail at: [email or web address]    Will anyone else be joining the visit? NO  (If patient encounters technical issues they should call [phone])    Are changes needed to the allergy or medication list? No    Are refills needed on medications prescribed by this physician? NO    Rooming Documentation:  Not applicable    Reason for visit: Diabetes Education    Nadira CORRIGAN

## 2025-05-16 NOTE — NURSING NOTE
Current patient location: [address] [facility] [address]    Is the patient currently in the state of [place]? YES    Visit mode: VIDEO    If the visit is dropped, the patient can be reconnected by:VIDEO VISIT: Text to cell phone:   Telephone Information:   Mobile [phone]       Will anyone else be joining the visit? NO  (If patient encounters technical issues they should call [phone]    Are changes needed to the allergy or medication list? No    Are refills needed on medications prescribed by this physician? NO    Rooming Documentation:  Questionnaire(s) completed    Reason for visit: EWA Uribe VVF

## 2025-05-16 NOTE — PROGRESS NOTES
Virtual Visit Details    Type of service:  Video Visit     Originating Location (pt. Location): Home    Distant Location (provider location):  Off-site  Platform used for Video Visit: AmWell                        Visit Start Time: 4:06 PM  Visit End Time:4:50 PM       SLEEP MEDICINE VIRTUAL FOLLOW-UP VISIT  Behavioral Sleep Medicine    Patient Name: Dali Martines  MRN:  [id number]  Date of Service: May 16, 2025       Subjective Report     Dali Martines  returns for a telehealth video visit to discuss progress in implementing behavioral strategies for the management of insomnia and stress affecting sleep and health.  Patient consent for initiation of video visit was obtained and documented prior to initiation of visit.     Dali reports she has experienced a reduction in overall stress due to limit setting and less burden for care of her mother with placement in a TCU.  She reports that there is a correlation between an improvement in her sleep and reduction of overall stress.  Focus of the session was on management of stress and its implication for sleep..          Sleep Data:     Source of Sleep Estimates:  Verbal Self-report    Average Time in Bed: 7-8 hrs.  Average Total Sleep Time: 6.7 hrs  Sleep Efficiency estimate: Estimated to be approximately 85%    EPWORTH SLEEPINESS SCALE    Sitting and reading  3   Watching TV  3   Sitting, inactive in a public place (theatre or mtg.)  1    As a passenger in a car  1   Lying down to rest in the afternoon when circumstance permit  3   Sitting and talking to someone  0   Sitting quietly after lunch without alcohol  1   In a car, while stopped for a few minutes in traffic  0   TOTAL SCORE (nl <11)  12     INSOMNIA SEVERITY INDEX     Difficulty falling asleep  2   Difficult staying asleep  2   Problems waking up to early  3   How SATISFIED/DISSATISFIED are you with your CURRENT sleep pattern?  3   How NOTICEABLE to others do you think your sleep pattern is in terms of  your quality of life?  2   How WORRIED/DISTRESSED are you about your current sleep pattern?  3   To what extent do you consider your sleep problem to INTERFERE with your daily fuctioning(e.g. daytime fatigue, mood, ability to function at work/daily chores, concentration, mood,etc.) CURRENTLY?  3   INSOMNIA SEVERITY INDEX TOTAL SCORE  18    Absence of insomnia (0-7); sub-threshold insomnia (8-14); moderate insomnia (15-21); and severe insomnia (22-28)       Interventions     Strategies and recommendations including Stimulus control therapy and Stress management were reviewed and discussed today.     Services provided are compliant with the requirements of [place] Statute SS 256B.0625 Subd. 3b and paragraph (b)       Vital Signs     LMP  (LMP Unknown)      Mental Status     Orientation:  X3  Mood:  normal  Affect:  Congruent with mood  Speech/Language:  Normal  Thought Process: Intact  Associations:  Normal  Thought Content: Normal  Patient does not report any suicidal ideation, intention or plan.    Diagnostic Impressions and Plan        Chronic insomnia  Stress  Caregiver stress    At follow-up, patient reports improvement in overall stress levels and which has correlated to improvement in sleep quality.    Plan:  Continue current sleep schedule and plan    Follow-up: 3 months      Philip Quiroz PsyD, GARY, DBSM  Diplomate, Behavioral Sleep Medicine  [facility]      Note: This dictation was created using voice recognition software. This document may contain an error not identified before finalizing the document. If the error changes the accuracy of the document, I would appreciate it being brought to my attention.

## 2025-05-16 NOTE — PROGRESS NOTES
Diabetes Self-Management Education & Support Visit- Short    Dali Martines presented today for education related to Type 1 diabetes    Patient is being treated with:  Omnipod 5 Insulin Pump    Year of diagnosis: 1988  Referring provider:  Felicia Mckeon MD  Living Situation: Lives alone and has a dog.  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  Currently not employed    Purpose of today's visit:  Ongoing DSME and insulin pump management.      Subjective/Objective:  Feeling pretty good.  Her mother has been in TCU since falling and breaking her 2nd hip in January, and Dali is feeling much less stress.  Care giving for her mother was taking a toll on her own health, she stated.  She is looking for long term care options for her mother.  Dali states that her own level of anxiety has decreased and she is sleeping better at night.      Using the Omnipod 5 insulin pump and the Dexcom G6 sensor.  She's happy with the G6 and does not want to switch to the Dexcom G7 sensor at this time.  Her insulin pump report appears below:                 Assessment/Plan:  Dali still working on pre-bolusing, not always successful, and has noticed that her glucoses remain high for several hours when she eats foods that are high in fat and carb.  She has tried using the extended bolus feature which she says works fairly well to cover those foods, even though she has to take her pump out of automated mode to do it.      She has had a couple of post-pranadial lows, usually occurring if she is bolusing late (after she starts eating) and includes her glucose number in the calculation.  Reviewed guidelines, and encouraged her to continue to pre-bolus as often as she can.      The only pump adjustment I made today was to change her manual basal rate to 0.55 which matches her basal delivery in automated mode.  Considered intensifying her ICR and relaxing her ISF, but will give it another  month to see if her efforts to pre-bolus make a difference.      She is taking 6 units of Tresiba in addition to the insulin delivered by the pump.      Follow-up:    July 31 follow up appointment.       Time spent in this visit: 30 minutes     Any diabetes medication dose changes were made via the CDE Protocol and Collaborative Practice Agreement. A copy of this encounter was provided to the patient's referring provider.   Answers submitted by the patient for this visit:  Questionnaire about: Diabetes problem (Submitted on 5/13/2025)  Chief Complaint: Diabetes problem

## 2025-05-17 DIAGNOSIS — J45.40 MODERATE PERSISTENT ASTHMA, UNSPECIFIED WHETHER COMPLICATED: ICD-10-CM

## 2025-05-19 RX ORDER — MONTELUKAST SODIUM 10 MG/1
10 TABLET ORAL EVERY EVENING
Qty: 30 TABLET | Refills: 5 | Status: SHIPPED | OUTPATIENT
Start: 2025-05-19

## 2025-06-30 ENCOUNTER — TELEPHONE (OUTPATIENT)
Dept: PULMONOLOGY | Facility: CLINIC | Age: 55
End: 2025-06-30
Payer: COMMERCIAL

## 2025-06-30 NOTE — TELEPHONE ENCOUNTER
Patient Contacted for the patient to call back and schedule the following:    Appointment type: RTA  Provider: JUDE  Return date: DEC 2025  Specialty phone number: [phone]  Additional appointment(s) needed: TAYO FLOW/ FENO  Additonal Notes: NA

## 2025-07-01 ENCOUNTER — TELEPHONE (OUTPATIENT)
Dept: RHEUMATOLOGY | Facility: CLINIC | Age: 55
End: 2025-07-01
Payer: COMMERCIAL

## 2025-07-01 NOTE — TELEPHONE ENCOUNTER
Called pt to r/s 8/8/25 appt with Dr. Velásquez. Pt was driving on her way to another appt and advised will need to callback. Sent her a [facility] msg with callback number.   On callback, please schedule as a RETURN RHEUM (30 mins) but okay to schedule in a new pt timeslot if needed.   Tanesha Mahoney RN on 7/1/2025 at 11:37 AM

## 2025-07-01 NOTE — PROGRESS NOTES
Outcome for 07/01/25 2:59 PM: Data uploaded on Dexcom and [employer]  Diego Cisneros MA  Outcome for 07/11/25 11:08 AM: Data obtained via Dexcom and [employer] website  Diego Cisneros MA    Push carb ratio during day?    This 54 year old woman was returns for f/u of her type 1 diabetes.  She is comanaged with Marissa Sebastian.  She also has hypothyroidism and psoriatic arthritis.  Pt was dx having type 1 diabetes at age 15. Her hx is also significant for mild NPDR right eye only,anxiety, hyperlipidemia, psoriatic arthritis,  and GERD.  She started using an omnipod 5 in 2024. Data are below.  She continues to take tresiba 4 units daily and Ozempic 1 mg each weeks..  Overall she is tolerating the Ozempic well.  She thinks it really helps with her blood sugars.  She does not have nausea.  She lately has had a return of the heartburn symptoms that she has had in the past.  This happens late in the day and does not seem to be related to the Ozempic in her opinion.  .  Her OmniPod/Dexcom download is below.  She says since she decreased the Tresiba, the lows she was having overnight are gone.  She does note that her sugars are often high after meals during the daytime.  She usually eats a late breakfast at about 11:00 and then a dinner somewhere between 630 and 830.  The time she has trouble with her sugars is in the late morning and afternoon.  She has no trouble recognizing her hypoglycemia.    She is seen regularly by rheumatology and now takes methotrexate for her psoriatic arthriti.  She still has some joint stiffness in the morning that is troubling to her.  She will see a rheumatologist next month in hopes that they can change the methotrexate dose    She is now seeing Elise Sutton for psychotherapy and thinks it has been very helpful.    She last saw her eye doctor in August 2024.  Her nonproliferative retinopathy was stable at that time.  She has a new appointment next month.  She has the same paresthesias on one of her toes that she  has had for years.  She has no new symptoms on her feet.  She denies having any foot ulcers.    After our last visit, she began to take her baby aspirin regularly.  Since doing so, the intermittent sensation of chest tightness/burning below her collarbone and under her sternum that she reported last visit is much better.  She no longer finds it a problem and is not interested in undergoing any cardiac evaluation.  Her asthma symptoms have been exacerbated by the smoky air but she manages that by staying inside.    She is taking her thyroid hormone and statin as detailed below .    Social history is significant for managing her mother's affairs.  She has dementia and is now living in the nursing home.                            Current Outpatient Medications   Medication Sig Dispense Refill    albuterol (PROAIR HFA/PROVENTIL HFA/VENTOLIN HFA) 108 (90 Base) MCG/ACT inhaler Inhale 2 puffs into the lungs every 4 hours as needed for shortness of breath / dyspnea or wheezing 1 Inhaler 11    aspirin 81 MG tablet Take 1 tablet by mouth daily.      blood glucose (ACCU-CHEK COLIN PLUS) test strip Test Blood Sugar 4 times daily or as directed 800 strip 3    blood glucose monitoring (ACCU-CHEK FASTCLIX) lancets Use to test blood sugar 8 times daily or as directed. 100 each 3    blood glucose monitoring (NO BRAND SPECIFIED) meter device kit Use to test blood sugar 8 times daily or as directed. Any covered brand, 1 kit 0    Calcium Carbonate-Vitamin D (CALCIUM + D PO) Take one daily      citalopram (CELEXA) 20 MG tablet Take 1.5 tablets (30 mg) by mouth daily 135 tablet 1    Continuous Blood Gluc  (DEXCOM G6 ) SUZANNE Use to read blood sugars as per 's instructions. 1 each 0    Continuous Glucose Sensor (DEXCOM G6 SENSOR) MISC Change every 10 days. 9 each 3    Continuous Glucose Transmitter (DEXCOM G6 TRANSMITTER) MISC Change every 3 months. 1 each 4    diclofenac (VOLTAREN) 1 % topical gel Apply 2 g  "topically 4 times daily 100 g 4    diclofenac (VOLTAREN) 1 % topical gel APPLY 2 GRAMS ONTO THE SKIN FOUR TIMES DAILY AS NEEDED FOR MODERATE PAIN 100 g 5    econazole nitrate 1 % cream Apply 0.5 inches topically daily.      fish oil-omega-3 fatty acids 1000 MG capsule Take one daily      fluocinonide (LIDEX) 0.05 % external solution Apply topically 2 times daily 60 mL 4    folic acid (FOLVITE) 1 MG tablet Take 1 tablet (1 mg) by mouth daily. 90 tablet 3    Insulin Aspart, w/Niacinamide, (FIASP PENFILL) 100 UNIT/ML SOCT 45 Units by Other route See Admin Instructions. Via insulin device. Approx 45 units daily as directed. 45 mL 3    Insulin Aspart, w/Niacinamide, (FIASP) 100 UNIT/ML SOLN Inject 50 Units as directed daily. For use in ambulatory insulin pump as directed.  Approximate daily dose is 50 units. 20 mL 11    insulin degludec (TRESIBA FLEXTOUCH) 100 UNIT/ML pen ADMINISTER 8 UNITS UNDER THE SKIN DAILY. 15 mL 1    Insulin Disposable Pump (OMNIPOD 5 G6 INTRO, GEN 5,) KIT 1 kit continuous 1 kit 0    Insulin Disposable Pump (OMNIPOD 5 INTRO, GEN 5,) KIT 1 kit continuously. 1 kit 0    Insulin Disposable Pump (OMNIPOD 5 PODS, GEN 5,) MISC 1 each every 3 days. 30 each 3    insulin pen needle (B-D U/F) 31G X 8 MM miscellaneous Use 5 pen needles daily 450 each 3    insulin syringe-needle U-100 (30G X 1/2\" 1 ML) 30G X 1/2\" 1 ML miscellaneous Use 1 syringes as directed to draw up insulin for pump. Per pharmacy request. . 100 each 4    Ketoconazole (NIZORAL PO) Shampoo daily      levothyroxine (SYNTHROID/LEVOTHROID) 112 MCG tablet Take 1 tablet (112 mcg) by mouth daily. 90 tablet 2    methotrexate 2.5 MG tablet Take 6 tablets (15 mg) by mouth every 7 days. Labs every 8 - 12 weeks for refills. 72 tablet 1    methylphenidate (METADATE CD) 20 MG CR capsule Take 20 mg by mouth daily      methylphenidate (RITALIN) 10 MG tablet TAKE UP TO 2 TABLETS BY MOUTH IN THE LATE AFTERNOON.      Minoxidil (ROGAINE EX) daily      " "mometasone-formoterol (DULERA) 100-5 MCG/ACT inhaler INHALE 2 PUFFS INTO THE LUNGS TWICE DAILY 13 g 11    montelukast (SINGULAIR) 10 MG tablet TAKE 1 TABLET(10 MG) BY MOUTH EVERY EVENING 30 tablet 5    Multiple Vitamin (MULTIVITAMIN OR) Take one daily tab      ondansetron (ZOFRAN ODT) 4 MG ODT tab Take 1 tablet (4 mg) by mouth every 8 hours as needed for vomiting. 30 tablet 1    predniSONE (DELTASONE) 20 MG tablet Take 1 tablet (20 mg) by mouth daily 5 tablet 0    Semaglutide, 1 MG/DOSE, (OZEMPIC) 4 MG/3ML pen Inject 1 mg subcutaneously every 7 days. 12 mL 3    simvastatin (ZOCOR) 20 MG tablet Take 1 tablet (20 mg) by mouth at bedtime. 90 tablet 3    traZODone (DESYREL) 50 MG tablet Take 1-2 tablets by mouth nightly as needed for sleep. 180 tablet 0     No current facility-administered medications for this visit.           98.7  F (37.1  C)  as of 7/19/2024       Pulse: 69  as of 5/12/2025      BP: 104/61   as of 5/16/2025      Resp: 18  as of 11/15/2024      SpO2: 97%  as of 2/6/2025      Body Mass Index: None      Systolic (Patient Repo...: Not taken      Diastolic (Patient Rep...: Not taken      Height: 1.778 m (5' 10\")  as of 4/17/2025      Weight: 83.5 kg (184 lb)  as of 5/16/2025      Body Surface Area: 2.03 m   1.778 m (5' 10\")  as of 4/17/2025  83.5 kg (184 lb)  as of 5/16/2025        On exam she is in no acute distress      Recent Labs   Lab Test 06/20/25  1418 05/03/25  1053 05/03/25  1045 03/12/25  1541 12/09/24  1422 08/23/24  1443 05/01/24  1102 02/26/24  1149 04/10/23  1406 03/31/23  1033 08/11/22  1129 05/10/22  1523 11/18/21  1659 10/26/21  1556 06/18/19  0000 03/21/19  1359   A1C  --   --  7.0*  --  7.0*  --   --  6.7*   < > 6.5*   < >  --    < >  --    < >  --    HEMOGLOBINA1  --   --   --   --   --   --   --   --   --   --   --  7.5  --  7.3   < >  --    TSH  --   --  3.24  --   --   --   --  3.78   < > 6.94*  --   --    < >  --    < > 0.19*   T4  --   --   --   --   --   --   --  1.10  --   --   " --   --   --   --   --  1.09   LDL  --   --  99  --   --   --   --   --   --  107*  --   --    < >  --    < >  --    HDL  --   --  88  --   --   --   --   --   --  78  --   --    < >  --    < >  --    TRIG  --   --  44  --   --   --   --   --   --  47  --   --    < >  --    < >  --    CR 0.60  --   --  0.63 0.62   < > 0.61 0.60   < > 0.61   < >  --    < >  --    < >  --    MICROL  --  <12.0  --   --   --   --  <12.0  --    < >  --   --   --    < >  --    < >  --     < > = values in this interval not displayed.     Assessment and plan:    1.  Diabetes control.  Overall she is doing very well on her pump with a little bit of Tresiba as background insulin and Ozempic.  It does look as if her carb ratio during the day is insufficient.  I suggested she change it to 1 unit for 12 g between 10 AM and 6 PM.  She agreed with making that change.    2.  Diabetes complications.  She is due to see her eye doctor.  She has no new foot concerns.  Her kidney function is normal.    3.hypothyroidism.  She is on a stable dose of T4.  Her TSH is at target.    4.hyperlipidemia.  She is taking her statin.  Her LDL is less than 100.    I will have her see Marissa Sebastian in follow-up in about 4 months and me in follow-up in about 8 months.    I spent 18 minutes with the patient on the video visit today (start time 3: 3 0 and end time 3: 4 8).  The visit was done at my office away from clinic.  On the same day of visit I spent an additional 7 minutes reviewing her chart, reviewing and interpreting her lab data, and doing documentation.  This time was exclusive of the time I spent reviewing her CGM and pump data.    Felicia Mckeon MD    .

## 2025-07-10 ENCOUNTER — TRANSFERRED RECORDS (OUTPATIENT)
Dept: HEALTH INFORMATION MANAGEMENT | Facility: CLINIC | Age: 55
End: 2025-07-10
Payer: COMMERCIAL

## 2025-07-15 ENCOUNTER — VIRTUAL VISIT (OUTPATIENT)
Dept: ENDOCRINOLOGY | Facility: CLINIC | Age: 55
End: 2025-07-15
Payer: COMMERCIAL

## 2025-07-15 DIAGNOSIS — E10.9 TYPE 1 DIABETES MELLITUS WITHOUT COMPLICATION (H): Primary | ICD-10-CM

## 2025-07-15 PROCEDURE — 98005 SYNCH AUDIO-VIDEO EST LOW 20: CPT | Mod: 25 | Performed by: INTERNAL MEDICINE

## 2025-07-15 PROCEDURE — 1126F AMNT PAIN NOTED NONE PRSNT: CPT | Mod: 95 | Performed by: INTERNAL MEDICINE

## 2025-07-15 PROCEDURE — 95251 CONT GLUC MNTR ANALYSIS I&R: CPT | Performed by: INTERNAL MEDICINE

## 2025-07-15 NOTE — LETTER
7/15/2025       RE: Dali Martines  [address]  [facility] [address]     Dear Colleague,    Thank you for referring your patient, Dali Martines, to the [facility] [place] [facility]. Please see a copy of my visit note below.    Outcome for 07/01/25 2:59 PM: Data uploaded on Dex[employer] and [employer]  Diego Cisneros MA  Outcome for 07/11/25 11:08 AM: Data obtained via Dexcom and [employer] website  Diego Cisneros MA    Push carb ratio during day?    This 54 year old woman was returns for f/u of her type 1 diabetes.  She is comanaged with Marissa eSbastian.  She also has hypothyroidism and psoriatic arthritis.  Pt was dx having type 1 diabetes at age 15. Her hx is also significant for mild NPDR right eye only,anxiety, hyperlipidemia, psoriatic arthritis,  and GERD.  She started using an omnipod 5 in 2024. Data are below.  She continues to take tresiba 4 units daily and Ozempic 1 mg each weeks..  Overall she is tolerating the Ozempic well.  She thinks it really helps with her blood sugars.  She does not have nausea.  She lately has had a return of the heartburn symptoms that she has had in the past.  This happens late in the day and does not seem to be related to the Ozempic in her opinion.  .  Her OmniPod/Dexcom download is below.  She says since she decreased the Tresiba, the lows she was having overnight are gone.  She does note that her sugars are often high after meals during the daytime.  She usually eats a late breakfast at about 11:00 and then a dinner somewhere between 630 and 830.  The time she has trouble with her sugars is in the late morning and afternoon.  She has no trouble recognizing her hypoglycemia.    She is seen regularly by rheumatology and now takes methotrexate for her psoriatic arthriti.  She still has some joint stiffness in the morning that is troubling to her.  She will see a rheumatologist next month in hopes that they can  change the methotrexate dose    She is now seeing Elise Sutton for psychotherapy and thinks it has been very helpful.    She last saw her eye doctor in August 2024.  Her nonproliferative retinopathy was stable at that time.  She has a new appointment next month.  She has the same paresthesias on one of her toes that she has had for years.  She has no new symptoms on her feet.  She denies having any foot ulcers.    After our last visit, she began to take her baby aspirin regularly.  Since doing so, the intermittent sensation of chest tightness/burning below her collarbone and under her sternum that she reported last visit is much better.  She no longer finds it a problem and is not interested in undergoing any cardiac evaluation.  Her asthma symptoms have been exacerbated by the smoky air but she manages that by staying inside.    She is taking her thyroid hormone and statin as detailed below .    Social history is significant for managing her mother's affairs.  She has dementia and is now living in the nursing home.                            Current Outpatient Medications   Medication Sig Dispense Refill     albuterol (PROAIR HFA/PROVENTIL HFA/VENTOLIN HFA) 108 (90 Base) MCG/ACT inhaler Inhale 2 puffs into the lungs every 4 hours as needed for shortness of breath / dyspnea or wheezing 1 Inhaler 11     aspirin 81 MG tablet Take 1 tablet by mouth daily.       blood glucose (ACCU-CHEK COLIN PLUS) test strip Test Blood Sugar 4 times daily or as directed 800 strip 3     blood glucose monitoring (ACCU-CHEK FASTCLIX) lancets Use to test blood sugar 8 times daily or as directed. 100 each 3     blood glucose monitoring (NO BRAND SPECIFIED) meter device kit Use to test blood sugar 8 times daily or as directed. Any covered brand, 1 kit 0     Calcium Carbonate-Vitamin D (CALCIUM + D PO) Take one daily       citalopram (CELEXA) 20 MG tablet Take 1.5 tablets (30 mg) by mouth daily 135 tablet 1     Continuous Blood Gluc   "(DEXCOM G6 ) SUZANNE Use to read blood sugars as per 's instructions. 1 each 0     Continuous Glucose Sensor (DEXCOM G6 SENSOR) MISC Change every 10 days. 9 each 3     Continuous Glucose Transmitter (DEXCOM G6 TRANSMITTER) MISC Change every 3 months. 1 each 4     diclofenac (VOLTAREN) 1 % topical gel Apply 2 g topically 4 times daily 100 g 4     diclofenac (VOLTAREN) 1 % topical gel APPLY 2 GRAMS ONTO THE SKIN FOUR TIMES DAILY AS NEEDED FOR MODERATE PAIN 100 g 5     econazole nitrate 1 % cream Apply 0.5 inches topically daily.       fish oil-omega-3 fatty acids 1000 MG capsule Take one daily       fluocinonide (LIDEX) 0.05 % external solution Apply topically 2 times daily 60 mL 4     folic acid (FOLVITE) 1 MG tablet Take 1 tablet (1 mg) by mouth daily. 90 tablet 3     Insulin Aspart, w/Niacinamide, (FIASP PENFILL) 100 UNIT/ML SOCT 45 Units by Other route See Admin Instructions. Via insulin device. Approx 45 units daily as directed. 45 mL 3     Insulin Aspart, w/Niacinamide, (FIASP) 100 UNIT/ML SOLN Inject 50 Units as directed daily. For use in ambulatory insulin pump as directed.  Approximate daily dose is 50 units. 20 mL 11     insulin degludec (TRESIBA FLEXTOUCH) 100 UNIT/ML pen ADMINISTER 8 UNITS UNDER THE SKIN DAILY. 15 mL 1     Insulin Disposable Pump (OMNIPOD 5 G6 INTRO, GEN 5,) KIT 1 kit continuous 1 kit 0     Insulin Disposable Pump (OMNIPOD 5 INTRO, GEN 5,) KIT 1 kit continuously. 1 kit 0     Insulin Disposable Pump (OMNIPOD 5 PODS, GEN 5,) MISC 1 each every 3 days. 30 each 3     insulin pen needle (B-D U/F) 31G X 8 MM miscellaneous Use 5 pen needles daily 450 each 3     insulin syringe-needle U-100 (30G X 1/2\" 1 ML) 30G X 1/2\" 1 ML miscellaneous Use 1 syringes as directed to draw up insulin for pump. Per pharmacy request. . 100 each 4     Ketoconazole (NIZORAL PO) Shampoo daily       levothyroxine (SYNTHROID/LEVOTHROID) 112 MCG tablet Take 1 tablet (112 mcg) by mouth daily. 90 tablet 2     " "methotrexate 2.5 MG tablet Take 6 tablets (15 mg) by mouth every 7 days. Labs every 8 - 12 weeks for refills. 72 tablet 1     methylphenidate (METADATE CD) 20 MG CR capsule Take 20 mg by mouth daily       methylphenidate (RITALIN) 10 MG tablet TAKE UP TO 2 TABLETS BY MOUTH IN THE LATE AFTERNOON.       Minoxidil (ROGAINE EX) daily       mometasone-formoterol (DULERA) 100-5 MCG/ACT inhaler INHALE 2 PUFFS INTO THE LUNGS TWICE DAILY 13 g 11     montelukast (SINGULAIR) 10 MG tablet TAKE 1 TABLET(10 MG) BY MOUTH EVERY EVENING 30 tablet 5     Multiple Vitamin (MULTIVITAMIN OR) Take one daily tab       ondansetron (ZOFRAN ODT) 4 MG ODT tab Take 1 tablet (4 mg) by mouth every 8 hours as needed for vomiting. 30 tablet 1     predniSONE (DELTASONE) 20 MG tablet Take 1 tablet (20 mg) by mouth daily 5 tablet 0     Semaglutide, 1 MG/DOSE, (OZEMPIC) 4 MG/3ML pen Inject 1 mg subcutaneously every 7 days. 12 mL 3     simvastatin (ZOCOR) 20 MG tablet Take 1 tablet (20 mg) by mouth at bedtime. 90 tablet 3     traZODone (DESYREL) 50 MG tablet Take 1-2 tablets by mouth nightly as needed for sleep. 180 tablet 0     No current facility-administered medications for this visit.           98.7  F (37.1  C)  as of 7/19/2024       Pulse: 69  as of 5/12/2025      BP: 104/61   as of 5/16/2025      Resp: 18  as of 11/15/2024      SpO2: 97%  as of 2/6/2025      Body Mass Index: None      Systolic (Patient Repo...: Not taken      Diastolic (Patient Rep...: Not taken      Height: 1.778 m (5' 10\")  as of 4/17/2025      Weight: 83.5 kg (184 lb)  as of 5/16/2025      Body Surface Area: 2.03 m   1.778 m (5' 10\")  as of 4/17/2025  83.5 kg (184 lb)  as of 5/16/2025        On exam she is in no acute distress      Recent Labs   Lab Test 06/20/25  1418 05/03/25  1053 05/03/25  1045 03/12/25  1541 12/09/24  1422 08/23/24  1443 05/01/24  1102 02/26/24  1149 04/10/23  1406 03/31/23  1033 08/11/22  1129 05/10/22  1523 11/18/21  1659 10/26/21  1556 06/18/19  0000 " 03/21/19  1359   A1C  --   --  7.0*  --  7.0*  --   --  6.7*   < > 6.5*   < >  --    < >  --    < >  --    HEMOGLOBINA1  --   --   --   --   --   --   --   --   --   --   --  7.5  --  7.3   < >  --    TSH  --   --  3.24  --   --   --   --  3.78   < > 6.94*  --   --    < >  --    < > 0.19*   T4  --   --   --   --   --   --   --  1.10  --   --   --   --   --   --   --  1.09   LDL  --   --  99  --   --   --   --   --   --  107*  --   --    < >  --    < >  --    HDL  --   --  88  --   --   --   --   --   --  78  --   --    < >  --    < >  --    TRIG  --   --  44  --   --   --   --   --   --  47  --   --    < >  --    < >  --    CR 0.60  --   --  0.63 0.62   < > 0.61 0.60   < > 0.61   < >  --    < >  --    < >  --    MICROL  --  <12.0  --   --   --   --  <12.0  --    < >  --   --   --    < >  --    < >  --     < > = values in this interval not displayed.     Assessment and plan:    1.  Diabetes control.  Overall she is doing very well on her pump with a little bit of Tresiba as background insulin and Ozempic.  It does look as if her carb ratio during the day is insufficient.  I suggested she change it to 1 unit for 12 g between 10 AM and 6 PM.  She agreed with making that change.    2.  Diabetes complications.  She is due to see her eye doctor.  She has no new foot concerns.  Her kidney function is normal.    3.hypothyroidism.  She is on a stable dose of T4.  Her TSH is at target.    4.hyperlipidemia.  She is taking her statin.  Her LDL is less than 100.    I will have her see Marissa Sebastian in follow-up in about 4 months and me in follow-up in about 8 months.    I spent 18 minutes with the patient on the video visit today (start time 3: 3 0 and end time 3: 4 8).  The visit was done at my office away from clinic.  On the same day of visit I spent an additional 7 minutes reviewing her chart, reviewing and interpreting her lab data, and doing documentation.  This time was exclusive of the time I spent reviewing her CGM and  pump data.    Felicia Mckeon MD    .      Again, thank you for allowing me to participate in the care of your patient.      Sincerely,    Felicia Mckeon MD

## 2025-07-15 NOTE — NURSING NOTE
Current patient location: [place]    Is the patient currently in the state of [place]? YES    Visit mode: VIDEO    If the visit is dropped, the patient can be reconnected by:VIDEO VISIT: Text to cell phone:   Telephone Information:   Mobile [phone]       Will anyone else be joining the visit? NO  (If patient encounters technical issues they should call [phone])    Are changes needed to the allergy or medication list? No    Are refills needed on medications prescribed by this physician? NO    Rooming Documentation:  Questionnaire(s) completed    Reason for visit: RECHECK    Lindy CORRIGAN

## 2025-07-28 DIAGNOSIS — J45.40 MODERATE PERSISTENT ASTHMA WITHOUT COMPLICATION: ICD-10-CM

## 2025-07-28 RX ORDER — MOMETASONE FUROATE AND FORMOTEROL FUMARATE DIHYDRATE 100; 5 UG/1; UG/1
2 AEROSOL RESPIRATORY (INHALATION) 2 TIMES DAILY
Qty: 13 G | Refills: 5 | Status: SHIPPED | OUTPATIENT
Start: 2025-07-28

## 2025-07-31 ENCOUNTER — VIRTUAL VISIT (OUTPATIENT)
Dept: EDUCATION SERVICES | Facility: CLINIC | Age: 55
End: 2025-07-31
Payer: COMMERCIAL

## 2025-07-31 DIAGNOSIS — E10.9 TYPE 1 DIABETES MELLITUS WITHOUT COMPLICATION (H): Primary | ICD-10-CM

## 2025-07-31 NOTE — NURSING NOTE
[facility] [address] [facility] [facility] [address]    Is the patient currently in the state of [place]? YES    Visit mode: VIDEO    If the visit is dropped, the patient can be reconnected by:VIDEO VISIT: Text to cell phone:   Telephone Information:   Mobile [phone]       Will anyone else be joining the visit? NO  (If patient encounters technical issues they should call [phone]    Are changes needed to the allergy or medication list? No    Are refills needed on medications prescribed by this physician? NO    Rooming Documentation:  Not applicable    Reason for visit: Diabetes Education    Sandra CORRIGAN

## 2025-07-31 NOTE — PROGRESS NOTES
Diabetes Self-Management Education & Support Visit- Short    Dali Martines presented today for education related to Type 1 diabetes    Patient is being treated with:  Omnipod 5 Insulin Pump    Year of diagnosis: 1988  Referring provider:  Felicia Mckeon MD  Living Situation: Lives alone and has a dog.  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  Currently not employed    Purpose of today's visit:  Ongoing DSME and insulin pump management.      Subjective/Objective:  She recently moved her mother, who has dementia, into a long term memory care facility near her home, and close to her mother's physician offices.  She is feeling like her stress level is less.      Using the Omnipod 5 insulin pump and the Dexcom G6 sensor.  She's happy with the G6 and does not want to switch to the Dexcom G7 sensor at this time.  Her insulin pump report appears below:     Assessment/Plan:  Dali still working on pre-bolusing.  She states that she thinks about 80-90% of the time, she is delivering her bolus right before eating.  She is having some hypoglycemia after correcting her glucose.  She saw Dr. Mckeon about 2 weeks ago and at that time she was having high post-prandial glucoses, so her insulin to carb ratio was changed from 15 to 12.  I think the hypoglycemia after corrections is related to her correction factor being too aggressive, so the only change we made in her pump settings today was to change her correction factor from 50 to 60 for the entire 24 hour period.        She is taking 6 units of Tresiba in addition to the insulin delivered by the pump.      Follow-up:    Has a follow up appointment with Shakira Sebastian in November.  Request that Dali call before then if she is having big excursions in her glucose.       Time spent in this visit: 30 minutes

## 2025-08-18 ASSESSMENT — SLEEP AND FATIGUE QUESTIONNAIRES
HOW LIKELY ARE YOU TO NOD OFF OR FALL ASLEEP WHILE WATCHING TV: HIGH CHANCE OF DOZING
HOW LIKELY ARE YOU TO NOD OFF OR FALL ASLEEP WHILE LYING DOWN TO REST IN THE AFTERNOON WHEN CIRCUMSTANCES PERMIT: HIGH CHANCE OF DOZING
HOW LIKELY ARE YOU TO NOD OFF OR FALL ASLEEP WHILE SITTING QUIETLY AFTER LUNCH WITHOUT ALCOHOL: SLIGHT CHANCE OF DOZING
HOW LIKELY ARE YOU TO NOD OFF OR FALL ASLEEP WHEN YOU ARE A PASSENGER IN A CAR FOR AN HOUR WITHOUT A BREAK: SLIGHT CHANCE OF DOZING
HOW LIKELY ARE YOU TO NOD OFF OR FALL ASLEEP WHILE SITTING AND TALKING TO SOMEONE: WOULD NEVER DOZE
HOW LIKELY ARE YOU TO NOD OFF OR FALL ASLEEP WHILE SITTING AND READING: HIGH CHANCE OF DOZING
HOW LIKELY ARE YOU TO NOD OFF OR FALL ASLEEP WHILE SITTING INACTIVE IN A PUBLIC PLACE: SLIGHT CHANCE OF DOZING
HOW LIKELY ARE YOU TO NOD OFF OR FALL ASLEEP IN A CAR, WHILE STOPPED FOR A FEW MINUTES IN TRAFFIC: WOULD NEVER DOZE

## 2025-08-19 ENCOUNTER — VIRTUAL VISIT (OUTPATIENT)
Dept: SLEEP MEDICINE | Facility: CLINIC | Age: 55
End: 2025-08-19
Payer: COMMERCIAL

## 2025-08-19 DIAGNOSIS — F51.04 CHRONIC INSOMNIA: Primary | ICD-10-CM

## 2025-08-19 DIAGNOSIS — Z63.6 CAREGIVER STRESS: ICD-10-CM

## 2025-08-19 PROCEDURE — 90834 PSYTX W PT 45 MINUTES: CPT | Mod: 95 | Performed by: PSYCHOLOGIST

## 2025-08-19 PROCEDURE — 1126F AMNT PAIN NOTED NONE PRSNT: CPT | Mod: 95 | Performed by: PSYCHOLOGIST

## 2025-08-19 SDOH — SOCIAL STABILITY - SOCIAL INSECURITY: DEPENDENT RELATIVE NEEDING CARE AT HOME: Z63.6

## 2025-08-19 ASSESSMENT — PAIN SCALES - GENERAL: PAINLEVEL_OUTOF10: NO PAIN (0)

## (undated) DEVICE — KIT ENDO TURNOVER/PROCEDURE CARRY-ON 101822

## (undated) DEVICE — TUBING SUCTION MEDI-VAC 1/4"X20' N620A

## (undated) DEVICE — SUCTION MANIFOLD NEPTUNE 2 SYS 1 PORT 702-025-000

## (undated) DEVICE — SYR 30ML SLIP TIP W/O NDL 302833

## (undated) DEVICE — GLOVE EXAM NITRILE LG PF LATEX FREE 5064

## (undated) DEVICE — SPECIMEN CONTAINER 3OZ W/FORMALIN 59901

## (undated) DEVICE — SUCTION CATH AIRLIFE TRI-FLO W/CONTROL PORT 14FR  T60C

## (undated) DEVICE — SOL WATER IRRIG 500ML BOTTLE 2F7113

## (undated) DEVICE — ENDO BITE BLOCK ADULT OMNI-BLOC

## (undated) DEVICE — ENDO FORCEP BX CAPTURA PRO SPIKE G50696

## (undated) DEVICE — GOWN IMPERVIOUS 2XL BLUE

## (undated) RX ORDER — BETAMETHASONE SODIUM PHOSPHATE AND BETAMETHASONE ACETATE 3; 3 MG/ML; MG/ML
INJECTION, SUSPENSION INTRA-ARTICULAR; INTRALESIONAL; INTRAMUSCULAR; SOFT TISSUE
Status: DISPENSED
Start: 2017-10-11

## (undated) RX ORDER — LIDOCAINE HYDROCHLORIDE 10 MG/ML
INJECTION, SOLUTION EPIDURAL; INFILTRATION; INTRACAUDAL; PERINEURAL
Status: DISPENSED
Start: 2022-11-09

## (undated) RX ORDER — FENTANYL CITRATE 50 UG/ML
INJECTION, SOLUTION INTRAMUSCULAR; INTRAVENOUS
Status: DISPENSED
Start: 2020-06-23

## (undated) RX ORDER — LIDOCAINE HYDROCHLORIDE 10 MG/ML
INJECTION, SOLUTION EPIDURAL; INFILTRATION; INTRACAUDAL; PERINEURAL
Status: DISPENSED
Start: 2018-04-11

## (undated) RX ORDER — DEXAMETHASONE SODIUM PHOSPHATE 4 MG/ML
INJECTION, SOLUTION INTRA-ARTICULAR; INTRALESIONAL; INTRAMUSCULAR; INTRAVENOUS; SOFT TISSUE
Status: DISPENSED
Start: 2022-11-09

## (undated) RX ORDER — BETAMETHASONE SODIUM PHOSPHATE AND BETAMETHASONE ACETATE 3; 3 MG/ML; MG/ML
INJECTION, SUSPENSION INTRA-ARTICULAR; INTRALESIONAL; INTRAMUSCULAR; SOFT TISSUE
Status: DISPENSED
Start: 2018-04-04

## (undated) RX ORDER — LIDOCAINE HYDROCHLORIDE 10 MG/ML
INJECTION, SOLUTION INFILTRATION; PERINEURAL
Status: DISPENSED
Start: 2018-04-04

## (undated) RX ORDER — LIDOCAINE HYDROCHLORIDE 10 MG/ML
INJECTION, SOLUTION INFILTRATION; PERINEURAL
Status: DISPENSED
Start: 2017-10-11

## (undated) RX ORDER — FENTANYL CITRATE 50 UG/ML
INJECTION, SOLUTION INTRAMUSCULAR; INTRAVENOUS
Status: DISPENSED
Start: 2019-03-15

## (undated) RX ORDER — FENTANYL CITRATE 50 UG/ML
INJECTION, SOLUTION INTRAMUSCULAR; INTRAVENOUS
Status: DISPENSED
Start: 2023-10-05